# Patient Record
Sex: MALE | Race: BLACK OR AFRICAN AMERICAN | NOT HISPANIC OR LATINO | Employment: PART TIME | URBAN - METROPOLITAN AREA
[De-identification: names, ages, dates, MRNs, and addresses within clinical notes are randomized per-mention and may not be internally consistent; named-entity substitution may affect disease eponyms.]

---

## 2017-01-08 ENCOUNTER — HOSPITAL ENCOUNTER (EMERGENCY)
Facility: CLINIC | Age: 57
Discharge: HOME | End: 2017-01-08
Attending: EMERGENCY MEDICINE | Admitting: EMERGENCY MEDICINE

## 2017-01-08 VITALS
HEART RATE: 65 BPM | DIASTOLIC BLOOD PRESSURE: 110 MMHG | SYSTOLIC BLOOD PRESSURE: 180 MMHG | OXYGEN SATURATION: 93 % | TEMPERATURE: 95.8 F | RESPIRATION RATE: 16 BRPM

## 2017-01-08 DIAGNOSIS — N04.9 NEPHROTIC SYNDROME: ICD-10-CM

## 2017-01-08 DIAGNOSIS — R80.9 PROTEINURIA: ICD-10-CM

## 2017-01-08 DIAGNOSIS — R60.9 EDEMA, UNSPECIFIED TYPE: ICD-10-CM

## 2017-01-08 LAB
ALBUMIN UR-MCNC: 300 MG/DL
ANION GAP SERPL CALCULATED.3IONS-SCNC: 5 MMOL/L (ref 3–14)
APPEARANCE UR: ABNORMAL
BACTERIA #/AREA URNS HPF: ABNORMAL /HPF
BASOPHILS # BLD AUTO: 0 10E9/L (ref 0–0.2)
BASOPHILS NFR BLD AUTO: 0.4 %
BILIRUB UR QL STRIP: NEGATIVE
BUN SERPL-MCNC: 15 MG/DL (ref 7–30)
CALCIUM SERPL-MCNC: 7.8 MG/DL (ref 8.5–10.1)
CHLORIDE SERPL-SCNC: 108 MMOL/L (ref 94–109)
CO2 SERPL-SCNC: 28 MMOL/L (ref 20–32)
COLOR UR AUTO: YELLOW
CREAT SERPL-MCNC: 0.83 MG/DL (ref 0.66–1.25)
DIFFERENTIAL METHOD BLD: ABNORMAL
EOSINOPHIL # BLD AUTO: 0.2 10E9/L (ref 0–0.7)
EOSINOPHIL NFR BLD AUTO: 3.1 %
ERYTHROCYTE [DISTWIDTH] IN BLOOD BY AUTOMATED COUNT: 12.7 % (ref 10–15)
GFR SERPL CREATININE-BSD FRML MDRD: ABNORMAL ML/MIN/1.7M2
GLUCOSE SERPL-MCNC: 123 MG/DL (ref 70–99)
GLUCOSE UR STRIP-MCNC: NEGATIVE MG/DL
HCT VFR BLD AUTO: 42.8 % (ref 40–53)
HGB BLD-MCNC: 14.8 G/DL (ref 13.3–17.7)
HGB UR QL STRIP: ABNORMAL
HYALINE CASTS #/AREA URNS LPF: 14 /LPF (ref 0–2)
IMM GRANULOCYTES # BLD: 0 10E9/L (ref 0–0.4)
IMM GRANULOCYTES NFR BLD: 0.2 %
KETONES UR STRIP-MCNC: NEGATIVE MG/DL
LEUKOCYTE ESTERASE UR QL STRIP: NEGATIVE
LYMPHOCYTES # BLD AUTO: 2 10E9/L (ref 0.8–5.3)
LYMPHOCYTES NFR BLD AUTO: 38.8 %
MCH RBC QN AUTO: 33.2 PG (ref 26.5–33)
MCHC RBC AUTO-ENTMCNC: 34.6 G/DL (ref 31.5–36.5)
MCV RBC AUTO: 96 FL (ref 78–100)
MONOCYTES # BLD AUTO: 0.5 10E9/L (ref 0–1.3)
MONOCYTES NFR BLD AUTO: 9.5 %
MUCOUS THREADS #/AREA URNS LPF: PRESENT /LPF
NEUTROPHILS # BLD AUTO: 2.5 10E9/L (ref 1.6–8.3)
NEUTROPHILS NFR BLD AUTO: 48 %
NITRATE UR QL: NEGATIVE
NRBC # BLD AUTO: 0 10*3/UL
NRBC BLD AUTO-RTO: 0 /100
OVAL FAT BODIES #/AREA URNS HPF: ABNORMAL /HPF
PH UR STRIP: 6.5 PH (ref 5–7)
PLATELET # BLD AUTO: 117 10E9/L (ref 150–450)
POTASSIUM SERPL-SCNC: 4.3 MMOL/L (ref 3.4–5.3)
RBC # BLD AUTO: 4.46 10E12/L (ref 4.4–5.9)
RBC #/AREA URNS AUTO: 34 /HPF (ref 0–2)
SODIUM SERPL-SCNC: 141 MMOL/L (ref 133–144)
SP GR UR STRIP: 1.02 (ref 1–1.03)
URN SPEC COLLECT METH UR: ABNORMAL
UROBILINOGEN UR STRIP-MCNC: 4 MG/DL (ref 0–2)
WBC # BLD AUTO: 5.2 10E9/L (ref 4–11)
WBC #/AREA URNS AUTO: 3 /HPF (ref 0–2)

## 2017-01-08 PROCEDURE — 80048 BASIC METABOLIC PNL TOTAL CA: CPT | Performed by: EMERGENCY MEDICINE

## 2017-01-08 PROCEDURE — 81001 URINALYSIS AUTO W/SCOPE: CPT | Performed by: EMERGENCY MEDICINE

## 2017-01-08 PROCEDURE — 99284 EMERGENCY DEPT VISIT MOD MDM: CPT | Mod: 25 | Performed by: EMERGENCY MEDICINE

## 2017-01-08 PROCEDURE — 96374 THER/PROPH/DIAG INJ IV PUSH: CPT | Performed by: EMERGENCY MEDICINE

## 2017-01-08 PROCEDURE — 99284 EMERGENCY DEPT VISIT MOD MDM: CPT | Mod: Z6 | Performed by: EMERGENCY MEDICINE

## 2017-01-08 PROCEDURE — 25000125 ZZHC RX 250: Performed by: EMERGENCY MEDICINE

## 2017-01-08 PROCEDURE — 85025 COMPLETE CBC W/AUTO DIFF WBC: CPT | Performed by: EMERGENCY MEDICINE

## 2017-01-08 RX ORDER — BUMETANIDE 1 MG/1
1 TABLET ORAL DAILY
Qty: 60 TABLET | Refills: 0 | Status: ON HOLD | OUTPATIENT
Start: 2017-01-08 | End: 2017-02-14

## 2017-01-08 RX ORDER — FUROSEMIDE 10 MG/ML
60 INJECTION INTRAMUSCULAR; INTRAVENOUS ONCE
Status: COMPLETED | OUTPATIENT
Start: 2017-01-08 | End: 2017-01-08

## 2017-01-08 RX ADMIN — FUROSEMIDE 60 MG: 10 INJECTION, SOLUTION INTRAVENOUS at 14:40

## 2017-01-08 ASSESSMENT — ENCOUNTER SYMPTOMS
BRUISES/BLEEDS EASILY: 0
NECK PAIN: 0
NUMBNESS: 0
FEVER: 0
DIFFICULTY URINATING: 0
ABDOMINAL PAIN: 0
COLOR CHANGE: 0
VOMITING: 0
LIGHT-HEADEDNESS: 0
ADENOPATHY: 0
BACK PAIN: 0
SHORTNESS OF BREATH: 0
WEAKNESS: 0
NECK STIFFNESS: 0
POLYDIPSIA: 0
NAUSEA: 0
AGITATION: 0
CHILLS: 0

## 2017-01-08 NOTE — ED PROVIDER NOTES
History     Chief Complaint   Patient presents with     Leg Swelling     for one month, denies SOB      The history is provided by the patient. The history is limited by a language barrier ([place] citizen).     Wilfredo Yoon is a 56 year old [place] citizen speaking male with history of glomerulonephritis and cryoglobulinemia  who presents to the ED today for one month of bilateral lower extremity swelling and some facial swelling. Patient states he hasn't taken his medication in about 4 months due to problems with his insurance. He denies shortness of breath, or chest pain. No fevers. His primary doctor is Dr. Smith. No other concerns or complaints.        I have reviewed the Medications, Allergies, Past Medical and Surgical History, and Social History in the [facility] system.    PAST MEDICAL HISTORY:   Past Medical History   Diagnosis Date     Hepatitis B      Cryoglobulinemia (H)      Glomerulonephritis      Surgical complication        PAST SURGICAL HISTORY:   Past Surgical History   Procedure Laterality Date     Hand surgery Right      C hand/finger surgery unlisted         FAMILY HISTORY:   Family History   Problem Relation Age of Onset     Liver Cancer No family hx of      Hepatitis No family hx of        SOCIAL HISTORY:   Social History   Substance Use Topics     Smoking status: Current Every Day Smoker -- 0.50 packs/day for 40 years     Types: Cigarettes     Last Attempt to Quit: 11/04/2015     Smokeless tobacco: Never Used     Alcohol Use: No        No current facility-administered medications for this encounter.     Current Outpatient Prescriptions   Medication     bumetanide (BUMEX) 1 MG tablet     methylPREDNISolone (MEDROL DOSEPAK) 4 MG tablet     ibuprofen (ADVIL,MOTRIN) 600 MG tablet     azithromycin (ZITHROMAX) 250 MG tablet     albuterol (PROAIR HFA, PROVENTIL HFA, VENTOLIN HFA) 108 (90 BASE) MCG/ACT inhaler     naproxen (NAPROSYN) 500 MG tablet     oxyCODONE (ROXICODONE) 5 MG immediate release tablet      entecavir (BARACLUDE) 0.5 MG tablet     metoprolol (TOPROL-XL) 50 MG 24 hr tablet     [DISCONTINUED] bumetanide (BUMEX) 1 MG tablet      No Known Allergies      Review of Systems   Constitutional: Negative for fever and chills.   HENT: Negative for congestion.    Eyes: Negative for visual disturbance.   Respiratory: Negative for shortness of breath.    Cardiovascular: Negative for chest pain.   Gastrointestinal: Negative for nausea, vomiting and abdominal pain.   Endocrine: Negative for polydipsia and polyuria.   Genitourinary: Negative for difficulty urinating.   Musculoskeletal: Negative for back pain, neck pain and neck stiffness.        Leg swelling   Skin: Negative for color change.   Neurological: Negative for weakness, light-headedness and numbness.   Hematological: Negative for adenopathy. Does not bruise/bleed easily.   Psychiatric/Behavioral: Negative for behavioral problems and agitation.       Physical Exam   BP: (!) 173/100 mmHg  Pulse: 82  Temp: 95.8  F (35.4  C)  Resp: 20  SpO2: 98 %  Physical Exam   Constitutional: He is oriented to person, place, and time. He appears well-developed and well-nourished. No distress.   HENT:   Head: Normocephalic and atraumatic.   Mouth/Throat: Oropharynx is clear and moist. No oropharyngeal exudate.   Mild facial edema   Eyes: Conjunctivae and EOM are normal. Pupils are equal, round, and reactive to light. No scleral icterus.   Neck: Normal range of motion. Neck supple.   Cardiovascular: Normal rate, normal heart sounds and intact distal pulses.    Pulmonary/Chest: Effort normal and breath sounds normal. No respiratory distress. He has no wheezes. He has no rales.   Abdominal: Soft. Bowel sounds are normal. There is no tenderness.   Musculoskeletal: Normal range of motion. He exhibits edema ( 1+ b/l LEs). He exhibits no tenderness.   Neurological: He is alert and oriented to person, place, and time. No cranial nerve deficit. He exhibits normal muscle tone.  Coordination normal.   Skin: Skin is warm. No rash noted. He is not diaphoretic. No erythema.   Psychiatric: He has a normal mood and affect. His behavior is normal. Judgment and thought content normal.   Nursing note and vitals reviewed.      ED Course   Procedures       2:14 PM  The patient was seen and examined by Dr. Dean in Room 17.          Critical Care time:  none               Labs Ordered and Resulted from Time of ED Arrival Up to the Time of Departure from the ED   CBC WITH PLATELETS DIFFERENTIAL - Abnormal; Notable for the following:     MCH 33.2 (*)     Platelet Count 117 (*)     All other components within normal limits   BASIC METABOLIC PANEL - Abnormal; Notable for the following:     Glucose 123 (*)     Calcium 7.8 (*)     All other components within normal limits   ROUTINE UA WITH MICROSCOPIC - Abnormal; Notable for the following:     Blood Urine Moderate (*)     Protein Albumin Urine 300 (*)     Urobilinogen mg/dL 4.0 (*)     WBC Urine 3 (*)     RBC Urine 34 (*)     Bacteria Urine Few (*)     Mucous Urine Present (*)     Hyaline Casts 14 (*)     All other components within normal limits   PERIPHERAL IV CATHETER       Assessments & Plan (with Medical Decision Making)    56-year-old male with history of nephrotic syndrome presenting with diffuse edema. Differential diagnosis: Nephrotic syndrome, acute kidney injury, electrolyte abnormalities, unlikely heart failure, unlikely hepatitis.    After thorough history and physical examination patient appears to  be in no distress. He has no difficulty breathing whatsoever and denies dyspnea on exertional or chest pain. I do not believe he has a deep venous thrombosis given the fact that he has mild diffuse body edema. I will treat him with intravenous Lasix. I will obtain laboratory studies for  further evaluation. He agrees with plan.    Patient s laboratory studies returned without any evidence of leukocytosis WBC normal at 5200. There is no anemia  hemoglobin is normal at 14.8. The electrolyte s show normal renal function with creatinine 0.83 and GFR greater than 90. There is a proteinuria, however, no evidence of urinary tract infection. Patient does have hematuria as well. He started to diurese with intravenous Lasix. At this point I do not believe he needs to be admitted to the hospital, however, given the fact that he has not had his diuretics in several months I will prescribe him Bumex which he was taking in the past. I will refer him to follow-up with his nephrologist and his primary care physician for further evaluation. He agrees with the plan. He also agrees to address his hypertension with his nephrologist and his primary care physician and return if his symptoms worsen. At this point he is stable for discharge.    I have reviewed the nursing notes.    I have reviewed the findings, diagnosis, plan and need for follow up with the patient.    Discharge Medication List as of 1/8/2017  3:32 PM          Final diagnoses:   Edema, unspecified type   Nephrotic syndrome     IFartun , am serving as a trained medical scribe to document services personally performed by Lonnie Dean MD, based on the provider's statements to me.   ILonnie MD, was physically present and have reviewed and verified the accuracy of this note documented by Fartun Maier.      1/8/2017   [facility], [place], EMERGENCY DEPARTMENT      Marlys Dean MD  01/08/17 1814

## 2017-01-08 NOTE — ED AVS SNAPSHOT
[facility], [place], Emergency Department    [address]    [place] [place] [place] [address]    Phone:  [phone]    Fax:  [phone]                                       Wilfredo Yoon   MRN: [id number]    Department:  [facility], Emergency Department   Date of Visit:  1/8/2017           After Visit Summary Signature Page     I have received my discharge instructions, and my questions have been answered. I have discussed any challenges I see with this plan with the nurse or doctor.    ..........................................................................................................................................  Patient/Patient Representative Signature      ..........................................................................................................................................  Patient Representative Print Name and Relationship to Patient    ..................................................               ................................................  Date                                            Time    ..........................................................................................................................................  Reviewed by Signature/Title    ...................................................              ..............................................  Date                                                            Time

## 2017-01-08 NOTE — DISCHARGE INSTRUCTIONS
Please make an appointment to follow up with Your Primary Care Provider in 2-3 days for further evaluation and recommendations.  Coping with Edema  What is edema?  Edema is the build-up of fluid in the body, which causes swelling. Swelling most commonly occurs in the feet, ankles, lower legs or hands.  Swelling can occur in the belly or chest may be a sign of a more severe problem.  Certain medicines or conditions can make the swelling worse.  Symptoms include:    Feet and lower legs get larger when you sit or walk.    Hands feel tight when you make a fist.    When you push on the skin, skin stays dented.    Shiny, tight skin.    Fast weight gain.  How is it treated?  Your care team may give you a medicine to reduce the swelling.  They may also suggest that you meet with a dietitian. He or she can help with food choices to reduce the swelling.  What can I do about the swelling?    Place your feet above your heart 3 times a day: Sit with your feet up on a stool with a pillow. Sit on the bed or couch with two pillows under your feet.    Do not stand for long periods of time.    Wear loose-fitting clothes.    Do not cross your legs.    Reduce the salt in your diet.   These foods are high in salt:    Chips, soup    Frozen meals, TV dinners    Carrizales, lunch meat, ham    Sauces (soy, canned spaghetti sauce)    Walk or do other exercise.    Wear compression stockings.    Drink water as normal.    Weigh yourself every day at the same time to keep track of weight gain.  When should I call my care team?  Call your care team if:    You have a hard time breathing.    You gained 5 pounds or more in 1 week.    Your hands or feet feel cold when you touch them.    You are peeing very little or not at all.    Swelling is moving up your arms or legs.    Your tongue is swelling.    You cannot eat for more than a day  If you have any side effects, call us. We can help you manage these problems.  For more information,  see:  www.chemocare.com  www.cancer.org/treatment/treatmentsandsideeffects/physicalsideeffects/dealingwithsymptomsathome  www.cancer.gov/cancertopics/coping/chemotherapy-and-you  Comments:  __________________________________________  __________________________________________  __________________________________________  __________________________________________  __________________________________________  __________________________________________  __________________________________________  For informational purposes only. Not to replace the advice of your health care provider.  Copyright   2014 [employer]. All rights reserved. SMARTworks 539968 - REV 03/16.

## 2017-01-08 NOTE — ED AVS SNAPSHOT
[facility], Emergency Department    [address]    [place] [address]    Phone:  [phone]    Fax:  [phone]                                       Wilfredo Yoon   MRN: [id number]    Department:  [facility], Emergency Department   Date of Visit:  1/8/2017           Patient Information     Date Of Birth          1960        Your diagnoses for this visit were:     Edema, unspecified type     Nephrotic syndrome     Proteinuria        You were seen by Marlys Dean MD.        Discharge Instructions       Please make an appointment to follow up with Your Primary Care Provider in 2-3 days for further evaluation and recommendations.  Coping with Edema  What is edema?  Edema is the build-up of fluid in the body, which causes swelling. Swelling most commonly occurs in the feet, ankles, lower legs or hands.  Swelling can occur in the belly or chest may be a sign of a more severe problem.  Certain medicines or conditions can make the swelling worse.  Symptoms include:    Feet and lower legs get larger when you sit or walk.    Hands feel tight when you make a fist.    When you push on the skin, skin stays dented.    Shiny, tight skin.    Fast weight gain.  How is it treated?  Your care team may give you a medicine to reduce the swelling.  They may also suggest that you meet with a dietitian. He or she can help with food choices to reduce the swelling.  What can I do about the swelling?    Place your feet above your heart 3 times a day: Sit with your feet up on a stool with a pillow. Sit on the bed or couch with two pillows under your feet.    Do not stand for long periods of time.    Wear loose-fitting clothes.    Do not cross your legs.    Reduce the salt in your diet.   These foods are high in salt:    Chips, soup    Frozen meals, TV dinners    Carrizales, lunch meat, ham    Sauces (soy, canned spaghetti sauce)    Walk or do other exercise.    Wear compression stockings.    Drink water as  normal.    Weigh yourself every day at the same time to keep track of weight gain.  When should I call my care team?  Call your care team if:    You have a hard time breathing.    You gained 5 pounds or more in 1 week.    Your hands or feet feel cold when you touch them.    You are peeing very little or not at all.    Swelling is moving up your arms or legs.    Your tongue is swelling.    You cannot eat for more than a day  If you have any side effects, call us. We can help you manage these problems.  For more information, see:  www.chemocare.com  www.cancer.org/treatment/treatmentsandsideeffects/physicalsideeffects/dealingwithsymptomsathome  www.cancer.gov/cancertopics/coping/chemotherapy-and-you  Comments:  __________________________________________  __________________________________________  __________________________________________  __________________________________________  __________________________________________  __________________________________________  __________________________________________  For informational purposes only. Not to replace the advice of your health care provider.  Copyright   2014 [employer]. All rights reserved. SMARTworks 853153 - REV 03/16.        Discharge References/Attachments     PERIPHERAL EDEMA, BILATERAL (ENGLISH)      24 Hour Appointment Hotline       To make an appointment at any [facility], call [phone] ([phone]). If you don't have a family doctor or clinic, we will help you find one. [facility] are conveniently located to serve the needs of you and your family.             Review of your medicines      Our records show that you are taking the medicines listed below. If these are incorrect, please call your family doctor or clinic.        Dose / Directions Last dose taken    albuterol 108 (90 BASE) MCG/ACT Inhaler   Commonly known as:  PROAIR HFA/PROVENTIL HFA/VENTOLIN HFA   Dose:  2 puff   Quantity:  1 Inhaler        Inhale 2  puffs into the lungs every 6 hours as needed for shortness of breath / dyspnea or wheezing   Refills:  0        azithromycin 250 MG tablet   Commonly known as:  ZITHROMAX   Quantity:  6 tablet        Two tablets first day, then one tablet daily for four days.   Refills:  0        bumetanide 1 MG tablet   Commonly known as:  BUMEX   Dose:  1 mg   Quantity:  60 tablet        Take 1 tablet (1 mg) by mouth daily   Refills:  0        entecavir 0.5 MG tablet   Commonly known as:  BARACLUDE   Dose:  0.5 mg   Quantity:  30 tablet        Take 1 tablet (0.5 mg) by mouth daily   Refills:  6        ibuprofen 600 MG tablet   Commonly known as:  ADVIL/MOTRIN   Dose:  600 mg   Quantity:  20 tablet        Take 1 tablet (600 mg) by mouth every 6 hours as needed for moderate pain   Refills:  0        methylPREDNISolone 4 MG tablet   Commonly known as:  MEDROL DOSEPAK   Quantity:  21 tablet        Follow package instructions   Refills:  0        metoprolol 50 MG 24 hr tablet   Commonly known as:  TOPROL-XL   Dose:  50 mg        Take 50 mg by mouth daily   Refills:  0        naproxen 500 MG tablet   Commonly known as:  NAPROSYN   Dose:  500 mg   Quantity:  30 tablet        Take 1 tablet (500 mg) by mouth 2 times daily as needed for moderate pain   Refills:  0        oxyCODONE 5 MG IR tablet   Commonly known as:  ROXICODONE   Dose:  5 mg   Quantity:  10 tablet        Take 1 tablet (5 mg) by mouth every 6 hours as needed for pain   Refills:  0                Prescriptions were sent or printed at these locations (1 Prescription)                   Other Prescriptions                Printed at Department/Unit printer (1 of 1)         bumetanide (BUMEX) 1 MG tablet                Procedures and tests performed during your visit     Basic metabolic panel    CBC with platelets differential    UA with Microscopic      Orders Needing Specimen Collection     None      Pending Results     No orders found from 1/7/2017 to 1/9/2017.           "  Pending Culture Results     No orders found from 2017 to 2017.            Thank you for choosing [facility]       Thank you for choosing [facility] for your care. Our goal is always to provide you with excellent care. Hearing back from our patients is one way we can continue to improve our services. Please take a few minutes to complete the written survey that you may receive in the mail after you visit with us. Thank you!        [employer] [employer] Information     [employer] lets you send messages to your doctor, view your test results, renew your prescriptions, schedule appointments and more. To sign up, go to [email or web address] . Click on \"Log in\" on the left side of the screen, which will take you to the Welcome page. Then click on \"Sign up Now\" on the right side of the page.     You will be asked to enter the access code listed below, as well as some personal information. Please follow the directions to create your username and password.     Your access code is: [email or web address]  Expires: 2017 12:35 PM     Your access code will  in 90 days. If you need help or a new code, please call your [facility] clinic or [phone].        Care EveryWhere ID     This is your Care EveryWhere ID. This could be used by other organizations to access your [facility] medical records  [id number]        After Visit Summary       This is your record. Keep this with you and show to your community pharmacist(s) and doctor(s) at your next visit.                  "

## 2017-02-12 ENCOUNTER — APPOINTMENT (OUTPATIENT)
Dept: GENERAL RADIOLOGY | Facility: CLINIC | Age: 57
End: 2017-02-12
Attending: FAMILY MEDICINE

## 2017-02-12 ENCOUNTER — HOSPITAL ENCOUNTER (OUTPATIENT)
Facility: CLINIC | Age: 57
Setting detail: OBSERVATION
Discharge: HOME | End: 2017-02-14
Attending: FAMILY MEDICINE | Admitting: HOSPITALIST

## 2017-02-12 DIAGNOSIS — R80.9 PROTEINURIA: ICD-10-CM

## 2017-02-12 DIAGNOSIS — B18.1 CHRONIC VIRAL HEPATITIS B WITHOUT DELTA AGENT AND WITHOUT COMA (H): ICD-10-CM

## 2017-02-12 DIAGNOSIS — N04.9 NEPHROTIC SYNDROME: Primary | ICD-10-CM

## 2017-02-12 DIAGNOSIS — N04.9: ICD-10-CM

## 2017-02-12 LAB
ALBUMIN SERPL-MCNC: 1.5 G/DL (ref 3.4–5)
ALP SERPL-CCNC: 180 U/L (ref 40–150)
ALT SERPL W P-5'-P-CCNC: 38 U/L (ref 0–70)
ANION GAP SERPL CALCULATED.3IONS-SCNC: 6 MMOL/L (ref 3–14)
AST SERPL W P-5'-P-CCNC: 63 U/L (ref 0–45)
BASOPHILS # BLD AUTO: 0 10E9/L (ref 0–0.2)
BASOPHILS NFR BLD AUTO: 0.8 %
BILIRUB SERPL-MCNC: 0.3 MG/DL (ref 0.2–1.3)
BUN SERPL-MCNC: 21 MG/DL (ref 7–30)
CALCIUM SERPL-MCNC: 7.7 MG/DL (ref 8.5–10.1)
CHLORIDE SERPL-SCNC: 105 MMOL/L (ref 94–109)
CO2 SERPL-SCNC: 28 MMOL/L (ref 20–32)
CREAT SERPL-MCNC: 1.03 MG/DL (ref 0.66–1.25)
DIFFERENTIAL METHOD BLD: ABNORMAL
EOSINOPHIL # BLD AUTO: 0.2 10E9/L (ref 0–0.7)
EOSINOPHIL NFR BLD AUTO: 3.4 %
ERYTHROCYTE [DISTWIDTH] IN BLOOD BY AUTOMATED COUNT: 12.2 % (ref 10–15)
GFR SERPL CREATININE-BSD FRML MDRD: 75 ML/MIN/1.7M2
GLUCOSE SERPL-MCNC: 77 MG/DL (ref 70–99)
HCT VFR BLD AUTO: 38.8 % (ref 40–53)
HGB BLD-MCNC: 13.3 G/DL (ref 13.3–17.7)
IMM GRANULOCYTES # BLD: 0 10E9/L (ref 0–0.4)
IMM GRANULOCYTES NFR BLD: 0.2 %
LYMPHOCYTES # BLD AUTO: 2 10E9/L (ref 0.8–5.3)
LYMPHOCYTES NFR BLD AUTO: 37.3 %
MCH RBC QN AUTO: 32.4 PG (ref 26.5–33)
MCHC RBC AUTO-ENTMCNC: 34.3 G/DL (ref 31.5–36.5)
MCV RBC AUTO: 95 FL (ref 78–100)
MONOCYTES # BLD AUTO: 0.5 10E9/L (ref 0–1.3)
MONOCYTES NFR BLD AUTO: 10.1 %
NEUTROPHILS # BLD AUTO: 2.6 10E9/L (ref 1.6–8.3)
NEUTROPHILS NFR BLD AUTO: 48.2 %
NRBC # BLD AUTO: 0 10*3/UL
NRBC BLD AUTO-RTO: 0 /100
PLATELET # BLD AUTO: 157 10E9/L (ref 150–450)
POTASSIUM SERPL-SCNC: 4.1 MMOL/L (ref 3.4–5.3)
PROT SERPL-MCNC: 5.3 G/DL (ref 6.8–8.8)
RBC # BLD AUTO: 4.1 10E12/L (ref 4.4–5.9)
SODIUM SERPL-SCNC: 139 MMOL/L (ref 133–144)
WBC # BLD AUTO: 5.3 10E9/L (ref 4–11)

## 2017-02-12 PROCEDURE — 99285 EMERGENCY DEPT VISIT HI MDM: CPT | Mod: Z6 | Performed by: FAMILY MEDICINE

## 2017-02-12 PROCEDURE — 99220 ZZC INITIAL OBSERVATION CARE,LEVL III: CPT | Mod: GC | Performed by: HOSPITALIST

## 2017-02-12 PROCEDURE — 81001 URINALYSIS AUTO W/SCOPE: CPT | Performed by: FAMILY MEDICINE

## 2017-02-12 PROCEDURE — 25000128 H RX IP 250 OP 636: Performed by: FAMILY MEDICINE

## 2017-02-12 PROCEDURE — 71020 XR CHEST 2 VW: CPT

## 2017-02-12 PROCEDURE — 85025 COMPLETE CBC W/AUTO DIFF WBC: CPT | Performed by: FAMILY MEDICINE

## 2017-02-12 PROCEDURE — 80053 COMPREHEN METABOLIC PANEL: CPT | Performed by: FAMILY MEDICINE

## 2017-02-12 PROCEDURE — 96374 THER/PROPH/DIAG INJ IV PUSH: CPT | Performed by: FAMILY MEDICINE

## 2017-02-12 PROCEDURE — 99285 EMERGENCY DEPT VISIT HI MDM: CPT | Mod: 25 | Performed by: FAMILY MEDICINE

## 2017-02-12 RX ORDER — FUROSEMIDE 10 MG/ML
40 INJECTION INTRAMUSCULAR; INTRAVENOUS ONCE
Status: COMPLETED | OUTPATIENT
Start: 2017-02-12 | End: 2017-02-12

## 2017-02-12 RX ORDER — ONDANSETRON 4 MG/1
4 TABLET, ORALLY DISINTEGRATING ORAL EVERY 6 HOURS PRN
Status: DISCONTINUED | OUTPATIENT
Start: 2017-02-12 | End: 2017-02-14 | Stop reason: HOSPADM

## 2017-02-12 RX ORDER — METOPROLOL TARTRATE 25 MG/1
25 TABLET, FILM COATED ORAL 2 TIMES DAILY
Status: DISCONTINUED | OUTPATIENT
Start: 2017-02-12 | End: 2017-02-12

## 2017-02-12 RX ORDER — NALOXONE HYDROCHLORIDE 0.4 MG/ML
.1-.4 INJECTION, SOLUTION INTRAMUSCULAR; INTRAVENOUS; SUBCUTANEOUS
Status: DISCONTINUED | OUTPATIENT
Start: 2017-02-12 | End: 2017-02-14 | Stop reason: HOSPADM

## 2017-02-12 RX ORDER — HYDRALAZINE HYDROCHLORIDE 20 MG/ML
5 INJECTION INTRAMUSCULAR; INTRAVENOUS EVERY 6 HOURS PRN
Status: DISCONTINUED | OUTPATIENT
Start: 2017-02-12 | End: 2017-02-12

## 2017-02-12 RX ORDER — ONDANSETRON 2 MG/ML
4 INJECTION INTRAMUSCULAR; INTRAVENOUS EVERY 6 HOURS PRN
Status: DISCONTINUED | OUTPATIENT
Start: 2017-02-12 | End: 2017-02-14 | Stop reason: HOSPADM

## 2017-02-12 RX ADMIN — FUROSEMIDE 40 MG: 10 INJECTION, SOLUTION INTRAVENOUS at 17:48

## 2017-02-12 NOTE — ED NOTES
Began feeling a generalized feeling of heaviness over the last 3 months. Recently experiencing decreased appetite, fatigue, and generalized swelling throughout his body, especially his feet. Patient is worried his BP high.

## 2017-02-12 NOTE — IP AVS SNAPSHOT
MRN:[id number]                      After Visit Summary   2/12/2017    Wilfredo Yoon    MRN: [id number]           Thank you!     Thank you for choosing [facility] for your care. Our goal is always to provide you with excellent care. Hearing back from our patients is one way we can continue to improve our services. Please take a few minutes to complete the written survey that you may receive in the mail after you visit with us. Thank you!        Patient Information     Date Of Birth          1960        About your hospital stay     You were admitted on:  February 12, 2017 You last received care in the:  [address] [facility]    You were discharged on:  February 14, 2017        Reason for your hospital stay       Hypertensive urgency  Nephrotic syndrome                  Who to Call     For medical emergencies, please call 911.  For non-urgent questions about your medical care, please call your primary care provider or clinic, [phone]          Attending Provider     Provider Specialty    Trever Orellana MD [facility], Kris Perera MD Internal Medicine    Carlos Ontiveros MD Internal Medicine    Ammy Cardona MD Internal Medicine       Primary Care Provider Office Phone # Fax #    Wu Smith [phone]       [facility] [address] [place] [address]        After Care Instructions     Activity       Your activity upon discharge: activity as tolerated            Diet       Follow this diet upon discharge: 2 gram sodium diet            Discharge Instructions       - Primary care visit on February 20th  - Nephrology follow-up in 3 months  - GI (liver clinic) in 1 month  - Start taking your discharge medications  - If you become dizzy or lightheaded, call your primary care clinic for instructions. You may need to stop your lisinopril and bumex  - Weigh yourself daily. If you gain more than 3 pounds in 1 day or more  than 5 pounds in 1 week, call your primary care clinic as you may need adjust your bumex dose            Monitor and record       weight every day                  Follow-up Appointments     Follow Up and recommended labs and tests       Primary care follow-up in 1 week. Repeat BMP    Nephrology at [facility] in 3 months    GI liver clinic at [facility] in 1 month                  Your next 10 appointments already scheduled     Feb 15, 2017  9:00 AM [facility] Inpatient with Peter Powell    Services Department ([facility])    [address] [place] [phone]               Feb 16, 2017  9:00 AM [facility] Inpatient with Kristopher Powell    Services Department ([facility])    [address] [place] [phone]               Feb 17, 2017  9:00 AM [facility] Inpatient with alex Harmon MD    Services Department ([facility])    [address] [place] [phone]               May 16, 2017  3:00 PM CDT   Lab with  LAB   [facility] ([facility])    [address] [place] [address] [phone]            May 16, 2017  4:00 PM CDT   (Arrive by 3:30 PM)   Return Visit with Isaak Evans MD   [facility]ph[facility] ([facility])    [address] [place] [phone] [phone]              Further instructions from your care team       We have scheduled an appointment for you on Monday 2/20 at 3:00 pm with your Primary Care Doctor, Dr. Wu Smith at the [facility].  Please bring your ID and insurance card with you to this appointment. They will have a [employer]  ready for you.  [place] [address] [place] [address]  Phone #: [phone]    Pending  "Results     Date and Time Order Name Status Description    2017 0947 Blood culture Preliminary     2017 0947 Blood culture Preliminary             Statement of Approval     Ordered          17 1248  I have reviewed and agree with all the recommendations and orders detailed in this document.  EFFECTIVE NOW     Approved and electronically signed by:  Bal Hunter MD             Admission Information     Date & Time Provider Department Dept. Phone    2017 Ammy Cardona MD [address] [facility] [place] [phone]      Your Vitals Were     Blood Pressure Temperature Respirations Height Weight Pulse Oximetry    125/83 97.9  F (36.6  C) (Oral) 16 1.803 m (5' 11\") 61.4 kg (135 lb 4.8 oz) 95%    BMI (Body Mass Index)                   18.87 kg/m2           MyChart Information     [employer] lets you send messages to your doctor, view your test results, renew your prescriptions, schedule appointments and more. To sign up, go to [email or web address] . Click on \"Log in\" on the left side of the screen, which will take you to the Welcome page. Then click on \"Sign up Now\" on the right side of the page.     You will be asked to enter the access code listed below, as well as some personal information. Please follow the directions to create your username and password.     Your access code is: [email or web address]  Expires: 2017 12:35 PM     Your access code will  in 90 days. If you need help or a new code, please call your [facility] clinic or [phone].        Care EveryWhere ID     This is your Care EveryWhere ID. This could be used by other organizations to access your [facility] medical records  [id number]           Review of your medicines      START taking        Dose / Directions    lisinopril 20 MG tablet   Commonly known as:  PRINIVIL/ZESTRIL   Used for:  Nephrotic syndrome        Dose:  20 mg   Take 1 tablet (20 mg) by mouth daily   Quantity:  30 tablet   Refills:  0         CONTINUE these " medicines which may have CHANGED, or have new prescriptions. If we are uncertain of the size of tablets/capsules you have at home, strength may be listed as something that might have changed.        Dose / Directions    bumetanide 2 MG tablet   Commonly known as:  BUMEX   This may have changed:    - medication strength  - how much to take   Used for:  Nephrotic syndrome        Dose:  2 mg   Take 1 tablet (2 mg) by mouth daily   Quantity:  30 tablet   Refills:  0         CONTINUE these medicines which have NOT CHANGED        Dose / Directions    entecavir 0.5 MG tablet   Commonly known as:  BARACLUDE   Used for:  Chronic viral hepatitis B without delta agent and without coma (H)        Dose:  0.5 mg   Take 1 tablet (0.5 mg) by mouth daily   Quantity:  30 tablet   Refills:  0         STOP taking     ibuprofen 600 MG tablet   Commonly known as:  ADVIL/MOTRIN           metoprolol 50 MG 24 hr tablet   Commonly known as:  TOPROL-XL           naproxen 500 MG tablet   Commonly known as:  NAPROSYN                Where to get your medicines      These medications were sent to [facility] [address] [address]     Phone:  [phone]     bumetanide 2 MG tablet    entecavir 0.5 MG tablet    lisinopril 20 MG tablet                Protect others around you: Learn how to safely use, store and throw away your medicines at www.disposemymeds.org.             Medication List: This is a list of all your medications and when to take them. Check marks below indicate your daily home schedule. Keep this list as a reference.      Medications           Morning Afternoon Evening Bedtime As Needed    bumetanide 2 MG tablet   Commonly known as:  BUMEX   Take 1 tablet (2 mg) by mouth daily   Last time this was given:  2 mg on 2/14/2017  8:31 AM                                entecavir 0.5 MG tablet   Commonly known as:  BARACLUDE   Take 1 tablet (0.5 mg) by mouth  daily                                lisinopril 20 MG tablet   Commonly known as:  PRINIVIL/ZESTRIL   Take 1 tablet (20 mg) by mouth daily   Last time this was given:  20 mg on 2/14/2017  8:31 AM

## 2017-02-12 NOTE — IP AVS SNAPSHOT
[address] [address]    Phone:  [phone]                                       After Visit Summary   2/12/2017    Wilfredo Yoon    MRN: [id number]           After Visit Summary Signature Page     I have received my discharge instructions, and my questions have been answered. I have discussed any challenges I see with this plan with the nurse or doctor.    ..........................................................................................................................................  Patient/Patient Representative Signature      ..........................................................................................................................................  Patient Representative Print Name and Relationship to Patient    ..................................................               ................................................  Date                                            Time    ..........................................................................................................................................  Reviewed by Signature/Title    ...................................................              ..............................................  Date                                                            Time

## 2017-02-12 NOTE — ED PROVIDER NOTES
History     Chief Complaint   Patient presents with     Hypertension     Began feeling a generalized feeling of heaviness over the last 3 months. Recently experiencing decreased appetite, fatigue, and generalized swelling throughout his body. Is worried his BP high     HPI  Wilfredo Yoon is a 56 year old male with a history of Hepatitis B, essential hypertension glomerulonephritis and cryoglobulinemia  who presents to the Emergency Department with various symptoms. The patient reports for the last three months he has been feeling generally weak and swollen (especially in his abdomen, back and legs) which he is concerned may be the result of his high blood pressure. He states he had similar symptoms about a year ago when he was not taking his antihypertensives and indeed he has been off of his metoprolol for about the last 4-5 months again secondary to insurance issues. He is still taking Demadex as prescribed. Over the last few days he has had decreased appetite, dizziness, bilateral eye pressure, dry mouth and fatigue which prompted him to seek evaluation. He had been following with a primary care provider but since he lost insurance has not been able to follow-up with him. He would appreciate help with getting insurance coverage. No other complaints at this time.       I have reviewed the Medications, Allergies, Past Medical and Surgical History, and Social History in the [facility] system.  Past Medical History   Diagnosis Date     Cryoglobulinemia (H)      Glomerulonephritis      Hepatitis B      Surgical complication        Past Surgical History   Procedure Laterality Date     Hand surgery Right      C hand/finger surgery unlisted         Family History   Problem Relation Age of Onset     Liver Cancer No family hx of      Hepatitis No family hx of        Social History   Substance Use Topics     Smoking status: Former Smoker     Packs/day: 0.50     Years: 40.00     Types: Cigarettes     Quit date:  "1/29/2017     Smokeless tobacco: Never Used     Alcohol use No       No current facility-administered medications for this encounter.      Current Outpatient Prescriptions   Medication     Torsemide (DEMADEX PO)     bumetanide (BUMEX) 1 MG tablet     ibuprofen (ADVIL,MOTRIN) 600 MG tablet     naproxen (NAPROSYN) 500 MG tablet     entecavir (BARACLUDE) 0.5 MG tablet     metoprolol (TOPROL-XL) 50 MG 24 hr tablet        No Known Allergies   Review of Systems   ROS: 10 point ROS neg other than the symptoms noted above in the HPI.   Physical Exam   BP: (!) 176/115  Heart Rate: 81  Temp: 97  F (36.1  C)  Resp: 16  Height: 180.3 cm (5' 11\")  Weight: 64.4 kg (142 lb)  SpO2: 100 %  Physical Exam   Constitutional: He is oriented to person, place, and time. He appears well-developed and well-nourished.   HENT:   Head: Normocephalic and atraumatic.   Mouth/Throat: Oropharynx is clear and moist.   No facial or periorbital edema is noted   Eyes: EOM are normal. Pupils are equal, round, and reactive to light.   Neck: Normal range of motion. Neck supple. No tracheal deviation present. No thyromegaly present.   Cardiovascular: Normal rate, regular rhythm, normal heart sounds and intact distal pulses.  Exam reveals no gallop and no friction rub.    No murmur heard.  Pulmonary/Chest: Effort normal and breath sounds normal. He has no rales. He exhibits no tenderness.   Abdominal: Soft. Bowel sounds are normal. He exhibits no distension and no mass. There is no tenderness.   Musculoskeletal: He exhibits tenderness (2+ pitting edema to the mid thigh and presacral). He exhibits no edema.   Neurological: He is alert and oriented to person, place, and time. He displays normal reflexes. No cranial nerve deficit. He exhibits normal muscle tone. Coordination normal.   Skin: Skin is warm and dry. No rash noted.   Psychiatric: He has a normal mood and affect. His behavior is normal.   Nursing note and vitals reviewed.      ED Course     ED " Course     Procedures             Critical Care time:  none               Labs Ordered and Resulted from Time of ED Arrival Up to the Time of Departure from the ED   CBC WITH PLATELETS DIFFERENTIAL - Abnormal; Notable for the following:        Result Value    RBC Count 4.10 (*)     Hematocrit 38.8 (*)     All other components within normal limits   COMPREHENSIVE METABOLIC PANEL - Abnormal; Notable for the following:     Calcium 7.7 (*)     Albumin 1.5 (*)     Protein Total 5.3 (*)     Alkaline Phosphatase 180 (*)     AST 63 (*)     All other components within normal limits   ROUTINE UA WITH MICROSCOPIC REFLEX TO CULTURE - Abnormal; Notable for the following:     Blood Urine Small (*)     Protein Albumin Urine 300 (*)     WBC Urine 4 (*)     RBC Urine 21 (*)     Mucous Urine Present (*)     Amorphous Crystals Few (*)     All other components within normal limits   PROTEIN  RANDOM URINE   STRICT INTAKE AND OUTPUT       Assessments & Plan (with Medical Decision Making)   Patient with a history of hepatitis B and prior history of nephrotic syndrome and glomerulonephritis which was felt to be due to cryoglobulinemia.  He is been lost to follow-up due to poor compliance and lack of accessible healthcare as he had no insurance.  He presented today with increasing fatigue, occasional blood in his urine, elevated blood pressure, and worsening edema.  His blood pressure remains persistently elevated despite a dose of Lasix, creatinine is slightly elevated, and his urine again shows protein and red blood cells.  CBC is normal.  His exam indeed shows edema to the presacral area.  The case was discussed with the nephrologist on-call and we agreed it would be appropriate to admit this patient for management of his edema, as well as his blood pressure, nephrology and likely GI consult.  I will discuss with the internal medicine physician on call.  He will need to be admitted to the [facility].  Patient is stable for transport to  the [facility].    I have reviewed the nursing notes.    I have reviewed the findings, diagnosis, plan and need for follow up with the patient.    New Prescriptions    No medications on file       Final diagnoses:   Glomerulonephritis with edema   ILeigha, am serving as a trained medical scribe to document services personally performed by Dick Orellana MD, based on the provider's statements to me. This document has been checked and approved by the attending provider.    IDick MD was physically present and have reviewed and verified the accuracy of this note documented by Leigha Merritt.        2/12/2017   [facility], [place], EMERGENCY DEPARTMENT     Trever Orellana MD  02/12/17 9991

## 2017-02-13 ENCOUNTER — APPOINTMENT (OUTPATIENT)
Dept: ULTRASOUND IMAGING | Facility: CLINIC | Age: 57
End: 2017-02-13

## 2017-02-13 LAB
ALBUMIN SERPL-MCNC: 1.2 G/DL (ref 3.4–5)
ALBUMIN UR-MCNC: 300 MG/DL
ALP SERPL-CCNC: 158 U/L (ref 40–150)
ALT SERPL W P-5'-P-CCNC: 34 U/L (ref 0–70)
AMORPH CRY #/AREA URNS HPF: ABNORMAL /HPF
ANION GAP SERPL CALCULATED.3IONS-SCNC: 7 MMOL/L (ref 3–14)
APPEARANCE UR: ABNORMAL
AST SERPL W P-5'-P-CCNC: 48 U/L (ref 0–45)
BILIRUB SERPL-MCNC: 0.6 MG/DL (ref 0.2–1.3)
BILIRUB UR QL STRIP: NEGATIVE
BUN SERPL-MCNC: 20 MG/DL (ref 7–30)
CALCIUM SERPL-MCNC: 7.7 MG/DL (ref 8.5–10.1)
CHLORIDE SERPL-SCNC: 104 MMOL/L (ref 94–109)
CO2 SERPL-SCNC: 28 MMOL/L (ref 20–32)
COLOR UR AUTO: YELLOW
CREAT SERPL-MCNC: 1.09 MG/DL (ref 0.66–1.25)
ERYTHROCYTE [DISTWIDTH] IN BLOOD BY AUTOMATED COUNT: 12.9 % (ref 10–15)
FLUAV+FLUBV RNA SPEC QL NAA+PROBE: NORMAL
FLUAV+FLUBV RNA SPEC QL NAA+PROBE: NORMAL
GFR SERPL CREATININE-BSD FRML MDRD: 70 ML/MIN/1.7M2
GLUCOSE SERPL-MCNC: 104 MG/DL (ref 70–99)
GLUCOSE UR STRIP-MCNC: NEGATIVE MG/DL
HCT VFR BLD AUTO: 37.7 % (ref 40–53)
HGB BLD-MCNC: 12.4 G/DL (ref 13.3–17.7)
HGB UR QL STRIP: ABNORMAL
HYALINE CASTS #/AREA URNS LPF: 1 /LPF (ref 0–2)
INR PPP: 1.18 (ref 0.86–1.14)
KETONES UR STRIP-MCNC: NEGATIVE MG/DL
LEUKOCYTE ESTERASE UR QL STRIP: NEGATIVE
MCH RBC QN AUTO: 31.6 PG (ref 26.5–33)
MCHC RBC AUTO-ENTMCNC: 32.9 G/DL (ref 31.5–36.5)
MCV RBC AUTO: 96 FL (ref 78–100)
MUCOUS THREADS #/AREA URNS LPF: PRESENT /LPF
NITRATE UR QL: NEGATIVE
PH UR STRIP: 6.5 PH (ref 5–7)
PLATELET # BLD AUTO: 139 10E9/L (ref 150–450)
POTASSIUM SERPL-SCNC: 4.1 MMOL/L (ref 3.4–5.3)
PROT SERPL-MCNC: 4.6 G/DL (ref 6.8–8.8)
RBC # BLD AUTO: 3.93 10E12/L (ref 4.4–5.9)
RBC #/AREA URNS AUTO: 21 /HPF (ref 0–2)
RSV RNA SPEC NAA+PROBE: NORMAL
SODIUM SERPL-SCNC: 138 MMOL/L (ref 133–144)
SP GR UR STRIP: 1.01 (ref 1–1.03)
SPECIMEN SOURCE: NORMAL
URN SPEC COLLECT METH UR: ABNORMAL
UROBILINOGEN UR STRIP-MCNC: 2 MG/DL (ref 0–2)
WBC # BLD AUTO: 6.1 10E9/L (ref 4–11)
WBC #/AREA URNS AUTO: 4 /HPF (ref 0–2)

## 2017-02-13 PROCEDURE — 36415 COLL VENOUS BLD VENIPUNCTURE: CPT | Performed by: INTERNAL MEDICINE

## 2017-02-13 PROCEDURE — 99226 ZZC SUBSEQUENT OBSERVATION CARE,LEVEL III: CPT | Mod: GC | Performed by: INTERNAL MEDICINE

## 2017-02-13 PROCEDURE — 25000128 H RX IP 250 OP 636: Performed by: INTERNAL MEDICINE

## 2017-02-13 PROCEDURE — 25000132 ZZH RX MED GY IP 250 OP 250 PS 637: Performed by: INTERNAL MEDICINE

## 2017-02-13 PROCEDURE — G0378 HOSPITAL OBSERVATION PER HR: HCPCS

## 2017-02-13 PROCEDURE — 85610 PROTHROMBIN TIME: CPT | Performed by: INTERNAL MEDICINE

## 2017-02-13 PROCEDURE — 87517 HEPATITIS B DNA QUANT: CPT | Performed by: INTERNAL MEDICINE

## 2017-02-13 PROCEDURE — 96376 TX/PRO/DX INJ SAME DRUG ADON: CPT

## 2017-02-13 PROCEDURE — 87040 BLOOD CULTURE FOR BACTERIA: CPT | Performed by: INTERNAL MEDICINE

## 2017-02-13 PROCEDURE — 80053 COMPREHEN METABOLIC PANEL: CPT | Performed by: INTERNAL MEDICINE

## 2017-02-13 PROCEDURE — 85027 COMPLETE CBC AUTOMATED: CPT | Performed by: INTERNAL MEDICINE

## 2017-02-13 PROCEDURE — 76705 ECHO EXAM OF ABDOMEN: CPT | Mod: XS

## 2017-02-13 PROCEDURE — 87631 RESP VIRUS 3-5 TARGETS: CPT | Performed by: INTERNAL MEDICINE

## 2017-02-13 RX ORDER — METOPROLOL TARTRATE 25 MG/1
25 TABLET, FILM COATED ORAL 2 TIMES DAILY
Status: DISCONTINUED | OUTPATIENT
Start: 2017-02-13 | End: 2017-02-13

## 2017-02-13 RX ORDER — BUMETANIDE 1 MG/1
2 TABLET ORAL DAILY
Status: DISCONTINUED | OUTPATIENT
Start: 2017-02-14 | End: 2017-02-14 | Stop reason: HOSPADM

## 2017-02-13 RX ORDER — FUROSEMIDE 10 MG/ML
20 INJECTION INTRAMUSCULAR; INTRAVENOUS ONCE
Status: COMPLETED | OUTPATIENT
Start: 2017-02-13 | End: 2017-02-13

## 2017-02-13 RX ORDER — LISINOPRIL 10 MG/1
10 TABLET ORAL DAILY
Status: DISCONTINUED | OUTPATIENT
Start: 2017-02-14 | End: 2017-02-13

## 2017-02-13 RX ORDER — LISINOPRIL 20 MG/1
20 TABLET ORAL DAILY
Status: DISCONTINUED | OUTPATIENT
Start: 2017-02-14 | End: 2017-02-14 | Stop reason: HOSPADM

## 2017-02-13 RX ORDER — ENTECAVIR 0.5 MG/1
0.5 TABLET, FILM COATED ORAL DAILY
Status: DISCONTINUED | OUTPATIENT
Start: 2017-02-13 | End: 2017-02-13

## 2017-02-13 RX ORDER — BUMETANIDE 1 MG/1
1 TABLET ORAL
Status: DISCONTINUED | OUTPATIENT
Start: 2017-02-14 | End: 2017-02-13

## 2017-02-13 RX ADMIN — METOPROLOL TARTRATE 25 MG: 25 TABLET ORAL at 20:53

## 2017-02-13 RX ADMIN — FUROSEMIDE 20 MG: 10 INJECTION, SOLUTION INTRAVENOUS at 11:24

## 2017-02-13 RX ADMIN — METOPROLOL TARTRATE 37.5 MG: 25 TABLET ORAL at 00:22

## 2017-02-13 RX ADMIN — FUROSEMIDE 20 MG: 10 INJECTION, SOLUTION INTRAVENOUS at 17:05

## 2017-02-13 RX ADMIN — METOPROLOL TARTRATE 37.5 MG: 25 TABLET ORAL at 08:10

## 2017-02-13 RX ADMIN — FUROSEMIDE 20 MG: 10 INJECTION, SOLUTION INTRAVENOUS at 00:54

## 2017-02-13 NOTE — DISCHARGE SUMMARY
[facility] Service - Internal Medicine Discharge Summary   Date of Service: 2/14/2017    Wilfredo Yoon MRN# [id number]   YOB: 1960 Age: 56 year old     Date of Admission:  2/12/2017  Date of Discharge:  2/14/2017  Admitting Physician:  Carlos Ontiveros MD  Discharge Physician:  Dr. Ammy Cardona  Discharging Service:  Internal Medicine, [place] [address]     Primary Provider: Wu Smith    TO DO:  - PCP follow-up in 1 week with repeat CMP. Adjust diuretics as needed.  - GI follow-up in 1 month  - Nephrology follow-up in 3 months         Reason for Admission:   Hypertension, fatigue          Discharge Diagnosis:   Hypertensive urgency  Nephrotic syndrome, hypervolemia  Chronic Hepatitis B  Medication non-compliance in the setting of loss of insurance         Procedures & Significant Findings:   RUQ with doppler US  Impression:   1. Mildly heterogeneous echotexture in the liver can be seen with  underlying chronic intrinsic parenchymal disease. No focal liver  lesion.  2. Patent Doppler evaluation with normal direction of flow.  3. Decompressed gallbladder obscures evaluation of the gallbladder  wall. Borderline wall thickening is nonspecific in this setting as  well as in the setting of ascites.  4. At least small perihepatic ascites.         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 Bedside IVC 2.3 with minimal resp variation. BP 180s/110s on admission. Improved with lasix IV. Albumin and TP down from previous lab values. Was following with nephrology but it has been over a year. Urine with significant proteinuria. Not currently on ACE-I. The true treatment will be treating his hepatitis B, so restarting entecavir is necessary  - Nephrology consulted  - Lisinopril 20mg  "daily started  - Bumex 2mg daily started  - Entecavir 0.5mg daily  - Daily weights      # Chronic Hepatitis B  Is prescribed Entecavir - ran out of this medication 4 months ago as well due to insurance coverage. US in 06/2016 did not have any evidence of cirrhosis or hepatomegaly. He did have ascites as listed above. No focal lesion was noted to suggest HCC. No evidence of acute liver failure/hepatitis at this time. Last seen by GI 1 year ago. Last Hep B viral load in 1/2016 was 306[address]. Abd US with doppler without masses  - Discussed case with GI team  - Outpatient GI follow-up in 1 month  - Restart entecavir 0.5mg daily  - HBV viral load pending     # Abdominal swelling  # Fatigue/malaise  Improved with diuresis. Patient feeling much better. Infectious work-up negative. Abdominal swelling is minimal, no pain, and no ascites on bedside ultrasound to get a diagnostic sample    Physical Exam on day of Discharge:  Blood pressure 125/83, temperature 97.9  F (36.6  C), temperature source Oral, resp. rate 16, height 1.803 m (5' 11\"), weight 61.4 kg (135 lb 4.8 oz), SpO2 95 %.  General: alert and no distress  Head: NC/AT  Eyes: non-icteric sclera, EOMI  Pulmonary: CTAB  CV: RRR, +s1/s2, no murmurs  GI: soft, non-tender, mild distension, + bowel sounds  Skin: no rashes seen  EXT: peripheral edema up to presacral area bilaterally, WWP  Neuro: A&Ox3, no focal deficits         Pending Results:   HBV viral load         Discharge Medications:     Current Discharge Medication List      START taking these medications    Details   lisinopril (PRINIVIL/ZESTRIL) 20 MG tablet Take 1 tablet (20 mg) by mouth daily  Qty: 30 tablet, Refills: 0    Associated Diagnoses: Nephrotic syndrome         CONTINUE these medications which have CHANGED    Details   entecavir (BARACLUDE) 0.5 MG tablet Take 1 tablet (0.5 mg) by mouth daily  Qty: 30 tablet, Refills: 0    Associated Diagnoses: Chronic viral hepatitis B without delta agent and without coma " (H)      bumetanide (BUMEX) 2 MG tablet Take 1 tablet (2 mg) by mouth daily  Qty: 30 tablet, Refills: 0    Associated Diagnoses: Nephrotic syndrome         STOP taking these medications       ibuprofen (ADVIL,MOTRIN) 600 MG tablet Comments:   Reason for Stopping:         naproxen (NAPROSYN) 500 MG tablet Comments:   Reason for Stopping:         metoprolol (TOPROL-XL) 50 MG 24 hr tablet Comments:   Reason for Stopping:                    Discharge Instructions and Follow-Up:     Discharge Procedure Orders  Reason for your hospital stay   Order Comments: Hypertensive urgency  Nephrotic syndrome     Follow Up and recommended labs and tests   Order Comments: Primary care follow-up in 1 week. Repeat BMP    Nephrology at [facility] in 3 months    GI liver clinic at [facility] in 1 month     Activity   Order Comments: Your activity upon discharge: activity as tolerated   Order Specific Question Answer Comments   Is discharge order? Yes      Monitor and record   Order Comments: weight every day     Discharge Instructions   Order Comments: - Primary care visit on February 20th  - Nephrology follow-up in 3 months  - GI (liver clinic) in 1 month  - Start taking your discharge medications  - If you become dizzy or lightheaded, call your primary care clinic for instructions. You may need to stop your lisinopril and bumex  - Weigh yourself daily. If you gain more than 3 pounds in 1 day or more than 5 pounds in 1 week, call your primary care clinic as you may need adjust your bumex dose     Diet   Order Comments: Follow this diet upon discharge: 2 gram sodium diet   Order Specific Question Answer Comments   Is discharge order? Yes                Discharge Disposition:   Home         Condition on Discharge:   Discharge condition: Stable   Code status on discharge: Full Code        Date of service: 2/14/2017     30 minutes spent in discharge, including >50% in counseling and coordination of care, medication review and plan of care recommended on  follow up.     Patient seen and staffed with Dr. Cardona

## 2017-02-13 NOTE — H&P
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    Nephrotic Syndrome:   Hypertension:  - Volume status: appears hypervolemic, increased lower extremity/abominal swelling.  Currently on Bumex 1 mg daily - per patient he has been compliant with this dose.  Monitor response to Lasix.  Additional Lasix 20 mg IV x 1 - reassess in the AM and resume PO Bumex.   - IVC on bedside ultrasound on arrival - 2.34 cm with minimal respiratory variation.    - Blood pressure: elevated to the 170s/103 systolic - given Lasix 40 mg IV in the ED prior to transfer.  Metoprolol succinate listed as home med - 50 mg XL - Pt has run out of this medication 4 months ago.  Restart Metoprlol 37.5 mg tartrate BID today and reassess.    -  He additionally has been lost to follow up for more than a year due to his insurance issues.  He was followed in the nephrology clinic 1 year ago.    - Creatinine mildly elevated to 1.03.  Trend with diuresis.   - He does have a history of ascites in the past - likely 2/2 to nephrotic syndrome.     Chronic Hepatitis B without evidence of hepatitis: Is prescribed Entecavir - ran out of this medication 4 months ago as well.    - US in 06/2016 did not have any evidence of cirrhosis or hepatomegaly.  He did have ascites as listed above.  No focal lesion was noted to suggest HCC.  No evidence of acute liver failure/hepatitis at this time.    - he as also seen in the hepatology clinic 1 year ago 01/2016 prescribed Entecariver 0.5 mg PO every 24 hours.  Was instructed to follow up in the  clinic in 3 months for repeat lab work and repeat HBV DNA - however was lost to follow up.    - Last Hep B viral load was 55671.    - Recheck Hep B viral load, CMP, INR in the AM.     - Consider repeat RUQ US in the AM vs. Outpt.    Fatigue/mallaise:  No clear localizing symptoms today.  White count normal.  Stable vital signs.  Afebrile.    - He does have  mild ascites on exam -however - his abdomen is non tender and not tense.  Low suspicion for SBP - but unsure if patients with nephrotic syndrome are at the same risk for SBP.    - UA unremarkable except for known proteinuria.    - CXR within normal limits.    - Monitor for signs of infection.    - check Influenza PCR.    Abdominal pain - intermittent - when he eats.  No prior history of EGD.  Currently is not on a PPI.  Naproxen is listed as his home med - inquire in the AM regarding NSAID use.  DDx includes pain related to abdominal distention vs.  GERD.    - Trail PPI in the AM if history is suggestive.      Nutrition: Albumin is 1.5.  The patient appears cachectic and endorses a poor diet recently.    - Consider nutrition consult.      FEN: 2 gram sodium diet.  2 liter fluid restriction.    DVT Prophylaxis: Ambulate.    Code Status: Full Code  Disposition: Expected discharge in 1-2 days upon further management of blood pressure.  Admit to observation.      Pt will be formally staffed in the AM by the day team.      Del Ag   Internal Medicine PGY2        Physician Attestation    Date of Service: February 12, 2017    Attending Exam: Patient has been seen and evaluated by me. Discussed with the team and agree with the findings and plan in this note.     Review of Findings: I have reviewed today's vital signs, medications, labs and imaging results    Attending Note:     Wilfredo Yoon is a 56 year old male who presents with chronic hepatitis B, HTN, nephrotic range proteinuria s/p renal biopsy consistent with MPGN and IgM kappa  concerning for cryoglobulinemic glomerulonephritis who is now coming in with generalized malaise, increased lower extremity/abdominal swelling, and elevated blood pressures. On exam: S1S2 normal. Lungs CTA. Leg edema noted. Abdomen is non tender.  We will have nephrology see him tomorrow. Give lasix for higher volume status and titrate metoprolol for BP. Check Hep B virla load. Rest of the plan is outline below.     Carlos Ontiveros MD  [place] [facility], [facility]                  Primary Care Physician   Wu Smith    Chief Complaint   Generalized fatigue, edema, and hypertension.      History is obtained from the patient    History of Present Illness   Wilfredo Yoon is a 56 year old male who presents with past medical history as listed above.  He reports several weeks of generalized malaise, increased abdominal and lower extremity edema.  Mild nausea, but no vomiting or hematemesis.  No diarrrhea or loose stools.  Variable amount of urine with his diuretics daily - no dysuria or hematuria.  No dizziness or lightheadedness.  Occasional headache but no neck pain or vision changes.  No syncope.  No shortness of breath - occasional cough when it is cold.  No chest pain or palpitations.      States due to insurance reasons has not been able to follow up with hepatology or nephrology.  Ran out of his hep B and blood pressure medications about 4 months ago.  Has not seen a physician in a while.  States he has been taking his diuretic but he is about to run out. Bumex once daily.  No fever.  But endorses occasional chills.  Diet has been poor - including appetite and endorses pain after meals and difficulty with abdominal bloating limiting his meals.      Past Medical History    I have reviewed this patient's medical history and updated it with pertinent information if needed.   Past Medical History   Diagnosis Date     Cryoglobulinemia (H)      Glomerulonephritis      Hepatitis B      Surgical complication         Past Surgical History   I have reviewed this patient's surgical history and updated it with pertinent information if needed.  Past Surgical History   Procedure Laterality Date     Hand surgery Right      C hand/finger surgery unlisted         Prior to Admission Medications   Prior to Admission Medications   Prescriptions Last Dose Informant Patient Reported? Taking?   Torsemide (DEMADEX PO) 2/11/2017 at Unknown time  Yes Yes   bumetanide (BUMEX) 1 MG tablet   No No   Sig: Take 1 tablet (1 mg) by mouth daily   entecavir (BARACLUDE) 0.5 MG tablet More than a month at Unknown time  No No   Sig: Take 1 tablet (0.5 mg) by mouth daily   ibuprofen (ADVIL,MOTRIN) 600 MG tablet More than a month at Unknown time  No No   Sig: Take 1 tablet (600 mg) by mouth every 6 hours as needed for moderate pain   metoprolol (TOPROL-XL) 50 MG 24 hr tablet   Yes No   Sig: Take 50 mg by mouth daily   naproxen (NAPROSYN) 500 MG tablet More than a month at Unknown time  No No   Sig: Take 1 tablet (500 mg) by mouth 2 times daily as needed for moderate pain      Facility-Administered Medications: None     Allergies   No Known Allergies    Social History   I have reviewed this patient's social history and updated it with pertinent information if needed. Wilfredoliz Yoon  reports that he quit smoking about 2 weeks ago. His smoking use included Cigarettes. He has a 20.00 pack-year smoking history. He has never used smokeless tobacco. He reports that he does not drink alcohol or use illicit drugs.  No history of IV drug use currently on in the past.      Quit smoking 3 weeks ago.  Used to smoke 1 to 1.5 ppd prior to quitting.      Family History   I have reviewed this patient's family history and updated it with pertinent information if needed.   Family History   Problem Relation Age of Onset     Liver Cancer No family hx of      Hepatitis No family hx of        Review of Systems   The 10 point Review of Systems is negative other  than noted in the HPI or here.     Physical Exam   Temp: 96.5  F (35.8  C) Temp src: Axillary BP: (!) 177/110   Heart Rate: 71 Resp: 20 SpO2: 98 % O2 Device: None (Room air)    Vital Signs with Ranges  Temp:  [96.5  F (35.8  C)-97  F (36.1  C)] 96.5  F (35.8  C)  Heart Rate:  [64-91] 71  Resp:  [0-27] 20  BP: (172-194)/(103-128) 177/110  SpO2:  [96 %-100 %] 98 %  135 lbs 4.8 oz    GEN:  Alert, oriented x 3, appears comfortable, NAD.    HEENT:  Normocephalic/atraumatic, no scleral icterus, no nasal discharge, mouth moist.  CV:  RRR, no murmurs, rubs or gallops.  S1 + S2 noted, no S3 or S4.  LUNGS:  Clear to auscultation bilaterally.  No wheezes, rhonchi, or crackles.  ABD:  Active bowel sounds, soft, mildly distended and non tender.  No rebound/guarding/rigidity.  EXT:  Distal pulses intact.  1-2 + pitting lower extremity edema - symmetric bilaterlly.   SKIN:  Dry to touch, no exanthems noted in the visualized areas.  NEURO:  No new focal deficits appreciated.    Bedside ultrasound: Ascites noted - mild in volume.  IVC  2.34 with minimal respiratory variation.  Grossly normal LVEF with no pericardial effusion.      Data   Data reviewed today:      Recent Labs  Lab 02/12/17  1741   WBC 5.3   HGB 13.3   MCV 95         POTASSIUM 4.1   CHLORIDE 105   CO2 28   BUN 21   CR 1.03   ANIONGAP 6   SOTERO 7.7*   GLC 77   ALBUMIN 1.5*   PROTTOTAL 5.3*   BILITOTAL 0.3   ALKPHOS 180*   ALT 38   AST 63*       Recent Results (from the past 24 hour(s))   Chest XR,  PA & LAT    Narrative    CHEST TWO VIEW   2/12/2017 6:08 PM     HISTORY: Fatigue, dyspnea.    COMPARISON: 9/17/2016      Impression    IMPRESSION: Normal.    ERIC DEVINE MD

## 2017-02-13 NOTE — PROGRESS NOTES
Observation goals:  - Diagnostic tests and consults completed and resulted: Not met. In process.  - Vital signs normal or at patient baseline: Partially met. B/P 140s/90s. HTN improving. Other VSS at baseline.   - Tolerating oral intake to maintain hydration: Met  - Adequate pain control on oral analgesia: Met  - Tolerating oral antibiotics or has plans for home infusion setup: N/A    - Infection is improving: N/A  - Return to baseline functional status. Met   - Dyspnea improved and oxygen saturations greater than 88% on room air or prior home oxygen levels: Met   - Safe disposition plan has been identified: Met    - Improved hypertension: Partially met. B/P improving.

## 2017-02-13 NOTE — PROGRESS NOTES
Observation goals:  - Diagnostic tests and consults completed and resulted: Not met. In process. Awaiting Abd ultrasound at 1600 today.  - Vital signs normal or at patient baseline: Met  - Tolerating oral intake to maintain hydration: Met  - Adequate pain control on oral analgesia: Met  - Tolerating oral antibiotics or has plans for home infusion setup: N/A    - Infection is improving: N/A  - Return to baseline functional status. Met   - Dyspnea improved and oxygen saturations greater than 88% on room air or prior home oxygen levels: Met   - Safe disposition plan has been identified: Met    - Improved hypertension: Met

## 2017-02-13 NOTE — PLAN OF CARE
Observation Goals:  - Diagnostic tests and consults completed and resulted : Not met. In process.   - Vital signs normal or at patient baseline: Partially met. Hypertensive but baseline for pt.  - Tolerating oral intake to maintain hydration: met.  - Adequate pain control on oral analgesia : met.  - Tolerating oral antibiotics or has plans for home infusion setup: N/A.  - Infection is improving: N/A.  - Return to baseline functional status: Not met. In process.   - Dyspnea improved and oxygen saturations greater than 88% on room air or prior home oxygen levels: Met.   - Safe disposition plan has been identified: Met.   - Improved hypertension: Partially met. BP improving, on lasix and PO Metoprolol.

## 2017-02-13 NOTE — PHARMACY-ADMISSION MEDICATION HISTORY
"Admission medication history interview status for the 2/12/2017 admission is complete. See Epic admission navigator for allergy information, pharmacy, prior to admission medications and immunization status.     Medication history interview sources:  patient, chart review, [facility], and [facility]    Changes made to PTA medication list (reason)  Added: none  Deleted: none  Changed: none    Additional medication history information:  1. Patient does not know his medications well. He was able to tell what they are for, but was unsure about the name and dosage strength.  2. Patient does not remember which pharmacy he goes to. Per our record, [facility] filled at [facility] and [facility] in the past.  3. Per patient, the only medication he has been taking is his diuretics. Per chart review and [facility], patient had a prescription for bumetanide 1 mg PO QD on 1/8/17. He thinks the diuretics is torsemide (he called it by the brand name \"Demadex\"), knew that it is for getting rid of fluid, and said that he thinks it is 5 mg. However, no other record is found for torsemide, and neither [facility] nor [facility] has a prescription for torsemide.  4. Patient has not take his other medications for ~4 months due to insurance and cost issues.  5. Patient has not had a flu shot this year.      Prior to Admission medications    Medication Sig Last Dose Taking? Auth Provider   bumetanide (BUMEX) 1 MG tablet Take 1 tablet (1 mg) by mouth daily Unknown  Marlys Dean MD   ibuprofen (ADVIL,MOTRIN) 600 MG tablet Take 1 tablet (600 mg) by mouth every 6 hours as needed for moderate pain Unknown  Tasneem Weinberg MD   naproxen (NAPROSYN) 500 MG tablet Take 1 tablet (500 mg) by mouth 2 times daily as needed for moderate pain Unknown  Valdemar Wade MD   entecavir (BARACLUDE) 0.5 MG tablet Take 1 tablet (0.5 mg) by mouth daily More than a month  Eber Ortez MD   metoprolol (TOPROL-XL) 50 MG 24 hr tablet Take " 50 mg by mouth daily More than a month  Reported, Patient         Medication history completed by: Soha Condon, PharmD  PGY-1 Pharmacy Resident

## 2017-02-13 NOTE — PLAN OF CARE
Problem: Goal Outcome Summary  Goal: Goal Outcome Summary  Outcome: Improving  Afebrile. VSS. B/P much improved on PO metoprolol and IV lasix. Denies pain. Tolerating diet, denies nausea/vomiting. Compliant w/ 2000mL fluid restriction. NPO since 0830 this AM for abd ultrasound, pt aware. May resume 2g sodium diet following ultrasound. +1 edema of LE bilaterally. Voiding adequate. Up w/SBA. Continue POC.

## 2017-02-13 NOTE — PROGRESS NOTES
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 Currently on Bumex 1 mg daily - per patient he has been compliant with this dose. Bedside IVC 2.3 with minimal resp. Variation. BP 180s/110s on admission. Improved with lasix IV. Albumin and TP down from previous lab values. Was following with nephrology but it has been over a year. Urine with significant proteinuria. Not currently on ACE-I  - Nephrology consult  - Additional 20mg IV lasix this AM, already with improved volume status today  - Was previously on metoprolol 50XL, so will give 25mg metoprolol tartrate BID to see how he tolerates it  - Would likely benefit from ACE-I given proteinuria, but was not on his med list  - Strict I&Os, daily weights     # Chronic Hepatitis B  Is prescribed Entecavir - ran out of this medication 4 months ago as well due to insurance coverage. US in 06/2016 did not have any evidence of cirrhosis or hepatomegaly. He did have ascites as listed above. No focal lesion was noted to suggest HCC. No evidence of acute liver failure/hepatitis at this time. Last seen by GI 1 year ago. Last Hep B viral load in 1/2016 was 69341.   - Discussed case with GI team: they recommend checking HBV viral load, getting RUQ doppler US, restarting his entecavir, and OPT f/u in  "3-4 weeks  - Will restart entecavir at discharge, given OBS status and cost holding while he is here  - HBV viral load pending  - Abd US with doppler ordered    # Abdominal swelling  # Fatigue/malaise  In the setting of fatigue/malaise, will get diagnostic paracentesis per CAPS procedure team. Afebrile, no white count, vitally stable. UA without clear infection. CXR normal. Influenza PCR negative. He may also have been feeling fatigued/malaise at home from hypervolemia. He feels much better this morning with IV diuresis.  - Diagnostic paracentesis this afternoon.    CODE: full  DVT: ambulate  Diet/fluids: 2 gram sodium diet  Disposition: obs status on maroon 5 for work-up of hypervolemia and consultation with nephrology/GI    Patient staffed with Dr. Kenyon who agrees with above plan    A [employer]  was used    Bal Hunter MD  PGY2  Pager: [phone]    ___________________________________________________________________    Subjective & Interval Hx:    Feeling much better this morning. States he has an appetite and ate breakfast. Has no acute complaints. Feels like swelling is improved. No fevers or chills    Last 24 hr care team notes reviewed.   ROS:  4 point ROS including Respiratory, CV, GI and , other than that noted in the HPI, is negative    Medications:  Reviewed in EPIC. List below for reference    Physical Exam:  Blood pressure (!) 148/92, temperature 97.3  F (36.3  C), temperature source Oral, resp. rate 18, height 1.803 m (5' 11\"), weight 61.4 kg (135 lb 4.8 oz), SpO2 98 %.    I/O last 3 completed shifts:  In: 120 [P.O.:120]  Out: 1775 [Urine:1775]    General: alert and no distress  Head: NC/AT  Eyes: non-icteric sclera, EOMI  Pulmonary: CTAB  CV: RRR, +s1/s2, no murmurs  GI: soft, non-tender, mild distension, + bowel sounds  Skin: no rashes seen  EXT: peripheral edema up to presacral area bilaterally, WWP  Neuro: A&Ox3, no focal deficits    Labs & Studies of Note:   Labs and studies reviewed in " EPIC    Unresulted Labs Ordered in the Past 30 Days of this Admission     Date and Time Order Name Status Description    2/13/2017 0104 Hep B Virus DNA Quant Real Time PCR In process           Medications list for Reference:   Current Facility-Administered Medications   Medication     naloxone (NARCAN) injection 0.1-0.4 mg     ondansetron (ZOFRAN-ODT) ODT tab 4 mg    Or     ondansetron (ZOFRAN) injection 4 mg     metoprolol (LOPRESSOR) half-tab 37.5 mg

## 2017-02-13 NOTE — PLAN OF CARE
Observation Goals:  - Diagnostic tests and consults completed and resulted : Not met. In process.   - Vital signs normal or at patient baseline: Partially met. Slightly hypertensive but baseline for pt. HTN improving.  - Tolerating oral intake to maintain hydration: met.  - Adequate pain control on oral analgesia : met.  - Tolerating oral antibiotics or has plans for home infusion setup: N/A.  - Infection is improving: N/A.  - Return to baseline functional status: Not met. In process.   - Dyspnea improved and oxygen saturations greater than 88% on room air or prior home oxygen levels: Met.   - Safe disposition plan has been identified: Met.   - Improved hypertension: Partially met. BP improving.

## 2017-02-13 NOTE — PLAN OF CARE
Problem: Goal Outcome Summary  Goal: Goal Outcome Summary  Outcome: No Change  A&O, VSS on RA ex some hypertension. Pt was having high BP's yesterday that have improved since receiving PO Metoprolol and Lasix. Denies pain and nausea and rested well between cares. +1 edema to BLE's. Nasopharyngeal swab and Urinalysis completed. Negative for RSV and Influenza A&B. On 2 gm NA restriction and 2000 cc fluid restriction. IV patent and SL'ed. Up with SBA. Continue to monitor and follow POC.

## 2017-02-13 NOTE — PLAN OF CARE
Problem: Goal Outcome Summary  Goal: Goal Outcome Summary  Outcome: No Change  Arrived from [facility] @ 2200. Pt alert and oriented and cooperative with all cares. Pt profile completed with use of  phone. Declined to have wallet sent to security. Explained to pt that we had no way to secure his wallet while in room and he declined security through the . Pt able to make needs known.

## 2017-02-14 ENCOUNTER — APPOINTMENT (OUTPATIENT)
Dept: LAB | Facility: CLINIC | Age: 57
End: 2017-02-14
Attending: INTERNAL MEDICINE

## 2017-02-14 VITALS
DIASTOLIC BLOOD PRESSURE: 83 MMHG | WEIGHT: 135.3 LBS | HEIGHT: 71 IN | TEMPERATURE: 97.9 F | RESPIRATION RATE: 16 BRPM | BODY MASS INDEX: 18.94 KG/M2 | SYSTOLIC BLOOD PRESSURE: 125 MMHG | OXYGEN SATURATION: 95 %

## 2017-02-14 LAB
ALBUMIN SERPL-MCNC: 1.1 G/DL (ref 3.4–5)
ALP SERPL-CCNC: 137 U/L (ref 40–150)
ALT SERPL W P-5'-P-CCNC: 29 U/L (ref 0–70)
ANION GAP SERPL CALCULATED.3IONS-SCNC: 5 MMOL/L (ref 3–14)
AST SERPL W P-5'-P-CCNC: 46 U/L (ref 0–45)
BILIRUB SERPL-MCNC: 1 MG/DL (ref 0.2–1.3)
BUN SERPL-MCNC: 19 MG/DL (ref 7–30)
CALCIUM SERPL-MCNC: 7.5 MG/DL (ref 8.5–10.1)
CHLORIDE SERPL-SCNC: 104 MMOL/L (ref 94–109)
CO2 SERPL-SCNC: 29 MMOL/L (ref 20–32)
CREAT SERPL-MCNC: 1.11 MG/DL (ref 0.66–1.25)
ERYTHROCYTE [DISTWIDTH] IN BLOOD BY AUTOMATED COUNT: 13 % (ref 10–15)
GFR SERPL CREATININE-BSD FRML MDRD: 69 ML/MIN/1.7M2
GLUCOSE SERPL-MCNC: 78 MG/DL (ref 70–99)
HBV DNA SERPL NAA+PROBE-ACNC: ABNORMAL [IU]/ML
HBV DNA SERPL NAA+PROBE-LOG IU: 5.3 {LOG_IU}/ML
HCT VFR BLD AUTO: 36.1 % (ref 40–53)
HGB BLD-MCNC: 12 G/DL (ref 13.3–17.7)
MCH RBC QN AUTO: 31.7 PG (ref 26.5–33)
MCHC RBC AUTO-ENTMCNC: 33.2 G/DL (ref 31.5–36.5)
MCV RBC AUTO: 95 FL (ref 78–100)
PLATELET # BLD AUTO: 130 10E9/L (ref 150–450)
POTASSIUM SERPL-SCNC: 4 MMOL/L (ref 3.4–5.3)
PROT SERPL-MCNC: 4.2 G/DL (ref 6.8–8.8)
RBC # BLD AUTO: 3.79 10E12/L (ref 4.4–5.9)
SODIUM SERPL-SCNC: 139 MMOL/L (ref 133–144)
WBC # BLD AUTO: 4.8 10E9/L (ref 4–11)

## 2017-02-14 PROCEDURE — 80053 COMPREHEN METABOLIC PANEL: CPT | Performed by: INTERNAL MEDICINE

## 2017-02-14 PROCEDURE — 99217 ZZC OBSERVATION CARE DISCHARGE: CPT | Mod: GC | Performed by: INTERNAL MEDICINE

## 2017-02-14 PROCEDURE — 85027 COMPLETE CBC AUTOMATED: CPT | Performed by: INTERNAL MEDICINE

## 2017-02-14 PROCEDURE — G0378 HOSPITAL OBSERVATION PER HR: HCPCS

## 2017-02-14 PROCEDURE — 25900017 H RX MED GY IP 259 OP 259 PS 637: Performed by: INTERNAL MEDICINE

## 2017-02-14 PROCEDURE — 36415 COLL VENOUS BLD VENIPUNCTURE: CPT | Performed by: INTERNAL MEDICINE

## 2017-02-14 PROCEDURE — 25000132 ZZH RX MED GY IP 250 OP 250 PS 637: Performed by: INTERNAL MEDICINE

## 2017-02-14 PROCEDURE — 82595 ASSAY OF CRYOGLOBULIN: CPT | Performed by: INTERNAL MEDICINE

## 2017-02-14 RX ORDER — LISINOPRIL 20 MG/1
20 TABLET ORAL DAILY
Qty: 30 TABLET | Refills: 0 | Status: ON HOLD
Start: 2017-02-14 | End: 2018-08-05

## 2017-02-14 RX ORDER — ENTECAVIR 0.5 MG/1
0.5 TABLET, FILM COATED ORAL DAILY
Qty: 30 TABLET | Refills: 0 | Status: SHIPPED
Start: 2017-02-14 | End: 2017-04-13

## 2017-02-14 RX ORDER — BUMETANIDE 2 MG/1
2 TABLET ORAL DAILY
Qty: 30 TABLET | Refills: 0 | Status: SHIPPED
Start: 2017-02-14 | End: 2017-10-10

## 2017-02-14 RX ADMIN — LISINOPRIL 20 MG: 20 TABLET ORAL at 08:31

## 2017-02-14 RX ADMIN — BUMETANIDE 2 MG: 1 TABLET ORAL at 08:31

## 2017-02-14 NOTE — PLAN OF CARE
Problem: Goal Outcome Summary  Goal: Goal Outcome Summary  Outcome: Adequate for Discharge Date Met:  02/14/17  VSS. Status adequate for d/c. D/c instructions reviewed with pt,  present at bedside throughout d/c process. Instructed about follow-up appts and changes to medications. Pt questions answered. Appt w/PCP scheduled for pt on Feb. 20, pt verbalized understanding of importance of going to this appt. PIV removed. Sent w/transport via WC to d/c pharmacy and front door.

## 2017-02-14 NOTE — CONSULTS
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    1. Hypervolemia in setting of nephrotic syndrome, repeat Pr:Cr pending, albumin 1.4. Prior Pr:Cr was 3.3 g/g -> 2.2 g/g on medications.   2. Hypoalbuminemia, likely 2/2 nephrotic syndrome as well as poor nutrition  3. 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 Cr 1.1 now from 0.7 in 2015, likely progressive cryo vasculitis.   4. Hepatitis B, chronic. Without cirrhosis. Off entecavir due to finances and lack of insurance  5. Hypertensive urgency, improved with diuresis and BB therapy  6. Anorexia, possibly from bowel edema  7. Social barriers to adequate healthcare delivery    Recs:  -Treatment for kidney disease at this point is treatment of Hepatitis B. He needs to be back on Entecavir  -Rituximab not indicated at this point, may be indicated if rapid loss of kidney function or other systemic manifestations of cryo vasculitis  -I'd like to repeat cryo quant test (ordered), also awaiting repeat Pr:Cr  -I'd like to get him on an ACEI and diuretic for nephrotic syndrome. Will start lisinopril 20mg and bumex 2mg daily (I ordered)  -Will stop metoprolol given above addition (I did this)  -Consider starting statin therapy given nephrotic syndrome, if affordability of medications is an issue this would be secondary to ACEI/diuretic and Entecavir  -Will need help from  and plan after discharge to be sure he can get his medications and have appropriate follow-up. This is critical to save his  "liver and kidney function and to control BP and prevent complications of this.    Staffed with Dr. Luciano.     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 He previously followed with GI and [facility]rology at the [facility] in 2015 and early 2016, but was lost to follow-up. He reports not having insurance. He was not able to continue Entecavir for Hep B (took for 1-2 months about 6-7 months ago he says) and was not able to take his BP medications. The only medication that he continued to have was bumex 1mg daily, which he has been taking. His appetite is very poor and he is hardly eating. He does not have abdominal pain, nausea, vomiting, diarrhea, or blood in his stools. The reason for coming to the ER was due to increasing fluid in his legs and mid-section and because he felt that his BP was high with a HA and \"heavy eyelids.\" He denies fevers, chills, sweats, weight loss, SOB, dyspnea with exertion, orthopnea, chest pain, or pain anywhere else in his body.    Regarding his kidney disease, he had a normal Cr of 0.7 in 2015 at presentation to our nephrology clinic, but had nephrotic range proteinuria and hypoalbuminemia. He was found to have positive cryo with IgM kappa monoclonal protein and IgG polyclonal and positive RF. He had a kidney biopsy in Jan 2016 that showed MPGN pattern with cryo vasculitis consistent with Hep B related cryo vasculitis. He had minimal IFTA at that time. His Cr has now risen to 1.1.     Abdu's BP was 180's/110's on admission and he had a HA. HE was given IV lasix and metoprolol and his BP rapidly improved and is now 140's/80's and his HA is gone. He has diuresed net negative " about 2L. He feels the swelling is much improved already. However, he is frustrated that he can't get insurance and can't get his medications paid for.     PAST MEDICAL HISTORY:  Reviewed with patient   Past Medical History   Diagnosis Date     Cryoglobulinemia (H)      Glomerulonephritis      Hepatitis B      Surgical complication      Past Surgical History   Procedure Laterality Date     Hand surgery Right      C hand/finger surgery unlisted        MEDICATIONS:  All Current meds and meds given in the hospital.    PTA Meds  Prior to Admission medications    Medication Sig Last Dose Taking? Auth Provider   bumetanide (BUMEX) 1 MG tablet Take 1 tablet (1 mg) by mouth daily Unknown  Marlys Dean MD   ibuprofen (ADVIL,MOTRIN) 600 MG tablet Take 1 tablet (600 mg) by mouth every 6 hours as needed for moderate pain Unknown  Tasneem Weinberg MD   naproxen (NAPROSYN) 500 MG tablet Take 1 tablet (500 mg) by mouth 2 times daily as needed for moderate pain Unknown  Valdemar Wade MD   entecavir (BARACLUDE) 0.5 MG tablet Take 1 tablet (0.5 mg) by mouth daily More than a month  Eber Ortez MD   metoprolol (TOPROL-XL) 50 MG 24 hr tablet Take 50 mg by mouth daily More than a month  Reported, Patient      ALLERGIES:    No Known Allergies    REVIEW OF SYSTEMS:  A 10 point review of systems was negative except as noted above.    SOCIAL HISTORY:   Social History     Social History     Marital status: Single     Spouse name: N/A     Number of children: N/A     Years of education: N/A     Occupational History     Not on file.     Social History Main Topics     Smoking status: Former Smoker     Packs/day: 0.50     Years: 40.00     Types: Cigarettes     Quit date: 1/29/2017     Smokeless tobacco: Never Used     Alcohol use No     Drug use: No     Sexual activity: Not on file     Other Topics Concern     Not on file     Social History Narrative     FAMILY MEDICAL HISTORY:   Family History   Problem Relation Age of Onset      "Liver Cancer No family hx of      Hepatitis No family hx of      Reviewed with patient    PHYSICAL EXAM:   Temp  Av.5  F (36.4  C)  Min: 96.5  F (35.8  C)  Max: 98.9  F (37.2  C)      No Data Recorded Resp  Av.7  Min: 0  Max: 27  SpO2  Av.3 %  Min: 96 %  Max: 100 %       /83 (BP Location: Left arm)  Temp 97.6  F (36.4  C) (Oral)  Resp 16  Ht 1.803 m (5' 11\")  Wt 61.4 kg (135 lb 4.8 oz)  SpO2 98%  BMI 18.87 kg/m2   Date 17 0700 - 17 0659   Shift 3678-5819 7237-6011 5404-7963 24 Hour Total   I  N  T  A  K  E   P.O. 240 240  480    Shift Total  (mL/kg) 240  (3.91) 240  (3.91)  480  (7.82)   O  U  T  P  U  T   Urine 600 400  1000    Shift Total  (mL/kg) 600  (9.78) 400  (6.52)  1000  (16.29)   Weight (kg) 61.37 61.37 61.37 61.37     Admit Weight: 64.4 kg (142 lb)     GENERAL APPEARANCE: alert and no distress  Head NC/AT  EYES: no scleral icterus, pupils equal  HENT: mouth  without ulcers or lesions  NECK: supple, no goiter  Lymphatics: no cervical or supraclavicular LAD  Pulmonary: lungs clear to auscultation with equal breath sounds bilaterally, no clubbing or cyanosis  CV: regular rhythm, normal rate, no rub   - JVP appears normal   - Edema 2+ in extremities up to lower back  GI: soft, nontender, normal bowel sounds, no HSM   MS: no evidence of inflammation in joints, no muscle tenderness  : no Hawkins, no scrotal edema  SKIN: no rash, warm, dry  NEURO: mentation intact and speech normal    LABS:   All Reviewed in Epic.    IMAGING:  Reviewed abdominal ultrasound    Flako Whitlock  Nephrology Fellow  Pager: [phone]    Nephrology Staff  I have seen and evaluated the patient, reviewed the clinical data, and discussed the case with the renal fellow. See fellow's note for details. I agree with the evaluation and treatment plans. 55 yo man with MPGN secondary to cryoglobulinemia from hepatitis B. Major issue is access to life saving medications. Appreciate social work help. Treatment plan " is clear - control BP, ACEI to reduce proteinuria and reduce BP, Entecavir to treat hepatitis B and consequently renal disease, statin, pneumoccocal vaccine (in view of heavy proteinuria), and diuretic (edema and BP control).    Steven Luciano MD

## 2017-02-14 NOTE — PLAN OF CARE
"Problem: Goal Outcome Summary  Goal: Goal Outcome Summary  Outcome: No Change  /83 (BP Location: Left arm)  Temp 97.6  F (36.4  C) (Oral)  Resp 16  Ht 1.803 m (5' 11\")  Wt 61.4 kg (135 lb 4.8 oz)  SpO2 98%  BMI 18.87 kg/m2     - Vital signs normal or at patient baseline - BP slightly elevated, other VSS  - Tolerating oral intake to maintain hydration -tolerating   - Adequate pain control on oral analgesia -denies pain  - Tolerating oral antibiotics or has plans for home infusion setup -N/A  - Infection is improving -N/A  - Return to baseline functional status -refused ambulation  - Dyspnea improved and oxygen saturations greater than 88% on room air or prior home oxygen levels - satting on RA  - Safe disposition plan has been identified -N/A   - Improved hypertension-improved since admission      "

## 2017-02-14 NOTE — PROGRESS NOTES
Observation goals:  - Diagnostic tests and consults completed and resulted: Met  - Vital signs normal or at patient baseline: Met  - Tolerating oral intake to maintain hydration: Met  - Adequate pain control on oral analgesia: N/A, denies pain.  - Tolerating oral antibiotics or has plans for home infusion setup: N/A   - Infection is improving: N/A  - Return to baseline functional status. Met   - Dyspnea improved and oxygen saturations greater than 88% on room air or prior home oxygen levels: Met   - Safe disposition plan has been identified: In process. Need to address home finances to ensure pt can afford essential medications.   - Improved hypertension: Met

## 2017-02-14 NOTE — PROGRESS NOTES
Observation goals:  - Diagnostic tests and consults completed and resulted: Met  - Vital signs normal or at patient baseline: Met  - Tolerating oral intake to maintain hydration: Met  - Adequate pain control on oral analgesia: Met  - Tolerating oral antibiotics or has plans for home infusion setup: N/A   - Infection is improving: N/A  - Return to baseline functional status. Met   - Dyspnea improved and oxygen saturations greater than 88% on room air or prior home oxygen levels: Met   - Safe disposition plan has been identified: In process. Need to address home finances to ensure pt can afford essential medications.   - Improved hypertension: Met

## 2017-02-14 NOTE — PLAN OF CARE
Problem: Goal Outcome Summary  Goal: Goal Outcome Summary  Outcome: No Change  Vitals:     02/13/17 2030 02/13/17 2217 02/13/17 2303 02/14/17 0431   BP: 133/80 148/83 148/88 140/84   BP Location: Left arm Left arm Left arm Left arm   Resp: 16 16 16 16   Temp: 97.5  F (36.4  C) 97.6  F (36.4  C) 98.9  F (37.2  C) 98  F (36.7  C)   TempSrc: Oral Oral Oral Oral   SpO2: 96% 98% 98% 98%   Weight:           Height:             Afebrile. VSS. O2 sats high 90s on RA. Denies pain. Lung sounds clear. Abdomen rounded, denies pain. Patient stated voided and had small BM in toilet. Ambulates to bathroom independently. Slept most of shift. On fluid restriction, patient aware.      - Vital signs normal or at patient baseline - BPs 140s/80s, VSS. O2 sats high 90s on RA.   - Tolerating oral intake to maintain hydration - Tolerating oral intake.  - Adequate pain control on oral analgesia - denies pain  - Tolerating oral antibiotics or has plans for home infusion setup -N/A  - Infection is improving -N/A  - Return to baseline functional status - ambulates independently to bathroom.  - Dyspnea improved and oxygen saturations greater than 88% on room air or prior home oxygen levels - satting high 90s on RA  - Safe disposition plan has been identified -N/A  - Improved hypertension-improved since admission

## 2017-02-14 NOTE — DISCHARGE INSTRUCTIONS
We have scheduled an appointment for you on Monday 2/20 at 3:00 pm with your Primary Care Doctor, Dr. Wu Smith at the [facility].  Please bring your ID and insurance card with you to this appointment. They will have a Gu[place]  ready for you.  [facility] [address] [place] [address]  Phone #: [phone]

## 2017-02-15 ENCOUNTER — CARE COORDINATION (OUTPATIENT)
Dept: CARDIOLOGY | Facility: CLINIC | Age: 57
End: 2017-02-15

## 2017-02-15 NOTE — PROGRESS NOTES
Patient was an ED admit patient so his post Dc follow up will be handled by another dept      Trever Orellana MD   Emergency Medicine           History           Chief Complaint   Patient presents with     Hypertension       Began feeling a generalized feeling of heaviness over the last 3 months. Recently experiencing decreased appetite, fatigue, and generalized swelling throughout his body. Is worried his BP high

## 2017-02-19 LAB
BACTERIA SPEC CULT: NO GROWTH
BACTERIA SPEC CULT: NO GROWTH
MICRO REPORT STATUS: NORMAL
MICRO REPORT STATUS: NORMAL
SPECIMEN SOURCE: NORMAL
SPECIMEN SOURCE: NORMAL

## 2017-02-20 LAB — CRYOGLOB SER QL: ABNORMAL %

## 2017-02-21 DIAGNOSIS — B18.1 CHRONIC VIRAL HEPATITIS B WITHOUT DELTA AGENT AND WITHOUT COMA (H): Primary | ICD-10-CM

## 2017-03-08 ENCOUNTER — APPOINTMENT (OUTPATIENT)
Dept: GASTROENTEROLOGY | Facility: CLINIC | Age: 57
End: 2017-03-08
Attending: INTERNAL MEDICINE
Payer: COMMERCIAL

## 2017-03-08 VITALS
HEART RATE: 71 BPM | OXYGEN SATURATION: 100 % | TEMPERATURE: 97.8 F | SYSTOLIC BLOOD PRESSURE: 172 MMHG | DIASTOLIC BLOOD PRESSURE: 99 MMHG | BODY MASS INDEX: 19.1 KG/M2 | WEIGHT: 133.4 LBS | HEIGHT: 70 IN

## 2017-03-08 DIAGNOSIS — B18.1 CHRONIC VIRAL HEPATITIS B WITHOUT DELTA AGENT AND WITHOUT COMA (H): ICD-10-CM

## 2017-03-08 DIAGNOSIS — B18.1 CHRONIC VIRAL HEPATITIS B WITHOUT DELTA AGENT AND WITHOUT COMA (H): Primary | ICD-10-CM

## 2017-03-08 LAB
ALBUMIN SERPL-MCNC: 1.5 G/DL (ref 3.4–5)
ALP SERPL-CCNC: 152 U/L (ref 40–150)
ALT SERPL W P-5'-P-CCNC: 28 U/L (ref 0–70)
ANION GAP SERPL CALCULATED.3IONS-SCNC: 6 MMOL/L (ref 3–14)
AST SERPL W P-5'-P-CCNC: 48 U/L (ref 0–45)
BILIRUB DIRECT SERPL-MCNC: <0.1 MG/DL (ref 0–0.2)
BILIRUB SERPL-MCNC: 0.5 MG/DL (ref 0.2–1.3)
BUN SERPL-MCNC: 19 MG/DL (ref 7–30)
CALCIUM SERPL-MCNC: 7.4 MG/DL (ref 8.5–10.1)
CHLORIDE SERPL-SCNC: 106 MMOL/L (ref 94–109)
CO2 SERPL-SCNC: 29 MMOL/L (ref 20–32)
CREAT SERPL-MCNC: 0.88 MG/DL (ref 0.66–1.25)
ERYTHROCYTE [DISTWIDTH] IN BLOOD BY AUTOMATED COUNT: 12.9 % (ref 10–15)
GFR SERPL CREATININE-BSD FRML MDRD: 89 ML/MIN/1.7M2
GLUCOSE SERPL-MCNC: 106 MG/DL (ref 70–99)
HCT VFR BLD AUTO: 37.4 % (ref 40–53)
HGB BLD-MCNC: 12.7 G/DL (ref 13.3–17.7)
INR PPP: 1.1 (ref 0.86–1.14)
MCH RBC QN AUTO: 32.3 PG (ref 26.5–33)
MCHC RBC AUTO-ENTMCNC: 34 G/DL (ref 31.5–36.5)
MCV RBC AUTO: 95 FL (ref 78–100)
PLATELET # BLD AUTO: 121 10E9/L (ref 150–450)
POTASSIUM SERPL-SCNC: 3.6 MMOL/L (ref 3.4–5.3)
PROT SERPL-MCNC: 5.4 G/DL (ref 6.8–8.8)
RBC # BLD AUTO: 3.93 10E12/L (ref 4.4–5.9)
SODIUM SERPL-SCNC: 141 MMOL/L (ref 133–144)
WBC # BLD AUTO: 4.9 10E9/L (ref 4–11)

## 2017-03-08 PROCEDURE — 36415 COLL VENOUS BLD VENIPUNCTURE: CPT | Performed by: INTERNAL MEDICINE

## 2017-03-08 PROCEDURE — 80076 HEPATIC FUNCTION PANEL: CPT | Performed by: INTERNAL MEDICINE

## 2017-03-08 PROCEDURE — 85610 PROTHROMBIN TIME: CPT | Performed by: INTERNAL MEDICINE

## 2017-03-08 PROCEDURE — 85027 COMPLETE CBC AUTOMATED: CPT | Performed by: INTERNAL MEDICINE

## 2017-03-08 PROCEDURE — 87517 HEPATITIS B DNA QUANT: CPT | Performed by: INTERNAL MEDICINE

## 2017-03-08 PROCEDURE — 80048 BASIC METABOLIC PNL TOTAL CA: CPT | Performed by: INTERNAL MEDICINE

## 2017-03-08 PROCEDURE — 99212 OFFICE O/P EST SF 10 MIN: CPT | Mod: 25

## 2017-03-08 PROCEDURE — 91200 LIVER ELASTOGRAPHY: CPT | Mod: ZF

## 2017-03-08 PROCEDURE — T1013 SIGN LANG/ORAL INTERPRETER: HCPCS | Mod: U3

## 2017-03-08 ASSESSMENT — PAIN SCALES - GENERAL: PAINLEVEL: EXTREME PAIN (8)

## 2017-03-08 NOTE — LETTER
3/8/2017      RE: Wilfredo Yoon  [address]   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  Last clinic visit Jan 2016.  Patient was admitted to hospital 2/12/17 to 2/14/17 for hypertensive urgency.  It was noted at that time that he had not been taking any of his anti-viral, anti-hypertensive or diuretic medications for ~4 months as he lost his insurance coverage.    Patient is well.  He feels much improved since resuming diuretics on discharge from hospital.  His lower extremity edema is improving.  He denies abdominal distension.    Patient denies jaundice, lethargy or confusion.    Patient denies melena, hematemesis or hematochezia.    Patient denies fevers, sweats or chills.  Weight stable.    Patient now has medical insurance coverage.  He is taking all his medications daily.  He has not missed any doses since resuming medications.    Patient does not drink alcohol.      Medical hx Surgical hx   Past Medical History   Diagnosis Date     Cryoglobulinemia (H)      Glomerulonephritis      Hepatitis B      Surgical complication       Past Surgical History   Procedure Laterality Date     Hand surgery Right      C hand/finger surgery unlisted            Medications  Prior to Admission medications    Medication Sig Start Date End Date Taking? Authorizing Provider   lisinopril (PRINIVIL/ZESTRIL) 20 MG tablet Take 1 tablet (20 mg) by mouth daily 2/14/17  Yes Bal Hunter MD   entecavir (BARACLUDE)  "0.5 MG tablet Take 1 tablet (0.5 mg) by mouth daily 2/14/17  Yes Bal Hunter MD   bumetanide (BUMEX) 2 MG tablet Take 1 tablet (2 mg) by mouth daily 2/14/17  Yes Bal Hunter MD       Allergies  No Known Allergies    Review of systems  A 10-point review of systems was negative    Examination  BP (!) 172/99  Pulse 71  Temp 97.8  F (36.6  C) (Oral)  Ht 1.778 m (5' 10\")  Wt 60.5 kg (133 lb 6.4 oz)  SpO2 100%  BMI 19.14 kg/m2  Body mass index is 19.14 kg/(m^2).    Gen- well, NAD, A+Ox3, normal color  Lym- no palpable LAD  CVS- RRR  RS- CTA  Abd- soft, non-tender, no ascites or organomegaly on palpation or percussion, BS+  Extr-hands normal, mild pitting bilateral AIDEN  Skin- scars below L nipple, no rash or jaundice  Neuro- no asterixis  Psych- normal mood    Laboratory  Lab Results   Component Value Date     03/08/2017    POTASSIUM 3.6 03/08/2017    CHLORIDE 106 03/08/2017    CO2 29 03/08/2017    BUN 19 03/08/2017    CR 0.88 03/08/2017       Lab Results   Component Value Date    BILITOTAL 0.5 03/08/2017    ALT 28 03/08/2017    AST 48 03/08/2017    ALKPHOS 152 03/08/2017       Lab Results   Component Value Date    ALBUMIN 1.5 03/08/2017    PROTTOTAL 5.4 03/08/2017        Lab Results   Component Value Date    WBC 4.9 03/08/2017    HGB 12.7 03/08/2017    MCV 95 03/08/2017     03/08/2017       Lab Results   Component Value Date    INR 1.10 03/08/2017     HBV DNA 3/8/2017 pending    Radiology  Liver doppler U/S 2/13/17 reviewed    Assessment  57 year old male who presents to clinic for follow-up of chronic hepatitis B 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  Persistent thrombocytopenia suspicious for cirrhosis therefore will obtain Fibrosis Scan.  We discussed the importance of maintaining antiviral therapy and to contact the clinic to help with any financial difficulties regarding medications.  Up to date with HCC screening.    Plan  1.  Follow-up pending HBV DNA  2.  " Continue entecavir  3.  Fibrosis Scan  4.  Follow-up in 6 months    Eber Adame MD  Hepatology  [facility]

## 2017-03-08 NOTE — PROGRESS NOTES
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  Last clinic visit Jan 2016.  Patient was admitted to hospital 2/12/17 to 2/14/17 for hypertensive urgency.  It was noted at that time that he had not been taking any of his anti-viral, anti-hypertensive or diuretic medications for ~4 months as he lost his insurance coverage.    Patient is well.  He feels much improved since resuming diuretics on discharge from hospital.  His lower extremity edema is improving.  He denies abdominal distension.    Patient denies jaundice, lethargy or confusion.    Patient denies melena, hematemesis or hematochezia.    Patient denies fevers, sweats or chills.  Weight stable.    Patient now has medical insurance coverage.  He is taking all his medications daily.  He has not missed any doses since resuming medications.    Patient does not drink alcohol.      Medical hx Surgical hx   Past Medical History   Diagnosis Date     Cryoglobulinemia (H)      Glomerulonephritis      Hepatitis B      Surgical complication       Past Surgical History   Procedure Laterality Date     Hand surgery Right      C hand/finger surgery unlisted            Medications  Prior to Admission medications    Medication Sig Start Date End Date Taking? Authorizing Provider   lisinopril (PRINIVIL/ZESTRIL) 20 MG tablet Take 1 tablet (20 mg) by mouth daily 2/14/17  Yes Bal Hunter MD   entecavir (BARACLUDE) 0.5 MG tablet Take 1 tablet (0.5 mg) by mouth daily 2/14/17  Yes Bal Hunter MD   bumetanide (BUMEX) 2 MG  "tablet Take 1 tablet (2 mg) by mouth daily 2/14/17  Yes Bal Hunter MD       Allergies  No Known Allergies    Review of systems  A 10-point review of systems was negative    Examination  BP (!) 172/99  Pulse 71  Temp 97.8  F (36.6  C) (Oral)  Ht 1.778 m (5' 10\")  Wt 60.5 kg (133 lb 6.4 oz)  SpO2 100%  BMI 19.14 kg/m2  Body mass index is 19.14 kg/(m^2).    Gen- well, NAD, A+Ox3, normal color  Lym- no palpable LAD  CVS- RRR  RS- CTA  Abd- soft, non-tender, no ascites or organomegaly on palpation or percussion, BS+  Extr-hands normal, mild pitting bilateral AIDEN  Skin- scars below L nipple, no rash or jaundice  Neuro- no asterixis  Psych- normal mood    Laboratory  Lab Results   Component Value Date     03/08/2017    POTASSIUM 3.6 03/08/2017    CHLORIDE 106 03/08/2017    CO2 29 03/08/2017    BUN 19 03/08/2017    CR 0.88 03/08/2017       Lab Results   Component Value Date    BILITOTAL 0.5 03/08/2017    ALT 28 03/08/2017    AST 48 03/08/2017    ALKPHOS 152 03/08/2017       Lab Results   Component Value Date    ALBUMIN 1.5 03/08/2017    PROTTOTAL 5.4 03/08/2017        Lab Results   Component Value Date    WBC 4.9 03/08/2017    HGB 12.7 03/08/2017    MCV 95 03/08/2017     03/08/2017       Lab Results   Component Value Date    INR 1.10 03/08/2017     HBV DNA 3/8/2017 pending    Radiology  Liver doppler U/S 2/13/17 reviewed    Assessment  57 year old male who presents to clinic for follow-up of chronic hepatitis B 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  Persistent thrombocytopenia suspicious for cirrhosis therefore will obtain Fibrosis Scan.  We discussed the importance of maintaining antiviral therapy and to contact the clinic to help with any financial difficulties regarding medications.  Up to date with HCC screening.    Plan  1.  Follow-up pending HBV DNA  2.  Continue entecavir  3.  Fibrosis Scan  4.  Follow-up in 6 months    Eber Adame MD  Hepatology  [facility]" [facility]

## 2017-03-08 NOTE — MR AVS SNAPSHOT
After Visit Summary   3/8/2017    Wilfredo Yoon    MRN: [id number]           Patient Information     Date Of Birth          1960        Visit Information        Provider Department      3/8/2017 10:15 AM Cindy Sterling; Eber Ortez MD [facility] Hepatology        Today's Diagnoses     Chronic viral hepatitis B without delta agent and without coma (H)    -  1       Follow-ups after your visit        Follow-up notes from your care team     Return in about 6 months (around 9/8/2017).      Your next 10 appointments already scheduled     Mar 08, 2017 11:15 AM CST   (Arrive by 11:00 AM)   FIBROSIS SCAN with UC FIBROSIS SCAN   [facility] Hepatology ([facility])    [address] [place] [address]   [phone]            Apr 04, 2017  3:30 PM CDT   Lab with UC LAB   [facility] Lab ([facility])    [address] [place] [address]   [phone]            Apr 04, 2017  4:30 PM CDT   (Arrive by 4:00 PM)   Return Visit with Viral Will Evans MD   [facility] Nephrology ([facility])    [address] [place] [id number]   [phone]            Jun 13, 2017  3:00 PM CDT   Lab with UC LAB   [facility] Lab ([facility])    [address] [place] [address]   [phone]            Jun 13, 2017  4:00 PM CDT   (Arrive by 3:30 PM)   Return Visit with Viral Will Evans MD   [facility] Nephrology ([facility])    [address] [place] [address]   [phone]            Sep 06, 2017  9:30 AM CDT   Lab with  LAB   [facility] Lab ([facility])    [address] [place] [address]   [phone]            Sep 06, 2017 10:30 AM CDT   (Arrive by 10:15 AM)   Return General Liver with Eber Rosas MD  "  [facility] Hepatology ([facility])    [address] [facility]  3rd Floor  [facility] [address] [phone]              Future tests that were ordered for you today     Open Future Orders        Priority Expected Expires Ordered    Fibrosis Scan Routine 3/8/2017 3/8/2018 3/8/2017            Who to contact     If you have questions or need follow up information about today's clinic visit or your schedule please contact [facility] HEPATOLOGY directly at [phone].  Normal or non-critical lab and imaging results will be communicated to you by "[employer]hart, letter or phone within 4 business days after the clinic has received the results. If you do not hear from us within 7 days, please contact the clinic through [employer]t or phone. If you have a critical or abnormal lab result, we will notify you by phone as soon as possible.  Submit refill requests through [employer] or call your pharmacy and they will forward the refill request to us. Please allow 3 business days for your refill to be completed.          Additional Information About Your Visit        [employer] Information     [employer] lets you send messages to your doctor, view your test results, renew your prescriptions, schedule appointments and more. To sign up, go to [email or web address] . Click on \"Log in\" on the left side of the screen, which will take you to the Welcome page. Then click on \"Sign up Now\" on the right side of the page.     You will be asked to enter the access code listed below, as well as some personal information. Please follow the directions to create your username and password.     Your access code is: [email or web address]  Expires: 2017  6:30 AM     Your access code will  in 90 days. If you need help or a new code, please call your [facility] clinic or [phone].        Care EveryWhere ID     This is your Care EveryWhere ID. This could be used by other organizations to access your [facility] medical " "records  [id number]        Your Vitals Were     Pulse Temperature Height Pulse Oximetry BMI (Body Mass Index)       71 97.8  F (36.6  C) (Oral) 1.778 m (5' 10\") 100% 19.14 kg/m2        Blood Pressure from Last 3 Encounters:   03/08/17 (!) 172/99   02/14/17 125/83   01/08/17 (!) 180/110    Weight from Last 3 Encounters:   03/08/17 60.5 kg (133 lb 6.4 oz)   02/12/17 61.4 kg (135 lb 4.8 oz)   08/30/16 56.7 kg (125 lb)               Primary Care Provider Office Phone # Fax #    Wu Smith [phone]       [place] [address] [facility] [address]        Thank you!     Thank you for choosing [facility] HEPATOLOGY  for your care. Our goal is always to provide you with excellent care. Hearing back from our patients is one way we can continue to improve our services. Please take a few minutes to complete the written survey that you may receive in the mail after your visit with us. Thank you!             Your Updated Medication List - Protect others around you: Learn how to safely use, store and throw away your medicines at www.disposemymeds.org.          This list is accurate as of: 3/8/17 11:14 AM.  Always use your most recent med list.                   Brand Name Dispense Instructions for use    bumetanide 2 MG tablet    BUMEX    30 tablet    Take 1 tablet (2 mg) by mouth daily       entecavir 0.5 MG tablet    BARACLUDE    30 tablet    Take 1 tablet (0.5 mg) by mouth daily       lisinopril 20 MG tablet    PRINIVIL/ZESTRIL    30 tablet    Take 1 tablet (20 mg) by mouth daily         "

## 2017-03-10 LAB
HBV DNA SERPL NAA+PROBE-ACNC: 1111 [IU]/ML
HBV DNA SERPL NAA+PROBE-LOG IU: 3 {LOG_IU}/ML

## 2017-03-22 DIAGNOSIS — N04.9 NEPHROTIC SYNDROME: Primary | ICD-10-CM

## 2017-03-27 ENCOUNTER — TELEPHONE (OUTPATIENT)
Dept: NEPHROLOGY | Facility: CLINIC | Age: 57
End: 2017-03-27

## 2017-03-27 NOTE — TELEPHONE ENCOUNTER
Ayse, this is  office from [facility] at the [facility]. We are calling to remind you of your upcoming Nephrology appointment on 4/4/17 at 430pm. Please arrive about 1 hours prior to your appointment time for labs. Also, please bring an updated medication list or your labeled medication bottles with you to your appointment. If you have any questions or would like to cancel or reschedule your appointment, please call us at [phone].     You are welcome to have your labs done up to a week before your appointment at any [facility] facility. If you have your labs completed before your appointment, please still come 30 minutes early for check-in  MATTIE ADAME CMA

## 2017-04-04 ENCOUNTER — OFFICE VISIT (OUTPATIENT)
Dept: NEPHROLOGY | Facility: CLINIC | Age: 57
End: 2017-04-04
Attending: INTERNAL MEDICINE
Payer: COMMERCIAL

## 2017-04-04 VITALS
DIASTOLIC BLOOD PRESSURE: 90 MMHG | SYSTOLIC BLOOD PRESSURE: 150 MMHG | TEMPERATURE: 98.3 F | HEIGHT: 70 IN | HEART RATE: 74 BPM | WEIGHT: 129.2 LBS | OXYGEN SATURATION: 99 % | BODY MASS INDEX: 18.5 KG/M2

## 2017-04-04 DIAGNOSIS — N04.9 NEPHROTIC SYNDROME: ICD-10-CM

## 2017-04-04 DIAGNOSIS — I77.6 VASCULITIS (H): ICD-10-CM

## 2017-04-04 DIAGNOSIS — N18.2 CKD (CHRONIC KIDNEY DISEASE) STAGE 2, GFR 60-89 ML/MIN: ICD-10-CM

## 2017-04-04 DIAGNOSIS — N05.5 MPGN (MEMBRANOPROLIFERATIVE GLOMERULONEPHRITIDES): Primary | ICD-10-CM

## 2017-04-04 LAB
ALBUMIN SERPL-MCNC: 1.7 G/DL (ref 3.4–5)
ANION GAP SERPL CALCULATED.3IONS-SCNC: 7 MMOL/L (ref 3–14)
BUN SERPL-MCNC: 16 MG/DL (ref 7–30)
CALCIUM SERPL-MCNC: 7.7 MG/DL (ref 8.5–10.1)
CHLORIDE SERPL-SCNC: 106 MMOL/L (ref 94–109)
CO2 SERPL-SCNC: 28 MMOL/L (ref 20–32)
CREAT SERPL-MCNC: 1.08 MG/DL (ref 0.66–1.25)
CREAT UR-MCNC: 56 MG/DL
ERYTHROCYTE [DISTWIDTH] IN BLOOD BY AUTOMATED COUNT: 13.2 % (ref 10–15)
GFR SERPL CREATININE-BSD FRML MDRD: 71 ML/MIN/1.7M2
GLUCOSE SERPL-MCNC: 126 MG/DL (ref 70–99)
HCT VFR BLD AUTO: 36.3 % (ref 40–53)
HGB BLD-MCNC: 12.2 G/DL (ref 13.3–17.7)
MCH RBC QN AUTO: 32.1 PG (ref 26.5–33)
MCHC RBC AUTO-ENTMCNC: 33.6 G/DL (ref 31.5–36.5)
MCV RBC AUTO: 96 FL (ref 78–100)
MICROALBUMIN UR-MCNC: 1150 MG/L
MICROALBUMIN/CREAT UR: 2064.63 MG/G CR (ref 0–17)
PHOSPHATE SERPL-MCNC: 3 MG/DL (ref 2.5–4.5)
PLATELET # BLD AUTO: 118 10E9/L (ref 150–450)
POTASSIUM SERPL-SCNC: 3.8 MMOL/L (ref 3.4–5.3)
PROT UR-MCNC: 1.46 G/L
PROT/CREAT 24H UR: 2.62 G/G CR (ref 0–0.2)
RBC # BLD AUTO: 3.8 10E12/L (ref 4.4–5.9)
SODIUM SERPL-SCNC: 140 MMOL/L (ref 133–144)
WBC # BLD AUTO: 4.5 10E9/L (ref 4–11)

## 2017-04-04 PROCEDURE — T1013 SIGN LANG/ORAL INTERPRETER: HCPCS | Mod: U3

## 2017-04-04 PROCEDURE — 99213 OFFICE O/P EST LOW 20 MIN: CPT | Mod: ZF

## 2017-04-04 PROCEDURE — 85027 COMPLETE CBC AUTOMATED: CPT | Performed by: INTERNAL MEDICINE

## 2017-04-04 PROCEDURE — 84156 ASSAY OF PROTEIN URINE: CPT | Performed by: INTERNAL MEDICINE

## 2017-04-04 PROCEDURE — 80069 RENAL FUNCTION PANEL: CPT | Performed by: INTERNAL MEDICINE

## 2017-04-04 PROCEDURE — 82043 UR ALBUMIN QUANTITATIVE: CPT | Performed by: INTERNAL MEDICINE

## 2017-04-04 PROCEDURE — 36415 COLL VENOUS BLD VENIPUNCTURE: CPT | Performed by: INTERNAL MEDICINE

## 2017-04-04 PROCEDURE — 99213 OFFICE O/P EST LOW 20 MIN: CPT

## 2017-04-04 ASSESSMENT — PAIN SCALES - GENERAL: PAINLEVEL: NO PAIN (0)

## 2017-04-04 NOTE — MR AVS SNAPSHOT
After Visit Summary   4/4/2017    Wilfredo Yoon    MRN: [id number]           Patient Information     Date Of Birth          1960        Visit Information        Provider Department      4/4/2017 4:15 PM Cindy Sterling; Isaak Evans MD [facility] Nephrology        Today's Diagnoses     MPGN (membranoproliferative glomerulonephritides)    -  1    Vasculitis (H)        CKD (chronic kidney disease) stage 2, GFR 60-89 ml/min           Follow-ups after your visit        Follow-up notes from your care team     Return in about 6 months (around 10/4/2017) for Routine Visit.      Your next 10 appointments already scheduled     Sep 06, 2017  9:30 AM CDT   Lab with  LAB   [facility] Lab ([facility])    [address] [place] [id number] [phone]            Sep 06, 2017 10:30 AM CDT   (Arrive by 10:15 AM)   Return General Liver with Eber Rosas MD   [facility] Hepatology ([facility])    [address] [place] [id number] [phone]            Oct 10, 2017  1:45 PM CDT   Lab with  LAB   [facility] Lab ([facility])    [address] [place] [id number] [phone]            Oct 10, 2017  2:45 PM CDT   (Arrive by 2:15 PM)   Return Visit with Edgar Fuentes MD   [facility] Nephrology ([facility])    [address] [place] [id number] [phone]              Who to contact     If you have questions or need follow up information about today's clinic visit or your schedule please contact [facility] NEPHROLOGY directly at [phone].  Normal or non-critical lab and imaging results will be communicated to you by MyChart, letter or phone within 4 business days after the clinic has received the results. If you do not hear from us within 7 days, please contact the clinic through  "[employer] or phone. If you have a critical or abnormal lab result, we will notify you by phone as soon as possible.  Submit refill requests through [employer] or call your pharmacy and they will forward the refill request to us. Please allow 3 business days for your refill to be completed.          Additional Information About Your Visit        [employer] Information     [employer] lets you send messages to your doctor, view your test results, renew your prescriptions, schedule appointments and more. To sign up, go to [email or web address] . Click on \"Log in\" on the left side of the screen, which will take you to the Welcome page. Then click on \"Sign up Now\" on the right side of the page.     You will be asked to enter the access code listed below, as well as some personal information. Please follow the directions to create your username and password.     Your access code is: [email or web address]  Expires: 2017  6:02 AM     Your access code will  in 90 days. If you need help or a new code, please call your [facility] clinic or [phone].        Care EveryWhere ID     This is your Care EveryWhere ID. This could be used by other organizations to access your [facility] medical records  [id number]        Your Vitals Were     Pulse Temperature Height Pulse Oximetry BMI (Body Mass Index)       74 98.3  F (36.8  C) (Oral) 1.778 m (5' 10\") 99% 18.54 kg/m2        Blood Pressure from Last 3 Encounters:   17 150/90   17 (!) 172/99   17 125/83    Weight from Last 3 Encounters:   17 58.6 kg (129 lb 3.2 oz)   17 60.5 kg (133 lb 6.4 oz)   17 61.4 kg (135 lb 4.8 oz)               Primary Care Provider Office Phone # Fax #    Wu Smith [phone]       [place] [address]  [facility] [address]        Thank you!     Thank you for choosing [facility] NEPHROLOGY  for your care. Our goal is always to provide you with excellent care. Hearing back from our patients " is one way we can continue to improve our services. Please take a few minutes to complete the written survey that you may receive in the mail after your visit with us. Thank you!             Your Updated Medication List - Protect others around you: Learn how to safely use, store and throw away your medicines at www.disposemymeds.org.          This list is accurate as of: 4/4/17 11:59 PM.  Always use your most recent med list.                   Brand Name Dispense Instructions for use    AMOXICILLIN PO      Take 500 mg by mouth 2 times daily       bumetanide 2 MG tablet    BUMEX    30 tablet    Take 1 tablet (2 mg) by mouth daily       lisinopril 20 MG tablet    PRINIVIL/ZESTRIL    30 tablet    Take 1 tablet (20 mg) by mouth daily       omeprazole 20 MG CR capsule    priLOSEC     Take 20 mg by mouth

## 2017-04-04 NOTE — NURSING NOTE
"Chief Complaint   Patient presents with     RECHECK     Kidney follow up       Initial /90  Pulse 74  Temp 98.3  F (36.8  C) (Oral)  Ht 1.778 m (5' 10\")  Wt 58.6 kg (129 lb 3.2 oz)  SpO2 99%  BMI 18.54 kg/m2 Estimated body mass index is 18.54 kg/(m^2) as calculated from the following:    Height as of this encounter: 1.778 m (5' 10\").    Weight as of this encounter: 58.6 kg (129 lb 3.2 oz).  Medication Reconciliation: complete    "

## 2017-04-04 NOTE — LETTER
4/4/2017       RE: Wilfredo Yoon  [address]   [facility] [address]     Dear Colleague,    Thank you for referring your patient, Wilfredo Yoon, to the [facility] NEPHROLOGY [facility]. Please see a copy of my visit note below.    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  Unfortuntely he lost insurance and was off of this medication.  He was hospitalized 2/12 - 2/14 for edema and proteinuria.  The lowest the proteinuria went was 2.2 g/g in 1/2016.  At this time in 1/2016 his viral load was small at 16,480 copies.  However, during his hospitalization he was up to 208,533 copies.  Since then he is back on treatment and the hep b viral load has decreased to 111.  Currently he denies edema, dyspnea, fevers, chills, weakness, muscle aches.  He remains on Bumex 2 mg daily and is also on lisinopril 20 mg daily.    Active Medical Problems:  Patient Active Problem List   Diagnosis     Chronic hepatitis B without hepatic coma (H)     Proteinuria     Nephrotic syndrome     Essential hypertension, malignant     Past Surgical History:  Past Surgical History:   Procedure Laterality Date     C HAND/FINGER SURGERY UNLISTED       HAND SURGERY Right      Family History:  Family History   Problem Relation Age of Onset     Liver Cancer No family hx of       "Hepatitis No family hx of        Social History:  Social History     Social History     Marital status: Single     Spouse name: N/A     Number of children: N/A     Years of education: N/A     Occupational History     Not on file.     Social History Main Topics     Smoking status: Former Smoker     Packs/day: 0.50     Years: 40.00     Types: Cigarettes     Quit date: 1/29/2017     Smokeless tobacco: Never Used     Alcohol use No     Drug use: No     Sexual activity: Not on file     Other Topics Concern     Not on file     Social History Narrative       Allergies:  No Known Allergies    Medications:  Outpatient Encounter Prescriptions as of 4/4/2017   Medication Sig Dispense Refill     omeprazole (PRILOSEC) 20 MG CR capsule Take 20 mg by mouth       AMOXICILLIN PO Take 500 mg by mouth 2 times daily       lisinopril (PRINIVIL/ZESTRIL) 20 MG tablet Take 1 tablet (20 mg) by mouth daily 30 tablet 0     bumetanide (BUMEX) 2 MG tablet Take 1 tablet (2 mg) by mouth daily 30 tablet 0     [DISCONTINUED] entecavir (BARACLUDE) 0.5 MG tablet Take 1 tablet (0.5 mg) by mouth daily 30 tablet 0     No facility-administered encounter medications on file as of 4/4/2017.         Review of Systems:   A comprehensive review of systems was obtained and negative, except as noted in the HPI or PMH.    Physical Exam:  /90  Pulse 74  Temp 98.3  F (36.8  C) (Oral)  Ht 1.778 m (5' 10\")  Wt 58.6 kg (129 lb 3.2 oz)  SpO2 99%  BMI 18.54 kg/m2    GENERAL APPEARANCE: alert and no distress  HENT: mouth without ulcers or lesions  LYMPHATICS: no cervical or supraclavicular nodes  RESP: lungs clear to auscultation - no rales, rhonchi or wheezes  CV: regular rhythm, normal rate, no rub, no murmur  EDEMA: no LE edema bilaterally  ABDOMEN: soft, nondistended, nontender, bowel sounds normal  MS: extremities normal - no gross deformities noted, no evidence of inflammation in joints, no muscle tenderness  SKIN: no rash    Laboratory:  Recent Labs "   Lab Test  04/04/17   1607  03/08/17   0814   11/04/15   1555   NA  140  141   < >  139   POTASSIUM  3.8  3.6   < >  4.5   CHLORIDE  106  106   < >  108   CO2  28  29   < >  23   BUN  16  19   < >  16   CR  1.08  0.88   < >  0.73   SOTERO  7.7*  7.4*   < >  7.4*   PHOS  3.0   --    < >  3.0   PROTTOTAL   --   5.4*   < >   --    ALBUMIN  1.7*  1.5*   < >  1.4*   AST   --   48*   < >   --    ALT   --   28   < >   --    GLC  126*  106*   < >  99   A1C   --    --    --   5.3   WBC  4.5  4.9   < >   --    HGB  12.2*  12.7*   < >  13.8   MCV  96  95   < >   --    PLT  118*  121*   < >   --    INR   --   1.10   < >   --     < > = values in this interval not displayed.       Recent Labs   Lab Test  04/04/17   1718  02/12/17   1819   01/20/16   1450  01/05/16   1142  11/04/15   1626   COLOR   --   Yellow   < >   --    --   Yellow   APPEARANCE   --   Slightly Cloudy   < >   --    --   Slightly Cloudy   URINEGLC   --   Negative   < >   --    --   Negative   URINEBILI   --   Negative   < >   --    --   Negative   URINEKETONE   --   Negative   < >   --    --   Negative   SG   --   1.009   < >   --    --   1.018   UBLD   --   Small*   < >   --    --   Large*   URINEPH   --   6.5   < >   --    --   6.0   PROTEIN   --   300*   < >   --    --   300*   NITRITE   --   Negative   < >   --    --   Negative   LEUKEST   --   Negative   < >   --    --   Negative   RBCU   --   21*   < >   --    --   57*   WBCU   --   4*   < >   --    --   10*   UTPG  2.62*   --    --   2.24*  3.36*  3.37*    < > = values in this interval not displayed.     Imaging:  All imaging studies reviewed by me.     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  Testing for cryoglobulin is recommended.    Assessment and Plan:  55 year old Italian male with history of chronic hepatitis B with edema, hypertension, hematuria, nephrotic range proteinuria s/p renal biopsy consistent with MPGN and IgM kappa concerning for cryoglobulinemic GN and later found to have trace cryoglobulins.  He has been non compliant with entecavir and has persistent proteinuria.  His nephrotic syndrome and renal function have improved / remained stable.     1. Renal Function  Chronic Kidney Disease stage 2.  Baseline creatinine 0.8-1.0. eGFR 86 ml/min  The patient has preserved kidney function despite poorly controlled proteinuria.  Treatment of the cryoglobulinemic GN involves continued treatment of the chronic hepatitis infection.  He should be monitored for improved symptoms in the next 6 months with ongoing treatment    2. Volume / Blood Pressure  Hypertension , euvolemic on exam.  Blood pressure is near goal of 140/90.  No changes to bp medication today.    3. Electrolytes / Acid Base  No electrolyte or acid base abnormalities.     Mr. Yoon should follow up with me in 6 months. At that time he should have a repeat renal panel and urine protein/creatinine ratio.    This patient has been evaluated by me, Edgar Fuentes MD, on 4/4/17. I discussed with the fellow, Dr. Evans, and agree with the findings and plan in this note.    I have reviewed 4/4/17 medications, vital signs and labs.    Total time spent was >40 minutes, and more than 50% of face to face time was spent in counseling and/or coordination of care regarding principles of multidisciplinary care, medication management, and CKD education.   Edgar Fuentes MD

## 2017-04-13 DIAGNOSIS — B18.1 CHRONIC VIRAL HEPATITIS B WITHOUT DELTA AGENT AND WITHOUT COMA (H): ICD-10-CM

## 2017-04-13 RX ORDER — ENTECAVIR 0.5 MG/1
0.5 TABLET, FILM COATED ORAL DAILY
Qty: 90 TABLET | Refills: 3 | Status: SHIPPED | OUTPATIENT
Start: 2017-04-13 | End: 2018-10-08

## 2017-04-13 NOTE — TELEPHONE ENCOUNTER
Patient only had 1 more days worth of medication. No refills available. Refilled with 90 day supply and 3 refills to pharmacy in the building. Called down to pharmacy and they were already working on the prescription request. If the medication is in stock (which it normally is) it would be filled within 30 minutes from when they received the request.    Patient advised to call the clinic if patient is down to the last 2 weeks of his medication in the future. Writer called the clinic number and went through the phone prompts with the patient so he could get to the Hepatology triage to leave a message. Writer wrote down the information so the patient could repeat the steps on his own. Patient went to the pharmacy to wait for the refill.

## 2017-05-03 NOTE — PROGRESS NOTES
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  Unfortuntely he lost insurance and was off of this medication.  He was hospitalized 2/12 - 2/14 for edema and proteinuria.  The lowest the proteinuria went was 2.2 g/g in 1/2016.  At this time in 1/2016 his viral load was small at 16,480 copies.  However, during his hospitalization he was up to 208,533 copies.  Since then he is back on treatment and the hep b viral load has decreased to 111.  Currently he denies edema, dyspnea, fevers, chills, weakness, muscle aches.  He remains on Bumex 2 mg daily and is also on lisinopril 20 mg daily.    Active Medical Problems:  Patient Active Problem List   Diagnosis     Chronic hepatitis B without hepatic coma (H)     Proteinuria     Nephrotic syndrome     Essential hypertension, malignant     Past Surgical History:  Past Surgical History:   Procedure Laterality Date     C HAND/FINGER SURGERY UNLISTED       HAND SURGERY Right      Family History:  Family History   Problem Relation Age of Onset     Liver Cancer No family hx of      Hepatitis No family hx of        Social History:  Social History     Social History     Marital status: Single     Spouse name: N/A     Number of children: N/A     Years of education: N/A     Occupational History     Not on file.     Social History Main Topics     Smoking status: Former Smoker     Packs/day: 0.50      "Years: 40.00     Types: Cigarettes     Quit date: 1/29/2017     Smokeless tobacco: Never Used     Alcohol use No     Drug use: No     Sexual activity: Not on file     Other Topics Concern     Not on file     Social History Narrative       Allergies:  No Known Allergies    Medications:  Outpatient Encounter Prescriptions as of 4/4/2017   Medication Sig Dispense Refill     omeprazole (PRILOSEC) 20 MG CR capsule Take 20 mg by mouth       AMOXICILLIN PO Take 500 mg by mouth 2 times daily       lisinopril (PRINIVIL/ZESTRIL) 20 MG tablet Take 1 tablet (20 mg) by mouth daily 30 tablet 0     bumetanide (BUMEX) 2 MG tablet Take 1 tablet (2 mg) by mouth daily 30 tablet 0     [DISCONTINUED] entecavir (BARACLUDE) 0.5 MG tablet Take 1 tablet (0.5 mg) by mouth daily 30 tablet 0     No facility-administered encounter medications on file as of 4/4/2017.         Review of Systems:   A comprehensive review of systems was obtained and negative, except as noted in the HPI or PMH.    Physical Exam:  /90  Pulse 74  Temp 98.3  F (36.8  C) (Oral)  Ht 1.778 m (5' 10\")  Wt 58.6 kg (129 lb 3.2 oz)  SpO2 99%  BMI 18.54 kg/m2    GENERAL APPEARANCE: alert and no distress  HENT: mouth without ulcers or lesions  LYMPHATICS: no cervical or supraclavicular nodes  RESP: lungs clear to auscultation - no rales, rhonchi or wheezes  CV: regular rhythm, normal rate, no rub, no murmur  EDEMA: no LE edema bilaterally  ABDOMEN: soft, nondistended, nontender, bowel sounds normal  MS: extremities normal - no gross deformities noted, no evidence of inflammation in joints, no muscle tenderness  SKIN: no rash    Laboratory:  Recent Labs   Lab Test  04/04/17   1607  03/08/17   0814   11/04/15   1555   NA  140  141   < >  139   POTASSIUM  3.8  3.6   < >  4.5   CHLORIDE  106  106   < >  108   CO2  28  29   < >  23   BUN  16  19   < >  16   CR  1.08  0.88   < >  0.73   SOTERO  7.7*  7.4*   < >  7.4*   PHOS  3.0   --    < >  3.0   PROTTOTAL   --   5.4*   < >  "  --    ALBUMIN  1.7*  1.5*   < >  1.4*   AST   --   48*   < >   --    ALT   --   28   < >   --    GLC  126*  106*   < >  99   A1C   --    --    --   5.3   WBC  4.5  4.9   < >   --    HGB  12.2*  12.7*   < >  13.8   MCV  96  95   < >   --    PLT  118*  121*   < >   --    INR   --   1.10   < >   --     < > = values in this interval not displayed.       Recent Labs   Lab Test  04/04/17   1718  02/12/17   1819   01/20/16   1450  01/05/16   1142  11/04/15   1626   COLOR   --   Yellow   < >   --    --   Yellow   APPEARANCE   --   Slightly Cloudy   < >   --    --   Slightly Cloudy   URINEGLC   --   Negative   < >   --    --   Negative   URINEBILI   --   Negative   < >   --    --   Negative   URINEKETONE   --   Negative   < >   --    --   Negative   SG   --   1.009   < >   --    --   1.018   UBLD   --   Small*   < >   --    --   Large*   URINEPH   --   6.5   < >   --    --   6.0   PROTEIN   --   300*   < >   --    --   300*   NITRITE   --   Negative   < >   --    --   Negative   LEUKEST   --   Negative   < >   --    --   Negative   RBCU   --   21*   < >   --    --   57*   WBCU   --   4*   < >   --    --   10*   UTPG  2.62*   --    --   2.24*  3.36*  3.37*    < > = values in this interval not displayed.     Imaging:  All imaging studies reviewed by me.     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  Testing for cryoglobulin is recommended.    Assessment and Plan:  55 year old Belgian male with history of chronic hepatitis B with edema, hypertension, hematuria, nephrotic range proteinuria s/p renal biopsy consistent with MPGN and  IgM kappa concerning for cryoglobulinemic GN and later found to have trace cryoglobulins.  He has been non compliant with entecavir and has persistent proteinuria.  His nephrotic syndrome and renal function have improved / remained stable.     1. Renal Function  Chronic Kidney Disease stage 2.  Baseline creatinine 0.8-1.0. eGFR 86 ml/min  The patient has preserved kidney function despite poorly controlled proteinuria.  Treatment of the cryoglobulinemic GN involves continued treatment of the chronic hepatitis infection.  He should be monitored for improved symptoms in the next 6 months with ongoing treatment    2. Volume / Blood Pressure  Hypertension , euvolemic on exam.  Blood pressure is near goal of 140/90.  No changes to bp medication today.    3. Electrolytes / Acid Base  No electrolyte or acid base abnormalities.     Mr. Yoon should follow up with me in 6 months. At that time he should have a repeat renal panel and urine protein/creatinine ratio.    This patient has been evaluated by me, Edgar Fuentes MD, on 4/4/17. I discussed with the fellow, Dr. Evans, and agree with the findings and plan in this note.    I have reviewed 4/4/17 medications, vital signs and labs.    Total time spent was >40 minutes, and more than 50% of face to face time was spent in counseling and/or coordination of care regarding principles of multidisciplinary care, medication management, and CKD education.   Edgar Fuentes MD

## 2017-05-22 ENCOUNTER — DOCUMENTATION ONLY (OUTPATIENT)
Dept: GASTROENTEROLOGY | Facility: CLINIC | Age: 57
End: 2017-05-22

## 2017-05-22 NOTE — PROGRESS NOTES
Writer spoke with patient using  tablet. Discovered patient only had received a 1 month supply. Writer called down to the pharmacy with the patient/ and they are only able to fill 30 day supplies due to insurance approval. Writer reiterated to the patient that he can plan to check in at the pharmacy every month to get his refills. Patient repeated back the information. No further actions needed.

## 2017-08-29 DIAGNOSIS — B18.1 CHRONIC VIRAL HEPATITIS B WITHOUT DELTA AGENT AND WITHOUT COMA (H): Primary | ICD-10-CM

## 2017-09-06 ENCOUNTER — OFFICE VISIT (OUTPATIENT)
Dept: GASTROENTEROLOGY | Facility: CLINIC | Age: 57
End: 2017-09-06
Attending: INTERNAL MEDICINE
Payer: COMMERCIAL

## 2017-09-06 VITALS
OXYGEN SATURATION: 98 % | HEART RATE: 70 BPM | DIASTOLIC BLOOD PRESSURE: 115 MMHG | HEIGHT: 70 IN | BODY MASS INDEX: 18.38 KG/M2 | WEIGHT: 128.4 LBS | TEMPERATURE: 97.9 F | SYSTOLIC BLOOD PRESSURE: 178 MMHG

## 2017-09-06 DIAGNOSIS — N05.5 MEMBRANOPROLIFERATIVE GLOMERULONEPHRITIS: ICD-10-CM

## 2017-09-06 DIAGNOSIS — B18.1 CHRONIC VIRAL HEPATITIS B WITHOUT DELTA AGENT AND WITHOUT COMA (H): Primary | ICD-10-CM

## 2017-09-06 DIAGNOSIS — K74.60 CIRRHOSIS OF LIVER WITHOUT ASCITES, UNSPECIFIED HEPATIC CIRRHOSIS TYPE (H): ICD-10-CM

## 2017-09-06 DIAGNOSIS — B18.1 CHRONIC VIRAL HEPATITIS B WITHOUT DELTA AGENT AND WITHOUT COMA (H): ICD-10-CM

## 2017-09-06 LAB
ALBUMIN SERPL-MCNC: 1.9 G/DL (ref 3.4–5)
ALP SERPL-CCNC: 72 U/L (ref 40–150)
ALT SERPL W P-5'-P-CCNC: 22 U/L (ref 0–70)
ANION GAP SERPL CALCULATED.3IONS-SCNC: 6 MMOL/L (ref 3–14)
AST SERPL W P-5'-P-CCNC: 27 U/L (ref 0–45)
BILIRUB DIRECT SERPL-MCNC: <0.1 MG/DL (ref 0–0.2)
BILIRUB SERPL-MCNC: 0.5 MG/DL (ref 0.2–1.3)
BUN SERPL-MCNC: 10 MG/DL (ref 7–30)
CALCIUM SERPL-MCNC: 8.3 MG/DL (ref 8.5–10.1)
CHLORIDE SERPL-SCNC: 106 MMOL/L (ref 94–109)
CO2 SERPL-SCNC: 25 MMOL/L (ref 20–32)
CREAT SERPL-MCNC: 0.75 MG/DL (ref 0.66–1.25)
ERYTHROCYTE [DISTWIDTH] IN BLOOD BY AUTOMATED COUNT: 12.9 % (ref 10–15)
GFR SERPL CREATININE-BSD FRML MDRD: >90 ML/MIN/1.7M2
GLUCOSE SERPL-MCNC: 134 MG/DL (ref 70–99)
HCT VFR BLD AUTO: 39.5 % (ref 40–53)
HGB BLD-MCNC: 13.7 G/DL (ref 13.3–17.7)
INR PPP: 0.98 (ref 0.86–1.14)
MCH RBC QN AUTO: 32.1 PG (ref 26.5–33)
MCHC RBC AUTO-ENTMCNC: 34.7 G/DL (ref 31.5–36.5)
MCV RBC AUTO: 93 FL (ref 78–100)
PLATELET # BLD AUTO: 125 10E9/L (ref 150–450)
POTASSIUM SERPL-SCNC: 3.5 MMOL/L (ref 3.4–5.3)
PROT SERPL-MCNC: 5.7 G/DL (ref 6.8–8.8)
RBC # BLD AUTO: 4.27 10E12/L (ref 4.4–5.9)
SODIUM SERPL-SCNC: 138 MMOL/L (ref 133–144)
WBC # BLD AUTO: 5.6 10E9/L (ref 4–11)

## 2017-09-06 PROCEDURE — 85027 COMPLETE CBC AUTOMATED: CPT | Performed by: INTERNAL MEDICINE

## 2017-09-06 PROCEDURE — T1013 SIGN LANG/ORAL INTERPRETER: HCPCS | Mod: U3,ZF

## 2017-09-06 PROCEDURE — 80048 BASIC METABOLIC PNL TOTAL CA: CPT | Performed by: INTERNAL MEDICINE

## 2017-09-06 PROCEDURE — 36415 COLL VENOUS BLD VENIPUNCTURE: CPT | Performed by: INTERNAL MEDICINE

## 2017-09-06 PROCEDURE — 85610 PROTHROMBIN TIME: CPT | Performed by: INTERNAL MEDICINE

## 2017-09-06 PROCEDURE — 80076 HEPATIC FUNCTION PANEL: CPT | Performed by: INTERNAL MEDICINE

## 2017-09-06 PROCEDURE — 99212 OFFICE O/P EST SF 10 MIN: CPT | Mod: ZF

## 2017-09-06 PROCEDURE — 87517 HEPATITIS B DNA QUANT: CPT | Performed by: INTERNAL MEDICINE

## 2017-09-06 ASSESSMENT — PAIN SCALES - GENERAL: PAINLEVEL: NO PAIN (0)

## 2017-09-06 NOTE — NURSING NOTE
Chief Complaint   Patient presents with     RECHECK     Hepatitis B   Pt roomed, vitals, meds, and allergies reviewed with pt. Pt ready for provider.  Darrel Lester, CMA

## 2017-09-06 NOTE — MR AVS SNAPSHOT
After Visit Summary   9/6/2017    Wilfredo Yoon    MRN: [id number]           Patient Information     Date Of Birth          1960        Visit Information        Provider Department      9/6/2017 10:05 AM Gibran White; Eber Ortez MD [facility] Hepatology        Today's Diagnoses     Chronic viral hepatitis B without delta agent and without coma (H)    -  1    Membranoproliferative glomerulonephritis        Cirrhosis of liver without ascites, unspecified hepatic cirrhosis type (H)           Follow-ups after your visit        Follow-up notes from your care team     Return in about 6 months (around 3/6/2018).      Your next 10 appointments already scheduled     Oct 10, 2017  2:00 PM CDT   Lab with  LAB   [facility] Lab ([facility])    [address] [place] [id number]   [phone]            Oct 10, 2017  3:00 PM CDT   (Arrive by 2:30 PM)   Return Visit with Edgar Fuentes MD   [facility] Nephrology ([facility])    [address] [place] [address]   [phone]            Feb 26, 2018  9:45 AM CST   US ABDOMEN COMPLETE with UCUS2   [facility] US ([facility])    [address] [place] [id number] [phone]           Please bring a list of your medicines (including vitamins, minerals and over-the-counter drugs). Also, tell your doctor about any allergies you may have. Wear comfortable clothes and leave your valuables at home.  Adults: No eating or drinking for 8 hours before the exam. You may take medicine with a small sip of water.  Children: - Children 6+ years: No food or drink for 6 hours before exam. - Children 1-5 years: No food or drink for 4 hours before exam. - Infants, breast-fed: may have breast milk up to 2 hours before exam. - Infants, formula: may have bottle until 4 hours before exam.   "Please call the Imaging Department at your exam site with any questions.            Mar 05, 2018  9:30 AM CST   Lab with UC LAB   [facility] ([facility])    [address]  1st Floor  [facility] [address] [phone]            Mar 05, 2018 10:20 AM CST   (Arrive by 10:05 AM)   Return General Liver with Eber Rosas MD   [facility] Hepatology ([facility])    [address]  3rd Floor  [facility] [address] [phone]              Future tests that were ordered for you today     Open Future Orders        Priority Expected Expires Ordered    US Abdomen Complete Routine  9/6/2018 9/6/2017    UPPER GI ENDOSCOPY Routine  10/21/2017 9/6/2017            Who to contact     If you have questions or need follow up information about today's clinic visit or your schedule please contact [facility] HEPATOLOGY directly at [phone].  Normal or non-critical lab and imaging results will be communicated to you by [employer], letter or phone within 4 business days after the clinic has received the results. If you do not hear from us within 7 days, please contact the clinic through [employer] or phone. If you have a critical or abnormal lab result, we will notify you by phone as soon as possible.  Submit refill requests through [employer] or call your pharmacy and they will forward the refill request to us. Please allow 3 business days for your refill to be completed.          Additional Information About Your Visit        [employer] Information     [employer] lets you send messages to your doctor, view your test results, renew your prescriptions, schedule appointments and more. To sign up, go to [email or web address] . Click on \"Log in\" on the left side of the screen, which will take you to the Welcome page. Then click on \"Sign up Now\" on the right side of the page.     You will be asked to enter the access code listed below, as well as some personal " "information. Please follow the directions to create your username and password.     Your access code is: [email or web address]  Expires: 2017  6:02 AM     Your access code will  in 90 days. If you need help or a new code, please call your [facility] clinic or [phone].        Care EveryWhere ID     This is your Care EveryWhere ID. This could be used by other organizations to access your [facility] medical records  [id number]        Your Vitals Were     Pulse Temperature Height Pulse Oximetry BMI (Body Mass Index)       70 97.9  F (36.6  C) (Oral) 1.778 m (5' 10\") 98% 18.42 kg/m2        Blood Pressure from Last 3 Encounters:   17 (!) 178/115   17 150/90   17 (!) 172/99    Weight from Last 3 Encounters:   17 58.2 kg (128 lb 6.4 oz)   17 58.6 kg (129 lb 3.2 oz)   17 60.5 kg (133 lb 6.4 oz)               Primary Care Provider Office Phone # Fax #    Basilia JOHNSON DAGOBERTO Wiley [phone]       [address] [place] [address]        Equal Access to Services     STEPHAN BAUTISTA AH: Hadii winston dave hadasho Soomaali, waaxda luqadaha, qaybta kaalmada adeegyada, elisabeth ornelas. So [facility] [phone].    ATENCIÓN: Si habla español, tiene a bingham disposición servicios gratuitos de asistencia lingüística. Neeta al [phone].    We comply with applicable federal civil rights laws and [place] laws. We do not discriminate on the basis of race, color, national origin, age, disability sex, sexual orientation or gender identity.            Thank you!     Thank you for choosing [facility]PATOLOG[facility]  for your care. Our goal is always to provide you with excellent care. Hearing back from our patients is one way we can continue to improve our services. Please take a few minutes to complete the written survey that you may receive in the mail after your visit with us. Thank you!             Your Updated Medication List - Protect others around you: " Learn how to safely use, store and throw away your medicines at www.disposemymeds.org.          This list is accurate as of: 9/6/17 10:42 AM.  Always use your most recent med list.                   Brand Name Dispense Instructions for use Diagnosis    bumetanide 2 MG tablet    BUMEX    30 tablet    Take 1 tablet (2 mg) by mouth daily    Nephrotic syndrome       entecavir 0.5 MG tablet    BARACLUDE    90 tablet    Take 1 tablet (0.5 mg) by mouth daily    Chronic viral hepatitis B without delta agent and without coma (H)       lisinopril 20 MG tablet    PRINIVIL/ZESTRIL    30 tablet    Take 1 tablet (20 mg) by mouth daily    Nephrotic syndrome

## 2017-09-06 NOTE — PROGRESS NOTES
[facility]    Hepatology follow-up    Follow-up visit for hep B    Subjective:  57 year old male    Hepatitis B  - dx 2015  - eAg neg, eAb pos  - Fibrosis Scan 3/8/17, F4 fibrosis  - complicated with glomerulonephritis secondary to cryoglobulinemia  - on entecavir 0.5mg PO Q24 since Dec 2015 (interrupted)  - HBV DNA= 1111, 3/8/17  - HCC screening- liver doppler U/S Feb 2017    Comes to clinic this AM with [place]  for follow-up of hep B.  Last clinic visit March 2017.  Since then, patient completed a course of antibiotics for h.pylori.  Fibrosis Scan showed stiffness consistent with F4 fibrosis.  Patient denies ER visits or hospital admissions since last clinic visit.    Patient is well today.  He denies any signs or symptoms specific to liver disease.    Patient denies jaundice, lower extremity edema, abdominal distension, lethargy or confusion.    Patient denies melena, hematemesis or hematochezia.    Patient denies fevers, sweats or chills.  Weight stable.    Patient is compliant with his medications.  He has not missed any doses of his antiviral therapy.    Patient continues to smoke.  He does not drink alcohol.  He is currently working part-time as PCA and going to school to learn english and math.      Medical hx Surgical hx   Past Medical History:   Diagnosis Date     Cryoglobulinemia (H)      Glomerulonephritis      Hepatitis B      Surgical complication       Past Surgical History:   Procedure Laterality Date     C HAND/FINGER SURGERY UNLISTED       HAND SURGERY Right           Medications  Prior to Admission medications    Medication Sig Start Date End Date Taking? Authorizing Provider   entecavir (BARACLUDE) 0.5 MG tablet Take 1 tablet (0.5 mg) by mouth daily 4/13/17   Eber Ortez MD   omeprazole (PRILOSEC) 20 MG CR capsule Take 20 mg by mouth 3/16/17   Reported, Patient   AMOXICILLIN PO Take 500 mg by mouth 2 times daily    Reported, Patient   lisinopril  "(PRINIVIL/ZESTRIL) 20 MG tablet Take 1 tablet (20 mg) by mouth daily 2/14/17   Bal Hunter MD   bumetanide (BUMEX) 2 MG tablet Take 1 tablet (2 mg) by mouth daily 2/14/17   Bal Hunter MD       Allergies  No Known Allergies    Review of systems  A 10-point review of systems was negative    Examination  BP (!) 178/115  Pulse 70  Temp 97.9  F (36.6  C) (Oral)  Ht 1.778 m (5' 10\")  Wt 58.2 kg (128 lb 6.4 oz)  SpO2 98%  BMI 18.42 kg/m2  Body mass index is 18.42 kg/(m^2).    Gen- well, NAD, A+Ox3, normal color  CVS- RRR  RS- CTA  Abd- soft, non-tender, no ascites or organomegaly on palpation or percussion, BS+  Extr- hands normal, no AIDEN  Skin- no rash or jaundice  Neuro- no asterixis  Psych- normal mood    Laboratory  Lab Results   Component Value Date     09/06/2017    POTASSIUM 3.5 09/06/2017    CHLORIDE 106 09/06/2017    CO2 25 09/06/2017    BUN 10 09/06/2017    CR 0.75 09/06/2017       Lab Results   Component Value Date    BILITOTAL 0.5 09/06/2017    ALT 22 09/06/2017    AST 27 09/06/2017    ALKPHOS 72 09/06/2017       Lab Results   Component Value Date    ALBUMIN 1.9 09/06/2017    PROTTOTAL 5.7 09/06/2017        Lab Results   Component Value Date    WBC 5.6 09/06/2017    HGB 13.7 09/06/2017    MCV 93 09/06/2017     09/06/2017       Lab Results   Component Value Date    INR 0.98 09/06/2017       Radiology  Fibrosis Scan March 2017 personally reviewed    Assessment  57 year old male who presents for follow-up of chronic HBV complicated with glomerulonephritis.  Liver function tests normal on entecavir.  Renal function normal.  Fibrosis Scan consistent with cirrhosis in addition to hypoalbuminemia and borderline thrombocytopenia.  Will obtain EGD to screen for esophageal varices.  Will obtain abdominal ultrasound to screen for HCC.    Plan  1.  Abd U/S  2.  EGD  3.  Continue entecavir 0.5mg PO Q24  4.  Follow-up in 6 months    Eber Adame MD  Hepatology  [facility] " [facility]

## 2017-09-07 LAB
HBV DNA SERPL NAA+PROBE-ACNC: <20 [IU]/ML
HBV DNA SERPL NAA+PROBE-LOG IU: <1.3 {LOG_IU}/ML

## 2017-09-27 DIAGNOSIS — R80.9 PROTEINURIA: Primary | ICD-10-CM

## 2017-09-27 DIAGNOSIS — N04.9 NEPHROTIC SYNDROME: ICD-10-CM

## 2017-10-03 ENCOUNTER — TELEPHONE (OUTPATIENT)
Dept: NEPHROLOGY | Facility: CLINIC | Age: 57
End: 2017-10-03

## 2017-10-03 NOTE — TELEPHONE ENCOUNTER
Hello, this is Alfredo's office from Medicine Specialty on the 3rd floor at [facility] located at [address] [place] [address]. We are reminding you of your upcoming Nephrology appointment on 10/10/2017 at 3:00 pm. Please arrive an hour prior to your appointment time for labs. Also, please bring an updated medication list including dose of prescribed and over the counter medications you are currently taking or your labeled medication bottles with you to your appointment. If you have any questions or would like to cancel or reschedule your appointment, please call us at [phone].     If you are having labs draw same day you need a lab appointment scheduled 1 hour prior to your visit.    You are welcome to have your labs done 2-7 days before your appointment at any [facility] or [facility] facility. If you have your labs completed before your appointment, please still come 30 minutes early for check-in.     Thank-You for allowing us to be a member of your Health Care Team,     Chen Carrillo., CMA

## 2017-10-10 ENCOUNTER — OFFICE VISIT (OUTPATIENT)
Dept: NEPHROLOGY | Facility: CLINIC | Age: 57
End: 2017-10-10
Attending: INTERNAL MEDICINE
Payer: COMMERCIAL

## 2017-10-10 VITALS
BODY MASS INDEX: 17.98 KG/M2 | OXYGEN SATURATION: 98 % | HEIGHT: 70 IN | WEIGHT: 125.6 LBS | DIASTOLIC BLOOD PRESSURE: 90 MMHG | SYSTOLIC BLOOD PRESSURE: 148 MMHG | HEART RATE: 67 BPM

## 2017-10-10 DIAGNOSIS — N05.5 MPGN (MEMBRANOPROLIFERATIVE GLOMERULONEPHRITIDES): Primary | ICD-10-CM

## 2017-10-10 DIAGNOSIS — N04.9 NEPHROTIC SYNDROME: ICD-10-CM

## 2017-10-10 DIAGNOSIS — R80.9 PROTEINURIA: ICD-10-CM

## 2017-10-10 LAB
ALBUMIN SERPL-MCNC: 2.1 G/DL (ref 3.4–5)
ANION GAP SERPL CALCULATED.3IONS-SCNC: 5 MMOL/L (ref 3–14)
BUN SERPL-MCNC: 10 MG/DL (ref 7–30)
CALCIUM SERPL-MCNC: 8.2 MG/DL (ref 8.5–10.1)
CHLORIDE SERPL-SCNC: 105 MMOL/L (ref 94–109)
CO2 SERPL-SCNC: 25 MMOL/L (ref 20–32)
CREAT SERPL-MCNC: 0.84 MG/DL (ref 0.66–1.25)
CREAT UR-MCNC: 141 MG/DL
DEPRECATED CALCIDIOL+CALCIFEROL SERPL-MC: 25 UG/L (ref 20–75)
FERRITIN SERPL-MCNC: 176 NG/ML (ref 26–388)
GFR SERPL CREATININE-BSD FRML MDRD: >90 ML/MIN/1.7M2
GLUCOSE SERPL-MCNC: 70 MG/DL (ref 70–99)
HGB BLD-MCNC: 15.6 G/DL (ref 13.3–17.7)
IRON SATN MFR SERPL: 50 % (ref 15–46)
IRON SERPL-MCNC: 133 UG/DL (ref 35–180)
MICROALBUMIN UR-MCNC: 4620 MG/L
MICROALBUMIN/CREAT UR: 3276.6 MG/G CR (ref 0–17)
PHOSPHATE SERPL-MCNC: 3 MG/DL (ref 2.5–4.5)
POTASSIUM SERPL-SCNC: 3.9 MMOL/L (ref 3.4–5.3)
PROT UR-MCNC: 4.95 G/L
PROT/CREAT 24H UR: 3.51 G/G CR (ref 0–0.2)
PTH-INTACT SERPL-MCNC: 37 PG/ML (ref 12–72)
SODIUM SERPL-SCNC: 135 MMOL/L (ref 133–144)
TIBC SERPL-MCNC: 266 UG/DL (ref 240–430)

## 2017-10-10 PROCEDURE — 83540 ASSAY OF IRON: CPT | Performed by: INTERNAL MEDICINE

## 2017-10-10 PROCEDURE — 82043 UR ALBUMIN QUANTITATIVE: CPT | Performed by: INTERNAL MEDICINE

## 2017-10-10 PROCEDURE — 99213 OFFICE O/P EST LOW 20 MIN: CPT | Mod: ZF

## 2017-10-10 PROCEDURE — 80069 RENAL FUNCTION PANEL: CPT | Performed by: INTERNAL MEDICINE

## 2017-10-10 PROCEDURE — 82728 ASSAY OF FERRITIN: CPT | Performed by: INTERNAL MEDICINE

## 2017-10-10 PROCEDURE — 83550 IRON BINDING TEST: CPT | Performed by: INTERNAL MEDICINE

## 2017-10-10 PROCEDURE — 36415 COLL VENOUS BLD VENIPUNCTURE: CPT | Performed by: INTERNAL MEDICINE

## 2017-10-10 PROCEDURE — 83970 ASSAY OF PARATHORMONE: CPT | Performed by: INTERNAL MEDICINE

## 2017-10-10 PROCEDURE — 82306 VITAMIN D 25 HYDROXY: CPT | Performed by: INTERNAL MEDICINE

## 2017-10-10 PROCEDURE — 85018 HEMOGLOBIN: CPT | Performed by: INTERNAL MEDICINE

## 2017-10-10 PROCEDURE — 84156 ASSAY OF PROTEIN URINE: CPT | Performed by: INTERNAL MEDICINE

## 2017-10-10 RX ORDER — BUMETANIDE 2 MG/1
2 TABLET ORAL DAILY PRN
Qty: 30 TABLET | Refills: 11 | Status: ON HOLD | COMMUNITY
Start: 2017-10-10 | End: 2018-08-05

## 2017-10-10 ASSESSMENT — PAIN SCALES - GENERAL: PAINLEVEL: NO PAIN (1)

## 2017-10-10 NOTE — PATIENT INSTRUCTIONS
-Please have your kidney function and urine albumin checked every 2 months at any [facility] facility  -It is ok to take Bumex as needed for lower extremity swelling rather than every day  -Otherwise, we will see you in 6 months

## 2017-10-10 NOTE — MR AVS SNAPSHOT
After Visit Summary   10/10/2017    Wilfredo Yoon    MRN: [id number]           Patient Information     Date Of Birth          1960        Visit Information        Provider Department      10/10/2017 2:30 PM Edgar Fuentes MD; [place] [employer] [facility] Nephrology        Today's Diagnoses     MPGN (membranoproliferative glomerulonephritides)    -  1    Nephrotic syndrome          Care Instructions    -Please have your kidney function and urine albumin checked every 2 months at any [facility] facility  -It is ok to take Bumex as needed for lower extremity swelling rather than every day  -Otherwise, we will see you in 6 months          Follow-ups after your visit        Follow-up notes from your care team     Return in about 6 months (around 4/10/2018).      Your next 10 appointments already scheduled     Feb 26, 2018  9:45 AM CST   US ABDOMEN COMPLETE with [address] [place] [address] Imaging Center [facility] ([facility])    [address] [place] [id number] [phone]           Please bring a list of your medicines (including vitamins, minerals and over-the-counter drugs). Also, tell your doctor about any allergies you may have. Wear comfortable clothes and leave your valuables at home.  Adults: No eating or drinking for 8 hours before the exam. You may take medicine with a small sip of water.  Children: - Children 6+ years: No food or drink for 6 hours before exam. - Children 1-5 years: No food or drink for 4 hours before exam. - Infants, breast-fed: may have breast milk up to 2 hours before exam. - Infants, formula: may have bottle until 4 hours before exam.  Please call the Imaging Department at your exam site with any questions.            Mar 05, 2018  9:30 AM CST   Lab with  LAB   [facility] ([facility])    [address] [place] [id number] [phone]            Mar  05, 2018 10:20 AM CST   (Arrive by 10:05 AM)   Return General Liver with Eber Rosas MD   [facility]at[facility] ([facility])    [address] [facility] [id number] [phone]            Apr 10, 2018  1:30 PM CDT   Lab with  LAB   [facility] ([facility])    [address] [facility] [id number] [phone]            Apr 10, 2018  2:30 PM CDT   (Arrive by 2:00 PM)   Return Visit with Edgar Fuentes MD   [facility] Nephrology ([facility])    [address] [id number] [phone]              Future tests that were ordered for you today     Open Standing Orders        Priority Remaining Interval Expires Ordered    Renal panel Routine 41/41 Every 2 Months 10/10/2018 10/10/2017    Albumin Random Urine Quantitative with Creat Ratio Routine 41/41 Every 2 Months 10/10/2018 10/10/2017            Who to contact     If you have questions or need follow up information about today's clinic visit or your schedule please contact [facility] NEPHROLOGY directly at [phone].  Normal or non-critical lab and imaging results will be communicated to you by [employer], letter or phone within 4 business days after the clinic has received the results. If you do not hear from us within 7 days, please contact the clinic through [employer] or phone. If you have a critical or abnormal lab result, we will notify you by phone as soon as possible.  Submit refill requests through [employer] or call your pharmacy and they will forward the refill request to us. Please allow 3 business days for your refill to be completed.          Additional Information About Your Visit        D[employer] Information     D[employer]ingle lets you send messages to your doctor, view your test results, renew your prescriptions, schedule appointments and more. To sign up, go to [email or web address] . Click  "on \"Log in\" on the left side of the screen, which will take you to the Welcome page. Then click on \"Sign up Now\" on the right side of the page.     You will be asked to enter the access code listed below, as well as some personal information. Please follow the directions to create your username and password.     Your access code is: [email or web address]  Expires: 2017  6:30 AM     Your access code will  in 90 days. If you need help or a new code, please call your [place] clinic or [phone].        Care EveryWhere ID     This is your Care EveryWhere ID. This could be used by other organizations to access your [place] medical records  [id number]        Your Vitals Were     Pulse Height Pulse Oximetry BMI (Body Mass Index)          67 1.778 m (5' 10\") 98% 18.02 kg/m2         Blood Pressure from Last 3 Encounters:   10/10/17 148/90   17 (!) 178/115   17 150/90    Weight from Last 3 Encounters:   10/10/17 57 kg (125 lb 9.6 oz)   17 58.2 kg (128 lb 6.4 oz)   17 58.6 kg (129 lb 3.2 oz)                 Today's Medication Changes          These changes are accurate as of: 10/10/17  3:51 PM.  If you have any questions, ask your nurse or doctor.               These medicines have changed or have updated prescriptions.        Dose/Directions    bumetanide 2 MG tablet   Commonly known as:  BUMEX   This may have changed:    - when to take this  - reasons to take this  - additional instructions   Used for:  Nephrotic syndrome   Changed by:  Edgar Fuentes MD        Dose:  2 mg   Take 1 tablet (2 mg) by mouth daily as needed As needed for edema.   Quantity:  30 tablet   Refills:  11                Primary Care Provider Office Phone # Fax #    Basilia Wiley PA-C [phone]       [facility] [address]  [facility] [address]        Equal Access to Services     STEPHAN BAUTISTA AH: Dean Torres, delfina etienne, ruthy gamino, [facility] bladimirin " nakul rohitzach sisrachael lacarlota ah. [facility] [phone].    ATENCIÓN: Si habla stephanie, tiene a bingham disposición servicios gratuitos de asistencia lingüística. Neeta al [phone].    We comply with applicable federal civil rights laws and [place] laws. We do not discriminate on the basis of race, color, national origin, age, disability, sex, sexual orientation, or gender identity.            Thank you!     Thank you for choosing [facility] NEPHROLOGY  for your care. Our goal is always to provide you with excellent care. Hearing back from our patients is one way we can continue to improve our services. Please take a few minutes to complete the written survey that you may receive in the mail after your visit with us. Thank you!             Your Updated Medication List - Protect others around you: Learn how to safely use, store and throw away your medicines at www.disposemymeds.org.          This list is accurate as of: 10/10/17  3:51 PM.  Always use your most recent med list.                   Brand Name Dispense Instructions for use Diagnosis    bumetanide 2 MG tablet    BUMEX    30 tablet    Take 1 tablet (2 mg) by mouth daily as needed As needed for edema.    Nephrotic syndrome       entecavir 0.5 MG tablet    BARACLUDE    90 tablet    Take 1 tablet (0.5 mg) by mouth daily    Chronic viral hepatitis B without delta agent and without coma (H)       lisinopril 20 MG tablet    PRINIVIL/ZESTRIL    30 tablet    Take 1 tablet (20 mg) by mouth daily    Nephrotic syndrome

## 2017-10-10 NOTE — NURSING NOTE
"Chief Complaint   Patient presents with     RECHECK     Ckd follow up       Initial /90  Pulse 67  Ht 1.778 m (5' 10\")  Wt 57 kg (125 lb 9.6 oz)  SpO2 98%  BMI 18.02 kg/m2 Estimated body mass index is 18.02 kg/(m^2) as calculated from the following:    Height as of this encounter: 1.778 m (5' 10\").    Weight as of this encounter: 57 kg (125 lb 9.6 oz).  Medication Reconciliation: complete   MATTIE ADAME CMA      "

## 2017-10-10 NOTE — LETTER
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 Yoon is a 57 year old [place] man with chronic hepatitis B initially referred to nephrology clinic for evaluation of edema and nephrotic range proteinuria who presents for CKD f/u.  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   Unfortunately he lost insurance and was off entecavir for a prolonged period of time and noted to have a high HBV viral load.  He has since restarted entecavir and has an undetectable viral load (last measured 9/6/17).  Currently he denies edema, dyspnea, fevers, chills, weakness, muscle aches.  He remains on Bumex 2 mg daily and is also on lisinopril 20 mg daily.  He does not check his BP at home.  He is hesitant to make any changes in medication today.     Active Medical Problems:  Patient Active Problem List   Diagnosis     Chronic hepatitis B without hepatic coma (H)     Proteinuria     Nephrotic syndrome     Essential hypertension, malignant     Past Surgical History:  Past Surgical History:   Procedure Laterality Date     C HAND/FINGER SURGERY UNLISTED       HAND SURGERY Right      Family History:  Family History   Problem Relation Age of Onset     Liver Cancer No family hx of      Hepatitis No family hx of        Social History:  Social History     Social History     Marital status: Single     Spouse name: N/A     Number of children: N/A     Years of education: N/A     Occupational History     Not on file.     Social History Main Topics     Smoking status: Former Smoker     Packs/day: 0.50     Years: 40.00      "Types: Cigarettes     Quit date: 1/29/2017     Smokeless tobacco: Never Used     Alcohol use No     Drug use: No     Sexual activity: Not on file     Other Topics Concern     Not on file     Social History Narrative       Allergies:  No Known Allergies    Medications:  Outpatient Encounter Prescriptions as of 10/10/2017   Medication Sig Dispense Refill     bumetanide (BUMEX) 2 MG tablet Take 1 tablet (2 mg) by mouth daily as needed As needed for edema. 30 tablet 11     entecavir (BARACLUDE) 0.5 MG tablet Take 1 tablet (0.5 mg) by mouth daily 90 tablet 3     lisinopril (PRINIVIL/ZESTRIL) 20 MG tablet Take 1 tablet (20 mg) by mouth daily 30 tablet 0     [DISCONTINUED] bumetanide (BUMEX) 2 MG tablet Take 1 tablet (2 mg) by mouth daily 30 tablet 0     No facility-administered encounter medications on file as of 10/10/2017.         Review of Systems:   A comprehensive review of systems was obtained and negative, except as noted in the HPI or PMH.    Physical Exam:  /90  Pulse 67  Ht 1.778 m (5' 10\")  Wt 57 kg (125 lb 9.6 oz)  SpO2 98%  BMI 18.02 kg/m2    GENERAL APPEARANCE: alert and no distress  HENT: mouth without ulcers or lesions  LYMPHATICS: no cervical or supraclavicular nodes  RESP: lungs clear to auscultation - no rales, rhonchi or wheezes  CV: regular rhythm, normal rate, no rub, no murmur  EDEMA: no LE edema bilaterally  ABDOMEN: soft, nondistended, nontender  : No CVA tenderness  MS: extremities normal - no gross deformities noted, no evidence of inflammation in joints, no muscle tenderness  SKIN: no rash  NEURO: No gross focal deficit    Laboratory:  Recent Labs   Lab Test  10/10/17   1328  09/06/17   0900   11/04/15   1555   NA  135  138   < >  139   POTASSIUM  3.9  3.5   < >  4.5   CHLORIDE  105  106   < >  108   CO2  25  25   < >  23   BUN  10  10   < >  16   CR  0.84  0.75   < >  0.73   SOTERO  8.2*  8.3*   < >  7.4*   PHOS  3.0   --    < >  3.0   PROTTOTAL   --   5.7*   < >   --    ALBUMIN  " 2.1*  1.9*   < >  1.4*   AST   --   27   < >   --    ALT   --   22   < >   --    GLC  70  134*   < >  99   A1C   --    --    --   5.3   WBC   --   5.6   < >   --    HGB  15.6  13.7   < >  13.8   MCV   --   93   < >   --    PLT   --   125*   < >   --    INR   --   0.98   < >   --    PTHI  37   --    --    --    IRON  133   --    --    --    FEB  266   --    --    --    IRONSAT  50*   --    --    --    CHRISTIAN  176   --    --    --     < > = values in this interval not displayed.       Recent Labs   Lab Test  10/10/17   1336  04/04/17   1718  02/12/17   1819   01/20/16   1450  01/05/16   1142  11/04/15   1626   COLOR   --    --   Yellow   < >   --    --   Yellow   APPEARANCE   --    --   Slightly Cloudy   < >   --    --   Slightly Cloudy   URINEGLC   --    --   Negative   < >   --    --   Negative   URINEBILI   --    --   Negative   < >   --    --   Negative   URINEKETONE   --    --   Negative   < >   --    --   Negative   SG   --    --   1.009   < >   --    --   1.018   UBLD   --    --   Small*   < >   --    --   Large*   URINEPH   --    --   6.5   < >   --    --   6.0   PROTEIN   --    --   300*   < >   --    --   300*   NITRITE   --    --   Negative   < >   --    --   Negative   LEUKEST   --    --   Negative   < >   --    --   Negative   RBCU   --    --   21*   < >   --    --   57*   WBCU   --    --   4*   < >   --    --   10*   UTPG  3.51*  2.62*   --    --   2.24*  3.36*  3.37*    < > = values in this interval not displayed.     Imaging:  All imaging studies reviewed by me.     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  Testing for cryoglobulin is recommended.    Assessment and Plan:  Mr. Yoon is a 57 year old [place] man with history of chronic hepatitis B with edema, hypertension, hematuria, nephrotic range proteinuria s/p renal biopsy consistent with MPGN and IgM kappa highly suggestive of cryoglobulinemic GN/vasculitis (later found to have serum cryoglobulins).  He has been non compliant with entecavir and has persistent proteinuria.  His nephrotic syndrome and renal function have improved / remained stable.     1. MPGN/cryoglobulinemic GN: Due to chronic HBV infection. Baseline creatinine 0.8 mg/dL with. eGFR >90 ml/min.  He has preserved kidney function despite poorly controlled albuminuria (~3.3 g/g).  Treatment of his cryoglobulinemic related MPGN involves continued treatment of his chronic hepatitis infection.  His kidney function remains stable though I am concerned about a component of hyperfiltration which may be masking the severity of his CKD.    -continue treatment of HBV with entecavir  -continue RAAS blockade with lisinopril at 20 mg daily  -would like to increase lisinopril to 40 mg daily today both for management of HTN and proteinuria but he remains quite hesitant but will reconsider in the upcoming months  -repeat renal panel and urine albumin in 2 months at which point we would increase his lisinopril if proteinuria has not significantly improved (as patient will be more willing at that time)  -RTC in 6 months    2. Volume / Blood Pressure: Euvolemic to slightly hypovolemic on exam. Office SBP above goal of <130/80.  He does not measure his BP at home.   -As mentioned, patient hesitant to make medication changes today other than decreasing his regimen  -would like to increase lisinopril to 40 mg daily next; will reassess at next visit  -will change bumex (2 mg) from schedule to daily as needed for edema due to concern for mild volume depletion  -measure BP at home as able and report to  clinic for possible changes (increase lisinopril to 40 mg next)      3. Electrolytes / Acid Base: No pressing issues.     4. Lipids:   -will consider lipid panel at next visit given his history of nephrotic syndrome and CKD       This patient has been evaluated by me, Edgar Fuentes MD, on 10/10/17. I have reviewed 10/10/17 medications, vital signs and labs.    Total time spent was >40 minutes, and more than 50% of face to face time was spent in counseling and/or coordination of care regarding principles of multidisciplinary care, medication management, and CKD education.   Edgar Fuentes MD

## 2017-10-16 NOTE — PROGRESS NOTES
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 Yoon is a 57 year old [place] man with chronic hepatitis B initially referred to nephrology clinic for evaluation of edema and nephrotic range proteinuria who presents for CKD f/u.  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   Unfortunately he lost insurance and was off entecavir for a prolonged period of time and noted to have a high HBV viral load.  He has since restarted entecavir and has an undetectable viral load (last measured 9/6/17).  Currently he denies edema, dyspnea, fevers, chills, weakness, muscle aches.  He remains on Bumex 2 mg daily and is also on lisinopril 20 mg daily.  He does not check his BP at home.  He is hesitant to make any changes in medication today.     Active Medical Problems:  Patient Active Problem List   Diagnosis     Chronic hepatitis B without hepatic coma (H)     Proteinuria     Nephrotic syndrome     Essential hypertension, malignant     Past Surgical History:  Past Surgical History:   Procedure Laterality Date     C HAND/FINGER SURGERY UNLISTED       HAND SURGERY Right      Family History:  Family History   Problem Relation Age of Onset     Liver Cancer No family hx of      Hepatitis No family hx of        Social History:  Social History     Social History     Marital status: Single     Spouse name: N/A     Number of children: N/A     Years of education: N/A     Occupational History     Not on file.     Social History Main Topics     Smoking status: Former Smoker     Packs/day: 0.50     Years: 40.00     Types: Cigarettes     Quit date: 1/29/2017     Smokeless tobacco: Never Used     Alcohol use No     Drug use: No      "Sexual activity: Not on file     Other Topics Concern     Not on file     Social History Narrative       Allergies:  No Known Allergies    Medications:  Outpatient Encounter Prescriptions as of 10/10/2017   Medication Sig Dispense Refill     bumetanide (BUMEX) 2 MG tablet Take 1 tablet (2 mg) by mouth daily as needed As needed for edema. 30 tablet 11     entecavir (BARACLUDE) 0.5 MG tablet Take 1 tablet (0.5 mg) by mouth daily 90 tablet 3     lisinopril (PRINIVIL/ZESTRIL) 20 MG tablet Take 1 tablet (20 mg) by mouth daily 30 tablet 0     [DISCONTINUED] bumetanide (BUMEX) 2 MG tablet Take 1 tablet (2 mg) by mouth daily 30 tablet 0     No facility-administered encounter medications on file as of 10/10/2017.         Review of Systems:   A comprehensive review of systems was obtained and negative, except as noted in the HPI or PMH.    Physical Exam:  /90  Pulse 67  Ht 1.778 m (5' 10\")  Wt 57 kg (125 lb 9.6 oz)  SpO2 98%  BMI 18.02 kg/m2    GENERAL APPEARANCE: alert and no distress  HENT: mouth without ulcers or lesions  LYMPHATICS: no cervical or supraclavicular nodes  RESP: lungs clear to auscultation - no rales, rhonchi or wheezes  CV: regular rhythm, normal rate, no rub, no murmur  EDEMA: no LE edema bilaterally  ABDOMEN: soft, nondistended, nontender  : No CVA tenderness  MS: extremities normal - no gross deformities noted, no evidence of inflammation in joints, no muscle tenderness  SKIN: no rash  NEURO: No gross focal deficit    Laboratory:  Recent Labs   Lab Test  10/10/17   1328  09/06/17   0900   11/04/15   1555   NA  135  138   < >  139   POTASSIUM  3.9  3.5   < >  4.5   CHLORIDE  105  106   < >  108   CO2  25  25   < >  23   BUN  10  10   < >  16   CR  0.84  0.75   < >  0.73   SOTERO  8.2*  8.3*   < >  7.4*   PHOS  3.0   --    < >  3.0   PROTTOTAL   --   5.7*   < >   --    ALBUMIN  2.1*  1.9*   < >  1.4*   AST   --   27   < >   --    ALT   --   22   < >   --    GLC  70  134*   < >  99   A1C   --    " --    --   5.3   WBC   --   5.6   < >   --    HGB  15.6  13.7   < >  13.8   MCV   --   93   < >   --    PLT   --   125*   < >   --    INR   --   0.98   < >   --    PTHI  37   --    --    --    IRON  133   --    --    --    FEB  266   --    --    --    IRONSAT  50*   --    --    --    CHRISTIAN  176   --    --    --     < > = values in this interval not displayed.       Recent Labs   Lab Test  10/10/17   1336  04/04/17   1718  02/12/17   1819   01/20/16   1450  01/05/16   1142  11/04/15   1626   COLOR   --    --   Yellow   < >   --    --   Yellow   APPEARANCE   --    --   Slightly Cloudy   < >   --    --   Slightly Cloudy   URINEGLC   --    --   Negative   < >   --    --   Negative   URINEBILI   --    --   Negative   < >   --    --   Negative   URINEKETONE   --    --   Negative   < >   --    --   Negative   SG   --    --   1.009   < >   --    --   1.018   UBLD   --    --   Small*   < >   --    --   Large*   URINEPH   --    --   6.5   < >   --    --   6.0   PROTEIN   --    --   300*   < >   --    --   300*   NITRITE   --    --   Negative   < >   --    --   Negative   LEUKEST   --    --   Negative   < >   --    --   Negative   RBCU   --    --   21*   < >   --    --   57*   WBCU   --    --   4*   < >   --    --   10*   UTPG  3.51*  2.62*   --    --   2.24*  3.36*  3.37*    < > = values in this interval not displayed.     Imaging:  All imaging studies reviewed by me.     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  Testing for cryoglobulin is recommended.    Assessment and Plan:  Mr. Yoon is a 57 year old [place] man  with history of chronic hepatitis B with edema, hypertension, hematuria, nephrotic range proteinuria s/p renal biopsy consistent with MPGN and IgM kappa highly suggestive of cryoglobulinemic GN/vasculitis (later found to have serum cryoglobulins).  He has been non compliant with entecavir and has persistent proteinuria.  His nephrotic syndrome and renal function have improved / remained stable.     1. MPGN/cryoglobulinemic GN: Due to chronic HBV infection. Baseline creatinine 0.8 mg/dL with. eGFR >90 ml/min.  He has preserved kidney function despite poorly controlled albuminuria (~3.3 g/g).  Treatment of his cryoglobulinemic related MPGN involves continued treatment of his chronic hepatitis infection.  His kidney function remains stable though I am concerned about a component of hyperfiltration which may be masking the severity of his CKD.    -continue treatment of HBV with entecavir  -continue RAAS blockade with lisinopril at 20 mg daily  -would like to increase lisinopril to 40 mg daily today both for management of HTN and proteinuria but he remains quite hesitant but will reconsider in the upcoming months  -repeat renal panel and urine albumin in 2 months at which point we would increase his lisinopril if proteinuria has not significantly improved (as patient will be more willing at that time)  -RTC in 6 months    2. Volume / Blood Pressure: Euvolemic to slightly hypovolemic on exam. Office SBP above goal of <130/80.  He does not measure his BP at home.   -As mentioned, patient hesitant to make medication changes today other than decreasing his regimen  -would like to increase lisinopril to 40 mg daily next; will reassess at next visit  -will change bumex (2 mg) from schedule to daily as needed for edema due to concern for mild volume depletion  -measure BP at home as able and report to clinic for possible changes (increase lisinopril to 40 mg next)      3. Electrolytes / Acid Base: No pressing issues.     4.  Lipids:   -will consider lipid panel at next visit given his history of nephrotic syndrome and CKD       This patient has been evaluated by me, Edgar Fuentes MD, on 10/10/17. I have reviewed 10/10/17 medications, vital signs and labs.    Total time spent was >40 minutes, and more than 50% of face to face time was spent in counseling and/or coordination of care regarding principles of multidisciplinary care, medication management, and CKD education.   Edgar Fuentes MD

## 2018-01-24 DIAGNOSIS — B18.1 CHRONIC VIRAL HEPATITIS B WITHOUT DELTA AGENT AND WITHOUT COMA (H): Primary | ICD-10-CM

## 2018-07-30 ENCOUNTER — APPOINTMENT (OUTPATIENT)
Dept: CT IMAGING | Facility: CLINIC | Age: 58
End: 2018-07-30
Attending: EMERGENCY MEDICINE
Payer: MEDICAID

## 2018-07-30 ENCOUNTER — APPOINTMENT (OUTPATIENT)
Dept: GENERAL RADIOLOGY | Facility: CLINIC | Age: 58
End: 2018-07-30
Attending: EMERGENCY MEDICINE
Payer: MEDICAID

## 2018-07-30 ENCOUNTER — HOSPITAL ENCOUNTER (INPATIENT)
Facility: CLINIC | Age: 58
LOS: 5 days | Discharge: HOME | End: 2018-08-05
Attending: EMERGENCY MEDICINE | Admitting: INTERNAL MEDICINE
Payer: MEDICAID

## 2018-07-30 ENCOUNTER — HOSPITAL ENCOUNTER (OUTPATIENT)
Age: 58
End: 2018-07-30
Attending: HOSPITALIST | Admitting: HOSPITALIST
Payer: COMMERCIAL

## 2018-07-30 DIAGNOSIS — I16.1 HYPERTENSIVE EMERGENCY: ICD-10-CM

## 2018-07-30 DIAGNOSIS — R60.0 BILATERAL LOWER EXTREMITY EDEMA: ICD-10-CM

## 2018-07-30 LAB
ALBUMIN SERPL-MCNC: 1.2 G/DL (ref 3.4–5)
ALBUMIN UR-MCNC: 300 MG/DL
ALP SERPL-CCNC: 166 U/L (ref 40–150)
ALT SERPL W P-5'-P-CCNC: 57 U/L (ref 0–70)
AMORPH CRY #/AREA URNS HPF: ABNORMAL /HPF
ANION GAP SERPL CALCULATED.3IONS-SCNC: 7 MMOL/L (ref 3–14)
APPEARANCE UR: CLEAR
AST SERPL W P-5'-P-CCNC: 98 U/L (ref 0–45)
BACTERIA #/AREA URNS HPF: ABNORMAL /HPF
BASOPHILS # BLD AUTO: 0.1 10E9/L (ref 0–0.2)
BASOPHILS NFR BLD AUTO: 0.9 %
BILIRUB SERPL-MCNC: 0.4 MG/DL (ref 0.2–1.3)
BILIRUB UR QL STRIP: NEGATIVE
BUN SERPL-MCNC: 15 MG/DL (ref 7–30)
CALCIUM SERPL-MCNC: 7.6 MG/DL (ref 8.5–10.1)
CHLORIDE SERPL-SCNC: 106 MMOL/L (ref 94–109)
CO2 SERPL-SCNC: 26 MMOL/L (ref 20–32)
COLOR UR AUTO: YELLOW
CREAT SERPL-MCNC: 1.2 MG/DL (ref 0.66–1.25)
DIFFERENTIAL METHOD BLD: ABNORMAL
EOSINOPHIL # BLD AUTO: 0.2 10E9/L (ref 0–0.7)
EOSINOPHIL NFR BLD AUTO: 3.5 %
ERYTHROCYTE [DISTWIDTH] IN BLOOD BY AUTOMATED COUNT: 13.2 % (ref 10–15)
GFR SERPL CREATININE-BSD FRML MDRD: 62 ML/MIN/1.7M2
GLUCOSE SERPL-MCNC: 100 MG/DL (ref 70–99)
GLUCOSE UR STRIP-MCNC: NEGATIVE MG/DL
HCT VFR BLD AUTO: 42.8 % (ref 40–53)
HGB BLD-MCNC: 14.6 G/DL (ref 13.3–17.7)
HGB UR QL STRIP: ABNORMAL
HYALINE CASTS #/AREA URNS LPF: 1 /LPF (ref 0–2)
IMM GRANULOCYTES # BLD: 0 10E9/L (ref 0–0.4)
IMM GRANULOCYTES NFR BLD: 0 %
KETONES UR STRIP-MCNC: NEGATIVE MG/DL
LEUKOCYTE ESTERASE UR QL STRIP: NEGATIVE
LYMPHOCYTES # BLD AUTO: 2.1 10E9/L (ref 0.8–5.3)
LYMPHOCYTES NFR BLD AUTO: 37.5 %
MCH RBC QN AUTO: 31.8 PG (ref 26.5–33)
MCHC RBC AUTO-ENTMCNC: 34.1 G/DL (ref 31.5–36.5)
MCV RBC AUTO: 93 FL (ref 78–100)
MONOCYTES # BLD AUTO: 0.3 10E9/L (ref 0–1.3)
MONOCYTES NFR BLD AUTO: 6 %
MUCOUS THREADS #/AREA URNS LPF: PRESENT /LPF
NEUTROPHILS # BLD AUTO: 2.9 10E9/L (ref 1.6–8.3)
NEUTROPHILS NFR BLD AUTO: 52.1 %
NITRATE UR QL: NEGATIVE
NRBC # BLD AUTO: 0 10*3/UL
NRBC BLD AUTO-RTO: 0 /100
NT-PROBNP SERPL-MCNC: 877 PG/ML (ref 0–900)
PH UR STRIP: 6.5 PH (ref 5–7)
PLATELET # BLD AUTO: 113 10E9/L (ref 150–450)
POTASSIUM SERPL-SCNC: 4.1 MMOL/L (ref 3.4–5.3)
PROT SERPL-MCNC: 5.4 G/DL (ref 6.8–8.8)
RBC # BLD AUTO: 4.59 10E12/L (ref 4.4–5.9)
RBC #/AREA URNS AUTO: 5 /HPF (ref 0–2)
SODIUM SERPL-SCNC: 139 MMOL/L (ref 133–144)
SOURCE: ABNORMAL
SP GR UR STRIP: 1.01 (ref 1–1.03)
TROPONIN I SERPL-MCNC: <0.015 UG/L (ref 0–0.04)
UROBILINOGEN UR STRIP-MCNC: 4 MG/DL (ref 0–2)
WBC # BLD AUTO: 5.5 10E9/L (ref 4–11)
WBC #/AREA URNS AUTO: 4 /HPF (ref 0–5)

## 2018-07-30 PROCEDURE — 85025 COMPLETE CBC W/AUTO DIFF WBC: CPT | Performed by: EMERGENCY MEDICINE

## 2018-07-30 PROCEDURE — 70450 CT HEAD/BRAIN W/O DYE: CPT

## 2018-07-30 PROCEDURE — 84484 ASSAY OF TROPONIN QUANT: CPT | Performed by: EMERGENCY MEDICINE

## 2018-07-30 PROCEDURE — 83880 ASSAY OF NATRIURETIC PEPTIDE: CPT | Performed by: EMERGENCY MEDICINE

## 2018-07-30 PROCEDURE — 25000128 H RX IP 250 OP 636: Performed by: EMERGENCY MEDICINE

## 2018-07-30 PROCEDURE — 80053 COMPREHEN METABOLIC PANEL: CPT | Performed by: EMERGENCY MEDICINE

## 2018-07-30 PROCEDURE — 71046 X-RAY EXAM CHEST 2 VIEWS: CPT

## 2018-07-30 PROCEDURE — 25000128 H RX IP 250 OP 636

## 2018-07-30 PROCEDURE — 25000132 ZZH RX MED GY IP 250 OP 250 PS 637: Performed by: EMERGENCY MEDICINE

## 2018-07-30 PROCEDURE — 81001 URINALYSIS AUTO W/SCOPE: CPT | Performed by: EMERGENCY MEDICINE

## 2018-07-30 RX ORDER — SODIUM CHLORIDE 9 MG/ML
INJECTION, SOLUTION INTRAVENOUS
Status: COMPLETED
Start: 2018-07-30 | End: 2018-07-30

## 2018-07-30 RX ORDER — NITROGLYCERIN 0.4 MG/1
0.4 TABLET SUBLINGUAL EVERY 5 MIN PRN
Status: DISCONTINUED | OUTPATIENT
Start: 2018-07-30 | End: 2018-08-05 | Stop reason: HOSPADM

## 2018-07-30 RX ORDER — FUROSEMIDE 10 MG/ML
40 INJECTION INTRAMUSCULAR; INTRAVENOUS ONCE
Status: COMPLETED | OUTPATIENT
Start: 2018-07-30 | End: 2018-07-30

## 2018-07-30 RX ORDER — GADOBUTROL 604.72 MG/ML
7.5 INJECTION INTRAVENOUS ONCE
Status: COMPLETED | OUTPATIENT
Start: 2018-07-30 | End: 2018-07-31

## 2018-07-30 RX ORDER — NITROGLYCERIN 20 MG/100ML
.07-2 INJECTION INTRAVENOUS CONTINUOUS
Status: DISCONTINUED | OUTPATIENT
Start: 2018-07-30 | End: 2018-08-01 | Stop reason: CLARIF

## 2018-07-30 RX ORDER — HYDRALAZINE HYDROCHLORIDE 20 MG/ML
10 INJECTION INTRAMUSCULAR; INTRAVENOUS ONCE
Status: COMPLETED | OUTPATIENT
Start: 2018-07-30 | End: 2018-07-30

## 2018-07-30 RX ADMIN — HYDRALAZINE HYDROCHLORIDE 10 MG: 20 INJECTION INTRAMUSCULAR; INTRAVENOUS at 20:30

## 2018-07-30 RX ADMIN — SODIUM CHLORIDE 500 ML: 9 INJECTION, SOLUTION INTRAVENOUS at 22:07

## 2018-07-30 RX ADMIN — NITROGLYCERIN 0.4 MG: 0.4 TABLET SUBLINGUAL at 22:04

## 2018-07-30 RX ADMIN — FUROSEMIDE 40 MG: 10 INJECTION, SOLUTION INTRAMUSCULAR; INTRAVENOUS at 20:33

## 2018-07-30 RX ADMIN — NITROGLYCERIN 0.07 MCG/KG/MIN: 20 INJECTION INTRAVENOUS at 22:09

## 2018-07-30 RX ADMIN — HYDRALAZINE HYDROCHLORIDE 10 MG: 20 INJECTION INTRAMUSCULAR; INTRAVENOUS at 21:30

## 2018-07-30 ASSESSMENT — ENCOUNTER SYMPTOMS
HEMATURIA: 0
BACK PAIN: 1
NAUSEA: 1
VOMITING: 0
HEADACHES: 1
FEVER: 0

## 2018-07-30 NOTE — IP AVS SNAPSHOT
MRN:[id number]                      After Visit Summary   7/30/2018    Wilfredo Yoon    MRN: [id number]           Thank you!     Thank you for choosing [facility] for your care. Our goal is always to provide you with excellent care. Hearing back from our patients is one way we can continue to improve our services. Please take a few minutes to complete the written survey that you may receive in the mail after you visit with us. Thank you!        Patient Information     Date Of Birth          1960        Designated Caregiver       Most Recent Value    Caregiver    Will someone help with your care after discharge? no      About your hospital stay     You were admitted on:  July 31, 2018 You last received care in the:  [address] [facility]    You were discharged on:  August 5, 2018        Reason for your hospital stay       Hypertensive emergency                  Who to Call     For medical emergencies, please call 911.  For non-urgent questions about your medical care, please call your primary care provider or clinic, [phone]          Attending Provider     Provider Specialty    Rupal Vega MD Emergency Medicine Emergency Medical Services    Bucky Kramer MD Emergency Medicine    [facility] davenport [facility] Pulmonary    Bal Wallis MD Internal Medicine       Primary Care Provider Office Phone # Fax #    Basilia ELIZABETH Wiley PA-C [phone]      After Care Instructions     Activity       Your activity upon discharge: activity as tolerated            Diet       Follow this diet upon discharge:    Regular Diet Adult            Discharge Instructions       You were admitted for hypertensive emergency (a dangerous elevation in your blood pressure). Your medications were resumed and adjusted. Please take all medications as prescribed to help control your blood pressure and the resulting complications of elevated blood pressure (leg swelling, kidney damage,  "stroke). You should follow up with your primary care doctor in about one week.            Monitor and record       blood pressure daily                  Follow-up Appointments     Adult [facility] Follow-up and recommended labs and tests       Follow up with primary care provider, Basilia Wiley, within 7 days, to evaluate medication change. The following labs/tests are recommended: BMP.     Appointments on [place] and/or [facility] (with [facility] or [facility] provider or service). Call [phone] if you haven't heard regarding these appointments within 7 days of discharge.                  Your next 10 appointments already scheduled     Aug 23, 2018  2:00 PM CDT   Lab with  LAB   [facility] ([facility])    [address]  1st Floor  [place] [address] [phone]            Aug 23, 2018  3:00 PM CDT   (Arrive by 2:30 PM)   Return Visit with Edgar Fuentes MD   [facility] Nephrology ([facility])    [address]  [address]  [place] [address] [phone]              Pending Results     No orders found from 7/28/2018 to 7/31/2018.            Statement of Approval     Ordered          08/05/18 1307  I have reviewed and agree with all the recommendations and orders detailed in this document.  EFFECTIVE NOW     Approved and electronically signed by:  Augustin Bush MD             Admission Information     Date & Time Provider Department Dept. Phone    7/30/2018 Bal Wallis MD [address] [facility] [place] [phone]      Your Vitals Were     Blood Pressure Pulse Temperature Respirations Height Weight    127/96 (BP Location: Left arm) 63 96.5  F (35.8  C) (Oral) 16 1.803 m (5' 11\") 60.7 kg (133 lb 14.4 oz)    Pulse Oximetry BMI (Body Mass Index)                97% 18.68 kg/m2          MyChart Information     [facility] lets you send messages to your doctor, view your test results, renew your prescriptions, schedule " "appointments and more. To sign up, go to [email or web address] . Click on \"Log in\" on the left side of the screen, which will take you to the Welcome page. Then click on \"Sign up Now\" on the right side of the page.     You will be asked to enter the access code listed below, as well as some personal information. Please follow the directions to create your username and password.     Your access code is: [email or web address]  Expires: 10/14/2018  6:30 AM     Your access code will  in 90 days. If you need help or a new code, please call your [place] clinic or [phone].        Care EveryWhere ID     This is your Care EveryWhere ID. This could be used by other organizations to access your [place] medical records  [id number]        Equal Access to Services     STEPHAN BAUTISTA : Dean Torres, delfina etienne, ruthy gamino, elisabeth mistry . So [facility] [phone].    ATENCIÓN: Si habla español, tiene a bingham disposición servicios gratuitos de asistencia lingüística. Neeta al [phone].    We comply with applicable federal civil rights laws and [place] laws. We do not discriminate on the basis of race, color, national origin, age, disability, sex, sexual orientation, or gender identity.               Review of your medicines      START taking        Dose / Directions    amLODIPine 5 MG tablet   Commonly known as:  NORVASC        Dose:  5 mg   Start taking on:  2018   Take 1 tablet (5 mg) by mouth daily   Quantity:  30 tablet   Refills:  0         CONTINUE these medicines which may have CHANGED, or have new prescriptions. If we are uncertain of the size of tablets/capsules you have at home, strength may be listed as something that might have changed.        Dose / Directions    bumetanide 2 MG tablet   Commonly known as:  BUMEX   This may have changed:    - when to take this  - reasons to take this  - additional instructions        Dose:  2 mg   Take 1 tablet (2 " mg) by mouth daily   Quantity:  30 tablet   Refills:  0         CONTINUE these medicines which have NOT CHANGED        Dose / Directions    entecavir 0.5 MG tablet   Commonly known as:  BARACLUDE   Used for:  Chronic viral hepatitis B without delta agent and without coma (H)        Dose:  0.5 mg   Take 1 tablet (0.5 mg) by mouth daily   Quantity:  90 tablet   Refills:  3       lisinopril 20 MG tablet   Commonly known as:  PRINIVIL/ZESTRIL        Dose:  20 mg   Start taking on:  8/6/2018   Take 1 tablet (20 mg) by mouth daily   Quantity:  30 tablet   Refills:  0            Where to get your medicines      These medications were sent to [facility] [address]  [address] [address]    Hours:  TRANSPLANT PHONE NUMBER [phone] Phone:  [phone]     amLODIPine 5 MG tablet    bumetanide 2 MG tablet    lisinopril 20 MG tablet                Protect others around you: Learn how to safely use, store and throw away your medicines at www.disposemymeds.org.             Medication List: This is a list of all your medications and when to take them. Check marks below indicate your daily home schedule. Keep this list as a reference.      Medications           Morning Afternoon Evening Bedtime As Needed    amLODIPine 5 MG tablet   Commonly known as:  NORVASC   Take 1 tablet (5 mg) by mouth daily   Start taking on:  8/6/2018   Last time this was given:  5 mg on 8/5/2018  8:22 AM                                bumetanide 2 MG tablet   Commonly known as:  BUMEX   Take 1 tablet (2 mg) by mouth daily   Last time this was given:  2 mg on 8/5/2018  8:22 AM                                entecavir 0.5 MG tablet   Commonly known as:  BARACLUDE   Take 1 tablet (0.5 mg) by mouth daily                                lisinopril 20 MG tablet   Commonly known as:  PRINIVIL/ZESTRIL   Take 1 tablet (20 mg) by mouth daily   Start taking on:  8/6/2018   Last  time this was given:  20 mg on 8/5/2018  8:26 AM

## 2018-07-30 NOTE — IP AVS SNAPSHOT
[address] [address]    Phone:  [phone]                                       After Visit Summary   7/30/2018    Wilfredo Yoon    MRN: [id number]           After Visit Summary Signature Page     I have received my discharge instructions, and my questions have been answered. I have discussed any challenges I see with this plan with the nurse or doctor.    ..........................................................................................................................................  Patient/Patient Representative Signature      ..........................................................................................................................................  Patient Representative Print Name and Relationship to Patient    ..................................................               ................................................  Date                                            Time    ..........................................................................................................................................  Reviewed by Signature/Title    ...................................................              ..............................................  Date                                                            Time

## 2018-07-31 ENCOUNTER — APPOINTMENT (OUTPATIENT)
Dept: MRI IMAGING | Facility: CLINIC | Age: 58
End: 2018-07-31
Attending: EMERGENCY MEDICINE
Payer: MEDICAID

## 2018-07-31 ENCOUNTER — APPOINTMENT (OUTPATIENT)
Dept: ULTRASOUND IMAGING | Facility: CLINIC | Age: 58
End: 2018-07-31
Attending: INTERNAL MEDICINE
Payer: MEDICAID

## 2018-07-31 ENCOUNTER — OFFICE VISIT (OUTPATIENT)
Dept: INTERPRETER SERVICES | Facility: CLINIC | Age: 58
End: 2018-07-31
Payer: MEDICAID

## 2018-07-31 PROBLEM — I16.1 HYPERTENSIVE EMERGENCY: Status: ACTIVE | Noted: 2018-07-31

## 2018-07-31 LAB
ANION GAP SERPL CALCULATED.3IONS-SCNC: 9 MMOL/L (ref 3–14)
BASOPHILS # BLD AUTO: 0 10E9/L (ref 0–0.2)
BASOPHILS NFR BLD AUTO: 0.5 %
BUN SERPL-MCNC: 16 MG/DL (ref 7–30)
CALCIUM SERPL-MCNC: 7.6 MG/DL (ref 8.5–10.1)
CHLORIDE SERPL-SCNC: 104 MMOL/L (ref 94–109)
CO2 SERPL-SCNC: 23 MMOL/L (ref 20–32)
COPATH REPORT: NORMAL
CREAT SERPL-MCNC: 1.08 MG/DL (ref 0.66–1.25)
CREAT UR-MCNC: 180 MG/DL
DIFFERENTIAL METHOD BLD: NORMAL
EOSINOPHIL # BLD AUTO: 0.1 10E9/L (ref 0–0.7)
EOSINOPHIL NFR BLD AUTO: 2.3 %
ERYTHROCYTE [DISTWIDTH] IN BLOOD BY AUTOMATED COUNT: 13.6 % (ref 10–15)
GFR SERPL CREATININE-BSD FRML MDRD: 70 ML/MIN/1.7M2
GLUCOSE SERPL-MCNC: 88 MG/DL (ref 70–99)
HBV DNA SERPL NAA+PROBE-ACNC: ABNORMAL [IU]/ML
HBV DNA SERPL NAA+PROBE-LOG IU: ABNORMAL {LOG_IU}/ML
HCT VFR BLD AUTO: 42.5 % (ref 40–53)
HGB BLD-MCNC: 14.5 G/DL (ref 13.3–17.7)
IMM GRANULOCYTES # BLD: 0 10E9/L (ref 0–0.4)
IMM GRANULOCYTES NFR BLD: 0.2 %
INTERPRETATION ECG - MUSE: NORMAL
LYMPHOCYTES # BLD AUTO: 1.5 10E9/L (ref 0.8–5.3)
LYMPHOCYTES NFR BLD AUTO: 26.7 %
MCH RBC QN AUTO: 31.9 PG (ref 26.5–33)
MCHC RBC AUTO-ENTMCNC: 34.1 G/DL (ref 31.5–36.5)
MCV RBC AUTO: 93 FL (ref 78–100)
MICROALBUMIN UR-MCNC: 6600 MG/L
MICROALBUMIN/CREAT UR: 3666.67 MG/G CR (ref 0–17)
MONOCYTES # BLD AUTO: 0.7 10E9/L (ref 0–1.3)
MONOCYTES NFR BLD AUTO: 12.7 %
MRSA DNA SPEC QL NAA+PROBE: NEGATIVE
NEUTROPHILS # BLD AUTO: 3.3 10E9/L (ref 1.6–8.3)
NEUTROPHILS NFR BLD AUTO: 57.6 %
NRBC # BLD AUTO: 0 10*3/UL
NRBC BLD AUTO-RTO: 0 /100
PLATELET # BLD AUTO: 131 10E9/L (ref 150–450)
POTASSIUM SERPL-SCNC: 3.7 MMOL/L (ref 3.4–5.3)
PROT UR-MCNC: 8.63 G/L
PROT/CREAT 24H UR: 4.8 G/G CR (ref 0–0.2)
RBC # BLD AUTO: 4.55 10E12/L (ref 4.4–5.9)
RETICS # AUTO: 36.1 10E9/L (ref 25–95)
RETICS/RBC NFR AUTO: 0.8 % (ref 0.5–2)
SODIUM SERPL-SCNC: 136 MMOL/L (ref 133–144)
SPECIMEN SOURCE: NORMAL
WBC # BLD AUTO: 5.6 10E9/L (ref 4–11)

## 2018-07-31 PROCEDURE — 99285 EMERGENCY DEPT VISIT HI MDM: CPT | Mod: 25 | Performed by: EMERGENCY MEDICINE

## 2018-07-31 PROCEDURE — 70553 MRI BRAIN STEM W/O & W/DYE: CPT

## 2018-07-31 PROCEDURE — 25000132 ZZH RX MED GY IP 250 OP 250 PS 637: Performed by: STUDENT IN AN ORGANIZED HEALTH CARE EDUCATION/TRAINING PROGRAM

## 2018-07-31 PROCEDURE — T1013 SIGN LANG/ORAL INTERPRETER: HCPCS | Mod: U3

## 2018-07-31 PROCEDURE — 87641 MR-STAPH DNA AMP PROBE: CPT | Performed by: INTERNAL MEDICINE

## 2018-07-31 PROCEDURE — 84156 ASSAY OF PROTEIN URINE: CPT | Performed by: STUDENT IN AN ORGANIZED HEALTH CARE EDUCATION/TRAINING PROGRAM

## 2018-07-31 PROCEDURE — 93005 ELECTROCARDIOGRAM TRACING: CPT | Performed by: EMERGENCY MEDICINE

## 2018-07-31 PROCEDURE — 87640 STAPH A DNA AMP PROBE: CPT | Performed by: INTERNAL MEDICINE

## 2018-07-31 PROCEDURE — 25000128 H RX IP 250 OP 636: Performed by: EMERGENCY MEDICINE

## 2018-07-31 PROCEDURE — 87517 HEPATITIS B DNA QUANT: CPT | Performed by: INTERNAL MEDICINE

## 2018-07-31 PROCEDURE — 70544 MR ANGIOGRAPHY HEAD W/O DYE: CPT

## 2018-07-31 PROCEDURE — 82043 UR ALBUMIN QUANTITATIVE: CPT | Performed by: STUDENT IN AN ORGANIZED HEALTH CARE EDUCATION/TRAINING PROGRAM

## 2018-07-31 PROCEDURE — 85004 AUTOMATED DIFF WBC COUNT: CPT | Performed by: INTERNAL MEDICINE

## 2018-07-31 PROCEDURE — 80048 BASIC METABOLIC PNL TOTAL CA: CPT | Performed by: INTERNAL MEDICINE

## 2018-07-31 PROCEDURE — 40000611 ZZHCL STATISTIC MORPHOLOGY W/INTERP HEMEPATH TC 85060: Performed by: INTERNAL MEDICINE

## 2018-07-31 PROCEDURE — 96375 TX/PRO/DX INJ NEW DRUG ADDON: CPT | Performed by: EMERGENCY MEDICINE

## 2018-07-31 PROCEDURE — 99291 CRITICAL CARE FIRST HOUR: CPT | Mod: 25 | Performed by: EMERGENCY MEDICINE

## 2018-07-31 PROCEDURE — 20000004 ZZH R&B ICU UMMC

## 2018-07-31 PROCEDURE — 96361 HYDRATE IV INFUSION ADD-ON: CPT | Performed by: EMERGENCY MEDICINE

## 2018-07-31 PROCEDURE — 25000128 H RX IP 250 OP 636: Performed by: INTERNAL MEDICINE

## 2018-07-31 PROCEDURE — 36415 COLL VENOUS BLD VENIPUNCTURE: CPT | Performed by: INTERNAL MEDICINE

## 2018-07-31 PROCEDURE — A9585 GADOBUTROL INJECTION: HCPCS | Performed by: EMERGENCY MEDICINE

## 2018-07-31 PROCEDURE — 93010 ELECTROCARDIOGRAM REPORT: CPT | Mod: Z6 | Performed by: EMERGENCY MEDICINE

## 2018-07-31 PROCEDURE — 96376 TX/PRO/DX INJ SAME DRUG ADON: CPT | Performed by: EMERGENCY MEDICINE

## 2018-07-31 PROCEDURE — 96366 THER/PROPH/DIAG IV INF ADDON: CPT | Performed by: EMERGENCY MEDICINE

## 2018-07-31 PROCEDURE — 25000132 ZZH RX MED GY IP 250 OP 250 PS 637: Performed by: INTERNAL MEDICINE

## 2018-07-31 PROCEDURE — 96365 THER/PROPH/DIAG IV INF INIT: CPT | Performed by: EMERGENCY MEDICINE

## 2018-07-31 PROCEDURE — 93975 VASCULAR STUDY: CPT | Mod: TC

## 2018-07-31 PROCEDURE — 85045 AUTOMATED RETICULOCYTE COUNT: CPT | Performed by: INTERNAL MEDICINE

## 2018-07-31 PROCEDURE — 70549 MR ANGIOGRAPH NECK W/O&W/DYE: CPT

## 2018-07-31 PROCEDURE — 99291 CRITICAL CARE FIRST HOUR: CPT | Mod: GC | Performed by: INTERNAL MEDICINE

## 2018-07-31 PROCEDURE — 85027 COMPLETE CBC AUTOMATED: CPT | Performed by: INTERNAL MEDICINE

## 2018-07-31 RX ORDER — HYDRALAZINE HYDROCHLORIDE 20 MG/ML
10 INJECTION INTRAMUSCULAR; INTRAVENOUS EVERY 6 HOURS
Status: DISCONTINUED | OUTPATIENT
Start: 2018-07-31 | End: 2018-08-01

## 2018-07-31 RX ORDER — HEPARIN SODIUM 5000 [USP'U]/.5ML
5000 INJECTION, SOLUTION INTRAVENOUS; SUBCUTANEOUS EVERY 8 HOURS
Status: DISCONTINUED | OUTPATIENT
Start: 2018-07-31 | End: 2018-08-05 | Stop reason: HOSPADM

## 2018-07-31 RX ORDER — ONDANSETRON 2 MG/ML
4 INJECTION INTRAMUSCULAR; INTRAVENOUS EVERY 6 HOURS PRN
Status: DISCONTINUED | OUTPATIENT
Start: 2018-07-31 | End: 2018-08-05 | Stop reason: HOSPADM

## 2018-07-31 RX ORDER — BUMETANIDE 2 MG/1
2 TABLET ORAL
Status: DISCONTINUED | OUTPATIENT
Start: 2018-07-31 | End: 2018-08-05 | Stop reason: HOSPADM

## 2018-07-31 RX ORDER — LISINOPRIL 20 MG/1
20 TABLET ORAL DAILY
Status: DISCONTINUED | OUTPATIENT
Start: 2018-07-31 | End: 2018-08-05 | Stop reason: HOSPADM

## 2018-07-31 RX ORDER — POLYETHYLENE GLYCOL 3350 17 G/17G
17 POWDER, FOR SOLUTION ORAL DAILY
Status: DISCONTINUED | OUTPATIENT
Start: 2018-07-31 | End: 2018-08-05 | Stop reason: HOSPADM

## 2018-07-31 RX ORDER — ACETAMINOPHEN 325 MG/1
650 TABLET ORAL EVERY 4 HOURS PRN
Status: DISCONTINUED | OUTPATIENT
Start: 2018-07-31 | End: 2018-08-05 | Stop reason: HOSPADM

## 2018-07-31 RX ORDER — NALOXONE HYDROCHLORIDE 0.4 MG/ML
.1-.4 INJECTION, SOLUTION INTRAMUSCULAR; INTRAVENOUS; SUBCUTANEOUS
Status: DISCONTINUED | OUTPATIENT
Start: 2018-07-31 | End: 2018-08-05 | Stop reason: HOSPADM

## 2018-07-31 RX ORDER — HYDRALAZINE HYDROCHLORIDE 10 MG/1
10 TABLET, FILM COATED ORAL ONCE
Status: COMPLETED | OUTPATIENT
Start: 2018-07-31 | End: 2018-07-31

## 2018-07-31 RX ORDER — ONDANSETRON 4 MG/1
4 TABLET, ORALLY DISINTEGRATING ORAL EVERY 6 HOURS PRN
Status: DISCONTINUED | OUTPATIENT
Start: 2018-07-31 | End: 2018-08-05 | Stop reason: HOSPADM

## 2018-07-31 RX ADMIN — HYDRALAZINE HYDROCHLORIDE 10 MG: 10 TABLET, FILM COATED ORAL at 19:57

## 2018-07-31 RX ADMIN — POLYETHYLENE GLYCOL 3350 17 G: 17 POWDER, FOR SOLUTION ORAL at 08:30

## 2018-07-31 RX ADMIN — GADOBUTROL 6.7 ML: 604.72 INJECTION INTRAVENOUS at 00:14

## 2018-07-31 RX ADMIN — HEPARIN SODIUM 5000 UNITS: 5000 INJECTION, SOLUTION INTRAVENOUS; SUBCUTANEOUS at 22:51

## 2018-07-31 RX ADMIN — BUMETANIDE 2 MG: 2 TABLET ORAL at 15:05

## 2018-07-31 RX ADMIN — SODIUM CHLORIDE 50 ML: 9 INJECTION, SOLUTION INTRAVENOUS at 00:16

## 2018-07-31 RX ADMIN — LISINOPRIL 20 MG: 20 TABLET ORAL at 08:29

## 2018-07-31 RX ADMIN — HEPARIN SODIUM 5000 UNITS: 5000 INJECTION, SOLUTION INTRAVENOUS; SUBCUTANEOUS at 15:06

## 2018-07-31 RX ADMIN — ACETAMINOPHEN 650 MG: 325 TABLET, FILM COATED ORAL at 05:41

## 2018-07-31 ASSESSMENT — ACTIVITIES OF DAILY LIVING (ADL)
ADLS_ACUITY_SCORE: 10

## 2018-07-31 ASSESSMENT — PAIN DESCRIPTION - DESCRIPTORS
DESCRIPTORS: HEADACHE
DESCRIPTORS: HEADACHE

## 2018-07-31 NOTE — ED PROVIDER NOTES
"  History     Chief Complaint   Patient presents with     Headache     Nausea     HPI  Wilfredojoshua Yoon is a 58 year old male with a history of hypertension who presents to the Emergency Department due to headache, nausea, and edema up to his abdomen.  He does have a history of hypertension but states that he stopped his medication 4 months ago after his blood pressure was well controlled.  He also reports that he began having a headache one week ago, worse yesterday (7/29) with nausea but denies vomiting. He has not had a fever or neck stiffness.  He denies any blurry vision, numbness, tingling or weakness with this. Patient denies recent falls.  He denies chest pain or shortness of breath.  No recent cough.  Patient also reports bilateral lower back pain. He states that he is swollen \"all over\" including his scrotum and penis. This has been worsening over the past week. He notes that since onset, his urine has decreased. He denies hematuria or dysuria.      Current Facility-Administered Medications   Medication     hydrALAZINE (APRESOLINE) injection 10 mg     Current Outpatient Prescriptions   Medication     lisinopril (PRINIVIL/ZESTRIL) 20 MG tablet     bumetanide (BUMEX) 2 MG tablet     entecavir (BARACLUDE) 0.5 MG tablet     Past Medical History:   Diagnosis Date     Cryoglobulinemia (H)      Glomerulonephritis      Hepatitis B      Hypertension      Surgical complication        Past Surgical History:   Procedure Laterality Date     C HAND/FINGER SURGERY UNLISTED       HAND SURGERY Right        Family History   Problem Relation Age of Onset     Liver Cancer No family hx of      Hepatitis No family hx of        Social History   Substance Use Topics     Smoking status: Current Some Day Smoker     Packs/day: 0.25     Years: 40.00     Types: Cigarettes     Smokeless tobacco: Never Used     Alcohol use No     No Known Allergies    I have reviewed the Medications, Allergies, Past Medical and Surgical " History, and Social History in the Epic system.    Review of Systems   Constitutional: Negative for fever.   Eyes:        Negative for blurry vision   Cardiovascular: Negative for chest pain.   Gastrointestinal: Positive for nausea. Negative for vomiting.   Genitourinary: Positive for decreased urine volume, penile swelling and scrotal swelling. Negative for hematuria.   Musculoskeletal: Positive for back pain (bilateral lower).        Positive for bilateral hip pain   Neurological: Positive for headaches.   All other systems reviewed and are negative.      Physical Exam   BP: (!) 201/112  Heart Rate: 69  Temp: 96.6  F (35.9  C)  Weight: 66.9 kg (147 lb 6.4 oz)  SpO2: 99 %      Physical Exam   General: patient is alert and oriented and in no acute distress   Head: atraumatic and normocephalic   EENT: moist mucus membranes without tonsillar erythema or exudates, pupils 2 mm equal round and reactive, EOMI, no nystagmus  Neck: supple for range of motion, no meningismus  Cardiovascular: regular rate and rhythm, no murmur appreciated, extremities warm and well perfused, 2+ bilateral lower extremity edema with edema extending into the mid abdomen and associated scrotal edema  Pulmonary: Crackles noted at bases bilaterally  Abdomen: soft, non-tender, mildly distended  Musculoskeletal: normal range of motion   Neurological: alert and oriented, moving all extremities symmetrically, CN II-XII intact, strength 5/5 and symmetric in , elbow flexion/extension, hip flexion/extension, knee flexion/extension and ankle plantar/dorsiflexion, sensation to light touch in upper and lower extremities intact, normal gait  Skin: warm, dry       ED Course   7:47 PM  The patient was seen and examined by Rupal Vega MD in Room 5.   ED Course     Procedures             EKG Interpretation:      Interpreted by Rupal Vega  Time reviewed: 2005  Symptoms at time of EKG: none   Rhythm: normal sinus   Rate: normal  Axis: normal  Ectopy:  none  Conduction: normal  ST Segments/ T Waves: No ST-T wave changes  Q Waves: none  Comparison to prior: Unchanged    Clinical Impression: normal EKG          Critical Care time:  was 30 minutes for this patient excluding procedures.             Labs Ordered and Resulted from Time of ED Arrival Up to the Time of Departure from the ED - No data to display         Assessments & Plan (with Medical Decision Making)   Mr. 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 He is noted to be quite hypertensive with a blood pressure of 201/112. He is neurologically intact without any focal deficits. He does complain of an ongoing headache. He has associated nausea without any vomiting or vision changes.  On exam, he does have significant bilateral lower extremity edema extending up to the abdomen. He has been off all of his medications for the last 4-5 months and suspect that he is having an episode of nephrotic syndrome with hypertensive urgency and concern for worsening renal dysfunction. Labs including CBC, CMP, troponin, BMP and UA were obtained.  He has no leukocytosis and hemoglobin is normal at 14.6.  Creatinine is within normal limits at 1.2 but slightly up from his baseline.  LFTs are slightly elevated as well with an alk phos of 166 and AST of 98.  Troponin is negative and ECG does not show acute ischemic changes.  BMP is within normal limits.  His chest x-ray does show bilateral pleural effusions and small amount of pulmonary edema. He was given 40 mg IV Lasix as well as 20 mg IV hydralazine.  Blood pressure continued to be elevated and the patient became more short of breath with desaturations into the 80s.  He was placed on 2 L nasal cannula with improvement in saturations to 94%.  He was given a sublingual nitro and was started on a nitro drip  at this point.  He did have a head CT which shows a possible focal hypodensity in the anterior left thalamic nucleus.  Discussed with neurology and will keep current BP parameters or SBP between 160-180.  MRI was ordered which was limited by motion but no acute infarct identified.  Patient will be admitted to ICU for hypertensive emergency.          I have reviewed the nursing notes.    I have reviewed the findings, diagnosis, plan and need for follow up with the patient.    New Prescriptions    No medications on file       Final diagnoses:   Bilateral lower extremity edema   Hypertensive emergency   I, Aleta Fang, am serving as a trained medical scribe to document services personally performed by Rupal Vega MD, based on the provider's statements to me.   I, Rupal Vega MD, was physically present and have reviewed and verified the accuracy of this note documented by Aleta Fang.    7/30/2018   [facility], [place], EMERGENCY DEPARTMENT     Rupal Vega MD  07/31/18 0152

## 2018-07-31 NOTE — ED PROVIDER NOTES
Emergency Department Patient Sign-out       Brief HPI and ED course:  This is a 58 year old male signed out to me by Dr. Mejía .  See initial ED Provider note for details of the presentation. In brief, patient presenting with hypertensive emergency, headache, edema up to umbilicus. Initial concern for possible subacute thalamic infarct from CT, but MRI negative. Patient admitted to [facility], boarding in ED.     Vitals:   Patient Vitals for the past 24 hrs:   BP Temp Temp src Heart Rate Resp SpO2 Weight   07/31/18 0310 (!) 166/115 - - 70 18 97 % -   07/31/18 0305 (!) 184/108 - - 74 18 97 % -   07/31/18 0300 (!) 185/106 - - 76 18 98 % -   07/31/18 0255 (!) 177/115 - - 88 18 98 % -   07/31/18 0250 (!) 172/114 - - 70 18 98 % -   07/31/18 0245 (!) 188/107 - - 70 18 98 % -   07/31/18 0240 (!) 179/123 - - 74 20 98 % -   07/31/18 0235 (!) 181/125 - - 76 20 98 % -   07/31/18 0230 (!) 182/120 - - 79 20 98 % -   07/31/18 0225 (!) 179/123 - - 84 20 93 % -   07/31/18 0220 (!) 179/121 - - 81 20 99 % -   07/31/18 0215 (!) 183/117 - - 82 20 99 % -   07/31/18 0210 (!) 190/117 - - 72 - 98 % -   07/31/18 0205 (!) 189/125 - - 73 20 100 % -   07/31/18 0200 (!) 194/119 - - 78 20 96 % -   07/31/18 0145 (!) 193/125 - - 77 - 98 % -   07/31/18 0130 (!) 191/120 - - 78 20 96 % -   07/31/18 0115 (!) 199/120 - - 83 20 96 % -   07/31/18 0100 (!) 178/112 - - - 20 97 % -   07/31/18 0000 (!) 181/113 - - 77 - 94 % -   07/30/18 2345 (!) 177/117 - - 79 - 95 % -   07/30/18 2335 (!) 181/114 - - 84 - - -   07/30/18 2330 (!) 170/110 - - 79 - 97 % -   07/30/18 2325 (!) 181/112 - - 77 - 95 % -   07/30/18 2320 (!) 179/113 - - 89 - 97 % -   07/30/18 2315 (!) 164/109 - - 87 - 93 % -   07/30/18 2310 (!) 177/106 - - 83 - 99 % -   07/30/18 2305 (!) 172/109 - - 80 - 97 % -   07/30/18 2300 (!) 165/103 - - 84 - 96 % -   07/30/18 2255 (!) 166/107 - - - - - -   07/30/18 2250 (!) 162/103 - - 81 - 94 % -   07/30/18 2245 (!) 151/125 - - 104 - - -   07/30/18  2240 (!) 117/104 - - 110 - 98 % -   07/30/18 2235 (!) 171/107 - - 93 - 100 % -   07/30/18 2230 (!) 162/108 - - 83 - 100 % -   07/30/18 2225 (!) 169/105 - - 81 - 100 % -   07/30/18 2221 - 98  F (36.7  C) Oral - - - -   07/30/18 2220 (!) 157/105 - - - - - -   07/30/18 2215 (!) 166/101 - - 85 - 100 % -   07/30/18 2210 (!) 160/103 - - 85 - 100 % -   07/30/18 2206 (!) 172/107 - - 95 - 99 % -   07/30/18 2200 (!) 166/101 - - 87 - 100 % -   07/30/18 2155 (!) 170/109 - - 80 - 99 % -   07/30/18 2152 - - - - - 92 % -   07/30/18 2151 - - - - - 90 % -   07/30/18 2150 (!) 170/107 - - - - - -   07/30/18 2149 - - - 84 - 91 % -   07/30/18 2148 (!) 172/114 - - 93 - 92 % -   07/30/18 2140 (!) 184/116 - - - - - -   07/30/18 2135 (!) 187/109 - - 78 - 90 % -   07/30/18 2130 (!) 197/119 - - 66 - 99 % -   07/30/18 2125 (!) 204/120 - - - - - -   07/30/18 2124 (!) 195/112 - - - - - -   07/30/18 2115 (!) 145/136 - - - - - -   07/30/18 2051 (!) 182/106 - - 67 - 97 % -   07/30/18 2049 (!) 187/102 - - 72 - - -   07/30/18 2035 (!) 205/121 - - 65 - - -   07/30/18 2030 (!) 208/125 - - - - 98 % -   07/30/18 2025 (!) 209/124 - - 63 - 95 % -   07/30/18 2022 (!) 204/127 - - - - - -   07/30/18 2013 (!) 213/135 - - 70 - - -   07/30/18 2012 (!) 222/140 - - 69 - - -   07/30/18 2011 - - - 70 - - -   07/30/18 2010 (!) 213/135 - - 63 - - -   07/30/18 1912 (!) 201/112 96.6  F (35.9  C) Oral 69 - 99 % 66.9 kg (147 lb 6.4 oz)       Received Sign-out Plan:    Pending studies include none.      Plan ongoing treatment of hypertensive emergency with nitroglycerin drip.     Events after assuming care:  After care was assumed, a focused history and physical was performed. Agree with findings relayed by previous provider.     MICU bed was available shortly after arrival to the [place] ED. Patient transported to MICU.     --  Bucky Kramer   Emergency Medicine           Bucky Kramer MD  08/01/18 0434

## 2018-07-31 NOTE — PLAN OF CARE
Problem: Patient Care Overview  Goal: Plan of Care/Patient Progress Review  Outcome: No Change  Admitted/transferred from: [place] ED  Reason for admission/transfer: Hypertensive emergency  Patient status upon admission/transfer: A&Ox4, ambulating with SBA. Neuros intact, headache. RA satting 97%. SBP 170s with nitroglycerin gtt continuing at 0.1mg/kg. HR NSR with HR 60-70s. +2 dependent edema, +3 BLE edema. Per patient has had poor appetite, no BM in the last week. [place] speaking,  at bedside.  Interventions: Tylenol given for HA, labs drawn, neuros monitored q1h  Plan: Nitroglycerin gtt titrated to keep SBPs between 160-180. Monitoring neuros for acute changes. PT/OT consulted, possible neuro and nephrology consults.   2 RN skin assessment: completed by Myla Pires  Result of skin assessment and interventions/actions: No skin concerns.  Height, weight, drug calc weight: done  Patient belongings: clothes/cell phone kept at bedside with patient  MDRO education (if applicable): N/A

## 2018-07-31 NOTE — ED NOTES
BP since return from MRI have been running 190's /130  titrating NTG infusion to get BP less than 180/s states breathing is better resting better and HR in 80's

## 2018-07-31 NOTE — CONSULTS
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 Patient ran off his medications around 3 months ago, and after that he started to have progressive weakness, leg swelling and abdominal distension. In addition, it is possible that along with the lack of medication, patient may have developed another infection or other type of vasculitis causing this exacerbation of symptoms. Therefore, we will recommend further work-up for nephrotic syndrome. Cr trended up to 1.2 but then returned to its baseline around 1 this morning.      Hypertensive emergency  - Previous admission in 2/2017 for hypertensive emergency due to non adherence to his meds. Currently transferred to MICU due to persistent hypertension despite initial IV lasix/IV hydralazine/Nitro drip and CT head concerning of ICH. MRI brain limited but ruling out active bleeding. This morning, SBP mostly around 160-170, dropped once to 140, so nitroglycerine drip was stopped and re-starteded PTA lisinopril dose. Also, patient is hypervolemic so will benefit of increase in home diuretics dose. Will try to keep reduction of 25% of SBP in the first 24h.      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 Ascites can be 2/2 liver dysfunction, nephrotic syndrome or other etiology. Will benefit of diagnostic paracentesis.  - Patient had a previous  undetectable VL from 09/2017, but currently not taking entecavir for at least 3 months because he refers is not able to afford medication. Currently LFT trending up, so will need to repeat VL and SW evaluation for social support.     Recommendation:  - Please order hep C serology, hep B viral load, HIV, FARIDA, ANCA, C3/C4, TP/INR  - Strict I/Os  - Goal NET 24h 1 - 1.5 lts  - Increase PO bumex to 2 mg BID, and continue lisinopril 20 mg every day.  - Agree with BP reduction of 25% in the first 24h  - Monitor daily Cr and lytes, protein/Cr ratio ordered  - Consider diagnostic paracentesis  - Will continue to follow    This recommendations were discussed with Dr. Gibson and communicated to primary team via note    Babak Jennings MD  IM - PGY2  [phone]      REASON FOR CONSULT: pt with glomerulonephritis, presenting with hypertensive urgency. 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 Patient refers progressive abdominal distension and bilateral leg edema that started around 3 months ago when he stopped taking his medications because he was not able to afford them. Symptoms were worsening over the time, and started to present headaches 1 week ago, associated with nausea and abdomina pain, so presented in [facility]. He denied fever, recent travel, sick contacts, sore throat, rhinorrhea, cough, SOB, CP, abd pain or diarrhea.There, patient was noted to have /140 but otherwise VS WNL; physical exam remarkable for abdominal distension, scrotal and bilateral LE edema. Labs remarkable for Plt 117, Cr 1.2 (previous baseline at 1), Alb 1.2, AST 98, ALT 57, AP: 166.Troponin is negative and ECG does not show acute ischemic changes   Patient received  IV lasix 40 mg once, IV hydralazine 20 mg once and was started on nitroglycerin drip. Patient underwent a CT head that showed a possible focal hypodensity in the thalamic nucleus (infarct?), with MRI limited by motion showing patchy T2 hyperintensities consistent with small vessel disease. Due to persistence of the symptoms, patient was transferred to the MICU for management of hypertensive emergency.    Renal history: Presented with nephrotic syndrome in 2015 and underwent Bx on 11/4/2015 showing MPGN with deposition of IgM kappa and pseudolinear glomerular capillary wall staining for IgG, compatible suggests the possibility of cryoglobulinemic. IgG and IgM were also positive in blood. HCV was negative but +serology and VL for hep B. Symptoms improving after starting entecavir, but stopping treatment intermittently with consequent symptoms flares and proteinuria. Last renal evaluation on 10/10/2017 where a random urine protein was obtained and resulted 3.5 gr. Cr has been stable around 0.7 - 1. Considered to increase dose of lisinopril to 40 mg every day and continue with bumex 2 mg every day with follow up in 6 months, but not showed up.      PAST MEDICAL HISTORY:  Reviewed with patient on 07/31/2018     Past Medical History:   Diagnosis Date     Cryoglobulinemia (H)      Glomerulonephritis      Hepatitis B      Hypertension      Surgical complication        Past Surgical History:   Procedure Laterality Date     C HAND/FINGER SURGERY UNLISTED       HAND SURGERY Right         MEDICATIONS:  PTA Meds  Prior to Admission medications    Medication Sig Last Dose Taking? Auth Provider   bumetanide (BUMEX) 2 MG tablet Take 1 tablet (2 mg) by mouth daily as needed As needed for edema. Unknown at Unknown time  Edgar Fuentes MD   entecavir (BARACLUDE) 0.5 MG tablet Take 1 tablet (0.5 mg) by mouth daily Unknown at Unknown time  Eber Ortez MD   lisinopril (PRINIVIL/ZESTRIL) 20 MG tablet Take 1 tablet  (20 mg) by mouth daily Unknown at Unknown time  SchempfBal MD      Current Meds    heparin  5,000 Units Subcutaneous Q8H     lisinopril  20 mg Oral Daily     polyethylene glycol  17 g Oral Daily     Infusion Meds    nitroGLYcerin 0.1 mcg/kg/min (18 0800)       ALLERGIES:    No Known Allergies    REVIEW OF SYSTEMS:  A comprehensive of systems was negative except as noted above.    SOCIAL HISTORY:   Social History     Social History     Marital status: Single     Spouse name: N/A     Number of children: N/A     Years of education: N/A     Occupational History     Not on file.     Social History Main Topics     Smoking status: Current Some Day Smoker     Packs/day: 0.25     Years: 40.00     Types: Cigarettes     Smokeless tobacco: Never Used     Alcohol use No     Drug use: No     Sexual activity: Not on file     Other Topics Concern     Not on file     Social History Narrative     Reviewed with patient    FAMILY MEDICAL HISTORY:   Family History   Problem Relation Age of Onset     Liver Cancer No family hx of      Hepatitis No family hx of      Reviewed with patient     PHYSICAL EXAM:   Temp  Av.7  F (36.5  C)  Min: 96.6  F (35.9  C)  Max: 98.2  F (36.8  C)      Pulse  Av  Min: 74  Max: 74 Resp  Av.5  Min: 14  Max: 20  SpO2  Av.9 %  Min: 90 %  Max: 100 %       BP (!) 165/103  Pulse 74  Temp 98.2  F (36.8  C) (Oral)  Resp 14  Wt 64 kg (141 lb 1.5 oz)  SpO2 98%  BMI 20.24 kg/m2   Date 18 0700 - 18 0659   Shift 2214-7399 5286-5363 0978-7048 24 Hour Total   I  N  T  A  K  E   P.O. 120   120    I.V. 26.47   26.47    Shift Total  (mL/kg) 146.47  (2.29)   146.47  (2.29)   O  U  T  P  U  T   Shift Total  (mL/kg)       Weight (kg) 64 64 64 64        Admit Weight: 66.9 kg (147 lb 6.4 oz)     GENERAL APPEARANCE: in no distress,  Awake, lying comfortable in bed  EYES: no scleral icterus, pupils equal  Endo: no goiter, no moon facies  Lymphatics: no cervical or supraclavicular  LAD  Pulmonary: slightly diminished breath sounds in bases, lungs clear to auscultation with equal breath sounds bilaterally  CV: regular rhythm, normal rate, no rub, multifocal holosystolic murmur III/VI, - JVD, peripheral LE edema noted.  GI: soft, nontender, normal bowel sounds, distended, fluid wave present  MS: no evidence of inflammation in joints, no muscle tenderness  : no vazquez  SKIN: no rash, warm, dry, no cyanosis  NEURO: alert, oriented x 3, no asterixis, no focal deficit     LABS:   CMP  Recent Labs  Lab 07/31/18 0613 07/30/18 2027    139   POTASSIUM 3.7 4.1   CHLORIDE 104 106   CO2 23 26   ANIONGAP 9 7   GLC 88 100*   BUN 16 15   CR 1.08 1.20   GFRESTIMATED 70 62   GFRESTBLACK 85 75   SOTERO 7.6* 7.6*   PROTTOTAL  --  5.4*   ALBUMIN  --  1.2*   BILITOTAL  --  0.4   ALKPHOS  --  166*   AST  --  98*   ALT  --  57     CBC  Recent Labs  Lab 07/31/18 0613 07/30/18 2027   HGB 14.5 14.6   WBC 5.6 5.5   RBC 4.55 4.59   HCT 42.5 42.8   MCV 93 93   MCH 31.9 31.8   MCHC 34.1 34.1   RDW 13.6 13.2   * 113*     INRNo lab results found in last 7 days.  ABGNo lab results found in last 7 days.   URINE STUDIES  Recent Labs   Lab Test  07/30/18   2051  02/12/17   1819  01/08/17   1420  11/04/15   1626   COLOR  Yellow  Yellow  Yellow  Yellow   APPEARANCE  Clear  Slightly Cloudy  Slightly Cloudy  Slightly Cloudy   URINEGLC  Negative  Negative  Negative  Negative   URINEBILI  Negative  Negative  Negative  Negative   URINEKETONE  Negative  Negative  Negative  Negative   SG  1.008  1.009  1.020  1.018   UBLD  Moderate*  Small*  Moderate*  Large*   URINEPH  6.5  6.5  6.5  6.0   PROTEIN  300*  300*  300*  300*   NITRITE  Negative  Negative  Negative  Negative   LEUKEST  Negative  Negative  Negative  Negative   RBCU  5*  21*  34*  57*   WBCU  4  4*  3*  10*     Recent Labs   Lab Test  10/10/17   1336  04/04/17   1718  01/20/16   1450  01/05/16   1142  11/04/15   1626   UTPG  3.51*  2.62*  2.24*  3.36*  3.37*      PTH  Recent Labs   Lab Test  10/10/17   1328   PTHI  37     IRON STUDIES  Recent Labs   Lab Test  10/10/17   1328   IRON  133   FEB  266   IRONSAT  50*   CHRISTIAN  176       IMAGING:  US abdomen 7/31  1.  Heterogeneous echotexture of the liver is again demonstrated,  consistent with underlying chronic intrinsic parenchymal disease such  as cirrhosis. No focal hepatic mass. Patent Doppler evaluation of the  liver, with normal direction of flow.  2.  The gallbladder wall is thickened, without evidence of acute  cholecystitis. This is likely related to ascites and underlying liver  these is.  3.  Incidentally noted, normal appearing lymph node in the akiko  hepatis measuring up to 1.0 cm.  4.  Moderate ascites. Right pleural effusion.    MRA head/neck 7/30  Patent vasculature. No flow-limiting stenosis.     MRI head 7/30  Patchy T2 hyperintensities throughout the white matter,  basal ganglia/thalamus, and brainstem which likely represent chronic  small vessel ischemic change. Multiple old lacunar infarcts. Of note,  contrast-enhanced imaging is severely limited due to motion artifact.    CT head 7/30  1. Focal hypodensity in the anterior left thalamic nucleus which is  probably an acute to subacute infarct. If clinically indicated, MRI of  the brain without and with contrast might be helpful in better  characterization.  2. No evidence for intracranial hemorrhage or any significant mass  effect.    CXR 7/30  Small bilateral pleural effusions and probable slight  interstitial pulmonary edema.

## 2018-07-31 NOTE — H&P
Medical ICU  History & Physical    Wilfredo Yoon MRN: [id number]  Age: 58 year old, : 1960  Date of Admission:2018  Primary care provider: Basilia Wiley       Chief Complaint     Hypertensive Emergency       History of Present Illness     Wilfredo Yono is a 58 year old with PMH of hypertension, Hepatitis B, cryoglobulinemia and glomerulonephritis who is admitted from ED after presenting with hypertensive emergency and concern for CVA. He hasn't taken his antihypertensives for the past 4 months. Headaches started about 1 week ago, and has been getting progressively worse leading to nausea and NBNB vomiting on . He's also complaining about bilateral lower back pain, swollen scrotum and legs. The progressive swelling prompted him to present to the ED at the [facility].    In the ED, his MAP was 154. He initially received 40 mg IV lasix as well as 20 mg IV hydralazine. He was also given sublingual nitro and nitroglycerin drip. Because he had severe HA he underwent head CT which demonstrated possible thalamic infarct. He then underwent brain MRI and was transferred to the [facility] and then to the ICU.      He denies fever, recent travel, sick contacts, sore throat, rhinorrhea, cough, SOB, CP, Abd pain, diarrhea, hematuria, dysuria and rash.     History is obtained from the patient and the electronic health record     Complete 10 point ROS is otherwise negative unless mentioned in the HPI.      Past Medical History     Past Medical History:   Diagnosis Date     Cryoglobulinemia (H)      Glomerulonephritis      Hepatitis B      Hypertension      Surgical complication            Past Surgical History     Kidney Biopsy        Social History   No alcohol or illicit drug use  Stopped smoking one week ago, has been lifelong smoker. Does not use dipping tobacco.      Family History     No family history of CVA      Allergies      No Known Allergies      Medications  "    Current Facility-Administered Medications   Medication     acetaminophen (TYLENOL) tablet 650 mg    Or     acetaminophen (TYLENOL) solution 650 mg     naloxone (NARCAN) injection 0.1-0.4 mg     nitroGLYcerin (NITROSTAT) sublingual tablet 0.4 mg     nitroGLYcerin 50 mg in D5W 250 mL (adult std) infusion     ondansetron (ZOFRAN-ODT) ODT tab 4 mg    Or     ondansetron (ZOFRAN) injection 4 mg     Current Outpatient Prescriptions   Medication     lisinopril (PRINIVIL/ZESTRIL) 20 MG tablet     bumetanide (BUMEX) 2 MG tablet     entecavir (BARACLUDE) 0.5 MG tablet         Physical Exam     Vital signs:  Temp: 98  F (36.7  C) Temp src: Oral BP: (!) 164/102   Heart Rate: 75 Resp: 16 SpO2: 95 % O2 Device: None (Room air) Oxygen Delivery: 2 LPM   Weight: 66.9 kg (147 lb 6.4 oz)  Estimated body mass index is 21.15 kg/(m^2) as calculated from the following:    Height as of 10/10/17: 1.778 m (5' 10\").    Weight as of this encounter: 66.9 kg (147 lb 6.4 oz).      Gen: awake, alert and oriented   NEURO: CN II-XII intact, moving all ext. Non-focal  HEENT:AT/ NC, PERRL b/l,  sclera anicteric, dry MM, no rhinorrea  PULM/THORAX: CTAB, no W/R/R  CV: RRR, S1 and S2 appreciated, no extra heart sounds, murmurs or rub auscultated. No JVD  ABD: soft, distended due to anasarca but not taught. NT, no HSM appreciated.  EXT: warm and well perfused. Significant pitting edema, from ankles to T spine. No asymmetrical edema or tenderness   SKIN: Capillary refill normal,     LINES: none, has PIV    Labs       Pertinent Admission Labs:    Component      Latest Ref Rng & Units 7/30/2018   WBC      4.0 - 11.0 10e9/L 5.5   RBC Count      4.4 - 5.9 10e12/L 4.59   Hemoglobin      13.3 - 17.7 g/dL 14.6   Hematocrit      40.0 - 53.0 % 42.8   MCV      78 - 100 fl 93   MCH      26.5 - 33.0 pg 31.8   MCHC      31.5 - 36.5 g/dL 34.1   RDW      10.0 - 15.0 % 13.2   Platelet Count      150 - 450 10e9/L 113 (L)   Diff Method       Automated Method   % " Neutrophils      % 52.1   % Lymphocytes      % 37.5   % Monocytes      % 6.0   % Eosinophils      % 3.5   % Basophils      % 0.9   % Immature Granulocytes      % 0.0   Nucleated RBCs      0 /100 0   Absolute Neutrophil      1.6 - 8.3 10e9/L 2.9   Absolute Lymphocytes      0.8 - 5.3 10e9/L 2.1   Absolute Monocytes      0.0 - 1.3 10e9/L 0.3   Absolute Eosinophils      0.0 - 0.7 10e9/L 0.2   Absolute Basophils      0.0 - 0.2 10e9/L 0.1   Abs Immature Granulocytes      0 - 0.4 10e9/L 0.0   Absolute Nucleated RBC       0.0   Color Urine       Yellow   Appearance Urine       Clear   Glucose Urine      NEG:Negative mg/dL Negative   Bilirubin Urine      NEG:Negative Negative   Ketones Urine      NEG:Negative mg/dL Negative   Specific Gravity Urine      1.003 - 1.035 1.008   Blood Urine      NEG:Negative Moderate (A)   pH Urine      5.0 - 7.0 pH 6.5   Protein Albumin Urine      NEG:Negative mg/dL 300 (A)   Urobilinogen mg/dL      0.0 - 2.0 mg/dL 4.0 (H)   Nitrite Urine      NEG:Negative Negative   Leukocyte Esterase Urine      NEG:Negative Negative   Source       Midstream Urine   WBC Urine      0 - 5 /HPF 4   RBC Urine      0 - 2 /HPF 5 (H)   Bacteria Urine      NEG:Negative /HPF Few (A)   Mucous Urine      NEG:Negative /LPF Present (A)   Hyaline Casts      0 - 2 /LPF 1   Amorphous Crystals      NEG:Negative /HPF Few (A)   Sodium      133 - 144 mmol/L 139   Potassium      3.4 - 5.3 mmol/L 4.1   Chloride      94 - 109 mmol/L 106   Carbon Dioxide      20 - 32 mmol/L 26   Anion Gap      3 - 14 mmol/L 7   Glucose      70 - 99 mg/dL 100 (H)   Urea Nitrogen      7 - 30 mg/dL 15   Creatinine      0.66 - 1.25 mg/dL 1.20   GFR Estimate      >60 mL/min/1.7m2 62   GFR Estimate If Black      >60 mL/min/1.7m2 75   Calcium      8.5 - 10.1 mg/dL 7.6 (L)   Bilirubin Total      0.2 - 1.3 mg/dL 0.4   Albumin      3.4 - 5.0 g/dL 1.2 (L)   Protein Total      6.8 - 8.8 g/dL 5.4 (L)   Alkaline Phosphatase      40 - 150 U/L 166 (H)   ALT      0 -  70 U/L 57   AST      0 - 45 U/L 98 (H)   Troponin I ES      0.000 - 0.045 ug/L <0.015   N-Terminal Pro BNP Inpatient      0 - 900 pg/mL 877            Imaging     CT Head w/o contrast  7/31    IMPRESSION:  1. Focal hypodensity in the anterior left thalamic nucleus which is  probably an acute to subacute infarct. If clinically indicated, MRI of  the brain without and with contrast might be helpful in better  characterization.  2. No evidence for intracranial hemorrhage or any significant mass  Effect.    X-ray Chest 2 view  7/31  IMPRESSION: Small bilateral pleural effusions and probable slight  interstitial pulmonary edema.             Assessment and Plan      Wilfredo Yoon is a 58 year old with PMH of hypertension, Hepatitis B, cryoglobulinemia and glomerulonephritis/nephrotic syndrome who presented with hypertensive emergency (likely due to medication noncompliance) and is responding well to nitroglycerin drip.     Neurologic  Headache:  Likely secondary to HTN v CVA  -tylenol PRN, treat HTN   -MRI brain read pending    Possible thalamic CVA on CT:  -keep -180 (per the notes), will have to balance with MAP goals  -Neurology consult if MRI positive  -Neurochecks Q1H for now  -will need CVA workup if MRI is positive    Cardiovascular  Hypertensive Emergency:  Initial MAP was 154, now it is 130 which 20% below initial presentation. Continue nitroglycerin drip to decrease another 10-15% over 24 hours. Goal MAP (30% lower than initial presentation) is 108 this evening.   -restart lisinopril 20 mg   -continue nitroglycerin drip as above  -nephrology consult     Respiratory  Pulm edema: due to hypertensive emergency, now on room air   -continuous O2 monitoring, diurese as able    Gastrointestinal  Abnormal LFTs: Hep B v HTN   -RUQ US  -treat HTN as above    Constipation: has not had a bowel movement in one week  -miralax qd    Genitourinary  Scrotal Edema:  -elevate scrotum, diurese    Infectious  Disease  Hepatitis B: Was previously entacavir but has not been taking this for months  -check viral hep B DNA level  -RUQ US for cirrhosis/HCC screening.     Hematology  Thrombocytopenia: plts are 113, possibly due to cirrhosis  -monitor CBC, transfuse as needed, plt goal > 10k  -RUQ US for possible cirrhosis, expand w/u if negative    Endocrine  No acute issues     Renal/Electolytes/FEN  Anasarca:  Glomerulonephritis (MPGN):  Nephrotic Syndrome:  Last seen by Nephrology in Oct 2017  -s/p  40 mg IV lasix in the ED  -recheck BMP, give 40 mg lasix  IV more if Cr has not increased   -Nephrology consult  -tentative plan to restart bumex at discharge    Hypocalcemia: When corrected Calcium is actually 9.8 (normal)  -monitor BMP    PPX: SQH    CODE: Full, discussed with patient    Patient seen and discussed with staff attending, Dr. Montenegro.     Electronically signed by:  Federico Lanza M.D.   Pager: [phone]  7/31/2018, 5:40 AM            MICU Attending Note:  July 31, 2018    I saw and examined the patient w the resident and fellow. I reviewed the labs, imaging studies and cultures.    Please see the resident's/fellow's note from today (July 31, 2018) for detailed physical exam, assessment and plan which I agree and formulated together w the team during our daily rounds.    Briefly,  Wilfredo Yoon is a 58 year old male admitted on 7/30/2018 for hypertensive emergency.      Assessment & Plan:  1.hypertensive emergency  --BP goal systolic 160-180  2.Hepatitis B  --Liver US  3. Kidney injury  --Nephrology consult  4.Cont. Supportive care      Critical care time is 35  min excluding procedure time.    Oskar Montenegro MD  [phone]

## 2018-07-31 NOTE — PROGRESS NOTES
Report taken from [facility] RN.  Informed her we will happily take patient after 2300 with an available RN will be here.  She was ok with that and said she would call when EMS leaves after 2300.

## 2018-07-31 NOTE — PLAN OF CARE
Problem: Patient Care Overview  Goal: Plan of Care/Patient Progress Review  PT 4C: Cancel.  Pt currently on bed rest, will reschedule tomorrow.

## 2018-08-01 ENCOUNTER — OFFICE VISIT (OUTPATIENT)
Dept: INTERPRETER SERVICES | Facility: CLINIC | Age: 58
End: 2018-08-01
Payer: MEDICAID

## 2018-08-01 LAB
ANION GAP SERPL CALCULATED.3IONS-SCNC: 6 MMOL/L (ref 3–14)
BUN SERPL-MCNC: 21 MG/DL (ref 7–30)
C3 SERPL-MCNC: 73 MG/DL (ref 76–169)
C4 SERPL-MCNC: 15 MG/DL (ref 15–50)
CALCIUM SERPL-MCNC: 7.8 MG/DL (ref 8.5–10.1)
CHLORIDE SERPL-SCNC: 108 MMOL/L (ref 94–109)
CHOLEST SERPL-MCNC: 254 MG/DL
CO2 SERPL-SCNC: 25 MMOL/L (ref 20–32)
CREAT SERPL-MCNC: 1.31 MG/DL (ref 0.66–1.25)
GFR SERPL CREATININE-BSD FRML MDRD: 56 ML/MIN/1.7M2
GLUCOSE SERPL-MCNC: 82 MG/DL (ref 70–99)
HDLC SERPL-MCNC: 29 MG/DL
LDLC SERPL CALC-MCNC: 201 MG/DL
NONHDLC SERPL-MCNC: 225 MG/DL
POTASSIUM SERPL-SCNC: 3.9 MMOL/L (ref 3.4–5.3)
SODIUM SERPL-SCNC: 139 MMOL/L (ref 133–144)
TRIGL SERPL-MCNC: 120 MG/DL

## 2018-08-01 PROCEDURE — 83516 IMMUNOASSAY NONANTIBODY: CPT | Performed by: INTERNAL MEDICINE

## 2018-08-01 PROCEDURE — 25000132 ZZH RX MED GY IP 250 OP 250 PS 637: Performed by: INTERNAL MEDICINE

## 2018-08-01 PROCEDURE — 25000128 H RX IP 250 OP 636: Performed by: STUDENT IN AN ORGANIZED HEALTH CARE EDUCATION/TRAINING PROGRAM

## 2018-08-01 PROCEDURE — 80061 LIPID PANEL: CPT | Performed by: STUDENT IN AN ORGANIZED HEALTH CARE EDUCATION/TRAINING PROGRAM

## 2018-08-01 PROCEDURE — 86160 COMPLEMENT ANTIGEN: CPT | Performed by: INTERNAL MEDICINE

## 2018-08-01 PROCEDURE — 99232 SBSQ HOSP IP/OBS MODERATE 35: CPT | Mod: GC

## 2018-08-01 PROCEDURE — 83876 ASSAY MYELOPEROXIDASE: CPT | Performed by: INTERNAL MEDICINE

## 2018-08-01 PROCEDURE — 96361 HYDRATE IV INFUSION ADD-ON: CPT | Performed by: EMERGENCY MEDICINE

## 2018-08-01 PROCEDURE — 40000894 ZZH STATISTIC OT IP EVAL DEFER

## 2018-08-01 PROCEDURE — 36415 COLL VENOUS BLD VENIPUNCTURE: CPT | Performed by: STUDENT IN AN ORGANIZED HEALTH CARE EDUCATION/TRAINING PROGRAM

## 2018-08-01 PROCEDURE — 86162 COMPLEMENT TOTAL (CH50): CPT | Performed by: INTERNAL MEDICINE

## 2018-08-01 PROCEDURE — 25000128 H RX IP 250 OP 636: Performed by: INTERNAL MEDICINE

## 2018-08-01 PROCEDURE — 36415 COLL VENOUS BLD VENIPUNCTURE: CPT | Performed by: INTERNAL MEDICINE

## 2018-08-01 PROCEDURE — 80048 BASIC METABOLIC PNL TOTAL CA: CPT | Performed by: STUDENT IN AN ORGANIZED HEALTH CARE EDUCATION/TRAINING PROGRAM

## 2018-08-01 PROCEDURE — 12000001 ZZH R&B MED SURG/OB UMMC

## 2018-08-01 PROCEDURE — T1013 SIGN LANG/ORAL INTERPRETER: HCPCS | Mod: U3

## 2018-08-01 PROCEDURE — 25000132 ZZH RX MED GY IP 250 OP 250 PS 637: Performed by: STUDENT IN AN ORGANIZED HEALTH CARE EDUCATION/TRAINING PROGRAM

## 2018-08-01 RX ORDER — CLONIDINE HYDROCHLORIDE 0.1 MG/1
0.1 TABLET ORAL 2 TIMES DAILY
Status: DISCONTINUED | OUTPATIENT
Start: 2018-08-01 | End: 2018-08-04

## 2018-08-01 RX ORDER — AMLODIPINE BESYLATE 2.5 MG/1
2.5 TABLET ORAL DAILY
Status: DISCONTINUED | OUTPATIENT
Start: 2018-08-01 | End: 2018-08-02

## 2018-08-01 RX ADMIN — HYDRALAZINE HYDROCHLORIDE 10 MG: 20 INJECTION INTRAMUSCULAR; INTRAVENOUS at 14:27

## 2018-08-01 RX ADMIN — HEPARIN SODIUM 5000 UNITS: 5000 INJECTION, SOLUTION INTRAVENOUS; SUBCUTANEOUS at 07:49

## 2018-08-01 RX ADMIN — LISINOPRIL 20 MG: 20 TABLET ORAL at 07:49

## 2018-08-01 RX ADMIN — AMLODIPINE BESYLATE 2.5 MG: 2.5 TABLET ORAL at 17:25

## 2018-08-01 RX ADMIN — BUMETANIDE 2 MG: 2 TABLET ORAL at 07:49

## 2018-08-01 RX ADMIN — HEPARIN SODIUM 5000 UNITS: 5000 INJECTION, SOLUTION INTRAVENOUS; SUBCUTANEOUS at 17:25

## 2018-08-01 RX ADMIN — BUMETANIDE 2 MG: 2 TABLET ORAL at 17:25

## 2018-08-01 ASSESSMENT — ACTIVITIES OF DAILY LIVING (ADL)
ADLS_ACUITY_SCORE: 10

## 2018-08-01 NOTE — PROGRESS NOTES
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 He responded to nitroglycerin gtt, and is now transferred to medicine.       # HTN Emergency   # Uncontrolled HTN  's headache, pulmonary edema, MRI w/chronic small vessel ischemic changes, multiple old lacunar infarcts. Baseline HTN w/medicine non-compliance d/t cost. Social/languge barriers may also be a factor.   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 Last seen by Nephrology in Oct 2017 - per Dr. Fuentes's note \"Treatment of his cryoglobulinemic related MPGN involves continued treatment of his chronic hepatitis infection.  His kidney function remains stable though I am concerned about a component of hyperfiltration which may be masking the severity of his CKD.\"  - resume bumex 2mg BID (for volume control)  - lisinopril 20mg qDay (for RAAS blockade) -> goal was to increase to 40  - recheck BMP  - strict I/O  - consider dx paracentesis   - Nephrology following, appreciate assistance       Chronic  # HBV  Previously on entacavir (12/2015).  " RUQ U/S (cirrhosis/HCC screen): + cirrhosis, moderate ascites, right pleural effusion.   - viral levels     # Constipation   - PEG    Diet: Regular   DVT:  Heparin   Code Status: Full    This patient was staffed with Dr. Wallis, the attending physician on service.     Augustin Bush IV, MD, MSc  Internal Medicine Resident, PGY2  [phone]              Objective:   /85  Pulse 74  Temp 98.2  F (36.8  C)  Resp 16  Wt 64 kg (141 lb 1.5 oz)  SpO2 97%  BMI 20.24 kg/m2    Intake/Output Summary (Last 24 hours) at 07/31/18 1917  Last data filed at 07/31/18 1500   Gross per 24 hour   Intake           250.17 ml   Output              400 ml   Net          -149.83 ml       PHYSICAL EXAMINATION  GEN: Alert, in NAD, pleasant   CV: RRR, PIETER  LUNGS: CTAB  ABD: + BS, soft, NT/ND  EXT: +2 pitting edema      Labs:   All labs reviewed by me. Notable labs include:   Cr 1.31  Cholesterol 254  HDL 29          Imaging:  All imaging studies reviewed by me.     Current Medications:  All medications reviewed by me.

## 2018-08-01 NOTE — PLAN OF CARE
Problem: Patient Care Overview  Goal: Plan of Care/Patient Progress Review  Outcome: Improving  Given iv dose of hydralezine and added oral amlodipine to patients med list, BPs are looking better this evening.   Patient makes needs known, transfers to bathroom with SBA, minimal appetite with nothing eaten today.   Neuro status stable, denies pain or nausea.   Report called to 5B RN and plan to transfer patient off ICU this evening.

## 2018-08-01 NOTE — PLAN OF CARE
Problem: Patient Care Overview  Goal: Plan of Care/Patient Progress Review  PT 4C: PT orders received. Per chart review and discussion with RN and OT, pt IND with mobility, no acute PT needs have been identified. PT orders will be completed, please re-order if new needs arise.

## 2018-08-01 NOTE — PLAN OF CARE
Problem: Patient Care Overview  Goal: Plan of Care/Patient Progress Review  Outcome: Improving  D/I: Patient on 4C Medical ICU.  Neuro: Intact. No overt deficits.   CV: HR 50-60s. BP stable, last BP was 144/91. Afebrile.   Pulm: Lungs are clear throughout, on RA.   GI: No BM. Bowel sounds active in all quadrants.   : No UO this shift.   Pt slept all shift with no complaints or issues.   See flow sheets for further interventions and assessments.  A: Stable  P: Continue to monitor pt closely. Notify MD of significant changes.

## 2018-08-01 NOTE — PLAN OF CARE
Problem: Patient Care Overview  Goal: Plan of Care/Patient Progress Review  OT-4C: Defer/Cancel evaluation. Per discussion with pt, RN and general assessment of mobility/function, pt has no acute skilled OT needs at this time. Pt is moving independently, reports no changes in cognition or general function. Pt feels slightly weak because he has not eaten recently. Pt lives in apartment and uses elevator to reach unit. Will complete OT order and defer.

## 2018-08-01 NOTE — PROGRESS NOTES
[facility] Progress  Note            Interval History:   Lao speaking, no  available. Patient seen and managed today by MICU. Agree with plan to dc nitroglycerin gtt, resume home meds, and transfer to medicine   Patient states he feels OK    Plan for Today  - transfer from ICU to medicine   - continue PTA antihypertensives  - CTM             Assessment and Plan:   Wilfredo Yoon is a 58 year old with PMH of hypertension, Hepatitis B, cryoglobulinemia and glomerulonephritis/nephrotic syndrome who presented with hypertensive emergency (likely due to medication noncompliance), responded to nitroglycerin gtt, and is now transferred to medicine.     # HTN Emergency   's headache, possible thalamic CVA on CT, pulmonary edema. Baseline HTN w/medicine non-compliance d/t cost. Social/languge barriers may also be a factor.   - avoid over correcting BP, goal 25% reduction over 24hrs to -180  - CTM BP and continuous O2   - neurology following   - lisinopril, bumex (as below)  - hydralazine for BPS > goal       # MPGN  # Nephrotic Syndrome  # Anasarca, Scrotal Edema   Last seen by Nephrology in Oct 2017  - resume bumex 2mg BID  - lisinopril 20mg qDay  - recheck BMP  - strict I/O  - consider dx paracentesis   - Nephrology following      Chronic  # HBV  Previously on entacavir.   - viral levels   - RUQ U/S (cirrhosis/HCC screen): + cirrhosis, moderate ascites, right pleural effusion     # Constipation   - PEG    Diet: Regular   DVT:  Heparin   Code Status: Full    This patient was staffed with Dr. Wallis, the attending physician on service.     Augustin Bush IV, MD, MSc  Internal Medicine Resident, PGY2  [phone]              Objective:   BP (!) 149/103  Pulse 74  Temp 98  F (36.7  C) (Oral)  Resp 18  Wt 64 kg (141 lb 1.5 oz)  SpO2 97%  BMI 20.24 kg/m2      Intake/Output Summary (Last 24 hours) at 07/31/18 1917  Last data filed at 07/31/18 1500   Gross per 24  hour   Intake           250.17 ml   Output              400 ml   Net          -149.83 ml       PHYSICAL EXAMINATION  GEN: A&Ox3, in NAD, pleasant  Declines exam in the setting of no       Labs:   All labs reviewed by me. Notable labs include:   BMP wnl  Urine Albumin 98737  Urine Protein 8.6  Urine Protein/Cr 4.8  Trop <0.015  Alk Phos 166  AST 57, ALT 98    Imaging:  All imaging studies reviewed by me.     Current Medications:  All medications reviewed by me.

## 2018-08-01 NOTE — PROGRESS NOTES
Visited with pt on the basis of hospital  request for spiritual support for the pt. Reflected with pt around his hospital experience, sources of spiritual and emotional support and current spiritual health needs. During my visit, no family member was with him.  Pt talked about his situation and circumstance.  He also, talked about what that means for him and his family. Pt expressed his emotion during my conversation. I let him know that I could be of support to him and his family. I would be able to coordinate and participate as a spiritual supporter for both pt and his family.  Pt receives support from his family. Pt reported that his brandon is important to him and hope for the future and trust in God.    Emotional support. Reflective conversation integrating illness elements and family spiritual narratives. I shared reading and conversation that would invite God into the room and to bless those present, support them in their suffering. I provided a special prayer asking of God to help his family and to ease and eliminate any suffering and pain that he feels. I gave Islamic Prayer Booklet.  I introduced spiritual Health Services that the hospital offers. I also, introduced myself as Jehovah's witness  in the hospital.     Pt received spiritual support and reflective conversation in the context of this hospitalization. He expressed appreciation for the visit and the encouragement he felt that he has God s support in his struggles that God knows of his suffering.  He agreed to turning over his worries to God is an important part of his own healing and grieving processes.  Will continue to provide support to pt/family during their hospitalization at least 1x/wk.

## 2018-08-01 NOTE — PROGRESS NOTES
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 Of note, besides vasculitis, previous kidney biopsy showed severe nephrosclerosis, so it is expected changes in kidney function related with changes in BP. Patient restarted lisinopril yesterday, but its low dose is unlikely the culprit of this creatinine elevation. In addition, protein/creatinine ratio resulted 4.8, so ACEs are the preferred agents for treatment of his proteinuria. Since it was just re-started, we can wait few days to increase dose to 40 mg every day considering not sustain worsening of creatinine. Vasculitis panel was ordered, pending results       Hypertensive emergency  - SBP dropped to 140 - 160s overnight, but worsened again this morning to . Patient is asymptomatic, so we can try to attain tight BP control slowly. We can add another scheduled BP medication, but polypharmacy would be an issue for discharge. In the meantime, we can use PRN clonidine instead of hydralazine.      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     Recommendation:  - Continue PO bumex to 2 mg BID and lisinopril 20 mg every day.  - Stop PRN hydralazine and switch it to PRN PO clonidine if SBP persistently > 180. Would consider to add amlodipine 5 mg according to BP readings.    - Follow pending results   - Strict I/Os  - Goal NET 24h 1 - 1.5 lts  - Monitor daily Cr and lytes  - Consider diagnostic paracentesis  - Will continue to follow     This recommendations were discussed with Dr. Gibson and communicated to primary team      Babak Jennings MD  IM - PGY2  [phone]      Interval History :   Patient refers to feel fine this morning, presenting some episodes of -180 but asymptomatic. No reports of  headaches or fever overnight.      Review of Systems:   I reviewed the following systems:  GI: Improved appetite. No nausea or vomiting or diarrhea.   Neuro: no confusion  Constitutional:  no fever or chills  CV: no dyspnea or edema.  no chest pain.    Physical Exam:   I/O last 3 completed shifts:  In: 358.47 [P.O.:120; I.V.:238.47]  Out: 700 [Urine:700]   BP (!) 165/111  Pulse 74  Temp 97.8  F (36.6  C) (Oral)  Resp 16  Wt 64 kg (141 lb 1.5 oz)  SpO2 95%  BMI 20.24 kg/m2     GENERAL APPEARANCE: Looks comfortable in bed  EYES: no scleral icterus, pupils equal  HENT: mouth without ulcers or lesions  PULM: lungs clear to auscultation bilaterally, equal air movement, no clubbing  CV: regular rhythm, normal rate, no rub, -JVD, -edema    GI: soft, non-tender, distended but improved, bowel sounds are present  INTEGUMENT: no cyanosis, no rash  NEURO: no asterixis   Access PIV    Labs:   All labs reviewed by me  Electrolytes/Renal -   Recent Labs   Lab Test  08/01/18   0648  07/31/18   0613  07/30/18   2027  10/10/17   1328   04/04/17   1607   01/20/16   1454   NA  139  136  139  135   < >  140   < >  139   POTASSIUM  3.9  3.7  4.1  3.9   < >  3.8   < >  3.8   CHLORIDE  108  104  106  105   < >  106   < >  106   CO2  25  23  26  25   < >  28   < >  26   BUN  21  16  15  10   < >  16   < >  9   CR  1.31*  1.08  1.20  0.84   < >  1.08   < >  0.72   GLC  82  88  100*  70   < >  126*   < >  152*   SOTERO  7.8*  7.6*  7.6*  8.2*   < >  7.7*   < >  8.2*   PHOS   --    --    --   3.0   --   3.0   --   2.8    < > = values in this interval not displayed.       CBC -   Recent Labs   Lab Test  07/31/18   0613  07/30/18   2027  10/10/17   1328  09/06/17   0900   WBC  5.6  5.5   --   5.6   HGB  14.5  14.6  15.6  13.7   PLT  131*  113*   --   125*       LFTs -   Recent Labs   Lab Test  07/30/18   2027  10/10/17   1328  09/06/17   0900   03/08/17   0814   ALKPHOS  166*   --   72   --   152*   BILITOTAL  0.4   --   0.5   --   0.5   ALT   57   --   22   --   28   AST  98*   --   27   --   48*   PROTTOTAL  5.4*   --   5.7*   --   5.4*   ALBUMIN  1.2*  2.1*  1.9*   < >  1.5*    < > = values in this interval not displayed.       Iron Panel -   Recent Labs   Lab Test  10/10/17   1328   IRON  133   IRONSAT  50*   CHRISTIAN  176           Current Medications:    bumetanide  2 mg Oral BID     heparin  5,000 Units Subcutaneous Q8H     hydrALAZINE  10 mg Intravenous Q6H     lisinopril  20 mg Oral Daily     polyethylene glycol  17 g Oral Daily       nitroGLYcerin Stopped (07/31/18 0900)

## 2018-08-01 NOTE — PROVIDER NOTIFICATION
Notified Person: [facility] crosscover   Notification  Time: 1925   Notification Interaction: telephone  Purpose of Notification: SBPs remain in the 170-180 range, asked if we should be more aggressive in normalizing patient's BP.   Orders Received: 25 mg PO Hydralazine

## 2018-08-01 NOTE — PLAN OF CARE
Problem: Patient Care Overview  Goal: Plan of Care/Patient Progress Review  Outcome: Improving  Patient off nitroglycerin gtt this morning, BPs 160/100 after dose of oral hydralazine, will continue to monitor closely.   Patient ate all of his dinner and had a BM this evening.   Voiding spontaneously using urinal, urine sample sent to lab.   Patient reports HA has improved, denies need for additional tylenol.   Utilizing  as needed.   Consider additional anti hypertensives for further improvement of current BPs.  Patient status adequate for floor with close BP monitoring, transfer orders received, plan to transfer when bed is available.

## 2018-08-01 NOTE — PROGRESS NOTES
A 58-yo MALE with Nephrotic Syndrome with anarsarca, and known HBV infection, presented with 1-week history of headache and was admitted for HTN emergency 2/2 antihypertensive med noncompliance, with unremarkable MRI on 07/31/2018. He was given Nitroglycerine gtt overnight. BPs went down to 160s/100s from 200s/110s. NTG gtt was discharged this morning and switched to hydralazine 10mh IV q6h with lisinopril PTA initiated. Transferred to the floor.    Parminder Crook MD  PGY-1 IM

## 2018-08-02 ENCOUNTER — OFFICE VISIT (OUTPATIENT)
Dept: INTERPRETER SERVICES | Facility: CLINIC | Age: 58
End: 2018-08-02
Payer: MEDICAID

## 2018-08-02 LAB
ANA PAT SER IF-IMP: ABNORMAL
ANA SER QL IF: ABNORMAL
ANA TITR SER IF: ABNORMAL {TITER}
ANION GAP SERPL CALCULATED.3IONS-SCNC: 7 MMOL/L (ref 3–14)
BUN SERPL-MCNC: 23 MG/DL (ref 7–30)
CALCIUM SERPL-MCNC: 7.7 MG/DL (ref 8.5–10.1)
CH50 SERPL-ACNC: 132 CAE UNITS (ref 60–144)
CHLORIDE SERPL-SCNC: 106 MMOL/L (ref 94–109)
CO2 SERPL-SCNC: 26 MMOL/L (ref 20–32)
CREAT SERPL-MCNC: 1.31 MG/DL (ref 0.66–1.25)
GBM IGG SER IA-ACNC: <0.2 AI (ref 0–0.9)
GFR SERPL CREATININE-BSD FRML MDRD: 56 ML/MIN/1.7M2
GLUCOSE SERPL-MCNC: 76 MG/DL (ref 70–99)
MYELOPEROXIDASE AB SER-ACNC: <0.2 AI (ref 0–0.9)
POTASSIUM SERPL-SCNC: 3.5 MMOL/L (ref 3.4–5.3)
PROTEINASE3 IGG SER-ACNC: <0.2 AI (ref 0–0.9)
SODIUM SERPL-SCNC: 139 MMOL/L (ref 133–144)

## 2018-08-02 PROCEDURE — 80048 BASIC METABOLIC PNL TOTAL CA: CPT | Performed by: STUDENT IN AN ORGANIZED HEALTH CARE EDUCATION/TRAINING PROGRAM

## 2018-08-02 PROCEDURE — 99232 SBSQ HOSP IP/OBS MODERATE 35: CPT | Mod: GC

## 2018-08-02 PROCEDURE — 25000132 ZZH RX MED GY IP 250 OP 250 PS 637: Performed by: STUDENT IN AN ORGANIZED HEALTH CARE EDUCATION/TRAINING PROGRAM

## 2018-08-02 PROCEDURE — 25000128 H RX IP 250 OP 636: Performed by: INTERNAL MEDICINE

## 2018-08-02 PROCEDURE — 36415 COLL VENOUS BLD VENIPUNCTURE: CPT | Performed by: STUDENT IN AN ORGANIZED HEALTH CARE EDUCATION/TRAINING PROGRAM

## 2018-08-02 PROCEDURE — 12000008 ZZH R&B INTERMEDIATE UMMC

## 2018-08-02 PROCEDURE — T1013 SIGN LANG/ORAL INTERPRETER: HCPCS | Mod: U3

## 2018-08-02 PROCEDURE — 86039 ANTINUCLEAR ANTIBODIES (ANA): CPT | Performed by: STUDENT IN AN ORGANIZED HEALTH CARE EDUCATION/TRAINING PROGRAM

## 2018-08-02 PROCEDURE — 25000132 ZZH RX MED GY IP 250 OP 250 PS 637: Performed by: INTERNAL MEDICINE

## 2018-08-02 PROCEDURE — 86038 ANTINUCLEAR ANTIBODIES: CPT | Performed by: STUDENT IN AN ORGANIZED HEALTH CARE EDUCATION/TRAINING PROGRAM

## 2018-08-02 RX ORDER — AMLODIPINE BESYLATE 5 MG/1
5 TABLET ORAL DAILY
Status: DISCONTINUED | OUTPATIENT
Start: 2018-08-03 | End: 2018-08-05 | Stop reason: HOSPADM

## 2018-08-02 RX ADMIN — AMLODIPINE BESYLATE 2.5 MG: 2.5 TABLET ORAL at 08:47

## 2018-08-02 RX ADMIN — HEPARIN SODIUM 5000 UNITS: 5000 INJECTION, SOLUTION INTRAVENOUS; SUBCUTANEOUS at 01:25

## 2018-08-02 RX ADMIN — HEPARIN SODIUM 5000 UNITS: 5000 INJECTION, SOLUTION INTRAVENOUS; SUBCUTANEOUS at 16:30

## 2018-08-02 RX ADMIN — POLYETHYLENE GLYCOL 3350 17 G: 17 POWDER, FOR SOLUTION ORAL at 08:46

## 2018-08-02 RX ADMIN — BUMETANIDE 2 MG: 2 TABLET ORAL at 16:30

## 2018-08-02 RX ADMIN — HEPARIN SODIUM 5000 UNITS: 5000 INJECTION, SOLUTION INTRAVENOUS; SUBCUTANEOUS at 08:47

## 2018-08-02 RX ADMIN — CLONIDINE HYDROCHLORIDE 0.1 MG: 0.1 TABLET ORAL at 22:31

## 2018-08-02 RX ADMIN — LISINOPRIL 20 MG: 20 TABLET ORAL at 08:47

## 2018-08-02 RX ADMIN — BUMETANIDE 2 MG: 2 TABLET ORAL at 08:47

## 2018-08-02 RX ADMIN — ACETAMINOPHEN 650 MG: 325 TABLET, FILM COATED ORAL at 08:47

## 2018-08-02 ASSESSMENT — ACTIVITIES OF DAILY LIVING (ADL)
ADLS_ACUITY_SCORE: 10

## 2018-08-02 NOTE — PLAN OF CARE
"Problem: Patient Care Overview  Goal: Plan of Care/Patient Progress Review  Outcome: No Change  Pt alert and oriented. Vitally stable on ra. Up ad sacha per report. Has been sleeping through then night. Denies pain, however c/o of h/a. Didn't want to take anything for h/a and stated \"i'd rather sleep it off.\" Telemetry indicates SR with 1st degree AV block.  R: Continue to monitor and implement poc       "

## 2018-08-02 NOTE — PROGRESS NOTES
Pt arrived from  at 6 pm. Vitally stable, on RA. Was able to walk into room. Pt has in room glasses, wristwatch, cell phone and , green jacket, trouser, black shoes, shirt, and a wallet that contains cash and some cards.  All belongings are in the white plastic hospital bags at bedside.

## 2018-08-02 NOTE — PLAN OF CARE
Problem: Patient Care Overview  Goal: Plan of Care/Patient Progress Review  Outcome: No Change  Arrived unit at 6pm. Pt alert. VSS. Speaks Ecuadorean but understands a little english. No complains of pain. Slept most of shift. Continue to monitor.

## 2018-08-02 NOTE — PLAN OF CARE
Problem: Patient Care Overview  Goal: Plan of Care/Patient Progress Review  /85 &  161/97, other vitals stable. Alert and oriented. Complained of headache this am. Tylenol given with good relief.  Tolerating regular diet. Up to bathroom independently.    Plan to monitor BP.

## 2018-08-02 NOTE — PROGRESS NOTES
[facility] Progress  Note            Interval History:   NAEO  RN notes reviewed  Patient states he was told he can take BP meds as needed and this is the reason why he does not fill his scripts regularly. After discussion did state that he does not have insurance and this was also a factor in obtaining medications. However, he appeared hesitant to admit payment is an issue. He was also apprehensive about obtaining less expensive medications as he feels these are likely to be less good. It was explained to him that price has to do with novelty and demand for medication and that less expensive drugs are usually tried and true' and often more expensive drugs provide only a marginal benefit. Much time was spent explaining the relationship between edema (his primary concern), heart failure, and strokes and blood pressure. He appeared to understand, was engaged in conversation, and asked appropriate questions. He is willing to try new/different medication regimen and appreciative of attempts to find financial assistance/insurance, though it was made clear to him that we could not guarantee such help could be secured for him. He understood the risks and benefits of not treating his hypertension, had an opportunity to ask questions, which were answered. A [employer]ks  was used for this conversation.     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 He responded to nitroglycerin gtt, and is now transferred to medicine.       # HTN Emergency   # Uncontrolled HTN  's headache,  "pulmonary edema, MRI w/chronic small vessel ischemic changes, multiple old lacunar infarcts. Baseline HTN w/medicine non-compliance d/t cost. Social/languge barriers may also be a factor. Will avoid hydralazine.   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 Last seen by Nephrology in Oct 2017 - per Dr. Fuentes's note \"Treatment of his cryoglobulinemic related MPGN involves continued treatment of his chronic hepatitis infection.  His kidney function remains stable though I am concerned about a component of hyperfiltration which may be masking the severity of his CKD.\"  - bumex 2mg BID (for volume control)  - lisinopril 20mg qDay (for RAAS blockade) -> goal was to increase to 40  - recheck BMP  - strict I/O  - consider dx paracentesis   - Nephrology following, appreciate assistance       Chronic  # HBV  Previously on entacavir (12/2015).  RUQ U/S (cirrhosis/HCC screen): + cirrhosis, moderate ascites, right pleural effusion.   - viral levels     # Constipation   - PEG    Diet: Regular   DVT:  Heparin   Code Status: Full    This patient was staffed with Dr. Wallis, the attending physician on service.     Augustin Bush IV, MD, MSc  Internal Medicine Resident, PGY2  [phone]              Objective:   /75 (BP Location: Left arm)  Pulse 77  Temp 97  F (36.1  C) (Oral)  Resp 16  Ht 1.803 m (5' 11\")  Wt 62.6 kg (138 lb)  SpO2 95%  BMI 19.25 kg/m2    Intake/Output Summary (Last 24 hours) at 07/31/18 1917  Last data filed at 07/31/18 1500   Gross per 24 hour   Intake           250.17 ml   Output              400 ml   Net          -149.83 ml "       PHYSICAL EXAMINATION  GEN: Alert, in NAD, pleasant   CV: RRR, PIETER  LUNGS: CTAB  ABD: + BS, soft, NT/ND  EXT: +2 pitting edema      Labs:   All labs reviewed by me. Notable labs include:   Cr 1.31  K 3.5    Imaging:  All imaging studies reviewed by me.     Current Medications:  All medications reviewed by me.

## 2018-08-02 NOTE — PROGRESS NOTES
Brief note - Nephrology    Mr. 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 Also, BP was still running high (SBP up to 180) but asymptomatic, so amlodipine was added. Not needing PRN clonidine overnight. This morning, -160, with Cr stable on 1.3, so can continue with current tx regimen    Recommendations:  - Continue lisinopril 20 mg every day, bumex 2 mg BID and consider to increase amlodipine to 5 mg every day. Agree with PRN PO clonidine as inpatient.  - Continue entecavir  - Check BMP every week  - Follow up with nephrology in 2 - 4 weeks (pt will be called upon discharge)    We sign off for now, but please call us if you have any questions. Thank you for allowing us to participate in his care. Recommendations were discussed with primary team.    Case discussed with Dr. Paige Jennings MD  IM-PGY2  P: [phone]

## 2018-08-03 ENCOUNTER — OFFICE VISIT (OUTPATIENT)
Dept: INTERPRETER SERVICES | Facility: CLINIC | Age: 58
End: 2018-08-03
Payer: MEDICAID

## 2018-08-03 LAB
ANION GAP SERPL CALCULATED.3IONS-SCNC: 6 MMOL/L (ref 3–14)
BUN SERPL-MCNC: 24 MG/DL (ref 7–30)
CALCIUM SERPL-MCNC: 7.5 MG/DL (ref 8.5–10.1)
CHLORIDE SERPL-SCNC: 106 MMOL/L (ref 94–109)
CO2 SERPL-SCNC: 28 MMOL/L (ref 20–32)
CREAT SERPL-MCNC: 1.23 MG/DL (ref 0.66–1.25)
GFR SERPL CREATININE-BSD FRML MDRD: 61 ML/MIN/1.7M2
GLUCOSE SERPL-MCNC: 77 MG/DL (ref 70–99)
HBV DNA SERPL NAA+PROBE-ACNC: ABNORMAL [IU]/ML
HBV DNA SERPL NAA+PROBE-LOG IU: 7.4 {LOG_IU}/ML
POTASSIUM SERPL-SCNC: 3.4 MMOL/L (ref 3.4–5.3)
SODIUM SERPL-SCNC: 140 MMOL/L (ref 133–144)

## 2018-08-03 PROCEDURE — 99232 SBSQ HOSP IP/OBS MODERATE 35: CPT | Mod: GC

## 2018-08-03 PROCEDURE — 36415 COLL VENOUS BLD VENIPUNCTURE: CPT | Performed by: STUDENT IN AN ORGANIZED HEALTH CARE EDUCATION/TRAINING PROGRAM

## 2018-08-03 PROCEDURE — 25000132 ZZH RX MED GY IP 250 OP 250 PS 637: Performed by: STUDENT IN AN ORGANIZED HEALTH CARE EDUCATION/TRAINING PROGRAM

## 2018-08-03 PROCEDURE — 80048 BASIC METABOLIC PNL TOTAL CA: CPT | Performed by: STUDENT IN AN ORGANIZED HEALTH CARE EDUCATION/TRAINING PROGRAM

## 2018-08-03 PROCEDURE — 25000128 H RX IP 250 OP 636: Performed by: INTERNAL MEDICINE

## 2018-08-03 PROCEDURE — 25000132 ZZH RX MED GY IP 250 OP 250 PS 637: Performed by: INTERNAL MEDICINE

## 2018-08-03 PROCEDURE — T1013 SIGN LANG/ORAL INTERPRETER: HCPCS | Mod: U3

## 2018-08-03 PROCEDURE — 12000001 ZZH R&B MED SURG/OB UMMC

## 2018-08-03 RX ADMIN — BUMETANIDE 2 MG: 2 TABLET ORAL at 08:49

## 2018-08-03 RX ADMIN — BUMETANIDE 2 MG: 2 TABLET ORAL at 16:18

## 2018-08-03 RX ADMIN — POLYETHYLENE GLYCOL 3350 17 G: 17 POWDER, FOR SOLUTION ORAL at 08:49

## 2018-08-03 RX ADMIN — HEPARIN SODIUM 5000 UNITS: 5000 INJECTION, SOLUTION INTRAVENOUS; SUBCUTANEOUS at 16:18

## 2018-08-03 RX ADMIN — CLONIDINE HYDROCHLORIDE 0.1 MG: 0.1 TABLET ORAL at 21:26

## 2018-08-03 RX ADMIN — HEPARIN SODIUM 5000 UNITS: 5000 INJECTION, SOLUTION INTRAVENOUS; SUBCUTANEOUS at 08:49

## 2018-08-03 RX ADMIN — AMLODIPINE BESYLATE 5 MG: 5 TABLET ORAL at 08:49

## 2018-08-03 RX ADMIN — LISINOPRIL 20 MG: 20 TABLET ORAL at 08:49

## 2018-08-03 RX ADMIN — HEPARIN SODIUM 5000 UNITS: 5000 INJECTION, SOLUTION INTRAVENOUS; SUBCUTANEOUS at 00:48

## 2018-08-03 RX ADMIN — CLONIDINE HYDROCHLORIDE 0.1 MG: 0.1 TABLET ORAL at 08:49

## 2018-08-03 ASSESSMENT — ACTIVITIES OF DAILY LIVING (ADL)
ADLS_ACUITY_SCORE: 10

## 2018-08-03 NOTE — PLAN OF CARE
Problem: Renal Insufficiency Comorbidity  Goal: Renal Insufficiency  Patient comorbidity will be monitored for signs and symptoms of Renal Insufficiency (Chronic) condition.  Problems will be absent, minimized or managed by discharge/transition of care.   Outcome: No Change  D.  Pt. Has no complaints of pain.  Is up to the bathroom independently.  Walked in the halls with a friend.  Good appetite.  Blood pressures have been high all shift but at 2130 it was 188/104  Clonidine was given.  Is not symptomatic.  Other vitals stable.  Speaks and understands english pretty well.  Alert and oriented.    P. Continue to monitor blood pressures and continue plan of care.

## 2018-08-03 NOTE — PLAN OF CARE
Problem: Patient Care Overview  Goal: Plan of Care/Patient Progress Review  Patient alert and oriented x 4, pleasant calm, Nigerien speaking but speaks and understand some English. Able to ambulate in hallway with steady gait and good balance. No c/o pain or discomfort and breathing comfortably in room air. Was seen and rounded by primary team. Good appetite this shift, VS stable..  P: possible discharge to home tomorrow.

## 2018-08-03 NOTE — PLAN OF CARE
Problem: Patient Care Overview  Goal: Plan of Care/Patient Progress Review  Outcome: No Change  Pt A&Ox4. Vitally stable on RA. Recheck BP per previous RN's recommendation. BPs were 164/96 at 2331 and 148/85 at 0052. Up ad sacha. Denies pain. Slept throughout the night. Will continue to monitor and follow POC.

## 2018-08-03 NOTE — PROGRESS NOTES
"   [facility] Progress  Note            Interval History:   NAEO  RN notes reviewed  Extensive discussion today to reinforce teaching from yesterday. Patient understands importance of compliance with medication, risks of non-compliance. He is appreciative of MA coverage.     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 He responded to nitroglycerin gtt, and is now transferred to medicine.       # HTN Emergency   # Uncontrolled HTN  's headache, pulmonary edema, MRI w/chronic small vessel ischemic changes, multiple old lacunar infarcts. Baseline HTN w/medicine non-compliance d/t cost. Social/languge barriers may also be a factor. Will avoid hydralazine.   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 Last seen by Nephrology in Oct 2017 - per Dr. Fuentes's note \"Treatment of his cryoglobulinemic related MPGN involves continued treatment of his chronic hepatitis infection.  His kidney function remains stable though I am concerned about a component of hyperfiltration which may be masking the severity of his CKD.\"  - bumex 2mg BID (for volume control)  - lisinopril 20mg qDay (for RAAS blockade) -> goal was to increase to 40  - " "recheck BMP  - strict I/O  - consider dx paracentesis   - Nephrology following, appreciate assistance       Chronic  # HBV  Previously on entacavir (12/2015).  RUQ U/S (cirrhosis/HCC screen): + cirrhosis, moderate ascites, right pleural effusion.   - viral levels     # Constipation   - PEG    Diet: Regular   DVT:  Heparin   Code Status: Full    This patient was staffed with Dr. Wallis, the attending physician on service.     Augustin Bush IV, MD, MSc  Internal Medicine Resident, PGY2  [phone]              Objective:   /75 (BP Location: Right arm)  Pulse 69  Temp 96.1  F (35.6  C) (Axillary)  Resp 18  Ht 1.803 m (5' 11\")  Wt 62.4 kg (137 lb 9.1 oz)  SpO2 95%  BMI 19.19 kg/m2    Intake/Output Summary (Last 24 hours) at 07/31/18 1917  Last data filed at 07/31/18 1500   Gross per 24 hour   Intake           250.17 ml   Output              400 ml   Net          -149.83 ml       PHYSICAL EXAMINATION  GEN: Alert, in NAD, pleasant   CV: RRR, PIETER  LUNGS: CTAB  ABD: + BS, soft, NT/ND  EXT: +2 pitting edema (moderate improvement from yesterday)      Labs:   All labs reviewed by me. Notable labs include:   Cr 1.23  K 3.4    Imaging:  All imaging studies reviewed by me.     Current Medications:  All medications reviewed by me.            "

## 2018-08-04 LAB
ANION GAP SERPL CALCULATED.3IONS-SCNC: 7 MMOL/L (ref 3–14)
BUN SERPL-MCNC: 24 MG/DL (ref 7–30)
CALCIUM SERPL-MCNC: 7.6 MG/DL (ref 8.5–10.1)
CHLORIDE SERPL-SCNC: 104 MMOL/L (ref 94–109)
CO2 SERPL-SCNC: 30 MMOL/L (ref 20–32)
CREAT SERPL-MCNC: 1.1 MG/DL (ref 0.66–1.25)
GFR SERPL CREATININE-BSD FRML MDRD: 69 ML/MIN/1.7M2
GLUCOSE SERPL-MCNC: 76 MG/DL (ref 70–99)
POTASSIUM SERPL-SCNC: 3.5 MMOL/L (ref 3.4–5.3)
SODIUM SERPL-SCNC: 140 MMOL/L (ref 133–144)

## 2018-08-04 PROCEDURE — 25000132 ZZH RX MED GY IP 250 OP 250 PS 637: Performed by: STUDENT IN AN ORGANIZED HEALTH CARE EDUCATION/TRAINING PROGRAM

## 2018-08-04 PROCEDURE — 12000001 ZZH R&B MED SURG/OB UMMC

## 2018-08-04 PROCEDURE — 99232 SBSQ HOSP IP/OBS MODERATE 35: CPT

## 2018-08-04 PROCEDURE — 80048 BASIC METABOLIC PNL TOTAL CA: CPT | Performed by: STUDENT IN AN ORGANIZED HEALTH CARE EDUCATION/TRAINING PROGRAM

## 2018-08-04 PROCEDURE — 25000132 ZZH RX MED GY IP 250 OP 250 PS 637: Performed by: INTERNAL MEDICINE

## 2018-08-04 PROCEDURE — 36415 COLL VENOUS BLD VENIPUNCTURE: CPT | Performed by: STUDENT IN AN ORGANIZED HEALTH CARE EDUCATION/TRAINING PROGRAM

## 2018-08-04 PROCEDURE — 25000128 H RX IP 250 OP 636: Performed by: INTERNAL MEDICINE

## 2018-08-04 RX ORDER — CLONIDINE HYDROCHLORIDE 0.1 MG/1
0.1 TABLET ORAL
Status: DISCONTINUED | OUTPATIENT
Start: 2018-08-04 | End: 2018-08-05 | Stop reason: HOSPADM

## 2018-08-04 RX ADMIN — CLONIDINE HYDROCHLORIDE 0.1 MG: 0.1 TABLET ORAL at 08:51

## 2018-08-04 RX ADMIN — HEPARIN SODIUM 5000 UNITS: 5000 INJECTION, SOLUTION INTRAVENOUS; SUBCUTANEOUS at 08:54

## 2018-08-04 RX ADMIN — HEPARIN SODIUM 5000 UNITS: 5000 INJECTION, SOLUTION INTRAVENOUS; SUBCUTANEOUS at 16:03

## 2018-08-04 RX ADMIN — BUMETANIDE 2 MG: 2 TABLET ORAL at 08:51

## 2018-08-04 RX ADMIN — LISINOPRIL 20 MG: 20 TABLET ORAL at 08:51

## 2018-08-04 RX ADMIN — AMLODIPINE BESYLATE 5 MG: 5 TABLET ORAL at 08:51

## 2018-08-04 RX ADMIN — BUMETANIDE 2 MG: 2 TABLET ORAL at 16:03

## 2018-08-04 RX ADMIN — POLYETHYLENE GLYCOL 3350 17 G: 17 POWDER, FOR SOLUTION ORAL at 08:51

## 2018-08-04 ASSESSMENT — ACTIVITIES OF DAILY LIVING (ADL)
ADLS_ACUITY_SCORE: 10

## 2018-08-04 NOTE — CONSULTS
Social Work Services Consult Note:     SW consulted for concerns with medication coverage and insurance coverage at discharge.  Reviewed with attending that per account notes, pt is MA pending and financial counseling was helping to contact employer for financial documentation.  Recommended that attending send prescriptions to discharge pharmacy to be filled and that pt be set up with [facility] to be eligible for SW services and  Pharmacy assistance programming.  Please re-consult if other needs arise.    EVA Thomason, APSW  [facility]   Pager [phone]    Care Management Dept; PH: [phone]  4A, 4C, 4E, 5A and 5B; pager [phone]  6A, 6B, 6C and 6D; pager [phone]  7A, 7B, 7C, 7D and 5C; pager [phone]    RNCC/Care Coordinator; job code [id number]

## 2018-08-04 NOTE — PROGRESS NOTES
[facility] Progress  Note            Interval History:   NAEO  RN notes reviewed  Extensive discussion today to reinforce teaching from yesterday. Patient understands importance of compliance with medication, risks of non-compliance. He is appreciative of MA coverage.     He continues to deny h/a, blurry vision, SOB, CP, n/v. He is eating well and ambulating.    Plan for Today  - amlodipine 5mg, lisinopril 20mg, bumex 2mg  - clonidine should have been PRN, but was scheduled. Will discontinue as I worry about rebound hypertension if we send him home on this medication.  Also, this would represent adding 2 new meds to his regimen.  Will observe overnight in light of this change.  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 He responded to nitroglycerin gtt, and is now transferred to medicine.       # HTN Emergency   # Uncontrolled HTN  's headache, pulmonary edema, MRI w/chronic small vessel ischemic changes, multiple old lacunar infarcts. Baseline HTN w/medicine non-compliance d/t cost. Social/languge barriers may also be a factor. Will avoid hydralazine.   - continue lisinopril, bumex (as below)  - amlodipine 5    - clonidine if SBP >180, however concern for med non-compliance makes this sub optimal for home regimen. He has been receiving this inadvertently on a scheduled basis, so will discontinue today, watch for rebound and see how his BP is managed without if.  If no rebound and good BP, should discharge in AM.      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 Last seen by Nephrology in Oct 2017 - per Dr. Fuentes's note \"Treatment of his cryoglobulinemic related MPGN involves continued treatment of his chronic hepatitis infection.  His kidney function remains stable though I am concerned about a component of hyperfiltration which may be masking the severity of his CKD.\"  - bumex 2mg BID (for volume control)  - lisinopril 20mg qDay (for RAAS blockade) -> goal was to increase to 40  - recheck BMP  - strict I/O  - consider dx paracentesis   - Nephrology following, appreciate assistance       Chronic  # HBV  Previously on entacavir (12/2015).  RUQ U/S (cirrhosis/HCC screen): + cirrhosis, moderate ascites, right pleural effusion.   - viral levels     # Constipation   - PEG    Diet: Regular   DVT:  Heparin   Code Status: Full    Bal Wallis 2002          Objective:   /69 (BP Location: Right arm)  Pulse 69  Temp 96.5  F (35.8  C) (Oral)  Resp 16  Ht 1.803 m (5' 11\")  Wt 61.3 kg (135 lb 3.2 oz)  SpO2 97%  BMI 18.86 kg/m2    Intake/Output Summary (Last 24 hours) at 07/31/18 1917  Last data filed at 07/31/18 1500   Gross per 24 hour   Intake           250.17 ml   Output              400 ml   Net          -149.83 ml       PHYSICAL EXAMINATION  GEN: Alert, in NAD, pleasant   CV: RRR, PIETER  LUNGS: CTAB  ABD: + BS, soft, NT/ND  EXT: +2 pitting edema (moderate improvement from yesterday)      Labs:   All labs reviewed by me. Notable labs include:   Cr 1.1  K 3.5    Imaging:  All imaging studies reviewed by me.     Current Medications:  All medications reviewed by me.            "

## 2018-08-04 NOTE — PLAN OF CARE
Problem: Patient Care Overview  Goal: Plan of Care/Patient Progress Review  D/I: Pt is alert and oriented x 4, vitally stable on room air. Up in room independently. Pleasant, calm, cooperative with cares. Pt is [place] speaking but speaks/understand some English. Denies pain.    P: Possible discharge home today.

## 2018-08-04 NOTE — PLAN OF CARE
Problem: Patient Care Overview  Goal: Plan of Care/Patient Progress Review  Outcome: Improving  No acute events noted this shift. Patient appeared calm and comfortable in bed withy no c/o pain or discomfort and no respiratory issus noted. Verbalized feeling better today and expressed that he would like to go home. Was seen and rounded by Dr. Wallis,  Ate 100% of meal with good appetite.  P: Possible Dc to home tomorrow.

## 2018-08-05 ENCOUNTER — OFFICE VISIT (OUTPATIENT)
Dept: INTERPRETER SERVICES | Facility: CLINIC | Age: 58
End: 2018-08-05
Payer: MEDICAID

## 2018-08-05 VITALS
DIASTOLIC BLOOD PRESSURE: 96 MMHG | HEART RATE: 63 BPM | OXYGEN SATURATION: 97 % | HEIGHT: 71 IN | TEMPERATURE: 96.5 F | BODY MASS INDEX: 18.75 KG/M2 | RESPIRATION RATE: 16 BRPM | SYSTOLIC BLOOD PRESSURE: 127 MMHG | WEIGHT: 133.9 LBS

## 2018-08-05 LAB
ANION GAP SERPL CALCULATED.3IONS-SCNC: 4 MMOL/L (ref 3–14)
BUN SERPL-MCNC: 26 MG/DL (ref 7–30)
CALCIUM SERPL-MCNC: 7.6 MG/DL (ref 8.5–10.1)
CHLORIDE SERPL-SCNC: 104 MMOL/L (ref 94–109)
CO2 SERPL-SCNC: 33 MMOL/L (ref 20–32)
CREAT SERPL-MCNC: 0.98 MG/DL (ref 0.66–1.25)
GFR SERPL CREATININE-BSD FRML MDRD: 78 ML/MIN/1.7M2
GLUCOSE SERPL-MCNC: 83 MG/DL (ref 70–99)
PLATELET # BLD AUTO: 106 10E9/L (ref 150–450)
POTASSIUM SERPL-SCNC: 3.2 MMOL/L (ref 3.4–5.3)
POTASSIUM SERPL-SCNC: 3.5 MMOL/L (ref 3.4–5.3)
SODIUM SERPL-SCNC: 141 MMOL/L (ref 133–144)

## 2018-08-05 PROCEDURE — 25000132 ZZH RX MED GY IP 250 OP 250 PS 637: Performed by: STUDENT IN AN ORGANIZED HEALTH CARE EDUCATION/TRAINING PROGRAM

## 2018-08-05 PROCEDURE — 25000132 ZZH RX MED GY IP 250 OP 250 PS 637: Performed by: INTERNAL MEDICINE

## 2018-08-05 PROCEDURE — 36415 COLL VENOUS BLD VENIPUNCTURE: CPT

## 2018-08-05 PROCEDURE — 84132 ASSAY OF SERUM POTASSIUM: CPT

## 2018-08-05 PROCEDURE — 99238 HOSP IP/OBS DSCHRG MGMT 30/<: CPT

## 2018-08-05 PROCEDURE — 25000128 H RX IP 250 OP 636: Performed by: INTERNAL MEDICINE

## 2018-08-05 PROCEDURE — 80048 BASIC METABOLIC PNL TOTAL CA: CPT

## 2018-08-05 PROCEDURE — 85049 AUTOMATED PLATELET COUNT: CPT

## 2018-08-05 PROCEDURE — T1013 SIGN LANG/ORAL INTERPRETER: HCPCS | Mod: U3

## 2018-08-05 RX ORDER — POTASSIUM CHLORIDE 750 MG/1
20-40 TABLET, EXTENDED RELEASE ORAL
Status: DISCONTINUED | OUTPATIENT
Start: 2018-08-05 | End: 2018-08-05 | Stop reason: HOSPADM

## 2018-08-05 RX ORDER — LISINOPRIL 20 MG/1
20 TABLET ORAL DAILY
Qty: 30 TABLET | Refills: 0 | Status: SHIPPED | OUTPATIENT
Start: 2018-08-06 | End: 2019-02-02

## 2018-08-05 RX ORDER — AMLODIPINE BESYLATE 5 MG/1
5 TABLET ORAL DAILY
Qty: 30 TABLET | Refills: 0 | Status: SHIPPED | OUTPATIENT
Start: 2018-08-06 | End: 2019-02-02

## 2018-08-05 RX ORDER — POTASSIUM CHLORIDE 29.8 MG/ML
20 INJECTION INTRAVENOUS
Status: DISCONTINUED | OUTPATIENT
Start: 2018-08-05 | End: 2018-08-05 | Stop reason: HOSPADM

## 2018-08-05 RX ORDER — POTASSIUM CL/LIDO/0.9 % NACL 10MEQ/0.1L
10 INTRAVENOUS SOLUTION, PIGGYBACK (ML) INTRAVENOUS
Status: DISCONTINUED | OUTPATIENT
Start: 2018-08-05 | End: 2018-08-05 | Stop reason: RX

## 2018-08-05 RX ORDER — POTASSIUM CHLORIDE 7.45 MG/ML
10 INJECTION INTRAVENOUS
Status: DISCONTINUED | OUTPATIENT
Start: 2018-08-05 | End: 2018-08-05 | Stop reason: HOSPADM

## 2018-08-05 RX ORDER — BUMETANIDE 2 MG/1
2 TABLET ORAL DAILY
Qty: 30 TABLET | Refills: 0 | Status: SHIPPED | OUTPATIENT
Start: 2018-08-05 | End: 2019-02-02

## 2018-08-05 RX ORDER — POTASSIUM CHLORIDE 1.5 G/1.58G
20-40 POWDER, FOR SOLUTION ORAL
Status: DISCONTINUED | OUTPATIENT
Start: 2018-08-05 | End: 2018-08-05 | Stop reason: HOSPADM

## 2018-08-05 RX ADMIN — HEPARIN SODIUM 5000 UNITS: 5000 INJECTION, SOLUTION INTRAVENOUS; SUBCUTANEOUS at 08:22

## 2018-08-05 RX ADMIN — POTASSIUM CHLORIDE 40 MEQ: 750 TABLET, EXTENDED RELEASE ORAL at 08:22

## 2018-08-05 RX ADMIN — POTASSIUM CHLORIDE 20 MEQ: 750 TABLET, EXTENDED RELEASE ORAL at 10:23

## 2018-08-05 RX ADMIN — BUMETANIDE 2 MG: 2 TABLET ORAL at 08:22

## 2018-08-05 RX ADMIN — POLYETHYLENE GLYCOL 3350 17 G: 17 POWDER, FOR SOLUTION ORAL at 08:22

## 2018-08-05 RX ADMIN — AMLODIPINE BESYLATE 5 MG: 5 TABLET ORAL at 08:22

## 2018-08-05 RX ADMIN — LISINOPRIL 20 MG: 20 TABLET ORAL at 08:26

## 2018-08-05 ASSESSMENT — ACTIVITIES OF DAILY LIVING (ADL)
ADLS_ACUITY_SCORE: 10

## 2018-08-05 NOTE — DISCHARGE SUMMARY
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 He was admitted on 7/30/2018 for hypertensive emergency.  The following problems were addressed during his hospitalization:       # HTN Emergency   # Uncontrolled HTN  's headache, pulmonary edema, MRI w/chronic small vessel ischemic changes, multiple old lacunar infarcts. Baseline HTN w/medicine non-compliance d/t cost +/- poor understanding of when to take medications. He states that he thought he should take them for leg swelling. Social/languge barriers may also be a factor. Plan to continue lisinopril, bumex, added amlodipine 5.   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 Last seen by Nephrology in Oct 2017 - per Dr. Fuentes's note \"Treatment of his cryoglobulinemic related MPGN involves continued treatment of his chronic hepatitis infection.  His kidney function remains stable though I am concerned about a component of hyperfiltration which may be masking the severity of his CKD.\" Continued bumex 2mg BID (for volume control), Lisinopril 20mg qDay (for RAAS blockade), and amlodipine 5mg.  - seen " outpatient by nephrology; f/u as planned        Chronic  # HBV  Previously on entacavir (12/2015).  RUQ U/S (cirrhosis/HCC screen): + cirrhosis, moderate ascites, right pleural effusion.      # Constipation   - PEG     Diet: Regular   DVT:  Heparin   Code Status: Full    # Discharge Pain Plan:   - Patient currently has NO PAIN and is not being prescribed pain medications on discharge.    Consultations This Hospital Stay   PHYSICAL THERAPY ADULT IP CONSULT  OCCUPATIONAL THERAPY ADULT IP CONSULT  NEPHROLOGY ICU IP CONSULT     Code Status   Full Code       The patient was discussed with Dr. Bimal Bush IV, MD, MSc  Internal Medicine Resident, PGY2  [facility]   Pager [phone]    ______________________________________________________________________    Physical Exam   Vital Signs: Temp: 96.5  F (35.8  C) Temp src: Oral BP: (!) 127/96 Pulse: 63 Heart Rate: 81 Resp: 16 SpO2: 97 % O2 Device: None (Room air)    Weight: 133 lbs 14.4 oz    GEN: Alert, in NAD, pleasant   HEENT: AT/NC, PERRLA, anicteric   CV: RRR, PIETER  LUNGS: CTAB  ABD: + BS, soft, NT/ND  SKIN: intact, no rashes noted on limited exam  EXT: wwp, +1-2 pitting edema       Significant Results and Procedures   Most Recent 3 CBC's:  Recent Labs   Lab Test  08/05/18   0622  07/31/18   0613  07/30/18   2027  10/10/17   1328  09/06/17   0900   WBC   --   5.6  5.5   --   5.6   HGB   --   14.5  14.6  15.6  13.7   MCV   --   93  93   --   93   PLT  106*  131*  113*   --   125*     Most Recent 3 BMP's:  Recent Labs   Lab Test  08/05/18   1215  08/05/18   0622  08/04/18   0534  08/03/18   0558   NA   --   141  140  140   POTASSIUM  3.5  3.2*  3.5  3.4   CHLORIDE   --   104  104  106   CO2   --   33*  30  28   BUN   --   26  24  24   CR   --   0.98  1.10  1.23   ANIONGAP   --   4  7  6   SOTERO   --   7.6*  7.6*  7.5*   GLC   --   83  76  77       Pending Results   These results will be followed up by PCP  Unresulted Labs Ordered in the Past 30  Days of this Admission     No orders found from 5/31/2018 to 7/31/2018.             Primary Care Physician   Basilia Wiley    Discharge Disposition   Discharged to home  Condition at discharge: Stable    Discharge Orders     Reason for your hospital stay   Hypertensive emergency     Adult [facility] Follow-up and recommended labs and tests   Follow up with primary care provider, Basilia Wiley, within 7 days, to evaluate medication change. The following labs/tests are recommended: BMP.     Appointments on [place] and/or [facility] (with [facility] or [facility] provider or service). Call [phone] if you haven't heard regarding these appointments within 7 days of discharge.     Activity   Your activity upon discharge: activity as tolerated     Monitor and record   blood pressure daily     Discharge Instructions   You were admitted for hypertensive emergency (a dangerous elevation in your blood pressure). Your medications were resumed and adjusted. Please take all medications as prescribed to help control your blood pressure and the resulting complications of elevated blood pressure (leg swelling, kidney damage, stroke). You should follow up with your primary care doctor in about one week.     Full Code     Diet   Follow this diet upon discharge:    Regular Diet Adult       Discharge Medications   Current Discharge Medication List      START taking these medications    Details   amLODIPine (NORVASC) 5 MG tablet Take 1 tablet (5 mg) by mouth daily  Qty: 30 tablet, Refills: 0    Associated Diagnoses: Hypertensive emergency         CONTINUE these medications which have CHANGED    Details   bumetanide (BUMEX) 2 MG tablet Take 1 tablet (2 mg) by mouth daily  Qty: 30 tablet, Refills: 0    Associated Diagnoses: Hypertensive emergency      lisinopril (PRINIVIL/ZESTRIL) 20 MG tablet Take 1 tablet (20 mg) by mouth daily  Qty: 30 tablet, Refills: 0    Associated Diagnoses: Hypertensive emergency         CONTINUE these  medications which have NOT CHANGED    Details   entecavir (BARACLUDE) 0.5 MG tablet Take 1 tablet (0.5 mg) by mouth daily  Qty: 90 tablet, Refills: 3    Associated Diagnoses: Chronic viral hepatitis B without delta agent and without coma (H)           Allergies   No Known Allergies   I visited/saw/examined the patient today. Feeling better; agree with resident history and physical documentation in discharge note which reflects my findings.Have spent the last two days educating the patient about his hypertension, renal disease, and the need for adherence.  Patient really appears to demonstrate understanding.  Also reviewed insurance status (MA pending) and what that meant as well as medical and insurance f/u. I reviewed the discharge plan with the resident, and agree with the final assessment and plan for discharge as noted in the resident s discharge summary. Patient stable and medically appropriate for d/c today, with appropriate follow-up planned. Please see dictated d/c note for details.     Bal Wallis MD

## 2018-08-05 NOTE — PLAN OF CARE
Problem: Patient Care Overview  Goal: Plan of Care/Patient Progress Review  Outcome: Improving.   D/I: Pt had an uneventful night, alert and oriented x 4, vitally stable on room air. Up in room independently. Pleasant, calm, cooperative with cares. Is Lebanese speaking but speaks/understand some English. Denies pain. Pt also refused heparin subcutaneous injection at midnight.   P: Continue to monitor pt and follow plan of care. Possible discharge home today.

## 2018-08-05 NOTE — PLAN OF CARE
Problem: Patient Care Overview  Goal: Plan of Care/Patient Progress Review  Outcome: Adequate for Discharge Date Met: 08/05/18  Patient discharged from unit to home in stable condition, ambulatory. Discharge instructions given including new meds and future MD appointments. Patient verbalized Dc instructions and had no questions. Personal belongings taken by [patient.

## 2018-08-06 ENCOUNTER — CARE COORDINATION (OUTPATIENT)
Dept: CARE COORDINATION | Facility: CLINIC | Age: 58
End: 2018-08-06

## 2018-08-13 DIAGNOSIS — N04.9 NEPHROTIC SYNDROME: ICD-10-CM

## 2018-08-13 DIAGNOSIS — R80.9 PROTEINURIA: Primary | ICD-10-CM

## 2018-08-23 ENCOUNTER — PATIENT OUTREACH (OUTPATIENT)
Dept: CARE COORDINATION | Facility: CLINIC | Age: 58
End: 2018-08-23

## 2018-10-08 ENCOUNTER — OFFICE VISIT (OUTPATIENT)
Dept: GASTROENTEROLOGY | Facility: CLINIC | Age: 58
End: 2018-10-08
Attending: INTERNAL MEDICINE
Payer: COMMERCIAL

## 2018-10-08 VITALS
DIASTOLIC BLOOD PRESSURE: 124 MMHG | BODY MASS INDEX: 18.62 KG/M2 | HEIGHT: 71 IN | TEMPERATURE: 98.2 F | HEART RATE: 72 BPM | OXYGEN SATURATION: 98 % | WEIGHT: 133 LBS | SYSTOLIC BLOOD PRESSURE: 175 MMHG

## 2018-10-08 DIAGNOSIS — B18.1 CHRONIC VIRAL HEPATITIS B WITHOUT DELTA AGENT AND WITHOUT COMA (H): ICD-10-CM

## 2018-10-08 DIAGNOSIS — K74.60 CIRRHOSIS OF LIVER WITHOUT ASCITES, UNSPECIFIED HEPATIC CIRRHOSIS TYPE (H): Primary | ICD-10-CM

## 2018-10-08 LAB
ALBUMIN SERPL-MCNC: 1.4 G/DL (ref 3.4–5)
ALP SERPL-CCNC: 176 U/L (ref 40–150)
ALT SERPL W P-5'-P-CCNC: 26 U/L (ref 0–70)
ANION GAP SERPL CALCULATED.3IONS-SCNC: 5 MMOL/L (ref 3–14)
AST SERPL W P-5'-P-CCNC: 48 U/L (ref 0–45)
BILIRUB DIRECT SERPL-MCNC: 0.1 MG/DL (ref 0–0.2)
BILIRUB SERPL-MCNC: 0.5 MG/DL (ref 0.2–1.3)
BUN SERPL-MCNC: 22 MG/DL (ref 7–30)
CALCIUM SERPL-MCNC: 8 MG/DL (ref 8.5–10.1)
CHLORIDE SERPL-SCNC: 107 MMOL/L (ref 94–109)
CO2 SERPL-SCNC: 28 MMOL/L (ref 20–32)
CREAT SERPL-MCNC: 1.24 MG/DL (ref 0.66–1.25)
ERYTHROCYTE [DISTWIDTH] IN BLOOD BY AUTOMATED COUNT: 14 % (ref 10–15)
GFR SERPL CREATININE-BSD FRML MDRD: 60 ML/MIN/1.7M2
GLUCOSE SERPL-MCNC: 75 MG/DL (ref 70–99)
HCT VFR BLD AUTO: 40.1 % (ref 40–53)
HGB BLD-MCNC: 13.5 G/DL (ref 13.3–17.7)
INR PPP: 1.1 (ref 0.86–1.14)
MCH RBC QN AUTO: 32.8 PG (ref 26.5–33)
MCHC RBC AUTO-ENTMCNC: 33.7 G/DL (ref 31.5–36.5)
MCV RBC AUTO: 97 FL (ref 78–100)
PLATELET # BLD AUTO: 115 10E9/L (ref 150–450)
POTASSIUM SERPL-SCNC: 4.5 MMOL/L (ref 3.4–5.3)
PROT SERPL-MCNC: 5.8 G/DL (ref 6.8–8.8)
RBC # BLD AUTO: 4.12 10E12/L (ref 4.4–5.9)
SODIUM SERPL-SCNC: 140 MMOL/L (ref 133–144)
WBC # BLD AUTO: 4.7 10E9/L (ref 4–11)

## 2018-10-08 PROCEDURE — 85610 PROTHROMBIN TIME: CPT | Performed by: INTERNAL MEDICINE

## 2018-10-08 PROCEDURE — 80048 BASIC METABOLIC PNL TOTAL CA: CPT | Performed by: INTERNAL MEDICINE

## 2018-10-08 PROCEDURE — 87517 HEPATITIS B DNA QUANT: CPT | Performed by: INTERNAL MEDICINE

## 2018-10-08 PROCEDURE — G0463 HOSPITAL OUTPT CLINIC VISIT: HCPCS

## 2018-10-08 PROCEDURE — 80076 HEPATIC FUNCTION PANEL: CPT | Performed by: INTERNAL MEDICINE

## 2018-10-08 PROCEDURE — 85027 COMPLETE CBC AUTOMATED: CPT | Performed by: INTERNAL MEDICINE

## 2018-10-08 PROCEDURE — 36415 COLL VENOUS BLD VENIPUNCTURE: CPT | Performed by: INTERNAL MEDICINE

## 2018-10-08 RX ORDER — ENTECAVIR 1 MG/1
1 TABLET, FILM COATED ORAL DAILY
Qty: 90 TABLET | Refills: 3 | Status: SHIPPED | OUTPATIENT
Start: 2018-10-08 | End: 2018-10-17

## 2018-10-08 ASSESSMENT — PAIN SCALES - GENERAL: PAINLEVEL: NO PAIN (0)

## 2018-10-08 NOTE — PROGRESS NOTES
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  - last HBV DNA= [id number], July 2018    Cirrhosis  - dx Mar 2017  - HBV  - Fibrosis Scan Mar 2017- F4  - hx ascites?  - no hx HE or variceal bleed  - no prior EGD  - HCC screening- liver doppler U/S July 2018    The patient comes to clinic this morning with a [facility]  for follow-up of HBV cirrhosis.  Last clinic visit 09/2017.  The patient lost his insurance and has not been compliant with his medications.  He was admitted to the hospital in 07/2018 for hypertensive urgency.  A liver Doppler ultrasound performed in the hospital in July showed no evidence of liver mass but evidence of moderate ascites.      The patient is well today.  He denies any signs or symptoms specific to liver disease.      The patient reports mild intermittent lower extremity edema.  He continues to take bumetanide daily.      The patient denies jaundice, itch, abdominal distention, lethargy or confusion.      The patient denies melena, hematemesis or hematochezia.      No history of fevers, sweats or chills.  Weight has been stable.      The patient does not drink alcohol.  He continues to smoke a few cigarettes per day.  He is no longer working.       Medical hx Surgical hx   Past Medical History:   Diagnosis Date     Cryoglobulinemia (H)      Glomerulonephritis      Hepatitis B      Hypertension      Surgical complication       Past Surgical History:   Procedure Laterality Date     C HAND/FINGER SURGERY UNLISTED       HAND SURGERY Right           Medications  Prior to Admission medications    Medication Sig Start Date End Date Taking? Authorizing Provider   amLODIPine (NORVASC) 5 MG tablet Take 1 tablet  "(5 mg) by mouth daily 8/6/18  Yes Augustin Bush MD   bumetanide (BUMEX) 2 MG tablet Take 1 tablet (2 mg) by mouth daily 8/5/18  Yes Augustin Bush MD   entecavir (BARACLUDE) 1 MG tablet Take 1 tablet (1 mg) by mouth daily 10/8/18  Yes Eber Ortez MD   lisinopril (PRINIVIL/ZESTRIL) 20 MG tablet Take 1 tablet (20 mg) by mouth daily 8/6/18  Yes Augustin Bush MD   RaNITidine HCl (ZANTAC PO) Take 75 mg by mouth 2 times daily   Yes Reported, Patient       Allergies  No Known Allergies    Review of systems  A 10-point review of systems was negative    Examination  BP (!) 175/124  Pulse 72  Temp 98.2  F (36.8  C) (Oral)  Ht 1.803 m (5' 11\")  Wt 60.3 kg (133 lb)  SpO2 98%  BMI 18.55 kg/m2  Body mass index is 18.55 kg/(m^2).    Gen- well, NAD, A+Ox3, normal color  CVS- RRR  RS- CTA  Abd- soft, non-tender, no ascites or organomegaly on palpation or percussion, BS+  Extr- hands normal, mild pitting AIDEN to ankles bilaterally  Skin- no rash or jaundice  Neuro- no asterixis  Psych- normal mood    Laboratory  Lab Results   Component Value Date     08/05/2018    POTASSIUM 3.5 08/05/2018    CHLORIDE 104 08/05/2018    CO2 33 08/05/2018    BUN 26 08/05/2018    CR 0.98 08/05/2018       Lab Results   Component Value Date    BILITOTAL 0.4 07/30/2018    ALT 57 07/30/2018    AST 98 07/30/2018    ALKPHOS 166 07/30/2018       Lab Results   Component Value Date    ALBUMIN 1.2 07/30/2018    PROTTOTAL 5.4 07/30/2018        Lab Results   Component Value Date    WBC 5.6 07/31/2018    HGB 14.5 07/31/2018    MCV 93 07/31/2018     08/05/2018     HBV DNA= [id number], 31st July 2018    Radiology  Liver doppler U/S July 2018 reviewed    Assessment  58-year-old male who presents for routine follow-up of HBV cirrhosis complicated with HBV glomerulonephritis and ascites.  MELD-Na~9.  Liver function tests abnormal off entecavir.  Compliance with entecavir is questionable.  Renal function " normal.  Will restart entecavir at higher dose due to history of now decompensated cirrhosis.  Will obtain EGD to screen for esophageal varices.  Up-to-date with HCC screening.     Plan  1.  Check CMP, INR, CBC, HBV DNA  2.  Entecavir 1mg PO Q24  3.  Continue bumetanide   4.  EGD  5.  Follow-up in 6 months    Eber Adame MD  Hepatology  [facility]

## 2018-10-08 NOTE — MR AVS SNAPSHOT
After Visit Summary   10/8/2018    Wilfredo Yoon    MRN: [id number]           Patient Information     Date Of Birth          1960        Visit Information        Provider Department      10/8/2018 9:15 AM Eber Ortez MD; [facility] Hepatology        Today's Diagnoses     Cirrhosis of liver without ascites, unspecified hepatic cirrhosis type (H)    -  1    Chronic viral hepatitis B without delta agent and without coma (H)           Follow-ups after your visit        Follow-up notes from your care team     Return in about 6 months (around 4/8/2019).      Your next 10 appointments already scheduled     Oct 18, 2018   Procedure with Eber Rosas MD   [facility] [place], Endoscopy ([facility])    [address] [id number] [phone]           The [facility] is located on the corner of [address] on the [facility]. It is easily accessible from virtually any point in the [place] area, via [address] and [address].            Apr 08, 2019  9:15 AM CDT   Lab with  LAB    Health Lab ([facility])    [address] [place] [id number] [phone]            Apr 08, 2019  9:45 AM CDT   US ABDOMEN COMPLETE with US[address] [place] [address] Imaging Center US ([facility])    [address] [place] [id number] [phone]           How do I prepare for my exam? (Food and drink instructions) Adults: No eating, smoking, gum chewing or drinking for 8 hours before the exam. You may take medicine with a small sip of water.  Children: * Infants, breast-fed: may have breast milk up to 2 hours before exam. * Infants, formula: may have bottle until 4 hours before exam. * Children 1-5 years: No food or drink for 4 hours before exam. * Children 6 -12 years: No  food or drink for 6 hours before exam. * Children over 12 years: No food or drink for 8 hours before exam.  * J Tube Fed: No need to stop feedings.  What should I wear: Wear comfortable clothes.  How long does the exam take: Most ultrasounds take 30 to 60 minutes.  What should I bring: Bring a list of your medicines, including vitamins, minerals and over-the-counter drugs. It is safest to leave personal items at home.  Do I need a :  No  is needed.  What do I need to tell my doctor: Tell your doctor about any allergies you may have.  What should I do after the exam: No restrictions, You may resume normal activities.  What is this test: An ultrasound uses sound waves to make pictures of the body. Sound waves do not cause pain. The only discomfort may be the pressure of the wand against your skin or full bladder.  Who should I call with questions: If you have any questions, please call the Imaging Department where you will have your exam. Directions, parking instructions, and other information is available on our website, [email or web address].            Apr 08, 2019 10:30 AM CDT   (Arrive by 10:15 AM)   Return General Liver with Eber Rosas MD   [facility] Hepatology ([facility] [place])    [address] [place] [address]  [address]  [facility] [address] [phone]              Future tests that were ordered for you today     Open Future Orders        Priority Expected Expires Ordered    US Abdomen Complete Routine 4/8/2019 10/8/2019 10/8/2018    INR Routine  11/7/2018 10/8/2018    CBC with platelets Routine  11/7/2018 10/8/2018    Basic metabolic panel Routine  11/7/2018 10/8/2018    Hepatic panel Routine  11/7/2018 10/8/2018    Hep B Virus DNA Quant Real Time PCR Routine  11/7/2018 10/8/2018    UPPER GI ENDOSCOPY Routine  11/22/2018 10/8/2018            Who to contact     If you have questions or need follow up information about today's clinic visit or your schedule please  "contact [facility] HEPATOLOGY directly at [phone].  Normal or non-critical lab and imaging results will be communicated to you by MyChart, letter or phone within 4 business days after the clinic has received the results. If you do not hear from us within 7 days, please contact the clinic through MyChart or phone. If you have a critical or abnormal lab result, we will notify you by phone as soon as possible.  Submit refill requests through [employer] or call your pharmacy and they will forward the refill request to us. Please allow 3 business days for your refill to be completed.          Additional Information About Your Visit        [employer] Information     [employer] lets you send messages to your doctor, view your test results, renew your prescriptions, schedule appointments and more. To sign up, go to [email or web address] . Click on \"Log in\" on the left side of the screen, which will take you to the Welcome page. Then click on \"Sign up Now\" on the right side of the page.     You will be asked to enter the access code listed below, as well as some personal information. Please follow the directions to create your username and password.     Your access code is: [email or web address]  Expires: 10/14/2018  6:30 AM     Your access code will  in 90 days. If you need help or a new code, please call your [place] clinic or [phone].        Care EveryWhere ID     This is your Care EveryWhere ID. This could be used by other organizations to access your [place] medical records  [id number]        Your Vitals Were     Pulse Temperature Height Pulse Oximetry BMI (Body Mass Index)       72 98.2  F (36.8  C) (Oral) 1.803 m (5' 11\") 98% 18.55 kg/m2        Blood Pressure from Last 3 Encounters:   10/08/18 (!) 175/124   18 (!) 127/96   10/10/17 148/90    Weight from Last 3 Encounters:   10/08/18 60.3 kg (133 lb)   18 60.7 kg (133 lb 14.4 oz)   10/10/17 57 kg (125 lb 9.6 oz)                 Today's Medication Changes "          These changes are accurate as of 10/8/18 10:13 AM.  If you have any questions, ask your nurse or doctor.               These medicines have changed or have updated prescriptions.        Dose/Directions    entecavir 1 MG tablet   Commonly known as:  BARACLUDE   This may have changed:    - medication strength  - how much to take   Used for:  Chronic viral hepatitis B without delta agent and without coma (H), Cirrhosis of liver without ascites, unspecified hepatic cirrhosis type (H)   Changed by:  Eber Ortez MD        Dose:  1 mg   Take 1 tablet (1 mg) by mouth daily   Quantity:  90 tablet   Refills:  3            Where to get your medicines      These medications were sent to [facility] [address] [facility] [employer] - [place], [place] - [address]  [address] [address]     Phone:  [phone]     entecavir 1 MG tablet                Primary Care Provider Office Phone # Fax #    Basilia JOHNSON DAGOBERTO Wiley [phone]       [facility] [address]  [facility] [address]        Equal Access to Services     STEPHAN BAUTISTA AH: Hadii aad ku hadasho Soomaali, waaxda luqadaha, qaybta kaalmada adeegyada, waxay idiin haykaya mistry . So [facility] [phone].    ATENCIÓN: Si habla español, tiene a bingham disposición servicios gratuitos de asistencia lingüística. Neeta al [phone].    We comply with applicable federal civil rights laws and [place] laws. We do not discriminate on the basis of race, color, national origin, age, disability, sex, sexual orientation, or gender identity.            Thank you!     Thank you for choosing [facility]PAT[facility]  for your care. Our goal is always to provide you with excellent care. Hearing back from our patients is one way we can continue to improve our services. Please take a few minutes to complete the written survey that you may receive in the mail after your visit with us. Thank you!             Your Updated Medication List - Protect  others around you: Learn how to safely use, store and throw away your medicines at www.disposemymeds.org.          This list is accurate as of 10/8/18 10:13 AM.  Always use your most recent med list.                   Brand Name Dispense Instructions for use Diagnosis    amLODIPine 5 MG tablet    NORVASC    30 tablet    Take 1 tablet (5 mg) by mouth daily    Hypertensive emergency       bumetanide 2 MG tablet    BUMEX    30 tablet    Take 1 tablet (2 mg) by mouth daily    Hypertensive emergency       entecavir 1 MG tablet    BARACLUDE    90 tablet    Take 1 tablet (1 mg) by mouth daily    Chronic viral hepatitis B without delta agent and without coma (H), Cirrhosis of liver without ascites, unspecified hepatic cirrhosis type (H)       lisinopril 20 MG tablet    PRINIVIL/ZESTRIL    30 tablet    Take 1 tablet (20 mg) by mouth daily    Hypertensive emergency       ZANTAC PO      Take 75 mg by mouth 2 times daily    Chronic viral hepatitis B without delta agent and without coma (H), Cirrhosis of liver without ascites, unspecified hepatic cirrhosis type (H)

## 2018-10-08 NOTE — LETTER
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  - last HBV DNA= [id number], July 2018    Cirrhosis  - dx Mar 2017  - HBV  - Fibrosis Scan Mar 2017- F4  - hx ascites?  - no hx HE or variceal bleed  - no prior EGD  - HCC screening- liver doppler U/S July 2018    The patient comes to clinic this morning with a Surinamese  for follow-up of HBV cirrhosis.  Last clinic visit 09/2017.  The patient lost his insurance and has not been compliant with his medications.  He was admitted to the hospital in 07/2018 for hypertensive urgency.  A liver Doppler ultrasound performed in the hospital in July showed no evidence of liver mass but evidence of moderate ascites.      The patient is well today.  He denies any signs or symptoms specific to liver disease.      The patient reports mild intermittent lower extremity edema.  He continues to take bumetanide daily.      The patient denies jaundice, itch, abdominal distention, lethargy or confusion.      The patient denies melena, hematemesis or hematochezia.      No history of fevers, sweats or chills.  Weight has been stable.      The patient does not drink alcohol.  He continues to smoke a few cigarettes per day.  He is no longer working.       Medical hx Surgical hx   Past Medical History:   Diagnosis Date     Cryoglobulinemia (H)      Glomerulonephritis      Hepatitis B      Hypertension      Surgical complication       Past Surgical History:   Procedure Laterality Date     C HAND/FINGER SURGERY UNLISTED       HAND SURGERY Right           Medications  Prior to Admission medications    Medication  "Sig Start Date End Date Taking? Authorizing Provider   amLODIPine (NORVASC) 5 MG tablet Take 1 tablet (5 mg) by mouth daily 8/6/18  Yes Augustin Bush MD   bumetanide (BUMEX) 2 MG tablet Take 1 tablet (2 mg) by mouth daily 8/5/18  Yes Augustin Bush MD   entecavir (BARACLUDE) 1 MG tablet Take 1 tablet (1 mg) by mouth daily 10/8/18  Yes Eber Ortez MD   lisinopril (PRINIVIL/ZESTRIL) 20 MG tablet Take 1 tablet (20 mg) by mouth daily 8/6/18  Yes Augustin Bush MD   RaNITidine HCl (ZANTAC PO) Take 75 mg by mouth 2 times daily   Yes Reported, Patient       Allergies  No Known Allergies    Review of systems  A 10-point review of systems was negative    Examination  BP (!) 175/124  Pulse 72  Temp 98.2  F (36.8  C) (Oral)  Ht 1.803 m (5' 11\")  Wt 60.3 kg (133 lb)  SpO2 98%  BMI 18.55 kg/m2  Body mass index is 18.55 kg/(m^2).    Gen- well, NAD, A+Ox3, normal color  CVS- RRR  RS- CTA  Abd- soft, non-tender, no ascites or organomegaly on palpation or percussion, BS+  Extr- hands normal, mild pitting AIDEN to ankles bilaterally  Skin- no rash or jaundice  Neuro- no asterixis  Psych- normal mood    Laboratory  Lab Results   Component Value Date     08/05/2018    POTASSIUM 3.5 08/05/2018    CHLORIDE 104 08/05/2018    CO2 33 08/05/2018    BUN 26 08/05/2018    CR 0.98 08/05/2018       Lab Results   Component Value Date    BILITOTAL 0.4 07/30/2018    ALT 57 07/30/2018    AST 98 07/30/2018    ALKPHOS 166 07/30/2018       Lab Results   Component Value Date    ALBUMIN 1.2 07/30/2018    PROTTOTAL 5.4 07/30/2018        Lab Results   Component Value Date    WBC 5.6 07/31/2018    HGB 14.5 07/31/2018    MCV 93 07/31/2018     08/05/2018     HBV DNA= [id number], 31st July 2018    Radiology  Liver doppler U/S July 2018 reviewed    Assessment  58-year-old male who presents for routine follow-up of HBV cirrhosis complicated with HBV glomerulonephritis and ascites.  MELD-Na~9.  Liver " function tests abnormal off entecavir.  Compliance with entecavir is questionable.  Renal function normal.  Will restart entecavir at higher dose due to history of now decompensated cirrhosis.  Will obtain EGD to screen for esophageal varices.  Up-to-date with HCC screening.     Plan  1.  Check CMP, INR, CBC, HBV DNA  2.  Entecavir 1mg PO Q24  3.  Continue bumetanide   4.  EGD  5.  Follow-up in 6 months    Eber Adame MD  Hepatology  [facility]    Eber Adame MD

## 2018-10-09 LAB
HBV DNA SERPL NAA+PROBE-ACNC: ABNORMAL [IU]/ML
HBV DNA SERPL NAA+PROBE-LOG IU: 5.1 {LOG_IU}/ML

## 2018-10-11 ENCOUNTER — TELEPHONE (OUTPATIENT)
Dept: GASTROENTEROLOGY | Facility: CLINIC | Age: 58
End: 2018-10-11

## 2018-10-11 NOTE — TELEPHONE ENCOUNTER
Patient scheduled for EGD     Indication for procedure. Chronic viral hepatitis B without delta agent and without coma, Cirrhosis of liver without ascites, unspecified hepatic cirrhosis type    Referring Provider. Eber Rosas MD    ? Yes, David     Arrival time verified? 1030 am     Facility location verified? [address]     Instructions given regarding prep and procedure    Prep Type NPO     Are you taking any anticoagulants or blood thinners? Denies     Instructions given? Yes     Electronic implanted devices? Denies     Pre procedure teaching completed? Yes    Transportation from procedure? Will find; aware of policy     H&P / Pre op physical completed? N/A    Antonio Baeza RN

## 2018-10-17 DIAGNOSIS — K74.60 CIRRHOSIS OF LIVER WITHOUT ASCITES, UNSPECIFIED HEPATIC CIRRHOSIS TYPE (H): ICD-10-CM

## 2018-10-17 DIAGNOSIS — B18.1 CHRONIC VIRAL HEPATITIS B WITHOUT DELTA AGENT AND WITHOUT COMA (H): ICD-10-CM

## 2018-10-17 RX ORDER — LIDOCAINE 40 MG/G
CREAM TOPICAL
Status: CANCELLED | OUTPATIENT
Start: 2018-10-17

## 2018-10-17 RX ORDER — ONDANSETRON 2 MG/ML
4 INJECTION INTRAMUSCULAR; INTRAVENOUS
Status: CANCELLED | OUTPATIENT
Start: 2018-10-17

## 2018-10-17 RX ORDER — ENTECAVIR 1 MG/1
1 TABLET, FILM COATED ORAL DAILY
Qty: 90 TABLET | Refills: 3 | Status: SHIPPED | OUTPATIENT
Start: 2018-10-17 | End: 2019-02-02

## 2018-10-18 ENCOUNTER — SURGERY (OUTPATIENT)
Age: 58
End: 2018-10-18

## 2018-10-18 ENCOUNTER — OFFICE VISIT (OUTPATIENT)
Dept: INTERPRETER SERVICES | Facility: CLINIC | Age: 58
End: 2018-10-18
Payer: COMMERCIAL

## 2018-10-18 ENCOUNTER — HOSPITAL ENCOUNTER (OUTPATIENT)
Facility: CLINIC | Age: 58
Discharge: HOME | End: 2018-10-18
Attending: INTERNAL MEDICINE | Admitting: INTERNAL MEDICINE
Payer: COMMERCIAL

## 2018-10-18 VITALS
OXYGEN SATURATION: 98 % | HEART RATE: 66 BPM | SYSTOLIC BLOOD PRESSURE: 174 MMHG | RESPIRATION RATE: 14 BRPM | DIASTOLIC BLOOD PRESSURE: 106 MMHG

## 2018-10-18 LAB — UPPER GI ENDOSCOPY: NORMAL

## 2018-10-18 PROCEDURE — 25000128 H RX IP 250 OP 636: Performed by: INTERNAL MEDICINE

## 2018-10-18 PROCEDURE — T1013 SIGN LANG/ORAL INTERPRETER: HCPCS | Mod: U3

## 2018-10-18 PROCEDURE — 25000125 ZZHC RX 250: Performed by: INTERNAL MEDICINE

## 2018-10-18 PROCEDURE — 43235 EGD DIAGNOSTIC BRUSH WASH: CPT | Performed by: INTERNAL MEDICINE

## 2018-10-18 PROCEDURE — 40000104 ZZH STATISTIC MODERATE SEDATION < 10 MIN: Performed by: INTERNAL MEDICINE

## 2018-10-18 RX ORDER — NALOXONE HYDROCHLORIDE 0.4 MG/ML
.1-.4 INJECTION, SOLUTION INTRAMUSCULAR; INTRAVENOUS; SUBCUTANEOUS
Status: CANCELLED | OUTPATIENT
Start: 2018-10-18 | End: 2018-10-19

## 2018-10-18 RX ORDER — FLUMAZENIL 0.1 MG/ML
0.2 INJECTION, SOLUTION INTRAVENOUS
Status: CANCELLED | OUTPATIENT
Start: 2018-10-18 | End: 2018-10-18

## 2018-10-18 RX ORDER — ONDANSETRON 4 MG/1
4 TABLET, ORALLY DISINTEGRATING ORAL EVERY 6 HOURS PRN
Status: CANCELLED | OUTPATIENT
Start: 2018-10-18

## 2018-10-18 RX ORDER — FENTANYL CITRATE 50 UG/ML
INJECTION, SOLUTION INTRAMUSCULAR; INTRAVENOUS PRN
Status: DISCONTINUED | OUTPATIENT
Start: 2018-10-18 | End: 2018-10-18 | Stop reason: HOSPADM

## 2018-10-18 RX ORDER — ONDANSETRON 2 MG/ML
4 INJECTION INTRAMUSCULAR; INTRAVENOUS EVERY 6 HOURS PRN
Status: CANCELLED | OUTPATIENT
Start: 2018-10-18

## 2018-10-18 RX ADMIN — MIDAZOLAM 2 MG: 1 INJECTION INTRAMUSCULAR; INTRAVENOUS at 11:45

## 2018-10-18 RX ADMIN — TOPICAL ANESTHETIC 1 SPRAY: 200 SPRAY DENTAL; PERIODONTAL at 11:45

## 2018-10-18 RX ADMIN — FENTANYL CITRATE 100 MCG: 50 INJECTION, SOLUTION INTRAMUSCULAR; INTRAVENOUS at 11:45

## 2018-10-18 NOTE — DISCHARGE INSTRUCTIONS
Discharge Instructions after  Upper Endoscopy (EGD)    Activity and Diet  You were given medicine for pain. You may be dizzy or sleepy.  For 24 hours:    Do not drive or use heavy equipment.    Do not make important decisions.    Do not drink any alcohol.  _x__ You may return to your regular diet.    Discomfort  You may have a sore throat for 2 to 3 days. It may help to:    Avoid hot liquids for 24 hours.    Use sore throat lozenges.    Gargle as needed with salt water up to 4 times a day. Mix 1 cup of warm water  with 1 teaspoon of salt. Do not swallow.    You may take Tylenol (acetaminophen) for pain unless your doctor has told you not to.    Follow-up  Per your physician.    When to call us:  Problems are rare. Call right away if you have:    Unusual throat pain or trouble swallowing    Unusual pain in belly or chest that is not relieved by belching or passing air    Black stools (tar-like looking bowel movement)    Temperature above 100.6  F. (37.5  C).    If you vomit blood or have severe pain, go to an emergency room.    If you have questions, call:  Monday to Friday, 7 a.m. to 4:30 p.m.: Endoscopy: [phone] (We may have to call you back)    After hours: Hospital: [phone] (Ask for the GI fellow on call)

## 2018-10-18 NOTE — IP AVS SNAPSHOT
[facility], [place], Endoscopy    [address] [facility] [address]    Phone:  [phone]                                       After Visit Summary   10/18/2018    Wilfredo Yoon    MRN: [id number]           After Visit Summary Signature Page     I have received my discharge instructions, and my questions have been answered. I have discussed any challenges I see with this plan with the nurse or doctor.    ..........................................................................................................................................  Patient/Patient Representative Signature      ..........................................................................................................................................  Patient Representative Print Name and Relationship to Patient    ..................................................               ................................................  Date                                   Time    ..........................................................................................................................................  Reviewed by Signature/Title    ...................................................              ..............................................  Date                                               Time          22EPIC Rev 08/18

## 2018-10-18 NOTE — IP AVS SNAPSHOT
MRN:[id number]                      After Visit Summary   10/18/2018    Wilfredo Yoon    MRN: [id number]           Thank you!     Thank you for choosing [facility] for your care. Our goal is always to provide you with excellent care. Hearing back from our patients is one way we can continue to improve our services. Please take a few minutes to complete the written survey that you may receive in the mail after you visit with us. Thank you!        Patient Information     Date Of Birth          1960        About your hospital stay     You were admitted on:  October 18, 2018 You last received care in the:  [facility], Endoscopy    You were discharged on:  October 18, 2018       Who to Call     For medical emergencies, please call 911.  For non-urgent questions about your medical care, please call your primary care provider or clinic, [phone]  For questions related to your surgery, please call your surgery clinic        Attending Provider     Provider Specialty    Eber Ortez MD Gastroenterology       Primary Care Provider Office Phone # Fax #    Suzanneangelita ELIZABETH Wiley PA-C [phone]      Your next 10 appointments already scheduled     Apr 08, 2019  9:15 AM CDT   Lab with  LAB   [facility] ([facility])    [address] [place] [address] [phone]            Apr 08, 2019  9:45 AM CDT   US ABDOMEN COMPLETE with US[address] [place] [address] Imaging Center US ([facility])    [address] [place] [address] [phone]           How do I prepare for my exam? (Food and drink instructions) Adults: No eating, smoking, gum chewing or drinking for 8 hours before the exam. You may take medicine with a small sip of water.  Children: * Infants, breast-fed: may have breast milk up to 2 hours before exam. * Infants, formula: may have bottle until 4 hours before  exam. * Children 1-5 years: No food or drink for 4 hours before exam. * Children 6 -12 years: No food or drink for 6 hours before exam. * Children over 12 years: No food or drink for 8 hours before exam.  * J Tube Fed: No need to stop feedings.  What should I wear: Wear comfortable clothes.  How long does the exam take: Most ultrasounds take 30 to 60 minutes.  What should I bring: Bring a list of your medicines, including vitamins, minerals and over-the-counter drugs. It is safest to leave personal items at home.  Do I need a :  No  is needed.  What do I need to tell my doctor: Tell your doctor about any allergies you may have.  What should I do after the exam: No restrictions, You may resume normal activities.  What is this test: An ultrasound uses sound waves to make pictures of the body. Sound waves do not cause pain. The only discomfort may be the pressure of the wand against your skin or full bladder.  Who should I call with questions: If you have any questions, please call the Imaging Department where you will have your exam. Directions, parking instructions, and other information is available on our website, [email or web address].            Apr 08, 2019 10:30 AM CDT   (Arrive by 10:15 AM)   Return General Liver with Eber Rosas MD   [facility] ([facility])    [address] [place] [address]  [address] [place] [address] [phone]              Further instructions from your care team       Discharge Instructions after  Upper Endoscopy (EGD)    Activity and Diet  You were given medicine for pain. You may be dizzy or sleepy.  For 24 hours:    Do not drive or use heavy equipment.    Do not make important decisions.    Do not drink any alcohol.  _x__ You may return to your regular diet.    Discomfort  You may have a sore throat for 2 to 3 days. It may help to:    Avoid hot liquids for 24 hours.    Use sore throat lozenges.    Gargle as needed with salt  "water up to 4 times a day. Mix 1 cup of warm water  with 1 teaspoon of salt. Do not swallow.    You may take Tylenol (acetaminophen) for pain unless your doctor has told you not to.    Follow-up  Per your physician.    When to call us:  Problems are rare. Call right away if you have:    Unusual throat pain or trouble swallowing    Unusual pain in belly or chest that is not relieved by belching or passing air    Black stools (tar-like looking bowel movement)    Temperature above 100.6  F. (37.5  C).    If you vomit blood or have severe pain, go to an emergency room.    If you have questions, call:  Monday to Friday, 7 a.m. to 4:30 p.m.: Endoscopy: [phone] (We may have to call you back)    After hours: Hospital: [phone] (Ask for the GI fellow on call)    Pending Results     No orders found from 10/16/2018 to 10/19/2018.            Admission Information     Date & Time Provider Department Dept. Phone    10/18/2018 Eber Ortez MD [facility], [place], Endoscopy [phone]      Your Vitals Were     Blood Pressure Pulse Respirations Pulse Oximetry          174/106 66 14 99%        [facility] Information     [employer] lets you send messages to your doctor, view your test results, renew your prescriptions, schedule appointments and more. To sign up, go to [email or web address] . Click on \"Log in\" on the left side of the screen, which will take you to the Welcome page. Then click on \"Sign up Now\" on the right side of the page.     You will be asked to enter the access code listed below, as well as some personal information. Please follow the directions to create your username and password.     Your access code is: [email or web address]  Expires: 2019 12:23 PM     Your access code will  in 90 days. If you need help or a new code, please call your [place] clinic or [phone].        Care EveryWhere ID     This is your Care EveryWhere ID. This could be used by other organizations to access your [place] " medical records  [id number]        Equal Access to Services     STEPHAN LILY : Hadii winston dave haleigh Sostevensonali, waaxda luqadaha, qaybta kamikedanielle greenkvngdanielle, elisabeth alegrealcidesnavi ornelas. So [facility] [phone].    ATENCIÓN: Si habla español, tiene a bingham disposición servicios gratuitos de asistencia lingüística. Llame al [phone].    We comply with applicable federal civil rights laws and [place] laws. We do not discriminate on the basis of race, color, national origin, age, disability, sex, sexual orientation, or gender identity.               Review of your medicines      UNREVIEWED medicines. Ask your doctor about these medicines        Dose / Directions    amLODIPine 5 MG tablet   Commonly known as:  NORVASC   Used for:  Hypertensive emergency        Dose:  5 mg   Take 1 tablet (5 mg) by mouth daily   Quantity:  30 tablet   Refills:  0       bumetanide 2 MG tablet   Commonly known as:  BUMEX   Used for:  Hypertensive emergency        Dose:  2 mg   Take 1 tablet (2 mg) by mouth daily   Quantity:  30 tablet   Refills:  0       entecavir 1 MG tablet   Commonly known as:  BARACLUDE   Used for:  Chronic viral hepatitis B without delta agent and without coma (H), Cirrhosis of liver without ascites, unspecified hepatic cirrhosis type (H)        Dose:  1 mg   Take 1 tablet (1 mg) by mouth daily   Quantity:  90 tablet   Refills:  3       lisinopril 20 MG tablet   Commonly known as:  PRINIVIL/ZESTRIL   Used for:  Hypertensive emergency        Dose:  20 mg   Take 1 tablet (20 mg) by mouth daily   Quantity:  30 tablet   Refills:  0       ZANTAC PO   Used for:  Chronic viral hepatitis B without delta agent and without coma (H), Cirrhosis of liver without ascites, unspecified hepatic cirrhosis type (H)        Dose:  75 mg   Take 75 mg by mouth 2 times daily   Refills:  0                Protect others around you: Learn how to safely use, store and throw away your medicines at www.disposemymeds.org.              Medication List: This is a list of all your medications and when to take them. Check marks below indicate your daily home schedule. Keep this list as a reference.      Medications           Morning Afternoon Evening Bedtime As Needed    amLODIPine 5 MG tablet   Commonly known as:  NORVASC   Take 1 tablet (5 mg) by mouth daily                                bumetanide 2 MG tablet   Commonly known as:  BUMEX   Take 1 tablet (2 mg) by mouth daily                                entecavir 1 MG tablet   Commonly known as:  BARACLUDE   Take 1 tablet (1 mg) by mouth daily                                lisinopril 20 MG tablet   Commonly known as:  PRINIVIL/ZESTRIL   Take 1 tablet (20 mg) by mouth daily                                ZANTAC PO   Take 75 mg by mouth 2 times daily

## 2019-02-02 ENCOUNTER — HOSPITAL ENCOUNTER (EMERGENCY)
Facility: CLINIC | Age: 59
Discharge: HOME | End: 2019-02-02
Attending: EMERGENCY MEDICINE | Admitting: EMERGENCY MEDICINE
Payer: COMMERCIAL

## 2019-02-02 VITALS
BODY MASS INDEX: 18.85 KG/M2 | SYSTOLIC BLOOD PRESSURE: 200 MMHG | TEMPERATURE: 98.2 F | DIASTOLIC BLOOD PRESSURE: 126 MMHG | RESPIRATION RATE: 16 BRPM | HEART RATE: 72 BPM | WEIGHT: 135.13 LBS | OXYGEN SATURATION: 100 %

## 2019-02-02 DIAGNOSIS — I16.1 HYPERTENSIVE EMERGENCY: ICD-10-CM

## 2019-02-02 DIAGNOSIS — Z91.148 PAIN MEDICATION AGREEMENT BROKEN: ICD-10-CM

## 2019-02-02 DIAGNOSIS — B18.1 CHRONIC VIRAL HEPATITIS B WITHOUT DELTA AGENT AND WITHOUT COMA (H): ICD-10-CM

## 2019-02-02 DIAGNOSIS — R42 LIGHTHEADEDNESS: ICD-10-CM

## 2019-02-02 DIAGNOSIS — K74.60 CIRRHOSIS OF LIVER WITHOUT ASCITES, UNSPECIFIED HEPATIC CIRRHOSIS TYPE (H): ICD-10-CM

## 2019-02-02 DIAGNOSIS — F17.210 CIGARETTE SMOKER: ICD-10-CM

## 2019-02-02 LAB
ALBUMIN SERPL-MCNC: 1.4 G/DL (ref 3.4–5)
ALP SERPL-CCNC: 150 U/L (ref 40–150)
ALT SERPL W P-5'-P-CCNC: 12 U/L (ref 0–70)
ANION GAP SERPL CALCULATED.3IONS-SCNC: 7 MMOL/L (ref 3–14)
AST SERPL W P-5'-P-CCNC: 37 U/L (ref 0–45)
BASOPHILS # BLD AUTO: 0 10E9/L (ref 0–0.2)
BASOPHILS NFR BLD AUTO: 0.6 %
BILIRUB SERPL-MCNC: 0.4 MG/DL (ref 0.2–1.3)
BUN SERPL-MCNC: 20 MG/DL (ref 7–30)
CALCIUM SERPL-MCNC: 7.6 MG/DL (ref 8.5–10.1)
CHLORIDE SERPL-SCNC: 107 MMOL/L (ref 94–109)
CO2 SERPL-SCNC: 26 MMOL/L (ref 20–32)
CREAT SERPL-MCNC: 1.07 MG/DL (ref 0.66–1.25)
DIFFERENTIAL METHOD BLD: ABNORMAL
EOSINOPHIL # BLD AUTO: 0.2 10E9/L (ref 0–0.7)
EOSINOPHIL NFR BLD AUTO: 3.9 %
ERYTHROCYTE [DISTWIDTH] IN BLOOD BY AUTOMATED COUNT: 12.9 % (ref 10–15)
GFR SERPL CREATININE-BSD FRML MDRD: 76 ML/MIN/{1.73_M2}
GLUCOSE SERPL-MCNC: 116 MG/DL (ref 70–99)
HCT VFR BLD AUTO: 37.9 % (ref 40–53)
HGB BLD-MCNC: 12.6 G/DL (ref 13.3–17.7)
IMM GRANULOCYTES # BLD: 0 10E9/L (ref 0–0.4)
IMM GRANULOCYTES NFR BLD: 0 %
LIPASE SERPL-CCNC: 78 U/L (ref 73–393)
LYMPHOCYTES # BLD AUTO: 1.9 10E9/L (ref 0.8–5.3)
LYMPHOCYTES NFR BLD AUTO: 37.1 %
MCH RBC QN AUTO: 32 PG (ref 26.5–33)
MCHC RBC AUTO-ENTMCNC: 33.2 G/DL (ref 31.5–36.5)
MCV RBC AUTO: 96 FL (ref 78–100)
MONOCYTES # BLD AUTO: 0.4 10E9/L (ref 0–1.3)
MONOCYTES NFR BLD AUTO: 7.1 %
NEUTROPHILS # BLD AUTO: 2.6 10E9/L (ref 1.6–8.3)
NEUTROPHILS NFR BLD AUTO: 51.3 %
NRBC # BLD AUTO: 0 10*3/UL
NRBC BLD AUTO-RTO: 0 /100
PLATELET # BLD AUTO: 158 10E9/L (ref 150–450)
POTASSIUM SERPL-SCNC: 3.6 MMOL/L (ref 3.4–5.3)
PROT SERPL-MCNC: 5.7 G/DL (ref 6.8–8.8)
RBC # BLD AUTO: 3.94 10E12/L (ref 4.4–5.9)
SODIUM SERPL-SCNC: 140 MMOL/L (ref 133–144)
TROPONIN I SERPL-MCNC: <0.015 UG/L (ref 0–0.04)
WBC # BLD AUTO: 5.1 10E9/L (ref 4–11)

## 2019-02-02 PROCEDURE — 85025 COMPLETE CBC W/AUTO DIFF WBC: CPT | Performed by: EMERGENCY MEDICINE

## 2019-02-02 PROCEDURE — 84484 ASSAY OF TROPONIN QUANT: CPT | Performed by: EMERGENCY MEDICINE

## 2019-02-02 PROCEDURE — 80053 COMPREHEN METABOLIC PANEL: CPT | Performed by: EMERGENCY MEDICINE

## 2019-02-02 PROCEDURE — 99284 EMERGENCY DEPT VISIT MOD MDM: CPT

## 2019-02-02 PROCEDURE — 99284 EMERGENCY DEPT VISIT MOD MDM: CPT | Mod: Z6 | Performed by: EMERGENCY MEDICINE

## 2019-02-02 PROCEDURE — 83690 ASSAY OF LIPASE: CPT | Performed by: EMERGENCY MEDICINE

## 2019-02-02 PROCEDURE — 93005 ELECTROCARDIOGRAM TRACING: CPT

## 2019-02-02 RX ORDER — LISINOPRIL 20 MG/1
20 TABLET ORAL DAILY
Qty: 30 TABLET | Refills: 0 | Status: ON HOLD | OUTPATIENT
Start: 2019-02-02 | End: 2019-10-23

## 2019-02-02 RX ORDER — AMLODIPINE BESYLATE 5 MG/1
5 TABLET ORAL DAILY
Qty: 30 TABLET | Refills: 0 | Status: ON HOLD | OUTPATIENT
Start: 2019-02-02 | End: 2019-10-23

## 2019-02-02 RX ORDER — ENTECAVIR 1 MG/1
1 TABLET, FILM COATED ORAL DAILY
Qty: 30 TABLET | Refills: 0 | Status: SHIPPED | OUTPATIENT
Start: 2019-02-02 | End: 2019-05-10

## 2019-02-02 RX ORDER — BUMETANIDE 2 MG/1
2 TABLET ORAL DAILY
Qty: 30 TABLET | Refills: 0 | Status: ON HOLD | OUTPATIENT
Start: 2019-02-02 | End: 2019-10-23

## 2019-02-02 NOTE — ED PROVIDER NOTES
History     Chief Complaint   Patient presents with     Generalized Weakness     Onset 2 weeks ago with generalized weakness, no appetitite and dizziness.     The history is provided by the patient. The history is limited by a language barrier. A  was used (Malawian).     Wilfredo Yoon is a 58 year old male who with a history of hypertension, nephrotic syndrome, and membranoproliferative glomerulonephritis who presents to the Emergency Department with complaints of  generalized weakness, primarily bilateral lower extremity onset 1 week ago. He also complains of abdominal distension for 1 month. The patient is known to be noncompliant with his medications for hypertension. He states that he couldn t work due to the weakness. He feels lightheaded and reports some chills. He also reports constipation, however, had a BM yesterday and this morning. He denies syncope or falls. He denies fever, vomiting, palpitations or any other pain. He reports chest pain when he coughs. He denies any past abdominal surgeries. The patient states that he finished his medication for hepatitis B 2 months ago without consulting a doctor.    I have reviewed the Medications, Allergies, Past Medical and Surgical History, and Social History in the [employer] system.  Past Medical History:   Diagnosis Date     Cryoglobulinemia (H)      Glomerulonephritis      Hepatitis B      Hypertension      Surgical complication        Past Surgical History:   Procedure Laterality Date     C HAND/FINGER SURGERY UNLISTED       ESOPHAGOSCOPY, GASTROSCOPY, DUODENOSCOPY (EGD), COMBINED N/A 10/18/2018    Procedure: EGD;  Surgeon: Eber Ortez MD;  Location:  GI     HAND SURGERY Right        Family History   Problem Relation Age of Onset     Liver Cancer No family hx of      Hepatitis No family hx of        Social History     Tobacco Use     Smoking status: Current Some Day Smoker     Packs/day: 0.25     Years: 40.00     Pack  years: 10.00     Types: Cigarettes     Smokeless tobacco: Never Used   Substance Use Topics     Alcohol use: No     Alcohol/week: 0.0 oz       No current facility-administered medications for this encounter.      Current Outpatient Medications   Medication     amLODIPine (NORVASC) 5 MG tablet     bumetanide (BUMEX) 2 MG tablet     entecavir (BARACLUDE) 1 MG tablet     lisinopril (PRINIVIL/ZESTRIL) 20 MG tablet     RaNITidine HCl (ZANTAC PO)      No Known Allergies    Review of Systems   All other systems reviewed and are negative.      Physical Exam   BP: (!) 162/110  Pulse: 84  Heart Rate: 84  Temp: 96  F (35.6  C)  Resp: 16  Weight: 61.3 kg (135 lb 2 oz)  SpO2: 99 %      Physical Exam   Constitutional: He is oriented to person, place, and time. No distress.   HENT:   Head: Normocephalic and atraumatic.   Eyes: Conjunctivae are normal. Pupils are equal, round, and reactive to light.   Neck: Normal range of motion. Neck supple.   Cardiovascular: Normal rate and intact distal pulses.   Pulmonary/Chest: Effort normal. No respiratory distress. He has no wheezes. He has no rales.   Abdominal: Soft. He exhibits distension. He exhibits no mass. There is no tenderness. There is no rebound and no guarding. No hernia.   Musculoskeletal: Normal range of motion.   Neurological: He is alert and oriented to person, place, and time.   Skin: Skin is warm and dry. He is not diaphoretic.   Psychiatric: He has a normal mood and affect. His behavior is normal. Thought content normal.   Nursing note and vitals reviewed.      ED Course        Procedures             Critical Care time:  none             Labs Ordered and Resulted from Time of ED Arrival Up to the Time of Departure from the ED - No data to display         Assessments & Plan (with Medical Decision Making)   1.  Ascites  2.  Hepatitis B  3.  Dizziness    58-year-old male with a history of hepatitis B who presents with 2 weeks of generalized weakness and dizziness.  On exam  he is hypertensive with otherwise normal vital signs.  He has some ascites on exam but no evidence of serious infection or other serious pathology.  Lab evaluation including CBC CMP and troponin are negative.  He has a history of hepatitis B but has not been taking his medications including his Entecavir or Bumex.  His exam is benign and I see no evidence of bacterial peritonitis.  The patient feels improved and will be discharged with GI follow up.  He has been non-compliant with his medications, and I suspect that explains his symptoms.      I have reviewed the nursing notes.    I have reviewed the findings, diagnosis, plan and need for follow up with the patient.       Medication List      There are no discharge medications for this visit.         Final diagnoses:   None   Suni JOHNSON, am serving as a trained medical scribe to document services personally performed by Bernardo Marks MD, based on the provider's statements to me.   Bernardo JOHNSON MD, was physically present and have reviewed and verified the accuracy of this note documented by Suni Davis.      2/2/2019   [facility], [place], EMERGENCY DEPARTMENT     Bernardo Marks MD  02/02/19 [id number]

## 2019-02-02 NOTE — ED AVS SNAPSHOT
[facility], [place], Emergency Department  [address]  [facility] [place] [address]  Phone:  [phone]  Fax:  [phone]                                    Wilfredo Yoon   MRN: [id number]    Department:  [facility], Emergency Department   Date of Visit:  2/2/2019           After Visit Summary Signature Page    I have received my discharge instructions, and my questions have been answered. I have discussed any challenges I see with this plan with the nurse or doctor.    ..........................................................................................................................................  Patient/Patient Representative Signature      ..........................................................................................................................................  Patient Representative Print Name and Relationship to Patient    ..................................................               ................................................  Date                                   Time    ..........................................................................................................................................  Reviewed by Signature/Title    ...................................................              ..............................................  Date                                               Time          22EPIC Rev 08/18

## 2019-02-03 LAB — INTERPRETATION ECG - MUSE: NORMAL

## 2019-02-19 ENCOUNTER — DOCUMENTATION ONLY (OUTPATIENT)
Dept: CARE COORDINATION | Facility: CLINIC | Age: 59
End: 2019-02-19

## 2019-03-11 DIAGNOSIS — R18.8 OTHER ASCITES: ICD-10-CM

## 2019-03-11 DIAGNOSIS — B18.1 CHRONIC VIRAL HEPATITIS B WITHOUT DELTA AGENT AND WITHOUT COMA (H): Primary | ICD-10-CM

## 2019-03-11 RX ORDER — ALBUMIN (HUMAN) 12.5 G/50ML
12.5 SOLUTION INTRAVENOUS 4 TIMES DAILY PRN
Status: CANCELLED
Start: 2019-03-11

## 2019-03-12 LAB
GRAM STN SPEC: NORMAL
SPECIMEN SOURCE: NORMAL

## 2019-03-14 ENCOUNTER — HOSPITAL ENCOUNTER (EMERGENCY)
Facility: CLINIC | Age: 59
Discharge: HOME | End: 2019-03-15
Attending: EMERGENCY MEDICINE | Admitting: EMERGENCY MEDICINE
Payer: COMMERCIAL

## 2019-03-14 DIAGNOSIS — K59.00 CONSTIPATION, UNSPECIFIED CONSTIPATION TYPE: ICD-10-CM

## 2019-03-14 DIAGNOSIS — R10.84 ABDOMINAL PAIN, GENERALIZED: ICD-10-CM

## 2019-03-14 DIAGNOSIS — R18.8 OTHER ASCITES: ICD-10-CM

## 2019-03-14 LAB
ALBUMIN SERPL-MCNC: 1.6 G/DL (ref 3.4–5)
ALP SERPL-CCNC: 138 U/L (ref 40–150)
ALT SERPL W P-5'-P-CCNC: 14 U/L (ref 0–70)
AMMONIA PLAS-SCNC: 39 UMOL/L (ref 10–50)
ANION GAP SERPL CALCULATED.3IONS-SCNC: 8 MMOL/L (ref 3–14)
APTT PPP: 29 SEC (ref 22–37)
AST SERPL W P-5'-P-CCNC: 36 U/L (ref 0–45)
BASOPHILS # BLD AUTO: 0 10E9/L (ref 0–0.2)
BASOPHILS NFR BLD AUTO: 1 %
BILIRUB SERPL-MCNC: 0.3 MG/DL (ref 0.2–1.3)
BUN SERPL-MCNC: 13 MG/DL (ref 7–30)
CALCIUM SERPL-MCNC: 7.8 MG/DL (ref 8.5–10.1)
CHLORIDE SERPL-SCNC: 104 MMOL/L (ref 94–109)
CO2 SERPL-SCNC: 25 MMOL/L (ref 20–32)
CREAT SERPL-MCNC: 1.09 MG/DL (ref 0.66–1.25)
DIFFERENTIAL METHOD BLD: ABNORMAL
EOSINOPHIL # BLD AUTO: 0.1 10E9/L (ref 0–0.7)
EOSINOPHIL NFR BLD AUTO: 3.2 %
ERYTHROCYTE [DISTWIDTH] IN BLOOD BY AUTOMATED COUNT: 12.6 % (ref 10–15)
GFR SERPL CREATININE-BSD FRML MDRD: 74 ML/MIN/{1.73_M2}
GLUCOSE SERPL-MCNC: 95 MG/DL (ref 70–99)
HCT VFR BLD AUTO: 38.8 % (ref 40–53)
HGB BLD-MCNC: 12.5 G/DL (ref 13.3–17.7)
IMM GRANULOCYTES # BLD: 0 10E9/L (ref 0–0.4)
IMM GRANULOCYTES NFR BLD: 0 %
INR PPP: 1.16 (ref 0.86–1.14)
LACTATE BLD-SCNC: 2 MMOL/L (ref 0.7–2)
LIPASE SERPL-CCNC: 66 U/L (ref 73–393)
LYMPHOCYTES # BLD AUTO: 1.4 10E9/L (ref 0.8–5.3)
LYMPHOCYTES NFR BLD AUTO: 34.5 %
MCH RBC QN AUTO: 31.9 PG (ref 26.5–33)
MCHC RBC AUTO-ENTMCNC: 32.2 G/DL (ref 31.5–36.5)
MCV RBC AUTO: 99 FL (ref 78–100)
MONOCYTES # BLD AUTO: 0.4 10E9/L (ref 0–1.3)
MONOCYTES NFR BLD AUTO: 9.9 %
NEUTROPHILS # BLD AUTO: 2.1 10E9/L (ref 1.6–8.3)
NEUTROPHILS NFR BLD AUTO: 51.4 %
NRBC # BLD AUTO: 0 10*3/UL
NRBC BLD AUTO-RTO: 0 /100
PLATELET # BLD AUTO: 190 10E9/L (ref 150–450)
POTASSIUM SERPL-SCNC: 3.6 MMOL/L (ref 3.4–5.3)
PROT SERPL-MCNC: 6.3 G/DL (ref 6.8–8.8)
RBC # BLD AUTO: 3.92 10E12/L (ref 4.4–5.9)
SODIUM SERPL-SCNC: 137 MMOL/L (ref 133–144)
WBC # BLD AUTO: 4.1 10E9/L (ref 4–11)

## 2019-03-14 PROCEDURE — 80053 COMPREHEN METABOLIC PANEL: CPT | Performed by: EMERGENCY MEDICINE

## 2019-03-14 PROCEDURE — 85610 PROTHROMBIN TIME: CPT | Performed by: EMERGENCY MEDICINE

## 2019-03-14 PROCEDURE — 87340 HEPATITIS B SURFACE AG IA: CPT | Performed by: EMERGENCY MEDICINE

## 2019-03-14 PROCEDURE — 40000556 ZZH STATISTIC PERIPHERAL IV START W US GUIDANCE

## 2019-03-14 PROCEDURE — 83605 ASSAY OF LACTIC ACID: CPT | Performed by: EMERGENCY MEDICINE

## 2019-03-14 PROCEDURE — 83690 ASSAY OF LIPASE: CPT | Performed by: EMERGENCY MEDICINE

## 2019-03-14 PROCEDURE — 99284 EMERGENCY DEPT VISIT MOD MDM: CPT | Performed by: EMERGENCY MEDICINE

## 2019-03-14 PROCEDURE — 82140 ASSAY OF AMMONIA: CPT | Performed by: EMERGENCY MEDICINE

## 2019-03-14 PROCEDURE — 85730 THROMBOPLASTIN TIME PARTIAL: CPT | Performed by: EMERGENCY MEDICINE

## 2019-03-14 PROCEDURE — 99284 EMERGENCY DEPT VISIT MOD MDM: CPT | Mod: Z6 | Performed by: EMERGENCY MEDICINE

## 2019-03-14 PROCEDURE — 85025 COMPLETE CBC W/AUTO DIFF WBC: CPT | Performed by: EMERGENCY MEDICINE

## 2019-03-14 PROCEDURE — 87341 HEP B SURFACE AG NEUTRLZJ IA: CPT | Performed by: EMERGENCY MEDICINE

## 2019-03-14 PROCEDURE — 25000128 H RX IP 250 OP 636: Performed by: EMERGENCY MEDICINE

## 2019-03-14 RX ORDER — IOPAMIDOL 755 MG/ML
88 INJECTION, SOLUTION INTRAVASCULAR ONCE
Status: COMPLETED | OUTPATIENT
Start: 2019-03-14 | End: 2019-03-14

## 2019-03-14 RX ADMIN — IOPAMIDOL 88 ML: 755 INJECTION, SOLUTION INTRAVENOUS at 22:42

## 2019-03-14 ASSESSMENT — ENCOUNTER SYMPTOMS
COLOR CHANGE: 0
CONSTIPATION: 1
ABDOMINAL PAIN: 1
SHORTNESS OF BREATH: 0
FEVER: 0

## 2019-03-14 NOTE — LETTER
March 15, 2019      To Whom It May Concern:      Wilfredo Yoon was seen in our Emergency Department today, 03/15/19.  I expect his condition to improve over the next 5 days.  He may return to work/school when improved.    Sincerely,        Janae Tan MD

## 2019-03-15 VITALS
DIASTOLIC BLOOD PRESSURE: 148 MMHG | RESPIRATION RATE: 18 BRPM | BODY MASS INDEX: 20.08 KG/M2 | SYSTOLIC BLOOD PRESSURE: 178 MMHG | OXYGEN SATURATION: 97 % | HEART RATE: 70 BPM | TEMPERATURE: 97.6 F | WEIGHT: 144 LBS

## 2019-03-15 RX ORDER — SENNA AND DOCUSATE SODIUM 50; 8.6 MG/1; MG/1
1 TABLET, FILM COATED ORAL AT BEDTIME
Qty: 20 TABLET | Refills: 0 | Status: SHIPPED | OUTPATIENT
Start: 2019-03-15 | End: 2019-07-03

## 2019-03-15 NOTE — ED TRIAGE NOTES
Pt ambulatory to triage for c/o 2 months of worsening abd pain, now c/o constipation. Pt unsure of last bowel movement and also ran out of bp medications last month.

## 2019-03-15 NOTE — ED NOTES
Pt left AMA. MD Tan discussed risks of leaving with pt with use of . RN reviewed AMA paperwork with pt via . Pt signed AMA paperwork.

## 2019-03-15 NOTE — ED PROVIDER NOTES
[place] EMERGENCY DEPARTMENT ([facility])  3/14/19   History     Chief Complaint   Patient presents with     Abdominal Pain     The history is provided by the patient. The history is limited by a language barrier. A  was used.     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  The patient reports that his symptoms started 2 months ago and they have gradually worsened since then.  He reports that he has not been passing stool for a while but has been urinating normally.  The patient also reports that he has been having vomiting off and on and does report having some leg swelling.  The patient reports that he has never had symptoms like this in the past.  The patient denies having any fevers, being jaundice, chest pain, or shortness of breath.  Patient reports that he ran out of his hepatitis B medication about 1 month ago and has not taken it since.    I have reviewed the Medications, Allergies, Past Medical and Surgical History, and Social History in the [employer] system.    Past Medical History:   Diagnosis Date     Cryoglobulinemia (H)      Glomerulonephritis      Hepatitis B      Hypertension      Surgical complication        Past Surgical History:   Procedure Laterality Date     C HAND/FINGER SURGERY UNLISTED       ESOPHAGOSCOPY, GASTROSCOPY, DUODENOSCOPY (EGD), COMBINED N/A 10/18/2018    Procedure: EGD;  Surgeon: Eber Ortez MD;  Location:  GI     HAND SURGERY Right        Family History   Problem Relation Age of Onset     Liver Cancer No family hx of      Hepatitis No family hx of        Social History     Tobacco Use     Smoking status: Current Some Day Smoker     Packs/day: 0.25     Years: 40.00     Pack years: 10.00      Types: Cigarettes     Smokeless tobacco: Never Used   Substance Use Topics     Alcohol use: No     Alcohol/week: 0.0 oz       No current facility-administered medications for this encounter.      Current Outpatient Medications   Medication     SENNA-docusate sodium (SENNA S) 8.6-50 MG tablet     amLODIPine (NORVASC) 5 MG tablet     bumetanide (BUMEX) 2 MG tablet     entecavir (BARACLUDE) 1 MG tablet     lisinopril (PRINIVIL/ZESTRIL) 20 MG tablet      No Known Allergies     Review of Systems   Constitutional: Negative for fever.   Respiratory: Negative for shortness of breath.    Cardiovascular: Positive for leg swelling. Negative for chest pain.   Gastrointestinal: Positive for abdominal pain and constipation.   Skin: Negative for color change.   All other systems reviewed and are negative.    Physical Exam   BP: (!) 193/118  Pulse: 70  Heart Rate: 83  Temp: 97.6  F (36.4  C)  Resp: 18  Weight: 65.3 kg (144 lb)  SpO2: 100 %    Physical Exam   Constitutional: He is oriented to person, place, and time. No distress.   HENT:   Head: Normocephalic and atraumatic.   Nose: Nose normal.   Mouth/Throat: Oropharynx is clear and moist.   Eyes: Conjunctivae and EOM are normal. Pupils are equal, round, and reactive to light. No scleral icterus.   Neck: Normal range of motion. Neck supple.   Cardiovascular: Normal rate, regular rhythm and normal heart sounds.   Pulmonary/Chest: Effort normal and breath sounds normal. No stridor. No respiratory distress. He has no wheezes. He has no rales.   Abdominal: Soft. Bowel sounds are normal. He exhibits distension and ascites. He exhibits no mass. There is hepatosplenomegaly. There is generalized tenderness (mild). There is no rigidity, no rebound, no guarding, no CVA tenderness and negative Keen's sign. No hernia.   Musculoskeletal: Normal range of motion. He exhibits no edema or deformity.   Neurological: He is alert and oriented to person, place, and time. No sensory deficit. He  exhibits normal muscle tone.   Skin: Skin is warm and dry. No rash noted. He is not diaphoretic.   Psychiatric: His affect is labile. His speech is rapid and/or pressured. He is agitated and hyperactive.   Nursing note and vitals reviewed.      ED Course   9:45 PM  The patient was seen and examined by Dr. Tan in Room 07.       Procedures             Critical Care time:  none     The Lactic acid level is elevated due to liver disease, at this time there is no sign of severe sepsis or septic shock.       Labs Ordered and Resulted from Time of ED Arrival Up to the Time of Departure from the ED   CBC WITH PLATELETS DIFFERENTIAL - Abnormal; Notable for the following components:       Result Value    RBC Count 3.92 (*)     Hemoglobin 12.5 (*)     Hematocrit 38.8 (*)     All other components within normal limits   COMPREHENSIVE METABOLIC PANEL - Abnormal; Notable for the following components:    Calcium 7.8 (*)     Albumin 1.6 (*)     Protein Total 6.3 (*)     All other components within normal limits   LIPASE - Abnormal; Notable for the following components:    Lipase 66 (*)     All other components within normal limits   INR - Abnormal; Notable for the following components:    INR 1.16 (*)     All other components within normal limits   LACTIC ACID WHOLE BLOOD   AMMONIA   PARTIAL THROMBOPLASTIN TIME   HEPATITIS B SURFACE ANTIGEN   PERIPHERAL IV CATHETER            Assessments & Plan (with Medical Decision Making)   Wilfredo Yoon is a 59 year old male with history of liver cirrhosis secondary to hepatitis B, hypertension, membranoproliferative glomerulonephritis, and nephrotic syndrome who presents with 2 months of worsening abdominal pain, constipation, as well as need for refill on blood pressure medications.     This part of the document was transcribed by Dilma Gonzalez Medical Scribe.      Ddx: acute decompensated Cirrhosis, SBP, med noncompliance, renal failure, constipation, bowel obstruction    LFTs  and creat wnl. Ammonia wnl. Patient had pain in IV when he was brought back to CT scan so he was sent back. IV replaced and tried again with same issue. Sent back again. Patient now refusing enema or further care until he is allowed to eat. Becoming increasingly agitated and yelling. Wanting to leave AMA. Apologized for delay in care and complications with IV access. Convinced patient to allow us to do enema and reassess his abdomen after bowel movement.    Disussed with GI fellow who will staff with patient's hepatologist. They will have someone call him to ensure clinic follow up. Patient had large volume stool output after enema. Still with abdominal distention but felt improved. I had a long discussion with patient and in-person [facility]  about the presence of large volume ascitic fluid in patient's abdomen. Patient reluctant to believe that his problem was related to liver disease or fluid in his abdomen despite POCUS demonstrating free fluid. He did not consent to paracentesis or admission and was not willing to stay for CT scan of his abdomen. He signed out AMA with prescription senna-docusate for home. Patient agreed to follow up with GI clinic. Unwilling to stay overnight because he had to be at work at 5 am in the morning. Offered work release note, but patient did not believe he would be excused.     I have reviewed the nursing notes.    I have reviewed the findings, diagnosis, plan and need for follow up with the patient.       Medication List      Started    SENNA-docusate sodium 8.6-50 MG tablet  Commonly known as:  SENNA S  1 tablet, Oral, AT BEDTIME        ASK your doctor about these medications    ranitidine 150 MG tablet  Commonly known as:  ZANTAC  75 mg, Oral, 2 TIMES DAILY  Ask about: Should I take this medication?          Final diagnoses:   Abdominal pain, generalized   Other ascites   Constipation, unspecified constipation type   I, Tone Yoon, am serving as a trained  medical scribe to document services personally performed by Janae Tan MD, based on the provider's statements to me.   I, Janae Tan MD, was physically present and have reviewed and verified the accuracy of this note documented by Tone Yoon.     3/14/2019   [facility], [place], EMERGENCY DEPARTMENT     Janae Tan MD  03/20/19 1029

## 2019-03-15 NOTE — DISCHARGE INSTRUCTIONS
Please make an appointment to follow up with GI Clinic (phone: [phone]) as soon as possible even if entirely better.    Take your prescribed medications. It is very important that you follow up with your liver doctor Dr. Adame. They will call you to set up an appointment.    Take senna-docusate for constipation.     Return to the Emergency Department for worsening pain, abdominal distention, fevers, or blood in your stool.

## 2019-03-20 LAB — HBV SURFACE AG SERPL QL IA: REACTIVE

## 2019-05-10 ENCOUNTER — CARE COORDINATION (OUTPATIENT)
Dept: GASTROENTEROLOGY | Facility: CLINIC | Age: 59
End: 2019-05-10

## 2019-05-10 DIAGNOSIS — B18.1 CHRONIC VIRAL HEPATITIS B WITHOUT DELTA AGENT AND WITHOUT COMA (H): ICD-10-CM

## 2019-05-10 DIAGNOSIS — K74.60 CIRRHOSIS OF LIVER WITHOUT ASCITES, UNSPECIFIED HEPATIC CIRRHOSIS TYPE (H): ICD-10-CM

## 2019-05-10 RX ORDER — ENTECAVIR 1 MG/1
1 TABLET, FILM COATED ORAL DAILY
Qty: 30 TABLET | Refills: 5 | Status: SHIPPED | OUTPATIENT
Start: 2019-05-10 | End: 2019-05-15

## 2019-05-10 NOTE — PROGRESS NOTES
"Pt came to clinic requesting refill of \"liver medication\" and to be scheduled with a \"[facility] doctor.\" Pt last seen in clinic in 10/2018 by Dr. Adame. Pt was a no show to appointment on 4/25 with Mayra Inman PA-C. Pt unable to name which medication needed to be refilled. With assistance of a [facility] , writer reviewed medication list with pt and pt confirmed he was out of entecavir. 6 month refill sent to pt's preferred pharmacy and writer discussed with pt that scheduling would be in touch to arrange an appointment with Dr. Powell. Reiterated with pt that it is pertinent that pt makes this appointment and that medications will not be further refilled without pt seeing a provider. Pt verbalized understanding of all the information discussed.  "

## 2019-05-13 DIAGNOSIS — B18.1 CHRONIC VIRAL HEPATITIS B WITHOUT DELTA AGENT AND WITHOUT COMA (H): Primary | ICD-10-CM

## 2019-05-14 ENCOUNTER — OFFICE VISIT (OUTPATIENT)
Dept: GASTROENTEROLOGY | Facility: CLINIC | Age: 59
End: 2019-05-14
Attending: INTERNAL MEDICINE
Payer: MEDICAID

## 2019-05-14 VITALS
OXYGEN SATURATION: 98 % | BODY MASS INDEX: 19.02 KG/M2 | DIASTOLIC BLOOD PRESSURE: 105 MMHG | WEIGHT: 128.4 LBS | HEIGHT: 69 IN | TEMPERATURE: 97.5 F | SYSTOLIC BLOOD PRESSURE: 156 MMHG | HEART RATE: 73 BPM

## 2019-05-14 DIAGNOSIS — N05.5 MEMBRANOPROLIFERATIVE GLOMERULONEPHRITIS: ICD-10-CM

## 2019-05-14 DIAGNOSIS — B18.1 CHRONIC VIRAL HEPATITIS B WITHOUT DELTA AGENT AND WITHOUT COMA (H): Primary | ICD-10-CM

## 2019-05-14 DIAGNOSIS — B18.1 CHRONIC VIRAL HEPATITIS B WITHOUT DELTA AGENT AND WITHOUT COMA (H): ICD-10-CM

## 2019-05-14 LAB
ALBUMIN SERPL-MCNC: 1.8 G/DL (ref 3.4–5)
ALP SERPL-CCNC: 166 U/L (ref 40–150)
ALT SERPL W P-5'-P-CCNC: 30 U/L (ref 0–70)
ANION GAP SERPL CALCULATED.3IONS-SCNC: 6 MMOL/L (ref 3–14)
AST SERPL W P-5'-P-CCNC: 56 U/L (ref 0–45)
BILIRUB DIRECT SERPL-MCNC: 0.1 MG/DL (ref 0–0.2)
BILIRUB SERPL-MCNC: 0.3 MG/DL (ref 0.2–1.3)
BUN SERPL-MCNC: 17 MG/DL (ref 7–30)
CALCIUM SERPL-MCNC: 8.3 MG/DL (ref 8.5–10.1)
CHLORIDE SERPL-SCNC: 104 MMOL/L (ref 94–109)
CO2 SERPL-SCNC: 28 MMOL/L (ref 20–32)
CREAT SERPL-MCNC: 1 MG/DL (ref 0.66–1.25)
ERYTHROCYTE [DISTWIDTH] IN BLOOD BY AUTOMATED COUNT: 13.5 % (ref 10–15)
GFR SERPL CREATININE-BSD FRML MDRD: 83 ML/MIN/{1.73_M2}
GLUCOSE SERPL-MCNC: 92 MG/DL (ref 70–99)
HCT VFR BLD AUTO: 36.8 % (ref 40–53)
HGB BLD-MCNC: 11.9 G/DL (ref 13.3–17.7)
INR PPP: 1.19 (ref 0.86–1.14)
MCH RBC QN AUTO: 31.4 PG (ref 26.5–33)
MCHC RBC AUTO-ENTMCNC: 32.3 G/DL (ref 31.5–36.5)
MCV RBC AUTO: 97 FL (ref 78–100)
PLATELET # BLD AUTO: 198 10E9/L (ref 150–450)
POTASSIUM SERPL-SCNC: 3.5 MMOL/L (ref 3.4–5.3)
PROT SERPL-MCNC: 6 G/DL (ref 6.8–8.8)
RBC # BLD AUTO: 3.79 10E12/L (ref 4.4–5.9)
SODIUM SERPL-SCNC: 138 MMOL/L (ref 133–144)
WBC # BLD AUTO: 4.9 10E9/L (ref 4–11)

## 2019-05-14 PROCEDURE — T1013 SIGN LANG/ORAL INTERPRETER: HCPCS | Mod: U3,ZF | Performed by: INTERNAL MEDICINE

## 2019-05-14 PROCEDURE — 80076 HEPATIC FUNCTION PANEL: CPT | Performed by: INTERNAL MEDICINE

## 2019-05-14 PROCEDURE — 87517 HEPATITIS B DNA QUANT: CPT | Performed by: INTERNAL MEDICINE

## 2019-05-14 PROCEDURE — 85610 PROTHROMBIN TIME: CPT | Performed by: INTERNAL MEDICINE

## 2019-05-14 PROCEDURE — 80048 BASIC METABOLIC PNL TOTAL CA: CPT | Performed by: INTERNAL MEDICINE

## 2019-05-14 PROCEDURE — 36415 COLL VENOUS BLD VENIPUNCTURE: CPT | Performed by: INTERNAL MEDICINE

## 2019-05-14 PROCEDURE — G0463 HOSPITAL OUTPT CLINIC VISIT: HCPCS | Mod: ZF

## 2019-05-14 PROCEDURE — 85027 COMPLETE CBC AUTOMATED: CPT | Performed by: INTERNAL MEDICINE

## 2019-05-14 ASSESSMENT — MIFFLIN-ST. JEOR: SCORE: 1392.8

## 2019-05-14 ASSESSMENT — PAIN SCALES - GENERAL: PAINLEVEL: NO PAIN (0)

## 2019-05-14 NOTE — NURSING NOTE
"Chief Complaint   Patient presents with     RECHECK     Chronic hepatitis B without hepatic coma      BP (!) 156/105   Pulse 73   Temp 97.5  F (36.4  C) (Oral)   Ht 1.753 m (5' 9\")   Wt 58.2 kg (128 lb 6.4 oz)   SpO2 98%   BMI 18.96 kg/m    Mariaelena Palacio, GARRISON    "

## 2019-05-14 NOTE — PROGRESS NOTES
[facility]    Hepatology follow-up    Chief complaint and Reason for the visit: HBV and related Cirrhosis.    Subjective: Mr. Yoon is a 59-year-old [place] immigrant who was previously seen by Dr. Adame.  His last visit here was in October, 2018.  At that time, he was coming for followup and according to Dr. Adame the patient had insurance issues and was not compliant with his hepatitis B medications.  He also had an admission in 07/2018, for  Hypertensive emergency attributed to non compliance.  Mr. Yoon had an  ultrasound done prior to seeing Dr. Adame and it  did not show any  liver mass.  Since then, he had been to the [facility] and at that time, his main issue was  ascites which according to him became more prominent in the last 1 month.  He did have a week ago a therapeutic  paracentesis and that was the only one he ever had.  He also lost a lot of muscle mass and is more cachectic.  Please note that the patient is on diuretics  and his creatinine is normal.  Initially when he presented, according to Jp's note, he had issues with nephrotic syndrome and there was a membranoproliferative glomerulonephritis picture.  It is normal and he does not have any significant fluid retention in his lower extremities.  At [place]  he had a CT scan and that CT scan was done on 05/08/2019.  It showed also questionable liver masses which were thought to be indeterminate and further imaging was also asked for regarding that and he was advised to start his medications.      Now he has not taken in over a month his medication.  His main issue is the abdominal distention.  He has also 1 paracentesis.  He is not listed.  He claims he is compliant with his diuretics.  He lost a significant amount of weight and his last EGD, although negative on the CT he had portal hypertension.  Today, he also denies any GI bleed.  Denies any confusion or memory issues, and he has regular bowel moviments.     Medical hx  "Surgical hx   Past Medical History:   Diagnosis Date     Cryoglobulinemia (H)      Glomerulonephritis      Hepatitis B      Hypertension      Surgical complication       Past Surgical History:   Procedure Laterality Date     C HAND/FINGER SURGERY UNLISTED       ESOPHAGOSCOPY, GASTROSCOPY, DUODENOSCOPY (EGD), COMBINED N/A 10/18/2018    Procedure: EGD;  Surgeon: Eber Ortez MD;  Location:  GI     HAND SURGERY Right           Medications  Prior to Admission medications    Medication Sig Start Date End Date Taking? Authorizing Provider   entecavir (BARACLUDE) 1 MG tablet Take 1 tablet (1 mg) by mouth daily 5/10/19  Yes Eber Ortez MD   SENNA-docusate sodium (SENNA S) 8.6-50 MG tablet Take 1 tablet by mouth At Bedtime 3/15/19  Yes Janae Tan MD   amLODIPine (NORVASC) 5 MG tablet Take 1 tablet (5 mg) by mouth daily 2/2/19 3/4/19  Bernardo Marks MD   bumetanide (BUMEX) 2 MG tablet Take 1 tablet (2 mg) by mouth daily 2/2/19 3/4/19  Bernardo Marks MD   lisinopril (PRINIVIL/ZESTRIL) 20 MG tablet Take 1 tablet (20 mg) by mouth daily 2/2/19 3/4/19  Bernardo Marks MD   ranitidine (ZANTAC) 150 MG tablet Take 0.5 tablets (75 mg) by mouth 2 times daily 2/2/19 3/4/19  Bernardo Marks MD       Allergies  No Known Allergies    Review of systems  A 10-point review of systems was negative    Examination  BP (!) 156/105   Pulse 73   Temp 97.5  F (36.4  C) (Oral)   Ht 1.753 m (5' 9\")   Wt 58.2 kg (128 lb 6.4 oz)   SpO2 98%   BMI 18.96 kg/m    Body mass index is 18.96 kg/m .    Gen- well, NAD, A+Ox3, normal color  Lym- no palpable LAD  CVS- RRR  RS- CTA  Abd- Distended. Positive shifting dullness.  Extr- hands normal, no AIDEN  Skin- no rash or jaundice  Neuro- no asterixis  Psych- normal mood    Laboratory  Lab Results   Component Value Date     05/14/2019    POTASSIUM 3.5 05/14/2019    CHLORIDE 104 05/14/2019    CO2 28 05/14/2019    BUN 17 05/14/2019    CR 1.00 " 05/14/2019       Lab Results   Component Value Date    BILITOTAL 0.3 05/14/2019    ALT 30 05/14/2019    AST 56 05/14/2019    ALKPHOS 166 05/14/2019       Lab Results   Component Value Date    ALBUMIN 1.8 05/14/2019    PROTTOTAL 6.0 05/14/2019        Lab Results   Component Value Date    WBC 4.9 05/14/2019    HGB 11.9 05/14/2019    MCV 97 05/14/2019     05/14/2019       Lab Results   Component Value Date    INR 1.19 05/14/2019       Radiology    Assessment and Plan: Mr. Yoon is a 59-year-old [place] immigrant who carries a diagnosis of hepatitis B related cirrhosis.  He was put on entacavir which he has not been compliant with for insurance issues.  He has not taken any now for the last 1-1/2 months.  He had decompensated, has ascites and intra-abdominal varices.  His last upper endoscopy done by Dr. Adame last year did not show any esophageal varices.  His current MELD score on his labs of today is day 8.  Our plan will be to schedule him with Dr. Adame to see him within the coming 8 weeks.  At that time, he will also have an MRI with contrast and we will see if these indeterminate lesions were dysplastic nodes or they are HCC.  Please note that his alpha fetoproteins done at [place] was 5.4.  Besides that, Dr. Adame will then decide if he needs to get an upper endoscopy.  Otherwise, we did have a long conversation with the patient about the importance of compliance and the fact that he will progress and eventually succumb to the hepatitis B related cirrhosis or other complications if he does not comply with his medications and he understood that.      This was a 40-minute visit, of which more than 50% was spent in explaining to the patient what our plan of care was and we answered all his questions.  We did discuss with him the importance of compliance.      cc:  Eber Rosas MD      Primary care physician.       Della Powell MD  Hepatology  [facility]

## 2019-05-14 NOTE — LETTER
5/14/2019      RE: Wilfredo Yoon  [address]  [address]  [facility] [address]       [facility]    Hepatology follow-up    Chief complaint and Reason for the visit: HBV and related Cirrhosis.    Subjective: Mr. Yoon is a 59-year-old [place] immigrant who was previously seen by Dr. Adame.  His last visit here was in October, 2018.  At that time, he was coming for followup and according to Dr. Adame the patient had insurance issues and was not compliant with his hepatitis B medications.  He also had an admission in 07/2018, for  Hypertensive emergency attributed to non compliance.  Mr. Yoon had an  ultrasound done prior to seeing Dr. Adame and it  did not show any  liver mass.  Since then, he had been to the [facility] and at that time, his main issue was  ascites which according to him became more prominent in the last 1 month.  He did have a week ago a therapeutic  paracentesis and that was the only one he ever had.  He also lost a lot of muscle mass and is more cachectic.  Please note that the patient is on diuretics  and his creatinine is normal.  Initially when he presented, according to Jp's note, he had issues with nephrotic syndrome and there was a membranoproliferative glomerulonephritis picture.  It is normal and he does not have any significant fluid retention in his lower extremities.  At [place]  he had a CT scan and that CT scan was done on 05/08/2019.  It showed also questionable liver masses which were thought to be indeterminate and further imaging was also asked for regarding that and he was advised to start his medications.      Now he has not taken in over a month his medication.  His main issue is the abdominal distention.  He has also 1 paracentesis.  He is not listed.  He claims he is compliant with his diuretics.  He lost a significant amount of weight and his last EGD, although negative on the CT he had portal hypertension.  Today, he also denies  "any GI bleed.  Denies any confusion or memory issues, and he has regular bowel moviments.     Medical hx Surgical hx   Past Medical History:   Diagnosis Date     Cryoglobulinemia (H)      Glomerulonephritis      Hepatitis B      Hypertension      Surgical complication       Past Surgical History:   Procedure Laterality Date     C HAND/FINGER SURGERY UNLISTED       ESOPHAGOSCOPY, GASTROSCOPY, DUODENOSCOPY (EGD), COMBINED N/A 10/18/2018    Procedure: EGD;  Surgeon: Eber Ortez MD;  Location:  GI     HAND SURGERY Right           Medications  Prior to Admission medications    Medication Sig Start Date End Date Taking? Authorizing Provider   entecavir (BARACLUDE) 1 MG tablet Take 1 tablet (1 mg) by mouth daily 5/10/19  Yes Eber Ortez MD   SENNA-docusate sodium (SENNA S) 8.6-50 MG tablet Take 1 tablet by mouth At Bedtime 3/15/19  Yes Janae Tan MD   amLODIPine (NORVASC) 5 MG tablet Take 1 tablet (5 mg) by mouth daily 2/2/19 3/4/19  Bernardo Marks MD   bumetanide (BUMEX) 2 MG tablet Take 1 tablet (2 mg) by mouth daily 2/2/19 3/4/19  Bernardo Marks MD   lisinopril (PRINIVIL/ZESTRIL) 20 MG tablet Take 1 tablet (20 mg) by mouth daily 2/2/19 3/4/19  Bernardo Marks MD   ranitidine (ZANTAC) 150 MG tablet Take 0.5 tablets (75 mg) by mouth 2 times daily 2/2/19 3/4/19  Bernardo Marks MD       Allergies  No Known Allergies    Review of systems  A 10-point review of systems was negative    Examination  BP (!) 156/105   Pulse 73   Temp 97.5  F (36.4  C) (Oral)   Ht 1.753 m (5' 9\")   Wt 58.2 kg (128 lb 6.4 oz)   SpO2 98%   BMI 18.96 kg/m     Body mass index is 18.96 kg/m .    Gen- well, NAD, A+Ox3, normal color  Lym- no palpable LAD  CVS- RRR  RS- CTA  Abd- Distended. Positive shifting dullness.  Extr- hands normal, no AIDEN  Skin- no rash or jaundice  Neuro- no asterixis  Psych- normal mood    Laboratory  Lab Results   Component Value Date     05/14/2019    " POTASSIUM 3.5 05/14/2019    CHLORIDE 104 05/14/2019    CO2 28 05/14/2019    BUN 17 05/14/2019    CR 1.00 05/14/2019       Lab Results   Component Value Date    BILITOTAL 0.3 05/14/2019    ALT 30 05/14/2019    AST 56 05/14/2019    ALKPHOS 166 05/14/2019       Lab Results   Component Value Date    ALBUMIN 1.8 05/14/2019    PROTTOTAL 6.0 05/14/2019        Lab Results   Component Value Date    WBC 4.9 05/14/2019    HGB 11.9 05/14/2019    MCV 97 05/14/2019     05/14/2019       Lab Results   Component Value Date    INR 1.19 05/14/2019       Radiology    Assessment and Plan: Mr. Yoon is a 59-year-old [place] immigrant who carries a diagnosis of hepatitis B related cirrhosis.  He was put on entacavir which he has not been compliant with for insurance issues.  He has not taken any now for the last 1-1/2 months.  He had decompensated, has ascites and intra-abdominal varices.  His last upper endoscopy done by Dr. Adame last year did not show any esophageal varices.  His current MELD score on his labs of today is day 8.  Our plan will be to schedule him with Dr. Adame to see him within the coming 8 weeks.  At that time, he will also have an MRI with contrast and we will see if these indeterminate lesions were dysplastic nodes or they are HCC.  Please note that his alpha fetoproteins done at [place] was 5.4.  Besides that, Dr. Adame will then decide if he needs to get an upper endoscopy.  Otherwise, we did have a long conversation with the patient about the importance of compliance and the fact that he will progress and eventually succumb to the hepatitis B related cirrhosis or other complications if he does not comply with his medications and he understood that.      This was a 40-minute visit, of which more than 50% was spent in explaining to the patient what our plan of care was and we answered all his questions.  We did discuss with him the importance of compliance.      cc:  Eber Rosas MD      Primary care  physician.       Della Powell MD  Hepatology  [facility]    Della Powell MD

## 2019-05-15 DIAGNOSIS — B18.1 CHRONIC VIRAL HEPATITIS B WITHOUT DELTA AGENT AND WITHOUT COMA (H): ICD-10-CM

## 2019-05-15 DIAGNOSIS — K74.60 CIRRHOSIS OF LIVER WITHOUT ASCITES, UNSPECIFIED HEPATIC CIRRHOSIS TYPE (H): ICD-10-CM

## 2019-05-15 RX ORDER — ENTECAVIR 1 MG/1
1 TABLET, FILM COATED ORAL DAILY
Qty: 30 TABLET | Refills: 5 | Status: ON HOLD | OUTPATIENT
Start: 2019-05-15 | End: 2019-10-23

## 2019-05-15 NOTE — PROGRESS NOTES
Patient stopped in requesting his prescription be sent to [address] [place] [address] Pharmacy. Updated Rx, called to pharmacy to get a quote on time (30 minutes - already started filling the Rx), and gave the patient a card with the information. Patient prefers to wait for his Rx today.    No further actions needed at this time.

## 2019-05-17 ENCOUNTER — ANCILLARY PROCEDURE (OUTPATIENT)
Dept: MRI IMAGING | Facility: CLINIC | Age: 59
End: 2019-05-17
Attending: INTERNAL MEDICINE
Payer: MEDICAID

## 2019-05-17 ENCOUNTER — HOSPITAL ENCOUNTER (EMERGENCY)
Facility: CLINIC | Age: 59
Discharge: HOME | End: 2019-05-17
Attending: EMERGENCY MEDICINE | Admitting: EMERGENCY MEDICINE
Payer: MEDICAID

## 2019-05-17 ENCOUNTER — APPOINTMENT (OUTPATIENT)
Dept: CT IMAGING | Facility: CLINIC | Age: 59
End: 2019-05-17
Attending: EMERGENCY MEDICINE
Payer: MEDICAID

## 2019-05-17 ENCOUNTER — OFFICE VISIT (OUTPATIENT)
Dept: INFUSION THERAPY | Facility: CLINIC | Age: 59
End: 2019-05-17
Attending: INTERNAL MEDICINE
Payer: MEDICAID

## 2019-05-17 ENCOUNTER — ANCILLARY PROCEDURE (OUTPATIENT)
Dept: ULTRASOUND IMAGING | Facility: CLINIC | Age: 59
End: 2019-05-17
Attending: INTERNAL MEDICINE
Payer: MEDICAID

## 2019-05-17 VITALS
SYSTOLIC BLOOD PRESSURE: 158 MMHG | OXYGEN SATURATION: 98 % | TEMPERATURE: 97.6 F | RESPIRATION RATE: 16 BRPM | HEART RATE: 87 BPM | BODY MASS INDEX: 18.32 KG/M2 | WEIGHT: 128 LBS | HEIGHT: 70 IN | DIASTOLIC BLOOD PRESSURE: 106 MMHG

## 2019-05-17 VITALS
DIASTOLIC BLOOD PRESSURE: 89 MMHG | RESPIRATION RATE: 18 BRPM | SYSTOLIC BLOOD PRESSURE: 136 MMHG | TEMPERATURE: 97.4 F | HEART RATE: 90 BPM | BODY MASS INDEX: 18.83 KG/M2 | OXYGEN SATURATION: 99 % | WEIGHT: 127.5 LBS

## 2019-05-17 DIAGNOSIS — R18.8 ASCITES: ICD-10-CM

## 2019-05-17 DIAGNOSIS — N05.5 MEMBRANOPROLIFERATIVE GLOMERULONEPHRITIS: ICD-10-CM

## 2019-05-17 DIAGNOSIS — R18.8 OTHER ASCITES: Primary | ICD-10-CM

## 2019-05-17 DIAGNOSIS — B18.1 CHRONIC VIRAL HEPATITIS B WITHOUT DELTA AGENT AND WITHOUT COMA (H): ICD-10-CM

## 2019-05-17 DIAGNOSIS — R10.13 ABDOMINAL PAIN, EPIGASTRIC: ICD-10-CM

## 2019-05-17 LAB
ALBUMIN SERPL-MCNC: 2.7 G/DL (ref 3.4–5)
ALP SERPL-CCNC: 123 U/L (ref 40–150)
ALT SERPL W P-5'-P-CCNC: 26 U/L (ref 0–70)
ANION GAP SERPL CALCULATED.3IONS-SCNC: 6 MMOL/L (ref 3–14)
AST SERPL W P-5'-P-CCNC: ABNORMAL U/L (ref 0–45)
BASOPHILS # BLD AUTO: 0 10E9/L (ref 0–0.2)
BASOPHILS NFR BLD AUTO: 0.4 %
BILIRUB SERPL-MCNC: 0.9 MG/DL (ref 0.2–1.3)
BUN SERPL-MCNC: 23 MG/DL (ref 7–30)
CALCIUM SERPL-MCNC: 8.2 MG/DL (ref 8.5–10.1)
CHLORIDE SERPL-SCNC: 105 MMOL/L (ref 94–109)
CO2 SERPL-SCNC: 28 MMOL/L (ref 20–32)
CREAT SERPL-MCNC: 0.93 MG/DL (ref 0.66–1.25)
DIFFERENTIAL METHOD BLD: ABNORMAL
EOSINOPHIL # BLD AUTO: 0 10E9/L (ref 0–0.7)
EOSINOPHIL NFR BLD AUTO: 0.4 %
ERYTHROCYTE [DISTWIDTH] IN BLOOD BY AUTOMATED COUNT: 13.8 % (ref 10–15)
GFR SERPL CREATININE-BSD FRML MDRD: >90 ML/MIN/{1.73_M2}
GLUCOSE SERPL-MCNC: 86 MG/DL (ref 70–99)
HCT VFR BLD AUTO: 41.9 % (ref 40–53)
HGB BLD-MCNC: 13.6 G/DL (ref 13.3–17.7)
IMM GRANULOCYTES # BLD: 0 10E9/L (ref 0–0.4)
IMM GRANULOCYTES NFR BLD: 0.2 %
LYMPHOCYTES # BLD AUTO: 0.4 10E9/L (ref 0.8–5.3)
LYMPHOCYTES NFR BLD AUTO: 9.5 %
MCH RBC QN AUTO: 30.8 PG (ref 26.5–33)
MCHC RBC AUTO-ENTMCNC: 32.5 G/DL (ref 31.5–36.5)
MCV RBC AUTO: 95 FL (ref 78–100)
MONOCYTES # BLD AUTO: 0.2 10E9/L (ref 0–1.3)
MONOCYTES NFR BLD AUTO: 4.1 %
NEUTROPHILS # BLD AUTO: 4 10E9/L (ref 1.6–8.3)
NEUTROPHILS NFR BLD AUTO: 85.4 %
NRBC # BLD AUTO: 0 10*3/UL
NRBC BLD AUTO-RTO: 0 /100
PLATELET # BLD AUTO: 234 10E9/L (ref 150–450)
POTASSIUM SERPL-SCNC: 3.7 MMOL/L (ref 3.4–5.3)
PROT SERPL-MCNC: 6.4 G/DL (ref 6.8–8.8)
RBC # BLD AUTO: 4.41 10E12/L (ref 4.4–5.9)
SODIUM SERPL-SCNC: 139 MMOL/L (ref 133–144)
WBC # BLD AUTO: 4.6 10E9/L (ref 4–11)

## 2019-05-17 PROCEDURE — 82947 ASSAY GLUCOSE BLOOD QUANT: CPT

## 2019-05-17 PROCEDURE — 82374 ASSAY BLOOD CARBON DIOXIDE: CPT

## 2019-05-17 PROCEDURE — 27210190 US PARACENTESIS

## 2019-05-17 PROCEDURE — 96365 THER/PROPH/DIAG IV INF INIT: CPT

## 2019-05-17 PROCEDURE — 84520 ASSAY OF UREA NITROGEN: CPT

## 2019-05-17 PROCEDURE — 99284 EMERGENCY DEPT VISIT MOD MDM: CPT | Mod: Z6 | Performed by: EMERGENCY MEDICINE

## 2019-05-17 PROCEDURE — 82040 ASSAY OF SERUM ALBUMIN: CPT

## 2019-05-17 PROCEDURE — P9047 ALBUMIN (HUMAN), 25%, 50ML: HCPCS | Mod: ZF | Performed by: INTERNAL MEDICINE

## 2019-05-17 PROCEDURE — 85025 COMPLETE CBC W/AUTO DIFF WBC: CPT | Performed by: EMERGENCY MEDICINE

## 2019-05-17 PROCEDURE — 96374 THER/PROPH/DIAG INJ IV PUSH: CPT | Mod: 59

## 2019-05-17 PROCEDURE — 25000128 H RX IP 250 OP 636: Mod: ZF | Performed by: INTERNAL MEDICINE

## 2019-05-17 PROCEDURE — 82435 ASSAY OF BLOOD CHLORIDE: CPT

## 2019-05-17 PROCEDURE — 74177 CT ABD & PELVIS W/CONTRAST: CPT

## 2019-05-17 PROCEDURE — 25000128 H RX IP 250 OP 636: Performed by: EMERGENCY MEDICINE

## 2019-05-17 PROCEDURE — 84460 ALANINE AMINO (ALT) (SGPT): CPT

## 2019-05-17 PROCEDURE — 82310 ASSAY OF CALCIUM: CPT

## 2019-05-17 PROCEDURE — 82247 BILIRUBIN TOTAL: CPT

## 2019-05-17 PROCEDURE — 84132 ASSAY OF SERUM POTASSIUM: CPT

## 2019-05-17 PROCEDURE — 84295 ASSAY OF SERUM SODIUM: CPT

## 2019-05-17 PROCEDURE — 84155 ASSAY OF PROTEIN SERUM: CPT

## 2019-05-17 PROCEDURE — 82565 ASSAY OF CREATININE: CPT

## 2019-05-17 PROCEDURE — 25000125 ZZHC RX 250: Mod: ZF | Performed by: INTERNAL MEDICINE

## 2019-05-17 PROCEDURE — 99284 EMERGENCY DEPT VISIT MOD MDM: CPT | Mod: 25 | Performed by: EMERGENCY MEDICINE

## 2019-05-17 PROCEDURE — 84075 ASSAY ALKALINE PHOSPHATASE: CPT

## 2019-05-17 RX ORDER — IOPAMIDOL 755 MG/ML
100 INJECTION, SOLUTION INTRAVASCULAR ONCE
Status: COMPLETED | OUTPATIENT
Start: 2019-05-17 | End: 2019-05-17

## 2019-05-17 RX ORDER — ALBUMIN (HUMAN) 12.5 G/50ML
12.5 SOLUTION INTRAVENOUS 4 TIMES DAILY PRN
Status: DISCONTINUED | OUTPATIENT
Start: 2019-05-17 | End: 2019-05-17 | Stop reason: HOSPADM

## 2019-05-17 RX ORDER — ALBUMIN (HUMAN) 12.5 G/50ML
12.5 SOLUTION INTRAVENOUS 4 TIMES DAILY PRN
Status: CANCELLED
Start: 2019-05-17

## 2019-05-17 RX ORDER — HYDRALAZINE HYDROCHLORIDE 20 MG/ML
20 INJECTION INTRAMUSCULAR; INTRAVENOUS ONCE
Status: COMPLETED | OUTPATIENT
Start: 2019-05-17 | End: 2019-05-17

## 2019-05-17 RX ADMIN — ALBUMIN HUMAN 12.5 G: 0.25 SOLUTION INTRAVENOUS at 11:12

## 2019-05-17 RX ADMIN — HYDRALAZINE HYDROCHLORIDE 20 MG: 20 INJECTION INTRAMUSCULAR; INTRAVENOUS at 11:04

## 2019-05-17 RX ADMIN — IOPAMIDOL 100 ML: 755 INJECTION, SOLUTION INTRAVENOUS at 16:23

## 2019-05-17 RX ADMIN — LIDOCAINE HYDROCHLORIDE 10 ML: 10 INJECTION, SOLUTION EPIDURAL; INFILTRATION; INTRACAUDAL; PERINEURAL at 10:30

## 2019-05-17 RX ADMIN — ALBUMIN HUMAN 12.5 G: 0.25 SOLUTION INTRAVENOUS at 11:46

## 2019-05-17 RX ADMIN — ALBUMIN HUMAN 12.5 G: 0.25 SOLUTION INTRAVENOUS at 11:18

## 2019-05-17 RX ADMIN — ALBUMIN HUMAN 12.5 G: 0.25 SOLUTION INTRAVENOUS at 11:37

## 2019-05-17 ASSESSMENT — ENCOUNTER SYMPTOMS
VOMITING: 1
ABDOMINAL PAIN: 1
WEAKNESS: 1
FATIGUE: 1

## 2019-05-17 ASSESSMENT — MIFFLIN-ST. JEOR: SCORE: 1406.85

## 2019-05-17 NOTE — ED PROVIDER NOTES
History     Chief Complaint   Patient presents with     Abdominal Pain     The history is provided by the patient and medical records. The history is limited by a language barrier. A  was used (iPad - [employer]).     Wilfredo Yoon is a 59 year old male with a history of chronic hepatitis B, essential HTN, and other ascites, who presents to the Emergency Department for evaluation of abdominal pain, emesis, weakness, and fatigue. The patient reports he completed a paracentesis earlier today, where the patient reports 7,800 ml of fluid was removed.    I have reviewed the Medications, Allergies, Past Medical and Surgical History, and Social History in the [employer] system.    Past Medical History:   Diagnosis Date     Cryoglobulinemia (H)      Glomerulonephritis      Hepatitis B      Hypertension      Surgical complication        Past Surgical History:   Procedure Laterality Date     C HAND/FINGER SURGERY UNLISTED       ESOPHAGOSCOPY, GASTROSCOPY, DUODENOSCOPY (EGD), COMBINED N/A 10/18/2018    Procedure: EGD;  Surgeon: Eber Ortez MD;  Location:  GI     HAND SURGERY Right        Family History   Problem Relation Age of Onset     Liver Cancer No family hx of      Hepatitis No family hx of        Social History     Tobacco Use     Smoking status: Current Some Day Smoker     Packs/day: 0.25     Years: 40.00     Pack years: 10.00     Types: Cigarettes     Smokeless tobacco: Never Used   Substance Use Topics     Alcohol use: No     Alcohol/week: 0.0 oz       No current facility-administered medications for this encounter.      Current Outpatient Medications   Medication     amLODIPine (NORVASC) 5 MG tablet     bumetanide (BUMEX) 2 MG tablet     entecavir (BARACLUDE) 1 MG tablet     lisinopril (PRINIVIL/ZESTRIL) 20 MG tablet     SENNA-docusate sodium (SENNA S) 8.6-50 MG tablet      No Known Allergies    Review of Systems   Constitutional: Positive for fatigue.   Gastrointestinal:  "Positive for abdominal pain and vomiting.   Neurological: Positive for weakness.   All other systems reviewed and are negative.      Physical Exam   BP: 141/85  Pulse: 92  Temp: 97.6  F (36.4  C)  Resp: 16  Height: 177.8 cm (5' 10\")  Weight: 58.1 kg (128 lb)  SpO2: 100 %      Physical Exam   Constitutional: He is oriented to person, place, and time. Vital signs are normal. He appears well-developed and well-nourished.  Non-toxic appearance. He does not appear ill. No distress.   HENT:   Head: Normocephalic and atraumatic.   Mouth/Throat: Oropharynx is clear and moist. No oropharyngeal exudate.   Eyes: Pupils are equal, round, and reactive to light. Conjunctivae and EOM are normal. No scleral icterus.   Neck: Normal range of motion. Neck supple. No JVD present. No tracheal deviation present. No thyromegaly present.   Cardiovascular: Normal rate, regular rhythm, normal heart sounds and intact distal pulses. Exam reveals no gallop and no friction rub.   No murmur heard.  Pulmonary/Chest: Effort normal and breath sounds normal. No respiratory distress.   Abdominal: Soft. Bowel sounds are normal. He exhibits no distension and no mass. There is tenderness (mild, without peritoneal signs) in the epigastric area. There is no rigidity, no rebound, no guarding and no CVA tenderness.   Musculoskeletal: Normal range of motion. He exhibits no edema or tenderness.   Lymphadenopathy:     He has no cervical adenopathy.   Neurological: He is alert and oriented to person, place, and time. He has normal strength. No cranial nerve deficit or sensory deficit.   Skin: Skin is warm and dry. No rash noted. No erythema. No pallor.   Psychiatric: He has a normal mood and affect. His behavior is normal.   Nursing note and vitals reviewed.      ED Course   2:56 PM  The patient was seen and examined by Dr. Reynoso in Room ED15.        Procedures        Results for orders placed or performed during the hospital encounter of 05/17/19   Abd/pelvis " CT,  IV  contrast only TRAUMA / AAA    Narrative    EXAM: CT abdomen and pelvis with IV contrast  5/17/2019 4:31 PM      HISTORY:   abd pain after paracentesis, eval for bleed     COMPARISON: MR 5/17/2019, CT 9/16/2015    FINDINGS: CT abdomen and pelvis was performed after intravenous  administration of contrast. Clear lung bases. No pneumoperitoneum.    Cirrhosis. Normal splenic size. Periesophageal varices. Normal  adrenals and kidneys. Small gastrohepatic ligament, upper  retroperitoneal and periportal lymph nodes are likely secondary to  chronic liver disease.    Normal urinary bladder.    Moderate ascites. Thickening with hyperenhancement of multiple small  bowel loops, likely secondary to portal hypertension. Patent major  abdominal pelvic vasculature. Ascites appears simple. No  intra-abdominal hemorrhage. Bilateral inferior epigastric arteries are  normal. Mild omental edema      Impression    IMPRESSION:   1. Moderate ascites. No evidence of intra-abdominal hemorrhage.  2. Areas of small bowel wall thickening, likely scattered to portal  hypertension.         I have personally reviewed the examination and initial interpretation  and I agree with the findings.    ROBSON AYALA MD   CBC with platelets differential   Result Value Ref Range    WBC 4.6 4.0 - 11.0 10e9/L    RBC Count 4.41 4.4 - 5.9 10e12/L    Hemoglobin 13.6 13.3 - 17.7 g/dL    Hematocrit 41.9 40.0 - 53.0 %    MCV 95 78 - 100 fl    MCH 30.8 26.5 - 33.0 pg    MCHC 32.5 31.5 - 36.5 g/dL    RDW 13.8 10.0 - 15.0 %    Platelet Count 234 150 - 450 10e9/L    Diff Method Automated Method     % Neutrophils 85.4 %    % Lymphocytes 9.5 %    % Monocytes 4.1 %    % Eosinophils 0.4 %    % Basophils 0.4 %    % Immature Granulocytes 0.2 %    Nucleated RBCs 0 0 /100    Absolute Neutrophil 4.0 1.6 - 8.3 10e9/L    Absolute Lymphocytes 0.4 (L) 0.8 - 5.3 10e9/L    Absolute Monocytes 0.2 0.0 - 1.3 10e9/L    Absolute Eosinophils 0.0 0.0 - 0.7 10e9/L    Absolute  Basophils 0.0 0.0 - 0.2 10e9/L    Abs Immature Granulocytes 0.0 0 - 0.4 10e9/L    Absolute Nucleated RBC 0.0    Comprehensive metabolic panel   Result Value Ref Range    Sodium 139 133 - 144 mmol/L    Potassium 3.7 3.4 - 5.3 mmol/L    Chloride 105 94 - 109 mmol/L    Carbon Dioxide 28 20 - 32 mmol/L    Anion Gap 6 3 - 14 mmol/L    Glucose 86 70 - 99 mg/dL    Urea Nitrogen 23 7 - 30 mg/dL    Creatinine 0.93 0.66 - 1.25 mg/dL    GFR Estimate >90 >60 mL/min/[1.73_m2]    GFR Estimate If Black >90 >60 mL/min/[1.73_m2]    Calcium 8.2 (L) 8.5 - 10.1 mg/dL    Bilirubin Total 0.9 0.2 - 1.3 mg/dL    Albumin 2.7 (L) 3.4 - 5.0 g/dL    Protein Total 6.4 (L) 6.8 - 8.8 g/dL    Alkaline Phosphatase 123 40 - 150 U/L    ALT 26 0 - 70 U/L    AST Canceled, Test credited 0 - 45 U/L            Assessments & Plan (with Medical Decision Making)   This patient presented to the Emergency Department with abdominal pain following large volume paracentesis. here in the Emergency Department, he appears in no acute distress but does have some tenderness to palpation of the abdomen without peritoneal signs. He is afebrile and does not have generalized peritoneal signs, does not have leukocytosis and I do not feel that his symptoms are secondary to bacterial peritonitis. Hemoglobin is actually somewhat up from previous level at 13.6. CT of the abdomen and pelvis demonstrated no evidence of a hemorrhage or other acute process. Patient was given a dose of morphine here in the Emergency Department and reported feeling better. Repeat abdominal exam demonstrates decreased tenderness and still no peritoneal signs. At this point in time I am comfortable discharging the patient and will have him follow up with his gastroenterologist as scheduled and to return for any other further problems.    This part of the medical record was transcribed by Suni Davis, Medical Scribe, from a dictation done by Dr. Reynoso.    I have reviewed the nursing notes.    I  have reviewed the findings, diagnosis, plan and need for follow up with the patient.       Medication List      There are no discharge medications for this visit.         Final diagnoses:   Abdominal pain, epigastric   I, Chapin Torrez, am serving as a trained medical scribe to document services personally performed by Phani Reynoso MD, based on the provider's statements to me.      IPhani MD, was physically present and have reviewed and verified the accuracy of this note documented by Chapin Torrez.     5/17/2019   [facility], [place], EMERGENCY DEPARTMENT     Phani Reynoso MD  05/19/19 1011

## 2019-05-17 NOTE — DISCHARGE INSTRUCTIONS
MRI Contrast Discharge Instructions    The IV contrast you received today will pass out of your body in your  urine. This will happen in the next 24 hours. You will not feel this process.  Your urine will not change color.    Drink at least 4 extra glasses of water or juice today (unless your doctor  has restricted your fluids). This reduces the stress on your kidneys.  You may take your regular medicines.    If you are on dialysis: It is best to have dialysis today.    If you have a reaction: Most reactions happen right away. If you have  any new symptoms after leaving the hospital (such as hives or swelling),  call your hospital at the correct number below. Or call your family doctor.  If you have breathing distress or wheezing, call 911.    Special instructions: ***    I have read and understand the above information.    Signature:______________________________________ Date:___________    Staff:__________________________________________ Date:___________     Time:__________    [place] [facility] Departments:    ___[place]: [phone]  ___[facility]: [phone]  ___[place]: [phone] ___[facility]: [phone]  ___[facility]: [phone]  ___[facility]: [phone]  ___[phone]  ___[facility]: [phone]  ___[phone]

## 2019-05-17 NOTE — LETTER
May 21, 2019       TO: Wilfredo Della Yoon  [address]  [address]  [facility] [place] [address]       Dear Mr. Yoon,    We are writing to inform you of your test results.  Your MRI shows cirrhosis and sequelae.  It also shows multiple indeterminate lesions. Six-month follow up is recommended.        Resulted Orders   MRI Abdomen w & w/o contrast*    Narrative    MRI ABDOMEN WITHOUT AND WITH INTRAVENOUS CONTRAST 5/17/2019    CLINICAL HISTORY:  chronic viral hepatitis B, report of questionable  liver lesions on CT at [facility] on 5/7/2019 (images not available for  comparison), membranoproliferative glomerulonephritis    COMPARISON: Ultrasound 7/31/2018, CT abdomen/pelvis 9/16/2015    TECHNIQUE:  Images were acquired with and without intravenous contrast  through the abdomen. The following MR images were acquired: TrueFISP,  multiplanar T2 weighted, axial T1 in/out of phase, axial fat-saturated  T1, diffusion-weighted. Multiplanar T1-weighted images with fat  saturation were before contrast administration and at multiple time  points following the administration of intravenous contrast. Contrast  dose: 10mL Eovist    FINDINGS: Very large volume ascites, which may decrease the  sensitivity of the study.    Liver: Cirrhotic configuration of the liver, including hypertrophy of  the left lobe, diffusely nodular contour to the capsule, and subtle  lacy T2 signal throughout the parenchyma suggesting fibrotic changes.  Multiple (>10) lobular and ill-defined foci of arterial enhancement  which demonstrate nonvisualization on portal venous and delayed phase  images, without washout or pseudocapsule, for example 9 mm focus in  segment VII (series 8, image 20) adjacent 9 mm and 10 mm foci in  segment VIII (series 8, image 24), and 9 mm focus in segment III  (series 8, image 46). No associated restricted diffusion, intrinsic T2  signal changes, or other suspicious ancillary features. These lesions  are indeterminate and  meet criteria for LI-RADS 3.    Gallbladder/biliary: Gallbladder is unremarkable. No intrahepatic or  extrahepatic biliary ductal dilatation.    Spleen: Nonenlarged.    Kidneys: No hydronephrosis. Normal enhancement.    Adrenal glands: Unremarkable.    Pancreas: Normal homogeneous enhancement. No pancreatic ductal  dilatation.    Bowel: The visualized small and large bowel is nondilated.    Lymph nodes: No enlarged lymph nodes.    Blood vessels: Portal venous system, splenic vein, and hepatic  arterial system are patent. Normal caliber of the abdominal aorta.  Partially visualized esophageal varices varices.    Mesentery and abdominal wall: Unremarkable.    Lung bases: Clear.    Bones and soft tissues: No focal abnormal enhancement.      Impression    IMPRESSION:  1. Cirrhotic liver with multiple (>10) arterially enhancing foci in  the liver with no visualization on other sequences, likely  representing vascular shunts. LI-RADS 3. Recommend 6 month follow-up  MRI exam.   2. Very large volume ascites, which can decrease the sensitivity of  MRI. If ascites persists or recurs, consider paracentesis prior to  follow-up MRI.  3. Sequela of portal hypertension including esophageal varices.    I have personally reviewed the examination and initial interpretation  and I agree with the findings.    REYNALDO FERNANDEZ MD         It was a pleasure to see you at your recent visit. Please let me know if you have any questions or concerns.     Clinic Staff - [phone] option 3     Sincerely,     Della Powell MD  [address] [facility], Mail [id number]  [place]  [address].

## 2019-05-17 NOTE — ED TRIAGE NOTES
Brought in by EMS with complaints of abdominal pain after having a paracentesis today. EMS reports pt had 7.9L of fluid removed during paracentesis. Per EMS report pt became hypertensive at 199/125. Pt received 20 mg hydrazine which brought pt to 98/62. VSS at this time.

## 2019-05-17 NOTE — PROGRESS NOTES
Paracentesis Nursing Note  Wilfredo Yoon presents today to [facility] for a paracentesis.    During today's appointment orders from Dr THERESE Adame and Dr RUSH Powell were completed.     present for appointment (Sharlene Bolivar).    Patient with elevated blood pressure upon arrival to [facility] (see flowsheets); patient asymptomatic; patient states he did not take his morning Lisinopril or Norvasc because he was instructed by his physician not to take it today. Denton Arce PA-C aware; Dr RUSH Powell was paged by Shannan Samayoa, RN (charge RN) who informed him of the situation and received orders from Dr Powell to proceed w/ paracentesis and IV albumin replacement per therapy plan orders and to administer 20 mg IV Hydralazine. IV Hydralazine was administered over approximately 4 minutes per orders. Blood pressure monitored throughout appointment. Patient orthostatic at conclusion of paracentesis. Dr Powell was notified and stated to monitor patient for 30 minutes longer and transfer to ED if not improved. Upon recheck of blood pressure after that half hour of monitoring, patient stated he was too weak to stand and patient had approximate 200 ml emesis. Charge RN (Shannan Samayoa) called [facility] for transfer to ED. Upon [facility]'s arrival, patient was able to ambulate across the room to stretcher and then had an approximate 300 ml emesis. Patient was transferred to the ED via [facility] non-emergent transport at 1345. Report called to Kristopher (ED RN).    Patient insisted on eating during albumin infusion and continually bent LUE while eating. At 1129, PIV to (L) AC area infiltrated. Approximate one inch area to PIV site area was noted to be swollen; infusion immediately stopped; PIV d/c'd intact and warm pack applied to area per patient request; within 20 minutes, swollen area had resolved and patient denied discomfort. New PIV was placed for remainder of appointment.        Progress Note:  Patient identification verified by name and date of birth.  Assessment completed.  Vitals monitored throughout appointment and recorded in Doc Flowsheets.  See proceduralist note in ultrasound.    Vascular Access: peripheral IV placed today.  Labs: were not ordered for this appointment.    Date of consent or authorization: 05/17/2019.  Invasive Procedure Safety Checklist was completed and sent for scanning.     Paracentesis performed by Tone Arce PA-C Radiology.    The following labs were communicated to provider performing paracentesis:  Lab Results   Component Value Date     05/14/2019       Total amount of ascites fluid drained: 7.9 liters.  Color of ascites fluid: yellow.  Total amount of albumin given: 50  grams.    Patient tolerated procedure well.    Discharge Plan:  Discharge instructions were reviewed with patient.  Patient/Representative verbalized understanding and all questions were answered.   Discharged from [facility] to [facility] ED via [facility] non-emergent transport.    Zaki Garza RN       Administrations This Visit     albumin human 25 % injection 12.5 g     Admin Date  05/17/2019 Action  New Bag Dose  12.5 g Route  Intravenous Administered By  Zaki Garza RN           Admin Date  05/17/2019 Action  New Bag Dose  12.5 g Route  Intravenous Administered By  Zaki Garza RN           Admin Date  05/17/2019 Action  New Bag Dose  12.5 g Route  Intravenous Administered By  Zaki Garza RN           Admin Date  05/17/2019 Action  New Bag Dose  12.5 g Route  Intravenous Administered By  Zaki Garza RN          hydrALAZINE (APRESOLINE) injection 20 mg     Admin Date  05/17/2019 Action  Given Dose  20 mg Route  Intravenous Administered By  Zaki Garza RN          lidocaine 1 % 20 mL     Admin Date  05/17/2019 Action  Given by Other Clinician Dose  10 mL Route  Other Administered  By  Zaki Garza, RN                  BP (!) 168/127   Pulse 66   Temp 97.2  F (36.2  C) (Oral)   Resp 18   Wt 57.8 kg (127 lb 8 oz)   SpO2 99%   BMI 18.83 kg/m

## 2019-05-17 NOTE — ED AVS SNAPSHOT
[facility], [place], Emergency Department  [address] [place] [address]  Phone:  [phone]                                    Wilfredo Yoon   MRN: [id number]    Department:  [facility], Emergency Department   Date of Visit:  5/17/2019           After Visit Summary Signature Page    I have received my discharge instructions, and my questions have been answered. I have discussed any challenges I see with this plan with the nurse or doctor.    ..........................................................................................................................................  Patient/Patient Representative Signature      ..........................................................................................................................................  Patient Representative Print Name and Relationship to Patient    ..................................................               ................................................  Date                                   Time    ..........................................................................................................................................  Reviewed by Signature/Title    ...................................................              ..............................................  Date                                               Time          22EPIC Rev 08/18

## 2019-05-17 NOTE — DISCHARGE INSTRUCTIONS
Follow-up with your gastroenterologist as scheduled.    Continue current management.    Return to the emergency department for high fevers, worsening symptoms, blood in stool or vomiting blood, or any other problems.

## 2019-05-17 NOTE — PATIENT INSTRUCTIONS
[facility]  DISCHARGE INSTRUCTIONS FOLLOWING ABDOMINAL PARACENTESIS    After you go home:    No strenuous activity for 24 hours    Resume your regular diet    Limit fluid intake for the first 48 hours to no more than 2 quarts per day.  There should be minimal drainage from the needle site.  If drainage does occur and soaks through the bandage, apply gentle pressure with your hand for 5 minutes.    Notify MD for the following:    Excessive drainage    Excessive swelling, redness or tenderness at the needle site    Fever greater than 101 degrees F    Dizziness or light-headedness when getting up or walking      IF THIS IS A MEDICAL EMERGENCY, CALL [phone]    If you have any questions or concerns    Contact the Hepatology Clinic:   [phone]    If you are a post-transplant patient, contact the Transplant Office:  [phone]    If this is after hours, contact the hospital :  [phone] and as to have the GI resident on call paged                   I have received and understand my discharge instructions and I have all of my personal belongings.          ------------------------------------------------------        ---------------------------------------------------    Patient / Significant Other's Signature     Relationship

## 2019-05-18 LAB
HBV DNA SERPL NAA+PROBE-ACNC: ABNORMAL [IU]/ML
HBV DNA SERPL NAA+PROBE-LOG IU: 6.5 {LOG_IU}/ML

## 2019-05-24 ENCOUNTER — OFFICE VISIT (OUTPATIENT)
Dept: INFUSION THERAPY | Facility: CLINIC | Age: 59
End: 2019-05-24
Attending: INTERNAL MEDICINE
Payer: MEDICAID

## 2019-05-24 ENCOUNTER — ANCILLARY PROCEDURE (OUTPATIENT)
Dept: ULTRASOUND IMAGING | Facility: CLINIC | Age: 59
End: 2019-05-24
Attending: INTERNAL MEDICINE
Payer: MEDICAID

## 2019-05-24 VITALS
WEIGHT: 110.9 LBS | HEART RATE: 76 BPM | BODY MASS INDEX: 15.91 KG/M2 | DIASTOLIC BLOOD PRESSURE: 90 MMHG | SYSTOLIC BLOOD PRESSURE: 153 MMHG | RESPIRATION RATE: 16 BRPM | TEMPERATURE: 98.2 F

## 2019-05-24 DIAGNOSIS — R18.8 OTHER ASCITES: Primary | ICD-10-CM

## 2019-05-24 PROCEDURE — T1013 SIGN LANG/ORAL INTERPRETER: HCPCS | Mod: U3,ZF

## 2019-05-24 PROCEDURE — P9047 ALBUMIN (HUMAN), 25%, 50ML: HCPCS | Mod: ZF | Performed by: INTERNAL MEDICINE

## 2019-05-24 PROCEDURE — 25000125 ZZHC RX 250: Mod: ZF | Performed by: INTERNAL MEDICINE

## 2019-05-24 PROCEDURE — 27210190 US PARACENTESIS

## 2019-05-24 PROCEDURE — 25000128 H RX IP 250 OP 636: Mod: ZF | Performed by: INTERNAL MEDICINE

## 2019-05-24 RX ORDER — ALBUMIN (HUMAN) 12.5 G/50ML
12.5 SOLUTION INTRAVENOUS 4 TIMES DAILY PRN
Status: DISCONTINUED | OUTPATIENT
Start: 2019-05-24 | End: 2019-05-24 | Stop reason: HOSPADM

## 2019-05-24 RX ORDER — ALBUMIN (HUMAN) 12.5 G/50ML
12.5 SOLUTION INTRAVENOUS 4 TIMES DAILY PRN
Status: CANCELLED
Start: 2019-05-24

## 2019-05-24 RX ADMIN — ALBUMIN HUMAN 12.5 G: 0.25 SOLUTION INTRAVENOUS at 10:30

## 2019-05-24 RX ADMIN — ALBUMIN HUMAN 12.5 G: 0.25 SOLUTION INTRAVENOUS at 10:46

## 2019-05-24 RX ADMIN — ALBUMIN HUMAN 12.5 G: 0.25 SOLUTION INTRAVENOUS at 10:38

## 2019-05-24 RX ADMIN — ALBUMIN HUMAN 12.5 G: 0.25 SOLUTION INTRAVENOUS at 10:24

## 2019-05-24 RX ADMIN — LIDOCAINE HYDROCHLORIDE 10 ML: 10 INJECTION, SOLUTION EPIDURAL; INFILTRATION; INTRACAUDAL; PERINEURAL at 10:15

## 2019-05-24 NOTE — PATIENT INSTRUCTIONS
Patient Education     Discharge Instructions for Paracentesis  Paracentesis is a procedure to remove extra fluid from your belly (abdomen). This fluid buildup in the abdomen is called ascites. The procedure may have been done to take a sample of the fluid. Or, it may have been done to drain the extra fluid from your abdomen and help make you more comfortable.     Ascites is buildup of excess fluid in the abdomen.   Home care    If you have pain after the procedure, your healthcare provider can prescribe or recommend pain medicines. Take these exactly as directed. If you stopped taking other medicines before the procedure, ask your provider when you can start them again.    Take it easy for 24 hours after the procedure. Avoid physical activity until your provider says it s OK.    You will have a small bandage over the puncture site. Stitches (sutures), surgical staples, adhesive tapes, adhesive strips, or surgical glue may be used to close the incision. They also help stop bleeding and speed healing. You may take the bandage off in 24 hours.    Check the puncture site for the signs of infection listed below.  Follow-up care  Make a follow-up appointment with your healthcare provider as directed. During your follow-up visit, your provider will check your healing. Let your provider know how you are feeling. You can also discuss the cause of your ascites and if you need any further treatment.  When to seek medical advice  Call your healthcare provider if you have any of the following after the procedure:    A fever of 100.4 F (38.0 C) or higher    Trouble breathing    Pain that doesn't go away even after taking pain medicine    Belly pain not caused by having the skin punctured    Bleeding from the puncture site    More than a small amount of fluid leaking from the puncture site    Swollen belly    Signs of infection at the puncture site. These include increased pain, redness, or swelling, warmth, or bad-smelling  drainage.    Blood in your urine    Feeling dizzy or lightheaded, or fainting   Date Last Reviewed: 7/1/2016 2000-2018 [employer]. [address] [place] [address], [place] [address]. All rights reserved. This information is not intended as a substitute for professional medical care. Always follow your healthcare professional's instructions.

## 2019-05-24 NOTE — PROGRESS NOTES
Paracentesis Nursing Note  Wilfredo Yoon presents today to [facility] for a [facility].    During today's appointment orders from Dr. Adame were completed.    Progress Note:  Patient identification verified by name and date of birth.  Assessment completed.  Vitals monitored throughout appointment and recorded in Doc Flowsheets.  See proceduralist note in ultrasound.    Upon arrival, patient's BP elevated: 152/108. Result discussed with Denton Arce PA-C prior to paracentesis procedure. OK to proceed with procedure today per PA.    Vascular Access: peripheral IV placed today.  Labs: were not ordered for this appointment.    Date of consent or authorization: 5/17/19.  Invasive Procedure Safety Checklist was completed and sent for scanning.     Paracentesis performed by Tone Arce PA-C Radiology.    The following labs were communicated to provider performing paracentesis:  Lab Results   Component Value Date     05/17/2019       Total amount of ascites fluid drained: 4.7 liters.  Color of ascites fluid: yellow.  Total amount of albumin given: 50  grams.    Patient tolerated procedure well.    Post procedure,denies pain or discomfort post paracentesis.      Discharge Plan:  Discharge instructions were reviewed with patient.  Patient/Representative verbalized understanding and all questions were answered.   Discharged from [facility] in stable condition.    Enedelia Velásquez, VAISHALI       Administrations This Visit     albumin human 25 % injection 12.5 g     Admin Date  05/24/2019 Action  New Bag Dose  12.5 g Route  Intravenous Administered By  Enedelia Velásquez RN           Admin Date  05/24/2019 Action  New Bag Dose  12.5 g Route  Intravenous Administered By  Enedelia Velásquez, VAISHALI           Admin Date  05/24/2019 Action  New Bag Dose  12.5 g Route  Intravenous Administered By  Enedelia Velásquez, VAISHAIL           Admin Date  05/24/2019  Action  New Bag Dose  12.5 g Route  Intravenous Administered By  Enedelia Velásquez, RN          lidocaine 1 % 20 mL     Admin Date  05/24/2019 Action  Given by Other Dose  10 mL Route  Other Administered By  Enedelia Velásquez, RN                  BP (!) 158/96   Pulse 69   Temp 98.2  F (36.8  C) (Oral)   Resp 16   Wt 54.8 kg (120 lb 12.8 oz)   BMI 17.33 kg/m

## 2019-05-28 ENCOUNTER — OFFICE VISIT (OUTPATIENT)
Dept: INFUSION THERAPY | Facility: CLINIC | Age: 59
End: 2019-05-28
Attending: INTERNAL MEDICINE
Payer: MEDICAID

## 2019-05-28 ENCOUNTER — ANCILLARY PROCEDURE (OUTPATIENT)
Dept: ULTRASOUND IMAGING | Facility: CLINIC | Age: 59
End: 2019-05-28
Attending: INTERNAL MEDICINE
Payer: MEDICAID

## 2019-05-28 VITALS
RESPIRATION RATE: 16 BRPM | TEMPERATURE: 98.1 F | OXYGEN SATURATION: 100 % | DIASTOLIC BLOOD PRESSURE: 86 MMHG | WEIGHT: 113.7 LBS | HEART RATE: 79 BPM | BODY MASS INDEX: 16.31 KG/M2 | SYSTOLIC BLOOD PRESSURE: 132 MMHG

## 2019-05-28 DIAGNOSIS — R18.8 OTHER ASCITES: Primary | ICD-10-CM

## 2019-05-28 PROCEDURE — T1013 SIGN LANG/ORAL INTERPRETER: HCPCS | Mod: U3,ZF

## 2019-05-28 PROCEDURE — P9047 ALBUMIN (HUMAN), 25%, 50ML: HCPCS | Mod: ZF | Performed by: INTERNAL MEDICINE

## 2019-05-28 PROCEDURE — 25000128 H RX IP 250 OP 636: Mod: ZF | Performed by: INTERNAL MEDICINE

## 2019-05-28 PROCEDURE — 27210190 US PARACENTESIS

## 2019-05-28 PROCEDURE — 25000125 ZZHC RX 250: Mod: ZF | Performed by: INTERNAL MEDICINE

## 2019-05-28 RX ORDER — ALBUMIN (HUMAN) 12.5 G/50ML
12.5 SOLUTION INTRAVENOUS 4 TIMES DAILY PRN
Status: DISCONTINUED | OUTPATIENT
Start: 2019-05-28 | End: 2019-05-28 | Stop reason: HOSPADM

## 2019-05-28 RX ORDER — ALBUMIN (HUMAN) 12.5 G/50ML
12.5 SOLUTION INTRAVENOUS 4 TIMES DAILY PRN
Status: CANCELLED
Start: 2019-05-28

## 2019-05-28 RX ADMIN — ALBUMIN HUMAN 12.5 G: 0.25 SOLUTION INTRAVENOUS at 15:04

## 2019-05-28 RX ADMIN — LIDOCAINE HYDROCHLORIDE 10 ML: 10 INJECTION, SOLUTION EPIDURAL; INFILTRATION; INTRACAUDAL; PERINEURAL at 14:57

## 2019-05-28 RX ADMIN — ALBUMIN HUMAN 12.5 G: 0.25 SOLUTION INTRAVENOUS at 15:14

## 2019-05-28 NOTE — PATIENT INSTRUCTIONS
Dear Wilfredo Yoon    Thank you for choosing [facility] ([facility]) for your procedure.  The following information is a summary of our appointment as well as important reminders.      We look forward in seeing you on your next appointment here at [facility] ([facility]).  Please don t hesitate to call us at [phone] to reschedule any of your appointments or to speak with one of the [facility] registered nurses.  It was a pleasure taking care of you today.    Sincerely,    [facility]  [address] [place] [address]  [address]  Phone:  [phone]    DISCHARGE INSTRUCTIONS FOLLOWING ABDOMINAL PARACENTESIS    After you go home:    No strenuous activity for 24 hours    Resume your regular diet    Limit fluid intake for the first 48 hours to no more than 2 quarts per day.  There should be minimal drainage from the needle site.  If drainage does occur and soaks through the bandage, apply gentle pressure with your hand for 5 minutes.    Notify MD for the following:    Excessive drainage    Excessive swelling, redness or tenderness at the needle site    Fever greater than 101 degrees F    Dizziness or light-headedness when getting up or walking      IF THIS IS A MEDICAL EMERGENCY, CALL [phone]    If you have any questions or concerns    Contact the Hepatology Clinic:   [phone]    If you are a post-transplant patient, contact the Transplant Office:  [phone]    If this is after hours, contact the hospital :  [phone] and as to have the GI resident on call paged                   I have received and understand my discharge instructions and I have all of my personal belongings.          ------------------------------------------------------        ---------------------------------------------------    Patient / Significant Other's Signature      Relationship

## 2019-05-28 NOTE — PROGRESS NOTES
Paracentesis Nursing Note  Wilfredo Yoon presents today to [facility] for a [facility]esis.    During today's appointment orders from Dr. Adame were completed.    Progress Note:  Patient identification verified by name and date of birth.  Assessment completed.  Vitals monitored throughout appointment and recorded in Doc Flowsheets.  See proceduralist note in ultrasound.     present during entire appointment  Vascular Access: peripheral IV placed today.  Labs: were not ordered for this appointment.    Date of consent or authorization: 5/17/19.  Invasive Procedure Safety Checklist was completed and sent for scanning.     Paracentesis performed by Galindo Madrid PA-C Radiology.    The following labs were communicated to provider performing paracentesis:  Lab Results   Component Value Date     05/17/2019       Total amount of ascites fluid drained: 2.4 liters.  Color of ascites fluid: Pale yellow/clear.  Total amount of albumin given: 25  grams.    Patient tolerated procedure well.    Post procedure,denies pain or discomfort post paracentesis.      Discharge Plan:  Discharge instructions were reviewed with patient.  Patient/Representative verbalized understanding and all questions were answered.   Discharged from [facility] in stable condition.    Ericka Severson, RN    Administrations This Visit     albumin human 25 % injection 12.5 g     Admin Date  05/28/2019 Action  New Bag Dose  12.5 g Route  Intravenous Administered By  Severson, Ericka, RN           Admin Date  05/28/2019 Action  New Bag Dose  12.5 g Route  Intravenous Administered By  Severson, Ericka, VAISHALI          lidocaine 1 % 20 mL     Admin Date  05/28/2019 Action  Given by Other Clinician Dose  10 mL Route  Other Administered By  Severson, Ericka, RN                /86   Pulse 79   Temp 98.1  F (36.7  C) (Oral)   Resp 16   Wt 53.9 kg (118 lb 12.8 oz)    SpO2 100%   BMI 17.05 kg/m

## 2019-06-07 ENCOUNTER — ANCILLARY PROCEDURE (OUTPATIENT)
Dept: ULTRASOUND IMAGING | Facility: CLINIC | Age: 59
End: 2019-06-07
Attending: INTERNAL MEDICINE
Payer: COMMERCIAL

## 2019-06-07 ENCOUNTER — OFFICE VISIT (OUTPATIENT)
Dept: INFUSION THERAPY | Facility: CLINIC | Age: 59
End: 2019-06-07
Attending: INTERNAL MEDICINE
Payer: COMMERCIAL

## 2019-06-07 VITALS
DIASTOLIC BLOOD PRESSURE: 86 MMHG | WEIGHT: 112.1 LBS | TEMPERATURE: 97.6 F | BODY MASS INDEX: 16.08 KG/M2 | RESPIRATION RATE: 16 BRPM | HEART RATE: 80 BPM | SYSTOLIC BLOOD PRESSURE: 144 MMHG

## 2019-06-07 DIAGNOSIS — R18.8 OTHER ASCITES: Primary | ICD-10-CM

## 2019-06-07 PROCEDURE — P9047 ALBUMIN (HUMAN), 25%, 50ML: HCPCS | Mod: ZF | Performed by: INTERNAL MEDICINE

## 2019-06-07 PROCEDURE — 25000125 ZZHC RX 250: Mod: ZF | Performed by: INTERNAL MEDICINE

## 2019-06-07 PROCEDURE — 27210190 US PARACENTESIS

## 2019-06-07 PROCEDURE — 25000128 H RX IP 250 OP 636: Mod: ZF | Performed by: INTERNAL MEDICINE

## 2019-06-07 PROCEDURE — T1013 SIGN LANG/ORAL INTERPRETER: HCPCS | Mod: U3,ZF

## 2019-06-07 RX ORDER — ALBUMIN (HUMAN) 12.5 G/50ML
12.5 SOLUTION INTRAVENOUS 4 TIMES DAILY PRN
Status: DISCONTINUED | OUTPATIENT
Start: 2019-06-07 | End: 2019-06-07 | Stop reason: HOSPADM

## 2019-06-07 RX ORDER — ALBUMIN (HUMAN) 12.5 G/50ML
12.5 SOLUTION INTRAVENOUS 4 TIMES DAILY PRN
Status: CANCELLED
Start: 2019-06-07

## 2019-06-07 RX ADMIN — LIDOCAINE HYDROCHLORIDE 10 ML: 10 INJECTION, SOLUTION EPIDURAL; INFILTRATION; INTRACAUDAL; PERINEURAL at 12:33

## 2019-06-07 RX ADMIN — ALBUMIN HUMAN 12.5 G: 0.25 SOLUTION INTRAVENOUS at 12:33

## 2019-06-07 RX ADMIN — ALBUMIN HUMAN 12.5 G: 0.25 SOLUTION INTRAVENOUS at 13:03

## 2019-06-07 RX ADMIN — ALBUMIN HUMAN 12.5 G: 0.25 SOLUTION INTRAVENOUS at 12:53

## 2019-06-07 NOTE — PROGRESS NOTES
Paracentesis Nursing Note  Wilfredo Yoon presents today to [facility] for a [facility]esis.    During today's appointment orders from Dr. Deep Rossi were completed.    Progress Note:  Patient identification verified by name and date of birth.  Assessment completed.  Vitals monitored throughout appointment and recorded in Doc Flowsheets.  See proceduralist note in ultrasound.    Vascular Access: peripheral IV placed today.  Labs: were not ordered for this appointment.  Date of consent or authorization: 5/17/19 - 8/15/19 (90 days).  Invasive Procedure Safety Checklist was completed and sent for scanning.   Paracentesis performed by Galindo Madrid PA-C Radiology.    The following labs were communicated to provider performing paracentesis:  Lab Results   Component Value Date     05/17/2019     Total amount of ascites fluid drained: 3.3 liters.  Color of ascites fluid: yellow.  Total amount of albumin given: 37.5  grams.  Patient tolerated procedure well.  Post procedure,denies pain or discomfort post paracentesis.    Discharge Plan:  Discharge instructions were reviewed with patient.  Patient/Representative verbalized understanding and all questions were answered.   Discharged from [facility] in stable condition.    Jerri Reid RN    Administrations This Visit     albumin human 25 % injection 12.5 g     Admin Date  06/07/2019 Action  New Bag Dose  12.5 g Route  Intravenous Administered By  Jerri Reid RN           Admin Date  06/07/2019 Action  New Bag Dose  12.5 g Route  Intravenous Administered By  Jerri Reid RN           Admin Date  06/07/2019 Action  New Bag Dose  12.5 g Route  Intravenous Administered By  Jerri Reid RN          lidocaine 1 % 20 mL     Admin Date  06/07/2019 Action  Given by Other Dose  10 mL Route  Other Administered By  Jerri Reid RN                BP (!) 147/94   Pulse  80   Temp 97.6  F (36.4  C) (Oral)   Resp 16   Wt 54.1 kg (119 lb 3.2 oz)   BMI 17.10 kg/m

## 2019-06-07 NOTE — PATIENT INSTRUCTIONS
Dear Wilfredo Yoon    Thank you for choosing [facility] ([facility]) for your procedure.  The following information is a summary of our appointment as well as important reminders.      Your paracentesis procedure today, beginning weight 119.2 lb (54.1 kg), removed 3.3 liters ascites fluid, gave 37.5 grams albumin fluid, ending weight approximately 112.1 lb (50.8 kg).    We look forward in seeing you on your next appointment here at [facility] ([facility]).  Please don t hesitate to call us at [phone] to reschedule any of your appointments or to speak with one of the [facility] registered nurses.  It was a pleasure taking care of you today.    Sincerely,    [facility]  [facility]  [address] [place] [address]  [address]  Phone:  [phone]  DISCHARGE INSTRUCTIONS FOLLOWING ABDOMINAL PARACENTESIS    After you go home:    No strenuous activity for 24 hours    Resume your regular diet    Limit fluid intake for the first 48 hours to no more than 2 quarts per day.  There should be minimal drainage from the needle site.  If drainage does occur and soaks through the bandage, apply gentle pressure with your hand for 5 minutes.    Notify MD for the following:    Excessive drainage    Excessive swelling, redness or tenderness at the needle site    Fever greater than 101 degrees F    Dizziness or light-headedness when getting up or walking      IF THIS IS A MEDICAL EMERGENCY, CALL 911    If you have any questions or concerns    Contact the Hepatology Clinic:   [phone]    If you are a post-transplant patient, contact the Transplant Office:  [phone]    If this is after hours, contact the hospital :  [phone] and as to have the GI resident on call paged                   I have received and understand my discharge instructions and I have all of my personal  belongings.      Dania lozoya markwinston guriga aado:  Looma baahna waxqabadyo ballaaran 24 saacadood  Delores u cusbooneysii cuntadaada caadiga ah  Xakamee cabitaanada dareeraha 48-ka saacadood ee ugu horreysa si aan ka badnaruthann 2 jeer maalintii. Waxaa karla doona dheecaan rhonda oo ka yimaada goobta irbadda. Haddii dheecaantu ay dhacdo oo ku dahaariso faashad, tenzin cadaadi gacantaada 5 daqiiqo.    Bob GARZA:  Christi marquez-dari ornelas, mohinderudanavio ama jilicsanaanta goobta irbadda  Xummasyl swift weyn [address] darajo F  Dawakthanh ama abdlf-xoddu-fxvhcagspu allegraa shelly meneseso qaado ama socdo

## 2019-06-14 ENCOUNTER — OFFICE VISIT (OUTPATIENT)
Dept: INFUSION THERAPY | Facility: CLINIC | Age: 59
End: 2019-06-14
Attending: INTERNAL MEDICINE
Payer: COMMERCIAL

## 2019-06-14 ENCOUNTER — ANCILLARY PROCEDURE (OUTPATIENT)
Dept: ULTRASOUND IMAGING | Facility: CLINIC | Age: 59
End: 2019-06-14
Attending: INTERNAL MEDICINE
Payer: COMMERCIAL

## 2019-06-14 VITALS
HEART RATE: 67 BPM | OXYGEN SATURATION: 100 % | RESPIRATION RATE: 16 BRPM | WEIGHT: 112.5 LBS | SYSTOLIC BLOOD PRESSURE: 163 MMHG | BODY MASS INDEX: 16.14 KG/M2 | TEMPERATURE: 97.6 F | DIASTOLIC BLOOD PRESSURE: 100 MMHG

## 2019-06-14 DIAGNOSIS — R18.8 OTHER ASCITES: Primary | ICD-10-CM

## 2019-06-14 LAB — PLATELET # BLD AUTO: 121 10E9/L (ref 150–450)

## 2019-06-14 PROCEDURE — 27210190 US PARACENTESIS

## 2019-06-14 PROCEDURE — 36415 COLL VENOUS BLD VENIPUNCTURE: CPT

## 2019-06-14 PROCEDURE — P9047 ALBUMIN (HUMAN), 25%, 50ML: HCPCS | Mod: ZF | Performed by: INTERNAL MEDICINE

## 2019-06-14 PROCEDURE — 25000125 ZZHC RX 250: Mod: ZF | Performed by: INTERNAL MEDICINE

## 2019-06-14 PROCEDURE — 25000128 H RX IP 250 OP 636: Mod: ZF | Performed by: INTERNAL MEDICINE

## 2019-06-14 PROCEDURE — 85049 AUTOMATED PLATELET COUNT: CPT | Performed by: INTERNAL MEDICINE

## 2019-06-14 RX ORDER — ALBUMIN (HUMAN) 12.5 G/50ML
12.5 SOLUTION INTRAVENOUS 4 TIMES DAILY PRN
Status: CANCELLED
Start: 2019-06-14

## 2019-06-14 RX ORDER — ALBUMIN (HUMAN) 12.5 G/50ML
12.5 SOLUTION INTRAVENOUS 4 TIMES DAILY PRN
Status: DISCONTINUED | OUTPATIENT
Start: 2019-06-14 | End: 2019-06-14 | Stop reason: HOSPADM

## 2019-06-14 RX ADMIN — LIDOCAINE HYDROCHLORIDE 10 ML: 10 INJECTION, SOLUTION EPIDURAL; INFILTRATION; INTRACAUDAL; PERINEURAL at 08:40

## 2019-06-14 RX ADMIN — ALBUMIN HUMAN 12.5 G: 0.25 SOLUTION INTRAVENOUS at 08:47

## 2019-06-14 RX ADMIN — ALBUMIN HUMAN 12.5 G: 0.25 SOLUTION INTRAVENOUS at 08:40

## 2019-06-14 NOTE — PROGRESS NOTES
Paracentesis Nursing Note  Wilfredo Yoon presents today to [facility] for a [facility].    During today's appointment orders from Dr. Deep Rossi were completed.    Progress Note:  Patient identification verified by name and date of birth.  Assessment completed.  Vitals monitored throughout appointment and recorded in Doc Flowsheets.  See proceduralist note in ultrasound.    Vascular Access: peripheral IV placed today.  Labs: were ordered for this appointment.  Date of consent or authorization: 5/17/19 - 8/15/19 (90 days).  Invasive Procedure Safety Checklist was completed and sent for scanning.   Paracentesis performed by Ivet Crisostomo PA-C Radiology.    The following labs were communicated to provider performing paracentesis:  Lab Results   Component Value Date     06/14/2019     Total amount of ascites fluid drained: 2.4 liters.  Color of ascites fluid: yellow.  Total amount of albumin given: 25  grams.  Patient tolerated procedure well.  Post procedure,denies pain or discomfort post paracentesis.    Discharge Plan:  Discharge instructions were reviewed with patient.  Patient/Representative verbalized understanding and all questions were answered.   Discharged from [facility] in stable condition.    Shannan Samayoa RN    Administrations This Visit     albumin human 25 % injection 12.5 g     Admin Date  06/14/2019 Action  New Bag Dose  12.5 g Route  Intravenous Administered By  Shannan Samayoa RN           Admin Date  06/14/2019 Action  New Bag Dose  12.5 g Route  Intravenous Administered By  Shannan Samayoa RN          lidocaine 1 % 20 mL     Admin Date  06/14/2019 Action  Given by Other Clinician Dose  10 mL Route  Other Administered By  Shannan Samayoa RN                BP (!) 157/100   Pulse 69   Temp 97.6  F (36.4  C) (Oral)   Resp 16   Wt 53.7 kg (118 lb 6.4 oz)   SpO2 100%   BMI 16.99 kg/m

## 2019-06-21 ENCOUNTER — ANCILLARY PROCEDURE (OUTPATIENT)
Dept: ULTRASOUND IMAGING | Facility: CLINIC | Age: 59
End: 2019-06-21
Attending: INTERNAL MEDICINE
Payer: COMMERCIAL

## 2019-06-21 ENCOUNTER — OFFICE VISIT (OUTPATIENT)
Dept: INFUSION THERAPY | Facility: CLINIC | Age: 59
End: 2019-06-21
Attending: INTERNAL MEDICINE
Payer: COMMERCIAL

## 2019-06-21 VITALS
SYSTOLIC BLOOD PRESSURE: 153 MMHG | DIASTOLIC BLOOD PRESSURE: 93 MMHG | TEMPERATURE: 97.3 F | WEIGHT: 114 LBS | HEART RATE: 67 BPM | BODY MASS INDEX: 16.36 KG/M2 | OXYGEN SATURATION: 100 %

## 2019-06-21 DIAGNOSIS — B18.1 CHRONIC VIRAL HEPATITIS B WITHOUT DELTA AGENT AND WITHOUT COMA (H): Primary | ICD-10-CM

## 2019-06-21 DIAGNOSIS — R18.8 OTHER ASCITES: Primary | ICD-10-CM

## 2019-06-21 PROCEDURE — P9047 ALBUMIN (HUMAN), 25%, 50ML: HCPCS | Mod: ZF | Performed by: INTERNAL MEDICINE

## 2019-06-21 PROCEDURE — 25000128 H RX IP 250 OP 636: Mod: ZF | Performed by: INTERNAL MEDICINE

## 2019-06-21 PROCEDURE — 27210190 US PARACENTESIS

## 2019-06-21 PROCEDURE — 25000125 ZZHC RX 250: Mod: ZF | Performed by: INTERNAL MEDICINE

## 2019-06-21 RX ORDER — ALBUMIN (HUMAN) 12.5 G/50ML
12.5 SOLUTION INTRAVENOUS 4 TIMES DAILY PRN
Status: CANCELLED
Start: 2019-06-21

## 2019-06-21 RX ORDER — ALBUMIN (HUMAN) 12.5 G/50ML
12.5 SOLUTION INTRAVENOUS 4 TIMES DAILY PRN
Status: DISCONTINUED | OUTPATIENT
Start: 2019-06-21 | End: 2019-06-21 | Stop reason: HOSPADM

## 2019-06-21 RX ADMIN — ALBUMIN HUMAN 12.5 G: 0.25 SOLUTION INTRAVENOUS at 10:27

## 2019-06-21 RX ADMIN — ALBUMIN HUMAN 12.5 G: 0.25 SOLUTION INTRAVENOUS at 10:38

## 2019-06-21 RX ADMIN — LIDOCAINE HYDROCHLORIDE 10 ML: 10 INJECTION, SOLUTION EPIDURAL; INFILTRATION; INTRACAUDAL; PERINEURAL at 10:26

## 2019-06-21 NOTE — PROGRESS NOTES
Paracentesis Nursing Note  Wilfredo Yoon presents today to [facility] for a [facility].    During today's appointment orders from Dr. Deep Rossi were completed.    Progress Note:  Patient identification verified by name and date of birth.  Assessment completed.  Vitals monitored throughout appointment and recorded in Doc Flowsheets.  See proceduralist note in ultrasound.    Vascular Access: peripheral IV placed today.  Labs: were not ordered for this appointment.    Date of consent or authorization: 5/17/19 - 8/15/19 (90 days).  Invasive Procedure Safety Checklist was completed and sent for scanning.     Paracentesis performed by Tone Arce PA-C Radiology.    The following labs were communicated to provider performing paracentesis:  Lab Results   Component Value Date     06/14/2019     Total amount of ascites fluid drained: 1.6 liters.  Color of ascites fluid: yellow.  Total amount of albumin given: 25  grams.    Patient tolerated procedure well.  Post procedure,denies pain or discomfort post paracentesis.    Discharge Plan:  Discharge instructions were reviewed with patient.  Patient/Representative verbalized understanding and all questions were answered.   Discharged from [facility] in stable condition.    Jerri Reid RN        BP (!) 142/97   Pulse 75   Temp 97.3  F (36.3  C) (Oral)   Wt 53.3 kg (117 lb 8 oz)   SpO2 100%   BMI 16.86 kg/m

## 2019-06-21 NOTE — PATIENT INSTRUCTIONS
Dear Wilfredo Yoon    Thank you for choosing [facility] ([facility]) for your paracentesis.  The following information is a summary of our appointment as well as important reminders.      Your paracentesis procedure today beginning weight 117.5 lb (53.3 kg), removed 1.6 liters ascites fluid, gave 25 grams albumin fluid, ending weight 113.5 lb    We look forward in seeing you on your next appointment here at [facility] ([facility]).  Please don t hesitate to call us at [phone] to reschedule any of your appointments or to speak with one of the [facility] registered nurses.  It was a pleasure taking care of you today.    Sincerely,    [facility]  [address] [place] [address]  [address]  Phone:  [phone]  DISCHARGE INSTRUCTIONS FOLLOWING ABDOMINAL PARACENTESIS    After you go home:    No strenuous activity for 24 hours    Resume your regular diet    Limit fluid intake for the first 48 hours to no more than 2 quarts per day.  There should be minimal drainage from the needle site.  If drainage does occur and soaks through the bandage, apply gentle pressure with your hand for 5 minutes.    Notify MD for the following:    Excessive drainage    Excessive swelling, redness or tenderness at the needle site    Fever greater than 101 degrees F    Dizziness or light-headedness when getting up or walking      IF THIS IS A MEDICAL EMERGENCY, CALL 911    If you have any questions or concerns    Contact the Hepatology Clinic:   [phone]    If you are a post-transplant patient, contact the Transplant Office:  [phone]    If this is after hours, contact the hospital :  [phone] and as to have the GI resident on call paged                   I have received and understand my discharge instructions and I have all of my personal  belongings.          ------------------------------------------------------        ---------------------------------------------------    Patient / Significant Other's Signature     Relationship

## 2019-06-28 ENCOUNTER — ANCILLARY PROCEDURE (OUTPATIENT)
Dept: ULTRASOUND IMAGING | Facility: CLINIC | Age: 59
End: 2019-06-28
Attending: INTERNAL MEDICINE
Payer: COMMERCIAL

## 2019-06-28 ENCOUNTER — OFFICE VISIT (OUTPATIENT)
Dept: INFUSION THERAPY | Facility: CLINIC | Age: 59
End: 2019-06-28
Attending: INTERNAL MEDICINE
Payer: COMMERCIAL

## 2019-06-28 VITALS
BODY MASS INDEX: 16.52 KG/M2 | HEART RATE: 66 BPM | SYSTOLIC BLOOD PRESSURE: 146 MMHG | TEMPERATURE: 98.3 F | RESPIRATION RATE: 16 BRPM | WEIGHT: 115.1 LBS | DIASTOLIC BLOOD PRESSURE: 90 MMHG | OXYGEN SATURATION: 100 %

## 2019-06-28 DIAGNOSIS — R18.8 OTHER ASCITES: Primary | ICD-10-CM

## 2019-06-28 PROCEDURE — P9047 ALBUMIN (HUMAN), 25%, 50ML: HCPCS | Mod: ZF | Performed by: INTERNAL MEDICINE

## 2019-06-28 PROCEDURE — 25000125 ZZHC RX 250: Mod: ZF | Performed by: INTERNAL MEDICINE

## 2019-06-28 PROCEDURE — 25000128 H RX IP 250 OP 636: Mod: ZF | Performed by: INTERNAL MEDICINE

## 2019-06-28 PROCEDURE — 27210190 US PARACENTESIS

## 2019-06-28 RX ORDER — ALBUMIN (HUMAN) 12.5 G/50ML
12.5 SOLUTION INTRAVENOUS 4 TIMES DAILY PRN
Status: CANCELLED
Start: 2019-06-28

## 2019-06-28 RX ORDER — ALBUMIN (HUMAN) 12.5 G/50ML
12.5 SOLUTION INTRAVENOUS 4 TIMES DAILY PRN
Status: DISCONTINUED | OUTPATIENT
Start: 2019-06-28 | End: 2019-06-28 | Stop reason: HOSPADM

## 2019-06-28 RX ADMIN — ALBUMIN HUMAN 12.5 G: 0.25 SOLUTION INTRAVENOUS at 08:26

## 2019-06-28 RX ADMIN — ALBUMIN HUMAN 12.5 G: 0.25 SOLUTION INTRAVENOUS at 08:43

## 2019-06-28 RX ADMIN — LIDOCAINE HYDROCHLORIDE 10 ML: 10 INJECTION, SOLUTION EPIDURAL; INFILTRATION; INTRACAUDAL; PERINEURAL at 08:26

## 2019-06-28 NOTE — PATIENT INSTRUCTIONS
Patient Education     Discharge Instructions for Paracentesis  Paracentesis is a procedure to remove extra fluid from your belly (abdomen). This fluid buildup in the abdomen is called ascites. The procedure may have been done to take a sample of the fluid. Or, it may have been done to drain the extra fluid from your abdomen and help make you more comfortable.     Ascites is buildup of excess fluid in the abdomen.   Home care    If you have pain after the procedure, your healthcare provider can prescribe or recommend pain medicines. Take these exactly as directed. If you stopped taking other medicines before the procedure, ask your provider when you can start them again.    Take it easy for 24 hours after the procedure. Avoid physical activity until your provider says it s OK.    You will have a small bandage over the puncture site. Stitches (sutures), surgical staples, adhesive tapes, adhesive strips, or surgical glue may be used to close the incision. They also help stop bleeding and speed healing. You may take the bandage off in 24 hours.    Check the puncture site for the signs of infection listed below.  Follow-up care  Make a follow-up appointment with your healthcare provider as directed. During your follow-up visit, your provider will check your healing. Let your provider know how you are feeling. You can also discuss the cause of your ascites and if you need any further treatment.  When to seek medical advice  Call your healthcare provider if you have any of the following after the procedure:    A fever of 100.4 F (38.0 C) or higher    Trouble breathing    Pain that doesn't go away even after taking pain medicine    Belly pain not caused by having the skin punctured    Bleeding from the puncture site    More than a small amount of fluid leaking from the puncture site    Swollen belly    Signs of infection at the puncture site. These include increased pain, redness, or swelling, warmth, or bad-smelling  drainage.    Blood in your urine    Feeling dizzy or lightheaded, or fainting   Date Last Reviewed: 7/1/2016 2000-2018 [employer]. [address] [place] [address], [place] [address]. All rights reserved. This information is not intended as a substitute for professional medical care. Always follow your healthcare professional's instructions.

## 2019-06-28 NOTE — PROGRESS NOTES
Paracentesis Nursing Note  Wilfredo Yoon [facility].    During today's appointment orders from Dr. Deep Rosas were completed.    Progress Note:  Patient identification verified by name and date of birth.  Assessment completed.  Vitals monitored throughout appointment and recorded in Doc Flowsheets.  See proceduralist note in ultrasound.    Vascular Access: peripheral IV placed today.  Labs: were not ordered for this appointment.    Date of consent or authorization: 5/17/19.  Invasive Procedure Safety Checklist was completed and sent for scanning.     Paracentesis performed by Jeni Torres PA-C Radiology.    The following labs were communicated to provider performing paracentesis:  Lab Results   Component Value Date     06/14/2019       Total amount of ascites fluid drained: 1.6 liters.  Color of ascites fluid: yellow.  Total amount of albumin given: 25  grams.    Patient tolerated procedure well.    Post procedure,denies pain or discomfort post paracentesis.      Discharge Plan:  Discharge instructions were reviewed with patient.  Patient/Representative verbalized understanding and all questions were answered.   [facility] in stable condition.    Madison Pickett RN       Administrations This Visit     albumin human 25 % injection 12.5 g     Admin Date  06/28/2019 Action  New Bag Dose  12.5 g Route  Intravenous Administered By  Madison Pickett RN           Admin Date  06/28/2019 Action  New Bag Dose  12.5 g Route  Intravenous Administered By  Madison Pickett RN          lidocaine 1 % 20 mL     Admin Date  06/28/2019 Action  Given by Other Dose  10 mL Route  Other Administered By  Madison Pickett, VAISHALI                  Temp 98.3  F (36.8  C) (Oral)   Resp 16   Wt 53.8 kg (118 lb 8 oz)   SpO2 100%   BMI 17.00 kg/m

## 2019-07-02 ENCOUNTER — PRE VISIT (OUTPATIENT)
Dept: GASTROENTEROLOGY | Facility: CLINIC | Age: 59
End: 2019-07-02

## 2019-07-02 NOTE — PROGRESS NOTES
Was the patient contacted by phone and reminded of the upcoming visit? message left    Was the patient instructed to bring a current list of all medications to the appointment or instructed to bring in all medication bottles? Yes     Is it anticipated the patient will need additional appointments? No    Was the patient instructed to arrive prior to the appointment time to have ordered labs drawn? Yes     Were the needed lab orders placed? Yes    Was lab appointment made 1 hour or more prior to visit? Yes    Patient instructed to arrive early for check-in    Ellyn Olson [facility]  7/2/2019 8:45 AM

## 2019-07-03 ENCOUNTER — OFFICE VISIT (OUTPATIENT)
Dept: GASTROENTEROLOGY | Facility: CLINIC | Age: 59
End: 2019-07-03
Attending: INTERNAL MEDICINE
Payer: COMMERCIAL

## 2019-07-03 VITALS
BODY MASS INDEX: 16.67 KG/M2 | OXYGEN SATURATION: 99 % | WEIGHT: 116.2 LBS | SYSTOLIC BLOOD PRESSURE: 172 MMHG | HEART RATE: 74 BPM | TEMPERATURE: 97.9 F | DIASTOLIC BLOOD PRESSURE: 105 MMHG

## 2019-07-03 DIAGNOSIS — K74.60 CIRRHOSIS OF LIVER WITHOUT ASCITES, UNSPECIFIED HEPATIC CIRRHOSIS TYPE (H): ICD-10-CM

## 2019-07-03 DIAGNOSIS — N05.5 MEMBRANOPROLIFERATIVE GLOMERULONEPHRITIS: ICD-10-CM

## 2019-07-03 DIAGNOSIS — B18.1 CHRONIC VIRAL HEPATITIS B WITHOUT DELTA AGENT AND WITHOUT COMA (H): Primary | ICD-10-CM

## 2019-07-03 DIAGNOSIS — B18.1 CHRONIC VIRAL HEPATITIS B WITHOUT DELTA AGENT AND WITHOUT COMA (H): ICD-10-CM

## 2019-07-03 LAB
AFP SERPL-MCNC: 12.4 UG/L (ref 0–8)
ALBUMIN SERPL-MCNC: 2.4 G/DL (ref 3.4–5)
ALP SERPL-CCNC: 124 U/L (ref 40–150)
ALT SERPL W P-5'-P-CCNC: 25 U/L (ref 0–70)
ANION GAP SERPL CALCULATED.3IONS-SCNC: 6 MMOL/L (ref 3–14)
AST SERPL W P-5'-P-CCNC: 39 U/L (ref 0–45)
BILIRUB DIRECT SERPL-MCNC: 0.1 MG/DL (ref 0–0.2)
BILIRUB SERPL-MCNC: 0.4 MG/DL (ref 0.2–1.3)
BUN SERPL-MCNC: 26 MG/DL (ref 7–30)
CALCIUM SERPL-MCNC: 8.2 MG/DL (ref 8.5–10.1)
CHLORIDE SERPL-SCNC: 104 MMOL/L (ref 94–109)
CO2 SERPL-SCNC: 28 MMOL/L (ref 20–32)
CREAT SERPL-MCNC: 1.26 MG/DL (ref 0.66–1.25)
ERYTHROCYTE [DISTWIDTH] IN BLOOD BY AUTOMATED COUNT: 14.4 % (ref 10–15)
GFR SERPL CREATININE-BSD FRML MDRD: 62 ML/MIN/{1.73_M2}
GLUCOSE SERPL-MCNC: 125 MG/DL (ref 70–99)
HCT VFR BLD AUTO: 35.8 % (ref 40–53)
HGB BLD-MCNC: 11.7 G/DL (ref 13.3–17.7)
INR PPP: 1.08 (ref 0.86–1.14)
MCH RBC QN AUTO: 32.1 PG (ref 26.5–33)
MCHC RBC AUTO-ENTMCNC: 32.7 G/DL (ref 31.5–36.5)
MCV RBC AUTO: 98 FL (ref 78–100)
PLATELET # BLD AUTO: 137 10E9/L (ref 150–450)
POTASSIUM SERPL-SCNC: 3.6 MMOL/L (ref 3.4–5.3)
PROT SERPL-MCNC: 6.6 G/DL (ref 6.8–8.8)
RBC # BLD AUTO: 3.65 10E12/L (ref 4.4–5.9)
SODIUM SERPL-SCNC: 138 MMOL/L (ref 133–144)
WBC # BLD AUTO: 5.7 10E9/L (ref 4–11)

## 2019-07-03 PROCEDURE — 85027 COMPLETE CBC AUTOMATED: CPT | Performed by: INTERNAL MEDICINE

## 2019-07-03 PROCEDURE — 80048 BASIC METABOLIC PNL TOTAL CA: CPT | Performed by: INTERNAL MEDICINE

## 2019-07-03 PROCEDURE — 80076 HEPATIC FUNCTION PANEL: CPT | Performed by: INTERNAL MEDICINE

## 2019-07-03 PROCEDURE — 82105 ALPHA-FETOPROTEIN SERUM: CPT | Performed by: INTERNAL MEDICINE

## 2019-07-03 PROCEDURE — 36415 COLL VENOUS BLD VENIPUNCTURE: CPT | Performed by: INTERNAL MEDICINE

## 2019-07-03 PROCEDURE — G0463 HOSPITAL OUTPT CLINIC VISIT: HCPCS | Mod: ZF

## 2019-07-03 PROCEDURE — 85610 PROTHROMBIN TIME: CPT | Performed by: INTERNAL MEDICINE

## 2019-07-03 RX ORDER — ATORVASTATIN CALCIUM 20 MG/1
20 TABLET, FILM COATED ORAL DAILY
Refills: 3 | Status: ON HOLD | COMMUNITY
Start: 2018-11-04 | End: 2019-10-23

## 2019-07-03 ASSESSMENT — PAIN SCALES - GENERAL: PAINLEVEL: NO PAIN (0)

## 2019-07-03 NOTE — NURSING NOTE
Chief Complaint   Patient presents with     RECHECK     Chronic hepatitis B without hepatic coma     BP (!) 172/105 (BP Location: Right arm, Patient Position: Sitting, Cuff Size: Adult Regular)   Pulse 74   Temp 97.9  F (36.6  C) (Oral)   Wt 52.7 kg (116 lb 3.2 oz)   SpO2 99%   BMI 16.67 kg/m    Sugar White CMA    7/3/2019 8:38 AM

## 2019-07-03 NOTE — LETTER
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  - last HBV DNA= [id number], July 2018     Cirrhosis  - dx Mar 2017  - HBV  - Fibrosis Scan Mar 2017- F4  - hx ascites  - no hx HE or variceal bleed  - EGD Oct 2018- normal esophagus, normal stomach  - HCC screening- MRI liver May 2019    The patient comes to clinic this morning with a Malagasy  for follow-up of decompensated cirrhosis.  Last clinic visit with me in 10/2018.  He did see Dr. Powell in 05/2019 after developing ascites and is now taking entecavir and bumetanide.  He had been to the ER twice in March and May for abdominal pain related to ascites.  MRI liver in May showed multiple small LIRADS-3 lesions but no HCC.  He denies any hospital admissions since last clinic visit.      The patient is feeling good today.  His abdomen is soft.  He denies any abdominal discomfort.  He notes that his weekly paracentesis has yielded progressively decreasing volumes.  He denies any lower extremity edema.      He reports that he is adherent to all of his medications including bumetanide and entecavir.      The patient denies itch, jaundice, lethargy or confusion.      No history of fevers, sweats or chills.      Appetite is good.  Weight is stable.      No history of melena, hematemesis or hematochezia.      The patient does not drink alcohol.  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  He is requesting a letter or some social work assistance to help with these issues.       Medical hx Surgical hx   Past Medical  History:   Diagnosis Date     Cryoglobulinemia (H)      Glomerulonephritis      Hepatitis B      Hypertension      Surgical complication       Past Surgical History:   Procedure Laterality Date     C HAND/FINGER SURGERY UNLISTED       ESOPHAGOSCOPY, GASTROSCOPY, DUODENOSCOPY (EGD), COMBINED N/A 10/18/2018    Procedure: EGD;  Surgeon: Eber Ortez MD;  Location:  GI     HAND SURGERY Right           Medications  Prior to Admission medications    Medication Sig Start Date End Date Taking? Authorizing Provider   amLODIPine (NORVASC) 5 MG tablet Take 1 tablet (5 mg) by mouth daily 2/2/19 7/3/19 Yes Bernardo Marks MD   atorvastatin (LIPITOR) 20 MG tablet Take 20 mg by mouth daily 11/4/18  Yes Reported, Patient   bumetanide (BUMEX) 2 MG tablet Take 1 tablet (2 mg) by mouth daily 2/2/19 7/3/19 Yes Bernardo Marks MD   entecavir (BARACLUDE) 1 MG tablet Take 1 tablet (1 mg) by mouth daily 5/15/19  Yes Della Powell MD   lisinopril (PRINIVIL/ZESTRIL) 20 MG tablet Take 1 tablet (20 mg) by mouth daily 2/2/19 7/3/19 Yes Bernardo Marks MD       Allergies  No Known Allergies    Review of systems  A 10-point review of systems was negative    Examination  BP (!) 172/105 (BP Location: Right arm, Patient Position: Sitting, Cuff Size: Adult Regular)   Pulse 74   Temp 97.9  F (36.6  C) (Oral)   Wt 52.7 kg (116 lb 3.2 oz)   SpO2 99%   BMI 16.67 kg/m     Body mass index is 16.67 kg/m .    Gen- well, NAD, A+Ox3, normal color  CVS- RRR  RS- CTA  Abd- SNT, no ascites (improved) or organomegaly on palpation or percussion, BS+  Extr- hands normal, no AIDEN  Skin- no rash or jaundice  Neuro- no asterixis  Psych- normal mood    Laboratory  Lab Results   Component Value Date     07/03/2019    POTASSIUM 3.6 07/03/2019    CHLORIDE 104 07/03/2019    CO2 28 07/03/2019    BUN 26 07/03/2019    CR 1.26 07/03/2019       Lab Results   Component Value Date    BILITOTAL 0.4 07/03/2019    ALT 25  07/03/2019    AST 39 07/03/2019    ALKPHOS 124 07/03/2019       Lab Results   Component Value Date    ALBUMIN 2.4 07/03/2019    PROTTOTAL 6.6 07/03/2019        Lab Results   Component Value Date    WBC 5.7 07/03/2019    HGB 11.7 07/03/2019    MCV 98 07/03/2019     07/03/2019       Lab Results   Component Value Date    INR 1.08 07/03/2019       Radiology  MRI liver May 2019 reviewed- no HCC, multiple small LIRADS-3 lesions (likely vascular shunts), ascites    Assessment  59-year-old male who presents for follow-up of decompensated HBV cirrhosis complicated with ascites.  MELD-Na= 10.  Ascites almost resolved with adherence to antiviral and diuretic medications.  No evidence of hepatic encephalopathy.  Will request social work assistance to help with the patient's housing issues.  Up to date with HCC screening.  Up to date with screening for esophageal varices.     Plan  1.  Continue entecavir  2.  Low Na diet  3.  Continue bumetanide  4.  Paracentesis as needed  5.  Follow-up in 3 months    Eber Adame MD  Hepatology  [facility]

## 2019-07-03 NOTE — PROGRESS NOTES
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  - last HBV DNA= [id number], July 2018     Cirrhosis  - dx Mar 2017  - HBV  - Fibrosis Scan Mar 2017- F4  - hx ascites  - no hx HE or variceal bleed  - EGD Oct 2018- normal esophagus, normal stomach  - HCC screening- MRI liver May 2019    The patient comes to clinic this morning with a [facility]  for follow-up of decompensated cirrhosis.  Last clinic visit with me in 10/2018.  He did see Dr. Powell in 05/2019 after developing ascites and is now taking entecavir and bumetanide.  He had been to the ER twice in March and May for abdominal pain related to ascites.  MRI liver in May showed multiple small LIRADS-3 lesions but no HCC.  He denies any hospital admissions since last clinic visit.      The patient is feeling good today.  His abdomen is soft.  He denies any abdominal discomfort.  He notes that his weekly paracentesis has yielded progressively decreasing volumes.  He denies any lower extremity edema.      He reports that he is adherent to all of his medications including bumetanide and entecavir.      The patient denies itch, jaundice, lethargy or confusion.      No history of fevers, sweats or chills.      Appetite is good.  Weight is stable.      No history of melena, hematemesis or hematochezia.      The patient does not drink alcohol.  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  He is requesting a letter or some social work assistance to help with these issues.       Medical hx Surgical hx   Past Medical History:   Diagnosis Date     Cryoglobulinemia (H)      Glomerulonephritis      Hepatitis B      Hypertension       Surgical complication       Past Surgical History:   Procedure Laterality Date     C HAND/FINGER SURGERY UNLISTED       ESOPHAGOSCOPY, GASTROSCOPY, DUODENOSCOPY (EGD), COMBINED N/A 10/18/2018    Procedure: EGD;  Surgeon: Eber Ortez MD;  Location:  GI     HAND SURGERY Right           Medications  Prior to Admission medications    Medication Sig Start Date End Date Taking? Authorizing Provider   amLODIPine (NORVASC) 5 MG tablet Take 1 tablet (5 mg) by mouth daily 2/2/19 7/3/19 Yes Bernardo Marks MD   atorvastatin (LIPITOR) 20 MG tablet Take 20 mg by mouth daily 11/4/18  Yes Reported, Patient   bumetanide (BUMEX) 2 MG tablet Take 1 tablet (2 mg) by mouth daily 2/2/19 7/3/19 Yes Bernardo Marks MD   entecavir (BARACLUDE) 1 MG tablet Take 1 tablet (1 mg) by mouth daily 5/15/19  Yes Della Powell MD   lisinopril (PRINIVIL/ZESTRIL) 20 MG tablet Take 1 tablet (20 mg) by mouth daily 2/2/19 7/3/19 Yes Bernardo Marks MD       Allergies  No Known Allergies    Review of systems  A 10-point review of systems was negative    Examination  BP (!) 172/105 (BP Location: Right arm, Patient Position: Sitting, Cuff Size: Adult Regular)   Pulse 74   Temp 97.9  F (36.6  C) (Oral)   Wt 52.7 kg (116 lb 3.2 oz)   SpO2 99%   BMI 16.67 kg/m    Body mass index is 16.67 kg/m .    Gen- well, NAD, A+Ox3, normal color  CVS- RRR  RS- CTA  Abd- SNT, no ascites (improved) or organomegaly on palpation or percussion, BS+  Extr- hands normal, no AIDEN  Skin- no rash or jaundice  Neuro- no asterixis  Psych- normal mood    Laboratory  Lab Results   Component Value Date     07/03/2019    POTASSIUM 3.6 07/03/2019    CHLORIDE 104 07/03/2019    CO2 28 07/03/2019    BUN 26 07/03/2019    CR 1.26 07/03/2019       Lab Results   Component Value Date    BILITOTAL 0.4 07/03/2019    ALT 25 07/03/2019    AST 39 07/03/2019    ALKPHOS 124 07/03/2019       Lab Results   Component Value Date    ALBUMIN  2.4 07/03/2019    PROTTOTAL 6.6 07/03/2019        Lab Results   Component Value Date    WBC 5.7 07/03/2019    HGB 11.7 07/03/2019    MCV 98 07/03/2019     07/03/2019       Lab Results   Component Value Date    INR 1.08 07/03/2019       Radiology  MRI liver May 2019 reviewed- no HCC, multiple small LIRADS-3 lesions (likely vascular shunts), ascites    Assessment  59-year-old male who presents for follow-up of decompensated HBV cirrhosis complicated with ascites.  MELD-Na= 10.  Ascites almost resolved with adherence to antiviral and diuretic medications.  No evidence of hepatic encephalopathy.  Will request social work assistance to help with the patient's housing issues.  Up to date with HCC screening.  Up to date with screening for esophageal varices.     Plan  1.  Continue entecavir  2.  Low Na diet  3.  Continue bumetanide  4.  Paracentesis as needed  5.  Follow-up in 3 months    Eber Adame MD  Hepatology  [facility]

## 2019-07-05 ENCOUNTER — TELEPHONE (OUTPATIENT)
Dept: GASTROENTEROLOGY | Facility: CLINIC | Age: 59
End: 2019-07-05

## 2019-07-05 ENCOUNTER — ANCILLARY PROCEDURE (OUTPATIENT)
Dept: ULTRASOUND IMAGING | Facility: CLINIC | Age: 59
End: 2019-07-05
Attending: INTERNAL MEDICINE
Payer: COMMERCIAL

## 2019-07-05 ENCOUNTER — OFFICE VISIT (OUTPATIENT)
Dept: INFUSION THERAPY | Facility: CLINIC | Age: 59
End: 2019-07-05
Attending: INTERNAL MEDICINE
Payer: COMMERCIAL

## 2019-07-05 VITALS
WEIGHT: 116.5 LBS | HEART RATE: 73 BPM | SYSTOLIC BLOOD PRESSURE: 166 MMHG | OXYGEN SATURATION: 100 % | DIASTOLIC BLOOD PRESSURE: 96 MMHG | TEMPERATURE: 98 F | BODY MASS INDEX: 16.72 KG/M2 | RESPIRATION RATE: 16 BRPM

## 2019-07-05 DIAGNOSIS — R18.8 OTHER ASCITES: ICD-10-CM

## 2019-07-05 DIAGNOSIS — R18.8 OTHER ASCITES: Primary | ICD-10-CM

## 2019-07-05 DIAGNOSIS — B18.1 CHRONIC VIRAL HEPATITIS B WITHOUT DELTA AGENT AND WITHOUT COMA (H): Primary | ICD-10-CM

## 2019-07-05 PROCEDURE — 25000125 ZZHC RX 250: Mod: ZF | Performed by: INTERNAL MEDICINE

## 2019-07-05 PROCEDURE — P9047 ALBUMIN (HUMAN), 25%, 50ML: HCPCS | Mod: ZF | Performed by: INTERNAL MEDICINE

## 2019-07-05 PROCEDURE — 25000128 H RX IP 250 OP 636: Mod: ZF | Performed by: INTERNAL MEDICINE

## 2019-07-05 PROCEDURE — 87517 HEPATITIS B DNA QUANT: CPT | Performed by: INTERNAL MEDICINE

## 2019-07-05 PROCEDURE — 27210190 US PARACENTESIS

## 2019-07-05 RX ORDER — ALBUMIN (HUMAN) 12.5 G/50ML
12.5 SOLUTION INTRAVENOUS 4 TIMES DAILY PRN
Status: DISCONTINUED | OUTPATIENT
Start: 2019-07-05 | End: 2019-07-05 | Stop reason: HOSPADM

## 2019-07-05 RX ORDER — ALBUMIN (HUMAN) 12.5 G/50ML
12.5 SOLUTION INTRAVENOUS 4 TIMES DAILY PRN
Status: CANCELLED
Start: 2019-07-05

## 2019-07-05 RX ADMIN — ALBUMIN HUMAN 12.5 G: 0.25 SOLUTION INTRAVENOUS at 10:11

## 2019-07-05 RX ADMIN — LIDOCAINE HYDROCHLORIDE 10 ML: 10 INJECTION, SOLUTION EPIDURAL; INFILTRATION; INTRACAUDAL; PERINEURAL at 10:09

## 2019-07-05 NOTE — TELEPHONE ENCOUNTER
"Patient came into clinic this morning requesting a letter. Patient's rent is increasing and patient works limited hours due to his liver disease.     Patient stated he was told that he has to pay increased rent or will have to move. Patient requesting a letter that explains his limitations on ability to work and his financial limits that the patient can pass along to his building in the hopes of being able to stay.    Writer asked how soon patient needs the the letter by but patient didn't seem to understand the question of \"When\". Used a calendar and patient wanted to come back Monday @ 1000 for a letter.    Plan to see if Dr. Adame would be willing to write a letter vs seeing if  would be able to reach patient to discuss options for housing/financial assistance.      "

## 2019-07-05 NOTE — PROGRESS NOTES
Paracentesis Nursing Note  Wilfredo Yoon presents today to [facility] for a paracentesis.    During today's appointment orders from Dr THERESE Adame were completed.    Progress Note:  Patient identification verified by name and date of birth.  Assessment completed.  Vitals monitored throughout appointment and recorded in Doc Flowsheets.  See proceduralist note in ultrasound.  Panamanian  present for appointment.     Vascular Access: peripheral IV placed today.  Labs: were drawn per orders.     Date of consent or authorization: 05/17/2019.  Invasive Procedure Safety Checklist was completed and sent for scanning.     Paracentesis performed by Suman Bedoya PA-C Radiology.    The following labs were communicated to provider performing paracentesis:  Lab Results   Component Value Date     07/03/2019       Total amount of ascites fluid drained: 1 liter.  Color of ascites fluid: yellow.  Total amount of albumin given: 12.5  grams.    Patient tolerated procedure well.    Post procedure,denies pain or discomfort post paracentesis.     Patient with elevated blood pressure which has been his trend. States he is taking his blood pressure medications every night. Asymptomatic. Encouraged patient to follow up with primary doctor regarding HTN. Patient agreeable and verbalized understanding.       Discharge Plan:  Discharge instructions were reviewed with patient.  Patient/Representative verbalized understanding and all questions were answered.   Discharged from [facility] in stable condition.    Zaki Garza RN    Administrations This Visit     albumin human 25 % injection 12.5 g     Admin Date  07/05/2019 Action  New Bag Dose  12.5 g Route  Intravenous Administered By  Zaki Garza RN          lidocaine 1 % 20 mL     Admin Date  07/05/2019 Action  Given by Other Clinician Dose  10 mL Route  Other Administered By  Zaki Garza RN                 Temp 98  F (36.7  C) (Oral)   Resp 16   Wt 53.6 kg (118 lb 3.2 oz)   SpO2 100%   BMI 16.96 kg/m

## 2019-07-08 ENCOUNTER — MEDICAL CORRESPONDENCE (OUTPATIENT)
Dept: HEALTH INFORMATION MANAGEMENT | Facility: CLINIC | Age: 59
End: 2019-07-08

## 2019-07-08 ENCOUNTER — PATIENT OUTREACH (OUTPATIENT)
Dept: CARE COORDINATION | Facility: CLINIC | Age: 59
End: 2019-07-08

## 2019-07-08 NOTE — PROGRESS NOTES
"Social Work Intervention  [facility] [place]    Data/Intervention:    Patient Name:  Wilfredo Yoon  /Age:  1960 (59 year old)    Visit Type: in person  Referral Source: VAISHALI Melendez in Hepatology Clinic   Reason for Referral:  Housing issues     Collaborated With:    - MAHESH Marlyn Montague (provided  coverage for this writer last week)   - VAISHALI Melendez  - Patient   - Pt's , Cherelle (Ph. [phone])     Patient Concerns/Issues:   Received request to meet with pt, who was requesting a letter from the clinic due to housing issues. It was unclear what type of letter pt was requesting. SW Marlyn Montague attempted to reach pt last week but his voicemail box was full. Pt came to clinic without an appt to obtain letter on 19.    Met with pt in clinic; introduced self and role of SW. Pt agreeable to meeting and using [employer] phone . Pt explained to SW that his rent went from $519 to $940 and he cannot afford to pay this. Pt reported that he has low-income housing ([employer] , is on a Section 8 voucher. Pt stated that he works 4.5 hrs/wk and is requesting a letter stating that due to his health issues he is unable to work full-time and so he needs to have his rent reduced back to what it was. Attempted to understand why his rent would increase this much if he has a Section 8 voucher. Agreed to provide letter of advocacy if needed, but also requested permission to speak with his landlord to help troubleshoot the issue with him and make sure there wasn't additional paperwork he needed to submit or other issues that needed to be addressed. Pt was agreeable to this though stated he has submitted everything and that they \"lost my documents.\" Called Cherelle at [employer] but reached voicemail so left message. Later attempted again and was able to get in touch with her and spoke with her on speaker phone with pt in the room. Inquired about " situation with pt's rent and Section 8 voucher status. Cherelle reported that it came to light that pt had more income than he reported and so an audit was done and pt was over-income for Section 8, so his rent increased. He also owes back the difference in rent for the months he was over-income but paid Section 8 rates. Discussed that the pt is sick, has weekly appts and that he has difficulty working full-time, asked if there was any way they could work with him while he reapplies for Section 8 (Cherelle reported he would need to reapply for the waitlist, he couldn't just be transferred back on to these rates). Cherelle stated they are unable to do this. Inquired if they manage any apartments that would be cheaper that he could move into. Cherelle reported that they could maybe move pt to a studio for $750, but that's the cheapest option available. Pt began stating that he is sick and can't work enough to afford this increase. Agreed to end call with Cherelle and discuss further. Discussed other options for assistance through the [facility] (e.g., food support, general cash assistance) and applying for disability. Pt adamantly refused these options and did not want to seek assistance, he just wanted a letter as discussed above. He also was adamant that they had made a mistake with his documents and that he had reported all of his income. Offered to print off resources for [employer] (legal assistance for renters) and pt was interested in this. Provided pt with a letter of advocacy for his property management, as well as [employer] info printed in [employer] and SW business card if he has additional questions/concerns. Pt thanked  for the letter and for meeting with him.     Intervention/Education/Resources Provided:  - Brief assessment of needs/resources  - Advocacy call with property management  - Letter of advocacy for property management  - [employer] info in [employer]     Assessment/Plan:  Pt was pleasant and receptive to , easily engaged,  open to support. Pt was frustrated and tearful re: situation. Unclear what actually happened with income reports, as there were discrepancies between pt and . Pt was not really receptive to resources for which he may be eligible, his main goal was to obtain a letter. I did explain that I was doubtful that this would help anything, but agreed to provide. Pt has SW contact info if he needs further assistance, as he is not connected to any other /supports in the community that I could tell.     BENY Milan, [facility]  Clinical   [facility]   Ph. [phone]  [email or web address]     NO LETTER

## 2019-07-10 LAB
HBV DNA SERPL NAA+PROBE-ACNC: 72 [IU]/ML
HBV DNA SERPL NAA+PROBE-LOG IU: 1.9 {LOG_IU}/ML

## 2019-07-12 ENCOUNTER — OFFICE VISIT (OUTPATIENT)
Dept: INFUSION THERAPY | Facility: CLINIC | Age: 59
End: 2019-07-12
Attending: INTERNAL MEDICINE
Payer: COMMERCIAL

## 2019-07-12 ENCOUNTER — ANCILLARY PROCEDURE (OUTPATIENT)
Dept: ULTRASOUND IMAGING | Facility: CLINIC | Age: 59
End: 2019-07-12
Attending: INTERNAL MEDICINE
Payer: COMMERCIAL

## 2019-07-12 VITALS — OXYGEN SATURATION: 97 % | BODY MASS INDEX: 17.32 KG/M2 | TEMPERATURE: 97.6 F | WEIGHT: 120.7 LBS

## 2019-07-12 DIAGNOSIS — R18.8 OTHER ASCITES: Primary | ICD-10-CM

## 2019-07-12 PROCEDURE — 76705 ECHO EXAM OF ABDOMEN: CPT

## 2019-07-12 RX ORDER — ALBUMIN (HUMAN) 12.5 G/50ML
12.5 SOLUTION INTRAVENOUS 4 TIMES DAILY PRN
Status: DISCONTINUED | OUTPATIENT
Start: 2019-07-12 | End: 2019-07-12 | Stop reason: HOSPADM

## 2019-07-12 RX ORDER — ALBUMIN (HUMAN) 12.5 G/50ML
12.5 SOLUTION INTRAVENOUS 4 TIMES DAILY PRN
Status: CANCELLED
Start: 2019-07-12

## 2019-07-12 NOTE — PROGRESS NOTES
Paracentesis Nursing Note  Wilfredo Yoon presents today to [facility] for a paracentesis.    During today's appointment orders from Dr. Adame were completed.    Pt assessed by Denton Arce and ultrasound. Decision made that there wasn't enough fluid to safely perform procedure today so paracentesis cancelled today. Pt will return next week for fluid check.     Progress Note:  Patient identification verified by name and date of birth.  Assessment completed.  Vitals monitored throughout appointment and recorded in Doc Flowsheets.  See proceduralist note in ultrasound.    Vascular Access: peripheral IV placed today.  Labs: were not ordered for this appointment.    The following labs were communicated to provider performing paracentesis:  Lab Results   Component Value Date     07/03/2019       Discharge Plan:  Discharge instructions were reviewed with patient.  Patient/Representative verbalized understanding and all questions were answered.   Discharged from [facility] in stable condition.    Evelina Howell RN        Temp 97.6  F (36.4  C) (Oral)   Wt 54.7 kg (120 lb 11.2 oz)   SpO2 97%   BMI 17.32 kg/m

## 2019-07-19 ENCOUNTER — ANCILLARY PROCEDURE (OUTPATIENT)
Dept: ULTRASOUND IMAGING | Facility: CLINIC | Age: 59
End: 2019-07-19
Attending: INTERNAL MEDICINE
Payer: COMMERCIAL

## 2019-07-19 ENCOUNTER — OFFICE VISIT (OUTPATIENT)
Dept: INFUSION THERAPY | Facility: CLINIC | Age: 59
End: 2019-07-19
Attending: INTERNAL MEDICINE
Payer: COMMERCIAL

## 2019-07-19 VITALS
BODY MASS INDEX: 17.15 KG/M2 | SYSTOLIC BLOOD PRESSURE: 181 MMHG | RESPIRATION RATE: 16 BRPM | WEIGHT: 119.5 LBS | TEMPERATURE: 97.9 F | DIASTOLIC BLOOD PRESSURE: 93 MMHG | HEART RATE: 67 BPM

## 2019-07-19 DIAGNOSIS — R18.8 OTHER ASCITES: Primary | ICD-10-CM

## 2019-07-19 PROCEDURE — 76705 ECHO EXAM OF ABDOMEN: CPT | Mod: TC

## 2019-07-19 RX ORDER — ALBUMIN (HUMAN) 12.5 G/50ML
12.5 SOLUTION INTRAVENOUS 4 TIMES DAILY PRN
Status: CANCELLED
Start: 2019-07-19

## 2019-07-19 RX ORDER — ALBUMIN (HUMAN) 12.5 G/50ML
12.5 SOLUTION INTRAVENOUS 4 TIMES DAILY PRN
Status: DISCONTINUED | OUTPATIENT
Start: 2019-07-19 | End: 2019-07-19 | Stop reason: HOSPADM

## 2019-07-19 NOTE — PROGRESS NOTES
Paracentesis Nursing Note  Wilfredo Yoon presents today to [facility] for a paracentesis.    During today's appointment orders from Dr. Deep Rossi were completed.    Progress Note:  Patient identification verified by name and date of birth.  Assessment completed.    Patient did not have fluid today. He was not have any symptoms of SOB or pain due to fluid.  Patient wanted to keep his next appointment one week today and then if still no fluid he would call for an appointment if he felt he was having symptoms.  Today was the second week with no fluids.  He has one more appointment scheduled next Friday and will not schedule after that unless symptomatic for another paracentesis.    I educated him re: taking blood pressure medications, patient is hypertensive.  He stated with intepreter present that he forgot to take before he left home but would go do that now.  I did more education regarding importance of taking them so there is not kidney or liver damage.  Patient through  stated he understood and declined interventions here.    The following labs were communicated to provider performing paracentesis:  Lab Results   Component Value Date     07/03/2019       Discharge Plan:  Discharge instructions were reviewed with patient.  Patient/Representative verbalized understanding and all questions were answered.   Discharged from [facility] in stable condition.    Jerri Reid RN      BP (!) 181/93   Pulse 67   Temp 97.9  F (36.6  C) (Oral)   Resp 16   Wt 54.2 kg (119 lb 8 oz)   BMI 17.15 kg/m

## 2019-07-26 ENCOUNTER — OFFICE VISIT (OUTPATIENT)
Dept: INFUSION THERAPY | Facility: CLINIC | Age: 59
End: 2019-07-26
Attending: INTERNAL MEDICINE
Payer: COMMERCIAL

## 2019-07-26 ENCOUNTER — ANCILLARY PROCEDURE (OUTPATIENT)
Dept: ULTRASOUND IMAGING | Facility: CLINIC | Age: 59
End: 2019-07-26
Attending: INTERNAL MEDICINE
Payer: COMMERCIAL

## 2019-07-26 VITALS — TEMPERATURE: 98.3 F | DIASTOLIC BLOOD PRESSURE: 102 MMHG | SYSTOLIC BLOOD PRESSURE: 152 MMHG

## 2019-07-26 DIAGNOSIS — R18.8 OTHER ASCITES: Primary | ICD-10-CM

## 2019-07-26 PROCEDURE — 76705 ECHO EXAM OF ABDOMEN: CPT | Mod: TC

## 2019-07-26 RX ORDER — ALBUMIN (HUMAN) 12.5 G/50ML
12.5 SOLUTION INTRAVENOUS 4 TIMES DAILY PRN
Status: CANCELLED
Start: 2019-07-26

## 2019-07-26 RX ORDER — ALBUMIN (HUMAN) 12.5 G/50ML
12.5 SOLUTION INTRAVENOUS 4 TIMES DAILY PRN
Status: DISCONTINUED | OUTPATIENT
Start: 2019-07-26 | End: 2019-07-26 | Stop reason: HOSPADM

## 2019-07-26 NOTE — PROGRESS NOTES
Paracentesis Nursing Note  Wilfredo Yoon presents today to [facility] for a paracentesis.      Progress Note:  Patient identification verified by name and date of birth.      Upon ultrasound, no ascites fluid noted. Provider did not see patient because patient decided to leave.    Lab Results   Component Value Date     07/03/2019       Discharge Plan:  Discharge instructions were reviewed with patient.  Patient/Representative verbalized understanding and all questions were answered.   Discharged from [facility] in stable condition.    Enedelia Velásquez RN      BP (!) 152/102   Temp 98.3  F (36.8  C) (Oral)

## 2019-07-26 NOTE — PROGRESS NOTES
Patient presents for paracentesis, however ultrasound shows no fluid.  Paracentesis canceled as patient denies abdominal distention or discomfort.    Kranthi Cooney PA-C  Interventional Radiology  [phone]

## 2019-08-07 ENCOUNTER — OFFICE VISIT (OUTPATIENT)
Dept: INFUSION THERAPY | Facility: CLINIC | Age: 59
End: 2019-08-07
Attending: INTERNAL MEDICINE
Payer: COMMERCIAL

## 2019-08-07 ENCOUNTER — ANCILLARY PROCEDURE (OUTPATIENT)
Dept: ULTRASOUND IMAGING | Facility: CLINIC | Age: 59
End: 2019-08-07
Attending: INTERNAL MEDICINE
Payer: COMMERCIAL

## 2019-08-07 VITALS
BODY MASS INDEX: 16.46 KG/M2 | RESPIRATION RATE: 16 BRPM | WEIGHT: 114.7 LBS | OXYGEN SATURATION: 100 % | TEMPERATURE: 97.4 F

## 2019-08-07 DIAGNOSIS — R18.8 OTHER ASCITES: Primary | ICD-10-CM

## 2019-08-07 PROCEDURE — 76705 ECHO EXAM OF ABDOMEN: CPT

## 2019-08-07 RX ORDER — ALBUMIN (HUMAN) 12.5 G/50ML
12.5 SOLUTION INTRAVENOUS 4 TIMES DAILY PRN
Status: CANCELLED
Start: 2019-08-07

## 2019-08-07 RX ORDER — ALBUMIN (HUMAN) 12.5 G/50ML
12.5 SOLUTION INTRAVENOUS 4 TIMES DAILY PRN
Status: DISCONTINUED | OUTPATIENT
Start: 2019-08-07 | End: 2019-08-07 | Stop reason: HOSPADM

## 2019-08-07 NOTE — PROGRESS NOTES
Paracentesis Nursing Note  Wilfredoliz Yoon presents today to [facility] for a paracentesis.      Patient did not have any ascites fluid to drain and was asymptomatic. Patient did not wait for PA to look and chose to discharge.    We did discuss his high BPs, and patient stated he takes his BP meds after his appointment when he eats breakfast; he said he would take them as soon as he got home. Patient was asymptomatic.      Madison Pickett RN        Temp 97.4  F (36.3  C) (Oral)   Resp 16   Wt 52 kg (114 lb 11.2 oz)   SpO2 100%   BMI 16.46 kg/m

## 2019-08-08 ENCOUNTER — TELEPHONE (OUTPATIENT)
Dept: GASTROENTEROLOGY | Facility: CLINIC | Age: 59
End: 2019-08-08

## 2019-08-08 DIAGNOSIS — B18.1 CHRONIC VIRAL HEPATITIS B WITHOUT DELTA AGENT AND WITHOUT COMA (H): Primary | ICD-10-CM

## 2019-08-08 NOTE — TELEPHONE ENCOUNTER
Called patient to let him know he no longer needs to have paracentesis as the last two times he presented for paracentesis ultrasound showed no fluid.      Patient stated he would like to be see Dr. Adame sooner as he has been having pain in the area of his liver and has been vomiting.  Let patient know he should see his primary care provider for his symptoms.  Patient asked if he could see Dr. Powell.  Let him know Dr. Powell's schedule is full.  Patient states he will call primary provider's office to schedule appointment.     No further questions or concerns.

## 2019-10-01 ENCOUNTER — OFFICE VISIT (OUTPATIENT)
Dept: GASTROENTEROLOGY | Facility: CLINIC | Age: 59
End: 2019-10-01
Attending: INTERNAL MEDICINE
Payer: COMMERCIAL

## 2019-10-01 VITALS
HEART RATE: 70 BPM | TEMPERATURE: 97.6 F | BODY MASS INDEX: 17.05 KG/M2 | SYSTOLIC BLOOD PRESSURE: 203 MMHG | DIASTOLIC BLOOD PRESSURE: 107 MMHG | OXYGEN SATURATION: 99 % | WEIGHT: 118.8 LBS

## 2019-10-01 DIAGNOSIS — K74.60 CIRRHOSIS OF LIVER WITHOUT ASCITES, UNSPECIFIED HEPATIC CIRRHOSIS TYPE (H): Primary | ICD-10-CM

## 2019-10-01 DIAGNOSIS — B18.1 CHRONIC VIRAL HEPATITIS B WITHOUT DELTA AGENT AND WITHOUT COMA (H): ICD-10-CM

## 2019-10-01 PROBLEM — R18.8 OTHER ASCITES: Status: RESOLVED | Noted: 2019-03-11 | Resolved: 2019-10-01

## 2019-10-01 PROBLEM — I16.1 HYPERTENSIVE EMERGENCY: Status: RESOLVED | Noted: 2018-07-31 | Resolved: 2019-10-01

## 2019-10-01 LAB
ALBUMIN SERPL-MCNC: 2.1 G/DL (ref 3.4–5)
ALP SERPL-CCNC: 136 U/L (ref 40–150)
ALT SERPL W P-5'-P-CCNC: 26 U/L (ref 0–70)
ANION GAP SERPL CALCULATED.3IONS-SCNC: 3 MMOL/L (ref 3–14)
AST SERPL W P-5'-P-CCNC: 37 U/L (ref 0–45)
BILIRUB DIRECT SERPL-MCNC: 0.2 MG/DL (ref 0–0.2)
BILIRUB SERPL-MCNC: 0.6 MG/DL (ref 0.2–1.3)
BUN SERPL-MCNC: 22 MG/DL (ref 7–30)
CALCIUM SERPL-MCNC: 8.3 MG/DL (ref 8.5–10.1)
CHLORIDE SERPL-SCNC: 106 MMOL/L (ref 94–109)
CO2 SERPL-SCNC: 27 MMOL/L (ref 20–32)
CREAT SERPL-MCNC: 1.17 MG/DL (ref 0.66–1.25)
ERYTHROCYTE [DISTWIDTH] IN BLOOD BY AUTOMATED COUNT: 12.2 % (ref 10–15)
GFR SERPL CREATININE-BSD FRML MDRD: 68 ML/MIN/{1.73_M2}
GLUCOSE SERPL-MCNC: 138 MG/DL (ref 70–99)
HCT VFR BLD AUTO: 42 % (ref 40–53)
HGB BLD-MCNC: 13.7 G/DL (ref 13.3–17.7)
INR PPP: 1.12 (ref 0.86–1.14)
MCH RBC QN AUTO: 32.2 PG (ref 26.5–33)
MCHC RBC AUTO-ENTMCNC: 32.6 G/DL (ref 31.5–36.5)
MCV RBC AUTO: 99 FL (ref 78–100)
PLATELET # BLD AUTO: 108 10E9/L (ref 150–450)
POTASSIUM SERPL-SCNC: 4.3 MMOL/L (ref 3.4–5.3)
PROT SERPL-MCNC: 6.2 G/DL (ref 6.8–8.8)
RBC # BLD AUTO: 4.26 10E12/L (ref 4.4–5.9)
SODIUM SERPL-SCNC: 136 MMOL/L (ref 133–144)
WBC # BLD AUTO: 4.9 10E9/L (ref 4–11)

## 2019-10-01 PROCEDURE — 87517 HEPATITIS B DNA QUANT: CPT | Performed by: INTERNAL MEDICINE

## 2019-10-01 PROCEDURE — 80076 HEPATIC FUNCTION PANEL: CPT | Performed by: INTERNAL MEDICINE

## 2019-10-01 PROCEDURE — 80048 BASIC METABOLIC PNL TOTAL CA: CPT | Performed by: INTERNAL MEDICINE

## 2019-10-01 PROCEDURE — 85610 PROTHROMBIN TIME: CPT | Performed by: INTERNAL MEDICINE

## 2019-10-01 PROCEDURE — 85027 COMPLETE CBC AUTOMATED: CPT | Performed by: INTERNAL MEDICINE

## 2019-10-01 PROCEDURE — 36415 COLL VENOUS BLD VENIPUNCTURE: CPT | Performed by: INTERNAL MEDICINE

## 2019-10-01 PROCEDURE — G0463 HOSPITAL OUTPT CLINIC VISIT: HCPCS | Mod: ZF

## 2019-10-01 ASSESSMENT — PAIN SCALES - GENERAL: PAINLEVEL: NO PAIN (0)

## 2019-10-01 NOTE — PROGRESS NOTES
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  - last HBV DNA= [id number], July 2018     Cirrhosis  - dx Mar 2017  - HBV  - Fibrosis Scan Mar 2017- F4  - hx ascites  - no hx HE or variceal bleed  - EGD Oct 2018- normal esophagus, normal stomach  - HCC screening- MRI liver May 2019    Patient comes to clinic this morning with a [place]  for follow-up of cirrhosis.  Last clinic visit July 2019.  Patient denies new medications, ER visits or hospital admissions since last clinic visit.    Patient is doing well today.  He has not needed a paracentesis since late July.  He also denies any lower extremity edema.  He is taking all his medications including diuretics and entecavir.    Patient denies joint pain, rash, jaundice, lower extremity edema, abdominal distension, lethargy or confusion.    Patient denies melena, hematemesis or hematochezia.    Patient denies fevers, sweats or chills.      Weight has stabilized with resolution of ascites.  Appetite is good.    Patient does not drink alcohol.      Medical hx Surgical hx   Past Medical History:   Diagnosis Date     Cryoglobulinemia (H)      Glomerulonephritis      Hepatitis B      Hypertension      Surgical complication       Past Surgical History:   Procedure Laterality Date     C HAND/FINGER SURGERY UNLISTED       ESOPHAGOSCOPY, GASTROSCOPY, DUODENOSCOPY (EGD), COMBINED N/A 10/18/2018    Procedure: EGD;  Surgeon: Eber Ortez MD;  Location:  GI     HAND SURGERY Right           Medications  Prior to Admission medications    Medication Sig Start Date End Date Taking? Authorizing Provider   amLODIPine (NORVASC) 5 MG tablet Take 1 tablet (5 mg) by mouth daily 2/2/19  10/1/19 Yes Bernardo Marks MD   atorvastatin (LIPITOR) 20 MG tablet Take 20 mg by mouth daily 11/4/18  Yes Reported, Patient   bumetanide (BUMEX) 2 MG tablet Take 1 tablet (2 mg) by mouth daily 2/2/19 10/1/19 Yes Bernardo Marks MD   entecavir (BARACLUDE) 1 MG tablet Take 1 tablet (1 mg) by mouth daily 5/15/19  Yes Della Powell MD   lisinopril (PRINIVIL/ZESTRIL) 20 MG tablet Take 1 tablet (20 mg) by mouth daily 2/2/19 10/1/19 Yes Bernardo Marks MD       Allergies  No Known Allergies    Review of systems  A 10-point review of systems was negative    Examination  BP (!) 203/107 (BP Location: Left arm, Patient Position: Sitting, Cuff Size: Adult Small)   Pulse 70   Temp 97.6  F (36.4  C) (Oral)   Wt 53.9 kg (118 lb 12.8 oz)   SpO2 99%   BMI 17.05 kg/m    Body mass index is 17.05 kg/m .    Gen- well, NAD, A+Ox3, normal color  CVS- RRR  RS- CTA  Abd- soft, non-tender, no ascites or organomegaly on palpation or percussion (improved), BS+  Extr- hands normal, no AIDEN  Skin- scars on abdominal wall, no rash or jaundice  Neuro- no asterixis  Psych- normal mood    Laboratory  Lab Results   Component Value Date     10/01/2019    POTASSIUM 4.3 10/01/2019    CHLORIDE 106 10/01/2019    CO2 27 10/01/2019    BUN 22 10/01/2019    CR 1.17 10/01/2019       Lab Results   Component Value Date    BILITOTAL 0.6 10/01/2019    ALT 26 10/01/2019    AST 37 10/01/2019    ALKPHOS 136 10/01/2019       Lab Results   Component Value Date    ALBUMIN 2.1 10/01/2019    PROTTOTAL 6.2 10/01/2019        Lab Results   Component Value Date    WBC 4.9 10/01/2019    HGB 13.7 10/01/2019    MCV 99 10/01/2019     10/01/2019       Lab Results   Component Value Date    INR 1.12 10/01/2019       Radiology  Nil recent    Assessment  59 year old male who presents for routine follow-up of decompensated HBV cirrhosis complicated with ascites.  MELD-Na= 9 (stable).  Ascites now resolved with adherence to  medications.  No evidence of hepatic encephalopathy.  Due for HCC screening next month.  Up to date with screening for esophageal varices.    Plan  1.  Continue bumetanide 2mg PO Q24  2.  Continue entecavir 1mg PO Q24  3.  Low Na diet  4.  Abd U/S next month  5.  Follow-up in 6 months    Eber Adame MD  Hepatology  [facility]

## 2019-10-01 NOTE — NURSING NOTE
Chief Complaint   Patient presents with     RECHECK     HBV Cirrhosis       BP (!) 203/107 (BP Location: Left arm, Patient Position: Sitting, Cuff Size: Adult Small)   Pulse 70   Temp 97.6  F (36.4  C) (Oral)   Wt 53.9 kg (118 lb 12.8 oz)   SpO2 99%   BMI 17.05 kg/m      Sugar White CMA CMA    10/1/2019 9:38 AM    NOTE: Patient declined flu vaccine today.

## 2019-10-01 NOTE — LETTER
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  - last HBV DNA= [id number], July 2018     Cirrhosis  - dx Mar 2017  - HBV  - Fibrosis Scan Mar 2017- F4  - hx ascites  - no hx HE or variceal bleed  - EGD Oct 2018- normal esophagus, normal stomach  - HCC screening- MRI liver May 2019    Patient comes to clinic this morning with a [place] Islander  for follow-up of cirrhosis.  Last clinic visit July 2019.  Patient denies new medications, ER visits or hospital admissions since last clinic visit.    Patient is doing well today.  He has not needed a paracentesis since late July.  He also denies any lower extremity edema.  He is taking all his medications including diuretics and entecavir.    Patient denies joint pain, rash, jaundice, lower extremity edema, abdominal distension, lethargy or confusion.    Patient denies melena, hematemesis or hematochezia.    Patient denies fevers, sweats or chills.      Weight has stabilized with resolution of ascites.  Appetite is good.    Patient does not drink alcohol.      Medical hx Surgical hx   Past Medical History:   Diagnosis Date     Cryoglobulinemia (H)      Glomerulonephritis      Hepatitis B      Hypertension      Surgical complication       Past Surgical History:   Procedure Laterality Date     C HAND/FINGER SURGERY UNLISTED       ESOPHAGOSCOPY, GASTROSCOPY, DUODENOSCOPY (EGD), COMBINED N/A 10/18/2018    Procedure: EGD;  Surgeon: Eber Ortez MD;  Location:  GI     HAND SURGERY Right           Medications  Prior to Admission medications    Medication Sig Start Date End Date Taking?  Authorizing Provider   amLODIPine (NORVASC) 5 MG tablet Take 1 tablet (5 mg) by mouth daily 2/2/19 10/1/19 Yes Bernardo Marks MD   atorvastatin (LIPITOR) 20 MG tablet Take 20 mg by mouth daily 11/4/18  Yes Reported, Patient   bumetanide (BUMEX) 2 MG tablet Take 1 tablet (2 mg) by mouth daily 2/2/19 10/1/19 Yes Bernardo Marks MD   entecavir (BARACLUDE) 1 MG tablet Take 1 tablet (1 mg) by mouth daily 5/15/19  Yes Della Powell MD   lisinopril (PRINIVIL/ZESTRIL) 20 MG tablet Take 1 tablet (20 mg) by mouth daily 2/2/19 10/1/19 Yes Bernardo Marks MD       Allergies  No Known Allergies    Review of systems  A 10-point review of systems was negative    Examination  BP (!) 203/107 (BP Location: Left arm, Patient Position: Sitting, Cuff Size: Adult Small)   Pulse 70   Temp 97.6  F (36.4  C) (Oral)   Wt 53.9 kg (118 lb 12.8 oz)   SpO2 99%   BMI 17.05 kg/m     Body mass index is 17.05 kg/m .    Gen- well, NAD, A+Ox3, normal color  CVS- RRR  RS- CTA  Abd- soft, non-tender, no ascites or organomegaly on palpation or percussion (improved), BS+  Extr- hands normal, no AIDEN  Skin- scars on abdominal wall, no rash or jaundice  Neuro- no asterixis  Psych- normal mood    Laboratory  Lab Results   Component Value Date     10/01/2019    POTASSIUM 4.3 10/01/2019    CHLORIDE 106 10/01/2019    CO2 27 10/01/2019    BUN 22 10/01/2019    CR 1.17 10/01/2019       Lab Results   Component Value Date    BILITOTAL 0.6 10/01/2019    ALT 26 10/01/2019    AST 37 10/01/2019    ALKPHOS 136 10/01/2019       Lab Results   Component Value Date    ALBUMIN 2.1 10/01/2019    PROTTOTAL 6.2 10/01/2019        Lab Results   Component Value Date    WBC 4.9 10/01/2019    HGB 13.7 10/01/2019    MCV 99 10/01/2019     10/01/2019       Lab Results   Component Value Date    INR 1.12 10/01/2019       Radiology  Nil recent    Assessment  59 year old male who presents for routine follow-up of decompensated HBV  cirrhosis complicated with ascites.  MELD-Na= 9 (stable).  Ascites now resolved with adherence to medications.  No evidence of hepatic encephalopathy.  Due for HCC screening next month.  Up to date with screening for esophageal varices.    Plan  1.  Continue bumetanide 2mg PO Q24  2.  Continue entecavir 1mg PO Q24  3.  Low Na diet  4.  Abd U/S next month  5.  Follow-up in 6 months    Eber Adame MD  Hepatology  [facility]    Eber Adame MD

## 2019-10-02 LAB
HBV DNA SERPL NAA+PROBE-ACNC: 20 [IU]/ML
HBV DNA SERPL NAA+PROBE-LOG IU: 1.3 {LOG_IU}/ML

## 2019-10-07 ENCOUNTER — ANCILLARY PROCEDURE (OUTPATIENT)
Dept: ULTRASOUND IMAGING | Facility: CLINIC | Age: 59
End: 2019-10-07
Attending: INTERNAL MEDICINE
Payer: COMMERCIAL

## 2019-10-07 DIAGNOSIS — K74.60 CIRRHOSIS OF LIVER WITHOUT ASCITES, UNSPECIFIED HEPATIC CIRRHOSIS TYPE (H): ICD-10-CM

## 2019-10-21 ENCOUNTER — APPOINTMENT (OUTPATIENT)
Dept: ULTRASOUND IMAGING | Facility: CLINIC | Age: 59
End: 2019-10-21
Attending: EMERGENCY MEDICINE
Payer: COMMERCIAL

## 2019-10-21 ENCOUNTER — HOSPITAL ENCOUNTER (OUTPATIENT)
Facility: CLINIC | Age: 59
Setting detail: OBSERVATION
Discharge: HOME | End: 2019-10-23
Attending: EMERGENCY MEDICINE | Admitting: EMERGENCY MEDICINE
Payer: COMMERCIAL

## 2019-10-21 ENCOUNTER — APPOINTMENT (OUTPATIENT)
Dept: CT IMAGING | Facility: CLINIC | Age: 59
End: 2019-10-21
Attending: EMERGENCY MEDICINE
Payer: COMMERCIAL

## 2019-10-21 DIAGNOSIS — K74.60 CIRRHOSIS OF LIVER WITHOUT ASCITES, UNSPECIFIED HEPATIC CIRRHOSIS TYPE (H): ICD-10-CM

## 2019-10-21 DIAGNOSIS — I16.1 HYPERTENSIVE EMERGENCY: ICD-10-CM

## 2019-10-21 DIAGNOSIS — E78.5 HYPERLIPIDEMIA, UNSPECIFIED HYPERLIPIDEMIA TYPE: ICD-10-CM

## 2019-10-21 DIAGNOSIS — Z87.19 HISTORY OF CIRRHOSIS OF LIVER: ICD-10-CM

## 2019-10-21 DIAGNOSIS — R11.2 NON-INTRACTABLE VOMITING WITH NAUSEA, UNSPECIFIED VOMITING TYPE: ICD-10-CM

## 2019-10-21 DIAGNOSIS — R03.0 ELEVATED BLOOD PRESSURE READING: ICD-10-CM

## 2019-10-21 DIAGNOSIS — I10 ESSENTIAL HYPERTENSION, MALIGNANT: Primary | ICD-10-CM

## 2019-10-21 DIAGNOSIS — M62.81 GENERALIZED MUSCLE WEAKNESS: ICD-10-CM

## 2019-10-21 DIAGNOSIS — B18.1 CHRONIC VIRAL HEPATITIS B WITHOUT DELTA AGENT AND WITHOUT COMA (H): ICD-10-CM

## 2019-10-21 LAB
ALBUMIN SERPL-MCNC: 1.8 G/DL (ref 3.4–5)
ALBUMIN UR-MCNC: 300 MG/DL
ALP SERPL-CCNC: 198 U/L (ref 40–150)
ALT SERPL W P-5'-P-CCNC: 24 U/L (ref 0–70)
AMMONIA PLAS-SCNC: <10 UMOL/L (ref 10–50)
ANION GAP SERPL CALCULATED.3IONS-SCNC: 5 MMOL/L (ref 3–14)
APPEARANCE UR: CLEAR
APTT PPP: 30 SEC (ref 22–37)
AST SERPL W P-5'-P-CCNC: 33 U/L (ref 0–45)
BACTERIA #/AREA URNS HPF: ABNORMAL /HPF
BASOPHILS # BLD AUTO: 0 10E9/L (ref 0–0.2)
BASOPHILS NFR BLD AUTO: 0.9 %
BILIRUB SERPL-MCNC: 0.7 MG/DL (ref 0.2–1.3)
BILIRUB UR QL STRIP: NEGATIVE
BUN SERPL-MCNC: 21 MG/DL (ref 7–30)
CALCIUM SERPL-MCNC: 8.1 MG/DL (ref 8.5–10.1)
CAOX CRY #/AREA URNS HPF: ABNORMAL /HPF
CHLORIDE SERPL-SCNC: 106 MMOL/L (ref 94–109)
CO2 SERPL-SCNC: 28 MMOL/L (ref 20–32)
COLOR UR AUTO: YELLOW
CREAT SERPL-MCNC: 1.17 MG/DL (ref 0.66–1.25)
DIFFERENTIAL METHOD BLD: ABNORMAL
EOSINOPHIL # BLD AUTO: 0.1 10E9/L (ref 0–0.7)
EOSINOPHIL NFR BLD AUTO: 2.6 %
ERYTHROCYTE [DISTWIDTH] IN BLOOD BY AUTOMATED COUNT: 12.6 % (ref 10–15)
GFR SERPL CREATININE-BSD FRML MDRD: 68 ML/MIN/{1.73_M2}
GLUCOSE SERPL-MCNC: 102 MG/DL (ref 70–99)
GLUCOSE UR STRIP-MCNC: 30 MG/DL
HCT VFR BLD AUTO: 41.6 % (ref 40–53)
HGB BLD-MCNC: 14 G/DL (ref 13.3–17.7)
HGB UR QL STRIP: ABNORMAL
HYALINE CASTS #/AREA URNS LPF: 12 /LPF (ref 0–2)
IMM GRANULOCYTES # BLD: 0 10E9/L (ref 0–0.4)
IMM GRANULOCYTES NFR BLD: 0 %
INR PPP: 1.07 (ref 0.86–1.14)
KETONES UR STRIP-MCNC: 5 MG/DL
LEUKOCYTE ESTERASE UR QL STRIP: NEGATIVE
LIPASE SERPL-CCNC: 77 U/L (ref 73–393)
LYMPHOCYTES # BLD AUTO: 1.1 10E9/L (ref 0.8–5.3)
LYMPHOCYTES NFR BLD AUTO: 23.1 %
MCH RBC QN AUTO: 32 PG (ref 26.5–33)
MCHC RBC AUTO-ENTMCNC: 33.7 G/DL (ref 31.5–36.5)
MCV RBC AUTO: 95 FL (ref 78–100)
MONOCYTES # BLD AUTO: 0.3 10E9/L (ref 0–1.3)
MONOCYTES NFR BLD AUTO: 6.8 %
MUCOUS THREADS #/AREA URNS LPF: PRESENT /LPF
NEUTROPHILS # BLD AUTO: 3.1 10E9/L (ref 1.6–8.3)
NEUTROPHILS NFR BLD AUTO: 66.6 %
NITRATE UR QL: NEGATIVE
NRBC # BLD AUTO: 0 10*3/UL
NRBC BLD AUTO-RTO: 0 /100
NT-PROBNP SERPL-MCNC: 776 PG/ML (ref 0–900)
PH UR STRIP: 7 PH (ref 5–7)
PLATELET # BLD AUTO: 132 10E9/L (ref 150–450)
POTASSIUM SERPL-SCNC: 3.6 MMOL/L (ref 3.4–5.3)
PROT SERPL-MCNC: 6 G/DL (ref 6.8–8.8)
RBC # BLD AUTO: 4.37 10E12/L (ref 4.4–5.9)
RBC #/AREA URNS AUTO: 11 /HPF (ref 0–2)
SODIUM SERPL-SCNC: 139 MMOL/L (ref 133–144)
SOURCE: ABNORMAL
SP GR UR STRIP: 1.02 (ref 1–1.03)
TROPONIN I SERPL-MCNC: <0.015 UG/L (ref 0–0.04)
UROBILINOGEN UR STRIP-MCNC: 2 MG/DL (ref 0–2)
WBC # BLD AUTO: 4.6 10E9/L (ref 4–11)
WBC #/AREA URNS AUTO: 6 /HPF (ref 0–5)

## 2019-10-21 PROCEDURE — 96375 TX/PRO/DX INJ NEW DRUG ADDON: CPT | Performed by: EMERGENCY MEDICINE

## 2019-10-21 PROCEDURE — 82140 ASSAY OF AMMONIA: CPT | Performed by: EMERGENCY MEDICINE

## 2019-10-21 PROCEDURE — 99285 EMERGENCY DEPT VISIT HI MDM: CPT | Mod: 25 | Performed by: EMERGENCY MEDICINE

## 2019-10-21 PROCEDURE — 85730 THROMBOPLASTIN TIME PARTIAL: CPT | Performed by: EMERGENCY MEDICINE

## 2019-10-21 PROCEDURE — 85025 COMPLETE CBC W/AUTO DIFF WBC: CPT | Performed by: EMERGENCY MEDICINE

## 2019-10-21 PROCEDURE — 25000132 ZZH RX MED GY IP 250 OP 250 PS 637: Performed by: EMERGENCY MEDICINE

## 2019-10-21 PROCEDURE — 25800030 ZZH RX IP 258 OP 636: Performed by: EMERGENCY MEDICINE

## 2019-10-21 PROCEDURE — 84484 ASSAY OF TROPONIN QUANT: CPT | Performed by: EMERGENCY MEDICINE

## 2019-10-21 PROCEDURE — 83690 ASSAY OF LIPASE: CPT | Performed by: EMERGENCY MEDICINE

## 2019-10-21 PROCEDURE — 93005 ELECTROCARDIOGRAM TRACING: CPT | Performed by: EMERGENCY MEDICINE

## 2019-10-21 PROCEDURE — 76705 ECHO EXAM OF ABDOMEN: CPT

## 2019-10-21 PROCEDURE — 83880 ASSAY OF NATRIURETIC PEPTIDE: CPT | Performed by: EMERGENCY MEDICINE

## 2019-10-21 PROCEDURE — 93010 ELECTROCARDIOGRAM REPORT: CPT | Mod: Z6 | Performed by: EMERGENCY MEDICINE

## 2019-10-21 PROCEDURE — 96361 HYDRATE IV INFUSION ADD-ON: CPT | Performed by: EMERGENCY MEDICINE

## 2019-10-21 PROCEDURE — 81001 URINALYSIS AUTO W/SCOPE: CPT | Performed by: EMERGENCY MEDICINE

## 2019-10-21 PROCEDURE — 96374 THER/PROPH/DIAG INJ IV PUSH: CPT | Mod: 59 | Performed by: EMERGENCY MEDICINE

## 2019-10-21 PROCEDURE — 25000128 H RX IP 250 OP 636: Performed by: EMERGENCY MEDICINE

## 2019-10-21 PROCEDURE — 70450 CT HEAD/BRAIN W/O DYE: CPT

## 2019-10-21 PROCEDURE — 85610 PROTHROMBIN TIME: CPT | Performed by: EMERGENCY MEDICINE

## 2019-10-21 PROCEDURE — 80053 COMPREHEN METABOLIC PANEL: CPT | Performed by: EMERGENCY MEDICINE

## 2019-10-21 RX ORDER — ONDANSETRON 2 MG/ML
4 INJECTION INTRAMUSCULAR; INTRAVENOUS EVERY 30 MIN PRN
Status: DISCONTINUED | OUTPATIENT
Start: 2019-10-21 | End: 2019-10-23 | Stop reason: HOSPADM

## 2019-10-21 RX ORDER — AMLODIPINE BESYLATE 5 MG/1
5 TABLET ORAL DAILY
Status: DISCONTINUED | OUTPATIENT
Start: 2019-10-22 | End: 2019-10-23 | Stop reason: HOSPADM

## 2019-10-21 RX ORDER — CLONIDINE HYDROCHLORIDE 0.1 MG/1
0.1 TABLET ORAL ONCE
Status: COMPLETED | OUTPATIENT
Start: 2019-10-21 | End: 2019-10-22

## 2019-10-21 RX ORDER — HYDRALAZINE HYDROCHLORIDE 20 MG/ML
20 INJECTION INTRAMUSCULAR; INTRAVENOUS ONCE
Status: COMPLETED | OUTPATIENT
Start: 2019-10-21 | End: 2019-10-21

## 2019-10-21 RX ORDER — HYDRALAZINE HYDROCHLORIDE 20 MG/ML
20 INJECTION INTRAMUSCULAR; INTRAVENOUS
Status: COMPLETED | OUTPATIENT
Start: 2019-10-21 | End: 2019-10-22

## 2019-10-21 RX ORDER — ONDANSETRON 4 MG/1
4 TABLET, ORALLY DISINTEGRATING ORAL EVERY 8 HOURS PRN
Qty: 15 TABLET | Refills: 0 | Status: SHIPPED | OUTPATIENT
Start: 2019-10-21 | End: 2019-10-24

## 2019-10-21 RX ORDER — LISINOPRIL 20 MG/1
20 TABLET ORAL DAILY
Status: DISCONTINUED | OUTPATIENT
Start: 2019-10-22 | End: 2019-10-23 | Stop reason: HOSPADM

## 2019-10-21 RX ORDER — DIPHENHYDRAMINE HYDROCHLORIDE 50 MG/ML
25 INJECTION INTRAMUSCULAR; INTRAVENOUS ONCE
Status: COMPLETED | OUTPATIENT
Start: 2019-10-21 | End: 2019-10-21

## 2019-10-21 RX ORDER — HYDRALAZINE HYDROCHLORIDE 20 MG/ML
20 INJECTION INTRAMUSCULAR; INTRAVENOUS ONCE
Status: DISCONTINUED | OUTPATIENT
Start: 2019-10-21 | End: 2019-10-21

## 2019-10-21 RX ADMIN — AMLODIPINE BESYLATE 5 MG: 5 TABLET ORAL at 23:33

## 2019-10-21 RX ADMIN — SODIUM CHLORIDE 500 ML: 9 INJECTION, SOLUTION INTRAVENOUS at 18:10

## 2019-10-21 RX ADMIN — DIPHENHYDRAMINE HYDROCHLORIDE 25 MG: 50 INJECTION, SOLUTION INTRAMUSCULAR; INTRAVENOUS at 18:10

## 2019-10-21 RX ADMIN — LISINOPRIL 20 MG: 20 TABLET ORAL at 23:33

## 2019-10-21 RX ADMIN — METOCLOPRAMIDE HYDROCHLORIDE 10 MG: 5 INJECTION INTRAMUSCULAR; INTRAVENOUS at 18:10

## 2019-10-21 RX ADMIN — HYDRALAZINE HYDROCHLORIDE 20 MG: 20 INJECTION INTRAMUSCULAR; INTRAVENOUS at 16:11

## 2019-10-21 RX ADMIN — ONDANSETRON 4 MG: 2 INJECTION INTRAMUSCULAR; INTRAVENOUS at 16:10

## 2019-10-21 ASSESSMENT — MIFFLIN-ST. JEOR: SCORE: 1291.2

## 2019-10-21 NOTE — ED PROVIDER NOTES
History     Chief Complaint   Patient presents with     Vomiting     HPI  Wilfredo Yoon is a 59 year old male who presents to the ED with vomiting and nausea that started yesterday. He reports that he last took his medications on Friday. He states that he has not taken his medications since Friday because he ran out of them but does have prescriptions available for refill.  He has not been able to tolerate oral intake. He last vomited in the car on his way to the ED. He does not have hematemesis. He reports no sick contacts or recent travel. He does not have diarrhea, fever, or chills. He does not think that he ate anything contributing to his emesis. He feels weak and unable to walk to the bathroom. He denies myalgias. He denies dysuria and hematuria. He also reports a headache on the right side of his head with dizziness. He does not have vision changes or numbness.     History was obtained with the aid of a [place]ian  via iPad.    I have reviewed the Medications, Allergies, Past Medical and Surgical History, and Social History in the Epic system.    Review of Systems    General: No fevers or chills  Skin: No rash or diaphoresis  Eyes: No eye redness, positive for scleral icteris  Ears/Nose/Throat: No rhinorrhea or nasal congestion  Respiratory: No cough or SOB  Cardiovascular: No chest pain or palpitations  Gastrointestinal: positive for nausea and vomiting, negative for BM changes  Genitourinary: No urinary changes  Neurologic: No numbness or weakness, positive for headache  Hematologic/Lymphatic/Immunologic: No leg swelling, no easy bruising/bleeding      Physical Exam   BP: (!) 219/125  Pulse: 63  Temp: 97.5  F (36.4  C)  Resp: 18  SpO2: 93 %      Physical Exam    General: Well nourished, well developed, NAD  HEENT: EOMI, icteric. NCAT, MMM  Neck: no jugular venous distension, supple, nl ROM  Cardiac: Regular rate and rhythm. No murmurs, rubs, or gallops. Normal S1, S2.  Intact  peripheral pulses  Pulm: CTAB, no stridor, wheezes, rales, rhonchi  Abd: Soft, no tenderness to palpation, rebound or guarding, however palpation of the patient's abdomen does worsen his nausea symptoms, abdomen is nondistended.  No masses palpated.    Skin: Warm and dry to the touch.  No rash  Extremities: No LE edema, no cyanosis, w/w/p  Neuro: Alert and oriented x 4, no facial droop, CN II-XII intact, strength 5/5 all 4 extremities, sensation intact throughout, no nystagmus, requires assistance with ambulation      ED Course        Procedures             EKG Interpretation:      Interpreted by Angelica Payne MD  Time reviewed: 1558  Symptoms at time of EKG: nausea   Rhythm: normal sinus   Rate: normal, 61 bpm  Axis: normal  Ectopy: none  Conduction: normal  ST Segments/ T Waves: ST elevation in V3-4 c/w early repolarization, without significant change from prior EKG, otherwise no ST-T wave changes  Q Waves: none  Comparison to prior: Unchanged from 2/2/19    Clinical Impression: normal EKG          Critical Care time:  none             Labs Ordered and Resulted from Time of ED Arrival Up to the Time of Departure from the ED   CBC WITH PLATELETS DIFFERENTIAL - Abnormal; Notable for the following components:       Result Value    RBC Count 4.37 (*)     Platelet Count 132 (*)     All other components within normal limits   COMPREHENSIVE METABOLIC PANEL - Abnormal; Notable for the following components:    Glucose 102 (*)     Calcium 8.1 (*)     Albumin 1.8 (*)     Protein Total 6.0 (*)     Alkaline Phosphatase 198 (*)     All other components within normal limits   AMMONIA - Abnormal; Notable for the following components:    Ammonia <10 (*)     All other components within normal limits   UA MACROSCOPIC WITH REFLEX TO MICRO AND CULTURE - Abnormal; Notable for the following components:    Glucose Urine 30 (*)     Ketones Urine 5 (*)     Blood Urine Moderate (*)     Protein Albumin Urine 300 (*)     RBC Urine 11  (*)     WBC Urine 6 (*)     Bacteria Urine Few (*)     Mucous Urine Present (*)     Hyaline Casts 12 (*)     Calcium Oxalate Few (*)     All other components within normal limits   PARTIAL THROMBOPLASTIN TIME   INR   LIPASE   TROPONIN I   NT PROBNP INPATIENT   PERIPHERAL IV CATHETER          Results for orders placed or performed during the hospital encounter of 10/21/19 (from the past 24 hour(s))   EKG 12-lead, tracing only   Result Value Ref Range    Interpretation ECG Click View Image link to view waveform and result    CBC with platelets differential   Result Value Ref Range    WBC 4.6 4.0 - 11.0 10e9/L    RBC Count 4.37 (L) 4.4 - 5.9 10e12/L    Hemoglobin 14.0 13.3 - 17.7 g/dL    Hematocrit 41.6 40.0 - 53.0 %    MCV 95 78 - 100 fl    MCH 32.0 26.5 - 33.0 pg    MCHC 33.7 31.5 - 36.5 g/dL    RDW 12.6 10.0 - 15.0 %    Platelet Count 132 (L) 150 - 450 10e9/L    Diff Method Automated Method     % Neutrophils 66.6 %    % Lymphocytes 23.1 %    % Monocytes 6.8 %    % Eosinophils 2.6 %    % Basophils 0.9 %    % Immature Granulocytes 0.0 %    Nucleated RBCs 0 0 /100    Absolute Neutrophil 3.1 1.6 - 8.3 10e9/L    Absolute Lymphocytes 1.1 0.8 - 5.3 10e9/L    Absolute Monocytes 0.3 0.0 - 1.3 10e9/L    Absolute Eosinophils 0.1 0.0 - 0.7 10e9/L    Absolute Basophils 0.0 0.0 - 0.2 10e9/L    Abs Immature Granulocytes 0.0 0 - 0.4 10e9/L    Absolute Nucleated RBC 0.0    Partial thromboplastin time   Result Value Ref Range    PTT 30 22 - 37 sec   INR   Result Value Ref Range    INR 1.07 0.86 - 1.14   Comprehensive metabolic panel   Result Value Ref Range    Sodium 139 133 - 144 mmol/L    Potassium 3.6 3.4 - 5.3 mmol/L    Chloride 106 94 - 109 mmol/L    Carbon Dioxide 28 20 - 32 mmol/L    Anion Gap 5 3 - 14 mmol/L    Glucose 102 (H) 70 - 99 mg/dL    Urea Nitrogen 21 7 - 30 mg/dL    Creatinine 1.17 0.66 - 1.25 mg/dL    GFR Estimate 68 >60 mL/min/[1.73_m2]    GFR Estimate If Black 79 >60 mL/min/[1.73_m2]    Calcium 8.1 (L) 8.5 - 10.1  mg/dL    Bilirubin Total 0.7 0.2 - 1.3 mg/dL    Albumin 1.8 (L) 3.4 - 5.0 g/dL    Protein Total 6.0 (L) 6.8 - 8.8 g/dL    Alkaline Phosphatase 198 (H) 40 - 150 U/L    ALT 24 0 - 70 U/L    AST 33 0 - 45 U/L   Lipase   Result Value Ref Range    Lipase 77 73 - 393 U/L   Troponin I   Result Value Ref Range    Troponin I ES <0.015 0.000 - 0.045 ug/L   Ammonia   Result Value Ref Range    Ammonia <10 (L) 10 - 50 umol/L   Nt probnp inpatient (BNP)   Result Value Ref Range    N-Terminal Pro BNP Inpatient 776 0 - 900 pg/mL   CT Head w/o Contrast    Narrative    CT SCAN OF THE HEAD WITHOUT CONTRAST   10/21/2019 5:00 PM     HISTORY: Headache, hypertension.      TECHNIQUE: Axial images of the head and coronal reformations without  IV contrast material. Radiation dose for this scan was reduced using  automated exposure control, adjustment of the mA and/or kV according  to patient size, or iterative reconstruction technique.    COMPARISON: MRI head 7/31/2018, CT head 7/30/2018.    FINDINGS: There is no evidence of intracranial hemorrhage, mass, acute  infarct or acute abnormality. The ventricles are normal in size, shape  and configuration. Mild diffuse parenchymal volume loss. Mild patchy  periventricular white matter hypodensities which are nonspecific, but  likely related to chronic microvascular ischemic disease. Multiple old  lacunar infarcts were better characterized on previous MRI of the  brain dated 7/31/2018.    Moderate mucosal thickening with aerated secretions in the right  maxillary sinus. There may be some periodontal disease involving right  maxillary teeth, but this is poorly characterized on this  head-targeted exam. The bony calvarium and bones of the skull base  appear intact.       Impression    IMPRESSION:   1. No evidence of acute intracranial hemorrhage, mass, or herniation.  2. Mild diffuse parenchymal volume loss and white matter changes  likely due to chronic microvascular ischemic disease. Multiple  known  old lacunar infarcts are better characterized on previous MRI.  3. Mucosal thickening with aerated secretions in the right maxillary  sinus. Correlate clinically for symptoms/signs of acute sinusitis.    MYLES PARNELL MD   US Abdomen Limited (RUQ)    Narrative    US ABDOMEN LIMITED   10/21/2019 7:33 PM     HISTORY:  Elevated alk phos, nausea, vomiting.    COMPARISON: Abdominal ultrasound on 10/7/2019.    FINDINGS:    Gallbladder: No cholelithiasis. Gallbladder wall thickening measuring  up to 6 mm. Sonographic Keen's sign is negative.    Bile ducts:  CHD is normal diameter.  No intrahepatic biliary  dilatation.    Liver: Demonstrates coarsened echotexture and surface nodularity. No  focal hepatic mass.    Pancreas:  Partially obscured by overlying bowel gas,  but grossly  unremarkable.     Right kidney:  Normal.     Aorta and IVC:  Not specifically assessed.     Small volume ascites also noted.      Impression    IMPRESSION:    1. Cirrhotic liver morphology with evidence of portal hypertension  including ascites.  2. Gallbladder wall thickening, likely related to liver dysfunction.  No cholelithiasis or other sonographic evidence of acute  cholecystitis.    BINA SHARP MD   UA reflex to Microscopic and Culture   Result Value Ref Range    Color Urine Yellow     Appearance Urine Clear     Glucose Urine 30 (A) NEG^Negative mg/dL    Bilirubin Urine Negative NEG^Negative    Ketones Urine 5 (A) NEG^Negative mg/dL    Specific Gravity Urine 1.022 1.003 - 1.035    Blood Urine Moderate (A) NEG^Negative    pH Urine 7.0 5.0 - 7.0 pH    Protein Albumin Urine 300 (A) NEG^Negative mg/dL    Urobilinogen mg/dL 2.0 0.0 - 2.0 mg/dL    Nitrite Urine Negative NEG^Negative    Leukocyte Esterase Urine Negative NEG^Negative    Source Unspecified Urine     RBC Urine 11 (H) 0 - 2 /HPF    WBC Urine 6 (H) 0 - 5 /HPF    Bacteria Urine Few (A) NEG^Negative /HPF    Mucous Urine Present (A) NEG^Negative /LPF    Hyaline Casts 12  (H) 0 - 2 /LPF    Calcium Oxalate Few (A) NEG^Negative /HPF       Labs and imaging studies were reviewed by me.    Assessments & Plan (with Medical Decision Making)   Mr. Yoon presents with one day of nausea and vomiting. He was also found to be hypertensive on arrival to the ED.    #Hypertension  Likkely secondary to not taking his prescribed medications since Friday. Chart review reveals that he tends to elevated blood pressures, however today he is reporting right-sided headache and dizziness, therefore we will obtain a head CT to rule out acute intracranial pathology secondary to his hypertension, however his physical exam is reassuring (no focal neurologic deficits). We also treated with hydralazine 20 mg once for management of blood pressure. We will also obtain lab studies to evaluate for other end-organ damage (BNP, troponin, CMP).     #Nausea and vomiting  The differential diagnosis for Mr. Yoon's nausea and emesis is broad, including gastroenteritis, pancreatitis, cholecystitis, hepatitis, migraine, metabolic disturbance, or vestibular neuritis. Mr. Yoon does have known hepatitis B infection with cirrhosis complicated by ascites, last requiring paracentesis six weeks ago. He does not report abdominal pain, which would be expected with pancreatitis and cholecystitis. He does have a headache, which could be related to his nausea and emesis. He also reports dizziness, however we would need to evaluate for gait instability in order for his presentation to be consistent with vestibular neuritis. Gastroenteritis is possible in the setting of acute nausea and emesis, and is probably the most likely etiology if all others are ruled out. We will obtain CMP, ammonia, and lipase to evaluate for other etiologies of his nausea and vomiting.     Patient was able to ambulate in the emergency department.  Patient tolerated small amount of p.o. after medications were given.  He expressed improvement in his  symptoms.    I have reviewed the nursing notes.    I have reviewed the findings, diagnosis, plan and need for follow up with the patient.    Patient's labs are consistent with his baseline.  Ultrasound reveals mild gallbladder wall thickening, but no cholelithiasis or other signs of cholecystitis.  Patient's white blood cell count is not elevated he is afebrile.  No specific right upper quadrant tenderness at this time, which makes cholecystitis much less likely.    Head CT is negative for acute disease process.    Patient's blood pressure improved to 175/108 after treatment with medications in the emergency department.  Patient states that he does have a prescription at a local pharmacy that he should be able to  tomorrow.  Emphasized importance of medication compliance with the patient.  He understands and agrees.    Patient to be discharged home. Advised to follow up with PCP within 1 week. To return to ER immediately with any new/worsening symptoms. Plan of care discussed with patient who understands and agrees with plan of care.  Patient was provided with prescription for Zofran for symptomatic relief at home.    After plan was made to send patient home, he attempted to ambulate and was unsteady on his feet.  He states he does feel better and would like to go home, however I am concerned about sending the patient home if he is unable to ambulate steadily.  He does have other people that he lives with and who agreed to help care for him once he gets home.  Discussed patient with Marlene with the ED observation unit.  Patient may be eligible to be admitted to ED observation unit if he remains unsteady on his feet.  However, he will need to have a diastolic blood pressure of less than 110.  Last measured diastolic blood pressure is 114.  Patient was given his home blood pressure medications to see if this will improve his blood pressure.  Patient has received 1 dose of IV blood pressure medications, per unit  protocol he may receive up to 2 doses of IV blood pressure medications and still be eligible for admission to their unit.  Will monitor patient's blood pressure and if he is still unsteady on his feet and his diastolic blood pressure is less than 110, he will be admitted to ED observation unit.    Clonidine and additional dose of hydralazine ordered.     Pt still unsteady on his feet. SBP <110, will admit patient to the ED observation unit.     New Prescriptions    ONDANSETRON (ZOFRAN ODT) 4 MG ODT TAB    Take 1 tablet (4 mg) by mouth every 8 hours as needed for nausea or vomiting       Final diagnoses:   Non-intractable vomiting with nausea, unspecified vomiting type   History of cirrhosis of liver   Elevated blood pressure reading   Generalized muscle weakness       10/21/2019   [facility], [place], EMERGENCY DEPARTMENT     Angelica Payne MD  10/21/19 215       Angelica Payne MD  10/23/19 [id number]

## 2019-10-21 NOTE — ED AVS SNAPSHOT
[facility], [place], Emergency Department  [address]  [facility] [place] [address]  Phone:  [phone]  Fax:  [phone]                                    Wilfredo Yoon   MRN: [id number]    Department:  [facility], Emergency Department   Date of Visit:  10/21/2019           After Visit Summary Signature Page    I have received my discharge instructions, and my questions have been answered. I have discussed any challenges I see with this plan with the nurse or doctor.    ..........................................................................................................................................  Patient/Patient Representative Signature      ..........................................................................................................................................  Patient Representative Print Name and Relationship to Patient    ..................................................               ................................................  Date                                   Time    ..........................................................................................................................................  Reviewed by Signature/Title    ...................................................              ..............................................  Date                                               Time          22EPIC Rev 08/18

## 2019-10-22 ENCOUNTER — OFFICE VISIT (OUTPATIENT)
Dept: INTERPRETER SERVICES | Facility: CLINIC | Age: 59
End: 2019-10-22

## 2019-10-22 ENCOUNTER — APPOINTMENT (OUTPATIENT)
Dept: MRI IMAGING | Facility: CLINIC | Age: 59
End: 2019-10-22
Attending: NURSE PRACTITIONER
Payer: COMMERCIAL

## 2019-10-22 ENCOUNTER — APPOINTMENT (OUTPATIENT)
Dept: PHYSICAL THERAPY | Facility: CLINIC | Age: 59
End: 2019-10-22
Attending: NURSE PRACTITIONER
Payer: COMMERCIAL

## 2019-10-22 PROBLEM — R11.2 NAUSEA & VOMITING: Status: ACTIVE | Noted: 2019-10-22

## 2019-10-22 LAB
ALBUMIN SERPL-MCNC: 1.6 G/DL (ref 3.4–5)
ALP SERPL-CCNC: 168 U/L (ref 40–150)
ALT SERPL W P-5'-P-CCNC: 21 U/L (ref 0–70)
ANION GAP SERPL CALCULATED.3IONS-SCNC: 5 MMOL/L (ref 3–14)
AST SERPL W P-5'-P-CCNC: 26 U/L (ref 0–45)
BASOPHILS # BLD AUTO: 0.1 10E9/L (ref 0–0.2)
BASOPHILS NFR BLD AUTO: 1.1 %
BILIRUB DIRECT SERPL-MCNC: <0.1 MG/DL (ref 0–0.2)
BILIRUB SERPL-MCNC: 0.5 MG/DL (ref 0.2–1.3)
BUN SERPL-MCNC: 22 MG/DL (ref 7–30)
CALCIUM SERPL-MCNC: 7.9 MG/DL (ref 8.5–10.1)
CHLORIDE SERPL-SCNC: 109 MMOL/L (ref 94–109)
CO2 SERPL-SCNC: 25 MMOL/L (ref 20–32)
CREAT SERPL-MCNC: 1.34 MG/DL (ref 0.66–1.25)
DIFFERENTIAL METHOD BLD: ABNORMAL
EOSINOPHIL # BLD AUTO: 0.2 10E9/L (ref 0–0.7)
EOSINOPHIL NFR BLD AUTO: 3.1 %
ERYTHROCYTE [DISTWIDTH] IN BLOOD BY AUTOMATED COUNT: 13.1 % (ref 10–15)
ERYTHROCYTE [DISTWIDTH] IN BLOOD BY AUTOMATED COUNT: 13.2 % (ref 10–15)
GFR SERPL CREATININE-BSD FRML MDRD: 58 ML/MIN/{1.73_M2}
GLUCOSE SERPL-MCNC: 79 MG/DL (ref 70–99)
HCT VFR BLD AUTO: 37.6 % (ref 40–53)
HCT VFR BLD AUTO: 39.7 % (ref 40–53)
HGB BLD-MCNC: 12.4 G/DL (ref 13.3–17.7)
HGB BLD-MCNC: 13 G/DL (ref 13.3–17.7)
IMM GRANULOCYTES # BLD: 0 10E9/L (ref 0–0.4)
IMM GRANULOCYTES NFR BLD: 0.2 %
INTERPRETATION ECG - MUSE: NORMAL
LYMPHOCYTES # BLD AUTO: 1.1 10E9/L (ref 0.8–5.3)
LYMPHOCYTES NFR BLD AUTO: 19.7 %
MCH RBC QN AUTO: 32.1 PG (ref 26.5–33)
MCH RBC QN AUTO: 32.2 PG (ref 26.5–33)
MCHC RBC AUTO-ENTMCNC: 32.7 G/DL (ref 31.5–36.5)
MCHC RBC AUTO-ENTMCNC: 33 G/DL (ref 31.5–36.5)
MCV RBC AUTO: 97 FL (ref 78–100)
MCV RBC AUTO: 98 FL (ref 78–100)
MONOCYTES # BLD AUTO: 0.5 10E9/L (ref 0–1.3)
MONOCYTES NFR BLD AUTO: 8.6 %
NEUTROPHILS # BLD AUTO: 3.7 10E9/L (ref 1.6–8.3)
NEUTROPHILS NFR BLD AUTO: 67.3 %
NRBC # BLD AUTO: 0 10*3/UL
NRBC BLD AUTO-RTO: 0 /100
PLATELET # BLD AUTO: 143 10E9/L (ref 150–450)
PLATELET # BLD AUTO: 159 10E9/L (ref 150–450)
PLATELET # BLD EST: ABNORMAL 10*3/UL
POTASSIUM SERPL-SCNC: 3.4 MMOL/L (ref 3.4–5.3)
PROT SERPL-MCNC: 5.2 G/DL (ref 6.8–8.8)
RBC # BLD AUTO: 3.86 10E12/L (ref 4.4–5.9)
RBC # BLD AUTO: 4.04 10E12/L (ref 4.4–5.9)
SODIUM SERPL-SCNC: 139 MMOL/L (ref 133–144)
WBC # BLD AUTO: 5.5 10E9/L (ref 4–11)
WBC # BLD AUTO: 6.6 10E9/L (ref 4–11)

## 2019-10-22 PROCEDURE — 80076 HEPATIC FUNCTION PANEL: CPT | Performed by: NURSE PRACTITIONER

## 2019-10-22 PROCEDURE — 25000128 H RX IP 250 OP 636: Performed by: NURSE PRACTITIONER

## 2019-10-22 PROCEDURE — 25000132 ZZH RX MED GY IP 250 OP 250 PS 637: Performed by: EMERGENCY MEDICINE

## 2019-10-22 PROCEDURE — 97116 GAIT TRAINING THERAPY: CPT | Mod: GP,59

## 2019-10-22 PROCEDURE — 25000131 ZZH RX MED GY IP 250 OP 636 PS 637: Performed by: NURSE PRACTITIONER

## 2019-10-22 PROCEDURE — T1013 SIGN LANG/ORAL INTERPRETER: HCPCS | Mod: U3

## 2019-10-22 PROCEDURE — 40000894 ZZH STATISTIC OT IP EVAL DEFER: Performed by: OCCUPATIONAL THERAPIST

## 2019-10-22 PROCEDURE — G0378 HOSPITAL OBSERVATION PER HR: HCPCS

## 2019-10-22 PROCEDURE — 85027 COMPLETE CBC AUTOMATED: CPT | Performed by: NURSE PRACTITIONER

## 2019-10-22 PROCEDURE — 99220 ZZC INITIAL OBSERVATION CARE,LEVL III: CPT | Mod: Z6 | Performed by: NURSE PRACTITIONER

## 2019-10-22 PROCEDURE — 85025 COMPLETE CBC W/AUTO DIFF WBC: CPT | Performed by: NURSE PRACTITIONER

## 2019-10-22 PROCEDURE — 96361 HYDRATE IV INFUSION ADD-ON: CPT | Mod: 59 | Performed by: EMERGENCY MEDICINE

## 2019-10-22 PROCEDURE — 25800030 ZZH RX IP 258 OP 636: Performed by: EMERGENCY MEDICINE

## 2019-10-22 PROCEDURE — 25000132 ZZH RX MED GY IP 250 OP 250 PS 637: Performed by: NURSE PRACTITIONER

## 2019-10-22 PROCEDURE — 36415 COLL VENOUS BLD VENIPUNCTURE: CPT | Performed by: NURSE PRACTITIONER

## 2019-10-22 PROCEDURE — 25000128 H RX IP 250 OP 636: Performed by: EMERGENCY MEDICINE

## 2019-10-22 PROCEDURE — 80048 BASIC METABOLIC PNL TOTAL CA: CPT | Performed by: NURSE PRACTITIONER

## 2019-10-22 PROCEDURE — 25000128 H RX IP 250 OP 636: Performed by: RADIOLOGY

## 2019-10-22 PROCEDURE — 96376 TX/PRO/DX INJ SAME DRUG ADON: CPT

## 2019-10-22 PROCEDURE — 25500064 ZZH RX 255 OP 636: Performed by: FAMILY MEDICINE

## 2019-10-22 PROCEDURE — 96376 TX/PRO/DX INJ SAME DRUG ADON: CPT | Performed by: EMERGENCY MEDICINE

## 2019-10-22 PROCEDURE — 25800030 ZZH RX IP 258 OP 636: Performed by: NURSE PRACTITIONER

## 2019-10-22 PROCEDURE — 25800030 ZZH RX IP 258 OP 636

## 2019-10-22 PROCEDURE — A9585 GADOBUTROL INJECTION: HCPCS | Performed by: FAMILY MEDICINE

## 2019-10-22 PROCEDURE — 70549 MR ANGIOGRAPH NECK W/O&W/DYE: CPT

## 2019-10-22 PROCEDURE — 97162 PT EVAL MOD COMPLEX 30 MIN: CPT | Mod: GP

## 2019-10-22 PROCEDURE — 96361 HYDRATE IV INFUSION ADD-ON: CPT

## 2019-10-22 RX ORDER — ATORVASTATIN CALCIUM 20 MG/1
20 TABLET, FILM COATED ORAL DAILY
Status: DISCONTINUED | OUTPATIENT
Start: 2019-10-22 | End: 2019-10-23 | Stop reason: HOSPADM

## 2019-10-22 RX ORDER — SODIUM CHLORIDE 9 MG/ML
1000 INJECTION, SOLUTION INTRAVENOUS CONTINUOUS
Status: DISCONTINUED | OUTPATIENT
Start: 2019-10-22 | End: 2019-10-23 | Stop reason: HOSPADM

## 2019-10-22 RX ORDER — GADOBUTROL 604.72 MG/ML
7.5 INJECTION INTRAVENOUS ONCE
Status: COMPLETED | OUTPATIENT
Start: 2019-10-22 | End: 2019-10-22

## 2019-10-22 RX ORDER — PREDNISONE 20 MG/1
40 TABLET ORAL ONCE
Status: COMPLETED | OUTPATIENT
Start: 2019-10-22 | End: 2019-10-22

## 2019-10-22 RX ORDER — SODIUM CHLORIDE 9 MG/ML
INJECTION, SOLUTION INTRAVENOUS CONTINUOUS
Status: DISCONTINUED | OUTPATIENT
Start: 2019-10-22 | End: 2019-10-23 | Stop reason: HOSPADM

## 2019-10-22 RX ORDER — ACETAMINOPHEN 325 MG/1
650 TABLET ORAL EVERY 4 HOURS PRN
Status: DISCONTINUED | OUTPATIENT
Start: 2019-10-22 | End: 2019-10-23 | Stop reason: HOSPADM

## 2019-10-22 RX ORDER — ONDANSETRON 2 MG/ML
4 INJECTION INTRAMUSCULAR; INTRAVENOUS EVERY 6 HOURS PRN
Status: DISCONTINUED | OUTPATIENT
Start: 2019-10-22 | End: 2019-10-23 | Stop reason: HOSPADM

## 2019-10-22 RX ORDER — LIDOCAINE 40 MG/G
CREAM TOPICAL
Status: DISCONTINUED | OUTPATIENT
Start: 2019-10-22 | End: 2019-10-23 | Stop reason: HOSPADM

## 2019-10-22 RX ORDER — DIPHENHYDRAMINE HYDROCHLORIDE 50 MG/ML
25 INJECTION INTRAMUSCULAR; INTRAVENOUS ONCE
Status: COMPLETED | OUTPATIENT
Start: 2019-10-22 | End: 2019-10-22

## 2019-10-22 RX ORDER — NALOXONE HYDROCHLORIDE 0.4 MG/ML
.1-.4 INJECTION, SOLUTION INTRAMUSCULAR; INTRAVENOUS; SUBCUTANEOUS
Status: DISCONTINUED | OUTPATIENT
Start: 2019-10-22 | End: 2019-10-23 | Stop reason: HOSPADM

## 2019-10-22 RX ORDER — ACETAMINOPHEN 650 MG/1
650 SUPPOSITORY RECTAL EVERY 4 HOURS PRN
Status: DISCONTINUED | OUTPATIENT
Start: 2019-10-22 | End: 2019-10-23 | Stop reason: HOSPADM

## 2019-10-22 RX ORDER — ENTECAVIR 1 MG/1
1 TABLET, FILM COATED ORAL DAILY
Status: DISCONTINUED | OUTPATIENT
Start: 2019-10-22 | End: 2019-10-23 | Stop reason: HOSPADM

## 2019-10-22 RX ORDER — ONDANSETRON 4 MG/1
4 TABLET, ORALLY DISINTEGRATING ORAL EVERY 6 HOURS PRN
Status: DISCONTINUED | OUTPATIENT
Start: 2019-10-22 | End: 2019-10-23 | Stop reason: HOSPADM

## 2019-10-22 RX ORDER — SODIUM CHLORIDE 9 MG/ML
INJECTION, SOLUTION INTRAVENOUS
Status: DISCONTINUED
Start: 2019-10-22 | End: 2019-10-22 | Stop reason: HOSPADM

## 2019-10-22 RX ORDER — BUMETANIDE 1 MG/1
2 TABLET ORAL DAILY
Status: DISCONTINUED | OUTPATIENT
Start: 2019-10-22 | End: 2019-10-23 | Stop reason: HOSPADM

## 2019-10-22 RX ADMIN — SODIUM CHLORIDE, PRESERVATIVE FREE: 5 INJECTION INTRAVENOUS at 04:18

## 2019-10-22 RX ADMIN — CLONIDINE HYDROCHLORIDE 0.1 MG: 0.1 TABLET ORAL at 00:07

## 2019-10-22 RX ADMIN — SODIUM CHLORIDE, PRESERVATIVE FREE: 5 INJECTION INTRAVENOUS at 17:53

## 2019-10-22 RX ADMIN — ACETAMINOPHEN 650 MG: 325 TABLET, FILM COATED ORAL at 04:05

## 2019-10-22 RX ADMIN — BUMETANIDE 2 MG: 1 TABLET ORAL at 08:12

## 2019-10-22 RX ADMIN — ACETAMINOPHEN 650 MG: 325 TABLET, FILM COATED ORAL at 16:52

## 2019-10-22 RX ADMIN — PREDNISONE 40 MG: 20 TABLET ORAL at 16:52

## 2019-10-22 RX ADMIN — SODIUM CHLORIDE 1000 ML: 9 INJECTION, SOLUTION INTRAVENOUS at 12:00

## 2019-10-22 RX ADMIN — Medication 500 ML: at 02:37

## 2019-10-22 RX ADMIN — ENTECAVIR 1 MG: 0.5 TABLET, FILM COATED ORAL at 08:13

## 2019-10-22 RX ADMIN — DIPHENHYDRAMINE HYDROCHLORIDE 25 MG: 50 INJECTION, SOLUTION INTRAMUSCULAR; INTRAVENOUS at 14:56

## 2019-10-22 RX ADMIN — AMLODIPINE BESYLATE 5 MG: 5 TABLET ORAL at 08:13

## 2019-10-22 RX ADMIN — SODIUM CHLORIDE 500 ML: 9 INJECTION, SOLUTION INTRAVENOUS at 02:37

## 2019-10-22 RX ADMIN — ATORVASTATIN CALCIUM 20 MG: 20 TABLET, FILM COATED ORAL at 08:13

## 2019-10-22 RX ADMIN — HYDRALAZINE HYDROCHLORIDE 20 MG: 20 INJECTION INTRAMUSCULAR; INTRAVENOUS at 00:30

## 2019-10-22 RX ADMIN — GADOBUTROL 7 ML: 604.72 INJECTION INTRAVENOUS at 15:21

## 2019-10-22 RX ADMIN — SODIUM CHLORIDE 1000 ML: 9 INJECTION, SOLUTION INTRAVENOUS at 03:01

## 2019-10-22 RX ADMIN — LISINOPRIL 20 MG: 20 TABLET ORAL at 08:13

## 2019-10-22 NOTE — ED NOTES
Pt up for discharge. Pt stood up to get dressed and unable to ambulate, unsteady and shaking, stumbled forward.  This writer helped pt back to bed.  MD notified

## 2019-10-22 NOTE — PROGRESS NOTES
"Patient interviewed and examined. Labs, imaging and chart notes reviewed.  Wilfredo Yoon presented to the ED yesterday with headache, elevated blood pressure, nausea, vomiting and dizziness which developed a couple of days ago, He has a history of hepatitis B with cirrhosis and ascites. He had not taken his blood pressure or liver disease medications for a couple of days. He has had no fever, chills, URI symptoms, falls, trauma, chest pain, palpitations, shortness of breath, leg pain or worsened swelling. Blood pressure responded to normal home medications. Head CT was negative. Today, his balance was poor on walk testing with PT. He states the headache is almost gone and the nausea is improved.    Past Medical History:   Diagnosis Date     Cryoglobulinemia (H)      Glomerulonephritis      Hepatitis B      Hypertension      Surgical complication        Past Surgical History:   Procedure Laterality Date     C HAND/FINGER SURGERY UNLISTED       ESOPHAGOSCOPY, GASTROSCOPY, DUODENOSCOPY (EGD), COMBINED N/A 10/18/2018    Procedure: EGD;  Surgeon: Eber Ortez MD;  Location:  GI     HAND SURGERY Right        Family History   Problem Relation Age of Onset     Liver Cancer No family hx of      Hepatitis No family hx of        Social History     Tobacco Use     Smoking status: Current Some Day Smoker     Packs/day: 0.25     Years: 40.00     Pack years: 10.00     Types: Cigarettes     Smokeless tobacco: Never Used   Substance Use Topics     Alcohol use: No     Alcohol/week: 0.0 standard drinks     Physical Exam:  Slender elderly male in [facility].   /71   Pulse 67   Temp 98.1  F (36.7  C) (Oral)   Resp 16   Ht 1.651 m (5' 5\")   Wt 54.4 kg (120 lb)   SpO2 99%   BMI 19.97 kg/m    HEENT: PERRLA. EOMI. Anicteric.  Neck: No mass or bruit.  Lungs: Clear to auscultation.  Cardiac: RRR. Normal S1 and S2. No JVD.  Abdomen: thin soft, non tender. BS active.  Extrem: No edema.  Neuro: CN intact other than " minimal left lip droop on smile. Motor intact UE. UE coordination intact.  Psych: Normal mood and affect.    Results for orders placed or performed during the hospital encounter of 10/21/19 (from the past 48 hour(s))   EKG 12-lead, tracing only   Result Value Ref Range    Interpretation ECG Click View Image link to view waveform and result    CBC with platelets differential   Result Value Ref Range    WBC 4.6 4.0 - 11.0 10e9/L    RBC Count 4.37 (L) 4.4 - 5.9 10e12/L    Hemoglobin 14.0 13.3 - 17.7 g/dL    Hematocrit 41.6 40.0 - 53.0 %    MCV 95 78 - 100 fl    MCH 32.0 26.5 - 33.0 pg    MCHC 33.7 31.5 - 36.5 g/dL    RDW 12.6 10.0 - 15.0 %    Platelet Count 132 (L) 150 - 450 10e9/L    Diff Method Automated Method     % Neutrophils 66.6 %    % Lymphocytes 23.1 %    % Monocytes 6.8 %    % Eosinophils 2.6 %    % Basophils 0.9 %    % Immature Granulocytes 0.0 %    Nucleated RBCs 0 0 /100    Absolute Neutrophil 3.1 1.6 - 8.3 10e9/L    Absolute Lymphocytes 1.1 0.8 - 5.3 10e9/L    Absolute Monocytes 0.3 0.0 - 1.3 10e9/L    Absolute Eosinophils 0.1 0.0 - 0.7 10e9/L    Absolute Basophils 0.0 0.0 - 0.2 10e9/L    Abs Immature Granulocytes 0.0 0 - 0.4 10e9/L    Absolute Nucleated RBC 0.0    Partial thromboplastin time   Result Value Ref Range    PTT 30 22 - 37 sec   INR   Result Value Ref Range    INR 1.07 0.86 - 1.14   Comprehensive metabolic panel   Result Value Ref Range    Sodium 139 133 - 144 mmol/L    Potassium 3.6 3.4 - 5.3 mmol/L    Chloride 106 94 - 109 mmol/L    Carbon Dioxide 28 20 - 32 mmol/L    Anion Gap 5 3 - 14 mmol/L    Glucose 102 (H) 70 - 99 mg/dL    Urea Nitrogen 21 7 - 30 mg/dL    Creatinine 1.17 0.66 - 1.25 mg/dL    GFR Estimate 68 >60 mL/min/[1.73_m2]    GFR Estimate If Black 79 >60 mL/min/[1.73_m2]    Calcium 8.1 (L) 8.5 - 10.1 mg/dL    Bilirubin Total 0.7 0.2 - 1.3 mg/dL    Albumin 1.8 (L) 3.4 - 5.0 g/dL    Protein Total 6.0 (L) 6.8 - 8.8 g/dL    Alkaline Phosphatase 198 (H) 40 - 150 U/L    ALT 24 0 - 70  U/L    AST 33 0 - 45 U/L   Lipase   Result Value Ref Range    Lipase 77 73 - 393 U/L   Troponin I   Result Value Ref Range    Troponin I ES <0.015 0.000 - 0.045 ug/L   Ammonia   Result Value Ref Range    Ammonia <10 (L) 10 - 50 umol/L   Nt probnp inpatient (BNP)   Result Value Ref Range    N-Terminal Pro BNP Inpatient 776 0 - 900 pg/mL   CT Head w/o Contrast    Narrative    CT SCAN OF THE HEAD WITHOUT CONTRAST   10/21/2019 5:00 PM     HISTORY: Headache, hypertension.      TECHNIQUE: Axial images of the head and coronal reformations without  IV contrast material. Radiation dose for this scan was reduced using  automated exposure control, adjustment of the mA and/or kV according  to patient size, or iterative reconstruction technique.    COMPARISON: MRI head 7/31/2018, CT head 7/30/2018.    FINDINGS: There is no evidence of intracranial hemorrhage, mass, acute  infarct or acute abnormality. The ventricles are normal in size, shape  and configuration. Mild diffuse parenchymal volume loss. Mild patchy  periventricular white matter hypodensities which are nonspecific, but  likely related to chronic microvascular ischemic disease. Multiple old  lacunar infarcts were better characterized on previous MRI of the  brain dated 7/31/2018.    Moderate mucosal thickening with aerated secretions in the right  maxillary sinus. There may be some periodontal disease involving right  maxillary teeth, but this is poorly characterized on this  head-targeted exam. The bony calvarium and bones of the skull base  appear intact.       Impression    IMPRESSION:   1. No evidence of acute intracranial hemorrhage, mass, or herniation.  2. Mild diffuse parenchymal volume loss and white matter changes  likely due to chronic microvascular ischemic disease. Multiple known  old lacunar infarcts are better characterized on previous MRI.  3. Mucosal thickening with aerated secretions in the right maxillary  sinus. Correlate clinically for  symptoms/signs of acute sinusitis.    MYLES PARNELL MD   US Abdomen Limited (RUQ)    Narrative    US ABDOMEN LIMITED   10/21/2019 7:33 PM     HISTORY:  Elevated alk phos, nausea, vomiting.    COMPARISON: Abdominal ultrasound on 10/7/2019.    FINDINGS:    Gallbladder: No cholelithiasis. Gallbladder wall thickening measuring  up to 6 mm. Sonographic Keen's sign is negative.    Bile ducts:  CHD is normal diameter.  No intrahepatic biliary  dilatation.    Liver: Demonstrates coarsened echotexture and surface nodularity. No  focal hepatic mass.    Pancreas:  Partially obscured by overlying bowel gas,  but grossly  unremarkable.     Right kidney:  Normal.     Aorta and IVC:  Not specifically assessed.     Small volume ascites also noted.      Impression    IMPRESSION:    1. Cirrhotic liver morphology with evidence of portal hypertension  including ascites.  2. Gallbladder wall thickening, likely related to liver dysfunction.  No cholelithiasis or other sonographic evidence of acute  cholecystitis.    BINA SHARP MD   UA reflex to Microscopic and Culture   Result Value Ref Range    Color Urine Yellow     Appearance Urine Clear     Glucose Urine 30 (A) NEG^Negative mg/dL    Bilirubin Urine Negative NEG^Negative    Ketones Urine 5 (A) NEG^Negative mg/dL    Specific Gravity Urine 1.022 1.003 - 1.035    Blood Urine Moderate (A) NEG^Negative    pH Urine 7.0 5.0 - 7.0 pH    Protein Albumin Urine 300 (A) NEG^Negative mg/dL    Urobilinogen mg/dL 2.0 0.0 - 2.0 mg/dL    Nitrite Urine Negative NEG^Negative    Leukocyte Esterase Urine Negative NEG^Negative    Source Unspecified Urine     RBC Urine 11 (H) 0 - 2 /HPF    WBC Urine 6 (H) 0 - 5 /HPF    Bacteria Urine Few (A) NEG^Negative /HPF    Mucous Urine Present (A) NEG^Negative /LPF    Hyaline Casts 12 (H) 0 - 2 /LPF    Calcium Oxalate Few (A) NEG^Negative /HPF   Social Work IP Consult    Narrative    Sydnee Yancey MSW     10/22/2019 10:14 AM  BRIEF SOCIAL WORK  NOTE:    SW consulted for discharge planning. SW competed chart review. Pt   lives with a roommate in an apartment and is independent with   ADL's at baseline. PT/OT completed evaluations this AM and   recommend discharge to home with assist from roommate and OP PT;   per chart review Pt is in agreement with this discharge plan. No   SW needs identified for discharge planning. SW remains available   if needs arise during this hospitalization.    BENY Garay, LGSW  6B Intermediate Care Unit   [facility]  Phone: [phone]  Pager: [phone]     Basic metabolic panel   Result Value Ref Range    Sodium 139 133 - 144 mmol/L    Potassium 3.4 3.4 - 5.3 mmol/L    Chloride 109 94 - 109 mmol/L    Carbon Dioxide 25 20 - 32 mmol/L    Anion Gap 5 3 - 14 mmol/L    Glucose 79 70 - 99 mg/dL    Urea Nitrogen 22 7 - 30 mg/dL    Creatinine 1.34 (H) 0.66 - 1.25 mg/dL    GFR Estimate 58 (L) >60 mL/min/[1.73_m2]    GFR Estimate If Black 67 >60 mL/min/[1.73_m2]    Calcium 7.9 (L) 8.5 - 10.1 mg/dL   CBC with platelets   Result Value Ref Range    WBC 6.6 4.0 - 11.0 10e9/L    RBC Count 3.86 (L) 4.4 - 5.9 10e12/L    Hemoglobin 12.4 (L) 13.3 - 17.7 g/dL    Hematocrit 37.6 (L) 40.0 - 53.0 %    MCV 97 78 - 100 fl    MCH 32.1 26.5 - 33.0 pg    MCHC 33.0 31.5 - 36.5 g/dL    RDW 13.1 10.0 - 15.0 %    Platelet Count 143 (L) 150 - 450 10e9/L   Hepatic panel   Result Value Ref Range    Bilirubin Direct <0.1 0.0 - 0.2 mg/dL    Bilirubin Total 0.5 0.2 - 1.3 mg/dL    Albumin 1.6 (L) 3.4 - 5.0 g/dL    Protein Total 5.2 (L) 6.8 - 8.8 g/dL    Alkaline Phosphatase 168 (H) 40 - 150 U/L    ALT 21 0 - 70 U/L    AST 26 0 - 45 U/L   CBC with platelets differential   Result Value Ref Range    WBC 5.5 4.0 - 11.0 10e9/L    RBC Count 4.04 (L) 4.4 - 5.9 10e12/L    Hemoglobin 13.0 (L) 13.3 - 17.7 g/dL    Hematocrit 39.7 (L) 40.0 - 53.0 %    MCV 98 78 - 100 fl    MCH 32.2 26.5 - 33.0 pg    MCHC 32.7 31.5 - 36.5 g/dL    RDW 13.2 10.0 -  15.0 %    Platelet Count 159 150 - 450 10e9/L    Diff Method Automated Method     % Neutrophils 67.3 %    % Lymphocytes 19.7 %    % Monocytes 8.6 %    % Eosinophils 3.1 %    % Basophils 1.1 %    % Immature Granulocytes 0.2 %    Nucleated RBCs 0 0 /100    Absolute Neutrophil 3.7 1.6 - 8.3 10e9/L    Absolute Lymphocytes 1.1 0.8 - 5.3 10e9/L    Absolute Monocytes 0.5 0.0 - 1.3 10e9/L    Absolute Eosinophils 0.2 0.0 - 0.7 10e9/L    Absolute Basophils 0.1 0.0 - 0.2 10e9/L    Abs Immature Granulocytes 0.0 0 - 0.4 10e9/L    Absolute Nucleated RBC 0.0     Platelet Estimate Confirming automated cell count          Impression:  Middle aged male with hepatitis B, chronic liver disease, history of glomerulonephritis and cryoglobulinemia, HTN presented to the ED yesterday with headache, nausea, vomiting and markedly elevated blood pressure. He has mild elevation of creatinine today compared to baseline. Hgb remains stable. Blood pressure has improved with reinstitution of regular home medications. Headache has resolved. His gait was poor this morning. He does not appear encephalopathic. I suspect this is more related to low volume status than to true ataxia. We will however, obtain brain MRI today to exclude cerebellar stroke or other posterior fossa insult. Recheck gait this afternoon after hydration. If still unsteady, he may need further hydration. He has no nystagmus. He has no hearing change. Peripheral vertigo is possible, but seems unlikely. I do not find evidence of infectious process, active bleeding or decompensation of liver disease.    MD Steven Cedillo MD

## 2019-10-22 NOTE — H&P
[facility], [place]    History and Physical - ED Observation       Date of Admission:  10/21/2019    Assessment & Plan   Wilfredo Yoon is a 59 year old male admitted on 10/21/2019. He has a past medical history significant for chronic Hep B with cirrhosis, HTN, Nephrotic syndrome, tobacco dependence, and HLD and presents to the Emergency Dept for evaluation of a headache, nausea, and vomiting.    1. Hypertension, Headache: He states that he has not taken his medications since Friday because he ran out of them but does have prescriptions available for refill.  He has not been able to tolerate oral intake. He last vomited in the car on his way to the ED. He does not have hematemesis. He reports no sick contacts or recent travel. He does not have diarrhea, fever, or chills. He does not think that he ate anything contributing to his emesis. He feels weak and unable to walk to the bathroom. He denies myalgias. He denies dysuria and hematuria. He also reports a headache on the right side of his head with dizziness. He does not have vision changes or numbness.  In the ED, BP: 230/126, HR 63, temp 97.5, RR 18, SPO2 93% on RA. Labs show Na 139, K+ 3.6, Cr 1.17, BUN 21. Calcium 8.1. Albumin 1.8, Bili 0.7, Alk Phos 198. Ammonia <10. . Troponin negative. CBC normal with the exception of platelets 132. US Abdomen negative for acute cholecystitis, does show cirrhosis of liver. CT Head negative for acute process. UA negative. Pt was cleared for discharge but unable to ambulate in room or dress himself due to weakness and tremor. Medications: NS bolus 500ml x1, clonidine 0.1mg PO, Benadryl 25mg IV, Hydralazine 20mg IV x2, Reglan 10mg IV. Plan: Oneco to ED Observation for monitoring of blood pressure overnight and evaluation by PT/OT in the AM due to weakness and difficulty ambulating.   -Oneco to ED Obs  -VS Q4hrs  -Telemetry  -Continue home Norvasc 5mg daily  -Continue home  "lisinopril 20mg daily  -Repeat BMP and CBC in AM    2. Nausea, vomiting: He has not been able to tolerate oral intake. He last vomited in the car on his way to the ED. He does not have hematemesis. He reports no sick contacts or recent travel. He does not have diarrhea, fever, or chills. He does not think that he ate anything contributing to his emesis. He feels weak and unable to walk to the bathroom. He denies myalgias.  -Zofran PRN  -IVF  -Serial abdominal exams  -ADAT    ##HLD: continue home atorvastatin  ##Hep B with cirrhosis: Meld score 9. Gets monthly paracentesis. Continue home bumetanide 2mg daily, continue home entecavir 1mg daily.        Diet: Clear Liquid Diet  Advance to low sodium diet when tolerating clears  DVT Prophylaxis: Low Risk/Ambulatory with no VTE prophylaxis indicated  Hawkins Catheter: not present  Code Status: full    Disposition Plan   Expected discharge: Today, recommended to prior living arrangement once adequate pain management/ tolerating PO medications and blood pressure stable.  Entered: Aide Rider CNP 10/22/2019, 4:15 AM       Aide Rider CNP  [facility], [place]  ED Observation, 6D  Ascom:[id number]  ______________________________________________________________________    Chief Complaint   Weakness, headache, hypertension    History is obtained from the patient    History of Present Illness   Per ED,\"Wilfredo Yoon is a 59 year old male who presents to the ED with vomiting and nausea that started yesterday. He reports that he last took his medications on Friday. He states that he has not taken his medications since Friday because he ran out of them but does have prescriptions available for refill.  He has not been able to tolerate oral intake. He last vomited in the car on his way to the ED. He does not have hematemesis. He reports no sick contacts or recent travel. He does not have diarrhea, fever, or chills. He does not think that he ate " "anything contributing to his emesis. He feels weak and unable to walk to the bathroom. He denies myalgias. He denies dysuria and hematuria. He also reports a headache on the right side of his head with dizziness. He does not have vision changes or numbness. \"    Review of Systems    General: No fevers or chills  Skin: No rash or diaphoresis  Eyes: No eye redness, positive for scleral icteris  Ears/Nose/Throat: No rhinorrhea or nasal congestion  Respiratory: No cough or SOB  Cardiovascular: No chest pain or palpitations  Gastrointestinal: positive for nausea and vomiting, negative for BM changes  Genitourinary: No urinary changes  Neurologic: No numbness or weakness, positive for headache, improved from ED  Hematologic/Lymphatic/Immunologic: No leg swelling, no easy bruising/bleeding    Past Medical History    I have reviewed this patient's medical history and updated it with pertinent information if needed.   Past Medical History:   Diagnosis Date     Cryoglobulinemia (H)      Glomerulonephritis      Hepatitis B      Hypertension      Surgical complication        Past Surgical History   I have reviewed this patient's surgical history and updated it with pertinent information if needed.  Past Surgical History:   Procedure Laterality Date     C HAND/FINGER SURGERY UNLISTED       ESOPHAGOSCOPY, GASTROSCOPY, DUODENOSCOPY (EGD), COMBINED N/A 10/18/2018    Procedure: EGD;  Surgeon: Eber Ortez MD;  Location:  GI     HAND SURGERY Right        Social History   I have reviewed this patient's social history and updated it with pertinent information if needed.  Social History     Tobacco Use     Smoking status: Current Some Day Smoker     Packs/day: 0.25     Years: 40.00     Pack years: 10.00     Types: Cigarettes     Smokeless tobacco: Never Used   Substance Use Topics     Alcohol use: No     Alcohol/week: 0.0 standard drinks     Drug use: No       Family History   I have reviewed this patient's family history " and updated it with pertinent information if needed.   Family History   Problem Relation Age of Onset     Liver Cancer No family hx of      Hepatitis No family hx of        Prior to Admission Medications   Prior to Admission Medications   Prescriptions Last Dose Informant Patient Reported? Taking?   amLODIPine (NORVASC) 5 MG tablet   No No   Sig: Take 1 tablet (5 mg) by mouth daily   atorvastatin (LIPITOR) 20 MG tablet Past Week at Unknown time  Yes Yes   Sig: Take 20 mg by mouth daily   bumetanide (BUMEX) 2 MG tablet   No No   Sig: Take 1 tablet (2 mg) by mouth daily   entecavir (BARACLUDE) 1 MG tablet Past Week at Unknown time  No Yes   Sig: Take 1 tablet (1 mg) by mouth daily   lisinopril (PRINIVIL/ZESTRIL) 20 MG tablet   No No   Sig: Take 1 tablet (20 mg) by mouth daily      Facility-Administered Medications: None     Allergies   No Known Allergies    Physical Exam   Vital Signs: Temp: 98  F (36.7  C) Temp src: Oral BP: 127/76 Pulse: 63 Heart Rate: 75 Resp: 16 SpO2: 97 % O2 Device: None (Room air)    Weight: 120 lbs 0 oz    Constitutional: awake, alert, cooperative, no apparent distress, and appears stated age  Eyes: Lids and lashes normal, pupils equal, round and reactive to light, extra ocular muscles intact, sclera clear, conjunctiva normal  ENT: Normocephalic, atraumatic, external ears without lesions, oral pharynx with moist mucous membranes  Respiratory: No increased work of breathing, good air exchange, clear to auscultation bilaterally, no crackles or wheezing  Cardiovascular: Normal apical impulse, regular rate and rhythm, normal S1 and S2, no S3 or S4, and no murmur noted  Abdomen: Normal bowel sounds, soft, non-distended, non-tender  Skin: normal skin color, texture, turgor  Musculoskeletal: There is no redness, warmth, or swelling of the joints.  Full range of motion noted.  Motor strength is 5 out of 5 all extremities bilaterally.  Tone is normal.  Neurologic: Awake, alert, oriented to name, place  and time.  Cranial nerves II-XII are grossly intact.  Motor is 5 out of 5 bilaterally.  No facial droop. Sensation intact throughout. No nystagmus.    Data   Data reviewed today: I reviewed all medications, new labs and imaging results over the last 24 hours. I personally reviewed the CT Head and US Abdomen image(s) showing :     US ABDOMEN  IMPRESSION:   1. Cirrhosis without focal liver lesion.  a. LI-RADS US Category: US-1 Negative: No US evidence of HCC  b. Recommend continued surveillance US.  2. Trace ascites.  3. Small hypoechoic nodule adjacent to the pancreas likely a  periportal lymph node. This is not enlarged by size criteria.    CT HEAD  IMPRESSION:   1. No evidence of acute intracranial hemorrhage, mass, or herniation.  2. Mild diffuse parenchymal volume loss and white matter changes  likely due to chronic microvascular ischemic disease. Multiple known  old lacunar infarcts are better characterized on previous MRI.  3. Mucosal thickening with aerated secretions in the right maxillary  sinus. Correlate clinically for symptoms/signs of acute sinusitis.    EKG  Interpreted by Angelica Payne MD  Time reviewed: 1558  Symptoms at time of EKG: nausea   Rhythm: normal sinus   Rate: normal, 61 bpm  Axis: normal  Ectopy: none  Conduction: normal  ST Segments/ T Waves: ST elevation in V3-4 c/w early repolarization, without significant change from prior EKG, otherwise no ST-T wave changes  Q Waves: none  Comparison to prior: Unchanged from 2/2/19     Clinical Impression: normal EKG    Recent Labs   Lab 10/21/19  1557   WBC 4.6   HGB 14.0   MCV 95   *   INR 1.07      POTASSIUM 3.6   CHLORIDE 106   CO2 28   BUN 21   CR 1.17   ANIONGAP 5   SOTERO 8.1*   *   ALBUMIN 1.8*   PROTTOTAL 6.0*   BILITOTAL 0.7   ALKPHOS 198*   ALT 24   AST 33   LIPASE 77   TROPI <0.015

## 2019-10-22 NOTE — DISCHARGE INSTRUCTIONS
Transportation Resource:  [facility] [phone] - call to arrange rides for any medical appointments.    TODAY'S VISIT:  You were seen today for nausea and vomiting  -   - If you had any labs or imaging/radiology tests performed today, you should also discuss these tests with your usual provider.     FOLLOW-UP:  Please make an appointment to follow up with:  - Your Primary Care Provider in 7 days.    - Have your provider review the results from today's visit with you again to make sure no further follow-up or additional testing is needed based on those results.     PRESCRIPTIONS / MEDICATIONS:  - zofran    OTHER INSTRUCTIONS:  - Do your best to stay hydrated.    RETURN TO THE EMERGENCY DEPARTMENT  Return to the Emergency Department at any time for any new or worsening symptoms or any concerns.

## 2019-10-22 NOTE — PROGRESS NOTES
-diagnostic tests and consults completed and resulted: No  -vital signs normal or at patient baseline: Yes     -tolerating oral intake to maintain hydration:- Yes.  -returns to baseline functional status: No  -safe disposition plan has been identified: No. PT/OT/ Consults pending.

## 2019-10-22 NOTE — SIGNIFICANT EVENT
Significant Event Note    S:  Wilfredo Yoon  is a 59 old male ED observation patient who presented to MRI for contrast enhanced MRI of the brain.     After contrast injection, patient reported diffuse itchiness particularly affecting his upper torso and neck. The patient was evaluated by the writer of this note on 10/22/2019 at 2:45 PM. Patient was interviewed with the assistance of an . Patient reported itchiness of the upper torso and neck. Denied chest pain, shortness of breath, difficulty breathing, mouth or lip swelling.    O:  General: A&O, NAD  Lungs: Breathing comfortably on RA and with pulse ox measurement of 98%  CV: Extremities WWP. Peripheral pulses are symmetric. Heart rate 80 bpm. Blood pressure is measured on left upper arm with cough 148/94.  Skin: Mildly erythematous upper torso over the chest and upper back. No swelling, urticaria.  Neuro: symmetric intentional movements    A/P:  Wilfredo Yoon  is status-post contrast reaction to gadolinium contrast. On initial evaluation, the patient was in no acute distress and reported symptoms of itchiness of the upper torso and neck and upper torso erythema which improved after administration of 25 mg IV Benadryl.    Plan for:  Discharge to the ED observation for continued monitoring for change in status, attention to worsening erythema, itchiness, new shortness of breath, hypotension, tachycardia.  Contrast reaction was discussed with ED observation nurse practitioner LINDA Amin CNP,  by Dr. Conor Mendez at 10/22/2019 3:01 PM.      Conor Mendez MD

## 2019-10-22 NOTE — PROGRESS NOTES
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     Functional Level Prior   Ambulation 0-->independent   Transferring 0-->independent   Toileting 0-->independent   Bathing 0-->independent   Communication 0-->understands/communicates without difficulty   Swallowing 0-->swallows foods/liquids without difficulty   Cognition 0 - no cognition issues reported   Fall history within last six months no   Prior Functional Level Comment he reports on balance or gait issues at baseline   General Information   Onset of Illness/Injury or Date of Surgery - Date 10/21/19   Referring Physician Aide Rider CNP   Patient/Family Goals Statement to eat better, feel stronger   Pertinent History of Current Problem (include personal factors and/or comorbidities that impact the POC) 59 year old male admitted on 10/21/2019. He has a past medical history significant for chronic Hep B with cirrhosis, HTN, Nephrotic syndrome, tobacco dependence, and HLD and presents to the Emergency Dept for evaluation of a headache, nausea, and vomiting.   Precautions/Limitations fall precautions   General Observations EOM exam intact without overt central signs.   Cognitive Status Examination   Orientation orientation to person, place and time   Level of Consciousness alert   Follows Commands and Answers Questions 100% of the time   Personal Safety and Judgment intact   Memory intact   Pain Assessment   Patient Currently in Pain No   Integumentary/Edema   Integumentary/Edema no  deficits were identifed   Posture    Posture Forward head position   Posture Comments at times holds head in left side bending, but can correct when cued.  standing with wide SUZANNE with weight on heels   Range of Motion (ROM)   ROM Quick Adds No deficits were identified   Strength   Strength Comments 5/5 all extremities    Bed Mobility   Bed Mobility Comments independent   Transfer Skills   Transfer Comments needs UE support, uses wide SUZANNE   Gait   Gait Comments wide SUZANNE, footflat at initial contacts, path deviations more toward left side.  difficulty coordinating arm swing.  LOB x1 requiring assist.  x4 other losses of balance that pt self-corrects.  prefers UE support and gait improves significantly with this.   Balance   Balance Comments positive romberg   Sensory Examination   Sensory Perception no deficits were identified   Coordination   Coordination Comments intact finger-to-nose BUE, intact heel-to-shin BLE   Muscle Tone   Muscle Tone no deficits were identified   General Therapy Interventions   Planned Therapy Interventions gait training;risk factor education;home program guidelines;progressive activity/exercise   Clinical Impression   Criteria for Skilled Therapeutic Intervention yes, treatment indicated   PT Diagnosis impaired gait   Influenced by the following impairments balance, posture   Functional limitations due to impairments gait, increased falls risk   Clinical Presentation Evolving/Changing   Clinical Presentation Rationale neuro exam changed from reported baseline, head CT negative however will talk to team about pursing MRI to rule out very focal acute or subacute infarcts   Clinical Decision Making (Complexity) Moderate complexity   Therapy Frequency Daily   Predicted Duration of Therapy Intervention (days/wks) 2 visits   Anticipated Equipment Needs at Discharge standard cane   Anticipated Discharge Disposition Home with Outpatient Therapy   Risk & Benefits of therapy have been explained Yes  "  Patient, Family & other staff in agreement with plan of care Yes   [facility] AM-PAC  \"6 Clicks\" V.2 Basic Mobility Inpatient Short Form   1. Turning from your back to your side while in a flat bed without using bedrails? 4 - None   2. Moving from lying on your back to sitting on the side of a flat bed without using bedrails? 4 - None   3. Moving to and from a bed to a chair (including a wheelchair)? 4 - None   4. Standing up from a chair using your arms (e.g., wheelchair, or bedside chair)? 4 - None   5. To walk in hospital room? 3 - A Little   6. Climbing 3-5 steps with a railing? 4 - None   Basic Mobility Raw Score (Score out of 24.Lower scores equate to lower levels of function) 23   Total Evaluation Time   Total Evaluation Time (Minutes) 20     "

## 2019-10-22 NOTE — PROGRESS NOTES
"-diagnostic tests and consults completed and resulted: No  -vital signs normal or at patient baseline: Yes  /76 (BP Location: Right arm)   Pulse 63   Temp 98  F (36.7  C) (Oral)   Resp 16   Ht 1.651 m (5' 5\")   Wt 54.4 kg (120 lb)   SpO2 97%   BMI 19.97 kg/m    -tolerating oral intake to maintain hydration: No. On clear liquids.  -returns to baseline functional status: No  -safe disposition plan has been identified: No. PT/OT/ Consults pending.     PRN Tylenol given for headache.  scheduled for 0800 for two hours.          "

## 2019-10-22 NOTE — PROGRESS NOTES
When assisting pt to the  bathroom, he was unsteady and almost fell backward. Staff was able to support pt. Pt stated he felt very weak.

## 2019-10-22 NOTE — ED NOTES
[facility], [place]   ED Nurse to Floor Handoff     Wilfredo Yoon is a 59 year old male who speaks Slovak and lives alone,  in a home  They arrived in the ED by car from home    ED Chief Complaint: Vomiting    ED Dx;   Final diagnoses:   Non-intractable vomiting with nausea, unspecified vomiting type   History of cirrhosis of liver   Elevated blood pressure reading   Generalized muscle weakness         Needed?: Yes    Allergies: No Known Allergies.  Past Medical Hx:   Past Medical History:   Diagnosis Date     Cryoglobulinemia (H)      Glomerulonephritis      Hepatitis B      Hypertension      Surgical complication       Baseline Mental status: WDL  Current Mental Status changes: at basesline    Infection present or suspected this encounter: no  Sepsis suspected: No  Isolation type: No active isolations     Activity level - Baseline/Home:  Independent  Activity Level - Current:   Stand with Assist    Bariatric equipment needed?: No    In the ED these meds were given:   Medications   ondansetron (ZOFRAN) injection 4 mg (4 mg Intravenous Given 10/21/19 1610)   amLODIPine (NORVASC) tablet 5 mg (5 mg Oral Given 10/21/19 2333)   lisinopril (PRINIVIL/ZESTRIL) tablet 20 mg (20 mg Oral Given 10/21/19 2333)   hydrALAZINE (APRESOLINE) injection 20 mg (20 mg Intravenous Given 10/21/19 1611)   metoclopramide (REGLAN) 10 mg in sodium chloride 0.9 % 50 mL infusion (10 mg Intravenous Given 10/21/19 1810)   diphenhydrAMINE (BENADRYL) injection 25 mg (25 mg Intravenous Given 10/21/19 1810)   0.9% sodium chloride BOLUS (0 mLs Intravenous Stopped 10/21/19 2233)   cloNIDine (CATAPRES) tablet 0.1 mg (0.1 mg Oral Given 10/22/19 0007)   hydrALAZINE (APRESOLINE) injection 20 mg (20 mg Intravenous Given 10/22/19 0030)       Drips running?  No    Home pump  No    Current LDAs       Labs results:   Labs Ordered and Resulted from Time of ED Arrival Up to the Time of Departure from the ED    CBC WITH PLATELETS DIFFERENTIAL - Abnormal; Notable for the following components:       Result Value    RBC Count 4.37 (*)     Platelet Count 132 (*)     All other components within normal limits   COMPREHENSIVE METABOLIC PANEL - Abnormal; Notable for the following components:    Glucose 102 (*)     Calcium 8.1 (*)     Albumin 1.8 (*)     Protein Total 6.0 (*)     Alkaline Phosphatase 198 (*)     All other components within normal limits   AMMONIA - Abnormal; Notable for the following components:    Ammonia <10 (*)     All other components within normal limits   UA MACROSCOPIC WITH REFLEX TO MICRO AND CULTURE - Abnormal; Notable for the following components:    Glucose Urine 30 (*)     Ketones Urine 5 (*)     Blood Urine Moderate (*)     Protein Albumin Urine 300 (*)     RBC Urine 11 (*)     WBC Urine 6 (*)     Bacteria Urine Few (*)     Mucous Urine Present (*)     Hyaline Casts 12 (*)     Calcium Oxalate Few (*)     All other components within normal limits   PARTIAL THROMBOPLASTIN TIME   INR   LIPASE   TROPONIN I   NT PROBNP INPATIENT   PERIPHERAL IV CATHETER       Imaging Studies:   Recent Results (from the past 24 hour(s))   CT Head w/o Contrast    Narrative    CT SCAN OF THE HEAD WITHOUT CONTRAST   10/21/2019 5:00 PM     HISTORY: Headache, hypertension.      TECHNIQUE: Axial images of the head and coronal reformations without  IV contrast material. Radiation dose for this scan was reduced using  automated exposure control, adjustment of the mA and/or kV according  to patient size, or iterative reconstruction technique.    COMPARISON: MRI head 7/31/2018, CT head 7/30/2018.    FINDINGS: There is no evidence of intracranial hemorrhage, mass, acute  infarct or acute abnormality. The ventricles are normal in size, shape  and configuration. Mild diffuse parenchymal volume loss. Mild patchy  periventricular white matter hypodensities which are nonspecific, but  likely related to chronic microvascular ischemic  disease. Multiple old  lacunar infarcts were better characterized on previous MRI of the  brain dated 7/31/2018.    Moderate mucosal thickening with aerated secretions in the right  maxillary sinus. There may be some periodontal disease involving right  maxillary teeth, but this is poorly characterized on this  head-targeted exam. The bony calvarium and bones of the skull base  appear intact.       Impression    IMPRESSION:   1. No evidence of acute intracranial hemorrhage, mass, or herniation.  2. Mild diffuse parenchymal volume loss and white matter changes  likely due to chronic microvascular ischemic disease. Multiple known  old lacunar infarcts are better characterized on previous MRI.  3. Mucosal thickening with aerated secretions in the right maxillary  sinus. Correlate clinically for symptoms/signs of acute sinusitis.    MYLES PARNELL MD   US Abdomen Limited (RUQ)    Narrative    US ABDOMEN LIMITED   10/21/2019 7:33 PM     HISTORY:  Elevated alk phos, nausea, vomiting.    COMPARISON: Abdominal ultrasound on 10/7/2019.    FINDINGS:    Gallbladder: No cholelithiasis. Gallbladder wall thickening measuring  up to 6 mm. Sonographic Keen's sign is negative.    Bile ducts:  CHD is normal diameter.  No intrahepatic biliary  dilatation.    Liver: Demonstrates coarsened echotexture and surface nodularity. No  focal hepatic mass.    Pancreas:  Partially obscured by overlying bowel gas,  but grossly  unremarkable.     Right kidney:  Normal.     Aorta and IVC:  Not specifically assessed.     Small volume ascites also noted.      Impression    IMPRESSION:    1. Cirrhotic liver morphology with evidence of portal hypertension  including ascites.  2. Gallbladder wall thickening, likely related to liver dysfunction.  No cholelithiasis or other sonographic evidence of acute  cholecystitis.    BINA SHARP MD       Recent vital signs:   /72   Pulse 77   Temp 98.1  F (36.7  C) (Oral)   Resp 16   Ht 1.651 m (5'  "5\")   Wt 54.4 kg (120 lb)   SpO2 97%   BMI 19.97 kg/m      Tony Coma Scale Score: 15 (10/21/19 1555)       Cardiac Rhythm: Other  Pt needs tele? No  Skin/wound Issues: None    Code Status: Full Code    Pain control: good    Nausea control: good    Abnormal labs/tests/findings requiring intervention: 2    Family present during ED course? No   Family Comments/Social Situation comments: none    Tasks needing completion: None  Arlene Mccormack RN  [phone] [facility]        "

## 2019-10-22 NOTE — ED NOTES
Observation Brochure and Video    Patient informed of observation status based on provider's order.  Observation brochure was given and the video watched. Patient/Family stated understanding. Questions answered.  Arlene Mccormack RN

## 2019-10-22 NOTE — PROGRESS NOTES
[facility] [place]  Daily Progress Note          Assessment & Plan:   Wilfredo Yoon is a 59 year old male with a PMH of chronic Hep B with cirrhosis, HTN, Nephrotic syndrome, tobacco dependence, and HLD who presented to the ED for evaluation of headache, nausea, and vomiting. Found to be hypertensive to 219/125 in the ED. Plan to discharge from ED after b/p improved. However, he was unable to ambulate.      ##Hypertension, Headache: Ran out of home blood pressure medications. States last dose was 10/18. States he was unable to ambulate to get to pharmacy. At admission he noted headache on the right side and dizziness. This am this has resolved. However, when working with PT he was noted to have gait instability. He does not have vision changes or numbness.  Troponin negative, BNP negative. CBC normal with the exception of platelets 132.  CT Head negative for acute process. UA negative. Pt was cleared for discharge but unable to ambulate in room or dress himself due to weakness and tremor. In the ED he was given NS bolus 500ml x1, clonidine 0.1mg PO, Benadryl 25mg IV, Hydralazine 20mg IV x2, Reglan 10mg IV. He was admitted to observation for monitoring of blood pressure overnight and evaluation by PT/OT in the AM due to weakness and difficulty ambulating.   -Telemetry  -Continue home Norvasc 5mg daily  -Continue home lisinopril 20mg daily      ##ANDRY: creatinine 1.34 this am. Was 1.17 in the ED. Baseline appears to be ~ 1.0-1.2.   -1 L NS bolus over 4 hours  -Monitor      ##Gait Instability: Acute onset. Had been living independently. Neuro exam is unremarkable. However, gait instability and balance issues noted with PT evaluation. Concerned given acute changes. CT negative but concerned stroke could be missed on CT, particularly cerebellar stroke.   -Stat MRI      ##Nausea, vomiting, elevated Alk Phos: He has not been able to tolerate oral intake. He last vomited in the car  "on his way to the ED. Denies hematemesis. He reports no sick contacts or recent travel. Denies diarrhea, fever, or chills. Believes eating spicy foods has caused his symptoms. Notes weakness. US abdomen without acute cause. Alk phos 198 in the ED, 168 this am. Feeling better and tolerating PO intake now.   -Zofran PRN  -IVF  -Serial abdominal exams  -ADAT     ##Anemia: hgb 12.4 this am. Was 14.0 in the ED. Suspect hemoconcentration with n/v and now dilution given IVF. No signs of bleeding. Recheck hgb at 1200 was 13.  -Monitor for signs of bleeding.     ##HLD: continue home atorvastatin    ##Hep B with cirrhosis: Meld score 9. Gets monthly paracentesis. Continue home bumetanide 2mg daily, continue home entecavir 1mg daily.        FEN: As tolerated  Lines: PIV  Prophylaxis: Early ambulation           Consults:   PT         Discharge Planning:   Pending MRI results         Interval History:   Denies complaints.     ROS:   Constitutional: No fevers/chills. Tolerating diet.   Cardiovascular: No chest pain or palpitations.   Respiratory: No cough or SOB.   GI: No abdominal pain. No N/V.      : No urinary complaints.            Physical Exam:   /71   Pulse 67   Temp 98.1  F (36.7  C) (Oral)   Resp 16   Ht 1.651 m (5' 5\")   Wt 54.4 kg (120 lb)   SpO2 99%   BMI 19.97 kg/m       GENERAL: Alert and oriented x 3. NAD.   HEENT: Anicteric sclera. Mucous membranes moist.   CV: RRR. S1, S2. No murmurs appreciated.   RESPIRATORY: Effort normal. Lungs CTAB with no wheezing, rales, rhonchi.   GI: Abdomen soft and non distended with normoactive bowel sounds present in all quadrants. No tenderness, rebound, guarding.   NEUROLOGICAL: No focal deficits. Moves all extremities.    EXTREMITIES: No peripheral edema. Intact bilateral pedal pulses.   SKIN: No jaundice. No rashes.     Medication list reviewed.   Today's labs and imaging were reviewed.     LINDA Amin, CNP  Emergency Department Observation Unit    Addendum " 1700: Patient with contrast reaction (itching without SOB, wheezing, hypoxia, throat swelling), resolved with Benadryl. Will give Prednisone 40 mg's PO x1. Continue to monitor. The patient was previously cleared for discharge by PT. However, per nursing was ambulating to restroom and almost fell. Per nursing, does not appear safe to discharge home. Patient also does not feel safe to discharge to home.   -PT consult again in the am   -Monitor for signs of reaction  -Give Prednisone 40 mg's PO x 1  - Benadryl PRN     LINDA Amin, CNP  Emergency Department Observation Unit

## 2019-10-22 NOTE — PLAN OF CARE
Discharge Planner OT   Defer OT evaluation  Patient plan for discharge: home with A from friend ptn  Current status: Per Physical Therapist pt completing ADLs at baseline, mobility was largest barrier.   Barriers to return to prior living situation: mobility  Recommendations for discharge: Per PT, home with A prn  Rationale for recommendations: defer OT eval as pt has a safe plan for discharge and is not limited in ADLs       Entered by: Leobardo Dickerson 10/22/2019 10:02 AM

## 2019-10-22 NOTE — PROGRESS NOTES
"-diagnostic tests and consults completed and resulted: No  -vital signs normal or at patient baseline: Yes     -tolerating oral intake to maintain hydration:- Yes.  -returns to baseline functional status: No  -safe disposition plan has been identified: To discharge home once medically stable per PT.  Pt has not had ant more reactions to contrast.  BP (!) 157/91   Pulse 67   Temp 97.6  F (36.4  C) (Oral)   Resp 18   Ht 1.651 m (5' 5\")   Wt 54.4 kg (120 lb)   SpO2 98%   BMI 19.97 kg/m                  "

## 2019-10-22 NOTE — PROGRESS NOTES
-diagnostic tests and consults completed and resulted: No  -vital signs normal or at patient baseline: Yes     -tolerating oral intake to maintain hydration:- Yes.  -returns to baseline functional status: No  -safe disposition plan has been identified: To discharge home once medically stable per PT

## 2019-10-22 NOTE — CONSULTS
BRIEF SOCIAL WORK NOTE:    SW consulted for discharge planning. SW competed chart review. Pt lives with a roommate in an apartment and is independent with ADL's at baseline. PT/OT completed evaluations this AM and recommend discharge to home with assist from roommate and OP PT; per chart review Pt is in agreement with this discharge plan. No SW needs identified for discharge planning. SW remains available if needs arise during this hospitalization.    BENY Garay, LGSW  6B Intermediate Care Unit   [facility]  Phone: [phone]  Pager: [phone]

## 2019-10-22 NOTE — PLAN OF CARE
PT 6D: Evaluation completed and treatment initiated.   Discharge Planner PT   Patient plan for discharge: Home with assist from his roommate  Current status: Balance and gait are notably impaired and do not correlate with the generalized weakness a pt would experience after a few days of poor PO intake.  Romberg is positive.  Several gait deviations noted.  Trained pt to use a cane and this improved gait stability significantly.  He continues to report weakness.  Pt should have assist and gait belt for all mobility.  Barriers to return to prior living situation: Medical status, falls risk  Recommendations for discharge: Home with assist from his roommate, use of cane for all gait (pt reports he owns one already), and OP PT  Rationale for recommendations: Pt is below baseline for gait and balance, and is now using a new device.  OP PT to address balance and progress gait independence.  Spoke with NP about the potential for focal infarct that was not seen on head CT given pt's exam.           Entered by: Zabrina Correa 10/22/2019 10:06 AM

## 2019-10-23 ENCOUNTER — APPOINTMENT (OUTPATIENT)
Dept: PHYSICAL THERAPY | Facility: CLINIC | Age: 59
End: 2019-10-23
Attending: NURSE PRACTITIONER
Payer: COMMERCIAL

## 2019-10-23 ENCOUNTER — OFFICE VISIT (OUTPATIENT)
Dept: INTERPRETER SERVICES | Facility: CLINIC | Age: 59
End: 2019-10-23
Payer: COMMERCIAL

## 2019-10-23 VITALS
TEMPERATURE: 98.1 F | BODY MASS INDEX: 19.99 KG/M2 | DIASTOLIC BLOOD PRESSURE: 85 MMHG | OXYGEN SATURATION: 98 % | RESPIRATION RATE: 16 BRPM | SYSTOLIC BLOOD PRESSURE: 152 MMHG | HEART RATE: 64 BPM | HEIGHT: 65 IN | WEIGHT: 120 LBS

## 2019-10-23 PROCEDURE — 25000132 ZZH RX MED GY IP 250 OP 250 PS 637: Performed by: NURSE PRACTITIONER

## 2019-10-23 PROCEDURE — 97530 THERAPEUTIC ACTIVITIES: CPT | Mod: GP

## 2019-10-23 PROCEDURE — 97116 GAIT TRAINING THERAPY: CPT | Mod: GP,59

## 2019-10-23 PROCEDURE — G0378 HOSPITAL OBSERVATION PER HR: HCPCS

## 2019-10-23 PROCEDURE — T1013 SIGN LANG/ORAL INTERPRETER: HCPCS | Mod: U3

## 2019-10-23 PROCEDURE — 99217 ZZC OBSERVATION CARE DISCHARGE: CPT | Mod: Z6 | Performed by: PHYSICIAN ASSISTANT

## 2019-10-23 PROCEDURE — 25000132 ZZH RX MED GY IP 250 OP 250 PS 637: Performed by: EMERGENCY MEDICINE

## 2019-10-23 RX ORDER — LISINOPRIL 20 MG/1
20 TABLET ORAL DAILY
Qty: 30 TABLET | Refills: 0 | Status: ON HOLD | OUTPATIENT
Start: 2019-10-23 | End: 2020-07-16

## 2019-10-23 RX ORDER — ENTECAVIR 1 MG/1
1 TABLET, FILM COATED ORAL DAILY
Qty: 30 TABLET | Refills: 0 | Status: SHIPPED | OUTPATIENT
Start: 2019-10-23 | End: 2020-01-23

## 2019-10-23 RX ORDER — ATORVASTATIN CALCIUM 20 MG/1
20 TABLET, FILM COATED ORAL DAILY
Qty: 30 TABLET | Refills: 0 | Status: ON HOLD | OUTPATIENT
Start: 2019-10-23 | End: 2020-06-16

## 2019-10-23 RX ORDER — AMLODIPINE BESYLATE 5 MG/1
5 TABLET ORAL DAILY
Qty: 30 TABLET | Refills: 0 | Status: ON HOLD | OUTPATIENT
Start: 2019-10-23 | End: 2020-07-16

## 2019-10-23 RX ORDER — BUMETANIDE 2 MG/1
2 TABLET ORAL DAILY
Qty: 30 TABLET | Refills: 0 | Status: ON HOLD | OUTPATIENT
Start: 2019-10-23 | End: 2020-07-16

## 2019-10-23 RX ADMIN — LISINOPRIL 20 MG: 20 TABLET ORAL at 07:56

## 2019-10-23 RX ADMIN — ATORVASTATIN CALCIUM 20 MG: 20 TABLET, FILM COATED ORAL at 07:56

## 2019-10-23 RX ADMIN — AMLODIPINE BESYLATE 5 MG: 5 TABLET ORAL at 07:56

## 2019-10-23 RX ADMIN — BUMETANIDE 2 MG: 1 TABLET ORAL at 07:56

## 2019-10-23 NOTE — PROGRESS NOTES
Care Coordinator - Discharge Planning    Admission Date/Time:  10/21/2019  Attending MD:  Trever Orellana MD     Data  Chart reviewed, discussed with interdisciplinary team.   Patient was admitted for:   1. Hyperlipidemia, unspecified hyperlipidemia type    2. Non-intractable vomiting with nausea, unspecified vomiting type    3. History of cirrhosis of liver    4. Elevated blood pressure reading    5. Generalized muscle weakness    6. Hypertensive emergency    7. Chronic viral hepatitis B without delta agent and without coma (H)    8. Cirrhosis of liver without ascites, unspecified hepatic cirrhosis type (H)         Assessment   Concerns with insurance coverage for discharge needs: None.  Current Living Situation: Patient lives with family.  Support System: Supportive and Involved  Services Involved: None  Transportation at Discharge: [place] transportation,  [employer] [phone], Car and Family or friend will provide  Transportation to Medical Appointments:    - Name of caregiver: Self  Barriers to Discharge: Medical stability     Pt status reviewed/discussed during care team rounds.  Team anticipates that pt will be able to discharge home today pending PT recommendations.    Notified by PT that pt would benefit from OP PT.  Pt noted concerns with transportation.      Met with pt.  Introduced RNCC role.   Per discussion with pt he has been driving on his own but d/t his current health needs will need to use medical transportation.  Agreed to place [employer] information on patients discharge orders.  Pt wishes to go to OP rehab at .  Reviewed with MADELIN Elliott.  Discharge orders updated.       Coordination of Care and Referrals: Provided patient/family with options for Outpatient Rehab and Transportation.      Plan  Anticipated Discharge Date:  10/23/2019  Anticipated Discharge Plan: Home    Valrey Moya RN BSN, PHN RN Care Coordinator  Internal Medicine   [phone]  Pager: [phone]  Weekend RN  Care Coordinator job code * * * [id number]  [facility] [employer]  10/23/2019 11:21 AM

## 2019-10-23 NOTE — PLAN OF CARE
Outpatient/Observation goals to be met prior to discharge:    1. Diagnostic test and consults completed and resulted: No, in process  2. Vital signs normal or at patient baseline: BP slightly elevated.   3. Tolerating oral intake to maintain hydration: Yes.  4. Returns to baseline functional status: No.  5. Safe disposition plan has been identified: Awaiting PT follow up 10/23.

## 2019-10-23 NOTE — PLAN OF CARE
Discharge Planner PT   Patient plan for discharge: Home with assist from friends, use of a cane  Current status: Overall stability is improved today, but continues to have gait impairments and be at falls risk.  Practiced further with cane today, pt is more competent with it than yesterday. Also educated extensively on reducing falls risk.  Barriers to return to prior living situation: Falls risk  Recommendations for discharge: Home with assist from friends, use of a cane and shower chair, OP PT  Rationale for recommendations: Pt's falls risk is reduced with use of cane.  His gait and balance remain quite below his reported baseline, therefore recommend OP PT to address this.       Entered by: Zabrina Correa 10/23/2019 11:06 AM

## 2019-10-23 NOTE — PROGRESS NOTES
Patient oriented, VSS with slightly elevated BP. Denies nausea & vomiting throughout the night, requesting food this AM. Denies headache or other pains. Patient insisted his PIV be removed due to discomfort, primary team notified. Uncertain if patient's gait has improved, he did not use call light to ask for assistance in using restroom. Reported ambulating independently. No acute events overnight.

## 2019-10-23 NOTE — DISCHARGE SUMMARY
Discharge Summary    Wilfredo Yoon MRN# [id number]   YOB: 1960 Age: 59 year old     Date of Admission:  10/21/2019  Date of Discharge:  10/23/2019 11:28 AM  Admitting Physician:  Angelica Payne MD  Discharge Physician:  Meka Tejeda MD  Discharging Service:  Emergency Department Observation Unit     Primary Provider: [facility]          Discharge Diagnosis:   Hypertension urgency  Headache  Nausea and vomiting              Discharge Disposition:   Discharged to home           Condition on Discharge:   Discharge condition: Stable   Code status on discharge:            Procedures:   Imaging performed:   Head MRI             Discharge Medications:     Current Discharge Medication List      START taking these medications    Details   ondansetron (ZOFRAN ODT) 4 MG ODT tab Take 1 tablet (4 mg) by mouth every 8 hours as needed for nausea or vomiting  Qty: 15 tablet, Refills: 0         CONTINUE these medications which have CHANGED    Details   amLODIPine (NORVASC) 5 MG tablet Take 1 tablet (5 mg) by mouth daily  Qty: 30 tablet, Refills: 0    Associated Diagnoses: Hypertensive emergency      atorvastatin (LIPITOR) 20 MG tablet Take 1 tablet (20 mg) by mouth daily  Qty: 30 tablet, Refills: 0    Associated Diagnoses: Hyperlipidemia, unspecified hyperlipidemia type      bumetanide (BUMEX) 2 MG tablet Take 1 tablet (2 mg) by mouth daily  Qty: 30 tablet, Refills: 0    Associated Diagnoses: Hypertensive emergency      entecavir (BARACLUDE) 1 MG tablet Take 1 tablet (1 mg) by mouth daily  Qty: 30 tablet, Refills: 0    Associated Diagnoses: Chronic viral hepatitis B without delta agent and without coma (H); Cirrhosis of liver without ascites, unspecified hepatic cirrhosis type (H)      lisinopril (PRINIVIL/ZESTRIL) 20 MG tablet Take 1 tablet (20 mg) by mouth daily  Qty: 30 tablet, Refills: 0    Associated Diagnoses: Hypertensive emergency                    Consultations:   No consultations were requested during this admission             Brief History of Illness:   Wilfredo Yoon is a 59 year old male with a PMH of chronic Hep B with cirrhosis, HTN, Nephrotic syndrome, tobacco dependence, and HLD who presented to the ED for evaluation of headache, nausea, and vomiting. Found to be hypertensive to 219/125 in the ED. Plan to discharge from ED after b/p improved. However, he was unable to ambulate.           Hospital Course:   ##Hypertension, Headache: Ran out of home blood pressure medications. States last dose was 10/18. States he was unable to ambulate to get to pharmacy. At admission he noted headache on the right side and dizziness. This am this has resolved. However, when working with PT he was noted to have gait instability. He does not have vision changes or numbness.  Troponin negative, BNP negative. CBC normal with the exception of platelets 132.  CT Head negative for acute process. UA negative. Pt was cleared for discharge but unable to ambulate in room or dress himself due to weakness and tremor. In the ED he was given NS bolus 500ml x1, clonidine 0.1mg PO, Benadryl 25mg IV, Hydralazine 20mg IV x2, Reglan 10mg IV. He was admitted to observation for monitoring of blood pressure overnight and evaluation by PT/OT in the AM due to weakness and difficulty ambulating. Patient was assessed by PT today. Recommend outpatient PT for strengthening. Patient agreeable to plan. All PTA medication refilled for x 1 month. Patient was advised to follow up with his primary for future refills of medication.   -Continue home Norvasc 5mg daily  -Continue home lisinopril 20mg daily      ##ANDRY: creatinine 1.34 this am. Was 1.17 in the ED. Baseline appears to be ~ 1.0-1.2.   -1 L NS bolus over 4 hours        ##Gait Instability: Acute onset. Had been living independently. Neuro exam is unremarkable. However, gait instability and balance issues noted with PT evaluation.  "Concerned given acute changes. CT negative but concerned stroke could be missed on CT, particularly cerebellar stroke.   Subsequently, patient had MRI head which was essentially normal. Patient feeling much better this morning. He was able to ambulate to the bathroom and back to his bed on his own. Denies feeling dizzy. He was reassessed by PT with recommendation for home with assist from friends, use of a cane and shower chair, OP PT         ##Nausea, vomiting, elevated Alk Phos: He has not been able to tolerate oral intake. He last vomited in the car on his way to the ED. Denies hematemesis. He reports no sick contacts or recent travel. Denies diarrhea, fever, or chills. Believes eating spicy foods has caused his symptoms. Notes weakness. US abdomen without acute cause. Alk phos 198 in the ED, 168 this am. Feeling better and tolerating PO intake now.      ##Anemia: hgb 12.4 this am. Was 14.0 in the ED. Suspect hemoconcentration with n/v and now dilution given IVF. No signs of bleeding. Recheck hgb at 1200 was 13.        ##HLD: continue home atorvastatin     ##Hep B with cirrhosis: Meld score 9. Gets monthly paracentesis. Continue home bumetanide 2mg daily, continue home entecavir 1mg daily.                Final Day of Progress before Discharge:       Physical Exam:  Blood pressure (!) 152/85, pulse 64, temperature 98.1  F (36.7  C), temperature source Oral, resp. rate 16, height 1.651 m (5' 5\"), weight 54.4 kg (120 lb), SpO2 98 %.    EXAM:  Physical Exam   Constitutional: Pt is oriented to person, place, and time.Pt appears well-developed and well-nourished.   HENT:   Head: Normocephalic and atraumatic.   Eyes: Conjunctivae are normal. Pupils are equal, round, and reactive to light.   Neck: Normal range of motion. Neck supple.   Cardiovascular: Normal rate, regular rhythm, normal heart sounds and intact distal pulses.    Pulmonary/Chest: Effort normal and breath sounds normal. No respiratory distress. Pt has no " wheezes. Pt has no rales  Abdominal: Soft. Bowel sounds are normal. Pt exhibits no distension and no mass. No tenderness. Pt has no rebound and no guarding.   Musculoskeletal: Normal range of motion. Pt exhibits no edema.   Neurological: Pt is alert and oriented to person, place, and time. Normal reflexes.   Skin: Skin is warm and dry. No rash noted.   Psychiatric: Pt has a normal mood and affect. Behavior is normal. Judgment and thought content normal.             Data:  All laboratory data reviewed             Significant Results:   None  Results for orders placed or performed during the hospital encounter of 10/21/19   CT Head w/o Contrast    Narrative    CT SCAN OF THE HEAD WITHOUT CONTRAST   10/21/2019 5:00 PM     HISTORY: Headache, hypertension.      TECHNIQUE: Axial images of the head and coronal reformations without  IV contrast material. Radiation dose for this scan was reduced using  automated exposure control, adjustment of the mA and/or kV according  to patient size, or iterative reconstruction technique.    COMPARISON: MRI head 7/31/2018, CT head 7/30/2018.    FINDINGS: There is no evidence of intracranial hemorrhage, mass, acute  infarct or acute abnormality. The ventricles are normal in size, shape  and configuration. Mild diffuse parenchymal volume loss. Mild patchy  periventricular white matter hypodensities which are nonspecific, but  likely related to chronic microvascular ischemic disease. Multiple old  lacunar infarcts were better characterized on previous MRI of the  brain dated 7/31/2018.    Moderate mucosal thickening with aerated secretions in the right  maxillary sinus. There may be some periodontal disease involving right  maxillary teeth, but this is poorly characterized on this  head-targeted exam. The bony calvarium and bones of the skull base  appear intact.       Impression    IMPRESSION:   1. No evidence of acute intracranial hemorrhage, mass, or herniation.  2. Mild diffuse  parenchymal volume loss and white matter changes  likely due to chronic microvascular ischemic disease. Multiple known  old lacunar infarcts are better characterized on previous MRI.  3. Mucosal thickening with aerated secretions in the right maxillary  sinus. Correlate clinically for symptoms/signs of acute sinusitis.    MYLES PARNELL MD   US Abdomen Limited (RUQ)    Narrative    US ABDOMEN LIMITED   10/21/2019 7:33 PM     HISTORY:  Elevated alk phos, nausea, vomiting.    COMPARISON: Abdominal ultrasound on 10/7/2019.    FINDINGS:    Gallbladder: No cholelithiasis. Gallbladder wall thickening measuring  up to 6 mm. Sonographic Keen's sign is negative.    Bile ducts:  CHD is normal diameter.  No intrahepatic biliary  dilatation.    Liver: Demonstrates coarsened echotexture and surface nodularity. No  focal hepatic mass.    Pancreas:  Partially obscured by overlying bowel gas,  but grossly  unremarkable.     Right kidney:  Normal.     Aorta and IVC:  Not specifically assessed.     Small volume ascites also noted.      Impression    IMPRESSION:    1. Cirrhotic liver morphology with evidence of portal hypertension  including ascites.  2. Gallbladder wall thickening, likely related to liver dysfunction.  No cholelithiasis or other sonographic evidence of acute  cholecystitis.    BINA SHARP MD   MR Brain for Stroke [facility] w/o & w Contras    Narrative    MRI brain without and with contrast  MRA of the head without contrast  Neck MRA without and with contrast    Provided History:  Ataxia, stroke suspected; Gait instability,  evaluate for stroke.    Comparison:  Head CT from yesterday      Technique:   Brain MRI:  Axial diffusion, FLAIR, T2-weighted, susceptibility, and  coronal T1-weighted images were obtained without intravenous contrast.  Following intravenous gadolinium-based contrast administration, axial  and coronal T1-weighted images were obtained.    Head MRA: 3D time-of-flight MRA of the [place]  was performed  without intravenous contrast.  Neck MRA:  Limited non contrast 2DTOF images were obtained of the  mid-cervical region. Following intravenous gadolinium-based contrast  administration, a contrast enhanced MRA of the neck/cervical vessels  was performed.  Three-dimensional reconstructions of the neck and head MRA were  created, which were reviewed by the radiologist.    Dose: 7 mls Gadavist    Findings:   Brain MRI: Axial diffusion weighted images demonstrate no definite  acute infarct. On the FLAIR images, there is nonspecific mild  periventricular T2-hyperintensity, which are most likely related to  chronic small vessel ischemic disease. There is no definite  intracranial hemorrhage on susceptibility images. Ventricles are  proportionate to the cerebral sulci. Contrast-enhanced images of the  brain demonstrate no abnormal intra- or extra-axial enhancement.    Head MRA demonstrates no definite aneurysm or stenosis of the major  intracranial arteries. Anterior communicating artery is patent.  Posterior communicating arteries are not well-demonstrated.    Neck MRA demonstrates patent major cervical arteries. The normal  distal right internal carotid artery measures 5 mm. The normal distal  left internal carotid artery measures 5 mm. Antegrade flow in the  major cervical vasculature.      Impression    Impression:  1. No acute intracranial pathology. No abnormal intracranial  enhancement.  2. Head MRA demonstrates no definite aneurysm or stenosis of the major  intracranial arteries.  3. Neck MRA demonstrates patent major cervical arteries.    ROSELYN PRESLEY MD   CBC with platelets differential   Result Value Ref Range    WBC 4.6 4.0 - 11.0 10e9/L    RBC Count 4.37 (L) 4.4 - 5.9 10e12/L    Hemoglobin 14.0 13.3 - 17.7 g/dL    Hematocrit 41.6 40.0 - 53.0 %    MCV 95 78 - 100 fl    MCH 32.0 26.5 - 33.0 pg    MCHC 33.7 31.5 - 36.5 g/dL    RDW 12.6 10.0 - 15.0 %    Platelet Count 132 (L) 150 - 450 10e9/L     Diff Method Automated Method     % Neutrophils 66.6 %    % Lymphocytes 23.1 %    % Monocytes 6.8 %    % Eosinophils 2.6 %    % Basophils 0.9 %    % Immature Granulocytes 0.0 %    Nucleated RBCs 0 0 /100    Absolute Neutrophil 3.1 1.6 - 8.3 10e9/L    Absolute Lymphocytes 1.1 0.8 - 5.3 10e9/L    Absolute Monocytes 0.3 0.0 - 1.3 10e9/L    Absolute Eosinophils 0.1 0.0 - 0.7 10e9/L    Absolute Basophils 0.0 0.0 - 0.2 10e9/L    Abs Immature Granulocytes 0.0 0 - 0.4 10e9/L    Absolute Nucleated RBC 0.0    Partial thromboplastin time   Result Value Ref Range    PTT 30 22 - 37 sec   INR   Result Value Ref Range    INR 1.07 0.86 - 1.14   Comprehensive metabolic panel   Result Value Ref Range    Sodium 139 133 - 144 mmol/L    Potassium 3.6 3.4 - 5.3 mmol/L    Chloride 106 94 - 109 mmol/L    Carbon Dioxide 28 20 - 32 mmol/L    Anion Gap 5 3 - 14 mmol/L    Glucose 102 (H) 70 - 99 mg/dL    Urea Nitrogen 21 7 - 30 mg/dL    Creatinine 1.17 0.66 - 1.25 mg/dL    GFR Estimate 68 >60 mL/min/[1.73_m2]    GFR Estimate If Black 79 >60 mL/min/[1.73_m2]    Calcium 8.1 (L) 8.5 - 10.1 mg/dL    Bilirubin Total 0.7 0.2 - 1.3 mg/dL    Albumin 1.8 (L) 3.4 - 5.0 g/dL    Protein Total 6.0 (L) 6.8 - 8.8 g/dL    Alkaline Phosphatase 198 (H) 40 - 150 U/L    ALT 24 0 - 70 U/L    AST 33 0 - 45 U/L   Lipase   Result Value Ref Range    Lipase 77 73 - 393 U/L   Troponin I   Result Value Ref Range    Troponin I ES <0.015 0.000 - 0.045 ug/L   Ammonia   Result Value Ref Range    Ammonia <10 (L) 10 - 50 umol/L   UA reflex to Microscopic and Culture   Result Value Ref Range    Color Urine Yellow     Appearance Urine Clear     Glucose Urine 30 (A) NEG^Negative mg/dL    Bilirubin Urine Negative NEG^Negative    Ketones Urine 5 (A) NEG^Negative mg/dL    Specific Gravity Urine 1.022 1.003 - 1.035    Blood Urine Moderate (A) NEG^Negative    pH Urine 7.0 5.0 - 7.0 pH    Protein Albumin Urine 300 (A) NEG^Negative mg/dL    Urobilinogen mg/dL 2.0 0.0 - 2.0 mg/dL     Nitrite Urine Negative NEG^Negative    Leukocyte Esterase Urine Negative NEG^Negative    Source Unspecified Urine     RBC Urine 11 (H) 0 - 2 /HPF    WBC Urine 6 (H) 0 - 5 /HPF    Bacteria Urine Few (A) NEG^Negative /HPF    Mucous Urine Present (A) NEG^Negative /LPF    Hyaline Casts 12 (H) 0 - 2 /LPF    Calcium Oxalate Few (A) NEG^Negative /HPF   Nt probnp inpatient (BNP)   Result Value Ref Range    N-Terminal Pro BNP Inpatient 776 0 - 900 pg/mL   Basic metabolic panel   Result Value Ref Range    Sodium 139 133 - 144 mmol/L    Potassium 3.4 3.4 - 5.3 mmol/L    Chloride 109 94 - 109 mmol/L    Carbon Dioxide 25 20 - 32 mmol/L    Anion Gap 5 3 - 14 mmol/L    Glucose 79 70 - 99 mg/dL    Urea Nitrogen 22 7 - 30 mg/dL    Creatinine 1.34 (H) 0.66 - 1.25 mg/dL    GFR Estimate 58 (L) >60 mL/min/[1.73_m2]    GFR Estimate If Black 67 >60 mL/min/[1.73_m2]    Calcium 7.9 (L) 8.5 - 10.1 mg/dL   CBC with platelets   Result Value Ref Range    WBC 6.6 4.0 - 11.0 10e9/L    RBC Count 3.86 (L) 4.4 - 5.9 10e12/L    Hemoglobin 12.4 (L) 13.3 - 17.7 g/dL    Hematocrit 37.6 (L) 40.0 - 53.0 %    MCV 97 78 - 100 fl    MCH 32.1 26.5 - 33.0 pg    MCHC 33.0 31.5 - 36.5 g/dL    RDW 13.1 10.0 - 15.0 %    Platelet Count 143 (L) 150 - 450 10e9/L   Hepatic panel   Result Value Ref Range    Bilirubin Direct <0.1 0.0 - 0.2 mg/dL    Bilirubin Total 0.5 0.2 - 1.3 mg/dL    Albumin 1.6 (L) 3.4 - 5.0 g/dL    Protein Total 5.2 (L) 6.8 - 8.8 g/dL    Alkaline Phosphatase 168 (H) 40 - 150 U/L    ALT 21 0 - 70 U/L    AST 26 0 - 45 U/L   CBC with platelets differential   Result Value Ref Range    WBC 5.5 4.0 - 11.0 10e9/L    RBC Count 4.04 (L) 4.4 - 5.9 10e12/L    Hemoglobin 13.0 (L) 13.3 - 17.7 g/dL    Hematocrit 39.7 (L) 40.0 - 53.0 %    MCV 98 78 - 100 fl    MCH 32.2 26.5 - 33.0 pg    MCHC 32.7 31.5 - 36.5 g/dL    RDW 13.2 10.0 - 15.0 %    Platelet Count 159 150 - 450 10e9/L    Diff Method Automated Method     % Neutrophils 67.3 %    % Lymphocytes 19.7 %    %  Monocytes 8.6 %    % Eosinophils 3.1 %    % Basophils 1.1 %    % Immature Granulocytes 0.2 %    Nucleated RBCs 0 0 /100    Absolute Neutrophil 3.7 1.6 - 8.3 10e9/L    Absolute Lymphocytes 1.1 0.8 - 5.3 10e9/L    Absolute Monocytes 0.5 0.0 - 1.3 10e9/L    Absolute Eosinophils 0.2 0.0 - 0.7 10e9/L    Absolute Basophils 0.1 0.0 - 0.2 10e9/L    Abs Immature Granulocytes 0.0 0 - 0.4 10e9/L    Absolute Nucleated RBC 0.0     Platelet Estimate Confirming automated cell count    EKG 12-lead, tracing only   Result Value Ref Range    Interpretation ECG Click View Image link to view waveform and result    Social Work IP Consult    Narrative    Sydnee Yancey MSW     10/22/2019 10:14 AM  BRIEF SOCIAL WORK NOTE:    SW consulted for discharge planning. SW competed chart review. Pt   lives with a roommate in an apartment and is independent with   ADL's at baseline. PT/OT completed evaluations this AM and   recommend discharge to home with assist from roommate and OP PT;   per chart review Pt is in agreement with this discharge plan. No   SW needs identified for discharge planning. SW remains available   if needs arise during this hospitalization.    BENY Garay, LGSW  6B Intermediate Care Unit   [facility]  Phone: [phone]  Pager: [phone]        Recent Results (from the past 48 hour(s))   CT Head w/o Contrast    Narrative    CT SCAN OF THE HEAD WITHOUT CONTRAST   10/21/2019 5:00 PM     HISTORY: Headache, hypertension.      TECHNIQUE: Axial images of the head and coronal reformations without  IV contrast material. Radiation dose for this scan was reduced using  automated exposure control, adjustment of the mA and/or kV according  to patient size, or iterative reconstruction technique.    COMPARISON: MRI head 7/31/2018, CT head 7/30/2018.    FINDINGS: There is no evidence of intracranial hemorrhage, mass, acute  infarct or acute abnormality. The ventricles are normal in size, shape  and  configuration. Mild diffuse parenchymal volume loss. Mild patchy  periventricular white matter hypodensities which are nonspecific, but  likely related to chronic microvascular ischemic disease. Multiple old  lacunar infarcts were better characterized on previous MRI of the  brain dated 7/31/2018.    Moderate mucosal thickening with aerated secretions in the right  maxillary sinus. There may be some periodontal disease involving right  maxillary teeth, but this is poorly characterized on this  head-targeted exam. The bony calvarium and bones of the skull base  appear intact.       Impression    IMPRESSION:   1. No evidence of acute intracranial hemorrhage, mass, or herniation.  2. Mild diffuse parenchymal volume loss and white matter changes  likely due to chronic microvascular ischemic disease. Multiple known  old lacunar infarcts are better characterized on previous MRI.  3. Mucosal thickening with aerated secretions in the right maxillary  sinus. Correlate clinically for symptoms/signs of acute sinusitis.    MYLES PARNELL MD   US Abdomen Limited (RUQ)    Narrative    US ABDOMEN LIMITED   10/21/2019 7:33 PM     HISTORY:  Elevated alk phos, nausea, vomiting.    COMPARISON: Abdominal ultrasound on 10/7/2019.    FINDINGS:    Gallbladder: No cholelithiasis. Gallbladder wall thickening measuring  up to 6 mm. Sonographic Keen's sign is negative.    Bile ducts:  CHD is normal diameter.  No intrahepatic biliary  dilatation.    Liver: Demonstrates coarsened echotexture and surface nodularity. No  focal hepatic mass.    Pancreas:  Partially obscured by overlying bowel gas,  but grossly  unremarkable.     Right kidney:  Normal.     Aorta and IVC:  Not specifically assessed.     Small volume ascites also noted.      Impression    IMPRESSION:    1. Cirrhotic liver morphology with evidence of portal hypertension  including ascites.  2. Gallbladder wall thickening, likely related to liver dysfunction.  No cholelithiasis or  other sonographic evidence of acute  cholecystitis.    BINA SHARP MD   MR Brain for Stroke [facility] w/o & w Contras    Narrative    MRI brain without and with contrast  MRA of the head without contrast  Neck MRA without and with contrast    Provided History:  Ataxia, stroke suspected; Gait instability,  evaluate for stroke.    Comparison:  Head CT from yesterday      Technique:   Brain MRI:  Axial diffusion, FLAIR, T2-weighted, susceptibility, and  coronal T1-weighted images were obtained without intravenous contrast.  Following intravenous gadolinium-based contrast administration, axial  and coronal T1-weighted images were obtained.    Head MRA: 3D time-of-flight MRA of the [place] was performed  without intravenous contrast.  Neck MRA:  Limited non contrast 2DTOF images were obtained of the  mid-cervical region. Following intravenous gadolinium-based contrast  administration, a contrast enhanced MRA of the neck/cervical vessels  was performed.  Three-dimensional reconstructions of the neck and head MRA were  created, which were reviewed by the radiologist.    Dose: 7 mls Gadavist    Findings:   Brain MRI: Axial diffusion weighted images demonstrate no definite  acute infarct. On the FLAIR images, there is nonspecific mild  periventricular T2-hyperintensity, which are most likely related to  chronic small vessel ischemic disease. There is no definite  intracranial hemorrhage on susceptibility images. Ventricles are  proportionate to the cerebral sulci. Contrast-enhanced images of the  brain demonstrate no abnormal intra- or extra-axial enhancement.    Head MRA demonstrates no definite aneurysm or stenosis of the major  intracranial arteries. Anterior communicating artery is patent.  Posterior communicating arteries are not well-demonstrated.    Neck MRA demonstrates patent major cervical arteries. The normal  distal right internal carotid artery measures 5 mm. The normal distal  left internal carotid  artery measures 5 mm. Antegrade flow in the  major cervical vasculature.      Impression    Impression:  1. No acute intracranial pathology. No abnormal intracranial  enhancement.  2. Head MRA demonstrates no definite aneurysm or stenosis of the major  intracranial arteries.  3. Neck MRA demonstrates patent major cervical arteries.    ROSELYN PRESLEY MD                Pending Results:   Unresulted Labs Ordered in the Past 30 Days of this Admission     No orders found from 9/21/2019 to 10/22/2019.                  Discharge Instructions and Follow-Up:     Discharge Procedure Orders   Reason for your hospital stay   Order Comments: Hypertension urgency  Headache  Nausea and vomiting     Follow Up and recommended labs and tests   Order Comments: Follow up with primary in 1 week     Activity   Order Comments: Your activity upon discharge: activity as tolerated     Order Specific Question Answer Comments   Is discharge order? Yes      When to contact your care team   Order Comments: Return to the ED with fever, uncontrolled nausea, vomiting, unrelieved pain, dizziness, chest pain, shortness of breath, loss of consciousness, and any new or concerning symptoms.     Discharge Instructions   Order Comments: You were admitted for hypertension urgency, headache, nausea and vomiting. You had CT head and MRI of the brain and both were negative. Your blood medications were resumed and with this your hypertension improved. Your headache, nausea, and vomiting are resolved currently. You were assessed by physical therapy who cleared for discharge. They recommended outpatient physical therapy for strengthening and to use your jakob when walking. Please continue to take you home medications as before. Follow up with your primary in 1 week. Return to the ED if having worsening symptoms.     Diet   Order Comments: Follow this diet upon discharge: Regular     Order Specific Question Answer Comments   Is discharge order? Yes            Attestation:  Lexi Hull PA-C.

## 2019-10-23 NOTE — PROGRESS NOTES
-diagnostic tests and consults completed and resulted: No  -vital signs normal or at patient baseline: Yes     -tolerating oral intake to maintain hydration:- Yes.  -returns to baseline functional status: No  -safe disposition plan has been identified: To discharge home once medically stable per PT.  Pt has not had ant more reactions to contrast.

## 2019-10-23 NOTE — PLAN OF CARE
Outpatient/Observation goals to be met prior to discharge:      1. Diagnostic test and consults completed and resulted: No, in process.  2. Vital signs normal or at patient baseline: BP elevated intermittently.  3. Tolerating oral intake to maintain hydration: Yes.  4. Returns to baseline functional status: No.  5. Safe disposition plan has been identified: Awaiting PT follow up.

## 2019-10-23 NOTE — PROGRESS NOTES
Patient complaining of IV site being painful, requested it be removed immediately. Discussed need for MIVF and getting PIV replaced. Patient refused new PIV placement. SENG Noble notified and agreeable for patient to be without PIV. Encourage PO intake.

## 2019-10-23 NOTE — PROGRESS NOTES
Discharge instructions reviewed, understood, and signed by patient. VSS, PIV removed, new medications reviewed and understood, patient has all belongings. Patient left unit via wheelchair.

## 2019-10-23 NOTE — PLAN OF CARE
Physical Therapy Discharge Summary    Reason for therapy discharge:    Discharged to home with outpatient therapy.    Progress towards therapy goal(s). See goals on Care Plan in [facility] electronic health record for goal details.  Goals partially met.  Barriers to achieving goals:   discharge from facility.    Therapy recommendation(s):    Continued therapy is recommended.  Rationale/Recommendations:  OP PT for balance and gait training, to wean from gait device.

## 2019-11-19 NOTE — PROGRESS NOTES
[facility]      OUTPATIENT PHYSICAL THERAPY EVALUATION  PLAN OF TREATMENT FOR OUTPATIENT REHABILITATION  (COMPLETE FOR INITIAL CLAIMS ONLY)  Patient's Last Name, First Name, M.I.  YOB: 1960  Wilfredo Yoon                        Provider's Name  [facility] Medical Record No.  [id number]                               Onset Date:  10/21/19   Start of Care Date:  10/22/19      Type:     _X_PT   ___OT   ___SLP Medical Diagnosis:  Hypertension urgency                        PT Diagnosis:  impaired gait   Visits from SOC:  1   _________________________________________________________________________________  Plan of Treatment/Functional Goals    Planned Interventions:  ,    gait training, risk factor education, home program guidelines, progressive activity/exercise,       Goals: See Physical Therapy Goals on Care Plan in [employer] electronic health record.    Therapy Frequency: Daily  Predicted Duration of Therapy Intervention: 2 visits  _________________________________________________________________________________    I CERTIFY THE NEED FOR THESE SERVICES FURNISHED UNDER        THIS PLAN OF TREATMENT AND WHILE UNDER MY CARE     (Physician co-signature of this document indicates review and certification of the therapy plan).                Certification date from: 10/22/19, Certification date to: 10/23/19    Referring Physician: WHITLEY SIBLEY           Initial Assessment        See Physical Therapy evaluation dated 10/22/19 in Epic electronic health record.

## 2020-01-23 DIAGNOSIS — B18.1 CHRONIC VIRAL HEPATITIS B WITHOUT DELTA AGENT AND WITHOUT COMA (H): ICD-10-CM

## 2020-01-23 DIAGNOSIS — K74.60 CIRRHOSIS OF LIVER WITHOUT ASCITES, UNSPECIFIED HEPATIC CIRRHOSIS TYPE (H): ICD-10-CM

## 2020-01-23 RX ORDER — ENTECAVIR 1 MG/1
1 TABLET, FILM COATED ORAL DAILY
Qty: 30 TABLET | Refills: 0 | Status: SHIPPED | OUTPATIENT
Start: 2020-01-23 | End: 2020-01-23

## 2020-01-23 RX ORDER — ENTECAVIR 1 MG/1
1 TABLET, FILM COATED ORAL DAILY
Qty: 30 TABLET | Refills: 0 | Status: SHIPPED | OUTPATIENT
Start: 2020-01-23 | End: 2020-04-02

## 2020-03-25 DIAGNOSIS — B18.1 CHRONIC VIRAL HEPATITIS B WITHOUT DELTA AGENT AND WITHOUT COMA (H): Primary | ICD-10-CM

## 2020-03-25 DIAGNOSIS — K74.60 CIRRHOSIS OF LIVER WITHOUT ASCITES, UNSPECIFIED HEPATIC CIRRHOSIS TYPE (H): ICD-10-CM

## 2020-03-26 ENCOUNTER — TELEPHONE (OUTPATIENT)
Dept: GASTROENTEROLOGY | Facility: CLINIC | Age: 60
End: 2020-03-26

## 2020-03-26 ENCOUNTER — APPOINTMENT (OUTPATIENT)
Dept: INTERPRETER SERVICES | Facility: CLINIC | Age: 60
End: 2020-03-26
Payer: COMMERCIAL

## 2020-03-26 NOTE — TELEPHONE ENCOUNTER
Appointment with Dr. Adame will be transferred to telephone visit.  Labs and ultrasound will be rescheduled for a later date.     [facility]    Phone Message    May a detailed message be left on voicemail: yes     Reason for Call: Other: Pavan called from imaging he said they need to know if the patients upcoming appointment with Dr. Adame can be rescheduled or is it of urgency? If not then this appointment with imaging would be scheduled out 30 days due to COVID please call to advise     Action Taken: Other: [facility]    Travel Screening: Not Applicable

## 2020-04-01 DIAGNOSIS — B18.1 CHRONIC VIRAL HEPATITIS B WITHOUT DELTA AGENT AND WITHOUT COMA (H): ICD-10-CM

## 2020-04-01 DIAGNOSIS — K74.60 CIRRHOSIS OF LIVER WITHOUT ASCITES, UNSPECIFIED HEPATIC CIRRHOSIS TYPE (H): ICD-10-CM

## 2020-04-02 RX ORDER — ENTECAVIR 1 MG/1
TABLET, FILM COATED ORAL
Qty: 30 TABLET | Refills: 11 | Status: ON HOLD | OUTPATIENT
Start: 2020-04-02 | End: 2020-08-04

## 2020-06-08 ENCOUNTER — APPOINTMENT (OUTPATIENT)
Dept: INTERPRETER SERVICES | Facility: CLINIC | Age: 60
End: 2020-06-08
Payer: COMMERCIAL

## 2020-06-08 DIAGNOSIS — B18.1 CHRONIC VIRAL HEPATITIS B WITHOUT DELTA AGENT AND WITHOUT COMA (H): Primary | ICD-10-CM

## 2020-06-08 DIAGNOSIS — K74.60 CIRRHOSIS OF LIVER WITHOUT ASCITES, UNSPECIFIED HEPATIC CIRRHOSIS TYPE (H): ICD-10-CM

## 2020-06-11 ENCOUNTER — ANCILLARY PROCEDURE (OUTPATIENT)
Dept: ULTRASOUND IMAGING | Facility: CLINIC | Age: 60
End: 2020-06-11
Attending: INTERNAL MEDICINE
Payer: COMMERCIAL

## 2020-06-11 DIAGNOSIS — K74.60 CIRRHOSIS OF LIVER WITHOUT ASCITES, UNSPECIFIED HEPATIC CIRRHOSIS TYPE (H): ICD-10-CM

## 2020-06-11 DIAGNOSIS — B18.1 CHRONIC VIRAL HEPATITIS B WITHOUT DELTA AGENT AND WITHOUT COMA (H): ICD-10-CM

## 2020-06-11 LAB — RADIOLOGIST FLAGS: NORMAL

## 2020-06-12 ENCOUNTER — TELEPHONE (OUTPATIENT)
Dept: GASTROENTEROLOGY | Facility: CLINIC | Age: 60
End: 2020-06-12

## 2020-06-12 DIAGNOSIS — B18.1 CHRONIC VIRAL HEPATITIS B WITHOUT DELTA AGENT AND WITHOUT COMA (H): ICD-10-CM

## 2020-06-12 DIAGNOSIS — R18.8 OTHER ASCITES: Primary | ICD-10-CM

## 2020-06-12 NOTE — TELEPHONE ENCOUNTER
Second call to patient to help get him scheduled for paracentesis.  Patient had already gone to [facility] ED for fluid removal.  Will call patient on Monday to discuss setting up future para's.    Joana NOLEN LPN

## 2020-06-14 ENCOUNTER — HOSPITAL ENCOUNTER (EMERGENCY)
Facility: CLINIC | Age: 60
Discharge: LEFT BEFORE DISCHARGE | End: 2020-06-14
Attending: EMERGENCY MEDICINE | Admitting: EMERGENCY MEDICINE
Payer: COMMERCIAL

## 2020-06-14 ENCOUNTER — ANCILLARY PROCEDURE (OUTPATIENT)
Dept: ULTRASOUND IMAGING | Facility: CLINIC | Age: 60
End: 2020-06-14
Attending: EMERGENCY MEDICINE
Payer: COMMERCIAL

## 2020-06-14 VITALS
SYSTOLIC BLOOD PRESSURE: 214 MMHG | OXYGEN SATURATION: 98 % | HEART RATE: 60 BPM | DIASTOLIC BLOOD PRESSURE: 128 MMHG | HEIGHT: 65 IN | WEIGHT: 123.6 LBS | RESPIRATION RATE: 16 BRPM | TEMPERATURE: 97.1 F | BODY MASS INDEX: 20.59 KG/M2

## 2020-06-14 DIAGNOSIS — I16.0 HYPERTENSIVE URGENCY: ICD-10-CM

## 2020-06-14 DIAGNOSIS — R18.8 OTHER ASCITES: ICD-10-CM

## 2020-06-14 LAB
ALBUMIN SERPL-MCNC: 1.5 G/DL (ref 3.4–5)
ALP SERPL-CCNC: 216 U/L (ref 40–150)
ALT SERPL W P-5'-P-CCNC: 22 U/L (ref 0–70)
ANION GAP SERPL CALCULATED.3IONS-SCNC: 4 MMOL/L (ref 3–14)
AST SERPL W P-5'-P-CCNC: 36 U/L (ref 0–45)
BASOPHILS # BLD AUTO: 0 10E9/L (ref 0–0.2)
BASOPHILS NFR BLD AUTO: 0.7 %
BILIRUB SERPL-MCNC: 0.4 MG/DL (ref 0.2–1.3)
BUN SERPL-MCNC: 26 MG/DL (ref 7–30)
CALCIUM SERPL-MCNC: 8.1 MG/DL (ref 8.5–10.1)
CHLORIDE SERPL-SCNC: 108 MMOL/L (ref 94–109)
CO2 SERPL-SCNC: 27 MMOL/L (ref 20–32)
CREAT SERPL-MCNC: 1.44 MG/DL (ref 0.66–1.25)
DIFFERENTIAL METHOD BLD: ABNORMAL
EOSINOPHIL # BLD AUTO: 0.3 10E9/L (ref 0–0.7)
EOSINOPHIL NFR BLD AUTO: 5 %
ERYTHROCYTE [DISTWIDTH] IN BLOOD BY AUTOMATED COUNT: 13.4 % (ref 10–15)
GFR SERPL CREATININE-BSD FRML MDRD: 53 ML/MIN/{1.73_M2}
GLUCOSE SERPL-MCNC: 122 MG/DL (ref 70–99)
HCT VFR BLD AUTO: 36.3 % (ref 40–53)
HGB BLD-MCNC: 11.8 G/DL (ref 13.3–17.7)
IMM GRANULOCYTES # BLD: 0 10E9/L (ref 0–0.4)
IMM GRANULOCYTES NFR BLD: 0.3 %
INR PPP: 1.06 (ref 0.86–1.14)
LIPASE SERPL-CCNC: 115 U/L (ref 73–393)
LYMPHOCYTES # BLD AUTO: 1.5 10E9/L (ref 0.8–5.3)
LYMPHOCYTES NFR BLD AUTO: 25.2 %
MCH RBC QN AUTO: 32.2 PG (ref 26.5–33)
MCHC RBC AUTO-ENTMCNC: 32.5 G/DL (ref 31.5–36.5)
MCV RBC AUTO: 99 FL (ref 78–100)
MONOCYTES # BLD AUTO: 0.5 10E9/L (ref 0–1.3)
MONOCYTES NFR BLD AUTO: 8.4 %
NEUTROPHILS # BLD AUTO: 3.5 10E9/L (ref 1.6–8.3)
NEUTROPHILS NFR BLD AUTO: 60.4 %
NRBC # BLD AUTO: 0 10*3/UL
NRBC BLD AUTO-RTO: 0 /100
PLATELET # BLD AUTO: 106 10E9/L (ref 150–450)
POTASSIUM SERPL-SCNC: 3.4 MMOL/L (ref 3.4–5.3)
PROT SERPL-MCNC: 5.7 G/DL (ref 6.8–8.8)
RBC # BLD AUTO: 3.66 10E12/L (ref 4.4–5.9)
SODIUM SERPL-SCNC: 140 MMOL/L (ref 133–144)
WBC # BLD AUTO: 5.8 10E9/L (ref 4–11)

## 2020-06-14 PROCEDURE — 76705 ECHO EXAM OF ABDOMEN: CPT | Mod: 26 | Performed by: EMERGENCY MEDICINE

## 2020-06-14 PROCEDURE — 85025 COMPLETE CBC W/AUTO DIFF WBC: CPT | Performed by: EMERGENCY MEDICINE

## 2020-06-14 PROCEDURE — 25000132 ZZH RX MED GY IP 250 OP 250 PS 637: Performed by: EMERGENCY MEDICINE

## 2020-06-14 PROCEDURE — 99285 EMERGENCY DEPT VISIT HI MDM: CPT | Mod: 25 | Performed by: EMERGENCY MEDICINE

## 2020-06-14 PROCEDURE — 83690 ASSAY OF LIPASE: CPT | Performed by: EMERGENCY MEDICINE

## 2020-06-14 PROCEDURE — 76705 ECHO EXAM OF ABDOMEN: CPT | Performed by: EMERGENCY MEDICINE

## 2020-06-14 PROCEDURE — 85610 PROTHROMBIN TIME: CPT | Performed by: EMERGENCY MEDICINE

## 2020-06-14 PROCEDURE — 80053 COMPREHEN METABOLIC PANEL: CPT | Performed by: EMERGENCY MEDICINE

## 2020-06-14 PROCEDURE — 99284 EMERGENCY DEPT VISIT MOD MDM: CPT | Mod: 25 | Performed by: EMERGENCY MEDICINE

## 2020-06-14 RX ORDER — CLONIDINE HYDROCHLORIDE 0.1 MG/1
0.2 TABLET ORAL ONCE
Status: COMPLETED | OUTPATIENT
Start: 2020-06-14 | End: 2020-06-14

## 2020-06-14 RX ADMIN — CLONIDINE HYDROCHLORIDE 0.2 MG: 0.1 TABLET ORAL at 15:16

## 2020-06-14 ASSESSMENT — MIFFLIN-ST. JEOR: SCORE: 1302.53

## 2020-06-14 NOTE — ED AVS SNAPSHOT
[facility], [place], Emergency Department  [address] [place] [id number]  Phone:  [phone]                                    Wilfredo Yoon   MRN: [id number]    Department:  [facility], Emergency Department   Date of Visit:  6/14/2020           After Visit Summary Signature Page    I have received my discharge instructions, and my questions have been answered. I have discussed any challenges I see with this plan with the nurse or doctor.    ..........................................................................................................................................  Patient/Patient Representative Signature      ..........................................................................................................................................  Patient Representative Print Name and Relationship to Patient    ..................................................               ................................................  Date                                   Time    ..........................................................................................................................................  Reviewed by Signature/Title    ...................................................              ..............................................  Date                                               Time          22EPIC Rev 08/18

## 2020-06-14 NOTE — ED PROVIDER NOTES
History     Chief Complaint   Patient presents with     Abdominal Pain     HPI  Wilfredo Yoon is a 60 year old male who presents the ER with complaints of abdominal distention requiring paracentesis for comfort.  Patient states he last had paracentesis a year ago and states that his abdomen is starting to swell again.  Patient denies any vomiting and denies any fevers.    I have reviewed the Medications, Allergies, Past Medical and Surgical History, and Social History in the [employer] system.    Past Medical History:   Diagnosis Date     Cryoglobulinemia (H)      Glomerulonephritis      Hepatitis B      Hypertension      Surgical complication        Past Surgical History:   Procedure Laterality Date     C HAND/FINGER SURGERY UNLISTED       ESOPHAGOSCOPY, GASTROSCOPY, DUODENOSCOPY (EGD), COMBINED N/A 10/18/2018    Procedure: EGD;  Surgeon: Eber Ortez MD;  Location:  GI     HAND SURGERY Right            Dose / Directions   amLODIPine 5 MG tablet  Commonly known as:  NORVASC  Used for:  Hypertensive emergency      Dose:  5 mg  Take 1 tablet (5 mg) by mouth daily  Quantity:  30 tablet  Refills:  0     atorvastatin 20 MG tablet  Commonly known as:  LIPITOR  Used for:  Hyperlipidemia, unspecified hyperlipidemia type      Dose:  20 mg  Take 1 tablet (20 mg) by mouth daily  Quantity:  30 tablet  Refills:  0     bumetanide 2 MG tablet  Commonly known as:  BUMEX  Used for:  Hypertensive emergency      Dose:  2 mg  Take 1 tablet (2 mg) by mouth daily  Quantity:  30 tablet  Refills:  0     entecavir 1 MG tablet  Commonly known as:  BARACLUDE  Used for:  Chronic viral hepatitis B without delta agent and without coma (H), Cirrhosis of liver without ascites, unspecified hepatic cirrhosis type (H)      TAKE ONE TABLET BY MOUTH EVERY DAY  Quantity:  30 tablet  Refills:  11     lisinopril 20 MG tablet  Commonly known as:  ZESTRIL  Used for:  Hypertensive emergency      Dose:  20 mg  Take 1 tablet (20 mg) by  "mouth daily  Quantity:  30 tablet  Refills:  0            Past medical history, past surgical history, medications, and allergies were reviewed with the patient. Additional pertinent items: None    Family History   Problem Relation Age of Onset     Liver Cancer No family hx of      Hepatitis No family hx of        Social History     Tobacco Use     Smoking status: Current Some Day Smoker     Packs/day: 0.25     Years: 40.00     Pack years: 10.00     Types: Cigarettes     Smokeless tobacco: Never Used   Substance Use Topics     Alcohol use: No     Alcohol/week: 0.0 standard drinks     Social history was reviewed with the patient. Additional pertinent items: None    Allergies   Allergen Reactions     Gadolinium Derivatives Itching     Gadavist reaction. Itchiness of neck and upper torso. Redness of upper torso, responded to 25 mg IV benadryl.       Review of Systems  A complete review of systems was performed with pertinent positives and negatives noted in the HPI, and all other systems negative.    Physical Exam   BP: (!) 201/119  Pulse: 71  Temp: 97.1  F (36.2  C)  Resp: 16  Height: 165.1 cm (5' 5\")  Weight: 56.1 kg (123 lb 9.6 oz)  SpO2: 100 %      Physical Exam  Vitals signs and nursing note reviewed.   HENT:      Head: Atraumatic.   Cardiovascular:      Rate and Rhythm: Regular rhythm.   Pulmonary:      Breath sounds: Normal breath sounds.   Abdominal:      General: There is distension.      Palpations: Abdomen is soft.      Tenderness: There is no abdominal tenderness.   Skin:     General: Skin is warm.      Coloration: Skin is not jaundiced.      Findings: No erythema.   Neurological:      General: No focal deficit present.      Mental Status: He is alert and oriented to person, place, and time.   Psychiatric:         Mood and Affect: Mood normal.         ED Course        Procedures  Results for orders placed during the hospital encounter of 06/14/20   POC US ABDOMEN LIMITED    Impression Limited Bedside " Abdominal Ultrasound, performed and interpreted by me.     Indication: Abdominal swelling. Evaluate for Free fluid.     With the patient in Trendelenburg, the RUQ, LUQ, (including the paracolic gutters) views were examined for free fluid. With the patient in reverse Trendelenburg, the super pubic view was examined for free fluid.     Findings: Free fluid present in the abdomen c/w ascites    IMPRESSION: Free fluid present as noted above.    Ge Goel MD, MD               Results for orders placed or performed during the hospital encounter of 06/14/20 (from the past 24 hour(s))   POC US ABDOMEN LIMITED    Impression    Limited Bedside Abdominal Ultrasound, performed and interpreted by me.     Indication: Abdominal swelling. Evaluate for Free fluid.     With the patient in Trendelenburg, the RUQ, LUQ, (including the paracolic gutters) views were examined for free fluid. With the patient in reverse Trendelenburg, the super pubic view was examined for free fluid.     Findings: Free fluid present in the abdomen c/w ascites    IMPRESSION: Free fluid present as noted above.    Ge Goel MD, MD       CBC with platelets differential   Result Value Ref Range    WBC 5.8 4.0 - 11.0 10e9/L    RBC Count 3.66 (L) 4.4 - 5.9 10e12/L    Hemoglobin 11.8 (L) 13.3 - 17.7 g/dL    Hematocrit 36.3 (L) 40.0 - 53.0 %    MCV 99 78 - 100 fl    MCH 32.2 26.5 - 33.0 pg    MCHC 32.5 31.5 - 36.5 g/dL    RDW 13.4 10.0 - 15.0 %    Platelet Count 106 (L) 150 - 450 10e9/L    Diff Method Automated Method     % Neutrophils 60.4 %    % Lymphocytes 25.2 %    % Monocytes 8.4 %    % Eosinophils 5.0 %    % Basophils 0.7 %    % Immature Granulocytes 0.3 %    Nucleated RBCs 0 0 /100    Absolute Neutrophil 3.5 1.6 - 8.3 10e9/L    Absolute Lymphocytes 1.5 0.8 - 5.3 10e9/L    Absolute Monocytes 0.5 0.0 - 1.3 10e9/L    Absolute Eosinophils 0.3 0.0 - 0.7 10e9/L    Absolute Basophils 0.0 0.0 - 0.2 10e9/L    Abs Immature Granulocytes 0.0  "0 - 0.4 10e9/L    Absolute Nucleated RBC 0.0    INR   Result Value Ref Range    INR 1.06 0.86 - 1.14   Comprehensive metabolic panel   Result Value Ref Range    Sodium 140 133 - 144 mmol/L    Potassium 3.4 3.4 - 5.3 mmol/L    Chloride 108 94 - 109 mmol/L    Carbon Dioxide 27 20 - 32 mmol/L    Anion Gap 4 3 - 14 mmol/L    Glucose 122 (H) 70 - 99 mg/dL    Urea Nitrogen 26 7 - 30 mg/dL    Creatinine 1.44 (H) 0.66 - 1.25 mg/dL    GFR Estimate 53 (L) >60 mL/min/[1.73_m2]    GFR Estimate If Black 61 >60 mL/min/[1.73_m2]    Calcium 8.1 (L) 8.5 - 10.1 mg/dL    Bilirubin Total 0.4 0.2 - 1.3 mg/dL    Albumin 1.5 (L) 3.4 - 5.0 g/dL    Protein Total 5.7 (L) 6.8 - 8.8 g/dL    Alkaline Phosphatase 216 (H) 40 - 150 U/L    ALT 22 0 - 70 U/L    AST 36 0 - 45 U/L   Lipase   Result Value Ref Range    Lipase 115 73 - 393 U/L       Patient's blood pressure was significantly elevated and this was treated with clonidine.    Medications   cloNIDine (CATAPRES) tablet 0.2 mg (0.2 mg Oral Given 6/14/20 1516)     After 1 hour the patient's blood pressure still remained elevated and hospitalization was recommended    BP (!) 214/128   Pulse 60   Temp 97.1  F (36.2  C) (Oral)   Resp 16   Ht 1.651 m (5' 5\")   Wt 56.1 kg (123 lb 9.6 oz)   SpO2 98%   BMI 20.57 kg/m        Patient refused hospitalization stating he wanted to go home and return in the morning for paracentesis.    Case was discussed with the interventional radiologist resident on-call who will help arrange the paracentesis for morning.  Consult ordered in epic.      Assessments & Plan (with Medical Decision Making)     I have reviewed the nursing notes.    I have reviewed the findings, diagnosis, plan and need for follow up with the patient.    New Prescriptions    No medications on file       Final diagnoses:   Hypertensive urgency   Other ascites     Your blood pressure needs to be better controlled so that you do not wind up with problems related to uncontrolled hypertension " like a stroke or heart attack.  Hospitalization for blood pressure control was recommended but you decided to leave AGAINST MEDICAL ADVICE and go home to return in the morning for your paracentesis.  If you change your mind about wanting to stay, we can always bring you into the hospital to control your blood pressure until the paracentesis can be done.    Interventional radiology should call you in the morning with an appointment time for your paracentesis.  If they do not call you by 9 AM, please call them at [phone].    Please make an appointment to follow up with Your Primary Care Provider within the next week to have your blood pressure rechecked and your medication reassessed.      Ge Goel MD, MD    6/14/2020   [facility], [place], EMERGENCY DEPARTMENT     Ge Goel MD  06/14/20 3622

## 2020-06-14 NOTE — DISCHARGE INSTRUCTIONS
Your blood pressure needs to be better controlled so that you do not wind up with problems related to uncontrolled hypertension like a stroke or heart attack.  Hospitalization for blood pressure control was recommended but you decided to leave AGAINST MEDICAL ADVICE and go home to return in the morning for your paracentesis.  If you change your mind about wanting to stay, we can always bring you into the hospital to control your blood pressure until the paracentesis can be done.    Interventional radiology should call you in the morning with an appointment time for your paracentesis.  If they do not call you by 9 AM, please call them at [phone].    Please make an appointment to follow up with Your Primary Care Provider within the next week to have your blood pressure rechecked and your medication reassessed.

## 2020-06-14 NOTE — ED TRIAGE NOTES
"Pt presents to ED with c/o fluid in \"belly\". Fluid is causing discomfort. Needs to have fluid taken off. Last had fluid drained 4-5months ago.   "

## 2020-06-15 ENCOUNTER — HOSPITAL ENCOUNTER (EMERGENCY)
Facility: CLINIC | Age: 60
Discharge: HOME | End: 2020-06-15
Attending: EMERGENCY MEDICINE | Admitting: EMERGENCY MEDICINE
Payer: COMMERCIAL

## 2020-06-15 VITALS
HEART RATE: 63 BPM | OXYGEN SATURATION: 99 % | DIASTOLIC BLOOD PRESSURE: 132 MMHG | SYSTOLIC BLOOD PRESSURE: 224 MMHG | RESPIRATION RATE: 18 BRPM | TEMPERATURE: 97.6 F

## 2020-06-15 DIAGNOSIS — R14.0 ABDOMINAL DISTENSION: ICD-10-CM

## 2020-06-15 LAB
APPEARANCE FLD: CLEAR
COLOR FLD: YELLOW
LYMPHOCYTES NFR FLD MANUAL: 6 %
MONOS+MACROS NFR FLD MANUAL: 92 %
NEUTS BAND NFR FLD MANUAL: 2 %
SARS-COV-2 PCR COMMENT: NORMAL
SARS-COV-2 RNA SPEC QL NAA+PROBE: NEGATIVE
SARS-COV-2 RNA SPEC QL NAA+PROBE: NORMAL
SPECIMEN SOURCE FLD: NORMAL
SPECIMEN SOURCE: NORMAL
SPECIMEN SOURCE: NORMAL
WBC # FLD AUTO: 79 /UL

## 2020-06-15 PROCEDURE — 49083 ABD PARACENTESIS W/IMAGING: CPT | Performed by: EMERGENCY MEDICINE

## 2020-06-15 PROCEDURE — C9803 HOPD COVID-19 SPEC COLLECT: HCPCS | Performed by: EMERGENCY MEDICINE

## 2020-06-15 PROCEDURE — 89051 BODY FLUID CELL COUNT: CPT | Performed by: EMERGENCY MEDICINE

## 2020-06-15 PROCEDURE — U0003 INFECTIOUS AGENT DETECTION BY NUCLEIC ACID (DNA OR RNA); SEVERE ACUTE RESPIRATORY SYNDROME CORONAVIRUS 2 (SARS-COV-2) (CORONAVIRUS DISEASE [COVID-19]), AMPLIFIED PROBE TECHNIQUE, MAKING USE OF HIGH THROUGHPUT TECHNOLOGIES AS DESCRIBED BY CMS-2020-01-R: HCPCS | Performed by: EMERGENCY MEDICINE

## 2020-06-15 PROCEDURE — 49083 ABD PARACENTESIS W/IMAGING: CPT | Mod: Z6 | Performed by: EMERGENCY MEDICINE

## 2020-06-15 PROCEDURE — 99285 EMERGENCY DEPT VISIT HI MDM: CPT | Mod: 25 | Performed by: EMERGENCY MEDICINE

## 2020-06-15 RX ORDER — LIDOCAINE HYDROCHLORIDE 20 MG/ML
INJECTION, SOLUTION EPIDURAL; INFILTRATION; INTRACAUDAL; PERINEURAL
Status: DISCONTINUED
Start: 2020-06-15 | End: 2020-06-15 | Stop reason: HOSPADM

## 2020-06-15 ASSESSMENT — ENCOUNTER SYMPTOMS
FEVER: 0
CHILLS: 0
SHORTNESS OF BREATH: 0
COUGH: 0

## 2020-06-15 NOTE — PROGRESS NOTES
Emergency Social Work Services Note    Date of  Intervention: 06/15/20  Last Emergency Department Visit:  06/14/20  Care Plan:  NA  Collaborated with:  ED MD, BETH scheduling, [facility]    Data:  Wilfredo Yoon is a 60 year old male who presents to the ED with abdominal distention and discomfort. The patient is in need of a paracentesis. ED SW was asked to get involved to assist the patient with scheduling and transportation.        Intervention:  Due to current staffing changes related to COVID, the patient will need to go to [facility] to complete paracentesis. They are currently being scheduled in the ultrasound department. I left a voicemail on the main scheduling line for a call back to assist patient with setting up appointment. Scheduling line: [phone]    The patient has current orders placed for an as needed paracentesis from Eber Ortez MD that are good until 6/12/2021.    The patient may need transportation assistance set up as well. He receives medical transportation through [facility]. Once appointment date/time is confirmed, I can assist with arranging transport.     [facility]: [phone]    Assessment:  I did not meet with the patient for this encounter.     Plan:    Anticipated Disposition:  Home, no needs identified    Barriers to d/c plan:  Follow up appointment and medical clearance    Follow Up:  Ongoing while the patient is in the ED.     BENY Dela Cruz. SW.   Clinical , Emergency Department   [facility]  Pager: [phone] Mon-Sat 9 am - 9 pm  On-call/after hours pager [phone]

## 2020-06-15 NOTE — ED PROVIDER NOTES
ED Provider Note  [facility]      History     Chief Complaint   Patient presents with     Abdominal Pain     HPI  Wilfredo Yoon is a 60 year old male who has a past medical history of hepatitis B with cirrhosis, hypertension, here with increased abdominal swelling.  Was seen in the ED for the same last night, was offered obvious with IR paracentesis this morning, left hospital AGAINST MEDICAL ADVICE.  Presents today for same complaint, has increased discomfort.  Use , unable to fully distinguish if pain is from increased swelling or separate in nature.  I asked patient, again using , if he has any fever, he is unclear what I mean by that as an ache he is questioning whether the fever is coming from his head his belly or his body, I asked her to measure her temperature if it would be elevated and he is unsure.  No vomiting or diarrhea no chest pain difficulty breathing or cough.    Past Medical History  Past Medical History:   Diagnosis Date     Cryoglobulinemia (H)      Glomerulonephritis      Hepatitis B      Hypertension      Surgical complication      Past Surgical History:   Procedure Laterality Date     ANESTHESIA OUT OF OR MRI N/A 7/18/2020    Procedure: ANESTHESIA OUT OF OR MRI;  Surgeon: GENERIC ANESTHESIA PROVIDER;  Location: [facility] OR     C HAND/FINGER SURGERY UNLISTED       ESOPHAGOSCOPY, GASTROSCOPY, DUODENOSCOPY (EGD), COMBINED N/A 10/18/2018    Procedure: EGD;  Surgeon: Eber Ortez MD;  Location: [facility] GI     HAND SURGERY Right      IR PARACENTESIS  7/27/2020     acetaminophen (TYLENOL) 325 MG tablet  amLODIPine (NORVASC) 10 MG tablet  aspirin (ASA) 81 MG chewable tablet  atorvastatin (LIPITOR) 40 MG tablet  carvedilol (COREG) 25 MG tablet  chlorthalidone (HYGROTON) 25 MG tablet  entecavir (BARACLUDE) 1 MG tablet  miconazole (MICATIN) 2 % external cream  polyethylene glycol (MIRALAX) 17 g packet      No Known Allergies  Past medical  history, past surgical history, medications, and allergies were reviewed with the patient. Additional pertinent items: None    Family History  Family History   Problem Relation Age of Onset     Liver Cancer No family hx of      Hepatitis No family hx of      Family history was reviewed with the patient. Additional pertinent items: None    Social History  Social History     Tobacco Use     Smoking status: Current Some Day Smoker     Packs/day: 0.25     Years: 40.00     Pack years: 10.00     Types: Cigarettes     Smokeless tobacco: Never Used   Substance Use Topics     Alcohol use: No     Alcohol/week: 0.0 standard drinks     Drug use: No      Social history was reviewed with the patient. Additional pertinent items: None    Review of Systems   Constitutional: Negative for chills and fever.   Respiratory: Negative for cough and shortness of breath.    All other systems reviewed and are negative.    A complete review of systems was performed with pertinent positives and negatives noted in the HPI, and all other systems negative.    Physical Exam   BP: (!) 185/111  Pulse: 64  Temp: 98.2  F (36.8  C)  Resp: 18  SpO2: 98 %  Physical Exam  Constitutional:       General: He is not in acute distress.     Appearance: Normal appearance.   HENT:      Head: Normocephalic and atraumatic.      Right Ear: External ear normal.      Left Ear: External ear normal.      Nose: Nose normal. No rhinorrhea.   Eyes:      Conjunctiva/sclera: Conjunctivae normal.   Cardiovascular:      Rate and Rhythm: Normal rate and regular rhythm.      Pulses: Normal pulses.   Pulmonary:      Effort: Pulmonary effort is normal. No respiratory distress.      Breath sounds: Normal breath sounds.   Abdominal:      General: There is distension.      Palpations: Abdomen is soft.      Tenderness: There is no abdominal tenderness.      Hernia: No hernia is present.   Musculoskeletal:         General: No deformity or signs of injury.   Skin:     General: Skin is  warm and dry.      Capillary Refill: Capillary refill takes less than 2 seconds.   Neurological:      General: No focal deficit present.      Mental Status: He is alert. Mental status is at baseline.   Psychiatric:         Mood and Affect: Mood normal.         Behavior: Behavior normal.         ED Course      Procedures  Results for orders placed during the hospital encounter of 06/15/20   POC US GUIDE FOR PARACENTESIS    Impression [facility] Procedure Note      Limited Bedside ED Ultrasound for Procedural Guidance:    Procedure: Paracentesis  PROCEDURE: PERFORMED BY: Dr. Ge Elise MD  INDICATIONS/SYMPTOM:  Pain  PROBE: Low frequency convex probe  BODY LOCATION: R lateral abdomen  FINDINGS:  Normal landmarks were identified such that vessels (arteries, veins) and nerves were avoided.  Injection was done with real time visualization.  INTERPRETATION:  Real-time ultrasonic guidance utilized to perform above procedure.  IMAGE DOCUMENTATION: Images were archived to PACs system.                         Results for orders placed or performed during the hospital encounter of 06/15/20   POC US GUIDE FOR PARACENTESIS     Status: None    Impression    [facility] Procedure Note      Limited Bedside ED Ultrasound for Procedural Guidance:    Procedure: Paracentesis  PROCEDURE: PERFORMED BY: Dr. Ge Elise MD  INDICATIONS/SYMPTOM:  Pain  PROBE: Low frequency convex probe  BODY LOCATION: R lateral abdomen  FINDINGS:  Normal landmarks were identified such that vessels (arteries, veins) and nerves were avoided.  Injection was done with real time visualization.  INTERPRETATION:  Real-time ultrasonic guidance utilized to perform above procedure.  IMAGE DOCUMENTATION: Images were archived to PACs system.   Asymptomatic COVID-19 Virus (Coronavirus) by PCR     Status: None    Specimen: Nasopharyngeal   Result Value Ref Range    COVID-19 Virus PCR to [facility] - Source Nasopharyngeal     COVID-19 Virus PCR to [facility]  - Result       Test received-See reflex to IDDL test SARS CoV2 (COVID-19) Virus RT-PCR   SARS-CoV-2 COVID-19 Virus (Coronavirus) RT-PCR Nasopharyngeal     Status: None    Specimen: Nasopharyngeal   Result Value Ref Range    SARS-CoV-2 Virus Specimen Source Nasopharyngeal     SARS-CoV-2 PCR Result NEGATIVE     SARS-CoV-2 PCR Comment       This automated, real-time RT-PCR assay by [employer] on the [employer] has   been given Emergency Use Authorization (EUA) for the in vitro qualitative detection of RNA   from the SARS-CoV2 virus in nasopharyngeal swabs in viral transport medium from patients   with signs and symptoms of infection who are suspected of COVID-19. The performance is   unknown in asymptomatic patients.     Cell count with differential fluid     Status: None   Result Value Ref Range    Body Fluid Analysis Source Abdominal     Color Fluid Yellow     Appearance Fluid Clear     WBC Fluid 79 /uL    % Neutrophils Fluid 2 %    % Lymphocytes Fluid 6 %    % Mono/Macro Fluid 92 %     Medications - No data to display     Assessments & Plan (with Medical Decision Making)     60-year-old male here with abdominal tenderness in setting of no distention with history of cirrhosis, will perform diagnostic and therapeutic paracentesis.  Low suspicion given minimal tenderness but will need to rule it out.    I have reviewed the nursing notes. I have reviewed the findings, diagnosis, plan and need for follow up with the patient.    Discharge Medication List as of 6/15/2020  3:09 PM          Final diagnoses:   Abdominal distension       --  eG Elise MD   Emergency Medicine   [facility], [place], EMERGENCY DEPARTMENT  6/15/2020     Ge Elise MD  06/20/20 2022       Ge Elise MD  08/25/20 [id number]

## 2020-06-15 NOTE — ED AVS SNAPSHOT
[facility], [place], Emergency Department  [address] [place] [id number]  Phone:  [phone]                                    Wilfredo Yoon   MRN: [id number]    Department:  [facility], Emergency Department   Date of Visit:  6/15/2020           After Visit Summary Signature Page    I have received my discharge instructions, and my questions have been answered. I have discussed any challenges I see with this plan with the nurse or doctor.    ..........................................................................................................................................  Patient/Patient Representative Signature      ..........................................................................................................................................  Patient Representative Print Name and Relationship to Patient    ..................................................               ................................................  Date                                   Time    ..........................................................................................................................................  Reviewed by Signature/Title    ...................................................              ..............................................  Date                                               Time          22EPIC Rev 08/18

## 2020-06-15 NOTE — ED TRIAGE NOTES
60 yr old male presents to ED with request for paracentesis for abd distention and discomfort. Pt presented to ED similarly last evening with abd distention and hypertensive emergency and was recommended that he be admitted to observation overnight and have paracentesis this morning. Pt decided to leave against medical advice last evening and agreed to come back to hospital for paracentesis this morning. Pt reports he did not receive a call to schedule paracentesis and did not call number on discharge paperwork.

## 2020-06-16 ENCOUNTER — HOSPITAL ENCOUNTER (OUTPATIENT)
Dept: ULTRASOUND IMAGING | Facility: CLINIC | Age: 60
End: 2020-06-16
Attending: INTERNAL MEDICINE
Payer: COMMERCIAL

## 2020-06-16 ENCOUNTER — HOSPITAL ENCOUNTER (OUTPATIENT)
Facility: CLINIC | Age: 60
Discharge: HOME | End: 2020-06-16
Admitting: INTERNAL MEDICINE
Payer: COMMERCIAL

## 2020-06-16 ENCOUNTER — CARE COORDINATION (OUTPATIENT)
Dept: CARE COORDINATION | Facility: CLINIC | Age: 60
End: 2020-06-16

## 2020-06-16 VITALS
DIASTOLIC BLOOD PRESSURE: 122 MMHG | TEMPERATURE: 98 F | HEART RATE: 64 BPM | SYSTOLIC BLOOD PRESSURE: 204 MMHG | RESPIRATION RATE: 18 BRPM

## 2020-06-16 DIAGNOSIS — B18.1 CHRONIC VIRAL HEPATITIS B WITHOUT DELTA AGENT AND WITHOUT COMA (H): ICD-10-CM

## 2020-06-16 DIAGNOSIS — R18.8 OTHER ASCITES: ICD-10-CM

## 2020-06-16 PROCEDURE — 25000125 ZZHC RX 250: Performed by: PHYSICIAN ASSISTANT

## 2020-06-16 PROCEDURE — 27210190 US PARACENTESIS

## 2020-06-16 PROCEDURE — 40000863 ZZH STATISTIC RADIOLOGY XRAY, US, CT, MAR, NM

## 2020-06-16 RX ORDER — NICOTINE POLACRILEX 4 MG
15-30 LOZENGE BUCCAL
Status: DISCONTINUED | OUTPATIENT
Start: 2020-06-16 | End: 2020-06-16 | Stop reason: HOSPADM

## 2020-06-16 RX ORDER — ALBUMIN (HUMAN) 12.5 G/50ML
12.5 SOLUTION INTRAVENOUS ONCE
Status: DISCONTINUED | OUTPATIENT
Start: 2020-06-16 | End: 2020-06-16 | Stop reason: HOSPADM

## 2020-06-16 RX ORDER — LIDOCAINE HYDROCHLORIDE 10 MG/ML
10 INJECTION, SOLUTION EPIDURAL; INFILTRATION; INTRACAUDAL; PERINEURAL ONCE
Status: COMPLETED | OUTPATIENT
Start: 2020-06-16 | End: 2020-06-16

## 2020-06-16 RX ORDER — LIDOCAINE 40 MG/G
CREAM TOPICAL
Status: DISCONTINUED | OUTPATIENT
Start: 2020-06-16 | End: 2020-06-16 | Stop reason: HOSPADM

## 2020-06-16 RX ORDER — DEXTROSE MONOHYDRATE 25 G/50ML
25-50 INJECTION, SOLUTION INTRAVENOUS
Status: DISCONTINUED | OUTPATIENT
Start: 2020-06-16 | End: 2020-06-16 | Stop reason: HOSPADM

## 2020-06-16 RX ADMIN — LIDOCAINE HYDROCHLORIDE 10 ML: 10 INJECTION, SOLUTION EPIDURAL; INFILTRATION; INTRACAUDAL; PERINEURAL at 14:02

## 2020-06-16 NOTE — PROGRESS NOTES
Pt returns to caresuites and requesting po food and fluids.  Denies pain at this time. Pt has called friend for ride to home.

## 2020-06-16 NOTE — PROGRESS NOTES
Wilfredo Yoon was seen in the ED yesterday for abdominal distention. Per ED MD, the patient will need a follow up appointment to get a paracentesis. The will need to be done through [facility] in the Ultrasound department. They have a 2pm appointment available today and the patient would need to arrive at 1230 for check in.    I contacted the patient with  services and he is able to get to th appointment in time. The patient will arrange his own transportation. He had no additional questions.     Lovely SWAN. LGSW.  Clinical   [facility]    Pager: [phone] Mon-Sat 9 am - 4:30 pm  On-call/after hours pager [phone]

## 2020-06-16 NOTE — PROGRESS NOTES
Care Suites Discharge Nursing Note    Patient Information  Name: Wilfredo Yoon  Age: 60 year old    Discharge Education:  Discharge instructions reviewed: Yes  Additional education/resources provided: none  Patient/patient representative verbalizes understanding: Yes  Patient discharging on new medications: No  Medication education completed: N/A    Discharge Plans:   Discharge location: home  Discharge ride contacted: Yes  Approximate discharge time: 1444    Discharge Criteria:  Discharge criteria met and vital signs stable: Yes    Patient Belongs:  Patient belongings returned to patient: Yes    Eugenie Hoffman RN

## 2020-06-16 NOTE — PROGRESS NOTES
Ivanna PA in to see and consent pt prior to procedure. Right side of abdomen used for paracentesis.  Pt denies pain. PA aware of B/P today as ongoing problem.  No additional orders received today to treat elevated Blood pressure.

## 2020-06-16 NOTE — PROCEDURES
[facility]    Procedure: Paracentesis    Date/Time: 6/16/2020 2:24 PM  Performed by: Ivanna Mckenzie PA-C  Authorized by: Ivanna Mckenzie PA-C     UNIVERSAL PROTOCOL   Site Marked: Yes  Prior Images Obtained and Reviewed:  Yes  Required items: Required blood products, implants, devices and special equipment available    Patient identity confirmed:  Verbally with patient  NA - No sedation, light sedation, or local anesthesia  Confirmation Checklist:  Patient's identity using two indicators, relevant allergies, procedure was appropriate and matched the consent or emergent situation and correct equipment/implants were available  Time out: Immediately prior to the procedure a time out was called    Universal Protocol: the Joint Commission Universal Protocol was followed    Preparation: Patient was prepped and draped in usual sterile fashion           ANESTHESIA    Anesthesia: Local infiltration  Local Anesthetic:  Lidocaine 1% without epinephrine  Anesthetic Total (mL):  10      SEDATION    Patient Sedated: No    See dictated procedure note for full details.  PROCEDURE   Patient Tolerance:  Patient tolerated the procedure well with no immediate complications  Describe Procedure: Ultrasound guided  Length of time physician/provider present for 1:1 monitoring during sedation: 0

## 2020-06-16 NOTE — PROGRESS NOTES
Pt discharged to home ambulating safely with friend driving. No additional questions at this time. Pt instructed to take medications today at home and follow up with MD.

## 2020-06-16 NOTE — PROGRESS NOTES
1300 ml clear yellow fluid drained from right side of abdomen. Pt denies pain. Will return to [facility] for recovery and discharge to home.

## 2020-06-16 NOTE — PROGRESS NOTES
Discharge instructions reviewed with pt prior to exam.  All questions answered. Pt's B/P high upon admission--pt states he has taken his medications yesterday as prescribed.

## 2020-06-16 NOTE — PROGRESS NOTES
Pre procedure plan of care reviewed with pt prior to exam.  Pt has had procedure in the past and has no additional questions at this time.

## 2020-06-16 NOTE — DISCHARGE INSTRUCTIONS

## 2020-07-16 ENCOUNTER — APPOINTMENT (OUTPATIENT)
Dept: CT IMAGING | Facility: CLINIC | Age: 60
End: 2020-07-16
Attending: EMERGENCY MEDICINE
Payer: COMMERCIAL

## 2020-07-16 ENCOUNTER — HOSPITAL ENCOUNTER (EMERGENCY)
Facility: CLINIC | Age: 60
Discharge: HOME | End: 2020-07-16
Attending: EMERGENCY MEDICINE | Admitting: EMERGENCY MEDICINE
Payer: COMMERCIAL

## 2020-07-16 ENCOUNTER — TELEPHONE (OUTPATIENT)
Dept: GASTROENTEROLOGY | Facility: CLINIC | Age: 60
End: 2020-07-16

## 2020-07-16 ENCOUNTER — APPOINTMENT (OUTPATIENT)
Dept: GENERAL RADIOLOGY | Facility: CLINIC | Age: 60
End: 2020-07-16
Attending: EMERGENCY MEDICINE
Payer: COMMERCIAL

## 2020-07-16 ENCOUNTER — HOSPITAL ENCOUNTER (INPATIENT)
Facility: CLINIC | Age: 60
LOS: 18 days | Discharge: NURSING, REHAB OR LONG-TERM CARE | End: 2020-08-04
Attending: EMERGENCY MEDICINE | Admitting: PSYCHIATRY & NEUROLOGY
Payer: COMMERCIAL

## 2020-07-16 ENCOUNTER — HOSPITAL ENCOUNTER (OUTPATIENT)
Facility: CLINIC | Age: 60
End: 2020-07-16
Payer: COMMERCIAL

## 2020-07-16 VITALS
DIASTOLIC BLOOD PRESSURE: 108 MMHG | TEMPERATURE: 98.1 F | HEART RATE: 78 BPM | SYSTOLIC BLOOD PRESSURE: 170 MMHG | OXYGEN SATURATION: 98 % | RESPIRATION RATE: 14 BRPM

## 2020-07-16 DIAGNOSIS — L30.4 INTERTRIGO: ICD-10-CM

## 2020-07-16 DIAGNOSIS — K74.60 CIRRHOSIS OF LIVER WITHOUT ASCITES, UNSPECIFIED HEPATIC CIRRHOSIS TYPE (H): ICD-10-CM

## 2020-07-16 DIAGNOSIS — I10 HYPERTENSION, UNSPECIFIED TYPE: ICD-10-CM

## 2020-07-16 DIAGNOSIS — I63.89 CEREBROVASCULAR ACCIDENT (CVA) DUE TO OTHER MECHANISM (H): Primary | ICD-10-CM

## 2020-07-16 DIAGNOSIS — R18.8 OTHER ASCITES: ICD-10-CM

## 2020-07-16 DIAGNOSIS — R41.0 CONFUSION: ICD-10-CM

## 2020-07-16 DIAGNOSIS — I10 ESSENTIAL HYPERTENSION, MALIGNANT: ICD-10-CM

## 2020-07-16 DIAGNOSIS — B18.1 CHRONIC VIRAL HEPATITIS B WITHOUT DELTA AGENT AND WITHOUT COMA (H): ICD-10-CM

## 2020-07-16 DIAGNOSIS — T83.511A URINARY TRACT INFECTION ASSOCIATED WITH INDWELLING URETHRAL CATHETER, INITIAL ENCOUNTER (H): ICD-10-CM

## 2020-07-16 DIAGNOSIS — Z11.59 ENCOUNTER FOR SCREENING FOR OTHER VIRAL DISEASES: Primary | ICD-10-CM

## 2020-07-16 DIAGNOSIS — B18.1 VIRAL HEPATITIS B CHRONIC (H): ICD-10-CM

## 2020-07-16 DIAGNOSIS — I67.1 INTRACRANIAL ANEURYSM: ICD-10-CM

## 2020-07-16 DIAGNOSIS — I10 BENIGN ESSENTIAL HYPERTENSION: ICD-10-CM

## 2020-07-16 DIAGNOSIS — N39.0 URINARY TRACT INFECTION ASSOCIATED WITH INDWELLING URETHRAL CATHETER, INITIAL ENCOUNTER (H): ICD-10-CM

## 2020-07-16 DIAGNOSIS — Z20.822 COVID-19 VIRUS NOT DETECTED: ICD-10-CM

## 2020-07-16 PROBLEM — R53.1 WEAKNESS: Status: ACTIVE | Noted: 2020-07-16

## 2020-07-16 LAB
ALBUMIN SERPL-MCNC: 1.7 G/DL (ref 3.4–5)
ALBUMIN SERPL-MCNC: 1.7 G/DL (ref 3.4–5)
ALP SERPL-CCNC: 238 U/L (ref 40–150)
ALP SERPL-CCNC: 254 U/L (ref 40–150)
ALT SERPL W P-5'-P-CCNC: 27 U/L (ref 0–70)
ALT SERPL W P-5'-P-CCNC: 28 U/L (ref 0–70)
AMMONIA PLAS-SCNC: <10 UMOL/L (ref 10–50)
AMMONIA PLAS-SCNC: NORMAL UMOL/L (ref 10–50)
ANION GAP SERPL CALCULATED.3IONS-SCNC: 4 MMOL/L (ref 3–14)
ANION GAP SERPL CALCULATED.3IONS-SCNC: 5 MMOL/L (ref 3–14)
AST SERPL W P-5'-P-CCNC: 41 U/L (ref 0–45)
AST SERPL W P-5'-P-CCNC: ABNORMAL U/L (ref 0–45)
BASOPHILS # BLD AUTO: 0 10E9/L (ref 0–0.2)
BASOPHILS # BLD AUTO: 0 10E9/L (ref 0–0.2)
BASOPHILS NFR BLD AUTO: 0.3 %
BASOPHILS NFR BLD AUTO: 0.6 %
BILIRUB SERPL-MCNC: 1 MG/DL (ref 0.2–1.3)
BILIRUB SERPL-MCNC: 1.1 MG/DL (ref 0.2–1.3)
BUN SERPL-MCNC: 21 MG/DL (ref 7–30)
BUN SERPL-MCNC: 22 MG/DL (ref 7–30)
CALCIUM SERPL-MCNC: 8.2 MG/DL (ref 8.5–10.1)
CALCIUM SERPL-MCNC: 8.5 MG/DL (ref 8.5–10.1)
CHLORIDE SERPL-SCNC: 106 MMOL/L (ref 94–109)
CHLORIDE SERPL-SCNC: 106 MMOL/L (ref 94–109)
CO2 SERPL-SCNC: 25 MMOL/L (ref 20–32)
CO2 SERPL-SCNC: 27 MMOL/L (ref 20–32)
CREAT SERPL-MCNC: 1.49 MG/DL (ref 0.66–1.25)
CREAT SERPL-MCNC: 1.53 MG/DL (ref 0.66–1.25)
DIFFERENTIAL METHOD BLD: ABNORMAL
DIFFERENTIAL METHOD BLD: ABNORMAL
EOSINOPHIL # BLD AUTO: 0.1 10E9/L (ref 0–0.7)
EOSINOPHIL # BLD AUTO: 0.2 10E9/L (ref 0–0.7)
EOSINOPHIL NFR BLD AUTO: 1.2 %
EOSINOPHIL NFR BLD AUTO: 2.7 %
ERYTHROCYTE [DISTWIDTH] IN BLOOD BY AUTOMATED COUNT: 12.7 % (ref 10–15)
ERYTHROCYTE [DISTWIDTH] IN BLOOD BY AUTOMATED COUNT: 12.8 % (ref 10–15)
GFR SERPL CREATININE-BSD FRML MDRD: 48 ML/MIN/{1.73_M2}
GFR SERPL CREATININE-BSD FRML MDRD: 51 ML/MIN/{1.73_M2}
GLUCOSE BLDC GLUCOMTR-MCNC: 99 MG/DL (ref 70–99)
GLUCOSE SERPL-MCNC: 93 MG/DL (ref 70–99)
GLUCOSE SERPL-MCNC: 93 MG/DL (ref 70–99)
HCT VFR BLD AUTO: 38.7 % (ref 40–53)
HCT VFR BLD AUTO: 38.9 % (ref 40–53)
HGB BLD-MCNC: 13.1 G/DL (ref 13.3–17.7)
HGB BLD-MCNC: 13.5 G/DL (ref 13.3–17.7)
IMM GRANULOCYTES # BLD: 0 10E9/L (ref 0–0.4)
IMM GRANULOCYTES # BLD: 0 10E9/L (ref 0–0.4)
IMM GRANULOCYTES NFR BLD: 0.2 %
IMM GRANULOCYTES NFR BLD: 0.3 %
INR PPP: 1.09 (ref 0.86–1.14)
INTERPRETATION ECG - MUSE: NORMAL
LACTATE BLD-SCNC: 0.6 MMOL/L (ref 0.7–2)
LACTATE BLD-SCNC: 0.9 MMOL/L (ref 0.7–2)
LIPASE SERPL-CCNC: 30 U/L (ref 73–393)
LIPASE SERPL-CCNC: 67 U/L (ref 73–393)
LYMPHOCYTES # BLD AUTO: 0.8 10E9/L (ref 0.8–5.3)
LYMPHOCYTES # BLD AUTO: 0.9 10E9/L (ref 0.8–5.3)
LYMPHOCYTES NFR BLD AUTO: 12.8 %
LYMPHOCYTES NFR BLD AUTO: 13.1 %
MCH RBC QN AUTO: 32.7 PG (ref 26.5–33)
MCH RBC QN AUTO: 33.3 PG (ref 26.5–33)
MCHC RBC AUTO-ENTMCNC: 33.7 G/DL (ref 31.5–36.5)
MCHC RBC AUTO-ENTMCNC: 34.9 G/DL (ref 31.5–36.5)
MCV RBC AUTO: 96 FL (ref 78–100)
MCV RBC AUTO: 97 FL (ref 78–100)
MONOCYTES # BLD AUTO: 0.5 10E9/L (ref 0–1.3)
MONOCYTES # BLD AUTO: 0.6 10E9/L (ref 0–1.3)
MONOCYTES NFR BLD AUTO: 7.7 %
MONOCYTES NFR BLD AUTO: 9.6 %
NEUTROPHILS # BLD AUTO: 4.7 10E9/L (ref 1.6–8.3)
NEUTROPHILS # BLD AUTO: 5.2 10E9/L (ref 1.6–8.3)
NEUTROPHILS NFR BLD AUTO: 74.1 %
NEUTROPHILS NFR BLD AUTO: 77.4 %
NRBC # BLD AUTO: 0 10*3/UL
NRBC # BLD AUTO: 0 10*3/UL
NRBC BLD AUTO-RTO: 0 /100
NRBC BLD AUTO-RTO: 0 /100
PLATELET # BLD AUTO: 92 10E9/L (ref 150–450)
PLATELET # BLD AUTO: 96 10E9/L (ref 150–450)
POTASSIUM SERPL-SCNC: 3.7 MMOL/L (ref 3.4–5.3)
POTASSIUM SERPL-SCNC: 4.6 MMOL/L (ref 3.4–5.3)
PROT SERPL-MCNC: 6.2 G/DL (ref 6.8–8.8)
PROT SERPL-MCNC: 6.4 G/DL (ref 6.8–8.8)
RBC # BLD AUTO: 4.01 10E12/L (ref 4.4–5.9)
RBC # BLD AUTO: 4.05 10E12/L (ref 4.4–5.9)
SODIUM SERPL-SCNC: 134 MMOL/L (ref 133–144)
SODIUM SERPL-SCNC: 138 MMOL/L (ref 133–144)
TROPONIN I SERPL-MCNC: <0.015 UG/L (ref 0–0.04)
TSH SERPL DL<=0.005 MIU/L-ACNC: 2.02 MU/L (ref 0.4–4)
WBC # BLD AUTO: 6.4 10E9/L (ref 4–11)
WBC # BLD AUTO: 6.7 10E9/L (ref 4–11)

## 2020-07-16 PROCEDURE — 83690 ASSAY OF LIPASE: CPT | Performed by: EMERGENCY MEDICINE

## 2020-07-16 PROCEDURE — 96360 HYDRATION IV INFUSION INIT: CPT | Performed by: EMERGENCY MEDICINE

## 2020-07-16 PROCEDURE — 80048 BASIC METABOLIC PNL TOTAL CA: CPT

## 2020-07-16 PROCEDURE — 25800030 ZZH RX IP 258 OP 636: Performed by: EMERGENCY MEDICINE

## 2020-07-16 PROCEDURE — C9803 HOPD COVID-19 SPEC COLLECT: HCPCS | Performed by: EMERGENCY MEDICINE

## 2020-07-16 PROCEDURE — 82140 ASSAY OF AMMONIA: CPT | Performed by: EMERGENCY MEDICINE

## 2020-07-16 PROCEDURE — 25000132 ZZH RX MED GY IP 250 OP 250 PS 637: Performed by: NURSE PRACTITIONER

## 2020-07-16 PROCEDURE — 99284 EMERGENCY DEPT VISIT MOD MDM: CPT | Mod: 25

## 2020-07-16 PROCEDURE — 80053 COMPREHEN METABOLIC PANEL: CPT | Performed by: EMERGENCY MEDICINE

## 2020-07-16 PROCEDURE — 82247 BILIRUBIN TOTAL: CPT

## 2020-07-16 PROCEDURE — 99220 ZZC INITIAL OBSERVATION CARE,LEVL III: CPT | Mod: 25 | Performed by: NURSE PRACTITIONER

## 2020-07-16 PROCEDURE — 25000132 ZZH RX MED GY IP 250 OP 250 PS 637: Performed by: EMERGENCY MEDICINE

## 2020-07-16 PROCEDURE — 25000128 H RX IP 250 OP 636: Performed by: EMERGENCY MEDICINE

## 2020-07-16 PROCEDURE — 25000128 H RX IP 250 OP 636: Performed by: NURSE PRACTITIONER

## 2020-07-16 PROCEDURE — 84443 ASSAY THYROID STIM HORMONE: CPT | Performed by: EMERGENCY MEDICINE

## 2020-07-16 PROCEDURE — 00000146 ZZHCL STATISTIC GLUCOSE BY METER IP

## 2020-07-16 PROCEDURE — 84155 ASSAY OF PROTEIN SERUM: CPT

## 2020-07-16 PROCEDURE — 85025 COMPLETE CBC W/AUTO DIFF WBC: CPT | Performed by: EMERGENCY MEDICINE

## 2020-07-16 PROCEDURE — G0378 HOSPITAL OBSERVATION PER HR: HCPCS

## 2020-07-16 PROCEDURE — 25000128 H RX IP 250 OP 636: Performed by: STUDENT IN AN ORGANIZED HEALTH CARE EDUCATION/TRAINING PROGRAM

## 2020-07-16 PROCEDURE — U0003 INFECTIOUS AGENT DETECTION BY NUCLEIC ACID (DNA OR RNA); SEVERE ACUTE RESPIRATORY SYNDROME CORONAVIRUS 2 (SARS-COV-2) (CORONAVIRUS DISEASE [COVID-19]), AMPLIFIED PROBE TECHNIQUE, MAKING USE OF HIGH THROUGHPUT TECHNOLOGIES AS DESCRIBED BY CMS-2020-01-R: HCPCS | Performed by: EMERGENCY MEDICINE

## 2020-07-16 PROCEDURE — 99285 EMERGENCY DEPT VISIT HI MDM: CPT | Mod: 25 | Performed by: EMERGENCY MEDICINE

## 2020-07-16 PROCEDURE — 84484 ASSAY OF TROPONIN QUANT: CPT | Performed by: EMERGENCY MEDICINE

## 2020-07-16 PROCEDURE — 96374 THER/PROPH/DIAG INJ IV PUSH: CPT

## 2020-07-16 PROCEDURE — 71045 X-RAY EXAM CHEST 1 VIEW: CPT

## 2020-07-16 PROCEDURE — 85610 PROTHROMBIN TIME: CPT | Performed by: EMERGENCY MEDICINE

## 2020-07-16 PROCEDURE — 96361 HYDRATE IV INFUSION ADD-ON: CPT | Performed by: EMERGENCY MEDICINE

## 2020-07-16 PROCEDURE — 70450 CT HEAD/BRAIN W/O DYE: CPT

## 2020-07-16 PROCEDURE — 83605 ASSAY OF LACTIC ACID: CPT | Performed by: EMERGENCY MEDICINE

## 2020-07-16 PROCEDURE — 84075 ASSAY ALKALINE PHOSPHATASE: CPT

## 2020-07-16 PROCEDURE — 82040 ASSAY OF SERUM ALBUMIN: CPT

## 2020-07-16 PROCEDURE — 93010 ELECTROCARDIOGRAM REPORT: CPT | Mod: Z6 | Performed by: EMERGENCY MEDICINE

## 2020-07-16 PROCEDURE — 84460 ALANINE AMINO (ALT) (SGPT): CPT

## 2020-07-16 PROCEDURE — 93005 ELECTROCARDIOGRAM TRACING: CPT | Performed by: EMERGENCY MEDICINE

## 2020-07-16 PROCEDURE — 70498 CT ANGIOGRAPHY NECK: CPT | Mod: 59

## 2020-07-16 RX ORDER — LISINOPRIL 40 MG/1
40 TABLET ORAL DAILY
Status: ON HOLD | COMMUNITY
End: 2020-08-04

## 2020-07-16 RX ORDER — BUMETANIDE 1 MG/1
1 TABLET ORAL DAILY
Status: DISCONTINUED | OUTPATIENT
Start: 2020-07-17 | End: 2020-07-25

## 2020-07-16 RX ORDER — ACETAMINOPHEN 650 MG/1
650 SUPPOSITORY RECTAL EVERY 4 HOURS PRN
Status: DISCONTINUED | OUTPATIENT
Start: 2020-07-16 | End: 2020-07-17

## 2020-07-16 RX ORDER — ACETAMINOPHEN 325 MG/1
650 TABLET ORAL EVERY 4 HOURS PRN
Status: DISCONTINUED | OUTPATIENT
Start: 2020-07-16 | End: 2020-07-17

## 2020-07-16 RX ORDER — LIDOCAINE 40 MG/G
CREAM TOPICAL
Status: DISCONTINUED | OUTPATIENT
Start: 2020-07-16 | End: 2020-08-04 | Stop reason: HOSPADM

## 2020-07-16 RX ORDER — ONDANSETRON 2 MG/ML
4 INJECTION INTRAMUSCULAR; INTRAVENOUS EVERY 6 HOURS PRN
Status: DISCONTINUED | OUTPATIENT
Start: 2020-07-16 | End: 2020-08-04 | Stop reason: HOSPADM

## 2020-07-16 RX ORDER — AMLODIPINE BESYLATE 5 MG/1
5 TABLET ORAL ONCE
Status: COMPLETED | OUTPATIENT
Start: 2020-07-16 | End: 2020-07-16

## 2020-07-16 RX ORDER — AMLODIPINE BESYLATE 10 MG/1
10 TABLET ORAL DAILY
Status: DISCONTINUED | OUTPATIENT
Start: 2020-07-17 | End: 2020-07-19

## 2020-07-16 RX ORDER — NALOXONE HYDROCHLORIDE 0.4 MG/ML
.1-.4 INJECTION, SOLUTION INTRAMUSCULAR; INTRAVENOUS; SUBCUTANEOUS
Status: DISCONTINUED | OUTPATIENT
Start: 2020-07-16 | End: 2020-08-04 | Stop reason: HOSPADM

## 2020-07-16 RX ORDER — LISINOPRIL 10 MG/1
20 TABLET ORAL ONCE
Status: COMPLETED | OUTPATIENT
Start: 2020-07-16 | End: 2020-07-16

## 2020-07-16 RX ORDER — HYDRALAZINE HYDROCHLORIDE 20 MG/ML
10 INJECTION INTRAMUSCULAR; INTRAVENOUS EVERY 6 HOURS PRN
Status: DISCONTINUED | OUTPATIENT
Start: 2020-07-16 | End: 2020-07-23

## 2020-07-16 RX ORDER — IOPAMIDOL 755 MG/ML
75 INJECTION, SOLUTION INTRAVASCULAR ONCE
Status: COMPLETED | OUTPATIENT
Start: 2020-07-16 | End: 2020-07-16

## 2020-07-16 RX ORDER — BUMETANIDE 2 MG/1
2 TABLET ORAL DAILY
Status: DISCONTINUED | OUTPATIENT
Start: 2020-07-16 | End: 2020-07-16

## 2020-07-16 RX ORDER — HYDRALAZINE HYDROCHLORIDE 20 MG/ML
10 INJECTION INTRAMUSCULAR; INTRAVENOUS ONCE
Status: COMPLETED | OUTPATIENT
Start: 2020-07-16 | End: 2020-07-16

## 2020-07-16 RX ORDER — AMLODIPINE BESYLATE 5 MG/1
5 TABLET ORAL DAILY
Status: DISCONTINUED | OUTPATIENT
Start: 2020-07-16 | End: 2020-07-16

## 2020-07-16 RX ORDER — CARVEDILOL 12.5 MG/1
12.5 TABLET ORAL 2 TIMES DAILY WITH MEALS
Status: ON HOLD | COMMUNITY
End: 2020-08-04

## 2020-07-16 RX ORDER — LISINOPRIL 40 MG/1
40 TABLET ORAL DAILY
Status: DISCONTINUED | OUTPATIENT
Start: 2020-07-17 | End: 2020-08-04 | Stop reason: HOSPADM

## 2020-07-16 RX ORDER — ONDANSETRON 4 MG/1
4 TABLET, ORALLY DISINTEGRATING ORAL EVERY 6 HOURS PRN
Status: DISCONTINUED | OUTPATIENT
Start: 2020-07-16 | End: 2020-08-04 | Stop reason: HOSPADM

## 2020-07-16 RX ORDER — LISINOPRIL 20 MG/1
20 TABLET ORAL DAILY
Status: DISCONTINUED | OUTPATIENT
Start: 2020-07-16 | End: 2020-07-16

## 2020-07-16 RX ORDER — LISINOPRIL 20 MG/1
40 TABLET ORAL DAILY
Status: DISCONTINUED | OUTPATIENT
Start: 2020-07-16 | End: 2020-07-16

## 2020-07-16 RX ORDER — AMLODIPINE BESYLATE 10 MG/1
10 TABLET ORAL DAILY
Status: ON HOLD | COMMUNITY
End: 2020-08-04

## 2020-07-16 RX ORDER — ENTECAVIR 1 MG/1
1 TABLET, FILM COATED ORAL DAILY
Status: DISCONTINUED | OUTPATIENT
Start: 2020-07-16 | End: 2020-07-18

## 2020-07-16 RX ORDER — BUMETANIDE 1 MG/1
1 TABLET ORAL DAILY
Status: ON HOLD | COMMUNITY
End: 2020-08-04

## 2020-07-16 RX ADMIN — SODIUM CHLORIDE 500 ML: 9 INJECTION, SOLUTION INTRAVENOUS at 14:06

## 2020-07-16 RX ADMIN — ENTECAVIR 1 MG: 1 TABLET ORAL at 21:00

## 2020-07-16 RX ADMIN — HYDRALAZINE HYDROCHLORIDE 10 MG: 20 INJECTION INTRAMUSCULAR; INTRAVENOUS at 10:31

## 2020-07-16 RX ADMIN — IOPAMIDOL 75 ML: 755 INJECTION, SOLUTION INTRAVENOUS at 14:15

## 2020-07-16 RX ADMIN — LISINOPRIL 20 MG: 10 TABLET ORAL at 09:17

## 2020-07-16 RX ADMIN — AMLODIPINE BESYLATE 5 MG: 5 TABLET ORAL at 21:00

## 2020-07-16 RX ADMIN — AMLODIPINE BESYLATE 5 MG: 5 TABLET ORAL at 09:39

## 2020-07-16 RX ADMIN — HYDRALAZINE HYDROCHLORIDE 10 MG: 20 INJECTION, SOLUTION INTRAMUSCULAR; INTRAVENOUS at 19:30

## 2020-07-16 ASSESSMENT — ENCOUNTER SYMPTOMS
VOMITING: 0
LIGHT-HEADEDNESS: 0
ARTHRALGIAS: 0
FEVER: 0
NECK STIFFNESS: 0
COUGH: 0
POLYDIPSIA: 0
NECK PAIN: 0
SHORTNESS OF BREATH: 0
CHILLS: 0
SPEECH DIFFICULTY: 0
SHORTNESS OF BREATH: 0
ADENOPATHY: 0
CONFUSION: 0
BRUISES/BLEEDS EASILY: 0
FATIGUE: 1
HEADACHES: 0
ABDOMINAL PAIN: 1
DIARRHEA: 0
WEAKNESS: 0
ABDOMINAL PAIN: 0
EYE REDNESS: 0
NAUSEA: 0
VOMITING: 0
FEVER: 0
COLOR CHANGE: 0
DIFFICULTY URINATING: 0

## 2020-07-16 NOTE — ED NOTES
Pt discharge was undone d/t fall that happened outside hospital building.  staff helped to get into a wheelchair and back into the ER. Pt was reassessed by MD and RN.

## 2020-07-16 NOTE — ED TRIAGE NOTES
Pt has hx of having paracentesis procedures and is having increased swelling and pain in abd. Pt wants abdomen drained.

## 2020-07-16 NOTE — ED NOTES
I was asked to assist this patient  Re: scheduling routine paracentesis as an OP.  I contacted the [facility]  Hepatology clinic and this patient has routine orders placed and just needs to call US to have the paracentesis done. I asked the clinic to reach out to this patient as he is coming to the ER for non emergent paracentesis and the ER provider thinks this patient isn't understanding the process even while using the interpretor services.  The clinic scheduler is sending a message to the team to reach out to this patient. I will put the OP US scheduling number on his discharge instructions.  I updated the ER provider of the above.

## 2020-07-16 NOTE — ED PROVIDER NOTES
History     Chief Complaint:  Abdominal Pain      HPI   A phone   was used obtain the below history as well as to explain diagnosis, plan of treatment, reasons to return to the emergency department and appropriate follow up.  ID: [id number]    Wilfredo Yoon is a 60 year old male with a history of hypertension, chronic hepatitis B and cirrhosis who presents with abdominal pain. The patient reported that he has had epigastric pain for the last 7 days. Per chart review, the patient had a paracentesis performed on 6/16/2020 at which time 1300 ml of fluid were removed. The patient states that he feels the same way he did prior to his paracentesis however the patient's abdomen does not appear to be extended. He denied any fever, shortness of breath, vomiting or diarrhea. The patient states that he normally takes his blood pressure medication in the morning but did not take any today. He did take his Lisinopril yesterday. Of note, his gastroenterologist is Dr. Deep Rosas with [facility].     Allergies:  The patient has no known drug allergies.    Medications:    Norvasc   Bumex  Entecavir   Lisinopril     Past Medical History:    Chronic hepatitis B without hepatic coma   hypertension   Glomerulonephritis  Cirrhosis     Past Surgical History:    Hand/Finger surgery   EGD combined   Hand surgery     Family History:    No past pertinent family history.    Social History:  Tobacco use: Current some day smoker, 0.25 PPD   Alcohol use: No  Drug use: No   Marital Status:  Single [1]    Review of Systems   Constitutional: Negative for fever.   Respiratory: Negative for shortness of breath.    Gastrointestinal: Positive for abdominal pain. Negative for diarrhea and vomiting.   All other systems reviewed and are negative.        Physical Exam     Patient Vitals for the past 24 hrs:   BP Temp Temp src Pulse Heart Rate Resp SpO2   07/16/20 0852 (!) 245/140 98.1  F (36.7  C) Oral 70 70 14 96  %         Physical Exam  General: Patient is alert and normal appearing.  HEENT: Head atraumatic    Eyes: pupils equal and reactive. Conjunctiva clear   Nares: patent   Oropharynx: no lesions, uvula midline, no palatal draping, normal voice, no trismus  Neck: Supple without lymphadenopathy, no meningismus  Chest: Heart regular rate and rhythm.   Lungs: Equal clear to auscultation with no wheeze or rales  Abdomen: Soft, non tender, minimal soft distention with no rebound or guarding, normal bowel sounds  Back: No costovertebral angle tenderness, no midline C, T or L spine tenderness  Neuro: Grossly nonfocal, normal speech, strength equal bilaterally, CN 2-12 intact  Extremities: No deformities, equal radial and DP pulses. No clubbing, cyanosis.  No edema  Skin: Warm and dry with no rash.       Emergency Department Course   Laboratory:  CBC: WBC: 6.4, HGB: 13.5, PLT: 92 (L)  CMP: Creatinine: 1.53 (H), GFR: 56 (L), Albumin: 1.7 (L), Protein Total: 6.4 (L), Alkphos: 254 (H), o/w WNL    INR: 1.09  Lipase: 67 (L)    0926 Lactic acid: 0.6 (L)    Interventions:  0917 Lisinopril 20 mg PO   0939 Norvasc 5 mg PO   1031 Hydralazine 10 mg IV    Emergency Department Course:  Nursing notes and vitals reviewed. (0850) I performed an exam of the patient as documented above.     (0917) I spoke with Merly, ED Care Coordinator, with regard to assisting the patient with scheduling appointments for the patient.   (0940) Merly reached out to the patient's care team.     IV inserted. Medicine administered as documented above. Blood drawn. This was sent to the lab for further testing, results above.    (1100) I rechecked the patient and discussed the results of his workup thus far. A family member of the patient's was in the room with the patient and was also given instructions.     Findings and plan explained to the Patient. Patient discharged home with instructions regarding supportive care, medications, and reasons to return. The  importance of close follow-up was reviewed.    I personally reviewed the laboratory results with the Patient and answered all related questions prior to discharge.     Impression & Plan      Medical Decision Making:  Patient is a 60-year-old male with history of hypertension, hepatitis B and associated ascites with history of paracentesis most recently 1 month ago presents the emergency department requesting paracentesis.  Patient states he has some mild abdominal pain that is been present for the last 7 days.  He denies fever nausea, vomiting.  His abdomen is benign on evaluation here.  He does have minimal distention but not significantly distended.  He has no shortness of breath.  He does not meet criteria for emergent paracentesis and he does have standing orders in place for outpatient paracentesis.  He was given the information through [facility]  as well as his family member for calling ultrasound to schedule outpatient paracentesis.  Do not suspect SBP at this time.  Do not suspect significant intra-abdominal pathology.  No indication for advanced imaging at this time.  Patient has mild elevation of his creatinine at at his baseline.  Otherwise his laboratory work-up is within acceptable limits.  No evidence of sepsis.  He was noted to be hypertensive and states he has been noncompliant with his hypertensive meds he was given his home meds as well as 1 dose of hydralazine here with improvement of his blood pressures.  Recommend close follow-up with primary care for consideration of new antihypertensives if he continues to have episodes of hypertension to this degree.  No evidence of endorgan damage at this time.  Blood pressure was 170/108 on recheck prior to discharge.  Patient denies headache and he has normal mental status.  Denies chest pain.  Patient is agreeable with the management plan and all questions and concerns addressed.    Diagnosis:    ICD-10-CM    1. Other ascites  R18.8    2. Benign  essential hypertension  I10    3. Viral hepatitis B chronic (H)  B18.1        Disposition:  discharged to home    Scribe Disclosure:  I, Milly Slaughter, am serving as a scribe on 7/16/2020 at 8:50 AM to personally document services performed by Catina Kulkarni MD based on my observations and the provider's statements to me.     Milly Slaughter  7/16/2020    EMERGENCY DEPARTMENT       Catina Kulkarni MD  07/16/20 1248

## 2020-07-16 NOTE — DISCHARGE INSTRUCTIONS
To schedule an Outpatient routine paracentesis to take fluid off of your abdomin please call  [phone].  You already have orders for the procedure to be scheduled.

## 2020-07-16 NOTE — ED PROVIDER NOTES
[place] EMERGENCY DEPARTMENT ([facility])  July 16, 2020 ED 28  History     Chief Complaint   Patient presents with     Altered Mental Status     The history is provided by the patient and medical records.     Wilfredo Yoon is a 60 year old male with history of hepatitis B, liver cirrhosis, ascites who was brought in via ambulance for evaluation of altered mental status. He was seen at [facility] ER this morning complaining of epigastric abdominal pain for the past 7 days.  He reported that this abdominal pain was similar to when he needed paracentesis and requested this, though did not have any abdominal distention. Patient previously had a second paracentesis done on 6/16/2020 with removal of 1.3 L of fluid. He was evaluated with labs and physical exam.  He was treated with his usual dose of amlodipine and hydralazine and was discharged to home.  Since that time he has developed altered mental status and lethargy.  Patient's friend was sitting with him that when he developed altered mental status.  911 was called and he was brought here for evaluation.  His blood sugar was 97 on arrival.    Currently, patient states that he feels generally ill with diffuse body aches.  Denies any chest pain, cough, shortness of breath, nausea/vomiting, abdominal pain, diarrhea, headache, neck pain, or fevers.  No other concerns or complaints.      PAST MEDICAL HISTORY:   Past Medical History:   Diagnosis Date     Cryoglobulinemia (H)      Glomerulonephritis      Hepatitis B      Hypertension      Surgical complication        PAST SURGICAL HISTORY:   Past Surgical History:   Procedure Laterality Date     C HAND/FINGER SURGERY UNLISTED       ESOPHAGOSCOPY, GASTROSCOPY, DUODENOSCOPY (EGD), COMBINED N/A 10/18/2018    Procedure: EGD;  Surgeon: Eber Ortez MD;  Location:  GI     HAND SURGERY Right        Past medical history, past surgical history, medications, and allergies were reviewed  with the patient. Additional pertinent items: None    FAMILY HISTORY:   Family History   Problem Relation Age of Onset     Liver Cancer No family hx of      Hepatitis No family hx of        SOCIAL HISTORY:   Social History     Tobacco Use     Smoking status: Current Some Day Smoker     Packs/day: 0.25     Years: 40.00     Pack years: 10.00     Types: Cigarettes     Smokeless tobacco: Never Used   Substance Use Topics     Alcohol use: No     Alcohol/week: 0.0 standard drinks     Social history was reviewed with the patient. Additional pertinent items: None      Patient's Medications   New Prescriptions    No medications on file   Previous Medications    AMLODIPINE (NORVASC) 5 MG TABLET    Take 1 tablet (5 mg) by mouth daily    BUMETANIDE (BUMEX) 2 MG TABLET    Take 1 tablet (2 mg) by mouth daily    ENTECAVIR (BARACLUDE) 1 MG TABLET    TAKE ONE TABLET BY MOUTH EVERY DAY    LISINOPRIL (PRINIVIL/ZESTRIL) 20 MG TABLET    Take 1 tablet (20 mg) by mouth daily   Modified Medications    No medications on file   Discontinued Medications    No medications on file        No Known Allergies     Review of Systems   Constitutional: Positive for fatigue. Negative for chills and fever.   HENT: Negative for congestion.    Eyes: Negative for redness.   Respiratory: Negative for cough and shortness of breath.    Cardiovascular: Negative for chest pain.   Gastrointestinal: Negative for abdominal pain, nausea and vomiting.   Endocrine: Negative for polydipsia and polyuria.   Genitourinary: Negative for difficulty urinating.   Musculoskeletal: Negative for arthralgias, gait problem, neck pain and neck stiffness.   Skin: Negative for color change.   Allergic/Immunologic: Negative for immunocompromised state.   Neurological: Negative for speech difficulty, weakness, light-headedness and headaches.   Hematological: Negative for adenopathy. Does not bruise/bleed easily.   Psychiatric/Behavioral: Negative for confusion.   All other systems  "reviewed and are negative.    A complete review of systems was performed with pertinent positives and negatives noted in the HPI, and all other systems negative.    Physical Exam   BP: (!) 156/99  Pulse: 69  Heart Rate: 69  Resp: 16  Height: 167.6 cm (5' 6\")  SpO2: 97 %      Physical Exam  Vitals signs and nursing note reviewed.   Constitutional:       General: He is not in acute distress.     Appearance: Normal appearance. He is normal weight. He is not diaphoretic.   HENT:      Head: Normocephalic and atraumatic.      Nose: Nose normal.      Mouth/Throat:      Mouth: Mucous membranes are moist.      Pharynx: Oropharynx is clear.   Eyes:      General: No scleral icterus.     Extraocular Movements: Extraocular movements intact.      Conjunctiva/sclera: Conjunctivae normal.      Pupils: Pupils are equal, round, and reactive to light.   Neck:      Musculoskeletal: Normal range of motion and neck supple. No muscular tenderness.      Comments: There is no meningismus.  Cardiovascular:      Rate and Rhythm: Normal rate.      Pulses: Normal pulses.      Heart sounds: Normal heart sounds.   Pulmonary:      Effort: Pulmonary effort is normal. No respiratory distress.      Breath sounds: Normal breath sounds. No wheezing, rhonchi or rales.   Abdominal:      General: Bowel sounds are normal. There is no distension.      Palpations: Abdomen is soft.      Tenderness: There is no abdominal tenderness. There is no right CVA tenderness, left CVA tenderness, guarding or rebound.      Hernia: No hernia is present.      Comments: No evidence of ascites.   Musculoskeletal: Normal range of motion.         General: No tenderness.      Right lower leg: No edema.      Left lower leg: No edema.   Lymphadenopathy:      Cervical: No cervical adenopathy.   Skin:     General: Skin is warm.      Findings: No rash.   Neurological:      General: No focal deficit present.      Mental Status: He is alert and oriented to person, place, and time.     "  GCS: GCS eye subscore is 4. GCS verbal subscore is 5. GCS motor subscore is 6.      Cranial Nerves: Cranial nerves are intact. No cranial nerve deficit.      Sensory: Sensation is intact. No sensory deficit.      Motor: Motor function is intact. No weakness.      Coordination: Coordination is intact. Coordination normal.      Gait: Gait is intact. Gait normal.      Deep Tendon Reflexes:      Reflex Scores:       Patellar reflexes are 2+ on the right side and 2+ on the left side.       Achilles reflexes are 2+ on the right side and 2+ on the left side.     Comments: No dysarthria, dysmetria, diplopia, disdiadochokinesia. Strength 5/5 in b/l UEs with  and b/l LEs with dorsi- and plantar-flexion against resistance. Sensation to light touch and 2-point discrimination intact in b/l distal UEs and LEs. CNs II-XII intact.   Psychiatric:         Mood and Affect: Mood normal.         Behavior: Behavior normal.         Thought Content: Thought content normal.         Judgment: Judgment normal.         ED Course        Procedures                           Results for orders placed or performed during the hospital encounter of 07/16/20 (from the past 24 hour(s))   Comprehensive metabolic panel   Result Value Ref Range    Sodium 134 133 - 144 mmol/L    Potassium 4.6 3.4 - 5.3 mmol/L    Chloride 106 94 - 109 mmol/L    Carbon Dioxide 25 20 - 32 mmol/L    Anion Gap 4 3 - 14 mmol/L    Glucose 93 70 - 99 mg/dL    Urea Nitrogen 22 7 - 30 mg/dL    Creatinine 1.49 (H) 0.66 - 1.25 mg/dL    GFR Estimate 51 (L) >60 mL/min/[1.73_m2]    GFR Estimate If Black 59 (L) >60 mL/min/[1.73_m2]    Calcium 8.2 (L) 8.5 - 10.1 mg/dL    Bilirubin Total 1.0 0.2 - 1.3 mg/dL    Albumin 1.7 (L) 3.4 - 5.0 g/dL    Protein Total 6.2 (L) 6.8 - 8.8 g/dL    Alkaline Phosphatase 238 (H) 40 - 150 U/L    ALT 27 0 - 70 U/L    AST Canceled, Test credited 0 - 45 U/L   Lipase   Result Value Ref Range    Lipase 30 (L) 73 - 393 U/L   Lactic acid whole blood   Result  Value Ref Range    Lactic Acid 0.9 0.7 - 2.0 mmol/L   Troponin I   Result Value Ref Range    Troponin I ES <0.015 0.000 - 0.045 ug/L   Ammonia   Result Value Ref Range    Ammonia Canceled, Test credited 10 - 50 umol/L   TSH   Result Value Ref Range    TSH 2.02 0.40 - 4.00 mU/L   CBC with platelets differential   Result Value Ref Range    WBC 6.7 4.0 - 11.0 10e9/L    RBC Count 4.01 (L) 4.4 - 5.9 10e12/L    Hemoglobin 13.1 (L) 13.3 - 17.7 g/dL    Hematocrit 38.9 (L) 40.0 - 53.0 %    MCV 97 78 - 100 fl    MCH 32.7 26.5 - 33.0 pg    MCHC 33.7 31.5 - 36.5 g/dL    RDW 12.8 10.0 - 15.0 %    Platelet Count 96 (L) 150 - 450 10e9/L    Diff Method Automated Method     % Neutrophils 77.4 %    % Lymphocytes 12.8 %    % Monocytes 7.7 %    % Eosinophils 1.2 %    % Basophils 0.6 %    % Immature Granulocytes 0.3 %    Nucleated RBCs 0 0 /100    Absolute Neutrophil 5.2 1.6 - 8.3 10e9/L    Absolute Lymphocytes 0.9 0.8 - 5.3 10e9/L    Absolute Monocytes 0.5 0.0 - 1.3 10e9/L    Absolute Eosinophils 0.1 0.0 - 0.7 10e9/L    Absolute Basophils 0.0 0.0 - 0.2 10e9/L    Abs Immature Granulocytes 0.0 0 - 0.4 10e9/L    Absolute Nucleated RBC 0.0    EKG 12-lead, tracing only   Result Value Ref Range    Interpretation ECG Click View Image link to view waveform and result    XR Chest Port 1 View    Narrative    Exam: XR CHEST PORT 1 VW, 7/16/2020 1:43 PM    Indication: Altered mental status    Comparison: 7/30/2018    Findings:   Heart and pulmonary vasculature within normal limits. Lungs are clear.  Pleural spaces are clear. Upper abdomen unremarkable.      Impression    Impression: No acute cardiopulmonary disease identified.    CHARLEE ALAN MD   CT Head w/o Contrast    Narrative    CT HEAD W/O CONTRAST 7/16/2020 2:38 PM    History: Altered mental status     Comparison: MRI 10/22/2019    Technique: Using multidetector thin collimation helical acquisition  technique, axial, coronal and sagittal CT images from the skull base  to the vertex were  obtained without intravenous contrast.    Findings: There is no intracranial hemorrhage, mass effect, or midline  shift. Gray/white matter differentiation in both cerebral hemispheres  is preserved. Ventricles are proportionate to the cerebral sulci. The  basal cisterns are clear. Extensive leukoaraiosis.    The bony calvaria and the bones of the skull base are normal. The  visualized portions of the paranasal sinuses and mastoid air cells are  clear.       Impression    Impression:  1. No acute intracranial pathology.   2. Extensive leukoaraiosis.         ROSELYN PRESLEY MD   CTA Head Neck with Contrast    Narrative    CTA  HEAD NECK WITH CONTRAST 7/16/2020 2:38 PM    CT angiogram of the neck   CT angiogram of the base of the brain with contrast  Reconstruction by the Radiologist on the 3D workstation    Provided History:  Altered mental status    Comparison:  No prior similar studies.      Technique:  HEAD and NECK CTA: During rapid bolus intravenous injection of  nonionic contrast material, axial images were obtained using thin  collimation multidetector helical technique from the base of the upper  aortic arch through the [place]. This CT angiogram data was  reconstructed at thin intervals with mild overlap. Images were sent to  the [employer] workstation, and 3D reconstructions were obtained. The  axial source images, multiplanar reformations, 3D reconstructions in  both maximum intensity projection display and volume rendered models  were reviewed, with reconstructions performed by the technologist and  the radiologist.    Contrast: iopamidol (ISOVUE-370) solution 75 mL    Findings:    Head CTA demonstrates a 2 x 2 mm anterosuperiorly pointing saccular  aneurysm off the left supraclinoid ICA. The remaining major  intracranial arteries are patent with no evidence of stenosis or  aneurysm. Anterior communicating artery is patent. Posterior  communicating arteries are not well seen.    Neck CTA demonstrates  no stenosis of the major cervical arteries. The  origins of the great vessels from the aortic arch are patent. The  normal distal right internal carotid artery measures 4.1 mm. The  normal distal left internal carotid artery measures 3.6 mm.     No mass within the visualized portions of the cervical soft tissues or  lung apices.       Impression    Impression:    1. Head CTA demonstrates a 2 x 2 mm anterosuperiorly pointing saccular  aneurysm off the left supraclinoid ICA. The remaining major  intracranial arteries are patent with no evidence of stenosis or  aneurysm.  2. Neck CTA demonstrates no stenosis of the major cervical arteries.    ROSELYN PRESLEY MD   Ammonia (on ice)   Result Value Ref Range    Ammonia <10 (L) 10 - 50 umol/L     Medications   0.9% sodium chloride BOLUS (500 mLs Intravenous New Bag 7/16/20 1406)   iopamidol (ISOVUE-370) solution 75 mL (75 mLs Intravenous Given 7/16/20 1415)   sodium chloride (PF) 0.9% PF flush 90 mL (90 mLs Intravenous Given 7/16/20 1415)             Assessments & Plan (with Medical Decision Making)   60-year-old man brought in by ambulance due to confusion.  Differential diagnosis: Dehydration, electrode abnormalities, ischemic stroke, hemorrhagic stroke, UTI, pneumonia, hepatic encephalopathy, unlikely meningitis, unlikely ACS.    After thorough history and physical examination patient appears to be no acute distress.  When I spoke to the patient he exhibited no signs of altered mental status.  I used a small  and  was able to understand the patient and interpret his answers to my questions, which made sense.  It is unclear to me what the etiology of the confusion or altered mental status was prior to his arriving to the emergency department.  Did review his medical records and it appears that he was at [facility] emergency department earlier today with significantly elevated blood pressure, that was reduced successfully with medications.  Currently  his blood pressure is somewhat elevated, however, nowhere close to what it was earlier this morning.    Patient's laboratory studies returned without any evidence of leukocytosis, WBC is normal at 6700. There is no evidence of anemia, hemoglobin is normal at 13.1.  Electrolytes show evidence of dehydration, creatinine is slightly elevated 1.49.  Troponin is undetectable and EKG showed no acute ischemic changes; I do not suspect the patient has acute coronary syndrome.  TSH is normal at 2.02.  I reviewed his chest x-ray and I read the radiology report; no evidence of pneumonia.  I also reviewed his CT of the head and I read the radiology report; no evidence of intracranial hemorrhage or large ischemic stroke.  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  Case was discussed with neuro interventional fellow, Dr. Orozco, who recommended no emergent intervention at this time, but he will arrange for the patient to schedule a clinic visit and establish care with neurosurgery.  Patient was informed about these findings.  He is awake, alert, oriented and at this time exhibits no signs of confusion.  Gait is normal.  It is unclear to me what the etiology of his symptoms was prior to arrival, however, I did not notice any confusion throughout his emergency department stay.  At this time he is stable for discharge.  He agrees with this plan and he agrees to return if his symptoms return or he develops a headache.      I have reviewed the nursing notes.    I have reviewed the findings, diagnosis, plan and need for follow up with the patient.    New Prescriptions    No medications on file       Final diagnoses:   Confusion   Intracranial aneurysm   Hypertension, unspecified type     I, Dilma Gonzalez, am serving as a trained medical scribe to document services personally  performed by Lonnie Dean MD, based on the provider's statements to me.  This document has been checked and approved by the attending provider.     I, Lonnie Dean MD, was physically present and have reviewed and verified the accuracy of this note documented by Dilma Gonzalez, medical scribe.    7/16/2020   [facility], [place], EMERGENCY DEPARTMENT     Marlys Dean MD  07/16/20 1547

## 2020-07-16 NOTE — DISCHARGE INSTRUCTIONS
Please make an appointment to follow up with Neurosurgery Clinic (phone: [phone]) as soon as possible for further evaluation and recommendations.  Today we found that you have 2 mm x 2 mm aneurysm in your left, supraclinoid internal carotid artery.  It is very important that you establish care with neurosurgery clinic and follow their recommendations.  If you develop headache or symptoms of confusion return please come back to the emergency department.      Also, please follow-up with your primary care clinic as well for management of your high blood pressure.

## 2020-07-16 NOTE — ED NOTES
[facility], [place]   ED Nurse to Floor Handoff     Wilfredo Yoon is a 60 year old male who speaks [place] and lives alone,  in a home  They arrived in the ED by ambulance from home    ED Chief Complaint: Altered Mental Status    ED Dx;   Final diagnoses:   Confusion   Intracranial aneurysm   Hypertension, unspecified type         Needed?: No    Allergies: No Known Allergies.  Past Medical Hx:   Past Medical History:   Diagnosis Date     Cryoglobulinemia (H)      Glomerulonephritis      Hepatitis B      Hypertension      Surgical complication       Baseline Mental status: WDL  Current Mental Status changes: at basesline    Infection present or suspected this encounter: no  Sepsis suspected: No  Isolation type: No active isolations     Activity level - Baseline/Home:  Independent  Activity Level - Current:   Stand with Assist    Bariatric equipment needed?: No    In the ED these meds were given:   Medications   0.9% sodium chloride BOLUS (0 mLs Intravenous Stopped 7/16/20 1545)   iopamidol (ISOVUE-370) solution 75 mL (75 mLs Intravenous Given 7/16/20 1415)   sodium chloride (PF) 0.9% PF flush 90 mL (90 mLs Intravenous Given 7/16/20 1415)       Drips running?  No    Home pump  No    Current LDAs  Peripheral IV 07/16/20 Right Upper forearm (Active)   Site Assessment WDL 07/16/20 1739   Line Status Saline locked 07/16/20 1739   Number of days: 0       Incision/Surgical Site 06/16/20 Right Abdomen (Active)   Number of days: 30       Labs results:   Labs Ordered and Resulted from Time of ED Arrival Up to the Time of Departure from the ED   COMPREHENSIVE METABOLIC PANEL - Abnormal; Notable for the following components:       Result Value    Creatinine 1.49 (*)     GFR Estimate 51 (*)     GFR Estimate If Black 59 (*)     Calcium 8.2 (*)     Albumin 1.7 (*)     Protein Total 6.2 (*)     Alkaline Phosphatase 238 (*)     All other components within normal limits   LIPASE -  Abnormal; Notable for the following components:    Lipase 30 (*)     All other components within normal limits   CBC WITH PLATELETS DIFFERENTIAL - Abnormal; Notable for the following components:    RBC Count 4.01 (*)     Hemoglobin 13.1 (*)     Hematocrit 38.9 (*)     Platelet Count 96 (*)     All other components within normal limits   AMMONIA - Abnormal; Notable for the following components:    Ammonia <10 (*)     All other components within normal limits   LACTIC ACID WHOLE BLOOD   TROPONIN I   AMMONIA   TSH   COVID-19 VIRUS (CORONAVIRUS) BY PCR   GLUCOSE BY METER   ROUTINE UA WITH MICROSCOPIC   PERIPHERAL IV CATHETER   CARDIAC CONTINUOUS MONITORING   NOTIFY       Imaging Studies:   Recent Results (from the past 24 hour(s))   XR Chest Port 1 View    Narrative    Exam: XR CHEST PORT 1 VW, 7/16/2020 1:43 PM    Indication: Altered mental status    Comparison: 7/30/2018    Findings:   Heart and pulmonary vasculature within normal limits. Lungs are clear.  Pleural spaces are clear. Upper abdomen unremarkable.      Impression    Impression: No acute cardiopulmonary disease identified.    CHARLEE ALAN MD   CT Head w/o Contrast    Narrative    CT HEAD W/O CONTRAST 7/16/2020 2:38 PM    History: Altered mental status     Comparison: MRI 10/22/2019    Technique: Using multidetector thin collimation helical acquisition  technique, axial, coronal and sagittal CT images from the skull base  to the vertex were obtained without intravenous contrast.    Findings: There is no intracranial hemorrhage, mass effect, or midline  shift. Gray/white matter differentiation in both cerebral hemispheres  is preserved. Ventricles are proportionate to the cerebral sulci. The  basal cisterns are clear. Extensive leukoaraiosis.    The bony calvaria and the bones of the skull base are normal. The  visualized portions of the paranasal sinuses and mastoid air cells are  clear.       Impression    Impression:  1. No acute intracranial pathology.    2. Extensive leukoaraiosis.         ROSELYN PRESLEY MD   CTA Head Neck with Contrast    Narrative    CTA  HEAD NECK WITH CONTRAST 7/16/2020 2:38 PM    CT angiogram of the neck   CT angiogram of the base of the brain with contrast  Reconstruction by the Radiologist on the 3D workstation    Provided History:  Altered mental status    Comparison:  No prior similar studies.      Technique:  HEAD and NECK CTA: During rapid bolus intravenous injection of  nonionic contrast material, axial images were obtained using thin  collimation multidetector helical technique from the base of the upper  aortic arch through the [place] of [place]. This CT angiogram data was  reconstructed at thin intervals with mild overlap. Images were sent to  the [employer] workstation, and 3D reconstructions were obtained. The  axial source images, multiplanar reformations, 3D reconstructions in  both maximum intensity projection display and volume rendered models  were reviewed, with reconstructions performed by the technologist and  the radiologist.    Contrast: iopamidol (ISOVUE-370) solution 75 mL    Findings:    Head CTA demonstrates a 2 x 2 mm anterosuperiorly pointing saccular  aneurysm off the left supraclinoid ICA. The remaining major  intracranial arteries are patent with no evidence of stenosis or  aneurysm. Anterior communicating artery is patent. Posterior  communicating arteries are not well seen.    Neck CTA demonstrates no stenosis of the major cervical arteries. The  origins of the great vessels from the aortic arch are patent. The  normal distal right internal carotid artery measures 4.1 mm. The  normal distal left internal carotid artery measures 3.6 mm.     No mass within the visualized portions of the cervical soft tissues or  lung apices.       Impression    Impression:    1. Head CTA demonstrates a 2 x 2 mm anterosuperiorly pointing saccular  aneurysm off the left supraclinoid ICA. The remaining major  intracranial arteries are  "patent with no evidence of stenosis or  aneurysm.  2. Neck CTA demonstrates no stenosis of the major cervical arteries.    ROSELYN PRESLEY MD       Recent vital signs:   BP (!) 128/94   Pulse 77   Resp 16   Ht 1.676 m (5' 6\")   SpO2 99%   BMI 19.95 kg/m      [place] Coma Scale Score: 15 (07/16/20 1629)       Cardiac Rhythm: Normal Sinus  Pt needs tele? No  Skin/wound Issues: None    Code Status: Full Code    Pain control: good    Nausea control: good    Abnormal labs/tests/findings requiring intervention:     Family present during ED course? No   Family Comments/Social Situation comments:      Tasks needing completion: None    Ivy Marie, RN  [phone] [facility]      "

## 2020-07-16 NOTE — TELEPHONE ENCOUNTER
Called pt using  Services.  No answer.  Mailbox full.  Will try later.    Joana NOLEN LPN  Hepatology Clinic      [facility] Call Center    Phone Message    May a detailed message be left on voicemail: yes     Reason for Call: Other: Merly called in from [facility] and the patient keeps going into the ED for a   Paracentesis she stated that he is not that full but if someone can follow up with the patient to let him know he has a order and that he comes here for those. Please follow up with the patient to discuss. Thank you.    Action Taken: Message routed to:  [facility] ([facility] hep    Travel Screening: Not Applicable

## 2020-07-16 NOTE — ED NOTES
Pt to discharge. RN ensured pt has safe ride home and keys to get in. Pt states that his house keys are in his car. He does not have his car keys but he states he can get into his car via key code. Pt stated he didn't want to call a ride and that he would use a bus or light rail. Conversation completed with [facility] .

## 2020-07-16 NOTE — ED NOTES
5:53 PM this patient has had 2 ED visits today 1 at [facility] and he was discharged in a second here at the [place].  He was seen by Dr. Sanz.  Work-up at both facilities was negative except for hypertension at [facility] which was treated by giving him his home medications here he had labs repeated and a head CT that showed a non-ruptured cerebral aneurysm he was discharged and heading towards the light rail and apparently had fallen and was found on the ground.  There is no loss of consciousness he did not sustain any injury he appears to me to be underweight and deconditioned.  He has a history of chronic hepatitis C.  His ammonia was negative.  He did not injure his head or any other area with this fall.  I do not feel I can safely discharge him at this time we repeated a blood sugar and an EKG these are normal he will be admitted to ED observation for PT OT social work consult.  The goal is discharge home tomorrow assuming he is safe and able to ambulate.  I do not think either of the 2 ED visits today has missed anything medically.     Alfonso Salinas MD  07/16/20 0938

## 2020-07-16 NOTE — ED AVS SNAPSHOT
[facility], [place], Emergency Department  [address] [place] [id number]  Phone:  [phone]                                    Wilfredo Yoon   MRN: [id number]    Department:  [facility], Emergency Department   Date of Visit:  7/16/2020           After Visit Summary Signature Page    I have received my discharge instructions, and my questions have been answered. I have discussed any challenges I see with this plan with the nurse or doctor.    ..........................................................................................................................................  Patient/Patient Representative Signature      ..........................................................................................................................................  Patient Representative Print Name and Relationship to Patient    ..................................................               ................................................  Date                                   Time    ..........................................................................................................................................  Reviewed by Signature/Title    ...................................................              ..............................................  Date                                               Time          22EPIC Rev 08/18

## 2020-07-16 NOTE — ED AVS SNAPSHOT
Emergency Department  [address] [place] [address]  Phone:  [phone]  Fax:  [phone]                                    Wilfredo Yoon   MRN: [id number]    Department:   Emergency Department   Date of Visit:  7/16/2020           After Visit Summary Signature Page    I have received my discharge instructions, and my questions have been answered. I have discussed any challenges I see with this plan with the nurse or doctor.    ..........................................................................................................................................  Patient/Patient Representative Signature      ..........................................................................................................................................  Patient Representative Print Name and Relationship to Patient    ..................................................               ................................................  Date                                   Time    ..........................................................................................................................................  Reviewed by Signature/Title    ...................................................              ..............................................  Date                                               Time          22EPIC Rev 08/18

## 2020-07-16 NOTE — ED TRIAGE NOTES
Pt BIBA c/o AMS and lethargic. Pt friend was sitting with pt when EMS arrive, states he was at an appointment today. BG 97. Hx hep B. Cambodian speaking, but knows some English. When talking with Niuean  pt is mumbling and not talking clear.

## 2020-07-16 NOTE — LETTER
Transition Communication Hand-off for Care Transitions to Next Level of Care Provider    Name: Wilfredo Yoon  : 1960  MRN #: [id number]  Primary Care Provider: [facility]     Primary Clinic: [address]  [facility] [address]     Reason for Hospitalization:  Intracranial aneurysm [I67.1]  Confusion [R41.0]  Hypertension, unspecified type [I10]  Admit Date/Time: 2020  1:01 PM  Discharge Date: 2020  Payor Source: Payor: [employer] / Plan: [employer] [place] / Product Type: HMO /              Reason for Communication Hand-off Referral:  Notification of the discharge plan.    Discharge Plan:    Social Work Services Discharge Note      Patient Name:  Wilfredo Yoon     Anticipated Discharge Date:  2020     Discharge Disposition:   [facility]     Following MD:  Facility Assignment     Pre-Admission Screening (PAS) online form has been completed.  The Level of Care (LOC) is:  Determined  Confirmation Code is:  [id number].  Patient/caregiver informed of referral to [facility] for Pre-Admission Screening for skilled nursing facility (SNF) placement and to expect a phone call post discharge from SNF.     Additional Services/Equipment Arranged:  Dr. Sandy has confirmed readiness for discharge. Admissions (Eugenie) at [facility] has confirmed acceptance for admit.  has arranged for [facility] to provide stretcher transport (due to pt's inability to maintain an upright seated position) today at 3:30pm.   has completed a physicians certification for transportation.     Patient / Family response to discharge plan:  Pt did not offer a response.  Pt's niece (Fathia) and sister (Sofi) voice understanding of the discharge plan and agreement with the discharge plan.     Persons notified of above discharge plan:  Pt, niece (Fathia), sister (Sofi), 6A nursing and Dr. Sandy.     Staff Discharge Instructions:  Please fax discharge  orders and signed hard scripts for any controlled substances (MAHESH completed this task).  Please print a packet and send with patient.      CTS Handoff completed:  YES     Medicare Notice of Rights provided to the patient/family:  NO, as per Admissions Facesheet, pt is not on Medicare.     Additional information:  Pt was assessed and declined for admit to [facility] as per Admissions (Joslyn) due to no appropriate bed.  Pt was assessed and approved for admit to both [facility] and [facility].  MAHESH reviewed Medicare Star ratings for each facility and each category with David (via  Phone call). David selected [facility] on pt's behalf again stressing that it is important to have pt placed closest to his home.  MAHESH reviewed [facility]'s visiting policy's (as stated by Eugenie in Admissions) with both Fathia (no inside visitors, pt quarantined for the first 14 days of admit, after that, visitor's can scheduled outside visits only, masks must be worn and social distancing practiced). MAHESH also informed David that family can bring pt food but must call the facility so that a staff member can come out to get the food (as stated by Eugenie in Admissions). MAHESH met with pt and sister (Sofi) and utilized on line . MAHESH updated in regards to the discharge plan, visiting guidelines at [facility] and bringing in food. Sofi voiced concern as pt is a one to one feeder and she states that she has been assisting with this task. MAHESH informed Sofi that staff at [facility] will feed pt and this will be written on the discharge orders.  ..     JOHNNY Rodriguez  Social Work, 6A  Phone:  [phone]  Pager:  [phone]  8/4/2020

## 2020-07-17 ENCOUNTER — APPOINTMENT (OUTPATIENT)
Dept: CT IMAGING | Facility: CLINIC | Age: 60
End: 2020-07-17
Payer: COMMERCIAL

## 2020-07-17 ENCOUNTER — APPOINTMENT (OUTPATIENT)
Dept: MRI IMAGING | Facility: CLINIC | Age: 60
End: 2020-07-17
Attending: NURSE PRACTITIONER
Payer: COMMERCIAL

## 2020-07-17 PROBLEM — G93.9 BRAIN LESION: Status: ACTIVE | Noted: 2020-07-17

## 2020-07-17 LAB
ALBUMIN SERPL-MCNC: 1.4 G/DL (ref 3.4–5)
ALBUMIN SERPL-MCNC: 1.6 G/DL (ref 3.4–5)
ALBUMIN UR-MCNC: 300 MG/DL
ALP SERPL-CCNC: 214 U/L (ref 40–150)
ALT SERPL W P-5'-P-CCNC: 21 U/L (ref 0–70)
AMMONIA PLAS-SCNC: <10 UMOL/L (ref 10–50)
AMORPH CRY #/AREA URNS HPF: ABNORMAL /HPF
AMPHETAMINES UR QL SCN: NEGATIVE
ANION GAP SERPL CALCULATED.3IONS-SCNC: 6 MMOL/L (ref 3–14)
APPEARANCE UR: CLEAR
AST SERPL W P-5'-P-CCNC: 28 U/L (ref 0–45)
BARBITURATES UR QL: NEGATIVE
BASE DEFICIT BLDA-SCNC: 0.7 MMOL/L
BENZODIAZ UR QL: NEGATIVE
BILIRUB SERPL-MCNC: 0.4 MG/DL (ref 0.2–1.3)
BILIRUB UR QL STRIP: NEGATIVE
BUN SERPL-MCNC: 23 MG/DL (ref 7–30)
CALCIUM SERPL-MCNC: 8 MG/DL (ref 8.5–10.1)
CANNABINOIDS UR QL SCN: NEGATIVE
CHLORIDE SERPL-SCNC: 110 MMOL/L (ref 94–109)
CO2 SERPL-SCNC: 24 MMOL/L (ref 20–32)
COCAINE UR QL: NEGATIVE
COLOR UR AUTO: YELLOW
CREAT SERPL-MCNC: 1.52 MG/DL (ref 0.66–1.25)
CRP SERPL-MCNC: 84 MG/L (ref 0–8)
ERYTHROCYTE [DISTWIDTH] IN BLOOD BY AUTOMATED COUNT: 12.9 % (ref 10–15)
ERYTHROCYTE [SEDIMENTATION RATE] IN BLOOD BY WESTERGREN METHOD: 96 MM/H (ref 0–20)
ETHANOL UR QL SCN: NEGATIVE
GFR SERPL CREATININE-BSD FRML MDRD: 49 ML/MIN/{1.73_M2}
GLUCOSE BLDC GLUCOMTR-MCNC: 82 MG/DL (ref 70–99)
GLUCOSE BLDC GLUCOMTR-MCNC: 83 MG/DL (ref 70–99)
GLUCOSE SERPL-MCNC: 87 MG/DL (ref 70–99)
GLUCOSE UR STRIP-MCNC: NEGATIVE MG/DL
GRAN CASTS #/AREA URNS LPF: 1 /LPF
HCO3 BLD-SCNC: 24 MMOL/L (ref 21–28)
HCT VFR BLD AUTO: 34.8 % (ref 40–53)
HGB BLD-MCNC: 11.4 G/DL (ref 13.3–17.7)
HGB UR QL STRIP: ABNORMAL
HYALINE CASTS #/AREA URNS LPF: 1 /LPF (ref 0–2)
KETONES UR STRIP-MCNC: NEGATIVE MG/DL
LDH SERPL L TO P-CCNC: 250 U/L (ref 85–227)
LEUKOCYTE ESTERASE UR QL STRIP: NEGATIVE
MAGNESIUM SERPL-MCNC: 1.8 MG/DL (ref 1.6–2.3)
MCH RBC QN AUTO: 32 PG (ref 26.5–33)
MCHC RBC AUTO-ENTMCNC: 32.8 G/DL (ref 31.5–36.5)
MCV RBC AUTO: 98 FL (ref 78–100)
MISCELLANEOUS TEST: NORMAL
MUCOUS THREADS #/AREA URNS LPF: PRESENT /LPF
NITRATE UR QL: NEGATIVE
O2/TOTAL GAS SETTING VFR VENT: 21 %
OPIATES UR QL SCN: NEGATIVE
PCO2 BLD: 37 MM HG (ref 35–45)
PH BLD: 7.42 PH (ref 7.35–7.45)
PH UR STRIP: 6.5 PH (ref 5–7)
PLATELET # BLD AUTO: 117 10E9/L (ref 150–450)
PO2 BLD: 92 MM HG (ref 80–105)
POTASSIUM SERPL-SCNC: 3.6 MMOL/L (ref 3.4–5.3)
PROT SERPL-MCNC: 5.4 G/DL (ref 6.8–8.8)
RADIOLOGIST FLAGS: ABNORMAL
RBC # BLD AUTO: 3.56 10E12/L (ref 4.4–5.9)
RBC #/AREA URNS AUTO: 14 /HPF (ref 0–2)
SARS-COV-2 RNA SPEC QL NAA+PROBE: NOT DETECTED
SODIUM SERPL-SCNC: 139 MMOL/L (ref 133–144)
SOURCE: ABNORMAL
SP GR UR STRIP: 1.01 (ref 1–1.03)
SPECIMEN SOURCE: NORMAL
SQUAMOUS #/AREA URNS AUTO: <1 /HPF (ref 0–1)
TRANS CELLS #/AREA URNS HPF: <1 /HPF (ref 0–1)
UROBILINOGEN UR STRIP-MCNC: 2 MG/DL (ref 0–2)
VIT B12 SERPL-MCNC: 308 PG/ML (ref 193–986)
WBC # BLD AUTO: 6.1 10E9/L (ref 4–11)
WBC #/AREA URNS AUTO: 5 /HPF (ref 0–5)

## 2020-07-17 PROCEDURE — 70551 MRI BRAIN STEM W/O DYE: CPT

## 2020-07-17 PROCEDURE — 40000275 ZZH STATISTIC RCP TIME EA 10 MIN

## 2020-07-17 PROCEDURE — 40000141 ZZH STATISTIC PERIPHERAL IV START W/O US GUIDANCE

## 2020-07-17 PROCEDURE — 86481 TB AG RESPONSE T-CELL SUSP: CPT | Performed by: STUDENT IN AN ORGANIZED HEALTH CARE EDUCATION/TRAINING PROGRAM

## 2020-07-17 PROCEDURE — 82040 ASSAY OF SERUM ALBUMIN: CPT | Performed by: NURSE PRACTITIONER

## 2020-07-17 PROCEDURE — G0378 HOSPITAL OBSERVATION PER HR: HCPCS

## 2020-07-17 PROCEDURE — 80053 COMPREHEN METABOLIC PANEL: CPT | Performed by: NURSE PRACTITIONER

## 2020-07-17 PROCEDURE — 70498 CT ANGIOGRAPHY NECK: CPT

## 2020-07-17 PROCEDURE — 96376 TX/PRO/DX INJ SAME DRUG ADON: CPT

## 2020-07-17 PROCEDURE — 85027 COMPLETE CBC AUTOMATED: CPT | Performed by: NURSE PRACTITIONER

## 2020-07-17 PROCEDURE — 82140 ASSAY OF AMMONIA: CPT | Performed by: NURSE PRACTITIONER

## 2020-07-17 PROCEDURE — 70450 CT HEAD/BRAIN W/O DYE: CPT

## 2020-07-17 PROCEDURE — 80320 DRUG SCREEN QUANTALCOHOLS: CPT | Performed by: NURSE PRACTITIONER

## 2020-07-17 PROCEDURE — 87040 BLOOD CULTURE FOR BACTERIA: CPT | Performed by: NURSE PRACTITIONER

## 2020-07-17 PROCEDURE — 86255 FLUORESCENT ANTIBODY SCREEN: CPT | Performed by: STUDENT IN AN ORGANIZED HEALTH CARE EDUCATION/TRAINING PROGRAM

## 2020-07-17 PROCEDURE — 36415 COLL VENOUS BLD VENIPUNCTURE: CPT | Performed by: NURSE PRACTITIONER

## 2020-07-17 PROCEDURE — 00000146 ZZHCL STATISTIC GLUCOSE BY METER IP

## 2020-07-17 PROCEDURE — 82607 VITAMIN B-12: CPT | Performed by: PHYSICIAN ASSISTANT

## 2020-07-17 PROCEDURE — 83735 ASSAY OF MAGNESIUM: CPT | Performed by: NURSE PRACTITIONER

## 2020-07-17 PROCEDURE — 82803 BLOOD GASES ANY COMBINATION: CPT | Performed by: NURSE PRACTITIONER

## 2020-07-17 PROCEDURE — 81001 URINALYSIS AUTO W/SCOPE: CPT | Performed by: EMERGENCY MEDICINE

## 2020-07-17 PROCEDURE — 83615 LACTATE (LD) (LDH) ENZYME: CPT | Performed by: NURSE PRACTITIONER

## 2020-07-17 PROCEDURE — 84999 UNLISTED CHEMISTRY PROCEDURE: CPT | Performed by: STUDENT IN AN ORGANIZED HEALTH CARE EDUCATION/TRAINING PROGRAM

## 2020-07-17 PROCEDURE — 36415 COLL VENOUS BLD VENIPUNCTURE: CPT | Performed by: STUDENT IN AN ORGANIZED HEALTH CARE EDUCATION/TRAINING PROGRAM

## 2020-07-17 PROCEDURE — 25000128 H RX IP 250 OP 636: Performed by: EMERGENCY MEDICINE

## 2020-07-17 PROCEDURE — 36600 WITHDRAWAL OF ARTERIAL BLOOD: CPT

## 2020-07-17 PROCEDURE — 40000740 ZZH STATISTIC STROKE CODE W/ ACCESS

## 2020-07-17 PROCEDURE — 86140 C-REACTIVE PROTEIN: CPT | Performed by: NURSE PRACTITIONER

## 2020-07-17 PROCEDURE — 99217 ZZC OBSERVATION CARE DISCHARGE: CPT | Mod: Z6 | Performed by: NURSE PRACTITIONER

## 2020-07-17 PROCEDURE — 25000128 H RX IP 250 OP 636: Performed by: NURSE PRACTITIONER

## 2020-07-17 PROCEDURE — 80307 DRUG TEST PRSMV CHEM ANLYZR: CPT | Performed by: NURSE PRACTITIONER

## 2020-07-17 PROCEDURE — 12000001 ZZH R&B MED SURG/OB UMMC

## 2020-07-17 PROCEDURE — 87086 URINE CULTURE/COLONY COUNT: CPT | Performed by: NURSE PRACTITIONER

## 2020-07-17 PROCEDURE — 85652 RBC SED RATE AUTOMATED: CPT | Performed by: NURSE PRACTITIONER

## 2020-07-17 PROCEDURE — 25000132 ZZH RX MED GY IP 250 OP 250 PS 637: Performed by: NURSE PRACTITIONER

## 2020-07-17 RX ORDER — IOPAMIDOL 755 MG/ML
75 INJECTION, SOLUTION INTRAVASCULAR ONCE
Status: COMPLETED | OUTPATIENT
Start: 2020-07-17 | End: 2020-07-17

## 2020-07-17 RX ADMIN — ENTECAVIR 1 MG: 1 TABLET ORAL at 09:01

## 2020-07-17 RX ADMIN — LISINOPRIL 40 MG: 40 TABLET ORAL at 21:24

## 2020-07-17 RX ADMIN — IOPAMIDOL 75 ML: 755 INJECTION, SOLUTION INTRAVENOUS at 11:08

## 2020-07-17 RX ADMIN — HYDRALAZINE HYDROCHLORIDE 10 MG: 20 INJECTION, SOLUTION INTRAMUSCULAR; INTRAVENOUS at 22:32

## 2020-07-17 RX ADMIN — HYDRALAZINE HYDROCHLORIDE 10 MG: 20 INJECTION, SOLUTION INTRAMUSCULAR; INTRAVENOUS at 01:36

## 2020-07-17 ASSESSMENT — ACTIVITIES OF DAILY LIVING (ADL): ADLS_ACUITY_SCORE: 20

## 2020-07-17 NOTE — DISCHARGE SUMMARY
Patient ID:  Wilfredo Yoon  MRN: [id number]  60 year old  YOB: 1960    Observation Admit Date: 07/16/2020    ED Admitting Attending: Dr. Alfonso Salinas MD    Transfer Date and Time: July 17, 2020 at 12:59 PM     Transferring Observation Provider: Jaye Holt, NP, APRN CNP    Admission Diagnoses:     1. Confusion    2. Intracranial aneurysm    3. Hypertension, unspecified type        Transfer Diagnoses:    ##AMS    Emergency Department and Observation Course:   Wilfredo Yoon is a 60 year old male with history of hepatitis B, liver cirrhosis, ascites, hypertension, and HLD who was brought in via ambulance for a fall. He was seen in the ED 3 times yesterday. He was initially seen at [facility] due to abdominal pain and HTN. Blood pressure was treated with antihypertensives and he was discharged. He then presented to the ED at [facility] with AMS. Mental status cleared, CT Head w/o contrast and CTA Head and Neck were unremarkable for acute process. He was discharged. He was then walking to the blue line and fell. No injuries reported from fall. He again came to the ED. He was admitted to observation for PT/OT and possible placement. See H & P for more details.     This am the patient became increasingly altered, not answering questions.  Throughout the morning he developed worsening left sided weakness and left sided facial droop. Not answering questions verbally. Neurology evaluated him and recommended MRI. Stroke code was called. Repeat CT Head, CTA Head and Neck were unremarkable. MRI Brain pending. Per Neurology, unlikely stroke.    At this time the patient has failed observation management due to AMS and will be transferred to inpatient status.    Diagnostic and therapeutic paracentesis ordered but deferred until MRI complete. Consider LP given confusion.    Per Neurology, do not believe this is consistent with stroke, mostly likely toxic metabolic encephalopathy.  Infection can not be ruled out.  Blood cultures sent and pending. UDS ordered. Will add on UC. Could also be encephalitis. Medicine team to follow-up on MRI and consider LP. PRESS is on the differential, will see what MRI shows.      Consults: Neurology     DATA:  Results for orders placed or performed during the hospital encounter of 07/16/20 (from the past 48 hour(s))   Comprehensive metabolic panel   Result Value Ref Range    Sodium 134 133 - 144 mmol/L    Potassium 4.6 3.4 - 5.3 mmol/L    Chloride 106 94 - 109 mmol/L    Carbon Dioxide 25 20 - 32 mmol/L    Anion Gap 4 3 - 14 mmol/L    Glucose 93 70 - 99 mg/dL    Urea Nitrogen 22 7 - 30 mg/dL    Creatinine 1.49 (H) 0.66 - 1.25 mg/dL    GFR Estimate 51 (L) >60 mL/min/[1.73_m2]    GFR Estimate If Black 59 (L) >60 mL/min/[1.73_m2]    Calcium 8.2 (L) 8.5 - 10.1 mg/dL    Bilirubin Total 1.0 0.2 - 1.3 mg/dL    Albumin 1.7 (L) 3.4 - 5.0 g/dL    Protein Total 6.2 (L) 6.8 - 8.8 g/dL    Alkaline Phosphatase 238 (H) 40 - 150 U/L    ALT 27 0 - 70 U/L    AST Canceled, Test credited 0 - 45 U/L   Lipase   Result Value Ref Range    Lipase 30 (L) 73 - 393 U/L   Lactic acid whole blood   Result Value Ref Range    Lactic Acid 0.9 0.7 - 2.0 mmol/L   Troponin I   Result Value Ref Range    Troponin I ES <0.015 0.000 - 0.045 ug/L   Ammonia   Result Value Ref Range    Ammonia Canceled, Test credited 10 - 50 umol/L   TSH   Result Value Ref Range    TSH 2.02 0.40 - 4.00 mU/L   CBC with platelets differential   Result Value Ref Range    WBC 6.7 4.0 - 11.0 10e9/L    RBC Count 4.01 (L) 4.4 - 5.9 10e12/L    Hemoglobin 13.1 (L) 13.3 - 17.7 g/dL    Hematocrit 38.9 (L) 40.0 - 53.0 %    MCV 97 78 - 100 fl    MCH 32.7 26.5 - 33.0 pg    MCHC 33.7 31.5 - 36.5 g/dL    RDW 12.8 10.0 - 15.0 %    Platelet Count 96 (L) 150 - 450 10e9/L    Diff Method Automated Method     % Neutrophils 77.4 %    % Lymphocytes 12.8 %    % Monocytes 7.7 %    % Eosinophils 1.2 %    % Basophils 0.6 %    % Immature Granulocytes  0.3 %    Nucleated RBCs 0 0 /100    Absolute Neutrophil 5.2 1.6 - 8.3 10e9/L    Absolute Lymphocytes 0.9 0.8 - 5.3 10e9/L    Absolute Monocytes 0.5 0.0 - 1.3 10e9/L    Absolute Eosinophils 0.1 0.0 - 0.7 10e9/L    Absolute Basophils 0.0 0.0 - 0.2 10e9/L    Abs Immature Granulocytes 0.0 0 - 0.4 10e9/L    Absolute Nucleated RBC 0.0    EKG 12-lead, tracing only   Result Value Ref Range    Interpretation ECG Click View Image link to view waveform and result    XR Chest Port 1 View    Narrative    Exam: XR CHEST PORT 1 VW, 7/16/2020 1:43 PM    Indication: Altered mental status    Comparison: 7/30/2018    Findings:   Heart and pulmonary vasculature within normal limits. Lungs are clear.  Pleural spaces are clear. Upper abdomen unremarkable.      Impression    Impression: No acute cardiopulmonary disease identified.    CHARLEE ALAN MD   CT Head w/o Contrast    Narrative    CT HEAD W/O CONTRAST 7/16/2020 2:38 PM    History: Altered mental status     Comparison: MRI 10/22/2019    Technique: Using multidetector thin collimation helical acquisition  technique, axial, coronal and sagittal CT images from the skull base  to the vertex were obtained without intravenous contrast.    Findings: There is no intracranial hemorrhage, mass effect, or midline  shift. Gray/white matter differentiation in both cerebral hemispheres  is preserved. Ventricles are proportionate to the cerebral sulci. The  basal cisterns are clear. Extensive leukoaraiosis.    The bony calvaria and the bones of the skull base are normal. The  visualized portions of the paranasal sinuses and mastoid air cells are  clear.       Impression    Impression:  1. No acute intracranial pathology.   2. Extensive leukoaraiosis.         ROSELYN PRESLEY MD   CTA Head Neck with Contrast    Narrative    CTA  HEAD NECK WITH CONTRAST 7/16/2020 2:38 PM    CT angiogram of the neck   CT angiogram of the base of the brain with contrast  Reconstruction by the Radiologist on the 3D  workstation    Provided History:  Altered mental status    Comparison:  No prior similar studies.      Technique:  HEAD and NECK CTA: During rapid bolus intravenous injection of  nonionic contrast material, axial images were obtained using thin  collimation multidetector helical technique from the base of the upper  aortic arch through the [place]. This CT angiogram data was  reconstructed at thin intervals with mild overlap. Images were sent to  the [email or web address] workstation, and 3D reconstructions were obtained. The  axial source images, multiplanar reformations, 3D reconstructions in  both maximum intensity projection display and volume rendered models  were reviewed, with reconstructions performed by the technologist and  the radiologist.    Contrast: iopamidol (ISOVUE-370) solution 75 mL    Findings:    Head CTA demonstrates a 2 x 2 mm anterosuperiorly pointing saccular  aneurysm off the left supraclinoid ICA. The remaining major  intracranial arteries are patent with no evidence of stenosis or  aneurysm. Anterior communicating artery is patent. Posterior  communicating arteries are not well seen.    Neck CTA demonstrates no stenosis of the major cervical arteries. The  origins of the great vessels from the aortic arch are patent. The  normal distal right internal carotid artery measures 4.1 mm. The  normal distal left internal carotid artery measures 3.6 mm.     No mass within the visualized portions of the cervical soft tissues or  lung apices.       Impression    Impression:    1. Head CTA demonstrates a 2 x 2 mm anterosuperiorly pointing saccular  aneurysm off the left supraclinoid ICA. The remaining major  intracranial arteries are patent with no evidence of stenosis or  aneurysm.  2. Neck CTA demonstrates no stenosis of the major cervical arteries.    ROSELYN PRESLEY MD   Ammonia (on ice)   Result Value Ref Range    Ammonia <10 (L) 10 - 50 umol/L   Glucose by meter   Result Value Ref Range    Glucose  99 70 - 99 mg/dL   UA with Microscopic   Result Value Ref Range    Color Urine Yellow     Appearance Urine Clear     Glucose Urine Negative NEG^Negative mg/dL    Bilirubin Urine Negative NEG^Negative    Ketones Urine Negative NEG^Negative mg/dL    Specific Gravity Urine 1.012 1.003 - 1.035    Blood Urine Moderate (A) NEG^Negative    pH Urine 6.5 5.0 - 7.0 pH    Protein Albumin Urine 300 (A) NEG^Negative mg/dL    Urobilinogen mg/dL 2.0 0.0 - 2.0 mg/dL    Nitrite Urine Negative NEG^Negative    Leukocyte Esterase Urine Negative NEG^Negative    Source Midstream Urine     WBC Urine 5 0 - 5 /HPF    RBC Urine 14 (H) 0 - 2 /HPF    Squamous Epithelial /HPF Urine <1 0 - 1 /HPF    Transitional Epi <1 0 - 1 /HPF    Mucous Urine Present (A) NEG^Negative /LPF    Hyaline Casts 1 0 - 2 /LPF    Granular Casts 1 (A) NEG^Negative /LPF    Amorphous Crystals Moderate (A) NEG^Negative /HPF   Comprehensive metabolic panel   Result Value Ref Range    Sodium 139 133 - 144 mmol/L    Potassium 3.6 3.4 - 5.3 mmol/L    Chloride 110 (H) 94 - 109 mmol/L    Carbon Dioxide 24 20 - 32 mmol/L    Anion Gap 6 3 - 14 mmol/L    Glucose 87 70 - 99 mg/dL    Urea Nitrogen 23 7 - 30 mg/dL    Creatinine 1.52 (H) 0.66 - 1.25 mg/dL    GFR Estimate 49 (L) >60 mL/min/[1.73_m2]    GFR Estimate If Black 57 (L) >60 mL/min/[1.73_m2]    Calcium 8.0 (L) 8.5 - 10.1 mg/dL    Bilirubin Total 0.4 0.2 - 1.3 mg/dL    Albumin 1.4 (L) 3.4 - 5.0 g/dL    Protein Total 5.4 (L) 6.8 - 8.8 g/dL    Alkaline Phosphatase 214 (H) 40 - 150 U/L    ALT 21 0 - 70 U/L    AST 28 0 - 45 U/L   CBC with platelets   Result Value Ref Range    WBC 6.1 4.0 - 11.0 10e9/L    RBC Count 3.56 (L) 4.4 - 5.9 10e12/L    Hemoglobin 11.4 (L) 13.3 - 17.7 g/dL    Hematocrit 34.8 (L) 40.0 - 53.0 %    MCV 98 78 - 100 fl    MCH 32.0 26.5 - 33.0 pg    MCHC 32.8 31.5 - 36.5 g/dL    RDW 12.9 10.0 - 15.0 %    Platelet Count 117 (L) 150 - 450 10e9/L   Lactate Dehydrogenase   Result Value Ref Range    Lactate  Dehydrogenase 250 (H) 85 - 227 U/L   CRP inflammation   Result Value Ref Range    CRP Inflammation 84.0 (H) 0.0 - 8.0 mg/L   Albumin level   Result Value Ref Range    Albumin 1.6 (L) 3.4 - 5.0 g/dL   Interventional Radiology Adult/Peds IP Consult: Patient to be seen: Routine within 24 hours; Call back #: [phone]; Paracentesis; Requesting provider? Other supervising provider; Name: Jaye Robert, LINDA Mansfield CNP     7/17/2020 10:52 AM      Interventional Radiology Consult Service Note    Patient is on IR schedule tentatively 7/17 for a dx and   therapeutic paracentesis.   Labs WNL for procedure. COVID pending.  No NPO required.  Consent will be done prior to procedure. Team working to ID   patient's surrogate decision maker.    Please [place] the [place] RN [place] [phone] for estimated time of   procedure.     Case discussed with LINDA Amin. This is a 60 year old   male with history of hepatitis B, liver cirrhosis, ascites,   hypertension, and HLD who was brought in via ambulance for a   fall. He presented to the ED two other times today, for ascites   and altered mental status. IR consulted for dx and therapeutic   paracentesis. Last para was 6/16 with 1.3 L drained.    Pt currently with waxing and waning mental status. If he is   deemed not consentable, procedure will be deferred until   surrogate decision maker is identified.    Chanell Robert DNP, LINDA  Interventional Radiology  Pager: [phone]       Blood culture    Specimen: Blood    Right Hand   Result Value Ref Range    Specimen Description Blood Right Hand     Culture Micro PENDING    Blood culture    Specimen: Blood    Left Hand   Result Value Ref Range    Specimen Description Blood Left Hand     Culture Micro PENDING    Glucose by meter   Result Value Ref Range    Glucose 83 70 - 99 mg/dL   CTA Head Neck with Contrast    Narrative    CTA  HEAD NECK WITH CONTRAST 7/17/2020 11:20 AM    CTA  HEAD NECK WITH  CONTRAST 7/17/2020 11:20 AM    CT angiogram of the neck   CT angiogram of the base of the brain with contrast  Reconstruction by the Radiologist on the 3D workstation    Provided History:  Neuro deficit(s), subacute    Comparison:  CTA from yesterday.      Technique:  HEAD and NECK CTA: During rapid bolus intravenous injection of  nonionic contrast material, axial images were obtained using thin  collimation multidetector helical technique from the base of the upper  aortic arch through the [place]. This CT angiogram data was  reconstructed at thin intervals with mild overlap. Images were sent to  the [email or web address] workstation, and 3D reconstructions were obtained. The  axial source images, multiplanar reformations, 3D reconstructions in  both maximum intensity projection display and volume rendered models  were reviewed, with reconstructions performed by the technologist and  the radiologist.    Contrast: iopamidol (ISOVUE-370) solution 75 mL    Findings:    Head CTA:  2.2 mm outpouching off the left supraclinoid ICA on yesterday's CT is  again seen. On today's CT, this outpouching appears to be at the  origin of the left ophthalmic artery. There are also punctate  posteriorly pointing outpouchings off both ICA terminus, not well  appreciated on yesterdays CTA. These may represent small aneurysms.  The remaining major intracranial arteries are patent. No evidence of  stenosis .     Neck CTA:  No stenosis of the major cervical arteries. The origins of the great  vessels from the aortic arch are patent. The normal distal right  internal carotid artery measures 4.1 mm. The normal distal left  internal carotid artery measures 3.6 mm.     No mass within the visualized portions of the cervical soft tissues or  lung apices.       Impression    Impression:    1. Head CTA:  a. 2.2 mm outpouching off the left supraclinoid ICA on yesterday's CT  is again seen. This outpouching appears to be at the origin of the  left  ophthalmic artery and likely an infundibulum. On yesterday's CT  ophthalmic artery is not well seen, likely technical.  b. Tiny posteriorly pointing outpouchings at bilateral carotid  terminus. These are not well seen on yesterday's CT, again likely due  phase of the CT. No vessel is seen at the tip of these outpouchings,  could represent tiny aneurysms or infundibulum.  2. Neck CTA demonstrates no stenosis of the major cervical arteries.    .    ROSELYN PRESLEY MD   CT Head w/o Contrast    Narrative    CT HEAD W/O CONTRAST 7/17/2020 11:21 AM    History: Neuro deficit(s), subacute     Comparison: Head CT from yesterday    Technique: Using multidetector thin collimation helical acquisition  technique, axial, coronal and sagittal CT images from the skull base  to the vertex were obtained without intravenous contrast.    Findings: There is no intracranial hemorrhage, mass effect, or midline  shift. Gray/white matter differentiation in both cerebral hemispheres  is preserved. Ventricles are proportionate to the cerebral sulci. The  basal cisterns are clear. Extensive leukoaraiosis. Chronic lacunar  infarcts in the left anterior thalamus and bilateral basal ganglia.    The bony calvaria and the bones of the skull base are normal. The  visualized portions of the paranasal sinuses and mastoid air cells are  clear.       Impression    Impression:  No acute intracranial pathology. Extensive leukoaraiosis. If there is  a clinical concern for acute infarct, MRI is recommended. Extensive  leukoaraiosis.    ROSELYN PRESLEY MD   Blood gas arterial   Result Value Ref Range    pH Arterial 7.42 7.35 - 7.45 pH    pCO2 Arterial 37 35 - 45 mm Hg    pO2 Arterial 92 80 - 105 mm Hg    Bicarbonate Arterial 24 21 - 28 mmol/L    Base Deficit Art 0.7 mmol/L    FIO2 21    Glucose by meter   Result Value Ref Range    Glucose 82 70 - 99 mg/dL   Ammonia   Result Value Ref Range    Ammonia <10 (L) 10 - 50 umol/L   Erythrocyte sedimentation rate  "auto   Result Value Ref Range    Sed Rate 96 (H) 0 - 20 mm/h         Transfer Exam:    BP (!) 148/98   Pulse 70   Temp 98.3  F (36.8  C) (Axillary)   Resp 12   Ht 1.676 m (5' 6\")   SpO2 99%   BMI 19.95 kg/m    Exam:  Constitutional: Wakes to voice, falls asleep during conversation.   Head: Normocephalic. Drooling noted, no longer noted   Neck: Neck supple. No adenopathy. Thyroid symmetric, normal size,,   ENT: ENT exam normal, no neck nodes or sinus tenderness  Cardiovascular: negative, PMI normal. No lifts, heaves, or thrills. RRR. No murmurs, clicks gallops or rub  Respiratory: negative, Percussion normal. Good diaphragmatic excursion. Lungs clear  Gastrointestinal: Abdomen soft, non-tender. BS normal. No masses, organomegaly  Musculoskeletal: Left hand  decreased from right  Skin: no suspicious lesions or rashes  Neurologic: Not answering questions verbally. Intermittently following commands.   Psychiatric: mentation appears altered   Current Medications:    No current outpatient medications on file.       Medications Prior to Admission:    Medications Prior to Admission   Medication Sig Dispense Refill Last Dose     amLODIPine (NORVASC) 10 MG tablet Take 10 mg by mouth daily   not filled since 1/29/2020     bumetanide (BUMEX) 1 MG tablet Take 1 mg by mouth daily   7/15/2020 at Unknown time     entecavir (BARACLUDE) 1 MG tablet TAKE ONE TABLET BY MOUTH EVERY DAY 30 tablet 11 7/15/2020 at Unknown time     carvedilol (COREG) 12.5 MG tablet Take 12.5 mg by mouth 2 times daily (with meals)   not filled since 1/29/2020     lisinopril (ZESTRIL) 40 MG tablet Take 40 mg by mouth daily   not filled since 1/29/2020       Significant Diagnostic Studies:     Results for orders placed or performed during the hospital encounter of 07/16/20   XR Chest Port 1 View     Status: None    Narrative    Exam: XR CHEST PORT 1 VW, 7/16/2020 1:43 PM    Indication: Altered mental status    Comparison: 7/30/2018    Findings: "   Heart and pulmonary vasculature within normal limits. Lungs are clear.  Pleural spaces are clear. Upper abdomen unremarkable.      Impression    Impression: No acute cardiopulmonary disease identified.    CHARLEE ALAN MD   CT Head w/o Contrast     Status: None    Narrative    CT HEAD W/O CONTRAST 7/16/2020 2:38 PM    History: Altered mental status     Comparison: MRI 10/22/2019    Technique: Using multidetector thin collimation helical acquisition  technique, axial, coronal and sagittal CT images from the skull base  to the vertex were obtained without intravenous contrast.    Findings: There is no intracranial hemorrhage, mass effect, or midline  shift. Gray/white matter differentiation in both cerebral hemispheres  is preserved. Ventricles are proportionate to the cerebral sulci. The  basal cisterns are clear. Extensive leukoaraiosis.    The bony calvaria and the bones of the skull base are normal. The  visualized portions of the paranasal sinuses and mastoid air cells are  clear.       Impression    Impression:  1. No acute intracranial pathology.   2. Extensive leukoaraiosis.         ROSELYN PRESLEY MD   CTA Head Neck with Contrast     Status: None    Narrative    CTA  HEAD NECK WITH CONTRAST 7/16/2020 2:38 PM    CT angiogram of the neck   CT angiogram of the base of the brain with contrast  Reconstruction by the Radiologist on the 3D workstation    Provided History:  Altered mental status    Comparison:  No prior similar studies.      Technique:  HEAD and NECK CTA: During rapid bolus intravenous injection of  nonionic contrast material, axial images were obtained using thin  collimation multidetector helical technique from the base of the upper  aortic arch through the [place]. This CT angiogram data was  reconstructed at thin intervals with mild overlap. Images were sent to  the [employer] workstation, and 3D reconstructions were obtained. The  axial source images, multiplanar reformations, 3D  reconstructions in  both maximum intensity projection display and volume rendered models  were reviewed, with reconstructions performed by the technologist and  the radiologist.    Contrast: iopamidol (ISOVUE-370) solution 75 mL    Findings:    Head CTA demonstrates a 2 x 2 mm anterosuperiorly pointing saccular  aneurysm off the left supraclinoid ICA. The remaining major  intracranial arteries are patent with no evidence of stenosis or  aneurysm. Anterior communicating artery is patent. Posterior  communicating arteries are not well seen.    Neck CTA demonstrates no stenosis of the major cervical arteries. The  origins of the great vessels from the aortic arch are patent. The  normal distal right internal carotid artery measures 4.1 mm. The  normal distal left internal carotid artery measures 3.6 mm.     No mass within the visualized portions of the cervical soft tissues or  lung apices.       Impression    Impression:    1. Head CTA demonstrates a 2 x 2 mm anterosuperiorly pointing saccular  aneurysm off the left supraclinoid ICA. The remaining major  intracranial arteries are patent with no evidence of stenosis or  aneurysm.  2. Neck CTA demonstrates no stenosis of the major cervical arteries.    ROSELYN PRESLEY MD   CTA Head Neck with Contrast     Status: None    Narrative    CTA  HEAD NECK WITH CONTRAST 7/17/2020 11:20 AM    CTA  HEAD NECK WITH CONTRAST 7/17/2020 11:20 AM    CT angiogram of the neck   CT angiogram of the base of the brain with contrast  Reconstruction by the Radiologist on the 3D workstation    Provided History:  Neuro deficit(s), subacute    Comparison:  CTA from yesterday.      Technique:  HEAD and NECK CTA: During rapid bolus intravenous injection of  nonionic contrast material, axial images were obtained using thin  collimation multidetector helical technique from the base of the upper  aortic arch through the [place]. This CT angiogram data was  reconstructed at thin intervals  with mild overlap. Images were sent to  the [employer] workstation, and 3D reconstructions were obtained. The  axial source images, multiplanar reformations, 3D reconstructions in  both maximum intensity projection display and volume rendered models  were reviewed, with reconstructions performed by the technologist and  the radiologist.    Contrast: iopamidol (ISOVUE-370) solution 75 mL    Findings:    Head CTA:  2.2 mm outpouching off the left supraclinoid ICA on yesterday's CT is  again seen. On today's CT, this outpouching appears to be at the  origin of the left ophthalmic artery. There are also punctate  posteriorly pointing outpouchings off both ICA terminus, not well  appreciated on yesterdays CTA. These may represent small aneurysms.  The remaining major intracranial arteries are patent. No evidence of  stenosis .     Neck CTA:  No stenosis of the major cervical arteries. The origins of the great  vessels from the aortic arch are patent. The normal distal right  internal carotid artery measures 4.1 mm. The normal distal left  internal carotid artery measures 3.6 mm.     No mass within the visualized portions of the cervical soft tissues or  lung apices.       Impression    Impression:    1. Head CTA:  a. 2.2 mm outpouching off the left supraclinoid ICA on yesterday's CT  is again seen. This outpouching appears to be at the origin of the  left ophthalmic artery and likely an infundibulum. On yesterday's CT  ophthalmic artery is not well seen, likely technical.  b. Tiny posteriorly pointing outpouchings at bilateral carotid  terminus. These are not well seen on yesterday's CT, again likely due  phase of the CT. No vessel is seen at the tip of these outpouchings,  could represent tiny aneurysms or infundibulum.  2. Neck CTA demonstrates no stenosis of the major cervical arteries.    .    ROSELYN PRESLEY MD   CT Head w/o Contrast     Status: None    Narrative    CT HEAD W/O CONTRAST 7/17/2020 11:21 AM    History:  Neuro deficit(s), subacute     Comparison: Head CT from yesterday    Technique: Using multidetector thin collimation helical acquisition  technique, axial, coronal and sagittal CT images from the skull base  to the vertex were obtained without intravenous contrast.    Findings: There is no intracranial hemorrhage, mass effect, or midline  shift. Gray/white matter differentiation in both cerebral hemispheres  is preserved. Ventricles are proportionate to the cerebral sulci. The  basal cisterns are clear. Extensive leukoaraiosis. Chronic lacunar  infarcts in the left anterior thalamus and bilateral basal ganglia.    The bony calvaria and the bones of the skull base are normal. The  visualized portions of the paranasal sinuses and mastoid air cells are  clear.       Impression    Impression:  No acute intracranial pathology. Extensive leukoaraiosis. If there is  a clinical concern for acute infarct, MRI is recommended. Extensive  leukoaraiosis.    ROSELYN PRESLEY MD   Comprehensive metabolic panel     Status: Abnormal   Result Value Ref Range    Sodium 134 133 - 144 mmol/L    Potassium 4.6 3.4 - 5.3 mmol/L    Chloride 106 94 - 109 mmol/L    Carbon Dioxide 25 20 - 32 mmol/L    Anion Gap 4 3 - 14 mmol/L    Glucose 93 70 - 99 mg/dL    Urea Nitrogen 22 7 - 30 mg/dL    Creatinine 1.49 (H) 0.66 - 1.25 mg/dL    GFR Estimate 51 (L) >60 mL/min/[1.73_m2]    GFR Estimate If Black 59 (L) >60 mL/min/[1.73_m2]    Calcium 8.2 (L) 8.5 - 10.1 mg/dL    Bilirubin Total 1.0 0.2 - 1.3 mg/dL    Albumin 1.7 (L) 3.4 - 5.0 g/dL    Protein Total 6.2 (L) 6.8 - 8.8 g/dL    Alkaline Phosphatase 238 (H) 40 - 150 U/L    ALT 27 0 - 70 U/L    AST Canceled, Test credited 0 - 45 U/L   Lipase     Status: Abnormal   Result Value Ref Range    Lipase 30 (L) 73 - 393 U/L   Lactic acid whole blood     Status: None   Result Value Ref Range    Lactic Acid 0.9 0.7 - 2.0 mmol/L   Troponin I     Status: None   Result Value Ref Range    Troponin I ES <0.015 0.000  - 0.045 ug/L   Ammonia     Status: None   Result Value Ref Range    Ammonia Canceled, Test credited 10 - 50 umol/L   TSH     Status: None   Result Value Ref Range    TSH 2.02 0.40 - 4.00 mU/L   CBC with platelets differential     Status: Abnormal   Result Value Ref Range    WBC 6.7 4.0 - 11.0 10e9/L    RBC Count 4.01 (L) 4.4 - 5.9 10e12/L    Hemoglobin 13.1 (L) 13.3 - 17.7 g/dL    Hematocrit 38.9 (L) 40.0 - 53.0 %    MCV 97 78 - 100 fl    MCH 32.7 26.5 - 33.0 pg    MCHC 33.7 31.5 - 36.5 g/dL    RDW 12.8 10.0 - 15.0 %    Platelet Count 96 (L) 150 - 450 10e9/L    Diff Method Automated Method     % Neutrophils 77.4 %    % Lymphocytes 12.8 %    % Monocytes 7.7 %    % Eosinophils 1.2 %    % Basophils 0.6 %    % Immature Granulocytes 0.3 %    Nucleated RBCs 0 0 /100    Absolute Neutrophil 5.2 1.6 - 8.3 10e9/L    Absolute Lymphocytes 0.9 0.8 - 5.3 10e9/L    Absolute Monocytes 0.5 0.0 - 1.3 10e9/L    Absolute Eosinophils 0.1 0.0 - 0.7 10e9/L    Absolute Basophils 0.0 0.0 - 0.2 10e9/L    Abs Immature Granulocytes 0.0 0 - 0.4 10e9/L    Absolute Nucleated RBC 0.0    UA with Microscopic     Status: Abnormal   Result Value Ref Range    Color Urine Yellow     Appearance Urine Clear     Glucose Urine Negative NEG^Negative mg/dL    Bilirubin Urine Negative NEG^Negative    Ketones Urine Negative NEG^Negative mg/dL    Specific Gravity Urine 1.012 1.003 - 1.035    Blood Urine Moderate (A) NEG^Negative    pH Urine 6.5 5.0 - 7.0 pH    Protein Albumin Urine 300 (A) NEG^Negative mg/dL    Urobilinogen mg/dL 2.0 0.0 - 2.0 mg/dL    Nitrite Urine Negative NEG^Negative    Leukocyte Esterase Urine Negative NEG^Negative    Source Midstream Urine     WBC Urine 5 0 - 5 /HPF    RBC Urine 14 (H) 0 - 2 /HPF    Squamous Epithelial /HPF Urine <1 0 - 1 /HPF    Transitional Epi <1 0 - 1 /HPF    Mucous Urine Present (A) NEG^Negative /LPF    Hyaline Casts 1 0 - 2 /LPF    Granular Casts 1 (A) NEG^Negative /LPF    Amorphous Crystals Moderate (A) NEG^Negative  /HPF   Ammonia (on ice)     Status: Abnormal   Result Value Ref Range    Ammonia <10 (L) 10 - 50 umol/L   Glucose by meter     Status: None   Result Value Ref Range    Glucose 99 70 - 99 mg/dL   Comprehensive metabolic panel     Status: Abnormal   Result Value Ref Range    Sodium 139 133 - 144 mmol/L    Potassium 3.6 3.4 - 5.3 mmol/L    Chloride 110 (H) 94 - 109 mmol/L    Carbon Dioxide 24 20 - 32 mmol/L    Anion Gap 6 3 - 14 mmol/L    Glucose 87 70 - 99 mg/dL    Urea Nitrogen 23 7 - 30 mg/dL    Creatinine 1.52 (H) 0.66 - 1.25 mg/dL    GFR Estimate 49 (L) >60 mL/min/[1.73_m2]    GFR Estimate If Black 57 (L) >60 mL/min/[1.73_m2]    Calcium 8.0 (L) 8.5 - 10.1 mg/dL    Bilirubin Total 0.4 0.2 - 1.3 mg/dL    Albumin 1.4 (L) 3.4 - 5.0 g/dL    Protein Total 5.4 (L) 6.8 - 8.8 g/dL    Alkaline Phosphatase 214 (H) 40 - 150 U/L    ALT 21 0 - 70 U/L    AST 28 0 - 45 U/L   CBC with platelets     Status: Abnormal   Result Value Ref Range    WBC 6.1 4.0 - 11.0 10e9/L    RBC Count 3.56 (L) 4.4 - 5.9 10e12/L    Hemoglobin 11.4 (L) 13.3 - 17.7 g/dL    Hematocrit 34.8 (L) 40.0 - 53.0 %    MCV 98 78 - 100 fl    MCH 32.0 26.5 - 33.0 pg    MCHC 32.8 31.5 - 36.5 g/dL    RDW 12.9 10.0 - 15.0 %    Platelet Count 117 (L) 150 - 450 10e9/L   Albumin level     Status: Abnormal   Result Value Ref Range    Albumin 1.6 (L) 3.4 - 5.0 g/dL   Lactate Dehydrogenase     Status: Abnormal   Result Value Ref Range    Lactate Dehydrogenase 250 (H) 85 - 227 U/L   Glucose by meter     Status: None   Result Value Ref Range    Glucose 83 70 - 99 mg/dL   Blood gas arterial     Status: None   Result Value Ref Range    pH Arterial 7.42 7.35 - 7.45 pH    pCO2 Arterial 37 35 - 45 mm Hg    pO2 Arterial 92 80 - 105 mm Hg    Bicarbonate Arterial 24 21 - 28 mmol/L    Base Deficit Art 0.7 mmol/L    FIO2 21    Ammonia     Status: Abnormal   Result Value Ref Range    Ammonia <10 (L) 10 - 50 umol/L   Glucose by meter     Status: None   Result Value Ref Range    Glucose 82  70 - 99 mg/dL   Erythrocyte sedimentation rate auto     Status: Abnormal   Result Value Ref Range    Sed Rate 96 (H) 0 - 20 mm/h   CRP inflammation     Status: Abnormal   Result Value Ref Range    CRP Inflammation 84.0 (H) 0.0 - 8.0 mg/L   EKG 12-lead, tracing only     Status: None   Result Value Ref Range    Interpretation ECG Click View Image link to view waveform and result    Interventional Radiology Adult/Peds IP Consult: Patient to be seen: Routine within 24 hours; Call back #: [phone]; Paracentesis; Requesting provider? Other supervising provider; Name: Jaye Holt NP     Status: None ()    Chanell Stout APRN CNP     7/17/2020 10:52 AM      Interventional Radiology Consult Service Note    Patient is on IR schedule tentatively 7/17 for a dx and   therapeutic paracentesis.   Labs WNL for procedure. COVID pending.  No NPO required.  Consent will be done prior to procedure. Team working to ID   patient's surrogate decision maker.    Please contact the [place] RN at [phone] for estimated time of   procedure.     Case discussed with LINDA Amin. This is a 60 year old   male with history of hepatitis B, liver cirrhosis, ascites,   hypertension, and HLD who was brought in via ambulance for a   fall. He presented to the ED two other times today, for ascites   and altered mental status. IR consulted for dx and therapeutic   paracentesis. Last para was 6/16 with 1.3 L drained.    Pt currently with waxing and waning mental status. If he is   deemed not consentable, procedure will be deferred until   surrogate decision maker is identified.    Chanell Robert DNP, APRN  Interventional Radiology  Pager: [phone]       Blood culture     Status: None (Preliminary result)    Specimen: Blood    Right Hand   Result Value Ref Range    Specimen Description Blood Right Hand     Culture Micro PENDING    Blood culture     Status: None (Preliminary result)    Specimen: Blood    Left Hand   Result  Value Ref Range    Specimen Description Blood Left Hand     Culture Micro PENDING        Signed:  Jaye Holt NP, APRN CNP  July 17, 2020 at 12:59 PM

## 2020-07-17 NOTE — PROGRESS NOTES
This writer had CARLYLE assess pt at bedside due to pt not responding to questions or commands. iPad  was used but pt would not answer  as well. Pt was attempting to get out of bed and this writer attempted to help pt get up to commode. Pt was not responding to nurses prompts. CARLYLE notified and assessed pt. VSS. Pt remains on tele monitor.

## 2020-07-17 NOTE — UTILIZATION REVIEW
Admission Status; Secondary Review Determination       Under the authority of the Utilization Management Committee, the utilization review process indicated a secondary review on the above patient. The review outcome is based on review of the medical records, discussions with staff, and applying clinical experience noted on the date of the review.     (x) Inpatient Status Appropriate - This patient's medical care is consistent with medical management for inpatient care and reasonable inpatient medical practice.     RATIONALE FOR DETERMINATION   60 year old male with history of hepatitis B, liver cirrhosis, ascites, hypertension, and HLD who was brought in via ambulance for a fall. He presented to the ED two other times today, for ascites and altered mental status. IR consulted for dx and therapeutic paracentesis.  She has been on observation for more than 24 hours, multiple code strokes in the MRI department, his preliminary reading:Extensive T2 hyperintense signal involving supratentorial and  infratentorial white matter with scattered foci of diffusion  restriction in bilateral corona radiata, bilateral frontal subcortical  white matter and right frontal centrum semiovale. Most of this T2  hyperintense signal is new from 10/22/2019. Given the abnormal signal  and diffusion restriction solely/mostly involves the white matter,  differentials favor acute white matter pathologies like acute toxic  leukoencephalopathy from liver failure. Other less likely  differentials include hepatocerebral syndrome, osmotic demyelination  or embolic infarct.   The expected length of stay at the time of admission was more than 2 nights because of the severity of illness, intensity of service provided, and risk for adverse outcome. Inpatient admission is appropriate.  Stroke team notified    This document was produced using voice recognition software       The information on this document is developed by the utilization review team  in order for the business office to ensure compliance. This only denotes the appropriateness of proper admission status and does not reflect the quality of care rendered.   The definitions of Inpatient Status and Observation Status used in making the determination above are those provided in the CMS Coverage Manual, Chapter 1 and Chapter 6, section 70.4.   Sincerely,   CHRISTINE SCHAEFER MD   System Medical Director   Utilization Management   [facility].

## 2020-07-17 NOTE — ED NOTES
Road test complete. Pt walks well with cane. Pt states he can walk far and feels safe getting home via light rail.

## 2020-07-17 NOTE — H&P
[facility], [place]    History and Physical - ED Observation       Date of Admission:  7/16/2020    Assessment & Plan   Wilfredo Yoon is a 60 year old male with history of hepatitis B, liver cirrhosis, ascites, hypertension, and HLD who was brought in via ambulance for a fall. He presented to the ED two other times today, for ascites and altered mental status.     ##Weakness  ##Fall  ##Altered Mental Status  60yr [place] male who reports that he lives alone and was trying to ride public transit to home after his second ED visit in twelve hours who fell while walking.  He returned to the ED a third time. Pt did not lose consciousness. Pt has no injuries. Ammonia level negative. Glucose normal. EKG normal. Unable to reach family during his time in the Emergency Dept. Pt was in the ED earlier today with AMS and discharged as WBC was normal, Hgb normal, EKG normal, and chest xray normal. 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  Case was discussed with neuro interventional fellow, Dr. Orozco, who recommended no emergent intervention at this time, but he will arrange for the patient to schedule a clinic visit and establish care with neurosurgery. Plan for discharge, likely to home, after PT/OT eval for weakness.  -[place] to ED Obs  -VS Q4hrs  -PT/OT eval  -SW consult  -CC consult, confirm Neuro IR follow up appt    ##HTN  179/123 on arrival in ED Obs. Was higher this morning, at [facility] with /140. Pharmacy has done medication review. It is unclear which, if any, antihypertensives the patient has been taking. Per chart review, in January primary care visit the patient had /105 and the next day 216/119. Notes from those visits show pt is supposed to be taking amlodipine 10mg each morning, Lisinopril 40mg each evening, and Carvedilol 12.5mg BID. He also takes bumetanide 1mg daily in the morning. Over the course of  his ED visits today he got amlodipine 5mg this morning and lisinopril 20mg this morning. This evening he has gotten 10mg hydralazine IV. He denies headache. Will give additional amlodipine this evening and monitor. Plan for full amlodipine 10mg in the morning.   -Resume amlodipine 10mg each morning.  -Resume lisinopril 40mg daily, in the evening. Start tomorrow.  -Hold carvedilol for now to avoid lowering BP too rapidly.   -Telemetry  -Hydralazine IV PRN    ##Nephrotic Syndrome  Creatinine at baseline, although GFR is 59.   -Avoid nephrotoxic agents    ##Hepatitis B  ALT 28, AST 41, Alk Phos 254. Albumin 1.7, Bili 1.1. Ammonia <10. On exam, there is some ascites. He is not short of breath. Per chart review, Last paracentesis was one month ago.   -Medicine procedure team consult for paracentesis    ##HLD  -Patient is supposed to be on a statin but has not been taking for months, per pharmacy medication review. Defer to primary care for follow up on this.       Diet:   regular  DVT Prophylaxis: Low Risk/Ambulatory with no VTE prophylaxis indicated  Hawkins Catheter: not present  Code Status:   full         Disposition Plan   Expected discharge: Tomorrow, recommended to prior living arrangement once safe disposition plan/ TCU bed available.  Entered: Aide Rider CNP 07/16/2020, 8:30 PM     The patient's care was discussed with the Attending Physician, Dr. Tejeda.    Aide Rider CNP  [facility], [place]  ED Observation, 6D  Ascom:[id number]  ______________________________________________________________________    Chief Complaint   Weakness, fall    History is obtained from the patient  And review of the medical record    History of Present Illness    Wilfredo Yoon is a 60 year old Lebanese speaking male with history of hepatitis B, liver cirrhosis, ascites, hypertension, and HLD who was brought in via ambulance for a fall. The patient has had 2 prior ED visits today, 1 at [facility]  and he was discharged, then a second here at the [facility].  He was seen by Dr. Sanz.  Work-up at both facilities was negative except for hypertension at [facility] which was treated by giving him his home medications of amlodipine 5mg and lisinopril 20mg. Here, he presented for Altered mental status when a visiting friend felt that he was not himself and called 911. The pt had labs repeated and a head CT that showed a non-ruptured cerebral aneurysm that Neuro IR reviewed and recommended non-emergent outpatient follow up in clinic. He was discharged and heading towards the light rail and apparently had fallen and was found on the ground.  .     In the ED, BP this morning 245/140. On arrival this visit, /113. HR 69,, temp 97.9, RR 16, SPO2 99% on RA.    blood sugar and an EKG these are normal he will be admitted to ED observation for PT OT social work consult.  The goal is discharge home tomorrow assuming he is safe and able to ambulate.  I do not think either of the 2 ED visits today has missed anything medically.    Review of Systems    Constitutional: Positive for fatigue. Negative for chills and fever.   HENT: Negative for congestion.    Eyes: Negative for redness.   Respiratory: Negative for cough and shortness of breath.    Cardiovascular: Negative for chest pain.   Gastrointestinal: Negative for abdominal pain, nausea and vomiting.   Endocrine: Negative for polydipsia and polyuria.   Genitourinary: Negative for difficulty urinating.   Musculoskeletal: Negative for arthralgias, gait problem, neck pain and neck stiffness.   Skin: Negative for color change.   Allergic/Immunologic: Negative for immunocompromised state.   Neurological: Negative for speech difficulty, weakness, light-headedness and headaches.   Hematological: Negative for adenopathy. Does not bruise/bleed easily.   Psychiatric/Behavioral: Negative for confusion.   All other systems reviewed and are negative.     A complete review of systems  was performed with pertinent positives and negatives noted in the HPI, and all other systems negative.    Past Medical History    I have reviewed this patient's medical history and updated it with pertinent information if needed.   Past Medical History:   Diagnosis Date     Cryoglobulinemia (H)      Glomerulonephritis      Hepatitis B      Hypertension      Surgical complication        Past Surgical History   I have reviewed this patient's surgical history and updated it with pertinent information if needed.  Past Surgical History:   Procedure Laterality Date     C HAND/FINGER SURGERY UNLISTED       ESOPHAGOSCOPY, GASTROSCOPY, DUODENOSCOPY (EGD), COMBINED N/A 10/18/2018    Procedure: EGD;  Surgeon: Eber Ortez MD;  Location:  GI     HAND SURGERY Right        Social History   I have reviewed this patient's social history and updated it with pertinent information if needed.      Family History         Prior to Admission Medications   Prior to Admission Medications   Prescriptions Last Dose Informant Patient Reported? Taking?   amLODIPine (NORVASC) 5 MG tablet   No No   Sig: Take 1 tablet (5 mg) by mouth daily   entecavir (BARACLUDE) 1 MG tablet   No No   Sig: TAKE ONE TABLET BY MOUTH EVERY DAY   lisinopril (PRINIVIL/ZESTRIL) 20 MG tablet   No No   Sig: Take 1 tablet (20 mg) by mouth daily      Facility-Administered Medications: None     Allergies   No Known Allergies    Physical Exam   Vital Signs: Temp: 97.9  F (36.6  C) Temp src: Oral BP: 139/87 Pulse: 79 Heart Rate: 69 Resp: 16 SpO2: 99 % O2 Device: None (Room air)    Weight: 0 lbs 0 oz    Constitutional: awake, alert, cooperative, no apparent distress, and appears stated age  Eyes: Lids and lashes normal, pupils equal, round and reactive to light, extra ocular muscles intact, sclera clear, conjunctiva normal  ENT: Normocephalic, atraumatic, external ears without lesions, oral pharynx with moist mucous membranes, gums normal and good  dentition.  Respiratory: No increased work of breathing, good air exchange, clear to auscultation bilaterally  Cardiovascular: Regular rate and rhythm, normal S1 and S2, no S3 or S4, and no murmur noted  Abdomen: Normal bowel sounds, soft, moderately distended although no obvious fluid wave. Non-tender.  Skin: normal skin color, texture, turgor  Musculoskeletal: There is no redness, warmth, or swelling of the joints.  Full range of motion noted.  Motor strength is 5 out of 5 all extremities bilaterally.  Tone is normal.  Neurologic: Awake, alert, oriented to name, place and time.  Cranial nerves II-XII are grossly intact.  Motor is 5 out of 5 bilaterally.  No asterixis.    Data     Recent Labs   Lab 07/16/20  1314 07/16/20  0926   WBC 6.7 6.4   HGB 13.1* 13.5   MCV 97 96   PLT 96* 92*   INR  --  1.09    138   POTASSIUM 4.6 3.7   CHLORIDE 106 106   CO2 25 27   BUN 22 21   CR 1.49* 1.53*   ANIONGAP 4 5   SOTERO 8.2* 8.5   GLC 93 93   ALBUMIN 1.7* 1.7*   PROTTOTAL 6.2* 6.4*   BILITOTAL 1.0 1.1   ALKPHOS 238* 254*   ALT 27 28   AST Canceled, Test credited 41   LIPASE 30* 67*   TROPI <0.015  --      Recent Results (from the past 24 hour(s))   XR Chest Port 1 View    Narrative    Exam: XR CHEST PORT 1 VW, 7/16/2020 1:43 PM    Indication: Altered mental status    Comparison: 7/30/2018    Findings:   Heart and pulmonary vasculature within normal limits. Lungs are clear.  Pleural spaces are clear. Upper abdomen unremarkable.      Impression    Impression: No acute cardiopulmonary disease identified.    CHARLEE ALAN MD   CT Head w/o Contrast    Narrative    CT HEAD W/O CONTRAST 7/16/2020 2:38 PM    History: Altered mental status     Comparison: MRI 10/22/2019    Technique: Using multidetector thin collimation helical acquisition  technique, axial, coronal and sagittal CT images from the skull base  to the vertex were obtained without intravenous contrast.    Findings: There is no intracranial hemorrhage, mass effect, or  midline  shift. Gray/white matter differentiation in both cerebral hemispheres  is preserved. Ventricles are proportionate to the cerebral sulci. The  basal cisterns are clear. Extensive leukoaraiosis.    The bony calvaria and the bones of the skull base are normal. The  visualized portions of the paranasal sinuses and mastoid air cells are  clear.       Impression    Impression:  1. No acute intracranial pathology.   2. Extensive leukoaraiosis.         ROSELYN PRESLEY MD   CTA Head Neck with Contrast    Narrative    CTA  HEAD NECK WITH CONTRAST 7/16/2020 2:38 PM    CT angiogram of the neck   CT angiogram of the base of the brain with contrast  Reconstruction by the Radiologist on the 3D workstation    Provided History:  Altered mental status    Comparison:  No prior similar studies.      Technique:  HEAD and NECK CTA: During rapid bolus intravenous injection of  nonionic contrast material, axial images were obtained using thin  collimation multidetector helical technique from the base of the upper  aortic arch through the [place] of [place]. This CT angiogram data was  reconstructed at thin intervals with mild overlap. Images were sent to  the [employer] workstation, and 3D reconstructions were obtained. The  axial source images, multiplanar reformations, 3D reconstructions in  both maximum intensity projection display and volume rendered models  were reviewed, with reconstructions performed by the technologist and  the radiologist.    Contrast: iopamidol (ISOVUE-370) solution 75 mL    Findings:    Head CTA demonstrates a 2 x 2 mm anterosuperiorly pointing saccular  aneurysm off the left supraclinoid ICA. The remaining major  intracranial arteries are patent with no evidence of stenosis or  aneurysm. Anterior communicating artery is patent. Posterior  communicating arteries are not well seen.    Neck CTA demonstrates no stenosis of the major cervical arteries. The  origins of the great vessels from the aortic arch are  patent. The  normal distal right internal carotid artery measures 4.1 mm. The  normal distal left internal carotid artery measures 3.6 mm.     No mass within the visualized portions of the cervical soft tissues or  lung apices.       Impression    Impression:    1. Head CTA demonstrates a 2 x 2 mm anterosuperiorly pointing saccular  aneurysm off the left supraclinoid ICA. The remaining major  intracranial arteries are patent with no evidence of stenosis or  aneurysm.  2. Neck CTA demonstrates no stenosis of the major cervical arteries.    ROSELYN PRESLEY MD

## 2020-07-17 NOTE — CONSULTS
Interventional Radiology Consult Service Note    Patient is on IR schedule tentatively 7/17 for a dx and therapeutic paracentesis.   Labs WNL for procedure. COVID pending.  No NPO required.  Consent will be done prior to procedure. Team working to ID patient's surrogate decision maker.    Please contact the IR charge RN at [phone] for estimated time of procedure.     Case discussed with LINDA Amin. This is a 60 year old male with history of hepatitis B, liver cirrhosis, ascites, hypertension, and HLD who was brought in via ambulance for a fall. He presented to the ED two other times today, for ascites and altered mental status. IR consulted for dx and therapeutic paracentesis. Last para was 6/16 with 1.3 L drained.    Pt currently with waxing and waning mental status. If he is deemed not consentable, procedure will be deferred until surrogate decision maker is identified.    Chanell Robert DNP, LINDA  Interventional Radiology  Pager: [phone]

## 2020-07-17 NOTE — PLAN OF CARE
"Observation Goals:    -diagnostic tests and consults completed and resulted:not met    -vital signs normal or at patient baseline: not met; pt hypertensive; PRN hydralazine given x1    -returns to baseline functional status: not met    -safe disposition plan has been identified: not met     BP (!) 149/100   Pulse 72   Temp 97.9  F (36.6  C) (Oral)   Resp 16   Ht 1.676 m (5' 6\")   SpO2 99%   BMI 19.95 kg/m      "

## 2020-07-17 NOTE — PLAN OF CARE
"Observation Goals:    -diagnostic tests and consults completed and resulted: not met    -vital signs normal or at patient baseline: /87   Pulse 98   Temp 97.9  F (36.6  C) (Oral)   Resp 16   Ht 1.676 m (5' 6\")   SpO2 97%   BMI 19.95 kg/m      -returns to baseline functional status: not met   -safe disposition plan has been identified: not met     Pts blood pressure has improved, however pt is now tachycardic. CARLYLE aware. Will continue to monitor.   "

## 2020-07-17 NOTE — PROGRESS NOTES
"BP (!) 148/98   Pulse 70   Temp 98.3  F (36.8  C) (Axillary)   Resp 12   Ht 1.676 m (5' 6\")   SpO2 99%   BMI 19.95 kg/m        Observation goals:   -diagnostic tests and consults completed and resulted: Not met. BCx2 pending. MRI pending.   -vital signs normal or at patient baseline: Met. At baseline.   -returns to baseline functional status: NOT MET: functional status not at baseline, patient is extremely weak, unable to sit up independently, unable to speak.   -safe disposition plan has been identified   "

## 2020-07-17 NOTE — PHARMACY-ADMISSION MEDICATION HISTORY
Admission medication history interview status for the 7/16/2020 admission is complete. See Epic admission navigator for allergy information, pharmacy, prior to admission medications and immunization status.     Medication history interview sources:  chart review, fill history available in EPIC    Changes made to PTA medication list (reason)  Added: carvedilol   Deleted: none  Changed:   - amlodipine 5mg --> 10mg   - bumetanide 2mg --> 1mg  - lisinopril 20mg --> 40mg    Additional medication history information (including reliability of information, actions taken by pharmacist):  - Per NP patient reports only taking 3 medication. He knew that he took bumetanide, entecavir but not what the third medication was. He thought maybe amlodipine.   - Per fill history patient has been filling bumetanide and entecavir fairly consistently.  With last fill on 6/23/2020.  - He has not filled any blood pressure medications since 1/29/2020.  On that day he filled carvedilol, lisinopril, and amlodipine.  Per last family medicine note in January he should be on all three medicines.         Prior to Admission medications    Medication Sig Last Dose Taking? Auth Provider   amLODIPine (NORVASC) 10 MG tablet Take 10 mg by mouth daily not filled since 1/29/2020 Yes Unknown, Entered By History   bumetanide (BUMEX) 1 MG tablet Take 1 mg by mouth daily 7/15/2020 at Unknown time Yes Unknown, Entered By History   entecavir (BARACLUDE) 1 MG tablet TAKE ONE TABLET BY MOUTH EVERY DAY 7/15/2020 at Unknown time Yes Eber Ortez MD   carvedilol (COREG) 12.5 MG tablet Take 12.5 mg by mouth 2 times daily (with meals) not filled since 1/29/2020  Unknown, Entered By History   lisinopril (ZESTRIL) 40 MG tablet Take 40 mg by mouth daily not filled since 1/29/2020  Unknown, Entered By History         Medication history completed by:   Lorraine Pak PharmD  Pager: [phone]

## 2020-07-17 NOTE — ED NOTES
RN advocating for pt to have repeat head CT. MD states he does not feel the need. MD gave pt road test. Pt is mumbling words slight more than previously, MD aware.

## 2020-07-17 NOTE — PROGRESS NOTES
Responded to Rapid Response call. RRT was called due to neuro and respiratory concern. Patient was on room air, RR 8-10/min, SpO2 97%. Respiratory interventions included ABGs send to lab. Patient remained on room air. RT to continue monitor.     Margie Karimi, RT, RT  7/17/2020 11:47 AM

## 2020-07-17 NOTE — PROGRESS NOTES
Shift: 0700 - 1930  VS: Temp: 98.2  F (36.8  C) Temp src: Oral BP: (!) 160/97 Pulse: 63 Heart Rate: 70 Resp: 16 SpO2: 96 % O2 Device: None (Room air)    Pain: No objective S&S of pain. Pt is non-verbal with no expression of pain.   Neuro: Eyes open to voice but unable to respond verbally or make needs known. Generalized weakness with moderate/significant decrease in mobility, Ax2 w/GB to commode. Unable to sit upright independently. Left greater than right weakness. Drooling.   Cardiac:   HTN. SR w/wide QT.   Respiratory: Lung sounds clear on RA. Respirations 12-16 RR  GI/Diet/Appetite: NPO. Aspiration precautions.  :  Voiding w/o difficulty.   LDA's: PIV to RUE, SL   Skin: Intact.   Activity: Ax2 w/GB. Mobility moderately/significantly impaired.   Tests/Procedures: CT, MRI, EKG.    Pertinent Labs/Lab Collection:      Plan: Continue w/POC.     Emergency contact, David NELSON, Expressed to this nurse she does not want any patient information shared with anyone except her or the patient's sister, Evelina. Also, no visitors except David and Evelina.

## 2020-07-17 NOTE — PLAN OF CARE
OT 6D: OT orders received and acknoweldged. Patient currently under observation status and per discussion with RN, patient with altered mental status and not medically appropriate for therapy at this time. Given decreased responsiveness patient may be transitioning to inpatient status. Will hold off on OT evaluation at this time and initiate as appropriate

## 2020-07-17 NOTE — PLAN OF CARE
"Observation Goals:     -diagnostic tests and consults completed and resulted: not met    -vital signs normal or at patient baseline: not met; hypertensive   -returns to baseline functional status: not met    -safe disposition plan has been identified: not met   BP (!) 147/110 (BP Location: Right arm)   Pulse 72   Temp 97.9  F (36.6  C) (Oral)   Resp 16   Ht 1.676 m (5' 6\")   SpO2 99%   BMI 19.95 kg/m       Vitals have been checked frequently due to high BP. Hydralazine PRN given x1. Home dose of amlodipine 5mg given. Pt up with one to bathroom. Commode ordered due to pt not being steady on his feet.  Bed alarm in place for safety. Will continue to monitor.    "

## 2020-07-17 NOTE — PROGRESS NOTES
The patient is a 59 yo male with hx of hepatitis B, liver cirrhosis, ascites, hyperension, and HLD who presents to the ED yesterday for fall.  He had been seen two other times in the ER yesterday.  He was admitted to ED obs for observation and found this morning by nursing staff to not be following directions, and drooling.  Neurology team was involved.  Eventually stroke team was also involved.  Neurology recommended brain MRI but felt this was more likely a metabolic encephalopathy.       Work up so far:     Ammonia less than 10 yesterday.  Will repeat today.  Cbc and comp met are at baseline.  Covid sent.  CXR yest shows no acute concerns.  Paracentesis scheduled for today. UA does not suggest uti.  Lactic acid normal.  tsh normal.  Lipase normal.       Yesterday in the ER he had Ct head and CT head and neck:    CTA head neck with contrast:                                                               Impression:    1. Head CTA demonstrates a 2 x 2 mm anterosuperiorly pointing saccular  aneurysm off the left supraclinoid ICA. The remaining major  intracranial arteries are patent with no evidence of stenosis or  aneurysm.  2. Neck CTA demonstrates no stenosis of the major cervical arteries.    Head CT without contrast:                                                                   Impression:  1. No acute intracranial pathology.   2. Extensive leukoaraiosis.    Neurology recommended repeat head CT today which showed no bleed.  Awaiting MRI brain.   Due to the patient being unable to talk, but can follow some commands though seems confused, I do not feel that he can stay on floor bed.  We have requested he go to a step down bed.  Neurology has requested medicine admission with neurology consulting.

## 2020-07-17 NOTE — PLAN OF CARE
PT 6D: Cancel- PT orders received. Patient currently under observation status, per discussion with RN patient with altered mental status, not medically appropriate for therapy at this time. Given decreased responsiveness patient may be transitioning to inpatient status. Will hold off on PT evaluation at this time.

## 2020-07-17 NOTE — PROVIDER NOTIFICATION
07/17/20 1100   Call Information   Date of Call 07/17/20   Time of Call 1122   Name of person requesting the team Ellyn NELSON   Title of person requesting team RN   RRT Arrival time 1122   Time RRT ended 1230   Reason for call   Type of RRT Adult   Primary reason for call Neurological;Respiratory   Respiratory Respiratory pattern change   Neurological Weakness   Was patient transferred from the ED, ICU, or PACU within last 24 hours prior to RRT call? Yes   SBAR   Situation Initially stroke code called ffor left sided weakness and right facial droop. CT/CTA completed, stroke code deescalated. Concern for change in respiratory pattern change, and snoring while alert.   Background Hx hepatitis B and liver cirrhosis. Admitted with AMS and is post fall.   Notable History/Conditions Hypertension   Assessment Pt is responsive to voice but non verbal. Left sided weakness and right facial droop with drooling. Currently protecting airway.   Interventions Labs;Blood glucose;Suction   Patient Outcome   Patient Outcome Stabilized on unit   RRT Team   Attending/Primary/Covering Physician ED OBS   Date Attending Physician notified 07/17/20   Time Attending Physician notified 1122   Physician(s) Jaye Holt NP   Lead RN Ellyn NELSON   RT Margie   Post RRT Intervention Assessment   Post RRT Assessment Stable/Improved   Date Follow Up Done 07/17/20   Time Follow Up Done 1430   Comments MRI with urgent results: Possible acute toxic leukoencephalopathy. Pt transfering to inpatient status.

## 2020-07-17 NOTE — PROGRESS NOTES
See Obs notes. Planned transfer to medicine for AMS. Before this writer could physically see patient he was brought to MRI.    On return from MRI, I was notified by neuro rad of multiple code strokes on MRI.    Called neuro and updated Dr. Condon. ? If patient would be best served on neuro service in light of new strokes and AMS. Their team will review chart and will provide recs ASAP.    Valarie Johnson PA-C     Addendum: Notified on 2nd call by neurorad of brainstem edema/inflammation, possible concern for demyelinating disease.     Discussed above findings again with neurology, Dr. Condon of neurostroke team. He notes there team will take over as primary & decide on dispo. Also discussed with Dr. Carvalho and notified cardiology of neuros hope for CLAUDIA today. Dr. Manley will be attending, with neurostroke team as primary. Please consult medicine if any acute medical issues arise that we could assist with.     Valarie Johnson PA-C

## 2020-07-17 NOTE — SIGNIFICANT EVENT
Wilfredo Yoon is a 60 year old male with history of hepatitis B, liver cirrhosis, ascites, hypertension, and HLD who was brought in via ambulance for a fall. He was seen in the ED 3 times yesterday. [place] due to abdominal pain and HTN. Blood pressure was treated with antihypertensives and he was discharged. He then presented to the ED at [facility] with AMS. Mental status cleared, CT Head w/o contrast and CTA Head and Neck were unremarkable for acute process. He was discharged. He was then walking to the blue line and fell. No injuries reported from fall. He again came to the ED. He was admitted to observation for PT/OT and possible placement. See H & P for more details.      Assumed care of this patient at 0700. At the time of arrival nursing had informed overnight CARLYLE that the patient was not responding verbally. Overnight CARLYLE had assessed the patient and stated he was sitting at bed-side, following commands, with equal strength bilateral. No focal neuro deficits.     Patient evaluated by this provider ~ 0830, resting in bed. Waking up and falling back asleep. Neuro exam difficult to assess due to mental status. Possible subtle left sided weakness with hand , 5/5 to right and 4/5 to left. Discussed with attending and Neurology. Etiology unclear as symptom waxing and waning AMS and CT head and CT A Head and Neck negative for acute process on 7/16.     Reassessed around 0900. Patient now alert, but not answering questions. Left sided deficit more pronounced. No able to sit up in bed. Drooling noted. Discussed with stroke team who recommended MRI. Also discussed with General Neurology who had already assessed the patient and agreed with MRI. Per Neurology, cancel stroke Neurology consult.     Patient with progressive weakness and then developed facial drop. Case discussed with General Neurology and Stroke team. Stroke Code was called. CT Head and CTA Head and Neck complete. Final read pending, but  nothing acute per Neurology.     Patient arrived back on floor. Due to AMS, rapid response was called. ABG ordered. Vital signs stable, sats in upper 90's on RA.     Clinical picture most concerning for acute stroke versus encephalopathy (infection, HTN, versus other).  Discussed transition to in-patient under Neurology team. Per Neurology believe stroke is unlikely and recommend admission to medicine with Neurology consult.     MRI already ordered. Nursing to obtain safety checklist and anticipate MRI around 1200 per MRI tech.     Continuous pulse ox, ABG, TELE, close nursing monitoring.     Waiting for call back from medicine triage, anticipate transition to in-patient status.     Discussed with: Attending Dr. Alicea.     Jaye Holt, NP, APRN CNP  Nurse Practitioner

## 2020-07-17 NOTE — CONSULTS
[facility]  Neurology Consultation    Patient Name:  Wilfredo Yoon  MRN:  [id number]    :  1960  Date of Service:  2020  [facility]      Neurology consultation service was asked to see Wilfredo Yoon by Jaye Black evaluate for altered mental status.    History of Present Illness:   Wilfredo Yoon is a 60 year old male with a PMHx of HTN, chronic Hepatitis B and cirrhosis a/w ascites who initially presented to [facility] with abdominal pain on . At that time he was requesting paracentesis but was found to have minimal distention, no findings concerning for SBP and was not meeting criteria for emergent paracentesis.  He was noted to be hypertensive (245/140) and noncompliant with his hypertensive medications and therefore was given 1 dose of hydralazine with improvement of blood to 170/108 and recommended follow-up for hypertension. He was then BIBA to [facility] a few hours later when he was found to be altered and lethargic.  After thorough history and examination in the ED he was found to be back to his baseline mental status and not altered. CT was reported at showing extensive leukoariosis but no acute intracranial pathology.  CT with patent intracranial and extracranial vessels.  He was deemed stable for discharge from the emergency room. During this ED visit BP was 156/99.  However patient returned to the ED a few hours later after a fall as he was heading towards the light rail.  Following this he was admitted under observation for PT/OT eval.     He remained hypertensive on arrival to the ED Obs (179/123).  Per H&P documented exam noted that he was awake, alert, and oriented with 5 out of 5 strength bilaterally.  This morning at approximately 7 AM patient was found to be altered and not responding to questions or commands.  There is also concern for some possible  left-sided weakness and therefore neurology was consulted to evaluate for altered mental status.  Upon my evaluation (with the help of an iPad ) patient was nonverbal and therefore I was unable to elicit adequate history from him.    ROS  A 10-point ROS was performed and is negative expect as per HPI.     PMH  Past Medical History:   Diagnosis Date     Cryoglobulinemia (H)      Glomerulonephritis      Hepatitis B      Hypertension      Surgical complication      Past Surgical History:   Procedure Laterality Date     C HAND/FINGER SURGERY UNLISTED       ESOPHAGOSCOPY, GASTROSCOPY, DUODENOSCOPY (EGD), COMBINED N/A 10/18/2018    Procedure: EGD;  Surgeon: Eber Ortez MD;  Location:  GI     HAND SURGERY Right        Medications   Medications Prior to Admission   Medication Sig Dispense Refill Last Dose     amLODIPine (NORVASC) 10 MG tablet Take 10 mg by mouth daily   not filled since 1/29/2020     bumetanide (BUMEX) 1 MG tablet Take 1 mg by mouth daily   7/15/2020 at Unknown time     entecavir (BARACLUDE) 1 MG tablet TAKE ONE TABLET BY MOUTH EVERY DAY 30 tablet 11 7/15/2020 at Unknown time     carvedilol (COREG) 12.5 MG tablet Take 12.5 mg by mouth 2 times daily (with meals)   not filled since 1/29/2020     lisinopril (ZESTRIL) 40 MG tablet Take 40 mg by mouth daily   not filled since 1/29/2020       Allergies  No Known Allergies    Social History  Social History     Tobacco Use     Smoking status: Current Some Day Smoker     Packs/day: 0.25     Years: 40.00     Pack years: 10.00     Types: Cigarettes     Smokeless tobacco: Never Used   Substance Use Topics     Alcohol use: No     Alcohol/week: 0.0 standard drinks       Family History    Family History   Problem Relation Age of Onset     Liver Cancer No family hx of      Hepatitis No family hx of          Physical Examination   Vitals: BP (!) 160/97 (BP Location: Right arm)   Pulse 63   Temp 98.2  F (36.8  C) (Oral)   Resp 16   Ht 1.676 m (5'  "6\")   SpO2 96%   BMI 19.95 kg/m    General: Adult, lying in bed  HEENT: NC/AT  Chest: Normal work of breathing in RA  Extremities: No LE swelling.  Skin: No rash or lesion.     Neuro:  Mental status: Eyes closed but with stimulation awakens and tracks. However, he is non-verbal and is unable to answer orientation questions or follow commands.   Cranial nerves: Eyes conjugate, PERRL, EOM appear intact, L facial droop,  sunable to assess remainder of Cranial Nerve exam.   Motor: Moving RUE and b/l LE spontaneously. However he is weak in the LUE and with L hand .  Reflexes: Normoreflexic and symmetric biceps, patellar, and achilles. Toes down-going on R, upgoing on L.   Sensory: Localizes noxious stimuli in RUE and b/l LE. Does not in LUE.   Coordination: Unable to assess  Gait: Unable to assess    Investigations   CT HEAD WO Contrast 7/16/20  Impression:  1. No acute intracranial pathology.   2. Extensive leukoaraiosis.    CTA HEAD & NECK 7/16/20  Impression:    1. Head CTA demonstrates a 2 x 2 mm anterosuperiorly pointing saccular  aneurysm off the left supraclinoid ICA. The remaining major  intracranial arteries are patent with no evidence of stenosis or  aneurysm.  2. Neck CTA demonstrates no stenosis of the major cervical arteries.    Laboratory tests including CBC, CMP, INR, lactic acid, ammonia reviewed.   CRP - 84 ESR 96  B12 - 308  BCx - NGTD     Impression & Recommendations  Wilfredo Yoon is a 60 year old male with a PMHx of HTN, chronic Hepatitis B and cirrhosis a/w ascites who initially presented to [facility] with abdominal pain on 7/16. He was noted to be hypertensive (245/140) and did not meet criteria for paracentesis and therefore was discharged home. He re-presented after friend found him to be altered and lethargic.  After thorough history and examination in the ED he was found to be back to his baseline mental status and not altered. CT was reported at showing extensive " leukoariosis but no acute intracranial pathology. Per my eye however, he had significant hyperdensities in the brainstem particularly in the mid-brain and linden in addition to cortical white matter changes and this finding was not seen on a previous MRI last October. CTA with patent intracranial and extracranial vessels.  He was deemed stable for discharge from the emergency room.  However patient returned to the ED a few hours later after a fall as he was heading towards the light rail.  Following this he was admitted under observation for PT/OT eval. We were consulted to evaluate him as he was found to be altered this morning. He was non-verbal but awake and tracking. He had LUE weakness and L facial droop. LKWT was unclear but stroke code was called. He had a repeat CT/CTA which again showed no acute pathology and patent intracranial vessels. Given focal findings on exam, altered mental status and CT findings we recommended MRI Brain due to concern for ischemic infarct. MRI Brain with extensive T2 hypertentense signal involving the midbrain and linden as well cortical white matter. He also has scattered bilateral ischemic infarcts (bilateral frontal subcortical and right frontal). Based on this he will be admitted to the Stroke Service for stroke evaluation and will undergo further work up with CLAUDIA.     Unclear etiology of what his white matter FLAR changes indicates. These changes have a broad differential including infratentorial posterior reversible encephalopathy syndrome (given elevated BP), demyelinating disease such as NMOSD/MOG, severe metabolic/toxic disturbances, CLIPPERS,  central pontine myelinosis, infectious rhomboencephalitis (bacterial such as tubercular or listeria rhomboencephalitis, or viral HSV, EBV or HHV6 rhomboencephalitis) or neoplastic etiologies such as glioma or lymphoma.  He has elevated ESR/CRP which would potentially point to an infectious etiology. Elevated BPs would be concerning for  infratentorial PRES.    Given his multifocal embolic infarcts he will be admitted to the NCC/Stroke service. We recommend further work up with a CSF (to send for glucose, protein, culture, gram stain, cell count, HSV, EBV, OCBs and freeze sample for future testing). Also recommending CNS Demyelinating panel (ordered for you) and MRI Brain W/ Contrast to look for enhancement. Aslo recommending HIV and Quantiferon Gold. Will ultimately defer work up to Stroke Team.     Thank you for involving neurology in the care of Wilfredo Yoon.  Please do not hesitate to call with questions/concerns (consult pager [phone]).      Patient was seen and discussed with Dr. Carvalho.     Janina Eaton  Neurology Resident, PGY4  Pager: [phone]

## 2020-07-17 NOTE — CONSULTS
[facility], [place]    Stroke Consult Note    Reason for Consult: Stroke Code    Chief Complaint: Altered Mental Status      HPI  Wilfredo Yoon is a 60 year old male with past medical history of hypertension, hepatitis B and cirrhosis who was presented to [facility] yesterday for altered mental status.  After extensive work-up he was found to have extensive leukoariosis and no acute intracranial finding and as he was back to baseline mentation he was discharged.  However he returned to the ED after a few hours stating that he had a fall after hitting out from the ED. he was admitted for further work-up and evaluation.  Per nursing since admission it has been difficult to communicate with him as he is [place] speaking, however he was following commands through the .  About 7:30 this morning he was found to be more altered and not following commands. Initially general neurology service was consulted, and as there was some concern for acute left-sided weakness, stroke code was activated on him.    TPA Treatment   Not given due to stroke mimic: Encephalopathy.    Endovascular Treatment  Not indicated    Impression  Initial examination was concerning for altered mental status and possible left lower extremity weakness.  CT scan with no acute intracranial pathology and same as yesterday CT scan, Extensive leukoaraiosis.  CTA unremarkable. given the focal finding on the exam and concern for acute infarction MRI was ordered. Patient has extensive T2 hyperintense signal involving in brainstem and bilateral white matter.  Differential diagnosis include neoplastic, Viral infection, toxic metabolic disturbances, vasculitis, demyelinating dis and TB. Would recommend repeat MRI with contrast, and CSF analysis for further wo.    Recommendations  - Admit to stroke service  - Neurochecks Q 4 hours  - MRI brain with contrast  - Lumbar puncture tomorrow  - Quantiferon Gold  - TTE with  Bubble Study  - Telemetry, EKG  - Bedside Glucose Monitoring  - A1c, Lipid Panel, Troponin x 3  - PT/OT/SLP  - Stroke Education  - Euthermia, Euglycemia      Patient Follow-up    - final recommendation pending work-up    Thank you for this consult. We will continue to follow.     Mirella Hernandez MD  Neurology Resident PGY2  Pager [phone]    ______________________________________________________    Past Medical History   Past Medical History:   Diagnosis Date     Cryoglobulinemia (H)      Glomerulonephritis      Hepatitis B      Hypertension      Surgical complication      Past Surgical History   Past Surgical History:   Procedure Laterality Date     C HAND/FINGER SURGERY UNLISTED       ESOPHAGOSCOPY, GASTROSCOPY, DUODENOSCOPY (EGD), COMBINED N/A 10/18/2018    Procedure: EGD;  Surgeon: Eber Ortez MD;  Location:  GI     HAND SURGERY Right      Medications   Home Meds  Prior to Admission medications    Medication Sig Start Date End Date Taking? Authorizing Provider   amLODIPine (NORVASC) 10 MG tablet Take 10 mg by mouth daily   Yes Unknown, Entered By History   bumetanide (BUMEX) 1 MG tablet Take 1 mg by mouth daily   Yes Unknown, Entered By History   entecavir (BARACLUDE) 1 MG tablet TAKE ONE TABLET BY MOUTH EVERY DAY 4/2/20  Yes Eber Ortez MD   carvedilol (COREG) 12.5 MG tablet Take 12.5 mg by mouth 2 times daily (with meals)    Unknown, Entered By History   lisinopril (ZESTRIL) 40 MG tablet Take 40 mg by mouth daily    Unknown, Entered By History       Scheduled Meds    amLODIPine  10 mg Oral Daily     bumetanide  1 mg Oral Daily     entecavir  1 mg Oral Daily     lisinopril  40 mg Oral Daily     sodium chloride (PF)  3 mL Intracatheter Q8H       PRN Meds  hydrALAZINE, lidocaine 4%, lidocaine (buffered or not buffered), melatonin, naloxone, ondansetron **OR** ondansetron, sodium chloride (PF)    Allergies   No Known Allergies  Family History   Family History   Problem Relation Age of  Onset     Liver Cancer No family hx of      Hepatitis No family hx of      Social History   Social History     Tobacco Use     Smoking status: Current Some Day Smoker     Packs/day: 0.25     Years: 40.00     Pack years: 10.00     Types: Cigarettes     Smokeless tobacco: Never Used   Substance Use Topics     Alcohol use: No     Alcohol/week: 0.0 standard drinks     Drug use: No       Review of Systems   The 10 point Review of Systems is negative other than noted in the HPI or here.        PHYSICAL EXAMINATION  Temp:  [97.9  F (36.6  C)-98.4  F (36.9  C)] 98.3  F (36.8  C)  Pulse:  [] 70  Heart Rate:  [] 73  Resp:  [12-16] 12  BP: (128-185)/() 148/98  SpO2:  [94 %-99 %] 99 %     General:  patient lying in bed without any acute distress    HEENT:  normocephalic/atraumatic  Cardio:  RRR  Pulmonary:  no respiratory distress  Abdomen:  soft  Extremities:  no edema  Skin:  intact       Dysphagia Screen  Speech consult, pending    Stroke Scales    NIHSS  Interval baseline (07/17/20 1927)   Interval Comments     1a. Level of Consciousness 1-->Not alert, but arousable by minor stimulation to obey, answer, or respond   1b. LOC Questions 2-->Answers neither question correctly   1c. LOC Commands 2-->Performs neither task correctly   2.   Best Gaze 0-->Normal   3.   Visual 0-->No visual loss   4.   Facial Palsy 1-->Minor paralysis (flattened nasolabial fold, asymmetry on smiling)   5a. Motor Arm, Left 0-->No drift, limb holds 90 (or 45) degrees for full 10 secs   5b. Motor Arm, Right 0-->No drift, limb holds 90 (or 45) degrees for full 10 secs   6a. Motor Leg, Left 3-->No effort against gravity, leg falls to bed immediately   6b. Motor Leg, right 0-->No drift, leg holds 30 degree position for full 5 secs   7.   Limb Ataxia 0-->Absent   8.   Sensory 0-->Normal, no sensory loss   9.   Best Language 1-->Mild-to-moderate aphasia, some obvious loss of fluency or facility of comprehension, without significant  limitation on ideas expressed or form of expression. Reduction of speech and/or comprehension, however, makes conversation. . . (see row details)   10. Dysarthria (UN) Intubated or other physical barrier   11. Extinction and Inattention  0-->No abnormality   Total 10 (07/17/20 1927)       Imaging  I personally reviewed all imaging; relevant findings per HPI.     Lab Results Data   CBC  Recent Labs   Lab 07/17/20  0610 07/16/20  1314 07/16/20  0926   WBC 6.1 6.7 6.4   RBC 3.56* 4.01* 4.05*   HGB 11.4* 13.1* 13.5   HCT 34.8* 38.9* 38.7*   * 96* 92*     Basic Metabolic Panel    Recent Labs   Lab 07/17/20  0610 07/16/20  1314 07/16/20  0926    134 138   POTASSIUM 3.6 4.6 3.7   CHLORIDE 110* 106 106   CO2 24 25 27   BUN 23 22 21   CR 1.52* 1.49* 1.53*   GLC 87 93 93   SOTERO 8.0* 8.2* 8.5     Liver Panel  Recent Labs   Lab 07/17/20  0856 07/17/20  0610 07/16/20  1314 07/16/20  0926   PROTTOTAL  --  5.4* 6.2* 6.4*   ALBUMIN 1.6* 1.4* 1.7* 1.7*   BILITOTAL  --  0.4 1.0 1.1   ALKPHOS  --  214* 238* 254*   AST  --  28 Canceled, Test credited 41   ALT  --  21 27 28     INR    Recent Labs   Lab Test 07/16/20  0926 06/14/20  1426 10/21/19  1557   INR 1.09 1.06 1.07      Lipid Profile    Recent Labs   Lab Test 08/01/18  0648 11/04/15  1555   CHOL 254* 171   HDL 29* 27*   * 122   TRIG 120 111   CHOLHDLRATIO  --  6.3*     A1C    Recent Labs   Lab Test 11/04/15  1555   A1C 5.3     Troponin I    Recent Labs   Lab 07/16/20  1314   TROPI <0.015          Stroke Code / Stroke Consult Data Data   Stroke Code Data  (for stroke code without tele)  Stroke code activated 07/17/20   1045   First stroke provider response 07/17/20   1048   Last known normal         Time of discovery   (or onset of symptoms)         Head CT read by me 07/17/20   1121   Was stroke code de-escalated? Yes

## 2020-07-18 ENCOUNTER — ANESTHESIA (OUTPATIENT)
Dept: SURGERY | Facility: CLINIC | Age: 60
End: 2020-07-18
Payer: COMMERCIAL

## 2020-07-18 ENCOUNTER — APPOINTMENT (OUTPATIENT)
Dept: MRI IMAGING | Facility: CLINIC | Age: 60
End: 2020-07-18
Attending: PSYCHIATRY & NEUROLOGY
Payer: COMMERCIAL

## 2020-07-18 ENCOUNTER — ANESTHESIA EVENT (OUTPATIENT)
Dept: SURGERY | Facility: CLINIC | Age: 60
End: 2020-07-18
Payer: COMMERCIAL

## 2020-07-18 ENCOUNTER — APPOINTMENT (OUTPATIENT)
Dept: INTERPRETER SERVICES | Facility: CLINIC | Age: 60
End: 2020-07-18
Payer: COMMERCIAL

## 2020-07-18 ENCOUNTER — APPOINTMENT (OUTPATIENT)
Dept: MRI IMAGING | Facility: CLINIC | Age: 60
End: 2020-07-18
Payer: COMMERCIAL

## 2020-07-18 LAB
ANION GAP SERPL CALCULATED.3IONS-SCNC: 7 MMOL/L (ref 3–14)
BACTERIA SPEC CULT: NO GROWTH
BASOPHILS # BLD AUTO: 0.1 10E9/L (ref 0–0.2)
BASOPHILS NFR BLD AUTO: 0.8 %
BUN SERPL-MCNC: 22 MG/DL (ref 7–30)
CALCIUM SERPL-MCNC: 8.3 MG/DL (ref 8.5–10.1)
CHLORIDE SERPL-SCNC: 112 MMOL/L (ref 94–109)
CHOLEST SERPL-MCNC: 222 MG/DL
CO2 SERPL-SCNC: 22 MMOL/L (ref 20–32)
CREAT SERPL-MCNC: 1.5 MG/DL (ref 0.66–1.25)
CREAT SERPL-MCNC: 1.54 MG/DL (ref 0.66–1.25)
CRP SERPL-MCNC: 50 MG/L (ref 0–8)
DIFFERENTIAL METHOD BLD: ABNORMAL
EOSINOPHIL # BLD AUTO: 0.2 10E9/L (ref 0–0.7)
EOSINOPHIL NFR BLD AUTO: 3.7 %
ERYTHROCYTE [DISTWIDTH] IN BLOOD BY AUTOMATED COUNT: 13.2 % (ref 10–15)
ERYTHROCYTE [SEDIMENTATION RATE] IN BLOOD BY WESTERGREN METHOD: 119 MM/H (ref 0–20)
GFR SERPL CREATININE-BSD FRML MDRD: 49 ML/MIN/{1.73_M2}
GFR SERPL CREATININE-BSD FRML MDRD: 50 ML/MIN/{1.73_M2}
GLUCOSE BLDC GLUCOMTR-MCNC: 112 MG/DL (ref 70–99)
GLUCOSE BLDC GLUCOMTR-MCNC: 126 MG/DL (ref 70–99)
GLUCOSE BLDC GLUCOMTR-MCNC: 59 MG/DL (ref 70–99)
GLUCOSE BLDC GLUCOMTR-MCNC: 69 MG/DL (ref 70–99)
GLUCOSE BLDC GLUCOMTR-MCNC: 77 MG/DL (ref 70–99)
GLUCOSE SERPL-MCNC: 68 MG/DL (ref 70–99)
HBA1C MFR BLD: 4.7 % (ref 0–5.6)
HCT VFR BLD AUTO: 35.1 % (ref 40–53)
HDLC SERPL-MCNC: 29 MG/DL
HGB BLD-MCNC: 11.3 G/DL (ref 13.3–17.7)
IMM GRANULOCYTES # BLD: 0 10E9/L (ref 0–0.4)
IMM GRANULOCYTES NFR BLD: 0.3 %
INTERPRETATION ECG - MUSE: NORMAL
LDLC SERPL CALC-MCNC: 172 MG/DL
LYMPHOCYTES # BLD AUTO: 1.6 10E9/L (ref 0.8–5.3)
LYMPHOCYTES NFR BLD AUTO: 26.6 %
Lab: NORMAL
MCH RBC QN AUTO: 32.2 PG (ref 26.5–33)
MCHC RBC AUTO-ENTMCNC: 32.2 G/DL (ref 31.5–36.5)
MCV RBC AUTO: 100 FL (ref 78–100)
MONOCYTES # BLD AUTO: 0.6 10E9/L (ref 0–1.3)
MONOCYTES NFR BLD AUTO: 8.9 %
NEUTROPHILS # BLD AUTO: 3.7 10E9/L (ref 1.6–8.3)
NEUTROPHILS NFR BLD AUTO: 59.7 %
NONHDLC SERPL-MCNC: 193 MG/DL
NRBC # BLD AUTO: 0 10*3/UL
NRBC BLD AUTO-RTO: 0 /100
PLATELET # BLD AUTO: 148 10E9/L (ref 150–450)
PLATELET # BLD AUTO: 152 10E9/L (ref 150–450)
POTASSIUM SERPL-SCNC: 3.6 MMOL/L (ref 3.4–5.3)
RBC # BLD AUTO: 3.51 10E12/L (ref 4.4–5.9)
SODIUM SERPL-SCNC: 141 MMOL/L (ref 133–144)
SPECIMEN SOURCE: NORMAL
TRIGL SERPL-MCNC: 106 MG/DL
TSH SERPL DL<=0.005 MIU/L-ACNC: 3.83 MU/L (ref 0.4–4)
WBC # BLD AUTO: 6.2 10E9/L (ref 4–11)

## 2020-07-18 PROCEDURE — 36415 COLL VENOUS BLD VENIPUNCTURE: CPT | Performed by: STUDENT IN AN ORGANIZED HEALTH CARE EDUCATION/TRAINING PROGRAM

## 2020-07-18 PROCEDURE — 86255 FLUORESCENT ANTIBODY SCREEN: CPT | Performed by: STUDENT IN AN ORGANIZED HEALTH CARE EDUCATION/TRAINING PROGRAM

## 2020-07-18 PROCEDURE — 87517 HEPATITIS B DNA QUANT: CPT | Performed by: STUDENT IN AN ORGANIZED HEALTH CARE EDUCATION/TRAINING PROGRAM

## 2020-07-18 PROCEDURE — 85652 RBC SED RATE AUTOMATED: CPT | Performed by: STUDENT IN AN ORGANIZED HEALTH CARE EDUCATION/TRAINING PROGRAM

## 2020-07-18 PROCEDURE — 25800030 ZZH RX IP 258 OP 636: Performed by: NURSE ANESTHETIST, CERTIFIED REGISTERED

## 2020-07-18 PROCEDURE — 86038 ANTINUCLEAR ANTIBODIES: CPT | Performed by: STUDENT IN AN ORGANIZED HEALTH CARE EDUCATION/TRAINING PROGRAM

## 2020-07-18 PROCEDURE — 25000128 H RX IP 250 OP 636: Performed by: NURSE PRACTITIONER

## 2020-07-18 PROCEDURE — 86140 C-REACTIVE PROTEIN: CPT | Performed by: STUDENT IN AN ORGANIZED HEALTH CARE EDUCATION/TRAINING PROGRAM

## 2020-07-18 PROCEDURE — 25800025 ZZH RX 258: Performed by: PSYCHIATRY & NEUROLOGY

## 2020-07-18 PROCEDURE — A9585 GADOBUTROL INJECTION: HCPCS | Performed by: RADIOLOGY

## 2020-07-18 PROCEDURE — 85025 COMPLETE CBC W/AUTO DIFF WBC: CPT | Performed by: STUDENT IN AN ORGANIZED HEALTH CARE EDUCATION/TRAINING PROGRAM

## 2020-07-18 PROCEDURE — 40000171 ZZH STATISTIC PRE-PROCEDURE ASSESSMENT III

## 2020-07-18 PROCEDURE — 80061 LIPID PANEL: CPT | Performed by: STUDENT IN AN ORGANIZED HEALTH CARE EDUCATION/TRAINING PROGRAM

## 2020-07-18 PROCEDURE — 25000128 H RX IP 250 OP 636: Performed by: PSYCHIATRY & NEUROLOGY

## 2020-07-18 PROCEDURE — 82164 ANGIOTENSIN I ENZYME TEST: CPT | Performed by: STUDENT IN AN ORGANIZED HEALTH CARE EDUCATION/TRAINING PROGRAM

## 2020-07-18 PROCEDURE — 25000128 H RX IP 250 OP 636: Performed by: NURSE ANESTHETIST, CERTIFIED REGISTERED

## 2020-07-18 PROCEDURE — 72156 MRI NECK SPINE W/O & W/DYE: CPT

## 2020-07-18 PROCEDURE — 25000125 ZZHC RX 250: Performed by: NURSE ANESTHETIST, CERTIFIED REGISTERED

## 2020-07-18 PROCEDURE — 85049 AUTOMATED PLATELET COUNT: CPT | Performed by: STUDENT IN AN ORGANIZED HEALTH CARE EDUCATION/TRAINING PROGRAM

## 2020-07-18 PROCEDURE — 37000009 ZZH ANESTHESIA TECHNICAL FEE, EACH ADDTL 15 MIN

## 2020-07-18 PROCEDURE — 84443 ASSAY THYROID STIM HORMONE: CPT | Performed by: STUDENT IN AN ORGANIZED HEALTH CARE EDUCATION/TRAINING PROGRAM

## 2020-07-18 PROCEDURE — 00000146 ZZHCL STATISTIC GLUCOSE BY METER IP

## 2020-07-18 PROCEDURE — 71000015 ZZH RECOVERY PHASE 1 LEVEL 2 EA ADDTL HR

## 2020-07-18 PROCEDURE — 25000132 ZZH RX MED GY IP 250 OP 250 PS 637: Performed by: NURSE PRACTITIONER

## 2020-07-18 PROCEDURE — 87389 HIV-1 AG W/HIV-1&-2 AB AG IA: CPT | Performed by: STUDENT IN AN ORGANIZED HEALTH CARE EDUCATION/TRAINING PROGRAM

## 2020-07-18 PROCEDURE — 86803 HEPATITIS C AB TEST: CPT | Performed by: STUDENT IN AN ORGANIZED HEALTH CARE EDUCATION/TRAINING PROGRAM

## 2020-07-18 PROCEDURE — 82565 ASSAY OF CREATININE: CPT | Performed by: STUDENT IN AN ORGANIZED HEALTH CARE EDUCATION/TRAINING PROGRAM

## 2020-07-18 PROCEDURE — 80048 BASIC METABOLIC PNL TOTAL CA: CPT | Performed by: STUDENT IN AN ORGANIZED HEALTH CARE EDUCATION/TRAINING PROGRAM

## 2020-07-18 PROCEDURE — 25800025 ZZH RX 258: Performed by: ANESTHESIOLOGY

## 2020-07-18 PROCEDURE — 12000001 ZZH R&B MED SURG/OB UMMC

## 2020-07-18 PROCEDURE — 25000566 ZZH SEVOFLURANE, EA 15 MIN

## 2020-07-18 PROCEDURE — 37000008 ZZH ANESTHESIA TECHNICAL FEE, 1ST 30 MIN

## 2020-07-18 PROCEDURE — 83036 HEMOGLOBIN GLYCOSYLATED A1C: CPT | Performed by: STUDENT IN AN ORGANIZED HEALTH CARE EDUCATION/TRAINING PROGRAM

## 2020-07-18 PROCEDURE — 71000014 ZZH RECOVERY PHASE 1 LEVEL 2 FIRST HR

## 2020-07-18 PROCEDURE — 70553 MRI BRAIN STEM W/O & W/DYE: CPT

## 2020-07-18 PROCEDURE — 25000125 ZZHC RX 250: Performed by: STUDENT IN AN ORGANIZED HEALTH CARE EDUCATION/TRAINING PROGRAM

## 2020-07-18 PROCEDURE — 25500064 ZZH RX 255 OP 636: Performed by: RADIOLOGY

## 2020-07-18 RX ORDER — PROPOFOL 10 MG/ML
INJECTION, EMULSION INTRAVENOUS PRN
Status: DISCONTINUED | OUTPATIENT
Start: 2020-07-18 | End: 2020-07-18

## 2020-07-18 RX ORDER — ONDANSETRON 2 MG/ML
INJECTION INTRAMUSCULAR; INTRAVENOUS PRN
Status: DISCONTINUED | OUTPATIENT
Start: 2020-07-18 | End: 2020-07-18

## 2020-07-18 RX ORDER — ENTECAVIR 0.5 MG/1
0.5 TABLET, FILM COATED ORAL DAILY
Status: DISCONTINUED | OUTPATIENT
Start: 2020-07-19 | End: 2020-07-28

## 2020-07-18 RX ORDER — SODIUM CHLORIDE, SODIUM LACTATE, POTASSIUM CHLORIDE, CALCIUM CHLORIDE 600; 310; 30; 20 MG/100ML; MG/100ML; MG/100ML; MG/100ML
INJECTION, SOLUTION INTRAVENOUS CONTINUOUS PRN
Status: DISCONTINUED | OUTPATIENT
Start: 2020-07-18 | End: 2020-07-18

## 2020-07-18 RX ORDER — GADOBUTROL 604.72 MG/ML
0.1 INJECTION INTRAVENOUS ONCE
Status: COMPLETED | OUTPATIENT
Start: 2020-07-18 | End: 2020-07-18

## 2020-07-18 RX ORDER — PROPOFOL 10 MG/ML
INJECTION, EMULSION INTRAVENOUS CONTINUOUS PRN
Status: DISCONTINUED | OUTPATIENT
Start: 2020-07-18 | End: 2020-07-18

## 2020-07-18 RX ORDER — LABETALOL 20 MG/4 ML (5 MG/ML) INTRAVENOUS SYRINGE
10-20 EVERY 4 HOURS PRN
Status: DISCONTINUED | OUTPATIENT
Start: 2020-07-18 | End: 2020-07-24

## 2020-07-18 RX ORDER — DEXTROSE MONOHYDRATE 25 G/50ML
25 INJECTION, SOLUTION INTRAVENOUS ONCE
Status: COMPLETED | OUTPATIENT
Start: 2020-07-18 | End: 2020-07-18

## 2020-07-18 RX ORDER — LIDOCAINE HYDROCHLORIDE 20 MG/ML
INJECTION, SOLUTION INFILTRATION; PERINEURAL PRN
Status: DISCONTINUED | OUTPATIENT
Start: 2020-07-18 | End: 2020-07-18

## 2020-07-18 RX ORDER — ESMOLOL HYDROCHLORIDE 10 MG/ML
INJECTION INTRAVENOUS PRN
Status: DISCONTINUED | OUTPATIENT
Start: 2020-07-18 | End: 2020-07-18

## 2020-07-18 RX ORDER — SODIUM CHLORIDE, SODIUM GLUCONATE, SODIUM ACETATE, POTASSIUM CHLORIDE AND MAGNESIUM CHLORIDE 526; 502; 368; 37; 30 MG/100ML; MG/100ML; MG/100ML; MG/100ML; MG/100ML
100 INJECTION, SOLUTION INTRAVENOUS CONTINUOUS
Status: DISCONTINUED | OUTPATIENT
Start: 2020-07-18 | End: 2020-07-21

## 2020-07-18 RX ORDER — LIDOCAINE 40 MG/G
CREAM TOPICAL
Status: DISCONTINUED | OUTPATIENT
Start: 2020-07-18 | End: 2020-07-18 | Stop reason: HOSPADM

## 2020-07-18 RX ORDER — DEXTROSE MONOHYDRATE 25 G/50ML
INJECTION, SOLUTION INTRAVENOUS
Status: DISCONTINUED
Start: 2020-07-18 | End: 2020-07-18 | Stop reason: HOSPADM

## 2020-07-18 RX ORDER — FENTANYL CITRATE 50 UG/ML
INJECTION, SOLUTION INTRAMUSCULAR; INTRAVENOUS PRN
Status: DISCONTINUED | OUTPATIENT
Start: 2020-07-18 | End: 2020-07-18

## 2020-07-18 RX ADMIN — ENOXAPARIN SODIUM 40 MG: 40 INJECTION SUBCUTANEOUS at 08:58

## 2020-07-18 RX ADMIN — PROPOFOL 50 MG: 10 INJECTION, EMULSION INTRAVENOUS at 13:13

## 2020-07-18 RX ADMIN — ENTECAVIR 1 MG: 1 TABLET ORAL at 08:09

## 2020-07-18 RX ADMIN — AMLODIPINE BESYLATE 10 MG: 10 TABLET ORAL at 08:09

## 2020-07-18 RX ADMIN — DEXTROSE MONOHYDRATE 25 ML: 500 INJECTION PARENTERAL at 10:00

## 2020-07-18 RX ADMIN — GADOBUTROL 5.6 ML: 604.72 INJECTION INTRAVENOUS at 13:34

## 2020-07-18 RX ADMIN — PHENYLEPHRINE HYDROCHLORIDE 150 MCG: 10 INJECTION INTRAVENOUS at 14:24

## 2020-07-18 RX ADMIN — PROPOFOL 100 MG: 10 INJECTION, EMULSION INTRAVENOUS at 14:44

## 2020-07-18 RX ADMIN — PHENYLEPHRINE HYDROCHLORIDE 100 MCG: 10 INJECTION INTRAVENOUS at 14:04

## 2020-07-18 RX ADMIN — LIDOCAINE HYDROCHLORIDE 100 MG: 20 INJECTION, SOLUTION INFILTRATION; PERINEURAL at 13:10

## 2020-07-18 RX ADMIN — FENTANYL CITRATE 50 MCG: 50 INJECTION, SOLUTION INTRAMUSCULAR; INTRAVENOUS at 13:15

## 2020-07-18 RX ADMIN — PHENYLEPHRINE HYDROCHLORIDE 100 MCG: 10 INJECTION INTRAVENOUS at 14:58

## 2020-07-18 RX ADMIN — ONDANSETRON 4 MG: 2 INJECTION INTRAMUSCULAR; INTRAVENOUS at 13:21

## 2020-07-18 RX ADMIN — SODIUM CHLORIDE, SODIUM GLUCONATE, SODIUM ACETATE, POTASSIUM CHLORIDE AND MAGNESIUM CHLORIDE 1000 ML: 526; 502; 368; 37; 30 INJECTION, SOLUTION INTRAVENOUS at 19:03

## 2020-07-18 RX ADMIN — ROCURONIUM BROMIDE 100 MG: 10 INJECTION INTRAVENOUS at 13:10

## 2020-07-18 RX ADMIN — HYDRALAZINE HYDROCHLORIDE 10 MG: 20 INJECTION, SOLUTION INTRAMUSCULAR; INTRAVENOUS at 23:58

## 2020-07-18 RX ADMIN — PROPOFOL 150 MG: 10 INJECTION, EMULSION INTRAVENOUS at 13:10

## 2020-07-18 RX ADMIN — PROPOFOL 50 MG: 10 INJECTION, EMULSION INTRAVENOUS at 14:50

## 2020-07-18 RX ADMIN — ESMOLOL HYDROCHLORIDE 10 MG: 10 INJECTION, SOLUTION INTRAVENOUS at 13:15

## 2020-07-18 RX ADMIN — SODIUM CHLORIDE, POTASSIUM CHLORIDE, SODIUM LACTATE AND CALCIUM CHLORIDE: 600; 310; 30; 20 INJECTION, SOLUTION INTRAVENOUS at 13:08

## 2020-07-18 RX ADMIN — BUMETANIDE 1 MG: 1 TABLET ORAL at 08:09

## 2020-07-18 RX ADMIN — FENTANYL CITRATE 50 MCG: 50 INJECTION, SOLUTION INTRAMUSCULAR; INTRAVENOUS at 13:10

## 2020-07-18 RX ADMIN — SUGAMMADEX 200 MG: 100 INJECTION, SOLUTION INTRAVENOUS at 15:18

## 2020-07-18 RX ADMIN — DEXTROSE 50 % IN WATER (D50W) INTRAVENOUS SYRINGE 25 ML: at 17:10

## 2020-07-18 RX ADMIN — PROPOFOL 175 MCG/KG/MIN: 10 INJECTION, EMULSION INTRAVENOUS at 13:15

## 2020-07-18 RX ADMIN — ESMOLOL HYDROCHLORIDE 10 MG: 10 INJECTION, SOLUTION INTRAVENOUS at 14:45

## 2020-07-18 ASSESSMENT — ACTIVITIES OF DAILY LIVING (ADL)
ADLS_ACUITY_SCORE: 18
ADLS_ACUITY_SCORE: 22

## 2020-07-18 NOTE — PLAN OF CARE
Neuro: Pt is intermittently alert, KENDAL orientation. Able to follow few simple commands. Does not speak, unable to make most needs known.  Cardiac: SR. Slightly hypertensive intermittently, no concerns.  VSS.   Respiratory: Sating >96% on RA. No SOB noted.  GI/: Adequate urine output via urinal, intermittently incontinent. No BM this shift.  Diet/appetite: NPO, took meds appropriately. BG 59, MD aware, 25 of D50 given x1.   Activity:  Independently positioning in bed, attempted to stand with Ax2 and GB but pt was too weak.   Pain: KENDAL, no signs of pain  Skin: No new deficits noted.  LDA's: Right PIV x2 SL.    Plan: Repeat MRI done today. Possible CLAUDIA Sunday or Monday. Niece and sister allowed to get updates. Continue with POC. Notify primary team with changes.

## 2020-07-18 NOTE — PLAN OF CARE
Arrived from: 6D by bed at 2100  Belongings/meds:  Cane, clothes, shoes  2 RN Skin Assessment Completed by: Amanda Smiley RN and Melinda Carlton RN   Non-intact findings documented (yes/no/NA): Scab on R knee, otherwise intact.    Vitals: VSS on RA ex HTN. PRN hydralazine and scheduled lisinopril given.  Neuros: Difficult to assess. BUE 4/5, BLE 3/5. Does not follow commands. Mumbles, but does not verbalize enough for  to understand.  IV: PIV SL x2.   Resp/trach: Expiratory and inspiratory wheezing. No shortness of breath noted.   Diet: NPO ex meds.   Bowel status: BS+. Unsure of last BM date.   : Voids spontaneously via bedside urinal.   Skin: Intact ex scab on R knee.   Pain: Denies, no signs of pain.   Activity: Bedrest.   Social: Niece and sister are only contacts allowed to receive updates or information.   Plan: CLAUDIA and repeat MRI tomorrow. Continue to monitor and follow plan of care.

## 2020-07-18 NOTE — ANESTHESIA POSTPROCEDURE EVALUATION
Anesthesia POST Procedure Evaluation    Patient: Wilfredo Yoon   MRN:     [id number] Gender:   male   Age:    60 year old :      1960        Preoperative Diagnosis: History of MRI of brain and brain stem [Z92.89]   Procedure(s):  ANESTHESIA OUT OF OR MRI   Postop Comments: No value filed.     Anesthesia Type: General       Disposition: Admission   Postop Pain Control: Uneventful            Sign Out: Well controlled pain   PONV: No   Neuro/Psych: Uneventful            Sign Out: Acceptable/Baseline neuro status   Airway/Respiratory: Uneventful            Sign Out: AIRWAY IN SITU/Resp. Support   CV/Hemodynamics: Uneventful            Sign Out: Acceptable CV status   Other NRE: NONE   DID A NON-ROUTINE EVENT OCCUR? No         Last Anesthesia Record Vitals:      Last PACU Vitals:  Vitals Value Taken Time   /92 2020  5:00 PM   Temp 36.5  C (97.7  F) 2020  3:45 PM   Pulse 64 2020  5:00 PM   Resp 11 2020  4:45 PM   SpO2 98 % 2020  5:09 PM   Temp src     NIBP 145/97 2020  3:20 PM   Pulse 62 2020  3:20 PM   SpO2 100 % 2020  3:20 PM   Resp     Temp     Ht Rate     Temp 2     Vitals shown include unvalidated device data.      Electronically Signed By: Rosanegla Zabala MD, 2020, 5:10 PM

## 2020-07-18 NOTE — PROVIDER NOTIFICATION
Pt's blood sugar 59 MD notified and asked for protocol orders.    No protocol orders to be placed, MD said to give 25 of D50

## 2020-07-18 NOTE — OR NURSING
Dr Zabala to assess pt at bedside at 1715 ok to send pt to floor per anesthesia. [employer] ipad used throughout stay in PACU while awake.

## 2020-07-18 NOTE — ANESTHESIA PREPROCEDURE EVALUATION
Anesthesia Pre-Procedure Evaluation    Patient: Wilfredo Yoon   MRN:     [id number] Gender:   male   Age:    60 year old :      1960        Preoperative Diagnosis: History of MRI of brain and brain stem [Z92.89]   Procedure(s):  ANESTHESIA OUT OF OR MRI     LABS:  CBC:   Lab Results   Component Value Date    WBC 6.2 2020    WBC 6.1 2020    HGB 11.3 (L) 2020    HGB 11.4 (L) 2020    HCT 35.1 (L) 2020    HCT 34.8 (L) 2020     2020     (L) 2020     BMP:   Lab Results   Component Value Date     2020     2020    POTASSIUM 3.6 2020    POTASSIUM 3.6 2020    CHLORIDE 112 (H) 2020    CHLORIDE 110 (H) 2020    CO2 22 2020    CO2 24 2020    BUN 22 2020    BUN 23 2020    CR 1.54 (H) 2020    CR 1.50 (H) 2020    GLC 68 (L) 2020    GLC 87 2020     COAGS:   Lab Results   Component Value Date    PTT 30 10/21/2019    INR 1.09 2020     POC:   Lab Results   Component Value Date    BGM 77 2020     OTHER:   Lab Results   Component Value Date    PH 7.42 2020    LACT 0.9 2020    A1C 4.7 2020    SOTERO 8.3 (L) 2020    PHOS 3.0 10/10/2017    MAG 1.8 2020    ALBUMIN 1.6 (L) 2020    PROTTOTAL 5.4 (L) 2020    ALT 21 2020    AST 28 2020    ALKPHOS 214 (H) 2020    BILITOTAL 0.4 2020    LIPASE 30 (L) 2020    JENNIFER <10 (L) 2020    TSH 3.83 2020    CRP 50.0 (H) 2020     (H) 2020        Preop Vitals    BP Readings from Last 3 Encounters:   20 (!) 140/106   20 (!) 170/108   20 (!) 204/122    Pulse Readings from Last 3 Encounters:   20 65   20 78   20 64      Resp Readings from Last 3 Encounters:   20 16   20 14   20 18    SpO2 Readings from Last 3 Encounters:   20 98%   20 98%   06/15/20 99%      Temp  "Readings from Last 1 Encounters:   07/18/20 37  C (98.6  F) (Oral)    Ht Readings from Last 1 Encounters:   07/16/20 1.676 m (5' 6\")      Wt Readings from Last 1 Encounters:   06/14/20 56.1 kg (123 lb 9.6 oz)    Estimated body mass index is 19.95 kg/m  as calculated from the following:    Height as of this encounter: 1.676 m (5' 6\").    Weight as of 6/14/20: 56.1 kg (123 lb 9.6 oz).     LDA:  Peripheral IV 07/16/20 Right Upper forearm (Active)   Site Assessment L 07/18/20 1256   Line Status Saline locked 07/18/20 1256   Phlebitis Scale 0-->no symptoms 07/18/20 1256   Infiltration Scale 0 07/18/20 1256   Infiltration Site Treatment Method  None 07/18/20 0800   Extravasation? No 07/18/20 0800   Number of days: 2       Peripheral IV 07/17/20 Right Lower forearm (Active)   Site Assessment [facility] 07/18/20 1256   Line Status Saline locked 07/18/20 1256   Phlebitis Scale 0-->no symptoms 07/18/20 1256   Infiltration Scale 0 07/18/20 1256   Infiltration Site Treatment Method  None 07/18/20 0800   Extravasation? No 07/18/20 0800   Number of days: 1       ETT (Active)   Number of days: 0        Past Medical History:   Diagnosis Date     Cryoglobulinemia (H)      Glomerulonephritis      Hepatitis B      Hypertension      Surgical complication       Past Surgical History:   Procedure Laterality Date     C HAND/FINGER SURGERY UNLISTED       ESOPHAGOSCOPY, GASTROSCOPY, DUODENOSCOPY (EGD), COMBINED N/A 10/18/2018    Procedure: EGD;  Surgeon: Eber Ortez MD;  Location: [facility]     HAND SURGERY Right       No Known Allergies     Anesthesia Evaluation     .             ROS/MED HX    ENT/Pulmonary:       Neurologic:     (+)other neuro AMS, somnolent, follows commands but is not speaking    Cardiovascular:     (+) hypertension----. : . . . :. .       METS/Exercise Tolerance:     Hematologic:         Musculoskeletal:         GI/Hepatic:     (+) hepatitis type B, liver disease,       Renal/Genitourinary:     (+) chronic renal " disease, type: CRI, Pt does not require dialysis, Pt has no history of transplant,       Endo:         Psychiatric:         Infectious Disease:         Malignancy:         Other:                         PHYSICAL EXAM:   Mental Status/Neuro: Abnormal Mental Status  Abnormal Mental Status: Disoriented (oriented to person only)   Airway: Facies: Feasible  Mallampati: Not Assessed  Mouth/Opening: Not Assessed  TM distance: > 6 cm  Neck ROM: Full   Respiratory:   Resp. Effort: Normal      CV:   Edema: None   Comments: Patient poorly cooperative with airway exam     Dental: Normal Dentition                Assessment:   ASA SCORE: 3    H&P: History and physical reviewed and following examination; no interval change.     Smoking Status:  Active Smoker       - patient did not smoke on day of surgery       - not instructed to abstain from smoking on day of procedure   NPO Status: NPO Appropriate     Plan:   Anes. Type:  General   Pre-Medication: None   Induction:  IV (Standard)   Airway: ETT; Oral   Access/Monitoring: PIV   Maintenance: Balanced     Postop Plan:   Postop Pain: Opioids  Postop Sedation/Airway: Not planned  Disposition: Inpatient/Admit     PONV Management:   Adult Risk Factors:, Postop Opioids   Prevention: Ondansetron     CONSENT: Not Applicable   Plan and risks discussed with: Not Applicable   Blood Products: N/a       Comments for Plan/Consent:  Unable to discuss risks/benefits with patient given somnolence and poor mental status. Attempted to discuss anesthesia plan using  but patient does not respond.                 Rosangela Zabala MD

## 2020-07-18 NOTE — PLAN OF CARE
Status: Pt admitted after fall for stroke work up.  Vitals: VSS on RA ex HTN within parameters. CCM  Neuros: Difficult to assess. BUE 4/5, BLE 3/5. Does not follow commands. Mumbles if speaks at all. Drooling at times. Unable to make needs known.  IV: PIV SL x2.   Resp/trach: Expiratory and inspiratory wheezing. No shortness of breath noted.   Diet: NPO ex meds.   Bowel status: BS+. Unsure of last BM date.   : Incontinent x1 this shift  Skin: Intact ex scab on R knee.   Pain: Denies, no signs of pain.   Activity: Pt has not been OOB yet this shift.   Social: Niece and sister are only contacts allowed to receive updates or information.   Plan: CLAUDIA and repeat MRI to be done today. Continue to monitor and follow plan of care

## 2020-07-18 NOTE — OR NURSING
David  IPADuse.  Pt alert to self and following simple commands.  Pupils MARCELLA.  Pt denies pain.  Alert to intermittent sleepiness.  Arousable by voice.   Dr Zabala aware of BS 77 at 1225.

## 2020-07-18 NOTE — PROGRESS NOTES
[facility], [place]    Stroke Progress Note    Interval Events  No acute events overnight    Summary  Wilfredo Yoon is a 60 year old male with past medical history of hypertension, hepatitis B and cirrhosis who was presented to for altered mental status, stroke code was called due to worsenaning of MS and left side weakness. CT CTA unremarkable for acute changes. MRI with T2 hyperintense signal involving in brainstem and bilateral cerebral white matter.    Impression and Plan:    # AMS  # Extensive b/l white matter changes, unclear etiology  - Admit to stroke service  - Neurochecks Q 4 hours  - MRI brain with contrast  - Lumbar puncture tomorrow  - Quantiferon Gold, pending  - TTE with Bubble Study  - Telemetry, EKG  - Bedside Glucose Monitoring  - PT/OT/SLP  - Stroke Education  - Euthermia, Euglycemia    # HTN  - Cont pta amlodipine 10 and Lisinopril 40  - Hold home Coreg    #HLD  No on Statin  - Lipid panel  - Start Statin today      # Nephrotic syndrome  Cr 1.5,  close to Cr baseline 1.3- 1.4    # Cirrhosis 2/2 Hepatitis B  # Ascites  - Medicine consult for Paracentesis       Diet: NPO, pending Speech clearance  DVT prophylaxis: Enoxaparin subcutaneous  Code status: Full code      __________________________________________________    Medications   Home Meds  Prior to Admission medications    Medication Sig Start Date End Date Taking? Authorizing Provider   amLODIPine (NORVASC) 10 MG tablet Take 10 mg by mouth daily   Yes Unknown, Entered By History   bumetanide (BUMEX) 1 MG tablet Take 1 mg by mouth daily   Yes Unknown, Entered By History   entecavir (BARACLUDE) 1 MG tablet TAKE ONE TABLET BY MOUTH EVERY DAY 4/2/20  Yes Eber Ortez MD   carvedilol (COREG) 12.5 MG tablet Take 12.5 mg by mouth 2 times daily (with meals)    Unknown, Entered By History   lisinopril (ZESTRIL) 40 MG tablet Take 40 mg by mouth daily    Unknown, Entered By History       Scheduled  Meds    amLODIPine  10 mg Oral Daily     bumetanide  1 mg Oral Daily     entecavir  1 mg Oral Daily     lisinopril  40 mg Oral Daily     sodium chloride (PF)  3 mL Intracatheter Q8H       PRN Meds  hydrALAZINE, labetalol, lidocaine 4%, lidocaine (buffered or not buffered), melatonin, naloxone, ondansetron **OR** ondansetron, sodium chloride (PF)       PHYSICAL EXAMINATION  Temp:  [97.6  F (36.4  C)-98.4  F (36.9  C)] 97.8  F (36.6  C)  Pulse:  [63-91] 73  Heart Rate:  [65-79] 66  Resp:  [12-16] 16  BP: (137-183)/() 137/104  SpO2:  [94 %-99 %] 98 %     General:  patient lying in bed without any acute distress    HEENT:  normocephalic/atraumatic  Cardio:  RRR  Pulmonary:  no respiratory distress  Abdomen:  soft  Extremities:  no edema  Skin:  intact     Neurologic  Mental Status: Awake and alert, follows simple commands, mute, answer questions with shaking the head  Cranial Nerves:  PERRL, EOMI with normal smooth pursuit, facial movements symmetric, hearing not formally tested but intact to conversation, tongue protrusion midline  Motor:  normal muscle tone and bulk, no abnormal movements, able to move all limbs spontaneously, has LUE and LLE drift  Sensory:  light touch sensation intact and symmetric throughout upper and lower extremities  Coordination:  Unable to test (pt not cooperative)  Station/Gait:  deferred     Imaging  I personally reviewed all imaging; relevant findings per HPI.     Lab Results Data   CBC  Recent Labs   Lab 07/17/20  0610 07/16/20  1314 07/16/20  0926   WBC 6.1 6.7 6.4   RBC 3.56* 4.01* 4.05*   HGB 11.4* 13.1* 13.5   HCT 34.8* 38.9* 38.7*   * 96* 92*     Basic Metabolic Panel    Recent Labs   Lab 07/17/20  0610 07/16/20  1314 07/16/20  0926    134 138   POTASSIUM 3.6 4.6 3.7   CHLORIDE 110* 106 106   CO2 24 25 27   BUN 23 22 21   CR 1.52* 1.49* 1.53*   GLC 87 93 93   SOTERO 8.0* 8.2* 8.5     Liver Panel  Recent Labs   Lab 07/17/20  0856 07/17/20  0610 07/16/20  1319  07/16/20  0926   PROTTOTAL  --  5.4* 6.2* 6.4*   ALBUMIN 1.6* 1.4* 1.7* 1.7*   BILITOTAL  --  0.4 1.0 1.1   ALKPHOS  --  214* 238* 254*   AST  --  28 Canceled, Test credited 41   ALT  --  21 27 28     INR    Recent Labs   Lab Test 07/16/20  0926 06/14/20  1426 10/21/19  1557   INR 1.09 1.06 1.07      Lipid Profile    Recent Labs   Lab Test 08/01/18  0648 11/04/15  1555   CHOL 254* 171   HDL 29* 27*   * 122   TRIG 120 111   CHOLHDLRATIO  --  6.3*     A1C    Recent Labs   Lab Test 11/04/15  1555   A1C 5.3     Troponin I    Recent Labs   Lab 07/16/20  1314   TROPI <0.015       Mirella Hernandez MD  Neurology Resident PGY2  Pager [phone]

## 2020-07-18 NOTE — PLAN OF CARE
Discharge Planner OT   Patient plan for discharge: unknown  Current status: OT eval orders received. Per RN, pt remains unable to follow commands or respond- not appropriate for evaluation this date. Cancel. Will reschedule to tomorrow and check for appropriateness.          Entered by: Fina Thayer 07/18/2020 10:47 AM

## 2020-07-18 NOTE — OR NURSING
1625 extubation done by Dr Zabala.  Pt able to sustain a head lift off pillow stick tongue out and open eyes with verbal stimuli.  Movement of all 4 extrms weakly.

## 2020-07-18 NOTE — OR NURSING
Late entry:  Propofol infusion off.  Pt unable to open eyes, lift head  or move extremities at this time.  Pt remained intubated.

## 2020-07-18 NOTE — ANESTHESIA CARE TRANSFER NOTE
Patient: Wilfredo Yoon    Procedure(s):  ANESTHESIA OUT OF OR MRI    Diagnosis: History of MRI of brain and brain stem [Z92.89]  Diagnosis Additional Information: No value filed.    Anesthesia Type:   General     Note:  Airway :ETT  Patient transferred to:PACU  Handoff Report: Identifed the Patient, Identified the Reponsible Provider, Reviewed the pertinent medical history, Discussed the surgical course, Reviewed Intra-OP anesthesia mangement and issues during anesthesia, Set expectations for post-procedure period and Allowed opportunity for questions and acknowledgement of understanding      Vitals: (Last set prior to Anesthesia Care Transfer)    CRNA VITALS  7/18/2020 1452 - 7/18/2020 1522      7/18/2020             EKG:  Sinus rhythm                Electronically Signed By: LINDA Carlson CRNA  July 18, 2020  3:22 PM

## 2020-07-18 NOTE — PLAN OF CARE
SLP consult received. SLP attempted eval today, but the patient has been NPO for procedures, per RN is lethargic, and is now off the unit for MRI with sedation. Will check back tomorrow to complete evaluation as appropriate.

## 2020-07-18 NOTE — PROVIDER NOTIFICATION
This writer received a call from Jd Yoon. His contact number is . He is not listed in the patient's chart as a contact. He had been with the patient earlier that day. He identified himself as the patient's brother. According to the conversation this writer had with security, he had left the hospital and attempted to return but was told he couldn't bring another visitor with him. The encounter escalated to the point that Jd was told he must leave the property or the police would be called an he would be sited for trespassing. When Jd called this writer he denied trying to bring in another person and stated that the  was racial profiling him. He was upset about the encounter. This writer had conversations with ANS, security and Jd. Jd agreed to remain off the property until 7-18. He expressed that he wanted to meet with the  and ANS. About an hour after this encounter the unit received a call from David Paris who identified herself as the patient's sister. She is listed in the chart as the patient's emergency contact. Her contact number is . During the conversation with her she stated that Jd was not the patient's brother. She stated that she did not know who this person was. She stated that she didn't want any information given to any one but herself and her niece. Her niece's name I do not have available at this time. This situation is difficult to understand. Jd's contact number was listed on the white board in the room. It is unclear who put it there as it was there prior to Jd arrival.

## 2020-07-18 NOTE — PLAN OF CARE
6A PT: Cancel, PT orders acknowledged and appreciated. Per RN, pt lethargic and not following commands this AM, now in pre-op. Will reschedule PT evaluation.

## 2020-07-19 ENCOUNTER — APPOINTMENT (OUTPATIENT)
Dept: PHYSICAL THERAPY | Facility: CLINIC | Age: 60
End: 2020-07-19
Attending: NURSE PRACTITIONER
Payer: COMMERCIAL

## 2020-07-19 ENCOUNTER — APPOINTMENT (OUTPATIENT)
Dept: SPEECH THERAPY | Facility: CLINIC | Age: 60
End: 2020-07-19
Payer: COMMERCIAL

## 2020-07-19 LAB
ANION GAP SERPL CALCULATED.3IONS-SCNC: 6 MMOL/L (ref 3–14)
APPEARANCE CSF: CLEAR
APPEARANCE CSF: CLEAR
BASOPHILS # BLD AUTO: 0 10E9/L (ref 0–0.2)
BASOPHILS NFR BLD AUTO: 0.9 %
BUN SERPL-MCNC: 24 MG/DL (ref 7–30)
CALCIUM SERPL-MCNC: 8.1 MG/DL (ref 8.5–10.1)
CHLORIDE SERPL-SCNC: 110 MMOL/L (ref 94–109)
CO2 SERPL-SCNC: 25 MMOL/L (ref 20–32)
COLOR CSF: COLORLESS
COLOR CSF: COLORLESS
CREAT SERPL-MCNC: 1.57 MG/DL (ref 0.66–1.25)
DIFFERENTIAL METHOD BLD: ABNORMAL
EOSINOPHIL # BLD AUTO: 0.1 10E9/L (ref 0–0.7)
EOSINOPHIL NFR BLD AUTO: 2.6 %
ERYTHROCYTE [DISTWIDTH] IN BLOOD BY AUTOMATED COUNT: 13.2 % (ref 10–15)
EV RNA SPEC QL NAA+PROBE: NEGATIVE
GFR SERPL CREATININE-BSD FRML MDRD: 47 ML/MIN/{1.73_M2}
GLUCOSE BLDC GLUCOMTR-MCNC: 185 MG/DL (ref 70–99)
GLUCOSE BLDC GLUCOMTR-MCNC: 62 MG/DL (ref 70–99)
GLUCOSE BLDC GLUCOMTR-MCNC: 75 MG/DL (ref 70–99)
GLUCOSE BLDC GLUCOMTR-MCNC: 77 MG/DL (ref 70–99)
GLUCOSE CSF-MCNC: 42 MG/DL (ref 40–70)
GLUCOSE SERPL-MCNC: 73 MG/DL (ref 70–99)
GRAM STN SPEC: NORMAL
HCT VFR BLD AUTO: 36.4 % (ref 40–53)
HGB BLD-MCNC: 11.6 G/DL (ref 13.3–17.7)
IMM GRANULOCYTES # BLD: 0 10E9/L (ref 0–0.4)
IMM GRANULOCYTES NFR BLD: 0.2 %
LYMPHOCYTES # BLD AUTO: 1.1 10E9/L (ref 0.8–5.3)
LYMPHOCYTES NFR BLD AUTO: 23.1 %
MCH RBC QN AUTO: 32.2 PG (ref 26.5–33)
MCHC RBC AUTO-ENTMCNC: 31.9 G/DL (ref 31.5–36.5)
MCV RBC AUTO: 101 FL (ref 78–100)
MONOCYTES # BLD AUTO: 0.4 10E9/L (ref 0–1.3)
MONOCYTES NFR BLD AUTO: 8.1 %
NEUTROPHILS # BLD AUTO: 3 10E9/L (ref 1.6–8.3)
NEUTROPHILS NFR BLD AUTO: 65.1 %
NRBC # BLD AUTO: 0 10*3/UL
NRBC BLD AUTO-RTO: 0 /100
PLATELET # BLD AUTO: 161 10E9/L (ref 150–450)
POTASSIUM SERPL-SCNC: 3.5 MMOL/L (ref 3.4–5.3)
PROT CSF-MCNC: 30 MG/DL (ref 15–60)
RBC # BLD AUTO: 3.6 10E12/L (ref 4.4–5.9)
RBC # CSF MANUAL: 0 /UL (ref 0–2)
RBC # CSF MANUAL: NORMAL /UL (ref 0–2)
SODIUM SERPL-SCNC: 142 MMOL/L (ref 133–144)
SPECIMEN SOURCE: NORMAL
SPECIMEN SOURCE: NORMAL
TUBE # CSF: 1 #
TUBE # CSF: 4 #
WBC # BLD AUTO: 4.6 10E9/L (ref 4–11)
WBC # CSF MANUAL: 2 /UL (ref 0–5)
WBC # CSF MANUAL: NORMAL /UL (ref 0–5)

## 2020-07-19 PROCEDURE — 87498 ENTEROVIRUS PROBE&REVRS TRNS: CPT | Performed by: STUDENT IN AN ORGANIZED HEALTH CARE EDUCATION/TRAINING PROGRAM

## 2020-07-19 PROCEDURE — 97162 PT EVAL MOD COMPLEX 30 MIN: CPT | Mod: GP | Performed by: PHYSICAL THERAPIST

## 2020-07-19 PROCEDURE — 82164 ANGIOTENSIN I ENZYME TEST: CPT | Performed by: STUDENT IN AN ORGANIZED HEALTH CARE EDUCATION/TRAINING PROGRAM

## 2020-07-19 PROCEDURE — 82042 OTHER SOURCE ALBUMIN QUAN EA: CPT | Performed by: NURSE PRACTITIONER

## 2020-07-19 PROCEDURE — 87798 DETECT AGENT NOS DNA AMP: CPT | Performed by: PSYCHIATRY & NEUROLOGY

## 2020-07-19 PROCEDURE — 82040 ASSAY OF SERUM ALBUMIN: CPT | Performed by: NURSE PRACTITIONER

## 2020-07-19 PROCEDURE — 12000001 ZZH R&B MED SURG/OB UMMC

## 2020-07-19 PROCEDURE — 009U3ZX DRAINAGE OF SPINAL CANAL, PERCUTANEOUS APPROACH, DIAGNOSTIC: ICD-10-PCS | Performed by: PSYCHIATRY & NEUROLOGY

## 2020-07-19 PROCEDURE — 00000102 ZZHCL STATISTIC CYTO WRIGHT STAIN TC: Performed by: STUDENT IN AN ORGANIZED HEALTH CARE EDUCATION/TRAINING PROGRAM

## 2020-07-19 PROCEDURE — 36415 COLL VENOUS BLD VENIPUNCTURE: CPT | Performed by: STUDENT IN AN ORGANIZED HEALTH CARE EDUCATION/TRAINING PROGRAM

## 2020-07-19 PROCEDURE — 80048 BASIC METABOLIC PNL TOTAL CA: CPT | Performed by: STUDENT IN AN ORGANIZED HEALTH CARE EDUCATION/TRAINING PROGRAM

## 2020-07-19 PROCEDURE — 88184 FLOWCYTOMETRY/ TC 1 MARKER: CPT | Performed by: STUDENT IN AN ORGANIZED HEALTH CARE EDUCATION/TRAINING PROGRAM

## 2020-07-19 PROCEDURE — 86644 CMV ANTIBODY: CPT | Performed by: STUDENT IN AN ORGANIZED HEALTH CARE EDUCATION/TRAINING PROGRAM

## 2020-07-19 PROCEDURE — 82784 ASSAY IGA/IGD/IGG/IGM EACH: CPT | Performed by: NURSE PRACTITIONER

## 2020-07-19 PROCEDURE — 84999 UNLISTED CHEMISTRY PROCEDURE: CPT | Performed by: STUDENT IN AN ORGANIZED HEALTH CARE EDUCATION/TRAINING PROGRAM

## 2020-07-19 PROCEDURE — 86592 SYPHILIS TEST NON-TREP QUAL: CPT | Performed by: STUDENT IN AN ORGANIZED HEALTH CARE EDUCATION/TRAINING PROGRAM

## 2020-07-19 PROCEDURE — 88108 CYTOPATH CONCENTRATE TECH: CPT | Performed by: STUDENT IN AN ORGANIZED HEALTH CARE EDUCATION/TRAINING PROGRAM

## 2020-07-19 PROCEDURE — 84157 ASSAY OF PROTEIN OTHER: CPT | Performed by: NURSE PRACTITIONER

## 2020-07-19 PROCEDURE — 92610 EVALUATE SWALLOWING FUNCTION: CPT | Mod: GN

## 2020-07-19 PROCEDURE — 89050 BODY FLUID CELL COUNT: CPT | Performed by: NURSE PRACTITIONER

## 2020-07-19 PROCEDURE — 25000125 ZZHC RX 250: Performed by: STUDENT IN AN ORGANIZED HEALTH CARE EDUCATION/TRAINING PROGRAM

## 2020-07-19 PROCEDURE — 87205 SMEAR GRAM STAIN: CPT | Performed by: NURSE PRACTITIONER

## 2020-07-19 PROCEDURE — 86645 CMV ANTIBODY IGM: CPT | Performed by: STUDENT IN AN ORGANIZED HEALTH CARE EDUCATION/TRAINING PROGRAM

## 2020-07-19 PROCEDURE — 40001004 ZZHCL STATISTIC FLOW INT 9-15 ABY TC 88188: Performed by: STUDENT IN AN ORGANIZED HEALTH CARE EDUCATION/TRAINING PROGRAM

## 2020-07-19 PROCEDURE — 87015 SPECIMEN INFECT AGNT CONCNTJ: CPT | Performed by: NURSE PRACTITIONER

## 2020-07-19 PROCEDURE — 87556 M.TUBERCULO DNA AMP PROBE: CPT | Performed by: STUDENT IN AN ORGANIZED HEALTH CARE EDUCATION/TRAINING PROGRAM

## 2020-07-19 PROCEDURE — 83916 OLIGOCLONAL BANDS: CPT | Performed by: NURSE PRACTITIONER

## 2020-07-19 PROCEDURE — 87798 DETECT AGENT NOS DNA AMP: CPT | Performed by: STUDENT IN AN ORGANIZED HEALTH CARE EDUCATION/TRAINING PROGRAM

## 2020-07-19 PROCEDURE — 86617 LYME DISEASE ANTIBODY: CPT | Performed by: NURSE PRACTITIONER

## 2020-07-19 PROCEDURE — 85025 COMPLETE CBC W/AUTO DIFF WBC: CPT | Performed by: STUDENT IN AN ORGANIZED HEALTH CARE EDUCATION/TRAINING PROGRAM

## 2020-07-19 PROCEDURE — 87070 CULTURE OTHR SPECIMN AEROBIC: CPT | Performed by: NURSE PRACTITIONER

## 2020-07-19 PROCEDURE — 97110 THERAPEUTIC EXERCISES: CPT | Mod: GP | Performed by: PHYSICAL THERAPIST

## 2020-07-19 PROCEDURE — 88185 FLOWCYTOMETRY/TC ADD-ON: CPT | Performed by: STUDENT IN AN ORGANIZED HEALTH CARE EDUCATION/TRAINING PROGRAM

## 2020-07-19 PROCEDURE — 82945 GLUCOSE OTHER FLUID: CPT | Performed by: NURSE PRACTITIONER

## 2020-07-19 PROCEDURE — 25800025 ZZH RX 258: Performed by: STUDENT IN AN ORGANIZED HEALTH CARE EDUCATION/TRAINING PROGRAM

## 2020-07-19 PROCEDURE — 97530 THERAPEUTIC ACTIVITIES: CPT | Mod: GP | Performed by: PHYSICAL THERAPIST

## 2020-07-19 PROCEDURE — 25000132 ZZH RX MED GY IP 250 OP 250 PS 637: Performed by: STUDENT IN AN ORGANIZED HEALTH CARE EDUCATION/TRAINING PROGRAM

## 2020-07-19 PROCEDURE — 00000146 ZZHCL STATISTIC GLUCOSE BY METER IP

## 2020-07-19 PROCEDURE — 87529 HSV DNA AMP PROBE: CPT | Performed by: NURSE PRACTITIONER

## 2020-07-19 RX ORDER — CARVEDILOL 12.5 MG/1
12.5 TABLET ORAL 2 TIMES DAILY WITH MEALS
Status: DISCONTINUED | OUTPATIENT
Start: 2020-07-19 | End: 2020-07-24

## 2020-07-19 RX ORDER — NIFEDIPINE 30 MG/1
90 TABLET, EXTENDED RELEASE ORAL DAILY
Status: DISCONTINUED | OUTPATIENT
Start: 2020-07-19 | End: 2020-07-21

## 2020-07-19 RX ORDER — DEXTROSE MONOHYDRATE 25 G/50ML
25-50 INJECTION, SOLUTION INTRAVENOUS
Status: DISCONTINUED | OUTPATIENT
Start: 2020-07-19 | End: 2020-08-04 | Stop reason: HOSPADM

## 2020-07-19 RX ORDER — CARVEDILOL 12.5 MG/1
12.5 TABLET ORAL 2 TIMES DAILY WITH MEALS
Status: DISCONTINUED | OUTPATIENT
Start: 2020-07-19 | End: 2020-07-19

## 2020-07-19 RX ORDER — NICOTINE POLACRILEX 4 MG
15-30 LOZENGE BUCCAL
Status: DISCONTINUED | OUTPATIENT
Start: 2020-07-19 | End: 2020-08-04 | Stop reason: HOSPADM

## 2020-07-19 RX ADMIN — CARVEDILOL 12.5 MG: 12.5 TABLET, FILM COATED ORAL at 22:43

## 2020-07-19 RX ADMIN — SODIUM CHLORIDE, SODIUM GLUCONATE, SODIUM ACETATE, POTASSIUM CHLORIDE AND MAGNESIUM CHLORIDE 100 ML: 526; 502; 368; 37; 30 INJECTION, SOLUTION INTRAVENOUS at 16:24

## 2020-07-19 RX ADMIN — SODIUM CHLORIDE, SODIUM GLUCONATE, SODIUM ACETATE, POTASSIUM CHLORIDE AND MAGNESIUM CHLORIDE 100 ML: 526; 502; 368; 37; 30 INJECTION, SOLUTION INTRAVENOUS at 05:11

## 2020-07-19 RX ADMIN — DEXTROSE MONOHYDRATE 25 ML: 500 INJECTION PARENTERAL at 16:37

## 2020-07-19 ASSESSMENT — ACTIVITIES OF DAILY LIVING (ADL)
ADLS_ACUITY_SCORE: 18
FALL_HISTORY_WITHIN_LAST_SIX_MONTHS: YES
ADLS_ACUITY_SCORE: 18
AMBULATION: 1-->ASSISTIVE EQUIPMENT
COGNITION: 0 - NO COGNITION ISSUES REPORTED
TRANSFERRING: 0-->INDEPENDENT
RETIRED_EATING: 0-->INDEPENDENT
ADLS_ACUITY_SCORE: 18
ADLS_ACUITY_SCORE: 18
TOILETING: 0-->INDEPENDENT
ADLS_ACUITY_SCORE: 18
NUMBER_OF_TIMES_PATIENT_HAS_FALLEN_WITHIN_LAST_SIX_MONTHS: 1
ADLS_ACUITY_SCORE: 18
WHICH_OF_THE_ABOVE_FUNCTIONAL_RISKS_HAD_A_RECENT_ONSET_OR_CHANGE?: AMBULATION
RETIRED_COMMUNICATION: 0-->UNDERSTANDS/COMMUNICATES WITHOUT DIFFICULTY
BATHING: 0-->INDEPENDENT
DRESS: 0-->INDEPENDENT
SWALLOWING: 0-->SWALLOWS FOODS/LIQUIDS WITHOUT DIFFICULTY

## 2020-07-19 ASSESSMENT — MIFFLIN-ST. JEOR: SCORE: 1290.73

## 2020-07-19 NOTE — PLAN OF CARE
Status: Pt admitted after fall for stroke work up.  Vitals: VSS on RA ex HTN within parameters. CCM  Neuros: Difficult to assess. BUE 4/5, BLE 3/5. Does not follow commands. Mumbles if speaks at all. Drooling at times. Unable to make needs known. Sluggish pupils.  IV: PIV infusing plasmalyte @ 100 mL/hr.   Resp/trach: Expiratory and inspiratory wheezing. No shortness of breath noted.   Diet: NPO ex meds.   Bowel status: BS+. Unsure of last BM date.   : Incontinent x2 this shift  Skin: Intact ex scab on R knee.   Pain: Denies, no signs of pain.   Activity: Up with lift, pt self adjusting in bed.  Social: Niece and sister are only contacts allowed to receive updates or information.   Plan: CLAUDIA today or Monday. Continue to monitor and follow plan of care

## 2020-07-19 NOTE — PROGRESS NOTES
[facility], [place]    Stroke Progress Note    Interval Events  No acute events overnight. Patient remains nonverbal, even with Maur[place]ian     Impression  60 year old man with a history of HTN, Hep B, and cirrhosis who presented with altered mental status on 7/16. Stroke code was called on patient on 7/17 due to altered mental status with possible LLE weakness and that resolved. CT at that time notable only for leukoaraiosis. MR obtained which showed extensive T2 hyperintensity of the entire brainstem with extension in the middle cerebellar peduncles and portions of the cerebellum as well as scattered bilateral ischemic infarcts. CLAUDIA held given patient's mental status. Unclear etiology of the extensive T2 hyperintensity at this time. LP to be performed.     Plan  # AMS  # Extensive b/l white matter changes, unclear etiology  - Neurochecks Q 4 hours  - Lumbar puncture today  - PT/OT/SLP  - Stroke Education  - Euthermia, Euglycemia     # HTN  - Nifedipine 90 mg daily  - Hold PTA amlodipine 10 mg PO daily  - Hold PTA Lisinopril 40 mg PO daily  - Holding PTA Coreg     # Nephrotic syndrome  #ANDRY  Cr 1.5,  close to Cr baseline 1.3- 1.4  - Holding lisinopril and Bumex until improved renal function     # Cirrhosis 2/2 Hepatitis B  # Ascites  - Continue entecavir 0.5 mg daily     Diet: NPO, pending Speech clearance  DVT prophylaxis: Holding enoxaparin subcutaneous for LP today  Code status: Full code    The patient was discussed with Stroke Fellow, Dr. Condon.  The Stroke Staff is Dr. Manley.    Alfonso Jefferson MD  PGY-1 Neurology Resident  Pager:  [phone]  ______________________________________________________    Medications   Home Meds  Prior to Admission medications    Medication Sig Start Date End Date Taking? Authorizing Provider   amLODIPine (NORVASC) 10 MG tablet Take 10 mg by mouth daily   Yes Unknown, Entered By History   bumetanide (BUMEX) 1 MG tablet Take 1 mg by mouth daily   Yes  Unknown, Entered By History   entecavir (BARACLUDE) 1 MG tablet TAKE ONE TABLET BY MOUTH EVERY DAY 4/2/20  Yes Eber Ortez MD   carvedilol (COREG) 12.5 MG tablet Take 12.5 mg by mouth 2 times daily (with meals)    Unknown, Entered By History   lisinopril (ZESTRIL) 40 MG tablet Take 40 mg by mouth daily    Unknown, Entered By History       Scheduled Meds    [Held by provider] bumetanide  1 mg Oral Daily     [Held by provider] carvedilol  12.5 mg Oral BID w/meals     [Held by provider] enoxaparin ANTICOAGULANT  40 mg Subcutaneous Q24H     entecavir  0.5 mg Oral Daily     [Held by provider] lisinopril  40 mg Oral Daily     NIFEdipine ER OSMOTIC  90 mg Oral Daily     sodium chloride (PF)  3 mL Intracatheter Q8H       Infusion Meds    Plasma-Lyte A 1,000 mL (07/18/20 1903)       PRN Meds  hydrALAZINE, labetalol, lidocaine 4%, lidocaine (buffered or not buffered), melatonin, naloxone, ondansetron **OR** ondansetron, sodium chloride (PF)       PHYSICAL EXAMINATION  Temp:  [94  F (34.4  C)-98.6  F (37  C)] 97.1  F (36.2  C)  Pulse:  [61-70] 70  Heart Rate:  [57-75] 64  Resp:  [1-18] 18  BP: (130-153)/() 141/89  FiO2 (%):  [60 %] 60 %  SpO2:  [96 %-100 %] 97 %     General: Awake and alert, Lying in bed, NAD, intermittently cooperative  HEENT: Normocephalic, atraumatic, no epistaxis, no oral lesions  Resp: Breathing comfortably on room air  Cardio: RRR  Extremities: Warm and well perfused, peripheral pulses present, no edema  Psych: Normal mood and affect    Neuro:  Mental status: Awake, alert, attentive; intermittency following commands w/ Ukrainian    Speech: Patient is mute despite use of Ukrainian   Cranial nerves: Eyes conjugate, PERRL, EOMI w/ normal and smooth pursuit, face expression symmetric at rest, unable to assess with smile due to lack of cooperation, other CN unable to be assessed d/t lack of cooperation.  Motor: Tone normal. Difficult to assess strength due to lack of  cooperation, but is moving all 4 extremities antigravity. No abnormal movements noted.   Sensory: Moving all 4 extremities in response to light touch, unable to formally assess  Coordination: Could not be assessed d/t lack of cooperation  Gait: Deferred    Stroke Scales    NIHSS  Interval baseline (07/18/20 1700)   Interval Comments     1a. Level of Consciousness 0-->Alert, keenly responsive   1b. LOC Questions 2-->Answers neither question correctly   1c. LOC Commands 1-->Performs one task correctly   2.   Best Gaze 0-->Normal   3.   Visual 0-->No visual loss   4.   Facial Palsy 0-->Normal symmetrical movements   5a. Motor Arm, Left 2-->Some effort against gravity, limb cannot get to or maintain (if cued) 90 (or 45) degrees, drifts down to bed, but has some effort against gravity   5b. Motor Arm, Right 2-->Some effort against gravity, limb cannot get to or maintain (if cued) 90 (or 45) degrees, drifts down to bed, but has some effort against gravity   6a. Motor Leg, Left 2-->Some effort against gravity, leg falls to bed by 5 secs, but has some effort against gravity   6b. Motor Leg, right 2-->Some effort against gravity, leg falls to bed by 5 secs, but has some effort against gravity   7.   Limb Ataxia 0-->Absent   8.   Sensory 0-->Normal, no sensory loss   9.   Best Language 3-->Mute, global aphasia, no usable speech or auditory comprehension   10. Dysarthria 0-->Normal   11. Extinction and Inattention  0-->No abnormality   Total 14 (07/19/20 1318)       Imaging  MR Brain W/ and W/O 07/18/20  Impression:  1. Stable extensive T2 signal abnormality involving the entire  brainstem with extension into the middle cerebellar peduncles and  portions of the cerebellum. Additional T2 signal abnormality involving  symmetric supratentorial white matter, with a slight predominance  towards watershed distribution. Upon chart review it is noted that  this patient has hypertension and these findings can be seen in  atypical form  of PRES. Given symmetric distribution, additional  metabolic/systemic etiologies including liver dysfunction and other  causes for acute toxic leukoencephalopathy are included in the  differential.  2. Mild to moderate spondylosis of the cervical spine. No abnormal  cord signal.    MR C-Spine 07/18/20:  Impression:  1. Stable extensive T2 signal abnormality involving the entire  brainstem with extension into the middle cerebellar peduncles and  portions of the cerebellum. Additional T2 signal abnormality involving  symmetric supratentorial white matter, with a slight predominance  towards watershed distribution. Upon chart review it is noted that  this patient has hypertension and these findings can be seen in  atypical form of PRES. Given symmetric distribution, additional  metabolic/systemic etiologies including liver dysfunction and other  causes for acute toxic leukoencephalopathy are included in the  differential.  2. Mild to moderate spondylosis of the cervical spine. No abnormal  cord signal.    MR Brain W/ and W/O contrast 07/17/20:  Impression:   Extensive T2 hyperintense signal involving supratentorial and  infratentorial white matter with scattered foci of diffusion  restriction in bilateral corona radiata, bilateral frontal subcortical  white matter and right frontal centrum semiovale. Most of this T2  hyperintense signal is new from 10/22/2019. Given the abnormal signal  and diffusion restriction solely/mostly involves the white matter,  differentials favor acute white matter pathologies like acute toxic  leukoencephalopathy from liver failure. Other less likely  differentials include hepatocerebral syndrome, osmotic demyelination  or embolic infarct.     CTH 07/17/20:  Impression:  No acute intracranial pathology. Extensive leukoaraiosis. If there is  a clinical concern for acute infarct, MRI is recommended. Extensive  leukoaraiosis.    CTA Head and Neck 07/17/20:  Impression:    1. Head CTA:  a. 2.2  mm outpouching off the left supraclinoid ICA on yesterday's CT  is again seen. This outpouching appears to be at the origin of the  left ophthalmic artery and likely an infundibulum. On yesterday's CT  ophthalmic artery is not well seen, likely technical.  b. Tiny posteriorly pointing outpouchings at bilateral carotid  terminus. These are not well seen on yesterday's CT, again likely due  phase of the CT. No vessel is seen at the tip of these outpouchings,  could represent tiny aneurysms or infundibulum.  2. Neck CTA demonstrates no stenosis of the major cervical arteries    Lab Results Data   CBC  Recent Labs   Lab 07/19/20  0625 07/18/20  1028 07/18/20  0822 07/17/20  0610   WBC 4.6  --  6.2 6.1   RBC 3.60*  --  3.51* 3.56*   HGB 11.6*  --  11.3* 11.4*   HCT 36.4*  --  35.1* 34.8*    152 148* 117*     Basic Metabolic Panel    Recent Labs   Lab 07/19/20  0625 07/18/20  1028 07/18/20  0822 07/17/20  0610     --  141 139   POTASSIUM 3.5  --  3.6 3.6   CHLORIDE 110*  --  112* 110*   CO2 25  --  22 24   BUN 24  --  22 23   CR 1.57* 1.54* 1.50* 1.52*   GLC 73  --  68* 87   SOTERO 8.1*  --  8.3* 8.0*     Liver Panel  Recent Labs   Lab 07/17/20  0856 07/17/20  0610 07/16/20  1314 07/16/20  0926   PROTTOTAL  --  5.4* 6.2* 6.4*   ALBUMIN 1.6* 1.4* 1.7* 1.7*   BILITOTAL  --  0.4 1.0 1.1   ALKPHOS  --  214* 238* 254*   AST  --  28 Canceled, Test credited 41   ALT  --  21 27 28     INR    Recent Labs   Lab Test 07/16/20  0926 06/14/20  1426 10/21/19  1557   INR 1.09 1.06 1.07      Lipid Profile    Recent Labs   Lab Test 07/18/20  1028 08/01/18  0648 11/04/15  1555   CHOL 222* 254* 171   HDL 29* 29* 27*   * 201* 122   TRIG 106 120 111   CHOLHDLRATIO  --   --  6.3*     A1C    Recent Labs   Lab Test 07/18/20  0822 11/04/15  1555   A1C 4.7 5.3     Troponin I    Recent Labs   Lab 07/16/20  1314   TROPI <0.015

## 2020-07-19 NOTE — PLAN OF CARE
Discharge Planner SLP   Patient plan for discharge: not discussed  Current status: Clinical dysphagia examination completed. The patient was initially very lethargic, but did become more alert and cooperative with cues/encouragement. The patient tolerated small sips of thin H20 and bites of puree applesauce without direct signs/symptoms of aspiration. However, swallow function did appear somewhat uncoordinated and pharyngeal swallow was audible. Cautiously recommend small sips of thin h20 for comfort with 1:1 supervision when alert and engaged. SLP will follow closely for dysphagia. When mental status is consistently improved, pt may benefit from videoswallow study to objectively assess aspiration risk and make PO diet recommendations. SLP will recommend videoswallow study as appropriate and if indicated pending progress.   Barriers to return to prior living situation: weakness, new deficits, dysphagia, aphasia  Recommendations for discharge:  TCU  Rationale for recommendations: benefits from SLP intervention for below baseline swallowing skills. Expect the patient may also require cognitive communication intervention as well.       Entered by: Joanne Boo 07/19/2020 2:57 PM

## 2020-07-19 NOTE — PROGRESS NOTES
07/19/20 1028   Quick Adds   Type of Visit Initial PT Evaluation   Living Environment   Living Environment Comment living environment unknown at this time as pt unable to state   Self-Care   Current Activity Tolerance poor   Equipment Currently Used at Home cane, straight   Activity/Exercise/Self-Care Comment Unknown. However pt riding bus at time of fall (able to access bus from a mobility standpoint)   Functional Level Prior   Ambulation 1-->assistive equipment   Transferring 0-->independent   Prior Functional Level Comment Per nursing notes pt ambulated with a cane at baseline, transferred IND. Unable to further determine PLOF at this time as pt unable to to verbalize, no visitor present and phone contact did not respond. Cane present in room.    General Information   Onset of Illness/Injury or Date of Surgery - Date 07/16/20   Referring Physician Aide Rider CNP    Patient/Family Goals Statement Pt unable to state   Pertinent History of Current Problem (include personal factors and/or comorbidities that impact the POC) Wilfredo Yoon is a 60 year old male with past medical history of hypertension, hepatitis B and cirrhosis who was presented to for altered mental status, stroke code was called due to worsenaning of MS and left side weakness. CT CTA unremarkable for acute changes. MRI with T2 hyperintense signal involving in brainstem and bilateral cerebral white matter.   Precautions/Limitations fall precautions   Cognitive Status Examination   Level of Consciousness alert;lethargic/somnolent   Follows Commands and Answers Questions able to follow single-step instructions;unable to follow multi-step instructions;unable to answer questions;speech unintelligible   Pain Assessment   Patient Currently in Pain   (no signs noted)   Range of Motion (ROM)   ROM Comment less than full hip flexion AROM, SAQ AROM B; hamstring tight on R   Strength   Strength Comments hip flex 2-/5 B, R weaker than L  "as pt required more assistance on R side. Quad strength (knee ext) R 2-/5, L at least 3/5. Ankle DF R 0/5, L at least 2/5 (NT further); NT UE strength due to pt fatigue and reduced participation   Bed Mobility   Bed Mobility Comments rolled to R with MODA x 1 to LUNA of 2; total assist to reposition in midline   Transfer Skills   Transfer Comments NT   Gait   Gait Comments N/A   Sensory Examination   Sensory Perception Comments light touch R lower leg (lower 1/3 shin) and at knee cap was impaired based on pt shaking head \"no\" in response to being asked if he felt the touch.    General Therapy Interventions   Planned Therapy Interventions balance training;bed mobility training;gait training;neuromuscular re-education;ROM;strengthening;stretching;transfer training;progressive activity/exercise   Clinical Impression   Criteria for Skilled Therapeutic Intervention yes, treatment indicated   PT Diagnosis Impaired functional mobility   Influenced by the following impairments impaired communication, R sided LE weakness, impaired sensation   Functional limitations due to impairments Impaired bed mobility, inability to perform transfers or  gait.    Clinical Presentation Evolving/Changing   Clinical Presentation Rationale Patient having difficulty communicating. Understanding some simple commands through  and sometimes in english. Patient also declining functional status per chart review.    Clinical Decision Making (Complexity) Moderate complexity   Therapy Frequency 5x/week   Predicted Duration of Therapy Intervention (days/wks) 1 week   Anticipated Equipment Needs at Discharge other (see comments)  (Unable to determine currently due to not knowing PLOF)   Anticipated Discharge Disposition Transitional Care Facility  (will monitor for ARU)   Risk & Benefits of therapy have been explained Yes   Patient, Family & other staff in agreement with plan of care No  (Unable to reach family, will try again tomorrow. ) " "  [facility] AM-PAC  \"6 Clicks\" V.2 Basic Mobility Inpatient Short Form   1. Turning from your back to your side while in a flat bed without using bedrails? 2 - A Lot   2. Moving from lying on your back to sitting on the side of a flat bed without using bedrails? 1 - Total   3. Moving to and from a bed to a chair (including a wheelchair)? 1 - Total   4. Standing up from a chair using your arms (e.g., wheelchair, or bedside chair)? 1 - Total   5. To walk in hospital room? 1 - Total   6. Climbing 3-5 steps with a railing? 1 - Total   Basic Mobility Raw Score (Score out of 24.Lower scores equate to lower levels of function) 7   Total Evaluation Time   Total Evaluation Time (Minutes) 10     "

## 2020-07-19 NOTE — PROCEDURES
Adult Lumbar Puncture Procedure Note    July 19, 2020    Procedure: Lumbar Puncture to obtain CSF     Diagnosis: Encephalopathy    Description:. The patient was placed in the lateral decubitus position. The L4-5 disc space was prepped and draped in a sterile fashion. Local anesthesia with 3 mL 1% preservative-free lidocaine was used. A 22 gauge, 4 inch needle was placed on the first  attempt.  21 mL spinal fluid collected. Specimen appears colorless. Specimen sent.  The needle was removed and a bandage was applied to the site.  Patient tolerated well.     Opening pressure was 21 cmH2O with both legs flexed     Disposition: Patient will remain on back for 2 hour post procedure.     Performed by:   Alfonso Jefferson MD

## 2020-07-19 NOTE — PROGRESS NOTES
07/19/20 1400   General Information   Onset Date 07/16/20   Start of Care Date 07/19/20   Referring Physician Sunni Wallis MD    Patient Profile Review/OT: Additional Occupational Profile Info See Profile for full history and prior level of function   Patient/Family Goals Statement patient nodded yes to being hungry and thirsty   Swallowing Evaluation Bedside swallow evaluation   Behaviorial Observations Lethargic   Mode of current nutrition NPO   Respiratory Status Room air   Comments 60 year old man with a history of HTN, Hep B, and cirrhosis who presented with altered mental status on 7/16. Stroke code was called on patient on 7/17 due to altered mental status with possible LLE weakness and that resolved. CT at that time notable only for leukoaraiosis. MR obtained which showed extensive T2 hyperintensity of the entire brainstem with extension in the middle cerebellar peduncles and portions of the cerebellum as well as scattered bilateral ischemic infarcts. CLAUDIA held given patient's mental status. Unclear etiology of the extensive T2 hyperintensity at this time. LP to be performed.  SLP completed bedside swallow eval at 1300 per RN request.   Clinical Swallow Evaluation   Oral Musculature unable to assess due to poor participation/comprehension   Dentition present and adequate   Oral Musculature Comments appears WFL   Clinical Swallow Eval: Thin Liquid Texture Trial   Mode of Presentation, Thin Liquids cup;spoon;self-fed;fed by clinician   Volume of Liquid or Food Presented 4 oz thin h20   Oral Phase of Swallow WFL   Pharyngeal Phase of Swallow other (see comments)  (audible swallow)   Diagnostic Statement no direct signs/symptoms of aspiration, but swallow notably audible.    Clinical Swallow Eval: Puree Solid Texture Trial   Mode of Presentation, Puree spoon;fed by clinician   Volume of Puree Presented 3 bites of applesauce   Oral Phase, Puree WFL   Pharyngeal Phase, Puree other (see comments)  (audible  swallow)   Diagnostic Statement no direct signs/symptoms of aspiration, but swallow notably audible.     Esophageal Phase of Swallow   Patient reports or presents with symptoms of esophageal dysphagia No   General Therapy Interventions   Planned Therapy Interventions Dysphagia Treatment   Dysphagia treatment Oropharyngeal exercise training;Modified diet education;Instruction of safe swallow strategies   Swallow Eval: Clinical Impressions   Skilled Criteria for Therapy Intervention Skilled criteria met.  Treatment indicated.   Functional Assessment Scale (FAS) 3   Treatment Diagnosis oropharyngeal dysphagia   Diet texture recommendations NPO;Thin liquids  (sips of thin H20 for comfort when appropriate)   Therapy Frequency Daily   Predicted Duration of Therapy Intervention (days/wks) 2 weeks   Anticipated Discharge Disposition inpatient rehabilitation facility   Risks and Benefits of Treatment have been explained. Yes   Patient, family and/or staff in agreement with Plan of Care Yes   Clinical Impression Comments Clinical dysphagia examination completed. The patient was initially very lethargic, but did become more alert and cooperative with cues/encouragement. The patient tolerated small sips of thin H20 and bites of puree applesauce without direct signs/symptoms of aspiration. However, swallow function did appear somewhat uncoordinated and pharyngeal swallow was audible. Cautiously recommend small sips of thin h20 for comfort with 1:1 supervision when alert and engaged. SLP will follow closely for dysphagia. When mental status is consistently improved, pt may benefit from videoswallow study to objectively assess aspiration risk and make PO diet recommendations. SLP will recommend videoswallow study as appropriate and if indicated pending progress.    Total Evaluation Time   Total Evaluation Time (Minutes) 15

## 2020-07-19 NOTE — PLAN OF CARE
Discharge Planner PT   Patient plan for discharge: Not discussed. Pt unable to verbalize.   Current status: Patient presented with difficulty communicating. Pt required [place]  and was unable to speak, but nodded or shook head in response single step commands. Patient required ModAx2 for rolling and dependent with scooting in bed. VSS.   Barriers to return to prior living situation: Medical status, impaired communication  Recommendations for discharge: TCU  Rationale for recommendations: Patient not able to tolerate intensive therapy at this time (will monitor ongoing for ARU), requires multiple services for return to PLOF.    Entered by: Judd Cuevas 07/19/2020 4:44 PM

## 2020-07-19 NOTE — PLAN OF CARE
Neuro: Lethargic. Pt is intermittently alert, KENDAL orientation. Able to follow few simple commands. Does not speak, unable to make most needs known.  VSS: VSS. CCM  Respiratory: Sating >96% on 2LNC. No SOB noted. On capno  GI/: Adequate urine output via urinal, intermittently incontinent per report. No incontinence this shift. No bowel movement this shift.  Diet/appetite: NPO, failed prescreen and bedside swallow, oral meds held and provider aware.  Activity:  Independently positioning in bed, not OOB this shift  Pain: Denies  Skin: No new deficits noted.  LDA's: Right PIV infusing plasmalyte 100mL/hr  Plan: Repeat MRI done today. Possible CLAUDIA Sunday or Monday. Niece and sister allowed to get updates. Speech consulted for difficulty swallowing. Continue with POC. Notify primary team with changes.

## 2020-07-19 NOTE — PLAN OF CARE
OT/6A: Cancel. OT evaluation orders noted and appreciated. Per collaboration with PT, anticipate patient would benefit from skilled OT while IP to promote command following/participation in ADL and strengthening. Patient able to participate with PT via ipad . Patient preparing for LP shortly upon check-in for OT evaluation with RN this PM, with need for bedrest ~1 hour after. Will reschedule evaluation for tomorrow.

## 2020-07-19 NOTE — PLAN OF CARE
Status: Pt admitted after fall for stroke work up.  Vitals: VSS on RA ex HTN within parameters. CCM, NSR.   Neuros: Difficult to assess. RUE 3/5, RLE 3/5, LUE 4/5, LLE 4/5, MD aware of weaker R side. Only follows commands intermittently, mumbling speech,  cannot understand. Cannot handle his secretions well. Unable to make needs known. Sluggish pupils.  IV: PIV infusing plasmalyte @ 100 mL/hr.   Resp/trach: Expiratory and inspiratory wheezing. No shortness of breath noted. Unable to handle secretions well.    Diet: NPO, speech saw pt, recommended full liquids with supervision when pt is awake. Pt was unable to swallow this morning, spit out water when writer attempted to assess swallowing.    Bowel status: BS+, unknown of last BM.   : Pt retaining urine, bladder scanned for 800 ml, straight cath for 450 ml. Pt w/ cirrhosis and hx of ascites.   Skin: Intact ex scab on R knee.   Pain: Denies, no signs of pain.    Activity: Up with lift, repositioned q2h.   Social: Niece and sister are only contacts allowed to receive updates or information. Sister visited.   Plan: LP later today, sister consented via . Continue to monitor and follow plan of care.

## 2020-07-20 ENCOUNTER — APPOINTMENT (OUTPATIENT)
Dept: OCCUPATIONAL THERAPY | Facility: CLINIC | Age: 60
End: 2020-07-20
Attending: NURSE PRACTITIONER
Payer: COMMERCIAL

## 2020-07-20 ENCOUNTER — APPOINTMENT (OUTPATIENT)
Dept: CARDIOLOGY | Facility: CLINIC | Age: 60
End: 2020-07-20
Attending: STUDENT IN AN ORGANIZED HEALTH CARE EDUCATION/TRAINING PROGRAM
Payer: COMMERCIAL

## 2020-07-20 ENCOUNTER — APPOINTMENT (OUTPATIENT)
Dept: SPEECH THERAPY | Facility: CLINIC | Age: 60
End: 2020-07-20
Payer: COMMERCIAL

## 2020-07-20 LAB
ACE SERPL-CCNC: <5 U/L (ref 9–67)
ALBUMIN UR-MCNC: 300 MG/DL
ANA SER QL IF: NEGATIVE
ANCA AB PATTERN SER IF-IMP: NORMAL
ANION GAP SERPL CALCULATED.3IONS-SCNC: 8 MMOL/L (ref 3–14)
APPEARANCE UR: ABNORMAL
BASOPHILS # BLD AUTO: 0.1 10E9/L (ref 0–0.2)
BASOPHILS NFR BLD AUTO: 1 %
BILIRUB UR QL STRIP: NEGATIVE
BUN SERPL-MCNC: 22 MG/DL (ref 7–30)
C-ANCA TITR SER IF: NORMAL {TITER}
CALCIUM SERPL-MCNC: 8.2 MG/DL (ref 8.5–10.1)
CHLORIDE SERPL-SCNC: 110 MMOL/L (ref 94–109)
CO2 SERPL-SCNC: 24 MMOL/L (ref 20–32)
COLOR UR AUTO: YELLOW
COPATH REPORT: NORMAL
COPATH REPORT: NORMAL
CREAT SERPL-MCNC: 1.43 MG/DL (ref 0.66–1.25)
DIFFERENTIAL METHOD BLD: ABNORMAL
EOSINOPHIL # BLD AUTO: 0.2 10E9/L (ref 0–0.7)
EOSINOPHIL NFR BLD AUTO: 4 %
ERYTHROCYTE [DISTWIDTH] IN BLOOD BY AUTOMATED COUNT: 13.1 % (ref 10–15)
GAMMA INTERFERON BACKGROUND BLD IA-ACNC: 0.64 IU/ML
GFR SERPL CREATININE-BSD FRML MDRD: 53 ML/MIN/{1.73_M2}
GLUCOSE BLDC GLUCOMTR-MCNC: 79 MG/DL (ref 70–99)
GLUCOSE BLDC GLUCOMTR-MCNC: 82 MG/DL (ref 70–99)
GLUCOSE BLDC GLUCOMTR-MCNC: 86 MG/DL (ref 70–99)
GLUCOSE SERPL-MCNC: 79 MG/DL (ref 70–99)
GLUCOSE UR STRIP-MCNC: NEGATIVE MG/DL
HCT VFR BLD AUTO: 37.4 % (ref 40–53)
HCV AB SERPL QL IA: NONREACTIVE
HGB BLD-MCNC: 12.2 G/DL (ref 13.3–17.7)
HGB UR QL STRIP: ABNORMAL
HIV 1+2 AB+HIV1 P24 AG SERPL QL IA: NONREACTIVE
HSV1 DNA CSF QL NAA+PROBE: NOT DETECTED
HSV2 DNA CSF QL NAA+PROBE: NOT DETECTED
HYALINE CASTS #/AREA URNS LPF: 1 /LPF (ref 0–2)
IMM GRANULOCYTES # BLD: 0 10E9/L (ref 0–0.4)
IMM GRANULOCYTES NFR BLD: 0.4 %
KETONES UR STRIP-MCNC: 10 MG/DL
LEUKOCYTE ESTERASE UR QL STRIP: ABNORMAL
LYMPHOCYTES # BLD AUTO: 1.3 10E9/L (ref 0.8–5.3)
LYMPHOCYTES NFR BLD AUTO: 25 %
Lab: NORMAL
M TB IFN-G CD4+ BCKGRND COR BLD-ACNC: 9.36 IU/ML
M TB TUBERC IFN-G BLD QL: POSITIVE
MCH RBC QN AUTO: 32.5 PG (ref 26.5–33)
MCHC RBC AUTO-ENTMCNC: 32.6 G/DL (ref 31.5–36.5)
MCV RBC AUTO: 100 FL (ref 78–100)
MICROBIOLOGIST REVIEW: NORMAL
MITOGEN IGNF BCKGRD COR BLD-ACNC: 9.36 IU/ML
MITOGEN IGNF BCKGRD COR BLD-ACNC: 9.36 IU/ML
MONOCYTES # BLD AUTO: 0.4 10E9/L (ref 0–1.3)
MONOCYTES NFR BLD AUTO: 8.2 %
MUCOUS THREADS #/AREA URNS LPF: PRESENT /LPF
NEUTROPHILS # BLD AUTO: 3.1 10E9/L (ref 1.6–8.3)
NEUTROPHILS NFR BLD AUTO: 61.4 %
NITRATE UR QL: NEGATIVE
NRBC # BLD AUTO: 0 10*3/UL
NRBC BLD AUTO-RTO: 0 /100
OSMOLALITY UR: 454 MMOL/KG (ref 100–1200)
PH UR STRIP: 6 PH (ref 5–7)
PLATELET # BLD AUTO: 175 10E9/L (ref 150–450)
POTASSIUM SERPL-SCNC: 3.5 MMOL/L (ref 3.4–5.3)
RBC # BLD AUTO: 3.75 10E12/L (ref 4.4–5.9)
RBC #/AREA URNS AUTO: 5 /HPF (ref 0–2)
SODIUM SERPL-SCNC: 142 MMOL/L (ref 133–144)
SODIUM UR-SCNC: 46 MMOL/L
SOURCE: ABNORMAL
SP GR UR STRIP: 1.02 (ref 1–1.03)
SPECIMEN TYPE: NORMAL
UROBILINOGEN UR STRIP-MCNC: 2 MG/DL (ref 0–2)
VARICELLA ZOSTER DNA PCR COMMENT: NORMAL
VZV DNA SPEC QL NAA+PROBE: NORMAL
WBC # BLD AUTO: 5 10E9/L (ref 4–11)
WBC #/AREA URNS AUTO: 6 /HPF (ref 0–5)

## 2020-07-20 PROCEDURE — 92526 ORAL FUNCTION THERAPY: CPT | Mod: GN

## 2020-07-20 PROCEDURE — 25000128 H RX IP 250 OP 636: Performed by: NURSE PRACTITIONER

## 2020-07-20 PROCEDURE — 83935 ASSAY OF URINE OSMOLALITY: CPT | Performed by: STUDENT IN AN ORGANIZED HEALTH CARE EDUCATION/TRAINING PROGRAM

## 2020-07-20 PROCEDURE — 87799 DETECT AGENT NOS DNA QUANT: CPT | Performed by: PSYCHIATRY & NEUROLOGY

## 2020-07-20 PROCEDURE — 25000132 ZZH RX MED GY IP 250 OP 250 PS 637: Performed by: STUDENT IN AN ORGANIZED HEALTH CARE EDUCATION/TRAINING PROGRAM

## 2020-07-20 PROCEDURE — 97165 OT EVAL LOW COMPLEX 30 MIN: CPT | Mod: GO

## 2020-07-20 PROCEDURE — 83516 IMMUNOASSAY NONANTIBODY: CPT | Performed by: PSYCHIATRY & NEUROLOGY

## 2020-07-20 PROCEDURE — 97530 THERAPEUTIC ACTIVITIES: CPT | Mod: GO

## 2020-07-20 PROCEDURE — 00000146 ZZHCL STATISTIC GLUCOSE BY METER IP

## 2020-07-20 PROCEDURE — 36415 COLL VENOUS BLD VENIPUNCTURE: CPT | Performed by: PSYCHIATRY & NEUROLOGY

## 2020-07-20 PROCEDURE — 93306 TTE W/DOPPLER COMPLETE: CPT | Mod: 26 | Performed by: INTERNAL MEDICINE

## 2020-07-20 PROCEDURE — 40000264 ECHOCARDIOGRAM COMPLETE

## 2020-07-20 PROCEDURE — 25000132 ZZH RX MED GY IP 250 OP 250 PS 637: Performed by: PSYCHIATRY & NEUROLOGY

## 2020-07-20 PROCEDURE — 25000128 H RX IP 250 OP 636: Performed by: STUDENT IN AN ORGANIZED HEALTH CARE EDUCATION/TRAINING PROGRAM

## 2020-07-20 PROCEDURE — 80048 BASIC METABOLIC PNL TOTAL CA: CPT | Performed by: PSYCHIATRY & NEUROLOGY

## 2020-07-20 PROCEDURE — 85025 COMPLETE CBC W/AUTO DIFF WBC: CPT | Performed by: PSYCHIATRY & NEUROLOGY

## 2020-07-20 PROCEDURE — 81001 URINALYSIS AUTO W/SCOPE: CPT | Performed by: STUDENT IN AN ORGANIZED HEALTH CARE EDUCATION/TRAINING PROGRAM

## 2020-07-20 PROCEDURE — 97110 THERAPEUTIC EXERCISES: CPT | Mod: GO

## 2020-07-20 PROCEDURE — 84300 ASSAY OF URINE SODIUM: CPT | Performed by: STUDENT IN AN ORGANIZED HEALTH CARE EDUCATION/TRAINING PROGRAM

## 2020-07-20 PROCEDURE — 12000001 ZZH R&B MED SURG/OB UMMC

## 2020-07-20 RX ORDER — ATORVASTATIN CALCIUM 20 MG/1
20 TABLET, FILM COATED ORAL EVERY EVENING
Status: DISCONTINUED | OUTPATIENT
Start: 2020-07-20 | End: 2020-07-26

## 2020-07-20 RX ORDER — ASPIRIN 81 MG/1
81 TABLET, CHEWABLE ORAL DAILY
Status: DISCONTINUED | OUTPATIENT
Start: 2020-07-20 | End: 2020-08-04 | Stop reason: HOSPADM

## 2020-07-20 RX ORDER — ACYCLOVIR 200 MG/1
12 CAPSULE ORAL ONCE
Status: DISCONTINUED | OUTPATIENT
Start: 2020-07-20 | End: 2020-08-04 | Stop reason: HOSPADM

## 2020-07-20 RX ADMIN — CARVEDILOL 12.5 MG: 12.5 TABLET, FILM COATED ORAL at 08:06

## 2020-07-20 RX ADMIN — NIFEDIPINE 90 MG: 30 TABLET, FILM COATED, EXTENDED RELEASE ORAL at 08:10

## 2020-07-20 RX ADMIN — ENOXAPARIN SODIUM 40 MG: 40 INJECTION SUBCUTANEOUS at 18:38

## 2020-07-20 RX ADMIN — HYDRALAZINE HYDROCHLORIDE 10 MG: 20 INJECTION, SOLUTION INTRAMUSCULAR; INTRAVENOUS at 05:09

## 2020-07-20 RX ADMIN — ATORVASTATIN CALCIUM 20 MG: 20 TABLET, FILM COATED ORAL at 20:39

## 2020-07-20 RX ADMIN — ASPIRIN 81 MG CHEWABLE TABLET 81 MG: 81 TABLET CHEWABLE at 11:26

## 2020-07-20 RX ADMIN — CARVEDILOL 12.5 MG: 12.5 TABLET, FILM COATED ORAL at 18:38

## 2020-07-20 RX ADMIN — ENTECAVIR 0.5 MG: 0.5 TABLET ORAL at 08:10

## 2020-07-20 ASSESSMENT — ACTIVITIES OF DAILY LIVING (ADL)
ADLS_ACUITY_SCORE: 18

## 2020-07-20 ASSESSMENT — VISUAL ACUITY
OU: OTHER (SEE COMMENT)
OU: OTHER (SEE COMMENT)

## 2020-07-20 NOTE — PROGRESS NOTES
"   07/20/20 0900   Quick Adds   Type of Visit Initial Occupational Therapy Evaluation   Living Environment   Lives With alone   Living Arrangements apartment   Living Environment Comment Unable to obtain detailed living environment information from patient d/t communication and cognition. Pt responded \"yes\" when asked if living apartment/alone.     Self-Care   Current Activity Tolerance fair   Activity/Exercise/Self-Care Comment Unknown, however personal cane present in room   Functional Level   Ambulation 1-->assistive equipment  (Per PT ty, RN notes indicating was ambulatory with cane)   Which of the above functional risks had a recent onset or change? fall history;transferring;ambulation;toileting;bathing;dressing;communication/speech;cognition;eating  (Will follow up for further clarification on PLOF)   Prior Functional Level Comment Per RN notes, patient previously independent. Unable to further determine PLOF from pt due to communication and limited ability to respond to questions. Y/N questions work best. Will plan to follow up with designated family contact if able and/or continue to obtain information from patient if cognition and communication improve. Cane was present in room, suggestive that patient may be ambulatory at baseline.        Present yes  (ipad)   Language Jordanian   General Information   Onset of Illness/Injury or Date of Surgery - Date 07/16/20   Referring Physician  Aide Rider, SENG     Patient/Family Goals Statement Patient did not state.   Additional Occupational Profile Info/Pertinent History of Current Problem Per pt chart \"60 year old man with a history of HTN, Hep B, and cirrhosis who presented with altered mental status on 7/16. Stroke code was called on patient on 7/17 due to altered mental status with possible LLE weakness and that resolved. CT at that time notable only for leukoaraiosis. MR obtained which showed extensive T2 hyperintensity of the " "entire brainstem with extension in the middle cerebellar peduncles and portions of the cerebellum as well as scattered bilateral ischemic infarcts. CLAUDIA held given patient's mental status. Unclear etiology of the extensive T2 hyperintensity at this time.\"   Precautions/Limitations fall precautions   General Observations Pt greeted supine in bed. Alert; delayed to respond to questions at times. Whispers answers and trying to respond, though often mumbles and difficult to understand. Peripheral IV on R forearm. Telemetry and pulse ox in place.    General Info Comments Activity orders: Up ad sacha   Cognitive Status Examination   Level of Consciousness alert   Follows Commands (Cognition) follows one step commands;50-74% accuracy;delayed response/completion;increased processing time needed;physical/tactile prompts required;repetition of directions required;verbal cues/prompting required   Cognitive Comment Pt attempting to respond to questions. However, unclear of the accuracy of responses and difficult to understand. Pt could not verbalize orientation responses. Language barrier/ may also be contributing factors. Will continue to monitor cognition.    Visual Perception   Visual Perception Comments Pt denied any blurry or double vision via head shake.   Sensory Examination   Sensory Comments Patient nodding head yes when asked if bilateral sensation feels intact/equal. Will continue to monitor.    Pain Assessment   Patient Currently in Pain Yes, see Vital Sign flowsheet  (Pt reported pain with PROM in R shoulder at 80 degrees)   Posture   Posture Comments Pt observed to have a R lean and intermittent posterior posture when seated EOB. Requiring Mod A to maintain balance at EOB. Able to correct for small periods of time to Min A with proper hand placement and cues.    Range of Motion (ROM)   ROM Comment LUE shoulder flexion AROM approx 70 degrees, and ~120 degrees with AAROM. DNT PROM. Question if demonstrates " true available AROM d/t cognitive status/command following. L Elbow/wrist/hand ROM appears functional. RUE shoulder flexion AROM approx 5-10 degrees, worked up to ~100 degrees during AAROM/PROM with pain reported. PROM of distal R hand WFL. Difficulty participating in AAROM/AROM in distal RUE. Difficult to assess motor planning vs command following vs weakness.     Strength   Strength Comments LUE strength > RUE strength. Able to assist with LUE for rolling. RUE grossly 2/5. LUE grossly 3/5. Did not attempt MMT today.    Hand Strength   Hand Strength Comments Pt able to make L hand fist, and unable to make R hand fist independently. R hand required hand-over-hand max A for making a first and extending digits.   Coordination   Coordination Comments Finger tip to nose functional with LUE. Did not formally assess RUE. Will continue to monitor.   Mobility   Bed Mobility Bed mobility skill: Rolling/Turning  (modA x 2)   Bed Mobility Skill: Rolling/Turning   Level of Baxter - Bed Mobility Skill Rolling Turning moderate assist (50% patients effort)   Physical Assist/Nonphysical Assist 2 persons   Assistive Device:  Rolling/Turning bed rails   Transfer Skills   Transfer Comments Supine <> EOB required use of ceiling lift. Pt required verbal and tactile cues for positioning of hand placement in sling and posture at EOB.    Balance   Balance Comments Patient requiring variable Mod-Min A for EOB sitting balance in sling. Improved sitting balance with assist placing L hand at EOB to stabilize and verbal/tactile cues for upright posture.   Grooming   Level of Baxter: Grooming maximum assist (25% patients effort)   Physical Assist/Nonphysical Assist: Grooming set-up required;1 person assist;nonverbal cues (demo/gestures);verbal cues;other (see comments)  (Performed face/R arm washing with assist using L hand)   Instrumental Activities of Daily Living (IADL)   IADL Comments   (Unable to determine - follow up for  clarification)   Activities of Daily Living Analysis   Impairments Contributing to Impaired Activities of Daily Living balance impaired;motor control impaired;postural control impaired;ROM decreased;strength decreased;cognition impaired;pain;coordination impaired   ADL Comments Pt has significant R side weakness, balance deficits, and cognitive changes currently impacting ability to perform ADL self-cares independently.   General Therapy Interventions   Planned Therapy Interventions ADL retraining;neuromuscular re-education;ROM;strengthening;progressive activity/exercise;risk factor education;home program guidelines;transfer training;bed mobility training;balance training;cognition;fine motor coordination training   Clinical Impression   Criteria for Skilled Therapeutic Interventions Met yes, treatment indicated   OT Diagnosis Impaired ability to complete ADLs independently.   Influenced by the following impairments ROM, R side weakness, limited communication ability, Balance, Coordination, Pain, and Cognition    Assessment of Occupational Performance 5 or more Performance Deficits   Identified Performance Deficits Functional transfers, dressing, toileting, grooming, bathing, leisure, home management (All ADL/IADL).   Clinical Decision Making (Complexity) Low complexity   Therapy Frequency 6x/week   Predicted Duration of Therapy Intervention (days/wks) 2-3 weeks   Anticipated Equipment Needs at Discharge other (see comments)  (TBD.)   Anticipated Discharge Disposition Acute Rehabilitation Facility;Transitional Care Facility;Other (see comments)  (TBD TCU vs ARU pending IP progress)   Risks and Benefits of Treatment have been explained. Yes   Patient, Family & other staff in agreement with plan of care Yes   Clinical Impression Comments Pt presents with R side weakness with subsequent ROM limitations, cognitive/communication changes and balance deficits. Pt would benefit from receiving skilled occupational therapy  "services to progress independence with ADLs.   (o)   [facility] AM-PAC TM \"6 Clicks\"   2016, Trustees of [facility], under license to [employer].  All rights reserved.   6 Clicks Short Forms Daily Activity Inpatient Short Form   [facility] AM-PAC  \"6 Clicks\" Daily Activity Inpatient Short Form   1. Putting on and taking off regular lower body clothing? 1 - Total   2. Bathing (including washing, rinsing, drying)? 2 - A Lot   3. Toileting, which includes using toilet, bedpan or urinal? 2 - A Lot   4. Putting on and taking off regular upper body clothing? 2 - A Lot   5. Taking care of personal grooming such as brushing teeth? 2 - A Lot   6. Eating meals? 2 - A Lot   Daily Activity Raw Score (Score out of 24.Lower scores equate to lower levels of function) 11   Total Evaluation Time   Total Evaluation Time (Minutes) 8     "

## 2020-07-20 NOTE — PROGRESS NOTES
SPIRITUAL HEALTH SERVICES: Tele-Encounter  Patient Location ([facility], Unit): [facility], [place]  Spoke with (patient, family relationship): pt's relative      Referral Source: referral by lead carlos.    If applicable: patient was appropriately screened for telechaplaincy support with bedside nurse prior to visit (e.g. Mental Health and Addiction contexts). See call details below.    DATA:  The patient's relative declined carlos visit.       PLAN:  For now there is no follow up in order.    Jerrica Powell   Intern   ______________________________    Type of service:  Telephone Visit     has received verbal consent for a TelephoneVisit from the patient? Yes    Distance Provider Location: designated [place] office or home office (secure setting)    Mode of Communication: telephone (via [employer] phone or [employer] tele-call-number ([phone]))    * [facility] remains available 24/7 for emergent requests/referrals, either by having the switchboard page the on-call  or by entering an ASAP/STAT consult in Epic (this will also page the on-call ). Routine Epic consults receive an initial response within 24 hours.*

## 2020-07-20 NOTE — PLAN OF CARE
OT/6A:   Discharge Planner OT   Patient plan for discharge: Did not discuss.   Current status: OT evaluation competed; treatment initiated. Ipad  utilized throughout. Patient alert but demonstrates difficulty with communication/mumbled speech. Able to answer some yes/no questions. RUE strength grossly 2/5. Mod Ax2 for rolling for placement of sling. Dependently transferred to EOB. Sits EOB with Min-Mod A and cues for hand placement. Able to initiate washing face with set-up of washcloth. Assist to wash RUE. Engaged in RUE AAROM/PROM exercises in supine. Indicates pain with R shoulder flexion greater than ~80 degrees.    Barriers to return to prior living situation: Medical Status, Communication, Cognition, R Weakness, Level of A for ADL/Mobility   Recommendations for discharge: TCU vs ARU. Pending progress with IP therapies.   Rationale for recommendations: Patient appears to be well below functional baseline. Would benefit from skilled OT services to facilitate increased ADL independence, strength, cognition and participation. Unclear if will tolerate 3 hours of intensive therapies at this time, but will monitor.        Entered by: Ava Gibson 07/20/2020 12:48 PM

## 2020-07-20 NOTE — PLAN OF CARE
Status: Pt admitted after fall for stroke work up.  Vitals: VSS on RA ex HTN within parameters. CCM, NSR, intermittently kun.   Neuros: Difficult to assess. RUE 3/5, RLE 3/5, LUE 4/5, LLE 4/5. Only follows commands intermittently, mumbling speech,  cannot understand. Cannot handle his secretions well. Unable to make needs known. Sluggish pupils.   IV: PIV infusing plasmalyte @ 100 mL/hr.   Resp/trach: Expiratory and inspiratory wheezing. No shortness of breath noted. Unable to handle secretions well.  Coarse lung sounds.   Diet: NPO except meds and sips of liquids, speech following. Pt was able to swallow his pills and sips of water okay. Oral cares q2h.   Bowel status: BS+, unknown of last BM.   : Pt retaining urine, bladder scanned for more 1000 cc (pt does have ascites). Hawkins placed this afternoon.  Skin: Intact ex scab on R knee.   Pain: Denies, no signs of pain.    Activity: Up with lift, repositioned q2h.   Social: Niece and sister are only contacts allowed to receive updates or information.    Plan: Continue to monitor and follow plan of care.

## 2020-07-20 NOTE — PLAN OF CARE
Status: Pt on 6A post-fall for a stroke work-up.   Vitals: VSS on RA ex HTN within parameters. CCM, NSR.   Neuros: Difficult to assess neuro status. R side 3/5, L side 4/5. Follows commands intermittently, usually only when mimicking. Mumbling speech, not understood by family or . Pupils sluggish.  IV: One PIV SL, one PIV infusing Plasma-Lyte A @ 100.   Resp/trach: LS coarse with intermittent wheezing. Pt has trouble managing secretions. No SOB noted.   Diet: NPO ex meds. Speech recommending thin liquids. Low BG this shift, PRN Dextrose given x1, effective.  Bowel status: BS+. No BM this shift.   : Pt retaining urine. Bladder scanned for 372 at 1740 and 515 at 2040. Straight cathed for 250 at 2100. Pt offered urinal multiple times.   Skin: Intact ex scab on R knee.   Pain: Denies. No signs or symptoms of pain present.   Activity: Assist of two and lift, not OOB this shift. Turn/repo q2h.   Social: Niece and sister at bedside at start of shift; they're the only contacts allowed to receive updates or information.   Plan: LP completed today, awaiting results. Continue to monitor and follow plan of care.

## 2020-07-20 NOTE — PROGRESS NOTES
"CLINICAL NUTRITION SERVICES - ASSESSMENT NOTE     Nutrition Prescription    RECOMMENDATIONS FOR MDs/PROVIDERS TO ORDER:  If unable to advance diet after VFSS, recommend initiate enteral nutrition support.  --> If/when nutrition support becomes POC, please consult RD (\"Registered Dietitian to Order TF per Medical Nutrition Therapy Guidelines\")    Malnutrition Status:    Unable to determine due to unable to complete all parameters of nutrition assessment at this time.    Recommendations already ordered by Registered Dietitian (RD):  None at this time.    Future/Additional Recommendations:  1. Monitor results of VFSS and if appropriate for scheduled ONS/snacks    2. If EN becomes nutrition POC, recommend:  Impact Peptide @ goal 45 ml/hr (1080 ml/day) to provide 1620 kcals (31 kcal/kg/day), 102 g PRO (1.9 g/kg/day), 832 ml free H2O, 69 g Fat (50% from MCTs), 151 g CHO and no Fiber daily.  - Initiate @ 15 mL/hr and advance by 10 mL q8hr as tolerated  - Do not start or advance unless lytes (Mg++/K+) >/= WNL and phos>1.9   - Weekly CRP inflammation lab with immunomodulating formula   - 30 mL q4hr fluid flushes for tube patency. Additional fluids and/or adjustments per MD.    - Multivitamin/mineral (15 mL/day via FT) to help ensure micronutrient needs being met with suspected hypermetabolic demands and potential interruptions to TF infusions.    3. Once ready for discharge, recommend transition to standard EN formula:  Isosource 1.5 @ goal 50 ml/hr (1200 ml/day) to provide 1800 kcals (34 kcal/kg/day), 82 g PRO (1.5 g/kg/day), 912 ml free H2O, 211 g CHO and 18 g Fiber daily.       REASON FOR ASSESSMENT  Wilfredo Yoon is a/an 60 year old male assessed by the dietitian for Admission Nutrition Risk Screen for \"family noted weight loss\".    Per chart: history of hepatitis B, liver cirrhosis, ascites, hypertension, and HLD who was brought in via ambulance for a fall. He presented to the ED two other times today, for " "ascites and altered mental status.     NUTRITION HISTORY  -Per pt: Unable -  unable to understand pt per chart notes today - pt nonverbal/mumbles.  -Per chart: Not previously known to clinical nutrition services. No known allergies per chart review.    CURRENT NUTRITION ORDERS  Diet: NPO - previously on 2gm Na diet --> Per SLP note today: \"Continue to recommend NPO. OK for small sips of H20 for comfort with 1:1 supervision. Further recommend a videoswallow study for 7/21/20 to better assess aspiration risk and help direct need for possible TF placement.\"    Intake/Tolerance: 75% of one meal on 7/16.    LABS  Labs reviewed - 7/20  -UA ketones 10 (abnormal)   -Na+ 142 (WNL)  -K+ 3.5 (WNL)  -Cr 1.43 (H)    MEDICATIONS  Medications reviewed  -Plasma-Lyte A @ 100 mL/hr    ANTHROPOMETRICS  Height: 167.6 cm (5' 6\")  Most Recent Weight: 53.3 kg (117 lb 8 oz)    IBW: 64.5 kg (83% IBW)  BMI: 18.96 kg/m2; Normal BMI (borderline underweight)  Weight History: ~5% wt loss over past month per limited recent wt hx below.  Wt Readings from Last 20 Encounters:   07/19/20 53.3 kg (117 lb 8 oz)   06/14/20 56.1 kg (123 lb 9.6 oz)   10/21/19 54.4 kg (120 lb)   10/01/19 53.9 kg (118 lb 12.8 oz)   08/07/19 52 kg (114 lb 11.2 oz)   07/19/19 54.2 kg (119 lb 8 oz)   07/12/19 54.7 kg (120 lb 11.2 oz)   07/05/19 52.8 kg (116 lb 8 oz)   07/03/19 52.7 kg (116 lb 3.2 oz)   06/28/19 52.2 kg (115 lb 1.6 oz)   06/21/19 51.7 kg (114 lb)   06/14/19 51 kg (112 lb 8 oz)   06/07/19 50.8 kg (112 lb 1.6 oz)   05/28/19 51.6 kg (113 lb 11.2 oz)   05/24/19 50.3 kg (110 lb 14.4 oz)   05/17/19 58.1 kg (128 lb)   05/17/19 57.8 kg (127 lb 8 oz)   05/14/19 58.2 kg (128 lb 6.4 oz)   03/14/19 65.3 kg (144 lb)   02/02/19 61.3 kg (135 lb 2 oz)   Per Care Everywhere: No recent wt hx either  52.5 kg (115 lb 12.8 oz) 01/24/2020 11:30 AM CST     Dosing Weight: 53 kg (actual, based on most recent wt of 53.3 kg on 7/19)    ASSESSED NUTRITION NEEDS  Estimated " Energy Needs: 4136-4536+ kcals/day (30 - 35+ kcals/kg )  Justification: Borderline underweight, maintenance to repletion  Estimated Protein Needs:  grams protein/day (1.5 - 2 grams of pro/kg)  Justification: Repletion  Estimated Fluid Needs: 1 mL/kcal   Justification: Maintenance or Per provider pending fluid status    PHYSICAL FINDINGS  See malnutrition section below.  -Surgical site to abdomen      MALNUTRITION**Nutrition focused physical exam available per MD request. --> Unable to obtain in-person nutrition history or nutrition focused physical assessment (NFPA) from patient as the number of staff going into rooms is restricted to limit exposure and to minimize use of PPE.  % Intake: Unable to assess  % Weight Loss: Up to 5% in 1 month (non-severe)  Subcutaneous Fat Loss: Unable to assess  Muscle Loss: Unable to assess  Fluid Accumulation/Edema: None noted  Malnutrition Diagnosis: Unable to determine due to unable to complete all parameters of nutrition assessment at this time.    NUTRITION DIAGNOSIS  Inadequate oral intake related to suspect poor PO PTA and current NPO in setting of dysphagia as evidenced by SLP recommendation for NPO until VFSS on 7/21, pt wt loss of ~5% wt loss over past month, positive urine ketones, and 83% IBW.     INTERVENTIONS  Implementation  -Nutrition Education: Not appropriate at this time due to patient condition   -Enteral Nutrition - Recs if needed     Goals  Diet adv v nutrition support within 24-48 hours.     Monitoring/Evaluation  Progress toward goals will be monitored and evaluated per protocol.    Estelle Miguel RD, LD  Pager: [phone]

## 2020-07-20 NOTE — PLAN OF CARE
Discharge Planner SLP   Patient plan for discharge: not discussed  Current status: The patient was seen for dysphagia tx this afternoon with use of iPad . Although pharyngeal swallow response is timely, it remains reduced and audible, raising concern for silent aspiration. Continue to recommend NPO. OK for small sips of H20 for comfort with 1:1 supervision. Further recommend a videoswallow study for 7/21/20 to better assess aspiration risk and help direct need for possible TF placement.   Barriers to return to prior living situation: severity of deficits, dysphagia, aphasia, medical complexity  Recommendations for discharge: TCU  Rationale for recommendations: SLP tx for well below baseline swallowing function. Anticipate pt will also require tx for aphasia pending progress.       Entered by: Joanne Boo 07/20/2020 2:22 PM

## 2020-07-20 NOTE — PROGRESS NOTES
"[facility]  General Neurology Progress Note    Patient Name:  Wilfredo Yoon  MRN:  [id number]    :  1960  Date of Service: 2020      Patient Summary: Per initial general neurology consult note -  \"Wilfredo Yoon is a 60 year old male with a PMHx of HTN, chronic Hepatitis B and cirrhosis a/w ascites who initially presented to [facility] with abdominal pain on . He was noted to be hypertensive (245/140) and did not meet criteria for paracentesis and therefore was discharged home. He re-presented after friend found him to be altered and lethargic.  After thorough history and examination in the ED he was found to be back to his baseline mental status and not altered. CT was reported at showing extensive leukoariosis but no acute intracranial pathology. Per my eye however, he had significant hyperdensities in the brainstem particularly in the mid-brain and linden in addition to cortical white matter changes and this finding was not seen on a previous MRI last October. CTA with patent intracranial and extracranial vessels.  He was deemed stable for discharge from the emergency room.  However patient returned to the ED a few hours later after a fall as he was heading towards the light rail.  Following this he was admitted under observation for PT/OT eval. We were consulted to evaluate him as he was found to be altered this morning. He was non-verbal but awake and tracking. He had LUE weakness and L facial droop. LKWT was unclear but stroke code was called. He had a repeat CT/CTA which again showed no acute pathology and patent intracranial vessels. Given focal findings on exam, altered mental status and CT findings we recommended MRI Brain due to concern for ischemic infarct. MRI Brain with extensive T2 hypertentense signal involving the midbrain and linden as well cortical white matter. He also has scattered bilateral ischemic infarcts " "(bilateral frontal subcortical and right frontal). Based on this he will be admitted to the Stroke Service for stroke evaluation and will undergo further work up with CLAUDIA.      Unclear etiology of what his white matter FLAR changes indicates. These changes have a broad differential including infratentorial posterior reversible encephalopathy syndrome (given elevated BP), demyelinating disease such as NMOSD/MOG, severe metabolic/toxic disturbances, CLIPPERS,  central pontine myelinosis, infectious rhomboencephalitis (bacterial such as tubercular or listeria rhomboencephalitis, or viral HSV, EBV or HHV6 rhomboencephalitis) or neoplastic etiologies such as glioma or lymphoma.  He has elevated ESR/CRP which would potentially point to an infectious etiology. Elevated BPs would be concerning for infratentorial PRES.     Given his multifocal embolic infarcts he will be admitted to the [facility]/Stroke service. We recommend further work up with a CSF (to send for glucose, protein, culture, gram stain, cell count, HSV, EBV, OCBs and freeze sample for future testing). Also recommending CNS Demyelinating panel (ordered for you) and MRI Brain W/ Contrast to look for enhancement. Aslo recommending HIV and Quantiferon Gold. Will ultimately defer work up to Stroke Team.\"     Stroke team continued workup. LP renata.     Interval history:   Transferred to general neurology on 7/20.     Right hemiparesis weakness, improving mental status.    Physical Examination (performed with help of ipad )  Vitals: BP (!) 158/96 (BP Location: Right arm)   Pulse 80   Temp 97.6  F (36.4  C) (Oral)   Resp 16   Ht 1.676 m (5' 6\")   Wt 53.3 kg (117 lb 8 oz)   SpO2 100%   BMI 18.96 kg/m    Physical Exam:  Constitutional: Alert. Lying in bed comfortably. No acute distress.   Head: Atraumatic, normocephalic.  ENT: Moist mucous membranes. No sinus drainage.  Cardiovascular: Appears warm, well-perfused, and non-toxic.  Respiratory: No increased " work of breathing, no accessory muscle use.   Gastrointestinal: Does not appear distended.  Musculoskeletal: Moving all four limbs spontaneously.    Skin: No rashes or lesions appreciated on visualized skin.   Hematologic/Lymphatic/Immunologic: No bruising appreciated on visualized skin.   Neurologic: (limited by communication issues)  Mental Status: alert, awake. Speech is aphasic, unable to produce more than occasional words. Able to follow commands (shut eyes, thumbs up).   Cranial Nerves: pupils, equal and reactive to light and accommodation, EOMI without nystagmus, eyes move conjugately. Smile and eyebrow raise equal, blinking symmetric, no facial weakness. Lashes are buried on eye squeeze. Nasolabial folds symmetric. Tongue midline, hearing intact to conversation.  Motor: R hemiparesis with difficulty antigravity in RUE and LUE and weaker than left  Reflexes:hypereflexic biceps, brachioradialis, patellar, and achilles (R>L). Toes are upgoing.   Sensory: intact to pinch in all extremities             PMH:  Past Medical History:   Diagnosis Date     Cryoglobulinemia (H)      Glomerulonephritis      Hepatitis B      Hypertension      Surgical complication      PSH:  Past Surgical History:   Procedure Laterality Date     C HAND/FINGER SURGERY UNLISTED       ESOPHAGOSCOPY, GASTROSCOPY, DUODENOSCOPY (EGD), COMBINED N/A 10/18/2018    Procedure: EGD;  Surgeon: Eber Ortez MD;  Location:  GI     HAND SURGERY Right      Fam Hx:  Family History   Problem Relation Age of Onset     Liver Cancer No family hx of      Hepatitis No family hx of      Social Hx:  Relationships   Social connections     Talks on phone: Not on file     Gets together: Not on file     Attends Faith service: Not on file     Active member of club or organization: Not on file     Attends meetings of clubs or organizations: Not on file     Relationship status: Not on file      ROS: unable to perform 2/2 mental status      Investigations    Results for orders placed or performed during the hospital encounter of 07/16/20 (from the past 24 hour(s))   Glucose CSF:   Result Value Ref Range    Glucose CSF 42 40 - 70 mg/dL   Protein total CSF:   Result Value Ref Range    Protein Total CSF 30 15 - 60 mg/dL   Cell count with differential CSF:   Result Value Ref Range    WBC CSF 2 0 - 5 /uL    RBC CSF 0 0 - 2 /uL    Tube Number 4 #    Color CSF Colorless CLRL^Colorless    Appearance CSF Clear CLER^Clear   Cell count with differential CSF:   Result Value Ref Range    WBC CSF  0 - 5 /uL     Test not performed. Criteria not met for second cell count.    RBC CSF  0 - 2 /uL     Test not performed. Criteria not met for second cell count.    Tube Number 1 #    Color CSF Colorless CLRL^Colorless    Appearance CSF Clear CLER^Clear   Gram stain CSF Tube 2    Specimen: Cerebrospinal fluid   Result Value Ref Range    Specimen Description Cerebrospinal fluid     Gram Stain No organisms seen     Gram Stain Few  WBC'S seen       Gram Stain       Quantification of host cells and microbiological organisms was done on a cytocentrifuged   preparation.     Enterovirus PCR CSF    Specimen: Cerebrospinal fluid   Result Value Ref Range    Specimen Description Cerebrospinal fluid     Enterovirus CSF PCR Negative NEG^Negative   Glucose by meter   Result Value Ref Range    Glucose 62 (L) 70 - 99 mg/dL   Glucose by meter   Result Value Ref Range    Glucose 75 70 - 99 mg/dL   Glucose by meter   Result Value Ref Range    Glucose 185 (H) 70 - 99 mg/dL   Glucose by meter   Result Value Ref Range    Glucose 77 70 - 99 mg/dL   Glucose by meter   Result Value Ref Range    Glucose 82 70 - 99 mg/dL   Basic metabolic panel   Result Value Ref Range    Sodium 142 133 - 144 mmol/L    Potassium 3.5 3.4 - 5.3 mmol/L    Chloride 110 (H) 94 - 109 mmol/L    Carbon Dioxide 24 20 - 32 mmol/L    Anion Gap 8 3 - 14 mmol/L    Glucose 79 70 - 99 mg/dL    Urea Nitrogen 22 7 - 30 mg/dL    Creatinine 1.43 (H)  0.66 - 1.25 mg/dL    GFR Estimate 53 (L) >60 mL/min/[1.73_m2]    GFR Estimate If Black 62 >60 mL/min/[1.73_m2]    Calcium 8.2 (L) 8.5 - 10.1 mg/dL   CBC with platelets differential   Result Value Ref Range    WBC 5.0 4.0 - 11.0 10e9/L    RBC Count 3.75 (L) 4.4 - 5.9 10e12/L    Hemoglobin 12.2 (L) 13.3 - 17.7 g/dL    Hematocrit 37.4 (L) 40.0 - 53.0 %     78 - 100 fl    MCH 32.5 26.5 - 33.0 pg    MCHC 32.6 31.5 - 36.5 g/dL    RDW 13.1 10.0 - 15.0 %    Platelet Count 175 150 - 450 10e9/L    Diff Method Automated Method     % Neutrophils 61.4 %    % Lymphocytes 25.0 %    % Monocytes 8.2 %    % Eosinophils 4.0 %    % Basophils 1.0 %    % Immature Granulocytes 0.4 %    Nucleated RBCs 0 0 /100    Absolute Neutrophil 3.1 1.6 - 8.3 10e9/L    Absolute Lymphocytes 1.3 0.8 - 5.3 10e9/L    Absolute Monocytes 0.4 0.0 - 1.3 10e9/L    Absolute Eosinophils 0.2 0.0 - 0.7 10e9/L    Absolute Basophils 0.1 0.0 - 0.2 10e9/L    Abs Immature Granulocytes 0.0 0 - 0.4 10e9/L    Absolute Nucleated RBC 0.0          Assessment and Plan:  Mr. Yoon is a 60 year old man with a PMH of chronic Hep B with cirrhosis, HTN with hx hypertensive emergency, tobacco use, HLD, who presented to [facility] with abdominal pain on 7/16. Hypertensive to 245/140, ultimately discharged. He presented later to [facility] ED, also discharged after CT/CTA. He presented to [facility] ED for a third time with new AMS. Initially worked up as a stroke code. Found to have MRI Brain with extensive T2 hypertentense signal involving the midbrain and linden as well cortical white matte, as well as scattered bilateral ischemic infarcts (bilateral frontal subcortical and right frontal). Likely infratentorial PRES. Concern for RCVS like etiolgy of new infarcts. Will need therapies and BP control.      #Infratentorial PRES  # AMS, improving  -Blood pressure management as below in HTN section  - Neurochecks Q 4 hours  -repeat MRI in 7-10 days    #Multifocal strokes of unknown etiology  -  aspirin 81mg PO daily  -resume pta atorvastatin at 20mg (home dose is 40mg) PO nightly  -TTE  -tele  - PT/OT/SLP  - Stroke Education     # HTN  - Nifedipine 90 mg daily, if BPs stable, then will consider resuming PTA amlodipine 10 mg PO daily  - Hold PTA Lisinopril 40 mg PO daily due to ANDRY  - continue PTA Coreg     #ANDRY  #Hx nephrotic syndrome  Cr baseline 1.3- 1.4  - Holding lisinopril and Bumex      # Cirrhosis 2/2 Hepatitis B  # Ascites  - Continue entecavir 0.5 mg daily         Diet: NPO, pending Speech clearance  DVT prophylaxis: enoxaparin subcutaneous   Code status: Full code      Thank you for involving neurology in the care of Wilfredo Yoon.  Please do not hesitate to call with questions/concerns (consult pager [phone]).           Patient was seen and discussed with Dr. Keturah Patel MD  PGY2 Neurology  [phone]

## 2020-07-20 NOTE — PLAN OF CARE
Status: Pt on 6A post-fall for a stroke work-up.   Vitals: VSS on RA ex intermittent bradycardia (55-60bpm) and HTN >160 parameters, PRN hydralazine given and re-take WNL. CCM.  Neuros: KENDAL orientation, pt. with mumbling/whispering speech and not understood by  (I[place]ic speaking). Intermittently follows commands, RUE drift, RUE 2-3/5 depending on effort (lifts higher off bed with continued encouragement), RLE 3/5, LUE/LLE 4/5.    IV: R. PIVx2 with one SL and other infusing Plasma-Lyte A @ 100mL/hr.   Resp/trach: LS coarse, diminished in bases.   Diet: NPO ex meds and sips of thin liquids for comfort when appropriate per speech recommendations.   Bowel status: No BM this shift.   : Urinal offered q2hrs but pt. Unable to void, had one small episode of incontinence this shift. Straight cath at 0500 for 250mL.   Skin: Intact ex scab on R knee.   Pain: No c/o pain overnight, no nonverbal indicators of pain present    Activity: A2/lift. Turn/repo q2h.   Social: Niece and sister are only contacts allowed to receive updates or information regarding pt.   Plan:  Continue to monitor and follow plan of care.

## 2020-07-21 ENCOUNTER — APPOINTMENT (OUTPATIENT)
Dept: GENERAL RADIOLOGY | Facility: CLINIC | Age: 60
End: 2020-07-21
Attending: STUDENT IN AN ORGANIZED HEALTH CARE EDUCATION/TRAINING PROGRAM
Payer: COMMERCIAL

## 2020-07-21 ENCOUNTER — APPOINTMENT (OUTPATIENT)
Dept: ULTRASOUND IMAGING | Facility: CLINIC | Age: 60
End: 2020-07-21
Attending: STUDENT IN AN ORGANIZED HEALTH CARE EDUCATION/TRAINING PROGRAM
Payer: COMMERCIAL

## 2020-07-21 ENCOUNTER — APPOINTMENT (OUTPATIENT)
Dept: OCCUPATIONAL THERAPY | Facility: CLINIC | Age: 60
End: 2020-07-21
Payer: COMMERCIAL

## 2020-07-21 ENCOUNTER — APPOINTMENT (OUTPATIENT)
Dept: PHYSICAL THERAPY | Facility: CLINIC | Age: 60
End: 2020-07-21
Payer: COMMERCIAL

## 2020-07-21 ENCOUNTER — APPOINTMENT (OUTPATIENT)
Dept: SPEECH THERAPY | Facility: CLINIC | Age: 60
End: 2020-07-21
Payer: COMMERCIAL

## 2020-07-21 LAB
ANION GAP SERPL CALCULATED.3IONS-SCNC: 9 MMOL/L (ref 3–14)
ANNOTATION COMMENT IMP: NOT DETECTED
B BURGDOR IGG CSF QL IB: NEGATIVE
B BURGDOR IGM CSF QL IB: NEGATIVE
BUN SERPL-MCNC: 30 MG/DL (ref 7–30)
CALCIUM SERPL-MCNC: 8.4 MG/DL (ref 8.5–10.1)
CHLORIDE SERPL-SCNC: 108 MMOL/L (ref 94–109)
CO2 SERPL-SCNC: 22 MMOL/L (ref 20–32)
CREAT SERPL-MCNC: 1.61 MG/DL (ref 0.66–1.25)
DEPRECATED M TB RPOB XXX QL NAA+PROBE: NORMAL
ERYTHROCYTE [DISTWIDTH] IN BLOOD BY AUTOMATED COUNT: 12.9 % (ref 10–15)
GFR SERPL CREATININE-BSD FRML MDRD: 46 ML/MIN/{1.73_M2}
GLUCOSE BLDC GLUCOMTR-MCNC: 190 MG/DL (ref 70–99)
GLUCOSE SERPL-MCNC: 91 MG/DL (ref 70–99)
HBV DNA SERPL NAA+PROBE-ACNC: <20 [IU]/ML
HBV DNA SERPL NAA+PROBE-LOG IU: <1.3 {LOG_IU}/ML
HCT VFR BLD AUTO: 37.2 % (ref 40–53)
HGB BLD-MCNC: 12.1 G/DL (ref 13.3–17.7)
M TB DNA SPEC QL NAA+PROBE: NOT DETECTED
MCH RBC QN AUTO: 32.6 PG (ref 26.5–33)
MCHC RBC AUTO-ENTMCNC: 32.5 G/DL (ref 31.5–36.5)
MCV RBC AUTO: 100 FL (ref 78–100)
PLATELET # BLD AUTO: 184 10E9/L (ref 150–450)
POTASSIUM SERPL-SCNC: 3.8 MMOL/L (ref 3.4–5.3)
RBC # BLD AUTO: 3.71 10E12/L (ref 4.4–5.9)
SODIUM SERPL-SCNC: 139 MMOL/L (ref 133–144)
SPECIMEN SOURCE: NORMAL
WBC # BLD AUTO: 5.9 10E9/L (ref 4–11)

## 2020-07-21 PROCEDURE — 73060 X-RAY EXAM OF HUMERUS: CPT | Mod: RT

## 2020-07-21 PROCEDURE — 97530 THERAPEUTIC ACTIVITIES: CPT | Mod: GP | Performed by: REHABILITATION PRACTITIONER

## 2020-07-21 PROCEDURE — 97112 NEUROMUSCULAR REEDUCATION: CPT | Mod: GP | Performed by: REHABILITATION PRACTITIONER

## 2020-07-21 PROCEDURE — 25000132 ZZH RX MED GY IP 250 OP 250 PS 637: Performed by: STUDENT IN AN ORGANIZED HEALTH CARE EDUCATION/TRAINING PROGRAM

## 2020-07-21 PROCEDURE — 92611 MOTION FLUOROSCOPY/SWALLOW: CPT | Mod: GN

## 2020-07-21 PROCEDURE — 97535 SELF CARE MNGMENT TRAINING: CPT | Mod: GO

## 2020-07-21 PROCEDURE — 25000132 ZZH RX MED GY IP 250 OP 250 PS 637: Performed by: PSYCHIATRY & NEUROLOGY

## 2020-07-21 PROCEDURE — 12000001 ZZH R&B MED SURG/OB UMMC

## 2020-07-21 PROCEDURE — 36415 COLL VENOUS BLD VENIPUNCTURE: CPT | Performed by: PSYCHIATRY & NEUROLOGY

## 2020-07-21 PROCEDURE — 85027 COMPLETE CBC AUTOMATED: CPT | Performed by: PSYCHIATRY & NEUROLOGY

## 2020-07-21 PROCEDURE — 80048 BASIC METABOLIC PNL TOTAL CA: CPT | Performed by: PSYCHIATRY & NEUROLOGY

## 2020-07-21 PROCEDURE — 25000128 H RX IP 250 OP 636: Performed by: STUDENT IN AN ORGANIZED HEALTH CARE EDUCATION/TRAINING PROGRAM

## 2020-07-21 PROCEDURE — 97110 THERAPEUTIC EXERCISES: CPT | Mod: GP | Performed by: REHABILITATION PRACTITIONER

## 2020-07-21 PROCEDURE — 00000146 ZZHCL STATISTIC GLUCOSE BY METER IP

## 2020-07-21 PROCEDURE — 93971 EXTREMITY STUDY: CPT | Mod: RT

## 2020-07-21 PROCEDURE — 92526 ORAL FUNCTION THERAPY: CPT | Mod: GN

## 2020-07-21 PROCEDURE — 73010 X-RAY EXAM OF SHOULDER BLADE: CPT | Mod: RT

## 2020-07-21 PROCEDURE — 74230 X-RAY XM SWLNG FUNCJ C+: CPT

## 2020-07-21 PROCEDURE — 97110 THERAPEUTIC EXERCISES: CPT | Mod: GO

## 2020-07-21 RX ORDER — AMLODIPINE BESYLATE 10 MG/1
10 TABLET ORAL DAILY
Status: DISCONTINUED | OUTPATIENT
Start: 2020-07-21 | End: 2020-08-04 | Stop reason: HOSPADM

## 2020-07-21 RX ORDER — BUMETANIDE 1 MG/1
1 TABLET ORAL ONCE
Status: COMPLETED | OUTPATIENT
Start: 2020-07-21 | End: 2020-07-21

## 2020-07-21 RX ORDER — BARIUM SULFATE 400 MG/ML
10 SUSPENSION ORAL ONCE
Status: COMPLETED | OUTPATIENT
Start: 2020-07-21 | End: 2020-07-21

## 2020-07-21 RX ADMIN — BUMETANIDE 1 MG: 1 TABLET ORAL at 16:17

## 2020-07-21 RX ADMIN — BARIUM SULFATE 10 ML: 400 SUSPENSION ORAL at 10:04

## 2020-07-21 RX ADMIN — ATORVASTATIN CALCIUM 20 MG: 20 TABLET, FILM COATED ORAL at 20:56

## 2020-07-21 RX ADMIN — CARVEDILOL 12.5 MG: 12.5 TABLET, FILM COATED ORAL at 08:19

## 2020-07-21 RX ADMIN — CARVEDILOL 12.5 MG: 12.5 TABLET, FILM COATED ORAL at 18:03

## 2020-07-21 RX ADMIN — ENTECAVIR 0.5 MG: 0.5 TABLET ORAL at 08:19

## 2020-07-21 RX ADMIN — AMLODIPINE BESYLATE 10 MG: 10 TABLET ORAL at 08:19

## 2020-07-21 RX ADMIN — ASPIRIN 81 MG CHEWABLE TABLET 81 MG: 81 TABLET CHEWABLE at 08:19

## 2020-07-21 RX ADMIN — ENOXAPARIN SODIUM 40 MG: 40 INJECTION SUBCUTANEOUS at 18:03

## 2020-07-21 ASSESSMENT — VISUAL ACUITY
OU: OTHER (SEE COMMENT)

## 2020-07-21 ASSESSMENT — ACTIVITIES OF DAILY LIVING (ADL)
ADLS_ACUITY_SCORE: 22
ADLS_ACUITY_SCORE: 18
ADLS_ACUITY_SCORE: 22

## 2020-07-21 NOTE — PLAN OF CARE
Discharge Planner PT   Patient plan for discharge: Did not discuss  Current status: Patient consistently responds to single step commands or questions. Patient performed LE therapeutic exercise in supine and EOB. Patient exhibits R sided weakness needing maximum assistance with R LE and UE. Patient able to transfer sup<>sit MaxAx2. Patient able to tolerate unsupported sitting EOB SBAx1.   Barriers to return to prior living situation: Medical status, impaired functional mobility, cognition, R hemiparesis, expressive aphasia, impaired balance, decreased strength.   Recommendations for discharge: TCU vs ARU  Rationale for recommendations: Pending progress with IP rehabs patient may be able to tolerate 3+ hours of therapy. Pt in need of multiple therapy services. Currently due to patients activity tolerance, TCU is the best choice.        Entered by: Judd Cuevas 07/21/2020 2:58 PM

## 2020-07-21 NOTE — PROGRESS NOTES
"[facility]  General Neurology Progress Note    Patient Name:  Wilfredo Yoon  MRN:  [id number]    :  1960  Date of Service: 2020      Patient Summary: Per initial general neurology consult note -  \"Wilfredo Yoon is a 60 year old male with a PMHx of HTN, chronic Hepatitis B and cirrhosis a/w ascites who initially presented to [facility] with abdominal pain on . He was noted to be hypertensive (245/140) and did not meet criteria for paracentesis and therefore was discharged home. He re-presented after friend found him to be altered and lethargic.  After thorough history and examination in the ED he was found to be back to his baseline mental status and not altered. CT was reported at showing extensive leukoariosis but no acute intracranial pathology. Per my eye however, he had significant hyperdensities in the brainstem particularly in the mid-brain and linden in addition to cortical white matter changes and this finding was not seen on a previous MRI last October. CTA with patent intracranial and extracranial vessels.  He was deemed stable for discharge from the emergency room.  However patient returned to the ED a few hours later after a fall as he was heading towards the light rail.  Following this he was admitted under observation for PT/OT eval. We were consulted to evaluate him as he was found to be altered this morning. He was non-verbal but awake and tracking. He had LUE weakness and L facial droop. LKWT was unclear but stroke code was called. He had a repeat CT/CTA which again showed no acute pathology and patent intracranial vessels. Given focal findings on exam, altered mental status and CT findings we recommended MRI Brain due to concern for ischemic infarct. MRI Brain with extensive T2 hypertentense signal involving the midbrain and linden as well cortical white matter. He also has scattered bilateral ischemic infarcts " "(bilateral frontal subcortical and right frontal). Based on this he will be admitted to the Stroke Service for stroke evaluation and will undergo further work up with CLAUDIA.      Unclear etiology of what his white matter FLAIR changes indicates. These changes have a broad differential including infratentorial posterior reversible encephalopathy syndrome (given elevated BP), demyelinating disease such as NMOSD/MOG, severe metabolic/toxic disturbances, CLIPPERS,  central pontine myelinosis, infectious rhomboencephalitis (bacterial such as tubercular or listeria rhomboencephalitis, or viral HSV, EBV or HHV6 rhomboencephalitis) or neoplastic etiologies such as glioma or lymphoma.  He has elevated ESR/CRP which would potentially point to an infectious etiology. Elevated BPs would be concerning for infratentorial PRES.     Given his multifocal embolic infarcts he will be admitted to the NCC/Stroke service. We recommend further work up with a CSF (to send for glucose, protein, culture, gram stain, cell count, HSV, EBV, OCBs and freeze sample for future testing). Also recommending CNS Demyelinating panel (ordered for you) and MRI Brain W/ Contrast to look for enhancement. Aslo recommending HIV and Quantiferon Gold. Will ultimately defer work up to Stroke Team.\"     Stroke team continued workup. LP renata.     Interval history:   Transferred to general neurology on 7/20.     Right hemiparesis weakness, improving mental status and alertness. Endorsing new pain/swelling in right shoulder and upper arm, grimmacing with passive motion.     Saw patient again later in afternoon with sister at bedside and niece on the phone, video ipad  used.       Physical Examination (performed with help of ipad )  Vitals: /85 (BP Location: Left arm)   Pulse 63   Temp 97.9  F (36.6  C) (Oral)   Resp 16   Ht 1.676 m (5' 6\")   Wt 53.3 kg (117 lb 8 oz)   SpO2 100%   BMI 18.96 kg/m    Physical Exam:  Constitutional: " Alert. Lying in bed comfortably. No acute distress.   Head: Atraumatic, normocephalic.  ENT: Moist mucous membranes. No sinus drainage.  Cardiovascular: Appears warm, well-perfused, and non-toxic.  Respiratory: No increased work of breathing, no accessory muscle use.   Gastrointestinal: Does not appear distended.   Skin: No rashes or lesions appreciated on visualized skin.   Hematologic/Lymphatic/Immunologic: No bruising appreciated on visualized skin.   Neurologic: (limited by aphasia)  Mental Status: alert, awake. Oriented to self and sister, as well as location. Speech is aphasic, unable to produce more than occasional words. Difficulty following commands today, but answering some question and following some exam commands.   Cranial Nerves: Looking in all directions, able to bury sclerae, eyes move conjugately. Smile and eyebrow raise equal, blinking symmetric, mild right nasolabial flattening. hearing intact to conversation.  Motor: R hemiparesis with RUE weaker than, RLE cannot lift antigravity but flicker. LUE with drift, unclear if limited due to aphasia.  Reflexes: hypereflexic biceps, brachioradialis, patellar, and achilles (R>L). Toes are upgoing.   Sensory: intact to pinch in all extremities, no sensory changes endorsed      PMH:  Past Medical History:   Diagnosis Date     Cryoglobulinemia (H)      Glomerulonephritis      Hepatitis B      Hypertension      Surgical complication      PSH:  Past Surgical History:   Procedure Laterality Date     ANESTHESIA OUT OF OR MRI N/A 7/18/2020    Procedure: ANESTHESIA OUT OF OR MRI;  Surgeon: GENERIC ANESTHESIA PROVIDER;  Location: [facility] OR     C HAND/FINGER SURGERY UNLISTED       ESOPHAGOSCOPY, GASTROSCOPY, DUODENOSCOPY (EGD), COMBINED N/A 10/18/2018    Procedure: EGD;  Surgeon: Eber Ortez MD;  Location: [facility]     HAND SURGERY Right      Fam Hx:  Family History   Problem Relation Age of Onset     Liver Cancer No family hx of      Hepatitis No family hx of       Social Hx:  Relationships   Social connections     Talks on phone: Not on file     Gets together: Not on file     Attends Sabianist service: Not on file     Active member of club or organization: Not on file     Attends meetings of clubs or organizations: Not on file     Relationship status: Not on file      ROS: unable to perform 2/2 mental status/aphasia      Investigations   Results for orders placed or performed during the hospital encounter of 20 (from the past 24 hour(s))   Glucose by meter   Result Value Ref Range    Glucose 79 70 - 99 mg/dL   Sodium random urine   Result Value Ref Range    Sodium Urine mmol/L 46 mmol/L   Osmolality urine   Result Value Ref Range    Urine Osmolality 454 100 - 1,200 mmol/kg   UA with Microscopic   Result Value Ref Range    Color Urine Yellow     Appearance Urine Slightly Cloudy     Glucose Urine Negative NEG^Negative mg/dL    Bilirubin Urine Negative NEG^Negative    Ketones Urine 10 (A) NEG^Negative mg/dL    Specific Gravity Urine 1.019 1.003 - 1.035    Blood Urine Small (A) NEG^Negative    pH Urine 6.0 5.0 - 7.0 pH    Protein Albumin Urine 300 (A) NEG^Negative mg/dL    Urobilinogen mg/dL 2.0 0.0 - 2.0 mg/dL    Nitrite Urine Negative NEG^Negative    Leukocyte Esterase Urine Trace (A) NEG^Negative    Source Catheterized Urine     WBC Urine 6 (H) 0 - 5 /HPF    RBC Urine 5 (H) 0 - 2 /HPF    Mucous Urine Present (A) NEG^Negative /LPF    Hyaline Casts 1 0 - 2 /LPF   Echo Complete    Narrative    [id number]  [id number]  [id number]  [id number]^JESSICA BLACK^MACARIO           [facility]  Echocardiography Laboratory  [address] [place] [id number]     Name: RANDALL BLOOD  MRN: [id number]  : 1960  Study Date: 2020 01:26 PM  Age: 60 yrs  Gender: Male  Patient Location: [facility]  Reason For Study: CVA  Ordering Physician: MACARIO RAMOS  Performed By: Veronica Knight RDCS     BSA: 1.6 m2  Height: 66 in  Weight: 117 lb  HR:  66  BP: 120/99 mmHg  _____________________________________________________________________________  __        Procedure  Bubble Echocardiogram with two-dimensional, color and spectral Doppler  performed.  _____________________________________________________________________________  __        Interpretation Summary  No cardiac source for embolus identified. The atrial septum is intact as  assessed by color Doppler and agitated saline bubble study .  _____________________________________________________________________________  __        Left Ventricle  Global and regional left ventricular function is hyperkinetic with an EF of  65-70%. Left ventricular wall thickness is normal. Left ventricular size is  normal. Grade I or early diastolic dysfunction. No regional wall motion  abnormalities are seen.     Right Ventricle  Right ventricular function, chamber size, wall motion, and thickness are  normal.     Atria  Both atria appear normal. The atrial septum is intact as assessed by color  Doppler and agitated saline bubble study .     Mitral Valve  The mitral valve is normal.        Aortic Valve  Aortic valve is normal in structure and function.     Tricuspid Valve  The tricuspid valve is normal. Pulmonary artery systolic pressure cannot be  assessed.     Pulmonic Valve  The pulmonic valve is normal.     Vessels  The thoracic aorta is normal. The pulmonary artery and bifurcation cannot be  assessed. The inferior vena cava is normal.     Pericardium  No pericardial effusion is present.        Compared to Previous Study  Previous study not available for comparison.  _____________________________________________________________________________  __  MMode/2D Measurements & Calculations     IVSd: 0.75 cm  LVIDd: 4.1 cm  LVIDs: 2.7 cm  LVPWd: 1.2 cm  FS: 33.9 %  LV mass(C)d: 126.4 grams  LV mass(C)dI: 79.4 grams/m2  Ao root diam: 2.9 cm  asc Aorta Diam: 2.3 cm  LVOT diam: 1.9 cm  LVOT area: 2.8 cm2  LA Volume (BP): 41.6  "ml  LA Volume Index (BP): 26.2 ml/m2  RWT: 0.59           Doppler Measurements & Calculations  MV E max gunjan: 48.0 cm/sec  MV A max gunjan: 107.0 cm/sec  MV E/A: 0.45  MV dec slope: 157.0 cm/sec2  PA acc time: 0.11 sec  E/E' av.7  Lateral E/e': 6.4  Medial E/e': 9.0     _____________________________________________________________________________  __           Report approved by: Mike Ruelas 2020 02:42 PM      Glucose by meter   Result Value Ref Range    Glucose 86 70 - 99 mg/dL   Basic metabolic panel   Result Value Ref Range    Sodium 139 133 - 144 mmol/L    Potassium 3.8 3.4 - 5.3 mmol/L    Chloride 108 94 - 109 mmol/L    Carbon Dioxide 22 20 - 32 mmol/L    Anion Gap 9 3 - 14 mmol/L    Glucose 91 70 - 99 mg/dL    Urea Nitrogen 30 7 - 30 mg/dL    Creatinine 1.61 (H) 0.66 - 1.25 mg/dL    GFR Estimate 46 (L) >60 mL/min/[1.73_m2]    GFR Estimate If Black 53 (L) >60 mL/min/[1.73_m2]    Calcium 8.4 (L) 8.5 - 10.1 mg/dL   CBC with platelets   Result Value Ref Range    WBC 5.9 4.0 - 11.0 10e9/L    RBC Count 3.71 (L) 4.4 - 5.9 10e12/L    Hemoglobin 12.1 (L) 13.3 - 17.7 g/dL    Hematocrit 37.2 (L) 40.0 - 53.0 %     78 - 100 fl    MCH 32.6 26.5 - 33.0 pg    MCHC 32.5 31.5 - 36.5 g/dL    RDW 12.9 10.0 - 15.0 %    Platelet Count 184 150 - 450 10e9/L     Per SLP 7/21  \"Recommend the patient initiate a nectar thick consistency full liquid diet with close supervision. Recommend 1 small bite/sip at a time, a slow pace of feeding, and a dry swallow between bites/sips to ensure pharyngeal residue has cleared. Feel the patient would benefit from a repeat videoswallow study in approximately 1 week to reassess oropharyngeal swallowing skills and potential to safely advance diet level.\"    Assessment and Plan:  Mr. Yoon is a 60 year old man with a PMH of chronic Hep B with cirrhosis, HTN with hx hypertensive emergency, tobacco use, HLD, who presented to [facility] with abdominal pain on . Hypertensive to " 245/140, ultimately discharged. He presented later to [facility] ED, also discharged after CT/CTA. He presented to [facility] ED for a third time with new AMS. Initially worked up as a stroke code. Imaging showed complex findings, including severe hyperintensity on T2/FLAIR of the entire brainstem, but NOT restricted diffusion, and supratentorial extensive white matter pathology involving periventricular white matter and corpus callosum, plus several areas of restricted diffusion at left corona radiata, right basal ganglia, etc, most likely consistent with infarcts. While the DDx of brainstem hyperintensities is very large and includes infectious, inflammatory, vasculitic, demyelinating, toxic/metabolic (eg CPM), and neoplastic pathologies, overall I believe that the most likely explanation is brainstem variant of PRES syndrome and that the multifocal infarcts noted in the supratentorial compartment may be due to concomitant reversible cerebral vasoconstriction syndrome.     Likely infratentorial PRES. Concern for RCVS like etiolgy of new infarcts. Will need therapies and BP control.      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   - resumed PTA amlodipine 10 mg PO daily and discontinued nifedipine  - Hold PTA Lisinopril 40 mg PO daily due to ANDRY   - continue PTA Coreg 12.5mg PO BID     #ANDRY  #Hx nephrotic syndrome  Cr baseline 1.3- 1.4. This morning at 1.6 with some increased edema on exam  - Holding lisinopril and Bumex      # Cirrhosis 2/2 Hepatitis B  #  Ascites  - Continue entecavir 0.5 mg daily     #shoulder pain and hand swelling this morning  Will obtain XR for fracture rule out (pt did have fall prior to presentation and may have been too altered to endorse symptoms previously). Will also obtain doppler to r/o DVT, given hand swelling, though this is less likely. Also on ddx is adhesive capsulitis.   -XR shoulder and upper arm no acute osseous abnormality per radiology read  -doppler RUE no DVT    Diet: Full liquid, nectar thick (will stop mIVF at this time)  DVT prophylaxis: enoxaparin subcutaneous   Lines: PIV, vazquez  Code status: Full code      Thank you for involving neurology in the care of Wilfredo Yoon.  Please do not hesitate to call with questions/concerns (consult pager [phone]).           Patient was seen and discussed with Dr. Keturah Patel MD  PGY2 Neurology  [phone]

## 2020-07-21 NOTE — PLAN OF CARE
OT/6A:  Discharge Planner OT   Patient plan for discharge: Did not discuss.  Current status:  used and sister present throughout session. Pt completed face washing seated in bed, requiring hand over hand assist to use R hand. Able to follow one-step commands and initiate task with set-up. Participated in PROM exercises for RUE, pain noted with shoulder flexion ~70-80 degrees. Pt alert initially but fatigued over time.  Barriers to return to prior living situation: Medical status, R sided weakness, impaired cognition and communication, level of assist for ADLs  Recommendations for discharge: TCU vs ARU  Rationale for recommendations: Pt significantly below baseline functional status with cognition, communication, and ADLs/IADLs. Unclear whether pt would tolerate 3 hours of therapy/day, will continue to monitor as pt progresses with IP therapy.       Entered by: Emerita Melton 07/21/2020 4:18 PM

## 2020-07-21 NOTE — PROGRESS NOTES
"   07/21/20 1000   General Information   Onset Date 07/16/20   Start of Care Date 07/19/20   Referring Physician Sunni Wallis MD    Patient Profile Review/OT: Additional Occupational Profile Info See Profile for full history and prior level of function   Patient/Family Goals Statement patient did not state, but was cooperative and appeared motivated for this exam   Swallowing Evaluation Videofluoroscopic evaluation   Behaviorial Observations WFL (within functional limits)  (expressive aphasia)   Mode of current nutrition NPO   Respiratory Status Room air   Comments Per pt chart \"60 year old man with a history of HTN, Hep B, and cirrhosis who presented with altered mental status on 7/16. Stroke code was called on patient on 7/17 due to altered mental status with possible LLE weakness and that resolved. CT at that time notable only for leukoaraiosis. MR obtained which showed extensive T2 hyperintensity of the entire brainstem with extension in the middle cerebellar peduncles and portions of the cerebellum as well as scattered bilateral ischemic infarcts. CLAUDIA held given patient's mental status. Unclear etiology of the extensive T2 hyperintensity at this time.\" Videoswallow study ordered to objectively assess oropharyngeal swallow skills and potential to safely advance to a PO diet.   VFSS Eval: Radiology   Radiologist resident   Views Taken left lateral   Physical Location of Procedure [facility] radiology suite #5   VFSS Eval: Thin Liquid Texture Trial   Mode of Presentation, Thin Liquid spoon;fed by clinician   Order of Presentation 1,2,3,4   Preparatory Phase Poor bolus control   Oral Phase, Thin Liquid Poor AP movement;Premature pharyngeal entry   Pharyngeal Phase, Thin Liquid Delayed swallow reflex;Residue in valleculae;Residue in pyriform sinus   Rosenbek's Penetration Aspiration Scale: Thin Liquid Trial Results 4 - contrast contacts vocal cords, no residue remains (penetration)   Diagnostic Statement Swallow " response triggered at the level of the pyfriform sinus. Penetration during the swallow, no aspiration present with teaspoon trial, but felt to be at risk for aspiration.   VFSS Eval: Nectar Thick Liquid Texture Trial   Mode of Presentation, Nectar cup;spoon;self-fed;fed by clinician   Order of Presentation 5,6,7,8,10,12,13   Preparatory Phase WFL   Oral Phase, Nectar Poor AP movement;Premature pharyngeal entry   Pharyngeal Phase, Nectar Delayed swallow reflex;Residue in valleculae;Residue in pyriform sinus   Rosenbek's Penetration Aspiration Scale: Nectar-Thick Liquid Trial Results 2 - contrast enters airway, remains above the vocal cords, no residue remains (penetration)   Diagnostic Statement Trace penetration, no aspiration with sips of nectar thick consistency by cup. Benefits from verbal cue for a dry swallow between sips to clear pharyngeal residue.   VFSS Eval: Puree Solid Texture Trial   Mode of Presentation, Puree fed by clinician   Order of Presentation 9   Preparatory Phase WFL   Oral Phase, Puree Poor AP movement;Premature pharyngeal entry   Pharyngeal Phase, Puree Delayed swallow reflex   Rosenbek's Penetration Aspiration Scale: Puree Food Trial Results 1 - no aspiration, contrast does not enter airway   Diagnostic Statement Swallow response triggered at the level of the pyriform sinus. No penetrration or aspriation present.   VFSS Eval: Semisolid Texture Trial   Mode of Presentation, Semisolid fed by clinician   Order of Presentation 11   Preparatory Phase Holding;Insufficient mastication;Poor bolus control   Oral Phase, Semisolid Poor AP movement;Residue in oral cavity;Premature pharyngeal entry   Pharyngeal Phase, Semisolid Delayed swallow reflex   Rosenbek's Penetration Aspiration Scale: Semisolid Food Trial Results 1 - no aspiration, contrast does not enter airway   Diagnostic Statement Oral phase excessively slow with insufficient mastication, poor bolus control and oral residue after the swallow.  No penetrtion or aspiration present with small trial.   Swallow Compensations   Swallow Compensations Alternate viscosity of consistencies;Pacing;Reduce amounts;Multiple swallow  (helpful to improve swallow safety)   Esophageal Phase of Swallow   Patient reports or presents with symptoms of esophageal dysphagia No   General Therapy Interventions   Planned Therapy Interventions Dysphagia Treatment   Dysphagia treatment Oropharyngeal exercise training;Modified diet education;Instruction of safe swallow strategies   Swallow Eval: Clinical Impressions   Skilled Criteria for Therapy Intervention Skilled criteria met.  Treatment indicated.   Functional Assessment Scale (FAS) 3   Treatment Diagnosis moderate oropharyngeal dysphagia   Diet texture recommendations Full liquid;Nectar thick liquids   Recommended Feeding/Eating Techniques alternate between small bites and sips of food/liquid;hard swallow w/ each bite or sip;maintain upright posture during/after eating for 30 mins;no straws;small sips/bites   Demonstrates Need for Referral to Another Service occupational therapy;physical therapy   Therapy Frequency Daily   Predicted Duration of Therapy Intervention (days/wks) 2 weeks   Anticipated Discharge Disposition inpatient rehabilitation facility   Risks and Benefits of Treatment have been explained. Yes   Patient, family and/or staff in agreement with Plan of Care Yes   Clinical Impression Comments Videoswallow study completed per MD order, ipad  used. During this session the patient remained nonverbal, but gave thumbs up and down, was alert, following commands and more interactive than during previous sessions. Under fluoroscopy the patient demonstrated moderate oropharyngeal dysphagia with poor bolus control, insufficient mastication with solids, premature pharyngeal entry and significantly delayed swallow response. Pharyngeal swallow triggered at the level of the pyiform sinus, which places the patient at  risk for aspiration across consistencies. Penetration noted with teaspoon amounts of thin liquid and with x1 sip of nectar thick liquids. Moderate residue present intermittently noted in the valleculae and pyriform sinus, cleared with cue for a dry swallow. No aspiration present during this exam. Recommend the patient initiate a nectar thick consistency full liquid diet with close supervision. Recommend 1 small bite/sip at a time, a slow pace of feeding, and a dry swallow between bites/sips to ensure pharyngeal residue has cleared. Feel the patient would benefit from a repeat videoswallow study in approximately 1 week to reassess oropharyngeal swallowing skills and potential to safely advance diet level.   Total Evaluation Time   Total Evaluation Time (Minutes) 20

## 2020-07-21 NOTE — PLAN OF CARE
Discharge Planner SLP   Patient plan for discharge: not discussed  Current status: Videoswallow study completed per MD order, ipad  used. During this session the patient remained nonverbal, but gave thumbs up and down, was alert, following commands and more interactive than during previous sessions. Under fluoroscopy the patient demonstrated moderate oropharyngeal dysphagia with poor bolus control, insufficient mastication with solids, premature pharyngeal entry and significantly delayed swallow response. Pharyngeal swallow triggered at the level of the pyiform sinus, which places the patient at risk for aspiration across consistencies. Penetration noted with teaspoon amounts of thin liquid and with x1 sip of nectar thick liquids. Moderate residue present intermittently noted in the valleculae and pyriform sinus, cleared with cue for a dry swallow. No aspiration present during this exam. Recommend the patient initiate a nectar thick consistency full liquid diet with close supervision. Recommend 1 small bite/sip at a time, a slow pace of feeding, and a dry swallow between bites/sips to ensure pharyngeal residue has cleared. Feel the patient would benefit from a repeat videoswallow study in approximately 1 week to reassess oropharyngeal swallowing skills and potential to safely advance diet level.  Barriers to return to prior living situation: medical complexity, weakness, aphasia, dysphagia  Recommendations for discharge: patient with increased participation today, may progress to being a good candidate for ARU  Rationale for recommendations: well below baseline oropharyngeal swallowing and communication skills. Will plan to initiate speech-language intervention if consistently appropriate to participate.       Entered by: Joanne Boo 07/21/2020 10:23 AM

## 2020-07-21 NOTE — PLAN OF CARE
Status: Pt on 6A post-fall for a stroke work-up.   Vitals: VSS on RA. CCM, NSR.   Neuros: Difficult to assess neuro status. R side 3/5, L side 4/5. Follows commands intermittently, usually only when mimicking. Mumbling speech, not understood by family or . Pupils sluggish.  Unable to make needs known a majority of the time.   IV: One PIV SL, one PIV infusing Plasma-Lyte A @ 100.   Resp/trach: LS coarse with intermittent wheezing. Pt has trouble managing secretions. No SOB noted.   Diet: NPO ex meds. Speech recommending thin liquids. Oral cares q2h.  Bowel status: BS+. No BM this shift. Last BM date unknown.  : Hawkins placed earlier today for retention. Cares completed. Low output.  Pain: Denies. No signs or symptoms of pain present.   Activity: Assist of two and lift, not OOB this shift. Turn/repo q2h.   Social: Niece and sister are only contacts allowed to receive updates. Niece updated this shift.  Plan: Continue to monitor and follow plan of care.

## 2020-07-21 NOTE — PLAN OF CARE
Status: Working diagnosis is PRES and infarcts due to vasoconstriction  Vitals: VSS. MD want consistent and controlled BP.   Neuros: [place] speaking. Speech is a whisper and mumbles.  has a difficult time understanding him. He was able to say his first name this afternoon. Continue to have right sided weakness. RUE 1/5 and RLE 2/5 LUE 4/5 and LLE3/5. Follows simple commands.   IV: PIV SL  Resp/trach: LS diminished in bases  Diet: Video swallow study done this AM- Full liquid nectar diet ordered.   Bowel status: No BM today   : Vazquez in place with low urine output. MD notified of previous low urine and will update her with day shift total.  Skin: Skin intact except scab on right knee. Pitting edema noted to right hand and bilat feet today. MD notified.   Pain: C/O pain in right hand/arm/shoulder- Very painful even to touch. Right hand was swollen as well. MD notified and x-ray and ultrasound complete. Asked MD to order patient tylenol.   Activity: Up with lift  Social: Sister at bedside this afternoon. MD updated sister and niece via  IPAD.   Plan: Continue to monitor BP, urine output, and encourage activity. Stroke class tomorrow at 10:30 with sister and possibly niece. Family is aware.       Update- vazquez output was 150ml. Neurology resident paged.

## 2020-07-21 NOTE — PLAN OF CARE
Status: Pt on 6A post-fall for a stroke work-up.   Vitals: VSS on RA. CCM.  Neuros: KENDAL orientation, pt. with mumbling/whispering speech and not understood by  (Thai speaking). Intermittently follows commands, RUE drift, RUE/RLE 2-3/5, LUE/LLE 4/5, generalized weakness.    IV: R. PIVx2 with one SL and other infusing Plasma-Lyte A @ 100mL/hr.   Resp/trach: LS coarse, diminished in bases. Occasional inspiratory wheezing  Diet: NPO ex meds and sips of thin liquids for comfort with 1:1 supervision  Bowel status: No BM this shift, LBM unknown. BS audible and normoactive  : Hawkins catheter in place for retention   Skin: Intact ex scab on R knee.   Pain: No c/o pain overnight, no nonverbal indicators of pain present    Activity: A2/lift. Turn/repo q2h.   Social: Niece and sister are only contacts allowed to receive updates or information regarding pt.   Plan:  Video swallow study for 7/21/20 to better assess aspiration risk and help direct need for possible TF placement. Paracentesis 7/23. Continue to monitor and follow plan of care.

## 2020-07-22 ENCOUNTER — APPOINTMENT (OUTPATIENT)
Dept: EDUCATION SERVICES | Facility: CLINIC | Age: 60
End: 2020-07-22
Attending: PSYCHIATRY & NEUROLOGY
Payer: COMMERCIAL

## 2020-07-22 ENCOUNTER — TELEPHONE (OUTPATIENT)
Dept: MEDSURG UNIT | Facility: CLINIC | Age: 60
End: 2020-07-22

## 2020-07-22 ENCOUNTER — APPOINTMENT (OUTPATIENT)
Dept: OCCUPATIONAL THERAPY | Facility: CLINIC | Age: 60
End: 2020-07-22
Payer: COMMERCIAL

## 2020-07-22 ENCOUNTER — APPOINTMENT (OUTPATIENT)
Dept: PHYSICAL THERAPY | Facility: CLINIC | Age: 60
End: 2020-07-22
Payer: COMMERCIAL

## 2020-07-22 LAB
ACE CSF-CCNC: <0.4 U/L (ref 0–2.5)
ALB CSF/SERPL: 5.7 RATIO (ref 0–9)
ALBUMIN CSF-MCNC: 9 MG/DL (ref 0–35)
ALBUMIN SERPL-MCNC: 1580 MG/DL (ref 3500–5200)
ANION GAP SERPL CALCULATED.3IONS-SCNC: 4 MMOL/L (ref 3–14)
BUN SERPL-MCNC: 32 MG/DL (ref 7–30)
C3 SERPL-MCNC: 95 MG/DL (ref 81–157)
C4 SERPL-MCNC: 21 MG/DL (ref 13–39)
CALCIUM SERPL-MCNC: 8.1 MG/DL (ref 8.5–10.1)
CHLORIDE SERPL-SCNC: 108 MMOL/L (ref 94–109)
CO2 SERPL-SCNC: 27 MMOL/L (ref 20–32)
CREAT SERPL-MCNC: 1.62 MG/DL (ref 0.66–1.25)
ERYTHROCYTE [DISTWIDTH] IN BLOOD BY AUTOMATED COUNT: 12.9 % (ref 10–15)
GFR SERPL CREATININE-BSD FRML MDRD: 46 ML/MIN/{1.73_M2}
GLUCOSE SERPL-MCNC: 91 MG/DL (ref 70–99)
HCT VFR BLD AUTO: 37.4 % (ref 40–53)
HGB BLD-MCNC: 12.1 G/DL (ref 13.3–17.7)
IGG CSF-MCNC: 3.4 MG/DL (ref 0–6)
IGG SERPL-MCNC: 1060 MG/DL (ref 768–1632)
IGG SYNTH RATE SER+CSF CALC-MRATE: <0 MG/D
IGG/ALB CLEAR SER+CSF-RTO: 0.56 RATIO (ref 0.28–0.66)
IGG/ALB CSF: 0.38 RATIO (ref 0.09–0.25)
MCH RBC QN AUTO: 32.2 PG (ref 26.5–33)
MCHC RBC AUTO-ENTMCNC: 32.4 G/DL (ref 31.5–36.5)
MCV RBC AUTO: 100 FL (ref 78–100)
OLIGOCLONAL BANDS CSF ELPH-IMP: ABNORMAL
OLIGOCLONAL BANDS CSF ELPH-IMP: NEGATIVE
OLIGOCLONAL BANDS CSF IEF: 0 BANDS (ref 0–1)
PLATELET # BLD AUTO: 191 10E9/L (ref 150–450)
POTASSIUM SERPL-SCNC: 3.5 MMOL/L (ref 3.4–5.3)
RBC # BLD AUTO: 3.76 10E12/L (ref 4.4–5.9)
RHEUMATOID FACT SER NEPH-ACNC: 84 IU/ML (ref 0–20)
SODIUM SERPL-SCNC: 139 MMOL/L (ref 133–144)
VDRL CSF QL: NON REACTIVE
WBC # BLD AUTO: 5.9 10E9/L (ref 4–11)

## 2020-07-22 PROCEDURE — 82595 ASSAY OF CRYOGLOBULIN: CPT | Performed by: PSYCHIATRY & NEUROLOGY

## 2020-07-22 PROCEDURE — 86160 COMPLEMENT ANTIGEN: CPT | Performed by: PSYCHIATRY & NEUROLOGY

## 2020-07-22 PROCEDURE — 36415 COLL VENOUS BLD VENIPUNCTURE: CPT | Performed by: STUDENT IN AN ORGANIZED HEALTH CARE EDUCATION/TRAINING PROGRAM

## 2020-07-22 PROCEDURE — 97530 THERAPEUTIC ACTIVITIES: CPT | Mod: GP | Performed by: PHYSICAL THERAPIST

## 2020-07-22 PROCEDURE — 25000132 ZZH RX MED GY IP 250 OP 250 PS 637: Performed by: STUDENT IN AN ORGANIZED HEALTH CARE EDUCATION/TRAINING PROGRAM

## 2020-07-22 PROCEDURE — 25000125 ZZHC RX 250: Performed by: STUDENT IN AN ORGANIZED HEALTH CARE EDUCATION/TRAINING PROGRAM

## 2020-07-22 PROCEDURE — 86334 IMMUNOFIX E-PHORESIS SERUM: CPT | Performed by: PSYCHIATRY & NEUROLOGY

## 2020-07-22 PROCEDURE — 97112 NEUROMUSCULAR REEDUCATION: CPT | Mod: GO

## 2020-07-22 PROCEDURE — 97530 THERAPEUTIC ACTIVITIES: CPT | Mod: GO

## 2020-07-22 PROCEDURE — 25000128 H RX IP 250 OP 636: Performed by: STUDENT IN AN ORGANIZED HEALTH CARE EDUCATION/TRAINING PROGRAM

## 2020-07-22 PROCEDURE — 80048 BASIC METABOLIC PNL TOTAL CA: CPT | Performed by: STUDENT IN AN ORGANIZED HEALTH CARE EDUCATION/TRAINING PROGRAM

## 2020-07-22 PROCEDURE — 36415 COLL VENOUS BLD VENIPUNCTURE: CPT | Performed by: PSYCHIATRY & NEUROLOGY

## 2020-07-22 PROCEDURE — 85027 COMPLETE CBC AUTOMATED: CPT | Performed by: STUDENT IN AN ORGANIZED HEALTH CARE EDUCATION/TRAINING PROGRAM

## 2020-07-22 PROCEDURE — 25000132 ZZH RX MED GY IP 250 OP 250 PS 637: Performed by: PSYCHIATRY & NEUROLOGY

## 2020-07-22 PROCEDURE — 86431 RHEUMATOID FACTOR QUANT: CPT | Performed by: PSYCHIATRY & NEUROLOGY

## 2020-07-22 PROCEDURE — 12000001 ZZH R&B MED SURG/OB UMMC

## 2020-07-22 RX ORDER — BUMETANIDE 1 MG/1
1 TABLET ORAL ONCE
Status: COMPLETED | OUTPATIENT
Start: 2020-07-22 | End: 2020-07-22

## 2020-07-22 RX ORDER — SODIUM CHLORIDE, SODIUM GLUCONATE, SODIUM ACETATE, POTASSIUM CHLORIDE AND MAGNESIUM CHLORIDE 526; 502; 368; 37; 30 MG/100ML; MG/100ML; MG/100ML; MG/100ML; MG/100ML
50 INJECTION, SOLUTION INTRAVENOUS CONTINUOUS
Status: DISCONTINUED | OUTPATIENT
Start: 2020-07-22 | End: 2020-07-23

## 2020-07-22 RX ADMIN — CARVEDILOL 12.5 MG: 12.5 TABLET, FILM COATED ORAL at 09:28

## 2020-07-22 RX ADMIN — SODIUM CHLORIDE, SODIUM GLUCONATE, SODIUM ACETATE, POTASSIUM CHLORIDE AND MAGNESIUM CHLORIDE 50 ML/HR: 526; 502; 368; 37; 30 INJECTION, SOLUTION INTRAVENOUS at 17:39

## 2020-07-22 RX ADMIN — CARVEDILOL 12.5 MG: 12.5 TABLET, FILM COATED ORAL at 17:05

## 2020-07-22 RX ADMIN — ENTECAVIR 0.5 MG: 0.5 TABLET ORAL at 09:29

## 2020-07-22 RX ADMIN — ATORVASTATIN CALCIUM 20 MG: 20 TABLET, FILM COATED ORAL at 20:28

## 2020-07-22 RX ADMIN — AMLODIPINE BESYLATE 10 MG: 10 TABLET ORAL at 09:29

## 2020-07-22 RX ADMIN — BUMETANIDE 1 MG: 1 TABLET ORAL at 17:05

## 2020-07-22 RX ADMIN — ENOXAPARIN SODIUM 40 MG: 40 INJECTION SUBCUTANEOUS at 17:05

## 2020-07-22 ASSESSMENT — ACTIVITIES OF DAILY LIVING (ADL)
ADLS_ACUITY_SCORE: 18
ADLS_ACUITY_SCORE: 18
ADLS_ACUITY_SCORE: 22

## 2020-07-22 NOTE — PROVIDER NOTIFICATION
"\"PAVITHRA  211-2 6A General Neurology. [facility] hospital urine output via vazquez catheter from [id number] was 200mL. Thanks, Ferdinand [phone]\"        Paged General neurology pager [phone]    "

## 2020-07-22 NOTE — PLAN OF CARE
SLP: Attempted to see patient for dysphagia treatment, however patient not available due to meeting with provider.    Addendum: Patient sleeping upon second attempt.

## 2020-07-22 NOTE — PLAN OF CARE
Discharge Planner OT   Patient plan for discharge: Pt does not state.   Current status: Pt max A rolling, dependent for sling placement. Dependently lifted to chair. Initiated R UE ROM/neuro re-ed. Short tx as pt's sisters arrived for stroke education.   Barriers to return to prior living situation: Medical status, lift dependent, decreased ROM with R side, weakness, decreased ADL/IADL I.   Recommendations for discharge: TCU  Rationale for recommendations: May progress to ARU. Pt has significant OT and PT needs. Currently, unclear if pt would tolerate 3 hours a day. Will continue to monitor.        Entered by: Fang Galvin 07/22/2020 11:03 AM     OT 6A

## 2020-07-22 NOTE — CONSULTS
Stroke Education Note    The following information has been reviewed with the patient and family:    1. Warning signs of stroke    2. Calling 911 if having warning signs of stroke    3. All modifiable risk factors: hypertension, CAD, atrial fib, diabetes, hypercholesterolemia, smoking, substance abuse, diet, physical inactivity, obesity, sleep apnea.    4. Patient's risk factors for stroke which include: HTN, HLD, smoking, unhealthy diet    5. Follow-up plan for after discharge    6. Discharge medications which include: Norvasc, lisinopril, ASA, Lipitor, and Bumex    In addition, the PLC Stroke Class Handout has been given to the patient and family.    Learner's response to risk factors / lifestyle modification education: Taking steps     SKYLER Verdugo RN

## 2020-07-22 NOTE — PLAN OF CARE
Discharge Planner PT   Patient plan for discharge: Did not discuss  Current status: PT 6A: Pt was very fatigued but able to participate. Pt unable to speak, but with encouragement intermittently able to nod/shake head to yes/no questions. Pt required MAXA x 2 rolling and supine>sit, sat at edge of bed ~ 6 min with LUNA of 1-2. VSS, O2 sats % on RA. Pt required MAXA x 2 sit>supine and total assist to boost. Pt declined further PT due to fatigue.   Barriers to return to prior living situation: Medical status, impaired functional mobility, cognition, R hemiparesis, expressive aphasia, impaired balance, decreased strength.   Recommendations for discharge: TCU   Rationale for recommendations: Rehab to maximize functional IND. Currently due to patients activity tolerance, TCU is the best choice.        Entered by: Asya Camacho 07/22/2020 5:00 PM

## 2020-07-22 NOTE — CONSULTS
Neuro Endovascular Consult     60 year old male with a PMHx of HTN, chronic Hepatitis B and cirrhosis a/w ascites, CKD presents with AMS in setting of hypertensive encephalopathy.  MRI brain showed new FLAIR changes in linden, and bihemispheric restricted diffusion lesions. NeuroIR consulted for possible vasculitis. Patient reportedly has history of vasculitis, and given HTN not given steroids during this admission. LP is unremarkable. CTA during this admission did not show any vessel irregularities. At this point, after discussion with primary neurology team, we believe that vasculitic process as etiology is low, if patient develops new/worsening sxs, please feel free to call us to rediscuss.    Neuro IR service will sign off    Eugenie Addison MD  Neuroendovascular Fellow  Pager: [phone]  07/22/2020 3:11 PM

## 2020-07-22 NOTE — PROGRESS NOTES
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   Cr at baseline , no acute electrolyte abnormalities   Hemodynamically stable    -- will check renal US   -- C3 C4, Cryoglobulinemia ordered - will follow results  -- Will check PR3, MPO antibody  -- would advise to continue Bumex 1 gm x1 today       Reassess response tomorrow    Formal nephrology consult to follow tomorrow      Staffed with  Dr Edison Aragon MD, FACP  Nephrology Fellow   [facility]   Pager [phone]

## 2020-07-22 NOTE — PLAN OF CARE
Status: Pt on 6A for stroke work-up after fall. Working diagnosis of PRES and infarcts d/t vasoconstriction. Needs [facility] .  Vitals: VSS on RA. CCM, intermittent bradycardia.   Neuros: Difficult to assess. Follows commands intermittently, usually only when mimicking. Mumbles/whispers, only understandable to sister and  at times. Denies N/T. R-sided weakness: RUE 1/5, RLE 2/5, LUE 4/5, and LLE 3/5. Oriented to self and hospital via .   IV: PIV SL x2.   Resp/trach: LS coarse/diminished. Denies SOB.   Diet: Full liquid, nectar-thick. Tolerating well. Offered sips of water at every safety check. Ate majority of dinner.   Bowel status: BS+. Last BM date unknown. No BM this shift.   : Hawkins in place for retention with low output, MD aware.   Skin: Intact ex scab on R knee. Pitting edema in bilateral feet, elevated. Swelling in hand, elevated, improving.  Pain: C/o pain in R arm and shoulder, improving over shift.   Activity: Assist of two and lift, not OOB this shift. Turn/repo q2h. Rooke boots and PCDs in place.  Social: Sister at bedside during visiting hours.   Plan: Encourage fluids and activity. Keep BP within parameters. Monitor urine output, strict I&Os. Stroke class tomorrow at 10:30 with sister in attendance. Continue to monitor and follow plan of care.

## 2020-07-22 NOTE — PROGRESS NOTES
"[facility]  General Neurology Progress Note    Patient Name:  Wilfredo Yoon  MRN:  [id number]    :  1960  Date of Service: 2020      Patient Summary:   Mr. Yoon is a 60 year old male with a PMHx of HTN, chronic Hepatitis B and cirrhosis a/w ascites, CKD who presents with PRES and vasoconstriction-induced infarcts after an episode of hypertensive emergency.    Transferred to general neurology on .     Interval history:   No acute events overnight. Right hemiparesis weakness, improving mental status and alertness.       Physical Examination (performed with help of ipad )  Vitals: /89 (BP Location: Left arm)   Pulse 68   Temp 98  F (36.7  C) (Oral)   Resp 16   Ht 1.676 m (5' 6\")   Wt 53.3 kg (117 lb 8 oz)   SpO2 100%   BMI 18.96 kg/m    Physical Exam:  Constitutional: Alert. Lying in bed comfortably. No acute distress.   Head: Atraumatic, normocephalic.  ENT: Moist mucous membranes. No sinus drainage.  Cardiovascular: Appears warm, well-perfused, and non-toxic.  Respiratory: No increased work of breathing, no accessory muscle use.   Gastrointestinal: Does not appear distended.   Skin: No rashes or lesions appreciated on visualized skin.   Hematologic/Lymphatic/Immunologic: No bruising appreciated on visualized skin.   Neurologic: (limited by aphasia)  Mental Status: alert, awake. Aphasic, following some exam commands.   Cranial Nerves: Looking in all directions, able to bury sclerae, eyes move conjugately. Smile and eyebrow raise equal, blinking symmetric, mild right nasolabial flattening. hearing intact to conversation.  Motor: R hemiparesis-RUE and RLE cannot lift antigravity but some movement in plane. LUE moving spontaneously, antigravity  Sensory: intact to pinch in all extremities, no sensory changes endorsed      Investigations   Recent Labs   Lab 20  0705 20  0648 20  0625  20  0856 " "07/17/20  0610 07/16/20  1314 07/16/20  0926   WBC 5.9 5.0 4.6   < >  --  6.1 6.7 6.4   HGB 12.1* 12.2* 11.6*   < >  --  11.4* 13.1* 13.5    100 101*   < >  --  98 97 96    175 161   < >  --  117* 96* 92*   INR  --   --   --   --   --   --   --  1.09    142 142   < >  --  139 134 138   POTASSIUM 3.8 3.5 3.5   < >  --  3.6 4.6 3.7   CHLORIDE 108 110* 110*   < >  --  110* 106 106   CO2 22 24 25   < >  --  24 25 27   BUN 30 22 24   < >  --  23 22 21   CR 1.61* 1.43* 1.57*   < >  --  1.52* 1.49* 1.53*   ANIONGAP 9 8 6   < >  --  6 4 5   SOTERO 8.4* 8.2* 8.1*   < >  --  8.0* 8.2* 8.5   GLC 91 79 73   < >  --  87 93 93   ALBUMIN  --   --   --   --  1.6* 1.4* 1.7* 1.7*   PROTTOTAL  --   --   --   --   --  5.4* 6.2* 6.4*   BILITOTAL  --   --   --   --   --  0.4 1.0 1.1   ALKPHOS  --   --   --   --   --  214* 238* 254*   ALT  --   --   --   --   --  21 27 28   AST  --   --   --   --   --  28 Canceled, Test credited 41   LIPASE  --   --   --   --   --   --  30* 67*   TROPI  --   --   --   --   --   --  <0.015  --     < > = values in this interval not displayed.         Per SLP 7/21  \"Recommend the patient initiate a nectar thick consistency full liquid diet with close supervision. Recommend 1 small bite/sip at a time, a slow pace of feeding, and a dry swallow between bites/sips to ensure pharyngeal residue has cleared. Feel the patient would benefit from a repeat videoswallow study in approximately 1 week to reassess oropharyngeal swallowing skills and potential to safely advance diet level.\"    Assessment and Plan:  Mr. Yoon is a 60 year old man with a PMH of chronic Hep B with cirrhosis, HTN with hx hypertensive emergency, tobacco use, HLD, who presented to [facility] with abdominal pain on 7/16. Hypertensive to 245/140, ultimately discharged. He presented later to [facility] ED, also discharged after CT/CTA. He presented to [facility] ED for a third time with new AMS. Initially worked up as a stroke code. Imaging " showed complex findings, including severe hyperintensity on T2/FLAIR of the entire brainstem, but NOT restricted diffusion, and supratentorial extensive white matter pathology involving periventricular white matter and corpus callosum, plus several areas of restricted diffusion at left corona radiata, right basal ganglia, etc, most likely consistent with infarcts. While the DDx of brainstem hyperintensities is very large and includes infectious, inflammatory, vasculitic, demyelinating, toxic/metabolic (eg CPM), and neoplastic pathologies, overall I believe that the most likely explanation is brainstem variant of PRES syndrome and that the multifocal infarcts noted in the supratentorial compartment may be due to concomitant reversible cerebral vasoconstriction syndrome. Of note he has membranous nephropathy.     Likely infratentorial PRES. Concern for RCVS like etiolgy of new infarcts. Will need therapies and BP control.      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   See neprhology office note 10/10/2017. Current Cr baseline 1.3- 1.4. Yesterday morning at 1.6 with some increased edema on exam ,gave 1x dose of home bumex with increase in UOP. Will consider consult to nephrology if no improvement.   - Holding lisinopril and Bumex   - strict Is and Os  - vazquez     #Multifocal strokes of unknown etiology  - aspirin 81mg PO daily   - resumed pta atorvastatin at 20mg (home dose is 40mg) PO nightly  - tele  - PT/OT/SLP  - Stroke Education     # HTN  Hypertension is long standing, secondary (likely due to membranous glomerulonephritis in the setting of chronic hepatitis B and possible cryoglobulinemia) and suboptimally controlled.    - resumed PTA amlodipine 10 mg PO daily and discontinued nifedipine  - continue PTA Coreg 12.5mg PO BID  - Hold PTA Lisinopril 40 mg PO daily due to ANDRY     # Cirrhosis 2/2 Hepatitis B  # Ascites  - Continue entecavir 0.5 mg daily     #shoulder pain R  Will obtain XR for fracture rule out (pt did have fall prior to presentation and may have been too altered to endorse symptoms previously). Will also obtain doppler to r/o DVT, given hand swelling, though this is less likely. Also on ddx is adhesive capsulitis.   -XR shoulder and upper arm no acute osseous abnormality per radiology read  -doppler RUE no DVT  -continue to monitor    Diet: Full liquid, nectar thick   DVT prophylaxis: SCDs, enoxaparin subcutaneous   Lines: PIV, vazquez  Code status: Full code      Thank you for involving neurology in the care of Wilfredo Yoon.  Please do not hesitate to call with questions/concerns (consult pager [phone]).         Patient was seen and discussed with Dr. Keturah Patel MD  PGY2 Neurology  [phone]

## 2020-07-22 NOTE — PLAN OF CARE
Shift: [id number]    Status: Admit 7/16 for stroke work-up from OSH, needs [place]   Neuro: Difficult to assess, speech is very quiet whisper/mumble. Intermittently follows commands, does better with tasks he can mimic. Oriented x self, unable to completely assess orientation. RUE 1/5 RLE 2/5, LUE 4/5 LLE 3/5  Cardiac/VS: VSS  Respiratory: RA  GI: Denies N/V  Diet/Appetite: Full liquid diet with nectar thick liquids  : Hawkins with small amount of urine output  Activity: Up w/ Ax1-2 w/ GB and walker   Pain: Denies pain  Skin: No new deficits   LDA(s): PIV SL        Plan: Continue plan of care

## 2020-07-22 NOTE — PLAN OF CARE
Vitals: VSS on RA, CCM  Neuros: English speaking. Mumbles/whispers. RUE 1/5, RLE 2/5, LLE 3/5, LUE 4/5. Alert and oriented to self.  IV: PIV x2 SL'd  Resp/trach: Wnl on RA  Diet: Full liquids, nectar thick  Bowel status: Bs+x4, no BM this shift.  : Hawkins in place, Urine output 200ml this shift, MD notified.  Skin: See flowsheets  Pain: Pain to right shoulder, elevated and ice applied  Activity: Up with lift and assist of 2, up in chair this shift.  Social: Family here for PLC today.  Plan: Continue to monitor and follow POC. Stroke PLC completed today.

## 2020-07-23 ENCOUNTER — APPOINTMENT (OUTPATIENT)
Dept: ULTRASOUND IMAGING | Facility: CLINIC | Age: 60
End: 2020-07-23
Payer: COMMERCIAL

## 2020-07-23 ENCOUNTER — APPOINTMENT (OUTPATIENT)
Dept: GENERAL RADIOLOGY | Facility: CLINIC | Age: 60
End: 2020-07-23
Payer: COMMERCIAL

## 2020-07-23 LAB
ANION GAP SERPL CALCULATED.3IONS-SCNC: 7 MMOL/L (ref 3–14)
BACTERIA SPEC CULT: NO GROWTH
BACTERIA SPEC CULT: NO GROWTH
BUN SERPL-MCNC: 30 MG/DL (ref 7–30)
CALCIUM SERPL-MCNC: 8.6 MG/DL (ref 8.5–10.1)
CHLORIDE SERPL-SCNC: 108 MMOL/L (ref 94–109)
CO2 SERPL-SCNC: 24 MMOL/L (ref 20–32)
CREAT SERPL-MCNC: 1.39 MG/DL (ref 0.66–1.25)
GFR SERPL CREATININE-BSD FRML MDRD: 55 ML/MIN/{1.73_M2}
GLUCOSE BLDC GLUCOMTR-MCNC: 112 MG/DL (ref 70–99)
GLUCOSE BLDC GLUCOMTR-MCNC: 75 MG/DL (ref 70–99)
GLUCOSE SERPL-MCNC: 84 MG/DL (ref 70–99)
LAB SCANNED RESULT: NORMAL
MYELOPEROXIDASE AB SER-ACNC: <0.2 AI (ref 0–0.9)
POTASSIUM SERPL-SCNC: 3.7 MMOL/L (ref 3.4–5.3)
PROTEINASE3 IGG SER-ACNC: <0.2 AI (ref 0–0.9)
RESULT: NORMAL
SEND OUTS MISC TEST CODE: NORMAL
SEND OUTS MISC TEST SPECIMEN: NORMAL
SODIUM SERPL-SCNC: 138 MMOL/L (ref 133–144)
SPECIMEN SOURCE: NORMAL
SPECIMEN SOURCE: NORMAL
TEST NAME: NORMAL

## 2020-07-23 PROCEDURE — 12000001 ZZH R&B MED SURG/OB UMMC

## 2020-07-23 PROCEDURE — 25000132 ZZH RX MED GY IP 250 OP 250 PS 637: Performed by: PSYCHIATRY & NEUROLOGY

## 2020-07-23 PROCEDURE — 83516 IMMUNOASSAY NONANTIBODY: CPT | Performed by: PSYCHIATRY & NEUROLOGY

## 2020-07-23 PROCEDURE — 25000132 ZZH RX MED GY IP 250 OP 250 PS 637: Performed by: STUDENT IN AN ORGANIZED HEALTH CARE EDUCATION/TRAINING PROGRAM

## 2020-07-23 PROCEDURE — 36415 COLL VENOUS BLD VENIPUNCTURE: CPT | Performed by: PSYCHIATRY & NEUROLOGY

## 2020-07-23 PROCEDURE — 74018 RADEX ABDOMEN 1 VIEW: CPT

## 2020-07-23 PROCEDURE — 25000128 H RX IP 250 OP 636: Performed by: STUDENT IN AN ORGANIZED HEALTH CARE EDUCATION/TRAINING PROGRAM

## 2020-07-23 PROCEDURE — 83876 ASSAY MYELOPEROXIDASE: CPT | Performed by: PSYCHIATRY & NEUROLOGY

## 2020-07-23 PROCEDURE — 80048 BASIC METABOLIC PNL TOTAL CA: CPT | Performed by: PSYCHIATRY & NEUROLOGY

## 2020-07-23 PROCEDURE — 86038 ANTINUCLEAR ANTIBODIES: CPT | Performed by: PSYCHIATRY & NEUROLOGY

## 2020-07-23 PROCEDURE — 25000132 ZZH RX MED GY IP 250 OP 250 PS 637: Performed by: NURSE PRACTITIONER

## 2020-07-23 PROCEDURE — 00000146 ZZHCL STATISTIC GLUCOSE BY METER IP

## 2020-07-23 PROCEDURE — 76770 US EXAM ABDO BACK WALL COMP: CPT

## 2020-07-23 RX ORDER — LABETALOL 20 MG/4 ML (5 MG/ML) INTRAVENOUS SYRINGE
10 ONCE
Status: COMPLETED | OUTPATIENT
Start: 2020-07-23 | End: 2020-07-23

## 2020-07-23 RX ADMIN — ASPIRIN 81 MG CHEWABLE TABLET 81 MG: 81 TABLET CHEWABLE at 08:58

## 2020-07-23 RX ADMIN — CARVEDILOL 12.5 MG: 12.5 TABLET, FILM COATED ORAL at 08:58

## 2020-07-23 RX ADMIN — ENTECAVIR 0.5 MG: 0.5 TABLET ORAL at 08:58

## 2020-07-23 RX ADMIN — CARVEDILOL 12.5 MG: 12.5 TABLET, FILM COATED ORAL at 18:18

## 2020-07-23 RX ADMIN — ATORVASTATIN CALCIUM 20 MG: 20 TABLET, FILM COATED ORAL at 20:28

## 2020-07-23 RX ADMIN — ENOXAPARIN SODIUM 40 MG: 40 INJECTION SUBCUTANEOUS at 18:18

## 2020-07-23 RX ADMIN — LABETALOL 20 MG/4 ML (5 MG/ML) INTRAVENOUS SYRINGE 10 MG: at 04:36

## 2020-07-23 RX ADMIN — BUMETANIDE 1 MG: 1 TABLET ORAL at 08:58

## 2020-07-23 RX ADMIN — AMLODIPINE BESYLATE 10 MG: 10 TABLET ORAL at 08:58

## 2020-07-23 RX ADMIN — LABETALOL 20 MG/4 ML (5 MG/ML) INTRAVENOUS SYRINGE 10 MG: at 05:05

## 2020-07-23 ASSESSMENT — ACTIVITIES OF DAILY LIVING (ADL)
ADLS_ACUITY_SCORE: 20
ADLS_ACUITY_SCORE: 20
ADLS_ACUITY_SCORE: 18
ADLS_ACUITY_SCORE: 18
ADLS_ACUITY_SCORE: 20
ADLS_ACUITY_SCORE: 18

## 2020-07-23 NOTE — PROVIDER NOTIFICATION
Patient diastolic . No parameters, MD updated. Systolic WNL. No new orders. Will continue to monitor.

## 2020-07-23 NOTE — PROGRESS NOTES
"[facility]  General Neurology Progress Note    Patient Name:  Wilfredo Yoon  MRN:  [id number]    :  1960  Date of Service: 2020      Patient Summary:   Mr. Yoon is a 60 year old male with a PMHx of HTN, chronic Hepatitis B and cirrhosis a/w ascites, CKD who presents with PRES and vasoconstriction-induced infarcts after an episode of hypertensive emergency.    Transferred to general neurology on .     Interval history:   No acute events overnight. Right hemiparesis stable.  Patient was sitting up feeding himself this morning with his left hand.       Intake/Output Summary (Last 24 hours) at 2020 1111  Last data filed at 2020 0800  Gross per 24 hour   Intake 760 ml   Output 1500 ml   Net -740 ml       Physical Examination   Vitals: BP (!) 156/100 (BP Location: Left arm)   Pulse 60   Temp 97.8  F (36.6  C) (Oral)   Resp 16   Ht 1.676 m (5' 6\")   Wt 53.3 kg (117 lb 8 oz)   SpO2 100%   BMI 18.96 kg/m    Physical Exam:  Constitutional: Alert. Lying in bed comfortably. No acute distress.   Head: Atraumatic, normocephalic.  ENT: Moist mucous membranes. No sinus drainage.  Cardiovascular: Appears warm, well-perfused, and non-toxic.  Respiratory: No increased work of breathing, no accessory muscle use.   Gastrointestinal: Does not appear distended. No tenderness to palpation.  Skin: No rashes or lesions appreciated on visualized skin.   Hematologic/Lymphatic/Immunologic: No bruising appreciated on visualized skin.   Neurologic: (limited by aphasia)  Mental Status: alert, awake. Waves hello. Copies gestures. Aphasic, following some exam commands.   Cranial Nerves: Looking in all directions, able to bury sclerae, eyes move conjugately. Smile and eyebrow raise equal, blinking symmetric, mild right nasolabial flattening. hearing intact to conversation.  Motor: R hemiparesis-RUE and RLE cannot lift antigravity but some movement in plane. LUE " moving spontaneously, antigravity.   Sensory: intact to pinch in all extremities      Investigations   Recent Labs   Lab 07/23/20  0623 07/22/20  1032 07/21/20  0705 07/20/20  0648  07/17/20  0856 07/17/20  0610 07/16/20  1314 07/16/20  0926   WBC  --  5.9 5.9 5.0   < >  --  6.1 6.7 6.4   HGB  --  12.1* 12.1* 12.2*   < >  --  11.4* 13.1* 13.5   MCV  --  100 100 100   < >  --  98 97 96   PLT  --  191 184 175   < >  --  117* 96* 92*   INR  --   --   --   --   --   --   --   --  1.09    139 139 142   < >  --  139 134 138   POTASSIUM 3.7 3.5 3.8 3.5   < >  --  3.6 4.6 3.7   CHLORIDE 108 108 108 110*   < >  --  110* 106 106   CO2 24 27 22 24   < >  --  24 25 27   BUN 30 32* 30 22   < >  --  23 22 21   CR 1.39* 1.62* 1.61* 1.43*   < >  --  1.52* 1.49* 1.53*   ANIONGAP 7 4 9 8   < >  --  6 4 5   SOTERO 8.6 8.1* 8.4* 8.2*   < >  --  8.0* 8.2* 8.5   GLC 84 91 91 79   < >  --  87 93 93   ALBUMIN  --   --   --   --   --  1.6* 1.4* 1.7* 1.7*   PROTTOTAL  --   --   --   --   --   --  5.4* 6.2* 6.4*   BILITOTAL  --   --   --   --   --   --  0.4 1.0 1.1   ALKPHOS  --   --   --   --   --   --  214* 238* 254*   ALT  --   --   --   --   --   --  21 27 28   AST  --   --   --   --   --   --  28 Canceled, Test credited 41   LIPASE  --   --   --   --   --   --   --  30* 67*   TROPI  --   --   --   --   --   --   --  <0.015  --     < > = values in this interval not displayed.         Assessment and Plan:  Mr. Yoon is a 60 year old man with a PMH of chronic Hep B with cirrhosis, HTN with hx hypertensive emergency, tobacco use, HLD, who presented to [facility] with abdominal pain on 7/16. Hypertensive to 245/140, ultimately discharged. He presented later to [facility] ED, also discharged after CT/CTA. He presented to [facility] ED for a third time with new AMS. Initially worked up as a stroke code. Imaging showed complex findings, including severe hyperintensity on T2/FLAIR of the entire brainstem, but NOT restricted diffusion, and supratentorial  extensive white matter pathology involving periventricular white matter and corpus callosum, plus several areas of restricted diffusion at left corona radiata, right basal ganglia, etc, most likely consistent with infarcts. While the DDx of brainstem hyperintensities is very large and includes infectious, inflammatory, vasculitic, demyelinating, toxic/metabolic (eg CPM), and neoplastic pathologies, overall I believe that the most likely explanation is brainstem variant of PRES syndrome and that the multifocal infarcts noted in the supratentorial compartment may be due to concomitant reversible cerebral vasoconstriction syndrome. Of note he has membranous nephropathy.     Likely infratentorial PRES. Concern for RCVS like etiolgy of new infarcts. Will need therapies and BP control.      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   Consulted nephrology 7/22, appreciate recommendations.  Gave gentle fluids with the restarting of Bumex with improved urine output yesterday this morning will discontinue fluids and continue home Bumex.  Of note, patient will need to be seen by a nephrologist outpatient going forward.  -Restarted pta Bumex   - Holding lisinopril   - strict Is and Os  - vazquez, will do a trial of void today    #Multifocal strokes of unknown etiology  - aspirin 81mg PO daily   - resumed pta atorvastatin at 20mg (home dose is 40mg) PO nightly  - tele  - PT/OT/SLP  - Stroke Education completed  -PMR consulted     # HTN  Hypertension is long standing, secondary (likely due to membranous glomerulonephritis in the setting of chronic hepatitis B and possible cryoglobulinemia) and suboptimally controlled.  Nephrology to assist with  recommendations for hypertensive management.  Per nephrology kidney goal blood pressures less than 130 systolic for patient long-term  - resumed PTA amlodipine 10 mg PO daily and discontinued nifedipine  - continue PTA Coreg 12.5mg PO BID  - Hold PTA Lisinopril 40 mg PO daily due to ANDRY     # Cirrhosis 2/2 Hepatitis B  # Ascites  - Continue entecavir 0.5 mg daily       Diet: Full liquid, nectar thick (patient needs supervision to eat)  DVT prophylaxis: SCDs, enoxaparin subcutaneous   Lines: PIV, vazquez  Code status: Full code      Thank you for involving neurology in the care of Wilfredo Yoon.  Please do not hesitate to call with questions/concerns (consult pager [phone]).         Patient was seen and discussed with Dr. Keturah Patel MD  PGY2 Neurology  [phone]

## 2020-07-23 NOTE — PLAN OF CARE
Status: Admitted for stroke work-up after fall. Concerns for PRES and vasoconstriction infarcts after hypertensive emergency   Vitals: Hypertensive within parameters. Systolic in 150's, diastolic 114, MDs notified, no new orders.   Neuros: Hard to assess. Knows self consistently. Did not answer other orientation questions. Alert most of shift. Mumbles and whispers. Kazakh speaking. RUE 1/5, RLE 2/5, LUE 5/5, LLE 3/5. Waxes and wanes  IV: 2 PIV SL   Resp/trach: Course  Diet: Full liquid with nectar thickness. Assisted with meals  Bowel status: No BM today. Unknown of last BM   : Vazquez trial today, vazquez pulled at 1300. DTV   Skin: Scars throughout   Pain: R shoulder/arm pain, controlled with ice pack and elevation  Activity: A2+lift. Turn q2  Social: Sister, Sofi, designated visitor   Plan: Kidney US done today. Repeat MRI 7/27 ordered. Continue to monitor and follow POC

## 2020-07-23 NOTE — PLAN OF CARE
6A OT: Cancel  2 attempts this AM/PM, both times w/other providers and family. Will reschedule for tomorrow.

## 2020-07-23 NOTE — PLAN OF CARE
PT cancel: Attempted to see patient this morning however gone for ultra sound. This afternoon, per SLP and OT, patient and patient's family declining therapy. Will reschedule for tomorrow as able/appropriate.

## 2020-07-23 NOTE — PLAN OF CARE
[id number]  Status: Pt admitted for stroke work-up after fall   Vitals: HTN on RA, MD bianchi, PRN Labetalol administered x2  Neuros: Pt lethargic, oriented to self, whispers with mumbling, Anguillan speaking, RUE 1/5, RLE 2/5, LLE 3/5, LUE 4/5  IV: PIV infusing with Plasma-Lyte A @50mL/hr  Resp/trach: Coarse LS  Diet: Full nectar thick diet  Bowel status: BS+  : Hawkins in place, cath cares completed  Skin: Scars throughout  Pain: R arm pain, elevated   Activity: Repo q2hr, up with lift   Plan: Continue to monitor and follow POC

## 2020-07-23 NOTE — CONSULTS
Nephrology Initial Consult  July 23, 2020      Wilfredo Yoon MRN:[id number] YOB: 1960  Date of Admission:7/16/2020  [facility]  Requesting physician: Lakhwinder Rebollar*    ASSESSMENT AND RECOMMENDATIONS:     CKD Stage 3  With some acute worsening in renal function 2/2 recent IV contrast use   ( Baseline Cr 1.17 - 1.44) . 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 I tried contacting his PCP  But unable to reach him by phone . No response on the telephone . His PCP is  Dr Feng Mcdonald's office ( [phone] ) at [facility]    -- His Oliguria has improved and Cr back to baseline with restarting his Bumex PTA dose of 1 mg daily   -- His last ACR 3.6 g/gCr  ( 7/31/2018)   -- UPCR 4.80 g/g/Cr ( 7/31/2018)   -- Renal US ( 7/23) - no Hydro/mass  -- C3,C4,Cryoglobulinemia , PR3,MPO Ab pending     Volume status : Hypervolemic   HTN : bp uncontrolled . Target /80   Electrolytes : No acute issues   Acid base : No acute issues       RECOMMENDATIONS   1. Continue Bumex 1 mg daily   2. Continue amlodipine 10 mg daily , Coreg 12.5 mg BID   3. If BP> 130/80 , then increase Coreg dose to 25 mg BID . Monitor HR . If HR < 60 , avoid increasing Coreg and rather start Hydralazine 10 mg TID , with holding parameter SBP< 110   4. If Cr remains stable  For the next 1 week ,restart Losartan 40 mg daily but then discontinue hydralazine to avoid hypotension   5. Will check 24 hr UPCR , ACR  ( ordered for you) to determine level of proteinuria   6. SPEP, Immunofixation, FLC ( ordered for you)   7 Daily renal panel, magnesium, CBC  diff   8. Daily weight , I/O     Nephrology will continue to follow   Recommendations were communicated to primary team via note and verbally    Seen and discussed with Dr. Edison Kim MD   [phone]       REASON FOR CONSULT:   Oliguria and Worsening renal function     HISTORY OF PRESENT ILLNESS:  Admitting provider and nursing notes reviewed  Wilfredo Yoon is a 60 year old  PMHx of HTN, chronic Hepatitis B and cirrhosis a/w ascites, CKD  admitted with with AMS in setting of hypertensive encephalopathy. His MRI brain showed new FLAIR changes in linden, and bihemispheric restricted diffusion lesions. NeuroIR consulted for possible vasculitis. LP is unremarkable. CTA during this admission did not show any vessel irregularities. Neurology evaluating further .  Neurology consulted for Oliguric ANDRY   Patient is Chadian speaking. I tried using Ipad  -patient unable to cooperate.  Patient's nursing team did state that patient has only been responding minimally.  I spoke with patient's emergency contact David Paris ( Relative of patient ) at [phone] who stated that previously he used to speak normally but for the last week has has not communicated well    In summary I was unable to get any history from patient and unable to obtain any ROS       PAST MEDICAL HISTORY:  Per chart review  on 07/23/2020     Past Medical History:   Diagnosis Date     Cryoglobulinemia (H)      Glomerulonephritis      Hepatitis B      Hypertension      Surgical complication        Past Surgical History:   Procedure Laterality Date     ANESTHESIA OUT OF OR MRI N/A 7/18/2020    Procedure: ANESTHESIA OUT OF OR MRI;  Surgeon: GENERIC ANESTHESIA PROVIDER;  Location: [facility] OR     C HAND/FINGER SURGERY UNLISTED       ESOPHAGOSCOPY, GASTROSCOPY, DUODENOSCOPY (EGD), COMBINED N/A 10/18/2018    Procedure: EGD;  Surgeon: Eber Ortez MD;  Location: [facility] GI     HAND SURGERY Right         MEDICATIONS:  PTA  Meds  Prior to Admission medications    Medication Sig Last Dose Taking? Auth Provider   amLODIPine (NORVASC) 10 MG tablet Take 10 mg by mouth daily not filled since 1/29/2020 Yes Unknown, Entered By History   bumetanide (BUMEX) 1 MG tablet Take 1 mg by mouth daily 7/15/2020 at Unknown time Yes Unknown, Entered By History   entecavir (BARACLUDE) 1 MG tablet TAKE ONE TABLET BY MOUTH EVERY DAY 7/15/2020 at Unknown time Yes Eber Ortez MD   carvedilol (COREG) 12.5 MG tablet Take 12.5 mg by mouth 2 times daily (with meals) not filled since 1/29/2020  Unknown, Entered By History   lisinopril (ZESTRIL) 40 MG tablet Take 40 mg by mouth daily not filled since 1/29/2020  Unknown, Entered By History      Current Meds    amLODIPine  10 mg Oral Daily     aspirin  81 mg Oral Daily     atorvastatin  20 mg Oral QPM     bumetanide  1 mg Oral Daily     carvedilol  12.5 mg Oral BID w/meals     enoxaparin ANTICOAGULANT  40 mg Subcutaneous Q24H     entecavir  0.5 mg Oral Daily     [Held by provider] lisinopril  40 mg Oral Daily     sodium chloride (PF)  3 mL Intracatheter Q8H     sodium chloride bacteriostatic  12 mL Intravenous Once     Infusion Meds    Plasma-Lyte A 50 mL/hr (07/22/20 1732)       ALLERGIES:    No Known Allergies    REVIEW OF SYSTEMS:  A comprehensive of systems was negative except as noted above.    SOCIAL HISTORY:   Social History     Socioeconomic History     Marital status: Single     Spouse name: Not on file     Number of children: Not on file     Years of education: Not on file     Highest education level: Not on file   Occupational History     Not on file   Social Needs     Financial resource strain: Not on file     Food insecurity     Worry: Not on file     Inability: Not on file     Transportation needs     Medical: Not on file     Non-medical: Not on file   Tobacco Use     Smoking status: Current Some Day Smoker     Packs/day: 0.25     Years: 40.00     Pack years: 10.00     Types: Cigarettes      "Smokeless tobacco: Never Used   Substance and Sexual Activity     Alcohol use: No     Alcohol/week: 0.0 standard drinks     Drug use: No     Sexual activity: Not on file   Lifestyle     Physical activity     Days per week: Not on file     Minutes per session: Not on file     Stress: Not on file   Relationships     Social connections     Talks on phone: Not on file     Gets together: Not on file     Attends Shinto service: Not on file     Active member of club or organization: Not on file     Attends meetings of clubs or organizations: Not on file     Relationship status: Not on file     Intimate partner violence     Fear of current or ex partner: Not on file     Emotionally abused: Not on file     Physically abused: Not on file     Forced sexual activity: Not on file   Other Topics Concern     Parent/sibling w/ CABG, MI or angioplasty before 65F 55M? Not Asked   Social History Narrative     Not on file     Per chart review      FAMILY MEDICAL HISTORY:   Family History   Problem Relation Age of Onset     Liver Cancer No family hx of      Hepatitis No family hx of      Per chart review    PHYSICAL EXAM:   Temp  Av.7  F (36.5  C)  Min: 94  F (34.4  C)  Max: 98.8  F (37.1  C)      Pulse  Av.5  Min: 60  Max: 109 Resp  Av.2  Min: 1  Max: 18  FiO2 (%)  Av %  Min: 60 %  Max: 60 %  SpO2  Av.4 %  Min: 94 %  Max: 100 %       BP (!) 159/98   Pulse 60   Temp 97.6  F (36.4  C) (Oral)   Resp 16   Ht 1.676 m (5' 6\")   Wt 53.3 kg (117 lb 8 oz)   SpO2 100%   BMI 18.96 kg/m        Admit Weight: 53.3 kg (117 lb 8 oz)     GENERAL APPEARANCE: no distress, drowsy but awakable   EYES: no scleral icterus, pupils equal  Endo: no goiter, no moon facies  Lymphatics: no cervical or supraclavicular LAD  Pulmonary: decreased air entry in both lung bases   CV: regular rhythm, normal rate, no rub   - JVD none   - Edema 1+ bilaterally   GI: soft, nontender, normal bowel sounds  MS: no evidence of inflammation in " joints, no muscle tenderness  : none  vazquez  SKIN: no rash, warm, dry, no cyanosis  NEURO: face symmetric, no asterixis . Drowsy, not able to cooperate with exam     LABS:   CMP  Recent Labs   Lab 07/23/20  0623 07/22/20  1032 07/21/20  0705 07/20/20  0648  07/17/20  0856 07/17/20  0610 07/16/20  1314    139 139 142   < >  --  139 134   POTASSIUM 3.7 3.5 3.8 3.5   < >  --  3.6 4.6   CHLORIDE 108 108 108 110*   < >  --  110* 106   CO2 24 27 22 24   < >  --  24 25   ANIONGAP 7 4 9 8   < >  --  6 4   GLC 84 91 91 79   < >  --  87 93   BUN 30 32* 30 22   < >  --  23 22   CR 1.39* 1.62* 1.61* 1.43*   < >  --  1.52* 1.49*   GFRESTIMATED 55* 46* 46* 53*   < >  --  49* 51*   GFRESTBLACK 64 53* 53* 62   < >  --  57* 59*   SOTERO 8.6 8.1* 8.4* 8.2*   < >  --  8.0* 8.2*   MAG  --   --   --   --   --   --  1.8  --    PROTTOTAL  --   --   --   --   --   --  5.4* 6.2*   ALBUMIN  --   --   --   --   --  1.6* 1.4* 1.7*   BILITOTAL  --   --   --   --   --   --  0.4 1.0   ALKPHOS  --   --   --   --   --   --  214* 238*   AST  --   --   --   --   --   --  28 Canceled, Test credited   ALT  --   --   --   --   --   --  21 27    < > = values in this interval not displayed.     CBC  Recent Labs   Lab 07/22/20  1032 07/21/20  0705 07/20/20  0648 07/19/20  0625   HGB 12.1* 12.1* 12.2* 11.6*   WBC 5.9 5.9 5.0 4.6   RBC 3.76* 3.71* 3.75* 3.60*   HCT 37.4* 37.2* 37.4* 36.4*    100 100 101*   MCH 32.2 32.6 32.5 32.2   MCHC 32.4 32.5 32.6 31.9   RDW 12.9 12.9 13.1 13.2    184 175 161     INRNo lab results found in last 7 days.  ABG  Recent Labs   Lab 07/17/20  1135   PH 7.42   PCO2 37   PO2 92   HCO3 24   O2PER 21      URINE STUDIES  Recent Labs   Lab Test 07/20/20  1300 07/17/20  0025 10/21/19  1953 07/30/18  2051   COLOR Yellow Yellow Yellow Yellow   APPEARANCE Slightly Cloudy Clear Clear Clear   URINEGLC Negative Negative 30* Negative   URINEBILI Negative Negative Negative Negative   URINEKETONE 10* Negative 5* Negative    SG 1.019 1.012 1.022 1.008   UBLD Small* Moderate* Moderate* Moderate*   URINEPH 6.0 6.5 7.0 6.5   PROTEIN 300* 300* 300* 300*   NITRITE Negative Negative Negative Negative   LEUKEST Trace* Negative Negative Negative   RBCU 5* 14* 11* 5*   WBCU 6* 5 6* 4     Recent Labs   Lab Test 07/31/18  1630 10/10/17  1336 04/04/17  1718 01/20/16  1450 01/05/16  1142 11/04/15  1626   UTPG 4.80* 3.51* 2.62* 2.24* 3.36* 3.37*     PTH  Recent Labs   Lab Test 10/10/17  1328   PTHI 37     IRON STUDIES  Recent Labs   Lab Test 10/10/17  1328   IRON 133      IRONSAT 50*   CHRISTIAN 176       IMAGING:  All imaging studies reviewed by me.     Margo Kim MD

## 2020-07-23 NOTE — PLAN OF CARE
SLP: Pt asleep upon SLP arrival, with polite refusal from pt and pt's family (sister) on SLP attempt.  SLP cancel, will continue to follow per POC.

## 2020-07-23 NOTE — CONSULTS
[facility]   PM&R CONSULT    Consulting Provider:   Reason for Consult: Assessment of rehabilitation   Location of Patient: 6211-02  Date of Encounter: 7/23/2020   Date of Admission: 7/16/2020        ASSESSMENT:  Mr. Wilfredo Yoon is a 61 y/o man with a PMH including chronic HepB/cirrhosis, membranous glomerulonephritis and hypertension who initially presented on 7/16 with multiple ED visits. He had severe hypertension (up to 245/140) but CT/CTA were negative. He ultimately did receive an MRI and findings included severe hyperintensity to entire brainstem on T2/FLAIR as well as infarcts to the L corona radiata and basal ganglia. 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 However, there is some consideration of CNS vasculitis given that the deep infarcts would be unusual for RCVS. Plan is for repeat MRI next week (resolution of lesions = more likely PRES).     In the interim, patient has multiple deficits including aphasia, dysphagia, right hemiparesis. With therapies, he is MaxAx2 to get to EOB; able to sit about 6 min. Using the lift to get to chair. He is on a nectar thick full liquid diet. He has been quite fatigued during therapies. Per collateral information from niece, pt was previously independent (with a cane) living alone in an apartment in [place].     RECOMMENDATIONS:  -- Given the severity of his deficits and poor participation in therapies, TCU would be more appropriate at this point. However, we will continue to follow and pending improvements, he may become appropriate for ARU level of care.     Discussed with attending physician Dr. Sheldon Carvalho MD  PM&R Resident      HPI:      Mr Yoon is a 61 y/o man with a PMH including chronic HepB with cirrhosis and HTN who initially presented to [facility] on 7/16 with abdominal pain and BP up to 245/140 but  "was discharged; presented to [facility] ED and again discharged after CT/CTA; finally, he presented again to [facility] ED with AMS and was worked up as a stroke code. He was found to have severe hyperintensity on T2/FLAIR of entire brainstem and extensive supratentorial white matter pathology to periventricular area and corpus callosum, as well as restricted diffusion in L corona radiata and basal ganglia consistent with infarcts. The working diagnosis is brainstem variant of PRES with multifocal infarcts due to cerebral vasoconstriction. However, there is also some concern for CNS vasculitis. His course has been complicated by ANDRY in the context of membranous glomerulonephritis.     On evaluation today, pt does respond upon entering room (seems to say \"hello doctor\"). He follows some commands. Any further history limited by apparent aphasia/mumbling. Did discuss with nursing if we could get an  - she reported that the Samoan iPad  has been unsuccessful as the  has been unable to understand what pt is saying.     Did obtain information about baseline function and home environment from nirashel David () as below.       PREVIOUS LEVEL OF FUNCTION   Mobility: Independent with cane  ADLs: Independent      CURRENT FUNCTION   PT: Pt was very fatigued but able to participate. Pt unable to speak, but with encouragement intermittently able to nod/shake head to yes/no questions. Pt required MAXA x 2 rolling and supine>sit, sat at edge of bed ~ 6 min with LUNA of 1-2. VSS, O2 sats % on RA. Pt required MAXA x 2 sit>supine and total assist to boost. Pt declined further PT due to fatigue.   OT: Pt max A rolling, dependent for sling placement. Dependently lifted to chair. Initiated R UE ROM/neuro re-ed  SLP: Recommending nectar thick full liquid diet. Below baseline with communication skills.     LIVING SITUATION/SUPPORT  Lives alone in apartment in [place]  5th floor, uses elevator  Nirashel " reports family is figuring out any post-discharge support. Sister might be able to help at home but this sister works and is older herself.     SOCIAL HISTORY  Social History     Socioeconomic History     Marital status: Single     Spouse name: None     Number of children: None     Years of education: None     Highest education level: None   Occupational History     None   Social Needs     Financial resource strain: None     Food insecurity     Worry: None     Inability: None     Transportation needs     Medical: None     Non-medical: None   Tobacco Use     Smoking status: Current Some Day Smoker     Packs/day: 0.25     Years: 40.00     Pack years: 10.00     Types: Cigarettes     Smokeless tobacco: Never Used   Substance and Sexual Activity     Alcohol use: No     Alcohol/week: 0.0 standard drinks     Drug use: No     Sexual activity: None   Lifestyle     Physical activity     Days per week: None     Minutes per session: None     Stress: None   Relationships     Social connections     Talks on phone: None     Gets together: None     Attends Caodaism service: None     Active member of club or organization: None     Attends meetings of clubs or organizations: None     Relationship status: None     Intimate partner violence     Fear of current or ex partner: None     Emotionally abused: None     Physically abused: None     Forced sexual activity: None   Other Topics Concern     Parent/sibling w/ CABG, MI or angioplasty before 65F 55M? Not Asked   Social History Narrative     None               Past Medical History:  Past Medical History:   Diagnosis Date     Cryoglobulinemia (H)      Glomerulonephritis      Hepatitis B      Hypertension      Surgical complication            Current Medications:  Current Facility-Administered Medications   Medication     amLODIPine (NORVASC) tablet 10 mg     aspirin (ASA) chewable tablet 81 mg     atorvastatin (LIPITOR) tablet 20 mg     bumetanide (BUMEX) tablet 1 mg     carvedilol  (COREG) tablet 12.5 mg     glucose gel 15-30 g    Or     dextrose 50 % injection 25-50 mL    Or     glucagon injection 1 mg     enoxaparin ANTICOAGULANT (LOVENOX) injection 40 mg     entecavir (BARACLUDE) tablet 0.5 mg     labetalol (NORMODYNE/TRANDATE) syringe 10-20 mg     lidocaine (LMX4) cream     lidocaine 1 % 0.1-1 mL     [Held by provider] lisinopril (ZESTRIL) tablet 40 mg     melatonin tablet 1 mg     naloxone (NARCAN) injection 0.1-0.4 mg     ondansetron (ZOFRAN-ODT) ODT tab 4 mg    Or     ondansetron (ZOFRAN) injection 4 mg     sodium chloride (PF) 0.9% PF flush 3 mL     sodium chloride (PF) 0.9% PF flush 3 mL     sodium chloride bacteriostatic 0.9 % flush 12 mL         Review of Systems:  ROS limited 2/2 aphasia.     Labs   Lab Results   Component Value Date    WBC 5.9 07/22/2020    HGB 12.1 (L) 07/22/2020    HCT 37.4 (L) 07/22/2020     07/22/2020     07/22/2020     Lab Results   Component Value Date     07/23/2020    POTASSIUM 3.7 07/23/2020    CHLORIDE 108 07/23/2020    CO2 24 07/23/2020    GLC 84 07/23/2020     Lab Results   Component Value Date    GFRESTIMATED 55 (L) 07/23/2020    GFRESTBLACK 64 07/23/2020     Lab Results   Component Value Date    AST 28 07/17/2020    ALT 21 07/17/2020    ALKPHOS 214 (H) 07/17/2020    BILITOTAL 0.4 07/17/2020    JENNIFER <10 (L) 07/17/2020     Lab Results   Component Value Date    INR 1.09 07/16/2020     Lab Results   Component Value Date    BUN 30 07/23/2020    CR 1.39 (H) 07/23/2020         ON EXAMINATION:  Vitals:    07/23/20 0632 07/23/20 0755 07/23/20 0903 07/23/20 1205   BP: (!) 156/100 (!) 161/107 (!) 159/98 (!) 156/114   BP Location: Left arm Left arm     Pulse:       Resp:  16  16   Temp:  97.6  F (36.4  C)  97.9  F (36.6  C)   TempSrc:  Oral  Oral   SpO2:  100%  100%   Weight:       Height:           Physical Exam:  Blood pressure (!) 156/114, pulse 60, temperature 97.9  F (36.6  C), temperature source Oral, resp. rate 16, height 1.676 m (5'  "6\"), weight 53.3 kg (117 lb 8 oz), SpO2 100 %.    GEN: NAD. Lying in bed comfortably.   HEENT: NC/AT  RESPIRATORY: Non-labored breathing on RA, no cough or wheeze  EXTREMITIES: No BLE edema.    SKIN: Warm and dry.   NEURO:  Alert. Aphasic but appears to say \"hello doctor\" upon entering room. Intermittently follows commands.   EOM appear grossly intact.   ? R facial droop.   RUE grossly 2/5, LUE at least 3/5  Moving BLE, R>L.             "

## 2020-07-23 NOTE — PLAN OF CARE
[id number]  Status: Pt admitted for stroke work-up after fall   Vitals: VSS on RA  Neuros: Pt lethargic, oriented to self, whispers with mumbling, Algerian speaking, RUE 1/5, RLE 2/5, LLE 3/5, LUE 4/5  IV: PIV infusing with Plasma-Lyte A @50mL/hr  Resp/trach: Coarse LS  Diet: Full nectar thick diet  Bowel status: BS+  : Hawkins in place  Skin: Scars throughout  Pain: R arm pain, elevated   Activity: Repo q2hr, up with lift   Social: Family at bedside for portion of shift   Plan: Continue to monitor and follow POC

## 2020-07-24 ENCOUNTER — APPOINTMENT (OUTPATIENT)
Dept: SPEECH THERAPY | Facility: CLINIC | Age: 60
End: 2020-07-24
Payer: COMMERCIAL

## 2020-07-24 ENCOUNTER — APPOINTMENT (OUTPATIENT)
Dept: PHYSICAL THERAPY | Facility: CLINIC | Age: 60
End: 2020-07-24
Payer: COMMERCIAL

## 2020-07-24 LAB
ANA SER QL IF: NEGATIVE
ANION GAP SERPL CALCULATED.3IONS-SCNC: 6 MMOL/L (ref 3–14)
BACTERIA SPEC CULT: NO GROWTH
BUN SERPL-MCNC: 28 MG/DL (ref 7–30)
CALCIUM SERPL-MCNC: 8.3 MG/DL (ref 8.5–10.1)
CHLORIDE SERPL-SCNC: 109 MMOL/L (ref 94–109)
CO2 SERPL-SCNC: 26 MMOL/L (ref 20–32)
CREAT SERPL-MCNC: 1.29 MG/DL (ref 0.66–1.25)
GD1B GANGL IGG SER IA-ACNC: 4 IV (ref 0–50)
GD1B GANGL IGM SER IA-ACNC: 15 IV (ref 0–50)
GFR SERPL CREATININE-BSD FRML MDRD: 60 ML/MIN/{1.73_M2}
GLUCOSE SERPL-MCNC: 84 MG/DL (ref 70–99)
GM1 GANGL IGG SER IA-ACNC: 4 IV (ref 0–50)
GM1 GANGL IGM SER IA-ACNC: 18 IV (ref 0–50)
GQ1B GANGL IGG SER IA-ACNC: 4 IV (ref 0–50)
GQ1B GANGL IGM SER IA-ACNC: 5 IV (ref 0–50)
PLATELET # BLD AUTO: 174 10E9/L (ref 150–450)
POTASSIUM SERPL-SCNC: 3.7 MMOL/L (ref 3.4–5.3)
SODIUM SERPL-SCNC: 141 MMOL/L (ref 133–144)
SPECIMEN SOURCE: NORMAL

## 2020-07-24 PROCEDURE — 97530 THERAPEUTIC ACTIVITIES: CPT | Mod: GP

## 2020-07-24 PROCEDURE — 25000132 ZZH RX MED GY IP 250 OP 250 PS 637: Performed by: NURSE PRACTITIONER

## 2020-07-24 PROCEDURE — 92526 ORAL FUNCTION THERAPY: CPT | Mod: GN

## 2020-07-24 PROCEDURE — 25000128 H RX IP 250 OP 636: Performed by: STUDENT IN AN ORGANIZED HEALTH CARE EDUCATION/TRAINING PROGRAM

## 2020-07-24 PROCEDURE — 25000132 ZZH RX MED GY IP 250 OP 250 PS 637: Performed by: PSYCHIATRY & NEUROLOGY

## 2020-07-24 PROCEDURE — 92523 SPEECH SOUND LANG COMPREHEN: CPT | Mod: GN

## 2020-07-24 PROCEDURE — 82784 ASSAY IGA/IGD/IGG/IGM EACH: CPT | Performed by: PSYCHIATRY & NEUROLOGY

## 2020-07-24 PROCEDURE — 85049 AUTOMATED PLATELET COUNT: CPT | Performed by: PSYCHIATRY & NEUROLOGY

## 2020-07-24 PROCEDURE — 36415 COLL VENOUS BLD VENIPUNCTURE: CPT | Performed by: PSYCHIATRY & NEUROLOGY

## 2020-07-24 PROCEDURE — 00000402 ZZHCL STATISTIC TOTAL PROTEIN: Performed by: PSYCHIATRY & NEUROLOGY

## 2020-07-24 PROCEDURE — 80048 BASIC METABOLIC PNL TOTAL CA: CPT | Performed by: PSYCHIATRY & NEUROLOGY

## 2020-07-24 PROCEDURE — 84165 PROTEIN E-PHORESIS SERUM: CPT | Performed by: PSYCHIATRY & NEUROLOGY

## 2020-07-24 PROCEDURE — 97112 NEUROMUSCULAR REEDUCATION: CPT | Mod: GP

## 2020-07-24 PROCEDURE — 25000132 ZZH RX MED GY IP 250 OP 250 PS 637: Performed by: STUDENT IN AN ORGANIZED HEALTH CARE EDUCATION/TRAINING PROGRAM

## 2020-07-24 PROCEDURE — 82043 UR ALBUMIN QUANTITATIVE: CPT | Performed by: INTERNAL MEDICINE

## 2020-07-24 PROCEDURE — 83883 ASSAY NEPHELOMETRY NOT SPEC: CPT | Performed by: PSYCHIATRY & NEUROLOGY

## 2020-07-24 PROCEDURE — 12000001 ZZH R&B MED SURG/OB UMMC

## 2020-07-24 PROCEDURE — 86334 IMMUNOFIX E-PHORESIS SERUM: CPT | Performed by: PSYCHIATRY & NEUROLOGY

## 2020-07-24 RX ORDER — HYDRALAZINE HYDROCHLORIDE 10 MG/1
10 TABLET, FILM COATED ORAL EVERY 6 HOURS PRN
Status: DISCONTINUED | OUTPATIENT
Start: 2020-07-24 | End: 2020-08-04 | Stop reason: HOSPADM

## 2020-07-24 RX ORDER — CARVEDILOL 25 MG/1
25 TABLET ORAL 2 TIMES DAILY WITH MEALS
Status: DISCONTINUED | OUTPATIENT
Start: 2020-07-24 | End: 2020-08-04 | Stop reason: HOSPADM

## 2020-07-24 RX ORDER — BISACODYL 10 MG
10 SUPPOSITORY, RECTAL RECTAL DAILY PRN
Status: DISCONTINUED | OUTPATIENT
Start: 2020-07-24 | End: 2020-08-04 | Stop reason: HOSPADM

## 2020-07-24 RX ORDER — POLYETHYLENE GLYCOL 3350 17 G/17G
17 POWDER, FOR SOLUTION ORAL DAILY PRN
Status: DISCONTINUED | OUTPATIENT
Start: 2020-07-24 | End: 2020-08-01

## 2020-07-24 RX ORDER — AMOXICILLIN 250 MG
1 CAPSULE ORAL 2 TIMES DAILY
Status: DISCONTINUED | OUTPATIENT
Start: 2020-07-24 | End: 2020-07-27

## 2020-07-24 RX ADMIN — CARVEDILOL 25 MG: 25 TABLET, FILM COATED ORAL at 17:37

## 2020-07-24 RX ADMIN — ATORVASTATIN CALCIUM 20 MG: 20 TABLET, FILM COATED ORAL at 21:17

## 2020-07-24 RX ADMIN — ASPIRIN 81 MG CHEWABLE TABLET 81 MG: 81 TABLET CHEWABLE at 07:57

## 2020-07-24 RX ADMIN — ENTECAVIR 0.5 MG: 0.5 TABLET ORAL at 07:57

## 2020-07-24 RX ADMIN — LABETALOL 20 MG/4 ML (5 MG/ML) INTRAVENOUS SYRINGE 10 MG: at 04:29

## 2020-07-24 RX ADMIN — BUMETANIDE 1 MG: 1 TABLET ORAL at 07:57

## 2020-07-24 RX ADMIN — ENOXAPARIN SODIUM 40 MG: 40 INJECTION SUBCUTANEOUS at 17:37

## 2020-07-24 RX ADMIN — AMLODIPINE BESYLATE 10 MG: 10 TABLET ORAL at 07:57

## 2020-07-24 RX ADMIN — LABETALOL 20 MG/4 ML (5 MG/ML) INTRAVENOUS SYRINGE 10 MG: at 05:02

## 2020-07-24 RX ADMIN — CARVEDILOL 25 MG: 25 TABLET, FILM COATED ORAL at 07:57

## 2020-07-24 RX ADMIN — MENTHOL 1 PATCH: 205.5 PATCH TOPICAL at 02:06

## 2020-07-24 ASSESSMENT — ACTIVITIES OF DAILY LIVING (ADL)
ADLS_ACUITY_SCORE: 20

## 2020-07-24 ASSESSMENT — VISUAL ACUITY
OU: OTHER (SEE COMMENT)

## 2020-07-24 NOTE — PLAN OF CARE
Status: Admitted for stroke work-up after fall. Concerns for PRES and vasoconstriction infarcts after hypertensive emergency. Scottish speaking.   Vitals: VSS on RA. HTN but within parameters.  Neuros: Alert, can state name but does not answer other orientation questions. Mumbles and whispers, hard to understand. RUE 1/5, RLE, 1/5, LUE 5/5, LLE 3/5. Noticed slight increase in right side weakness at 2000 assessment.  IV: 2 PIV saline locked.   Resp/trach: Lung sounds are coarse. Infrequent, productive cough.  Diet: Full liquid diet, nectar thick.  Bowel status: BS+ 4 large, loose/liquid BM this shift. Stomach firm and distended. MD aware.   : Hawkins catheter put back in at 1900 per MD for 24hr urine collection.  Skin: Scarring throughout.  Pain: Right shoulder pain partially controlled with ice packs and repositioning. Waiting for an order for icy hot patches.   Activity: A2 w/ lift. Q2hr repo.  Social: Family member at bedside. Uncle cannot receive updates. Only 3 family members listed on sticky note in chart can receive updates per family.   Plan: Continue to monitor and follow POC.

## 2020-07-24 NOTE — PLAN OF CARE
Discharge Planner PT   Patient plan for discharge: not discussed today  Current status: Pt progressing with transfers. ModAx2 sit<>stand x3 with improving technique throughout. Seated balance with reaching tasks needing assist to complete forward lean. Slight improvements with R LE activation throughout.  Barriers to return to prior living situation: medical needs, current level of function  Recommendations for discharge: TCU  Rationale for recommendations: Pt currently below baseline level of function and would benefit from ongoing therapy to address the above deficits in order to progress towards PLOF and promote IND mobility. If continues to progress and participate would be good ARU candidate but want to see consistent participation and progress.       Entered by: Kristin Dias 07/24/2020 12:57 PM

## 2020-07-24 NOTE — PROGRESS NOTES
Nephrology Progress note  July 24, 2020      Wilfredo Yoon MRN:[id number] YOB: 1960  Date of Admission:7/16/2020  [facility]  Requesting physician: Lakhwinder Rebollar*    ASSESSMENT AND RECOMMENDATIONS:   CKD Stage 3  With some acute worsening in renal function 2/2 recent IV contrast use   ( Baseline Cr 1.17 - 1.44) . 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 I tried contacting his PCP  But unable to reach him by phone . No response on the telephone . His PCP is  Dr Feng Mcdonald's office ( [phone] ) at [facility]    -- His Oliguria has improved and Cr back to baseline with restarting his Bumex PTA dose of 1 mg daily   -- His last ACR 3.6 g/gCr  ( 7/31/2018)   -- UPCR 4.80 g/g/Cr ( 7/31/2018)   -- Renal US ( 7/23) - no Hydro/mass  -- C3,C4,Cryoglobulinemia , PR3,MPO Ab pending    --  SPEP ,Immunofixation pending     Volume status : Hypervolemic,mild  HTN : bp uncontrolled . Target /80   Electrolytes : No acute issues   Acid base : No acute issues         RECOMMENDATIONS   1. Stop Bumex  2. Start Chlorthalidone 25 mg daily .   3. Continue Amlodipine 10 mg daily , Coreg 12.5 mg BID   3. If Cr remains stable  For the next 1 week ,restart Lisinopril at 10 mg daily and titrate dose up if needed to maintain BP ~ 130/80. Titrate dose up to 40 mg daily which was his PTA dose  4. Follow up labs   5. Following discharge he will need to follow with Nephrology clinic ( Dr Fuentes) in 2-4 weeks . Please make a referral at time of discharge        Nephrology will continue to follow   Recommendations were  communicated to primary team via note and verbally  Seen and discussed with Dr. Edison Kim MD   [phone]     INTERVAL HISTORY   BP still high   Patient remains non conversive   Cr improving  Non oliguric       MEDICATIONS:  PTA Meds  Prior to Admission medications    Medication Sig Last Dose Taking? Auth Provider   amLODIPine (NORVASC) 10 MG tablet Take 10 mg by mouth daily not filled since 1/29/2020 Yes Unknown, Entered By History   bumetanide (BUMEX) 1 MG tablet Take 1 mg by mouth daily 7/15/2020 at Unknown time Yes Unknown, Entered By History   entecavir (BARACLUDE) 1 MG tablet TAKE ONE TABLET BY MOUTH EVERY DAY 7/15/2020 at Unknown time Yes Eber Ortez MD   carvedilol (COREG) 12.5 MG tablet Take 12.5 mg by mouth 2 times daily (with meals) not filled since 1/29/2020  Unknown, Entered By History   lisinopril (ZESTRIL) 40 MG tablet Take 40 mg by mouth daily not filled since 1/29/2020  Unknown, Entered By History      Current Meds    amLODIPine  10 mg Oral Daily     aspirin  81 mg Oral Daily     atorvastatin  20 mg Oral QPM     bumetanide  1 mg Oral Daily     carvedilol  25 mg Oral BID w/meals     enoxaparin ANTICOAGULANT  40 mg Subcutaneous Q24H     entecavir  0.5 mg Oral Daily     [Held by provider] lisinopril  40 mg Oral Daily     menthol   Transdermal Q8H     [Held by provider] senna-docusate  1 tablet Oral BID     sodium chloride (PF)  3 mL Intracatheter Q8H     sodium chloride bacteriostatic  12 mL Intravenous Once     Infusion Meds      ALLERGIES:    No Known Allergies    REVIEW OF SYSTEMS:  Unable to obtain , remains non conversive     SOCIAL HISTORY:   Social History     Socioeconomic History     Marital status: Single     Spouse name: Not on file     Number of children: Not on file     Years of education: Not on file     Highest education level: Not on file   Occupational History     Not on file   Social Needs     Financial resource strain: Not on file     Food insecurity      "Worry: Not on file     Inability: Not on file     Transportation needs     Medical: Not on file     Non-medical: Not on file   Tobacco Use     Smoking status: Current Some Day Smoker     Packs/day: 0.25     Years: 40.00     Pack years: 10.00     Types: Cigarettes     Smokeless tobacco: Never Used   Substance and Sexual Activity     Alcohol use: No     Alcohol/week: 0.0 standard drinks     Drug use: No     Sexual activity: Not on file   Lifestyle     Physical activity     Days per week: Not on file     Minutes per session: Not on file     Stress: Not on file   Relationships     Social connections     Talks on phone: Not on file     Gets together: Not on file     Attends [employer] service: Not on file     Active member of club or organization: Not on file     Attends meetings of clubs or organizations: Not on file     Relationship status: Not on file     Intimate partner violence     Fear of current or ex partner: Not on file     Emotionally abused: Not on file     Physically abused: Not on file     Forced sexual activity: Not on file   Other Topics Concern     Parent/sibling w/ CABG, MI or angioplasty before 65F 55M? Not Asked   Social History Narrative     Not on file         PHYSICAL EXAM:   Temp  Av.7  F (36.5  C)  Min: 94  F (34.4  C)  Max: 98.8  F (37.1  C)      Pulse  Av.3  Min: 60  Max: 109 Resp  Av.4  Min: 1  Max: 18  FiO2 (%)  Av %  Min: 60 %  Max: 60 %  SpO2  Av.3 %  Min: 92 %  Max: 100 %       BP (!) 131/90 (BP Location: Left arm)   Pulse 71   Temp 98  F (36.7  C) (Oral)   Resp 16   Ht 1.676 m (5' 6\")   Wt 53.3 kg (117 lb 8 oz)   SpO2 96%   BMI 18.96 kg/m        Admit Weight: 53.3 kg (117 lb 8 oz)     GENERAL APPEARANCE: no distress, sleeping but awakable  EYES: no scleral icterus, pupils equal  Pulmonary: lungs clear to auscultation with equal breath sounds bilaterally, no clubbing  CV: regular rhythm, normal rate, no rub   - JVD none   - Edema 1+  GI: soft, nontender, " normal bowel sounds  MS: no evidence of inflammation in joints, no muscle tenderness  :none vazquez  SKIN: no rash, warm, dry, no cyanosis  NEURO: face symmetric, no asterixis     LABS:   CMP  Recent Labs   Lab 07/24/20  0640 07/23/20  0623 07/22/20  1032 07/21/20  0705    138 139 139   POTASSIUM 3.7 3.7 3.5 3.8   CHLORIDE 109 108 108 108   CO2 26 24 27 22   ANIONGAP 6 7 4 9   GLC 84 84 91 91   BUN 28 30 32* 30   CR 1.29* 1.39* 1.62* 1.61*   GFRESTIMATED 60* 55* 46* 46*   GFRESTBLACK 70 64 53* 53*   SOTERO 8.3* 8.6 8.1* 8.4*     CBC  Recent Labs   Lab 07/24/20  0640 07/22/20  1032 07/21/20  0705 07/20/20  0648 07/19/20  0625   HGB  --  12.1* 12.1* 12.2* 11.6*   WBC  --  5.9 5.9 5.0 4.6   RBC  --  3.76* 3.71* 3.75* 3.60*   HCT  --  37.4* 37.2* 37.4* 36.4*   MCV  --  100 100 100 101*   MCH  --  32.2 32.6 32.5 32.2   MCHC  --  32.4 32.5 32.6 31.9   RDW  --  12.9 12.9 13.1 13.2    191 184 175 161     INRNo lab results found in last 7 days.  ABGNo lab results found in last 7 days.   URINE STUDIES  Recent Labs   Lab Test 07/20/20  1300 07/17/20  0025 10/21/19  1953 07/30/18  2051   COLOR Yellow Yellow Yellow Yellow   APPEARANCE Slightly Cloudy Clear Clear Clear   URINEGLC Negative Negative 30* Negative   URINEBILI Negative Negative Negative Negative   URINEKETONE 10* Negative 5* Negative   SG 1.019 1.012 1.022 1.008   UBLD Small* Moderate* Moderate* Moderate*   URINEPH 6.0 6.5 7.0 6.5   PROTEIN 300* 300* 300* 300*   NITRITE Negative Negative Negative Negative   LEUKEST Trace* Negative Negative Negative   RBCU 5* 14* 11* 5*   WBCU 6* 5 6* 4     Recent Labs   Lab Test 07/31/18  1630 10/10/17  1336 04/04/17  1718 01/20/16  1450 01/05/16  1142 11/04/15  1626   UTPG 4.80* 3.51* 2.62* 2.24* 3.36* 3.37*     PTH  Recent Labs   Lab Test 10/10/17  1328   PTHI 37     IRON STUDIES  Recent Labs   Lab Test 10/10/17  1328   IRON 133      IRONSAT 50*   CHRISTIAN 176       IMAGING:  All imaging studies reviewed by me.      Margo Kim MD

## 2020-07-24 NOTE — PLAN OF CARE
Status: Admitted for stroke work-up after fall. Concerns for PRES and vasoconstriction infarcts after hypertensive emergency. [place] speaking.   Vitals: VSS on RA ex/ intermittently kun while resting (55-60bpm) and HTN >160 parameters, PRN labetalol 20mg given this shift  Neuros: A&O to self only at 0000 and at 0400 A&O to self and hospital. Mumbled/whispering speech. RUE/RLE 1/5, LUE 4/5, LLE 3-4/5  IV: R. PIVx2, SL   Resp/trach: Lung sounds are coarse. Intermittent expiratory grunting. Infrequent, productive cough.  Diet: Full liquid diet, nectar thick.  Bowel status: Having loose/liquid stools, BM x1 this shift. Stomach firm and distended. MD aware.   : Hawkins catheter in place per MD for 24hr urine collection, to be collected until 1900 today.  Skin: Scarring throughout.  Pain: Right shoulder pain managed with repositioning and icy hot patch  Activity: A2/lift. Q2hr repo.  Social: Uncle cannot receive updates. Only 3 family members listed on sticky note in chart can receive updates per family.   Plan: Continue to monitor and follow POC.

## 2020-07-24 NOTE — PLAN OF CARE
Status: Admitted for stroke work-up after fall. Concerns for PRES and vasoconstriction infarcts after hypertensive emergency. [place] speaking.   Vitals: VSS on RA. HTN but within parameters.  Neuros: Alert, can state name but does not answer other orientation questions. Mumbles and whispers, hard to understand. RUE 1/5, RLE, 1/5, LUE 5/5, LLE 3/5.   IV: 2 PIV saline locked.   Resp/trach: Lung sounds are coarse. Infrequent, productive cough.  Diet: Full liquid diet, nectar thick. Assist with feeding.  Bowel status: BS+, No BM this shift.Stomach firm and distended. MD aware.   : Hawkins catheter for 24hr urine collection.  Skin: Scarring throughout.  Pain: Right shoulder pain partially controlled with ice packs and repositioning.   Activity: A2 w/ lift. Q2hr repo.  Social: Family member at bedside. Uncle cannot receive updates. Only 3 family members listed on sticky note in chart can receive updates per family.   Plan: Continue to monitor and follow POC.

## 2020-07-24 NOTE — PROGRESS NOTES
"[facility]  General Neurology Progress Note    Patient Name:  Wilfredo Yoon  MRN:  [id number]    :  1960  Date of Service: 2020      Patient Summary:   Mr. Yoon is a 60 year old male with a PMHx of HTN, chronic Hepatitis B and cirrhosis a/w ascites, CKD who presents with PRES and vasoconstriction-induced infarcts after an episode of hypertensive emergency.    Transferred to general neurology on .     Interval history:   No acute events overnight. Right hemiparesis stable.  Patient was sitting up feeding himself this morning with his left hand.       Intake/Output Summary (Last 24 hours) at 2020 1215  Last data filed at 2020 1318  Gross per 24 hour   Intake --   Output 225 ml   Net -225 ml         Physical Examination (ipad )  Vitals: BP (!) 176/103 (BP Location: Left arm)   Pulse 71   Temp 97.7  F (36.5  C) (Oral)   Resp 16   Ht 1.676 m (5' 6\")   Wt 53.3 kg (117 lb 8 oz)   SpO2 92%   BMI 18.96 kg/m    Physical Exam:  Constitutional: Alert.  Sitting up in bed comfortably. No acute distress.  Waves when you enter the room  Head: Atraumatic, normocephalic.  ENT: Moist mucous membranes. No sinus drainage.  Cardiovascular: Appears warm, well-perfused, and non-toxic.  Respiratory: No increased work of breathing, no accessory muscle use.   Gastrointestinal: Does not appear distended. No tenderness to palpation.  Skin: No rashes or lesions appreciated on visualized skin.   Hematologic/Lymphatic/Immunologic: No bruising appreciated on visualized skin.   Neurologic: (limited by aphasia)  Mental Status: alert, awake. Waves hello. Copies gestures. Aphasic, following some exam commands including squeeze I should show me a thumbs up wiggle your toes.  Languages moderate to severely aphasic with hypophonia.  Cranial Nerves: Looking in all directions, able to bury sclerae, eyes move conjugately. Smile and eyebrow raise equal, blinking " symmetric, mild right nasolabial flattening. hearing intact to conversation.  Spontaneous smile intact with symmetric facial muscles.  Motor: R hemiparesis-RUE and RLE cannot lift antigravity but some movement in plane. LUE moving spontaneously, antigravity.  Able to hold left upper extremity antigravity without drift, able to slightly elevate left lower extremity off the bed.  Exam somewhat limited by aphasia.  Sensory: intact to pinch in all extremities      Investigations   Recent Labs   Lab 07/24/20  0640 07/23/20  0623 07/22/20  1032 07/21/20  0705 07/20/20  0648  07/17/20  0856   WBC  --   --  5.9 5.9 5.0   < >  --    HGB  --   --  12.1* 12.1* 12.2*   < >  --    MCV  --   --  100 100 100   < >  --      --  191 184 175   < >  --     138 139 139 142   < >  --    POTASSIUM 3.7 3.7 3.5 3.8 3.5   < >  --    CHLORIDE 109 108 108 108 110*   < >  --    CO2 26 24 27 22 24   < >  --    BUN 28 30 32* 30 22   < >  --    CR 1.29* 1.39* 1.62* 1.61* 1.43*   < >  --    ANIONGAP 6 7 4 9 8   < >  --    SOTERO 8.3* 8.6 8.1* 8.4* 8.2*   < >  --    GLC 84 84 91 91 79   < >  --    ALBUMIN  --   --   --   --   --   --  1.6*    < > = values in this interval not displayed.         Assessment and Plan:  Mr. Yoon is a 60 year old man with a PMH of chronic Hep B with cirrhosis, HTN with hx hypertensive emergency, tobacco use, HLD, who presented to [facility] with abdominal pain on 7/16. Hypertensive to 245/140, ultimately discharged. He presented later to [facility] ED, also discharged after CT/CTA. He presented to [facility] ED for a third time with new AMS. Initially worked up as a stroke code. Imaging showed complex findings, including severe hyperintensity on T2/FLAIR of the entire brainstem, but NOT restricted diffusion, and supratentorial extensive white matter pathology involving periventricular white matter and corpus callosum, plus several areas of restricted diffusion at left corona radiata, right basal ganglia, etc, most  likely consistent with infarcts. While the DDx of brainstem hyperintensities is very large and includes infectious, inflammatory, vasculitic, demyelinating, toxic/metabolic (eg CPM), and neoplastic pathologies, overall I believe that the most likely explanation is brainstem variant of PRES syndrome and that the multifocal infarcts noted in the supratentorial compartment may be due to concomitant reversible cerebral vasoconstriction syndrome. Of note he has membranous nephropathy.     Likely infratentorial PRES. Concern for RCVS like etiolgy of new infarcts. Will need therapies and BP control.      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   Consulted nephrology 7/22, appreciate recommendations.  Gave gentle fluids with the restarting of Bumex with improved urine output yesterday this morning will discontinue fluids and continue home Bumex.  Of note, patient will need to be seen by a nephrologist outpatient going forward.  He had a Hawkins and trial void yesterday was failed Hawkins was reinserted. Will follow with renal for additional recs.  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  Nephrology to assist with recommendations for hypertensive management.  Per nephrology kidney goal blood pressures less than 130 systolic for patient  long-term.  Will increase Coreg to 25 mg p.o. twice daily today.  Added as needed hydralazine.  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  - Continue entecavir 0.5 mg daily       Diet: Full liquid, nectar thick (patient needs supervision to eat)  DVT prophylaxis: SCDs, enoxaparin subcutaneous   Lines: PIV, vazquez  Code status: Full code      Thank you for involving neurology in the care of Wilfredo Yoon.  Please do not hesitate to call with questions/concerns (consult pager [phone]).         Patient was seen and discussed with Dr. Kd Patel MD  PGY2 Neurology  [phone]    I saw and evaluated the patient with the resident and agree with the assessment and plan. I was present for key portions of the above examination.    Shannon Yi MD  Chief, Multiple Sclerosis Division  Department of Neurology  [facility] [place]

## 2020-07-24 NOTE — PLAN OF CARE
"Discharge Planner SLP   Patient plan for discharge: Pt unable to state  Current status: Language evaluation completed per MD order. Administered the Bedside Western Aphasia Battery - Revised (WAB-R); pt scored 13.33/100, indicating severe global aphasia. Expressive language characterized by recurrent utterances, with meaningful intonation. Deficits are present in structured tasks and conversation. Relative strength is basic auditory comprehension (able to answer yes/no questions with ~50% accuracy, follow 1 step commands, identify a named object from a f:2). Pt with increasing difficulty following 2-step sequential commands, repeating words and phrases, naming objects.  Pt with suspected dysarthria of speech.  Difficult to determine diagnosis of dysarthria versus apraxia, pt speaks [place] and per ipad  speech is difficult to understand and seems \"slurred\".  No significant groping errors present, however unable to determine if speech sounds errors are inconsistent vs consistent, presence of vowel distortions.  Pt would benefit from additional assessment with use of in person  as available to determine differential speech intelligibility disorder, as well continued assessment of functional language skills.  Recommend ongoing ST targeting verbal expression and auditory comprehension. Pt benefits from basic yes/no questions, repetition of tasks, and initiation cues.   Recommend continuation of full liquid (nectar-thick consistency) diet, no straws, with 1:1 supervision and feeding assist. Pt should perform additional dry swallow after each bite/sip.  Ensure pt is fully alert and upright for all PO, given small bites/sips, alternating bites/sips. ST to continue to follow. Pt will require intensive ST follow up at discharge targeting language, swallow deficits.  Pt will require VFSS prior to diet advancement.  Barriers to return to prior living situation: medical complexity, weakness, aphasia, " dysphagia  Recommendations for discharge: TCU  Rationale for recommendations: Below baseline function; pt will benefit from ongoing ST targeting swallowing, language, functional communication       Entered by: Rosemary Ybarra 07/24/2020 11:33 AM

## 2020-07-24 NOTE — PROGRESS NOTES
"   07/24/20 1000   General Information, SLP   Type of Evaluation Speech and Language   Type of Visit Initial   Start of Care Date 07/17/20   Onset of Illness/Injury or Date of Surgery - Date 07/16/20   Referring Physician  Jj Condon MD   Patient/Family Goals Statement Pt unable to state   Pertinent History of Current Problem Per pt chart \"60 year old man with a history of HTN, Hep B, and cirrhosis who presented with altered mental status on 7/16. Stroke code was called on patient on 7/17 due to altered mental status with possible LLE weakness and that resolved. CT at that time notable only for leukoaraiosis. MR obtained which showed extensive T2 hyperintensity of the entire brainstem with extension in the middle cerebellar peduncles and portions of the cerebellum as well as scattered bilateral ischemic infarcts. CLAUDIA held given patient's mental status. Unclear etiology of the extensive T2 hyperintensity at this time.\" Pt currently in EOC with SLP targeting swallow, recommended full liquid (nectar-thick consistency) diet per recent VFSS.  Formal language evaluation completed per MD order.  Limited background information able to be obtained at time of this assessment as pt with signficant expressive communication deficits and dysarthria, and no family/caregiver present   General Observations Pt awake and alert   Speech   Speech Comments Pt with suspected dysarthia of speech.  Difficult to detemine diagnosis of dysarthria versus apraxia, pt speaks Japanese and per ipad  speech is difficult to understand and seems \"slurred\".  No significant groping errors present, however unable to determine if speech sounds errors are inconsistent vs consistent, or to detemine vowel distortions.  Pt would benefit from additional assessment with use of in person  to improve ability to determine differential diagnosis   Language: Auditory Comprehension (understanding of spoken language)   Comments (Auditory " "Comprehension) Based on informal assessment, pt is able to identify a named object from a f:2, follow simple 1 step directions.  Increased difficulty following simple 2 step directions   Language: Verbal Expression (use of spoken language to express information)   Comments (Verbal Expression) Pt is able to indicate \"yes\" and \"no\" in English via words with ~50% accuracy, improved ability given visual support for Y/N    Western Aphasia Battery- Revised Bedside Record From   Spontaneous Speech Content Score (out of 10) 0   Spontaneous Speech Fluency Score (out of 10) 1   Auditory Verbal Comprehension Score (out of 10) 5   Sequential Commands Score (out of 10) 0   Repetition Score (out of 10) 2   Object Naming Score (out of 10) 0   Bedside Aphasia Sum 8   WAB-R Bedside Aphasia Score 13.33   Apraxia Score - optional (out of 10) 2   Bedside Aphasia Classification Global Aphasia   Aphasia Severity Level Very Severe Aphasia   Comments Testing conducted in English with use of ipad [employer] .  Suspect method of interpretation impacting pt's performance, would benefit from additional testing with use of in person    General Therapy Interventions   Planned Therapy Interventions Language;Communication   Language Auditory comprehension;Reading comprehension   Communication Augmentative/alternative communication   Clinical Impression, SLP Eval   Criteria for Skilled Therapeutic Interventions Met Yes   SLP Diagnosis Severe global aphasia   Rehab Potential Good, to achieve stated therapy goals   Therapy Frequency 5x/week   Predicted Duration of Therapy Intervention (days/wks) 2 weeks   Anticipated Discharge Disposition Transitional Care Facility   Risks and Benefits of Treatment have been explained. Yes   Patient, Family & other staff in agreement with plan of care Yes   Clinical Impression Comments Language evaluation completed per MD order. Administered the Bedside Western Aphasia Battery - Revised (WAB-R); pt " "scored 13.33/100, indicating severe global aphasia. Expressive language characterized by recurrent utterances, with meaningful intonation. Deficits are present in structured tasks and conversation. Relative strength is basic auditory comprehension (able to answer yes/no questions with ~50% accuracy, follow 1 step commands, identify a named object from a f:2). Pt with increasing difficulty following 2-step sequential commands, repeating words and phrases, naming objects.  Pt with suspected dysarthria of speech.  Difficult to determine diagnosis of dysarthria versus apraxia, pt speaks Wallisian and per ipad  speech is difficult to understand and seems \"slurred\".  No significant groping errors present, however unable to determine if speech sounds errors are inconsistent vs consistent, presence of vowel distortions.  Pt would benefit from additional assessment with use of in person  as available to determine differential speech intelligibility disorder, as well continued assessment of functional language skills.  Recommend ongoing ST targeting basic-moderate verbal expression and auditory comprehension. Pt benefits from basic yes/no questions, repetition of tasks, and initiation cues. ST to continue to follow. Pt will require intensive ST follow up at discharge targeting language deficits   Total Evaluation Time      Total Evaluation Time (Minutes) 40     "

## 2020-07-25 ENCOUNTER — APPOINTMENT (OUTPATIENT)
Dept: PHYSICAL THERAPY | Facility: CLINIC | Age: 60
End: 2020-07-25
Payer: COMMERCIAL

## 2020-07-25 LAB
ALBUMIN URINE MG/SPEC: 2379 MG/SPEC (ref 0–30)
ANION GAP SERPL CALCULATED.3IONS-SCNC: 6 MMOL/L (ref 3–14)
BUN SERPL-MCNC: 26 MG/DL (ref 7–30)
CALCIUM SERPL-MCNC: 8.1 MG/DL (ref 8.5–10.1)
CHLORIDE SERPL-SCNC: 109 MMOL/L (ref 94–109)
CO2 SERPL-SCNC: 23 MMOL/L (ref 20–32)
CREAT SERPL-MCNC: 1.23 MG/DL (ref 0.66–1.25)
GFR SERPL CREATININE-BSD FRML MDRD: 64 ML/MIN/{1.73_M2}
GLUCOSE SERPL-MCNC: 75 MG/DL (ref 70–99)
MICROALBUMIN UR-MCNC: 2310 MG/L
MICROALBUMIN/CREAT UR: 2793.23 MG/G CR (ref 0–17)
POTASSIUM SERPL-SCNC: 4 MMOL/L (ref 3.4–5.3)
SODIUM SERPL-SCNC: 138 MMOL/L (ref 133–144)

## 2020-07-25 PROCEDURE — 25000128 H RX IP 250 OP 636: Performed by: STUDENT IN AN ORGANIZED HEALTH CARE EDUCATION/TRAINING PROGRAM

## 2020-07-25 PROCEDURE — 25000132 ZZH RX MED GY IP 250 OP 250 PS 637: Performed by: PSYCHIATRY & NEUROLOGY

## 2020-07-25 PROCEDURE — 25000132 ZZH RX MED GY IP 250 OP 250 PS 637: Performed by: STUDENT IN AN ORGANIZED HEALTH CARE EDUCATION/TRAINING PROGRAM

## 2020-07-25 PROCEDURE — 97110 THERAPEUTIC EXERCISES: CPT | Mod: GP

## 2020-07-25 PROCEDURE — 12000001 ZZH R&B MED SURG/OB UMMC

## 2020-07-25 PROCEDURE — 80048 BASIC METABOLIC PNL TOTAL CA: CPT | Performed by: PSYCHIATRY & NEUROLOGY

## 2020-07-25 PROCEDURE — 97530 THERAPEUTIC ACTIVITIES: CPT | Mod: GP

## 2020-07-25 PROCEDURE — 25000132 ZZH RX MED GY IP 250 OP 250 PS 637: Performed by: NURSE PRACTITIONER

## 2020-07-25 PROCEDURE — 36415 COLL VENOUS BLD VENIPUNCTURE: CPT | Performed by: PSYCHIATRY & NEUROLOGY

## 2020-07-25 RX ORDER — CHLORTHALIDONE 25 MG/1
25 TABLET ORAL DAILY
Status: DISCONTINUED | OUTPATIENT
Start: 2020-07-26 | End: 2020-08-04 | Stop reason: HOSPADM

## 2020-07-25 RX ORDER — BUMETANIDE 1 MG/1
1 TABLET ORAL
Status: DISCONTINUED | OUTPATIENT
Start: 2020-07-25 | End: 2020-07-25

## 2020-07-25 RX ADMIN — AMLODIPINE BESYLATE 10 MG: 10 TABLET ORAL at 07:47

## 2020-07-25 RX ADMIN — CARVEDILOL 25 MG: 25 TABLET, FILM COATED ORAL at 07:47

## 2020-07-25 RX ADMIN — ENTECAVIR 0.5 MG: 0.5 TABLET ORAL at 07:47

## 2020-07-25 RX ADMIN — CARVEDILOL 25 MG: 25 TABLET, FILM COATED ORAL at 18:26

## 2020-07-25 RX ADMIN — BUMETANIDE 1 MG: 1 TABLET ORAL at 07:47

## 2020-07-25 RX ADMIN — ATORVASTATIN CALCIUM 20 MG: 20 TABLET, FILM COATED ORAL at 21:45

## 2020-07-25 RX ADMIN — MENTHOL 1 PATCH: 205.5 PATCH TOPICAL at 07:47

## 2020-07-25 RX ADMIN — ASPIRIN 81 MG CHEWABLE TABLET 81 MG: 81 TABLET CHEWABLE at 07:47

## 2020-07-25 RX ADMIN — Medication 1 MG: at 21:45

## 2020-07-25 RX ADMIN — ENOXAPARIN SODIUM 40 MG: 40 INJECTION SUBCUTANEOUS at 18:26

## 2020-07-25 ASSESSMENT — ACTIVITIES OF DAILY LIVING (ADL)
ADLS_ACUITY_SCORE: 20

## 2020-07-25 NOTE — PLAN OF CARE
Status: Admitted for stroke work-up after fall. Concerns for PRES and vasoconstriction infarcts after hypertensive emergency. [place] speaking.   Vitals: VSS on RA. HTN but within parameters.  Neuros: Alert, can state name but does not answer other orientation questions. Mumbles and whispers, hard to understand. RUE 1/5, RLE, 1/5, LUE 5/5, LLE 3/5.   IV: 2 PIV saline locked.   Resp/trach: Lung sounds are coarse. Infrequent, productive cough.  Diet: Full liquid diet, nectar thick. Assist with feeding.  Bowel status: BS+, No BM this shift. Stomach firm and distended. MD aware. possible paracentesis.  : Hawkins catheter in place, low UOP, MD aware.  Skin: Scarring throughout.  Pain: Right shoulder pain partially controlled with ice packs and icey hot patch.    Activity: A2 w/ lift. Q2hr repo.  Social: Both sister and niece have been allowed by security to visit. Uncle cannot receive updates. Only 3 family members listed on sticky note in chart can receive updates per family.   Plan: Continue to monitor and follow POC. Repeat MRI 7/27

## 2020-07-25 NOTE — PROGRESS NOTES
"[facility]  General Neurology Progress Note    Patient Name:  Wilfredo Yoon  MRN:  [id number]    :  1960  Date of Service: 2020      Patient Summary:   Mr. Yoon is a 60 year old male with a PMHx of HTN, chronic Hepatitis B and cirrhosis a/w ascites, CKD who presents with PRES and vasoconstriction-induced infarcts after an episode of hypertensive emergency.    Transferred to general neurology on .     Interval history:   No acute events overnight. Right hemiparesis stable to improved this morning.  Patient able to repeat some words today, and vocalizing more.      Intake/Output Summary (Last 24 hours) at 2020 0710  Last data filed at 2020 0600  Gross per 24 hour   Intake --   Output 400 ml   Net -400 ml         Physical Examination (ipad )  Vitals: BP (!) 161/103 (BP Location: Left arm)   Pulse 61   Temp 98.1  F (36.7  C) (Oral)   Resp 16   Ht 1.676 m (5' 6\")   Wt 53.3 kg (117 lb 8 oz)   SpO2 99%   BMI 18.96 kg/m    Physical Exam:  Constitutional: Alert.  Sitting up in bed comfortably. No acute distress.  Able to smile and wave at examiner  Head: Atraumatic, normocephalic.  ENT: Moist mucous membranes. No sinus drainage.  Cardiovascular: Appears warm, well-perfused, and non-toxic.  Respiratory: No increased work of breathing, no accessory muscle use.   Gastrointestinal: Distended, dull to percussion, nontender  Neurologic: (limited by aphasia)  Mental Status: alert, awake. Waves hello. Copies gestures. Aphasic, following some exam commands..  Languages moderate to severely aphasic with hypophonia.  Cranial Nerves: Looking in all directions, able to bury sclerae, eyes move conjugately. Smile and eyebrow raise equal, blinking symmetric, mild right nasolabial flattening. hearing intact to conversation.  Spontaneous smile intact with symmetric facial muscles.  Motor: R hemiparesis-RUE and RLE cannot lift antigravity but " some movement in plane. LUE moving spontaneously, antigravity.  Able to hold left upper extremity antigravity without drift, able to slightly elevate left lower extremity off the bed.  Exam somewhat limited by aphasia.  Sensory: intact to pinch in all extremities, he nods to light touch questions      Investigations   Recent Labs   Lab 07/24/20  0640 07/23/20  0623 07/22/20  1032 07/21/20  0705 07/20/20  0648   WBC  --   --  5.9 5.9 5.0   HGB  --   --  12.1* 12.1* 12.2*   MCV  --   --  100 100 100     --  191 184 175    138 139 139 142   POTASSIUM 3.7 3.7 3.5 3.8 3.5   CHLORIDE 109 108 108 108 110*   CO2 26 24 27 22 24   BUN 28 30 32* 30 22   CR 1.29* 1.39* 1.62* 1.61* 1.43*   ANIONGAP 6 7 4 9 8   SOTERO 8.3* 8.6 8.1* 8.4* 8.2*   GLC 84 84 91 91 79         Assessment and Plan:  Mr. Yoon is a 60 year old man with a PMH of chronic Hep B with cirrhosis, HTN with hx hypertensive emergency, tobacco use, HLD, who presented to [facility] with abdominal pain on 7/16. Hypertensive to 245/140, ultimately discharged. He presented later to [facility] ED, also discharged after CT/CTA. He presented to [facility] ED for a third time with new AMS. Initially worked up as a stroke code. Imaging showed complex findings, including severe hyperintensity on T2/FLAIR of the entire brainstem, but NOT restricted diffusion, and supratentorial extensive white matter pathology involving periventricular white matter and corpus callosum, plus several areas of restricted diffusion at left corona radiata, right basal ganglia, etc, most likely consistent with infarcts. While the DDx of brainstem hyperintensities is very large and includes infectious, inflammatory, vasculitic, demyelinating, toxic/metabolic (eg CPM), and neoplastic pathologies, overall I believe that the most likely explanation is brainstem variant of PRES syndrome and that the multifocal infarcts noted in the supratentorial compartment may be due to concomitant reversible cerebral  vasoconstriction syndrome. Of note he has membranous nephropathy.     Likely infratentorial PRES. Concern for RCVS like etiolgy of new infarcts.       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   Consulted nephrology 7/22, appreciate recs.  - stop Bumex  - Start Chlorthalidone 25 mg daily  - strict Is and Os  - continue amlodipine 10 mg PO daily  - continue Coreg 25mg PO BID  - Hold PTA Lisinopril 40 mg PO daily due to ANDRY (per renal: If Cr remains stable  For the next 1 week ,restart Lisinopril at 10 mg daily and titrate dose up if needed to maintain BP ~ 130/80. Titrate dose up to 40 mg daily which was his PTA dose)  - hydralazine prn for SBP>160  -Following discharge follow with Nephrology clinic ( Dr Fuentes) in 2-4 weeks    #Multifocal strokes of unknown etiology  - aspirin 81mg PO daily   - resumed pta atorvastatin at 20mg (home dose is 40mg) PO nightly  - tele  - PT/OT/SLP  - Stroke Education completed  -PMR consulted       # Cirrhosis 2/2 Hepatitis B  # Ascites  Increasing abdominal distention, and patient missed his last outpatient paracentesis date which would have been 7/23.  We will consider IR paracentesis.  - Continue entecavir 0.5 mg daily  -consult to IR for para       Diet: Full liquid, nectar thick (patient needs supervision to eat)  DVT prophylaxis: SCDs, enoxaparin subcutaneous   Lines: PIV, vazquez  Code status: Full code      Thank you for involving neurology in the care of Wilfredo Yoon.  Please do not hesitate to call with questions/concerns (consult pager [phone]).         Patient was seen and discussed with Dr. Kd Patel MD  PGY2 Neurology  [phone]    I saw and evaluated the patient with  the resident and agree with the assessment and plan. I was present for  minor  Examination. We a attempted to interview him with the help of a virtual  but he was tired from the PT session and minimally engaged. His abdomen was distended but not painful to touch.    Plan is to proceed with paracentesis ASAP and transfer to TCU when bed available    Shannon Yi MD  Chief, Multiple Sclerosis Division  Department of Neurology  [facility] [place]

## 2020-07-25 NOTE — PLAN OF CARE
Status: Admitted for stroke work-up after fall. Concerns for PRES and vasoconstriction infarcts after hypertensive emergency. [place] speaking.   Vitals: VSS on RA. HTN but within parameters.  Neuros: Alert, can state name but does not answer other orientation questions. Mumbles and whispers, hard to understand. RUE 1/5, RLE, 1/5, LUE 5/5, LLE 3/5.   IV: 2 PIV saline locked.   Resp/trach: Lung sounds are coarse. Infrequent, productive cough.  Diet: Full liquid diet, nectar thick. Assist with feeding.  Bowel status: BS+, No BM this shift. Stomach firm and distended. MD aware.   : Hawkins catheter in place, low UOP, MD aware.  Skin: Scarring throughout.  Pain: Right shoulder pain partially controlled with ice packs and repositioning.   Activity: A2 w/ lift. Q2hr repo.  Social: Both sister and niece have been allowed by security to visit. Uncle cannot receive updates. Only 3 family members listed on sticky note in chart can receive updates per family.   Plan: Continue to monitor and follow POC. Repeat MRI 7/27

## 2020-07-25 NOTE — DISCHARGE SUMMARY
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 Pt will need MRI brain in 2 weeks (ordered) to follow up on strokes and PRES.   - ID Referral at discharge for positive QuantiFERON gold test  - Follow with Nephrology clinic at discharge (Dr Fuentes) in 2-4 weeks  - Repeat UA, CBC, BMP in 1 week   -Order placed for monthly paracentesis through [facility] ultrasound department.  Please see discharge orders for phone number.  Patient was previously getting paracentesis through [facility] system.  - F/U with PCP within 1 month for HTN control     Brief History of Illness:   Mr. Yoon is a 60 year old [place]ian-speaking man with a PMH of chronic Hep B with cirrhosis, HTN with hx hypertensive emergency, tobacco use, HLD, who presented to [facility] with abdominal pain on . Hypertensive to 245/140, ultimately discharged. He presented later to [facility] ED, also discharged after CT/CTA. He presented to [facility] ED for a third time with new AMS. Initially worked up as a stroke code. Imaging showed complex findings, including severe hyperintensity on T2/FLAIR of the  entire brainstem, but without restricted diffusion, and supratentorial extensive white matter pathology involving periventricular white matter and corpus callosum, plus several areas of restricted diffusion including at left corona radiata and right basal ganglia, most likely consistent with infarcts. While the DDx of brainstem hyperintensities is very large and includes infectious, inflammatory, vasculitic, demyelinating, toxic/metabolic (eg CPM), and neoplastic pathologies, overall we believed that the most likely explanation is brainstem variant of PRES syndrome (especially given normal CSF and partial resolution of hyperintensity on repeat MR imaging 1 week later). The following CSF labs were negative: NMO/AQP4, MOG, WBC 2, RBC 0, negative gram stain/culture, protein 30, HSV 1 and 2, EBV, Lyme IGG/IGM, oligoclonal banding, RAJAN virus, leukemia/lymphoma evaluation, ACE, enterovirus, VZV, VDRL, TB PCR, CMV (note suboptimal sample), cytology. The following serum labs were unremarkable: TSH, ANCA, FARIDA, HIV, ganglioside antibodies, myeloperoxidase. RF, ESR, CRP elevated (chronic). Trace cryoglobulin. Kappa and lambda light chains elevated and ratio slightly elevated.      The multifocal infarcts noted in the supratentorial compartment may be due to concomitant small vessel infarcts secondary to hypertensive urgency. Of note he has membranous nephropathy and other differential includes vasculitis. Rheumatology was consulted to assist in evaluating for vasculitis. However, given patient's clinical stability and improvement in physical exam over the course of the hospital stay, suspicion for vasculitis is quite low. Patient needing ongoing PT, OT, SLP cares and will benefit from TCU stay.      In last week of admission, patient was taking in less than his recommended daily caloric intake.  Option of NG tube or PEG was presented to patient's decision maker (DEWEY, his sister).  She expressed that artificial nutrition would  not be within patient's goals of care.     Hospital Course By Problem:  #Infratentorial PRES  #Encephalopathy, improving  Suspect secondary to malignant HTN (patient  at outside hospital on day of admission to [facility]). Patient's encephalopathy resolved over few days. PRES improved on sequential scan. See follow-up section.      #Multifocal subcortical infarcts  Suspect secondary to small vessel disease vs. Malignant HTN. Echo normal. Telemetry just showing rare Mobitz heart block. Residual deficits include expressive aphasia and right hemiparesis. Noted to have dysphagia as well. After video swallow study at end of July, patient was able to advance to dysphagia 3 diet.   - Aspirin 81mg PO daily   -PTA atorvastatin  - See follow-up section.      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  Consulted nephrology 7/22 for assistance with management. Rheumatology also consulted this hospital visit to evaluate whether patient could have ongoing active cryoglobulinemia.  They agree that patient is unlikely to have any acute vasculitis.  They did recommend treatment with steroids and rituximab after infectious disease and hematology clearance if there is any suspicion for vasculitis in the future.  - Stopped PTA Bumex  - Started Chlorthalidone 25 mg daily  - Continue amlodipine 10 mg PO daily  - Continue Coreg 25mg PO BID (to primary care physician: can decrease this to 12.5 BID if patient increasingly in heart block).   - Was initially holding PTA Lisinopril 40 mg PO daily due to ANDRY, that resolved within first week of admission. We decided to continue holding off this medication as patient was normotensive during last week of admission.      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 Gets monthly paracentesis.   - Continue  entecavir 0.5 mg daily inpatient and at discharge    #Positive Quant Gold Test   Likely represents latent TB infection. CSF TB PCR negative. No chronic pulmonary symptoms/persistent cough, stable weight. Low suspicion for acute pulmonary TB.   - ID Referral at discharge      #Hyperlipidemia   on admission   - Stated patient on atorvastatin      #Urinary Retention, improving   #Enterococcus faecalis UTI   Patient presented with urinary retention requiring Hawkins catheter.  He failed trial of void on 7-23.  Hawkins was kept in place for a few days.  He was noted to have white discharge from penis on 7-28.  UA from Hawkins bag grew enterococcus. Hawkins removed for good. Next clean-catch UA done few hours later virtually normal.  However patient was started on 5-day course of Augmentin for presumed catheter associated urinary tract infection.  Patient able to void spontaneously at time of discharge.  Patient completed course of Augmentin.      #Episode of Type 2 heart block  Pt with occasional dropped QRS interval on telemetry 7/31. Ordered EKG in AM that showed mild ST elevation in V2, V3, but did not meet criteria for STEMI. Repeat EKG stable. Troponin x 1 undetectable.  We did curbside consult cardiology who agreed that no further work-up was merited.  If this continues, primary care doctor can consider decreasing Coreg dosing to 12.5 BID instead and should avoid AV node blocking agents.         Pertinent Investigations:  Results for orders placed or performed during the hospital encounter of 07/16/20   XR Chest Port 1 View    Narrative    Exam: XR CHEST PORT 1 VW, 7/16/2020 1:43 PM    Indication: Altered mental status    Comparison: 7/30/2018    Findings:   Heart and pulmonary vasculature within normal limits. Lungs are clear.  Pleural spaces are clear. Upper abdomen unremarkable.      Impression    Impression: No acute cardiopulmonary disease identified.    CHARLEE ALAN MD   CT Head w/o Contrast    Narrative     CT HEAD W/O CONTRAST 7/16/2020 2:38 PM    History: Altered mental status     Comparison: MRI 10/22/2019    Technique: Using multidetector thin collimation helical acquisition  technique, axial, coronal and sagittal CT images from the skull base  to the vertex were obtained without intravenous contrast.    Findings: There is no intracranial hemorrhage, mass effect, or midline  shift. Gray/white matter differentiation in both cerebral hemispheres  is preserved. Ventricles are proportionate to the cerebral sulci. The  basal cisterns are clear. Extensive leukoaraiosis.    The bony calvaria and the bones of the skull base are normal. The  visualized portions of the paranasal sinuses and mastoid air cells are  clear.       Impression    Impression:  1. No acute intracranial pathology.   2. Extensive leukoaraiosis.         ROSELYN PRESLEY MD   CTA Head Neck with Contrast    Narrative    CTA  HEAD NECK WITH CONTRAST 7/16/2020 2:38 PM    CT angiogram of the neck   CT angiogram of the base of the brain with contrast  Reconstruction by the Radiologist on the 3D workstation    Provided History:  Altered mental status    Comparison:  No prior similar studies.      Technique:  HEAD and NECK CTA: During rapid bolus intravenous injection of  nonionic contrast material, axial images were obtained using thin  collimation multidetector helical technique from the base of the upper  aortic arch through the [place] of [place]. This CT angiogram data was  reconstructed at thin intervals with mild overlap. Images were sent to  the [employer] workstation, and 3D reconstructions were obtained. The  axial source images, multiplanar reformations, 3D reconstructions in  both maximum intensity projection display and volume rendered models  were reviewed, with reconstructions performed by the technologist and  the radiologist.    Contrast: iopamidol (ISOVUE-370) solution 75 mL    Findings:    Head CTA demonstrates a 2 x 2 mm anterosuperiorly pointing  saccular  aneurysm off the left supraclinoid ICA. The remaining major  intracranial arteries are patent with no evidence of stenosis or  aneurysm. Anterior communicating artery is patent. Posterior  communicating arteries are not well seen.    Neck CTA demonstrates no stenosis of the major cervical arteries. The  origins of the great vessels from the aortic arch are patent. The  normal distal right internal carotid artery measures 4.1 mm. The  normal distal left internal carotid artery measures 3.6 mm.     No mass within the visualized portions of the cervical soft tissues or  lung apices.       Impression    Impression:    1. Head CTA demonstrates a 2 x 2 mm anterosuperiorly pointing saccular  aneurysm off the left supraclinoid ICA. The remaining major  intracranial arteries are patent with no evidence of stenosis or  aneurysm.  2. Neck CTA demonstrates no stenosis of the major cervical arteries.    ROSELYN PRESLEY MD   CTA Head Neck with Contrast    Narrative    CTA  HEAD NECK WITH CONTRAST 7/17/2020 11:20 AM    CTA  HEAD NECK WITH CONTRAST 7/17/2020 11:20 AM    CT angiogram of the neck   CT angiogram of the base of the brain with contrast  Reconstruction by the Radiologist on the 3D workstation    Provided History:  Neuro deficit(s), subacute    Comparison:  CTA from yesterday.      Technique:  HEAD and NECK CTA: During rapid bolus intravenous injection of  nonionic contrast material, axial images were obtained using thin  collimation multidetector helical technique from the base of the upper  aortic arch through the [place] of [place]. This CT angiogram data was  reconstructed at thin intervals with mild overlap. Images were sent to  the [employer] workstation, and 3D reconstructions were obtained. The  axial source images, multiplanar reformations, 3D reconstructions in  both maximum intensity projection display and volume rendered models  were reviewed, with reconstructions performed by the technologist and  the  radiologist.    Contrast: iopamidol (ISOVUE-370) solution 75 mL    Findings:    Head CTA:  2.2 mm outpouching off the left supraclinoid ICA on yesterday's CT is  again seen. On today's CT, this outpouching appears to be at the  origin of the left ophthalmic artery. There are also punctate  posteriorly pointing outpouchings off both ICA terminus, not well  appreciated on yesterdays CTA. These may represent small aneurysms.  The remaining major intracranial arteries are patent. No evidence of  stenosis .     Neck CTA:  No stenosis of the major cervical arteries. The origins of the great  vessels from the aortic arch are patent. The normal distal right  internal carotid artery measures 4.1 mm. The normal distal left  internal carotid artery measures 3.6 mm.     No mass within the visualized portions of the cervical soft tissues or  lung apices.       Impression    Impression:    1. Head CTA:  a. 2.2 mm outpouching off the left supraclinoid ICA on yesterday's CT  is again seen. This outpouching appears to be at the origin of the  left ophthalmic artery and likely an infundibulum. On yesterday's CT  ophthalmic artery is not well seen, likely technical.  b. Tiny posteriorly pointing outpouchings at bilateral carotid  terminus. These are not well seen on yesterday's CT, again likely due  phase of the CT. No vessel is seen at the tip of these outpouchings,  could represent tiny aneurysms or infundibulum.  2. Neck CTA demonstrates no stenosis of the major cervical arteries.    .    ROSELYN PRESLEY MD   CT Head w/o Contrast    Narrative    CT HEAD W/O CONTRAST 7/17/2020 11:21 AM    History: Neuro deficit(s), subacute     Comparison: Head CT from yesterday    Technique: Using multidetector thin collimation helical acquisition  technique, axial, coronal and sagittal CT images from the skull base  to the vertex were obtained without intravenous contrast.    Findings: There is no intracranial hemorrhage, mass effect, or  midline  shift. Gray/white matter differentiation in both cerebral hemispheres  is preserved. Ventricles are proportionate to the cerebral sulci. The  basal cisterns are clear. Extensive leukoaraiosis. Chronic lacunar  infarcts in the left anterior thalamus and bilateral basal ganglia.    The bony calvaria and the bones of the skull base are normal. The  visualized portions of the paranasal sinuses and mastoid air cells are  clear.       Impression    Impression:  No acute intracranial pathology. Extensive leukoaraiosis. If there is  a clinical concern for acute infarct, MRI is recommended. Extensive  leukoaraiosis.    ROSELYN PRESLEY MD   MR Brain w/o Contrast     Value    Radiologist flags Possible acute toxic leukoencephalopathy (Urgent)    Narrative    MR BRAIN W/O CONTRAST 7/17/2020 1:29 PM    Provided History: Altered level of consciousness (LOC), unexplained    Comparison:  Brain MRI dated 10/22/2019     Technique: Sagittal T1-weighted and axial T2-weighted, turboFLAIR and  diffusion-weighted with ADC map images of the brain were obtained  without intravenous contrast.    Findings:   Multiple images degraded by motion artifact and susceptibility  weighted image was not able to obtain.    There is diffuse T2 hyperintense signal involving the supratentorial  white matter and extending down into the linden and middle cerebellar  peduncle, increased from 10/22/2019. There are scattered foci of  diffusion restriction, in bilateral corona radiata, bilateral frontal  subcortical white matter and right frontal centrum semiovale. There is  slight swelling of the brainstem. These images reveal no hemorrhage,  mass effect, midline shift or abnormal extraaxial fluid collection.  The ventricles and sulci are grossly normal for age. Normal  intravascular flow voids.      Impression    Impression:   Extensive T2 hyperintense signal involving supratentorial and  infratentorial white matter with scattered foci of  diffusion  restriction in bilateral corona radiata, bilateral frontal subcortical  white matter and right frontal centrum semiovale. Most of this T2  hyperintense signal is new from 10/22/2019. Given the abnormal signal  and diffusion restriction solely/mostly involves the white matter,  differentials favor acute white matter pathologies like acute toxic  leukoencephalopathy from liver failure. Other less likely  differentials include hepatocerebral syndrome, osmotic demyelination  or embolic infarct.     [Urgent Result: Possible acute toxic leukoencephalopathy     Finding was identified on 7/17/2020 1:46 PM.     Dr. Johnson and neurology team was contacted by Dr. Arellano at 7/17/2020  1:48 PM and verbalized understanding of the urgent finding.     I have personally reviewed the examination and initial interpretation  and I agree with the findings.    ROSELYN PRESLEY MD   MR Cervical Spine w/o & w Contrast    Narrative    History: Acute disseminated demyelination.     Comparison: MR brain 7/17/2020 and 10/22/2019    Technique:   Brain:  Axial FLAIR,  T1-weighted, and susceptibility images were obtained  without intravenous contrast. Following intravenous gadolinium-based  contrast administration, axial T2-weighted, diffusion, FLAIR, and  axial and coronal T1-weighted images were obtained.     Cervical Spine:  Sagittal pre and postcontrast T1-weighted, sagittal T2-weighted,  sagittal STIR, sagittal diffusion weighted, axial T2-weighted, and  axial T2* gradient echo, axial T1 postcontrast were obtained.    Contrast: 5.6CC GADAVIST (accession [id number])    Findings:   BRAIN:  There is a new focus of restricted diffusion within the right  posterior parietal lobe. Redemonstration of restricted diffusion  involving the left corona radiata, bilateral centrum semiovale,  bilateral basal ganglia and right inferior frontal lobe. The bilateral  centrum semiovale lesions are slightly less intense on diffusion  weighted imaging  compared to the prior study.    Again noted is extensive T2 signal abnormality within the brainstem,  extending into the middle cerebellar peduncles and portions of the  cerebellum. Additional supratentorial T2 signal abnormality is  identified almost exclusively within the periventricular white matter,  however does involve portions of the basal ganglia. The locations of  the white matter signal abnormality favor a watershed distribution in  certain areas. Foci of susceptibility artifact is present within the  brainstem, particularly the linden. Overall white matter signal  abnormality is similar to the more recent prior MR examination.    The ventricles are stable in size. There is no abnormal mass effect or  midline shift. The fourth ventricle is midline. No abnormal  extra-axial fluid collection.     There is no abnormal enhancement. No intracranial mass identified.    CERVICAL SPINE:  The vertebral body heights are maintained without evidence of  fracture. There is reversal of the normal cervical lordosis. Moderate  disc desiccation and narrowing is present at C5-T1 levels. There is no  spondylolisthesis. No evidence of marrow replacing process.    There is no abnormal cord signal. No abnormal enhancement.    C2-3: No spinal canal or neural foraminal stenosis.    C3-4: Mild disc bulge without significant spinal canal narrowing. Mild  bilateral neural foraminal narrowing secondary to disc bulge.    C4-5: Minimal disc bulge without spinal canal narrowing. Moderate left  neural foraminal narrowing secondary to uncovertebral joint and facet  arthropathy. Right neural foramen is patent.    C5-6: No spinal canal narrowing. Mild left and moderate right neural  foraminal narrowing secondary to uncovertebral joint hypertrophy and  facet arthropathy.    C6-7: Left eccentric posterior disc bulge. No spinal canal narrowing.  Moderate to severe left and mild right neural foraminal narrowing  secondary to uncovertebral joint  and facet hypertrophy.    C7-T1: Mild disc bulge resulting in mild relative spinal canal  narrowing. Moderate bilateral neural foraminal narrowing secondary to  facet hypertrophy.    Incidental note is made of a mucous retention cyst within the right  lateral aspect of the pharyngeal mucosal space posteriorly. The  remaining visualized paraspinal soft tissues appear unremarkable.      Impression    Impression:  1. Stable extensive T2 signal abnormality involving the entire  brainstem with extension into the middle cerebellar peduncles and  portions of the cerebellum. Additional T2 signal abnormality involving  symmetric supratentorial white matter, with a slight predominance  towards watershed distribution. Upon chart review it is noted that  this patient has hypertension and these findings can be seen in  atypical form of PRES. Given symmetric distribution, additional  metabolic/systemic etiologies including liver dysfunction and other  causes for acute toxic leukoencephalopathy are included in the  differential.  2. Mild to moderate spondylosis of the cervical spine. No abnormal  cord signal.    I have personally reviewed the examination and initial interpretation  and I agree with the findings.    DEYSI MEYER MD   MR Brain w/o & w Contrast    Narrative    History: Acute disseminated demyelination.     Comparison: MR brain 7/17/2020 and 10/22/2019    Technique:   Brain:  Axial FLAIR,  T1-weighted, and susceptibility images were obtained  without intravenous contrast. Following intravenous gadolinium-based  contrast administration, axial T2-weighted, diffusion, FLAIR, and  axial and coronal T1-weighted images were obtained.     Cervical Spine:  Sagittal pre and postcontrast T1-weighted, sagittal T2-weighted,  sagittal STIR, sagittal diffusion weighted, axial T2-weighted, and  axial T2* gradient echo, axial T1 postcontrast were obtained.    Contrast: 5.6CC GADAVIST (accession [id number])    Findings:    BRAIN:  There is a new focus of restricted diffusion within the right  posterior parietal lobe. Redemonstration of restricted diffusion  involving the left corona radiata, bilateral centrum semiovale,  bilateral basal ganglia and right inferior frontal lobe. The bilateral  centrum semiovale lesions are slightly less intense on diffusion  weighted imaging compared to the prior study.    Again noted is extensive T2 signal abnormality within the brainstem,  extending into the middle cerebellar peduncles and portions of the  cerebellum. Additional supratentorial T2 signal abnormality is  identified almost exclusively within the periventricular white matter,  however does involve portions of the basal ganglia. The locations of  the white matter signal abnormality favor a watershed distribution in  certain areas. Foci of susceptibility artifact is present within the  brainstem, particularly the linden. Overall white matter signal  abnormality is similar to the more recent prior MR examination.    The ventricles are stable in size. There is no abnormal mass effect or  midline shift. The fourth ventricle is midline. No abnormal  extra-axial fluid collection.     There is no abnormal enhancement. No intracranial mass identified.    CERVICAL SPINE:  The vertebral body heights are maintained without evidence of  fracture. There is reversal of the normal cervical lordosis. Moderate  disc desiccation and narrowing is present at C5-T1 levels. There is no  spondylolisthesis. No evidence of marrow replacing process.    There is no abnormal cord signal. No abnormal enhancement.    C2-3: No spinal canal or neural foraminal stenosis.    C3-4: Mild disc bulge without significant spinal canal narrowing. Mild  bilateral neural foraminal narrowing secondary to disc bulge.    C4-5: Minimal disc bulge without spinal canal narrowing. Moderate left  neural foraminal narrowing secondary to uncovertebral joint and facet  arthropathy. Right  neural foramen is patent.    C5-6: No spinal canal narrowing. Mild left and moderate right neural  foraminal narrowing secondary to uncovertebral joint hypertrophy and  facet arthropathy.    C6-7: Left eccentric posterior disc bulge. No spinal canal narrowing.  Moderate to severe left and mild right neural foraminal narrowing  secondary to uncovertebral joint and facet hypertrophy.    C7-T1: Mild disc bulge resulting in mild relative spinal canal  narrowing. Moderate bilateral neural foraminal narrowing secondary to  facet hypertrophy.    Incidental note is made of a mucous retention cyst within the right  lateral aspect of the pharyngeal mucosal space posteriorly. The  remaining visualized paraspinal soft tissues appear unremarkable.      Impression    Impression:  1. Stable extensive T2 signal abnormality involving the entire  brainstem with extension into the middle cerebellar peduncles and  portions of the cerebellum. Additional T2 signal abnormality involving  symmetric supratentorial white matter, with a slight predominance  towards watershed distribution. Upon chart review it is noted that  this patient has hypertension and these findings can be seen in  atypical form of PRES. Given symmetric distribution, additional  metabolic/systemic etiologies including liver dysfunction and other  causes for acute toxic leukoencephalopathy are included in the  differential.  2. Mild to moderate spondylosis of the cervical spine. No abnormal  cord signal.    I have personally reviewed the examination and initial interpretation  and I agree with the findings.    DEYSI MEYER MD   XR Video Swallow with SLP or OT    Narrative    Examination:  Modified Barium Swallow Study with Speech Pathology,  11/21/2020    Comparison: None.    History: Patient has history of stroke. Evaluate swallow function    Fluoroscopy time: 3.4 minutes.    Findings: Under fluoroscopic guidance, the patient was given orally  administered barium  of varying consistencies in the presence of the  speech pathology service. Premature spillage of thin liquid barium,  nectar thick barium, and pudding consistency barium. Normal  mastication of barium covered cookie with delay in swallow. However,  no penetration or aspiration with any consistency.      Impression    Impression: No penetration or aspiration with any consistency. Please  see the speech pathologist report for further details.    I have personally reviewed the examination and initial interpretation  and I agree with the findings.    TIKI DORMAN MD   XR Scapula Right    Narrative    EXAMINATION: XR SCAPULA RT  7/21/2020 10:36 AM     CLINICAL HISTORY:  New right arm pain/shoulder pain     COMPARISON: None available    FINDINGS: 2 views of the scapula were obtained. There is no definite  evidence of acute osseous abnormalities.    The lungs within the field of view are preserved. Mild degenerative  changes of the AC joint.       Impression    IMPRESSION: No evidence of acute osseous abnormalities.    JUTTA ELLERMANN, MD   XR Humerus Right G/E 2 Views    Narrative    2 views right humerus radiographs 7/21/2020 11:00 AM    History: New right arm/shoulder pain    Comparison: None available    Findings:    AP and lateral views of the right humerus were obtained.     No acute osseous abnormality.      Shoulder and elbow joints are incompletely assessed but grossly  congruent. No substantial degenerative change in shoulder or elbow.  Mild subcortical cystic changes of the humeral head.    Soft tissue is unremarkable.      Impression    Impression:  1. No acute osseous abnormality.    JUTTA ELLERMANN, MD   US Upper Extremity Venous Duplex Right    Narrative    EXAMINATION: US UPPER EXTREMITY VENOUS DUPLEX RIGHT  7/21/2020 10:49  AM      CLINICAL HISTORY: New asymmetric right arm swelling    COMPARISON: None        PROCEDURE COMMENTS: Ultrasound was performed of the deep venous system  of the right using  grayscale, color, and spectral Doppler.    FINDINGS:  The internal jugular, brachiocephalic, subclavian, brachial, basilic  and cephalic veins are visualized and are patent. Venous waveforms are  normal. There is normal response to compression.      Impression    IMPRESSION:  No deep venous thrombosis in the right upper extremity.    I have personally reviewed the examination and initial interpretation  and I agree with the findings.    TIKI DORMAN MD   US Renal Complete    Narrative    EXAMINATION: US RENAL COMPLETE  7/23/2020 10:08 AM      CLINICAL HISTORY: OLIGURIA    COMPARISON: Ultrasound: 10/21/2019, 6/11/2020    FINDINGS:  Right kidney:  Right renal length: 10.7 cm.  This is within normal limits for age.  Previous length: 11.5 cm.    The right kidney is normal in position and echogenicity. There is no  evident calculus or renal scarring. There is no significant urinary  tract dilation.     Left kidney:  Left renal length: 11.0 cm.  This is within normal limits for age.  Previous length: 12.9 cm.    The left kidney is normal in position and echogenicity. There is no  evident calculus or renal scarring. There is no significant urinary  tract dilation.     The urinary bladder is incompletely distended and normal in  morphology. Hawkins catheter in place.       Cirrhotic morphology, shrunken liver with nodular contour and  coarsened echotexture. Ascites.      Impression    IMPRESSION:    1. Normal grayscale evaluation of both kidneys.    2. Cirrhotic liver morphology with ascites.    I have personally reviewed the examination and initial interpretation  and I agree with the findings.    XANDER MARIE MD   XR Abdomen Port 1 View    Narrative    Exam: XR ABDOMEN PORT 1 VW, 7/23/2020 11:39 PM    Indication: Copious loose stool, abdominal distension with tenderness  to palpation    Comparison: CT AP 5/17/2019    Findings: Single portable abdominal radiograph. Nonobstructive bowel  gas pattern. There is  hyperdense material coating the entirety of the  colon, which is predominantly air-filled. No pneumatosis or portal  venous gas. Lung bases are clear.      Impression    Impression:   1. Nonobstructive bowel gas pattern.  2. Hyperdense material coating the walls of the large intestine, which  contains minimal stool.    I have personally reviewed the examination and initial interpretation  and I agree with the findings.    REYNALDO FERNANDEZ MD   Echo Complete    Narrative    [id number]  [id number]  [id number]  [id number]^JESSICA BLACK^MACARIO           [facility]  Ech[facility]  [address] [place] [id number]     Name: RANDALL BLOOD  MRN: [id number]  : 1960  Study Date: 2020 01:26 PM  Age: 60 yrs  Gender: Male  Patient Location: [facility]  Reason For Study: CVA  Ordering Physician: MACARIO RAMOS  Performed By: Veronica Knight RDCS     BSA: 1.6 m2  Height: 66 in  Weight: 117 lb  HR: 66  BP: 120/99 mmHg  _____________________________________________________________________________  __        Procedure  Bubble Echocardiogram with two-dimensional, color and spectral Doppler  performed.  _____________________________________________________________________________  __        Interpretation Summary  No cardiac source for embolus identified. The atrial septum is intact as  assessed by color Doppler and agitated saline bubble study .  _____________________________________________________________________________  __        Left Ventricle  Global and regional left ventricular function is hyperkinetic with an EF of  65-70%. Left ventricular wall thickness is normal. Left ventricular size is  normal. Grade I or early diastolic dysfunction. No regional wall motion  abnormalities are seen.     Right Ventricle  Right ventricular function, chamber size, wall motion, and thickness are  normal.     Atria  Both atria appear normal. The atrial septum is intact as assessed by  color  Doppler and agitated saline bubble study .     Mitral Valve  The mitral valve is normal.        Aortic Valve  Aortic valve is normal in structure and function.     Tricuspid Valve  The tricuspid valve is normal. Pulmonary artery systolic pressure cannot be  assessed.     Pulmonic Valve  The pulmonic valve is normal.     Vessels  The thoracic aorta is normal. The pulmonary artery and bifurcation cannot be  assessed. The inferior vena cava is normal.     Pericardium  No pericardial effusion is present.        Compared to Previous Study  Previous study not available for comparison.  _____________________________________________________________________________  __  MMode/2D Measurements & Calculations     IVSd: 0.75 cm  LVIDd: 4.1 cm  LVIDs: 2.7 cm  LVPWd: 1.2 cm  FS: 33.9 %  LV mass(C)d: 126.4 grams  LV mass(C)dI: 79.4 grams/m2  Ao root diam: 2.9 cm  asc Aorta Diam: 2.3 cm  LVOT diam: 1.9 cm  LVOT area: 2.8 cm2  LA Volume (BP): 41.6 ml  LA Volume Index (BP): 26.2 ml/m2  RWT: 0.59           Doppler Measurements & Calculations  MV E max gunjan: 48.0 cm/sec  MV A max gunjan: 107.0 cm/sec  MV E/A: 0.45  MV dec slope: 157.0 cm/sec2  PA acc time: 0.11 sec  E/E' av.7  Lateral E/e': 6.4  Medial E/e': 9.0     _____________________________________________________________________________  __           Report approved by: Mike Ruelas 2020 02:42 PM              Consultations:    PT, OT, SLP rheumatology, nephrology, interventional radiology, PMNR.    Discharge Medications:  Current Discharge Medication List      START taking these medications    Details   acetaminophen (TYLENOL) 325 MG tablet Take 1 tablet (325 mg) by mouth every 4 hours as needed for mild pain or fever    Associated Diagnoses: Cerebrovascular accident (CVA) due to other mechanism (H)      aspirin (ASA) 81 MG chewable tablet Take 1 tablet (81 mg) by mouth daily Indication: Stroke    Associated Diagnoses: Cerebrovascular accident (CVA) due to other  mechanism (H)      atorvastatin (LIPITOR) 40 MG tablet Take 1 tablet (40 mg) by mouth every evening Indication: Stroke    Associated Diagnoses: Cerebrovascular accident (CVA) due to other mechanism (H)      chlorthalidone (HYGROTON) 25 MG tablet Take 1 tablet (25 mg) by mouth daily Indication: HTN    Associated Diagnoses: Essential hypertension, malignant      miconazole (MICATIN) 2 % external cream Apply topically 2 times daily Indication: Groin rash    Associated Diagnoses: Intertrigo      polyethylene glycol (MIRALAX) 17 g packet Take 17 g by mouth daily Indication: Constipation    Associated Diagnoses: Cerebrovascular accident (CVA) due to other mechanism (H)         CONTINUE these medications which have CHANGED    Details   amLODIPine (NORVASC) 10 MG tablet Take 1 tablet (10 mg) by mouth daily Indication: HTN    Associated Diagnoses: Essential hypertension, malignant      carvedilol (COREG) 25 MG tablet Take 1 tablet (25 mg) by mouth 2 times daily (with meals) Indication: HTN    Associated Diagnoses: Essential hypertension, malignant      entecavir (BARACLUDE) 1 MG tablet Take 1 tablet (1 mg) by mouth daily Indication: HTN  Qty: 30 tablet, Refills: 11    Associated Diagnoses: Chronic viral hepatitis B without delta agent and without coma (H); Cirrhosis of liver without ascites, unspecified hepatic cirrhosis type (H)         STOP taking these medications       bumetanide (BUMEX) 1 MG tablet Comments:   Reason for Stopping:         lisinopril (PRINIVIL/ZESTRIL) 20 MG tablet Comments:   Reason for Stopping:         lisinopril (ZESTRIL) 40 MG tablet Comments:   Reason for Stopping:               Medication changes:  See above     Discharge physical examination:   Physical Examination (ipad )  Vitals Overnight: WNL overnight   Physical Exam:  Constitutional: Alert, Laying in bed. Appears comfortable.    Head: Atraumatic, normocephalic.  Cardiovascular: Warm extremities. No leg edema. RRR.      Neurologic:    Mental Status: Alert, awake. Follows commands to the best of his ability (sticks tongue out, wiggles toes on left, raises left arm above head). Speech is dysarthric and unintelligible to myself and iPad .   Cranial Nerves: Looks in all direction on command, eyes move conjugately. Smile and eyebrow raise equal, blinking symmetric, mild right nasolabial flattening. Hearing intact to conversation.   Motor: R hemiparesis-RUE and RLE cannot lift antigravity but some movement in plane. Very weak right hand . LUE moving spontaneously, antigravity.  Able to hold LUE/LLE antigravity without drift. 2/5 hip flexion in RLE.   Sensory: Intact to light touch x 4 extremities.   Gait: Patient hemiparetic, so did not test gait     Discharge follow up and instructions:       IR Paracentesis     MRI Brain w & w/o contrast     Routine UA with microscopic     Medication Therapy Management Referral      General info for SNF    Length of Stay Estimate: Short Term Care: Estimated # of Days <30  Condition at Discharge: Improving  Level of care:skilled   Rehabilitation Potential: Fair  Admission H&P remains valid and up-to-date: Yes  Recent Chemotherapy: N/A  Use Nursing Home Standing Orders: Yes     Mantoux instructions    Give two-step Mantoux (PPD) Per Facility Policy Yes     Reason for your hospital stay    You were admitted for evaluation of altered mental status. You were found to have brainstem PRES and brain stroke.     Glucose monitor nursing POCT    Before meals and at bedtime     Daily weights    Call Provider for weight gain of more than 2 pounds per day or 5 pounds per week.     Bladder scan    X 2 for post void residual     Follow Up and recommended labs and tests    Follow up with snf physician.  The following labs/tests are recommended: Urinalysis, CBC, BMP.     Additional Discharge Instructions    Patient will need next paracentesis at end of August. Please call [facility] Ultrasound  ([phone]) to schedule this appointment.     Activity - Up with nursing assistance     Encourage PO fluids     Follow Up ([facility])    Follow up with Dr. Feuntes in nephrology within 2 weeks of discharge.     Follow up with Dr. Mcdonald, your family care physician, within 1 month of discharge for blood pressure management, repeat EKG.     Establish care with infectious disease within 1-3 months of discharge for evaluation of latent TB     Follow up with general neurology within 3-4 weeks of discharge (after brain MRI has been completed)     Appointments on [place] and/or [facility] (with [facility] or [facility] provider or service). Call [phone] if you haven't heard regarding these appointments within 7 days of discharge.     Physical Therapy Adult Consult    Evaluate and treat as clinically indicated.    Reason:  Stroke     Occupational Therapy Adult Consult    Evaluate and treat as clinically indicated.    Reason:  Stroke     Speech Language Path Adult Consult    Evaluate and treat as clinically indicated.    Reason:  Stroke     Fall precautions     Pneumatic Compression Device     Bilateral calf. Remove 30 mins BID.     Advance Diet as Tolerated    Follow this diet upon discharge: Orders Placed This Encounter      Room Service      Calorie Counts      Snacks/Supplements Adult: Boost Plus; Between Meals      Calorie Counts      Calorie Counts      Dysphagia Diet Level 3 Advanced Thin Liquids (water, ice chips, juice, milk gelatin, ice cream, etc); No Pork       A total of 75 minutes was spent on discharge activities.     Patient seen and discussed with Dr. Swati Sandy MD  PGY-2 Neurology Resident  P: [phone]    Attending physician: I saw and evaluated the patient on the day of discharge and I agree with the findings and plan of care as documented above.    Briefly, the patient is a 60 year old man who presented with encephalopathy and new focal neurologic deficits in the setting of  hyperintensive urgency. Brain MRI revealed multifocal brain infarcts superimposed on changes consistent with posterior reversible encephalopathy syndrome, all of which are presumed secondary to hypertensive emergency.    By discharge, the patient was demonstrating some improvement in right sided motor function, although continues to have profound right hemiparesis and apparent aphasia. He will need ongoing rehabilitation to try to maximize functional recovery.    Disposition: TCU for ongoing rehabilitation    Rey Longoria MD   of Neurology

## 2020-07-25 NOTE — CONSULTS
INTERVENTIONAL RADIOLOGY CONSULT NOTE    Request received for paracentesis.     Will put the patient on IR list and discuss on morning rounds on Monday for possible time of scheduling.   Labs WNL for procedure.  No NPO required.    Consent will be done prior to procedure.     Platelet Count   Date Value Ref Range Status   07/24/2020 174 150 - 450 10e9/L Final     INR   Date Value Ref Range Status   07/16/2020 1.09 0.86 - 1.14 Final      Please [place] the [place] [phone] for estimated time of procedure.     Case discussed with Dr CAILIN Mcconnell and plan conveyed to Reshma Patel MD    I agree with the assessment and plan documented by Dr. Parish.    Lydia Mcconnell MD  Interventional Radiology   Pager [phone]

## 2020-07-25 NOTE — PLAN OF CARE
PT 5C  Discharge Planner PT   Patient plan for discharge: not discussed with pt  Current status:  available via IPad. Rehab tech present to assist. Pt transferred supine to sitting with moderate assist of 2. Moderate to minimal assist needed to maintain sitting position as pt leans backward. Pt transferred sit to stand with moderate assist of 2 and blocking of knees to facilitate standing. Pt stands with flexed posture and backward lean and responds somewhat to verbal and manual cues to stand straighter. Pt transfers sit to supine with maximum assist of 2.  Barriers to return to prior living situation: Medical condition, assist with transfers, right sided weakness, inability to ambulate presently  Recommendations for discharge: TCU  Rationale for recommendations: Continued rehab recommended for pt to progress with strengthening, transfer training, gait training, and functional endurance.       Entered by: Feng Torres 07/25/2020 2:49 PM

## 2020-07-26 ENCOUNTER — APPOINTMENT (OUTPATIENT)
Dept: OCCUPATIONAL THERAPY | Facility: CLINIC | Age: 60
End: 2020-07-26
Payer: COMMERCIAL

## 2020-07-26 LAB
ANION GAP SERPL CALCULATED.3IONS-SCNC: 3 MMOL/L (ref 3–14)
BUN SERPL-MCNC: 27 MG/DL (ref 7–30)
CALCIUM SERPL-MCNC: 8.3 MG/DL (ref 8.5–10.1)
CHLORIDE SERPL-SCNC: 108 MMOL/L (ref 94–109)
CO2 SERPL-SCNC: 27 MMOL/L (ref 20–32)
CREAT SERPL-MCNC: 1.28 MG/DL (ref 0.66–1.25)
ERYTHROCYTE [DISTWIDTH] IN BLOOD BY AUTOMATED COUNT: 12.8 % (ref 10–15)
GFR SERPL CREATININE-BSD FRML MDRD: 61 ML/MIN/{1.73_M2}
GLUCOSE BLDC GLUCOMTR-MCNC: 109 MG/DL (ref 70–99)
GLUCOSE BLDC GLUCOMTR-MCNC: 89 MG/DL (ref 70–99)
GLUCOSE SERPL-MCNC: 81 MG/DL (ref 70–99)
HCT VFR BLD AUTO: 33.2 % (ref 40–53)
HGB BLD-MCNC: 10.7 G/DL (ref 13.3–17.7)
JCPYV DNA SERPL QL NAA+PROBE: NOT DETECTED
MCH RBC QN AUTO: 32.1 PG (ref 26.5–33)
MCHC RBC AUTO-ENTMCNC: 32.2 G/DL (ref 31.5–36.5)
MCV RBC AUTO: 100 FL (ref 78–100)
PLATELET # BLD AUTO: 152 10E9/L (ref 150–450)
POTASSIUM SERPL-SCNC: 3.8 MMOL/L (ref 3.4–5.3)
RBC # BLD AUTO: 3.33 10E12/L (ref 4.4–5.9)
SODIUM SERPL-SCNC: 139 MMOL/L (ref 133–144)
SPECIMEN SOURCE: NORMAL
WBC # BLD AUTO: 6.8 10E9/L (ref 4–11)

## 2020-07-26 PROCEDURE — 85027 COMPLETE CBC AUTOMATED: CPT | Performed by: STUDENT IN AN ORGANIZED HEALTH CARE EDUCATION/TRAINING PROGRAM

## 2020-07-26 PROCEDURE — 25000128 H RX IP 250 OP 636: Performed by: STUDENT IN AN ORGANIZED HEALTH CARE EDUCATION/TRAINING PROGRAM

## 2020-07-26 PROCEDURE — 80048 BASIC METABOLIC PNL TOTAL CA: CPT | Performed by: STUDENT IN AN ORGANIZED HEALTH CARE EDUCATION/TRAINING PROGRAM

## 2020-07-26 PROCEDURE — 25000132 ZZH RX MED GY IP 250 OP 250 PS 637: Performed by: STUDENT IN AN ORGANIZED HEALTH CARE EDUCATION/TRAINING PROGRAM

## 2020-07-26 PROCEDURE — 12000001 ZZH R&B MED SURG/OB UMMC

## 2020-07-26 PROCEDURE — 25000132 ZZH RX MED GY IP 250 OP 250 PS 637: Performed by: PSYCHIATRY & NEUROLOGY

## 2020-07-26 PROCEDURE — 00000146 ZZHCL STATISTIC GLUCOSE BY METER IP

## 2020-07-26 PROCEDURE — 97535 SELF CARE MNGMENT TRAINING: CPT | Mod: GO | Performed by: OCCUPATIONAL THERAPIST

## 2020-07-26 PROCEDURE — 36415 COLL VENOUS BLD VENIPUNCTURE: CPT | Performed by: STUDENT IN AN ORGANIZED HEALTH CARE EDUCATION/TRAINING PROGRAM

## 2020-07-26 PROCEDURE — A9585 GADOBUTROL INJECTION: HCPCS | Performed by: PSYCHIATRY & NEUROLOGY

## 2020-07-26 PROCEDURE — 25500064 ZZH RX 255 OP 636: Performed by: PSYCHIATRY & NEUROLOGY

## 2020-07-26 PROCEDURE — 97110 THERAPEUTIC EXERCISES: CPT | Mod: GO | Performed by: OCCUPATIONAL THERAPIST

## 2020-07-26 RX ORDER — GADOBUTROL 604.72 MG/ML
0.1 INJECTION INTRAVENOUS ONCE
Status: COMPLETED | OUTPATIENT
Start: 2020-07-26 | End: 2020-07-28

## 2020-07-26 RX ORDER — ATORVASTATIN CALCIUM 40 MG/1
40 TABLET, FILM COATED ORAL EVERY EVENING
Status: DISCONTINUED | OUTPATIENT
Start: 2020-07-26 | End: 2020-08-04 | Stop reason: HOSPADM

## 2020-07-26 RX ADMIN — CARVEDILOL 25 MG: 25 TABLET, FILM COATED ORAL at 18:10

## 2020-07-26 RX ADMIN — ATORVASTATIN CALCIUM 40 MG: 40 TABLET, FILM COATED ORAL at 20:45

## 2020-07-26 RX ADMIN — AMLODIPINE BESYLATE 10 MG: 10 TABLET ORAL at 08:35

## 2020-07-26 RX ADMIN — ASPIRIN 81 MG CHEWABLE TABLET 81 MG: 81 TABLET CHEWABLE at 08:35

## 2020-07-26 RX ADMIN — ENOXAPARIN SODIUM 40 MG: 40 INJECTION SUBCUTANEOUS at 18:09

## 2020-07-26 RX ADMIN — CHLORTHALIDONE 25 MG: 25 TABLET ORAL at 08:35

## 2020-07-26 RX ADMIN — ENTECAVIR 0.5 MG: 0.5 TABLET ORAL at 08:35

## 2020-07-26 RX ADMIN — MENTHOL 1 PATCH: 205.5 PATCH TOPICAL at 08:34

## 2020-07-26 RX ADMIN — CARVEDILOL 25 MG: 25 TABLET, FILM COATED ORAL at 08:37

## 2020-07-26 ASSESSMENT — ACTIVITIES OF DAILY LIVING (ADL)
ADLS_ACUITY_SCORE: 20

## 2020-07-26 NOTE — PLAN OF CARE
Discharge Planner OT   Patient plan for discharge: not discussed  Current status: Utilized , limited by R sided weakness. Completed RUE shoulder, elbow, wrist, hand PROM (one set x 10 reps) to maintain integrity and prevent contractures. Used ceiling lift to transfer chair <> bed. ModA x2 for bed mobility, maxA for toileting with bed pan.  Barriers to return to prior living situation: Medical status, R sided weakness, level of assistance, cognition  Recommendations for discharge: TCU  Rationale for recommendations: Pt is significantly below baseline functioning level and would benefit from continued skilled occupational therapy to increase safety and independence with ADL/IADLs.       Entered by: Judith Joiner 07/26/2020 3:14 PM

## 2020-07-26 NOTE — PLAN OF CARE
SLP: Cancel - Attempted to see pt for dysphagia and communication tx. Pt OOR at time of attempt. SLP will reschedule per POC.

## 2020-07-26 NOTE — PLAN OF CARE
Status: Admitted for stroke work-up after fall. Concerns for PRES and vasoconstriction infarcts after hypertensive emergency. Citizen of [place] speaking.   Vitals: VSS on RA. HTN but within parameters.  Neuros: Alert, can state name but does not answer other orientation questions. Mumbles and whispers, hard to understand. RUE 2/5, RLE, 1/5, LUE 5/5, LLE 3/5.   IV: 2 PIV saline locked.   Resp/trach: Lung sounds are coarse. Infrequent, productive cough.  Diet: Full liquid diet, nectar thick. Assist with feeding. Poor intake this shift.  Bowel status: BS+, No BM this shift. Stomach firm and distended. MD aware.   : Hawkins catheter in place, low UOP, MD aware.  Skin: Scarring throughout.  Pain: Right shoulder pain partially controlled with ice packs and repositioning.   Activity: A2 w/ lift. Q2hr repo.  Social: Both sister and niece have been allowed by security to visit. Uncle cannot receive updates. Only 3 family members listed on sticky note in chart can receive updates per family.   Plan: Continue to monitor and follow POC. Repeat MRI 7/27, paracentesis in IR will be scheduled on Monday.

## 2020-07-26 NOTE — PROGRESS NOTES
"[facility]  General Neurology Progress Note    Patient Name:  Wilfredo Yoon  MRN:  [id number]    :  1960  Date of Service: 20    Patient Summary:   Mr. Yoon is a 60 year old male with a PMHx of HTN, chronic Hepatitis B and cirrhosis a/w ascites, CKD who presents with PRES and probably hypertension-induced infarcts after an episode of hypertensive emergency.    Interval history:   No acute events overnight. Patient not reporting any pain.     Physical Examination (ipad )  Vitals: /82 (BP Location: Left arm)   Pulse 64   Temp 97.6  F (36.4  C) (Axillary)   Resp 16   Ht 1.676 m (5' 6\")   Wt 53.3 kg (117 lb 8 oz)   SpO2 100%   BMI 18.96 kg/m    Physical Exam:  Constitutional: Alert.  Sitting up in bed comfortably. No acute distress.    Head: Atraumatic, normocephalic.  Cardiovascular: Appears warm, well-perfused, and non-toxic.  Respiratory: No increased work of breathing, no accessory muscle use.     Neurologic:   Mental Status: Alert, awake. Able to consistently follow commands (sticks tongue out, thumbs up, wiggles toes). Speech is dysarthric and unintelligible to iPAD .   Cranial Nerves: Looking in all directions, able to bury sclerae, eyes move conjugately. Smile and eyebrow raise equal, blinking symmetric, mild right nasolabial flattening. Hearing intact to conversation.    Motor: R hemiparesis-RUE and RLE cannot lift antigravity but some movement in plane. LUE moving spontaneously, antigravity.  Able to hold left upper extremity antigravity without drift, able to slightly elevate left lower extremity off the bed.   Sensory: Deferred today    Investigations   Recent Labs   Lab 20  0734 20  0637 20  0640  20  1032 20  0705   WBC 6.8  --   --   --  5.9 5.9   HGB 10.7*  --   --   --  12.1* 12.1*     --   --   --  100 100     --  174  --  191 184    138 141   < > 139 " 139   POTASSIUM 3.8 4.0 3.7   < > 3.5 3.8   CHLORIDE 108 109 109   < > 108 108   CO2 27 23 26   < > 27 22   BUN 27 26 28   < > 32* 30   CR 1.28* 1.23 1.29*   < > 1.62* 1.61*   ANIONGAP 3 6 6   < > 4 9   SOTERO 8.3* 8.1* 8.3*   < > 8.1* 8.4*   GLC 81 75 84   < > 91 91    < > = values in this interval not displayed.       Assessment and Plan:  Mr. Yoon is a 60 year old man with a PMH of chronic Hep B with cirrhosis, HTN with hx hypertensive emergency, tobacco use, HLD, who presented to [facility] with abdominal pain on 7/16. Hypertensive to 245/140, ultimately discharged. He presented later to [facility] ED, also discharged after CT/CTA. He presented to [facility] ED for a third time with new AMS. Initially worked up as a stroke code. Imaging showed complex findings, including severe hyperintensity on T2/FLAIR of the entire brainstem, but without restricted diffusion, and supratentorial extensive white matter pathology involving periventricular white matter and corpus callosum, plus several areas of restricted diffusion including at left corona radiata and right basal ganglia, most likely consistent with infarcts. While the DDx of brainstem hyperintensities is very large and includes infectious, inflammatory, vasculitic, demyelinating, toxic/metabolic (eg CPM), and neoplastic pathologies, overall I believe that the most likely explanation is brainstem variant of PRES syndrome (especially given normal CSF). The multifocal infarcts noted in the supratentorial compartment may be due to concomitant small vessel infarcts secondary to hypertensive urgency. Of note he has membranous nephropathy and other differential includes vasculitis.     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   Consulted nephrology 7/22, appreciate recs.  - Stopped Bumex  - Started Chlorthalidone 25 mg daily  - Strict Is and Os  - Continue amlodipine 10 mg PO daily  - Continue Coreg 25mg PO BID  - Hold PTA Lisinopril 40 mg PO daily due to ANDRY (per renal: If Cr remains stable for the next 1 week, restart Lisinopril at 10 mg daily and titrate dose up if needed to maintain BP ~ 130/80. Titrate dose up to 40 mg daily which was his PTA dose)  - Hydralazine prn for SBP>160  - Follow with Nephrology clinic at discharge (Dr Fuentes) in 2-4 weeks    #Cirrhosis 2/2 Hepatitis B  #Ascites  Increasing abdominal distention, and patient missed his last outpatient paracentesis date which would have been 7/23.    - Plan for IR paracentesis early next week (Monday/Tuesday)  - Continue entecavir 0.5 mg daily    Diet: Full liquid, nectar thick (patient needs supervision to eat)  DVT prophylaxis: SCDs, enoxaparin subcutaneous   Lines: PIV, vazquez  Code status: Full code    Disposition Plan   Expected discharge in 2-3 days to transitional care unit once medical work up/treatment is complete.     Entered: Jainna Eaton 07/26/2020, 4:00 PM       Patient was seen and discussed with Dr. Kd Eaton  PGY4 Neurology  [phone]

## 2020-07-26 NOTE — PROGRESS NOTES
"[facility]  General Neurology Progress Note    Patient Name:  Wilfredo Yoon  MRN:  [id number]    :  1960  Date of Service: 20    Patient Summary:   Mr. Yoon is a 60 year old male with a PMHx of HTN, chronic Hepatitis B and cirrhosis a/w ascites, CKD who presents with PRES and probably hypertension-induced infarcts after an episode of hypertensive emergency.    Interval history:   No acute events overnight. Patient not reporting any pain.     Physical Examination (ipad )  Vitals: BP (!) 150/89   Pulse 65   Temp 98.1  F (36.7  C) (Axillary)   Resp 18   Ht 1.676 m (5' 6\")   Wt 53.3 kg (117 lb 8 oz)   SpO2 100%   BMI 18.96 kg/m    Physical Exam:  Constitutional: Alert.  Sitting up in bed comfortably. No acute distress.    Head: Atraumatic, normocephalic.  Cardiovascular: Appears warm, well-perfused, and non-toxic.  Respiratory: No increased work of breathing, no accessory muscle use.     Neurologic:   Mental Status: Alert, awake. Able to consistently follow commands (sticks tongue out, thumbs up, wiggles toes). Speech is dysarthric and unintelligible to iPAD .   Cranial Nerves: Looking in all directions, able to bury sclerae, eyes move conjugately. Smile and eyebrow raise equal, blinking symmetric, mild right nasolabial flattening. Hearing intact to conversation.    Motor: R hemiparesis-RUE and RLE cannot lift antigravity but some movement in plane. LUE moving spontaneously, antigravity.  Able to hold left upper extremity antigravity without drift, able to slightly elevate left lower extremity off the bed.   Sensory: Deferred today    Investigations   Recent Labs   Lab 20  0734 20  0637 20  0640  20  1032 20  0705   WBC 6.8  --   --   --  5.9 5.9   HGB 10.7*  --   --   --  12.1* 12.1*     --   --   --  100 100     --  174  --  191 184    138 141   < > 139 139   POTASSIUM 3.8 " 4.0 3.7   < > 3.5 3.8   CHLORIDE 108 109 109   < > 108 108   CO2 27 23 26   < > 27 22   BUN 27 26 28   < > 32* 30   CR 1.28* 1.23 1.29*   < > 1.62* 1.61*   ANIONGAP 3 6 6   < > 4 9   SOTERO 8.3* 8.1* 8.3*   < > 8.1* 8.4*   GLC 81 75 84   < > 91 91    < > = values in this interval not displayed.       Assessment and Plan:  Mr. Yoon is a 60 year old man with a PMH of chronic Hep B with cirrhosis, HTN with hx hypertensive emergency, tobacco use, HLD, who presented to [facility] with abdominal pain on 7/16. Hypertensive to 245/140, ultimately discharged. He presented later to [facility] ED, also discharged after CT/CTA. He presented to [facility] ED for a third time with new AMS. Initially worked up as a stroke code. Imaging showed complex findings, including severe hyperintensity on T2/FLAIR of the entire brainstem, but without restricted diffusion, and supratentorial extensive white matter pathology involving periventricular white matter and corpus callosum, plus several areas of restricted diffusion including at left corona radiata and right basal ganglia, most likely consistent with infarcts. While the DDx of brainstem hyperintensities is very large and includes infectious, inflammatory, vasculitic, demyelinating, toxic/metabolic (eg CPM), and neoplastic pathologies, overall I believe that the most likely explanation is brainstem variant of PRES syndrome (especially given normal CSF). The multifocal infarcts noted in the supratentorial compartment may be due to concomitant small vessel infarcts secondary to hypertensive urgency. Of note he has membranous nephropathy and other differential includes vasculitis.     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   Consulted nephrology 7/22, appreciate recs.  - Stopped Bumex  - Started Chlorthalidone 25 mg daily  - Strict Is and Os  - Continue amlodipine 10 mg PO daily  - Continue Coreg 25mg PO BID  - Hold PTA Lisinopril 40 mg PO daily due to ANDRY (per renal: If Cr remains stable for the next 1 week, restart Lisinopril at 10 mg daily and titrate dose up if needed to maintain BP ~ 130/80. Titrate dose up to 40 mg daily which was his PTA dose)  - Hydralazine prn for SBP>160  - Follow with Nephrology clinic at discharge (Dr Fuentes) in 2-4 weeks    #Cirrhosis 2/2 Hepatitis B  #Ascites  Increasing abdominal distention, and patient missed his last outpatient paracentesis date which would have been 7/23.    - Plan for IR paracentesis early next week (Monday/Tuesday)  - Continue entecavir 0.5 mg daily    Diet: Full liquid, nectar thick (patient needs supervision to eat)  DVT prophylaxis: SCDs, enoxaparin subcutaneous   Lines: PIV, vazquez  Code status: Full code    Disposition Plan   Expected discharge in 2-3 days to transitional care unit once medical work up/treatment is complete.     Entered: Janina Eaton 07/25/2020, 8:06 PM       Patient was seen and discussed with Dr. Kd Eaton  PGY4 Neurology  [phone]    I saw and evaluated the patient with the resident and agree with the assessment and plan. I was present for  minor examination.    Agree with paracentesis tomorrow.    Shannon Yi MD  Chief, Multiple Sclerosis Division  Department of Neurology  [facility] [place]

## 2020-07-27 ENCOUNTER — APPOINTMENT (OUTPATIENT)
Dept: INTERVENTIONAL RADIOLOGY/VASCULAR | Facility: CLINIC | Age: 60
End: 2020-07-27
Payer: COMMERCIAL

## 2020-07-27 ENCOUNTER — APPOINTMENT (OUTPATIENT)
Dept: OCCUPATIONAL THERAPY | Facility: CLINIC | Age: 60
End: 2020-07-27
Payer: COMMERCIAL

## 2020-07-27 LAB
ALBUMIN SERPL-MCNC: 1.5 G/DL (ref 3.4–5)
ALP SERPL-CCNC: 151 U/L (ref 40–150)
ALT SERPL W P-5'-P-CCNC: 18 U/L (ref 0–70)
AST SERPL W P-5'-P-CCNC: 27 U/L (ref 0–45)
BILIRUB DIRECT SERPL-MCNC: 0.1 MG/DL (ref 0–0.2)
BILIRUB SERPL-MCNC: 0.6 MG/DL (ref 0.2–1.3)
CREAT SERPL-MCNC: 1.23 MG/DL (ref 0.66–1.25)
CRYOGLOB SER QL: ABNORMAL %
DIAGNOSTIC IMP SPEC-IMP: NOT DETECTED
EBV DNA # SPEC NAA+PROBE: <390 CPY/ML
EBV DNA SPEC NAA+PROBE-LOG#: <2.6 LOG
GFR SERPL CREATININE-BSD FRML MDRD: 64 ML/MIN/{1.73_M2}
IGA SERPL-MCNC: 405 MG/DL (ref 84–499)
IGG SERPL-MCNC: 1030 MG/DL (ref 610–1616)
IGM SERPL-MCNC: 127 MG/DL (ref 35–242)
KAPPA LC UR-MCNC: 12.7 MG/DL (ref 0.33–1.94)
KAPPA LC/LAMBDA SER: 1.87 {RATIO} (ref 0.26–1.65)
LAMBDA LC SERPL-MCNC: 6.79 MG/DL (ref 0.57–2.63)
PLATELET # BLD AUTO: 168 10E9/L (ref 150–450)
PROT PATTERN SERPL IFE-IMP: NORMAL
PROT SERPL-MCNC: 5.7 G/DL (ref 6.8–8.8)
SPECIMEN SOURCE: NORMAL

## 2020-07-27 PROCEDURE — 25000128 H RX IP 250 OP 636: Performed by: STUDENT IN AN ORGANIZED HEALTH CARE EDUCATION/TRAINING PROGRAM

## 2020-07-27 PROCEDURE — 27211039 ZZH NEEDLE CR2

## 2020-07-27 PROCEDURE — 97535 SELF CARE MNGMENT TRAINING: CPT | Mod: GO | Performed by: OCCUPATIONAL THERAPIST

## 2020-07-27 PROCEDURE — 97530 THERAPEUTIC ACTIVITIES: CPT | Mod: GO | Performed by: OCCUPATIONAL THERAPIST

## 2020-07-27 PROCEDURE — 25000132 ZZH RX MED GY IP 250 OP 250 PS 637: Performed by: STUDENT IN AN ORGANIZED HEALTH CARE EDUCATION/TRAINING PROGRAM

## 2020-07-27 PROCEDURE — 27210732 ZZH ACCESSORY CR1

## 2020-07-27 PROCEDURE — 80076 HEPATIC FUNCTION PANEL: CPT | Performed by: PSYCHIATRY & NEUROLOGY

## 2020-07-27 PROCEDURE — 0W9G3ZZ DRAINAGE OF PERITONEAL CAVITY, PERCUTANEOUS APPROACH: ICD-10-PCS | Performed by: PHYSICIAN ASSISTANT

## 2020-07-27 PROCEDURE — 85049 AUTOMATED PLATELET COUNT: CPT | Performed by: PSYCHIATRY & NEUROLOGY

## 2020-07-27 PROCEDURE — 12000001 ZZH R&B MED SURG/OB UMMC

## 2020-07-27 PROCEDURE — 36415 COLL VENOUS BLD VENIPUNCTURE: CPT | Performed by: PSYCHIATRY & NEUROLOGY

## 2020-07-27 PROCEDURE — 49083 ABD PARACENTESIS W/IMAGING: CPT

## 2020-07-27 PROCEDURE — 25000125 ZZHC RX 250: Performed by: PHYSICIAN ASSISTANT

## 2020-07-27 PROCEDURE — 82565 ASSAY OF CREATININE: CPT | Performed by: PSYCHIATRY & NEUROLOGY

## 2020-07-27 PROCEDURE — 25000132 ZZH RX MED GY IP 250 OP 250 PS 637: Performed by: PSYCHIATRY & NEUROLOGY

## 2020-07-27 RX ORDER — ALBUMIN (HUMAN) 12.5 G/50ML
12.5-5 SOLUTION INTRAVENOUS CONTINUOUS PRN
Status: CANCELLED | OUTPATIENT
Start: 2020-07-27

## 2020-07-27 RX ORDER — LIDOCAINE HYDROCHLORIDE 10 MG/ML
1-30 INJECTION, SOLUTION EPIDURAL; INFILTRATION; INTRACAUDAL; PERINEURAL
Status: COMPLETED | OUTPATIENT
Start: 2020-07-27 | End: 2020-07-27

## 2020-07-27 RX ORDER — LORAZEPAM 2 MG/ML
1 INJECTION INTRAMUSCULAR
Status: COMPLETED | OUTPATIENT
Start: 2020-07-27 | End: 2020-07-27

## 2020-07-27 RX ORDER — DEXTROSE MONOHYDRATE 25 G/50ML
25-50 INJECTION, SOLUTION INTRAVENOUS
Status: CANCELLED | OUTPATIENT
Start: 2020-07-27

## 2020-07-27 RX ORDER — NICOTINE POLACRILEX 4 MG
15-30 LOZENGE BUCCAL
Status: CANCELLED | OUTPATIENT
Start: 2020-07-27

## 2020-07-27 RX ORDER — ACETAMINOPHEN 325 MG/1
325 TABLET ORAL EVERY 4 HOURS PRN
Status: DISCONTINUED | OUTPATIENT
Start: 2020-07-27 | End: 2020-08-04 | Stop reason: HOSPADM

## 2020-07-27 RX ORDER — LORAZEPAM 2 MG/ML
2 INJECTION INTRAMUSCULAR
Status: DISCONTINUED | OUTPATIENT
Start: 2020-07-27 | End: 2020-07-27

## 2020-07-27 RX ORDER — LORAZEPAM 1 MG/1
1 TABLET ORAL ONCE
Status: DISCONTINUED | OUTPATIENT
Start: 2020-07-27 | End: 2020-07-28

## 2020-07-27 RX ADMIN — ENOXAPARIN SODIUM 40 MG: 40 INJECTION SUBCUTANEOUS at 17:58

## 2020-07-27 RX ADMIN — MENTHOL 1 PATCH: 205.5 PATCH TOPICAL at 17:58

## 2020-07-27 RX ADMIN — LORAZEPAM 1 MG: 2 INJECTION INTRAMUSCULAR; INTRAVENOUS at 20:44

## 2020-07-27 RX ADMIN — CARVEDILOL 25 MG: 25 TABLET, FILM COATED ORAL at 17:58

## 2020-07-27 RX ADMIN — CHLORTHALIDONE 25 MG: 25 TABLET ORAL at 08:24

## 2020-07-27 RX ADMIN — ENTECAVIR 0.5 MG: 0.5 TABLET ORAL at 08:24

## 2020-07-27 RX ADMIN — POLYETHYLENE GLYCOL 3350 17 G: 17 POWDER, FOR SOLUTION ORAL at 14:59

## 2020-07-27 RX ADMIN — ATORVASTATIN CALCIUM 40 MG: 40 TABLET, FILM COATED ORAL at 20:30

## 2020-07-27 RX ADMIN — AMLODIPINE BESYLATE 10 MG: 10 TABLET ORAL at 08:24

## 2020-07-27 RX ADMIN — CARVEDILOL 25 MG: 25 TABLET, FILM COATED ORAL at 08:24

## 2020-07-27 RX ADMIN — LIDOCAINE HYDROCHLORIDE 6 ML: 10 INJECTION, SOLUTION EPIDURAL; INFILTRATION; INTRACAUDAL; PERINEURAL at 14:03

## 2020-07-27 RX ADMIN — ASPIRIN 81 MG CHEWABLE TABLET 81 MG: 81 TABLET CHEWABLE at 08:24

## 2020-07-27 ASSESSMENT — ACTIVITIES OF DAILY LIVING (ADL)
ADLS_ACUITY_SCORE: 22
ADLS_ACUITY_SCORE: 22
ADLS_ACUITY_SCORE: 20
ADLS_ACUITY_SCORE: 22

## 2020-07-27 ASSESSMENT — MIFFLIN-ST. JEOR: SCORE: 1361.49

## 2020-07-27 NOTE — PLAN OF CARE
OT/6A:  Discharge Planner OT   Patient plan for discharge: Not discussed with patient today  Current status: Pt completed supine > EOB with mod A x2 and sat EOB ~7 mins with min-mod A to maintain upright posture. Required max x 2 for STS x2, standing for ~10 seconds each time. Dependent transfer bed > chair using ceiling lift. Engaged in self-feeding using built-up spoon in R hand and [facility] assistance to bring to mouth. Pt dependent for self-feeding with RUE, however did note pt engagement through trace muscle activation during task.  Barriers to return to prior living situation: Medical status, R sided weakness, level of assist, communication, command following/cognition  Recommendations for discharge: TCU  Rationale for recommendations: Pt would benefit from continued skilled therapies to address deficits to facilitate independence with ADLs/IADLs and safety.       Entered by: Emerita Melton 07/27/2020 4:15 PM

## 2020-07-27 NOTE — PROCEDURES
[facility], [place]    Procedure: IR Procedure Note    Date/Time: 7/27/2020 2:17 PM  Performed by: Galindo Madrid PA-C  Authorized by: Galindo Madrid PA-C   IR Fellow Physician:  Other(s) attending procedure: Assist; Nhi AGUILERA    UNIVERSAL PROTOCOL   Site Marked: NA  Prior Images Obtained and Reviewed:  Yes  Required items: Required blood products, implants, devices and special equipment available    Patient identity confirmed:  Verbally with patient, arm band, provided demographic data and hospital-assigned identification number  Patient was reevaluated immediately before administering moderate or deep sedation or anesthesia  Confirmation Checklist:  Patient's identity using two indicators, relevant allergies, procedure was appropriate and matched the consent or emergent situation and correct equipment/implants were available  Time out: Immediately prior to the procedure a time out was called    Universal Protocol: the Joint Commission Universal Protocol was followed    Preparation: Patient was prepped and draped in usual sterile fashion    ESBL (mL):  1         ANESTHESIA    Anesthesia: Local infiltration  Local Anesthetic:  Lidocaine 1% without epinephrine  Anesthetic Total (mL):  5      SEDATION    Patient Sedated: No    See dictated procedure note for full details.  Findings: Ultrasound guided, therapeutic paracentesis performed at right lower quadrant. Return of fluid was clear serous fluid.     Specimens: none    Complications: None    Condition: Stable    Plan: Follow up per primary team.    PROCEDURE   Patient Tolerance:  Patient tolerated the procedure well with no immediate complications    Length of time physician/provider present for 1:1 monitoring during sedation: 0

## 2020-07-27 NOTE — PLAN OF CARE
Status: Patient admitted for stroke work up following fall. Concerns for PRES and vasoconstriction infarcts after hypertensive emergency. Barbadian speaking.   Vitals: VSS, HTN within parameters. On CCM, sinus kun.   Neuros: Unable to fully assess. Does not respond to questions even with Trinidadian  on iPad. Can repeat actions. Left side=4/5, right side=2/5.   IV: PIV SL.  Resp/trach: No issues.  Diet: Full liquid nectar thick diet.  Bowel status: No BM this shift.   : Hawkins in place for retention, adequate UOP.   Skin: Intact.  Pain: No nonverbal signs of pain.   Activity: Up with 2 and lift. Turn q2h.  Social: No visitors or phone calls this shift.  Plan: Plan for repeat MRI and paracentesis in IR today. Continue to monitor.

## 2020-07-27 NOTE — PLAN OF CARE
Status: Admitted for stroke work-up after fall. Concerns for PRES and vasoconstriction infarcts after hypertensive emergency. Luxembourger speaking.   Vitals: VSS on RA. HTN but within parameters.  Neuros: Alert, can state name but does not answer other orientation questions. Mumbles and whispers, hard to understand. RUE 2/5, RLE, 1/5, LUE 5/5, LLE 3/5.   IV: 2 PIV saline locked.   Resp/trach: Lung sounds are coarse. Infrequent, productive cough.  Diet: Full liquid diet, nectar thick. Assist with feeding.  Bowel status: BS+, Small formed BM this shift. Stomach firm and distended. MD aware. possible paracentesis tomorrow in IR.   : Hawkins catheter in place, low UOP, MD aware.  Skin: Scarring throughout.  Pain: Right shoulder pain partially controlled with ice packs and icey hot patch.    Activity: A2 w/ lift. Q2hr repo.  Social: Both sister and niece have been allowed by security to visit. Uncle cannot receive updates. Only 3 family members listed on sticky note in chart can receive updates per family.   Plan: Continue to monitor and follow POC. Repeat MRI 7/26. Pt didn't tolerate. MD aware.

## 2020-07-27 NOTE — PROGRESS NOTES
Patient Name: Wilfredo Yoon  Medical Record Number: [id number]  Today's Date: 7/27/2020    Procedure: Paracentesis  Proceduralist: Filipe Madrid PA-C.    Patient in room: 1330  Procedure Start: 1400  Procedure end: 1435  Patient out of room: 1438    Report given to: 6.A. R.N.    Other Notes: Pt arrived to IR room 7 from 6211-02. Consent reviewed. Pt denies any questions or concerns regarding procedure. Pt positioned supine and monitored per protocol. Pt tolerated procedure without any noted complications. Pt transferred back to 6.A    3300cc removed.

## 2020-07-27 NOTE — PROGRESS NOTES
"CLINICAL NUTRITION SERVICES - REASSESSMENT NOTE   Nutrition Prescription    Malnutrition Status:    Severe malnutrition in the context of acute illness.    Recommendations already ordered by Registered Dietitian (RD):  1. Ordered \"weigh patient\" d/t no wt since admit  2. Kcal cts 7/28-7/30  3. Supplements: Boost Plus TID to trial    Future/Additional Recommendations:  1. Monitor kcal cts. Recommend pt meets on average at least 75% minimum assessed needs (~1200 kcal and 60 g protein daily) to avoid add'l nutrition interventions such as add'l scheduled ONS/snacks vs FT placement and EN support.    2. If EN becomes nutrition POC, recommend:  Impact Peptide @ goal 45 ml/hr (1080 ml/day) to provide 1620 kcals (31 kcal/kg/day), 102 g PRO (1.9 g/kg/day), 832 ml free H2O, 69 g Fat (50% from MCTs), 151 g CHO and no Fiber daily.  - Initiate @ 15 mL/hr and advance by 10 mL q8hr as tolerated  - Do not start or advance unless lytes (Mg++/K+) >/= WNL and phos>1.9   - Weekly CRP inflammation lab with immunomodulating formula   - 30 mL q4hr fluid flushes for tube patency. Additional fluids and/or adjustments per MD.    - Multivitamin/mineral (15 mL/day via FT) to help ensure micronutrient needs being met with suspected hypermetabolic demands and potential interruptions to TF infusions.     3. Once ready for discharge, recommend transition to standard EN formula:  Isosource 1.5 @ goal 50 ml/hr (1200 ml/day) to provide 1800 kcals (34 kcal/kg/day), 82 g PRO (1.5 g/kg/day), 912 ml free H2O, 211 g CHO and 18 g Fiber daily.     EVALUATION OF THE PROGRESS TOWARD GOALS   Diet: Full Liquid and Nectar Thickened Liquids + pt not appropriate for room service    Oral Intake: Eating typically % meals with variable appetite since 7/22 per RN flowsheets.  -Per [employer] meal ordering system: 6-day average (assuming 100% meals consumed): 636 kcal (12 kcal/kg, or 40% minimum assessed needs) and 18 g protein (0.3 g pro/kg, or 23% minimum " assessed needs) daily.    NEW FINDINGS     -Wt trends: 53.3 kg on 7/19 --> 60.4 kg on 7/27. +16 lbs over past week - suspect related to fluid/differences in scales. DW of 53 kg remains appropriate.      -Labs: Reviewed (from 7/26 unless otherwise noted), notable for: Na+ 139 (WNL), K+ 3.8 (WNL), BUN 27 (WNL), Cr 1.23 (WNL on 7/27)    -GI: LBM today per RN flowsheets    -Neuro: Infratentorial PRES, Encephalopathy, Multifocal subcortical infarcts. Unintelligible to/not responding to iPAD  per RN chart notes.    -Skin: Intact per RN chart note today    -Meds & Vitamin/Mineral Supplementation: Reviewed    -Diet/SLP: SLP following. Per VFSS on 7/21, Recommend the patient initiate a nectar thick consistency full liquid diet with close supervision. Recommend 1 small bite/sip at a time, a slow pace of feeding, and a dry swallow between bites/sips to ensure pharyngeal residue has cleared.    MALNUTRITION**Nutrition focused physical exam available per MD request. --> Unable to obtain in-person nutrition history or nutrition focused physical assessment (NFPA) from patient as the number of staff going into rooms is restricted to limit exposure and to minimize use of PPE.  % Intake: </= 50% for >/= 5 days (severe)  % Weight Loss: Up to 5% in 1 month (non-severe) - from PTA  Subcutaneous Fat Loss: Unable to assess  Muscle Loss: Unable to assess  Fluid Accumulation/Edema: Does not meet criteria (trace bipedal edema per RN flowsheets)  Malnutrition Diagnosis: Severe malnutrition in the context of acute illness.    Previous Goals   Diet adv v nutrition support within 24-48 hours.  Evaluation: Met - diet advanced 7/21    Previous Nutrition Diagnosis  Inadequate oral intake related to suspect poor PO PTA and current NPO in setting of dysphagia as evidenced by SLP recommendation for NPO until VFSS on 7/21, pt wt loss of ~5% wt loss over past month, positive urine ketones, and 83% IBW.   Evaluation: Improving, dx updated  below    CURRENT NUTRITION DIAGNOSIS  Inadequate protein-energy intake related to limited diet 2/2 dysphagia as evidenced by VFSS and SLP recommending nectar-thick full liquid diet and 6-day average based on [facility] meal ordering showing 636 kcal (12 kcal/kg, or 40% minimum assessed needs) and 18 g protein (0.3 g pro/kg, or 23% minimum assessed needs) daily (assuming 100% meals consumed).      INTERVENTIONS  Implementation  -Enteral Nutrition - Recs  -Medical food supplement therapy    Goals  Total avg nutritional intake to meet a minimum of 30 kcal/kg and 1.5 g PRO/kg daily (per dosing wt 53 kg).    Monitoring/Evaluation  Progress toward goals will be monitored and evaluated per protocol.     Estelle Miguel RD, LD  Pager: [phone]

## 2020-07-27 NOTE — PLAN OF CARE
Status: Admitted for stroke work up s/p fall. Concerns for PRES and vasoconstriction infarcts after hypertensive emergency. Polish speaking.   VS: VSS ex bradycardic. Cardiac monitoring sinus kun. HTN resolved w/AM oral meds  Neuros: Unable to assess orientation, pt mumbles in response to . R side 2/5, L side 4/5. Mimics some other actions  GI: Tolerating fulls w/nectar thick liquids. 1:1 feeder. Fair intake. Small BM this shift, miralax given  : Hawkins intact w/adequate output  IV: PIV SL  Activity: Ax2 w/lift. Up in chair x2. Turn/repo Q2hrs in bed  Pain: No nonverbal indicators of pain.   Skin: WNL  Labs/Tests: Paracentesis completed in IR. MRI to be completed this evening  Social: Sister and niece updated  Plan of care: MRI this evening w/IV ativan. No discharge plan at this time. Encourage PO    Contacted MRI multiple times throughout day, always had other stat scans before pt. Update at 1850 was that pt would be coming down in a few hours

## 2020-07-27 NOTE — PROGRESS NOTES
[facility]  General Neurology Progress Note    Patient Name:  Wilfredo Yoon  MRN:  [id number]    :  1960  Date of Service: 20    Patient Summary:   Mr. Yoon is a 60 year old male with a PMHx of HTN, chronic Hepatitis B c/b liver cirrhosis and ascites, and CKD who presented on 20 with fall and AMS, found to have PRES and probably hypertension-induced infarcts after an episode of hypertensive emergency.    Interval history:   No acute events overnight. Patient denies pain in rt shoulder or anywhere else (belly does seem tender and full). Patient evaluated today with help of iPad  in [facility].     Physical Examination (ipad )  Vitals Overnight: BP 130s-170s/90s-100s, HR 60s, RR 16-20.   Physical Exam:  Constitutional: Alert. Laying in bed. Appears comfortable.    Head: Atraumatic, normocephalic.  Cardiovascular: Warm extremities. RRR, no murmur appreciated. No leg edema.   Respiratory: diminished breath sounds anteriorly bilaterally. No wheezing or respiratory muscle use.     Neurologic:   Mental Status: Alert, awake. Follows commands to the best of his ability (sticks tongue out, thumbs up left hand, wiggles toes on left foot, turns head from side to side). Speech is dysarthric and unintelligible to iPAD .   Cranial Nerves: Looking in all directions, eyes move conjugately. Smile and eyebrow raise equal, blinking symmetric, mild right nasolabial flattening. Hearing intact to conversation.  Can turn head from side to side.   Motor: R hemiparesis-RUE and RLE cannot lift antigravity but some movement in plane. Very weak right hand . LUE moving spontaneously, antigravity.  Able to hold left upper extremity antigravity without drift, able to lift LLE off bed antigravity and hold in air for few seconds with drift.   Sensory: Groaned to noxious stimuli x 4 extremities   Gait: not tested    Investigations   Labs :  Platelets 168, Tbili 0.6, Albumin 1.5, Total protein 5.7, alk phosph 151, Cr 1.23  Plan for MR Brain today     Assessment and Plan:  Mr. Yoon is a 60 year old man with a PMH of chronic Hep B with cirrhosis and ascites, HTN with hx hypertensive emergency, tobacco use, HLD, who presented to the ED on 7/16/20 with AMS and concern for stroke. Neurology initially saw patient as a stroke code. MR brain showed large brainstem hyperintensity w/extension into cerebellum on FLAIR sequence without DWI/ADC correlate. It also showed moderate-severe leukoaraiosis plus several areas of restricted diffusion including at left corona radiata and right basal ganglia, most likely consistent with infarcts. DDx of brainstem hyperintensities is broad and includes infectious, inflammatory, vasculitic, demyelinating, toxic/metabolic (eg CPM), and neoplastic pathologies. The following CSF labs were negative: NMO/AQP4, MOG, WBC 2, RBC 0, negative gram stain/culture, protein 30, HSV 1 and 2, Lyme IGG/IGM, oligoclonal banding, RAJAN virus, leukemia/lymphoma evaluation, ACE, enterovirus, VZV, VDRL, TB PCR, CMV (note suboptimal sample), cytology. The following serum labs were unremarkable: TSH, ANCA, FARIDA, HIV, ganglioside antibodies, myeloperoxidase. RF, ESR, CRP elevated (chronic). Our team feels the most likely explanation for the posterior T2 hyperintensities is brainstem variant of PRES syndrome. The multifocal infarcts noted in the supratentorial compartment may be due to concomitant small vessel infarcts secondary to hypertensive urgency. Of note he has membranous nephropathy and other differential includes vasculitis.     #Extensive posterior T2 hyperintensities, suspect infratentorial PRES  #Encephalopathy, improving  - Continue supportive cares   - Blood pressure management as below in HTN section  - Neurochecks Q4 hours  - Repeat MRI on 7/27 w/1mg Ativan for mild sedation prior to scan   - F/U EBV, cryoglobulin, kapa/lambda light chain,  protein electrophoresis, protein immunofixation  - F/U brain MR scheduled for 7/27 PM     #Multifocal subcortical infarcts  - Aspirin 81mg PO daily   - Atorvastatin 40mg daily   - Telemetry  - PT/OT/SLP -- will likely discharge to TCU   - Stroke Education completed  - PMR consulted  - Consideration of re-consulting Neuro IR for DSA if he develops further ischemic infarcts    #MPGN  #Oliguria  #HTN  #CKD 3 with worsening of   Hx renal 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   Consulted nephrology 7/22, appreciate recs.  - Stopped Bumex  - Started Chlorthalidone 25 mg daily  - Strict Is and Os  - Continue amlodipine 10 mg PO daily  - Continue Coreg 25mg PO BID  - Hold PTA Lisinopril 40 mg PO daily due to ANDRY (per renal: If Cr remains stable for the next 1 week, restart Lisinopril at 10 mg daily and titrate dose up if needed to maintain BP ~ 130/80. Titrate dose up to 40 mg daily which was his PTA dose)  - Hydralazine 10mg PO prn for SBP>165  - Follow with Nephrology clinic at discharge (Dr Fuentes) in 2-4 weeks    #Cirrhosis 2/2 Hepatitis B  #Ascites  Increasing abdominal distention, and patient missed his last outpatient paracentesis date which would have been 7/23.   - Plan for IR paracentesis today (IR aware)  - Continue entecavir 0.5 mg daily    #Positive Quant Gold Test   Likely represents latent TB infection. CSF TB PCR negative. No chronic pulmonary symptoms/persistent cough, stable weight. Low suspicion for acute pulmonary TB.   - ID Referral at discharge     #Malnutrition:  Moderate, based on decreased PO intake and dysphagia.      On full liquid diet, but unclear if taking adequate PO.    Will do calorie counts and consult nutrition.        #Hyperlipidemia   on admission   - Atorvastatin as above     #Constipation   Last BM charted as 7/24. Will give pt Miralax today.     Diet: Full liquid,  nectar thick (patient needs supervision to eat)  DVT prophylaxis: SCDs, enoxaparin subcutaneous   Lines: PIV, vazquez  Code status: Full code    Disposition Plan   Expected discharge in 2-3 days to transitional care unit once medical work up/treatment is complete.     Entered: Yuval Sandy 07/27/2020, 7:37 AM       Patient was seen and discussed with Dr. Carvalho.     Yuval Sandy MD  PGY-2 Neurology Resident  P: [phone]      I saw and evaluated the patient on 7/27/20 and agree with the findings and the plan of care as documented in the resident's note.       Complex case.  60-year-old man who presented with severe hypertension and abdominal pain subsequently developed encephalopathy.  On original consult left-sided weakness was appreciated and MRI showed T2 hyperintensities throughout the brainstem and scattered infarcts in the deep white matter.  Over course of admission he has developed a dense hemiparesis on the right side.  Lesions do not enhance and it is not clear that he is worsening but also has not had significant improvement in both his hemiparesis, and dysarthria.     Today he is following commands in all extremities, and is able to say his name but  states that she cannot identify most of what he is saying.     We will need repeat brain MRI today to evaluate changes over the past week.  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  This may be complicated by reversible cerebral vasoconstriction syndrome which would  be source of infarcts.  If this is the case, brain MRI should be showing improvement.  If brain MRI worse or if he is developing new lesions I do think he is going to need vasculitis work-up including angiogram or possibly even a brain biopsy.  Admittedly on most recent MRI there is no clear safe, high yield site to biopsy.     Geo Carvalho DO   of Neurology

## 2020-07-28 ENCOUNTER — APPOINTMENT (OUTPATIENT)
Dept: OCCUPATIONAL THERAPY | Facility: CLINIC | Age: 60
End: 2020-07-28
Payer: COMMERCIAL

## 2020-07-28 ENCOUNTER — APPOINTMENT (OUTPATIENT)
Dept: SPEECH THERAPY | Facility: CLINIC | Age: 60
End: 2020-07-28
Payer: COMMERCIAL

## 2020-07-28 ENCOUNTER — APPOINTMENT (OUTPATIENT)
Dept: MRI IMAGING | Facility: CLINIC | Age: 60
End: 2020-07-28
Attending: STUDENT IN AN ORGANIZED HEALTH CARE EDUCATION/TRAINING PROGRAM
Payer: COMMERCIAL

## 2020-07-28 ENCOUNTER — APPOINTMENT (OUTPATIENT)
Dept: PHYSICAL THERAPY | Facility: CLINIC | Age: 60
End: 2020-07-28
Payer: COMMERCIAL

## 2020-07-28 LAB
ALBUMIN SERPL ELPH-MCNC: 2 G/DL (ref 3.7–5.1)
ALBUMIN SERPL ELPH-MCNC: POSITIVE G/DL
ALBUMIN SERPL-MCNC: 1.4 G/DL (ref 3.4–5)
ALP SERPL-CCNC: 154 U/L (ref 40–150)
ALPHA1 GLOB SERPL ELPH-MCNC: 0.4 G/DL (ref 0.2–0.4)
ALPHA2 GLOB SERPL ELPH-MCNC: 1 G/DL (ref 0.5–0.9)
ALT SERPL W P-5'-P-CCNC: 19 U/L (ref 0–70)
ANION GAP SERPL CALCULATED.3IONS-SCNC: 3 MMOL/L (ref 3–14)
AST SERPL W P-5'-P-CCNC: 28 U/L (ref 0–45)
B-GLOBULIN SERPL ELPH-MCNC: 0.7 G/DL (ref 0.6–1)
BILIRUB SERPL-MCNC: 0.3 MG/DL (ref 0.2–1.3)
BUN SERPL-MCNC: 22 MG/DL (ref 7–30)
CALCIUM SERPL-MCNC: 8.3 MG/DL (ref 8.5–10.1)
CHLORIDE SERPL-SCNC: 106 MMOL/L (ref 94–109)
CO2 SERPL-SCNC: 28 MMOL/L (ref 20–32)
CREAT SERPL-MCNC: 1.17 MG/DL (ref 0.66–1.25)
CRYOGLOB IGA & IGG & IGM SER-IMP: ABNORMAL
CRYOGLOB TYP SER IFE: ABNORMAL
CRYOGLOB TYP SER IFE: POSITIVE
ERYTHROCYTE [DISTWIDTH] IN BLOOD BY AUTOMATED COUNT: 12.4 % (ref 10–15)
GAMMA GLOB SERPL ELPH-MCNC: 1.1 G/DL (ref 0.7–1.6)
GFR SERPL CREATININE-BSD FRML MDRD: 68 ML/MIN/{1.73_M2}
GLUCOSE BLDC GLUCOMTR-MCNC: 82 MG/DL (ref 70–99)
GLUCOSE SERPL-MCNC: 93 MG/DL (ref 70–99)
HCT VFR BLD AUTO: 33.4 % (ref 40–53)
HGB BLD-MCNC: 11.1 G/DL (ref 13.3–17.7)
M PROTEIN SERPL ELPH-MCNC: 0 G/DL
MCH RBC QN AUTO: 32.5 PG (ref 26.5–33)
MCHC RBC AUTO-ENTMCNC: 33.2 G/DL (ref 31.5–36.5)
MCV RBC AUTO: 98 FL (ref 78–100)
PLATELET # BLD AUTO: 157 10E9/L (ref 150–450)
POTASSIUM SERPL-SCNC: 3.8 MMOL/L (ref 3.4–5.3)
PROT PATTERN SERPL ELPH-IMP: ABNORMAL
PROT SERPL-MCNC: 5.5 G/DL (ref 6.8–8.8)
RADIOLOGIST FLAGS: ABNORMAL
RBC # BLD AUTO: 3.42 10E12/L (ref 4.4–5.9)
SODIUM SERPL-SCNC: 137 MMOL/L (ref 133–144)
WBC # BLD AUTO: 6.7 10E9/L (ref 4–11)

## 2020-07-28 PROCEDURE — 25000132 ZZH RX MED GY IP 250 OP 250 PS 637: Performed by: STUDENT IN AN ORGANIZED HEALTH CARE EDUCATION/TRAINING PROGRAM

## 2020-07-28 PROCEDURE — 97110 THERAPEUTIC EXERCISES: CPT | Mod: GO | Performed by: OCCUPATIONAL THERAPIST

## 2020-07-28 PROCEDURE — 97530 THERAPEUTIC ACTIVITIES: CPT | Mod: GP | Performed by: PHYSICAL THERAPIST

## 2020-07-28 PROCEDURE — 25000128 H RX IP 250 OP 636: Performed by: STUDENT IN AN ORGANIZED HEALTH CARE EDUCATION/TRAINING PROGRAM

## 2020-07-28 PROCEDURE — 25000132 ZZH RX MED GY IP 250 OP 250 PS 637: Performed by: PSYCHIATRY & NEUROLOGY

## 2020-07-28 PROCEDURE — 25800030 ZZH RX IP 258 OP 636: Performed by: STUDENT IN AN ORGANIZED HEALTH CARE EDUCATION/TRAINING PROGRAM

## 2020-07-28 PROCEDURE — 00000146 ZZHCL STATISTIC GLUCOSE BY METER IP

## 2020-07-28 PROCEDURE — 97112 NEUROMUSCULAR REEDUCATION: CPT | Mod: GP | Performed by: PHYSICAL THERAPIST

## 2020-07-28 PROCEDURE — 80053 COMPREHEN METABOLIC PANEL: CPT | Performed by: STUDENT IN AN ORGANIZED HEALTH CARE EDUCATION/TRAINING PROGRAM

## 2020-07-28 PROCEDURE — 70553 MRI BRAIN STEM W/O & W/DYE: CPT

## 2020-07-28 PROCEDURE — 92526 ORAL FUNCTION THERAPY: CPT | Mod: GN

## 2020-07-28 PROCEDURE — A9585 GADOBUTROL INJECTION: HCPCS | Performed by: PSYCHIATRY & NEUROLOGY

## 2020-07-28 PROCEDURE — 36415 COLL VENOUS BLD VENIPUNCTURE: CPT | Performed by: STUDENT IN AN ORGANIZED HEALTH CARE EDUCATION/TRAINING PROGRAM

## 2020-07-28 PROCEDURE — 85027 COMPLETE CBC AUTOMATED: CPT | Performed by: STUDENT IN AN ORGANIZED HEALTH CARE EDUCATION/TRAINING PROGRAM

## 2020-07-28 PROCEDURE — 25500064 ZZH RX 255 OP 636: Performed by: PSYCHIATRY & NEUROLOGY

## 2020-07-28 PROCEDURE — 12000001 ZZH R&B MED SURG/OB UMMC

## 2020-07-28 RX ORDER — LORAZEPAM 2 MG/ML
0.5 INJECTION INTRAMUSCULAR
Status: DISCONTINUED | OUTPATIENT
Start: 2020-07-28 | End: 2020-08-04 | Stop reason: HOSPADM

## 2020-07-28 RX ORDER — ENTECAVIR 1 MG/1
1 TABLET, FILM COATED ORAL DAILY
Status: DISCONTINUED | OUTPATIENT
Start: 2020-07-29 | End: 2020-08-04 | Stop reason: HOSPADM

## 2020-07-28 RX ORDER — LORAZEPAM 2 MG/ML
1 INJECTION INTRAMUSCULAR
Status: DISCONTINUED | OUTPATIENT
Start: 2020-07-28 | End: 2020-07-28

## 2020-07-28 RX ADMIN — SODIUM CHLORIDE, POTASSIUM CHLORIDE, SODIUM LACTATE AND CALCIUM CHLORIDE 1000 ML: 600; 310; 30; 20 INJECTION, SOLUTION INTRAVENOUS at 18:15

## 2020-07-28 RX ADMIN — CARVEDILOL 25 MG: 25 TABLET, FILM COATED ORAL at 18:22

## 2020-07-28 RX ADMIN — ENOXAPARIN SODIUM 40 MG: 40 INJECTION SUBCUTANEOUS at 18:23

## 2020-07-28 RX ADMIN — ACETAMINOPHEN 325 MG: 325 TABLET, FILM COATED ORAL at 16:41

## 2020-07-28 RX ADMIN — GADOBUTROL 5 ML: 604.72 INJECTION INTRAVENOUS at 11:42

## 2020-07-28 RX ADMIN — CHLORTHALIDONE 25 MG: 25 TABLET ORAL at 08:39

## 2020-07-28 RX ADMIN — CARVEDILOL 25 MG: 25 TABLET, FILM COATED ORAL at 08:39

## 2020-07-28 RX ADMIN — AMLODIPINE BESYLATE 10 MG: 10 TABLET ORAL at 08:40

## 2020-07-28 RX ADMIN — ENTECAVIR 0.5 MG: 0.5 TABLET ORAL at 08:40

## 2020-07-28 RX ADMIN — ATORVASTATIN CALCIUM 40 MG: 40 TABLET, FILM COATED ORAL at 19:31

## 2020-07-28 RX ADMIN — ASPIRIN 81 MG CHEWABLE TABLET 81 MG: 81 TABLET CHEWABLE at 08:39

## 2020-07-28 RX ADMIN — SODIUM CHLORIDE, POTASSIUM CHLORIDE, SODIUM LACTATE AND CALCIUM CHLORIDE 1000 ML: 600; 310; 30; 20 INJECTION, SOLUTION INTRAVENOUS at 21:50

## 2020-07-28 ASSESSMENT — ACTIVITIES OF DAILY LIVING (ADL)
ADLS_ACUITY_SCORE: 20
ADLS_ACUITY_SCORE: 18
ADLS_ACUITY_SCORE: 20
ADLS_ACUITY_SCORE: 18

## 2020-07-28 NOTE — PROGRESS NOTES
[facility]  General Neurology Progress Note    Patient Name:  Wilfredo Yoon  MRN:  [id number]    :  1960  Date of Service: 20    Patient Summary:   Mr. Yoon is a 60 year old male with a PMHx of HTN, chronic Hepatitis B c/b liver cirrhosis and ascites, and CKD who presented on 20 with fall and AMS, found to have PRES and probably hypertension-induced infarcts after an episode of hypertensive emergency.    Interval history:   No acute events overnight. Patient denies pain. He appears more sleepy this morning compared to yesterday and is intermittently following commands. Was supposed to get repeat MR brain yesterday and was given 1mg Ativan around 8:30PM in anticipation of this. However, patient was repeatedly rescheduled due emergent MRI's and ultimately patient's MR had to be delayed to today.  Patient evaluated today with help of iPad  in [facility].     Physical Examination (ipad )  Vitals Overnight: BP 140s-160s/90s, HR 70s, RR 16  Physical Exam:  Constitutional: Sleepy but easily arousable with voice and stimulation. Laying in bed. Appears comfortable.    Head: Atraumatic, normocephalic.  Cardiovascular: Warm extremities. RRR, no murmur appreciated. No leg edema.   Respiratory: diminished breath sounds anteriorly bilaterally. No wheezing or respiratory muscle use.     Neurologic:   Mental Status: Alert, awake. Follows commands to the best of his ability (sticks tongue out, thumbs up left hand, wiggles toes on left foot and with limited success on right). Speech is dysarthric and unintelligible to iPAD .   Cranial Nerves: Looking in all directions, eyes move conjugately. Smile and eyebrow raise equal, blinking symmetric, mild right nasolabial flattening. Hearing intact to conversation.  Can turn head from side to side.   Motor: R hemiparesis-RUE and RLE cannot lift antigravity but some movement in plane. Very weak  right hand . LUE moving spontaneously, antigravity.  Able to hold left upper extremity antigravity without drift, able to lift LLE off bed antigravity and hold in air for few seconds with drift. Dorsiflexion 3/5 in LLE   Sensory: Difficult to assess but appears intact to light touch x 4 extremities   Gait: not tested    Investigations   Labs 7/28 Pending  Plan for MR Brain today     Assessment and Plan:  Mr. Yoon is a 60 year old man with a PMH of chronic Hep B with cirrhosis and ascites, HTN with hx hypertensive emergency, tobacco use, HLD, who presented to the ED on 7/16/20 with AMS and concern for stroke. Neurology initially saw patient as a stroke code. MR brain showed large brainstem hyperintensity w/extension into cerebellum on FLAIR sequence without DWI/ADC correlate. It also showed moderate-severe leukoaraiosis plus several areas of restricted diffusion including at left corona radiata and right basal ganglia, most likely consistent with infarcts. DDx of brainstem hyperintensities is broad and includes infectious, inflammatory, vasculitic, demyelinating, toxic/metabolic (eg CPM), and neoplastic pathologies. The following CSF labs were negative: NMO/AQP4, MOG, WBC 2, RBC 0, negative gram stain/culture, protein 30, HSV 1 and 2, Lyme IGG/IGM, oligoclonal banding, RAJAN virus, leukemia/lymphoma evaluation, ACE, enterovirus, VZV, VDRL, TB PCR, CMV (note suboptimal sample), cytology. The following serum labs were unremarkable: TSH, ANCA, FARIDA, HIV, ganglioside antibodies, myeloperoxidase. RF, ESR, CRP elevated (chronic). Our team feels the most likely explanation for the posterior T2 hyperintensities is brainstem variant of PRES syndrome. The multifocal infarcts noted in the supratentorial compartment may be due to concomitant small vessel infarcts secondary to hypertensive urgency. Plan for re-evaluation of posterior fossa with repeat brain MR on 7/28.     #Extensive posterior T2 hyperintensities, suspect  infratentorial PRES  #Encephalopathy, improving  - Continue supportive cares   - Blood pressure management as below in HTN section  - Neurochecks Q4 hours  - Repeat MRI on 7/28 w/ Ativan for mild sedation prior to scan   - F/U EBV, cryoglobulin, kapa/lambda light chain, protein electrophoresis, protein immunofixation    #Multifocal subcortical infarcts  - Aspirin 81mg PO daily   - Atorvastatin 40mg daily   - Telemetry  - PT/OT/SLP -- will likely discharge to TCU   - Stroke Education completed  - PMR consulted  - Consideration of re-consulting Neuro IR for DSA if he develops further ischemic infarcts    #MPGN  #Oliguria  #HTN  #CKD 3 with worsening of   Hx renal 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   Consulted nephrology 7/22, appreciate recs.  - Started Chlorthalidone 25 mg daily  - Strict Is and Os  - Continue amlodipine 10 mg PO daily  - Continue Coreg 25mg PO BID  - Hold PTA Lisinopril 40 mg PO daily due to ANDRY (per renal: If Cr remains stable for the next 1 week, restart Lisinopril at 10 mg daily and titrate dose up if needed to maintain BP ~ 130/80. Titrate dose up to 40 mg daily which was his PTA dose)  - Hydralazine 10mg PO prn for SBP>165  - Follow with Nephrology clinic at discharge (Dr Fuentes) in 2-4 weeks    #Cirrhosis 2/2 Hepatitis B  #Ascites  S/P paracentesis of 3.3L on 7/28 with IR.   - Continue entecavir 0.5 mg daily    #Positive Quant Gold Test   Likely represents latent TB infection. CSF TB PCR negative. No chronic pulmonary symptoms/persistent cough, stable weight. Low suspicion for acute pulmonary TB.   - ID Referral at discharge     #Malnutrition:  Moderate, based on decreased PO intake and dysphagia.      On full liquid diet, but unclear if taking adequate PO.    Will do calorie counts and consult nutrition.      #Hyperlipidemia   on admission   - Atorvastatin as above      #Constipation   Last BM charted as 7/24. Will give pt Miralax today.     Diet: Full liquid, nectar thick (patient needs supervision to eat)  DVT prophylaxis: SCDs, enoxaparin subcutaneous   Lines: PIV, vazquez  Code status: Full code    Disposition Plan   Expected discharge in 2 days to transitional care unit once medical work up/treatment is complete.     Entered: Yuval Sandy 07/28/2020, 7:15 AM       Patient was seen and discussed with Dr. Carvalho.     Yuval Sandy MD  PGY-2 Neurology Resident  P: [phone]      I saw and evaluated the patient on 7/28/20 and agree with the findings and the plan of care as documented in the resident's note.      Patient's exam is stable.  MRI shows improved signal change in the brainstem.  Appears to be evolution of old infarcts but no new infarcts.  This would be consistent with presumed diagnosis of atypical posterior reversible encephalopathy syndrome.  We will continue to work on hypertension and nutrition.  He is borderline with regard to p.o. intake.  Will work with social work on pursuing TCU discharge there is no further neurologic work-up planned at this time.  He will need follow-up MRI to confirm resolution of changes.    Geo Carvalho DO   of Neurology

## 2020-07-28 NOTE — PROVIDER NOTIFICATION
Output via vazquez 100ml since 6am. Paged provider.    Received order for 1L LR bolus, infusing over 4 hours

## 2020-07-28 NOTE — PLAN OF CARE
Status: Pt on 6A for stroke work up s/p fall, concern for PRES and vasoconstriction infarcts after HTN emergency  Vitals: VSS on RA, HTN w/in nakita  Neuros: Difficult to assess, pt more lethargic, interpretor is unable to understand the pt, L side strengths 4/5, R side strengths 2/5,   IV: PIVs are SL  Resp/trach: Lung sounds are clear  Diet: Full liquid, nectar thick  Bowel status: Bowel sounds are active, no BM this shift  : Hawkins in place, adequate output  Skin: Intact  Pain: No signs of pain present  Activity: Assist x2 w/ lift  Social: One visitor this shift and has been supportive in his cares, other family was updated by RN via the phone  Plan: Will continue to monitor and follow POC, further plan is pending MRI results

## 2020-07-28 NOTE — PLAN OF CARE
PT 5C  Discharge Planner PT   Patient plan for discharge: pt was unable to verbalize/respond  Current status:  utilizedvia IPad. Per sister who was concerned about pt, pt today has been more lethargic than other dates, to the point he is not opening his mouth to eat when assisted. She noted he has barely opened his eyes today. During PT visit presentation was more similar to back on 7/22, pt was not able to participate as much as recent visits. Standing deferred due to this. Pt required MODA-MAXA sitting at edge of bed, briefly participated in forward reaching with L UE (unable with R UE) and lateral leans, was prone to retro and R lateral lean. Pt briefly able to kick with L LE, unable with R LE. Pt required MAXA-total assist supine>sit, total assist sit>supine and boosting.    Barriers to return to prior living situation: Medical condition, assist with transfers, right sided weakness, inability to ambulate presently  Recommendations for discharge: TCU  Rationale for recommendations: Continued rehab recommended for pt to progress with strengthening, transfer training, gait training, and functional endurance.       Entered by: Asya Camacho 07/28/2020 2:50 PM

## 2020-07-28 NOTE — PLAN OF CARE
Status: Admitted for stroke work up s/p fall. Concerns for PRES and vasoconstriction infarcts after hypertensive emergency.  Vitals: VSS, RA. HTN within parameters  Neuros: Orientation difficult to assess.  unable to understand patient.   IV: PIV x2 SL  Resp/trach: RA, O2 sats stable, LS clear  Diet: Full liquid/Nectar thick.  Bowel status: BS+, No BM overnihgt  : Hawkins in place - adequate UOP  Skin: WNL  Pain: No non-verbal indicators of pain.  Activity: Up w/2 and lift, Turn/repo. q2h  Plan: Needs MRI (pre-med w/ativan). Continue to monitor and follow POC. Discharge plan pending MRI results

## 2020-07-28 NOTE — PROGRESS NOTES
Brief Nephrology note:    Patient with ANDRY on CKD, likely 2/2 congestion v GERARDO, with h/o nephrotic range proteinuria 2/2 biopsy proven MPGN consistent with cryoglobulinemia in the setting of chronic hep B infection. Cr now stable and BP at goal.  - continue chlorthalidone, amlodipine and coreg  - If Cr remains stable for the next 1 week, could restart Lisinopril at 10 mg daily and titrate dose up as needed   - Follow-up with Nephrology (Dr Fuentes) after discharge in 2-4 weeks    Nephrology will sign off, please page with questions    Wilbur Mahoney MD  Renal fellow  [phone]

## 2020-07-28 NOTE — PLAN OF CARE
Discharge Planner SLP   Patient plan for discharge: Pt unable to state  Current status: Recommend continuation of full liquid (nectar-thick consistency) diet, no straws, with 1:1 supervision and feeding assist. Pt should perform additional dry swallow after each bite/sip.  Ensure pt is fully alert and upright for all PO, given small bites/sips, alternating bites/sips. Pt with polite refusal of functional communication/language tx despite maximal encouragement.  ST to continue to follow.  Pt will require VFSS prior to diet advancement.  Barriers to return to prior living situation: medical complexity, weakness, aphasia, dysphagia  Recommendations for discharge: TCU  Rationale for recommendations: Below baseline function; pt will benefit from ongoing ST targeting swallowing, language, functional communication       Entered by: Rosemary Ybarra 07/28/2020 9:50 AM

## 2020-07-29 ENCOUNTER — APPOINTMENT (OUTPATIENT)
Dept: SPEECH THERAPY | Facility: CLINIC | Age: 60
End: 2020-07-29
Payer: COMMERCIAL

## 2020-07-29 ENCOUNTER — APPOINTMENT (OUTPATIENT)
Dept: PHYSICAL THERAPY | Facility: CLINIC | Age: 60
End: 2020-07-29
Payer: COMMERCIAL

## 2020-07-29 ENCOUNTER — APPOINTMENT (OUTPATIENT)
Dept: OCCUPATIONAL THERAPY | Facility: CLINIC | Age: 60
End: 2020-07-29
Payer: COMMERCIAL

## 2020-07-29 LAB
ALBUMIN UR-MCNC: 100 MG/DL
ANION GAP SERPL CALCULATED.3IONS-SCNC: 5 MMOL/L (ref 3–14)
APPEARANCE UR: ABNORMAL
BACTERIA #/AREA URNS HPF: ABNORMAL /HPF
BILIRUB UR QL STRIP: NEGATIVE
BUN SERPL-MCNC: 22 MG/DL (ref 7–30)
CALCIUM SERPL-MCNC: 8.3 MG/DL (ref 8.5–10.1)
CHLORIDE SERPL-SCNC: 107 MMOL/L (ref 94–109)
CO2 SERPL-SCNC: 25 MMOL/L (ref 20–32)
COLOR UR AUTO: ABNORMAL
CREAT SERPL-MCNC: 1.16 MG/DL (ref 0.66–1.25)
GFR SERPL CREATININE-BSD FRML MDRD: 68 ML/MIN/{1.73_M2}
GLUCOSE SERPL-MCNC: 83 MG/DL (ref 70–99)
GLUCOSE UR STRIP-MCNC: NEGATIVE MG/DL
HGB UR QL STRIP: ABNORMAL
KETONES UR STRIP-MCNC: NEGATIVE MG/DL
LEUKOCYTE ESTERASE UR QL STRIP: ABNORMAL
NITRATE UR QL: NEGATIVE
PH UR STRIP: 7 PH (ref 5–7)
POTASSIUM SERPL-SCNC: 3.8 MMOL/L (ref 3.4–5.3)
RBC #/AREA URNS AUTO: 105 /HPF (ref 0–2)
SODIUM SERPL-SCNC: 137 MMOL/L (ref 133–144)
SOURCE: ABNORMAL
SP GR UR STRIP: 1.01 (ref 1–1.03)
UROBILINOGEN UR STRIP-MCNC: NORMAL MG/DL (ref 0–2)
WBC #/AREA URNS AUTO: >182 /HPF (ref 0–5)
WBC CLUMPS #/AREA URNS HPF: PRESENT /HPF
YEAST #/AREA URNS HPF: ABNORMAL /HPF

## 2020-07-29 PROCEDURE — 80048 BASIC METABOLIC PNL TOTAL CA: CPT | Performed by: STUDENT IN AN ORGANIZED HEALTH CARE EDUCATION/TRAINING PROGRAM

## 2020-07-29 PROCEDURE — 92526 ORAL FUNCTION THERAPY: CPT | Mod: GN

## 2020-07-29 PROCEDURE — 25000132 ZZH RX MED GY IP 250 OP 250 PS 637: Performed by: STUDENT IN AN ORGANIZED HEALTH CARE EDUCATION/TRAINING PROGRAM

## 2020-07-29 PROCEDURE — 81001 URINALYSIS AUTO W/SCOPE: CPT | Performed by: STUDENT IN AN ORGANIZED HEALTH CARE EDUCATION/TRAINING PROGRAM

## 2020-07-29 PROCEDURE — 36415 COLL VENOUS BLD VENIPUNCTURE: CPT | Performed by: STUDENT IN AN ORGANIZED HEALTH CARE EDUCATION/TRAINING PROGRAM

## 2020-07-29 PROCEDURE — 87088 URINE BACTERIA CULTURE: CPT | Performed by: STUDENT IN AN ORGANIZED HEALTH CARE EDUCATION/TRAINING PROGRAM

## 2020-07-29 PROCEDURE — 12000001 ZZH R&B MED SURG/OB UMMC

## 2020-07-29 PROCEDURE — 87186 SC STD MICRODIL/AGAR DIL: CPT | Performed by: STUDENT IN AN ORGANIZED HEALTH CARE EDUCATION/TRAINING PROGRAM

## 2020-07-29 PROCEDURE — 25800030 ZZH RX IP 258 OP 636: Performed by: STUDENT IN AN ORGANIZED HEALTH CARE EDUCATION/TRAINING PROGRAM

## 2020-07-29 PROCEDURE — 87086 URINE CULTURE/COLONY COUNT: CPT | Performed by: STUDENT IN AN ORGANIZED HEALTH CARE EDUCATION/TRAINING PROGRAM

## 2020-07-29 PROCEDURE — 92507 TX SP LANG VOICE COMM INDIV: CPT | Mod: GN

## 2020-07-29 PROCEDURE — 97535 SELF CARE MNGMENT TRAINING: CPT | Mod: GO | Performed by: OCCUPATIONAL THERAPIST

## 2020-07-29 PROCEDURE — 97530 THERAPEUTIC ACTIVITIES: CPT | Mod: GP | Performed by: PHYSICAL THERAPIST

## 2020-07-29 PROCEDURE — 25000128 H RX IP 250 OP 636: Performed by: STUDENT IN AN ORGANIZED HEALTH CARE EDUCATION/TRAINING PROGRAM

## 2020-07-29 RX ADMIN — AMLODIPINE BESYLATE 10 MG: 10 TABLET ORAL at 08:37

## 2020-07-29 RX ADMIN — ATORVASTATIN CALCIUM 40 MG: 40 TABLET, FILM COATED ORAL at 20:07

## 2020-07-29 RX ADMIN — ENTECAVIR 1 MG: 1 TABLET ORAL at 08:37

## 2020-07-29 RX ADMIN — CARVEDILOL 25 MG: 25 TABLET, FILM COATED ORAL at 08:37

## 2020-07-29 RX ADMIN — ASPIRIN 81 MG CHEWABLE TABLET 81 MG: 81 TABLET CHEWABLE at 08:37

## 2020-07-29 RX ADMIN — CHLORTHALIDONE 25 MG: 25 TABLET ORAL at 08:37

## 2020-07-29 RX ADMIN — MENTHOL 1 PATCH: 205.5 PATCH TOPICAL at 21:38

## 2020-07-29 RX ADMIN — ENOXAPARIN SODIUM 40 MG: 40 INJECTION SUBCUTANEOUS at 18:38

## 2020-07-29 RX ADMIN — CARVEDILOL 25 MG: 25 TABLET, FILM COATED ORAL at 18:38

## 2020-07-29 RX ADMIN — MENTHOL 1 PATCH: 205.5 PATCH TOPICAL at 06:38

## 2020-07-29 RX ADMIN — SODIUM CHLORIDE, POTASSIUM CHLORIDE, SODIUM LACTATE AND CALCIUM CHLORIDE 1000 ML: 600; 310; 30; 20 INJECTION, SOLUTION INTRAVENOUS at 16:40

## 2020-07-29 ASSESSMENT — ACTIVITIES OF DAILY LIVING (ADL)
ADLS_ACUITY_SCORE: 18
ADLS_ACUITY_SCORE: 18
ADLS_ACUITY_SCORE: 22
ADLS_ACUITY_SCORE: 18
ADLS_ACUITY_SCORE: 22
ADLS_ACUITY_SCORE: 22

## 2020-07-29 NOTE — PROGRESS NOTES
CLINICAL NUTRITION SERVICES - BRIEF NOTE    Nutrition Prescription    RECOMMENDATIONS FOR MDs/PROVIDERS TO ORDER:  - Discussed with neurology; agree with sub-optimal nutrition intakes on restricted/modified diet.   - If anticipated pt will require nutrition support at minimum 1 month, consider placement of long-term access/gastrostomy to meet nutrition needs. Please consult RD for nutrition support orders if/when access placed for nutrition support.  - Ongoing diet per SLP; repeat video swallow pending.   - Encouragement and assistance with meals    Recommendations already ordered by Registered Dietitian (RD):  - none currently while nutrition POC pending. Continue dung counts and supplements     Future/Additional Recommendations:  - PO/supp adequacy vs FT (short vs long-term) and nutrition support.     - Once/if EN initiated,once access placed and verified for use recommend: Isosource 1.5 @ goal 50 ml/hr (1200 ml/day) to provide 1800 kcals (34 kcal/kg/day), 82 g PRO (1.5 g/kg/day), 912 ml free H2O, 211 g CHO and 18 g Fiber daily.  - Initiate @ 10 ml/hr and advance by 10 ml q8hr as tolerated  - Do not start or advance until lytes (Mg++,K+) WNL and phos>1.9   - Recommend 30-60 ml q4hr fluid flushes for tube patency. Additional fluids and/or adjustments per MD.    - Order multivitamin/mineral (15 ml/day via FT) to help ensure micronutrient needs being met with suspected hypermetabolic demands and potential interruptions to TF infusions.  - Elevated HOB with gastric feeds   - May require adjusting pending PO abilities        Dung counts; only one day available:   7/28  Total Kcals: 515  Total Protein: 10g    Nutrition Progress Note - f/u for progress towards previous nutrition POC (see previous reassessment for details)     Harrison Torres, ERICA, LD, [facility]  Neuro ICU  Pager: [phone]

## 2020-07-29 NOTE — PLAN OF CARE
Discharge Planner PT   Patient plan for discharge: Not discussed  Current status: Patient transferred supine>sit EOB modAx2 with use of bed rail. Pt required modAx1 for static balance EOB with support on R side and behind to address imbalance. Pt attempted standing twice, able to initiate mod-maxAx2 but unable to stand fully erect in today's session.   Barriers to return to prior living situation: Impaired functional mobility, weakness  Recommendations for discharge: TCU  Rationale for recommendations: Patient will benefit from skilled therapy for improved safety with functional mobility.        Entered by: Judd Cuevas 07/29/2020 5:08 PM

## 2020-07-29 NOTE — PROGRESS NOTES
Calorie Count  Intake recorded for: 7/28  Total Kcals: 515 Total Protein: 10g  Kcals from Hospital Food: 515  Protein: 10g  Kcals from Outside Food (average):0 Protein: 0g  # Meals Recorded: 100% apple juice, pudding, 50% applesauce  # Supplements Recorded: 100% 1 Magic Cup

## 2020-07-29 NOTE — PLAN OF CARE
OT/6A    Discharge Planner OT   Patient plan for discharge: Not discussed   Current status: Facilitated self feeding due to team concerns on intake. Pt able to self feed entire pudding cup with LUE at SBA level after set up. Does benefit from use of built up foam to ease coordination due to non-dominant UE. Provided hand over hand to hold pudding cup in right hand for incorporation of impaired RUE.   Barriers to return to prior living situation: medical, dependent transfers, level of Assist, R side weakness, cognition   Recommendations for discharge: TCU  Rationale for recommendations:  Pt is significantly below baseline PLOF, and would benefit from continued skilled therapies to maximize independence with functional mobility and ADLs/IADLs.       Entered by: Eugenie Farley 07/29/2020 3:50 PM

## 2020-07-29 NOTE — PLAN OF CARE
Discharge Planner SLP   Patient plan for discharge: TCU  Current status: Pt seen for dysphagia and communication. Per provider, concern that pt is taking in very little PO, potentially d/t diet modification. No s/sx of aspiration with current diet. Attempted communication tx; difficult with audio-only iPad  and pt's severe expressive language deficits. Training provided re: communication boards; pt 67% accurate.     Recommend continuation of nectar-thick, full liquid diet with 1:1 assistance. Will plan to complete videoswallow study to determine readiness for diet advancement. Recommend caregivers utilize total communication approach with pt, including verbal expression, gestures, facial expression, and communication boards. SLP will follow.     Barriers to return to prior living situation: Limited PO intake, dysphagia, medical condition, communication deficits  Recommendations for discharge: TCU  Rationale for recommendations: Pt with significant communication and swallow deficits, will benefit from ongoing ST to improve functional communication and safely return to PO diet       Entered by: Claire Marrero 07/29/2020 2:50 PM

## 2020-07-29 NOTE — PLAN OF CARE
"  Vitals: /88   Pulse 68   Temp 98.3  F (36.8  C) (Oral)   Resp 16   Ht 1.676 m (5' 6\")   Wt 60.4 kg (133 lb 1.6 oz)   SpO2 99%   BMI 21.48 kg/m    Neuros: Difficult to assess. KENDAL orientation this shift as  phone was static when attempted, but pt participated in push/pull and other commands. L side 4, RUE 1, RLE 2.   IV: PIV SL x2.   Resp/trach: On RA.   Diet: Full liquids & nectar thickened liquids. Spoon feeding, with 1:1 assist.   Bowel status: BM x1 this shift.   : Hawkins in place for retention.   Pain: No nonverbal indicators present.   Activity: T/R q2.   Social: Speaks Botswanan, but per  only making incomprehensible sounds.   Plan: Continue to monitor and follow POC.     "

## 2020-07-29 NOTE — PLAN OF CARE
Status: Pt admitted for stroke work up s/p fall. Concerns for PRES and vasoconstriction infarcts after hypertensive emergency. [place] speaking.   Vitals: VSS on RA. On CCM.  Neuros: A&O to self only. KENDAL full neuro assessment w/  present.  states he cannot understand him or it doesn't make sense. L side 4/5. RUE 1/5. RLE 2/5. Intermittently follows commands.  IV: PIV x 2 SL.  Resp/trach: Stable on RA. LS clear all fields.  Diet: Full liquids diet w/ nectar thick liquids. Pt takes pills crushed in applesauce.  Bowel status: BS active all quads. Incontinent BM x 1 overnight.  : Hawkins in place for retention. Pt having scant amount of creamy penile discharge.  Skin: Skin intact.  Pain: Pt exhibiting signs of pain in RLE w/ position change but refused medications when asked by . Icy/Hot patch x 1 on RLE.  Activity: Up w/ 2 and lift. Turn and repo q 2 hr.  Social: No calls or visitors this shift.  Plan: Will continue to monitor and follow POC.

## 2020-07-29 NOTE — PLAN OF CARE
[id number]  VSS, sating 100% on RA. Alert, oriented to person and family. Unable to assess orientation to place, time, or situation.  says pt speech isn't clear. L side 4/5, R side 2/5. C/o pain in R leg, tylenol given with relief. Takes pills crushed in apple sauce. Intermittently follows commands. On full liquid diet with nectar thick liquids, spoon fed. Needs encouragement for po intake. Hawkins with low urine output, bladder scanned for 187ml. 1L LR bolus infused over 4 hours, 2nd L LR infusing over 2 hours.

## 2020-07-30 ENCOUNTER — APPOINTMENT (OUTPATIENT)
Dept: GENERAL RADIOLOGY | Facility: CLINIC | Age: 60
End: 2020-07-30
Attending: STUDENT IN AN ORGANIZED HEALTH CARE EDUCATION/TRAINING PROGRAM
Payer: COMMERCIAL

## 2020-07-30 ENCOUNTER — APPOINTMENT (OUTPATIENT)
Dept: SPEECH THERAPY | Facility: CLINIC | Age: 60
End: 2020-07-30
Payer: COMMERCIAL

## 2020-07-30 ENCOUNTER — APPOINTMENT (OUTPATIENT)
Dept: PHYSICAL THERAPY | Facility: CLINIC | Age: 60
End: 2020-07-30
Payer: COMMERCIAL

## 2020-07-30 LAB
ALBUMIN UR-MCNC: 100 MG/DL
APPEARANCE UR: CLEAR
BILIRUB UR QL STRIP: NEGATIVE
COLOR UR AUTO: ABNORMAL
CREAT SERPL-MCNC: 1.15 MG/DL (ref 0.66–1.25)
CRP SERPL-MCNC: 25 MG/L (ref 0–8)
ERYTHROCYTE [SEDIMENTATION RATE] IN BLOOD BY WESTERGREN METHOD: 110 MM/H (ref 0–20)
GFR SERPL CREATININE-BSD FRML MDRD: 69 ML/MIN/{1.73_M2}
GLUCOSE UR STRIP-MCNC: NEGATIVE MG/DL
HGB UR QL STRIP: ABNORMAL
KETONES UR STRIP-MCNC: NEGATIVE MG/DL
LDH SERPL L TO P-CCNC: 164 U/L (ref 85–227)
LEUKOCYTE ESTERASE UR QL STRIP: ABNORMAL
MUCOUS THREADS #/AREA URNS LPF: PRESENT /LPF
NITRATE UR QL: NEGATIVE
PH UR STRIP: 7 PH (ref 5–7)
PLATELET # BLD AUTO: 141 10E9/L (ref 150–450)
RBC #/AREA URNS AUTO: 3 /HPF (ref 0–2)
SOURCE: ABNORMAL
SP GR UR STRIP: 1 (ref 1–1.03)
UROBILINOGEN UR STRIP-MCNC: NORMAL MG/DL (ref 0–2)
WBC # BLD AUTO: 7.3 10E9/L (ref 4–11)
WBC #/AREA URNS AUTO: 4 /HPF (ref 0–5)

## 2020-07-30 PROCEDURE — 85652 RBC SED RATE AUTOMATED: CPT | Performed by: INTERNAL MEDICINE

## 2020-07-30 PROCEDURE — 86140 C-REACTIVE PROTEIN: CPT | Performed by: PSYCHIATRY & NEUROLOGY

## 2020-07-30 PROCEDURE — 36415 COLL VENOUS BLD VENIPUNCTURE: CPT | Performed by: INTERNAL MEDICINE

## 2020-07-30 PROCEDURE — 25000132 ZZH RX MED GY IP 250 OP 250 PS 637: Performed by: STUDENT IN AN ORGANIZED HEALTH CARE EDUCATION/TRAINING PROGRAM

## 2020-07-30 PROCEDURE — 12000001 ZZH R&B MED SURG/OB UMMC

## 2020-07-30 PROCEDURE — 97110 THERAPEUTIC EXERCISES: CPT | Mod: GP

## 2020-07-30 PROCEDURE — 82565 ASSAY OF CREATININE: CPT | Performed by: PSYCHIATRY & NEUROLOGY

## 2020-07-30 PROCEDURE — 81001 URINALYSIS AUTO W/SCOPE: CPT | Performed by: STUDENT IN AN ORGANIZED HEALTH CARE EDUCATION/TRAINING PROGRAM

## 2020-07-30 PROCEDURE — 92526 ORAL FUNCTION THERAPY: CPT | Mod: GN

## 2020-07-30 PROCEDURE — 85049 AUTOMATED PLATELET COUNT: CPT | Performed by: PSYCHIATRY & NEUROLOGY

## 2020-07-30 PROCEDURE — 25000128 H RX IP 250 OP 636: Performed by: STUDENT IN AN ORGANIZED HEALTH CARE EDUCATION/TRAINING PROGRAM

## 2020-07-30 PROCEDURE — 25000132 ZZH RX MED GY IP 250 OP 250 PS 637: Performed by: NURSE PRACTITIONER

## 2020-07-30 PROCEDURE — 74230 X-RAY XM SWLNG FUNCJ C+: CPT

## 2020-07-30 PROCEDURE — 36415 COLL VENOUS BLD VENIPUNCTURE: CPT | Performed by: PSYCHIATRY & NEUROLOGY

## 2020-07-30 PROCEDURE — 83615 LACTATE (LD) (LDH) ENZYME: CPT | Performed by: PSYCHIATRY & NEUROLOGY

## 2020-07-30 PROCEDURE — 92611 MOTION FLUOROSCOPY/SWALLOW: CPT | Mod: GN

## 2020-07-30 PROCEDURE — 85048 AUTOMATED LEUKOCYTE COUNT: CPT | Performed by: PSYCHIATRY & NEUROLOGY

## 2020-07-30 RX ORDER — BARIUM SULFATE 400 MG/ML
SUSPENSION ORAL ONCE
Status: COMPLETED | OUTPATIENT
Start: 2020-07-30 | End: 2020-07-30

## 2020-07-30 RX ORDER — CEFTRIAXONE 1 G/1
1 INJECTION, POWDER, FOR SOLUTION INTRAMUSCULAR; INTRAVENOUS EVERY 24 HOURS
Status: DISCONTINUED | OUTPATIENT
Start: 2020-07-30 | End: 2020-07-31

## 2020-07-30 RX ADMIN — ASPIRIN 81 MG CHEWABLE TABLET 81 MG: 81 TABLET CHEWABLE at 10:13

## 2020-07-30 RX ADMIN — Medication 1 MG: at 20:03

## 2020-07-30 RX ADMIN — AMLODIPINE BESYLATE 10 MG: 10 TABLET ORAL at 10:13

## 2020-07-30 RX ADMIN — CARVEDILOL 25 MG: 25 TABLET, FILM COATED ORAL at 10:13

## 2020-07-30 RX ADMIN — ACETAMINOPHEN 325 MG: 325 TABLET, FILM COATED ORAL at 01:17

## 2020-07-30 RX ADMIN — ACETAMINOPHEN 325 MG: 325 TABLET, FILM COATED ORAL at 20:03

## 2020-07-30 RX ADMIN — CARVEDILOL 25 MG: 25 TABLET, FILM COATED ORAL at 20:03

## 2020-07-30 RX ADMIN — ENTECAVIR 1 MG: 1 TABLET ORAL at 10:13

## 2020-07-30 RX ADMIN — ENOXAPARIN SODIUM 40 MG: 40 INJECTION SUBCUTANEOUS at 20:03

## 2020-07-30 RX ADMIN — CEFTRIAXONE 1 G: 1 INJECTION, POWDER, FOR SOLUTION INTRAMUSCULAR; INTRAVENOUS at 16:03

## 2020-07-30 RX ADMIN — ACETAMINOPHEN 325 MG: 325 TABLET, FILM COATED ORAL at 14:11

## 2020-07-30 RX ADMIN — CHLORTHALIDONE 25 MG: 25 TABLET ORAL at 10:13

## 2020-07-30 RX ADMIN — BARIUM SULFATE 4 G: 400 SUSPENSION ORAL at 11:06

## 2020-07-30 RX ADMIN — ATORVASTATIN CALCIUM 40 MG: 40 TABLET, FILM COATED ORAL at 20:03

## 2020-07-30 ASSESSMENT — ACTIVITIES OF DAILY LIVING (ADL)
ADLS_ACUITY_SCORE: 22
ADLS_ACUITY_SCORE: 22
ADLS_ACUITY_SCORE: 18
ADLS_ACUITY_SCORE: 22

## 2020-07-30 NOTE — CONSULTS
[facility] Rheumatology IP H&P    Consult for: Rheumatology IP Consult: Patient to be seen: Routine - within 24 hours; Pt with hx hep B, cryoglobulinemia, chronically elevated ESR, CRP and positive RF. Currently inpatient for PRES + strokes, but wondering if this is vasculitis.; Consultant may... [[id number]] ordered by Yuval Sandy MD at 07/29/20 9410     Consult by: Neurology inpatient service      ASSESSMENT:    59 yo male with pmhx of hep b, cirrhosis, and chronic cryoglobulinemia admitted with multifocal infarcts and likely PRES variant. Repeat MRI showing new areas of infarct. Rheumatology consulted to consider vasculitis. Cryoglobulinemia syndromes/mixed cryo vasculitis is possible given positive cryo, elevated ESR, CRP, and RF. However this patient has had known cryoglobulinemia without issue chronically. Difficult to say if the cryoglobulins are responsible for current symptoms. He lacks skin findings, he did have an ANDRY on admission that has since improved. Unclear if ongoing tissue damage or active process involving cryo, however new infarcts is concerning.     Multifocal infarcts could be 2/2 to small to medium vessel vasculitis vs hyperviscosity. His elevated RF is likely 2/2 mixed cryglobulinemia very low suspician for RA as there are no joint abnormalities on exam. Will obtain repeat ESR, and RF and check CRP for signs of worsening inflammation. Will also check LDH for signs of ongoing tissue damage. If there are signs of significant  ongoing tissue damage, or worsening neuro status/ongoing infarcts, would consider treatment for cryoglobulin mediated hyperviscosity vs.  vasculitis with plasmaphoresis and exchange along with high dose steroids and immunosuppression. If that is the direction we go, would need an infectious disease consult regarding the potential use of rituximab in the setting of hep b infection and TB. We would also want Hepatology opinion regarding HepB reactivation risk..      DIAGNOSIS:  #Cryoglobulinemia  #Multifocal infarcts  #Chronic Hep B cirrhosis  #MPGN consistent with cryoglobulinemia  #Latent TB      PLAN:  -ESR  -CRP  -RF  -LDH  -CCP antibody  -Will consider plasmaphoresis with exchange if evidence of ongoing tissue damage  -Will consider treatment with steroids and rituximab with ID and Hepatology clearance if ongoing tissue damage.     I discussed the findings and recommendations with the patient.  I communicated the assessment and plan to the consulting team.    Case seen and discussed with Dr. Ching Roca MD     I saw the patient with the fellow and resident.  My exam and recomendations are as described.     I have further discussed the pt. With Dr. Carvalho, who feel the case is most c/w PRES. His Cryos are long-standing, and of unclear relationship to current features. C3 and C4, typically low in small vessel immune complex vasculitis are normal. His exam shows no palpable purpura or other petechial rashes, no joint warmth or swelling and no evidence of pain on joint ROM, all of which might be expected in diffuse immune-complex mediated vasculitis.     On the other hand his cryo levels, RF and ESR are all high.     LDH may be helpful in considering possibility of infarcted tissue due to vasculitis or small vessel occlusive disease secondary to immune complexes. If that were quite high, would consider additional imaging to look for evidence of infarcts in other tissue beds, and depending on those results, would consider PLEX and immunsuppression.     Otherwise, would agree with observation based on imaging evidence of improvement c/w resolving PRES, while tracking cryo's and complement levels.      Serge Flower [phone]               HPI:  59 yo with pmhx of hep b cirrhosis s/b cryoglobulinemia MPGN, HTN,    On day of presentation had uncomfortable ascites, left  ED after therepeutic IR para, then presented again same day with altered mental  status was found to be hypertensive, this was treated there were no focal neurologic deficits noted at that time and his mental status was back to baseline after treating HTN.  Head CTA that showed a non-ruptured cerebral aneurysm with plan to follow up in neurology clinic he was discharged and heading towards the light rail and apparently had fallen and was found on the ground. Was admitted to observation.     CTA head neck with contrast:                                                               Impression:    1. Head CTA demonstrates a 2 x 2 mm anterosuperiorly pointing saccular  aneurysm off the left supraclinoid ICA. The remaining major  intracranial arteries are patent with no evidence of stenosis or  aneurysm.  2. Neck CTA demonstrates no stenosis of the major cervical arteries.     Head CT without contrast:                                                                   Impression:  1. No acute intracranial pathology.   2. Extensive leukoaraiosis.    Hours after admission he developed, facial droop, abnormal speech and right sided weakness deficit stroke code with MRI showing:    Impression:   Extensive T2 hyperintense signal involving supratentorial and  infratentorial white matter with scattered foci of diffusion  restriction in bilateral corona radiata, bilateral frontal subcortical  white matter and right frontal centrum semiovale. Most of this T2  hyperintense signal is new from 10/22/2019. Given the abnormal signal  and diffusion restriction solely/mostly involves the white matter,  differentials favor acute white matter pathologies like acute toxic  leukoencephalopathy from liver failure. Other less likely  differentials include hepatocerebral syndrome, osmotic demyelination  or embolic infarct.    Per Neuro: Upon further review prior MRIs from 2019 and 2018 demonstrate small areas of white matter/pontine FLAIR changes that evolved slowly since 2018 at least. Unclear etiology.     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   See nepology office note 10/10/2017. Current Cr baseline 1.3- 1.4.    The following CSF labs were negative: NMO/AQP4, MOG, WBC 2, RBC 0, negative gram stain/culture, protein 30, HSV 1 and 2, Lyme IGG/IGM, oligoclonal banding, RAJAN virus, leukemia/lymphoma evaluation, ACE, enterovirus, VZV, VDRL, TB PCR, CMV (note suboptimal sample), cytology. The following serum labs were unremarkable: TSH, ANCA, FARIDA, HIV, ganglioside antibodies, myeloperoxidase. RF, ESR, CRP elevated (chronic). Positive quant gold.     Repeat MRI 1 week later shows improvement in PRES and evidence of at least 1 new infarct in left periventricular region.     Patient is aphasic and is unable to give any history aside from answering no to having any pain.         HISTORY REVIEW:  Past Medical History:   Diagnosis Date     Cryoglobulinemia (H)      Glomerulonephritis      Hepatitis B      Hypertension      Surgical complication      Past Surgical History:   Procedure Laterality Date     ANESTHESIA OUT OF OR MRI N/A 7/18/2020    Procedure: ANESTHESIA OUT OF OR MRI;  Surgeon: GENERIC ANESTHESIA PROVIDER;  Location: [facility] OR     C HAND/FINGER SURGERY UNLISTED       ESOPHAGOSCOPY, GASTROSCOPY, DUODENOSCOPY (EGD), COMBINED N/A 10/18/2018    Procedure: EGD;  Surgeon: Eber Ortez MD;  Location: [facility]     HAND SURGERY Right      IR PARACENTESIS  7/27/2020     Family History   Problem Relation Age of Onset     Liver Cancer No family hx of      Hepatitis No family hx of      Social History     Socioeconomic History     Marital status: Single     Spouse name: Not on file     Number of children: Not on file     Years of education: Not on file     Highest education level: Not on file   Occupational History     Not on file   Social Needs     Financial resource strain: Not on file     Food insecurity     Worry: Not on  "file     Inability: Not on file     Transportation needs     Medical: Not on file     Non-medical: Not on file   Tobacco Use     Smoking status: Current Some Day Smoker     Packs/day: 0.25     Years: 40.00     Pack years: 10.00     Types: Cigarettes     Smokeless tobacco: Never Used   Substance and Sexual Activity     Alcohol use: No     Alcohol/week: 0.0 standard drinks     Drug use: No     Sexual activity: Not on file   Lifestyle     Physical activity     Days per week: Not on file     Minutes per session: Not on file     Stress: Not on file   Relationships     Social connections     Talks on phone: Not on file     Gets together: Not on file     Attends Orthodox service: Not on file     Active member of club or organization: Not on file     Attends meetings of clubs or organizations: Not on file     Relationship status: Not on file     Intimate partner violence     Fear of current or ex partner: Not on file     Emotionally abused: Not on file     Physically abused: Not on file     Forced sexual activity: Not on file   Other Topics Concern     Parent/sibling w/ CABG, MI or angioplasty before 65F 55M? Not Asked   Social History Narrative     Not on file     Patient Active Problem List   Diagnosis     Chronic hepatitis B without hepatic coma (H)     Essential hypertension, malignant     Nausea & vomiting     Weakness     Brain lesion     No Known Allergies    REVIEW OF SYMPTOMS:  Unable to obtain due to aphasia    OBJECTIVE:  PHYSICAL EXAM  /89 (BP Location: Left arm)   Pulse 71   Temp 98.2  F (36.8  C) (Oral)   Resp 16   Ht 1.676 m (5' 6\")   Wt 60.4 kg (133 lb 1.6 oz)   SpO2 99%   BMI 21.48 kg/m    Wt Readings from Last 4 Encounters:   07/27/20 60.4 kg (133 lb 1.6 oz)   06/14/20 56.1 kg (123 lb 9.6 oz)   10/21/19 54.4 kg (120 lb)   10/01/19 53.9 kg (118 lb 12.8 oz)     Constitutional: laying comfortably in bed, appears chronically ill  Eyes: nl EOM,  Normal conjunctiva, sclera  ENT: nl external ears, " nose,  lips, teeth, gums, No mucous membrane lesions  Neck: no mass or thyroid enlargement, No LAD  Resp: lungs clear to auscultation  CV: RRR, no murmurs,  GI: NDNT  Lymph: no cervical, supraclavicular LAD  MS:  shoulder, elbow, wrist, MCP/PIP/DIP, knee, ankle, and foot MTP/IP joints were examined and  found normal aside from is mild nonspecific swelling in the right foot, cool to touch, no tenderness. No active synovitis or deformity. Full ROM.    Skin: no nail pitting, rash, nodules or lesions  Neuro: intermittently follows commands, unable to speak, expressive aphasia, hypophonia, dysarthria, tongue is midline, face symmetric at rest       DATA:  Outside Records: NO  Outside Xrays: NO  CBC:  Recent Labs   Lab Test 07/30/20  0703 07/28/20  0929 07/27/20  0655 07/26/20  0734  07/22/20  1032   WBC  --  6.7  --  6.8  --  5.9   RBC  --  3.42*  --  3.33*  --  3.76*   HGB  --  11.1*  --  10.7*  --  12.1*   HCT  --  33.4*  --  33.2*  --  37.4*   MCV  --  98  --  100  --  100   MCH  --  32.5  --  32.1  --  32.2   MCHC  --  33.2  --  32.2  --  32.4   RDW  --  12.4  --  12.8  --  12.9   * 157 168 152   < > 191    < > = values in this interval not displayed.       BMP:  Recent Labs   Lab Test 07/30/20  0703 07/29/20  0641 07/28/20  0929  07/26/20  0734   NA  --  137 137  --  139   POTASSIUM  --  3.8 3.8  --  3.8   CHLORIDE  --  107 106  --  108   CO2  --  25 28  --  27   ANIONGAP  --  5 3  --  3   GLC  --  83 93  --  81   BUN  --  22 22  --  27   CR 1.15 1.16 1.17   < > 1.28*   GFRESTIMATED 69 68 68   < > 61   SOTERO  --  8.3* 8.3*  --  8.3*    < > = values in this interval not displayed.       LFT:  Recent Labs   Lab Test 07/28/20  0929 07/27/20  0655 07/17/20  0856 07/17/20  0610   PROTTOTAL 5.5* 5.7*  --  5.4*   ALBUMIN 1.4* 1.5* 1.6* 1.4*   BILITOTAL 0.3 0.6  --  0.4   ALKPHOS 154* 151*  --  214*   AST 28 27  --  28   ALT 19 18  --  21       No results found for: CKTOTAL  TSH   Date Value Ref Range Status    07/18/2020 3.83 0.40 - 4.00 mU/L Final     No results found for: URIC    Inflammatory markers  Lab Results   Component Value Date    CRP 50.0 07/18/2020    CRP 84.0 07/17/2020    .0 09/17/2016     Lab Results   Component Value Date     07/18/2020    SED 96 07/17/2020     Ferritin   Date Value Ref Range Status   10/10/2017 176 26 - 388 ng/mL Final       UA RESULTS:  Recent Labs   Lab Test 07/29/20 2012 07/20/20  1300 07/17/20  0025   COLOR Light Yellow Yellow Yellow   APPEARANCE Slightly Cloudy Slightly Cloudy Clear   URINEGLC Negative Negative Negative   URINEBILI Negative Negative Negative   URINEKETONE Negative 10* Negative   SG 1.008 1.019 1.012   UBLD Moderate* Small* Moderate*   URINEPH 7.0 6.0 6.5   PROTEIN 100* 300* 300*   NITRITE Negative Negative Negative   LEUKEST Large* Trace* Negative   RBCU 105* 5* 14*   WBCU >182* 6* 5       Autoimmunity labs:  Lab Results   Component Value Date    RHF 84 (H) 07/22/2020     No results found for: CCPIGG  No results found for: ANCA  Lab Results   Component Value Date    N9XXLQU 95 07/22/2020    B5MDXAV 73 (L) 08/01/2018     Lab Results   Component Value Date    O4DIIIP 21 07/22/2020    L6WUBWA 15 08/01/2018     No results found for: SUJIT  No results found for: DNA  No results found for: RNPIGG, SMIGG, SSAIGG, SSBIGG, SCLIGG    IMAGING:

## 2020-07-30 NOTE — PLAN OF CARE
Status: Pt on 6A for stroke work up s/p fall, concern for PRES and vasoconstriction infarcts after HTN emergency  Vitals: VSS on RA, HTN w/in nakita  Neuros: Difficult to assess, interpretor is unable to understand the pt, L side strengths 4/5, R side strengths 2/5,   IV: PIVs are SL  Resp/trach: Lung sounds are clear  Diet: Full liquid, nectar thick, 1:1  Bowel status: Bowel sounds are active, 2 small BMs this shift, light brown/megan colored  : Incontinent  Skin: Intact  Pain: Pt was showing some signs of pain, tylenol given x1  Activity: Assist x2 w/ lift  Social: One visitor this shift and has been supportive in his cares, other family was updated by RN via the phone  Plan: Will continue to monitor and follow POC

## 2020-07-30 NOTE — PROGRESS NOTES
07/30/20 1050   General Information   Onset Date 07/16/20   Start of Care Date 07/19/20   Referring Physician Yuval Sandy MD   Patient Profile Review/OT: Additional Occupational Profile Info See Profile for full history and prior level of function   Patient/Family Goals Statement None stated   Swallowing Evaluation Videofluoroscopic evaluation   Behaviorial Observations Alert  (attempting to vocalize but persisting dysarthria)   Mode of current nutrition Oral diet   Type of oral diet Nectar - thick liquid  (Full liquids)   Respiratory Status Room air   Comments SLP: Pt is a 60 year old male with a PMHx of HTN, chronic Hepatitis B c/b liver cirrhosis and ascites, and CKD who presented on 7/16/20 with fall and AMS, found to have PRES and probably hypertension-induced infarcts after an episode of hypertensive emergency. Repeat MRI 1 week later shows improvement in PRES and evidence of at least 1 new infarct in left periventricular region. Patient will need TCU. SLP has been following pt for dysphagia and language tx. Repeat VFSS requested by neuro to assess if pt is ready for diet advancement and hopefully improve pt's PO intake. VFSS completed per MD order.    Clinical Swallow Evaluation   Oral Musculature Comments Persisting dysarthria which slightly improves with cues for overarticulation and increased vocal intensity   Swallow Eval   Feeding Assistance no assistance needed  **Pt required max encouragement to participate in VFSS and only accepted limited trials.   VFSS Eval: Radiology   Radiologist Resident   Views Taken left lateral   Physical Location of Procedure [facility] [address]   VFSS Eval: Thin Liquid Texture Trial   Mode of Presentation, Thin Liquid cup;self-fed   Order of Presentation 1, 2, 6   Preparatory Phase WFL   Oral Phase, Thin Liquid WFL   Pharyngeal Phase, Thin Liquid Delayed swallow reflex  (questionable)   Rosenbek's Penetration Aspiration Scale: Thin Liquid Trial Results 2 -  contrast enters airway, remains above the vocal cords, no residue remains (penetration)   Diagnostic Statement Oral phase WFL. Pharyngeal phase characterized flash penetration 2/2 questionable swallow delay. No aspiration.    VFSS Eval: Nectar Thick Liquid Texture Trial   Mode of Presentation, Nectar cup;self-fed   Order of Presentation 3, 7   Preparatory Phase WFL   Oral Phase, Nectar WFL   Pharyngeal Phase, Nectar   (Flash penetration)   Rosenbek's Penetration Aspiration Scale: Nectar-Thick Liquid Trial Results 2 - contrast enters airway, remains above the vocal cords, no residue remains (penetration)   Diagnostic Statement Oral phase WFL. Pharyngeal phase characterized by flash penetration, no aspiration, no pharyngeal residue.   VFSS Eval: Puree Solid Texture Trial   Mode of Presentation, Puree spoon;self-fed   Order of Presentation 4   Preparatory Phase WFL   Oral Phase, Puree WFL   Pharyngeal Phase, Puree Delayed swallow reflex   Rosenbek's Penetration Aspiration Scale: Puree Food Trial Results 1 - no aspiration, contrast does not enter airway   Diagnostic Statement Swallow was delayed but functional. No pharyngeal residue. No penetration or aspiration.    VFSS Eval: Solid Food Texture Trial   Mode of Presentation, Solid self-fed   Order of Presentation 5   Preparatory Phase WFL   Oral Phase, Solid WFL   Pharyngeal Phase, Solid Delayed swallow reflex   Rosenbek's Penetration Aspiration Scale: Solid Food Trial Results 1 - no aspiration, contrast does not enter airway   Diagnostic Statement Oral phase functional. Pharyngeal phase characterized by delayed swallow. Once swallow was triggered, no penetration or aspiration, no pharyngeal residue.    Esophageal Phase of Swallow   Patient reports or presents with symptoms of esophageal dysphagia No   General Therapy Interventions   Planned Therapy Interventions Dysphagia Treatment;Language   Dysphagia treatment Modified diet education;Instruction of safe swallow  strategies;Compensatory strategies for swallowing;Oropharyngeal exercise training   Language Auditory comprehension;Verbal expression   Swallow Eval: Clinical Impressions   Skilled Criteria for Therapy Intervention Skilled criteria met.  Treatment indicated.   Functional Assessment Scale (FAS) 5   Treatment Diagnosis Mild pharyngeal dysphagia 2/2 delayed swallow    Diet texture recommendations Dysphagia diet level 3;Thin liquids   Recommended Feeding/Eating Techniques alternate between small bites and sips of food/liquid;small sips/bites  (upright and alert for all PO, slow rate)   Demonstrates Need for Referral to Another Service occupational therapy;physical therapy;dietitian   Therapy Frequency Daily   Predicted Duration of Therapy Intervention (days/wks) 2 weeks   Anticipated Discharge Disposition inpatient rehabilitation facility   Risks and Benefits of Treatment have been explained. Yes   Patient, family and/or staff in agreement with Plan of Care Yes   Clinical Impression Comments SLP: Videoswallow study completed per MD order with assistance of iPad  to objectively assess oropharyngeal swallow mechanism. Under fluoroscopy, pt presents with improving, mild pharyngeal dysphagia in setting of PRES vs vasculitis vs atypical demyelinating disease (felt least likely). Pt assessed with thin liquids, nectar-thick liquids, puree, and cookie. Oral phase WFL across consistencies. Swallow trigger delayed; this was most notable with solids, with majority of bolus in valleculae before swallow was triggered. Once triggered, velar elevation, BOT retraction, and pharyngeal constriction are adequate. No pharyngeal residue with any consistency. Epiglottic inversion is complete. Flash penetration occurred with thin and nectar-thick liquids. No aspiration with any consistency. Recommend dysphagia 3 (chopped) diet/thin liquids. Pt to be fully alert and upright for all PO, taking small bites/sips at slow rate. SLP to  follow and advance diet at bedside as appropriate.   Total Evaluation Time   Total Evaluation Time (Minutes) 23

## 2020-07-30 NOTE — PLAN OF CARE
Status: Pt admitted for stroke work up s/p fall. Concerns for PRES and vasoconstriction infarcts after hypertensive emergency.   Vitals: VSS   Neuros: KENDAL most neuros, pt speaking incomprehensible words. [place] speaking, able to say okay and follow commands for assessment. Left extremities 1/5, RUE 1/5, RLE 2/5  IV: PIV SL. 1 L LR bolus given   Diet: Full w/ nectar thick. 1:1 assist.   Bowel status: BM x1 this shift, megan light brown colored  : Hawkins in place for retention. Pulled around @1800 for spontaneous voiding trial, pt stated he cannot go and was bladder scanned for 525 ml. Pt urinated again, PVR-300.   Skin: new rash noted on right side of groin, notified MD.  ICY hot patch to back.   Pain: No nonverbal indicators present.   Activity: Up w/2 and a lift. Repo Q2hrs  Plan: Continue to follow POC

## 2020-07-30 NOTE — PLAN OF CARE
"Status: Pt admitted for stroke work up s/p fall. Concerns for PRES and vasoconstriction infarcts after hypertensive emergency.   Vitals: VSS   Neuros: KENDAL most neuros, pt speaking incomprehensible words. Singaporean speaking, able to say \"okay\" & \"no\", follow commands for assessment. Left extremities 4/5, RUE 1/5, RLE 2/5  IV: PIV SL  Diet: Full liquid w/ nectar thick. 1:1 assist.   Bowel status: Incontinent of soft BM, megan light brown colored  : Hawkins removed last evening, Incontinent of urine but with urinary retention, Bladder scanned for >482mL @ 0430, voided about 125mL after trying a few times, bladder scanned again for >371mL, straight cath'd to get a baseline idea to make sure how much of scan was for urine or ascites, straight cath'd for 150mL. Offer & allow time with urinal, more than half of bladder scanned fluid was for ascites.  Skin: new rash noted on right side of groin, notified MD.  ICY hot patch to back.   Pain: prn tylenol x1 for headache  Activity: Up w/ A2 and a lift. Repo Q2hrs  Plan: Positive for UTI, On charge RN list to inform MD. Continue to follow POC  "

## 2020-07-30 NOTE — PLAN OF CARE
Discharge Planner PT   Patient plan for discharge: Not discussed  Current status: Pt awake and alert for today's session. Patient rolled to right side in bed min-modAx2. Required CGA-ModAx2 for sitting EOB. Transferred supine>sit EOB modAx2 with use of bed rail and assist w/ R leg. Pt required ModAx2 for sit>stand. Transferred bed>chair MaxAx2 with assist with R leg.   Barriers to return to prior living situation: Weakness, impaired functional mobility, medical status, impaired balance  Recommendations for discharge: TCU  Rationale for recommendations: Patient below baseline. Will benefit of skilled therapy to return to baseline functional mobility.        Entered by: Judd Cuevas 07/30/2020 10:43 AM

## 2020-07-30 NOTE — PROGRESS NOTES
[facility]  General Neurology Progress Note    Patient Name:  Wilfredo Yoon  MRN:  [id number]    :  1960  Date of Service: 20    Patient Summary:   Mr. Yoon is a 60 year old male with a PMHx of HTN, chronic Hepatitis B c/b liver cirrhosis and ascites, and CKD who presented on 20 with fall and AMS, found to have T2 hyperintensity in brainstem on MRI, suspicious for PRES and multiple infarcts. Deficits include right hemibody weakness and expressive aphasia. Repeat MRI 1 week later shows improvement in PRES and evidence of at least 1 new infarct in left periventricular region.     Major Updates for Today:   - Hawkins removed. Requiring intermittent straight cath for urinary retention  - Urine from Hawkins appears infected. Will empirically begin broad-spectrum abx for UTI   - Will get video swallow study      Subjective:   Difficult to obtain, patient with severe expressive aphasia. Patient evaluated today with help of iPad  in [facility].     Physical Examination (ipad )  Vitals Overnight: BP 120s-160s/70s-90s, HR 50s-70s, RR 16  Physical Exam:  Constitutional: Alert, Laying in bed. Appears comfortable.    Head: Atraumatic, normocephalic.  Cardiovascular: Warm extremities. No leg edema.   Genital: No discharge from penis on examination today     Neurologic:   Mental Status: Alert, awake. Follows commands to the best of his ability (sticks tongue out, wiggles toes on right). Speech is dysarthric and unintelligible.    Cranial Nerves: Looking in all directions, eyes move conjugately. Smile and eyebrow raise equal, blinking symmetric, mild right nasolabial flattening. Hearing intact to conversation.   Motor: R hemiparesis-RUE and RLE cannot lift antigravity but some movement in plane. Very weak right hand . LUE moving spontaneously, antigravity.  Able to hold left upper extremity antigravity without drift, able to lift LLE off bed  antigravity and hold in air for 5 seconds without drift. No movement observed in RLE.   Sensory: Difficult to assess   Gait: not tested    Labs/Investigations  - No fluid intake recorded for 7/29. Output over 1L.   - Total calorie intake on 7/29 is 843 rafael with 27g of protein   - UA from last night shows large amount of leukocyte esterase, greater than 182 WBC, presence of bacteria and WBC clumps in urine.     Assessment and Plan:  Mr. Yoon is a 60 year old man with a PMH of chronic Hep B with cirrhosis and ascites, HTN with hx hypertensive emergency, tobacco use, HLD, who presented to the ED on 7/16/20 with AMS and concern for stroke. Neurology initially saw patient as a stroke code. MR brain showed large brainstem hyperintensity w/extension into cerebellum on FLAIR sequence without DWI/ADC correlate. It also showed moderate-severe leukoaraiosis plus several areas of restricted diffusion including at left corona radiata and right basal ganglia, most likely consistent with infarcts. DDx of brainstem hyperintensities is broad and includes infectious, inflammatory, vasculitic, demyelinating, toxic/metabolic (eg CPM), and neoplastic pathologies. The following CSF labs were negative: NMO/AQP4, MOG, WBC 2, RBC 0, negative gram stain/culture, protein 30, HSV 1 and 2, EBV, Lyme IGG/IGM, oligoclonal banding, RAJAN virus, leukemia/lymphoma evaluation, ACE, enterovirus, VZV, VDRL, TB PCR, CMV (note suboptimal sample), cytology. The following serum labs were unremarkable: TSH, ANCA, FARIDA, HIV, ganglioside antibodies, myeloperoxidase. RF, ESR, CRP elevated (chronic). Trace cryoglobulin. Kappa and lambda light chains elevated and ratio slightly elevated. Our team feels the most likely explanation for the posterior T2 hyperintensities is brainstem variant of PRES syndrome but when taking into account the strokes, RCVS and vasculitis are also on differential.     #Extensive posterior T2 hyperintensities, suspect PRES, improving  -  Continue supportive cares   - Blood pressure management as below in HTN section  - Neurochecks Q4 hours  - Rheumatology consult to evaluate for active vasculitis/cryoglobulinemia   - Patient will need repeat MRI in 3-4 weeks and outpatient general neurology follow-up within 1 month of discharge     #Multifocal subcortical infarcts, suspect hypertensive vs. 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   Consulted nephrology 7/22, appreciate recs.  - Started Chlorthalidone 25 mg daily  - Strict Is and Os  - Continue amlodipine 10 mg PO daily  - Continue Coreg 25mg PO BID  - Hold PTA Lisinopril 40 mg PO daily due to ANDRY (per renal: If Cr remains stable for the next 1 week, restart Lisinopril at 10 mg daily and titrate dose up if needed to maintain BP ~ 130/80. Titrate dose up to 40 mg daily which was his PTA dose)  - Hydralazine 10mg PO prn for SBP>165  - Follow with Nephrology clinic at discharge (Dr Fuentes) in 2-4 weeks    #Cirrhosis 2/2 Hepatitis B  #Ascites  S/P paracentesis of 3.3L on 7/28 with IR.   - Continue entecavir 0.5 mg daily    #Positive Quant Gold Test   Likely represents latent TB infection. CSF TB PCR negative. No chronic pulmonary symptoms/persistent cough, stable weight. Low suspicion for acute pulmonary TB.   - ID Referral at discharge     #Malnutrition:  Moderate, based on decreased PO intake and dysphagia.    On full liquid diet.  Calorie count showing suboptimal intake.   - video swallow test with speech today  - May need NG or PEG for tube feeds for supplemental feeds.      #Hyperlipidemia   on admission   - Atorvastatin as above     #Urinary Retention  #UTI  Vazquez was place for urinary retention. Failed trial of void on 7/23-7/24. UA from last night shows large amount of leukocyte esterase, greater than 182 WBC, presence of bacteria and WBC clumps in urine. Suspect UTI related to catheter use   - Remove vazquez   - Intermittent straight cath PRN   - Urine culture pending   - Repeat UA   - IV Ceftriaxone 1g Q24 hour    Will repeat UA from clean catch, and if still infected, will start abx treatment for UTI     Diet: Dysphagia 3 (updated today by SLP)   DVT prophylaxis: SCDs, enoxaparin subcutaneous   Lines: PIV, vazquez  Code status: Full code    Disposition Plan   TCU - date pending.  Medical workup ongoing.        Entered: Yuval Sandy 07/30/2020, 7:53 AM       Patient was seen and discussed with Dr. Carvalho.     Yuval Sandy MD  PGY-2 Neurology Resident  P: [phone]

## 2020-07-30 NOTE — PROGRESS NOTES
Calorie Count  Intake recorded for: 7/29  Total Kcals: 843 Total Protein: 27g  Kcals from Hospital Food: 843  Protein: 27g  Kcals from Outside Food (average):0 Protein: 0g  # Meals Recorded: 2 meals (First - 100% apple juice, Greek yogurt, pudding)      (Second - 100% cream of wheat w/ brown sugar, apple juice, 25% Greek yogurt)  # Supplements Recorded: 100% 1 Magic Cup

## 2020-07-30 NOTE — PLAN OF CARE
Discharge Planner SLP   Patient plan for discharge: Unknown  Current status: Videoswallow study completed per MD order with assistance of iPad  to objectively assess oropharyngeal swallow mechanism. Under fluoroscopy, pt presents with improving, mild pharyngeal dysphagia in setting of PRES vs vasculitis vs atypical demyelinating disease (felt least likely). Pt assessed with thin liquids, nectar-thick liquids, puree, and cookie. Oral phase WFL across consistencies. Swallow trigger delayed; this was most notable with solids, with majority of bolus in valleculae before swallow was triggered. Once triggered, velar elevation, BOT retraction, and pharyngeal constriction are adequate. No pharyngeal residue with any consistency. Epiglottic inversion is complete. Flash penetration occurred with thin and nectar-thick liquids. No aspiration with any consistency. Recommend dysphagia 3 (chopped) diet/thin liquids. Pt to be fully alert and upright for all PO, taking small bites/sips at slow rate. SLP to follow and advance diet at bedside as appropriate.  Barriers to return to prior living situation: Medical condition, communication deficits/dysarthria, weakness  Recommendations for discharge: TCU  Rationale for recommendations: Pt will benefit from ongoing ST targeting swallow and communication       Entered by: Claire Marrero 07/30/2020 12:50 PM

## 2020-07-31 ENCOUNTER — APPOINTMENT (OUTPATIENT)
Dept: PHYSICAL THERAPY | Facility: CLINIC | Age: 60
End: 2020-07-31
Payer: COMMERCIAL

## 2020-07-31 ENCOUNTER — APPOINTMENT (OUTPATIENT)
Dept: OCCUPATIONAL THERAPY | Facility: CLINIC | Age: 60
End: 2020-07-31
Payer: COMMERCIAL

## 2020-07-31 ENCOUNTER — APPOINTMENT (OUTPATIENT)
Dept: SPEECH THERAPY | Facility: CLINIC | Age: 60
End: 2020-07-31
Payer: COMMERCIAL

## 2020-07-31 LAB
B2 GLYCOPROT1 IGG SERPL IA-ACNC: 3 U/ML
B2 GLYCOPROT1 IGM SERPL IA-ACNC: <2.9 U/ML
CARDIOLIPIN ANTIBODY IGG: <1.6 GPL-U/ML (ref 0–19.9)
CARDIOLIPIN ANTIBODY IGM: 1.1 MPL-U/ML (ref 0–19.9)
CCP AB SER IA-ACNC: 1 U/ML
INTERPRETATION ECG - MUSE: NORMAL
INTERPRETATION ECG - MUSE: NORMAL
LDH SERPL L TO P-CCNC: 168 U/L (ref 85–227)
RHEUMATOID FACT SER NEPH-ACNC: 78 IU/ML (ref 0–20)
TROPONIN I SERPL-MCNC: <0.015 UG/L (ref 0–0.04)

## 2020-07-31 PROCEDURE — 12000001 ZZH R&B MED SURG/OB UMMC

## 2020-07-31 PROCEDURE — 86146 BETA-2 GLYCOPROTEIN ANTIBODY: CPT | Performed by: STUDENT IN AN ORGANIZED HEALTH CARE EDUCATION/TRAINING PROGRAM

## 2020-07-31 PROCEDURE — 86200 CCP ANTIBODY: CPT | Performed by: STUDENT IN AN ORGANIZED HEALTH CARE EDUCATION/TRAINING PROGRAM

## 2020-07-31 PROCEDURE — 00000401 ZZHCL STATISTIC THROMBIN TIME NC: Performed by: STUDENT IN AN ORGANIZED HEALTH CARE EDUCATION/TRAINING PROGRAM

## 2020-07-31 PROCEDURE — 00000167 ZZHCL STATISTIC INR NC: Performed by: STUDENT IN AN ORGANIZED HEALTH CARE EDUCATION/TRAINING PROGRAM

## 2020-07-31 PROCEDURE — 25000128 H RX IP 250 OP 636: Performed by: STUDENT IN AN ORGANIZED HEALTH CARE EDUCATION/TRAINING PROGRAM

## 2020-07-31 PROCEDURE — 36415 COLL VENOUS BLD VENIPUNCTURE: CPT | Performed by: STUDENT IN AN ORGANIZED HEALTH CARE EDUCATION/TRAINING PROGRAM

## 2020-07-31 PROCEDURE — 85732 THROMBOPLASTIN TIME PARTIAL: CPT | Performed by: STUDENT IN AN ORGANIZED HEALTH CARE EDUCATION/TRAINING PROGRAM

## 2020-07-31 PROCEDURE — 25000132 ZZH RX MED GY IP 250 OP 250 PS 637: Performed by: STUDENT IN AN ORGANIZED HEALTH CARE EDUCATION/TRAINING PROGRAM

## 2020-07-31 PROCEDURE — 97530 THERAPEUTIC ACTIVITIES: CPT | Mod: GO

## 2020-07-31 PROCEDURE — 97110 THERAPEUTIC EXERCISES: CPT | Mod: GO

## 2020-07-31 PROCEDURE — 93010 ELECTROCARDIOGRAM REPORT: CPT | Mod: 76 | Performed by: INTERNAL MEDICINE

## 2020-07-31 PROCEDURE — 83615 LACTATE (LD) (LDH) ENZYME: CPT | Performed by: STUDENT IN AN ORGANIZED HEALTH CARE EDUCATION/TRAINING PROGRAM

## 2020-07-31 PROCEDURE — 86147 CARDIOLIPIN ANTIBODY EA IG: CPT | Performed by: STUDENT IN AN ORGANIZED HEALTH CARE EDUCATION/TRAINING PROGRAM

## 2020-07-31 PROCEDURE — 85597 PHOSPHOLIPID PLTLT NEUTRALIZ: CPT | Performed by: STUDENT IN AN ORGANIZED HEALTH CARE EDUCATION/TRAINING PROGRAM

## 2020-07-31 PROCEDURE — 97530 THERAPEUTIC ACTIVITIES: CPT | Mod: GP

## 2020-07-31 PROCEDURE — 93005 ELECTROCARDIOGRAM TRACING: CPT

## 2020-07-31 PROCEDURE — 86431 RHEUMATOID FACTOR QUANT: CPT | Performed by: STUDENT IN AN ORGANIZED HEALTH CARE EDUCATION/TRAINING PROGRAM

## 2020-07-31 PROCEDURE — 85730 THROMBOPLASTIN TIME PARTIAL: CPT | Performed by: STUDENT IN AN ORGANIZED HEALTH CARE EDUCATION/TRAINING PROGRAM

## 2020-07-31 PROCEDURE — 97535 SELF CARE MNGMENT TRAINING: CPT | Mod: GO

## 2020-07-31 PROCEDURE — 85613 RUSSELL VIPER VENOM DILUTED: CPT | Performed by: STUDENT IN AN ORGANIZED HEALTH CARE EDUCATION/TRAINING PROGRAM

## 2020-07-31 PROCEDURE — 92526 ORAL FUNCTION THERAPY: CPT | Mod: GN

## 2020-07-31 PROCEDURE — 84484 ASSAY OF TROPONIN QUANT: CPT | Performed by: STUDENT IN AN ORGANIZED HEALTH CARE EDUCATION/TRAINING PROGRAM

## 2020-07-31 RX ADMIN — CARVEDILOL 25 MG: 25 TABLET, FILM COATED ORAL at 09:05

## 2020-07-31 RX ADMIN — AMLODIPINE BESYLATE 10 MG: 10 TABLET ORAL at 09:05

## 2020-07-31 RX ADMIN — CHLORTHALIDONE 25 MG: 25 TABLET ORAL at 09:05

## 2020-07-31 RX ADMIN — ENTECAVIR 1 MG: 1 TABLET ORAL at 09:05

## 2020-07-31 RX ADMIN — ENOXAPARIN SODIUM 40 MG: 40 INJECTION SUBCUTANEOUS at 18:54

## 2020-07-31 RX ADMIN — AMOXICILLIN AND CLAVULANATE POTASSIUM 1 TABLET: 875; 125 TABLET, FILM COATED ORAL at 20:26

## 2020-07-31 RX ADMIN — ATORVASTATIN CALCIUM 40 MG: 40 TABLET, FILM COATED ORAL at 20:26

## 2020-07-31 RX ADMIN — CEFTRIAXONE 1 G: 1 INJECTION, POWDER, FOR SOLUTION INTRAMUSCULAR; INTRAVENOUS at 12:18

## 2020-07-31 RX ADMIN — ASPIRIN 81 MG CHEWABLE TABLET 81 MG: 81 TABLET CHEWABLE at 09:05

## 2020-07-31 RX ADMIN — CARVEDILOL 25 MG: 25 TABLET, FILM COATED ORAL at 18:54

## 2020-07-31 ASSESSMENT — MIFFLIN-ST. JEOR: SCORE: 1353.32

## 2020-07-31 ASSESSMENT — ACTIVITIES OF DAILY LIVING (ADL)
ADLS_ACUITY_SCORE: 24
ADLS_ACUITY_SCORE: 24
ADLS_ACUITY_SCORE: 22
ADLS_ACUITY_SCORE: 24

## 2020-07-31 NOTE — PLAN OF CARE
Discharge Planner PT   Patient plan for discharge: Not discussed  Current status: Pt more lethargic in todays session leading to regression in mobility. ModAx2 for log rolling R. Pt mod-maxAx2 with help at feet and shoulders with sup>sit. maxAx2 for sit>stand with guarding pt R knee due to a valgus buckle. Pt transferred maxAx1 with assistance at R leg for bed>chair stand-pivot transfer.   Barriers to return to prior living situation: Weaknesss, Hemiparesis, lethargy, medical status  Recommendations for discharge: TCU  Rationale for recommendations: Patient well below baseline, will benefit from a longer stay at TCU to return to functional independence.        Entered by: Judd Cuevas 07/31/2020 3:51 PM

## 2020-07-31 NOTE — PLAN OF CARE
"I saw and evaluated the patient and agree with the resident note and plan of care. Patient complaining of crampy abdominal pain this afternoon and decreased stool and gas output from ileostomy. Abdominal exam benign. Will obtain KUB.     Adriana Najrea MD  Reconstructive Urology Fellow    -----------------------------------------------------------------------------------------------------------------------------------------    Urology  Progress Note    - afebrile overnight  - epidural removed yesterday, pain is controlled with oral medication   - Tolerating regular diet, no nausea or vomiting   - +stool and gas in ileostomy    Exam  /81   Pulse 99   Temp 99.3  F (37.4  C) (Oral)   Resp 16   Ht 1.473 m (4' 10\")   Wt 69.4 kg (153 lb)   SpO2 97%   BMI 31.98 kg/m    No acute distress  Unlabored breathing  Abdomen soft, non-distended, appropriately tender to palpation. L abdominal ileostomy w/  brown liquid and gas. Laparotomy incision C/D/I with staples, no clear evidence of fluctuance/drainage   R abdominal urostomy with dark UOP, mucous in bag, stoma pink and viable with single stent. Yellow urine in bag.   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     Neuro: tylenol, dilaudid IV and PO PRN.  CV: HDS. HR normalizing. LE US 7/14 negative for DVT.   Pulm: incentive spirometry while awake  FEN/GI: regular diet, no MIVF  Endo: DIRK   : monitor urine output, will check LEANDRO Cr. If normal- will plan for stent and LEANDRO removal.   Heme/ID: Hgb stable. Afebrile x24 hours. F/u cultures. Levaquin added 7/14.   Activity: Up with assist. Encourage ambulation.   PPx: SCDs  Dispo: pending. WOCRN to assist with stoma " Discharge Planner SLP   Patient plan for discharge: Unknown  Current status: Pt seen for dysphagia tx. Prolonged mastication of cracker, otherwise functional swallow. Pt declined lang tx this date. Communication remains a barrier d/t pt's low vocal intensity; iPad  has a difficult time understanding pt. Recommend dysphagia 3 (chopped) diet/thin liquids. Pt to be fully alert and upright for all PO, taking small bites/sips at slow rate. SLP will follow.  Barriers to return to prior living situation: Medical condition, communication deficits/dysarthria, weakness  Recommendations for discharge: TCU  Rationale for recommendations: Pt will benefit from ongoing ST targeting swallow and communication       Entered by: Claire Marrero 07/31/2020 4:16 PM        tran. TCU is recommended per PT/OT       Will discuss with Dr. Causey.    Jaylyn Matos  Urology PGY-5           Contacting the Urology Team     Please use the following job codes to reach the Urology Team. Note that you must use an in house phone and that job codes cannot receive text pages.     On weekdays, dial [phone] (or star-star-star [phone] on the new [employer] telephones) then [phone] to reach the Adult Urology resident or PA on call    On weekdays, dial [phone] (or star-star-star [phone] on the new [employer] telephones) then [phone] to reach the Pediatric Urology resident    On weeknights and weekends, dial [phone] (or star-star-star [phone] on the new [employer] telephones) then [phone] to reach the Urology resident on call (for both Adult and Pediatrics)

## 2020-07-31 NOTE — PROGRESS NOTES
[facility]  General Neurology Progress Note    Patient Name:  Wilfredo Yoon  MRN:  [id number]    :  1960  Date of Service: 20    Patient Summary:   Mr. Yoon is a 60 year old male with a PMHx of HTN, chronic Hepatitis B c/b liver cirrhosis and ascites, and CKD who presented on 20 with fall and AMS, found to have T2 hyperintensity in brainstem on MRI, suspicious for PRES and multiple infarcts. Deficits include right hemibody weakness and expressive aphasia. Repeat MRI 1 week later shows improvement in PRES and evidence of at least 1 new infarct in left periventricular region.     Interval Updates:   - Some dropping of QRS on telemetry overnight --> ordered EKG       Subjective:   Difficult to obtain, patient with severe expressive aphasia. Patient evaluated today with help of iPad  in [facility].     Physical Examination (ipad )  Vitals Overnight: 's-120's/70s-90s, RR 16, HR 60s   Physical Exam:  Constitutional: Alert, Laying in bed. Appears comfortable.    Head: Atraumatic, normocephalic.  Cardiovascular: Warm extremities. No leg edema. RRR.   Lungs: CTA b/l anteriorly and laterally   Genital: No discharge from penis. No Hawkins.     Neurologic:   Mental Status: Alert, awake. Follows commands to the best of his ability (sticks tongue out, wiggles toes on right, raises left arm above head). Speech is dysarthric and unintelligible to myself and iPad .   Cranial Nerves: Looks in all direction on command, eyes move conjugately. Smile and eyebrow raise equal, blinking symmetric, mild right nasolabial flattening. Hearing intact to conversation.   Motor: R hemiparesis-RUE and RLE cannot lift antigravity but some movement in plane. Very weak right hand . LUE moving spontaneously, antigravity.  Able to hold left upper extremity antigravity without drift. No movement observed in RLE.   Sensory: Difficult to assess    Gait: not tested    Labs/Investigations  - Fluid intake for 7/30 recorded as 170cc. Output recorded as 275cc, but note that patient is wearing adult diaper.   - No calorie count posted for 7/30  - CRP 25 (50 on 7/18)  -   -  (119 on 7/18)  - UA with trace leukocyte esterase, 4 WBC. Urine culture from 7/29 showing enterococcus faecalis (susceptiblity pending)     Assessment and Plan:  Mr. Yoon is a 60 year old man with a PMH of chronic Hep B with cirrhosis and ascites, HTN with hx hypertensive emergency, tobacco use, HLD, who presented to the ED on 7/16/20 with AMS and concern for stroke. MR brain showed large brainstem hyperintensity w/extension into cerebellum on FLAIR sequence without DWI/ADC correlate. It also showed moderate-severe leukoaraiosis plus several areas of restricted diffusion including at left corona radiata and right basal ganglia, most likely consistent with infarcts. DDx of brainstem hyperintensities is broad and includes infectious, inflammatory, vasculitic, demyelinating, toxic/metabolic (eg CPM), and neoplastic pathologies, but suspect the most likely explanation is brainstem variant PRES. The following CSF labs were negative: NMO/AQP4, MOG, WBC 2, RBC 0, negative gram stain/culture, protein 30, HSV 1 and 2, EBV, Lyme IGG/IGM, oligoclonal banding, RAJAN virus, leukemia/lymphoma evaluation, ACE, enterovirus, VZV, VDRL, TB PCR, CMV (note suboptimal sample), cytology. The following serum labs were unremarkable: TSH, ANCA, FARIDA, HIV, ganglioside antibodies, myeloperoxidase. RF, ESR, CRP elevated (chronic). Trace cryoglobulin. Kappa and lambda light chains elevated and ratio slightly elevated. Low suspicion for vasculitic process given clinical stability and improving inflammatory labs. Patient will need TCU and close outpatient follow up with both neurology and rheumatology.     #Extensive posterior T2 hyperintensities, suspect PRES, improving  - Continue supportive cares   - Blood  pressure management as below in HTN section  - Neurochecks Q4 hours  - Rheumatology consulted to assist in evaluating for active cryoglobulinemia, vasculitis, hypercoagulability. Per rheumatology, low suspicion for cryoglobulins contributing to overall picture. Cannot exclude vasculitis vs. Hyperviscosity as contributors to infarcts. Will track cryoglobulin and complement levels. Will see rheum outpatient for repeat evaluation and labs.   - Patient will need repeat MRI in 2 weeks and outpatient general neurology follow-up within 1 month of discharge     #Multifocal subcortical infarcts, suspect 2/2 severe hypertension vs. 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   Consulted nephrology 7/22, appreciate recs.  - Started Chlorthalidone 25 mg daily  - Strict Is and Os  - Continue amlodipine 10 mg PO daily  - Continue Coreg 25mg PO BID  - Hold PTA Lisinopril 40 mg PO daily due to ANDRY (per renal: If Cr remains stable for the next 1 week, restart Lisinopril at 10 mg daily and titrate dose up if needed to maintain BP ~ 130/80. Titrate dose up to 40 mg daily which was his PTA dose)  - Hydralazine 10mg PO prn for SBP>165  - Follow with Nephrology clinic at discharge (Dr Fuentes) in 2-4 weeks    #Cirrhosis 2/2 Hepatitis B  #Ascites  S/P paracentesis of 3.3L on 7/28 with IR.   - Continue entecavir 0.5 mg daily    #Positive Quant Gold Test   Likely represents latent TB infection. CSF TB PCR negative. No chronic pulmonary symptoms/persistent cough, stable weight. Low suspicion for acute pulmonary TB.   - ID  Referral at discharge     #Malnutrition:  Moderate, based on decreased PO intake and dysphagia and calorie counts  Advanced to dysphagia 3 diet. Calorie counts ongoing.   - May need PEG or NG tube at discharge for supplemental feedings  - Can consider adding mirtazapine to stimulate appetite.   - Daily weights     #Hyperlipidemia   on admission   - Atorvastatin as above     #Urinary Retention, improving   #Enterococcus faecalis UTI   Vazquez was place for urinary retention. Failed trial of void on 7/23-7/24. UA from last night shows large amount of leukocyte esterase, greater than 182 WBC, presence of bacteria and WBC clumps in urine. Now growing Enterococcus. Suspect UTI related to catheter use   - Removed vazquez   - Intermittent straight cath PRN   - Augmentin 875-125 Q12h for 5 days for complicated UTI     #Telemetry Abnormalities  Pt with occasional dropped QRS interval on telemetry overnight. Ordered EKG in AM that showed mild ST elevation in V2, V3, but did not meet criteria for STEMI. Repeat EKG stable. Troponin x 1 undetectable. Will just watch for now but low threshold to pursue more extensive workup if additional abnormalities noted on telemetry.     Diet: Dysphagia 3   DVT prophylaxis: SCDs, enoxaparin subcutaneous   Lines: PIV  Code status: Full code    Disposition Plan   TCU, hopefully Monday.        Entered: Yuval Sandy 07/31/2020, 7:33 AM       Patient was seen and discussed with Dr. Carvalho.     Yuval Sandy MD  PGY-2 Neurology Resident  P: [phone]      I saw and evaluated the patient on 7/31/20 and agree with the findings and the plan of care as documented in the resident's note.      Diet advanced after video swallow.  Will continue calorie counts to see if needs NG.  Plan for close monitoring as outpatient with repeat MRI in ~2 weeks.      Geo Carvalho DO   of Neurology

## 2020-07-31 NOTE — PROGRESS NOTES
Social Work: Assessment with Discharge Plan    Patient Name:  Wilfredo Yoon  :  1960  Age:  60 year old  MRN:  [id number]  Risk/Complexity Score:  Filed Complexity Screen Score: 7  Completed assessment with:  Niece (David Paris)    Presenting Information   Reason for Referral:  Assessment and Discharge Planning  Date of Intake:  2020  Referral Source: Case Finding  Decision Maker:  Patient  Alternate Decision Maker:  David states that pt was/is a very private person and did not share information with family members.  David states that pt only recently told her that he wants her to be his emergency contact.  Niece does not know if pt ever  (again stating that pt is very private and did not share information).  Niece states that pt has 4 adult children who reside in [facility].  Niece states that pt is one of 11 children.  Niece states that pt's sister (Sofi Yoon ([phone]) lives in [place], [place] and the remaining siblings (including David's father Cindy) live in [place].    Health Care Directive:  Provided education  Living Situation:  Pt was living alone in a 5th floor apt. There are no steps to enter the building. Once inside, the building is accessible by elevator.  Laundry facility's are on the first floor. Pt's bathroom has a tub/shower combination.    Previous Functional Status:  Per niece, pt was ambulating with a cane prior to hospitalization.  Niece states that to her knowledge, pt has only fallen 1x in the past year (leading up to current hospitalization). Niece again states that pt was very private and did not share information with family and thus, she does not know any add'l information about pt's previous level of indep, whether or not pt owns add'l DME, whether or not pt was utilizing community resources, pt's previous mode of transportation ect.  Patient and family understanding of hospitalization:  CVA  Cultural/Language/Spiritual  Considerations:   Libyan/Hinduism/Islamic  Adjustment to Illness:  Appropriate for situation.    Physical Health  Reason for Admission:    1. Confusion    2. Intracranial aneurysm    3. Hypertension, unspecified type    4. COVID-19 virus not detected      Services Needed/Recommended:  TCU    Mental Health/Chemical Dependency  Diagnosis:  Not applicable  Support/Services in Place:  Not applicable  Services Needed/Recommended:  Not applicable    Support System  Significant relationship at present time:  As stated above  Family of origin is available for support:  As stated above  Other support available:  As stated above  Gaps in support system:  Pt's niece is the only local family member  Patient is caregiver to:  None     Provider Information   [facility]   [phone]   Clinic:  [address] [place] [address] / [facility] [address]      :  Unknown    Financial   Income Source:  Unknown  Financial Concerns:  None stated  Insurance:    Payor/Plan Subscriber Name Rel Member # Group #   UCARE - UCARE VLAD ALVAREZCAITIERADHA* F [id number] [address]       Discharge Plan   Patient and family discharge goal: Rehab placement in a SNF near to pt's home  Provided education on discharge plan:  YES  Patient agreeable to discharge plan:  YES  A list of Medicare Certified Facilities was provided to the patient and/or family to encourage patient choice. Patient's choices for facility are:  Jonathon plans to speak with pt's sister (Sofi) to inquire as to if Sofi would like to make decisions regarding disposition planning or if she would like to defer this to jonathon. Nirashel states that she will get back to this SW after she speaks with Sofi.  Will NH provide Skilled rehabilitation or complex medical:  YES  General information regarding anticipated insurance coverage and possible out of pocket cost was discussed. Patient and patient's family are aware patient may incur the cost of  "transportation to the facility, pending insurance payment: YES  Barriers to discharge:  Medical readiess    Discharge Recommendations   Anticipated Disposition:  TCU placement in a community SNF      Additional comments   - MAHESH received a 7/30/2020, note (on desk) requesting that SW call [phone] in regards to this patient.  The note does not state the name of the caller or the relationship (to the patient).  SW did not call this number for this reason.  Note states, \"[phone], please call regarding Car.\"   SW asked pt's niece whether or not she recognizes this phone number and she did not.  Niece states that pt is very private and would not want information shared about him with multiple people.  Niece states that this would upset pt.    SW will follow for discharge planning.    JOHNNY Rodriguez  Social Work, 6A  Phone:  [phone]  Pager:  [phone]  7/31/2020        "

## 2020-07-31 NOTE — PROGRESS NOTES
"Calorie Count    Intake recorded for: 7/30/2020  Total Kcals: ? Total Protein: ?g    Kcals from Hospital Food: ?   Protein: ?g    Kcals from Outside Food (average):? Protein: ?g    # Meals Recorded: 3 meals ordered from kitchen. No food intake recorded on calorie count sheet. Meal tray tickets not saved. [facility] Food Record shows \"75% intake\" @ 1850, but no note stating what was eaten. Not enough information to accurately calculate nutritionals.     # Supplements Recorded: no intake recorded             "

## 2020-07-31 NOTE — PLAN OF CARE
Status: Pt on 6A for stroke work up s/p fall, concern for PRES and vasoconstriction infarcts after HTN emergency  Vitals: VSS on RA, HTN w/in nakita  Neuros: Difficult to assess, interpretor is unable to understand the pt, family was present and able to help understand pt, oriented to self and place, disoriented to situation and time, L side strengths 4/5, R side strengths 2/5, denied N/T  IV: PIVs are SL  Resp/trach: Lung sounds are clear  Diet: DD3 w/ thins, 1:1  Bowel status: Bowel sounds are active, 1 smear BM this shift, light brown/megan colored  : Incontinent  Skin: Intact  Pain: No signs of pain present  Activity: Assist x2 w/ lift  Social: His sister is present this shift and has been supportive in his cares  Plan: Will continue to monitor and follow POC

## 2020-07-31 NOTE — PROGRESS NOTES
Brief Rheumatology Note     Wilfredo Yoon MRN# [id number]   YOB: 1960 Age: 60 year old      Date of Service: July 31, 2020       Assessment /Plan:     61 yo male with pmhx of hep b, cirrhosis, and chronic cryoglobulinemia admitted with multifocal infarcts and likely PRES variant. Repeat MRI showing new areas of infarct. Rheumatology consulted for possible vasculitis. Cryoglobulinemia syndromes/mixed cryo vasculitis is possible given positive cryo, elevated ESR, CRP, and RF. However this patient has had known cryoglobulinemia without issue chronically. Difficult to say if the cryoglobulins are responsible for current symptoms. He lacks skin findings, he did have an ANDRY on admission that has since improved and in setting of normal complement and LDH level, makes active cryo-AV, unlikely to be related to his current features.       DIAGNOSIS:   #Cryoglobulinemia  #Multifocal infarcts  #Chronic Hep B cirrhosis  #MPGN consistent with cryoglobulinemia  #Latent TB     Pending tests:  -RF  -CCP antibody  -Beta-2 GP IgM and IgG, Anti-cardiolipin abx, Lupus anticoagulation panel      Plan:  - Agree with observation in setting of improvement on imaging which is suggestive of resolving PRES. In the meantime recommend tracking Cryo's and complement levels. If there was any evidence of active disease with decreasing complement level and elevated Cryo's, We recommend follow up with Rheumatology as outpatient.     - Rheumatology will sign off for now. Please page rheumatology if you have any question.     Patient was discuss with Dr. Flower who agrees with assessment and  Plan.     Giovanni Ferris MD  Internal Medicine PGY-2  P: [phone]    I discussed but did not see  the patient with the fellow and resident.  My exam and recomendations are as described.      Serge Flower MD  [phone]

## 2020-07-31 NOTE — PLAN OF CARE
Status: Pt on 6A for stroke work up s/p fall, concern for PRES and vasoconstriction infarcts after HTN emergency.   Vitals: VSS on RA  Neuros: Difficult to assess,  used, but even  is unable to understand patient much of the time. JI/LL 3-4/5, RU/RL 1-2/5.  IV: PIV SL   Resp: LS diminished in bases.  Encouraged pt to cough, does not follow.   Diet: DD3 with thins with precautions (see SLP note), 1:1 assist/supervision eating.   Bowel status: Last BM 07/30, incontinent of megan/tan soft BM, BS+  : Incontinent of urine. UA negative    Skin: WDL   Pain: Denied pain, no obvious signs of pain  Activity: Up with 2, lift. Turn/repo Q2hrs and PRN.   Social: No visitors this shift.   Plan: Cont to monitor.

## 2020-07-31 NOTE — PLAN OF CARE
Status: Pt on 6A for stroke work up s/p fall, concern for PRES and vasoconstriction infarcts after HTN emergency.   Vitals: VSS on RA, HTN WDL   Neuros: Difficult to assess,  used, but even  is unable to understand patient much of the time. JI/LL 3-4/5, RU/RL 1-2/5.  IV: PIV SL between abx.   Resp: LS diminished in bases. Slightly course. Pt encouraged to cough, does not follow.   Diet: DD3 with thins with precautions (see SLP note), 1:1 assist/supervision eating. Tolerated well. Ate approx 75% of dinner.   Bowel status: BS +, One very large megan/tan/brown colored stool, soft and liquid.   : Incontinent of urine. Changed Q2hrs and PRN. Straight cath'ed x1 for UA. UA sent.    Skin: WDL   Pain: Tylenol given x1.   Activity: Up with 2, lift. Turn/repo Q2hrs and PRN.   Social: No visitors this shift.   Plan: Cont to monitor.

## 2020-07-31 NOTE — PLAN OF CARE
OT/6A:  Discharge Planner OT   Patient plan for discharge: Not discussed today  Current status: Interpretor used. Pt engaged in preparatory AAROM/PROM with RUE, 10 sets each of shoulder, elbow, and wrist flexion/extension and R hand grasp/release. Improved muscle activation noted from previous sessions. Therapist alerted to plan for EKG monitoring of troponin, further OOB activity deferred and session continued seated in bed with OK from RN. Completed seated face-washing and toothbrushing with incorporation of RUE with [place] assistance. Pt able to follow one-step commands throughout session.  Barriers to return to prior living situation: Medical status, R sided weakness, level of assist, cognition  Recommendations for discharge: TCU  Rationale for recommendations: Pt is significantly below baseline and would benefit from continued skilled OT services to address deficits to promote independence in ADLs/IADLs and safety.       Entered by: Emerita Melton 07/31/2020 10:44 AM

## 2020-07-31 NOTE — PROGRESS NOTES
CLINICAL NUTRITION SERVICES - BRIEF NOTE   (See RD note on 7/27 for full assessment)     Reason for RD note: Following up on kcal cts and SLP diet advancement    New Findings/Chart Review:  Oral Diet: DD3 with thin liquids per SLP VFSS on 7/30 (previously on full nectar-thick liquids) + Boost Plus TID (vary flavors) between meals    Intake: % meals over past week per RN flowsheets (improved compared to last wk)    Kcal cts  7/28       Total Kcals: 515       Total Protein: 10g   7/29       Total Kcals: 843       Total Protein: 27g   7/30       Total Kcals: ?           Total Protein: ?g -- incomplete data  -->Based on 2-day average: 679 kcal (13 kcal/kg, or 43% minimum assessed needs), and 19 g protein (0.4 g pro/kg, or 24% minimum assessed needs).     Interventions:  -Continue Boost Plus TID as ordered  -Continue kcal cts 8/1-8/3     Future/Additional Recommendations:  1. Attempted calling bedside RN to check on Boost intake, line busy x2 attempts    2. Monitor kcal cts. Recommend pt meets at least 75% minimum assessed needs (1200 kcal, 60 g pro) to avoid more aggressive nutrition interventions such as additional scheduled ONS/snacks vs FT placement and EN support.   -->RD notes that primary team is hopeful to avoid PEG and place NG instead if needed.     Nutrition will continue to follow per protocol.    Estelle Miguel RD, LD  Pager: [phone]

## 2020-08-01 ENCOUNTER — APPOINTMENT (OUTPATIENT)
Dept: INTERPRETER SERVICES | Facility: CLINIC | Age: 60
End: 2020-08-01
Payer: COMMERCIAL

## 2020-08-01 LAB
ALBUMIN SERPL-MCNC: 1.3 G/DL (ref 3.4–5)
ALP SERPL-CCNC: 144 U/L (ref 40–150)
ALT SERPL W P-5'-P-CCNC: 24 U/L (ref 0–70)
ANION GAP SERPL CALCULATED.3IONS-SCNC: 6 MMOL/L (ref 3–14)
AST SERPL W P-5'-P-CCNC: 36 U/L (ref 0–45)
BACTERIA SPEC CULT: ABNORMAL
BACTERIA SPEC CULT: ABNORMAL
BILIRUB SERPL-MCNC: 0.9 MG/DL (ref 0.2–1.3)
BUN SERPL-MCNC: 28 MG/DL (ref 7–30)
CALCIUM SERPL-MCNC: 8.2 MG/DL (ref 8.5–10.1)
CHLORIDE SERPL-SCNC: 106 MMOL/L (ref 94–109)
CO2 SERPL-SCNC: 24 MMOL/L (ref 20–32)
CREAT SERPL-MCNC: 1.36 MG/DL (ref 0.66–1.25)
ERYTHROCYTE [DISTWIDTH] IN BLOOD BY AUTOMATED COUNT: 12.3 % (ref 10–15)
GFR SERPL CREATININE-BSD FRML MDRD: 56 ML/MIN/{1.73_M2}
GLUCOSE SERPL-MCNC: 90 MG/DL (ref 70–99)
HCT VFR BLD AUTO: 31.8 % (ref 40–53)
HGB BLD-MCNC: 10.5 G/DL (ref 13.3–17.7)
LA PPP-IMP: NEGATIVE
Lab: ABNORMAL
MCH RBC QN AUTO: 31.9 PG (ref 26.5–33)
MCHC RBC AUTO-ENTMCNC: 33 G/DL (ref 31.5–36.5)
MCV RBC AUTO: 97 FL (ref 78–100)
PLATELET # BLD AUTO: 148 10E9/L (ref 150–450)
POTASSIUM SERPL-SCNC: 4.1 MMOL/L (ref 3.4–5.3)
PROT SERPL-MCNC: 5.5 G/DL (ref 6.8–8.8)
RBC # BLD AUTO: 3.29 10E12/L (ref 4.4–5.9)
SODIUM SERPL-SCNC: 136 MMOL/L (ref 133–144)
SPECIMEN SOURCE: ABNORMAL
WBC # BLD AUTO: 8.2 10E9/L (ref 4–11)

## 2020-08-01 PROCEDURE — 36415 COLL VENOUS BLD VENIPUNCTURE: CPT | Performed by: PSYCHIATRY & NEUROLOGY

## 2020-08-01 PROCEDURE — 85027 COMPLETE CBC AUTOMATED: CPT | Performed by: PSYCHIATRY & NEUROLOGY

## 2020-08-01 PROCEDURE — 80053 COMPREHEN METABOLIC PANEL: CPT | Performed by: PSYCHIATRY & NEUROLOGY

## 2020-08-01 PROCEDURE — 25800030 ZZH RX IP 258 OP 636: Performed by: STUDENT IN AN ORGANIZED HEALTH CARE EDUCATION/TRAINING PROGRAM

## 2020-08-01 PROCEDURE — 25000132 ZZH RX MED GY IP 250 OP 250 PS 637: Performed by: STUDENT IN AN ORGANIZED HEALTH CARE EDUCATION/TRAINING PROGRAM

## 2020-08-01 PROCEDURE — 12000001 ZZH R&B MED SURG/OB UMMC

## 2020-08-01 PROCEDURE — 25000128 H RX IP 250 OP 636: Performed by: STUDENT IN AN ORGANIZED HEALTH CARE EDUCATION/TRAINING PROGRAM

## 2020-08-01 RX ORDER — POLYETHYLENE GLYCOL 3350 17 G/17G
17 POWDER, FOR SOLUTION ORAL DAILY
Status: DISCONTINUED | OUTPATIENT
Start: 2020-08-02 | End: 2020-08-04 | Stop reason: HOSPADM

## 2020-08-01 RX ADMIN — AMLODIPINE BESYLATE 10 MG: 10 TABLET ORAL at 09:31

## 2020-08-01 RX ADMIN — CHLORTHALIDONE 25 MG: 25 TABLET ORAL at 09:31

## 2020-08-01 RX ADMIN — AMOXICILLIN AND CLAVULANATE POTASSIUM 1 TABLET: 875; 125 TABLET, FILM COATED ORAL at 09:31

## 2020-08-01 RX ADMIN — ATORVASTATIN CALCIUM 40 MG: 40 TABLET, FILM COATED ORAL at 20:02

## 2020-08-01 RX ADMIN — ENTECAVIR 1 MG: 1 TABLET ORAL at 09:31

## 2020-08-01 RX ADMIN — ENOXAPARIN SODIUM 40 MG: 40 INJECTION SUBCUTANEOUS at 20:02

## 2020-08-01 RX ADMIN — AMOXICILLIN AND CLAVULANATE POTASSIUM 1 TABLET: 875; 125 TABLET, FILM COATED ORAL at 20:02

## 2020-08-01 RX ADMIN — CARVEDILOL 25 MG: 25 TABLET, FILM COATED ORAL at 09:31

## 2020-08-01 RX ADMIN — CARVEDILOL 25 MG: 25 TABLET, FILM COATED ORAL at 20:02

## 2020-08-01 RX ADMIN — SODIUM CHLORIDE, POTASSIUM CHLORIDE, SODIUM LACTATE AND CALCIUM CHLORIDE 1000 ML: 600; 310; 30; 20 INJECTION, SOLUTION INTRAVENOUS at 16:09

## 2020-08-01 ASSESSMENT — ACTIVITIES OF DAILY LIVING (ADL)
ADLS_ACUITY_SCORE: 24

## 2020-08-01 ASSESSMENT — MIFFLIN-ST. JEOR: SCORE: 1353.78

## 2020-08-01 NOTE — PROGRESS NOTES
[facility]  General Neurology Progress Note    Patient Name:  Wilfredo Yoon  MRN:  [id number]    :  1960  Date of Service: 20    Patient Summary:   Mr. Yoon is a 60 year old male with a PMHx of HTN, chronic Hepatitis B c/b liver cirrhosis and ascites, and CKD who presented on 20 with fall and AMS, found to have T2 hyperintensity in brainstem on MRI, suspicious for brainstem variant PRES and multiple acute-subacute infarcts, felt to be secondary to malignant hypertension. Deficits include right hemibody weakness and expressive aphasia. Repeat MRI 1 week later shows improvement in PRES and evidence of at least 1 new infarct in left periventricular region.     Interval Updates:   No acute events overnight.       Subjective:   Difficult to obtain, patient with severe expressive aphasia. Patient evaluated today with help of iPad  in [facility].     Physical Examination (ipad )  Vitals Overnight: WNL overnight   Physical Exam:  Constitutional: Alert, Laying in bed. Appears comfortable.    Head: Atraumatic, normocephalic.  Cardiovascular: Warm extremities. No leg edema. RRR.   Lungs: CTA b/l anteriorly and laterally   Genital: No discharge from penis. No Hawkins.     Neurologic:   Mental Status: Alert, awake. Follows commands to the best of his ability (sticks tongue out, wiggles toes on right, raises left arm above head). Speech is dysarthric and unintelligible to myself and iPad .   Cranial Nerves: Looks in all direction on command, eyes move conjugately. Smile and eyebrow raise equal, blinking symmetric, mild right nasolabial flattening. Hearing intact to conversation.   Motor: R hemiparesis-RUE and RLE cannot lift antigravity but some movement in plane. Very weak right hand . LUE moving spontaneously, antigravity.  Able to hold left upper extremity antigravity without drift. No movement observed in RLE.   Sensory:  Difficult to assess   Gait: not tested    Labs/Investigations  - I's and O's  - Calorie count for today pending   - CMP normal, Hg 10.5, platelet 148    Assessment and Plan:  Mr. Yoon is a 60 year old man with a PMH of chronic Hep B with cirrhosis and ascites, HTN with hx hypertensive emergency, tobacco use, HLD, who presented to the ED on 7/16/20 with AMS and concern for stroke. MR brain showed large brainstem hyperintensity w/extension into cerebellum on FLAIR sequence without DWI/ADC correlate. It also showed moderate-severe leukoaraiosis plus several areas of restricted diffusion including at left corona radiata and right basal ganglia, most likely consistent with infarcts. DDx of brainstem hyperintensities is broad and includes infectious, inflammatory, vasculitic, demyelinating, toxic/metabolic (eg CPM), and neoplastic pathologies, but suspect the most likely explanation is brainstem variant PRES. The following CSF labs were negative: NMO/AQP4, MOG, WBC 2, RBC 0, negative gram stain/culture, protein 30, HSV 1 and 2, EBV, Lyme IGG/IGM, oligoclonal banding, RAJAN virus, leukemia/lymphoma evaluation, ACE, enterovirus, VZV, VDRL, TB PCR, CMV (note suboptimal sample), cytology. The following serum labs were unremarkable: TSH, ANCA, FARIDA, HIV, ganglioside antibodies, myeloperoxidase. RF, ESR, CRP elevated (chronic). Trace cryoglobulin. Kappa and lambda light chains elevated and ratio slightly elevated. Low suspicion for vasculitic process given clinical stability and improving inflammatory labs. Patient with poor appetite and generally avoids eating unless someone coaxes him to eat. He is not meeting his recommended daily intake and would benefit from supplemental feeds through PEG. Patient will need TCU and close outpatient follow up with both neurology and rheumatology.     #Extensive posterior T2 hyperintensities, suspect PRES, improving  - Continue supportive cares   - Blood pressure management as below in HTN  section  - Neurochecks Q4 hours  - Rheumatology consulted to assist in evaluating for active cryoglobulinemia, vasculitis, hypercoagulability. Per rheumatology, low suspicion for cryoglobulins contributing to overall picture. Cannot exclude vasculitis vs. Hyperviscosity as contributors to infarcts. Will track cryoglobulin and complement levels. Will see rheum outpatient for repeat evaluation and labs.   - Patient will need repeat MRI in 2 weeks and outpatient general neurology follow-up within 1 month of discharge     #Multifocal subcortical infarcts, suspect 2/2 severe hypertension vs. RCVS   - Aspirin 81mg PO daily   - Atorvastatin 40mg daily   - Telemetry  - PT/OT/SLP -- will likely discharge to TCU   - Stroke Education completed  - PMR consulted  - Consideration of re-consulting Neuro IR for DSA if he develops further ischemic infarcts    #Malnutrition:  Moderate, based on decreased PO intake and dysphagia and calorie counts  #Tentative plan for PEG before discharge   Advanced to dysphagia 3 diet. Calorie counts ongoing. Patient continues to under-eat significantly. Patient with history of portal hypertension and esophageal varices found on abd MRI from May, 2019, but his liver disease is stable now so will likely still be candidate for PEG   - Discussed PEG with niece (David). She plans to discuss this with other family members and have a decision ready by tomorrow. If PEG is pursued, patient's sister Sofi would be the one providing consent.   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   Consulted nephrology 7/22, appreciate recs.  - Started Chlorthalidone 25 mg daily  - Strict Is and Os  - Continue amlodipine 10 mg PO  daily  - Continue Coreg 25mg PO BID  - Hold PTA Lisinopril 40 mg PO daily due to ANDRY (per renal: If Cr remains stable for the next 1 week, restart Lisinopril at 10 mg daily and titrate dose up if needed to maintain BP ~ 130/80. Titrate dose up to 40 mg daily which was his PTA dose)  - Hydralazine 10mg PO prn for SBP>165  - Follow with Nephrology clinic at discharge (Dr Fuentes) in 2-4 weeks    #Cirrhosis 2/2 Hepatitis B  #Ascites  S/P paracentesis of 3.3L on 7/28 with IR.   - Continue entecavir 0.5 mg daily    #Positive Quant Gold Test   Likely represents latent TB infection. CSF TB PCR negative. No chronic pulmonary symptoms/persistent cough, stable weight. Low suspicion for acute pulmonary TB.   - ID Referral at discharge     #Hyperlipidemia   on admission   - Atorvastatin as above     #Urinary Retention, improving   #Enterococcus faecalis UTI   Vazquez was place for urinary retention. Failed trial of void on 7/23-7/24. UA from last night shows large amount of leukocyte esterase, greater than 182 WBC, presence of bacteria and WBC clumps in urine. Now growing Enterococcus. Suspect UTI related to catheter use   - Removed vazquez   - Intermittent straight cath PRN   - Augmentin 875-125 Q12h for 5 days for complicated UTI     #Telemetry Abnormalities  Pt with occasional dropped QRS interval on telemetry overnight. Ordered EKG in AM that showed mild ST elevation in V2, V3, but did not meet criteria for STEMI. Repeat EKG stable. Troponin x 1 undetectable. Will just watch for now but low threshold to pursue more extensive workup if additional abnormalities noted on telemetry.     Diet: Dysphagia 3   DVT prophylaxis: SCDs, enoxaparin subcutaneous   Lines: PIV  Code status: Full code    Disposition Plan   TCU, hopefully Monday.        Entered: Yuval Sandy 08/01/2020, 5:17 AM       Patient was seen and discussed with Dr. Carvalho.     Yuval Sandy MD  PGY-2 Neurology Resident  P: [phone]    I saw and  evaluated the patient on 8/1/20 and agree with the findings and the plan of care as documented in the resident's note.      Patient's exam stable and appears more alert today.  He still has dense R hemiparesis and dysarthria per .  His major issue now is with regards to nutrition.  He is not maintaining nutrition to optimal levels for stroke recovery based on calorie counts.  Feel may be related to his decreased functional status more than appetite.  NG and PEG discussed with family to adamantly deny feeding tube of any sort.  Benefits including improved recovery with adequate nutrition discussed but they again deny.  Patient being treated for UTI.  Will need repeat follow up MRI and close follow up in neurology in 2-3 weeks to ensure no underlying vasculitis process present but risk felt low given lack of inflammatory markers and improvement in exam and imaging in general despite no treatment.     Geo Carvalho DO   of Neurology

## 2020-08-01 NOTE — PLAN OF CARE
Admitted for stroke. Hx HTN, hep B cirrhosis, HLD. Neuros: follows most commands, dysarthric speech with mild aphasia. RUE 1/5, RLE 2/5. Left side 4/5. KENDAL sensation. VSS on CCM NSR, on RA. Turn and reposition q2h. Bladder scanned for 535ml, cath for 100ml at 0300. Abdomin distended. PIV SL. DD3 diet with thins, rafael counts begin today. 1:1 supervision with eating. Incontinence of bowel and bladder. Up 2/lift. Continue to monitor.

## 2020-08-01 NOTE — PLAN OF CARE
VSS. Mimics most commands but does not answer most questions via . Up to chair for couple hours today via lift. DD3 with thins; pt ate good breakfast and dinner; rafael counts started today. 1000ml LR bolus running via PIV. Incontinent of bowel and bladder. Turn and repo q2h. Continue with POC.

## 2020-08-01 NOTE — PLAN OF CARE
Status: Pt on 6A for stroke work up s/p fall, concern for PRES and vasoconstriction infarcts after HTN emergency.   Vitals: VSS on RA  Neuros: Difficult to assess,  used, at times even hard for interpretor to understand pt. Cooperative with cares. JI/LL 4/5, RU/RL 2/5. Denied N/T  IV: PIV SL   Resp: LS clear. Pt encouraged to cough   Diet: DD3 with thins with precautions 1:1 assist/supervision eating.   Bowel status: Small BM this shift, incontinent of megan/tan soft BS+  : Incontinent of urine.  Skin: WDL   Pain:  No signs of pain  Activity: Up with 2, lift.   Social: No visitors this shift.   Plan: Dung counts start tomorrow. Continue to monitor and follow POC.

## 2020-08-01 NOTE — PROGRESS NOTES
"Brief Progress Note    I spent over 45 minutes speaking with patient's niece (David) over the phone and his sister Sofi (patient's decision-maker) in-person today via iPad  regarding our medical recommendation to pursue PEG tube. I explained that although Mr. Yoon has some oral intake, it is far below his recommended daily calorie intake. He is at risk of malnutrition, weight loss, dehydration, and fatigue without additional supplementation through artificial nutrition. She says he would not want a PEG placed and would instead prefer to get his nutrition by mouth. Sofi states patient eats \"everything I bring him\" whenever she visits him in the hospital, and that she plans to visit him everyday at TCU and feed him 1-2 meals/day. I explained that this may not be possible during COVID pandemic. I offered NG tube as alternative, which Sofi also declined.     Yuval Sandy MD  PGY-2 Neurology Resident  P: [phone]  "

## 2020-08-02 ENCOUNTER — APPOINTMENT (OUTPATIENT)
Dept: SPEECH THERAPY | Facility: CLINIC | Age: 60
End: 2020-08-02
Payer: COMMERCIAL

## 2020-08-02 LAB
ANION GAP SERPL CALCULATED.3IONS-SCNC: 4 MMOL/L (ref 3–14)
BUN SERPL-MCNC: 28 MG/DL (ref 7–30)
CALCIUM SERPL-MCNC: 8.3 MG/DL (ref 8.5–10.1)
CHLORIDE SERPL-SCNC: 104 MMOL/L (ref 94–109)
CO2 SERPL-SCNC: 27 MMOL/L (ref 20–32)
CREAT SERPL-MCNC: 1.18 MG/DL (ref 0.66–1.25)
ERYTHROCYTE [DISTWIDTH] IN BLOOD BY AUTOMATED COUNT: 12.4 % (ref 10–15)
GFR SERPL CREATININE-BSD FRML MDRD: 67 ML/MIN/{1.73_M2}
GLUCOSE SERPL-MCNC: 74 MG/DL (ref 70–99)
HCT VFR BLD AUTO: 30.9 % (ref 40–53)
HGB BLD-MCNC: 10.1 G/DL (ref 13.3–17.7)
MCH RBC QN AUTO: 31.8 PG (ref 26.5–33)
MCHC RBC AUTO-ENTMCNC: 32.7 G/DL (ref 31.5–36.5)
MCV RBC AUTO: 97 FL (ref 78–100)
PLATELET # BLD AUTO: 139 10E9/L (ref 150–450)
POTASSIUM SERPL-SCNC: 4 MMOL/L (ref 3.4–5.3)
RBC # BLD AUTO: 3.18 10E12/L (ref 4.4–5.9)
SODIUM SERPL-SCNC: 135 MMOL/L (ref 133–144)
WBC # BLD AUTO: 8.3 10E9/L (ref 4–11)

## 2020-08-02 PROCEDURE — 12000001 ZZH R&B MED SURG/OB UMMC

## 2020-08-02 PROCEDURE — 25000128 H RX IP 250 OP 636: Performed by: STUDENT IN AN ORGANIZED HEALTH CARE EDUCATION/TRAINING PROGRAM

## 2020-08-02 PROCEDURE — 25000132 ZZH RX MED GY IP 250 OP 250 PS 637: Performed by: STUDENT IN AN ORGANIZED HEALTH CARE EDUCATION/TRAINING PROGRAM

## 2020-08-02 PROCEDURE — 80048 BASIC METABOLIC PNL TOTAL CA: CPT | Performed by: STUDENT IN AN ORGANIZED HEALTH CARE EDUCATION/TRAINING PROGRAM

## 2020-08-02 PROCEDURE — 36415 COLL VENOUS BLD VENIPUNCTURE: CPT | Performed by: STUDENT IN AN ORGANIZED HEALTH CARE EDUCATION/TRAINING PROGRAM

## 2020-08-02 PROCEDURE — 92526 ORAL FUNCTION THERAPY: CPT | Mod: GN | Performed by: SPEECH-LANGUAGE PATHOLOGIST

## 2020-08-02 PROCEDURE — 85027 COMPLETE CBC AUTOMATED: CPT | Performed by: STUDENT IN AN ORGANIZED HEALTH CARE EDUCATION/TRAINING PROGRAM

## 2020-08-02 PROCEDURE — 92507 TX SP LANG VOICE COMM INDIV: CPT | Mod: GN | Performed by: SPEECH-LANGUAGE PATHOLOGIST

## 2020-08-02 RX ORDER — MICONAZOLE NITRATE 20 MG/G
CREAM TOPICAL 2 TIMES DAILY
Status: DISCONTINUED | OUTPATIENT
Start: 2020-08-02 | End: 2020-08-04 | Stop reason: HOSPADM

## 2020-08-02 RX ADMIN — ENTECAVIR 1 MG: 1 TABLET ORAL at 08:46

## 2020-08-02 RX ADMIN — ATORVASTATIN CALCIUM 40 MG: 40 TABLET, FILM COATED ORAL at 20:30

## 2020-08-02 RX ADMIN — CARVEDILOL 25 MG: 25 TABLET, FILM COATED ORAL at 08:46

## 2020-08-02 RX ADMIN — AMOXICILLIN AND CLAVULANATE POTASSIUM 1 TABLET: 875; 125 TABLET, FILM COATED ORAL at 20:30

## 2020-08-02 RX ADMIN — AMOXICILLIN AND CLAVULANATE POTASSIUM 1 TABLET: 875; 125 TABLET, FILM COATED ORAL at 08:46

## 2020-08-02 RX ADMIN — ENOXAPARIN SODIUM 40 MG: 40 INJECTION SUBCUTANEOUS at 18:39

## 2020-08-02 RX ADMIN — ASPIRIN 81 MG CHEWABLE TABLET 81 MG: 81 TABLET CHEWABLE at 08:46

## 2020-08-02 RX ADMIN — MICONAZOLE NITRATE: 20 CREAM TOPICAL at 23:29

## 2020-08-02 RX ADMIN — CHLORTHALIDONE 25 MG: 25 TABLET ORAL at 08:46

## 2020-08-02 RX ADMIN — CARVEDILOL 25 MG: 25 TABLET, FILM COATED ORAL at 18:39

## 2020-08-02 RX ADMIN — POLYETHYLENE GLYCOL 3350 17 G: 17 POWDER, FOR SOLUTION ORAL at 08:46

## 2020-08-02 RX ADMIN — AMLODIPINE BESYLATE 10 MG: 10 TABLET ORAL at 08:46

## 2020-08-02 ASSESSMENT — ACTIVITIES OF DAILY LIVING (ADL)
ADLS_ACUITY_SCORE: 24

## 2020-08-02 ASSESSMENT — MIFFLIN-ST. JEOR: SCORE: 1346.52

## 2020-08-02 NOTE — PLAN OF CARE
Status: Admitted for stroke work up s/p fall.  VS:VSS. Cardiac monitoring NSR vs sinus kun. No PRNs needed for BP  Neuros: Axself and place intermittently, can be difficult to assess d/t pts quiet/dysarthric speech.  ipad utilized. R side 1-2/5, L side 4/5. Mimics some other actions  GI: Tolerating DD3 w/thins, fair intake, 1:1 feeder. Dung counts Day 2. BM x2 this shift  : Incontinent of urine  IV: PIV x2 SL  Activity: Ax2 w/lift. Up in chair x1. Turn/repo Q2hrs in bed  Pain: Denies?  Skin:?WNL  Labs/Tests: Worked w/speech  Social: Sister and niece updated  Plan of care: Continue to encourage PO intake and activity as tolerated. Eventual d/c to TCU, no plan at this time

## 2020-08-02 NOTE — PLAN OF CARE
Discharge Planner SLP   Patient plan for discharge: Pt unable to state  Current status: Pt seen for communication and dysphagia therapy. Pt demonstrates adequate ability to manage DD3/thin liquids via straw. Cup sips provided by his sister resulted in s/sx of aspiration/penetration. Pt demonstrates minimal attempts at verbal communication during session.     Recommend continue Dysphagia Diet level 3 with thin liquids. Sit upright for all po intake. Straw ok with liquid when pt is being assisted with feeding. Pt will benefit from ongoing SLP services to maximize swallow and communication function.      Barriers to return to prior living situation: Medical condition, communication deficits/dysarthria, weakness  Recommendations for discharge: TCU  Rationale for recommendations: Pt will benefit from ongoing ST targeting swallow and communication       Entered by: Margarette Goel 08/02/2020 1:03 PM

## 2020-08-02 NOTE — PROGRESS NOTES
[facility]  General Neurology Progress Note    Patient Name:  Wilfredo Yoon  MRN:  [id number]    :  1960  Date of Service: 20    Patient Summary:   Mr. Yoon is a 60 year old male with a PMHx of HTN, chronic Hepatitis B c/b liver cirrhosis and ascites, and CKD who presented on 20 with fall and AMS, found to have T2 hyperintensity in brainstem on MRI, suspicious for brainstem variant PRES and multiple acute-subacute infarcts, felt to be secondary to malignant hypertension. Deficits include right hemibody weakness and expressive aphasia. Repeat MRI 1 week later shows improvement in PRES and evidence of at least 1 new infarct in left periventricular region.     Interval Updates:   RN notes reviewed. No acute events overnight. Feeding tube discussed with family - they declined feeding tube of any sort. Remains vitally stable. Remained in NSR with occasionally dropped QRS. Appears to have had intermittent Type 1 heart block, will monitor for now.    Subjective:   Difficult to obtain, patient with severe expressive aphasia. Patient evaluated today with help of iPad  in [facility].     Physical Examination (ipad )  Vitals Overnight: WNL overnight   Physical Exam:  Constitutional: Alert, Laying in bed. Appears comfortable.    Head: Atraumatic, normocephalic.  Cardiovascular: Warm extremities. No leg edema. RRR.   Lungs: CTA b/l anteriorly and laterally     Neurologic:   Mental Status: Alert, awake. Follows commands to the best of his ability (sticks tongue out, wiggles toes on left, raises left arm above head). Speech is dysarthric and unintelligible to myself and iPad .   Cranial Nerves: Looks in all direction on command, eyes move conjugately. Smile and eyebrow raise equal, blinking symmetric, mild right nasolabial flattening. Hearing intact to conversation.   Motor: R hemiparesis-RUE and RLE cannot lift antigravity but  some movement in plane. Very weak right hand . LUE moving spontaneously, antigravity.  Able to hold LUE/LLE antigravity without drift. No movement observed in RLE.   Sensory: Difficult to assess   Gait: not tested    Labs/Investigations  - BMP/CBC stable    Assessment and Plan:  Mr. Yoon is a 60 year old man with a PMH of chronic Hep B with cirrhosis and ascites, HTN with hx hypertensive emergency, tobacco use, HLD, who presented to the ED on 7/16/20 with AMS and concern for stroke. MR brain showed large brainstem hyperintensity w/extension into cerebellum on FLAIR sequence without DWI/ADC correlate. It also showed moderate-severe leukoaraiosis plus several areas of restricted diffusion including at left corona radiata and right basal ganglia, most likely consistent with infarcts. DDx of brainstem hyperintensities is broad and includes infectious, inflammatory, vasculitic, demyelinating, toxic/metabolic (eg CPM), and neoplastic pathologies, but suspect the most likely explanation is brainstem variant PRES. The following CSF labs were negative: NMO/AQP4, MOG, WBC 2, RBC 0, negative gram stain/culture, protein 30, HSV 1 and 2, EBV, Lyme IGG/IGM, oligoclonal banding, RAJAN virus, leukemia/lymphoma evaluation, ACE, enterovirus, VZV, VDRL, TB PCR, CMV (note suboptimal sample), cytology. The following serum labs were unremarkable: TSH, ANCA, FARIDA, HIV, ganglioside antibodies, myeloperoxidase. RF, ESR, CRP elevated (chronic). Trace cryoglobulin. Kappa and lambda light chains elevated and ratio slightly elevated. Low suspicion for vasculitic process given clinical stability and improving inflammatory labs. Patient with poor appetite and generally avoids eating unless someone coaxes him to eat. He is not meeting his recommended daily intake and would benefit from supplemental feeds through PEG, however, family declined. Patient will need TCU and close outpatient follow up with both neurology and rheumatology.      #Extensive posterior T2 hyperintensities, suspect PRES, improving  - Continue supportive cares   - Blood pressure management as below in HTN section  - Neurochecks Q4 hours  - Rheumatology consulted to assist in evaluating for active cryoglobulinemia, vasculitis, hypercoagulability. Per rheumatology, low suspicion for cryoglobulins contributing to overall picture. Cannot exclude vasculitis vs. Hyperviscosity as contributors to infarcts. Will track cryoglobulin and complement levels. Will see rheum outpatient for repeat evaluation and labs.   - Patient will need repeat MRI in 2 weeks and outpatient general neurology follow-up within 1 month of discharge     #Multifocal subcortical infarcts, suspect 2/2 severe hypertension vs. RCVS   - Aspirin 81mg PO daily   - Atorvastatin 40mg daily   - Telemetry  - PT/OT/SLP -- will likely discharge to TCU   - Stroke Education completed  - PMR consulted  - Consideration of re-consulting Neuro IR for DSA if he develops further ischemic infarcts    #Malnutrition:  Moderate, based on decreased PO intake and dysphagia and calorie counts  #Tentative plan for PEG before discharge   Advanced to dysphagia 3 diet. Calorie counts ongoing. Patient continues to under-eat significantly. Patient with history of portal hypertension and esophageal varices found on abd MRI from May, 2019, but his liver disease is stable now so will likely still be candidate for PEG though family declined.  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   Consulted  nephrology 7/22, appreciate recs.  - Started Chlorthalidone 25 mg daily  - Strict Is and Os  - Continue amlodipine 10 mg PO daily  - Continue Coreg 25mg PO BID  - Hold PTA Lisinopril 40 mg PO daily due to ANDRY (per renal: If Cr remains stable for the next 1 week, restart Lisinopril at 10 mg daily and titrate dose up if needed to maintain BP ~ 130/80. Titrate dose up to 40 mg daily which was his PTA dose)  - Hydralazine 10mg PO prn for SBP>165  - Follow with Nephrology clinic at discharge (Dr Fuentes) in 2-4 weeks    #Cirrhosis 2/2 Hepatitis B  #Ascites  S/P paracentesis of 3.3L on 7/28 with IR.   - Continue entecavir 0.5 mg daily    #Positive Quant Gold Test   Likely represents latent TB infection. CSF TB PCR negative. No chronic pulmonary symptoms/persistent cough, stable weight. Low suspicion for acute pulmonary TB.   - ID Referral at discharge     #Hyperlipidemia   on admission   - Atorvastatin as above     #Urinary Retention, improving   #Enterococcus faecalis UTI   Vazquez was place for urinary retention. Failed trial of void on 7/23-7/24. UA from last night shows large amount of leukocyte esterase, greater than 182 WBC, presence of bacteria and WBC clumps in urine. Now growing Enterococcus. Suspect UTI related to catheter use   - Removed vazquez   - Intermittent straight cath PRN   - Augmentin 875-125 Q12h for 5 days for complicated UTI     #Telemetry Abnormalities  #Episode of Type 1 heart block  Pt with occasional dropped QRS interval on telemetry overnight. Ordered EKG in AM that showed mild ST elevation in V2, V3, but did not meet criteria for STEMI. Repeat EKG stable. Troponin x 1 undetectable. Will just watch for now but low threshold to pursue more extensive workup if additional abnormalities noted on telemetry. Noted to have Type 1 Heart block though not sustained on 8/1. Will monitor for now and if increased frequency, will decrease coreg dosing to 12.5 BID instead and will avoid AV node blocking  agents.    Diet: Dysphagia 3   DVT prophylaxis: SCDs, enoxaparin subcutaneous   Lines: PIV  Code status: Full code    Disposition Plan   TCU, hopefully Monday.        Entered: Cat Carty 08/02/2020, 8:03 AM       Patient was seen and discussed with Dr. Carvalho.     Cat Carty MD  PGY-3 Neurology Resident  P: [phone]      I saw and evaluated the patient on 8/2/20 and agree with the findings and the plan of care as documented in the resident's note.      Patients exam remained stable.  Discussed with cardiology telemetry changes, with no acute interventions but plan to continue monitoring while inpatient.  Family denies NG or PEG tube.  Fortunately his appetite seems to be improving and he had over 75% of breakfast today.  Patient is medically ready for discharge.  Needs very close follow-up with repeat MRI in approximately 2 weeks.  Message to Dr. Boone and schedulers to arrange close follow-up preferably in person given complexity of case and need for .    Geo Carvalho DO   of Neurology

## 2020-08-02 NOTE — PROGRESS NOTES
Calorie Count  Intake recorded for: 8/1  Total Kcals: 1431 Total Protein: 59g  Kcals from Hospital Food: 1431  Protein: 59g  Kcals from Outside Food (average):0 Protein: 0g  # Meals Recorded: 3 meals (First - 100% scrambled eggs, 1% milk, banana, Cheerios)      (Second - 100% ice cream, pears, 50% lemonade, 25% hot roast beef w/ gravy)      (Third - 100% pudding, apple juice, 50% mashed potatoes w/ gravy, 25% roast turkey w/ gravy, green beans)  # Supplements Recorded: 100% 1 Boost Plus

## 2020-08-02 NOTE — PLAN OF CARE
Admitted for stroke, Neuros: mimics commands, mild aphasia with dysarthric speech, right sided weakness with RUE 1/5, RLE 2/5. Left side 4/5. KENDAL sensation. Oriented to place and self. Liberian speaking,  phone at bedside. VSS on RA. CCM NSR with occasional QRS drops/bradycardia. Turn and reposition q2h. Incontinent of bowel and bladder. Abdomin distended, ascites. Up 2/lift. DD3 diet with thins with 1:1 supervision. Continue to monitor.

## 2020-08-03 ENCOUNTER — APPOINTMENT (OUTPATIENT)
Dept: PHYSICAL THERAPY | Facility: CLINIC | Age: 60
End: 2020-08-03
Payer: COMMERCIAL

## 2020-08-03 PROCEDURE — 25000128 H RX IP 250 OP 636: Performed by: STUDENT IN AN ORGANIZED HEALTH CARE EDUCATION/TRAINING PROGRAM

## 2020-08-03 PROCEDURE — 25000132 ZZH RX MED GY IP 250 OP 250 PS 637: Performed by: STUDENT IN AN ORGANIZED HEALTH CARE EDUCATION/TRAINING PROGRAM

## 2020-08-03 PROCEDURE — 12000001 ZZH R&B MED SURG/OB UMMC

## 2020-08-03 PROCEDURE — 97530 THERAPEUTIC ACTIVITIES: CPT | Mod: GP

## 2020-08-03 RX ADMIN — CARVEDILOL 25 MG: 25 TABLET, FILM COATED ORAL at 17:41

## 2020-08-03 RX ADMIN — ATORVASTATIN CALCIUM 40 MG: 40 TABLET, FILM COATED ORAL at 20:16

## 2020-08-03 RX ADMIN — MICONAZOLE NITRATE: 20 CREAM TOPICAL at 08:00

## 2020-08-03 RX ADMIN — ENOXAPARIN SODIUM 40 MG: 40 INJECTION SUBCUTANEOUS at 17:41

## 2020-08-03 RX ADMIN — AMLODIPINE BESYLATE 10 MG: 10 TABLET ORAL at 07:59

## 2020-08-03 RX ADMIN — AMOXICILLIN AND CLAVULANATE POTASSIUM 1 TABLET: 875; 125 TABLET, FILM COATED ORAL at 08:00

## 2020-08-03 RX ADMIN — CARVEDILOL 25 MG: 25 TABLET, FILM COATED ORAL at 08:00

## 2020-08-03 RX ADMIN — POLYETHYLENE GLYCOL 3350 17 G: 17 POWDER, FOR SOLUTION ORAL at 08:00

## 2020-08-03 RX ADMIN — AMOXICILLIN AND CLAVULANATE POTASSIUM 1 TABLET: 875; 125 TABLET, FILM COATED ORAL at 20:16

## 2020-08-03 RX ADMIN — MICONAZOLE NITRATE: 20 CREAM TOPICAL at 20:16

## 2020-08-03 RX ADMIN — ENTECAVIR 1 MG: 1 TABLET ORAL at 08:00

## 2020-08-03 RX ADMIN — ASPIRIN 81 MG CHEWABLE TABLET 81 MG: 81 TABLET CHEWABLE at 08:00

## 2020-08-03 RX ADMIN — CHLORTHALIDONE 25 MG: 25 TABLET ORAL at 08:00

## 2020-08-03 ASSESSMENT — ACTIVITIES OF DAILY LIVING (ADL)
ADLS_ACUITY_SCORE: 24

## 2020-08-03 NOTE — PROGRESS NOTES
Social Work Services Progress Note    Hospital Day: 19  Date of Initial Social Work Evaluation:  7/31/2020  Collaborated with:   Dr. Sandy, pt's niece (David), and SNF Admissions Coordinator's    Data:  SW is following for discharge planning. TCU placement is recommended.      Intervention:  SW attended am rounds and per Dr. Sandy, pt is medically ready for discharge.  MAHESH phoned pt's niece David. David states that she spoke with pt's sister (Sofi) who deferred decision making regarding TCU placement to Fathidarryl.  SW informed David that pt is medically ready for discharge. David states that she would like pt placed closest to his home as this is what pt would want.  David has requested that SW make referrals based on closest to furthest.  Thus, SW referred pt to the following SNF's:  - [facility] ([facility]) assessed and declined pt for admit as pt is anticipated to be a longer term short term rehab stay and thus, pt's length of stay would exceed program capacity  - [facility], MAHESH left message referring pt and sent referral via [facility], MAHESH left message referring pt and sent referral via [facility] has one male opening in a semi private room, referred pt and faxed assessment materials  - [facility], MAHESH left message referring pt and sent referral via Epic.    MAHESH updated Dr. Sandy.    Assessment:  Pt's niece voices understanding of the discharge plan and agreement with the discharge plan.    Plan:    Anticipated Disposition:  Rehab placement in a community SNF    Barriers to d/c plan:  Await decision from the above facility's    Follow Up:  SW will continue to follow for discharge planning.    JOHNNY Rodriguez  Social Work, 6A  Phone:  [phone]  Pager:  [phone]  8/3/2020

## 2020-08-03 NOTE — PROGRESS NOTES
Calorie Count  Intake recorded for: 8/2  Total Kcals: 672 Total Protein: 30g  Kcals from Hospital Food: 672  Protein: 30g  Kcals from Outside Food (average):0 Protein: 0g  # Meals Recorded: 2 meals (First - 100% orange juice, rice chex, peaches, blueberry muffin, 75% milk, 25% coffee, scrambled eggs)      (Second - 50% hot turkey sandwich w/ mashed potatoes & gravy, carrots)  # Supplements Recorded: 0

## 2020-08-03 NOTE — PLAN OF CARE
Admitted for stroke work up. Neuros: garbled dysarthric speech with mild aphasia. Right side 2/5, left side 4/5. KENDAL sensation. Oriented to self, occasionally place. Mimics commands. VSS on CCM with occasional QRS drop. Turn and reposition q2h. Abdomin distended/ascites . PIV SL. Condom cath patent. Miconazole cream for fungal rash between thighs bilaterally. DD3 diet with thins 1:1 supervision rafael counts. Plan TCU, medically ready for discharge.

## 2020-08-03 NOTE — PLAN OF CARE
Status: Admitted for stroke work up s/p fall.  VS:VSS. Cardiac monitoring NSR vs sinus kun. No PRNs needed for BP  Neuros: Axself and place intermittently, can be difficult to assess d/t pts quiet/dysarthric speech.  ipad utilized. R side 1-2/5, L side 4/5. Mimics some other actions  GI: Tolerating DD3 w/thins, fair intake, 1:1 feeder. Dung counts Day 2. No  BM x2 this shift  : Incontinent of urine  IV: PIV x2 SL  Activity: Ax2 w/lift. Up in chair x1. Turn/repo Q2hrs in bed  Pain: Denies?  Skin:?WNL, rash groin.   Labs/Tests: Worked w/speech  Social: Sister at BS.   Plan of care: Continue to encourage PO intake and activity as tolerated. Eventual d/c to TCU, no plan at this time

## 2020-08-03 NOTE — PROGRESS NOTES
CLINICAL NUTRITION SERVICES - REASSESSMENT NOTE   Nutrition Prescription     Malnutrition Status:    Pt meets criteria for at least non-severe malnutrition in the context of acute illness based on info available.     Recommendations already ordered by Registered Dietitian (RD):  1. Continue kcal cts through 8/6  2. Continue supplements: Boost Plus TID      Future/Additional Recommendations:  1. Monitor kcal cts. Recommend pt meets on average at least 75% minimum assessed needs (~1200 kcal and 60 g protein daily) to avoid add'l nutrition interventions such as add'l scheduled ONS/snacks vs FT placement and EN support.     2. If EN becomes nutrition POC, recommend:  Isosource 1.5 @ goal 50 ml/hr (1200 ml/day) to provide 1800 kcals (34 kcal/kg/day), 82 g PRO (1.5 g/kg/day), 912 ml free H2O, 211 g CHO and 18 g Fiber daily.      EVALUATION OF THE PROGRESS TOWARD GOALS   Diet: DD3 with thin liquids per SLP VFSS on 7/30 and most recent SLP eval on 8/2 (previously on full nectar-thick liquids) + Boost Plus TID (vary flavors) between meals + pt not appropriate for room service     Oral Intake: Taking Boost Plus per kcal cts. PO approved over weekend. Eating typically % meals with poor to fair appetite since 7/27 per RN flowsheets.    -Per kcal cts:  7/28       Total Kcals: 515       Total Protein: 10g   7/29       Total Kcals: 843       Total Protein: 27g   7/30       Total Kcals: ?           Total Protein: ?g -- incomplete data  8/1         Total Kcals: 1431     Total Protein: 59g   8/2         Total Kcals: 672       Total Protein: 30g   8/3   Pending...  -->Based on 4-day average: 865 kcal (16 kcal/kg, or 54% minimum assessed needs), and 32 g protein (0.6 g pro/kg, or 40% minimum assessed needs). PO is overall improving.     NEW FINDINGS      -General: Medically ready to discharge, plan for TCU  -Wt trends: 53.3 kg on 7/19 --> 58.9 kg on 8/2. +12 lbs since admit - suspect related to fluid. DW of 53 kg remains  appropriate.    08/02/20 1540  58.9 kg (129 lb 12.8 oz)    08/01/20 1311  59.6 kg (131 lb 6.4 oz)    07/31/20 1700  59.6 kg (131 lb 4.8 oz)    07/27/20 1154  60.4 kg (133 lb 1.6 oz)    07/19/20 1300  53.3 kg (117 lb 8 oz) - lowest wt this admit       -Labs: Reviewed (from 8/3), notable for: Na+ 135 (WNL), K+ 4.0 (WNL), BUN 28 (WNL), Cr 1.18 (WNL)     -GI: LBM yesterday 8/2 per RN flowsheets     -Neuro: Infratentorial PRES, Encephalopathy, Multifocal subcortical infarcts. Speech is dysarthric and unintelligible to staff and iPad  per chart notes.     -Skin: No issues per RN flowsheets/chart     -Meds & Vitamin/Mineral Supplementation: Reviewed     -Diet/SLP: SLP following. DD3 with thin liquids per SLP VFSS on 7/30 and most recent SLP eval on 8/2.     MALNUTRITION**Nutrition focused physical exam available per MD request. --> Unable to obtain in-person nutrition history or nutrition focused physical assessment (NFPA) from patient as the number of staff going into rooms is restricted to limit exposure and to minimize use of PPE.  % Intake: </= 75% for >7 days (non-severe)  % Weight Loss: Up to 5% in 1 month (non-severe) - from PTA  Subcutaneous Fat Loss: Unable to assess  Muscle Loss: Unable to assess  Fluid Accumulation/Edema: Does not meet criteria (trace bipedal edema per RN flowsheets)  Malnutrition Diagnosis: Pt meets criteria for at least non-severe malnutrition in the context of acute illness based on info available.     Previous Goals   Total avg nutritional intake to meet a minimum of 30 kcal/kg and 1.5 g PRO/kg daily (per dosing wt 53 kg).  Evaluation: Not met, but overall improving per kcal cts     Previous Nutrition Diagnosis  Inadequate protein-energy intake related to limited diet 2/2 dysphagia as evidenced by VFSS and SLP recommending nectar-thick full liquid diet and 6-day average based on [employer] meal ordering showing 636 kcal (12 kcal/kg, or 40% minimum assessed needs) and 18 g protein  (0.3 g pro/kg, or 23% minimum assessed needs) daily (assuming 100% meals consumed).  Evaluation: Improving, dx updated below     CURRENT NUTRITION DIAGNOSIS  Inadequate protein-energy intake related to limited diet 2/2 dysphagia as evidenced by VFSS and SLP recommending DD3 with thin liquid diet and 4-day kcal cts average of 865 kcal (16 kcal/kg, or 54% minimum assessed needs), and 32 g protein (0.6 g pro/kg, or 40% minimum assessed needs).       INTERVENTIONS  Implementation  -Enteral Nutrition - Recs if needed  -Continue kcal cts through 8/6  -Medical food supplement therapy - Continue     Goals  Total avg nutritional intake to meet a minimum of 30 kcal/kg and 1.5 g PRO/kg daily (per dosing wt 53 kg).     Monitoring/Evaluation  Progress toward goals will be monitored and evaluated per protocol.     Estelle Miguel RD, LD  Pager: [phone]

## 2020-08-03 NOTE — PLAN OF CARE
Discharge Planner PT   Patient plan for discharge: not discussed   Current status: Supine to sit with mod Ax1.  Needing constant min-mod Ax1 to maintain sitting balance.  STS x3 with mod-max A x1.  Leaning posterior and R in standing with mod Ax1 to prevent.    Barriers to return to prior living situation: Weaknesss, Hemiparesis, lethargy, medical status  Recommendations for discharge: TCU  Rationale for recommendations: Patient well below baseline, will benefit from a longer stay at TCU to return to functional independence.        Entered by: Alex Hickman 08/03/2020 1:17 PM

## 2020-08-03 NOTE — PROGRESS NOTES
General acute hospital  General Neurology Progress Note    Patient Name:  Wilfredo Yoon  MRN:  [id number]    :  1960  Date of Service: 20    Patient Summary:   Mr. Yoon is a 60 year old male with a PMHx of HTN, chronic Hepatitis B c/b liver cirrhosis and ascites, and CKD who presented on 20 with fall and AMS, found to have T2 hyperintensity in brainstem on MRI, suspicious for brainstem variant PRES and multiple acute-subacute infarcts, felt to be secondary to malignant hypertension. Deficits include right hemibody weakness and expressive aphasia. Repeat MRI 1 week later shows improvement in PRES and evidence of at least 1 new infarct in left periventricular region.     Interval Updates:   No acute events overnight.     Attempted to call jonathon Hernandez (first call) today to update her but phone went to voicemail after a few rings.     Subjective:   Difficult to obtain, patient with severe expressive aphasia. Patient evaluated today with help of iPad  in [facility].     Physical Examination (ipad )  Vitals Overnight: WNL overnight   Physical Exam:  Constitutional: Alert, Laying in bed. Appears comfortable.    Head: Atraumatic, normocephalic.  Cardiovascular: Warm extremities. No leg edema. RRR.     Neurologic:   Mental Status: Alert, awake. Follows commands to the best of his ability (sticks tongue out, wiggles toes on left, raises left arm above head). Speech is dysarthric and unintelligible to myself and iPad .   Cranial Nerves: Looks in all direction on command, eyes move conjugately. Smile and eyebrow raise equal, blinking symmetric, mild right nasolabial flattening. Hearing intact to conversation.   Motor: R hemiparesis-RUE and RLE cannot lift antigravity but some movement in plane. Very weak right hand . LUE moving spontaneously, antigravity.  Able to hold LUE/LLE antigravity without drift. No movement observed in RLE.    Sensory: Intact to pinprick, light touch x 4 extremities. Pt did not understand directions for vibration/proprioception testing   Gait: Patient hemiparetic, so did not test gait     Labs/Investigations  No new labs/investigations     Assessment and Plan:  Mr. Yoon is a 60 year old man with a PMH of chronic Hep B with cirrhosis and ascites, HTN with hx hypertensive emergency, tobacco use, HLD, who presented to the ED on 7/16/20 with AMS and concern for stroke. MR brain showed large brainstem hyperintensity w/extension into cerebellum on FLAIR sequence without DWI/ADC correlate. It also showed moderate-severe leukoaraiosis plus several areas of restricted diffusion including at left corona radiata and right basal ganglia, most likely consistent with infarcts. DDx of brainstem hyperintensities is broad and includes infectious, inflammatory, vasculitic, demyelinating, toxic/metabolic (eg CPM), and neoplastic pathologies, but suspect the most likely explanation is brainstem variant PRES. The following CSF labs were negative: NMO/AQP4, MOG, WBC 2, RBC 0, negative gram stain/culture, protein 30, HSV 1 and 2, EBV, Lyme IGG/IGM, oligoclonal banding, RAJAN virus, leukemia/lymphoma evaluation, ACE, enterovirus, VZV, VDRL, TB PCR, CMV (note suboptimal sample), cytology. The following serum labs were unremarkable: TSH, ANCA, FARIDA, HIV, ganglioside antibodies, myeloperoxidase. RF, ESR, CRP elevated (chronic). Trace cryoglobulin. Kappa and lambda light chains elevated and ratio slightly elevated. Low suspicion for vasculitic process given clinical stability and improving inflammatory labs. Patient is eating some food but is missing goal daily calorie intake by ~500-1000/day. He is not meeting his recommended daily intake and would benefit from supplemental feeds through PEG, however, family declined. Patient will need TCU and close outpatient follow up with both neurology and rheumatology.     #Extensive posterior T2  hyperintensities, suspect PRES, improving  - Continue supportive cares   - Blood pressure management as below in HTN section  - Neurochecks Q4 hours  - Rheumatology consulted to assist in evaluating for active cryoglobulinemia, vasculitis, hypercoagulability. Per rheumatology, low suspicion for cryoglobulins contributing to overall picture. Cannot exclude vasculitis vs. Hyperviscosity as contributors to infarcts. Will track cryoglobulin and complement levels. Will see rheum outpatient for repeat evaluation and labs.   - Patient will need repeat MRI in 2 weeks and outpatient general neurology follow-up within 1 month of discharge     #Multifocal subcortical infarcts, suspect 2/2 severe hypertension vs. RCVS   - Aspirin 81mg PO daily   - Atorvastatin 40mg daily   - Telemetry  - PT/OT/SLP -- will likely discharge to TCU   - Stroke Education completed  - PMR consulted  - Consideration of re-consulting Neuro IR for DSA if he develops further ischemic infarcts    #Malnutrition:  Moderate, based on decreased PO intake and dysphagia and calorie counts  #Tentative plan for PEG before discharge   Advanced to dysphagia 3 diet. Calorie counts ongoing. Patient continues to under-eat significantly. Patient with history of portal hypertension and esophageal varices found on abd MRI from May, 2019, but his liver disease is stable now so will likely still be candidate for PEG though family declined.  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   Consulted nephrology 7/22, appreciate recs.  - Started Chlorthalidone 25 mg daily  -  Strict Is and Os  - Continue amlodipine 10 mg PO daily  - Continue Coreg 25mg PO BID  - Hold PTA Lisinopril 40 mg PO daily due to ANDRY (per renal: If Cr remains stable for the next 1 week, restart Lisinopril at 10 mg daily and titrate dose up if needed to maintain BP ~ 130/80. Titrate dose up to 40 mg daily which was his PTA dose)  - Hydralazine 10mg PO prn for SBP>165  - Follow with Nephrology clinic at discharge (Dr Fuentes) in 2-4 weeks    #Cirrhosis 2/2 Hepatitis B  #Ascites  S/P paracentesis of 3.3L on 7/28 with IR. Gets monthly paracentesis.   - Continue entecavir 0.5 mg daily    #Positive Quant Gold Test   Likely represents latent TB infection. CSF TB PCR negative. No chronic pulmonary symptoms/persistent cough, stable weight. Low suspicion for acute pulmonary TB.   - ID Referral at discharge     #Hyperlipidemia   on admission   - Atorvastatin as above     #Urinary Retention, improving   #Enterococcus faecalis UTI   Vazquez was place for urinary retention. Failed trial of void on 7/23-7/24. UA from last night shows large amount of leukocyte esterase, greater than 182 WBC, presence of bacteria and WBC clumps in urine. Now growing Enterococcus. Suspect UTI related to catheter use   - Removed vazquez   - Intermittent straight cath PRN   - Augmentin 875-125 Q12h for 5 days for complicated UTI     #Telemetry Abnormalities  #Episode of Type 1 heart block  Pt with occasional dropped QRS interval on telemetry 7/31. Ordered EKG in AM that showed mild ST elevation in V2, V3, but did not meet criteria for STEMI. Repeat EKG stable. Troponin x 1 undetectable. Will just watch for now but low threshold to pursue more extensive workup if additional abnormalities noted on telemetry. Noted to have Type 1 Heart block though not sustained on 8/1. Will monitor for now and if increased frequency, will decrease coreg dosing to 12.5 BID instead and will avoid AV node blocking agents.    Diet: Dysphagia 3   DVT prophylaxis: SCDs,  enoxaparin subcutaneous   Lines: PIV  Code status: Full code    Disposition Plan   TCU       Entered: Yuval Sandy 08/03/2020, 12:57 PM       Patient was seen and discussed with Dr. Swati Sandy MD  PGY-2 Neurology Resident  P: [phone]    Attending physician: I saw and evaluated the patient with the resident team and I agree with the findings and plan of care as per Dr. Ledezma above. The patient is an unfortunate 60 year old man admitted with acute neurologic changes and multifocal ischemia in the setting of hypertensive emergency. MRI improved but with persistent neurologic deficits.    Disposition: Medically ready for discharge, awaiting TCU placement.    Rey Longoria MD   of Neurology

## 2020-08-04 ENCOUNTER — TELEPHONE (OUTPATIENT)
Dept: NEUROLOGY | Facility: CLINIC | Age: 60
End: 2020-08-04

## 2020-08-04 ENCOUNTER — APPOINTMENT (OUTPATIENT)
Dept: SPEECH THERAPY | Facility: CLINIC | Age: 60
End: 2020-08-04
Payer: COMMERCIAL

## 2020-08-04 ENCOUNTER — APPOINTMENT (OUTPATIENT)
Dept: OCCUPATIONAL THERAPY | Facility: CLINIC | Age: 60
End: 2020-08-04
Payer: COMMERCIAL

## 2020-08-04 VITALS
BODY MASS INDEX: 20.86 KG/M2 | HEIGHT: 66 IN | WEIGHT: 129.8 LBS | SYSTOLIC BLOOD PRESSURE: 138 MMHG | HEART RATE: 71 BPM | OXYGEN SATURATION: 98 % | DIASTOLIC BLOOD PRESSURE: 95 MMHG | TEMPERATURE: 98.2 F | RESPIRATION RATE: 16 BRPM

## 2020-08-04 LAB
LABORATORY COMMENT REPORT: NORMAL
SARS-COV-2 RNA SPEC QL NAA+PROBE: NEGATIVE
SARS-COV-2 RNA SPEC QL NAA+PROBE: NORMAL
SPECIMEN SOURCE: NORMAL
SPECIMEN SOURCE: NORMAL

## 2020-08-04 PROCEDURE — 25000132 ZZH RX MED GY IP 250 OP 250 PS 637: Performed by: STUDENT IN AN ORGANIZED HEALTH CARE EDUCATION/TRAINING PROGRAM

## 2020-08-04 PROCEDURE — 92526 ORAL FUNCTION THERAPY: CPT | Mod: GN

## 2020-08-04 PROCEDURE — 97535 SELF CARE MNGMENT TRAINING: CPT | Mod: GO

## 2020-08-04 PROCEDURE — U0003 INFECTIOUS AGENT DETECTION BY NUCLEIC ACID (DNA OR RNA); SEVERE ACUTE RESPIRATORY SYNDROME CORONAVIRUS 2 (SARS-COV-2) (CORONAVIRUS DISEASE [COVID-19]), AMPLIFIED PROBE TECHNIQUE, MAKING USE OF HIGH THROUGHPUT TECHNOLOGIES AS DESCRIBED BY CMS-2020-01-R: HCPCS | Performed by: STUDENT IN AN ORGANIZED HEALTH CARE EDUCATION/TRAINING PROGRAM

## 2020-08-04 PROCEDURE — 97530 THERAPEUTIC ACTIVITIES: CPT | Mod: GO

## 2020-08-04 RX ORDER — ENTECAVIR 1 MG/1
1 TABLET, FILM COATED ORAL DAILY
Qty: 30 TABLET | Refills: 11
Start: 2020-08-04 | End: 2021-03-26

## 2020-08-04 RX ORDER — CHLORTHALIDONE 25 MG/1
25 TABLET ORAL DAILY
Start: 2020-08-04 | End: 2020-12-02 | Stop reason: SINTOL

## 2020-08-04 RX ORDER — ASPIRIN 81 MG/1
81 TABLET, CHEWABLE ORAL DAILY
Start: 2020-08-04 | End: 2021-03-26

## 2020-08-04 RX ORDER — POLYETHYLENE GLYCOL 3350 17 G/17G
17 POWDER, FOR SOLUTION ORAL DAILY
Start: 2020-08-04 | End: 2021-03-26

## 2020-08-04 RX ORDER — MICONAZOLE NITRATE 20 MG/G
CREAM TOPICAL 2 TIMES DAILY
Start: 2020-08-04 | End: 2021-02-23

## 2020-08-04 RX ORDER — ATORVASTATIN CALCIUM 40 MG/1
40 TABLET, FILM COATED ORAL EVERY EVENING
Start: 2020-08-04 | End: 2021-03-26

## 2020-08-04 RX ORDER — ACETAMINOPHEN 325 MG/1
325 TABLET ORAL EVERY 4 HOURS PRN
Start: 2020-08-04 | End: 2022-02-01

## 2020-08-04 RX ORDER — AMLODIPINE BESYLATE 10 MG/1
10 TABLET ORAL DAILY
Start: 2020-08-04 | End: 2021-03-26

## 2020-08-04 RX ORDER — CARVEDILOL 25 MG/1
25 TABLET ORAL 2 TIMES DAILY WITH MEALS
Start: 2020-08-04 | End: 2020-12-02 | Stop reason: DRUGHIGH

## 2020-08-04 RX ADMIN — AMOXICILLIN AND CLAVULANATE POTASSIUM 1 TABLET: 875; 125 TABLET, FILM COATED ORAL at 07:53

## 2020-08-04 RX ADMIN — ENTECAVIR 1 MG: 1 TABLET ORAL at 07:53

## 2020-08-04 RX ADMIN — POLYETHYLENE GLYCOL 3350 17 G: 17 POWDER, FOR SOLUTION ORAL at 07:52

## 2020-08-04 RX ADMIN — CARVEDILOL 25 MG: 25 TABLET, FILM COATED ORAL at 07:53

## 2020-08-04 RX ADMIN — AMLODIPINE BESYLATE 10 MG: 10 TABLET ORAL at 07:53

## 2020-08-04 RX ADMIN — CHLORTHALIDONE 25 MG: 25 TABLET ORAL at 07:53

## 2020-08-04 RX ADMIN — ASPIRIN 81 MG CHEWABLE TABLET 81 MG: 81 TABLET CHEWABLE at 07:53

## 2020-08-04 ASSESSMENT — ACTIVITIES OF DAILY LIVING (ADL)
ADLS_ACUITY_SCORE: 24

## 2020-08-04 NOTE — PROGRESS NOTES
Social Work Services Discharge Note      Patient Name:  Wilfredo Yoon     Anticipated Discharge Date:  8/4/2020    Discharge Disposition:   [facility] [place] ([phone])    Following MD:  Facility Assignment     Pre-Admission Screening (PAS) online form has been completed.  The Level of Care (LOC) is:  Determined  Confirmation Code is:  [id number].  Patient/caregiver informed of referral to [facility] for Pre-Admission Screening for skilled nursing facility (SNF) placement and to expect a phone call post discharge from SNF.     Additional Services/Equipment Arranged:  Dr. Sandy has confirmed readiness for discharge. Admissions (Eugenie) at [facility] [place] has confirmed acceptance for admit. MAHESH has arranged for [facility] to provide stretcher transport (due to pt's inability to maintain an upright seated position) today at 3:30pm.  SW has completed a physicians certification for transportation.     Patient / Family response to discharge plan:  Pt did not offer a response.  Pt's niece (Fathia) and sister (Sofi) voice understanding of the discharge plan and agreement with the discharge plan.     Persons notified of above discharge plan:  Pt, niece (Fathia), sister (Sofi), 6A nursing and Dr. Sandy.    Staff Discharge Instructions:  Please fax discharge orders and signed hard scripts for any controlled substances (MAHESH completed this task).  Please print a packet and send with patient.     CTS Handoff completed:  YES    Medicare Notice of Rights provided to the patient/family:  NO, as per Admissions Facesheet, pt is not on Medicare.    Additional information:  Pt was assessed and declined for admit to [facility] as per Admissions (Joslyn) due to no appropriate bed.  Pt was assessed and approved for admit to both [facility] and to [facility].  MAHESH reviewed Medicare Star ratings for each facility and each category with David (via  Phone call). David  selected [facility] on pt's behalf again stressing that it is important to have pt placed closest to his home.  MAHESH reviewed [facility]'s visiting policy's (as stated by Eugenie in Admissions) with both [place] (no inside visitors, pt quarantined for the first 14 days of admit, after that, visitor's can scheduled outside visits only, masks must be worn and social distancing practiced). MAHESH also informed Fathia that family can bring pt food but must call the facility so that a staff member can come out to get the food (as stated by Eugenie in Admissions). MAHESH met with pt and sister (Sofi) and utilized on line . SW updated in regards to the discharge plan, visiting guidelines at [facility] and bringing in food. Sofi voiced concern as pt is a one to one feeder and she states that she has been assisting with this task. MAHESH informed Sofi that staff at [facility] will feed pt and this will be written on the discharge orders.  ..    JOHNNY Rodriguez  Social Work, 6A  Phone:  [phone]  Pager:  [phone]  8/4/2020

## 2020-08-04 NOTE — TELEPHONE ENCOUNTER
----- Message from Nimesh Blackwood sent at 8/4/2020 11:12 AM CDT -----  Regarding: RE: Dr Boone 1-2 weeks face to face  Pt not able to receive calls at this time. Ill try back later in the week.  ----- Message -----  From: Rachelle Simons, RN  Sent: 8/3/2020   6:25 AM CDT  To: Nimesh Blackwood  Subject: Dr Boone 1-2 weeks face to face           Please help schedule a new face to face visit with Paolo in one to two weeks for hospital follow up .    Thank you!  ----- Message -----  From: Geo Carvalho MD  Sent: 8/2/2020  12:22 PM CDT  To: Armen Boone DO, #    Hi Rachelle Wu and JOSI,      I will plan to get this patient follow-up in JOSI's clinic in approximately 1 to 2 weeks for follow-up hospitalization with repeat brain MRI.  He requires an  and given his relative complexity I think he would be better served seen in person if possible.   I have cc'ed schedulers and would appreciate any help arranging.      Geo Roldan

## 2020-08-04 NOTE — PROGRESS NOTES
"Social Work Services Progress Note    Hospital Day: 20  Date of Initial Social Work Evaluation:  7/31/2020  Collaborated with:  Dr Sandy, SNF Admissions Coordinator's, niece (David), sister (Sofi)    Data:  SW is following pt for discharge planning.   A TCU stay is recommended.   Pt ready for discharge on 8/3/2020.      Intervention:  MAHESH spoke with Dr. Sandy who confirms readiness for discharge.  SW received a voice mail message from Admissions (Kristin) at [facility] indicating that pt was assessed and declined for admit as they have \"no appropriate bed.\"  SW received a voice mail message from Admissions (Angela) at [facility] indicating that pt was assessed and approved for admit. Angela states that pt needs a current Covid screen.  Angela states that pt will be quarantined for 14 days (to his room). Angela states that family will not be allowed into the facility but can bring pt food by calling the facility to arrange for a staff member to come out to get the food.  Angela states that after 14 days, family can schedule outside visits but will need to mask. MAHESH spoke with Dr. Sandy and she will order a new covid test.  MAHESH spoke with Admissions for [facility] ([place]) who indicates that pt has been assessed and approved for admit and they can accept pt for admit today.  MAHESH spoke with Admissions for [facility] (Joslyn) and pt was assessed and declined for admit due to \"no appropriate bed.      Assessment:  Family has expressed that it is important that pt is placed in a rehab facility nearest to his home.  Pt declined for [facility], [facility] and [facility].  Pt accepted for admit to [facility] and to [facility]. MAHESH spoke with pt's niece (David) who selects [facility]. MAHESH spoke with pt's sister (Sofi) who states that she wants pt placed nearest to pt's home.    Plan:    Anticipated Disposition:  TCU placement    Barriers " to d/c plan:  None at this time    Follow Up:  SW will coordinate discharge.    JOHNNY Rodriguez  Social Work, 6A  Phone:  [phone]  Pager:  [phone]  8/4/2020

## 2020-08-04 NOTE — PROGRESS NOTES
Calorie Count  Intake recorded for: 8/3  Total Kcals: 916 Total Protein: 41g  Kcals from Hospital Food: 916  Protein: 41g  Kcals from Outside Food (average):0 Protein: 0g  # Meals Recorded: 2 meals (First - 100% scrambled eggs, oatmeal, blueberry muffin, orange juice, 25% milk)      (Second - 100% peaches, 50% grilled chicken w/ grav, green beans, 25% egg noodles w/ gravy)  # Supplements Recorded: 25% 1 Magic Cup

## 2020-08-04 NOTE — PLAN OF CARE
OT/6A:  Discharge Planner OT   Patient plan for discharge: Not discussed today  Current status: Interpretor services used. Pt required mod A rolling in bed and mod A x2 side-lying > EOB. Completed weight-bearing in elbow of RUE at EOB with mod-max A to re-establish and maintain upright posture. Pt completed self-feeding with built-up handle and [place] assistance. Noted improved voluntary muscle activation throughout RUE.  Barriers to return to prior living situation: Medical status, R sided weakness, communication, level of assist  Recommendations for discharge: TCU  Rationale for recommendations: Pt is significantly below baseline and would benefit from continued skilled OT services to promote independence with functional mobility and ADLs/IADLs. Pt has intensive multidisciplinary needs however timeline for recovery may be longer than typical ARU candidate.       Entered by: Emerita Melton 08/04/2020 10:18 AM

## 2020-08-04 NOTE — PLAN OF CARE
Discharge Planner SLP   Patient plan for discharge: none stated   Current status: Pt remains appropriate to continue dysphagia diet 3 and thin liquids with feeding assistance. Pt should be fully upright and alert for all PO, take small sips/bites, slow pacing, and alternate between consistencies. Pt made minimal attempts to communicate and iPad  unable to understand pt's message despite repetition. ST to continue to follow.   Barriers to return to prior living situation: dysphagia, functional communication, weakness   Recommendations for discharge: TCU  Rationale for recommendations: pt would benefit from continued ST targeting communication and swallowing        Entered by: Eugenie Norman 08/04/2020 10:08 AM         Speech Language Therapy Discharge Summary    Reason for therapy discharge:    Discharged to transitional care facility.    Progress towards therapy goal(s). See goals on Care Plan in [facility] electronic health record for goal details.  Goals not met.  Barriers to achieving goals:   discharge from facility.    Therapy recommendation(s):    Continued therapy is recommended.  Rationale/Recommendations:  Continued ST for dysphagia and functional communication .

## 2020-08-04 NOTE — PLAN OF CARE
Physical Therapy Discharge Summary    Reason for therapy discharge:    Discharged to transitional care facility.    Progress towards therapy goal(s). See [facility] on Care [facility] for goal details.  Goals partially met.  Barriers to achieving goals:   discharge from facility.    Therapy recommendation(s):    Continued therapy is recommended.  Rationale/Recommendations:  TCU.

## 2020-08-04 NOTE — PROGRESS NOTES
Patient discharged to [facility] via EMT/stretcher at 1600. Patient's sister collected his belongings. Patient's packet and facesheet sent with EMT.

## 2020-08-04 NOTE — PROGRESS NOTES
Status: admitted for stroke work up s/p fall  Vitals: VSS.  Continuous cardiac monitoring, NSR  Neuros: A&Ox2.  (self and place intermittently).  Quiet/dysarthric speech, difficult to understand.  Mimics commands for neuro assessment.  R side 1/5.  L side 4/5.    IV: PIV x2, SL  Resp/trach: WDL  Diet: DD3 w/thins.  1:1 feeder.  Dung counts day 3.    Bowel status: Large BM this shift  : Incontinent  Skin: rash to groin  Pain: denies  Activity: Turned/repositioned Q2hrs  Plan: TCU when bed available

## 2020-08-04 NOTE — PLAN OF CARE
Status: Admitted for stroke work up s/p fall.  VS:VSS. Cardiac monitoring NSR vs sinus kun. No PRNs needed for BP  Neuros: Ax 2 self and place intermittently, can be difficult to assess d/t pts quiet/dysarthric speech.  ipad utilized. R side 1-2/5, L side 4/5. Mimics some other actions  GI: Tolerating DD3 w/thins, fair intake, 1:1 feeder. Dung counts Day 3. No BM this shift.  : Incontinent of urine, condom cath replaced.  IV: PIV x2 SL  Activity: Ax2 w/lift. Up in chair x1. Turn/repo Q2hrs in bed  Pain: Denies?  Skin: WNL, rash groin.   Labs/Tests: Worked w/speech and PT.   Social: Sister at .   Plan of care: Continue to encourage PO intake and activity as tolerated. Eventual d/c to TCU, when bed available.

## 2020-08-05 ENCOUNTER — NURSING HOME VISIT (OUTPATIENT)
Dept: GERIATRICS | Facility: CLINIC | Age: 60
End: 2020-08-05
Payer: COMMERCIAL

## 2020-08-05 VITALS
RESPIRATION RATE: 18 BRPM | HEART RATE: 72 BPM | HEIGHT: 66 IN | DIASTOLIC BLOOD PRESSURE: 94 MMHG | SYSTOLIC BLOOD PRESSURE: 146 MMHG | OXYGEN SATURATION: 98 % | TEMPERATURE: 98.2 F | BODY MASS INDEX: 20.95 KG/M2

## 2020-08-05 DIAGNOSIS — N05.5 MPGN (MEMBRANOPROLIFERATIVE GLOMERULONEPHRITIDES): ICD-10-CM

## 2020-08-05 DIAGNOSIS — B19.10 HEPATIC CIRRHOSIS DUE TO HEPATITIS B (H): ICD-10-CM

## 2020-08-05 DIAGNOSIS — I10 ESSENTIAL HYPERTENSION: ICD-10-CM

## 2020-08-05 DIAGNOSIS — K74.60 HEPATIC CIRRHOSIS DUE TO HEPATITIS B (H): ICD-10-CM

## 2020-08-05 DIAGNOSIS — E44.0 MODERATE PROTEIN-CALORIE MALNUTRITION (H): ICD-10-CM

## 2020-08-05 DIAGNOSIS — R41.82 ALTERED MENTAL STATUS, UNSPECIFIED ALTERED MENTAL STATUS TYPE: Primary | ICD-10-CM

## 2020-08-05 DIAGNOSIS — I67.83 PRES (POSTERIOR REVERSIBLE ENCEPHALOPATHY SYNDROME): ICD-10-CM

## 2020-08-05 DIAGNOSIS — R53.81 PHYSICAL DECONDITIONING: ICD-10-CM

## 2020-08-05 PROCEDURE — 99309 SBSQ NF CARE MODERATE MDM 30: CPT | Performed by: NURSE PRACTITIONER

## 2020-08-05 NOTE — PROGRESS NOTES
[place] [facility]  PRIMARY CARE PROVIDER AND CLINIC:  [facility], [address] / [facility] [address]  Chief Complaint   Patient presents with     Hospital F/U     [place] Medical Record Number:  [id number]  Place of Service where encounter took place:  [facility] ([facility]) [[id number]]    Wilfredo Yoon  is a 60 year old  (1960), admitted to the above facility from  [facility]. Hospital stay 7/16/20 through 8/4/20.  Admitted to this facility for  rehab, medical management and nursing care.    HPI:    HPI information obtained from: facility chart records, facility staff, patient report and [facility] chart review.   Brief Summary of Hospital Course: PMH chronic Hep B with cirrhosis, MPGN, HTN, HLD. Patient presented to [facility] with altered mental status. He was also in the ED 7/16 with abdominal pain and hypertension 245/140. After extensive work up, his AMS was determined to be due to PRES and multifocal subcortical infarcts secondary to small vessel disease vs malignant HTN. PRES did improve throughout his hospital stay. Residual deficits include expressive aphasia, right hemiparesis and dysphagia. Due to poor intake, tube feeding was discussed with his wife, but she declined saying that that would not be in line with his wishes.    Updates on Status Since Skilled nursing Admission:   Patient is seen with [facility] . When patient is asked if he has any residual effects from his neurological issues, he shakes his head no. He acknowledges that he is weak. He answers only yes/no to questions.       CODE STATUS/ADVANCE DIRECTIVES DISCUSSION:   CPR/Full code   Patient's living condition: lives with spouse  ALLERGIES: Patient has no known allergies.  PAST MEDICAL HISTORY:  has a past medical history of Cryoglobulinemia (H), Glomerulonephritis, Hepatitis B, Hypertension, and Surgical complication.  PAST SURGICAL HISTORY:   has a past  "surgical history that includes Hand surgery (Right); HAND/FINGER SURGERY UNLISTED; Esophagoscopy, gastroscopy, duodenoscopy (EGD), combined (N/A, 10/18/2018); Anesthesia out of OR MRI (N/A, 7/18/2020); and IR Paracentesis (7/27/2020).  FAMILY HISTORY: family history is not on file.  SOCIAL HISTORY:   reports that he has been smoking cigarettes. He has a 10.00 pack-year smoking history. He has never used smokeless tobacco. He reports that he does not drink alcohol or use drugs.    Post Discharge Medication Reconciliation Status: discharge medications reconciled, continue medications without change    Current Outpatient Medications   Medication Sig Dispense Refill     acetaminophen (TYLENOL) 325 MG tablet Take 1 tablet (325 mg) by mouth every 4 hours as needed for mild pain or fever       amLODIPine (NORVASC) 10 MG tablet Take 1 tablet (10 mg) by mouth daily Indication: HTN       aspirin (ASA) 81 MG chewable tablet Take 1 tablet (81 mg) by mouth daily Indication: Stroke       atorvastatin (LIPITOR) 40 MG tablet Take 1 tablet (40 mg) by mouth every evening Indication: Stroke       carvedilol (COREG) 25 MG tablet Take 1 tablet (25 mg) by mouth 2 times daily (with meals) Indication: HTN       chlorthalidone (HYGROTON) 25 MG tablet Take 1 tablet (25 mg) by mouth daily Indication: HTN       entecavir (BARACLUDE) 1 MG tablet Take 1 tablet (1 mg) by mouth daily Indication: HTN 30 tablet 11     miconazole (MICATIN) 2 % external cream Apply topically 2 times daily Indication: Groin rash       polyethylene glycol (MIRALAX) 17 g packet Take 17 g by mouth daily Indication: Constipation           ROS:  Limited secondary to aphasia impairment but today pt reports 4 point ROS including Respiratory, CV, GI and , other than that noted in the HPI,  is negative    Vitals:  BP (!) 146/94   Pulse 72   Temp 98.2  F (36.8  C)   Resp 18   Ht 1.676 m (5' 6\")   SpO2 98%   BMI 20.95 kg/m    Exam:  GENERAL APPEARANCE:  Alert, in no " distress, thin  EYES:  EOM, conjunctivae, lids, pupils and irises normal  RESP:  respiratory effort and palpation of chest normal, lungs clear to auscultation , no respiratory distress  CV:  Palpation and auscultation of heart done , regular rate and rhythm, no murmur, rub, or gallop, no edema  ABDOMEN:  bowel sounds normal, soft, non-tender, mild distension  SKIN:  Inspection of skin and subcutaneous tissue baseline, Palpation of skin and subcutaneous tissue baseline  NEURO:   R hemiparesis, expressive aphasia  PSYCH:  insight and judgement impaired, memory impaired , affect abnormal flat    Lab/Diagnostic data:  Recent labs in [facility] reviewed by me today.       ASSESSMENT/PLAN:  (R41.82) Altered mental status, unspecified altered mental status type  (primary encounter diagnosis)  Comment: Difficult to assess due to expressive aphasia. Therapy will be evaluating cognition and family can certainly alert staff if they have acute concerns    (I67.83) PRES (posterior reversible encephalopathy syndrome)  Comment: No evidence of worsening  Plan: Monitor neuro exam. PT/OT eval and treat, discharge planning per their recommendations.    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 Plan is for monthly paracentesis. No s/sx significant ascites currently  Plan: Continue current POC with no changes at this time and adjustments as needed.    (I10) Essential hypertension  Comment: -140s since admission, it is unclear what goal is. Will not make any changes, but will certainly send data to cardiology and nephrology follow up appointments  Plan: Continue current POC with no changes at this time and adjustments as needed.    (E44.0) Moderate protein-calorie malnutrition (H)  Comment: Due to poor intake. Hopefully this will improve as time goes on  Plan: Continue current POC with no  changes at this time. Dietician to monitor      Electronically signed by:  LINDA Mac [facility]  Phone: [phone]

## 2020-08-05 NOTE — PLAN OF CARE
Occupational Therapy Discharge Summary    Reason for therapy discharge:    Discharged to transitional care facility.    Progress towards therapy goal(s). See goals on Care Plan in [facility] electronic health record for goal details.  Goals not met.  Barriers to achieving goals:   discharge from facility.    Therapy recommendation(s):    Continued therapy is recommended.  Rationale/Recommendations:  to maximize safety and independence with ADL/IADL and functional mobility.

## 2020-08-05 NOTE — LETTER
8/5/2020        RE: Wilfredo Yoon  [address]  [address]  [facility] [address]        [place] [facility]  PRIMARY CARE PROVIDER AND CLINIC:  [facility], [address] / [facility] [address]  Chief Complaint   Patient presents with     Hospital F/U     [place] Medical Record Number:  [id number]  Place of Service where encounter took place:  [facility] [place] ([facility]) [[id number]]    Wilfredo Yoon  is a 60 year old  (1960), admitted to the above facility from  [facility]. Hospital stay 7/16/20 through 8/4/20.  Admitted to this facility for  rehab, medical management and nursing care.    HPI:    HPI information obtained from: facility chart records, facility staff, patient report and [facility] chart review.   Brief Summary of Hospital Course: PMH chronic Hep B with cirrhosis, MPGN, HTN, HLD. Patient presented to [facility] with altered mental status. He was also in the ED 7/16 with abdominal pain and hypertension 245/140. After extensive work up, his AMS was determined to be due to PRES and multifocal subcortical infarcts secondary to small vessel disease vs malignant HTN. PRES did improve throughout his hospital stay. Residual deficits include expressive aphasia, right hemiparesis and dysphagia. Due to poor intake, tube feeding was discussed with his wife, but she declined saying that that would not be in line with his wishes.    Updates on Status Since Skilled nursing Admission:   Patient is seen with [facility] . When patient is asked if he has any residual effects from his neurological issues, he shakes his head no. He acknowledges that he is weak. He answers only yes/no to questions.       CODE STATUS/ADVANCE DIRECTIVES DISCUSSION:   CPR/Full code   Patient's living condition: lives with spouse  ALLERGIES: Patient has no known allergies.  PAST MEDICAL HISTORY:  has a past medical history of Cryoglobulinemia (H),  Glomerulonephritis, Hepatitis B, Hypertension, and Surgical complication.  PAST SURGICAL HISTORY:   has a past surgical history that includes Hand surgery (Right); HAND/FINGER SURGERY UNLISTED; Esophagoscopy, gastroscopy, duodenoscopy (EGD), combined (N/A, 10/18/2018); Anesthesia out of OR MRI (N/A, 7/18/2020); and IR Paracentesis (7/27/2020).  FAMILY HISTORY: family history is not on file.  SOCIAL HISTORY:   reports that he has been smoking cigarettes. He has a 10.00 pack-year smoking history. He has never used smokeless tobacco. He reports that he does not drink alcohol or use drugs.    Post Discharge Medication Reconciliation Status: discharge medications reconciled, continue medications without change    Current Outpatient Medications   Medication Sig Dispense Refill     acetaminophen (TYLENOL) 325 MG tablet Take 1 tablet (325 mg) by mouth every 4 hours as needed for mild pain or fever       amLODIPine (NORVASC) 10 MG tablet Take 1 tablet (10 mg) by mouth daily Indication: HTN       aspirin (ASA) 81 MG chewable tablet Take 1 tablet (81 mg) by mouth daily Indication: Stroke       atorvastatin (LIPITOR) 40 MG tablet Take 1 tablet (40 mg) by mouth every evening Indication: Stroke       carvedilol (COREG) 25 MG tablet Take 1 tablet (25 mg) by mouth 2 times daily (with meals) Indication: HTN       chlorthalidone (HYGROTON) 25 MG tablet Take 1 tablet (25 mg) by mouth daily Indication: HTN       entecavir (BARACLUDE) 1 MG tablet Take 1 tablet (1 mg) by mouth daily Indication: HTN 30 tablet 11     miconazole (MICATIN) 2 % external cream Apply topically 2 times daily Indication: Groin rash       polyethylene glycol (MIRALAX) 17 g packet Take 17 g by mouth daily Indication: Constipation           ROS:  Limited secondary to aphasia impairment but today pt reports 4 point ROS including Respiratory, CV, GI and , other than that noted in the HPI,  is negative    Vitals:  BP (!) 146/94   Pulse 72   Temp 98.2  F (36.8  C)   " Resp 18   Ht 1.676 m (5' 6\")   SpO2 98%   BMI 20.95 kg/m    Exam:  GENERAL APPEARANCE:  Alert, in no distress, thin  EYES:  EOM, conjunctivae, lids, pupils and irises normal  RESP:  respiratory effort and palpation of chest normal, lungs clear to auscultation , no respiratory distress  CV:  Palpation and auscultation of heart done , regular rate and rhythm, no murmur, rub, or gallop, no edema  ABDOMEN:  bowel sounds normal, soft, non-tender, mild distension  SKIN:  Inspection of skin and subcutaneous tissue baseline, Palpation of skin and subcutaneous tissue baseline  NEURO:   R hemiparesis, expressive aphasia  PSYCH:  insight and judgement impaired, memory impaired , affect abnormal flat    Lab/Diagnostic data:  Recent labs in [facility] reviewed by me today.       ASSESSMENT/PLAN:  (R41.82) Altered mental status, unspecified altered mental status type  (primary encounter diagnosis)  Comment: Difficult to assess due to expressive aphasia. Therapy will be evaluating cognition and family can certainly alert staff if they have acute concerns    (I67.83) PRES (posterior reversible encephalopathy syndrome)  Comment: No evidence of worsening  Plan: Monitor neuro exam. PT/OT eval and treat, discharge planning per their recommendations.    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 Plan is for monthly paracentesis. No s/sx significant ascites currently  Plan: Continue current POC with no changes at this time and adjustments as needed.    (I10) Essential hypertension  Comment: -140s since admission, it is unclear what goal is. Will not make any changes, but will certainly send data to cardiology and nephrology follow up appointments  Plan: Continue current POC with no changes at this time and adjustments as needed.    (E44.0) Moderate protein-calorie malnutrition " (H)  Comment: Due to poor intake. Hopefully this will improve as time goes on  Plan: Continue current POC with no changes at this time. Dietician to monitor      Electronically signed by:  LINDA Mac [facility]  Phone: [phone]

## 2020-08-06 NOTE — TELEPHONE ENCOUNTER
RECORDS RECEIVED FROM: hospital follow up/per pts discharge instructions   DATE RECEIVED: 09.08.2020   NOTES (Gather within 2 years) STATUS DETAILS   OFFICE NOTE from referring provider   Internal 07.16.2020    Rey Longoria MD        OFFICE NOTE from other specialist N/A    DISCHARGE SUMMARY from hospital Internal 07.16.2020   DISCHARGE REPORT from the ER N/A    LABS (any labs) Internal / CE    MEDICATION LIST Internal    IMAGING  (NEED IMAGES AND REPORTS)     Osteomyelitis: Foot imaging  N/A    Liver Abscess: Abdominal imaging N/A    Other (anything related to diagnoses N/A

## 2020-08-14 ENCOUNTER — APPOINTMENT (OUTPATIENT)
Dept: INTERPRETER SERVICES | Facility: CLINIC | Age: 60
End: 2020-08-14
Payer: COMMERCIAL

## 2020-08-25 ENCOUNTER — NURSING HOME VISIT (OUTPATIENT)
Dept: GERIATRICS | Facility: CLINIC | Age: 60
End: 2020-08-25
Payer: COMMERCIAL

## 2020-08-25 VITALS
BODY MASS INDEX: 16.83 KG/M2 | SYSTOLIC BLOOD PRESSURE: 126 MMHG | HEIGHT: 65 IN | DIASTOLIC BLOOD PRESSURE: 82 MMHG | HEART RATE: 61 BPM | WEIGHT: 101 LBS | OXYGEN SATURATION: 98 % | TEMPERATURE: 98.9 F | RESPIRATION RATE: 18 BRPM

## 2020-08-25 DIAGNOSIS — G81.91 RIGHT HEMIPARESIS (H): ICD-10-CM

## 2020-08-25 DIAGNOSIS — R13.10 DYSPHAGIA, UNSPECIFIED TYPE: ICD-10-CM

## 2020-08-25 DIAGNOSIS — R41.89 COGNITIVE IMPAIRMENT: ICD-10-CM

## 2020-08-25 DIAGNOSIS — N05.5 MPGN (MEMBRANOPROLIFERATIVE GLOMERULONEPHRITIDES): ICD-10-CM

## 2020-08-25 DIAGNOSIS — B18.1 VIRAL HEPATITIS B CHRONIC (H): ICD-10-CM

## 2020-08-25 DIAGNOSIS — I67.83 PRES (POSTERIOR REVERSIBLE ENCEPHALOPATHY SYNDROME): ICD-10-CM

## 2020-08-25 DIAGNOSIS — I69.320 APHASIA AS LATE EFFECT OF CEREBROVASCULAR ACCIDENT (CVA): ICD-10-CM

## 2020-08-25 DIAGNOSIS — R76.12 POSITIVE QUANTIFERON-TB GOLD TEST: ICD-10-CM

## 2020-08-25 DIAGNOSIS — I63.9 CEREBROVASCULAR ACCIDENT (CVA), UNSPECIFIED MECHANISM (H): ICD-10-CM

## 2020-08-25 DIAGNOSIS — I10 ESSENTIAL HYPERTENSION, MALIGNANT: Primary | ICD-10-CM

## 2020-08-25 DIAGNOSIS — R00.1 BRADYCARDIA: ICD-10-CM

## 2020-08-25 DIAGNOSIS — R18.8 CIRRHOSIS OF LIVER WITH ASCITES, UNSPECIFIED HEPATIC CIRRHOSIS TYPE (H): ICD-10-CM

## 2020-08-25 DIAGNOSIS — K74.60 CIRRHOSIS OF LIVER WITH ASCITES, UNSPECIFIED HEPATIC CIRRHOSIS TYPE (H): ICD-10-CM

## 2020-08-25 PROCEDURE — 99305 1ST NF CARE MODERATE MDM 35: CPT | Performed by: INTERNAL MEDICINE

## 2020-08-25 ASSESSMENT — MIFFLIN-ST. JEOR: SCORE: 1200.01

## 2020-08-25 NOTE — LETTER
8/25/2020        RE: Wilfredo Yoon  [address]  [address]  [facility] [address]        [place] [facility]  PHYSICIAN NOTE    PRIMARY CARE PROVIDER AND CLINIC:  [facility], [address] / [facility] [address]    Chief Complaint   Patient presents with     Hospital F/U     [place] Medical Record Number:  [id number]  Place of Service where encounter took place:  [facility] ([facility]) [[id number]]    Wilfredo Yoon is a 60 year old (1960), admitted to the above facility from  [facility]. Hospital stay 7/16/20 through 8/4/20. Admitted to this facility for  rehab, medical management and nursing care.     HPI:    HPI information obtained from: facility chart records, facility staff, patient report and [facility] chart review.     Brief summary of hospital course: Mr. Wilfredo Yoon has h/o HTN, chronic Hepatitis B with cirrhosis needing periodic paracentesis and h/o glomerulonephritis who was admitted with hypertensive emergency and evidence of multifocal supratentorial infarcts likely d/t HTN with brainstem findings likely consistent with PRES. He was encephalopathic and unfortunately had developed aphasia, dysphagia and right hemiparesis. His diet was eventually able to be advanced to a dysphagia 3 diet but still had poor intake. Per notes, his sister thought a feeding tube would not be in line with his goals of care. Several anti-hypertensives added for BP control. He is also to be on aspirin and atorvastatin. He had several consultants on board in his care and several tests throughout his stay. He did have IR paracentesis of 3.3 liters on 7/28/20 as has h/o approximately monthly paracenteses. He had a positive quantiferon gold test too thought to be representative of latent TB to see ID as outpatient.    Updates on status since skilled nursing admission: Wilfredo is seen in his room today accompanied by a  "professional [facility] interpretor. He just finished AM therapies and lunch. He says he is \"very good\" and denies pain or dyspnea. Says he is sleeping ok and mood is fine. He doesn't complain or ask anything specific. His answers are very short and to the point. He has insight to being independent prior to this. Though chart mentions h/o smoking he denies h/o recent smoking. No behaviors per staff. I did speak with his therapists who said there is some progress but its very slow and often limited some days d/t reports of fatigue. The interpretors try to encourage him but he doesn't always seem to respond to that. Doesn't seem to nap away the days. Unsure if family has been able to do a visit d/t COVID-19 pandemic and visitor restrictions. Therapists give example about his ability and say if he is tired, he doesn't move his right arm at all. Still using ethel and/or mechanical lift for transfers. He told me he thought he was \"in clinic\" now but per interpretor, when asked this question earlier today he did mention rehab.  In reviewing social work note from the hospital, jonathon Hernandez is closest to him in [place]. He is one of 11 siblings and all live in [place] except for one sister in [place]. Supposedly he has 4 adult children in [place] and unknown if ever  as per notes he was always a very private person.    CODE STATUS/ADVANCE DIRECTIVES DISCUSSION:   CPR/Full code   Patient's living condition: lives with family    ALLERGIES: Patient has no known allergies.    Past Medical History:   Diagnosis Date     Cirrhosis (H)      Cryoglobulinemia (H)      CVA (cerebral vascular accident) (H) 07/16/2020    Supratentorial likely d/t hypertensive emergency leading to right hemiparesis, dysphagia and aphasia     Glomerulonephritis      Hepatitis B      Hypertension      MPGN (membranoproliferative glomerulonephritides)      Positive QuantiFERON-TB Gold test      PRES (posterior reversible encephalopathy syndrome) 07/16/2020 "    In brainstem d/t hypertensive emergency; suffered supratentorial CVAs same date      Past Surgical History:   Procedure Laterality Date     ANESTHESIA OUT OF OR MRI N/A 7/18/2020    Procedure: ANESTHESIA OUT OF OR MRI;  Surgeon: GENERIC ANESTHESIA PROVIDER;  Location: [facility] OR     C HAND/FINGER SURGERY UNLISTED       ESOPHAGOSCOPY, GASTROSCOPY, DUODENOSCOPY (EGD), COMBINED N/A 10/18/2018    Procedure: EGD;  Surgeon: Eber Ortez MD;  Location: [facility]     HAND SURGERY Right      IR PARACENTESIS  7/27/2020     Family History   Problem Relation Age of Onset     Liver Cancer No family hx of      Hepatitis No family hx of      Social History     Tobacco Use     Smoking status: Former Smoker     Packs/day: 0.25     Years: 40.00     Pack years: 10.00     Types: Cigarettes     Smokeless tobacco: Never Used   Substance Use Topics     Alcohol use: No     Alcohol/week: 0.0 standard drinks     Drug use: No        Post-discharge medication reconciliation status: Reviewed and updated in [facility] according to facility MAR    Current Outpatient Medications   Medication Sig Dispense Refill     acetaminophen (TYLENOL) 325 MG tablet Take 1 tablet (325 mg) by mouth every 4 hours as needed for mild pain or fever       amLODIPine (NORVASC) 10 MG tablet Take 1 tablet (10 mg) by mouth daily Indication: HTN       aspirin (ASA) 81 MG chewable tablet Take 1 tablet (81 mg) by mouth daily Indication: Stroke       atorvastatin (LIPITOR) 40 MG tablet Take 1 tablet (40 mg) by mouth every evening Indication: Stroke       carvedilol (COREG) 25 MG tablet Take 1 tablet (25 mg) by mouth 2 times daily (with meals) Indication: HTN       chlorthalidone (HYGROTON) 25 MG tablet Take 1 tablet (25 mg) by mouth daily Indication: HTN       entecavir (BARACLUDE) 1 MG tablet Take 1 tablet (1 mg) by mouth daily Indication: HTN 30 tablet 11     miconazole (MICATIN) 2 % external cream Apply topically 2 times daily Indication: Groin rash       polyethylene  "glycol (MIRALAX) 17 g packet Take 17 g by mouth daily Indication: Constipation         ROS:  Limited secondary to cognitive impairment but today pt reports as above in HPI    Exam:  /82   Pulse 61   Temp 98.9  F (37.2  C)   Resp 18   Ht 1.651 m (5' 5\")   Wt 45.8 kg (101 lb)   SpO2 98%   BMI 16.81 kg/m    Alert, sitting up in wheelchair in NAD, casually dressed  Smiles at one point during our visit  No scleral icterus, moist oral mucosa  Breathing non-labored, no cough  Abdomen thin, soft, non-tender, no palpable ascites  No tremor, voice is very soft/mumbled  Leans slightly forward and to left in chair, noted right hemiparesis with weak right hand , elevation at right shoulder less than 90 degrees, significant right pronator drift and minimal active movement of right leg on command  Even left arm seems slightly generally weak/deconditioned but is able to ultimately raise it up with prompting/encouragement and tap left leg  No edema  No muscle contractures noted  Appears well cared for, no odor    Lab/Diagnostic data:  None yet at TCU but CBC and CMP ordered for 8/31/20    ASSESSMENT/PLAN:  Essential hypertension, malignant  Cerebrovascular accident (CVA), unspecified mechanism (H)  PRES (posterior reversible encephalopathy syndrome)  Right hemiparesis (H)  Dysphagia, unspecified type  Aphasia as late effect of cerebrovascular accident (CVA)  Cognitive impairment  Bradycardia  Looks alert today after therapies in AM but sometimes I'm told he can be fatigued and minimally interactive with therapy and requires a lot of encouragement that interpretors try to provide  He denies concerns but appears to have lack of insight/understanding about what is going on  So unfortunate from being previously independent to now have such debility  He is calm without behaviors though per staff  BPs well controlled on several agents; labs ordered for recheck given his new medications and reports of fatigue  Monitor for " recurrent bradycardia as noted intermittently in hospital and if that occurs would need dose reduction of Coreg  Per social work notes, he was/is historically a very private person so that may be contributing to his lack of participation at times  Now on regular soft diet with thins; monitor weight to ensure enough nutrition though per family discussion in the hospital a feeding tube would be against his goals of care (but again he was quite private so unsure how well they know)  Consider depression in Ddx if continues to have poor participation despite encouragement from staff and professional [facility] interpretors  Remains on ASA and Atorvastatin; per outpatient notes he was not always reliable with medication management at baseline  Future f/u with neurology with MRI as well is also needed    Viral hepatitis B chronic (H)  Cirrhosis of liver with ascites, unspecified hepatic cirrhosis type (H)  MPGN (membranoproliferative glomerulonephritides)  No ascites to drain that I can appreciate today; continues on Entecavir  However, last paracentesis was in the hospital at the end of July and as outpatient often had paracentesis every month so follow closely for needs  He would need wheelchair transport and transfer from wheelchair to stretcher in IR would be challenging so keep that in mind (?stretcher transport then needed)  Follows with GI Dr. Eber Rossi Ralph  May need f/u with nephrology pending labs      Positive QuantiFERON-TB Gold test  Eventual f/u with ID on positive quantiferon gold thought to be d/t latent TB       Electronically signed by:  Heather Lester DO        Sincerely,        Heather Lester DO

## 2020-08-26 NOTE — PROGRESS NOTES
"[place] [facility]  PHYSICIAN NOTE    PRIMARY CARE PROVIDER AND CLINIC:  [facility] [place], [address] / [facility] [address]    Chief Complaint   Patient presents with     Hospital F/U     [place] Medical Record Number:  [id number]  Place of Service where encounter took place:  [facility] ([facility]) [[id number]]    Wilfredo Yoon is a 60 year old (1960), admitted to the above facility from  [facility]. Hospital stay 7/16/20 through 8/4/20. Admitted to this facility for  rehab, medical management and nursing care.     HPI:    HPI information obtained from: facility chart records, facility staff, patient report and [facility] chart review.     Brief summary of hospital course: Mr. Wilfredo Yoon has h/o HTN, chronic Hepatitis B with cirrhosis needing periodic paracentesis and h/o glomerulonephritis who was admitted with hypertensive emergency and evidence of multifocal supratentorial infarcts likely d/t HTN with brainstem findings likely consistent with PRES. He was encephalopathic and unfortunately had developed aphasia, dysphagia and right hemiparesis. His diet was eventually able to be advanced to a dysphagia 3 diet but still had poor intake. Per notes, his sister thought a feeding tube would not be in line with his goals of care. Several anti-hypertensives added for BP control. He is also to be on aspirin and atorvastatin. He had several consultants on board in his care and several tests throughout his stay. He did have IR paracentesis of 3.3 liters on 7/28/20 as has h/o approximately monthly paracenteses. He had a positive quantiferon gold test too thought to be representative of latent TB to see ID as outpatient.    Updates on status since skilled nursing admission: Wilfredo is seen in his room today accompanied by a professional [place] interpretor. He just finished AM therapies and lunch. He says he is \"very good\" and denies pain " "or dyspnea. Says he is sleeping ok and mood is fine. He doesn't complain or ask anything specific. His answers are very short and to the point. He has insight to being independent prior to this. Though chart mentions h/o smoking he denies h/o recent smoking. No behaviors per staff. I did speak with his therapists who said there is some progress but its very slow and often limited some days d/t reports of fatigue. The interpretors try to encourage him but he doesn't always seem to respond to that. Doesn't seem to nap away the days. Unsure if family has been able to do a visit d/t COVID-19 pandemic and visitor restrictions. Therapists give example about his ability and say if he is tired, he doesn't move his right arm at all. Still using ethel and/or mechanical lift for transfers. He told me he thought he was \"in clinic\" now but per interpretor, when asked this question earlier today he did mention rehab.  In reviewing social work note from the hospital, jonathon Hernandez is closest to him in [place]. He is one of 11 siblings and all live in [place] except for one sister in [place]. Supposedly he has 4 adult children in [facility] and unknown if ever  as per notes he was always a very private person.    CODE STATUS/ADVANCE DIRECTIVES DISCUSSION:   CPR/Full code   Patient's living condition: lives with family    ALLERGIES: Patient has no known allergies.    Past Medical History:   Diagnosis Date     Cirrhosis (H)      Cryoglobulinemia (H)      CVA (cerebral vascular accident) (H) 07/16/2020    Supratentorial likely d/t hypertensive emergency leading to right hemiparesis, dysphagia and aphasia     Glomerulonephritis      Hepatitis B      Hypertension      MPGN (membranoproliferative glomerulonephritides)      Positive QuantiFERON-TB Gold test      PRES (posterior reversible encephalopathy syndrome) 07/16/2020    In brainstem d/t hypertensive emergency; suffered supratentorial CVAs same date      Past Surgical History: "   Procedure Laterality Date     ANESTHESIA OUT OF OR MRI N/A 7/18/2020    Procedure: ANESTHESIA OUT OF OR MRI;  Surgeon: GENERIC ANESTHESIA PROVIDER;  Location: [facility] OR     C HAND/FINGER SURGERY UNLISTED       ESOPHAGOSCOPY, GASTROSCOPY, DUODENOSCOPY (EGD), COMBINED N/A 10/18/2018    Procedure: EGD;  Surgeon: Eber Ortez MD;  Location: [facility] GI     HAND SURGERY Right      IR PARACENTESIS  7/27/2020     Family History   Problem Relation Age of Onset     Liver Cancer No family hx of      Hepatitis No family hx of      Social History     Tobacco Use     Smoking status: Former Smoker     Packs/day: 0.25     Years: 40.00     Pack years: 10.00     Types: Cigarettes     Smokeless tobacco: Never Used   Substance Use Topics     Alcohol use: No     Alcohol/week: 0.0 standard drinks     Drug use: No        Post-discharge medication reconciliation status: Reviewed and updated in [facility] according to facility MAR    Current Outpatient Medications   Medication Sig Dispense Refill     acetaminophen (TYLENOL) 325 MG tablet Take 1 tablet (325 mg) by mouth every 4 hours as needed for mild pain or fever       amLODIPine (NORVASC) 10 MG tablet Take 1 tablet (10 mg) by mouth daily Indication: HTN       aspirin (ASA) 81 MG chewable tablet Take 1 tablet (81 mg) by mouth daily Indication: Stroke       atorvastatin (LIPITOR) 40 MG tablet Take 1 tablet (40 mg) by mouth every evening Indication: Stroke       carvedilol (COREG) 25 MG tablet Take 1 tablet (25 mg) by mouth 2 times daily (with meals) Indication: HTN       chlorthalidone (HYGROTON) 25 MG tablet Take 1 tablet (25 mg) by mouth daily Indication: HTN       entecavir (BARACLUDE) 1 MG tablet Take 1 tablet (1 mg) by mouth daily Indication: HTN 30 tablet 11     miconazole (MICATIN) 2 % external cream Apply topically 2 times daily Indication: Groin rash       polyethylene glycol (MIRALAX) 17 g packet Take 17 g by mouth daily Indication: Constipation         ROS:  Limited secondary to  "cognitive impairment but today pt reports as above in HPI    Exam:  /82   Pulse 61   Temp 98.9  F (37.2  C)   Resp 18   Ht 1.651 m (5' 5\")   Wt 45.8 kg (101 lb)   SpO2 98%   BMI 16.81 kg/m    Alert, sitting up in wheelchair in NAD, casually dressed  Smiles at one point during our visit  No scleral icterus, moist oral mucosa  Breathing non-labored, no cough  Abdomen thin, soft, non-tender, no palpable ascites  No tremor, voice is very soft/mumbled  Leans slightly forward and to left in chair, noted right hemiparesis with weak right hand , elevation at right shoulder less than 90 degrees, significant right pronator drift and minimal active movement of right leg on command  Even left arm seems slightly generally weak/deconditioned but is able to ultimately raise it up with prompting/encouragement and tap left leg  No edema  No muscle contractures noted  Appears well cared for, no odor    Lab/Diagnostic data:  None yet at TCU but CBC and CMP ordered for 8/31/20    ASSESSMENT/PLAN:  Essential hypertension, malignant  Cerebrovascular accident (CVA), unspecified mechanism (H)  PRES (posterior reversible encephalopathy syndrome)  Right hemiparesis (H)  Dysphagia, unspecified type  Aphasia as late effect of cerebrovascular accident (CVA)  Cognitive impairment  Bradycardia  Looks alert today after therapies in AM but sometimes I'm told he can be fatigued and minimally interactive with therapy and requires a lot of encouragement that interpretors try to provide  He denies concerns but appears to have lack of insight/understanding about what is going on  So unfortunate from being previously independent to now have such debility  He is calm without behaviors though per staff  BPs well controlled on several agents; labs ordered for recheck given his new medications and reports of fatigue  Monitor for recurrent bradycardia as noted intermittently in hospital and if that occurs would need dose reduction of " Coreg  Per social work notes, he was/is historically a very private person so that may be contributing to his lack of participation at times  Now on regular soft diet with thins; monitor weight to ensure enough nutrition though per family discussion in the hospital a feeding tube would be against his goals of care (but again he was quite private so unsure how well they know)  Consider depression in Ddx if continues to have poor participation despite encouragement from staff and professional [place] interpretors  Remains on ASA and Atorvastatin; per outpatient notes he was not always reliable with medication management at baseline  Future f/u with neurology with MRI as well is also needed    Viral hepatitis B chronic (H)  Cirrhosis of liver with ascites, unspecified hepatic cirrhosis type (H)  MPGN (membranoproliferative glomerulonephritides)  No ascites to drain that I can appreciate today; continues on Entecavir  However, last paracentesis was in the hospital at the end of July and as outpatient often had paracentesis every month so follow closely for needs  He would need wheelchair transport and transfer from wheelchair to stretcher in IR would be challenging so keep that in mind (?stretcher transport then needed)  Follows with GI Dr. Eber Rossi Ralph  May need f/u with nephrology pending labs      Positive QuantiFERON-TB Gold test  Eventual f/u with ID on positive quantiferon gold thought to be d/t latent TB       Electronically signed by:  Heather Lester DO

## 2020-09-08 ENCOUNTER — PRE VISIT (OUTPATIENT)
Dept: INFECTIOUS DISEASES | Facility: CLINIC | Age: 60
End: 2020-09-08

## 2020-09-08 ENCOUNTER — VIRTUAL VISIT (OUTPATIENT)
Dept: INFECTIOUS DISEASES | Facility: CLINIC | Age: 60
End: 2020-09-08
Attending: INTERNAL MEDICINE
Payer: COMMERCIAL

## 2020-09-08 DIAGNOSIS — R76.12 POSITIVE QUANTIFERON-TB GOLD TEST: Primary | ICD-10-CM

## 2020-09-08 NOTE — PROGRESS NOTES
Left voicemail for patient to call back to set up telemedicine visit, will call again before appointment time.  Dolores Clemente CMA on 9/8/2020 at 9:35 AM

## 2020-09-08 NOTE — PROGRESS NOTES
Left voicemail for patient to call back to set up telemedicine visit, will call again before appointment time.  Dolores Clemente CMA on 9/8/2020 at 9:50 AM      Patient did not answer the phone  when I called him back with an  at 10 am. We left a voice message, then I called his listed relative David at her home phone number. She did answer and told me that the patient was at [facility] for rehab. He has had a stroke and is unable to speak. She gave me the number for the nursing home. [phone]. Mr. Hurt is her father's brother.     I spoke with Mr. Yoon's nurse, LINDSAY Best who told me that Mr. Yoon was at therapy. She said that we should contact the nurse manager, Ed Chand, at [phone] to reschedule a virtual visit with the patient at a time the patient and a nurse are available to assist.

## 2020-09-08 NOTE — LETTER
9/8/2020       RE: Wilfredo Yoon  [address]  [address]  [place] [address]     Dear Colleague,    Thank you for referring your patient, Wilfredo Yoon, to the [facility] AND INFECTIOUS DISEASES at [facility]. Please see a copy of my visit note below.    Left voicemail for patient to call back to set up telemedicine visit, will call again before appointment time.  Dolores Clemente CMA on 9/8/2020 at 9:35 AM      Left voicemail for patient to call back to set up telemedicine visit, will call again before appointment time.  Dolores Clemente CMA on 9/8/2020 at 9:50 AM      Patient did not answer the phone  when I called him back with an  at 10 am. We left a voice message, then I called his listed relative David at her home phone number. She did answer and told me that the patient was at [facility] for rehab. He has had a stroke and is unable to speak. She gave me the number for the nursing home. [phone]. Mr. Hurt is her father's brother.     I spoke with Mr. Yoon's nurse, LINDSAY Best who told me that Mr. Yoon was at therapy. She said that we should contact the nurse manager, Ed Chand, at [phone] to reschedule a virtual visit with the patient at a time the patient and a nurse are available to assist.       Again, thank you for allowing me to participate in the care of your patient.      Sincerely,    Jerri Brandt MD

## 2020-09-13 PROBLEM — R76.12 POSITIVE QUANTIFERON-TB GOLD TEST: Status: ACTIVE | Noted: 2020-09-13

## 2020-09-13 PROBLEM — R11.2 NAUSEA & VOMITING: Status: RESOLVED | Noted: 2019-10-22 | Resolved: 2020-09-13

## 2020-09-21 ENCOUNTER — TELEPHONE (OUTPATIENT)
Dept: INFECTIOUS DISEASES | Facility: CLINIC | Age: 60
End: 2020-09-21

## 2020-09-21 ENCOUNTER — TELEPHONE (OUTPATIENT)
Dept: GERIATRICS | Facility: CLINIC | Age: 60
End: 2020-09-21

## 2020-09-22 NOTE — TELEPHONE ENCOUNTER
Patient had a positive mantoux so a blood test Quantiferon test was done, result is positive.   Patient does not have a cough, congestion, fever or SOB  Order: update NP in AM

## 2020-09-30 ENCOUNTER — NURSING HOME VISIT (OUTPATIENT)
Dept: GERIATRICS | Facility: CLINIC | Age: 60
End: 2020-09-30
Payer: COMMERCIAL

## 2020-09-30 VITALS
OXYGEN SATURATION: 98 % | WEIGHT: 99.8 LBS | SYSTOLIC BLOOD PRESSURE: 127 MMHG | HEART RATE: 65 BPM | DIASTOLIC BLOOD PRESSURE: 74 MMHG | RESPIRATION RATE: 16 BRPM | BODY MASS INDEX: 16.63 KG/M2 | HEIGHT: 65 IN | TEMPERATURE: 98.4 F

## 2020-09-30 DIAGNOSIS — R76.12 POSITIVE QUANTIFERON-TB GOLD TEST: Primary | ICD-10-CM

## 2020-09-30 PROCEDURE — 99308 SBSQ NF CARE LOW MDM 20: CPT | Performed by: NURSE PRACTITIONER

## 2020-09-30 ASSESSMENT — MIFFLIN-ST. JEOR: SCORE: 1194.57

## 2020-09-30 NOTE — PROGRESS NOTES
[place] [facility]  [place] Medical Record Number:  [id number]  Place of Service where encounter took place:  [facility] ([facility]) [[id number]]  Chief Complaint   Patient presents with     RECHECK       HPI:    Wilfredo Yoon  is a 60 year old (1960), who is being seen today for an episodic care visit.  HPI information obtained from: facility chart records and facility staff.     Today's concern is:  Positive QuantiFERON-TB Gold test  Patient had a positive quantiferon in the hospital and at this facility. Staff are wondering what the next steps are. HPI is challenging due to cognitive impairment. No cough, fever, SOB, hypoxia. He was supposed to have a telehealth visit with infectious disease, Dr. Brandt, but her chart review there was an issue with her having the correct contact information. Her office has tried to contact the nurse manager here to reschedule      Past Medical and Surgical History reviewed in Epic today.    MEDICATIONS:    Current Outpatient Medications   Medication Sig Dispense Refill     acetaminophen (TYLENOL) 325 MG tablet Take 1 tablet (325 mg) by mouth every 4 hours as needed for mild pain or fever       amLODIPine (NORVASC) 10 MG tablet Take 1 tablet (10 mg) by mouth daily Indication: HTN       aspirin (ASA) 81 MG chewable tablet Take 1 tablet (81 mg) by mouth daily Indication: Stroke       atorvastatin (LIPITOR) 40 MG tablet Take 1 tablet (40 mg) by mouth every evening Indication: Stroke       carvedilol (COREG) 25 MG tablet Take 1 tablet (25 mg) by mouth 2 times daily (with meals) Indication: HTN       chlorthalidone (HYGROTON) 25 MG tablet Take 1 tablet (25 mg) by mouth daily Indication: HTN       entecavir (BARACLUDE) 1 MG tablet Take 1 tablet (1 mg) by mouth daily Indication: HTN 30 tablet 11     miconazole (MICATIN) 2 % external cream Apply topically 2 times daily Indication: Groin rash       polyethylene glycol (MIRALAX) 17 g packet Take 17 g by mouth daily  "Indication: Constipation           REVIEW OF SYSTEMS:  Unobtainable secondary to cognitive impairment.     Objective:  /74   Pulse 65   Temp 98.4  F (36.9  C)   Resp 16   Ht 1.651 m (5' 5\")   Wt 45.3 kg (99 lb 12.8 oz)   SpO2 98%   BMI 16.61 kg/m    Exam:  GENERAL APPEARANCE:  Alert, in no distress    Labs:   Recent labs in EPIC reviewed by me today.     ASSESSMENT/PLAN:  (R76.12) Positive QuantiFERON-TB Gold test  (primary encounter diagnosis)  Comment: Patient may need to be treated for latent TB, but would prefer to have ID input regarding this. Nurse manager is advised of the need to follow up with Dr. Brandt's office to schedule a telehealth visit. A nurse will need to be available for the visit to answer questions. It may also be helpful to have his sister involved      Electronically signed by:  LINDA Mac CNP   [facility]  Phone: [phone]            "

## 2020-09-30 NOTE — LETTER
9/30/2020        RE: Wilfredo Yoon  [address]  [address]  [place] [address]        [place] [facility]  [facility] Record Number:  [id number]  Place of Service where encounter took place:  [facility] ([facility]) [[id number]]  Chief Complaint   Patient presents with     RECHECK       HPI:    Wilfredo Yoon  is a 60 year old (1960), who is being seen today for an episodic care visit.  HPI information obtained from: facility chart records and facility staff.     Today's concern is:  Positive QuantiFERON-TB Gold test  Patient had a positive quantiferon in the hospital and at this facility. Staff are wondering what the next steps are. HPI is challenging due to cognitive impairment. No cough, fever, SOB, hypoxia. He was supposed to have a telehealth visit with infectious disease, Dr. Brandt, but her chart review there was an issue with her having the correct contact information. Her office has tried to contact the nurse manager here to reschedule      Past Medical and Surgical History reviewed in Epic today.    MEDICATIONS:    Current Outpatient Medications   Medication Sig Dispense Refill     acetaminophen (TYLENOL) 325 MG tablet Take 1 tablet (325 mg) by mouth every 4 hours as needed for mild pain or fever       amLODIPine (NORVASC) 10 MG tablet Take 1 tablet (10 mg) by mouth daily Indication: HTN       aspirin (ASA) 81 MG chewable tablet Take 1 tablet (81 mg) by mouth daily Indication: Stroke       atorvastatin (LIPITOR) 40 MG tablet Take 1 tablet (40 mg) by mouth every evening Indication: Stroke       carvedilol (COREG) 25 MG tablet Take 1 tablet (25 mg) by mouth 2 times daily (with meals) Indication: HTN       chlorthalidone (HYGROTON) 25 MG tablet Take 1 tablet (25 mg) by mouth daily Indication: HTN       entecavir (BARACLUDE) 1 MG tablet Take 1 tablet (1 mg) by mouth daily Indication: HTN 30 tablet 11     miconazole (MICATIN) 2 % external cream Apply topically  "2 times daily Indication: Groin rash       polyethylene glycol (MIRALAX) 17 g packet Take 17 g by mouth daily Indication: Constipation           REVIEW OF SYSTEMS:  Unobtainable secondary to cognitive impairment.     Objective:  /74   Pulse 65   Temp 98.4  F (36.9  C)   Resp 16   Ht 1.651 m (5' 5\")   Wt 45.3 kg (99 lb 12.8 oz)   SpO2 98%   BMI 16.61 kg/m    Exam:  GENERAL APPEARANCE:  Alert, in no distress    Labs:   Recent labs in EPIC reviewed by me today.     ASSESSMENT/PLAN:  (R76.12) Positive QuantiFERON-TB Gold test  (primary encounter diagnosis)  Comment: Patient may need to be treated for latent TB, but would prefer to have ID input regarding this. Nurse manager is advised of the need to follow up with Dr. Brandt's office to schedule a telehealth visit. A nurse will need to be available for the visit to answer questions. It may also be helpful to have his sister involved      Electronically signed by:  LINDA Mac [facility]  Phone: [phone]                    "

## 2020-10-13 ENCOUNTER — VIRTUAL VISIT (OUTPATIENT)
Dept: INFECTIOUS DISEASES | Facility: CLINIC | Age: 60
End: 2020-10-13
Attending: INTERNAL MEDICINE
Payer: COMMERCIAL

## 2020-10-13 DIAGNOSIS — R76.12 POSITIVE QUANTIFERON-TB GOLD TEST: Primary | ICD-10-CM

## 2020-10-13 DIAGNOSIS — Z22.7 LATENT TUBERCULOSIS INFECTION: ICD-10-CM

## 2020-10-13 PROCEDURE — 99203 OFFICE O/P NEW LOW 30 MIN: CPT | Mod: 95 | Performed by: INTERNAL MEDICINE

## 2020-10-13 ASSESSMENT — PAIN SCALES - GENERAL: PAINLEVEL: NO PAIN (0)

## 2020-10-13 NOTE — LETTER
"10/13/2020       RE: Wilfredo Yoon  [address]  [address]  [facility] [address]     Dear Colleague,    Thank you for referring your patient, Wilfredo Yoon, to the [facility] INFECTIOUS DISEASE CLINIC [place] at [facility]. Please see a copy of my visit note below.    Wilfredo Yoon is a 60 year old male who is being evaluated via a billable telephone visit.      The patient has been notified of following:     \"This telephone visit will be conducted via a call between you and your physician/provider. We have found that certain health care needs can be provided without the need for a physical exam.  This service lets us provide the care you need with a short phone conversation.  If a prescription is necessary we can send it directly to your pharmacy.  If lab work is needed we can place an order for that and you can then stop by our lab to have the test done at a later time.    Telephone visits are billed at different rates depending on your insurance coverage. During this emergency period, for some insurers they may be billed the same as an in-person visit.  Please reach out to your insurance provider with any questions.    If during the course of the call the physician/provider feels a telephone visit is not appropriate, you will not be charged for this service.\"    Patient has given verbal consent for Telephone visit?  Yes    What phone number would you like to be contacted at? [phone] (long term care facility)     Services [phone]    Opt 1 for phone    Opt 2 for [place]     How would you like to obtain your AVS? Mail a copy     Wilfredo Yoon is a 60 year old male who is being evaluated via a billable telephone visit.      Subjective     Wilfredo Yoon is a 60 year old male who presents via phone visit today for the following health issues: positive quantiferon gold TB " assay.    HPI     Patient was hospitalized in July for mental status changes due to hypertensive emergency. He was discharged to a nursing home where he is staying currently. He has no acute c/o today. History was obtained with the help of a [facility] .   Patient denies cough, SOB, fever or night sweats. He now is feeling better than during his hospitalization in July. H had a CXR in July which appeared normal. Patient denies h/o prior positive TB test or treatment for TB. He is from [facility].     Review of Systems   CONSTITUTIONAL: NEGATIVE for fever, chills, change in weight  ENT/MOUTH: NEGATIVE for ear, mouth and throat problems  RESP: NEGATIVE for significant cough or SOB  CV: NEGATIVE for chest pain, palpitations or peripheral edema     Past Medical History:   Diagnosis Date     Cirrhosis (H)      Cryoglobulinemia (H)      CVA (cerebral vascular accident) (H) 07/16/2020    Supratentorial likely d/t hypertensive emergency leading to right hemiparesis, dysphagia and aphasia     Glomerulonephritis      Hepatitis B      Hypertension      MPGN (membranoproliferative glomerulonephritides)      Positive QuantiFERON-TB Gold test      PRES (posterior reversible encephalopathy syndrome) 07/16/2020    In brainstem d/t hypertensive emergency; suffered supratentorial CVAs same date     Past Surgical History:   Procedure Laterality Date     ANESTHESIA OUT OF OR MRI N/A 7/18/2020    Procedure: ANESTHESIA OUT OF OR MRI;  Surgeon: GENERIC ANESTHESIA PROVIDER;  Location: [facility] OR     C HAND/FINGER SURGERY UNLISTED       ESOPHAGOSCOPY, GASTROSCOPY, DUODENOSCOPY (EGD), COMBINED N/A 10/18/2018    Procedure: EGD;  Surgeon: Eber Ortez MD;  Location: [facility] GI     HAND SURGERY Right      IR PARACENTESIS  7/27/2020       Current Outpatient Medications:      acetaminophen (TYLENOL) 325 MG tablet, Take 1 tablet (325 mg) by mouth every 4 hours as needed for mild pain or fever, Disp: , Rfl:      amLODIPine (NORVASC) 10 MG  tablet, Take 1 tablet (10 mg) by mouth daily Indication: HTN, Disp: , Rfl:      aspirin (ASA) 81 MG chewable tablet, Take 1 tablet (81 mg) by mouth daily Indication: Stroke, Disp: , Rfl:      atorvastatin (LIPITOR) 40 MG tablet, Take 1 tablet (40 mg) by mouth every evening Indication: Stroke, Disp: , Rfl:      carvedilol (COREG) 25 MG tablet, Take 1 tablet (25 mg) by mouth 2 times daily (with meals) Indication: HTN (Patient taking differently: Take 12.5 mg by mouth 2 times daily (with meals) Indication: HTN), Disp: , Rfl:      chlorthalidone (HYGROTON) 25 MG tablet, Take 1 tablet (25 mg) by mouth daily Indication: HTN, Disp: , Rfl:      entecavir (BARACLUDE) 1 MG tablet, Take 1 tablet (1 mg) by mouth daily Indication: HTN, Disp: 30 tablet, Rfl: 11     miconazole (MICATIN) 2 % external cream, Apply topically 2 times daily Indication: Groin rash, Disp: , Rfl:      polyethylene glycol (MIRALAX) 17 g packet, Take 17 g by mouth daily Indication: Constipation, Disp: , Rfl:     Objective   Vitals - Patient Reported  Pain Score: No Pain (0)        healthy, alert and no distress  PSYCH: Alert and oriented times 3; coherent speech, normal   rate and volume, able to articulate logical thoughts, able   to abstract reason, no tangential thoughts, no hallucinations   or delusions  His affect is normal  RESP: No cough, no audible wheezing, able to talk in full sentences  Remainder of exam unable to be completed due to telephone visits    XRAY:  Chest 2 views:  Personally reviewed by me - no airspace, soft tissue, cardiac or bony abnormalities.                Assessment/Plan:    Assessment & Plan     Wilfredo was seen today for telephone.    Diagnoses and all orders for this visit:    Positive QuantiFERON-TB Gold test    Latent tuberculosis infection    Patient would benefit from treatment with INH. Need to watch LFTs very closely given h/o hepatitis B , currently on treatment with normal AST/ALT.         MEDICATIONS:        -  Continue other medications without change       - Start  mg PO Q day X 9 months. I gave this as a telephone order to his nursing home nurse, Estephania.   Patient has h/o cirrhosis due to chronic hepatitis B therefore will need to monitor AST/ALT closely. Plan LFTs in 2 weeks and follow-up telephone visit with Dr. Brandt on 10/28/2020.     No follow-ups on file.    Jerri Brandt MD  [facility] INFECTIOUS DISEASE CLINIC [place]      Phone call duration: 22 minutes    Jerri Brandt MD

## 2020-10-13 NOTE — PROGRESS NOTES
"Wilfredo Yoon is a 60 year old male who is being evaluated via a billable telephone visit.      The patient has been notified of following:     \"This telephone visit will be conducted via a call between you and your physician/provider. We have found that certain health care needs can be provided without the need for a physical exam.  This service lets us provide the care you need with a short phone conversation.  If a prescription is necessary we can send it directly to your pharmacy.  If lab work is needed we can place an order for that and you can then stop by our lab to have the test done at a later time.    Telephone visits are billed at different rates depending on your insurance coverage. During this emergency period, for some insurers they may be billed the same as an in-person visit.  Please reach out to your insurance provider with any questions.    If during the course of the call the physician/provider feels a telephone visit is not appropriate, you will not be charged for this service.\"    Patient has given verbal consent for Telephone visit?  Yes    What phone number would you like to be contacted at? [phone] (long term care facility)     Services [phone]    Opt 1 for phone    Opt 2 for [place]     How would you like to obtain your AVS? Mail a copy     Wilfredo Yoon is a 60 year old male who is being evaluated via a billable telephone visit.      Subjective     Wilfredo Yoon is a 60 year old male who presents via phone visit today for the following health issues: positive quantiferon gold TB assay.    HPI     Patient was hospitalized in July for mental status changes due to hypertensive emergency. He was discharged to a nursing home where he is staying currently. He has no acute c/o today. History was obtained with the help of a [place] .   Patient denies cough, SOB, fever or night sweats. He now is feeling better than during his " hospitalization in July. H had a CXR in July which appeared normal. Patient denies h/o prior positive TB test or treatment for TB. He is from [facility].     Review of Systems   CONSTITUTIONAL: NEGATIVE for fever, chills, change in weight  ENT/MOUTH: NEGATIVE for ear, mouth and throat problems  RESP: NEGATIVE for significant cough or SOB  CV: NEGATIVE for chest pain, palpitations or peripheral edema     Past Medical History:   Diagnosis Date     Cirrhosis (H)      Cryoglobulinemia (H)      CVA (cerebral vascular accident) (H) 07/16/2020    Supratentorial likely d/t hypertensive emergency leading to right hemiparesis, dysphagia and aphasia     Glomerulonephritis      Hepatitis B      Hypertension      MPGN (membranoproliferative glomerulonephritides)      Positive QuantiFERON-TB Gold test      PRES (posterior reversible encephalopathy syndrome) 07/16/2020    In brainstem d/t hypertensive emergency; suffered supratentorial CVAs same date     Past Surgical History:   Procedure Laterality Date     ANESTHESIA OUT OF OR MRI N/A 7/18/2020    Procedure: ANESTHESIA OUT OF OR MRI;  Surgeon: GENERIC ANESTHESIA PROVIDER;  Location: [facility] OR     C HAND/FINGER SURGERY UNLISTED       ESOPHAGOSCOPY, GASTROSCOPY, DUODENOSCOPY (EGD), COMBINED N/A 10/18/2018    Procedure: EGD;  Surgeon: Eber Ortez MD;  Location: [facility] GI     HAND SURGERY Right      IR PARACENTESIS  7/27/2020       Current Outpatient Medications:      acetaminophen (TYLENOL) 325 MG tablet, Take 1 tablet (325 mg) by mouth every 4 hours as needed for mild pain or fever, Disp: , Rfl:      amLODIPine (NORVASC) 10 MG tablet, Take 1 tablet (10 mg) by mouth daily Indication: HTN, Disp: , Rfl:      aspirin (ASA) 81 MG chewable tablet, Take 1 tablet (81 mg) by mouth daily Indication: Stroke, Disp: , Rfl:      atorvastatin (LIPITOR) 40 MG tablet, Take 1 tablet (40 mg) by mouth every evening Indication: Stroke, Disp: , Rfl:      carvedilol (COREG) 25 MG tablet, Take 1 tablet  (25 mg) by mouth 2 times daily (with meals) Indication: HTN (Patient taking differently: Take 12.5 mg by mouth 2 times daily (with meals) Indication: HTN), Disp: , Rfl:      chlorthalidone (HYGROTON) 25 MG tablet, Take 1 tablet (25 mg) by mouth daily Indication: HTN, Disp: , Rfl:      entecavir (BARACLUDE) 1 MG tablet, Take 1 tablet (1 mg) by mouth daily Indication: HTN, Disp: 30 tablet, Rfl: 11     miconazole (MICATIN) 2 % external cream, Apply topically 2 times daily Indication: Groin rash, Disp: , Rfl:      polyethylene glycol (MIRALAX) 17 g packet, Take 17 g by mouth daily Indication: Constipation, Disp: , Rfl:     Objective   Vitals - Patient Reported  Pain Score: No Pain (0)        healthy, alert and no distress  PSYCH: Alert and oriented times 3; coherent speech, normal   rate and volume, able to articulate logical thoughts, able   to abstract reason, no tangential thoughts, no hallucinations   or delusions  His affect is normal  RESP: No cough, no audible wheezing, able to talk in full sentences  Remainder of exam unable to be completed due to telephone visits    XRAY:  Chest 2 views:  Personally reviewed by me - no airspace, soft tissue, cardiac or bony abnormalities.                Assessment/Plan:    Assessment & Plan     Wilfredo was seen today for telephone.    Diagnoses and all orders for this visit:    Positive QuantiFERON-TB Gold test    Latent tuberculosis infection    Patient would benefit from treatment with INH. Need to watch LFTs very closely given h/o hepatitis B , currently on treatment with normal AST/ALT.         MEDICATIONS:        - Continue other medications without change       - Start  mg PO Q day X 9 months. I gave this as a telephone order to his nursing home nurse, Estephania.   Patient has h/o cirrhosis due to chronic hepatitis B therefore will need to monitor AST/ALT closely. Plan LFTs in 2 weeks and follow-up telephone visit with Dr. Brandt on 10/28/2020.     No follow-ups on  file.    Jerri Brandt MD  [facility] INFECTIOUS DISEASE CLINIC [place]      Phone call duration: 22 minutes    Jerri Brandt MD

## 2020-11-03 ENCOUNTER — VIRTUAL VISIT (OUTPATIENT)
Dept: INFECTIOUS DISEASES | Facility: CLINIC | Age: 60
End: 2020-11-03
Attending: INTERNAL MEDICINE
Payer: COMMERCIAL

## 2020-11-03 DIAGNOSIS — R76.12 POSITIVE QUANTIFERON-TB GOLD TEST: Primary | ICD-10-CM

## 2020-11-03 DIAGNOSIS — Z22.7 LATENT TUBERCULOSIS INFECTION: ICD-10-CM

## 2020-11-03 PROCEDURE — 99213 OFFICE O/P EST LOW 20 MIN: CPT | Mod: 95 | Performed by: INTERNAL MEDICINE

## 2020-11-03 NOTE — LETTER
"11/3/2020       RE: Wilfredo Yoon  [address]  [address]  [facility] [address]     Dear Colleague,    Thank you for referring your patient, Wilfredo Yoon, to the [facility] INFECTIOUS DISEASE CLINIC [place] at [facility]. Please see a copy of my visit note below.    Patients phone is not in service.  Dolores Clemente CMA on 11/3/2020 at 11:31 AM    Wilfredo Yoon is a 60 year old male who is being evaluated via a billable telephone visit.      The patient has been notified of following:     \"This telephone visit will be conducted via a call between you and your physician/provider. We have found that certain health care needs can be provided without the need for a physical exam.  This service lets us provide the care you need with a short phone conversation.  If a prescription is necessary we can send it directly to your pharmacy.  If lab work is needed we can place an order for that and you can then stop by our lab to have the test done at a later time.    Telephone visits are billed at different rates depending on your insurance coverage. During this emergency period, for some insurers they may be billed the same as an in-person visit.  Please reach out to your insurance provider with any questions.    If during the course of the call the physician/provider feels a telephone visit is not appropriate, you will not be charged for this service.\"    Patient has given verbal consent for Telephone visit?  Yes    What phone number would you like to be contacted at? [facility]- [phone]      Subjective     Wilfredo Yoon is a 60 year old male who presents via phone visit today for the following health issues:    HPI     I spoke with the patient with th help of a [place] interpreters and the assistance of his nursing home nurse. He has no complaints and denies nausea, abdominal pain. His nurse says that he seems more " tired since he started taking the INH in October. LFTs were checked last week and were in the normal range except for a low albumin.     Review of Systems   CONSTITUTIONAL: NEGATIVE for fever, chills, change in weight  ENT/MOUTH: NEGATIVE for ear, mouth and throat problems  RESP: NEGATIVE for significant cough or SOB  CV: NEGATIVE for chest pain, palpitations or peripheral edema       Objective          Vitals:  No vitals were obtained today due to virtual visit.    healthy, alert and no distress  PSYCH: Alert and oriented times 3; coherent speech, normal   rate and volume, able to articulate logical thoughts, able   to abstract reason, no tangential thoughts, no hallucinations   or delusions  His affect is normal  RESP: No cough, no audible wheezing, able to talk in full sentences  Remainder of exam unable to be completed due to telephone visits    {        Assessment/Plan:    Assessment & Plan     Diagnoses and all orders for this visit:    Positive QuantiFERON-TB Gold test    Latent tuberculosis infection            FUTURE LABS:       - I asked the nursing home to check CMP and CBC in 2 weeks.     Return in about 3 weeks (around 11/24/2020) for using a video visit, with me.    Jerri Brandt MD  [facility] INFECTIOUS DISEASE CLINIC [place]    Phone call duration:  15 minutes

## 2020-11-03 NOTE — PROGRESS NOTES
"Patients phone is not in service.  Dolroes Clemente CMA on 11/3/2020 at 11:31 AM    Wilfredo Yoon is a 60 year old male who is being evaluated via a billable telephone visit.      The patient has been notified of following:     \"This telephone visit will be conducted via a call between you and your physician/provider. We have found that certain health care needs can be provided without the need for a physical exam.  This service lets us provide the care you need with a short phone conversation.  If a prescription is necessary we can send it directly to your pharmacy.  If lab work is needed we can place an order for that and you can then stop by our lab to have the test done at a later time.    Telephone visits are billed at different rates depending on your insurance coverage. During this emergency period, for some insurers they may be billed the same as an in-person visit.  Please reach out to your insurance provider with any questions.    If during the course of the call the physician/provider feels a telephone visit is not appropriate, you will not be charged for this service.\"    Patient has given verbal consent for Telephone visit?  Yes    What phone number would you like to be contacted at? [facility]- [phone]      [facility]     Wilfredo Yoon is a 60 year old male who presents via phone visit today for the following health issues:    HPI     I spoke with the patient with th help of a [place] interpreters and the assistance of his nursing home nurse. He has no complaints and denies nausea, abdominal pain. His nurse says that he seems more tired since he started taking the INH in October. LFTs were checked last week and were in the normal range except for a low albumin.     Review of Systems   CONSTITUTIONAL: NEGATIVE for fever, chills, change in weight  ENT/MOUTH: NEGATIVE for ear, mouth and throat problems  RESP: NEGATIVE for significant cough or SOB  CV: NEGATIVE for chest pain, " palpitations or peripheral edema       Objective          Vitals:  No vitals were obtained today due to virtual visit.    healthy, alert and no distress  PSYCH: Alert and oriented times 3; coherent speech, normal   rate and volume, able to articulate logical thoughts, able   to abstract reason, no tangential thoughts, no hallucinations   or delusions  His affect is normal  RESP: No cough, no audible wheezing, able to talk in full sentences  Remainder of exam unable to be completed due to telephone visits    {        Assessment/Plan:    Assessment & Plan     Diagnoses and all orders for this visit:    Positive QuantiFERON-TB Gold test    Latent tuberculosis infection            FUTURE LABS:       - I asked the nursing home to check CMP and CBC in 2 weeks.     Return in about 3 weeks (around 11/24/2020) for using a video visit, with me.    Jerri Brandt MD  [facility] INFECTIOUS DISEASE CLINIC [place]    Phone call duration:  15 minutes

## 2020-11-06 ENCOUNTER — TELEPHONE (OUTPATIENT)
Dept: INFECTIOUS DISEASES | Facility: CLINIC | Age: 60
End: 2020-11-06

## 2020-11-24 ENCOUNTER — TELEPHONE (OUTPATIENT)
Dept: INFECTIOUS DISEASES | Facility: CLINIC | Age: 60
End: 2020-11-24
Payer: COMMERCIAL

## 2020-11-24 DIAGNOSIS — R76.12 POSITIVE QUANTIFERON-TB GOLD TEST: Primary | ICD-10-CM

## 2020-11-24 NOTE — TELEPHONE ENCOUNTER
Nursing home nurse called me today. She thought patient was to have had a phone visit with me today at 12 noon but this never got on my schedule.     She said that the patient continues to take the INH and is weak but his labs are okay.     Labs last week were bili 0.9, AST 28, ALT 14. Cr 1.39. K+ 3.3, glucose 175.     Plan to reschedule a phone visit with the patient in 2-3 weeks.

## 2020-12-02 ENCOUNTER — NURSING HOME VISIT (OUTPATIENT)
Dept: GERIATRICS | Facility: CLINIC | Age: 60
End: 2020-12-02
Payer: COMMERCIAL

## 2020-12-02 VITALS
RESPIRATION RATE: 17 BRPM | BODY MASS INDEX: 16.41 KG/M2 | TEMPERATURE: 97.5 F | OXYGEN SATURATION: 99 % | DIASTOLIC BLOOD PRESSURE: 80 MMHG | HEART RATE: 55 BPM | SYSTOLIC BLOOD PRESSURE: 125 MMHG | WEIGHT: 98.5 LBS | HEIGHT: 65 IN

## 2020-12-02 DIAGNOSIS — I69.90 LATE EFFECTS OF CVA (CEREBROVASCULAR ACCIDENT): Primary | ICD-10-CM

## 2020-12-02 DIAGNOSIS — Z22.7 LATENT TUBERCULOSIS: ICD-10-CM

## 2020-12-02 DIAGNOSIS — I15.1 HYPERTENSION SECONDARY TO OTHER RENAL DISORDERS: ICD-10-CM

## 2020-12-02 DIAGNOSIS — E43 SEVERE PROTEIN-CALORIE MALNUTRITION (H): ICD-10-CM

## 2020-12-02 PROBLEM — E46 PROTEIN-CALORIE MALNUTRITION (H): Status: ACTIVE | Noted: 2020-12-02

## 2020-12-02 PROBLEM — E78.2 MIXED HYPERLIPIDEMIA: Status: ACTIVE | Noted: 2019-06-14

## 2020-12-02 PROBLEM — R16.0 LIVER MASS: Status: ACTIVE | Noted: 2019-05-07

## 2020-12-02 PROCEDURE — 99309 SBSQ NF CARE MODERATE MDM 30: CPT | Performed by: NURSE PRACTITIONER

## 2020-12-02 RX ORDER — CARVEDILOL 12.5 MG/1
12.5 TABLET ORAL 2 TIMES DAILY WITH MEALS
COMMUNITY
End: 2021-03-26

## 2020-12-02 RX ORDER — ISONIAZID 300 MG/1
300 TABLET ORAL EVERY MORNING
COMMUNITY
End: 2021-03-26

## 2020-12-02 ASSESSMENT — MIFFLIN-ST. JEOR: SCORE: 1188.67

## 2020-12-02 NOTE — LETTER
12/2/2020        RE: Wilfredo Yoon  [address]  [address]  [facility] [address]        [place] [facility]  Chief Complaint   Patient presents with     long-term Regulatory     [place] [facility] Record Number:  [id number]  Place of Service where encounter took place:  [facility] ([facility]) [[id number]]    HPI:    Wilfredo Yoon  is 60 year old (1960), who is being seen today for a federally mandated E/M visit.  HPI information obtained from: facility chart records, facility staff and patient report.     Today's concerns are:  Late effects of CVA (cerebrovascular accident)  After extensive work up, his AMS was determined to be due to PRES and multifocal subcortical infarcts secondary to small vessel disease vs malignant HTN. He did not make appreciable progress with therapy. He is [facility] for transfers, assist with all ADLs. He answers yes/no to questions using [place]ian     Severe protein-calorie malnutrition (H)  He says he is trying to eat well. His family declined feeding tube in the hospital, saying this would not be in line with his wishes.  Wt Readings from Last 4 Encounters:   12/02/20 44.7 kg (98 lb 8 oz)   09/30/20 45.3 kg (99 lb 12.8 oz)   08/25/20 45.8 kg (101 lb)   08/02/20 58.9 kg (129 lb 12.8 oz)     Hypertension secondary to other renal disorders  -120s/70-80s. HR 60s, occasional 50s    Latent tuberculosis  Started on treatment and followed by ID      ALLERGIES:Patient has no known allergies.  PAST MEDICAL HISTORY:   has a past medical history of Cirrhosis (H), Cryoglobulinemia (H), CVA (cerebral vascular accident) (H) (07/16/2020), Glomerulonephritis, Hepatitis B, Hypertension, MPGN (membranoproliferative glomerulonephritides), Positive QuantiFERON-TB Gold test, and PRES (posterior reversible encephalopathy syndrome) (07/16/2020).  PAST SURGICAL HISTORY:   has a past surgical history that includes Hand surgery (Right); HAND/FINGER SURGERY  UNLISTED; Esophagoscopy, gastroscopy, duodenoscopy (EGD), combined (N/A, 10/18/2018); Anesthesia out of OR MRI (N/A, 7/18/2020); and IR Paracentesis (7/27/2020).  FAMILY HISTORY: family history is not on file.  SOCIAL HISTORY:  reports that he has quit smoking. His smoking use included cigarettes. He has a 10.00 pack-year smoking history. He has never used smokeless tobacco. He reports that he does not drink alcohol or use drugs.    MEDICATIONS:  Current Outpatient Medications   Medication Sig Dispense Refill     acetaminophen (TYLENOL) 325 MG tablet Take 1 tablet (325 mg) by mouth every 4 hours as needed for mild pain or fever       amLODIPine (NORVASC) 10 MG tablet Take 1 tablet (10 mg) by mouth daily Indication: HTN       aspirin (ASA) 81 MG chewable tablet Take 1 tablet (81 mg) by mouth daily Indication: Stroke       atorvastatin (LIPITOR) 40 MG tablet Take 1 tablet (40 mg) by mouth every evening Indication: Stroke       carvedilol (COREG) 12.5 MG tablet Take 12.5 mg by mouth 2 times daily (with meals)       chlorthalidone (HYGROTON) 25 MG tablet Take 1 tablet (25 mg) by mouth daily Indication: HTN       entecavir (BARACLUDE) 1 MG tablet Take 1 tablet (1 mg) by mouth daily Indication: HTN 30 tablet 11     isoniazid (NYDRAZID) 300 MG tablet Take 300 mg by mouth every morning       miconazole (MICATIN) 2 % external cream Apply topically 2 times daily Indication: Groin rash       polyethylene glycol (MIRALAX) 17 g packet Take 17 g by mouth daily Indication: Constipation         Case Management:  I have reviewed the care plan and MDS and do agree with the plan. Patient's desire to return to the community is not assessible due to cognitive impairment. Information reviewed:  Medications, vital signs, orders, and nursing notes.    ROS:  Limited secondary to cognitive impairment but today pt reports 10 point ROS of systems including Constitutional, Eyes, Respiratory, Cardiovascular, Gastroenterology, Genitourinary,  "Integumentary, Musculoskeletal, Psychiatric were all negative except for pertinent positives noted in my HPI.    Vitals:  /80   Pulse 55   Temp 97.5  F (36.4  C)   Resp 17   Ht 1.651 m (5' 5\")   Wt 44.7 kg (98 lb 8 oz)   SpO2 99%   BMI 16.39 kg/m    Body mass index is 16.39 kg/m .  Exam:  GENERAL APPEARANCE:  Alert, in no distress, cachectic  ENT:  Mouth and posterior oropharynx normal, moist mucous membranes, normal hearing acuity  EYES:  EOM, conjunctivae, lids, pupils and irises normal  RESP:  respiratory effort and palpation of chest normal, lungs clear to auscultation , no respiratory distress  CV:  Palpation and auscultation of heart done , regular rate and rhythm, no murmur, rub, or gallop, no edema  ABDOMEN:  normal bowel sounds, soft, nontender, no hepatosplenomegaly or other masses  M/S:   muscle wasting  NEURO:   follows commands, able to move upper extremities, grasp strength 4/5 in left hand, 3/5 in right hand. No purposeful movement of BLE  PSYCH:  insight and judgement impaired, memory impaired , speech sparse, affect abnormal flat    Lab/Diagnostic data:   11/17/20  Na 137  K 3.3  BUN 32  Creat 1.39  ALT 14  AST 28  Hgb 13.5  Plt 164    ASSESSMENT/PLAN  (I69.90) Late effects of CVA (cerebrovascular accident)  (primary encounter diagnosis)  Comment: Severe functional impairment. Cognitive impairment challenging to assess, but appears to be moderate at a minimum. Improvement is not expected. He will need 24 hour care indefinitely  Plan: Continue secondary prevention. Continue 24 hour skilled care.     (E43) Severe protein-calorie malnutrition (H)  Comment: Due to poor intake. Weight continues to decrease. Any acute illness would be challenging for him to recover from or even survive. See HTN as well  Plan: Dietician following.     (I15.1,  N28.89) Hypertension secondary to other renal disorders  Comment: BP controlled, but given his poor intake and low K, will try stopping chlorthalidone. " Goal BP<140/80  Plan: Discontinue chlorthalidone. Monitor BP. Monitor for hypervolemia.     (Z22.7) Latent tuberculosis  Comment: Followed by ID. Staff thought that his appetite may have been further affected by the INH, but benefit of this outweighs risk  Plan: Continue current POC with no changes at this time and adjustments as needed.      Orders:  Discontinue chlorthalidone      Electronically signed by:  LINDA Mac [facility]  Phone: [phone]

## 2020-12-02 NOTE — PROGRESS NOTES
[place] [facility]  Chief Complaint   Patient presents with     correction Regulatory     [place] [facility] Record Number:  [id number]  Place of Service where encounter took place:  [facility] ([facility]) [[id number]]    HPI:    Wilfredo Yoon  is 60 year old (1960), who is being seen today for a federally mandated E/M visit.  HPI information obtained from: facility chart records, facility staff and patient report.     Today's concerns are:  Late effects of CVA (cerebrovascular accident)  After extensive work up, his AMS was determined to be due to PRES and multifocal subcortical infarcts secondary to small vessel disease vs malignant HTN. He did not make appreciable progress with therapy. He is [facility] for transfers, assist with all ADLs. He answers yes/no to questions using [place] Islander     Severe protein-calorie malnutrition (H)  He says he is trying to eat well. His family declined feeding tube in the hospital, saying this would not be in line with his wishes.  Wt Readings from Last 4 Encounters:   12/02/20 44.7 kg (98 lb 8 oz)   09/30/20 45.3 kg (99 lb 12.8 oz)   08/25/20 45.8 kg (101 lb)   08/02/20 58.9 kg (129 lb 12.8 oz)     Hypertension secondary to other renal disorders  -120s/70-80s. HR 60s, occasional 50s    Latent tuberculosis  Started on treatment and followed by ID      ALLERGIES:Patient has no known allergies.  PAST MEDICAL HISTORY:   has a past medical history of Cirrhosis (H), Cryoglobulinemia (H), CVA (cerebral vascular accident) (H) (07/16/2020), Glomerulonephritis, Hepatitis B, Hypertension, MPGN (membranoproliferative glomerulonephritides), Positive QuantiFERON-TB Gold test, and PRES (posterior reversible encephalopathy syndrome) (07/16/2020).  PAST SURGICAL HISTORY:   has a past surgical history that includes Hand surgery (Right); HAND/FINGER SURGERY UNLISTED; Esophagoscopy, gastroscopy, duodenoscopy (EGD), combined (N/A, 10/18/2018); Anesthesia out of OR MRI  (N/A, 7/18/2020); and IR Paracentesis (7/27/2020).  FAMILY HISTORY: family history is not on file.  SOCIAL HISTORY:  reports that he has quit smoking. His smoking use included cigarettes. He has a 10.00 pack-year smoking history. He has never used smokeless tobacco. He reports that he does not drink alcohol or use drugs.    MEDICATIONS:  Current Outpatient Medications   Medication Sig Dispense Refill     acetaminophen (TYLENOL) 325 MG tablet Take 1 tablet (325 mg) by mouth every 4 hours as needed for mild pain or fever       amLODIPine (NORVASC) 10 MG tablet Take 1 tablet (10 mg) by mouth daily Indication: HTN       aspirin (ASA) 81 MG chewable tablet Take 1 tablet (81 mg) by mouth daily Indication: Stroke       atorvastatin (LIPITOR) 40 MG tablet Take 1 tablet (40 mg) by mouth every evening Indication: Stroke       carvedilol (COREG) 12.5 MG tablet Take 12.5 mg by mouth 2 times daily (with meals)       chlorthalidone (HYGROTON) 25 MG tablet Take 1 tablet (25 mg) by mouth daily Indication: HTN       entecavir (BARACLUDE) 1 MG tablet Take 1 tablet (1 mg) by mouth daily Indication: HTN 30 tablet 11     isoniazid (NYDRAZID) 300 MG tablet Take 300 mg by mouth every morning       miconazole (MICATIN) 2 % external cream Apply topically 2 times daily Indication: Groin rash       polyethylene glycol (MIRALAX) 17 g packet Take 17 g by mouth daily Indication: Constipation         Case Management:  I have reviewed the care plan and MDS and do agree with the plan. Patient's desire to return to the community is not assessible due to cognitive impairment. Information reviewed:  Medications, vital signs, orders, and nursing notes.    ROS:  Limited secondary to cognitive impairment but today pt reports 10 point ROS of systems including Constitutional, Eyes, Respiratory, Cardiovascular, Gastroenterology, Genitourinary, Integumentary, Musculoskeletal, Psychiatric were all negative except for pertinent positives noted in my  "HPI.    Vitals:  /80   Pulse 55   Temp 97.5  F (36.4  C)   Resp 17   Ht 1.651 m (5' 5\")   Wt 44.7 kg (98 lb 8 oz)   SpO2 99%   BMI 16.39 kg/m    Body mass index is 16.39 kg/m .  Exam:  GENERAL APPEARANCE:  Alert, in no distress, cachectic  ENT:  Mouth and posterior oropharynx normal, moist mucous membranes, normal hearing acuity  EYES:  EOM, conjunctivae, lids, pupils and irises normal  RESP:  respiratory effort and palpation of chest normal, lungs clear to auscultation , no respiratory distress  CV:  Palpation and auscultation of heart done , regular rate and rhythm, no murmur, rub, or gallop, no edema  ABDOMEN:  normal bowel sounds, soft, nontender, no hepatosplenomegaly or other masses  M/S:   muscle wasting  NEURO:   follows commands, able to move upper extremities, grasp strength 4/5 in left hand, 3/5 in right hand. No purposeful movement of BLE  PSYCH:  insight and judgement impaired, memory impaired , speech sparse, affect abnormal flat    Lab/Diagnostic data:   11/17/20  Na 137  K 3.3  BUN 32  Creat 1.39  ALT 14  AST 28  Hgb 13.5  Plt 164    ASSESSMENT/PLAN  (I69.90) Late effects of CVA (cerebrovascular accident)  (primary encounter diagnosis)  Comment: Severe functional impairment. Cognitive impairment challenging to assess, but appears to be moderate at a minimum. Improvement is not expected. He will need 24 hour care indefinitely  Plan: Continue secondary prevention. Continue 24 hour skilled care.     (E43) Severe protein-calorie malnutrition (H)  Comment: Due to poor intake. Weight continues to decrease. Any acute illness would be challenging for him to recover from or even survive. See HTN as well  Plan: Dietician following.     (I15.1,  N28.89) Hypertension secondary to other renal disorders  Comment: BP controlled, but given his poor intake and low K, will try stopping chlorthalidone. Goal BP<140/80  Plan: Discontinue chlorthalidone. Monitor BP. Monitor for hypervolemia.     (Z22.7) " Latent tuberculosis  Comment: Followed by ID. Staff thought that his appetite may have been further affected by the INH, but benefit of this outweighs risk  Plan: Continue current POC with no changes at this time and adjustments as needed.      Orders:  Discontinue chlorthalidone      Electronically signed by:  LINDA Mac [facility]  Phone: [phone]

## 2020-12-03 ENCOUNTER — TELEPHONE (OUTPATIENT)
Dept: INFECTIOUS DISEASES | Facility: CLINIC | Age: 60
End: 2020-12-03

## 2020-12-03 DIAGNOSIS — Z22.7 LATENT TUBERCULOSIS INFECTION: Primary | ICD-10-CM

## 2020-12-03 NOTE — TELEPHONE ENCOUNTER
----- Message from Jerri Brandt MD sent at 11/24/2020  2:23 PM CST -----  Regarding: need to schedule follow-up virtual vist.  Patient is in a nursing home. I need to schedule a FOLLOW-UP PHONE VISIT WITH HIM IN 2-3 WEEKS. Please call the nursing home to reschedule a follow-up phone visit . A video visit would be better if possible.     To schedule call the nursing home [facility] at [phone]. Also please ask the nursing home to repeat his labs before the follow-up visit CBC and CMP.     Thanks,  Jerri

## 2020-12-03 NOTE — TELEPHONE ENCOUNTER
Faxed orders for CBC with diff and CMP to [facility] at fax # [phone]. Requested that labs be drawn on 12/7 and results faxed to us.  Emerita Lambert RN

## 2020-12-07 ENCOUNTER — TELEPHONE (OUTPATIENT)
Dept: GERIATRICS | Facility: CLINIC | Age: 60
End: 2020-12-07

## 2020-12-07 DIAGNOSIS — E87.6 HYPOPOTASSEMIA: Primary | ICD-10-CM

## 2020-12-07 RX ORDER — POTASSIUM CHLORIDE 750 MG/1
10 CAPSULE, EXTENDED RELEASE ORAL DAILY
Start: 2020-12-07 | End: 2021-01-05

## 2020-12-07 NOTE — TELEPHONE ENCOUNTER
K level 3.0 today. At last check it was 3.3. Order was given to stop chlorthalidone on 12/3. Expected that potassium level would improve with this. He is eating better overall. No loose stools. Will start low dose supplement for now.    Plan:  KCl 10meq daily

## 2020-12-08 ENCOUNTER — VIRTUAL VISIT (OUTPATIENT)
Dept: INFECTIOUS DISEASES | Facility: CLINIC | Age: 60
End: 2020-12-08
Attending: INTERNAL MEDICINE
Payer: COMMERCIAL

## 2020-12-08 DIAGNOSIS — B18.1 CHRONIC VIRAL HEPATITIS B WITHOUT DELTA AGENT AND WITHOUT COMA (H): ICD-10-CM

## 2020-12-08 DIAGNOSIS — Z22.7 LATENT TUBERCULOSIS INFECTION: Primary | ICD-10-CM

## 2020-12-08 PROCEDURE — 99214 OFFICE O/P EST MOD 30 MIN: CPT | Mod: 95 | Performed by: INTERNAL MEDICINE

## 2020-12-08 NOTE — LETTER
"12/8/2020       RE: Wilfredo Yoon  [address]  [address]  [facility] [address]     Dear Colleague,    Thank you for referring your patient, Wilfredo Yoon, to the [facility]ECT[facility] DISEASE CL[facility] [place] [facility]. Please see a copy of my visit note below.    Phone is not in service.  Dolores Clemente CMA on 12/8/2020 at 10:37 AM    .Wilfredo Yoon is a 60 year old male who is being evaluated via a billable telephone visit.      The patient has been notified of following:     \"This telephone visit will be conducted via a call between you and your physician/provider. We have found that certain health care needs can be provided without the need for a physical exam.  This service lets us provide the care you need with a short phone conversation.  If a prescription is necessary we can send it directly to your pharmacy.  If lab work is needed we can place an order for that and you can then stop by our lab to have the test done at a later time.    Telephone visits are billed at different rates depending on your insurance coverage. During this emergency period, for some insurers they may be billed the same as an in-person visit.  Please reach out to your insurance provider with any questions.    If during the course of the call the physician/provider feels a telephone visit is not appropriate, you will not be charged for this service.\"    Patient has given verbal consent for Telephone visit?  Yes        Subjective     Wilfredo Yoon is a 60 year old male who presents via phone visit today for the following health issues:    HPI   Patient has been staying at a nursing home after a stroke. History obtained with help of Malagasy . Patient without acute c/o. Per his nurse who assisted with the patient the patient is doing better than at the time of last visit. He is no longer so fatigued and he is eating well " now. NH CARLYLE started him on potasium supplement.     Review of Systems   CONSTITUTIONAL: NEGATIVE for fever, chills, change in weight  ENT/MOUTH: NEGATIVE for ear, mouth and throat problems  RESP: NEGATIVE for significant cough or SOB  CV: NEGATIVE for chest pain, palpitations or peripheral edema       Objective          Vitals:  No vitals were obtained today due to virtual visit.    healthy, alert and no distress  PSYCH: Alert and oriented times 3; coherent speech, normal   rate and volume, able to articulate logical thoughts, able   to abstract reason, no tangential thoughts, no hallucinations   or delusions  His affect is normal  RESP: No cough, no audible wheezing, able to talk in full sentences  Remainder of exam unable to be completed due to telephone visits    No results displayed because visit has over 200 results.          Per NH nurse the patient's recent labs from 12/7/20 show low potassium but normal AST/ALT and CBC with low but stable hemoglobin. Low albumin and total protein, normal bili.     Assessment/Plan:    Assessment & Plan   Problem List Items Addressed This Visit        Infectious Diseases    Chronic hepatitis B without hepatic coma (H)      Other Visit Diagnoses     Latent tuberculosis infection    -  Primary         Continue  mg PO Q day. So far patient is tolerating this despite having chronic hepatitis B which he is on treatment for.   Plan repeat telephone visit in 4 weeks with labs 2 days before, CBC with diff/plts and CMP.        MEDICATIONS:  Continue current medications without change  Return in 29 days (on 1/6/2021) for Follow up, using a phone visit, with me.    Jerri Brandt MD  [facility] INFECTIOUS DISEASE CLINIC [place]    Phone call duration:  25 minutes

## 2020-12-08 NOTE — PROGRESS NOTES
"Phone is not in service.  Dolores Clemente CMA on 12/8/2020 at 10:37 AM    .Wilfredo Yoon is a 60 year old male who is being evaluated via a billable telephone visit.      The patient has been notified of following:     \"This telephone visit will be conducted via a call between you and your physician/provider. We have found that certain health care needs can be provided without the need for a physical exam.  This service lets us provide the care you need with a short phone conversation.  If a prescription is necessary we can send it directly to your pharmacy.  If lab work is needed we can place an order for that and you can then stop by our lab to have the test done at a later time.    Telephone visits are billed at different rates depending on your insurance coverage. During this emergency period, for some insurers they may be billed the same as an in-person visit.  Please reach out to your insurance provider with any questions.    If during the course of the call the physician/provider feels a telephone visit is not appropriate, you will not be charged for this service.\"    Patient has given verbal consent for Telephone visit?  Yes        Subjective     Wilfredo Yoon is a 60 year old male who presents via phone visit today for the following health issues:    HPI   Patient has been staying at a nursing home after a stroke. History obtained with help of Lao . Patient without acute c/o. Per his nurse who assisted with the patient the patient is doing better than at the time of last visit. He is no longer so fatigued and he is eating well now. NH CARLYLE started him on potasium supplement.     Review of Systems   CONSTITUTIONAL: NEGATIVE for fever, chills, change in weight  ENT/MOUTH: NEGATIVE for ear, mouth and throat problems  RESP: NEGATIVE for significant cough or SOB  CV: NEGATIVE for chest pain, palpitations or peripheral edema       Objective          Vitals:  No vitals were " obtained today due to virtual visit.    healthy, alert and no distress  PSYCH: Alert and oriented times 3; coherent speech, normal   rate and volume, able to articulate logical thoughts, able   to abstract reason, no tangential thoughts, no hallucinations   or delusions  His affect is normal  RESP: No cough, no audible wheezing, able to talk in full sentences  Remainder of exam unable to be completed due to telephone visits    No results displayed because visit has over 200 results.          Per NH nurse the patient's recent labs from 12/7/20 show low potassium but normal AST/ALT and CBC with low but stable hemoglobin. Low albumin and total protein, normal bili.     Assessment/Plan:    Assessment & Plan   Problem List Items Addressed This Visit        Infectious Diseases    Chronic hepatitis B without hepatic coma (H)      Other Visit Diagnoses     Latent tuberculosis infection    -  Primary         Continue  mg PO Q day. So far patient is tolerating this despite having chronic hepatitis B which he is on treatment for.   Plan repeat telephone visit in 4 weeks with labs 2 days before, CBC with diff/plts and CMP.        MEDICATIONS:  Continue current medications without change  Return in 29 days (on 1/6/2021) for Follow up, using a phone visit, with me.    Jerri Brandt MD  [facility] INFECTIOUS DISEASE CLINIC [place]    Phone call duration:  25 minutes

## 2020-12-15 ENCOUNTER — NURSING HOME VISIT (OUTPATIENT)
Dept: GERIATRICS | Facility: CLINIC | Age: 60
End: 2020-12-15
Payer: COMMERCIAL

## 2020-12-15 VITALS
OXYGEN SATURATION: 100 % | HEIGHT: 65 IN | WEIGHT: 99.1 LBS | DIASTOLIC BLOOD PRESSURE: 79 MMHG | HEART RATE: 60 BPM | SYSTOLIC BLOOD PRESSURE: 135 MMHG | RESPIRATION RATE: 18 BRPM | BODY MASS INDEX: 16.51 KG/M2 | TEMPERATURE: 97.7 F

## 2020-12-15 DIAGNOSIS — I63.9 CEREBROVASCULAR ACCIDENT (CVA), UNSPECIFIED MECHANISM (H): ICD-10-CM

## 2020-12-15 DIAGNOSIS — K74.60 HEPATIC CIRRHOSIS DUE TO HEPATITIS B (H): ICD-10-CM

## 2020-12-15 DIAGNOSIS — B19.10 HEPATIC CIRRHOSIS DUE TO HEPATITIS B (H): ICD-10-CM

## 2020-12-15 DIAGNOSIS — I10 ESSENTIAL HYPERTENSION, MALIGNANT: ICD-10-CM

## 2020-12-15 DIAGNOSIS — I67.83 PRES (POSTERIOR REVERSIBLE ENCEPHALOPATHY SYNDROME): ICD-10-CM

## 2020-12-15 DIAGNOSIS — Z22.7 LATENT TUBERCULOSIS: Primary | ICD-10-CM

## 2020-12-15 PROCEDURE — 99309 SBSQ NF CARE MODERATE MDM 30: CPT | Performed by: INTERNAL MEDICINE

## 2020-12-15 ASSESSMENT — MIFFLIN-ST. JEOR: SCORE: 1191.39

## 2020-12-15 NOTE — PROGRESS NOTES
"[place] [facility]  PHYSICIAN NOTE    Chief Complaint   Patient presents with     Nursing Home Acute       HPI:    Wilfredo Yoon is a 60 year old  (1960), who is being seen today for a follow up visit at [facility] . He has now been residing at [facility] since Aug 2020 after Hypertensive emergency with PRES and sustaining a CVA with resultant right sided hemiparesis, cognitive impairment and dysphagia. Feeding tube not thought to be in line with his wishes per his sister but he has been able to maintain nutrition here at [facility]. His BP is well controlled with medication adjustments as needed. Amazingly, with known h/o Hep B cirrhosis and h/o ascites needing monthly paracenteses prior to admission, that has not been needed here while at [facility]. He has been following with ID and is on INH for latent TB as well.     Wilfredo is seen in his room today with professional [facility] interpretor and welcomes the visit. He smiles broadly and says he is \"happy\" today but he doesn't know why. Denies pain. No dyspnea. No recurrent ascites. BP improved/stable. Some increasing strength. Denies concerns. Staff noted some decline when he initially started his INH treatment for latent TB but that seems to have passed and he is better now.     ALLERGIES: Patient has no known allergies.    Past Medical History:   Diagnosis Date     Cirrhosis (H)      Cryoglobulinemia (H)      CVA (cerebral vascular accident) (H) 07/16/2020    Supratentorial likely d/t hypertensive emergency leading to right hemiparesis, dysphagia and aphasia     Glomerulonephritis      Hepatitis B      Hypertension      Latent tuberculosis     Treatment 2781-3190     MPGN (membranoproliferative glomerulonephritides)      Positive QuantiFERON-TB Gold test      PRES (posterior reversible encephalopathy syndrome) 07/16/2020    In brainstem d/t hypertensive emergency; suffered supratentorial CVAs same date      CODE STATUS: " "FULL    MEDICATIONS: Reviewed and updated in [facility] according to facility MAR  Current Outpatient Medications   Medication Sig Dispense Refill     acetaminophen (TYLENOL) 325 MG tablet Take 1 tablet (325 mg) by mouth every 4 hours as needed for mild pain or fever       amLODIPine (NORVASC) 10 MG tablet Take 1 tablet (10 mg) by mouth daily Indication: HTN       aspirin (ASA) 81 MG chewable tablet Take 1 tablet (81 mg) by mouth daily Indication: Stroke       atorvastatin (LIPITOR) 40 MG tablet Take 1 tablet (40 mg) by mouth every evening Indication: Stroke       carvedilol (COREG) 12.5 MG tablet Take 12.5 mg by mouth 2 times daily (with meals)       entecavir (BARACLUDE) 1 MG tablet Take 1 tablet (1 mg) by mouth daily  30 tablet 11     isoniazid (NYDRAZID) 300 MG tablet Take 300 mg by mouth every morning       miconazole (MICATIN) 2 % external cream Apply topically 2 times daily Indication: Groin rash       polyethylene glycol (MIRALAX) 17 g packet Take 17 g by mouth daily Indication: Constipation       potassium chloride ER (MICRO-K) 10 MEQ CR capsule Take 1 capsule (10 mEq) by mouth daily         ROS:  Limited secondary to aphasia impairment but today pt reports as above in HPI    Exam:  /79   Pulse 60   Temp 97.7  F (36.5  C)   Resp 18   Ht 1.651 m (5' 5\")   Wt 45 kg (99 lb 1.6 oz)   SpO2 100%   BMI 16.49 kg/m    Alert, sitting up in bed casually dressed  Cheerful disposition, smiles broadly, lack of insight though  Breathing non-labored, no cough  HRRR  No edema  R hemiparesis with R arm flexed at shoulder to about 90 degrees only with some drift, R foot flexed    Lab/Diagnostic Data:    12/7/20 labs showing K+ 3.0 (subsequently started supplement), Na 140, Cr 1.2, LFT within normal limits, low albumin at 2.6, WBC 6.3, Hgb 12.8, Platelets 151    ASSESSMENT/PLAN:  Latent tuberculosis  Appreciate ID assistance in overseeing his INH therapy  Sounds per staff he had some vague general decline when he " started this therapy for about a month and then rebounded   Has labs scheduled 1/4/21    Essential hypertension, malignant  PRES (posterior reversible encephalopathy syndrome)  Cerebrovascular accident (CVA), unspecified mechanism (H)  BP now well controlled on agents as noted above in medication list (110-140s/60-80s)  Again, repeat labs planned 1/4/21  His Chlorthalidone was discontinued recently and K+ supplement started d/t mild hypokalemia  Continues to have right hemiparesis, cognitive impairment (reports happy mood and generally did smile brightly but prior to his CVA he was independent/private person), and is maintaining his weight now after an initial loss when first admitted  Neurology had wanted follow up and an MRI but given stability could likely hold until COVID-19 pandemic calms    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 Eber Rosas        Electronically signed by:  Heather Lester DO

## 2020-12-15 NOTE — LETTER
"    12/15/2020        RE: Wilfredo Yoon  [address]  [address]  [facility] [address]        [place] [facility]  PHYSICIAN NOTE    Chief Complaint   Patient presents with     Nursing Home Acute       HPI:    Wilfredo Yoon is a 60 year old  (1960), who is being seen today for a follow up visit at [facility] . He has now been residing at [facility] since Aug 2020 after Hypertensive emergency with PRES and sustaining a CVA with resultant right sided hemiparesis, cognitive impairment and dysphagia. Feeding tube not thought to be in line with his wishes per his sister but he has been able to maintain nutrition here at [facility]. His BP is well controlled with medication adjustments as needed. Amazingly, with known h/o Hep B cirrhosis and h/o ascites needing monthly paracenteses prior to admission, that has not been needed here while at [facility]. He has been following with ID and is on INH for latent TB as well.     Wilfredo is seen in his room today with professional [facility] interpretor and welcomes the visit. He smiles broadly and says he is \"happy\" today but he doesn't know why. Denies pain. No dyspnea. No recurrent ascites. BP improved/stable. Some increasing strength. Denies concerns. Staff noted some decline when he initially started his INH treatment for latent TB but that seems to have passed and he is better now.     ALLERGIES: Patient has no known allergies.    Past Medical History:   Diagnosis Date     Cirrhosis (H)      Cryoglobulinemia (H)      CVA (cerebral vascular accident) (H) 07/16/2020    Supratentorial likely d/t hypertensive emergency leading to right hemiparesis, dysphagia and aphasia     Glomerulonephritis      Hepatitis B      Hypertension      Latent tuberculosis     Treatment [id number]     MPGN (membranoproliferative glomerulonephritides)      Positive QuantiFERON-TB Gold test      PRES (posterior reversible encephalopathy syndrome) 07/16/2020    " "In brainstem d/t hypertensive emergency; suffered supratentorial CVAs same date      CODE STATUS: FULL    MEDICATIONS: Reviewed and updated in Epic according to facility MAR  Current Outpatient Medications   Medication Sig Dispense Refill     acetaminophen (TYLENOL) 325 MG tablet Take 1 tablet (325 mg) by mouth every 4 hours as needed for mild pain or fever       amLODIPine (NORVASC) 10 MG tablet Take 1 tablet (10 mg) by mouth daily Indication: HTN       aspirin (ASA) 81 MG chewable tablet Take 1 tablet (81 mg) by mouth daily Indication: Stroke       atorvastatin (LIPITOR) 40 MG tablet Take 1 tablet (40 mg) by mouth every evening Indication: Stroke       carvedilol (COREG) 12.5 MG tablet Take 12.5 mg by mouth 2 times daily (with meals)       entecavir (BARACLUDE) 1 MG tablet Take 1 tablet (1 mg) by mouth daily  30 tablet 11     isoniazid (NYDRAZID) 300 MG tablet Take 300 mg by mouth every morning       miconazole (MICATIN) 2 % external cream Apply topically 2 times daily Indication: Groin rash       polyethylene glycol (MIRALAX) 17 g packet Take 17 g by mouth daily Indication: Constipation       potassium chloride ER (MICRO-K) 10 MEQ CR capsule Take 1 capsule (10 mEq) by mouth daily         ROS:  Limited secondary to aphasia impairment but today pt reports as above in HPI    Exam:  /79   Pulse 60   Temp 97.7  F (36.5  C)   Resp 18   Ht 1.651 m (5' 5\")   Wt 45 kg (99 lb 1.6 oz)   SpO2 100%   BMI 16.49 kg/m    Alert, sitting up in bed casually dressed  Cheerful disposition, smiles broadly, lack of insight though  Breathing non-labored, no cough  HRRR  No edema  R hemiparesis with R arm flexed at shoulder to about 90 degrees only with some drift, R foot flexed    Lab/Diagnostic Data:    12/7/20 labs showing K+ 3.0 (subsequently started supplement), Na 140, Cr 1.2, LFT within normal limits, low albumin at 2.6, WBC 6.3, Hgb 12.8, Platelets 151    ASSESSMENT/PLAN:  Latent tuberculosis  Appreciate ID " assistance in overseeing his INH therapy  Sounds per staff he had some vague general decline when he started this therapy for about a month and then rebounded   Has labs scheduled 1/4/21    Essential hypertension, malignant  PRES (posterior reversible encephalopathy syndrome)  Cerebrovascular accident (CVA), unspecified mechanism (H)  BP now well controlled on agents as noted above in medication list (110-140s/60-80s)  Again, repeat labs planned 1/4/21  His Chlorthalidone was discontinued recently and K+ supplement started d/t mild hypokalemia  Continues to have right hemiparesis, cognitive impairment (reports happy mood and generally did smile brightly but prior to his CVA he was independent/private person), and is maintaining his weight now after an initial loss when first admitted  Neurology had wanted follow up and an MRI but given stability could likely hold until COVID-19 pandemic calms    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 Eber Rosas        Electronically signed by:  Heather Lester DO        Sincerely,        Heather Lester DO

## 2021-01-05 ENCOUNTER — TELEPHONE (OUTPATIENT)
Dept: GERIATRICS | Facility: CLINIC | Age: 61
End: 2021-01-05

## 2021-01-05 DIAGNOSIS — E87.6 HYPOPOTASSEMIA: ICD-10-CM

## 2021-01-05 RX ORDER — POTASSIUM CHLORIDE 750 MG/1
20 CAPSULE, EXTENDED RELEASE ORAL DAILY
Start: 2021-01-05 | End: 2021-03-26

## 2021-01-06 NOTE — TELEPHONE ENCOUNTER
[place] [facility] TELEPHONE ENCOUNTER    Wilfredo Yoon is a 60 year old  (1960), Nurse called today to report: CBC with diff.     WBC 7.4  HGB = 11.3  PLTS 182    BMP:  Na - 139  Glucose is 50  K 3.2  Cr - 1.24    On KCL - 10 meq po q day     ASSESSMENT/PLAN  Hypokalemia     Give KCL 20 meq po x 1 dose now.     Increase scheduled KCL to 20 meq po daily     K + recheck in 1 week.   Electronically signed by:   LINDA Monahan CNP

## 2021-01-12 ENCOUNTER — VIRTUAL VISIT (OUTPATIENT)
Dept: INFECTIOUS DISEASES | Facility: CLINIC | Age: 61
End: 2021-01-12
Attending: INTERNAL MEDICINE
Payer: COMMERCIAL

## 2021-01-12 DIAGNOSIS — Z22.7 LATENT TUBERCULOSIS INFECTION: Primary | ICD-10-CM

## 2021-01-12 NOTE — LETTER
1/12/2021       RE: Wilfredo Yoon  [address]  [address]  [facility] [address]     Dear Colleague,    Thank you for referring your patient, Wilfredo Yoon, to the [facility] [place] [facility]. Please see a copy of my visit note below.    Patient phone is not in service.  Austin Hull MA    I called the patient's nursing home [phone] nursing home. I tried to connect with his nursing station but they did not answer. I left a message on the nurse managers phoen mail to call me back.  Jerri rBandt Md      Again, thank you for allowing me to participate in the care of your patient.      Sincerely,    Jerri Brandt MD

## 2021-01-12 NOTE — PROGRESS NOTES
Patient phone is not in service.  Austin Hull MA    I called the patient's nursing home [phone] nursing home. I tried to connect with his nursing station but they did not answer. I left a message on the nurse managers phoen mail to call me back.  Jerri Brandt Md

## 2021-02-22 ENCOUNTER — NURSING HOME VISIT (OUTPATIENT)
Dept: GERIATRICS | Facility: CLINIC | Age: 61
End: 2021-02-22
Payer: COMMERCIAL

## 2021-02-22 ENCOUNTER — APPOINTMENT (OUTPATIENT)
Dept: INTERPRETER SERVICES | Facility: CLINIC | Age: 61
End: 2021-02-22
Payer: COMMERCIAL

## 2021-02-22 VITALS
OXYGEN SATURATION: 98 % | DIASTOLIC BLOOD PRESSURE: 71 MMHG | HEART RATE: 67 BPM | BODY MASS INDEX: 17 KG/M2 | TEMPERATURE: 98.3 F | HEIGHT: 65 IN | WEIGHT: 102 LBS | RESPIRATION RATE: 18 BRPM | SYSTOLIC BLOOD PRESSURE: 114 MMHG

## 2021-02-22 DIAGNOSIS — E43 SEVERE PROTEIN-CALORIE MALNUTRITION (H): ICD-10-CM

## 2021-02-22 DIAGNOSIS — I69.90 LATE EFFECTS OF CVA (CEREBROVASCULAR ACCIDENT): Primary | ICD-10-CM

## 2021-02-22 DIAGNOSIS — I15.1 HYPERTENSION SECONDARY TO OTHER RENAL DISORDERS: ICD-10-CM

## 2021-02-22 DIAGNOSIS — Z22.7 LATENT TUBERCULOSIS: ICD-10-CM

## 2021-02-22 PROCEDURE — 99309 SBSQ NF CARE MODERATE MDM 30: CPT | Performed by: NURSE PRACTITIONER

## 2021-02-22 ASSESSMENT — MIFFLIN-ST. JEOR: SCORE: 1199.55

## 2021-02-22 NOTE — LETTER
2/22/2021        RE: Wilfredo Yoon  [address]  [address]  [facility] [address]        [place] [facility]  Chief Complaint   Patient presents with     skilled nursing Regulatory     [place] Medical Record Number:  [id number]  Place of Service where encounter took place:  [facility] () [[id number]]    HPI:    Wilfredo Yoon  is 61 year old (1960), who is being seen today for a federally mandated E/M visit.      HPI information obtained from: facility chart records, facility staff and patient report.     Today's concerns are:  Late effects of CVA (cerebrovascular accident)  Severe protein-calorie malnutrition (H)  Staff reported that for 2-3 days he refused to eat, refused medications. He has been compliant today. There are apparently some staff that are [place] women that he doesn't work well with. When provider enters room he has his blanket up over his head. He does pull it down. Phone  is used. Patient does not answer all questions, sometimes just stares at examiner. He says he feels fine. If they bring him lunch, he will eat it. His sister is now here and is going through training with staff so that she and her daughter can take him home. He requires assist with all ADLs and transfers. He does not ambulate.   Wt Readings from Last 4 Encounters:   02/22/21 46.3 kg (102 lb)   12/15/20 45 kg (99 lb 1.6 oz)   12/02/20 44.7 kg (98 lb 8 oz)   09/30/20 45.3 kg (99 lb 12.8 oz)     Hypertension secondary to other renal disorders  -150s/70-90s    Latent tuberculosis  Followed by ID      ALLERGIES:Patient has no known allergies.  PAST MEDICAL HISTORY:   has a past medical history of Cirrhosis (H), Cryoglobulinemia (H), CVA (cerebral vascular accident) (H) (07/16/2020), Glomerulonephritis, Hepatitis B, Hypertension, Latent tuberculosis, MPGN (membranoproliferative glomerulonephritides), Positive QuantiFERON-TB Gold test, and PRES (posterior reversible  encephalopathy syndrome) (07/16/2020).  PAST SURGICAL HISTORY:   has a past surgical history that includes Hand surgery (Right); HAND/FINGER SURGERY UNLISTED; Esophagoscopy, gastroscopy, duodenoscopy (EGD), combined (N/A, 10/18/2018); Anesthesia out of OR MRI (N/A, 7/18/2020); and IR Paracentesis (7/27/2020).  FAMILY HISTORY: family history is not on file.  SOCIAL HISTORY:  reports that he has quit smoking. His smoking use included cigarettes. He has a 10.00 pack-year smoking history. He has never used smokeless tobacco. He reports that he does not drink alcohol or use drugs.    MEDICATIONS:  Current Outpatient Medications   Medication Sig Dispense Refill     acetaminophen (TYLENOL) 325 MG tablet Take 1 tablet (325 mg) by mouth every 4 hours as needed for mild pain or fever       amLODIPine (NORVASC) 10 MG tablet Take 1 tablet (10 mg) by mouth daily Indication: HTN       aspirin (ASA) 81 MG chewable tablet Take 1 tablet (81 mg) by mouth daily Indication: Stroke       atorvastatin (LIPITOR) 40 MG tablet Take 1 tablet (40 mg) by mouth every evening Indication: Stroke       carvedilol (COREG) 12.5 MG tablet Take 12.5 mg by mouth 2 times daily (with meals)       entecavir (BARACLUDE) 1 MG tablet Take 1 tablet (1 mg) by mouth daily Indication: HTN 30 tablet 11     isoniazid (NYDRAZID) 300 MG tablet Take 300 mg by mouth every morning       polyethylene glycol (MIRALAX) 17 g packet Take 17 g by mouth daily Indication: Constipation       potassium chloride ER (MICRO-K) 10 MEQ CR capsule Take 2 capsules (20 mEq) by mouth daily         Case Management:  I have reviewed the care plan and MDS and do agree with the plan. Patient's desire to return to the community is present and family is working toward this. Information reviewed:  Medications, vital signs, orders, and nursing notes.    ROS:  Limited secondary to cognitive impairment but today pt reports 4 point ROS including Respiratory, CV, GI and , other than that noted in  "the HPI,  is negative    Vitals:  /71   Pulse 67   Temp 98.3  F (36.8  C)   Resp 18   Ht 1.651 m (5' 5\")   Wt 46.3 kg (102 lb)   SpO2 98%   BMI 16.97 kg/m    Body mass index is 16.97 kg/m .  Exam:  GENERAL APPEARANCE:  Alert, in no distress, thin  ENT:  Mouth and posterior oropharynx normal, moist mucous membranes, normal hearing acuity  EYES:  EOM, conjunctivae, lids, pupils and irises normal  RESP:  respiratory effort and palpation of chest normal, lungs clear to auscultation , no respiratory distress  CV:  Palpation and auscultation of heart done , regular rate and rhythm, no murmur, rub, or gallop, no edema  ABDOMEN:  normal bowel sounds, soft, nontender, no hepatosplenomegaly or other masses  M/S:   muscle wasting  NEURO:   follows commands, able to move upper extremities, grasp strength 4/5 in left hand, 3/5 in right hand. No purposeful movement of BLE  PSYCH:  insight and judgement impaired, memory impaired , speech sparse, affect abnormal flat      Lab/Diagnostic data:   Recent labs in [facility] reviewed by me today.       ASSESSMENT/PLAN  (I69.90) Late effects of CVA (cerebrovascular accident)  (primary encounter diagnosis)  Comment: Weakness, aphasia. Is totally dependent on staff for all cares.   Plan:  Continue current psychological and physical support. Monitor skin integrity, weight.    (E43) Severe protein-calorie malnutrition (H)  Comment: Patient has gained a few pounds, so he must be eating an adequate amount. This may improve if he is able to be with family, have home cooked food that appeals to him  Plan: Continue current POC with no changes at this time and adjustments as needed.    (I15.1,  N28.89) Hypertension secondary to other renal disorders  Comment: Adequate control. To avoid risk of hypotension, falls, dizziness and tissue hypoperfusion, recommend  BP goal is < 150/90mmHg.  Plan: Continue current POC with no changes at this time and adjustments as needed.    (Z22.7) Latent " tuberculosis  Comment: Generally compliant with treatment  Plan: Continue current POC with no changes at this time and adjustments as needed.      Electronically signed by:  LINDA Mac [facility]  Phone: [phone]

## 2021-02-22 NOTE — PROGRESS NOTES
[place] [facility]  Chief Complaint   Patient presents with     USP Regulatory     [place] [facility] Record Number:  [id number]  Place of Service where encounter took place:  [facility] () [[id number]]    HPI:    Wilfredo Yoon  is 61 year old (1960), who is being seen today for a federally mandated E/M visit.      HPI information obtained from: facility chart records, facility staff and patient report.     Today's concerns are:  Late effects of CVA (cerebrovascular accident)  Severe protein-calorie malnutrition (H)  Staff reported that for 2-3 days he refused to eat, refused medications. He has been compliant today. There are apparently some staff that are [place] women that he doesn't work well with. When provider enters room he has his blanket up over his head. He does pull it down. Phone  is used. Patient does not answer all questions, sometimes just stares at examiner. He says he feels fine. If they bring him lunch, he will eat it. His sister is now here and is going through training with staff so that she and her daughter can take him home. He requires assist with all ADLs and transfers. He does not ambulate.   Wt Readings from Last 4 Encounters:   02/22/21 46.3 kg (102 lb)   12/15/20 45 kg (99 lb 1.6 oz)   12/02/20 44.7 kg (98 lb 8 oz)   09/30/20 45.3 kg (99 lb 12.8 oz)     Hypertension secondary to other renal disorders  -150s/70-90s    Latent tuberculosis  Followed by ID      ALLERGIES:Patient has no known allergies.  PAST MEDICAL HISTORY:   has a past medical history of Cirrhosis (H), Cryoglobulinemia (H), CVA (cerebral vascular accident) (H) (07/16/2020), Glomerulonephritis, Hepatitis B, Hypertension, Latent tuberculosis, MPGN (membranoproliferative glomerulonephritides), Positive QuantiFERON-TB Gold test, and PRES (posterior reversible encephalopathy syndrome) (07/16/2020).  PAST SURGICAL HISTORY:   has a past surgical history that includes Hand  surgery (Right); HAND/FINGER SURGERY UNLISTED; Esophagoscopy, gastroscopy, duodenoscopy (EGD), combined (N/A, 10/18/2018); Anesthesia out of OR MRI (N/A, 7/18/2020); and IR Paracentesis (7/27/2020).  FAMILY HISTORY: family history is not on file.  SOCIAL HISTORY:  reports that he has quit smoking. His smoking use included cigarettes. He has a 10.00 pack-year smoking history. He has never used smokeless tobacco. He reports that he does not drink alcohol or use drugs.    MEDICATIONS:  Current Outpatient Medications   Medication Sig Dispense Refill     acetaminophen (TYLENOL) 325 MG tablet Take 1 tablet (325 mg) by mouth every 4 hours as needed for mild pain or fever       amLODIPine (NORVASC) 10 MG tablet Take 1 tablet (10 mg) by mouth daily Indication: HTN       aspirin (ASA) 81 MG chewable tablet Take 1 tablet (81 mg) by mouth daily Indication: Stroke       atorvastatin (LIPITOR) 40 MG tablet Take 1 tablet (40 mg) by mouth every evening Indication: Stroke       carvedilol (COREG) 12.5 MG tablet Take 12.5 mg by mouth 2 times daily (with meals)       entecavir (BARACLUDE) 1 MG tablet Take 1 tablet (1 mg) by mouth daily Indication: HTN 30 tablet 11     isoniazid (NYDRAZID) 300 MG tablet Take 300 mg by mouth every morning       polyethylene glycol (MIRALAX) 17 g packet Take 17 g by mouth daily Indication: Constipation       potassium chloride ER (MICRO-K) 10 MEQ CR capsule Take 2 capsules (20 mEq) by mouth daily         Case Management:  I have reviewed the care plan and MDS and do agree with the plan. Patient's desire to return to the community is present and family is working toward this. Information reviewed:  Medications, vital signs, orders, and nursing notes.    ROS:  Limited secondary to cognitive impairment but today pt reports 4 point ROS including Respiratory, CV, GI and , other than that noted in the HPI,  is negative    Vitals:  /71   Pulse 67   Temp 98.3  F (36.8  C)   Resp 18   Ht 1.651 m (5'  "5\")   Wt 46.3 kg (102 lb)   SpO2 98%   BMI 16.97 kg/m    Body mass index is 16.97 kg/m .  Exam:  GENERAL APPEARANCE:  Alert, in no distress, thin  ENT:  Mouth and posterior oropharynx normal, moist mucous membranes, normal hearing acuity  EYES:  EOM, conjunctivae, lids, pupils and irises normal  RESP:  respiratory effort and palpation of chest normal, lungs clear to auscultation , no respiratory distress  CV:  Palpation and auscultation of heart done , regular rate and rhythm, no murmur, rub, or gallop, no edema  ABDOMEN:  normal bowel sounds, soft, nontender, no hepatosplenomegaly or other masses  M/S:   muscle wasting  NEURO:   follows commands, able to move upper extremities, grasp strength 4/5 in left hand, 3/5 in right hand. No purposeful movement of BLE  PSYCH:  insight and judgement impaired, memory impaired , speech sparse, affect abnormal flat      Lab/Diagnostic data:   Recent labs in [facility] reviewed by me today.       ASSESSMENT/PLAN  (I69.90) Late effects of CVA (cerebrovascular accident)  (primary encounter diagnosis)  Comment: Weakness, aphasia. Is totally dependent on staff for all cares.   Plan:  Continue current psychological and physical support. Monitor skin integrity, weight.    (E43) Severe protein-calorie malnutrition (H)  Comment: Patient has gained a few pounds, so he must be eating an adequate amount. This may improve if he is able to be with family, have home cooked food that appeals to him  Plan: Continue current POC with no changes at this time and adjustments as needed.    (I15.1,  N28.89) Hypertension secondary to other renal disorders  Comment: Adequate control. To avoid risk of hypotension, falls, dizziness and tissue hypoperfusion, recommend  BP goal is < 150/90mmHg.  Plan: Continue current POC with no changes at this time and adjustments as needed.    (Z22.7) Latent tuberculosis  Comment: Generally compliant with treatment  Plan: Continue current POC with no changes at this time " and adjustments as needed.      Electronically signed by:  LINDA Mac [facility]  Phone: [phone]

## 2021-03-03 ENCOUNTER — NURSING HOME VISIT (OUTPATIENT)
Dept: GERIATRICS | Facility: CLINIC | Age: 61
End: 2021-03-03
Payer: COMMERCIAL

## 2021-03-03 VITALS
TEMPERATURE: 98.2 F | RESPIRATION RATE: 16 BRPM | OXYGEN SATURATION: 100 % | HEART RATE: 68 BPM | BODY MASS INDEX: 18.15 KG/M2 | SYSTOLIC BLOOD PRESSURE: 134 MMHG | WEIGHT: 108.9 LBS | HEIGHT: 65 IN | DIASTOLIC BLOOD PRESSURE: 74 MMHG

## 2021-03-03 DIAGNOSIS — I69.90 LATE EFFECTS OF CVA (CEREBROVASCULAR ACCIDENT): Primary | ICD-10-CM

## 2021-03-03 DIAGNOSIS — E43 SEVERE PROTEIN-CALORIE MALNUTRITION (H): ICD-10-CM

## 2021-03-03 PROCEDURE — 99309 SBSQ NF CARE MODERATE MDM 30: CPT | Performed by: NURSE PRACTITIONER

## 2021-03-03 ASSESSMENT — MIFFLIN-ST. JEOR: SCORE: 1230.85

## 2021-03-03 NOTE — PROGRESS NOTES
Face to Face and Medical Necessity Statement for DME Provider visit    Demographic Information on Wilfredo Yoon:  Gender: male  : 1960  [address]    [facility] [address] [phone] (home) none (work)    Medical Record: [id number]  Social Security Number: [id number]  [facility]  Insurance: Payor: [facility] / Plan: [facility] [place] / Product Type: HMO /     HPI:   Wilfredo Yoon is a 61 year old  (1960), who is being seen today for a face to face provider visit at [facility]; medical necessity statement for DME included. This patient requires the following:  DME Ordered and Medical Necessity Statement   Manual wheelchair  Semi-electric hospital bed    Wheelchair Documentation  Size: standard 16 x 16  Corresponding cushion: Yes: seat  Standard foot rests: Yes  Elevating leg rests: No      The patient has mobility limitation that significantly impairs their ability to participate in one or more mobility related activities: Toileting, Feeding, Grooming and Bathing due to (I69.90) Late effects of CVA (cerebrovascular accident) and (E43) Severe protein-calorie malnutrition. The patient's mobility limitations cannot be safely resolved by using a cane/walker. Patient and/or caregiver is able to safely use the manual wheelchair. Patient's functional mobility deficit can be sufficiently resolved by the use of a manual wheelchair. Patient's home provides adequate access between rooms, maneuvering space, and surfaces for use of a manual wheelchair. The patient has expressed willingness to use a manual wheelchair in the home.       The patient does  require positioning of the body in ways not feasible with an ordinary bed due to a medical condition that is expected to last at least 1 month due to (I69.90) Late effects of CVA (cerebrovascular accident) and (E43) Severe protein-calorie malnutrition.   The patient does not require,  "for the alleviation of pain, postioning of the body in ways not feasible with an ordinary bed.   The patient does  require the head of bed elevated more than 30* most of the time due to CHF, chronic pulmonary disease or aspiration.  The patient does not require traction that can only be attached to a hospital bed.  The patient does  require a bed height different than a fixed height hospital bed to permit tranfers to wheelchair or standing position.   The patient does  require frequent or immediate changes in body position due to (I69.90) Late effects of CVA (cerebrovascular accident) and (E43) Severe protein-calorie malnutrition.       Pt needing above DME with expected length of need of  99  months  due to medical necessity associated with following diagnosis:  (I69.90) Late effects of CVA (cerebrovascular accident)    (E43) Severe protein-calorie malnutrition      PMH   has a past medical history of Cirrhosis (H), Cryoglobulinemia (H), CVA (cerebral vascular accident) (H) (07/16/2020), Glomerulonephritis, Hepatitis B, Hypertension, Latent tuberculosis, MPGN (membranoproliferative glomerulonephritides), Positive QuantiFERON-TB Gold test, and PRES (posterior reversible encephalopathy syndrome) (07/16/2020).    ROS:10 point ROS of systems including Constitutional, Eyes, Respiratory, Cardiovascular, Gastroenterology, Genitourinary, Integumentary, Musculoskeletal, Psychiatric were all negative except for pertinent positives noted in my HPI.    EXAM  Vitals: /74   Pulse 68   Temp 98.2  F (36.8  C)   Resp 16   Ht 1.651 m (5' 5\")   Wt 49.4 kg (108 lb 14.4 oz)   SpO2 100%   BMI 18.12 kg/m  ;BMI= Body mass index is 18.12 kg/m .   GENERAL APPEARANCE:  Alert, in no distress, thin  ENT:  Mouth and posterior oropharynx normal, moist mucous membranes, normal hearing acuity  EYES:  EOM, conjunctivae, lids, pupils and irises normal  RESP:  respiratory effort and palpation of chest normal, lungs clear to auscultation " , no respiratory distress  CV:  Palpation and auscultation of heart done , regular rate and rhythm, no murmur, rub, or gallop, no edema  ABDOMEN:  normal bowel sounds, soft, nontender, no hepatosplenomegaly or other masses  M/S:   muscle wasting  NEURO:   follows commands, able to move upper extremities, grasp strength 4/5 in left hand, 3/5 in right hand. Severely weak BLE  PSYCH:  insight and judgement impaired, memory impaired , speech sparse, affect abnormal flat      ASSESSMENT/PLAN:  1. Late effects of CVA (cerebrovascular accident)    2. Severe protein-calorie malnutrition (H)        Orders:  Semi-electric hospital bed  Manual wheelchair      ELECTRONICALLY SIGNED BY PECOS CERTIFIED PROVIDER:  LINDA Mac CNP   [facility]  Phone: [phone]    NPI: [id number]  [facility]  [address], [address]  [place] [address]    Addendum 3/12/21 to make diagnoses for bed more clear, see above

## 2021-03-03 NOTE — LETTER
3/3/2021        RE: Wilfredo Yoon  [address]  [address]  [place] [address]          Face to Face and Medical Necessity Statement for DME Provider visit    Demographic Information on Wilfredo Yoon:  Gender: male  : 1960  [address]    [place] [id number] [phone] (home) none (work)    Medical Record: [id number]  Social Security Number: [id number]  Primary Care Provider: [facility], [facility]  Insurance: Payor: [employer] / Plan: [employer] [place] / Product Type: HMO /     HPI:   Wilfredo Yoon is a 61 year old  (1960), who is being seen today for a face to face provider visit at [facility]; medical necessity statement for DME included. This patient requires the following:  DME Ordered and Medical Necessity Statement   Manual wheelchair  Semi-electric hospital bed    Wheelchair Documentation  Size: standard 16 x 16  Corresponding cushion: Yes: seat  Standard foot rests: Yes  Elevating leg rests: No      The patient has mobility limitation that significantly impairs their ability to participate in one or more mobility related activities: Toileting, Feeding, Grooming and Bathing due to (I69.90) Late effects of CVA (cerebrovascular accident) and (E43) Severe protein-calorie malnutrition. The patient's mobility limitations cannot be safely resolved by using a cane/walker. Patient and/or caregiver is able to safely use the manual wheelchair. Patient's functional mobility deficit can be sufficiently resolved by the use of a manual wheelchair. Patient's home provides adequate access between rooms, maneuvering space, and surfaces for use of a manual wheelchair. The patient has expressed willingness to use a manual wheelchair in the home.       The patient does  require positioning of the body in ways not feasible with an ordinary bed due to a medical condition that is expected to last at least 1 month due to CVA  The patient does not  "require, for the alleviation of pain, postioning of the body in ways not feasible with an ordinary bed.   The patient does  require the head of bed elevated more than 30* most of the time due to CHF, chronic pulmonary disease or aspiration.  The patient does not require traction that can only be attached to a hospital bed.  The patient does  require a bed height different than a fixed height hospital bed to permit tranfers to wheelchair or standing position.   The patient does  require frequent or immediate changes in body position due to high risk for pressure ulcers.         Pt needing above DME with expected length of need of  99  months  due to medical necessity associated with following diagnosis:  (I69.90) Late effects of CVA (cerebrovascular accident)    (E43) Severe protein-calorie malnutrition      PMH   has a past medical history of Cirrhosis (H), Cryoglobulinemia (H), CVA (cerebral vascular accident) (H) (07/16/2020), Glomerulonephritis, Hepatitis B, Hypertension, Latent tuberculosis, MPGN (membranoproliferative glomerulonephritides), Positive QuantiFERON-TB Gold test, and PRES (posterior reversible encephalopathy syndrome) (07/16/2020).    ROS:10 point ROS of systems including Constitutional, Eyes, Respiratory, Cardiovascular, Gastroenterology, Genitourinary, Integumentary, Musculoskeletal, Psychiatric were all negative except for pertinent positives noted in my HPI.    EXAM  Vitals: /74   Pulse 68   Temp 98.2  F (36.8  C)   Resp 16   Ht 1.651 m (5' 5\")   Wt 49.4 kg (108 lb 14.4 oz)   SpO2 100%   BMI 18.12 kg/m  ;BMI= Body mass index is 18.12 kg/m .   GENERAL APPEARANCE:  Alert, in no distress, thin  ENT:  Mouth and posterior oropharynx normal, moist mucous membranes, normal hearing acuity  EYES:  EOM, conjunctivae, lids, pupils and irises normal  RESP:  respiratory effort and palpation of chest normal, lungs clear to auscultation , no respiratory distress  CV:  Palpation and auscultation of " heart done , regular rate and rhythm, no murmur, rub, or gallop, no edema  ABDOMEN:  normal bowel sounds, soft, nontender, no hepatosplenomegaly or other masses  M/S:   muscle wasting  NEURO:   follows commands, able to move upper extremities, grasp strength 4/5 in left hand, 3/5 in right hand. Severely weak BLE  PSYCH:  insight and judgement impaired, memory impaired , speech sparse, affect abnormal flat      ASSESSMENT/PLAN:  1. Late effects of CVA (cerebrovascular accident)    2. Severe protein-calorie malnutrition (H)        Orders:  Semi-electric hospital bed  Manual wheelchair      ELECTRONICALLY SIGNED BY SHAYY CERTIFIED PROVIDER:  LINDA Mac CNP   [facility]  Phone: [phone]    NPI: [id number]  [facility]  [address], [address]  [place] [address]                  Breath sounds clear and equal bilaterally.

## 2021-03-12 ENCOUNTER — DISCHARGE SUMMARY NURSING HOME (OUTPATIENT)
Dept: GERIATRICS | Facility: CLINIC | Age: 61
End: 2021-03-12
Payer: COMMERCIAL

## 2021-03-12 VITALS
BODY MASS INDEX: 17.49 KG/M2 | OXYGEN SATURATION: 98 % | HEART RATE: 63 BPM | DIASTOLIC BLOOD PRESSURE: 81 MMHG | SYSTOLIC BLOOD PRESSURE: 126 MMHG | WEIGHT: 105 LBS | RESPIRATION RATE: 16 BRPM | TEMPERATURE: 98.6 F | HEIGHT: 65 IN

## 2021-03-12 DIAGNOSIS — I15.1 HYPERTENSION SECONDARY TO OTHER RENAL DISORDERS: ICD-10-CM

## 2021-03-12 DIAGNOSIS — I69.90 LATE EFFECTS OF CVA (CEREBROVASCULAR ACCIDENT): Primary | ICD-10-CM

## 2021-03-12 DIAGNOSIS — E43 SEVERE PROTEIN-CALORIE MALNUTRITION (H): ICD-10-CM

## 2021-03-12 DIAGNOSIS — Z22.7 LATENT TUBERCULOSIS: ICD-10-CM

## 2021-03-12 DIAGNOSIS — B18.1 CHRONIC VIRAL HEPATITIS B WITHOUT DELTA AGENT AND WITHOUT COMA (H): ICD-10-CM

## 2021-03-12 DIAGNOSIS — E87.6 HYPOPOTASSEMIA: ICD-10-CM

## 2021-03-12 PROCEDURE — 99316 NF DSCHRG MGMT 30 MIN+: CPT | Performed by: NURSE PRACTITIONER

## 2021-03-12 ASSESSMENT — MIFFLIN-ST. JEOR: SCORE: 1213.16

## 2021-03-12 NOTE — PROGRESS NOTES
[facility] DISCHARGE SUMMARY  PATIENT'S NAME: Wilfredo Yoon  YOB: 1960  MEDICAL RECORD NUMBER:  [id number]  Place of Service where encounter took place:  [facility] () [[id number]]    PRIMARY CARE PROVIDER AND CLINIC RESPONSIBLE AFTER TRANSFER:   [facility], [address] / [facility] [address]    Non-FMG Provider     Transferring providers: LINDA Mac CNP, Heather Lester MD  Recent Hospitalization/ED:  Hospital  [facility] stay 7/16/20 to 8/4/20.  Date of SNF Admission: August/04/2020  Date of SNF (anticipated) Discharge: March/15/2021  Discharged to: with family, sister  Physical Function: Assist of 1 for transfers, non-ambulatory  DME: Wheelchair and Hospital Bed    CODE STATUS/ADVANCE DIRECTIVES DISCUSSION:  Full Code   ALLERGIES: Patient has no known allergies.    DISCHARGE DIAGNOSIS/NURSING FACILITY COURSE:    [facility] Hospital stay 7/16/20 through 8/4/20. PMH chronic Hep B with cirrhosis, MPGN, HTN, HLD. Patient presented to [facility] with altered mental status. He was also in the ED 7/16 with abdominal pain and hypertension 245/140. After extensive work up, his AMS was determined to be due to PRES and multifocal subcortical infarcts secondary to small vessel disease vs malignant HTN. PRES did improve throughout his hospital stay. Residual deficits include expressive aphasia, right hemiparesis and dysphagia. Due to poor intake, tube feeding was discussed with his wife, but she declined saying that that would not be in line with his wishes.    Late effects of CVA (cerebrovascular accident)  He did not make appreciable progress with therapy. He is min assist for transfers, assist with all ADLs. He answers yes/no to questions using Cuban . His answers are not always accurate and he cannot elaborate. His sister has undergone staff training with therapy and has been  approved to take him home. She will be his PCA and her daughter will help out as well. Wheelchair and hospital bed have been ordered.     Severe protein-calorie malnutrition (H)  Patient goes through episodes of refusing to eat at times. He certainly prefers Gambian food.   Wt Readings from Last 4 Encounters:   03/12/21 47.6 kg (105 lb)   03/03/21 49.4 kg (108 lb 14.4 oz)   02/22/21 46.3 kg (102 lb)   12/15/20 45 kg (99 lb 1.6 oz)     Hypertension secondary to other renal disorders  Controlled. -130s/60-80s    Latent tuberculosis  Chronic viral hepatitis B without delta agent and without coma (H)  He was started on isoniazid by ID a few months ago. He is scheduled to complete this 7/2021. No obvious side effects to treatment. His LFTs have remained WNL despite his diagnosis of chronic hepatitis. ID has had issues completing follow up visits as these have been virtual and [facility] staff are not always able to assist. Hopefully his family can assist in a visit soon. His niece is the main contact here as she speaks English    Hypopotassemia  K noted to be 3.2 during routine labs. Supplement started, last K 3.5. This is likely due to poor nutritional intake      Past Medical History:  has a past medical history of Cirrhosis (H), Cryoglobulinemia (H), CVA (cerebral vascular accident) (H) (07/16/2020), Glomerulonephritis, Hepatitis B, Hypertension, Latent tuberculosis, MPGN (membranoproliferative glomerulonephritides), Positive QuantiFERON-TB Gold test, and PRES (posterior reversible encephalopathy syndrome) (07/16/2020).    Discharge Medications:    Current Outpatient Medications   Medication Sig Dispense Refill     acetaminophen (TYLENOL) 325 MG tablet Take 1 tablet (325 mg) by mouth every 4 hours as needed for mild pain or fever       amLODIPine (NORVASC) 10 MG tablet Take 1 tablet (10 mg) by mouth daily Indication: HTN       aspirin (ASA) 81 MG chewable tablet Take 1 tablet (81 mg) by mouth daily Indication:  "Stroke       atorvastatin (LIPITOR) 40 MG tablet Take 1 tablet (40 mg) by mouth every evening Indication: Stroke       carvedilol (COREG) 12.5 MG tablet Take 12.5 mg by mouth 2 times daily (with meals)       entecavir (BARACLUDE) 1 MG tablet Take 1 tablet (1 mg) by mouth daily Indication: HTN 30 tablet 11     isoniazid (NYDRAZID) 300 MG tablet Take 300 mg by mouth every morning       polyethylene glycol (MIRALAX) 17 g packet Take 17 g by mouth daily Indication: Constipation       potassium chloride ER (MICRO-K) 10 MEQ CR capsule Take 2 capsules (20 mEq) by mouth daily          ROS:   Limited secondary to cognitive impairment but today pt reports 4 point ROS including Respiratory, CV, GI and , other than that noted in the HPI,  is negative    Physical Exam:   Vitals: /81   Pulse 63   Temp 98.6  F (37  C)   Resp 16   Ht 1.651 m (5' 5\")   Wt 47.6 kg (105 lb)   SpO2 98%   BMI 17.47 kg/m    BMI= Body mass index is 17.47 kg/m .   GENERAL APPEARANCE:  Alert, in no distress, thin  ENT:  Mouth and posterior oropharynx normal, moist mucous membranes, normal hearing acuity  EYES:  EOM, conjunctivae, lids, pupils and irises normal  RESP:  respiratory effort and palpation of chest normal, lungs clear to auscultation , no respiratory distress  CV:  Palpation and auscultation of heart done , regular rate and rhythm, no murmur, rub, or gallop, no edema  ABDOMEN:  normal bowel sounds, soft, nontender, no hepatosplenomegaly or other masses  M/S:   muscle wasting  NEURO:   follows commands, able to move upper extremities, grasp strength 4/5 in left hand, 3/5 in right hand. Very weak BLE  PSYCH:  insight and judgement impaired, memory impaired , speech sparse, affect abnormal flat      SNF labs: Labs done in SNF are in [place] EPIC. Please refer to them using [employer]Care Everywhere.      DISCHARGE PLAN:    Follow up labs: No labs orders/due    Medical Follow Up:      Follow up with primary care provider in 1-2 " weeks   Follow up with specialist ID when able     MTM referral needed and placed by this provider: No    Discharge Services: Home Care:  Occupational Therapy, Physical Therapy, Registered Nurse and From:  [facility]      TOTAL DISCHARGE TIME:   Greater than 30 minutes  Electronically signed by:  LINDA Mac CNP   [facility]  Phone: [phone]      Home care Face to Face documentation done in EPIC attached to Home care orders for [facility].

## 2021-03-12 NOTE — LETTER
3/12/2021        RE: Wilfredo Yoon  [address]  [address]  [facility] [address]        [facility] DISCHARGE SUMMARY  PATIENT'S NAME: Wilfredo Yoon  YOB: 1960  MEDICAL RECORD NUMBER:  [id number]  Place of Service where encounter took place:  [facility] () [[id number]]    PRIMARY CARE PROVIDER AND CLINIC RESPONSIBLE AFTER TRANSFER:   [facility], [address] / [facility] [address]    Non-FMG Provider     Transferring providers: LINDA Mac CNP, Heather Lester MD  Recent Hospitalization/ED:  Hospital  [facility] stay 7/16/20 to 8/4/20.  Date of SNF Admission: August/04/2020  Date of SNF (anticipated) Discharge: March/15/2021  Discharged to: with family, sister  Physical Function: Assist of 1 for transfers, non-ambulatory  DME: Wheelchair and Hospital Bed    CODE STATUS/ADVANCE DIRECTIVES DISCUSSION:  Full Code   ALLERGIES: Patient has no known allergies.    DISCHARGE DIAGNOSIS/NURSING FACILITY COURSE:    [facility] Hospital stay 7/16/20 through 8/4/20. PMH chronic Hep B with cirrhosis, MPGN, HTN, HLD. Patient presented to [facility] with altered mental status. He was also in the ED 7/16 with abdominal pain and hypertension 245/140. After extensive work up, his AMS was determined to be due to PRES and multifocal subcortical infarcts secondary to small vessel disease vs malignant HTN. PRES did improve throughout his hospital stay. Residual deficits include expressive aphasia, right hemiparesis and dysphagia. Due to poor intake, tube feeding was discussed with his wife, but she declined saying that that would not be in line with his wishes.    Late effects of CVA (cerebrovascular accident)  He did not make appreciable progress with therapy. He is min assist for transfers, assist with all ADLs. He answers yes/no to questions using Sammarinese . His answers are not  always accurate and he cannot elaborate. His sister has undergone staff training with therapy and has been approved to take him home. She will be his PCA and her daughter will help out as well. Wheelchair and hospital bed have been ordered.     Severe protein-calorie malnutrition (H)  Patient goes through episodes of refusing to eat at times. He certainly prefers Nauruan food.   Wt Readings from Last 4 Encounters:   03/12/21 47.6 kg (105 lb)   03/03/21 49.4 kg (108 lb 14.4 oz)   02/22/21 46.3 kg (102 lb)   12/15/20 45 kg (99 lb 1.6 oz)     Hypertension secondary to other renal disorders  Controlled. -130s/60-80s    Latent tuberculosis  Chronic viral hepatitis B without delta agent and without coma (H)  He was started on isoniazid by ID a few months ago. He is scheduled to complete this 7/2021. No obvious side effects to treatment. His LFTs have remained WNL despite his diagnosis of chronic hepatitis. ID has had issues completing follow up visits as these have been virtual and [facility] staff are not always able to assist. Hopefully his family can assist in a visit soon. His niece is the main contact here as she speaks English    Hypopotassemia  K noted to be 3.2 during routine labs. Supplement started, last K 3.5. This is likely due to poor nutritional intake      Past Medical History:  has a past medical history of Cirrhosis (H), Cryoglobulinemia (H), CVA (cerebral vascular accident) (H) (07/16/2020), Glomerulonephritis, Hepatitis B, Hypertension, Latent tuberculosis, MPGN (membranoproliferative glomerulonephritides), Positive QuantiFERON-TB Gold test, and PRES (posterior reversible encephalopathy syndrome) (07/16/2020).    Discharge Medications:    Current Outpatient Medications   Medication Sig Dispense Refill     acetaminophen (TYLENOL) 325 MG tablet Take 1 tablet (325 mg) by mouth every 4 hours as needed for mild pain or fever       amLODIPine (NORVASC) 10 MG tablet Take 1 tablet (10 mg) by mouth daily  "Indication: HTN       aspirin (ASA) 81 MG chewable tablet Take 1 tablet (81 mg) by mouth daily Indication: Stroke       atorvastatin (LIPITOR) 40 MG tablet Take 1 tablet (40 mg) by mouth every evening Indication: Stroke       carvedilol (COREG) 12.5 MG tablet Take 12.5 mg by mouth 2 times daily (with meals)       entecavir (BARACLUDE) 1 MG tablet Take 1 tablet (1 mg) by mouth daily Indication: HTN 30 tablet 11     isoniazid (NYDRAZID) 300 MG tablet Take 300 mg by mouth every morning       polyethylene glycol (MIRALAX) 17 g packet Take 17 g by mouth daily Indication: Constipation       potassium chloride ER (MICRO-K) 10 MEQ CR capsule Take 2 capsules (20 mEq) by mouth daily          ROS:   Limited secondary to cognitive impairment but today pt reports 4 point ROS including Respiratory, CV, GI and , other than that noted in the HPI,  is negative    Physical Exam:   Vitals: /81   Pulse 63   Temp 98.6  F (37  C)   Resp 16   Ht 1.651 m (5' 5\")   Wt 47.6 kg (105 lb)   SpO2 98%   BMI 17.47 kg/m    BMI= Body mass index is 17.47 kg/m .   GENERAL APPEARANCE:  Alert, in no distress, thin  ENT:  Mouth and posterior oropharynx normal, moist mucous membranes, normal hearing acuity  EYES:  EOM, conjunctivae, lids, pupils and irises normal  RESP:  respiratory effort and palpation of chest normal, lungs clear to auscultation , no respiratory distress  CV:  Palpation and auscultation of heart done , regular rate and rhythm, no murmur, rub, or gallop, no edema  ABDOMEN:  normal bowel sounds, soft, nontender, no hepatosplenomegaly or other masses  M/S:   muscle wasting  NEURO:   follows commands, able to move upper extremities, grasp strength 4/5 in left hand, 3/5 in right hand. Very weak BLE  PSYCH:  insight and judgement impaired, memory impaired , speech sparse, affect abnormal flat      SNF labs: Labs done in SNF are in [place] EPIC. Please refer to them using EPIC/Care Everywhere.      DISCHARGE PLAN:    Follow up " labs: No labs orders/due    Medical Follow Up:      Follow up with primary care provider in 1-2 weeks   Follow up with specialist ID when able     MTM referral needed and placed by this provider: No    Discharge Services: Home Care:  Occupational Therapy, Physical Therapy, Registered Nurse and From:  [facility]      TOTAL DISCHARGE TIME:   Greater than 30 minutes  Electronically signed by:  LINDA Mac CNP   [facility]  Phone: [phone]      Home care Face to Face documentation done in [facility] attached to Home care orders for [facility].

## 2021-03-17 ENCOUNTER — TELEPHONE (OUTPATIENT)
Dept: FAMILY MEDICINE | Facility: CLINIC | Age: 61
End: 2021-03-17

## 2021-03-17 DIAGNOSIS — E87.6 HYPOPOTASSEMIA: ICD-10-CM

## 2021-03-17 DIAGNOSIS — I63.89 CEREBROVASCULAR ACCIDENT (CVA) DUE TO OTHER MECHANISM (H): ICD-10-CM

## 2021-03-17 DIAGNOSIS — K74.60 CIRRHOSIS OF LIVER WITHOUT ASCITES, UNSPECIFIED HEPATIC CIRRHOSIS TYPE (H): ICD-10-CM

## 2021-03-17 DIAGNOSIS — B18.1 CHRONIC VIRAL HEPATITIS B WITHOUT DELTA AGENT AND WITHOUT COMA (H): ICD-10-CM

## 2021-03-17 DIAGNOSIS — I10 ESSENTIAL HYPERTENSION, MALIGNANT: ICD-10-CM

## 2021-03-17 NOTE — TELEPHONE ENCOUNTER
Home Care - [facility]  - intake - [phone]    Calling to review if you will be following patient - he has not seen you before but was recently discharged from hospital and has appointment with you 3/22/21 - home care is requesting confirmation that you will be following patient at this time please    Nurse scheduled for patient assessment to establish care 3/18/21    Dorian with [facility] - yes can confirm provider will follow patient - as long as he attends his appointment on Monday    Discussed with home care they verbalized understanding

## 2021-03-18 ENCOUNTER — MEDICAL CORRESPONDENCE (OUTPATIENT)
Dept: HEALTH INFORMATION MANAGEMENT | Facility: CLINIC | Age: 61
End: 2021-03-18

## 2021-03-18 RX ORDER — ACETAMINOPHEN 325 MG/1
325 TABLET ORAL EVERY 4 HOURS PRN
Status: CANCELLED | OUTPATIENT
Start: 2021-03-18

## 2021-03-18 RX ORDER — ASPIRIN 81 MG/1
81 TABLET, CHEWABLE ORAL DAILY
Status: CANCELLED | OUTPATIENT
Start: 2021-03-18

## 2021-03-18 RX ORDER — ATORVASTATIN CALCIUM 40 MG/1
40 TABLET, FILM COATED ORAL EVERY EVENING
Status: CANCELLED | OUTPATIENT
Start: 2021-03-18

## 2021-03-18 RX ORDER — POTASSIUM CHLORIDE 750 MG/1
20 CAPSULE, EXTENDED RELEASE ORAL DAILY
Status: CANCELLED | OUTPATIENT
Start: 2021-03-18

## 2021-03-18 RX ORDER — POLYETHYLENE GLYCOL 3350 17 G/17G
17 POWDER, FOR SOLUTION ORAL DAILY
Qty: 510 G | Status: CANCELLED | OUTPATIENT
Start: 2021-03-18

## 2021-03-18 RX ORDER — ENTECAVIR 1 MG/1
1 TABLET, FILM COATED ORAL DAILY
Qty: 30 TABLET | Refills: 11 | Status: CANCELLED | OUTPATIENT
Start: 2021-03-18

## 2021-03-18 RX ORDER — AMLODIPINE BESYLATE 10 MG/1
10 TABLET ORAL DAILY
Status: CANCELLED | OUTPATIENT
Start: 2021-03-18

## 2021-03-18 RX ORDER — CARVEDILOL 12.5 MG/1
12.5 TABLET ORAL 2 TIMES DAILY WITH MEALS
Status: CANCELLED | OUTPATIENT
Start: 2021-03-18

## 2021-03-18 RX ORDER — ISONIAZID 300 MG/1
300 TABLET ORAL EVERY MORNING
Status: CANCELLED | OUTPATIENT
Start: 2021-03-18

## 2021-03-18 NOTE — TELEPHONE ENCOUNTER
Dr Soto    Pt is starting with home care, he is going to be changing pharmacy and needs refills sent  He only has enough meds until next visit with she plans on doing next Tuesday 3/23/21 for med set  He was dcd from TCU on Tuesday this week  Va would also like BP parameters     Ok for skilled nursing weekly for med management 1week9 and 3prn  PT/OT to eval and tx given to Va TOM   1st stat nursing services, her contact number is     He should have a an appointment and he is on MA can this be a virtual appointment but he is behiond on many vaccines, he did get the flu and COVID vaccines, he has an appointment set up for 3/22/ at 815 am which doesn't work for them and will need to be changed and it is a short appointment is that appointment time long enough?    Call Va with plan/refills   Med rec done with Va Cummins RN   [facility]

## 2021-03-18 NOTE — TELEPHONE ENCOUNTER
OK to start home care however, he needs to be seen before medications can be refilled.  In person visit would be preferred given complexity of patient.      If appointment with me does not work on Monday as scheduled then he can see another provider or contact discharging provider for refill request (and blood pressure parameters) and schedule with me another day. This patient has never been seen in this clinic, treating him without having established care here is not appropriate.      Amber Soto, DO on 3/18/2021 at 4:51 PM

## 2021-03-19 NOTE — TELEPHONE ENCOUNTER
Spoke with Va TOM detailed message given she will contact pt jonathon to assure he gets to the appointment on Monday    June Cummins RN   [facility]

## 2021-03-22 ENCOUNTER — TELEPHONE (OUTPATIENT)
Dept: FAMILY MEDICINE | Facility: CLINIC | Age: 61
End: 2021-03-22

## 2021-03-22 NOTE — TELEPHONE ENCOUNTER
Spoke with representative with [facility] and gave verbal order for OT treatment:  1 visit for 1 week  2 visits for 2 weeks  1 visit for 1 week    Evelina Lewis RN  [facility]

## 2021-03-26 DIAGNOSIS — Z22.7 LATENT TUBERCULOSIS: Primary | ICD-10-CM

## 2021-03-26 DIAGNOSIS — K74.60 CIRRHOSIS OF LIVER WITHOUT ASCITES, UNSPECIFIED HEPATIC CIRRHOSIS TYPE (H): ICD-10-CM

## 2021-03-26 DIAGNOSIS — E87.6 HYPOPOTASSEMIA: ICD-10-CM

## 2021-03-26 DIAGNOSIS — B18.1 CHRONIC VIRAL HEPATITIS B WITHOUT DELTA AGENT AND WITHOUT COMA (H): ICD-10-CM

## 2021-03-26 DIAGNOSIS — I10 ESSENTIAL HYPERTENSION, MALIGNANT: ICD-10-CM

## 2021-03-26 DIAGNOSIS — I63.89 CEREBROVASCULAR ACCIDENT (CVA) DUE TO OTHER MECHANISM (H): ICD-10-CM

## 2021-03-26 RX ORDER — POLYETHYLENE GLYCOL 3350 17 G/17G
17 POWDER, FOR SOLUTION ORAL DAILY
Qty: 507 G | Refills: 0 | Status: SHIPPED | OUTPATIENT
Start: 2021-03-26 | End: 2021-04-02

## 2021-03-26 RX ORDER — CARVEDILOL 12.5 MG/1
12.5 TABLET ORAL 2 TIMES DAILY WITH MEALS
Qty: 60 TABLET | Refills: 0 | Status: SHIPPED | OUTPATIENT
Start: 2021-03-26 | End: 2021-04-02

## 2021-03-26 RX ORDER — ISONIAZID 300 MG/1
300 TABLET ORAL EVERY MORNING
Qty: 30 TABLET | Refills: 0 | Status: SHIPPED | OUTPATIENT
Start: 2021-03-26 | End: 2021-04-28

## 2021-03-26 RX ORDER — POTASSIUM CHLORIDE 750 MG/1
20 CAPSULE, EXTENDED RELEASE ORAL DAILY
Qty: 60 CAPSULE | Refills: 0 | Status: SHIPPED | OUTPATIENT
Start: 2021-03-26 | End: 2021-04-02

## 2021-03-26 RX ORDER — ASPIRIN 81 MG/1
81 TABLET, CHEWABLE ORAL DAILY
Qty: 30 TABLET | Refills: 0 | Status: SHIPPED | OUTPATIENT
Start: 2021-03-26 | End: 2021-04-02

## 2021-03-26 RX ORDER — ATORVASTATIN CALCIUM 40 MG/1
40 TABLET, FILM COATED ORAL EVERY EVENING
Qty: 30 TABLET | Refills: 0 | Status: SHIPPED | OUTPATIENT
Start: 2021-03-26 | End: 2021-04-02

## 2021-03-26 RX ORDER — ENTECAVIR 1 MG/1
1 TABLET, FILM COATED ORAL DAILY
Qty: 30 TABLET | Refills: 0 | Status: SHIPPED | OUTPATIENT
Start: 2021-03-26 | End: 2021-04-28

## 2021-03-26 RX ORDER — AMLODIPINE BESYLATE 10 MG/1
10 TABLET ORAL DAILY
Qty: 30 TABLET | Refills: 0 | Status: SHIPPED | OUTPATIENT
Start: 2021-03-26 | End: 2021-04-02

## 2021-03-26 NOTE — TELEPHONE ENCOUNTER
Notified by home care that patient has not been able to see new PCP and needs refill of medications. 30 day supply sent to [facility] pharmacy

## 2021-04-02 ENCOUNTER — OFFICE VISIT (OUTPATIENT)
Dept: FAMILY MEDICINE | Facility: CLINIC | Age: 61
End: 2021-04-02
Payer: COMMERCIAL

## 2021-04-02 ENCOUNTER — MEDICAL CORRESPONDENCE (OUTPATIENT)
Dept: HEALTH INFORMATION MANAGEMENT | Facility: CLINIC | Age: 61
End: 2021-04-02

## 2021-04-02 ENCOUNTER — TELEPHONE (OUTPATIENT)
Dept: FAMILY MEDICINE | Facility: CLINIC | Age: 61
End: 2021-04-02

## 2021-04-02 VITALS
HEART RATE: 72 BPM | OXYGEN SATURATION: 98 % | DIASTOLIC BLOOD PRESSURE: 72 MMHG | RESPIRATION RATE: 16 BRPM | SYSTOLIC BLOOD PRESSURE: 128 MMHG

## 2021-04-02 DIAGNOSIS — E87.6 HYPOPOTASSEMIA: ICD-10-CM

## 2021-04-02 DIAGNOSIS — Z22.7 LATENT TUBERCULOSIS: Primary | ICD-10-CM

## 2021-04-02 DIAGNOSIS — Z09 HOSPITAL DISCHARGE FOLLOW-UP: ICD-10-CM

## 2021-04-02 DIAGNOSIS — K59.00 CONSTIPATION, UNSPECIFIED CONSTIPATION TYPE: ICD-10-CM

## 2021-04-02 DIAGNOSIS — I10 ESSENTIAL HYPERTENSION, MALIGNANT: ICD-10-CM

## 2021-04-02 DIAGNOSIS — Z22.7 LATENT TUBERCULOSIS: ICD-10-CM

## 2021-04-02 DIAGNOSIS — K74.60 CIRRHOSIS OF LIVER WITHOUT ASCITES, UNSPECIFIED HEPATIC CIRRHOSIS TYPE (H): ICD-10-CM

## 2021-04-02 DIAGNOSIS — B18.1 CHRONIC VIRAL HEPATITIS B WITHOUT DELTA AGENT AND WITHOUT COMA (H): ICD-10-CM

## 2021-04-02 DIAGNOSIS — E43 SEVERE PROTEIN-CALORIE MALNUTRITION (H): ICD-10-CM

## 2021-04-02 DIAGNOSIS — I69.30 HISTORY OF CVA WITH RESIDUAL DEFICIT: ICD-10-CM

## 2021-04-02 DIAGNOSIS — Z76.89 ENCOUNTER TO ESTABLISH CARE: Primary | ICD-10-CM

## 2021-04-02 LAB
ALBUMIN SERPL-MCNC: 3 G/DL (ref 3.4–5)
ALP SERPL-CCNC: 128 U/L (ref 40–150)
ALT SERPL W P-5'-P-CCNC: 30 U/L (ref 0–70)
ANION GAP SERPL CALCULATED.3IONS-SCNC: 4 MMOL/L (ref 3–14)
AST SERPL W P-5'-P-CCNC: 32 U/L (ref 0–45)
BILIRUB SERPL-MCNC: 0.5 MG/DL (ref 0.2–1.3)
BUN SERPL-MCNC: 25 MG/DL (ref 7–30)
CALCIUM SERPL-MCNC: 9.5 MG/DL (ref 8.5–10.1)
CHLORIDE SERPL-SCNC: 105 MMOL/L (ref 94–109)
CO2 SERPL-SCNC: 28 MMOL/L (ref 20–32)
CREAT SERPL-MCNC: 1.19 MG/DL (ref 0.66–1.25)
ERYTHROCYTE [DISTWIDTH] IN BLOOD BY AUTOMATED COUNT: 12.5 % (ref 10–15)
GFR SERPL CREATININE-BSD FRML MDRD: 66 ML/MIN/{1.73_M2}
GLUCOSE SERPL-MCNC: 97 MG/DL (ref 70–99)
HCT VFR BLD AUTO: 41.3 % (ref 40–53)
HGB BLD-MCNC: 13.5 G/DL (ref 13.3–17.7)
MCH RBC QN AUTO: 30.4 PG (ref 26.5–33)
MCHC RBC AUTO-ENTMCNC: 32.7 G/DL (ref 31.5–36.5)
MCV RBC AUTO: 93 FL (ref 78–100)
PLATELET # BLD AUTO: 145 10E9/L (ref 150–450)
POTASSIUM SERPL-SCNC: 4 MMOL/L (ref 3.4–5.3)
PROT SERPL-MCNC: 7.4 G/DL (ref 6.8–8.8)
RBC # BLD AUTO: 4.44 10E12/L (ref 4.4–5.9)
SODIUM SERPL-SCNC: 137 MMOL/L (ref 133–144)
WBC # BLD AUTO: 6.2 10E9/L (ref 4–11)

## 2021-04-02 PROCEDURE — 99205 OFFICE O/P NEW HI 60 MIN: CPT | Performed by: FAMILY MEDICINE

## 2021-04-02 PROCEDURE — 36415 COLL VENOUS BLD VENIPUNCTURE: CPT | Performed by: PATHOLOGY

## 2021-04-02 PROCEDURE — 80053 COMPREHEN METABOLIC PANEL: CPT | Performed by: PATHOLOGY

## 2021-04-02 PROCEDURE — 85027 COMPLETE CBC AUTOMATED: CPT | Performed by: PATHOLOGY

## 2021-04-02 RX ORDER — CARVEDILOL 12.5 MG/1
12.5 TABLET ORAL 2 TIMES DAILY WITH MEALS
Qty: 60 TABLET | Refills: 2 | Status: SHIPPED | OUTPATIENT
Start: 2021-04-02 | End: 2021-08-10

## 2021-04-02 RX ORDER — DOCUSATE SODIUM 100 MG/1
100 CAPSULE, LIQUID FILLED ORAL 2 TIMES DAILY PRN
Qty: 180 CAPSULE | Refills: 1 | Status: SHIPPED | OUTPATIENT
Start: 2021-04-02 | End: 2022-02-01

## 2021-04-02 RX ORDER — MULTIVITAMIN
1 TABLET ORAL DAILY
Qty: 90 TABLET | Refills: 1 | Status: SHIPPED | OUTPATIENT
Start: 2021-04-02 | End: 2021-11-11

## 2021-04-02 RX ORDER — ASPIRIN 81 MG/1
81 TABLET, CHEWABLE ORAL DAILY
Qty: 30 TABLET | Refills: 2 | Status: SHIPPED | OUTPATIENT
Start: 2021-04-02 | End: 2021-08-10

## 2021-04-02 RX ORDER — POTASSIUM CHLORIDE 750 MG/1
20 CAPSULE, EXTENDED RELEASE ORAL DAILY
Qty: 60 CAPSULE | Refills: 0 | Status: SHIPPED | OUTPATIENT
Start: 2021-04-02 | End: 2021-06-01

## 2021-04-02 RX ORDER — POLYETHYLENE GLYCOL 3350 17 G/17G
17 POWDER, FOR SOLUTION ORAL DAILY PRN
Qty: 507 G | Refills: 2 | Status: SHIPPED | OUTPATIENT
Start: 2021-04-02

## 2021-04-02 RX ORDER — ATORVASTATIN CALCIUM 40 MG/1
40 TABLET, FILM COATED ORAL EVERY EVENING
Qty: 30 TABLET | Refills: 2 | Status: SHIPPED | OUTPATIENT
Start: 2021-04-02 | End: 2021-08-10

## 2021-04-02 RX ORDER — AMLODIPINE BESYLATE 10 MG/1
10 TABLET ORAL DAILY
Qty: 30 TABLET | Refills: 2 | Status: SHIPPED | OUTPATIENT
Start: 2021-04-02 | End: 2021-08-10

## 2021-04-02 NOTE — PATIENT INSTRUCTIONS
1.  I will reach out the following specialists:   - Dr Brandt (infectious disease for tuberculosis)   - Dr Deep Rosas (liver disease)   - Dr Fuentes (kidney disease)   - Dr Diallo (stroke follow-up)    2.  Medication refills today.    3.  Blood pressure looks very good.    4.  Our care coordination team will reach out to you for extra support.      5.  We will reach out to home care to work on getting speech therapy and any other needs at home addressed.    6.  Physical therapy referral placed - be sure to let them know he is in a wheelchair and has a history of stroke when you schedule.

## 2021-04-02 NOTE — PROGRESS NOTES
Assessment & Plan     Encounter to establish care  Hospital discharge follow-up  See below.      Cerebrovascular accident (CVA) due to other mechanism (H)  Stable/improving.  Encouraged family to continue with home cares.  He will continue his current medications.  Family reports that home care PT suggested in office PT - referral placed as this seems reasonable.  I have also placed a care coordination referral to help assist family and patient in navigating complex care moving forward.  Will ask clinic nursing to reach out to home care to clarify any outstanding orders that are needed or if any additional equipment/supplies are needed.  He will need to follow-up with his neurologist.  - aspirin (ASA) 81 MG chewable tablet; Take 1 tablet (81 mg) by mouth daily Indication: Stroke  - atorvastatin (LIPITOR) 40 MG tablet; Take 1 tablet (40 mg) by mouth every evening Indication: Stroke  - polyethylene glycol (MIRALAX) 17 GM/Dose powder; Take 17 g by mouth daily as needed for constipation Hold for loose stools/diarrhea  - PHYSICAL THERAPY REFERRAL; Future  - CARE COORDINATION REFERRAL    Severe protein-calorie malnutrition (H)  Needs improvement.  Family wishes for him to take a multivitamin which is reasonable given his oral intake is not back to normal.  Reasonable to also consider nutrition consults as he may benefit from dietary supplements.  - multivitamin (ONE-DAILY) tablet; Take 1 tablet by mouth daily  - NUTRITION REFERRAL    Cirrhosis of liver without ascites, unspecified hepatic cirrhosis type (H)  Chronic viral hepatitis B without delta agent and without coma (H)  Overall stable.  He is tolerating antiviral treatment.  Liver enzymes have remained stable.  He is due for follow-up with hepatology.  No ascites on exam.    Essential hypertension, malignant  At goal.  He will continue his current medications and careful monitoring.  - amLODIPine (NORVASC) 10 MG tablet; Take 1 tablet (10 mg) by mouth daily  Indication: HTN  - carvedilol (COREG) 12.5 MG tablet; Take 1 tablet (12.5 mg) by mouth 2 times daily (with meals)    Hypopotassemia  Has been stable on supplemental potassium.  He is due for labs today and will recheck.  - potassium chloride ER (MICRO-K) 10 MEQ CR capsule; Take 2 capsules (20 mEq) by mouth daily    Latent tuberculosis  Tolerating treatment.  Treatment concludes in July but this year.  He is overdue for follow-up with infectious disease.  Remains symptomatic.  - **CBC with platelets FUTURE anytime  - Comprehensive metabolic panel (BMP + Alb, Alk Phos, ALT, AST, Total. Bili, TP)    Constipation, unspecified constipation type  He continues to struggle with constipation at times despite MiraLAX.  I will add Colace as needed for relief.  - docusate sodium (COLACE) 100 MG capsule; Take 1 capsule (100 mg) by mouth 2 times daily as needed for constipation    I have asked that he follow-up with me in approximately 3 months, sooner if needed.  He has many specialists appointments that he will need to attend in the near future.  I provided a list of these appointment needs to his caregivers and will reach out to the various specialists as well.    Amber Soto, [facility] ALVERTO Villalobos is a 61 year old who presents for the following health issues  accompanied by his sister and aric:    [facility]     Hospital Follow-up Visit:    Hospital/Nursing Home/IP Rehab Facility: [facility]   Date of Admission: 8/4/2020  Date of Discharge: 3/15/2021  Reason(s) for Admission:  AMS       Was your hospitalization related to COVID-19? No   Problems taking medications regularly:  None  Medication changes since discharge: None  Problems adhering to non-medication therapy:  None    Summary of hospitalization:  [facility] discharge summary reviewed  Diagnostic Tests/Treatments reviewed.  Follow up needed:  ID/neuro/hepatology/nephrology  Other Healthcare Providers  Involved in Patient s Care:         Homecare and Specialist appointment - see above  Update since discharge: improved.  Post Discharge Medication Reconciliation: discharge medications reconciled, continue medications without change.  Plan of care communicated with patient and caregiver/family       Since discharge he has been staying in his own apartment, his sister cares for him around-the-clock.  His niece also helps.  He has a home care nurse that comes once a week.  Patient responds to questions with mostly one-word answers.    1.  PRES/mutifocal subcortical infarcts:  Residual effects noted to be expressive aphasia, right hemiparesis, and dysphagia.  Family reports that he was fully independent prior to hospitalization and now is fully dependent.  His sister notes that since coming home from the nursing home he is gaining strength -she reports he is able to maintain upright position when seated though still requires assistance to move from laying to seated position.  His sister will often massage his muscles in his hands and arms and thinks this is helping him regain some strength.  His speech difficulties persist.  Family notes improved oral control when eating.  We will still sometimes say things that do not make sense.  He is not currently engaged in speech therapy but this is something patient and family are interested in.  Family is requesting an order for a bath chair (I have these orders to be signed from home care) and briefs (which I do not have orders for).  Per chart review it appears that neurology has not seen considered admitted.    2.  Malnutrition: Due to poor oral intake.  Family is unsure if he has lost or gained weight since arriving home.  Per notes I see that a feeding tube was discussed but ultimately family felt this would go against his wishes.  Family wonders if nutrition consult could be placed to help address nutrition needs.    3.  Hypertension: Blood pressures were well controlled  in nursing home.  He continues to take his blood pressure medications.  He denies any chest pain or shortness of breath.    4.  History of glomerulonephritis: He follows with nephrology (Dr Fuentes) and is due for follow-up appointment.  Potassium levels have been low, he is on daily supplement.    5.  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  Prior to hospitalization he was having monthly paracentesis.  Has not required lately.  He is currently taking entecavir to treat hepatitis B infection.  Due for follow-up with hepatology.    6.  Latent tuberculosis: He is currently taking isoniazid 300 mg daily.  He is to follow with Dr. Brandt from infectious disease, overdue for follow-up visit.      Review of Systems   Constitutional, cardiovascular, pulmonary, GI, , musculoskeletal, neuro, skin, endocrine and psych systems are negative, except as otherwise noted.      Objective    /72   Pulse 72   Resp 16   SpO2 98%   There is no height or weight on file to calculate BMI.  Physical Exam   GENERAL: alert and no distress, thin  EYES: Eyes grossly normal to inspection, conjunctivae and sclerae normal  RESP: lungs clear to auscultation - no rales, rhonchi or wheezes  CV: regular rate and rhythm, normal S1 S2, no S3 or S4, no murmur, click or rub, no peripheral edema and peripheral pulses strong  ABDOMEN: soft, nontender, nondistended, bowel sounds normal  MS: no LE edema  SKIN: warm and dry, no suspicious lesions or rashes  NEURO: in wheelchair, strength 4+/5 in bilateral UE (slightly weaker on right)

## 2021-04-05 ENCOUNTER — TELEPHONE (OUTPATIENT)
Dept: FAMILY MEDICINE | Facility: CLINIC | Age: 61
End: 2021-04-05

## 2021-04-05 NOTE — TELEPHONE ENCOUNTER
Forms completed and faxed back to [facility] @ [phone]. Placed in abstraction to be scanned into patient's chart.    [place], Patient Representative

## 2021-04-06 ENCOUNTER — TELEPHONE (OUTPATIENT)
Dept: FAMILY MEDICINE | Facility: CLINIC | Age: 61
End: 2021-04-06

## 2021-04-06 NOTE — TELEPHONE ENCOUNTER
Patient recently established care with me after discharge from TCU.  He is currently being treated for chronic hepatitis B, history of cirrhosis.  Due for follow-up with hepatology.  Will request that hepatology (Dr Deep Rosas) office help patient schedule appointment.      Amber Soto, DO on 4/6/2021 at 3:30 PM

## 2021-04-06 NOTE — TELEPHONE ENCOUNTER
Patient established care with me on 4/2 after discharge from TCU.  He is overdue for neurology follow-up. Will request neurology clinic assist patient is scheduling follow-up - appears he was to see Dr Boone last summer regarding CVA/PRES but this does not appear to have occurred.      Amber Soto, DO on 4/6/2021 at 3:33 PM

## 2021-04-06 NOTE — TELEPHONE ENCOUNTER
Patient established care with me for primary care on 4/2 after discharge from TCU.  He is overdue for ID follow-up with Dr Brandt regarding latent TB.  Will forward to Dr Brandt to request her office reach out to patient to schedule.      Amber Soto, DO on 4/6/2021 at 3:23 PM

## 2021-04-06 NOTE — TELEPHONE ENCOUNTER
Left vm for Va TOM with [facility] at [phone] to verify briefs that need to be ordered for patient    Mary North RN   [facility]    Pt a/ox4, RR even and unlabored. Pt to be discharged home at this time. Pt provided with and educated on discharge instructions. No questions, no further needs. No distress noted.

## 2021-04-06 NOTE — TELEPHONE ENCOUNTER
Patient recently established care with me after discharge from TCU. Due for follow-up with nephrology (Dr Fuentes) for history of MPGN.  Will ask Dr Fuentes's office to reach out to patient to assist in scheduling follow-up.      Amber Soto, DO on 4/6/2021 at 3:28 PM

## 2021-04-07 ENCOUNTER — PATIENT OUTREACH (OUTPATIENT)
Dept: CARE COORDINATION | Facility: CLINIC | Age: 61
End: 2021-04-07

## 2021-04-07 NOTE — PROGRESS NOTES
. Clinic Care Coordination Contact  Community Health Worker Initial Outreach    CHW Initial Information Gathering:  Referral Source: PCP  Community Resources: PCA  Equipment Currently Used at Home: wheelchair, manual  Informal Support system:: Family  No PCP office visit in Past Year: Yes  Transportation means:: Other       Patient accepts CC: Yes. Patient scheduled for assessment with CCC SW on 4/15/2021 at 2:00pm. Patient noted desire to discuss about CCC..     After consulting with the CCRC supervisor CHW spoke with David pt's niece.   Pt is non verbal per David and from recent OV with PCP, family is very involved in supporting patient in plan of care.    Notes:   Please ensure communication occurs back to clinic to have consent to communicate form done at next OV, currently there is no consent to communicate form uploaded in the chart.    Reason for Referral: Care Transition: TCU discharge, Complex Medical Concerns/Education: New diagnosis (stroke) and Patient/Caregiver Support: Resources for Support  Additional pertinent details: Patient was fully independent prior to stroke July 2020, now completely dependent. Sister is primary caregiver. He has home health care. Sister would like to be his PCA.

## 2021-04-08 DIAGNOSIS — B18.1 CHRONIC VIRAL HEPATITIS B WITHOUT DELTA AGENT AND WITHOUT COMA (H): ICD-10-CM

## 2021-04-08 DIAGNOSIS — K74.60 CIRRHOSIS OF LIVER WITHOUT ASCITES, UNSPECIFIED HEPATIC CIRRHOSIS TYPE (H): ICD-10-CM

## 2021-04-08 DIAGNOSIS — Z22.7 LATENT TUBERCULOSIS: ICD-10-CM

## 2021-04-08 RX ORDER — ENTECAVIR 1 MG/1
1 TABLET, FILM COATED ORAL DAILY
Qty: 30 TABLET | Refills: 0 | Status: CANCELLED | OUTPATIENT
Start: 2021-04-08

## 2021-04-08 RX ORDER — ISONIAZID 300 MG/1
300 TABLET ORAL EVERY MORNING
Qty: 30 TABLET | Refills: 0 | Status: CANCELLED | OUTPATIENT
Start: 2021-04-08

## 2021-04-08 NOTE — TELEPHONE ENCOUNTER
Please check with pharmacy as these were sent on 3/26 for 30 day supply.  If refill is not on file then OK to fill for 1 month as patient should be following up with appropriate specialists.    Amber Soto, DO on 4/8/2021 at 5:07 PM

## 2021-04-08 NOTE — TELEPHONE ENCOUNTER
PCP:   Patient's homecare nurse calling   Pt needs refills of Isoniazid and Entecavir  Homecare was only able to set up 5 days worth of pills in patients box for this next week     Routing refill request to provider for review/approval because:  Drug not on the [facility] refill protocol     Please authorize if appropriate.  Thanks,  Esperanza GALLOWAY RN

## 2021-04-09 ENCOUNTER — TELEPHONE (OUTPATIENT)
Dept: FAMILY MEDICINE | Facility: CLINIC | Age: 61
End: 2021-04-09

## 2021-04-09 ENCOUNTER — APPOINTMENT (OUTPATIENT)
Dept: INTERPRETER SERVICES | Facility: CLINIC | Age: 61
End: 2021-04-09
Payer: COMMERCIAL

## 2021-04-09 DIAGNOSIS — I69.30 HISTORY OF CVA WITH RESIDUAL DEFICIT: Primary | ICD-10-CM

## 2021-04-09 NOTE — TELEPHONE ENCOUNTER
Dr Soto    Home care nurse called requesting depends, 2xl, pt uses 5 per day, jim, disposable uses 2 to 4 a day and gloves, requesting size medium  Family has been paying for the supplies out of pocket and it is getting to be to costly for them    DME cued  Will need to be faxed to [facility], [phone]    June Cummins RN   [facility]

## 2021-04-09 NOTE — TELEPHONE ENCOUNTER
"Orders printed and signed.  Placed in \"completed\" basket to be faxed.    Amber Soto, DO on 4/9/2021 at 2:26 PM    "

## 2021-04-09 NOTE — TELEPHONE ENCOUNTER
Called pharmacy about medications that were sent on 3/26/21 - confirmed that 30 day supplies were received and family was able to  the entecavir, but were unable to  isoniazid because it was too early to fill. Pharmacy states isoniazid is available for pt, and they will deliver it to him. Refills denied at this time.    Evelina Lewis RN  [facility]

## 2021-04-14 ENCOUNTER — TELEPHONE (OUTPATIENT)
Dept: FAMILY MEDICINE | Facility: CLINIC | Age: 61
End: 2021-04-14

## 2021-04-14 NOTE — TELEPHONE ENCOUNTER
Forms completed and faxed back to [facility] @ [phone]. Placed in abstraction to be scanned into patient's chart.    [place] Pay[place], Patient Representative

## 2021-04-15 ENCOUNTER — PATIENT OUTREACH (OUTPATIENT)
Dept: NURSING | Facility: CLINIC | Age: 61
End: 2021-04-15
Attending: FAMILY MEDICINE
Payer: COMMERCIAL

## 2021-04-15 SDOH — SOCIAL STABILITY: SOCIAL NETWORK: HOW OFTEN DO YOU GET TOGETHER WITH FRIENDS OR RELATIVES?: MORE THAN THREE TIMES A WEEK

## 2021-04-15 SDOH — ECONOMIC STABILITY: FOOD INSECURITY: WITHIN THE PAST 12 MONTHS, YOU WORRIED THAT YOUR FOOD WOULD RUN OUT BEFORE YOU GOT MONEY TO BUY MORE.: NEVER TRUE

## 2021-04-15 SDOH — ECONOMIC STABILITY: FOOD INSECURITY: WITHIN THE PAST 12 MONTHS, THE FOOD YOU BOUGHT JUST DIDN'T LAST AND YOU DIDN'T HAVE MONEY TO GET MORE.: NEVER TRUE

## 2021-04-15 SDOH — ECONOMIC STABILITY: TRANSPORTATION INSECURITY
IN THE PAST 12 MONTHS, HAS LACK OF TRANSPORTATION KEPT YOU FROM MEETINGS, WORK, OR FROM GETTING THINGS NEEDED FOR DAILY LIVING?: NO

## 2021-04-15 ASSESSMENT — ACTIVITIES OF DAILY LIVING (ADL)
DEPENDENT_IADLS:: CLEANING;MEDICATION MANAGEMENT;MONEY MANAGEMENT;COOKING;LAUNDRY;SHOPPING;MEAL PREPARATION;TRANSPORTATION

## 2021-04-15 NOTE — PROGRESS NOTES
Clinic Care Coordination Contact    Clinic Care Coordination Contact  OUTREACH    Referral Information:  Referral Source: PCP    Primary Diagnosis: Neurological Disorders    Chief Complaint   Patient presents with     Clinic Care Coordination - Initial        Universal Utilization:   Clinic Utilization  Difficulty keeping appointments:: No  Compliance Concerns: No  No-Show Concerns: No  No PCP office visit in Past Year: No  Utilization    Last refreshed: 4/15/2021  2:58 PM: Hospital Admissions 2           Last refreshed: 4/15/2021  2:58 PM: ED Visits 4           Last refreshed: 4/15/2021  2:58 PM: No Show Count (past year) 12              Current as of: 4/15/2021  2:58 PM            Clinical Concerns:  CC SW spoke with pt's niece regarding pt's overall wellbeing. David shared that she and her mother care for pt. Her mother is living in the home with pt and offers care 24/7.    Transportation is the main concern at this time. David shared that it was requested that provider complete [employer] paperwork and Home Care RN assisted with this on 3/26.    [facility] comes at least 1x/week. PT coming 1x/week but this isn't working well for him. PT would like him to go to clinic to utilize more equipment. This order was completed at OV on 4/2.    Many of pt's appointments are virtual which is very helpful.    David shared that pt's sister would like to become pt's PCA officially and get paid for this. When CC SW inquired further about the steps taken David stated the Home Care RN had somehow started his process. David believes PCP completed paperwork and sent it somewhere.    David shared that pt's sister and Home Care RN will be a good resources of CC SW to learn more about pt's needs and what steps have been taken.    Current Medical Concerns:  stroke    Current Behavioral Concerns: due to stroke only able to use 1-2 word answers    Education Provided to patient: CC role      Health Maintenance  Reviewed: Up to date  Clinical Pathway: None    Medication Management:  Not discussed at this time     Functional Status:  Dependent ADLs:: Wheelchair-with assist, Bathing, Dressing, Eating, Grooming, Transfers, Incontinence  Dependent IADLs:: Cleaning, Medication Management, Money Management, Cooking, Laundry, Shopping, Meal Preparation, Transportation  Bed or wheelchair confined:: Yes  Mobility Status: Dependent/Assisted by Another  Fallen 2 or more times in the past year?: No  Any fall with injury in the past year?: No    Living Situation:  Current living arrangement:: I live in a private home with family  Type of residence:: Apartment    Lifestyle & Psychosocial Needs:     Social Needs     Financial resource strain: Not on file     Food insecurity     Worry: Never true     Inability: Never true     Transportation needs     Medical: Not on file     Non-medical: No     Diet:: Regular  Tube Feeding: No  Transportation means:: Other     Chemical Dependency Status: No Current Concerns  Informal Support system:: Family   Socioeconomic History     Marital status: Single     Spouse name: Not on file     Number of children: Not on file     Years of education: Not on file     Highest education level: Not on file   Relationships     Social connections     Talks on phone: Not on file     Gets together: More than three times a week     Attends [employer] service: Not on file     Active member of club or organization: Not on file     Attends meetings of clubs or organizations: Not on file     Relationship status: Not on file     Intimate partner violence     Fear of current or ex partner: Not on file     Emotionally abused: Not on file     Physically abused: Not on file     Forced sexual activity: Not on file     Tobacco Use     Smoking status: Former Smoker     Packs/day: 0.25     Years: 40.00     Pack years: 10.00     Types: Cigarettes     Smokeless tobacco: Never Used   Substance and Sexual Activity     Alcohol use: No      Alcohol/week: 0.0 standard drinks     Drug use: No      Resources and Interventions:  Current Resources:   Skilled Home Care Services: Skilled Nursing, Physicial Therapy  Community Resources: [facility]([place])  Supplies used at home:: Incontinence Supplies  Equipment Currently Used at Home: wheelchair, manual  Employment Status: disabled)   )    Advance Care Plan/Directive  Advanced Care Plans/Directives on file:: No       Goals:   Goals        General    1. Medical (pt-stated)     Notes - Note created  4/15/2021  4:23 PM by Keena Yi LGSW    Goal Statement: Over the next 6 months, Allison family would like to assist him to following medical recommendation to support his health and wellbeing.   Date Goal Set: 4/15/2021  Barriers: Complexity of medical needs  Strengths: Strong support system  Date to Achieve By: 10/15/2021  Patient expressed understanding of goal: yes    Action steps to achieve this goal:  1. Family will assist pt in completing [employer] mobility paperwork  2. Family will explore payment for family as PCA support  3. Family will outreach to Care Coordination  for further questions or concerns           Patient/Caregiver understanding: Pt reports understanding and denies any additional questions or concerns at this times. SW CC engaged in [employer]ID[employer] communication during encounter.    Outreach Frequency: weekly  Future Appointments              In 6 days Maliha Interiano RD [facility]    In 1 week Jerri Brandt MD [facility]    In 2 months Amber Soto DO [facility]        Plan: CC SW will outreach to HC for clarification on [employer] application and PCA services.    Cheyanne Yi [facility]  Clinic Care Coordinator  [facility]  [facility]  [phone]  [email or web address]

## 2021-04-16 NOTE — PROGRESS NOTES
Clinic Care Coordination Contact  Care Team Conversations    DEMARCO ARAGON spoke with Va with pt's home care. She shared that she was not able to complete [employer] Application with pt, at this time she doesn't believe this is pending.    She is uncertain about the information regarding pt's sister becoming pt's PCP. She did provide [facility] phone number to discuss this.     Plan: DEMARCO ARAGON will outreach to pt's sister to discuss transportation to appointments and PCA services.    Cheyanne Yi [facility]  Clinic Care Coordinator  [facility] [place]  [facility]  [phone]  [email or web address]

## 2021-04-21 ENCOUNTER — TELEPHONE (OUTPATIENT)
Dept: NUTRITION | Facility: CLINIC | Age: 61
End: 2021-04-21

## 2021-04-21 ENCOUNTER — HOSPITAL ENCOUNTER (OUTPATIENT)
Dept: NUTRITION | Facility: CLINIC | Age: 61
End: 2021-04-21
Attending: FAMILY MEDICINE
Payer: COMMERCIAL

## 2021-04-21 ENCOUNTER — PATIENT OUTREACH (OUTPATIENT)
Dept: NURSING | Facility: CLINIC | Age: 61
End: 2021-04-21
Payer: COMMERCIAL

## 2021-04-21 NOTE — PROGRESS NOTES
Clinic Care Coordination Contact    Follow Up Progress Note      Assessment: DEMARCO ARAGON spoke with pt's sister regarding pt's overall wellbeing. They are in need of a chair for bathroom/shower support. Sofi believes a nurse put in the order for this chair but she doesn't know who or if they are continuing to come to the home. Pt is still working with Home Care.     Sofi is new to the area and needs more information about [place] assistance. Discuss [place]. DEMARCO ARAGON will assist Sofi and pt to set this appointment up with [facility].    Discussed transportation. He is unable to sit in a car and needs to transport with a wheelchair. They did use an uber to attend PCP appointment. Ultimately, she is hopefully to have support from the government to help with transportation. DEMARCO ARAGON explained there may be support for this but it will not be available at this time. DEMARCO ARAGON will discuss Metro Mobility at next conversation.    Pt missed nutrition appointment. Sofi shared that she didn't receive a call.    Goals addressed this encounter:   Goals Addressed                 This Visit's Progress       Patient Stated      1. Medical (pt-stated)   10%     Goal Statement: Over the next 6 months, Wilfredo's family would like to assist him to following medical recommendation to support his health and wellbeing.   Date Goal Set: 4/15/2021  Barriers: Complexity of medical needs  Strengths: Strong support system  Date to Achieve By: 10/15/2021  Patient expressed understanding of goal: yes    Action steps to achieve this goal:  1. Family will assist pt in completing metro mobility paperwork  2. Family will explore payment for family as PCA support  3. Family will outreach to Care Coordination  for further questions or concerns         Outreach Frequency: weekly    Plan: DEMARCO ARAGON will outreach to pt's sister on 4/23/2021 to contact [facility] to start MH Choices Assessment.    Cheyanne Yi, [facility]  Clinic Care  Coordinator  [facility]  [facility]  [facility] Sarah [place]  [facility]  [phone]  [email or web address]

## 2021-04-21 NOTE — PROGRESS NOTES
Nutrition Note     attempted to call pt for nutrition therapy visit today at 14:15 and was unable to connect. Left a call-back number and did not receive a return call. Pt was a no-show today.    Maliha Interiano RD, LD  Clinical Dietitian  [facility]: [phone]  [facility]: [phone]

## 2021-04-23 ENCOUNTER — PATIENT OUTREACH (OUTPATIENT)
Dept: NURSING | Facility: CLINIC | Age: 61
End: 2021-04-23
Payer: COMMERCIAL

## 2021-04-23 ENCOUNTER — MEDICAL CORRESPONDENCE (OUTPATIENT)
Dept: HEALTH INFORMATION MANAGEMENT | Facility: CLINIC | Age: 61
End: 2021-04-23

## 2021-04-23 NOTE — PROGRESS NOTES
Clinic Care Coordination Contact    Follow Up Progress Note      Assessment: DEMARCO ARAGON spoke with Sofi, utilizing [employer] , regarding support services. Sofi is currently being paid for PCA services for pt. 4/2 saw a provider that prescribed 10 hours of PCA.    DEMARCO ARGAON outreach to [facility] with Sofi to discuss progress with [facility] services. [facility] was able to confirm pt has been assessed for CADI Waiver but uncertain where pt is in the process.  contact information was provided (Sloane Rodarte at [phone]). Message was left requesting call to discuss pt's waiver, she returns 5/3.    Pt currently has PT/OT/ Nurse through Home Care. Every Thursday a provider is coming to the home to check pt's vitals. Pt will be transferring to in-clinic PT soon.    Transportation is a concern. At this time pt has access to [place] transportation but it is uncertain what [place] can offer for wheelchair bound pts. DEMARCO ARAGON will outreach to [place] to discuss this.    Additional concern for pt is the cost of the place being rented. Sofi would like to discuss housing support. DEMARCO ARAGON explained this will be a better question for the CADI worker as they may be able to offer him support with his living expenses.    Goals addressed this encounter:   Goals Addressed                 This Visit's Progress       Patient Stated      1. Medical (pt-stated)   10%     Goal Statement: Over the next 6 months, Wilfredo's family would like to assist him to following medical recommendation to support his health and wellbeing.   Date Goal Set: 4/15/2021  Barriers: Complexity of medical needs  Strengths: Strong support system  Date to Achieve By: 10/15/2021  Patient expressed understanding of goal: yes    Action steps to achieve this goal:  1. Family will assist pt in completing metro mobility paperwork  2. Family will explore payment for family as PCA support  3. Family will outreach to Care Coordination  for further  questions or concerns         Outreach Frequency: 2 weeks    Plan: CC SW will outreach to [place] to discuss transportation. CC MAHESH will relay this information to Sofi.    Cheyanne Yi [facility]  Clinic Care Coordinator  [facility] Sarah [place]  [facility]  [phone]  [email or web address]

## 2021-04-27 ENCOUNTER — VIRTUAL VISIT (OUTPATIENT)
Dept: INFECTIOUS DISEASES | Facility: CLINIC | Age: 61
End: 2021-04-27
Attending: INTERNAL MEDICINE
Payer: COMMERCIAL

## 2021-04-27 DIAGNOSIS — K74.60 CIRRHOSIS OF LIVER WITHOUT ASCITES, UNSPECIFIED HEPATIC CIRRHOSIS TYPE (H): ICD-10-CM

## 2021-04-27 DIAGNOSIS — Z22.7 LATENT TUBERCULOSIS: ICD-10-CM

## 2021-04-27 DIAGNOSIS — B18.1 CHRONIC VIRAL HEPATITIS B WITHOUT DELTA AGENT AND WITHOUT COMA (H): ICD-10-CM

## 2021-04-27 DIAGNOSIS — Z22.7 LATENT TUBERCULOSIS INFECTION: Primary | ICD-10-CM

## 2021-04-27 PROCEDURE — 99207 PR NO BILLABLE SERVICE THIS VISIT: CPT | Mod: 95 | Performed by: INTERNAL MEDICINE

## 2021-04-27 ASSESSMENT — PAIN SCALES - GENERAL: PAINLEVEL: NO PAIN (0)

## 2021-04-27 NOTE — LETTER
4/27/2021       RE: Wilfredo Yoon  [address]  [place] [address]     Dear Colleague,    Thank you for referring your patient, Wilfredo Yoon, to the [facility] [place] [facility]. Please see a copy of my visit note below.    Wilfredo is a 61 year old who is being evaluated via a billable telephone visit.        What phone number would you like to be contacted at? [phone]   [phone]  How would you like to obtain your AVS? Mail a copy  Phone call duration: 15 minutes plus conversation with home health nurse 5 minutes plus documentation and arranging follow-up 10 minutes. Total time 30 minutes.    Wilfredo is a 61 year old who is being evaluated via a billable telephone visit.      Subjective   Wilfredo is a 61 year old who presents for the following health issues latent TB,  accompanied by his sister:     HPI   The patient is back living at home being cared for by his sister for two months. 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 I had an  on the phone to help communicate with his sister. The patient was sleeping and his sister did not want to wake him up to talk. His sister said that he was doing okay and he was eating and she was giving him all his meds which his home health nurse sets up in pill boxes. I asked his sister to have his home health nurse call me and she called me back the same day. She confirmed that the patient is taking  mg Q day. Patient has seen his primary care recently and she ordered all the needed labs then.     Review of Systems   CONSTITUTIONAL: NEGATIVE for fever, chills, change in weight  ENT/MOUTH: NEGATIVE for ear, mouth and throat problems  RESP: NEGATIVE for significant cough or SOB  CV: NEGATIVE for chest pain, palpitations or peripheral edema      Objective           Vitals:  No vitals were obtained today due  to virtual visit.    Physical Exam   Description obtained over the phone from his sister and nurse. Patient reported to be resting comfortably in no acute distress.   Remainder of exam unable to be completed due to telephone visits    Office Visit on 04/02/2021   Component Date Value Ref Range Status     WBC 04/02/2021 6.2  4.0 - 11.0 10e9/L Final     RBC Count 04/02/2021 4.44  4.4 - 5.9 10e12/L Final     Hemoglobin 04/02/2021 13.5  13.3 - 17.7 g/dL Final     Hematocrit 04/02/2021 41.3  40.0 - 53.0 % Final     MCV 04/02/2021 93  78 - 100 fl Final     MCH 04/02/2021 30.4  26.5 - 33.0 pg Final     MCHC 04/02/2021 32.7  31.5 - 36.5 g/dL Final     RDW 04/02/2021 12.5  10.0 - 15.0 % Final     Platelet Count 04/02/2021 145* 150 - 450 10e9/L Final     Sodium 04/02/2021 137  133 - 144 mmol/L Final     Potassium 04/02/2021 4.0  3.4 - 5.3 mmol/L Final     Chloride 04/02/2021 105  94 - 109 mmol/L Final     Carbon Dioxide 04/02/2021 28  20 - 32 mmol/L Final     Anion Gap 04/02/2021 4  3 - 14 mmol/L Final     Glucose 04/02/2021 97  70 - 99 mg/dL Final     Urea Nitrogen 04/02/2021 25  7 - 30 mg/dL Final     Creatinine 04/02/2021 1.19  0.66 - 1.25 mg/dL Final     GFR Estimate 04/02/2021 66  >60 mL/min/[1.73_m2] Final    Comment: Non  GFR Calc  Starting 12/18/2018, serum creatinine based estimated GFR (eGFR) will be   calculated using the Chronic Kidney Disease Epidemiology Collaboration   (CKD-EPI) equation.       GFR Estimate If Black 04/02/2021 76  >60 mL/min/[1.73_m2] Final    Comment:  GFR Calc  Starting 12/18/2018, serum creatinine based estimated GFR (eGFR) will be   calculated using the Chronic Kidney Disease Epidemiology Collaboration   (CKD-EPI) equation.       Calcium 04/02/2021 9.5  8.5 - 10.1 mg/dL Final     Bilirubin Total 04/02/2021 0.5  0.2 - 1.3 mg/dL Final     Albumin 04/02/2021 3.0* 3.4 - 5.0 g/dL Final     Protein Total 04/02/2021 7.4  6.8 - 8.8 g/dL Final     Alkaline Phosphatase  04/02/2021 128  40 - 150 U/L Final     ALT 04/02/2021 30  0 - 70 U/L Final     AST 04/02/2021 32  0 - 45 U/L Final       Wilfredo was seen today for recheck.    Diagnoses and all orders for this visit:    Latent tuberculosis infection      Plan to continue  mg PO Q day through July 13, 2021 to complete a total 9 month course.   RTC 3 months.     Again, thank you for allowing me to participate in the care of your patient.      Sincerely,    Jerri Brandt MD

## 2021-04-27 NOTE — PROGRESS NOTES
Wilfredo is a 61 year old who is being evaluated via a billable telephone visit.        What phone number would you like to be contacted at? [phone]   [phone]  How would you like to obtain your AVS? Mail a copy  Phone call duration: 15 minutes plus conversation with home health nurse 5 minutes plus documentation and arranging follow-up 10 minutes. Total time 30 minutes.    Wilfredo is a 61 year old who is being evaluated via a billable telephone visit.      Subjective   Wilfredo is a 61 year old who presents for the following health issues latent TB,  accompanied by his sister:     HPI   The patient is back living at home being cared for by his sister for two months. 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 I had an  on the phone to help communicate with his sister. The patient was sleeping and his sister did not want to wake him up to talk. His sister said that he was doing okay and he was eating and she was giving him all his meds which his home health nurse sets up in pill boxes. I asked his sister to have his home health nurse call me and she called me back the same day. She confirmed that the patient is taking  mg Q day. Patient has seen his primary care recently and she ordered all the needed labs then.     Review of Systems   CONSTITUTIONAL: NEGATIVE for fever, chills, change in weight  ENT/MOUTH: NEGATIVE for ear, mouth and throat problems  RESP: NEGATIVE for significant cough or SOB  CV: NEGATIVE for chest pain, palpitations or peripheral edema      Objective           Vitals:  No vitals were obtained today due to virtual visit.    Physical Exam   Description obtained over the phone from his sister and nurse. Patient reported to be resting comfortably in no acute distress.   Remainder of exam unable to be completed due to telephone visits    Office Visit on 04/02/2021   Component Date Value Ref Range Status     WBC 04/02/2021 6.2  4.0 - 11.0 10e9/L  Final     RBC Count 04/02/2021 4.44  4.4 - 5.9 10e12/L Final     Hemoglobin 04/02/2021 13.5  13.3 - 17.7 g/dL Final     Hematocrit 04/02/2021 41.3  40.0 - 53.0 % Final     MCV 04/02/2021 93  78 - 100 fl Final     MCH 04/02/2021 30.4  26.5 - 33.0 pg Final     MCHC 04/02/2021 32.7  31.5 - 36.5 g/dL Final     RDW 04/02/2021 12.5  10.0 - 15.0 % Final     Platelet Count 04/02/2021 145* 150 - 450 10e9/L Final     Sodium 04/02/2021 137  133 - 144 mmol/L Final     Potassium 04/02/2021 4.0  3.4 - 5.3 mmol/L Final     Chloride 04/02/2021 105  94 - 109 mmol/L Final     Carbon Dioxide 04/02/2021 28  20 - 32 mmol/L Final     Anion Gap 04/02/2021 4  3 - 14 mmol/L Final     Glucose 04/02/2021 97  70 - 99 mg/dL Final     Urea Nitrogen 04/02/2021 25  7 - 30 mg/dL Final     Creatinine 04/02/2021 1.19  0.66 - 1.25 mg/dL Final     GFR Estimate 04/02/2021 66  >60 mL/min/[1.73_m2] Final    Comment: Non  GFR Calc  Starting 12/18/2018, serum creatinine based estimated GFR (eGFR) will be   calculated using the Chronic Kidney Disease Epidemiology Collaboration   (CKD-EPI) equation.       GFR Estimate If Black 04/02/2021 76  >60 mL/min/[1.73_m2] Final    Comment:  GFR Calc  Starting 12/18/2018, serum creatinine based estimated GFR (eGFR) will be   calculated using the Chronic Kidney Disease Epidemiology Collaboration   (CKD-EPI) equation.       Calcium 04/02/2021 9.5  8.5 - 10.1 mg/dL Final     Bilirubin Total 04/02/2021 0.5  0.2 - 1.3 mg/dL Final     Albumin 04/02/2021 3.0* 3.4 - 5.0 g/dL Final     Protein Total 04/02/2021 7.4  6.8 - 8.8 g/dL Final     Alkaline Phosphatase 04/02/2021 128  40 - 150 U/L Final     ALT 04/02/2021 30  0 - 70 U/L Final     AST 04/02/2021 32  0 - 45 U/L Final       Wilfredo was seen today for recheck.    Diagnoses and all orders for this visit:    Latent tuberculosis infection      Plan to continue  mg PO Q day through July 13, 2021 to complete a total 9 month course.   RTC  3 months.

## 2021-04-27 NOTE — TELEPHONE ENCOUNTER
Dr. Soto,    Pt's homecare nurse Shantell calling regarding pt's isoniazid and entecavir - pt requested these medications on 4/8, and you responded with the following message:    OK to fill for 1 month as patient should be following up with appropriate specialists    Pt's homecare nurse reports that they had a virtual visit with infectious disease today, discussed refilling these meds, and were advised to follow up with PCP for refills. Family reports that pt is not currently following with any other specialists.    Are you ok with refilling? Cued if agree.    Call pt's homecare nurse Shantell if follow-up is needed - [phone]    Evelina Lewis, VAISHALI  [facility]

## 2021-04-28 RX ORDER — ISONIAZID 300 MG/1
300 TABLET ORAL EVERY MORNING
Qty: 30 TABLET | Refills: 2 | Status: SHIPPED | OUTPATIENT
Start: 2021-04-28 | End: 2022-02-01

## 2021-04-28 RX ORDER — ENTECAVIR 1 MG/1
1 TABLET, FILM COATED ORAL DAILY
Qty: 30 TABLET | Refills: 0 | Status: SHIPPED | OUTPATIENT
Start: 2021-04-28 | End: 2021-06-01

## 2021-04-28 NOTE — TELEPHONE ENCOUNTER
Refill sent.  Please remind patient/family that he needs to follow-up with hepatology - they should call [phone].     Thanks!    Amber Soto, DO on 4/28/2021 at 8:30 AM

## 2021-04-29 NOTE — PATIENT INSTRUCTIONS
Continue to take INH daily for latent TB through July 13th to complete 9 month course.  Continue Entecavir for chronic hepatitis B.

## 2021-05-04 ENCOUNTER — TELEPHONE (OUTPATIENT)
Dept: FAMILY MEDICINE | Facility: CLINIC | Age: 61
End: 2021-05-04

## 2021-05-04 NOTE — TELEPHONE ENCOUNTER
Shantell - RN [facility] - [phone] - calling to check on refill request patient still needs medication - isoniazid (NYDRAZID) 300 MG tablet    Writer reviewed 4/28/21 - 90 day supply sent to [facility] pharmacy     Nurse verbalized understanding and will contact pharmacy at this time.

## 2021-05-05 ENCOUNTER — MEDICAL CORRESPONDENCE (OUTPATIENT)
Dept: HEALTH INFORMATION MANAGEMENT | Facility: CLINIC | Age: 61
End: 2021-05-05

## 2021-05-05 ENCOUNTER — PATIENT OUTREACH (OUTPATIENT)
Dept: CARE COORDINATION | Facility: CLINIC | Age: 61
End: 2021-05-05

## 2021-05-05 NOTE — PROGRESS NOTES
Clinic Care Coordination Contact  Care Team Conversations    DEMARCO ARAGON outreached to pt's [employer] transportation to inquire about options for pt due to status as wheelchair bound. [facility] offers wheelchair transportation. Representative explained that rides need to be reserved 1 week in advance. She provided the phone number for pt/family to call ([phone]) and an  can be requested on the call.    Due to pt currently not being listed as wheelchair bound in transportation system, paperwork will be faxed to PCP to complete. This will ensure appropriate transportation is arranged for pt's appointments.    Plan: DEMARCO ARAGON will inform PCP to await this paperwork. DEMARCO ARAGON will outreach to pt's family to provide transportation information.    [facility]yanne [facility]i [facility]  Clinic Care Coordinator  [facility] Sarah Tipton  [facility]  [phone]  [email or web address]

## 2021-05-06 ENCOUNTER — TELEPHONE (OUTPATIENT)
Dept: INFECTIOUS DISEASES | Facility: CLINIC | Age: 61
End: 2021-05-06

## 2021-05-06 ENCOUNTER — TELEPHONE (OUTPATIENT)
Dept: FAMILY MEDICINE | Facility: CLINIC | Age: 61
End: 2021-05-06

## 2021-05-06 NOTE — TELEPHONE ENCOUNTER
Forms completed and faxed back to [facility] @ [phone]. Placed into Abstraction to scan into patients chart.   Mike Anthony MA

## 2021-05-10 ENCOUNTER — TELEPHONE (OUTPATIENT)
Dept: NEPHROLOGY | Facility: CLINIC | Age: 61
End: 2021-05-10

## 2021-05-10 NOTE — TELEPHONE ENCOUNTER
lvm x2 for patient to call us back to schedule an appt with Dr Fuentes.  It have been 4 year so it will  Need to be scheduled as a new patient

## 2021-05-12 ENCOUNTER — TELEPHONE (OUTPATIENT)
Dept: FAMILY MEDICINE | Facility: CLINIC | Age: 61
End: 2021-05-12

## 2021-05-12 DIAGNOSIS — Z53.9 DIAGNOSIS NOT YET DEFINED: Primary | ICD-10-CM

## 2021-05-12 PROCEDURE — G0180 MD CERTIFICATION HHA PATIENT: HCPCS | Performed by: FAMILY MEDICINE

## 2021-05-12 NOTE — TELEPHONE ENCOUNTER
Reason for Call: Other call back    Detailed comments: [facility] is requesting order for PT 1 x week for 3 weeks. Please follow up. Thanks!    Phone Number Patient can be reached at: [phone]    Best Time: Any    Can we leave a detailed message on this number? YES    Call taken on 5/12/2021 at 9:38 AM by Indira Boyd

## 2021-05-12 NOTE — TELEPHONE ENCOUNTER
"Return call to home care - voice message says \"vikram\" - advised we do not leave detailed messages on unidentified or non-confidential voice messages - requested she return call to clinic with regard to her request  "

## 2021-05-13 ENCOUNTER — PATIENT OUTREACH (OUTPATIENT)
Dept: CARE COORDINATION | Facility: CLINIC | Age: 61
End: 2021-05-13

## 2021-05-13 NOTE — PROGRESS NOTES
Clinic Care Coordination Contact  [facility]/Voicemail    Referral Source: PCP  Clinical Data: Care Coordinator Outreach    Outreach attempted x 1.  Left message on Sofi's voicemail with Ukrainian  with call back information and requested return call.    CC SW left message with pt's nieceDavid, shared phone number for pt/family to contact for transportation through [facility].    Plan: Care Coordinator will try to reach patient again in 10 business days.    Cheyanne Yi [facility]  Clinic Care Coordinator  [facility] Sarah Howell  [facility]  [phone]  [email or web address]

## 2021-05-14 ENCOUNTER — TELEPHONE (OUTPATIENT)
Dept: FAMILY MEDICINE | Facility: CLINIC | Age: 61
End: 2021-05-14

## 2021-05-14 NOTE — TELEPHONE ENCOUNTER
Reason for Call:  Home Health Care    Va with [facility] called regarding (reason for call): verbal orders    Orders are needed for this patient. Yes    PT: Continue Eval And Treat    OT: Continue Eval and Treat    Skilled Nursing: once a week and 1 Prn   - Eval And Treat    Pt Provider: Charles    Phone Number Homecare Nurse can be reached at: [phone]    Can we leave a detailed message on this number? YES    Phone number patient can be reached at: Other phone number:  N/A*    Best Time: SOON    Call taken on 5/14/2021 at 4:53 PM by America Mcadams

## 2021-05-14 NOTE — TELEPHONE ENCOUNTER
Forms completed and faxed back to [facility] @ [phone]. Placed into Abstraction to scan into patients chart.   Mike Anthony MA

## 2021-05-18 ENCOUNTER — TELEPHONE (OUTPATIENT)
Dept: FAMILY MEDICINE | Facility: CLINIC | Age: 61
End: 2021-05-18

## 2021-05-18 DIAGNOSIS — K74.60 CIRRHOSIS OF LIVER WITHOUT ASCITES, UNSPECIFIED HEPATIC CIRRHOSIS TYPE (H): Primary | ICD-10-CM

## 2021-05-18 NOTE — TELEPHONE ENCOUNTER
Message with referral info given to Jelly, she will inform the pt/family    June Cummins RN   [facility]

## 2021-05-18 NOTE — TELEPHONE ENCOUNTER
Dr. Soto,    Please see message below.    Called pt's home care nurse to clarify referral request - they need a referral to see hepatology.    Unsure which hepatology referral to cue up - there's few different options.    Evelina Lewis RN  [facility]

## 2021-05-18 NOTE — TELEPHONE ENCOUNTER
Left vm on Ileana confidential vm OK for PT orders    June Cummins RN   [facility]

## 2021-05-18 NOTE — TELEPHONE ENCOUNTER
Reason for Call: Request for an order or referral:    Order or referral being requested: Referral for Hematology.     Date needed: as soon as possible    Has the patient been seen by the PCP for this problem? YES    Additional comments: Needs Haitian Interpretor     Phone number Patient can be reached at:  Home number on file [phone] (home)    Best Time:  Any    Can we leave a detailed message on this number?  YES    Call taken on 5/18/2021 at 1:12 PM by Ileana Mario

## 2021-05-18 NOTE — TELEPHONE ENCOUNTER
Referral placed.  Please let patient/family or RN know.      Amber Soto, DO on 5/18/2021 at 2:21 PM

## 2021-05-24 ENCOUNTER — MEDICAL CORRESPONDENCE (OUTPATIENT)
Dept: HEALTH INFORMATION MANAGEMENT | Facility: CLINIC | Age: 61
End: 2021-05-24

## 2021-05-27 ENCOUNTER — TRANSFERRED RECORDS (OUTPATIENT)
Dept: HEALTH INFORMATION MANAGEMENT | Facility: CLINIC | Age: 61
End: 2021-05-27

## 2021-05-27 ENCOUNTER — MEDICAL CORRESPONDENCE (OUTPATIENT)
Dept: HEALTH INFORMATION MANAGEMENT | Facility: CLINIC | Age: 61
End: 2021-05-27

## 2021-05-28 ENCOUNTER — MEDICAL CORRESPONDENCE (OUTPATIENT)
Dept: HEALTH INFORMATION MANAGEMENT | Facility: CLINIC | Age: 61
End: 2021-05-28

## 2021-05-28 ENCOUNTER — TELEPHONE (OUTPATIENT)
Dept: FAMILY MEDICINE | Facility: CLINIC | Age: 61
End: 2021-05-28

## 2021-05-28 NOTE — TELEPHONE ENCOUNTER
Spoke with Naveed KEARNEY from home care, gave verbal orders:    OT for Wilfredo 1 x w for 4 wks.      Mary North RN   [facility]

## 2021-05-28 NOTE — TELEPHONE ENCOUNTER
Naveed is requesting verbal orders for OT for Wilfredo 1 x w for 4 wks. If you have any questions feel free to give Naveed a call at [phone]. Ileana COLLINS 5/28/2021

## 2021-06-01 ENCOUNTER — TELEPHONE (OUTPATIENT)
Dept: FAMILY MEDICINE | Facility: CLINIC | Age: 61
End: 2021-06-01

## 2021-06-01 DIAGNOSIS — B18.1 CHRONIC VIRAL HEPATITIS B WITHOUT DELTA AGENT AND WITHOUT COMA (H): ICD-10-CM

## 2021-06-01 DIAGNOSIS — I69.30 HISTORY OF CVA WITH RESIDUAL DEFICIT: Primary | ICD-10-CM

## 2021-06-01 DIAGNOSIS — K74.60 CIRRHOSIS OF LIVER WITHOUT ASCITES, UNSPECIFIED HEPATIC CIRRHOSIS TYPE (H): ICD-10-CM

## 2021-06-01 DIAGNOSIS — E87.6 HYPOPOTASSEMIA: ICD-10-CM

## 2021-06-01 RX ORDER — ENTECAVIR 1 MG/1
1 TABLET, FILM COATED ORAL DAILY
Qty: 30 TABLET | Refills: 0 | Status: SHIPPED | OUTPATIENT
Start: 2021-06-01 | End: 2021-06-09

## 2021-06-01 RX ORDER — POTASSIUM CHLORIDE 750 MG/1
20 CAPSULE, EXTENDED RELEASE ORAL DAILY
Qty: 60 CAPSULE | Refills: 2 | Status: SHIPPED | OUTPATIENT
Start: 2021-06-01 | End: 2021-09-13

## 2021-06-01 NOTE — TELEPHONE ENCOUNTER
Refill for 30 days sent to pharmacy.  Appreciate update on upcoming visit with hepatology.    Amber Soto,  on 6/1/2021 at 2:38 PM

## 2021-06-01 NOTE — TELEPHONE ENCOUNTER
Dr. Soto,    Pt's homecare nurse is calling to request referral for PT - pt's family would like him to do outpatient PT to help with strength - pt is an assist of 1 into wheelchair, could use strength training to help with transfers.    Pt's family requesting referral to [facility] - informed pt's family that they may have to check with insurance to see if [facility] would be covered    Referral cued for [facility].    Evelina Lewis, RN  [facility]

## 2021-06-01 NOTE — TELEPHONE ENCOUNTER
"Dr. Soto,    Pt's homecare nurse calling to request refills:    Requested Prescriptions   Pending Prescriptions Disp Refills     entecavir (BARACLUDE) 1 MG tablet 30 tablet 0     Sig: Take 1 tablet (1 mg) by mouth daily Indication: HTN       There is no refill protocol information for this order      Signed Prescriptions Disp Refills    potassium chloride ER (MICRO-K) 10 MEQ CR capsule 60 capsule 2     Sig: Take 2 capsules (20 mEq) by mouth daily       Potassium Supplements Protocol Passed - 6/1/2021  1:05 PM        Passed - Recent (12 mo) or future (30 days) visit within the authorizing provider's department     Patient has had an office visit with the authorizing provider or a provider within the authorizing providers department within the previous 12 mos or has a future within next 30 days. See \"Patient Info\" tab in inbasket, or \"Choose Columns\" in Meds & Orders section of the refill encounter.              Passed - Medication is active on med list        Passed - Patient is age 18 or older        Passed - Normal serum potassium in past 12 months     Recent Labs   Lab Test 04/02/21  1230   POTASSIUM 4.0                       Pt is scheduled to see hepatology on 6/9, needs a bridge of entecavir until this appt.    Routing refill request to provider for review/approval because:  Drug not on the [facility] [place] [facility] according to the [facility] refill protocol     Evelina Lewis RN  [facility]          "

## 2021-06-03 NOTE — PROGRESS NOTES
Clinic Care Coordination Contact  [facility]/Voicemail    Referral Source: PCP  Clinical Data: Care Coordinator Outreach    Outreach attempted x 2.  Left message on pt's sister, Sofi, iker with call back information and requested return call.    Plan: Care Coordinator will try to reach patient again in 1 month.    Cheyanne Yi [facility]  Clinic Care Coordinator  [facility]'s [facility] Sarah Wright  [facility]  [phone]  [email or web address]

## 2021-06-03 NOTE — TELEPHONE ENCOUNTER
Referral faxed to Omar Zapata at [phone]. Called pt's home care nurse to update.    Evelina Lewis RN  [facility]

## 2021-06-07 ENCOUNTER — MEDICAL CORRESPONDENCE (OUTPATIENT)
Dept: HEALTH INFORMATION MANAGEMENT | Facility: CLINIC | Age: 61
End: 2021-06-07

## 2021-06-09 ENCOUNTER — VIRTUAL VISIT (OUTPATIENT)
Dept: GASTROENTEROLOGY | Facility: CLINIC | Age: 61
End: 2021-06-09
Attending: FAMILY MEDICINE
Payer: COMMERCIAL

## 2021-06-09 ENCOUNTER — PATIENT OUTREACH (OUTPATIENT)
Dept: NURSING | Facility: CLINIC | Age: 61
End: 2021-06-09
Payer: COMMERCIAL

## 2021-06-09 ENCOUNTER — PRE VISIT (OUTPATIENT)
Dept: GASTROENTEROLOGY | Facility: CLINIC | Age: 61
End: 2021-06-09

## 2021-06-09 DIAGNOSIS — B18.1 CHRONIC VIRAL HEPATITIS B WITHOUT DELTA AGENT AND WITHOUT COMA (H): Primary | ICD-10-CM

## 2021-06-09 DIAGNOSIS — R18.8 CIRRHOSIS OF LIVER WITH ASCITES, UNSPECIFIED HEPATIC CIRRHOSIS TYPE (H): ICD-10-CM

## 2021-06-09 DIAGNOSIS — K74.60 CIRRHOSIS OF LIVER WITH ASCITES, UNSPECIFIED HEPATIC CIRRHOSIS TYPE (H): ICD-10-CM

## 2021-06-09 DIAGNOSIS — I69.90 LATE EFFECTS OF CVA (CEREBROVASCULAR ACCIDENT): ICD-10-CM

## 2021-06-09 PROCEDURE — 99214 OFFICE O/P EST MOD 30 MIN: CPT | Mod: 95 | Performed by: STUDENT IN AN ORGANIZED HEALTH CARE EDUCATION/TRAINING PROGRAM

## 2021-06-09 RX ORDER — ENTECAVIR 0.5 MG/1
1 TABLET, FILM COATED ORAL DAILY
Qty: 90 TABLET | Refills: 3 | Status: SHIPPED | OUTPATIENT
Start: 2021-06-09 | End: 2022-02-01

## 2021-06-09 NOTE — PROGRESS NOTES
Wilfredo is a 61 year old who is being evaluated via a billable telephone visit.      What phone number would you like to be contacted at? Please call  services first at [phone]   Then use cell number [phone]  How would you like to obtain your AVS? Mail a copy  Phone call duration: 25 minutes    [facility] New Patient Visit    Date of Visit: June 9, 2021    Reason for referral: Follow up of hepatitis B cirrhosis    Subjective: Mr. 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     He was admitted to the hospital 8/2020 with AMS, found to multifocal infarcts 2/2 HTN urgency and brainstem abnormalities thought to be related to infratentorial PRES syndrome.     He had a paracentesis 7/2020 - no studies sent.      Found to have latent Tb during this admission, on INH with ID.     Was discharged to a TCU Fall 2020 - just discharged to home 2/2021.     Interview conducted with the patient and his niece. He is not able to provide much history. He does not remember when he had his last paracentesis. Denies abdominal distention, jaundice. Niece reports he has made improvement since discharge to home. He is unable to walk, needs assistance with eating, but she reports he is eating more since home.     A RN comes and sets up his medications weekly. They do not think he is taking medications for his hepatitis B, but it appears his PCP was been prescribing it, so he is probably taking it.     Cirrhosis diagnosed 2017 - had a history of ascites, no history of HE or variceal bleeding. EGD 10/2018 without varices. Previously follows with Dr. Adame - scheduled in my clinic in error.     ROS: 14 point ROS negative except for positives noted in HPI.    PMHx:  Past Medical History:   Diagnosis Date     Cirrhosis (H)      Cryoglobulinemia (H)      CVA  (cerebral vascular accident) (H) 07/16/2020    Supratentorial likely d/t hypertensive emergency leading to right hemiparesis, dysphagia and aphasia     Glomerulonephritis      Hepatitis B      Hypertension      Latent tuberculosis     Treatment [id number]     MPGN (membranoproliferative glomerulonephritides)      Positive QuantiFERON-TB Gold test      PRES (posterior reversible encephalopathy syndrome) 07/16/2020    In brainstem d/t hypertensive emergency; suffered supratentorial CVAs same date       PSHx:  Past Surgical History:   Procedure Laterality Date     ANESTHESIA OUT OF OR MRI N/A 7/18/2020    Procedure: ANESTHESIA OUT OF OR MRI;  Surgeon: GENERIC ANESTHESIA PROVIDER;  Location: [facility]     C HAND/FINGER SURGERY UNLISTED       ESOPHAGOSCOPY, GASTROSCOPY, DUODENOSCOPY (EGD), COMBINED N/A 10/18/2018    Procedure: EGD;  Surgeon: Eber Ortez MD;  [facility]     HAND SURGERY Right      IR PARACENTESIS  7/27/2020       FamHx:  Family History   Problem Relation Age of Onset     Liver Cancer No family hx of      Hepatitis No family hx of      No family history of liver disease, liver cancer    SocHx:  Social History     Socioeconomic History     Marital status: Single     Spouse name: Not on file     Number of children: Not on file     Years of education: Not on file     Highest education level: Not on file   Occupational History     Not on file   Social Needs     Financial resource strain: Not on file     Food insecurity     Worry: Never true     Inability: Never true     Transportation needs     Medical: Not on file     Non-medical: No   Tobacco Use     Smoking status: Former Smoker     Packs/day: 0.25     Years: 40.00     Pack years: 10.00     Types: Cigarettes     Smokeless tobacco: Never Used   Substance and Sexual Activity     Alcohol use: No     Alcohol/week: 0.0 standard drinks     Drug use: No     Sexual activity: Not on file   Lifestyle     Physical activity     Days per week: Not on file      Minutes per session: Not on file     Stress: Not on file   Relationships     Social connections     Talks on phone: Not on file     Gets together: More than three times a week     Attends Islam service: Not on file     Active member of club or organization: Not on file     Attends meetings of clubs or organizations: Not on file     Relationship status: Not on file     Intimate partner violence     Fear of current or ex partner: Not on file     Emotionally abused: Not on file     Physically abused: Not on file     Forced sexual activity: Not on file   Other Topics Concern     Parent/sibling w/ CABG, MI or angioplasty before 65F 55M? Not Asked   Social History Narrative     Not on file       Medications:  Current Outpatient Medications   Medication     amLODIPine (NORVASC) 10 MG tablet     aspirin (ASA) 81 MG chewable tablet     atorvastatin (LIPITOR) 40 MG tablet     carvedilol (COREG) 12.5 MG tablet     docusate sodium (COLACE) 100 MG capsule     entecavir (BARACLUDE) 1 MG tablet     isoniazid (NYDRAZID) 300 MG tablet     multivitamin (ONE-DAILY) tablet     polyethylene glycol (MIRALAX) 17 GM/Dose powder     potassium chloride ER (MICRO-K) 10 MEQ CR capsule     acetaminophen (TYLENOL) 325 MG tablet     No current facility-administered medications for this visit.    Patient and niece do not know what medications he takes    Allergies:  No Known Allergies    Objective:  No vitals or exam for this telephone visit    Labs:  Last Comprehensive Metabolic Panel:  Sodium   Date Value Ref Range Status   04/02/2021 137 133 - 144 mmol/L Final     Potassium   Date Value Ref Range Status   04/02/2021 4.0 3.4 - 5.3 mmol/L Final     Chloride   Date Value Ref Range Status   04/02/2021 105 94 - 109 mmol/L Final     Carbon Dioxide   Date Value Ref Range Status   04/02/2021 28 20 - 32 mmol/L Final     Anion Gap   Date Value Ref Range Status   04/02/2021 4 3 - 14 mmol/L Final     Glucose   Date Value Ref Range Status   04/02/2021  97 70 - 99 mg/dL Final     Urea Nitrogen   Date Value Ref Range Status   04/02/2021 25 7 - 30 mg/dL Final     Creatinine   Date Value Ref Range Status   04/02/2021 1.19 0.66 - 1.25 mg/dL Final     GFR Estimate   Date Value Ref Range Status   04/02/2021 66 >60 mL/min/[1.73_m2] Final     Comment:     Non  GFR Calc  Starting 12/18/2018, serum creatinine based estimated GFR (eGFR) will be   calculated using the Chronic Kidney Disease Epidemiology Collaboration   (CKD-EPI) equation.       Calcium   Date Value Ref Range Status   04/02/2021 9.5 8.5 - 10.1 mg/dL Final     Bilirubin Total   Date Value Ref Range Status   04/02/2021 0.5 0.2 - 1.3 mg/dL Final     Alkaline Phosphatase   Date Value Ref Range Status   04/02/2021 128 40 - 150 U/L Final     ALT   Date Value Ref Range Status   04/02/2021 30 0 - 70 U/L Final     AST   Date Value Ref Range Status   04/02/2021 32 0 - 45 U/L Final       Lab Results   Component Value Date    WBC 6.2 04/02/2021     Lab Results   Component Value Date    RBC 4.44 04/02/2021     Lab Results   Component Value Date    HGB 13.5 04/02/2021     Lab Results   Component Value Date    HCT 41.3 04/02/2021     Lab Results   Component Value Date    MCV 93 04/02/2021     Lab Results   Component Value Date    MCH 30.4 04/02/2021     Lab Results   Component Value Date    MCHC 32.7 04/02/2021     Lab Results   Component Value Date    RDW 12.5 04/02/2021     Lab Results   Component Value Date     04/02/2021       INR   Date Value Ref Range Status   07/16/2020 1.09 0.86 - 1.14 Final        MELD-Na score: 11 at 7/18/2020 10:28 AM  MELD score: 11 at 7/18/2020 10:28 AM  Calculated from:  Serum Creatinine: 1.54 mg/dL at 7/18/2020 10:28 AM  Serum Sodium: 141 mmol/L (Rounded to 137 mmol/L) at 7/18/2020  8:22 AM  Total Bilirubin: 0.4 mg/dL (Rounded to 1 mg/dL) at 7/17/2020  6:10 AM  INR(ratio): 1.09 at 7/16/2020  9:26 AM  Age: 60 years 4 months    Imaging:     RUQ US 6/2020    IMPRESSION:   1.  Persistent coarsened echotexture of liver with surface nodularity  consistent with chronic viral hepatitis. No focal liver lesions  identified.  2. Increasingly thickened gallbladder wall, presumably related to  chronic liver disease and ascites.  3. Two rounded hypoechoic nodules in the midline appear to be adjacent  the visualized pancreas. It may be related to  peripancreatic/periportal lymph nodes present on prior CT and MRI.  Consider consider attention on CT/MRI for further confirmation.    Endoscopy:    EGD 10/2018    Findings:        The examined esophagus was normal.        There is no endoscopic evidence of varices in the entire esophagus.        The entire examined stomach was normal.        There is no endoscopic evidence of portal hypertension gastropathy in        the entire examined stomach.        The examined duodenum was normal.                                                                                     Moderate Sedation:        Moderate (conscious) sedation was administered by the endoscopy nurse        and supervised by the endoscopist. The following parameters were        monitored: oxygen saturation, heart rate, blood pressure, and response        to care. Total physician intraservice time was 8 minutes.        Moderate (conscious) sedation was administered by the endoscopy nurse        and supervised by the endoscopist. The following parameters were        monitored: oxygen saturation, heart rate, blood pressure, and response        to care. Total physician intraservice time was 8 minutes.   Impression:          - Normal esophagus.                        - Normal stomach.                        - Normal examined duodenum.                        - No specimens collected.     Independently reviewed labs and imaging.     Assessment/Plan: Mr. 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     Visit today limited due to telephone encounter - patient and niece do not know what medications he takes, appears he is taking entecavir. Does not sounds like he has had issues with ascites in the past several months. Denies recent para, no diuretics are prescribed.     - Recommend CBC, CMP, INR, AFP, Hepatitis B dna, Sag  - Repeat RUQ US to assess for ascites and HCC screening   - No varices on EGD 2018, since has been put on NSBB, can hold on repeat EGD for surveillance while on NSBB  - Continue entecavir 1 mg daily for chronic hepatitis B in a patient with decompensated cirrhosis. Discussed this is a lifelong medication    RTC 3 months in person or video - NO telephone visit    Marlyn Hurley MD MS  Hepatology/Liver Transplant  [facility]

## 2021-06-09 NOTE — PROGRESS NOTES
Clinic Care Coordination Contact  Care Team Conversations    DEMARCO ARAGON spoke with Jelly to inform that PT appointment with made at [facility]. DEMARCO ARAGON requested she support pt in calling ANW to request Care Everywhere. Jelly was agreeable to this next Tuesday when she is next with pt    Plan: DEMARCO ARAGON will outreach to pt's sister to inform of PT appointment, date, and time.     Cheyanne Yi [facility]  Clinic Care Coordinator  [facility] Sarah Morrill  [facility]  [phone]  [email or web address]

## 2021-06-09 NOTE — TELEPHONE ENCOUNTER
[facility] fax number was incorrect, referral not received for PT.    Please re-fax PT referral from 6/2 to [phone].    Cheyanne Yi, [facility]  Clinic Care Coordinator  [phone]

## 2021-06-09 NOTE — LETTER
6/9/2021         RE: Wilfredo Yoon  [address]  [place] [address]        Dear Colleague,    Thank you for referring your patient, Wilfredo Yoon, to the [facility] [place]. Please see a copy of my visit note below.    Wilfredo is a 61 year old who is being evaluated via a billable telephone visit.      What phone number would you like to be contacted at? Please call  services first at [phone]   Then use cell number [phone]  How would you like to obtain your AVS? Mail a copy  Phone call duration: 25 minutes    [facility] New Patient Visit    Date of Visit: June 9, 2021    Reason for referral: Follow up of hepatitis B cirrhosis    Subjective: Mr. 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     He was admitted to the hospital 8/2020 with AMS, found to multifocal infarcts 2/2 HTN urgency and brainstem abnormalities thought to be related to infratentorial PRES syndrome.     He had a paracentesis 7/2020 - no studies sent.      Found to have latent Tb during this admission, on INH with ID.     Was discharged to a TCU Fall 2020 - just discharged to home 2/2021.     Interview conducted with the patient and his niece. He is not able to provide much history. He does not remember when he had his last paracentesis. Denies abdominal distention, jaundice. Niece reports he has made improvement since discharge to home. He is unable to walk, needs assistance with eating, but she reports he is eating more since home.     A RN comes and sets up his medications weekly. They do not think he is taking medications for his hepatitis B, but it appears his PCP was been prescribing it, so he is probably taking it.     Cirrhosis diagnosed 2017 - had a history of ascites, no history of  HE or variceal bleeding. EGD 10/2018 without varices. Previously follows with Dr. Adame - scheduled in my clinic in error.     ROS: 14 point ROS negative except for positives noted in HPI.    PMHx:  Past Medical History:   Diagnosis Date     Cirrhosis (H)      Cryoglobulinemia (H)      CVA (cerebral vascular accident) (H) 07/16/2020    Supratentorial likely d/t hypertensive emergency leading to right hemiparesis, dysphagia and aphasia     Glomerulonephritis      Hepatitis B      Hypertension      Latent tuberculosis     Treatment [id number]     MPGN (membranoproliferative glomerulonephritides)      Positive QuantiFERON-TB Gold test      PRES (posterior reversible encephalopathy syndrome) 07/16/2020    In brainstem d/t hypertensive emergency; suffered supratentorial CVAs same date       PSHx:  Past Surgical History:   Procedure Laterality Date     ANESTHESIA OUT OF OR MRI N/A 7/18/2020    Procedure: ANESTHESIA OUT OF OR MRI;  Surgeon: GENERIC ANESTHESIA PROVIDER;  Location: [facility] OR     C HAND/FINGER SURGERY UNLISTED       ESOPHAGOSCOPY, GASTROSCOPY, DUODENOSCOPY (EGD), COMBINED N/A 10/18/2018    Procedure: EGD;  Surgeon: Eber Ortez MD;  Location: [facility]     HAND SURGERY Right      IR PARACENTESIS  7/27/2020       FamHx:  Family History   Problem Relation Age of Onset     Liver Cancer No family hx of      Hepatitis No family hx of      No family history of liver disease, liver cancer    SocHx:  Social History     Socioeconomic History     Marital status: Single     Spouse name: Not on file     Number of children: Not on file     Years of education: Not on file     Highest education level: Not on file   Occupational History     Not on file   Social Needs     Financial resource strain: Not on file     Food insecurity     Worry: Never true     Inability: Never true     Transportation needs     Medical: Not on file     Non-medical: No   Tobacco Use     Smoking status: Former Smoker     Packs/day: 0.25     Years:  40.00     Pack years: 10.00     Types: Cigarettes     Smokeless tobacco: Never Used   Substance and Sexual Activity     Alcohol use: No     Alcohol/week: 0.0 standard drinks     Drug use: No     Sexual activity: Not on file   Lifestyle     Physical activity     Days per week: Not on file     Minutes per session: Not on file     Stress: Not on file   Relationships     Social connections     Talks on phone: Not on file     Gets together: More than three times a week     Attends [employer] service: Not on file     Active member of club or organization: Not on file     Attends meetings of clubs or organizations: Not on file     Relationship status: Not on file     Intimate partner violence     Fear of current or ex partner: Not on file     Emotionally abused: Not on file     Physically abused: Not on file     Forced sexual activity: Not on file   Other Topics Concern     Parent/sibling w/ CABG, MI or angioplasty before 65F 55M? Not Asked   Social History Narrative     Not on file       Medications:  Current Outpatient Medications   Medication     amLODIPine (NORVASC) 10 MG tablet     aspirin (ASA) 81 MG chewable tablet     atorvastatin (LIPITOR) 40 MG tablet     carvedilol (COREG) 12.5 MG tablet     docusate sodium (COLACE) 100 MG capsule     entecavir (BARACLUDE) 1 MG tablet     isoniazid (NYDRAZID) 300 MG tablet     multivitamin (ONE-DAILY) tablet     polyethylene glycol (MIRALAX) 17 GM/Dose powder     potassium chloride ER (MICRO-K) 10 MEQ CR capsule     acetaminophen (TYLENOL) 325 MG tablet     No current facility-administered medications for this visit.    Patient and niece do not know what medications he takes    Allergies:  No Known Allergies    Objective:  No vitals or exam for this telephone visit    Labs:  Last Comprehensive Metabolic Panel:  Sodium   Date Value Ref Range Status   04/02/2021 137 133 - 144 mmol/L Final     Potassium   Date Value Ref Range Status   04/02/2021 4.0 3.4 - 5.3 mmol/L Final      Chloride   Date Value Ref Range Status   04/02/2021 105 94 - 109 mmol/L Final     Carbon Dioxide   Date Value Ref Range Status   04/02/2021 28 20 - 32 mmol/L Final     Anion Gap   Date Value Ref Range Status   04/02/2021 4 3 - 14 mmol/L Final     Glucose   Date Value Ref Range Status   04/02/2021 97 70 - 99 mg/dL Final     Urea Nitrogen   Date Value Ref Range Status   04/02/2021 25 7 - 30 mg/dL Final     Creatinine   Date Value Ref Range Status   04/02/2021 1.19 0.66 - 1.25 mg/dL Final     GFR Estimate   Date Value Ref Range Status   04/02/2021 66 >60 mL/min/[1.73_m2] Final     Comment:     Non  GFR Calc  Starting 12/18/2018, serum creatinine based estimated GFR (eGFR) will be   calculated using the Chronic Kidney Disease Epidemiology Collaboration   (CKD-EPI) equation.       Calcium   Date Value Ref Range Status   04/02/2021 9.5 8.5 - 10.1 mg/dL Final     Bilirubin Total   Date Value Ref Range Status   04/02/2021 0.5 0.2 - 1.3 mg/dL Final     Alkaline Phosphatase   Date Value Ref Range Status   04/02/2021 128 40 - 150 U/L Final     ALT   Date Value Ref Range Status   04/02/2021 30 0 - 70 U/L Final     AST   Date Value Ref Range Status   04/02/2021 32 0 - 45 U/L Final       Lab Results   Component Value Date    WBC 6.2 04/02/2021     Lab Results   Component Value Date    RBC 4.44 04/02/2021     Lab Results   Component Value Date    HGB 13.5 04/02/2021     Lab Results   Component Value Date    HCT 41.3 04/02/2021     Lab Results   Component Value Date    MCV 93 04/02/2021     Lab Results   Component Value Date    MCH 30.4 04/02/2021     Lab Results   Component Value Date    MCHC 32.7 04/02/2021     Lab Results   Component Value Date    RDW 12.5 04/02/2021     Lab Results   Component Value Date     04/02/2021       INR   Date Value Ref Range Status   07/16/2020 1.09 0.86 - 1.14 Final        MELD-Na score: 11 at 7/18/2020 10:28 AM  MELD score: 11 at 7/18/2020 10:28 AM  Calculated from:  Serum  Creatinine: 1.54 mg/dL at 7/18/2020 10:28 AM  Serum Sodium: 141 mmol/L (Rounded to 137 mmol/L) at 7/18/2020  8:22 AM  Total Bilirubin: 0.4 mg/dL (Rounded to 1 mg/dL) at 7/17/2020  6:10 AM  INR(ratio): 1.09 at 7/16/2020  9:26 AM  Age: 60 years 4 months    Imaging:     RUQ US 6/2020    IMPRESSION:   1. Persistent coarsened echotexture of liver with surface nodularity  consistent with chronic viral hepatitis. No focal liver lesions  identified.  2. Increasingly thickened gallbladder wall, presumably related to  chronic liver disease and ascites.  3. Two rounded hypoechoic nodules in the midline appear to be adjacent  the visualized pancreas. It may be related to  peripancreatic/periportal lymph nodes present on prior CT and MRI.  Consider consider attention on CT/MRI for further confirmation.    Endoscopy:    EGD 10/2018    Findings:        The examined esophagus was normal.        There is no endoscopic evidence of varices in the entire esophagus.        The entire examined stomach was normal.        There is no endoscopic evidence of portal hypertension gastropathy in        the entire examined stomach.        The examined duodenum was normal.                                                                                     Moderate Sedation:        Moderate (conscious) sedation was administered by the endoscopy nurse        and supervised by the endoscopist. The following parameters were        monitored: oxygen saturation, heart rate, blood pressure, and response        to care. Total physician intraservice time was 8 minutes.        Moderate (conscious) sedation was administered by the endoscopy nurse        and supervised by the endoscopist. The following parameters were        monitored: oxygen saturation, heart rate, blood pressure, and response        to care. Total physician intraservice time was 8 minutes.   Impression:          - Normal esophagus.                        - Normal stomach.                         - Normal examined duodenum.                        - No specimens collected.     Independently reviewed labs and imaging.     Assessment/Plan: Mr. 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     Visit today limited due to telephone encounter - patient and niece do not know what medications he takes, appears he is taking entecavir. Does not sounds like he has had issues with ascites in the past several months. Denies recent para, no diuretics are prescribed.     - Recommend CBC, CMP, INR, AFP, Hepatitis B dna, Sag  - Repeat RUQ US to assess for ascites and HCC screening   - No varices on EGD 2018, since has been put on NSBB, can hold on repeat EGD for surveillance while on NSBB  - Continue entecavir 1 mg daily for chronic hepatitis B in a patient with decompensated cirrhosis. Discussed this is a lifelong medication    RTC 3 months in person or video - NO telephone visit    Marlyn Hurley MD MS  Hepatology/Liver Transplant  [facility]

## 2021-06-09 NOTE — PROGRESS NOTES
Clinic Care Coordination Contact    Follow Up Progress Note      Assessment: DEMARCO ARAGON spoke with Sofi with [employer]  to inform of PT appointment at [facility] at [facility]. Sofi stated understanding of the date and time. DEMARCO ARAGON clarified that she needs to use the phone number given to make transportation reservation.     DEMARCO ARAGON explained that HC Nurse will be assisting with calling ANW to give permission to access Care Everywhere [facility] notes.    Goals addressed this encounter:   Goals Addressed                 This Visit's Progress       Patient Stated      1. Medical (pt-stated)   30%     Goal Statement: Over the next 6 months, Wilfredo's family would like to assist him to following medical recommendation to support his health and wellbeing.   Date Goal Set: 4/15/2021  Barriers: Complexity of medical needs  Strengths: Strong support system  Date to Achieve By: 10/15/2021  Patient expressed understanding of goal: yes    Action steps to achieve this goal:  1. Family will assist pt in completing metro mobility paperwork  2. Family will explore payment for family as PCA support  3. Family will outreach to Care Coordination  for further questions or concerns         Outreach Frequency: monthly    Plan: DEMARCO ARAGON will outreach to pt's sister in 1 month to discuss overall wellbeing.    Cheyanne Yi [facility]  Clinic Care Coordinator  [facility] Sarah Bulloch  [facility]  [phone]  [email or web address]

## 2021-06-09 NOTE — PROGRESS NOTES
Clinic Care Coordination Contact    Follow Up Progress Note      Assessment: DEMARCO ARAGON received a call from pt's sister, Sofi, stating that she thought she had an appointment with DEMARCO ARAGON. DEMARCO ARAGON explained that there was no appointment planned but clarified that pt does have a virtual appointment with Gastroenterology today at 1:30pm.     Pt's sister shared frustration that she isn't able to keep up with all the different medical providers and different roles that everyone is working within.     His  came to the home yesterday to review his overall wellbeing. Sofi doesn't know why she was there and what is to come from this.     DEMARCO ARAGON offered to support pt with making PT appointment with Omar Quigley. Sofi shared this is a high priority for pt at this time.    Goals addressed this encounter:   Goals Addressed                 This Visit's Progress       Patient Stated      1. Medical (pt-stated)   30%     Goal Statement: Over the next 6 months, Allison family would like to assist him to following medical recommendation to support his health and wellbeing.   Date Goal Set: 4/15/2021  Barriers: Complexity of medical needs  Strengths: Strong support system  Date to Achieve By: 10/15/2021  Patient expressed understanding of goal: yes    Action steps to achieve this goal:  1. Family will assist pt in completing metro mobility paperwork  2. Family will explore payment for family as PCA support  3. Family will outreach to Care Coordination  for further questions or concerns         Outreach Frequency: monthly    Plan: DEMARCO ARAGON will outreach to Omar Quigley to make PT appointment for pt.    Cheyanne Yi [facility]  Clinic Care Coordinator  [facility] Sarah [place]  [facility]  [phone]  [email or web address]

## 2021-06-09 NOTE — TELEPHONE ENCOUNTER
PT referral refaxed to the corrected number    June Cummins RN   [facility]

## 2021-06-09 NOTE — PROGRESS NOTES
Clinic Care Coordination Contact  Care Team Conversations    DEMARCO ARAGON spoke with Omar sequeira. They did not receive referral from PCP. DEMARCO ARAGON will request this to be re-sent to fax: [phone].    DEMARCO ARAGON was able to schedule appointment for 6/18 at 11:15am. Pt needs to arrive 20 minutes prior to appointment and appointment with take 1 hour.    ANW is not able to access pt's [facility] notes at this time. Home Care RN can call with pt to provide verbal permission for Care Everywhere. He will then sign something at his appointment. This will ensure the PT has access to his records prior to appointment, which was very important to Sofi. Phone number to call:  [phone] or Medical Records [phone].    Plan: DEMARCO ARAGON will outreach to pt's RN, Jelly, to request she support pt in calling ANW to give verbal permission for Care Everywhere to access [facility] notes. DEMARCO ARAGON will send request to care team to re-fax referral.    Cheyanne Yi [facility]  Clinic Care Coordinator  [facility] Women[facility] Sarah [place]  [facility]  [phone]  [email or web address]

## 2021-06-11 ENCOUNTER — TELEPHONE (OUTPATIENT)
Dept: FAMILY MEDICINE | Facility: CLINIC | Age: 61
End: 2021-06-11

## 2021-06-11 DIAGNOSIS — Z53.9 DIAGNOSIS NOT YET DEFINED: Primary | ICD-10-CM

## 2021-06-11 PROCEDURE — G0179 MD RECERTIFICATION HHA PT: HCPCS | Performed by: FAMILY MEDICINE

## 2021-06-22 ENCOUNTER — TELEPHONE (OUTPATIENT)
Dept: FAMILY MEDICINE | Facility: CLINIC | Age: 61
End: 2021-06-22

## 2021-06-22 NOTE — TELEPHONE ENCOUNTER
Reason for Call:  Form, our goal is to have forms completed with 72 hours, however, some forms may require a visit or additional information.    Type of letter, form or note:  medical    Who is the form from?: [facility]  (if other please explain)    Where did the form come from: form was faxed in    What clinic location was the form placed at?: [facility]    Where the form was placed: Dr. Soto Box/Folder    What number is listed as a contact on the form?: [phone]       Additional comments: Please complete forms, sign and fax back to number listed above     Call taken on 6/22/2021 at 3:46 PM by Gregoria Norman

## 2021-06-22 NOTE — TELEPHONE ENCOUNTER
Reason for Call:  Form, our goal is to have forms completed with 72 hours, however, some forms may require a visit or additional information.    Type of letter, form or note:  medical    Who is the form from?: [facility]  (if other please explain)    Where did the form come from: form was faxed in    What clinic location was the form placed at?: [facility]    Where the form was placed: Dr. Soto  Box/Folder    What number is listed as a contact on the form?: [phone]       Additional comments: Please complete, sign and fax back to number listed above     Call taken on 6/22/2021 at 4:26 PM by Gregoria Norman

## 2021-06-23 ENCOUNTER — TRANSFERRED RECORDS (OUTPATIENT)
Dept: HEALTH INFORMATION MANAGEMENT | Facility: CLINIC | Age: 61
End: 2021-06-23

## 2021-06-23 ENCOUNTER — MEDICAL CORRESPONDENCE (OUTPATIENT)
Dept: HEALTH INFORMATION MANAGEMENT | Facility: CLINIC | Age: 61
End: 2021-06-23

## 2021-06-24 ENCOUNTER — APPOINTMENT (OUTPATIENT)
Dept: INTERPRETER SERVICES | Facility: CLINIC | Age: 61
End: 2021-06-24
Payer: COMMERCIAL

## 2021-06-28 NOTE — TELEPHONE ENCOUNTER
Forms faxed on 06/28/2021 to [employer] to [phone]. Forms placed into scanning.     Kristin Patterson MA

## 2021-06-29 ENCOUNTER — APPOINTMENT (OUTPATIENT)
Dept: INTERPRETER SERVICES | Facility: CLINIC | Age: 61
End: 2021-06-29
Payer: COMMERCIAL

## 2021-06-30 ENCOUNTER — ANCILLARY PROCEDURE (OUTPATIENT)
Dept: ULTRASOUND IMAGING | Facility: CLINIC | Age: 61
End: 2021-06-30
Attending: STUDENT IN AN ORGANIZED HEALTH CARE EDUCATION/TRAINING PROGRAM
Payer: COMMERCIAL

## 2021-06-30 DIAGNOSIS — B18.1 CHRONIC VIRAL HEPATITIS B WITHOUT DELTA AGENT AND WITHOUT COMA (H): ICD-10-CM

## 2021-06-30 LAB
AFP SERPL-MCNC: 10.9 UG/L (ref 0–8)
ALBUMIN SERPL-MCNC: 3 G/DL (ref 3.4–5)
ALP SERPL-CCNC: 106 U/L (ref 40–150)
ALT SERPL W P-5'-P-CCNC: 16 U/L (ref 0–70)
ANION GAP SERPL CALCULATED.3IONS-SCNC: 9 MMOL/L (ref 3–14)
AST SERPL W P-5'-P-CCNC: 32 U/L (ref 0–45)
BILIRUB SERPL-MCNC: 0.7 MG/DL (ref 0.2–1.3)
BUN SERPL-MCNC: 24 MG/DL (ref 7–30)
CALCIUM SERPL-MCNC: 9 MG/DL (ref 8.5–10.1)
CHLORIDE SERPL-SCNC: 106 MMOL/L (ref 94–109)
CO2 SERPL-SCNC: 25 MMOL/L (ref 20–32)
CREAT SERPL-MCNC: 1.27 MG/DL (ref 0.66–1.25)
ERYTHROCYTE [DISTWIDTH] IN BLOOD BY AUTOMATED COUNT: 13.4 % (ref 10–15)
GFR SERPL CREATININE-BSD FRML MDRD: 61 ML/MIN/{1.73_M2}
GLUCOSE SERPL-MCNC: 96 MG/DL (ref 70–99)
HBV SURFACE AG SERPL QL IA: REACTIVE
HCT VFR BLD AUTO: 41.5 % (ref 40–53)
HGB BLD-MCNC: 14.3 G/DL (ref 13.3–17.7)
INR PPP: 1.16 (ref 0.86–1.14)
MCH RBC QN AUTO: 31.2 PG (ref 26.5–33)
MCHC RBC AUTO-ENTMCNC: 34.5 G/DL (ref 31.5–36.5)
MCV RBC AUTO: 91 FL (ref 78–100)
PLATELET # BLD AUTO: 137 10E9/L (ref 150–450)
POTASSIUM SERPL-SCNC: 4.2 MMOL/L (ref 3.4–5.3)
PROT SERPL-MCNC: 7.4 G/DL (ref 6.8–8.8)
RBC # BLD AUTO: 4.58 10E12/L (ref 4.4–5.9)
SODIUM SERPL-SCNC: 140 MMOL/L (ref 133–144)
WBC # BLD AUTO: 5.3 10E9/L (ref 4–11)

## 2021-06-30 PROCEDURE — 85027 COMPLETE CBC AUTOMATED: CPT | Performed by: PATHOLOGY

## 2021-06-30 PROCEDURE — 87341 HEP B SURFACE AG NEUTRLZJ IA: CPT | Mod: 90 | Performed by: PATHOLOGY

## 2021-06-30 PROCEDURE — 82105 ALPHA-FETOPROTEIN SERUM: CPT | Mod: 90 | Performed by: PATHOLOGY

## 2021-06-30 PROCEDURE — 87340 HEPATITIS B SURFACE AG IA: CPT | Mod: 90 | Performed by: PATHOLOGY

## 2021-06-30 PROCEDURE — 36415 COLL VENOUS BLD VENIPUNCTURE: CPT | Performed by: PATHOLOGY

## 2021-06-30 PROCEDURE — 85610 PROTHROMBIN TIME: CPT | Performed by: PATHOLOGY

## 2021-06-30 PROCEDURE — 99000 SPECIMEN HANDLING OFFICE-LAB: CPT | Performed by: PATHOLOGY

## 2021-06-30 PROCEDURE — 80053 COMPREHEN METABOLIC PANEL: CPT | Performed by: PATHOLOGY

## 2021-06-30 PROCEDURE — 87517 HEPATITIS B DNA QUANT: CPT | Mod: 90 | Performed by: PATHOLOGY

## 2021-06-30 PROCEDURE — 76705 ECHO EXAM OF ABDOMEN: CPT | Mod: GC | Performed by: RADIOLOGY

## 2021-07-01 LAB
HBV DNA SERPL NAA+PROBE-ACNC: NORMAL [IU]/ML
HBV DNA SERPL NAA+PROBE-LOG IU: NORMAL {LOG_IU}/ML

## 2021-07-05 ENCOUNTER — TELEPHONE (OUTPATIENT)
Dept: FAMILY MEDICINE | Facility: CLINIC | Age: 61
End: 2021-07-05

## 2021-07-05 NOTE — TELEPHONE ENCOUNTER
Reason for Call:  Form, our goal is to have forms completed with 72 hours, however, some forms may require a visit or additional information.    Type of letter, form or note:  Home Health Certification    Who is the form from?: [facility]  (if other please explain)    Where did the form come from: form was faxed in    What clinic location was the form placed at?: [facility]    Where the form was placed: Dr. Soto's  Box/Folder    What number is listed as a contact on the form?: FAX: [phone]       Additional comments: Please Complete, Sign and fax back to the number listed above     Call taken on 7/5/2021 at 2:09 PM by Gregoria Norman

## 2021-07-06 ENCOUNTER — TELEPHONE (OUTPATIENT)
Dept: FAMILY MEDICINE | Facility: CLINIC | Age: 61
End: 2021-07-06

## 2021-07-06 NOTE — TELEPHONE ENCOUNTER
Homecare calling. They do not provide social work. Sister said that Dr Stephenson office is providing homecare    Caller Asya is director [facility], phone [phone]. Has questions about this    Rosibel Melton RN, BSN  [facility]

## 2021-07-07 ENCOUNTER — VIRTUAL VISIT (OUTPATIENT)
Dept: FAMILY MEDICINE | Facility: CLINIC | Age: 61
End: 2021-07-07
Payer: COMMERCIAL

## 2021-07-07 DIAGNOSIS — Z53.9 NO SHOW: Primary | ICD-10-CM

## 2021-07-07 NOTE — PROGRESS NOTES
Unable to reach patient at time of visit.  I sent two video invites via text and attempted two phone calls.  Will need to reschedule.      Chart review:    1.  History of CVA:  - taking ASA and atorvastatin  - working with home care and PT    2.  HTN:  - taking amlodipine and carvedilol    3.  Latent TB:  - had recent follow-up with infections disease (Dr Brandt on 4/27)  - continues insoniazid - end date of 7/13/21 (completion of 9 month course)   - due for follow-up with ID end of July - this does not appear to be scheduled    4.  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  In person preferred.        Amber Soto DO on 7/7/2021 at 10:57 PM    This patient was a no show for this scheduled appointment.

## 2021-07-07 NOTE — Clinical Note
I was not able to reach patient today.  Can you please call to reschedule?  I would prefer in person (OK to use virtual spots or Dr Only spots).  30 min visit.  Thanks!

## 2021-07-09 NOTE — TELEPHONE ENCOUNTER
Asya calling back. Pt was looking for resources for living costs and placement, currently having difficulty paying for home now.   Informed Asya that Pt can make follow up appt with PCP to discuss needs and care coordinator or  referral can be placed.       Sury Sherman RN   [facility]

## 2021-07-09 NOTE — TELEPHONE ENCOUNTER
FORMS HAS BEEN COMPLETED AND FAXED BACK TO [facility] -[phone] FROM 6/11/2021, ALSO PLACED INTO ABSTRACTED TO SCAN INTO PATIENT CHART.    GI ABDI MA

## 2021-07-09 NOTE — TELEPHONE ENCOUNTER
Spoke with Ayah, informed that Pt needs to be seen in clinic and we can provide referrals if appropriate.       Sury Sherman RN   [facility]

## 2021-07-13 ENCOUNTER — NURSE TRIAGE (OUTPATIENT)
Dept: FAMILY MEDICINE | Facility: CLINIC | Age: 61
End: 2021-07-13

## 2021-07-13 NOTE — TELEPHONE ENCOUNTER
Return call to nurse - gave verbal orders as requested - she reported vitals WNL /71    Writer attempted triage called niece/sister with [place]  to clarify if patient is continuing to have dizziness - left message encouraging triage this evening - if he continues with dizziness please report to emergency

## 2021-07-13 NOTE — TELEPHONE ENCOUNTER
BP Readings from Last 6 Encounters:   04/02/21 128/72   03/12/21 126/81   03/03/21 134/74   02/22/21 114/71   12/15/20 135/79   12/02/20 125/80       Additional Information    Negative: Sounds like a life-threatening emergency to the triager    Negative: Fainted suddenly after medicine, allergic food or bee sting    Negative: Heart beating < 50 beats per minute OR > 140 beats per minute    Negative: Extra heart beats or heart is beating fast (i.e., palpitations)    Negative: Chest pain    Negative: Bleeding (e.g., vomiting blood, rectal bleeding or tarry stools, severe vaginal bleeding)    Negative: Has diabetes (diabetes mellitus) and fainting from low blood sugar (i.e., < 70 mg/dL or 3.9 mmol/L)    Negative: Seizure suspected (e.g., muscle jerking or shaking followed by confusion)    Negative: Heat exhaustion suspected (i.e., dehydration from heat exposure)    Negative: Shock suspected (e.g., cold/pale/clammy skin, too weak to stand, low BP, rapid pulse)    Negative: Bluish (or gray) lips or face    Negative: Difficulty breathing    Negative: Difficult to awaken or acting confused (e.g., disoriented, slurred speech)    Negative: Still unconscious    Negative: Fainted > 15 minutes ago and still looks pale (pale skin, pallor)    Negative: History of heart problems or congestive heart failure    Negative: Occurred during exercise    Negative: Any head or face injury    Protocols used: [id number]

## 2021-07-13 NOTE — TELEPHONE ENCOUNTER
Reason for Call: Request for an order or referral:    Order or referral being requested: Verbal Orders and an FYI    Date needed: as soon as possible    Has the patient been seen by the PCP for this problem? YES    Additional comments: Verbal Orders for skilled nursing 1 x a wk for 9 wks 1 PRN visit for Re-Certification.   FYI- collapsed while at PT patient stated he felt dizzy and started throwing up and has felt this way since he  Passed out.    Phone number Patient can be reached at:  [facility] number [phone]      Best Time:  ANY    Can we leave a detailed message on this number?  YES    Call taken on 7/13/2021 at 1:34 PM by Ileana Mario

## 2021-07-14 NOTE — TELEPHONE ENCOUNTER
Left vm on Sofi vm to call clinic, pt should have an in clinic appointment per the providers needs to be a longer appointment    June Cummins RN   [facility]

## 2021-07-14 NOTE — TELEPHONE ENCOUNTER
"  Sofi sister says he is much improved over the last two days - says he did not loose consciousness and this occurred on Monday    Rhinitis - \"a lot, all the time\" - since he had the stroke - sister doesn't feel this is related to environmental allergies -    Right shoulder pain - requesting X-ray - tends to lean to this side - no known injury - has occurred since stroke -  ice/heat, rest, tylenol      Low back pain - since the stroke - not something new - denies loss of bowel or bladder control, falls, severe weakness - ice/heat, rest, tylenol    Denies dizziness, episodes of LOC, vomiting, diarrhea, fever, sinus pain, ear pain, sinus congestion, swallowing concerns    Scheduled f/u appointment with provider - they verbalized understanding and agree with plan  "

## 2021-07-15 ENCOUNTER — VIRTUAL VISIT (OUTPATIENT)
Dept: FAMILY MEDICINE | Facility: CLINIC | Age: 61
End: 2021-07-15
Payer: COMMERCIAL

## 2021-07-15 ENCOUNTER — PATIENT OUTREACH (OUTPATIENT)
Dept: NURSING | Facility: CLINIC | Age: 61
End: 2021-07-15
Payer: COMMERCIAL

## 2021-07-15 DIAGNOSIS — I44.2 ATRIOVENTRICULAR BLOCK, COMPLETE (H): ICD-10-CM

## 2021-07-15 DIAGNOSIS — R47.01 APHASIA: ICD-10-CM

## 2021-07-15 DIAGNOSIS — I69.391 DYSPHAGIA FOLLOWING CEREBRAL INFARCTION: ICD-10-CM

## 2021-07-15 DIAGNOSIS — R76.12 POSITIVE QUANTIFERON-TB GOLD TEST: ICD-10-CM

## 2021-07-15 DIAGNOSIS — B18.1 CHRONIC VIRAL HEPATITIS B WITHOUT DELTA AGENT AND WITHOUT COMA (H): ICD-10-CM

## 2021-07-15 DIAGNOSIS — I69.351 HEMIPLEGIA AND HEMIPARESIS FOLLOWING CEREBRAL INFARCTION AFFECTING RIGHT DOMINANT SIDE (H): Primary | ICD-10-CM

## 2021-07-15 PROBLEM — I67.1 CEREBRAL ANEURYSM, NONRUPTURED: Status: ACTIVE | Noted: 2020-08-04

## 2021-07-15 PROBLEM — K74.60 UNSPECIFIED CIRRHOSIS OF LIVER (H): Status: ACTIVE | Noted: 2020-08-04

## 2021-07-15 PROBLEM — I63.9 CEREBRAL INFARCTION, UNSPECIFIED (H): Status: ACTIVE | Noted: 2020-08-04

## 2021-07-15 PROCEDURE — 99214 OFFICE O/P EST MOD 30 MIN: CPT | Mod: 95 | Performed by: FAMILY MEDICINE

## 2021-07-15 NOTE — PROGRESS NOTES
Clinic Care Coordination Contact    Follow Up Progress Note      Assessment: DEMARCO ARAGON spoke with pt's sister regarding his overall wellbeing. Pt is now engaging with PT, he is at an appointment now. This is going well.    Transportation is a struggle right now. Pt has [employer] insurance and uses transportation through this but 2-3 times the cabs has dropped her and pt off not at the appointment location. She would like to use a different ride company. DEMARCO ARAGON advised that she speak with are to discuss this as it is a serious issue and request a different cab company. DEMARCO ARAGON explained that they will want to know all the details, dates, and locations that this occurred and with what company. DEMARCO ARAGON explained [employer] is the only one that can make changes to transportation company.    DEMARCO ARAGON shared another transportation option in [employer] but this would cost money and not be as efficient as Medical transportation should be.    Sofi shared that pt's appetite is low but she was not able to expand on this as she was in appointment with pt. She requested this be sent to PCP and request medication to support this.    Goals addressed this encounter:   Goals Addressed                    This Visit's Progress       Patient Stated       1. Medical (pt-stated)   80%      Goal Statement: Over the next 6 months, Wilfredo's family would like to assist him to following medical recommendation to support his health and wellbeing.   Date Goal Set: 4/15/2021  Barriers: Complexity of medical needs  Strengths: Strong support system  Date to Achieve By: 10/15/2021  Patient expressed understanding of goal: yes    Action steps to achieve this goal:  1. Family will assist pt in completing metro mobility paperwork  2. Family will explore payment for family as PCA support  3. Family will outreach to Care Coordination  for further questions or concerns         Outreach Frequency: monthly    Plan: DEMARCO ARAGON will outreach to pt in 2-3 weeks  to discuss transportation and overall wellbeing.    Cheyanne Yi [facility]  Clinic Care Coordinator  [facility] [place]  [facility]  [phone]  [email or web address]

## 2021-07-15 NOTE — PROGRESS NOTES
Wilfredo is a 61 year old who is being evaluated via a billable telephone visit.      What phone number would you like to be contacted at? [phone]   How would you like to obtain your AVS? MyChart    Assessment & Plan     Hemiplegia and hemiparesis following cerebral infarction affecting right dominant side (H)  Sister reports that patient has significant physical disability related to a previous cerebral infarction.  He is totally dependent for what sounds like most cares and unable to ambulate independently.  She feels overwhelmed at this time.  She reports having an appointment with Social Security this afternoon, which if true, I suspect is probably related for disability determination.  I did give her the contact information for our office in anticipation that they may request medical records and have also suggested that she look into getting medical records from other care providers.  He has follow-up with Dr. Soto in August and would recommend that he keeps that at this time with sister voicing confirmation.  I did put in a referral to care coordination with the hope that our care coordinators could provide some education and guidance and navigating the possible application for Social Security disability as well as possible community resources to help the sister, Sofi, care for her brother.  - Care Coordination Referral; Future    Chronic viral hepatitis B without delta agent and without coma (H)  As above.  - Care Coordination Referral; Future    Aphasia  As above.  - Care Coordination Referral; Future    Dysphagia following cerebral infarction  He did have some issues related to malnutrition.  I am not able to assess his current status today.  - Care Coordination Referral; Future    Positive QuantiFERON-TB Gold test  Noted previously.  Currently appears to be on treatment.  - Care Coordination Referral; Future    Atrioventricular block, complete (H)  Did have a recent syncopal episode associated  with physical therapy earlier this week and the note from bhavesh goddard was reviewed.  Unclear if this is playing a role.      Review of prior external note(s) from - CareEverywhere information from [facility] reviewed             Return in about 1 month (around 8/18/2021) for Routine Visit.    Poncho Tillman MD  [facility] is a 61 year old who presents for the following health issues  accompanied by his sister and :    HPI     Chronic Pain Follow-Up    Where in your body do you have pain? Back-low and shoulder right  How has your pain affected your ability to work? Not applicable  Which of these pain treatments have you tried since your last clinic visit? Physical Therapy and Rest  How well are you sleeping? Good  How has your mood been since your last visit? Better  Have you had a significant life event? No  Other aggravating factors: none  Taking medication as directed? Yes    No flowsheet data found.  No flowsheet data found.  No flowsheet data found.  Encounter-Level CSA:    There are no encounter-level csa.     Patient-Level CSA:    There are no patient-level csa.         How many servings of fruits and vegetables do you eat daily?  2-3    On average, how many sweetened beverages do you drink each day (Examples: soda, juice, sweet tea, etc.  Do NOT count diet or artificially sweetened beverages)?   0    How many days per week do you exercise enough to make your heart beat faster? 3 or less    How many minutes a day do you exercise enough to make your heart beat faster? 9 or less    How many days per week do you miss taking your medication? 0    Acute Illness  Acute illness concerns: Runny nose  Onset/Duration: a few days  Symptoms:  Fever: no  Chills/Sweats: no  Headache (location?): no  Sinus Pressure: no  Conjunctivitis:  no  Ear Pain: no  Rhinorrhea: YES  Congestion: no  Sore Throat: no  Cough: YES-non-productive  Wheeze: no  Decreased  Appetite: YES  Nausea: no  Vomiting: no  Diarrhea: no  Dysuria/Freq.: no  Dysuria or Hematuria: no  Fatigue/Achiness: YES  Sick/Strep Exposure: no  Therapies tried and outcome: None    I spoke with his sister today as the patient is significant dysarthria and aphasia.  She is overwhelmed by the level of care that she is having to provide for him and is wondering what resources may be available to help her.  It also sounds like there may be an appointment later today with Social Security for possible disability.  She describes her brother as sick, bedridden, not able to walk, and completely dependent on her for all cares after he suffered what sounds like a rather devastating cerebral infarction.    Review of Systems   Constitutional, HEENT, cardiovascular, pulmonary, gi and gu systems are negative, except as otherwise noted.      Objective           Vitals:  No vitals were obtained today due to virtual visit.    Physical Exam   healthy, alert and no distress  PSYCH: Alert and oriented times 3; coherent speech, normal   rate and volume, able to articulate logical thoughts, able   to abstract reason, no tangential thoughts, no hallucinations   or delusions  His affect is normal  RESP: No cough, no audible wheezing, able to talk in full sentences  Remainder of exam unable to be completed due to telephone visits                Phone call duration: 35 minutes

## 2021-07-20 ENCOUNTER — MEDICAL CORRESPONDENCE (OUTPATIENT)
Dept: HEALTH INFORMATION MANAGEMENT | Facility: CLINIC | Age: 61
End: 2021-07-20

## 2021-07-27 ENCOUNTER — TELEPHONE (OUTPATIENT)
Dept: FAMILY MEDICINE | Facility: CLINIC | Age: 61
End: 2021-07-27

## 2021-07-27 NOTE — TELEPHONE ENCOUNTER
Reason for Call:  Form, our goal is to have forms completed with 72 hours, however, some forms may require a visit or additional information.    Type of letter, form or note:  Home Health Certification and plan of care (7/16/21-9/13/21)    Who is the form from?: Home care    Where did the form come from: form was faxed in    What clinic location was the form placed at?: [facility]    Where the form was placed: Dr. Soto's  Box/Folder    What number is listed as a contact on the form?:   Phone: [phone]   Fax: [phone]       Additional comments: Please complete, sign, date and fax back to the number listed above.     Call taken on 7/27/2021 at 4:34 PM by Gregoria Norman

## 2021-07-28 DIAGNOSIS — Z53.9 DIAGNOSIS NOT YET DEFINED: Primary | ICD-10-CM

## 2021-07-28 PROCEDURE — G0179 MD RECERTIFICATION HHA PT: HCPCS | Performed by: FAMILY MEDICINE

## 2021-07-30 NOTE — TELEPHONE ENCOUNTER
Plan of care forms faxed on 07/30/2021 to [phone]. Placed into scanning.     Kristin Patterson MA

## 2021-08-03 ENCOUNTER — TELEPHONE (OUTPATIENT)
Dept: FAMILY MEDICINE | Facility: CLINIC | Age: 61
End: 2021-08-03

## 2021-08-03 NOTE — TELEPHONE ENCOUNTER
FYI- 89/63 bp then unable to set up all medications pharmacy unable to fill all meds pharmacy reached out no reply.    [facility] [phone] [facility]

## 2021-08-04 ENCOUNTER — PATIENT OUTREACH (OUTPATIENT)
Dept: NURSING | Facility: CLINIC | Age: 61
End: 2021-08-04
Payer: COMMERCIAL

## 2021-08-04 NOTE — PROGRESS NOTES
Clinic Care Coordination Contact    Follow Up Progress Note      Assessment: DEMARCO ARAGON spoke with pt's sister with [place] . Sofi shared that pt is doing well and continues to work with PT. He has a follow up appointment with PCP in 2 weeks. Sofi reported that pt is taking his medications as prescribed. Pt continues to have a Home Care nurse visit each week.    Goals addressed this encounter:   Goals Addressed                    This Visit's Progress       Patient Stated       1. Medical (pt-stated)   90%      Goal Statement: Over the next 6 months, Nates family would like to assist him to following medical recommendation to support his health and wellbeing.   Date Goal Set: 4/15/2021  Barriers: Complexity of medical needs  Strengths: Strong support system  Date to Achieve By: 10/15/2021  Patient expressed understanding of goal: yes    Action steps to achieve this goal:  1. Family will assist pt in completing metro mobility paperwork  2. Family will explore payment for family as PCA support  3. Family will outreach to Care Coordination  for further questions or concerns         Outreach Frequency: monthly    Plan: DEMARCO ARAGON will outreach to pt's sister in 1 month to discuss overall wellbeing.    Cheyanne Yi [facility]  Clinic Care Coordinator  [facility] Women's [facility] [place]  [facility]  [phone]  [email or web address]

## 2021-08-10 ENCOUNTER — TELEPHONE (OUTPATIENT)
Dept: FAMILY MEDICINE | Facility: CLINIC | Age: 61
End: 2021-08-10

## 2021-08-10 DIAGNOSIS — I10 ESSENTIAL HYPERTENSION, MALIGNANT: ICD-10-CM

## 2021-08-10 DIAGNOSIS — I69.30 HISTORY OF CVA WITH RESIDUAL DEFICIT: ICD-10-CM

## 2021-08-10 DIAGNOSIS — Z22.7 LATENT TUBERCULOSIS: ICD-10-CM

## 2021-08-10 RX ORDER — ASPIRIN 81 MG/1
81 TABLET, CHEWABLE ORAL DAILY
Qty: 30 TABLET | Refills: 3 | Status: SHIPPED | OUTPATIENT
Start: 2021-08-10 | End: 2021-12-15

## 2021-08-10 RX ORDER — CARVEDILOL 12.5 MG/1
12.5 TABLET ORAL 2 TIMES DAILY WITH MEALS
Qty: 60 TABLET | Refills: 3 | Status: SHIPPED | OUTPATIENT
Start: 2021-08-10 | End: 2021-12-15

## 2021-08-10 NOTE — TELEPHONE ENCOUNTER
Reason for Call:  Medication or medication refill:    Do you use a [facility]?  Name of the pharmacy and phone number for the current request:       [facility] - [place], [place] - [address]      Name of the medication requested: asparin, amlodipine, atorvastatin, cabadillo, isoniazid    Other request:     Can we leave a detailed message on this number? YES    Phone number patient can be reached at: Other phone number:  [phone]*    Best Time: Any    Call taken on 8/10/2021 at 3:45 PM by Letty Butler

## 2021-08-10 NOTE — TELEPHONE ENCOUNTER
"Dr. Soto,    Requested Prescriptions   Pending Prescriptions Disp Refills     amLODIPine (NORVASC) 10 MG tablet 30 tablet 3     Sig: Take 1 tablet (10 mg) by mouth daily Indication: HTN       Calcium Channel Blockers Protocol  Failed - 8/10/2021  5:11 PM        Failed - Normal serum creatinine on file in past 12 months     Recent Labs   Lab Test 06/30/21  0804   CR 1.27*       Ok to refill medication if creatinine is low          Passed - Blood pressure under 140/90 in past 12 months     BP Readings from Last 3 Encounters:   04/02/21 128/72   03/12/21 126/81   03/03/21 134/74                 Passed - Recent (12 mo) or future (30 days) visit within the authorizing provider's specialty     Patient has had an office visit with the authorizing provider or a provider within the authorizing providers department within the previous 12 mos or has a future within next 30 days. See \"Patient Info\" tab in inbasket, or \"Choose Columns\" in Meds & Orders section of the refill encounter.              Passed - Medication is active on med list        Passed - Patient is age 18 or older           atorvastatin (LIPITOR) 40 MG tablet 30 tablet 3     Sig: Take 1 tablet (40 mg) by mouth every evening Indication: Stroke       Statins Protocol Failed - 8/10/2021  5:11 PM        Failed - LDL on file in past 12 months     Recent Labs   Lab Test 07/18/20  1028   *             Passed - No abnormal creatine kinase in past 12 months     Recent Labs   Lab Test 05/29/15  1302                   Passed - Recent (12 mo) or future (30 days) visit within the authorizing provider's specialty     Patient has had an office visit with the authorizing provider or a provider within the authorizing providers department within the previous 12 mos or has a future within next 30 days. See \"Patient Info\" tab in inbasket, or \"Choose Columns\" in Meds & Orders section of the refill encounter.              Passed - Medication is active on med list    " "    Passed - Patient is age 18 or older           isoniazid (NYDRAZID) 300 MG tablet 30 tablet 3     Sig: Take 1 tablet (300 mg) by mouth every morning       There is no refill protocol information for this order      Signed Prescriptions Disp Refills    aspirin (ASA) 81 MG chewable tablet 30 tablet 3     Sig: Take 1 tablet (81 mg) by mouth daily Indication: Stroke       Analgesics (Non-Narcotic Tylenol and ASA Only) Passed - 8/10/2021  5:11 PM        Passed - Recent (12 mo) or future (30 days) visit within the authorizing provider's specialty     Patient has had an office visit with the authorizing provider or a provider within the authorizing providers department within the previous 12 mos or has a future within next 30 days. See \"Patient Info\" tab in inbasket, or \"Choose Columns\" in Meds & Orders section of the refill encounter.              Passed - Patient is age 20 years or older     If ASA is flagged for ages under 20 years old. Forward to provider for confirmation Ryes Syndrome is not a concern.              Passed - Medication is active on med list          carvedilol (COREG) 12.5 MG tablet 60 tablet 3     Sig: Take 1 tablet (12.5 mg) by mouth 2 times daily (with meals)       Beta-Blockers Protocol Passed - 8/10/2021  5:11 PM        Passed - Blood pressure under 140/90 in past 12 months     BP Readings from Last 3 Encounters:   04/02/21 128/72   03/12/21 126/81   03/03/21 134/74                 Passed - Patient is age 6 or older        Passed - Recent (12 mo) or future (30 days) visit within the authorizing provider's specialty     Patient has had an office visit with the authorizing provider or a provider within the authorizing providers department within the previous 12 mos or has a future within next 30 days. See \"Patient Info\" tab in inbasket, or \"Choose Columns\" in Meds & Orders section of the refill encounter.              Passed - Medication is active on med list           Routing refill request to " provider for review/approval because:  Drug not on the [facility] refill protocol   Labs out of range:  Cr, LDL    Evelina Lewis RN  [place]

## 2021-08-11 RX ORDER — ISONIAZID 300 MG/1
300 TABLET ORAL EVERY MORNING
Qty: 30 TABLET | Refills: 3 | OUTPATIENT
Start: 2021-08-11

## 2021-08-11 RX ORDER — ATORVASTATIN CALCIUM 40 MG/1
40 TABLET, FILM COATED ORAL EVERY EVENING
Qty: 30 TABLET | Refills: 3 | Status: SHIPPED | OUTPATIENT
Start: 2021-08-11 | End: 2021-12-15

## 2021-08-11 RX ORDER — AMLODIPINE BESYLATE 10 MG/1
10 TABLET ORAL DAILY
Qty: 30 TABLET | Refills: 3 | Status: SHIPPED | OUTPATIENT
Start: 2021-08-11 | End: 2021-12-15

## 2021-08-11 NOTE — TELEPHONE ENCOUNTER
I have sent refills of amlodipine and atorvastatin.  He needs to follow-up with infectious disease (Dr Brandt) regarding tuberculosis treatment.  Per most recent note he should have completed treatment course in July.      Amber Soto, DO on 8/11/2021 at 12:53 PM

## 2021-08-13 NOTE — TELEPHONE ENCOUNTER
PAPERWORK WAS NOT RECEIVED AND ASKING TO RESEND THESE PAPERWORK. COULDN'T FIND PAPERWORK SCANNED IN CHART.     Reason for Call:  Form, our goal is to have forms completed with 72 hours, however, some forms may require a visit or additional information.    Type of letter, form or note:  Home Health Certification & PLAN OF CARE (07/16/2021 TO 09/13/2021)    Who is the form from?: Home care; [facility].     Where did the form come from: form was faxed in    What clinic location was the form placed at?: [facility]    Where the form was placed: [facility] Box/Folder    What number is listed as a contact on the form?: [phone] (P) [phone] (F)       Additional comments: PLEASE REVIEW, SIGN, AND FAX BACK TO NUMBER LISTED ABOVE.     Call taken on 8/13/2021 at 9:55 AM by Zabrina Doan

## 2021-08-16 NOTE — TELEPHONE ENCOUNTER
3RD REQUEST  Reason for Call:  Form, our goal is to have forms completed with 72 hours, however, some forms may require a visit or additional information.    Type of letter, form or note:  Home Health Certification Home Health Certification & PLAN OF CARE (07/16/2021 TO 09/13/2021)    Who is the form from?: Home care    Where did the form come from: form was faxed in    What clinic location was the form placed at?: [facility]    Where the form was placed: Dr. Soto's  Box/Folder    What number is listed as a contact on the form?:   PHONE: [phone]  FAX: [phone]       Additional comments:  3RD REQUEST: Please review, sign, date and fax back to the number listed above.    Call taken on 8/16/2021 at 2:40 PM by Gregoria Norman

## 2021-08-18 ENCOUNTER — TELEPHONE (OUTPATIENT)
Dept: FAMILY MEDICINE | Facility: CLINIC | Age: 61
End: 2021-08-18

## 2021-08-18 NOTE — TELEPHONE ENCOUNTER
Forms completed and faxed back to Home Health Certification and Plan of Care @ [phone]. Placed into [employer] to scan into patients chart.   Mike Anthony MA

## 2021-09-07 NOTE — PROGRESS NOTES
[facility] Return Patient Visit    Date of Visit: September 8, 2021    Reason for referral: Follow up of hepatitis B cirrhosis    Subjective: Mr. 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     Initial History:     He was admitted to the hospital 8/2020 with AMS, found to multifocal infarcts 2/2 HTN urgency and brainstem abnormalities thought to be related to infratentorial PRES syndrome.     He had a paracentesis 7/2020 - no studies sent.      Found to have latent Tb during this admission, took INH.     Was discharged to a TCU Fall 2020 - just discharged to home 2/2021. .     Cirrhosis diagnosed 2017 - had a history of ascites, no history of HE or variceal bleeding. EGD 10/2018 without varices. Previously follows with Dr. Adame - scheduled in my clinic in error.     Interval Events:  - Doing well, more time out of bed. Still does not have a great appetite. Weight is actually up a few pounds  - No abdominal distention, other s/s of liver decompensation  - Taking his entecavir, RN sets up his medications.     ROS: 14 point ROS negative except for positives noted in HPI.    PMHx:  Past Medical History:   Diagnosis Date     Cirrhosis (H)      Cryoglobulinemia (H)      CVA (cerebral vascular accident) (H) 07/16/2020    Supratentorial likely d/t hypertensive emergency leading to right hemiparesis, dysphagia and aphasia     Glomerulonephritis      Hepatitis B      Hypertension      Latent tuberculosis     Treatment 4661-0529     MPGN (membranoproliferative glomerulonephritides)      Positive QuantiFERON-TB Gold test      PRES (posterior reversible encephalopathy syndrome) 07/16/2020    In brainstem d/t hypertensive emergency; suffered supratentorial CVAs same date       PSHx:  Past Surgical History:   Procedure Laterality Date     ANESTHESIA OUT OF OR MRI  N/A 7/18/2020    Procedure: ANESTHESIA OUT OF OR MRI;  Surgeon: GENERIC ANESTHESIA PROVIDER;  Location: [facility] OR     C HAND/FINGER SURGERY UNLISTED       ESOPHAGOSCOPY, GASTROSCOPY, DUODENOSCOPY (EGD), COMBINED N/A 10/18/2018    Procedure: EGD;  Surgeon: Eber Ortez MD;  Location: [facility]     HAND SURGERY Right      IR PARACENTESIS  7/27/2020       FamHx:  Family History   Problem Relation Age of Onset     Liver Cancer No family hx of      Hepatitis No family hx of      No family history of liver disease, liver cancer    SocHx:  Social History     Socioeconomic History     Marital status: Single     Spouse name: Not on file     Number of children: Not on file     Years of education: Not on file     Highest education level: Not on file   Occupational History     Not on file   Tobacco Use     Smoking status: Former Smoker     Packs/day: 0.25     Years: 40.00     Pack years: 10.00     Types: Cigarettes     Smokeless tobacco: Never Used   Substance and Sexual Activity     Alcohol use: No     Alcohol/week: 0.0 standard drinks     Drug use: No     Sexual activity: Not on file   Other Topics Concern     Parent/sibling w/ CABG, MI or angioplasty before 65F 55M? Not Asked   Social History Narrative     Not on file     Social Determinants of Health     Financial Resource Strain:      Difficulty of Paying Living Expenses:    Food Insecurity: No Food Insecurity     Worried About Running Out of Food in the Last Year: Never true     Ran Out of Food in the Last Year: Never true   Transportation Needs: Unknown     Lack of Transportation (Medical): Not on file     Lack of Transportation (Non-Medical): No   Physical Activity:      Days of Exercise per Week:      Minutes of Exercise per Session:    Stress:      Feeling of Stress :    Social Connections: Unknown     Frequency of Communication with Friends and Family: Not on file     Frequency of Social Gatherings with Friends and Family: More than three times a week     Attends  Worship Services: Not on file     Active Member of Clubs or Organizations: Not on file     Attends Club or Organization Meetings: Not on file     Marital Status: Not on file   Intimate Partner Violence:      Fear of Current or Ex-Partner:      Emotionally Abused:      Physically Abused:      Sexually Abused:        Medications:  Current Outpatient Medications   Medication     acetaminophen (TYLENOL) 325 MG tablet     amLODIPine (NORVASC) 10 MG tablet     aspirin (ASA) 81 MG chewable tablet     atorvastatin (LIPITOR) 40 MG tablet     carvedilol (COREG) 12.5 MG tablet     docusate sodium (COLACE) 100 MG capsule     entecavir (BARACLUDE) 0.5 MG tablet     isoniazid (NYDRAZID) 300 MG tablet     multivitamin (ONE-DAILY) tablet     polyethylene glycol (MIRALAX) 17 GM/Dose powder     potassium chloride ER (MICRO-K) 10 MEQ CR capsule     No current facility-administered medications for this visit.   Patient and niece do not know what medications he takes    Allergies:  No Known Allergies    Objective:  /84   Pulse 63   Wt 49.9 kg (110 lb)   SpO2 97%   BMI 18.30 kg/m    General: pleasant man in NAD  HEENT: Non icteric  Abd: NT, No HSM, no ascites  Ext: no edema    Labs:  Last Comprehensive Metabolic Panel:  Sodium   Date Value Ref Range Status   06/30/2021 140 133 - 144 mmol/L Final     Potassium   Date Value Ref Range Status   06/30/2021 4.2 3.4 - 5.3 mmol/L Final     Chloride   Date Value Ref Range Status   06/30/2021 106 94 - 109 mmol/L Final     Carbon Dioxide   Date Value Ref Range Status   06/30/2021 25 20 - 32 mmol/L Final     Anion Gap   Date Value Ref Range Status   06/30/2021 9 3 - 14 mmol/L Final     Glucose   Date Value Ref Range Status   06/30/2021 96 70 - 99 mg/dL Final     Urea Nitrogen   Date Value Ref Range Status   06/30/2021 24 7 - 30 mg/dL Final     Creatinine   Date Value Ref Range Status   06/30/2021 1.27 (H) 0.66 - 1.25 mg/dL Final     GFR Estimate   Date Value Ref Range Status   06/30/2021  61 >60 mL/min/[1.73_m2] Final     Comment:     Non  GFR Calc  Starting 12/18/2018, serum creatinine based estimated GFR (eGFR) will be   calculated using the Chronic Kidney Disease Epidemiology Collaboration   (CKD-EPI) equation.       Calcium   Date Value Ref Range Status   06/30/2021 9.0 8.5 - 10.1 mg/dL Final     Bilirubin Total   Date Value Ref Range Status   06/30/2021 0.7 0.2 - 1.3 mg/dL Final     Alkaline Phosphatase   Date Value Ref Range Status   06/30/2021 106 40 - 150 U/L Final     ALT   Date Value Ref Range Status   06/30/2021 16 0 - 70 U/L Final     AST   Date Value Ref Range Status   06/30/2021 32 0 - 45 U/L Final       Lab Results   Component Value Date    WBC 6.2 04/02/2021     Lab Results   Component Value Date    RBC 4.44 04/02/2021     Lab Results   Component Value Date    HGB 13.5 04/02/2021     Lab Results   Component Value Date    HCT 41.3 04/02/2021     Lab Results   Component Value Date    MCV 93 04/02/2021     Lab Results   Component Value Date    MCH 30.4 04/02/2021     Lab Results   Component Value Date    MCHC 32.7 04/02/2021     Lab Results   Component Value Date    RDW 12.5 04/02/2021     Lab Results   Component Value Date     04/02/2021       INR   Date Value Ref Range Status   06/30/2021 1.16 (H) 0.86 - 1.14 Final        MELD-Na score: 11 at 6/30/2021  8:04 AM  MELD score: 11 at 6/30/2021  8:04 AM  Calculated from:  Serum Creatinine: 1.27 mg/dL at 6/30/2021  8:04 AM  Serum Sodium: 140 mmol/L (Using max of 137 mmol/L) at 6/30/2021  8:04 AM  Total Bilirubin: 0.7 mg/dL (Using min of 1 mg/dL) at 6/30/2021  8:04 AM  INR(ratio): 1.16 at 6/30/2021  8:04 AM  Age: 61 years    Imaging:     RUQ US 6/2021    Fluid: No evidence of ascites or pleural effusions.     Liver: Cirrhotic morphology of the liver, measuring 12 cm in  craniocaudal dimension. No focal hepatic mass. No intrahepatic biliary  dilatation. The main portal vein is patent with antegrade flow.     US  visualization score: A - No or minimal limitations     Gallbladder: The gallbladder is well distended and of normal  morphology. No wall thickening, pericholecystic fluid, sonographic  Keen's sign, or evidence of cholelithiasis.     Bile Ducts: Normal caliber intra and extrahepatic biliary tree. The  common bile duct measures 4.6 mm in diameter.     Pancreas: Smaller in size and prominence of the hypoechoic rounded  structures about the visualized pancreas, for example now measuring  0.8 x 0.8 x 1.1 cm, previously measured 1.3 x 1.1 x 0.9 cm. Otherwise,  visualized portions of the pancreas are unremarkable. S     Kidney: The right kidney measures 10.3 cm in long dimension. No  hydronephrosis, hydroureter, shadowing renal calculi, or solid mass.  The capsule and parenchyma demonstrate normal echogenicity.  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     Aorta and IVC: The visualized portions of the aorta and IVC are  unremarkable.                                                                       IMPRESSION:   1. Cirrhosis without focal liver lesion.  a. LI-RADS US Category: US-1 Negative: No US evidence of HCC  b. Recommend continued surveillance US.  2. Smaller in size and prominence of the hypoechoic rounded structures  about the visualized pancreas since prior US 6/11/2020. These are  likely representing peripancreatic/periportal lymph nodes also when  correlated with prior CT and MRI. Attention on follow-up.    Endoscopy:    EGD 10/2018    Findings:        The examined esophagus was normal.        There is no endoscopic evidence of varices in the entire esophagus.        The entire examined stomach was normal.        There is no endoscopic evidence of portal hypertension gastropathy in        the entire examined stomach.        The examined duodenum was normal.                                                                                     Moderate Sedation:         Moderate (conscious) sedation was administered by the endoscopy nurse        and supervised by the endoscopist. The following parameters were        monitored: oxygen saturation, heart rate, blood pressure, and response        to care. Total physician intraservice time was 8 minutes.        Moderate (conscious) sedation was administered by the endoscopy nurse        and supervised by the endoscopist. The following parameters were        monitored: oxygen saturation, heart rate, blood pressure, and response        to care. Total physician intraservice time was 8 minutes.   Impression:          - Normal esophagus.                        - Normal stomach.                        - Normal examined duodenum.                        - No specimens collected.     Independently reviewed labs and imaging.     Assessment/Plan: Mr. 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     He currently has compensated cirrhosis - previously decompensations with ascites. He has made good functional improvements after his CVA. Biggest issue is decreased appetite - has been slowly gaining weight, discussed trying protein supplements prior to bedtime to help his intake.    - Recommend CBC, CMP, INR, AFP, Hepatitis B dna, Sag ~ 12/2021  - Will recommend MRI liver given history of LR 3 lesions, no concerning features on more recent abdominal imaging, after this can resume US for surveillance  - No varices on EGD 2018, since has been put on NSBB, can hold on repeat EGD for surveillance while on NSBB  - Continue entecavir 1 mg daily for chronic hepatitis B in a patient with decompensated cirrhosis. Discussed this is a lifelong medication    RTC 9 months with Abd US and labs    Marlyn Hurley MD MS  Hepatology/Liver Transplant  [facility]

## 2021-09-08 ENCOUNTER — OFFICE VISIT (OUTPATIENT)
Dept: GASTROENTEROLOGY | Facility: CLINIC | Age: 61
End: 2021-09-08
Attending: STUDENT IN AN ORGANIZED HEALTH CARE EDUCATION/TRAINING PROGRAM
Payer: COMMERCIAL

## 2021-09-08 VITALS
BODY MASS INDEX: 18.3 KG/M2 | HEART RATE: 63 BPM | OXYGEN SATURATION: 97 % | DIASTOLIC BLOOD PRESSURE: 84 MMHG | WEIGHT: 110 LBS | SYSTOLIC BLOOD PRESSURE: 128 MMHG

## 2021-09-08 DIAGNOSIS — B18.1 CHRONIC VIRAL HEPATITIS B WITHOUT DELTA AGENT AND WITHOUT COMA (H): Primary | ICD-10-CM

## 2021-09-08 DIAGNOSIS — E43 SEVERE PROTEIN-CALORIE MALNUTRITION (H): ICD-10-CM

## 2021-09-08 PROCEDURE — 99215 OFFICE O/P EST HI 40 MIN: CPT | Performed by: STUDENT IN AN ORGANIZED HEALTH CARE EDUCATION/TRAINING PROGRAM

## 2021-09-08 NOTE — LETTER
9/8/2021         RE: Wilfredo Yoon  [address]  [facility] [address]        Dear Colleague,    Thank you for referring your patient, Wilfredo Yoon, to the [facility]PATOLOG[facility] [place]. Please see a copy of my visit note below.    [facility] Return Patient Visit    Date of Visit: September 8, 2021    Reason for referral: Follow up of hepatitis B cirrhosis    Subjective: Mr. 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     Initial History:     He was admitted to the hospital 8/2020 with AMS, found to multifocal infarcts 2/2 HTN urgency and brainstem abnormalities thought to be related to infratentorial PRES syndrome.     He had a paracentesis 7/2020 - no studies sent.      Found to have latent Tb during this admission, took INH.     Was discharged to a TCU Fall 2020 - just discharged to home 2/2021. .     Cirrhosis diagnosed 2017 - had a history of ascites, no history of HE or variceal bleeding. EGD 10/2018 without varices. Previously follows with Dr. Adame - scheduled in my clinic in error.     Interval Events:  - Doing well, more time out of bed. Still does not have a great appetite. Weight is actually up a few pounds  - No abdominal distention, other s/s of liver decompensation  - Taking his entecavir, RN sets up his medications.     ROS: 14 point ROS negative except for positives noted in HPI.    PMHx:  Past Medical History:   Diagnosis Date     Cirrhosis (H)      Cryoglobulinemia (H)      CVA (cerebral vascular accident) (H) 07/16/2020    Supratentorial likely d/t hypertensive emergency leading to right hemiparesis, dysphagia and aphasia     Glomerulonephritis      Hepatitis B      Hypertension      Latent tuberculosis     Treatment [id number]     MPGN (membranoproliferative  glomerulonephritides)      Positive QuantiFERON-TB Gold test      PRES (posterior reversible encephalopathy syndrome) 07/16/2020    In brainstem d/t hypertensive emergency; suffered supratentorial CVAs same date       PSHx:  Past Surgical History:   Procedure Laterality Date     ANESTHESIA OUT OF OR MRI N/A 7/18/2020    Procedure: ANESTHESIA OUT OF OR MRI;  Surgeon: GENERIC ANESTHESIA PROVIDER;  Location: [facility] OR     C HAND/FINGER SURGERY UNLISTED       ESOPHAGOSCOPY, GASTROSCOPY, DUODENOSCOPY (EGD), COMBINED N/A 10/18/2018    Procedure: EGD;  Surgeon: Eber Ortez MD;  Location:  GI     HAND SURGERY Right      IR PARACENTESIS  7/27/2020       FamHx:  Family History   Problem Relation Age of Onset     Liver Cancer No family hx of      Hepatitis No family hx of      No family history of liver disease, liver cancer    SocHx:  Social History     Socioeconomic History     Marital status: Single     Spouse name: Not on file     Number of children: Not on file     Years of education: Not on file     Highest education level: Not on file   Occupational History     Not on file   Tobacco Use     Smoking status: Former Smoker     Packs/day: 0.25     Years: 40.00     Pack years: 10.00     Types: Cigarettes     Smokeless tobacco: Never Used   Substance and Sexual Activity     Alcohol use: No     Alcohol/week: 0.0 standard drinks     Drug use: No     Sexual activity: Not on file   Other Topics Concern     Parent/sibling w/ CABG, MI or angioplasty before 65F 55M? Not Asked   Social History Narrative     Not on file     Social Determinants of Health     Financial Resource Strain:      Difficulty of Paying Living Expenses:    Food Insecurity: No Food Insecurity     Worried About Running Out of Food in the Last Year: Never true     Ran Out of Food in the Last Year: Never true   Transportation Needs: Unknown     Lack of Transportation (Medical): Not on file     Lack of Transportation (Non-Medical): No   Physical Activity:       Days of Exercise per Week:      Minutes of Exercise per Session:    Stress:      Feeling of Stress :    Social Connections: Unknown     Frequency of Communication with Friends and Family: Not on file     Frequency of Social Gatherings with Friends and Family: More than three times a week     Attends Orthodox Services: Not on file     Active Member of Clubs or Organizations: Not on file     Attends Club or Organization Meetings: Not on file     Marital Status: Not on file   Intimate Partner Violence:      Fear of Current or Ex-Partner:      Emotionally Abused:      Physically Abused:      Sexually Abused:        Medications:  Current Outpatient Medications   Medication     acetaminophen (TYLENOL) 325 MG tablet     amLODIPine (NORVASC) 10 MG tablet     aspirin (ASA) 81 MG chewable tablet     atorvastatin (LIPITOR) 40 MG tablet     carvedilol (COREG) 12.5 MG tablet     docusate sodium (COLACE) 100 MG capsule     entecavir (BARACLUDE) 0.5 MG tablet     isoniazid (NYDRAZID) 300 MG tablet     multivitamin (ONE-DAILY) tablet     polyethylene glycol (MIRALAX) 17 GM/Dose powder     potassium chloride ER (MICRO-K) 10 MEQ CR capsule     No current facility-administered medications for this visit.   Patient and niece do not know what medications he takes    Allergies:  No Known Allergies    Objective:  /84   Pulse 63   Wt 49.9 kg (110 lb)   SpO2 97%   BMI 18.30 kg/m    General: pleasant man in NAD  HEENT: Non icteric  Abd: NT, No HSM, no ascites  Ext: no edema    Labs:  Last Comprehensive Metabolic Panel:  Sodium   Date Value Ref Range Status   06/30/2021 140 133 - 144 mmol/L Final     Potassium   Date Value Ref Range Status   06/30/2021 4.2 3.4 - 5.3 mmol/L Final     Chloride   Date Value Ref Range Status   06/30/2021 106 94 - 109 mmol/L Final     Carbon Dioxide   Date Value Ref Range Status   06/30/2021 25 20 - 32 mmol/L Final     Anion Gap   Date Value Ref Range Status   06/30/2021 9 3 - 14 mmol/L Final      Glucose   Date Value Ref Range Status   06/30/2021 96 70 - 99 mg/dL Final     Urea Nitrogen   Date Value Ref Range Status   06/30/2021 24 7 - 30 mg/dL Final     Creatinine   Date Value Ref Range Status   06/30/2021 1.27 (H) 0.66 - 1.25 mg/dL Final     GFR Estimate   Date Value Ref Range Status   06/30/2021 61 >60 mL/min/[1.73_m2] Final     Comment:     Non  GFR Calc  Starting 12/18/2018, serum creatinine based estimated GFR (eGFR) will be   calculated using the Chronic Kidney Disease Epidemiology Collaboration   (CKD-EPI) equation.       Calcium   Date Value Ref Range Status   06/30/2021 9.0 8.5 - 10.1 mg/dL Final     Bilirubin Total   Date Value Ref Range Status   06/30/2021 0.7 0.2 - 1.3 mg/dL Final     Alkaline Phosphatase   Date Value Ref Range Status   06/30/2021 106 40 - 150 U/L Final     ALT   Date Value Ref Range Status   06/30/2021 16 0 - 70 U/L Final     AST   Date Value Ref Range Status   06/30/2021 32 0 - 45 U/L Final       Lab Results   Component Value Date    WBC 6.2 04/02/2021     Lab Results   Component Value Date    RBC 4.44 04/02/2021     Lab Results   Component Value Date    HGB 13.5 04/02/2021     Lab Results   Component Value Date    HCT 41.3 04/02/2021     Lab Results   Component Value Date    MCV 93 04/02/2021     Lab Results   Component Value Date    MCH 30.4 04/02/2021     Lab Results   Component Value Date    MCHC 32.7 04/02/2021     Lab Results   Component Value Date    RDW 12.5 04/02/2021     Lab Results   Component Value Date     04/02/2021       INR   Date Value Ref Range Status   06/30/2021 1.16 (H) 0.86 - 1.14 Final        MELD-Na score: 11 at 6/30/2021  8:04 AM  MELD score: 11 at 6/30/2021  8:04 AM  Calculated from:  Serum Creatinine: 1.27 mg/dL at 6/30/2021  8:04 AM  Serum Sodium: 140 mmol/L (Using max of 137 mmol/L) at 6/30/2021  8:04 AM  Total Bilirubin: 0.7 mg/dL (Using min of 1 mg/dL) at 6/30/2021  8:04 AM  INR(ratio): 1.16 at 6/30/2021  8:04 AM  Age:  61 years    Imaging:     RUQ US 6/2021    Fluid: No evidence of ascites or pleural effusions.     Liver: Cirrhotic morphology of the liver, measuring 12 cm in  craniocaudal dimension. No focal hepatic mass. No intrahepatic biliary  dilatation. The main portal vein is patent with antegrade flow.     US visualization score: A - No or minimal limitations     Gallbladder: The gallbladder is well distended and of normal  morphology. No wall thickening, pericholecystic fluid, sonographic  Keen's sign, or evidence of cholelithiasis.     Bile Ducts: Normal caliber intra and extrahepatic biliary tree. The  common bile duct measures 4.6 mm in diameter.     Pancreas: Smaller in size and prominence of the hypoechoic rounded  structures about the visualized pancreas, for example now measuring  0.8 x 0.8 x 1.1 cm, previously measured 1.3 x 1.1 x 0.9 cm. Otherwise,  visualized portions of the pancreas are unremarkable. S     Kidney: The right kidney measures 10.3 cm in long dimension. No  hydronephrosis, hydroureter, shadowing renal calculi, or solid mass.  The capsule and parenchyma demonstrate normal echogenicity.  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     Aorta and IVC: The visualized portions of the aorta and IVC are  unremarkable.                                                                       IMPRESSION:   1. Cirrhosis without focal liver lesion.  a. LI-RADS US Category: US-1 Negative: No US evidence of HCC  b. Recommend continued surveillance US.  2. Smaller in size and prominence of the hypoechoic rounded structures  about the visualized pancreas since prior US 6/11/2020. These are  likely representing peripancreatic/periportal lymph nodes also when  correlated with prior CT and MRI. Attention on follow-up.    Endoscopy:    EGD 10/2018    Findings:        The examined esophagus was normal.        There is no endoscopic evidence of varices in the entire esophagus.         The entire examined stomach was normal.        There is no endoscopic evidence of portal hypertension gastropathy in        the entire examined stomach.        The examined duodenum was normal.                                                                                     Moderate Sedation:        Moderate (conscious) sedation was administered by the endoscopy nurse        and supervised by the endoscopist. The following parameters were        monitored: oxygen saturation, heart rate, blood pressure, and response        to care. Total physician intraservice time was 8 minutes.        Moderate (conscious) sedation was administered by the endoscopy nurse        and supervised by the endoscopist. The following parameters were        monitored: oxygen saturation, heart rate, blood pressure, and response        to care. Total physician intraservice time was 8 minutes.   Impression:          - Normal esophagus.                        - Normal stomach.                        - Normal examined duodenum.                        - No specimens collected.     Independently reviewed labs and imaging.     Assessment/Plan: Mr. 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     He currently has compensated cirrhosis - previously decompensations with ascites. He has made good functional improvements after his CVA. Biggest issue is decreased appetite - has been slowly gaining weight, discussed trying protein supplements prior to bedtime to help his intake.    - Recommend CBC, CMP, INR, AFP, Hepatitis B dna, Sag ~ 12/2021  - Will recommend MRI liver given history of LR 3 lesions, no concerning features on more recent abdominal imaging, after this can resume US for surveillance  - No varices on EGD 2018, since has been put on NSBB, can hold on repeat EGD for surveillance while on NSBB  -  Continue entecavir 1 mg daily for chronic hepatitis B in a patient with decompensated cirrhosis. Discussed this is a lifelong medication    RTC 9 months with Abd US and labs    Marlyn Hurley MD MS  Hepatology/Liver Transplant  [facility]          Again, thank you for allowing me to participate in the care of your patient.        Sincerely,        Marlyn Hurley MD

## 2021-09-09 DIAGNOSIS — K59.00 CONSTIPATION, UNSPECIFIED CONSTIPATION TYPE: ICD-10-CM

## 2021-09-09 DIAGNOSIS — E87.6 HYPOPOTASSEMIA: ICD-10-CM

## 2021-09-09 NOTE — TELEPHONE ENCOUNTER
1.[facility] services, phone [phone]    Orders needed for skilled nursing once a week for 9 weeks, one prn visit    OK given for these orders under PCP Charles    2. Also he has been out of potassium and needs refills     will route to primary clinic for potassium refill    Rosibel Melton RN, BSN  [facility]

## 2021-09-11 PROBLEM — R16.0 LIVER MASS: Status: RESOLVED | Noted: 2019-05-07 | Resolved: 2021-09-11

## 2021-09-13 ENCOUNTER — TELEPHONE (OUTPATIENT)
Dept: GASTROENTEROLOGY | Facility: CLINIC | Age: 61
End: 2021-09-13

## 2021-09-13 RX ORDER — POTASSIUM CHLORIDE 750 MG/1
20 CAPSULE, EXTENDED RELEASE ORAL DAILY
Qty: 60 CAPSULE | Refills: 2 | Status: SHIPPED | OUTPATIENT
Start: 2021-09-13 | End: 2022-02-01

## 2021-09-13 NOTE — TELEPHONE ENCOUNTER
"Requested Prescriptions   Signed Prescriptions Disp Refills    potassium chloride ER (MICRO-K) 10 MEQ CR capsule 60 capsule 2     Sig: Take 2 capsules (20 mEq) by mouth daily       Potassium Supplements Protocol Passed - 9/13/2021  9:42 AM        Passed - Recent (12 mo) or future (30 days) visit within the authorizing provider's department     Patient has had an office visit with the authorizing provider or a provider within the authorizing providers department within the previous 12 mos or has a future within next 30 days. See \"Patient Info\" tab in inbasket, or \"Choose Columns\" in Meds & Orders section of the refill encounter.              Passed - Medication is active on med list        Passed - Patient is age 18 or older        Passed - Normal serum potassium in past 12 months     Recent Labs   Lab Test 06/30/21  0804   POTASSIUM 4.2                       ThanksAkosua RN  [facility]     "

## 2021-09-14 NOTE — TELEPHONE ENCOUNTER
Dr Soto    Per the home care nurse, pt is not able to swallow the colace capsules and is asking for an alternative    Pharm cued    June Cummins RN   [facility]

## 2021-09-15 RX ORDER — DOCUSATE SODIUM 100 MG/1
100 CAPSULE, LIQUID FILLED ORAL 2 TIMES DAILY PRN
Qty: 180 CAPSULE | Refills: 1 | Status: CANCELLED | OUTPATIENT
Start: 2021-09-15

## 2021-09-16 ENCOUNTER — PATIENT OUTREACH (OUTPATIENT)
Dept: NURSING | Facility: CLINIC | Age: 61
End: 2021-09-16
Payer: COMMERCIAL

## 2021-09-16 NOTE — PROGRESS NOTES
Clinic Care Coordination Contact    Follow Up Progress Note      Assessment: DEMARCO ARAGON spoke with pt's sister with [place]  regarding pt's overall wellbeing. Sofi shared that pt is doing well except he is struggling with transportation to his appointments and therefore he is unable to go to PT at [facility].     They have tried to use the medical insurance for transportation but it is very unreliable. They are trying to get a card through his medical insurance that will assist further with transportation.     Discuss [employer]. DEMARCO ARAGON assisted to complete application. DEMARCO ARAGON explained that PCP now needs to complete a portion and the entire application will then be sent to her to sign and mail in Sofi expressed understanding.    Sofi requested to work with someone that speaks Nepalese to simplify communication. DEMARCO ARAGON will look into this request to determine if there is someone appropriate from the Care Coordination team to assist.    Care Gaps:    Health Maintenance Due   Topic Date Due     PREVENTIVE CARE VISIT  Never done     Pneumococcal Vaccine: Pediatrics (0 to 5 Years) and At-Risk Patients (6 to 64 Years) (1 of 2 - PPSV23) Never done     COLORECTAL CANCER SCREENING  Never done     ZOSTER IMMUNIZATION (1 of 2) Never done     INFLUENZA VACCINE (1) 09/01/2021       Postponed to focus on other medical needs     Goals addressed this encounter:   Goals Addressed                    This Visit's Progress       Patient Stated       1. Medical (pt-stated)   90%      Goal Statement: Over the next 6 months, Wilfredo's family would like to assist him to following medical recommendation to support his health and wellbeing.   Date Goal Set: 4/15/2021  Barriers: Complexity of medical needs  Strengths: Strong support system  Date to Achieve By: 10/15/2021  Patient expressed understanding of goal: yes    Action steps to achieve this goal:  1. Family will assist pt in completing metro mobility paperwork  2. Family  will explore payment for family as PCA support  3. Family will outreach to Care Coordination  for further questions or concerns         Outreach Frequency: monthly    Plan: CC SW will outreach next week to discuss completion of application.    Cheyanne Yi, [facility]  Clinic Care Coordinator  [facility] [place]  [facility]  [phone]  [email or web address]

## 2021-09-16 NOTE — TELEPHONE ENCOUNTER
Called home care RN and left detailed message - requested she call back if pt is using Miralax so we can order liquid colace as well.    Evelina Lewis RN  [facility]

## 2021-09-17 ENCOUNTER — TELEPHONE (OUTPATIENT)
Dept: FAMILY MEDICINE | Facility: CLINIC | Age: 61
End: 2021-09-17
Payer: COMMERCIAL

## 2021-09-17 NOTE — TELEPHONE ENCOUNTER
Reason for Call:  Form, our goal is to have forms completed with 72 hours, however, some forms may require a visit or additional information.    Type of letter, form or note:  metro mobility    Who is the form from?: [place]    Where did the form come from: emailed from care coordinator    What clinic location was the form placed at?: [facility]    Where the form was placed: Given to physician    What number is listed as a contact on the form?:        Additional comments: After Dr. Soto completes her portion, the entire packet needs to be mailed to patient at:  [facility] [address] [address]      Call taken on 9/17/2021 at 10:46 AM by Terri Mcdonough

## 2021-09-22 ENCOUNTER — ANCILLARY PROCEDURE (OUTPATIENT)
Dept: MRI IMAGING | Facility: CLINIC | Age: 61
End: 2021-09-22
Attending: STUDENT IN AN ORGANIZED HEALTH CARE EDUCATION/TRAINING PROGRAM
Payer: COMMERCIAL

## 2021-09-22 DIAGNOSIS — B18.1 CHRONIC VIRAL HEPATITIS B WITHOUT DELTA AGENT AND WITHOUT COMA (H): ICD-10-CM

## 2021-09-22 PROCEDURE — A9585 GADOBUTROL INJECTION: HCPCS | Performed by: RADIOLOGY

## 2021-09-22 PROCEDURE — 74183 MRI ABD W/O CNTR FLWD CNTR: CPT | Mod: GC | Performed by: RADIOLOGY

## 2021-09-22 RX ORDER — GADOBUTROL 604.72 MG/ML
7.5 INJECTION INTRAVENOUS ONCE
Status: COMPLETED | OUTPATIENT
Start: 2021-09-22 | End: 2021-09-22

## 2021-09-22 RX ADMIN — GADOBUTROL 6 ML: 604.72 INJECTION INTRAVENOUS at 15:58

## 2021-09-22 NOTE — LETTER
September 27, 2021      Wilfredo Yoon  [address]   [facility] [address]        Dear ,    We are writing to inform you of your MRI results. The MRI is stable - showed cirrhosis without findings concerning for liver cancer. It showed you have a very small amount of fluid in your abdomen (ascites). Continue taking your hepatitis B medication as planned. We will monitor your liver with labs and an ultrasound every 6 months.     Resulted Orders   MR Liver wo & w Contrast    Narrative    MRI ABDOMEN    CLINICAL HISTORY:  cirrhosis, history of LR3 lesions, baseline mildly  elevated AFP    TECHNIQUE:  Images were acquired with and without intravenous contrast  through the liver. The following MR images were acquired: TrueFISP,  multiplanar T2 weighted, axial T1 in/out of phase, axial fat-saturated  T1, diffusion-weighted. Multiplanar T1-weighted images with fat  saturation were before contrast administration and at multiple time  points following the administration of intravenous contrast. Contrast  dose: 6mL Gadavist    FINDINGS:    Comparison study: 5/17/2019 CT abdomen/pelvis, 5/17/2019 MRI abdomen    Liver: Hepatic parenchyma is slightly atrophic with nodular hepatic  capsule and relative hypertrophy of the left lobe. Small volume  perihepatic ascites. No biliary dilatation. Multiple,  well-circumscribed, irregular, arterially enhancing lesions scattered  throughout the hepatic parenchyma without washout, T2 hyperintensity  or restricted diffusion, the largest of which measures 1.1 cm located  in the medial aspect of the right lobe (series 19, image 7).    Gallbladder: No focal abnormalities.     Spleen: No focal abnormalities. Small volume perisplenic ascites.     Kidneys: Unremarkable. No urinary obstruction.    Adrenal glands: Unremarkable    Pancreas: Normal homogenous T1 signal. No focal abnormalities.    Bowel: Visualized large and small bowel are normal diameter.    Lymph nodes:  Subcentimeter peripancreatic nodes are stable and likely  reactive. No pathologically enlarged lymph nodes.    Blood vessels: Abdominal aorta and its proximal bifurcations are  widely patent.    Lung bases: Clear    Bones and soft tissues: No suspicious osseous or soft tissue lesions.    Mesentery and abdominal wall: Unremarkable    Ascites: Small volume perihepatic and perisplenic ascites, markedly  improved when compared to 5/17/2019 abdominal MR.      Impression    IMPRESSION:  1.  Limited study due to motion artifact.  2.  Cirrhotic liver morphology with multiple small arterially  enhancing lesions that are not evident on delayed or  diffusion-weighted sequences, likely representing benign vascular  shunts(LI-RADS 2).   3.  Markedly decreased perihepatic and perisplenic ascites.     I have personally reviewed the examination and initial interpretation  and I agree with the findings.    FRANNY SWAIN MD         SYSTEM ID:  [id number]       If you have any questions or concerns, please call the clinic at the number listed above.       Sincerely,      Marlyn Hurley MD

## 2021-09-22 NOTE — DISCHARGE INSTRUCTIONS
MRI Contrast Discharge Instructions    The IV contrast you received today will pass out of your body in your  urine. This will happen in the next 24 hours. You will not feel this process.  Your urine will not change color.    Drink at least 4 extra glasses of water or juice today (unless your doctor  has restricted your fluids). This reduces the stress on your kidneys.  You may take your regular medicines.    If you are on dialysis: It is best to have dialysis today.    If you have a reaction: Most reactions happen right away. If you have  any new symptoms after leaving the hospital (such as hives or swelling),  call your hospital at the correct number below. Or call your family doctor.  If you have breathing distress or wheezing, call 911.    Special instructions: ***    I have read and understand the above information.    Signature:______________________________________ Date:___________    Staff:__________________________________________ Date:___________     Time:__________    [place] [facility] Departments:    ___[place]: [phone]  ___[facility]: [phone]  ___[place]: [phone] ___[facility]: [phone]  ___[facility]: [phone]  ___[facility]: [phone]  ___[phone]  ___[facility]: [phone]  ___[phone]

## 2021-09-22 NOTE — TELEPHONE ENCOUNTER
Form needs patient signature before it can be completed.  Can they bring in copy of signed form?    Amber Soto, DO on 9/22/2021 at 4:39 PM

## 2021-09-23 ENCOUNTER — PATIENT OUTREACH (OUTPATIENT)
Dept: CARE COORDINATION | Facility: CLINIC | Age: 61
End: 2021-09-23

## 2021-09-23 NOTE — PROGRESS NOTES
Clinic Care Coordination Contact  [facility]/Voicemail       Clinical Data: Care Coordinator Outreach    CC SW received message from Dr. Soto explaining importance of pt signing form prior to her completion.     Outreach attempted x 1.  Left message on pt's sister's voicemail with call back information and requested return call.     CC SW left information that form will be mailed to her for signature and requested call back to discuss having form completed by PCP.    Plan: Care Coordinator will try to reach patient again in 3-5 business days.    Cheyanne Yi [facility]  Clinic Care Coordinator  [facility] [place]  [facility]  [phone]  [email or web address]

## 2021-09-24 NOTE — TELEPHONE ENCOUNTER
Form mailed to patient with signature spots highlighted and stickered and requested it be mailed or dropped off when complete.    Amber Soto, DO on 9/24/2021 at 11:28 AM

## 2021-09-29 ENCOUNTER — TELEPHONE (OUTPATIENT)
Dept: FAMILY MEDICINE | Facility: CLINIC | Age: 61
End: 2021-09-29

## 2021-09-29 DIAGNOSIS — Z53.9 DIAGNOSIS NOT YET DEFINED: Primary | ICD-10-CM

## 2021-09-29 PROCEDURE — G0179 MD RECERTIFICATION HHA PT: HCPCS | Performed by: FAMILY MEDICINE

## 2021-09-29 NOTE — TELEPHONE ENCOUNTER
Reason for Call:  Form, our goal is to have forms completed with 72 hours, however, some forms may require a visit or additional information.    Type of letter, form or note:  Home Health Certification-Home health certification and plan of care (09/14/21-11/12/21)    Who is the form from?: Home care-[facility] nursing [facility]     Where did the form come from: form was faxed in    What clinic location was the form placed at?: [facility]    Where the form was placed: Dr. Soto's  Box/Folder    What number is listed as a contact on the form?:   PHONE: [phone]  FAX: [phone]       Additional comments: Please review, sign, date and fax back to the number listed above     Call taken on 9/29/2021 at 2:13 PM by Gregoria Norman

## 2021-10-01 ENCOUNTER — PATIENT OUTREACH (OUTPATIENT)
Dept: NURSING | Facility: CLINIC | Age: 61
End: 2021-10-01
Payer: COMMERCIAL

## 2021-10-01 NOTE — PROGRESS NOTES
Clinic Care Coordination Contact    Follow Up Progress Note      Assessment: DEMARCO ARAGON received a return call from Sofi and a friend that speaks english with her.     DEMARCO ARAGON inquired about progress with [employer] application. Sofi stated the form has been completed. DEMARCO ARAGON explained it just needs to be dropped off at [facility] for completion. This will happen today.    Pt has applied for a low income apartment. He was told it could take a year unless he has a medical need. DEMARCO ARAGON encouraged to gather more information about what is needed for the PCP to document this and if there is form to be completed or letter needed then to make an appointment with PCP.     Pt will establish with new PCP due to currently PCP leaving clinic.    Care Gaps:    Health Maintenance Due   Topic Date Due     PREVENTIVE CARE VISIT  Never done     Pneumococcal Vaccine: Pediatrics (0 to 5 Years) and At-Risk Patients (6 to 64 Years) (1 of 2 - PPSV23) Never done     COLORECTAL CANCER SCREENING  Never done     ZOSTER IMMUNIZATION (1 of 2) Never done     INFLUENZA VACCINE (1) 09/01/2021       Postponed to focus on other medical needs     Goals addressed this encounter:   Goals Addressed                    This Visit's Progress       Patient Stated       1. Medical (pt-stated)   90%      Goal Statement: Over the next 6 months, Wilfredo's family would like to assist him to following medical recommendation to support his health and wellbeing.   Date Goal Set: 4/15/2021  Barriers: Complexity of medical needs  Strengths: Strong support system  Date to Achieve By: 10/15/2021  Patient expressed understanding of goal: yes    Action steps to achieve this goal:  1. Family will assist pt in completing metro mobility paperwork  2. Family will explore payment for family as PCA support  3. Family will outreach to Care Coordination  for further questions or concerns            Outreach Frequency: monthly    Plan: DEMARCO ARAGON will outreach to  pt's sister in 3-4 weeks to discuss overall wellbeing.    Cheyanne Yi, [facility]  Clinic Care Coordinator  [facility] Sarah [place]  [facility]  [phone]  [email or web address]

## 2021-10-01 NOTE — PROGRESS NOTES
Clinic Care Coordination Contact  [facility]/Voicemail       Clinical Data: Care Coordinator Outreach    Outreach attempted x 2.  Left message on patient's voicemail with call back information and requested return call.    Plan: Care Coordinator will try to reach patient again in 10 business days.    Cheyanne Yi [facility]  Clinic Care Coordinator  [facility]'s [facility] Sarah [place]  [facility]  [phone]  [email or web address]

## 2021-10-22 ENCOUNTER — TELEPHONE (OUTPATIENT)
Dept: FAMILY MEDICINE | Facility: CLINIC | Age: 61
End: 2021-10-22

## 2021-10-22 NOTE — TELEPHONE ENCOUNTER
Apryl/MARIAN reception,    Per our provider of the day pt needs to schedule visit to establish care with a new provider to have these forms filled out. Please call pt and help schedule.    Thanks,  VAISHALI South  [facility]

## 2021-10-22 NOTE — TELEPHONE ENCOUNTER
POD,    Can pt do a virtual visit with any provider for the forms that were dropped off today? Please see below.     Thanks,  VAISHALI South  [facility]

## 2021-10-22 NOTE — TELEPHONE ENCOUNTER
Reason for Call:  Form, our goal is to have forms completed with 72 hours, however, some forms may require a visit or additional information.    Type of letter, form or note:  medical    Who is the form from?: [facility] (if other please explain)    Where did the form come from: Patient or family brought in       What clinic location was the form placed at?: [facility]    Where the form was placed:  Box/Folder    What number is listed as a contact on the form?:   Phone [phone]  Fax [phone]       Additional comments: please complete sign date, and fax back to number above    Call taken on 10/22/2021 at 10:36 AM by Apryl Norton

## 2021-10-25 NOTE — TELEPHONE ENCOUNTER
Form completed and faxed to [phone]. Copy faxed to [employer]. Original mailed to patients home address per request

## 2021-10-27 ENCOUNTER — APPOINTMENT (OUTPATIENT)
Dept: INTERPRETER SERVICES | Facility: CLINIC | Age: 61
End: 2021-10-27
Payer: COMMERCIAL

## 2021-10-27 ENCOUNTER — PATIENT OUTREACH (OUTPATIENT)
Dept: CARE COORDINATION | Facility: CLINIC | Age: 61
End: 2021-10-27

## 2021-10-27 NOTE — PROGRESS NOTES
Clinic Care Coordination Contact  [facility]/Voicemail       Clinical Data: Care Coordinator Outreach    Outreach attempted x 1. Unable to leave message on pt's sister's phone due to voicemail not being set up.    Plan: Care Coordinator will try to reach patient again in 3-5 business days.    Cheyanne Yi [facility]  Clinic Care Coordinator  [facility] Women's [facility] Sarah [place]  [facility]  [phone]  [email or web address]

## 2021-11-09 ENCOUNTER — PATIENT OUTREACH (OUTPATIENT)
Dept: NURSING | Facility: CLINIC | Age: 61
End: 2021-11-09
Payer: COMMERCIAL

## 2021-11-09 NOTE — PROGRESS NOTES
Clinic Care Coordination Contact    Follow Up Progress Note      Assessment: DEMARCO ARAGON spoke with pt's sister, Sofi, regarding pt's overall wellbeing. She shared that pt is doing well.    He continues to need a home. DEMARCO ARAGON shared that the forms that were brought to clinic for [facility] were completed and faxed. DEMARCO ARAGON provided the home number that she can call to discuss this with [facility]. Sofi shared that she never received the originals in the mail, DEMARCO ARAGON will send them to her    Discuss "[employer]" application. Sofi never received any information about these forms. DEMARCO ARAGON shared that a provider completed these. DEMARCO ARAGON shared this application was likely mailed to "[employer]" but DEMARCO ARAGON will mail them now just in case. DEMARCO ARAGON will mail this application to pt to have for his records.    Discussed establishing with a new PCP. Sofi would like to wait until the transportation is all sorted out.    Care Gaps:    Health Maintenance Due   Topic Date Due     PREVENTIVE CARE VISIT  Never done     LUNG CANCER SCREENING  Never done     Pneumococcal Vaccine: Pediatrics (0 to 5 Years) and At-Risk Patients (6 to 64 Years) (1 of 2 - PPSV23) Never done     COLORECTAL CANCER SCREENING  Never done     ZOSTER IMMUNIZATION (1 of 2) Never done     INFLUENZA VACCINE (1) 09/01/2021     COVID-19 Vaccine (3 - Booster for Moderna series) 09/04/2021     Postponed to focus on transportation     Goals addressed this encounter:   Goals Addressed                    This Visit's Progress       Patient Stated       1. Medical (pt-stated)   90%      Goal Statement: Over the next 6 months, Wilfredo's family would like to assist him to following medical recommendation to support his health and wellbeing.   Date Goal Set: 4/15/2021  Barriers: Complexity of medical needs  Strengths: Strong support system  Date to Achieve By: 10/15/2021  Patient expressed understanding of goal: yes    Action steps to achieve this  goal:  1. Family will assist pt in completing [employer] mobility paperwork  2. Family will explore payment for family as PCA support  3. Family will outreach to Care Coordination  for further questions or concerns            Outreach Frequency: monthly    Plan: CC MAHESH will outreach to Sofi in 1 month to discuss pt's overall wellbeing.    Cheyanne Yi [facility]  Clinic Care Coordinator  [facility] Women's [facility] Sarah [place]  [facility]  [phone]  [email or web address]

## 2021-11-10 ENCOUNTER — TELEPHONE (OUTPATIENT)
Dept: FAMILY MEDICINE | Facility: CLINIC | Age: 61
End: 2021-11-10
Payer: COMMERCIAL

## 2021-11-10 DIAGNOSIS — E43 SEVERE PROTEIN-CALORIE MALNUTRITION (H): ICD-10-CM

## 2021-11-10 NOTE — TELEPHONE ENCOUNTER
Reason for Call:  Home Health Care    Va with [facility] called regarding (reason for call): verbal orders    Orders are needed for this patient. Yes    PT: N/A    OT: N/A    Skilled Nursing: Continue  1 time a week for 9 weeks  And 1 Prn  Also Requesting refill on Patient Multivitamins- says Pharmacy has been Requesting?    Pt Provider: Charles    Phone Number Homecare Nurse can be reached at: [phone]    Can we leave a detailed message on this number? YES    Phone number patient can be reached at: Other phone number:  N/A*    Best Time: Today    Call taken on 11/10/2021 at 4:44 PM by America Mcadams

## 2021-11-11 DIAGNOSIS — E43 SEVERE PROTEIN-CALORIE MALNUTRITION (H): ICD-10-CM

## 2021-11-11 RX ORDER — MULTIVITAMIN
1 TABLET ORAL DAILY
Qty: 90 TABLET | Refills: 3 | Status: SHIPPED | OUTPATIENT
Start: 2021-11-11 | End: 2022-02-01

## 2021-11-11 RX ORDER — MULTIVITAMIN
1 TABLET ORAL DAILY
Qty: 90 TABLET | Refills: 3 | Status: SHIPPED | OUTPATIENT
Start: 2021-11-11 | End: 2021-11-11

## 2021-11-11 NOTE — TELEPHONE ENCOUNTER
Spoke with Va, gave verbal ok and let her know multi vits were sent to pharm. Verbalized understanding.     Thanks,  VAISHALI South  [facility]

## 2021-11-11 NOTE — TELEPHONE ENCOUNTER
"Requested Prescriptions   Signed Prescriptions Disp Refills    multivitamin (ONE-DAILY) tablet 90 tablet 3     Sig: Take 1 tablet by mouth daily       Vitamin Supplements (Adult) Protocol Passed - 11/11/2021  2:10 PM        Passed - High dose Vitamin D not ordered        Passed - Recent (12 mo) or future (30 days) visit within the authorizing provider's specialty     Patient has had an office visit with the authorizing provider or a provider within the authorizing providers department within the previous 12 mos or has a future within next 30 days. See \"Patient Info\" tab in inbasket, or \"Choose Columns\" in Meds & Orders section of the refill encounter.              Passed - Medication is active on med list           Akosua Roldan RN  [facility]     "

## 2021-11-23 ENCOUNTER — TELEPHONE (OUTPATIENT)
Dept: FAMILY MEDICINE | Facility: CLINIC | Age: 61
End: 2021-11-23
Payer: COMMERCIAL

## 2021-11-23 DIAGNOSIS — Z53.9 DIAGNOSIS NOT YET DEFINED: Primary | ICD-10-CM

## 2021-11-23 PROCEDURE — G0179 MD RECERTIFICATION HHA PT: HCPCS | Performed by: FAMILY MEDICINE

## 2021-11-23 NOTE — TELEPHONE ENCOUNTER
Reason for Call:  Form, our goal is to have forms completed with 72 hours, however, some forms may require a visit or additional information.    Type of letter, form or note:  Home Health Certification-HOME HEALTH CERTIFICATION AND PLAN OF CARE 11/13/21-1/11/22    Who is the form from?: Home care    Where did the form come from: form was faxed in    What clinic location was the form placed at?: [facility]    Where the form was placed:  Box/Folder    What number is listed as a contact on the form?: FAX- [phone]       Additional comments: Please review,sign date and fax back to the number listed above.    Call taken on 11/23/2021 at 10:48 AM by Apryl Norton

## 2021-12-15 DIAGNOSIS — Z22.7 LATENT TUBERCULOSIS: ICD-10-CM

## 2021-12-15 DIAGNOSIS — I69.30 HISTORY OF CVA WITH RESIDUAL DEFICIT: ICD-10-CM

## 2021-12-15 DIAGNOSIS — I10 ESSENTIAL HYPERTENSION, MALIGNANT: ICD-10-CM

## 2021-12-15 RX ORDER — ATORVASTATIN CALCIUM 40 MG/1
40 TABLET, FILM COATED ORAL EVERY EVENING
Qty: 30 TABLET | Refills: 3 | Status: SHIPPED | OUTPATIENT
Start: 2021-12-15 | End: 2022-02-01

## 2021-12-15 RX ORDER — ISONIAZID 300 MG/1
300 TABLET ORAL EVERY MORNING
Qty: 30 TABLET | Refills: 2 | OUTPATIENT
Start: 2021-12-15

## 2021-12-15 RX ORDER — ASPIRIN 81 MG/1
81 TABLET, CHEWABLE ORAL DAILY
Qty: 30 TABLET | Refills: 3 | Status: SHIPPED | OUTPATIENT
Start: 2021-12-15 | End: 2022-02-01

## 2021-12-15 RX ORDER — AMLODIPINE BESYLATE 10 MG/1
10 TABLET ORAL DAILY
Qty: 30 TABLET | Refills: 3 | Status: SHIPPED | OUTPATIENT
Start: 2021-12-15 | End: 2022-02-01

## 2021-12-15 RX ORDER — CARVEDILOL 12.5 MG/1
12.5 TABLET ORAL 2 TIMES DAILY WITH MEALS
Qty: 60 TABLET | Refills: 3 | Status: SHIPPED | OUTPATIENT
Start: 2021-12-15 | End: 2022-02-01

## 2021-12-15 NOTE — TELEPHONE ENCOUNTER
Routing refill request to provider for review/approval because:  No protocol    Call from pharmacy requesting refill of the meds cued  They will also let pt know he needs to establish care with a new provider    June Cummins RN   [facility]

## 2021-12-16 ENCOUNTER — PATIENT OUTREACH (OUTPATIENT)
Dept: CARE COORDINATION | Facility: CLINIC | Age: 61
End: 2021-12-16
Payer: COMMERCIAL

## 2021-12-16 NOTE — PROGRESS NOTES
Clinic Care Coordination Contact  [facility]/Voicemail       Clinical Data: Care Coordinator Outreach    Outreach attempted x 1.  Left message on patient's sister's voicemail with Mongolian  with call back information and requested return call.    Plan: Care Coordinator will try to reach patient again in 10 business days.    Cheyanne Yi [facility]  Clinic Care Coordinator  [facility] Women's [facility] Sarah [place]  [facility]  [phone]  [email or web address]

## 2021-12-17 ENCOUNTER — TRANSFERRED RECORDS (OUTPATIENT)
Dept: HEALTH INFORMATION MANAGEMENT | Facility: CLINIC | Age: 61
End: 2021-12-17

## 2021-12-17 ENCOUNTER — TELEPHONE (OUTPATIENT)
Dept: FAMILY MEDICINE | Facility: CLINIC | Age: 61
End: 2021-12-17

## 2021-12-17 ENCOUNTER — PATIENT OUTREACH (OUTPATIENT)
Dept: NURSING | Facility: CLINIC | Age: 61
End: 2021-12-17
Payer: COMMERCIAL

## 2021-12-17 NOTE — PROGRESS NOTES
Clinic Care Coordination Contact    Follow Up Progress Note      Assessment: DEMARCO ARAGON spoke with pt's sister, Sofi, regarding their overall wellbeing. Sofi shared they are doing very good.    Discussed the paperwork that DEMARCO ARAGON sent over a month ago. Sofi stated she hasn't received the paperwork in the mail. She also never received this paperwork which was also sent by clinic staff. DEMARCO ARAGON expressed concern that they are not receiving mail from [facility]. Sofi stated she doesn't have any alternative address and to keep sending mail to his address.    Sofi requested update on the social security papers and housing paperwork that was given to clinic. DEMARCO ARAGON explained that as was discussed last month, the clinic did receive and complete this. It was sent to public housed and back to her home. Sofi didn't receive this.    DEMARCO ARAGON contacted [employer] and they stated they do not have any record of the application, though it does take 3 weeks to process and will not show up in the system until processing is complete. DEMARCO ARAGON will send application again just to be certain that it is received. Pt will received notification by mail if he is approved. This was explained the Sofi.    They are out of his medication. DEMARCO ARAGON shared that his medications were refilled 12/15 but he needs to establish with a new provider for ongoing refills. Sofi explained that usually medications are delivered but they have not been yet. Sofi requested DEMARCO ARAGON call the pharmacy to discuss this. DEMARCO ARAGON called [facility] ([phone]), they have the medications scheduled for delivery today.    DEMARCO ARAGON explained the importance of pt establishing with a new provider. DEMARCO ARAGON assisted Sofi to contact scheduling to make an appointment. She accepted the next available appointment in February. She understands if she is out of any medication prior to the appointment to contact the clinic.    Care Gaps:    Health Maintenance Due   Topic Date Due     PREVENTIVE  CARE VISIT  Never done     Pneumococcal Vaccine: Pediatrics (0 to 5 Years) and At-Risk Patients (6 to 64 Years) (1 of 2 - PPSV23) Never done     COLORECTAL CANCER SCREENING  Never done     ZOSTER IMMUNIZATION (1 of 2) Never done     LUNG CANCER SCREENING  Never done     INFLUENZA VACCINE (1) 09/01/2021     COVID-19 Vaccine (3 - Booster for Moderna series) 09/04/2021     Postponed to focus on psychosocial needs     Goals addressed this encounter:   Goals Addressed                    This Visit's Progress       Patient Stated       1. Medical (pt-stated)   90%      Goal Statement: Over the next 6 months, Allison family would like to assist him to following medical recommendation to support his health and wellbeing.   Date Goal Set: 4/15/2021  Barriers: Complexity of medical needs  Strengths: Strong support system  Date to Achieve By: 10/15/2021  Patient expressed understanding of goal: yes    Action steps to achieve this goal:  1. Family will assist pt in completing [employer] paperwork  2. Family will explore payment for family as PCA support  3. Family will outreach to Care Coordination  for further questions or concerns            Outreach Frequency: monthly    Plan: CC MAHESH will outreach to [facility] in 1 month to discuss [employer] and housing application.    Cheyanne Yi [facility]  Clinic Care Coordinator  [facility] [place]  [facility] [place]  [facility]  [phone]  [email or web address]

## 2021-12-17 NOTE — TELEPHONE ENCOUNTER
Reason for Call:  Form, our goal is to have forms completed with 72 hours, however, some forms may require a visit or additional information.    Type of letter, form or note:  medical-Physical Therapy Plan of Care with Partial Evaluation 03/18/21-05/16/2021    Who is the form from?: Home care    Where did the form come from: form was faxed in    What clinic location was the form placed at?: [facility]    Where the form was placed:  Box/Folder    What number is listed as a contact on the form?:   P: [phone]  F: [phone]       Additional comments: Please sign attached orders, and fax back to [facility]     Call taken on 12/17/2021 at 10:15 AM by Apryl Norton

## 2022-01-19 ENCOUNTER — OFFICE VISIT (OUTPATIENT)
Dept: GASTROENTEROLOGY | Facility: CLINIC | Age: 62
End: 2022-01-19
Attending: STUDENT IN AN ORGANIZED HEALTH CARE EDUCATION/TRAINING PROGRAM
Payer: COMMERCIAL

## 2022-01-19 ENCOUNTER — LAB (OUTPATIENT)
Dept: LAB | Facility: CLINIC | Age: 62
End: 2022-01-19
Attending: STUDENT IN AN ORGANIZED HEALTH CARE EDUCATION/TRAINING PROGRAM
Payer: COMMERCIAL

## 2022-01-19 VITALS
SYSTOLIC BLOOD PRESSURE: 131 MMHG | BODY MASS INDEX: 18.3 KG/M2 | TEMPERATURE: 98.2 F | HEIGHT: 65 IN | DIASTOLIC BLOOD PRESSURE: 83 MMHG | OXYGEN SATURATION: 98 % | HEART RATE: 64 BPM

## 2022-01-19 DIAGNOSIS — B18.1 CHRONIC VIRAL HEPATITIS B WITHOUT DELTA AGENT AND WITHOUT COMA (H): ICD-10-CM

## 2022-01-19 DIAGNOSIS — B18.1 CHRONIC VIRAL HEPATITIS B WITHOUT DELTA AGENT AND WITHOUT COMA (H): Primary | ICD-10-CM

## 2022-01-19 DIAGNOSIS — K74.60 CIRRHOSIS OF LIVER WITH ASCITES, UNSPECIFIED HEPATIC CIRRHOSIS TYPE (H): ICD-10-CM

## 2022-01-19 DIAGNOSIS — R18.8 CIRRHOSIS OF LIVER WITH ASCITES, UNSPECIFIED HEPATIC CIRRHOSIS TYPE (H): ICD-10-CM

## 2022-01-19 LAB
AFP SERPL-MCNC: 6.3 UG/L (ref 0–8)
ALBUMIN SERPL-MCNC: 2.5 G/DL (ref 3.4–5)
ALP SERPL-CCNC: 124 U/L (ref 40–150)
ALT SERPL W P-5'-P-CCNC: 36 U/L (ref 0–70)
ANION GAP SERPL CALCULATED.3IONS-SCNC: 10 MMOL/L (ref 3–14)
AST SERPL W P-5'-P-CCNC: 33 U/L (ref 0–45)
BILIRUB SERPL-MCNC: 0.7 MG/DL (ref 0.2–1.3)
BUN SERPL-MCNC: 19 MG/DL (ref 7–30)
CALCIUM SERPL-MCNC: 8.8 MG/DL (ref 8.5–10.1)
CHLORIDE BLD-SCNC: 107 MMOL/L (ref 94–109)
CO2 SERPL-SCNC: 27 MMOL/L (ref 20–32)
CREAT SERPL-MCNC: 1.08 MG/DL (ref 0.66–1.25)
ERYTHROCYTE [DISTWIDTH] IN BLOOD BY AUTOMATED COUNT: 12.7 % (ref 10–15)
GFR SERPL CREATININE-BSD FRML MDRD: 79 ML/MIN/1.73M2
GLUCOSE BLD-MCNC: 78 MG/DL (ref 70–99)
HCT VFR BLD AUTO: 45.4 % (ref 40–53)
HGB BLD-MCNC: 15.4 G/DL (ref 13.3–17.7)
INR PPP: 1.12 (ref 0.85–1.15)
MCH RBC QN AUTO: 31 PG (ref 26.5–33)
MCHC RBC AUTO-ENTMCNC: 33.9 G/DL (ref 31.5–36.5)
MCV RBC AUTO: 91 FL (ref 78–100)
PLATELET # BLD AUTO: 161 10E3/UL (ref 150–450)
POTASSIUM BLD-SCNC: 3.6 MMOL/L (ref 3.4–5.3)
PROT SERPL-MCNC: 6.9 G/DL (ref 6.8–8.8)
RBC # BLD AUTO: 4.97 10E6/UL (ref 4.4–5.9)
SODIUM SERPL-SCNC: 144 MMOL/L (ref 133–144)
WBC # BLD AUTO: 4.2 10E3/UL (ref 4–11)

## 2022-01-19 PROCEDURE — 85027 COMPLETE CBC AUTOMATED: CPT | Performed by: PATHOLOGY

## 2022-01-19 PROCEDURE — 87340 HEPATITIS B SURFACE AG IA: CPT | Mod: 90 | Performed by: PATHOLOGY

## 2022-01-19 PROCEDURE — 82105 ALPHA-FETOPROTEIN SERUM: CPT | Mod: 90 | Performed by: PATHOLOGY

## 2022-01-19 PROCEDURE — 85610 PROTHROMBIN TIME: CPT | Performed by: PATHOLOGY

## 2022-01-19 PROCEDURE — 87341 HEP B SURFACE AG NEUTRLZJ IA: CPT | Mod: 90 | Performed by: PATHOLOGY

## 2022-01-19 PROCEDURE — 99214 OFFICE O/P EST MOD 30 MIN: CPT | Performed by: STUDENT IN AN ORGANIZED HEALTH CARE EDUCATION/TRAINING PROGRAM

## 2022-01-19 PROCEDURE — 87517 HEPATITIS B DNA QUANT: CPT | Mod: 90 | Performed by: PATHOLOGY

## 2022-01-19 PROCEDURE — 99000 SPECIMEN HANDLING OFFICE-LAB: CPT | Performed by: PATHOLOGY

## 2022-01-19 PROCEDURE — 36415 COLL VENOUS BLD VENIPUNCTURE: CPT | Performed by: PATHOLOGY

## 2022-01-19 PROCEDURE — 80053 COMPREHEN METABOLIC PANEL: CPT | Performed by: PATHOLOGY

## 2022-01-19 NOTE — PROGRESS NOTES
"Chief Complaint   Patient presents with     RECHECK     CHRONIC HEP B     /83   Pulse 64   Temp 98.2  F (36.8  C) (Oral)   Ht 1.651 m (5' 5\")   SpO2 98%   BMI 18.30 kg/m      Austin Hull MA  "

## 2022-01-19 NOTE — LETTER
1/19/2022     RE: Wilfredo Yoon  [address]  [facility] [address]    Dear Colleague,    Thank you for referring your patient, Wilfredo Yoon, to the [facility] [place]. Please see a copy of my visit note below.    [facility] Return Patient Visit    Date of Visit: January 19th, 2022    Reason for referral: Follow up of hepatitis B cirrhosis    Subjective: Mr. 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     Initial History:     He was admitted to the hospital 8/2020 with AMS, found to multifocal infarcts 2/2 HTN urgency and brainstem abnormalities thought to be related to infratentorial PRES syndrome.     He had a paracentesis 7/2020 - no studies sent.      Found to have latent Tb during this admission, took INH.     Was discharged to a TCU Fall 2020 - just discharged to home 2/2021. .     Cirrhosis diagnosed 2017 - had a history of ascites, no history of HE or variceal bleeding. EGD 10/2018 without varices. Previously follows with Dr. Adame - scheduled in my clinic in error.     Interval Events:  - No abdominal distention, other s/s of liver decompensation  - Taking his entecavir, RN sets up his medications.   - MRI liver for history of LR 3 lesions, baseline elevated AFP showed cirrhosis with LR 2 lesions, no mass. Also showed small volume perihepatic and perisplenic ascites.   - [place] asking if that is okay     ROS: 14 point ROS negative except for positives noted in HPI.    PMHx:  Past Medical History:   Diagnosis Date     Cirrhosis (H)      Cryoglobulinemia (H)      CVA (cerebral vascular accident) (H) 07/16/2020    Supratentorial likely d/t hypertensive emergency leading to right hemiparesis, dysphagia and aphasia      Glomerulonephritis      Hepatitis B      Hypertension      Latent tuberculosis     Treatment [id number]     MPGN (membranoproliferative glomerulonephritides)      Positive QuantiFERON-TB Gold test      PRES (posterior reversible encephalopathy syndrome) 07/16/2020    In brainstem d/t hypertensive emergency; suffered supratentorial CVAs same date       PSHx:  Past Surgical History:   Procedure Laterality Date     ANESTHESIA OUT OF OR MRI N/A 7/18/2020    Procedure: ANESTHESIA OUT OF OR MRI;  Surgeon: GENERIC ANESTHESIA PROVIDER;  Location:  OR     ESOPHAGOSCOPY, GASTROSCOPY, DUODENOSCOPY (EGD), COMBINED N/A 10/18/2018    Procedure: EGD;  Surgeon: Eber Ortez MD;  Location: [facility]     HAND SURGERY Right      IR PARACENTESIS  7/27/2020     ZZC HAND/FINGER SURGERY UNLISTED         FamHx:  Family History   Problem Relation Age of Onset     Liver Cancer No family hx of      Hepatitis No family hx of      No family history of liver disease, liver cancer    SocHx:  Social History     Socioeconomic History     Marital status: Single     Spouse name: Not on file     Number of children: Not on file     Years of education: Not on file     Highest education level: Not on file   Occupational History     Not on file   Tobacco Use     Smoking status: Former Smoker     Packs/day: 0.25     Years: 40.00     Pack years: 10.00     Types: Cigarettes     Smokeless tobacco: Never Used   Substance and Sexual Activity     Alcohol use: No     Alcohol/week: 0.0 standard drinks     Drug use: No     Sexual activity: Not on file   Other Topics Concern     Parent/sibling w/ CABG, MI or angioplasty before 65F 55M? Not Asked   Social History Narrative     Not on file     Social Determinants of Health     Financial Resource Strain: Not on file   Food Insecurity: No Food Insecurity     Worried About Running Out of Food in the Last Year: Never true     Ran Out of Food in the Last Year: Never true   Transportation Needs: Unknown     Lack of  "Transportation (Medical): Not on file     Lack of Transportation (Non-Medical): No   Physical Activity: Not on file   Stress: Not on file   Social Connections: Unknown     Frequency of Communication with Friends and Family: Not on file     Frequency of Social Gatherings with Friends and Family: More than three times a week     Attends [employer] Services: Not on file     Active Member of Clubs or Organizations: Not on file     Attends Club or Organization Meetings: Not on file     Marital Status: Not on file   Intimate Partner Violence: Not on file   Housing Stability: Not on file       Medications:  Current Outpatient Medications   Medication     acetaminophen (TYLENOL) 325 MG tablet     amLODIPine (NORVASC) 10 MG tablet     aspirin (ASA) 81 MG chewable tablet     atorvastatin (LIPITOR) 40 MG tablet     carvedilol (COREG) 12.5 MG tablet     docusate sodium (COLACE) 100 MG capsule     entecavir (BARACLUDE) 0.5 MG tablet     isoniazid (NYDRAZID) 300 MG tablet     multivitamin (ONE-DAILY) tablet     polyethylene glycol (MIRALAX) 17 GM/Dose powder     potassium chloride ER (MICRO-K) 10 MEQ CR capsule     No current facility-administered medications for this visit.   Patient and niece do not know what medications he takes    Allergies:  No Known Allergies    Objective:  /83   Pulse 64   Temp 98.2  F (36.8  C) (Oral)   Ht 1.651 m (5' 5\")   SpO2 98%   BMI 18.30 kg/m    General: pleasant man in NAD  HEENT: Non icteric  Abd: NT, No HSM, no ascites  Ext: no edema    Labs:  Last Comprehensive Metabolic Panel:  Sodium   Date Value Ref Range Status   01/19/2022 144 133 - 144 mmol/L Final   06/30/2021 140 133 - 144 mmol/L Final     Potassium   Date Value Ref Range Status   01/19/2022 3.6 3.4 - 5.3 mmol/L Final   06/30/2021 4.2 3.4 - 5.3 mmol/L Final     Chloride   Date Value Ref Range Status   01/19/2022 107 94 - 109 mmol/L Final   06/30/2021 106 94 - 109 mmol/L Final     Carbon Dioxide   Date Value Ref Range Status "   06/30/2021 25 20 - 32 mmol/L Final     Carbon Dioxide (CO2)   Date Value Ref Range Status   01/19/2022 27 20 - 32 mmol/L Final     Anion Gap   Date Value Ref Range Status   01/19/2022 10 3 - 14 mmol/L Final   06/30/2021 9 3 - 14 mmol/L Final     Glucose   Date Value Ref Range Status   01/19/2022 78 70 - 99 mg/dL Final   06/30/2021 96 70 - 99 mg/dL Final     Urea Nitrogen   Date Value Ref Range Status   01/19/2022 19 7 - 30 mg/dL Final   06/30/2021 24 7 - 30 mg/dL Final     Creatinine   Date Value Ref Range Status   01/19/2022 1.08 0.66 - 1.25 mg/dL Final   06/30/2021 1.27 (H) 0.66 - 1.25 mg/dL Final     GFR Estimate   Date Value Ref Range Status   01/19/2022 79 >60 mL/min/1.73m2 Final     Comment:     Effective December 21, 2021 eGFRcr in adults is calculated using the 2021 CKD-EPI creatinine equation which includes age and gender (Duke et al., NEJ, DOI: 10.1056/XKFGvw8561696)   06/30/2021 61 >60 mL/min/[1.73_m2] Final     Comment:     Non  GFR Calc  Starting 12/18/2018, serum creatinine based estimated GFR (eGFR) will be   calculated using the Chronic Kidney Disease Epidemiology Collaboration   (CKD-EPI) equation.       Calcium   Date Value Ref Range Status   01/19/2022 8.8 8.5 - 10.1 mg/dL Final   06/30/2021 9.0 8.5 - 10.1 mg/dL Final     Bilirubin Total   Date Value Ref Range Status   01/19/2022 0.7 0.2 - 1.3 mg/dL Final   06/30/2021 0.7 0.2 - 1.3 mg/dL Final     Alkaline Phosphatase   Date Value Ref Range Status   01/19/2022 124 40 - 150 U/L Final   06/30/2021 106 40 - 150 U/L Final     ALT   Date Value Ref Range Status   01/19/2022 36 0 - 70 U/L Final   06/30/2021 16 0 - 70 U/L Final     AST   Date Value Ref Range Status   01/19/2022 33 0 - 45 U/L Final   06/30/2021 32 0 - 45 U/L Final       Lab Results   Component Value Date    WBC 6.2 04/02/2021     Lab Results   Component Value Date    RBC 4.44 04/02/2021     Lab Results   Component Value Date    HGB 13.5 04/02/2021     Lab Results    Component Value Date    HCT 41.3 04/02/2021     Lab Results   Component Value Date    MCV 93 04/02/2021     Lab Results   Component Value Date    MCH 30.4 04/02/2021     Lab Results   Component Value Date    MCHC 32.7 04/02/2021     Lab Results   Component Value Date    RDW 12.5 04/02/2021     Lab Results   Component Value Date     04/02/2021       INR   Date Value Ref Range Status   01/19/2022 1.12 0.85 - 1.15 Final   06/30/2021 1.16 (H) 0.86 - 1.14 Final        MELD-Na score: 8 at 1/19/2022  3:34 PM  MELD score: 8 at 1/19/2022  3:34 PM  Calculated from:  Serum Creatinine: 1.08 mg/dL at 1/19/2022  3:34 PM  Serum Sodium: 144 mmol/L (Using max of 137 mmol/L) at 1/19/2022  3:34 PM  Total Bilirubin: 0.7 mg/dL (Using min of 1 mg/dL) at 1/19/2022  3:34 PM  INR(ratio): 1.12 at 1/19/2022  3:34 PM  Age: 61 years    Imaging:     RUQ US 6/2021    Fluid: No evidence of ascites or pleural effusions.     Liver: Cirrhotic morphology of the liver, measuring 12 cm in  craniocaudal dimension. No focal hepatic mass. No intrahepatic biliary  dilatation. The main portal vein is patent with antegrade flow.     US visualization score: A - No or minimal limitations     Gallbladder: The gallbladder is well distended and of normal  morphology. No wall thickening, pericholecystic fluid, sonographic  Keen's sign, or evidence of cholelithiasis.     Bile Ducts: Normal caliber intra and extrahepatic biliary tree. The  common bile duct measures 4.6 mm in diameter.     Pancreas: Smaller in size and prominence of the hypoechoic rounded  structures about the visualized pancreas, for example now measuring  0.8 x 0.8 x 1.1 cm, previously measured 1.3 x 1.1 x 0.9 cm. Otherwise,  visualized portions of the pancreas are unremarkable. S     Kidney: The right kidney measures 10.3 cm in long dimension. No  hydronephrosis, hydroureter, shadowing renal calculi, or solid mass.  The capsule and parenchyma demonstrate normal  echogenicity.  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     Aorta and IVC: The visualized portions of the aorta and IVC are  unremarkable.                                                                       IMPRESSION:   1. Cirrhosis without focal liver lesion.  a. LI-RADS US Category: US-1 Negative: No US evidence of HCC  b. Recommend continued surveillance US.  2. Smaller in size and prominence of the hypoechoic rounded structures  about the visualized pancreas since prior US 6/11/2020. These are  likely representing peripancreatic/periportal lymph nodes also when  correlated with prior CT and MRI. Attention on follow-up.    MRI 9/2021    Liver: Hepatic parenchyma is slightly atrophic with nodular hepatic  capsule and relative hypertrophy of the left lobe. Small volume  perihepatic ascites. No biliary dilatation. Multiple,  well-circumscribed, irregular, arterially enhancing lesions scattered  throughout the hepatic parenchyma without washout, T2 hyperintensity  or restricted diffusion, the largest of which measures 1.1 cm located  in the medial aspect of the right lobe (series 19, image 7).     Gallbladder: No focal abnormalities.      Spleen: No focal abnormalities. Small volume perisplenic ascites.      Kidneys: Unremarkable. No urinary obstruction.     Adrenal glands: Unremarkable     Pancreas: Normal homogenous T1 signal. No focal abnormalities.     Bowel: Visualized large and small bowel are normal diameter.     Lymph nodes: Subcentimeter peripancreatic nodes are stable and likely  reactive. No pathologically enlarged lymph nodes.     Blood vessels: Abdominal aorta and its proximal bifurcations are  widely patent.     Lung bases: Clear     Bones and soft tissues: No suspicious osseous or soft tissue lesions.     Mesentery and abdominal wall: Unremarkable     Ascites: Small volume perihepatic and perisplenic ascites, markedly  improved when compared to 5/17/2019  abdominal MR.                                                                      IMPRESSION:  1.  Limited study due to motion artifact.  2.  Cirrhotic liver morphology with multiple small arterially  enhancing lesions that are not evident on delayed or  diffusion-weighted sequences, likely representing benign vascular  shunts(LI-RADS 2).   3.  Markedly decreased perihepatic and perisplenic ascites.      Endoscopy:    EGD 10/2018    Findings:        The examined esophagus was normal.        There is no endoscopic evidence of varices in the entire esophagus.        The entire examined stomach was normal.        There is no endoscopic evidence of portal hypertension gastropathy in        the entire examined stomach.        The examined duodenum was normal.                                                                                     Moderate Sedation:        Moderate (conscious) sedation was administered by the endoscopy nurse        and supervised by the endoscopist. The following parameters were        monitored: oxygen saturation, heart rate, blood pressure, and response        to care. Total physician intraservice time was 8 minutes.        Moderate (conscious) sedation was administered by the endoscopy nurse        and supervised by the endoscopist. The following parameters were        monitored: oxygen saturation, heart rate, blood pressure, and response        to care. Total physician intraservice time was 8 minutes.   Impression:          - Normal esophagus.                        - Normal stomach.                        - Normal examined duodenum.                        - No specimens collected.     Independently reviewed labs and imaging.     Assessment/Plan: Mr. 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     He currently has compensated cirrhosis -  "previously decompensations with ascites (is only a very small amount of ascites on imaging). He has made good functional improvements after his CVA.     MRI September 2021 showed cirrhosis without known liver lesions to explain chronically low elevated AFP.    - Recommend CBC, CMP, INR, AFP, Hepatitis B dna, Sag ~ 6/2022  - Due for RUQ US 6/2022 for HCC screening  - No varices on EGD 2018, since has been put on NSBB, can hold on repeat EGD for surveillance while on NSBB  - Continue entecavir 1 mg daily for chronic hepatitis B in a patient with decompensated cirrhosis. Discussed this is a lifelong medication    RTC 6 months with Abd US and labs    Marlyn Hurley MD MS  Hepatology/Liver Transplant  [facility]    Approximately 15 minutes was spent for the visit with 15 minutes of non face-to-face time were spent in review of the patient's medical record on the day of the visit. This included review of previous: clinic visits, hospital records, lab results, imaging studies, and documentation.  The findings from this review are summarized in the above note.    Chief Complaint   Patient presents with     RECHECK     CHRONIC HEP B     /83   Pulse 64   Temp 98.2  F (36.8  C) (Oral)   Ht 1.651 m (5' 5\")   SpO2 98%   BMI 18.30 kg/m    Austin Hull MA  "

## 2022-01-19 NOTE — PROGRESS NOTES
[facility] Return Patient Visit    Date of Visit: January 19th, 2022    Reason for referral: Follow up of hepatitis B cirrhosis    Subjective: Mr. 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     Initial History:     He was admitted to the hospital 8/2020 with AMS, found to multifocal infarcts 2/2 HTN urgency and brainstem abnormalities thought to be related to infratentorial PRES syndrome.     He had a paracentesis 7/2020 - no studies sent.      Found to have latent Tb during this admission, took INH.     Was discharged to a TCU Fall 2020 - just discharged to home 2/2021. .     Cirrhosis diagnosed 2017 - had a history of ascites, no history of HE or variceal bleeding. EGD 10/2018 without varices. Previously follows with Dr. Adame - scheduled in my clinic in error.     Interval Events:  - No abdominal distention, other s/s of liver decompensation  - Taking his entecavir, RN sets up his medications.   - MRI liver for history of LR 3 lesions, baseline elevated AFP showed cirrhosis with LR 2 lesions, no mass. Also showed small volume perihepatic and perisplenic ascites.   - [place] asking if that is okay     ROS: 14 point ROS negative except for positives noted in HPI.    PMHx:  Past Medical History:   Diagnosis Date     Cirrhosis (H)      Cryoglobulinemia (H)      CVA (cerebral vascular accident) (H) 07/16/2020    Supratentorial likely d/t hypertensive emergency leading to right hemiparesis, dysphagia and aphasia     Glomerulonephritis      Hepatitis B      Hypertension      Latent tuberculosis     Treatment [id number]     MPGN (membranoproliferative glomerulonephritides)      Positive QuantiFERON-TB Gold test      PRES (posterior reversible encephalopathy syndrome) 07/16/2020    In brainstem d/t hypertensive  emergency; suffered supratentorial CVAs same date       PSHx:  Past Surgical History:   Procedure Laterality Date     ANESTHESIA OUT OF OR MRI N/A 7/18/2020    Procedure: ANESTHESIA OUT OF OR MRI;  Surgeon: GENERIC ANESTHESIA PROVIDER;  Location: [facility]     ESOPHAGOSCOPY, GASTROSCOPY, DUODENOSCOPY (EGD), COMBINED N/A 10/18/2018    Procedure: EGD;  Surgeon: Eber Ortez MD;  Location: [facility]     HAND SURGERY Right      IR PARACENTESIS  7/27/2020     ZZC HAND/FINGER SURGERY UNLISTED         FamHx:  Family History   Problem Relation Age of Onset     Liver Cancer No family hx of      Hepatitis No family hx of      No family history of liver disease, liver cancer    SocHx:  Social History     Socioeconomic History     Marital status: Single     Spouse name: Not on file     Number of children: Not on file     Years of education: Not on file     Highest education level: Not on file   Occupational History     Not on file   Tobacco Use     Smoking status: Former Smoker     Packs/day: 0.25     Years: 40.00     Pack years: 10.00     Types: Cigarettes     Smokeless tobacco: Never Used   Substance and Sexual Activity     Alcohol use: No     Alcohol/week: 0.0 standard drinks     Drug use: No     Sexual activity: Not on file   Other Topics Concern     Parent/sibling w/ CABG, MI or angioplasty before 65F 55M? Not Asked   Social History Narrative     Not on file     Social Determinants of Health     Financial Resource Strain: Not on file   Food Insecurity: No Food Insecurity     Worried About Running Out of Food in the Last Year: Never true     Ran Out of Food in the Last Year: Never true   Transportation Needs: Unknown     Lack of Transportation (Medical): Not on file     Lack of Transportation (Non-Medical): No   Physical Activity: Not on file   Stress: Not on file   Social Connections: Unknown     Frequency of Communication with Friends and Family: Not on file     Frequency of Social Gatherings with Friends and Family:  "More than three times a week     Attends Restoration Services: Not on file     Active Member of Clubs or Organizations: Not on file     Attends Club or Organization Meetings: Not on file     Marital Status: Not on file   Intimate Partner Violence: Not on file   Housing Stability: Not on file       Medications:  Current Outpatient Medications   Medication     acetaminophen (TYLENOL) 325 MG tablet     amLODIPine (NORVASC) 10 MG tablet     aspirin (ASA) 81 MG chewable tablet     atorvastatin (LIPITOR) 40 MG tablet     carvedilol (COREG) 12.5 MG tablet     docusate sodium (COLACE) 100 MG capsule     entecavir (BARACLUDE) 0.5 MG tablet     isoniazid (NYDRAZID) 300 MG tablet     multivitamin (ONE-DAILY) tablet     polyethylene glycol (MIRALAX) 17 GM/Dose powder     potassium chloride ER (MICRO-K) 10 MEQ CR capsule     No current facility-administered medications for this visit.   Patient and niece do not know what medications he takes    Allergies:  No Known Allergies    Objective:  /83   Pulse 64   Temp 98.2  F (36.8  C) (Oral)   Ht 1.651 m (5' 5\")   SpO2 98%   BMI 18.30 kg/m    General: pleasant man in NAD  HEENT: Non icteric  Abd: NT, No HSM, no ascites  Ext: no edema    Labs:  Last Comprehensive Metabolic Panel:  Sodium   Date Value Ref Range Status   01/19/2022 144 133 - 144 mmol/L Final   06/30/2021 140 133 - 144 mmol/L Final     Potassium   Date Value Ref Range Status   01/19/2022 3.6 3.4 - 5.3 mmol/L Final   06/30/2021 4.2 3.4 - 5.3 mmol/L Final     Chloride   Date Value Ref Range Status   01/19/2022 107 94 - 109 mmol/L Final   06/30/2021 106 94 - 109 mmol/L Final     Carbon Dioxide   Date Value Ref Range Status   06/30/2021 25 20 - 32 mmol/L Final     Carbon Dioxide (CO2)   Date Value Ref Range Status   01/19/2022 27 20 - 32 mmol/L Final     Anion Gap   Date Value Ref Range Status   01/19/2022 10 3 - 14 mmol/L Final   06/30/2021 9 3 - 14 mmol/L Final     Glucose   Date Value Ref Range Status   01/19/2022 " 78 70 - 99 mg/dL Final   06/30/2021 96 70 - 99 mg/dL Final     Urea Nitrogen   Date Value Ref Range Status   01/19/2022 19 7 - 30 mg/dL Final   06/30/2021 24 7 - 30 mg/dL Final     Creatinine   Date Value Ref Range Status   01/19/2022 1.08 0.66 - 1.25 mg/dL Final   06/30/2021 1.27 (H) 0.66 - 1.25 mg/dL Final     GFR Estimate   Date Value Ref Range Status   01/19/2022 79 >60 mL/min/1.73m2 Final     Comment:     Effective December 21, 2021 eGFRcr in adults is calculated using the 2021 CKD-EPI creatinine equation which includes age and gender (Duke et al., NEJ, DOI: 10.1056/AGOXio7270955)   06/30/2021 61 >60 mL/min/[1.73_m2] Final     Comment:     Non  GFR Calc  Starting 12/18/2018, serum creatinine based estimated GFR (eGFR) will be   calculated using the Chronic Kidney Disease Epidemiology Collaboration   (CKD-EPI) equation.       Calcium   Date Value Ref Range Status   01/19/2022 8.8 8.5 - 10.1 mg/dL Final   06/30/2021 9.0 8.5 - 10.1 mg/dL Final     Bilirubin Total   Date Value Ref Range Status   01/19/2022 0.7 0.2 - 1.3 mg/dL Final   06/30/2021 0.7 0.2 - 1.3 mg/dL Final     Alkaline Phosphatase   Date Value Ref Range Status   01/19/2022 124 40 - 150 U/L Final   06/30/2021 106 40 - 150 U/L Final     ALT   Date Value Ref Range Status   01/19/2022 36 0 - 70 U/L Final   06/30/2021 16 0 - 70 U/L Final     AST   Date Value Ref Range Status   01/19/2022 33 0 - 45 U/L Final   06/30/2021 32 0 - 45 U/L Final       Lab Results   Component Value Date    WBC 6.2 04/02/2021     Lab Results   Component Value Date    RBC 4.44 04/02/2021     Lab Results   Component Value Date    HGB 13.5 04/02/2021     Lab Results   Component Value Date    HCT 41.3 04/02/2021     Lab Results   Component Value Date    MCV 93 04/02/2021     Lab Results   Component Value Date    MCH 30.4 04/02/2021     Lab Results   Component Value Date    MCHC 32.7 04/02/2021     Lab Results   Component Value Date    RDW 12.5 04/02/2021     Lab  Results   Component Value Date     04/02/2021       INR   Date Value Ref Range Status   01/19/2022 1.12 0.85 - 1.15 Final   06/30/2021 1.16 (H) 0.86 - 1.14 Final        MELD-Na score: 8 at 1/19/2022  3:34 PM  MELD score: 8 at 1/19/2022  3:34 PM  Calculated from:  Serum Creatinine: 1.08 mg/dL at 1/19/2022  3:34 PM  Serum Sodium: 144 mmol/L (Using max of 137 mmol/L) at 1/19/2022  3:34 PM  Total Bilirubin: 0.7 mg/dL (Using min of 1 mg/dL) at 1/19/2022  3:34 PM  INR(ratio): 1.12 at 1/19/2022  3:34 PM  Age: 61 years    Imaging:     RUQ US 6/2021    Fluid: No evidence of ascites or pleural effusions.     Liver: Cirrhotic morphology of the liver, measuring 12 cm in  craniocaudal dimension. No focal hepatic mass. No intrahepatic biliary  dilatation. The main portal vein is patent with antegrade flow.     US visualization score: A - No or minimal limitations     Gallbladder: The gallbladder is well distended and of normal  morphology. No wall thickening, pericholecystic fluid, sonographic  Keen's sign, or evidence of cholelithiasis.     Bile Ducts: Normal caliber intra and extrahepatic biliary tree. The  common bile duct measures 4.6 mm in diameter.     Pancreas: Smaller in size and prominence of the hypoechoic rounded  structures about the visualized pancreas, for example now measuring  0.8 x 0.8 x 1.1 cm, previously measured 1.3 x 1.1 x 0.9 cm. Otherwise,  visualized portions of the pancreas are unremarkable. S     Kidney: The right kidney measures 10.3 cm in long dimension. No  hydronephrosis, hydroureter, shadowing renal calculi, or solid mass.  The capsule and parenchyma demonstrate normal echogenicity.  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     Aorta and IVC: The visualized portions of the aorta and IVC are  unremarkable.                                                                       IMPRESSION:   1. Cirrhosis without focal liver lesion.  a. LI-RADS US  Category: US-1 Negative: No US evidence of HCC  b. Recommend continued surveillance US.  2. Smaller in size and prominence of the hypoechoic rounded structures  about the visualized pancreas since prior US 6/11/2020. These are  likely representing peripancreatic/periportal lymph nodes also when  correlated with prior CT and MRI. Attention on follow-up.    MRI 9/2021    Liver: Hepatic parenchyma is slightly atrophic with nodular hepatic  capsule and relative hypertrophy of the left lobe. Small volume  perihepatic ascites. No biliary dilatation. Multiple,  well-circumscribed, irregular, arterially enhancing lesions scattered  throughout the hepatic parenchyma without washout, T2 hyperintensity  or restricted diffusion, the largest of which measures 1.1 cm located  in the medial aspect of the right lobe (series 19, image 7).     Gallbladder: No focal abnormalities.      Spleen: No focal abnormalities. Small volume perisplenic ascites.      Kidneys: Unremarkable. No urinary obstruction.     Adrenal glands: Unremarkable     Pancreas: Normal homogenous T1 signal. No focal abnormalities.     Bowel: Visualized large and small bowel are normal diameter.     Lymph nodes: Subcentimeter peripancreatic nodes are stable and likely  reactive. No pathologically enlarged lymph nodes.     Blood vessels: Abdominal aorta and its proximal bifurcations are  widely patent.     Lung bases: Clear     Bones and soft tissues: No suspicious osseous or soft tissue lesions.     Mesentery and abdominal wall: Unremarkable     Ascites: Small volume perihepatic and perisplenic ascites, markedly  improved when compared to 5/17/2019 abdominal MR.                                                                      IMPRESSION:  1.  Limited study due to motion artifact.  2.  Cirrhotic liver morphology with multiple small arterially  enhancing lesions that are not evident on delayed or  diffusion-weighted sequences, likely representing benign  vascular  shunts(LI-RADS 2).   3.  Markedly decreased perihepatic and perisplenic ascites.      Endoscopy:    EGD 10/2018    Findings:        The examined esophagus was normal.        There is no endoscopic evidence of varices in the entire esophagus.        The entire examined stomach was normal.        There is no endoscopic evidence of portal hypertension gastropathy in        the entire examined stomach.        The examined duodenum was normal.                                                                                     Moderate Sedation:        Moderate (conscious) sedation was administered by the endoscopy nurse        and supervised by the endoscopist. The following parameters were        monitored: oxygen saturation, heart rate, blood pressure, and response        to care. Total physician intraservice time was 8 minutes.        Moderate (conscious) sedation was administered by the endoscopy nurse        and supervised by the endoscopist. The following parameters were        monitored: oxygen saturation, heart rate, blood pressure, and response        to care. Total physician intraservice time was 8 minutes.   Impression:          - Normal esophagus.                        - Normal stomach.                        - Normal examined duodenum.                        - No specimens collected.     Independently reviewed labs and imaging.     Assessment/Plan: Mr. 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     He currently has compensated cirrhosis - previously decompensations with ascites (is only a very small amount of ascites on imaging). He has made good functional improvements after his CVA.     MRI September 2021 showed cirrhosis without known liver lesions to explain chronically low elevated AFP.    - Recommend CBC, CMP, INR, AFP, Hepatitis B dna, Sag ~  6/2022  - Due for RUQ US 6/2022 for HCC screening  - No varices on EGD 2018, since has been put on NSBB, can hold on repeat EGD for surveillance while on NSBB  - Continue entecavir 1 mg daily for chronic hepatitis B in a patient with decompensated cirrhosis. Discussed this is a lifelong medication    RTC 6 months with Abd US and labs    Marlyn Hurley MD MS  Hepatology/Liver Transplant  [facility]    Approximately 15 minutes was spent for the visit with 15 minutes of non face-to-face time were spent in review of the patient's medical record on the day of the visit. This included review of previous: clinic visits, hospital records, lab results, imaging studies, and documentation.  The findings from this review are summarized in the above note.

## 2022-01-20 LAB
HBV DNA SERPL NAA+PROBE-ACNC: <20 IU/ML
HBV DNA SERPL NAA+PROBE-LOG IU: <1.3 {LOG_IU}/ML
HBV SURFACE AG SERPL QL IA: REACTIVE

## 2022-01-26 ENCOUNTER — TELEPHONE (OUTPATIENT)
Dept: FAMILY MEDICINE | Facility: CLINIC | Age: 62
End: 2022-01-26
Payer: COMMERCIAL

## 2022-01-26 ENCOUNTER — PATIENT OUTREACH (OUTPATIENT)
Dept: NURSING | Facility: CLINIC | Age: 62
End: 2022-01-26
Payer: COMMERCIAL

## 2022-01-26 NOTE — TELEPHONE ENCOUNTER
[facility]   [phone]  Faxed to: [phone]  [employer]: [phone]

## 2022-01-26 NOTE — PROGRESS NOTES
Clinic Care Coordination Contact    Follow Up Progress Note      Assessment: CC SW spoke with pt's sister with [place]  regarding pt's overall wellbeing. Sofi shared that pt received the [employer] card yesterday.     Pt would like to resume PT at [facility] but they were told that he needs a new referral from PCP. Pt is scheduled to establish care with new PCP 2/24/2022. CC MAHESH will send a message to Sana Meier requesting referral for PT at [facility].    Sofi shared that pt is in need of medication refills prior to his appointment next month. She would like to discuss this with pt's PCP. Due to pt's PCP no longer being in clinic CC MAHESH encouraged pt schedule a phone appointment to discuss this further. Sofi agreeable to this. Phone appointment made for 2/1/2022 with Sana Meier.     Care Gaps:    Health Maintenance Due   Topic Date Due     PREVENTIVE CARE VISIT  Never done     Pneumococcal Vaccine: Pediatrics (0 to 5 Years) and At-Risk Patients (6 to 64 Years) (1 of 2 - PPSV23) Never done     COLORECTAL CANCER SCREENING  Never done     ZOSTER IMMUNIZATION (1 of 2) Never done     LUNG CANCER SCREENING  Never done     COVID-19 Vaccine (3 - Booster for Moderna series) 08/04/2021     INFLUENZA VACCINE (1) 09/01/2021     PHQ-2  01/01/2022     Scheduled 2/24/2022      Goals addressed this encounter:   Goals Addressed                    This Visit's Progress       Patient Stated       1. Medical (pt-stated)   90%      Goal Statement: Over the next 6 months, Wilfredo's family would like to assist him to following medical recommendation to support his health and wellbeing.   Date Goal Set: 4/15/2021  Barriers: Complexity of medical needs  Strengths: Strong support system  Date to Achieve By: 10/15/2021  Patient expressed understanding of goal: yes    Action steps to achieve this goal:  1. Family will assist pt in completing [employer] mobility paperwork  2. Family will explore payment for family as  PCA support  3. Family will outreach to Care Coordination  for further questions or concerns            Outreach Frequency: monthly    Plan: CC SW will outreach to Sofi in 1 month to discuss overall wellbeing.    Cheyanne Yi, [facility]  Clinic Care Coordinator  [facility] Sarah Emmet  [facility]  [phone]  [email or web address]

## 2022-01-26 NOTE — TELEPHONE ENCOUNTER
Reason for Call:  Form, our goal is to have forms completed with 72 hours, however, some forms may require a visit or additional information.    Type of letter, form or note:  Home Health Certification (1/12/22-3/12/22)    Who is the form from?: Home care    Where did the form come from: form was faxed in    What clinic location was the form placed at?: [facility]    Where the form was placed: [facility] Box/Folder    What number is listed as a contact on the form?:   P: [phone]  F: [phone]       Additional comments: PLEASE REVIEW, SIGN, AND RETURN VIA FAX    Call taken on 1/26/2022 at 8:10 AM by Renee Guerrero

## 2022-02-01 ENCOUNTER — VIRTUAL VISIT (OUTPATIENT)
Dept: FAMILY MEDICINE | Facility: CLINIC | Age: 62
End: 2022-02-01
Payer: COMMERCIAL

## 2022-02-01 DIAGNOSIS — R18.8 CIRRHOSIS OF LIVER WITH ASCITES, UNSPECIFIED HEPATIC CIRRHOSIS TYPE (H): ICD-10-CM

## 2022-02-01 DIAGNOSIS — I10 ESSENTIAL HYPERTENSION, MALIGNANT: ICD-10-CM

## 2022-02-01 DIAGNOSIS — B18.1 CHRONIC VIRAL HEPATITIS B WITHOUT DELTA AGENT AND WITHOUT COMA (H): ICD-10-CM

## 2022-02-01 DIAGNOSIS — K74.60 CIRRHOSIS OF LIVER WITH ASCITES, UNSPECIFIED HEPATIC CIRRHOSIS TYPE (H): ICD-10-CM

## 2022-02-01 DIAGNOSIS — I69.30 HISTORY OF CVA WITH RESIDUAL DEFICIT: ICD-10-CM

## 2022-02-01 DIAGNOSIS — E87.6 HYPOPOTASSEMIA: ICD-10-CM

## 2022-02-01 DIAGNOSIS — E43 SEVERE PROTEIN-CALORIE MALNUTRITION (H): ICD-10-CM

## 2022-02-01 DIAGNOSIS — K59.00 CONSTIPATION, UNSPECIFIED CONSTIPATION TYPE: ICD-10-CM

## 2022-02-01 DIAGNOSIS — I63.89 CEREBROVASCULAR ACCIDENT (CVA) DUE TO OTHER MECHANISM (H): ICD-10-CM

## 2022-02-01 PROCEDURE — 99214 OFFICE O/P EST MOD 30 MIN: CPT | Mod: 95 | Performed by: NURSE PRACTITIONER

## 2022-02-01 RX ORDER — ACETAMINOPHEN 325 MG/1
325 TABLET ORAL EVERY 4 HOURS PRN
Qty: 90 TABLET | Refills: 3 | Status: SHIPPED | OUTPATIENT
Start: 2022-02-01 | End: 2024-09-05

## 2022-02-01 RX ORDER — CARVEDILOL 12.5 MG/1
12.5 TABLET ORAL 2 TIMES DAILY WITH MEALS
Qty: 180 TABLET | Refills: 3 | Status: SHIPPED | OUTPATIENT
Start: 2022-02-01 | End: 2023-02-28

## 2022-02-01 RX ORDER — MULTIVITAMIN
1 TABLET ORAL DAILY
Qty: 90 TABLET | Refills: 3 | Status: SHIPPED | OUTPATIENT
Start: 2022-02-01 | End: 2023-01-13

## 2022-02-01 RX ORDER — DOCUSATE SODIUM 100 MG/1
100 CAPSULE, LIQUID FILLED ORAL 2 TIMES DAILY PRN
Qty: 180 CAPSULE | Refills: 4 | Status: SHIPPED | OUTPATIENT
Start: 2022-02-01 | End: 2023-03-28

## 2022-02-01 RX ORDER — ENTECAVIR 0.5 MG/1
1 TABLET, FILM COATED ORAL DAILY
Qty: 90 TABLET | Refills: 4 | Status: SHIPPED | OUTPATIENT
Start: 2022-02-01 | End: 2023-01-13

## 2022-02-01 RX ORDER — AMLODIPINE BESYLATE 10 MG/1
10 TABLET ORAL DAILY
Qty: 90 TABLET | Refills: 3 | Status: SHIPPED | OUTPATIENT
Start: 2022-02-01 | End: 2023-02-28

## 2022-02-01 RX ORDER — ATORVASTATIN CALCIUM 40 MG/1
40 TABLET, FILM COATED ORAL EVERY EVENING
Qty: 90 TABLET | Refills: 3 | Status: SHIPPED | OUTPATIENT
Start: 2022-02-01 | End: 2023-01-31

## 2022-02-01 RX ORDER — ASPIRIN 81 MG/1
81 TABLET, CHEWABLE ORAL DAILY
Qty: 90 TABLET | Refills: 3 | Status: SHIPPED | OUTPATIENT
Start: 2022-02-01 | End: 2023-01-13

## 2022-02-01 RX ORDER — POTASSIUM CHLORIDE 750 MG/1
20 CAPSULE, EXTENDED RELEASE ORAL DAILY
Qty: 60 CAPSULE | Refills: 4 | Status: SHIPPED | OUTPATIENT
Start: 2022-02-01 | End: 2022-08-15

## 2022-02-01 NOTE — PROGRESS NOTES
Wilfredo is a 61 year old who is being evaluated via a billable telephone visit.      What phone number would you like to be contacted at? [phone]   How would you like to obtain your AVS? [facility]    Assessment & Plan   Problem List Items Addressed This Visit     Chronic viral hepatitis B without delta-agent (H)    Relevant Medications    entecavir (BARACLUDE) 0.5 MG tablet    Protein-calorie malnutrition (H)    Relevant Medications    multivitamin (ONE-DAILY) tablet    Unspecified cirrhosis of liver (H)    Relevant Medications    entecavir (BARACLUDE) 0.5 MG tablet      Other Visit Diagnoses     Constipation, unspecified constipation type        Relevant Medications    docusate sodium (COLACE) 100 MG capsule    Hypopotassemia        Relevant Medications    potassium chloride ER (MICRO-K) 10 MEQ CR capsule    History of CVA with residual deficit        Relevant Medications    aspirin (ASA) 81 MG chewable tablet    atorvastatin (LIPITOR) 40 MG tablet    Other Relevant Orders    Physical Therapy Referral    Essential hypertension, malignant        Relevant Medications    carvedilol (COREG) 12.5 MG tablet    amLODIPine (NORVASC) 10 MG tablet    Cerebrovascular accident (CVA) due to other mechanism (H)        Relevant Medications    acetaminophen (TYLENOL) 325 MG tablet         - Patient's sister is mostly concerned about his medication refills. Notes some difficulties with getting her brother's prescription. Discussed sending sending 90 day refills and this would reduce the amount of times she needs to call and request his medications.   - Discussed starting with land physical therapy and once it gets warmer, he can follow-up with pool therapy.   - Reminded her of a clinic appointment already scheduled and to see if she will be able to come in with her brother.     Prescription drug management  I spent a total of 28 minutes on the day of the visit.   Time spent doing chart review, history and exam, documentation  and further activities per the note       Patient Instructions   - Medications refilled.   - Physical therapy ordered.    - Call clinic with any questions or concerns.         Return for as scheduled. .    LINDA Hilliard CNP [facility]ob[facility] is a 61 year old who presents for the following health issues:   [place]izen of [place] phone .   Sister (Sofi)- primary care giver on the phone.     HPI     Patient's sister notes that they are in need of medication refills and also a physical therapy order.     I spoke with his sister today with the help of the : patient has significant dysarthria and aphasia.  She is still working on getting some resources to help with his care at home.  She describes her brother as sick, bedridden, not able to walk, and completely dependent on her for all cares.      Review of Systems   Constitutional, HEENT, cardiovascular, pulmonary, gi and gu systems are negative, except as otherwise noted.      Objective           Vitals:  No vitals were obtained today due to virtual visit.    Physical Exam   healthy, alert and no distress  PSYCH: Alert and oriented times 3; coherent speech, normal   rate and volume, able to articulate logical thoughts, able   to abstract reason, no tangential thoughts, no hallucinations   or delusions  His affect is normal  RESP: No cough, no audible wheezing, able to talk in full sentences  Remainder of exam unable to be completed due to telephone visits    Labs Reviewed.             Phone call duration: 30 minutes

## 2022-02-01 NOTE — PATIENT INSTRUCTIONS
- Medications refilled.   - Physical therapy ordered.    - Call clinic with any questions or concerns.

## 2022-02-21 ENCOUNTER — TELEPHONE (OUTPATIENT)
Dept: FAMILY MEDICINE | Facility: CLINIC | Age: 62
End: 2022-02-21

## 2022-02-21 ENCOUNTER — OFFICE VISIT (OUTPATIENT)
Dept: FAMILY MEDICINE | Facility: CLINIC | Age: 62
End: 2022-02-21
Payer: COMMERCIAL

## 2022-02-21 VITALS
BODY MASS INDEX: 18.3 KG/M2 | RESPIRATION RATE: 16 BRPM | DIASTOLIC BLOOD PRESSURE: 86 MMHG | HEART RATE: 65 BPM | SYSTOLIC BLOOD PRESSURE: 123 MMHG | OXYGEN SATURATION: 98 % | TEMPERATURE: 98.2 F | HEIGHT: 65 IN

## 2022-02-21 DIAGNOSIS — I69.351 HEMIPLEGIA AND HEMIPARESIS FOLLOWING CEREBRAL INFARCTION AFFECTING RIGHT DOMINANT SIDE (H): ICD-10-CM

## 2022-02-21 DIAGNOSIS — I44.2 ATRIOVENTRICULAR BLOCK, COMPLETE (H): ICD-10-CM

## 2022-02-21 DIAGNOSIS — Z12.11 SCREENING FOR MALIGNANT NEOPLASM OF COLON: ICD-10-CM

## 2022-02-21 DIAGNOSIS — N18.1 CHRONIC KIDNEY DISEASE, STAGE 1: ICD-10-CM

## 2022-02-21 DIAGNOSIS — Z87.891 PERSONAL HISTORY OF TOBACCO USE: Primary | ICD-10-CM

## 2022-02-21 PROCEDURE — 99214 OFFICE O/P EST MOD 30 MIN: CPT | Performed by: FAMILY MEDICINE

## 2022-02-21 PROCEDURE — G0296 VISIT TO DETERM LDCT ELIG: HCPCS | Performed by: FAMILY MEDICINE

## 2022-02-21 RX ORDER — IBUPROFEN 400 MG/1
TABLET, FILM COATED ORAL
COMMUNITY
Start: 2022-01-01 | End: 2024-09-05

## 2022-02-21 NOTE — PROGRESS NOTES
[facility] Progress Note          Assessment & Plan     Personal history of tobacco use  Discussed patient's tobacco history he by history does not have a long enough.  So is not eligible  - Prof Fee: Shared Decision Making Visit for Lung Cancer Screening    Screening for malignant neoplasm of colon  Here are willing to try the Cologuard test this will be sent to the house.  - COLOGUARD([employer]ACT SCI[employer]ES)    Chronic kidney disease, stage 1     - Home Care Referral    Hemiplegia and hemiparesis following cerebral infarction affecting right dominant side (H)  Patient needs significant help with meds set up will continue to do this will refer for home health care.  - Home Care Referral    Atrioventricular block, complete (H)  Patient has a history of AV block currently has a regular rate and no symptoms.        I spent a total of 32 minutes on the day of the visit.   Time spent doing chart review, history and exam, documentation and further activities per the note            Return in about 2 months (around 4/21/2022) for Hypertension.    Keven Fairbanks MD  [facility] NONA Villalobos is a 62 year old who presents for the following health issues   Patient presents with:  Establish Care: pt here to establish care and to discuss current medications with provider. no other questions or concerns.         HPI   Here to establish care- had primary care at [place]. Has a PCA and having a skilled nurse coming weekly. Family expressing concerns about the nurse. Family expresses concerns that they feel the nurse is not listening to family and do not feel respected. They would like a new skilled nurse.  They were also told by the agency that they would not be continue to follow-up with them and would not be working with this patient anymore.  Patient had a history of hemiplegia and hemiparesis following cerebral infarction involving the right dominant side.  Living Situation: Lives with  "family,   ADLs unable to stand or walk unable to dress or bathe. Family makes decisions for him. REcieves 10 hours of PCA care a day.   Reviewed medications: sister reports they are accurate.   Currently is clinically stable         Review of Systems         Objective    /86   Pulse 65   Temp 98.2  F (36.8  C) (Oral)   Resp 16   Ht 1.651 m (5' 5\")   SpO2 98%   BMI 18.30 kg/m    Body mass index is 18.3 kg/m .  Physical Exam  Vitals reviewed.   Constitutional:       General: He is not in acute distress.     Appearance: He is well-developed. He is not diaphoretic.   HENT:      Head: Normocephalic.   Eyes:      General: No scleral icterus.     Conjunctiva/sclera: Conjunctivae normal.   Neck:      Thyroid: No thyromegaly.   Cardiovascular:      Rate and Rhythm: Normal rate and regular rhythm.      Heart sounds: Normal heart sounds. No murmur heard.  Pulmonary:      Effort: Pulmonary effort is normal. No respiratory distress.      Breath sounds: Normal breath sounds. No wheezing.   Abdominal:      General: Bowel sounds are normal. There is no distension.      Palpations: Abdomen is soft. There is no hepatomegaly or splenomegaly.      Tenderness: There is no abdominal tenderness.   Musculoskeletal:      Cervical back: Normal range of motion.   Lymphadenopathy:      Cervical: No cervical adenopathy.   Skin:     General: Skin is warm and dry.   Neurological:      Mental Status: He is alert.      Cranial Nerves: Cranial nerves are intact.      Comments: Patient has right-sided hemiplegia and hemiparesis.  He is unable to ambulate independently does have movement of both arms and legs though is has more focus movement on his left side.   Psychiatric:      Comments: Patient answers simple questions with yes or no was able to declined immunizations but unable to say why he wanted to declined them generally family makes decisions for him except and less he declined something.                        Lung Cancer " Screening Shared Decision Making Visit     Wilfredo Yoon is not eligible for lung cancer screening on the basis of the information provided in my signed lung cancer screening order. Wilfredo's smoking history is below the threshold and so it is not recommended.    Patient is not currently a smoker and so we did discuss that the only way to prevent lung cancer is to not smoke. Smoking cessation counseling was not given.

## 2022-02-21 NOTE — TELEPHONE ENCOUNTER
3:38p.m Many Home Care Facilities are at capacity. CC will try and find one with availability. Forwarding message on to  as ADILENE.    Karly Kimble  Pronouns: She/Her/Hers  Care Coordinator  [facility]  [phone]

## 2022-02-21 NOTE — PATIENT INSTRUCTIONS
Here is the plan from today's visit    1. Personal history of tobacco use    - Prof Fee: Shared Decision Making Visit for Lung Cancer Screening    2. Screening for malignant neoplasm of colon  This will come to the house  - COLOGUARD(EXACT SCIENCES)    3. Chronic kidney disease, stage 1    - Home Care Referral    4. Hemiplegia and hemiparesis following cerebral infarction affecting right dominant side (H)  Ordered hjome care  - Home Care Referral    5. Atrioventricular block, complete (H)  stable      Please call or return to clinic if your symptoms don't go away.    Follow up plan  Return in about 2 months (around 4/21/2022) for Hypertension.     Thank you for coming to [facility] today.  Lab Testing:  **If you had lab testing today and your results are reassuring or normal they will be mailed to you or sent through [employer] within 7 days.   **If the lab tests need quick action we will call you with the results.  The phone number we will call with results is # [phone] (home) none (work). If this is not the best number please call our clinic and change the number.  Medication Refills:  If you need any refills please call your pharmacy and they will contact us.   If you need to  your refill at a new pharmacy, please contact the new pharmacy directly. The new pharmacy will help you get your medications transferred faster.   Scheduling:  If you have any concerns about today's visit or wish to schedule another appointment please call our office during normal business hours [phone] (8-5:00 M-F)   eferrals to [facility] please call [phone].   Mammogram Scheduling [phone]     XRay/CT/Ultrasound/MRI Scheduling [phone]    Medical Concerns:  If you have urgent medical concerns please call [phone] at any time of the day.    Keven Fairbanks MD

## 2022-02-21 NOTE — TELEPHONE ENCOUNTER
"HARPAL from [facility] called regarding referral for home care. HARPAL states they have to decline referral as they \"are at capacity\".  "

## 2022-03-04 ENCOUNTER — PATIENT OUTREACH (OUTPATIENT)
Dept: NURSING | Facility: CLINIC | Age: 62
End: 2022-03-04
Payer: COMMERCIAL

## 2022-03-04 NOTE — PROGRESS NOTES
Clinic Care Coordination Contact    Follow Up Progress Note      Assessment: DEMARCO ARAGON spoke with pt's sister, Sofi, regarding pt's overall wellbeing. Pt has established care with a new PCP at [facility]. This went well and pt will remain with this PCP. She would like to have appointment with Sana Meier cancelled and a 2 month follow up scheduled with Dr. Fairbanks. DEMARCO ARAGON will assist with this.    DEMARCO ARAGON inquired about using [employer]. Sofi shared that she hasn't gotten any confirmation from [employer] that he is able to use this. DEMARCO ARAGON inquired about the card she stated she got last month but Sofi denied having gotten this. DEMARCO ARAGON will outreach to [employer] to understand the problem. DEMARCO ARAGON will also outreach to pt's [facility] to further discuss issues pt has been having with accessing this.     They haven't gone to the Physical Therapist because they did not have transportation.    Care Gaps:    Health Maintenance Due   Topic Date Due     PREVENTIVE CARE VISIT  Never done     Pneumococcal Vaccine: Pediatrics (0 to 5 Years) and At-Risk Patients (6 to 64 Years) (1 of 2 - PPSV23) Never done     COLORECTAL CANCER SCREENING  Never done     ZOSTER IMMUNIZATION (1 of 2) Never done     LUNG CANCER SCREENING  Never done     LIPID  07/18/2021     MICROALBUMIN  07/24/2021     COVID-19 Vaccine (3 - Booster for Moderna series) 08/04/2021     INFLUENZA VACCINE (1) 09/01/2021     Care Gaps Last addressed on 2/21/2022    Goals addressed this encounter:   Goals        Patient Stated       1. Medical (pt-stated)       Goal Statement: Over the next 6 months, Wilfredo's family would like to assist him to following medical recommendation to support his health and wellbeing.   Date Goal Set: 4/15/2021  Barriers: Complexity of medical needs  Strengths: Strong support system  Date to Achieve By: 10/15/2021  Patient expressed understanding of goal: yes    Action steps to achieve this goal:  1. Family will assist pt in  completing [employer] paperwork  2. Family will explore payment for family as PCA support  3. Family will outreach to Care Coordination  for further questions or concerns           Outreach Frequency: monthly    Plan: CC SW will outreach to scheduling to assist with making a PCP appointment. CC SW will also speak with [employer] and anthony to discuss transportation.    Cheyanne Yi [facility]  Clinic Care Coordinator  [facility] Sarah [place]  [facility]  [phone]  [email or web address]

## 2022-03-04 NOTE — PROGRESS NOTES
"Clinic Care Coordination Contact  Care Team Conversations    DEMARCO ARAGON spoke with [facility] transportation. DEMARCO ARAGON explained that due to problems in the past with transportation pt has not been utilizing rides. [facility] stated they could set up rides with a different company for special transportation. Pt is set up for special rides with a person to accompany. This doesn't have an end date.    Sofi will call [phone] - direct transportation line, they answer and she asks for . She needs to give more than 2 days in advance notice but she can call up to 1 month ahead.     When  gets to her home some companies call or text when they arrive but some don't. Pt needs be ready and watching for  at  time.     Can book for return time or \"will call\" and they call the company directly. Company may not have interpreters. She can call [phone] for  and help with getting the return call.     If any problems occur, Sofi can call to explain that a problem occurred.     DEMARCO ARAGON tried to call [employer] but they will not speak without pt's permission.    Plan: DEMARCO ARAGON will outreach to Sofi to further discuss transportation.    Cheyanne Yi [facility]  Clinic Care Coordinator  [facility] Sarah [place]  [facility]  [phone]  [email or web address]  "

## 2022-03-04 NOTE — PROGRESS NOTES
Clinic Care Coordination Contact    Follow Up Progress Note      Assessment: DEMARCO ARAGON returned call to pt's sister to relay information on [employer] [place] transportation. She wants to know if he has a cab through [place]. DEMARCO ARAGON explained that [employer] is [place]'s [place].     Sofi stated that she doesn't want to speak with the transportation company through an  and only wants to speak with someone directly in [place] citizen. DEMARCO ARAGON explained that it is unknown the languages that all staff know and it is not possible to guarantee the person that answers to set up the ride speaks [place] citizen. Pt stated that they have had [place] citizen speaking drivers before and this was helpful. DEMARCO ARAGON explained she could request this and they may be able to accommodate if they have the staff.    Sofi shared that she has called the [place] transportation number in the past and though she was speaking directly with a cab company. DEMARCO ARAGON clarify that this number is through [place] transportation and not a cab. Pt shared great frustration with this company because they would often have long wait times for cabs to pick them up from appointments.    Zeenat shared that she would rather contact the cab company directly to set up rides because it would be faster. DEMARCO ARAGON explained she needs to have [place] set up the rides because otherwise they won't be able to pay for it. DEMARCO ARAGON explained she could request a specific company to be used if she has one that works better. DEMARCO ARAGON also explained that to make the return rides faster would be to set up a return ride in advance rather than will call.     Plan: DEMARCO ARAGON will call Sofi next week to schedule PCP appointment.     Cheyanne Yi [facility]  Clinic Care Coordinator  [facility] Sarah [place]  [facility]  [phone]  [email or web address]

## 2022-03-08 NOTE — TELEPHONE ENCOUNTER
After many calls to Home Health Care facilities, CC was told that [facility] was taking new patients. CC faxed order to [phone]. Successful send.    Karly Kimble  Pronouns: She/Her/Hers  Care Coordinator  [facility]  [phone]

## 2022-03-15 NOTE — PROGRESS NOTES
Clinic Care Coordination Contact  Care Team Conversations    DEMARCO ARAGON spoke with [facility] Scheduling. April's scheduling is not yet available for appointments. Pt's new provider Dr. Fairbanks will be out of clinic from 4/8 to 5/8. Due to complexity of pt's needs, pt will await PCP's return for follow up appointment. The schedule will become available around mid-April for scheduling.    Plan: DEMARCO ARAGON will inform [facility] about PCP follow up plan.     Cheyanne Yi [facility]  Clinic Care Coordinator  [facility] Sarah [place]  [facility]  [phone]  [email or web address]

## 2022-03-16 ENCOUNTER — TELEPHONE (OUTPATIENT)
Dept: FAMILY MEDICINE | Facility: CLINIC | Age: 62
End: 2022-03-16
Payer: COMMERCIAL

## 2022-03-16 NOTE — TELEPHONE ENCOUNTER
[facility] phone call message - order or referral request for patient:     Order or referral being requested: Skilled Nursing Orders      Additional Comments: Santyrodrick is the  for the patient and will like a call back concerning nursing orders she said was supposed to be put in on 2/21/22 by Dr Fairbanks.    OK to leave a message on voice mail? Yes    Primary language: Ecuadorean      needed? Yes    Call taken on March 16, 2022 at 3:57 PM by Anahi Dockery

## 2022-03-17 NOTE — TELEPHONE ENCOUNTER
3/17/22  attempted to reach Dorina ([phone]). Had to leave a message on Dorina's voicemail.    Karly Kimble  Pronouns: She/Her/Hers  Care Coordinator  [facility]  [phone]

## 2022-03-22 NOTE — TELEPHONE ENCOUNTER
3/18/22 CCdid speak to  Dorina and she requested that Home Care order be faxed ([phone]) to [facility] ([phone]). Successful send.      Karly Kimble  Pronouns: She/Her/Hers  Care Coordinator  [facility]  [phone]

## 2022-03-25 ENCOUNTER — TELEPHONE (OUTPATIENT)
Dept: FAMILY MEDICINE | Facility: CLINIC | Age: 62
End: 2022-03-25
Payer: COMMERCIAL

## 2022-03-25 DIAGNOSIS — I69.351 HEMIPLEGIA AND HEMIPARESIS FOLLOWING CEREBRAL INFARCTION AFFECTING RIGHT DOMINANT SIDE (H): Primary | ICD-10-CM

## 2022-03-25 DIAGNOSIS — N39.46 MIXED INCONTINENCE URGE AND STRESS (MALE)(FEMALE): ICD-10-CM

## 2022-03-25 NOTE — TELEPHONE ENCOUNTER
Reason for Call:  Medication or medication refill:    Do you use a [facility]?  Name of the pharmacy and phone number for the current request:  [facility]    Name of the medication requested: LARGE DISPOSABLE UNDERPAD, ADULT SIZE BRIEF/DIAPER MEDIUM    Other request: N/A    Can we leave a detailed message on this number? YES    Phone number patient can be reached at: Home number on file [phone] (home)    Best Time: ANY    Call taken on 3/25/2022 at 1:54 PM by Mirella Ward

## 2022-03-28 NOTE — TELEPHONE ENCOUNTER
POD,    Pt requesting DME. However, multiple scheduled and canceled appts. Does not look like he has been seen since Dr. Soto was here.     Thanks,  VAISHALI South  [facility]

## 2022-03-28 NOTE — TELEPHONE ENCOUNTER
Patient was seen once: 2/1/22 on a telephone visit- sister as his primary care giver. He will need to get established with a new provider once I leave.   DME signed.   LINDA Esparza CNP

## 2022-03-29 NOTE — TELEPHONE ENCOUNTER
It looks like he was established at [facility] 2/21/22 with Dr. Fairbanks- lets have him continue there.   Thanks!  Can we forward his medical requests to [facility].   LINDA Esparza CNP

## 2022-03-29 NOTE — TELEPHONE ENCOUNTER
DME (Durable Medical Equipment) Orders and Documentation  Orders Placed This Encounter   Procedures     Incontinence Supplies Order      The patient was assessed and it was determined the patient is in need of the following listed DME Supplies/Equipment. Please complete supporting documentation below to demonstrate medical necessity.      Incontinence Supplies Documentation  Incontinence supplies are needed due to patient has incontinence of stool and urine because of inablity to ambulate and to sense when he needs to use the toilet..

## 2022-03-31 ENCOUNTER — TELEPHONE (OUTPATIENT)
Dept: FAMILY MEDICINE | Facility: CLINIC | Age: 62
End: 2022-03-31
Payer: COMMERCIAL

## 2022-03-31 DIAGNOSIS — R19.5 POSITIVE COLORECTAL CANCER SCREENING USING COLOGUARD TEST: Primary | ICD-10-CM

## 2022-03-31 NOTE — TELEPHONE ENCOUNTER
RN spoke with Rao at [facility]- confirmed that patient's Cologuard that Dr. Fairbanks prescribed in February came back positive. RN requested that they re-fax results to us- provided [facility] RN direct fax of [phone]. Once result received, will place in provider folder. Routing to PCP high priority.  Jeni Bruce RN

## 2022-03-31 NOTE — TELEPHONE ENCOUNTER
"[facility] phone call message- patient requesting to speak with PCP or provider:    PCP: Keven Fairbanks    Additional Comments: Juvencio provider support from [facility] called and said that patient has \"abnormal cologuard results\". They said they faxed over paperwork on 3/28/22 to Dr. Fairbanks. Their call back number is [phone].     Is a call back needed? Yes    Patient informed that it may take up to 2 business days to hear back from PCP:Yes    OK to leave a message on voice mail? Yes    Primary language: North Korean      needed? Yes    Call taken on March 31, 2022 at 10:54 AM by Nickie Hull      "

## 2022-04-05 NOTE — TELEPHONE ENCOUNTER
RN called [facility] again as we have still not received results- spoke with Randi at provider support. Gave her RN fax number again of [phone]. She states she will re-send results now. RN also updated [facility] fax number associated with Dr. Fairbanks to our current Medical Records Fax of [phone].  Jeni Bruce RN

## 2022-04-05 NOTE — TELEPHONE ENCOUNTER
RN received result fax for patient with positive Cologuard result from 3/23/22. Placed result in Dr. Fairbanks's mailbox and will route to provider for next steps.   Jeni Bruce RN

## 2022-04-06 PROBLEM — R19.5 POSITIVE COLORECTAL CANCER SCREENING USING COLOGUARD TEST: Status: ACTIVE | Noted: 2022-04-06

## 2022-04-06 NOTE — TELEPHONE ENCOUNTER
RN spoke to patient's sister Bren who is primary caregiver via  [id number]. Relayed message that Cologard was positive and patient needs colonoscopy to check for polyps and other signs of cancer. Advised that positive Cologard does not mean patient has cancer- it detects abomal DNA but puts him at high risk for cancer which is why we need to do this follow up test. Explained that the colonoscopy is a procedure using flexible tube with camera on it to examine patient's intestinal tract through the anus. Advised it will require preparatory medications which a provider or nurse will go through with them after it is scheduled. She verbalized understanding requesting assistance with setting this up but did not have time to do it now. Requesting CC schedule for patient- sister states any date or time works. Patient will need assistance as he is in a wheelchair and needs assistance with transferring. Routing to CC to schedule and contact sister with details.   Jeni Bruce, RN              Please contact patient and family with  with the + cologuard result and the recommendation to have a colonoscopy. In people with + Cologuard 1 in 10 will not show a polyp or cancer on the follow up colonoscopy. That means that 9/10 there is findings.   Robert PORTILLO

## 2022-04-06 NOTE — TELEPHONE ENCOUNTER
Please contact patient's family and let them know I have ordered a colonoscopy because the Cologard test was positive -this greatly increases the risk of colon cancer and I recommend it.  Patient is not his own decision maker

## 2022-04-06 NOTE — TELEPHONE ENCOUNTER
3:05p.m Colonoscopy referral is internal, so was auto sent to Colonoscopy scheduling. They will reach out to patient/family directly and do assessment and scheduling of procedure.      Karly Kimble  Pronouns: She/Her/Hers  Care Coordinator  [facility]  [phone]

## 2022-04-07 ENCOUNTER — PATIENT OUTREACH (OUTPATIENT)
Dept: NURSING | Facility: CLINIC | Age: 62
End: 2022-04-07
Payer: COMMERCIAL

## 2022-04-07 NOTE — PROGRESS NOTES
Clinic Care Coordination Contact    Follow Up Progress Note      Assessment: DEMARCO ARAGON spoke with pt's sister, Sofi. She shared that pt is doing well overall but Home Care has not started working with pt. She would like to have a nurse come out for pt.    Discussed PT. They will be starting 4/10 at [facility]. Discussed transportation. DEMARCO ARAGON encouraged her to call ASAP as he is in need of wheelchair transport and there are less vehicles for this. DEMARCO ARAGON shared that she can schedule rides up to a month in advance.    They are waiting for pt's next appointment to discuss what is next for pt's care. Sofi shared confusion regarding the different roles of PT and PCP. DEMARCO ARAGON explained how these roles differ.    DEMARCO ARAGON connected with [facility] Care Coordinator to provide warm handoff. As pt is now established care at [facility], he will now receive support from his care team there. DEMARCO ARAGON explained this transition to [facility], she stated understanding. Karly will outreach to [facility] next week to introduce self.    Care Gaps:    Health Maintenance Due   Topic Date Due     PREVENTIVE CARE VISIT  Never done     Pneumococcal Vaccine: Pediatrics (0 to 5 Years) and At-Risk Patients (6 to 64 Years) (1 of 2 - PPSV23) Never done     COLORECTAL CANCER SCREENING  Never done     ZOSTER IMMUNIZATION (1 of 2) Never done     LUNG CANCER SCREENING  Never done     LIPID  07/18/2021     MICROALBUMIN  07/24/2021     COVID-19 Vaccine (3 - Booster for Moderna series) 08/04/2021     INFLUENZA VACCINE (1) 09/01/2021     Patient accepted scheduling phone number for [facility]  to schedule independently     Goals addressed this encounter:   Goals Addressed                    This Visit's Progress       Patient Stated       1. Medical (pt-stated)   70%      Goal Statement: Over the next 6 months, Wilfredo's family would like to assist him to following medical recommendation to support his health and wellbeing.   Date Goal Set:  4/15/2021  Barriers: Complexity of medical needs  Strengths: Strong support system  Date to Achieve By: 10/15/2021 // date extended 6/1/2022  Patient expressed understanding of goal: yes    Action steps to achieve this goal:  1. Family will assist pt in completing [employer] paperwork  2. Family will explore payment for family as PCA support  3. Family will outreach to Care Coordination  for further questions or concerns            Outreach Frequency: monthly    Plan: CC SW will make no further outreaches to pt. CC at [facility] will perform next outreach and introduce herself to pt's sister.    Cheyanne Yi [facility]  Clinic Care Coordinator  [facility] Sarah [place]  [facility]  [phone]  [email or web address]

## 2022-04-11 ENCOUNTER — TELEPHONE (OUTPATIENT)
Dept: CARE COORDINATION | Facility: CLINIC | Age: 62
End: 2022-04-11

## 2022-04-11 NOTE — TELEPHONE ENCOUNTER
Patient's family came in and requested the following medications be refilled: Aspirin, Atorvastatin, Multivitamin. SW unable to see if medications have refills still available. Patient's family requested a phone call at [phone] on whether medications get refilled or not.     JOHNNY Huddleston  Pronouns: She/Her/Hers  , Care Coordination  [facility]  [phone]

## 2022-04-11 NOTE — TELEPHONE ENCOUNTER
RN attempted to call family back,  with call back number. If they return call, please relay message below about refills. Okay to transfer to RN if they have questions. Please document if they receive message.   Jeni Bruce, VAISHALI    According to chart, patient should have refills of all these medications at [facility]. Rn spoke to pharmacy who confirms he has refills of all three however, too soon to fill atorvastatin as it was last filled on 2/1/22 for 90 day supply so not due until 5/2/22. They will run both the Asprin and MV today.

## 2022-04-12 ENCOUNTER — PATIENT OUTREACH (OUTPATIENT)
Dept: CARE COORDINATION | Facility: CLINIC | Age: 62
End: 2022-04-12
Payer: COMMERCIAL

## 2022-04-12 NOTE — PROGRESS NOTES
Clinic Care Coordination Contact  Care Team Conversations    DEMARCO ARAGON received a call from Gina, a past home care nurse of pts. She shared that she received a phone call from pt's sister, Sofi, very upset because pt is in need of a Home Care nurse to assist with pt's cares.     DEMARCO ARAGON shared that due to pt now working with [facility] for his medical care, he will now be working with a CC at that clinic.     Plan: DEMARCO ARAGON will route message to Karly to assist with Home Care.    Cheyanne Yi [facility]  Clinic Care Coordinator  [facility] Sarah Holt  [facility]  [phone]  [email or web address]

## 2022-04-13 ENCOUNTER — DOCUMENTATION ONLY (OUTPATIENT)
Dept: FAMILY MEDICINE | Facility: CLINIC | Age: 62
End: 2022-04-13

## 2022-04-13 ENCOUNTER — TELEPHONE (OUTPATIENT)
Dept: CARE COORDINATION | Facility: CLINIC | Age: 62
End: 2022-04-13
Payer: COMMERCIAL

## 2022-04-13 ENCOUNTER — OFFICE VISIT (OUTPATIENT)
Dept: FAMILY MEDICINE | Facility: CLINIC | Age: 62
End: 2022-04-13
Payer: COMMERCIAL

## 2022-04-13 VITALS
RESPIRATION RATE: 14 BRPM | HEART RATE: 60 BPM | SYSTOLIC BLOOD PRESSURE: 128 MMHG | DIASTOLIC BLOOD PRESSURE: 86 MMHG | TEMPERATURE: 98 F | OXYGEN SATURATION: 99 %

## 2022-04-13 DIAGNOSIS — E78.2 MIXED HYPERLIPIDEMIA: ICD-10-CM

## 2022-04-13 DIAGNOSIS — I69.351 HEMIPLEGIA AND HEMIPARESIS FOLLOWING CEREBRAL INFARCTION AFFECTING RIGHT DOMINANT SIDE (H): Primary | ICD-10-CM

## 2022-04-13 DIAGNOSIS — N05.5 UNSPECIFIED NEPHRITIC SYNDROME WITH DIFFUSE MESANGIOCAPILLARY GLOMERULONEPHRITIS: ICD-10-CM

## 2022-04-13 DIAGNOSIS — N18.1 CHRONIC KIDNEY DISEASE, STAGE 1: ICD-10-CM

## 2022-04-13 DIAGNOSIS — B18.1 CHRONIC VIRAL HEPATITIS B WITHOUT DELTA AGENT AND WITHOUT COMA (H): ICD-10-CM

## 2022-04-13 DIAGNOSIS — I44.2 ATRIOVENTRICULAR BLOCK, COMPLETE (H): ICD-10-CM

## 2022-04-13 PROCEDURE — 99213 OFFICE O/P EST LOW 20 MIN: CPT | Performed by: FAMILY MEDICINE

## 2022-04-13 NOTE — TELEPHONE ENCOUNTER
"Received warm hand off from Keena ARAGON at [facility], Keena has been working with patient/family, however we are his Primary Clinic and  is his PCP. Patient sister Lyndsey mentioned that Home Care has not yet been out to the patient home.   Gina [phone].    CC spoke to Gina, CC explained that Dorina  from [facility] said that the Home Care order needs to be faxed to [facility], CC faxed order on 3/18/22.  CC will contact [facility] [phone] to find out status of order and will contact Gina back with information.  (Gina did mention order could be faxed to Home Health Care)  Looking into chart, patient has been getting home care from [facility], Gina states the \"family does not want them anymore, family has a complaint\".      CC contacted [facility] and was transferred to Christy, CC had to leave message along with direct number. CC then contacted Gina, I informed her that I would call her back once I hear from Kimberly at [facility]. Gina agreed.      Karly Kimble  Pronouns: She/Her/Hers  Care Coordinator  [facility]  [phone]         "

## 2022-04-13 NOTE — PROGRESS NOTES
"When opening a documentation only encounter, be sure to enter in \"Chief Complaint\" Forms and in \" Comments\" Title of form, description if needed.    Wilfredo is a 62 year old  male  Form received via: Fax  Form now resides in: Provider Ready    Sydnee Gutierrez MA    Form has been completed by provider.     Form sent out via: Fax to [employer] at Fax Number: [phone]  Patient informed: No, Reason:n/a  Output date: April 19, 2022    Sydnee Gutierrez MA      **Please close the encounter**                  "

## 2022-04-15 ENCOUNTER — MEDICAL CORRESPONDENCE (OUTPATIENT)
Dept: HEALTH INFORMATION MANAGEMENT | Facility: CLINIC | Age: 62
End: 2022-04-15
Payer: COMMERCIAL

## 2022-04-15 NOTE — TELEPHONE ENCOUNTER
This sounds good to me.  I think if he is getting the therapy at [facility] then it would be fine to use the agency that doesn't have it.  Chava Mcmillan MD

## 2022-04-15 NOTE — TELEPHONE ENCOUNTER
"4/15/22 CC received call back from Christy at [facility] ([phone]) Per Christy \"we have no availability at this time\"chief complaint then reached out to several other Home Care Agencies:    [facility]  at [facility]  [facility], [facility] Jenny [phone] at [facility]  [facility] Lovely [phone] Do not accept insurance  [facility] Eugenie [phone] Do not take insurane  [facility] [phone] Left message  [facility], [facility] [phone] \"We do not have servicecs here\"  [facility] [phone] \"can not take call at this time\"  [facility] [phone] left message    Contacted Gina and explained where we were at with finding different Home Care.Gina asked me to try:    [facility] [phone] No speech/OT therapy    CC contacted Gina back and informed her that [facility] has no PT/OT/Speech. Gina stated that all the patient needs is an RN, skilled nursing, patient has our patient PT/OT at [facility]. CC explained to Gina that CC will send message to provider letting them know this and will call her back once CC has information /direction from provider, Gina agreed. Forwarding message to  who is covering for .      Karly Kimble  Pronouns: She/Her/Hers  Care Coordinator  [facility]  [phone]    "

## 2022-04-17 ENCOUNTER — DOCUMENTATION ONLY (OUTPATIENT)
Dept: FAMILY MEDICINE | Facility: CLINIC | Age: 62
End: 2022-04-17
Payer: COMMERCIAL

## 2022-04-17 NOTE — PROGRESS NOTES
Wilfredo was seen today for referral.    Diagnoses and all orders for this visit:    Hemiplegia and hemiparesis following cerebral infarction affecting right dominant side (H).  He has significant generalized sarcopenia and right-sided weakness, but does have some muscle strength on the right.  I feel that a course of physical therapy, which she has not engaged in since his stroke, is worth a try.  -     Physical Therapy Referral; Future    Mixed hyperlipidemia.  Reinforced the importance of continuing a statin given his cerebrovascular disease.    Chronic kidney disease, stage 1.  Stable continue current care.            Aline Villalobos is a 62 year old who presents for the following health issues     HPI the patient presents with his sister, with whom he lives, and his nephew serves as an .  The patient had a left CVA with right hemiplegia resulting in August 2020.  He has been wheelchair-bound since then.  The family and patient are interested in the possibility of getting him strengthen to the point where he can walk again.  They feel that his strength has improved somewhat on the right side.          Review of Systems         Objective    /86 (BP Location: Left arm, Patient Position: Sitting, Cuff Size: Adult Small)   Pulse 60   Temp 98  F (36.7  C) (Oral)   Resp 14   SpO2 99%   There is no height or weight on file to calculate BMI.  Physical Exam  Constitutional:       Comments: Thin alert male seated in wheelchair   Cardiovascular:      Rate and Rhythm: Regular rhythm.      Heart sounds: Normal heart sounds.   Pulmonary:      Breath sounds: Normal breath sounds.   Musculoskeletal:      Comments: Generalized sarcopenia, worse on the right than the left   Neurological:      Comments: There is right-sided hemiplegia with strength in his right arm and right leg at 3/5.  Some mild contracture of the right biceps.            Lab on 01/19/2022   Component Date Value Ref Range Status      WBC Count 01/19/2022 4.2  4.0 - 11.0 10e3/uL Final     RBC Count 01/19/2022 4.97  4.40 - 5.90 10e6/uL Final     Hemoglobin 01/19/2022 15.4  13.3 - 17.7 g/dL Final     Hematocrit 01/19/2022 45.4  40.0 - 53.0 % Final     MCV 01/19/2022 91  78 - 100 fL Final     MCH 01/19/2022 31.0  26.5 - 33.0 pg Final     MCHC 01/19/2022 33.9  31.5 - 36.5 g/dL Final     RDW 01/19/2022 12.7  10.0 - 15.0 % Final     Platelet Count 01/19/2022 161  150 - 450 10e3/uL Final     Sodium 01/19/2022 144  133 - 144 mmol/L Final     Potassium 01/19/2022 3.6  3.4 - 5.3 mmol/L Final     Chloride 01/19/2022 107  94 - 109 mmol/L Final     Carbon Dioxide (CO2) 01/19/2022 27  20 - 32 mmol/L Final     Anion Gap 01/19/2022 10  3 - 14 mmol/L Final     Urea Nitrogen 01/19/2022 19  7 - 30 mg/dL Final     Creatinine 01/19/2022 1.08  0.66 - 1.25 mg/dL Final     Calcium 01/19/2022 8.8  8.5 - 10.1 mg/dL Final     Glucose 01/19/2022 78  70 - 99 mg/dL Final     Alkaline Phosphatase 01/19/2022 124  40 - 150 U/L Final     AST 01/19/2022 33  0 - 45 U/L Final     ALT 01/19/2022 36  0 - 70 U/L Final     Protein Total 01/19/2022 6.9  6.8 - 8.8 g/dL Final     Albumin 01/19/2022 2.5 (A) 3.4 - 5.0 g/dL Final     Bilirubin Total 01/19/2022 0.7  0.2 - 1.3 mg/dL Final     GFR Estimate 01/19/2022 79  >60 mL/min/1.73m2 Final    Effective December 21, 2021 eGFRcr in adults is calculated using the 2021 CKD-EPI creatinine equation which includes age and gender (Duke et al., NEJM, DOI: 10.1056/ZNUGss6851059)     INR 01/19/2022 1.12  0.85 - 1.15 Final     AFP tumor marker 01/19/2022 6.3  0.0 - 8.0 ug/L Final     Hepatitis B DNA IU/mL 01/19/2022 <20 (A) Not Detected IU/mL Final     Hepatitis B log 01/19/2022 <1.3   Final     Hepatitis B Surface Antigen 01/19/2022 Reactive (A) Nonreactive Final    Confirmed Reactive

## 2022-04-17 NOTE — PROGRESS NOTES
[facility] will be sharing updates with you on Referral requests for home care services.  This is for care coordination purposes and alert you to referral status.    Dear / Hipolito,   Thank You for the referral for [facility], for Wilfredo Yoon; MRN [id number].  At this time, [facility] is experiencing a high volume of referrals and is not able to meet your patient s needs in a safe and timely fashion for Home Care Services.  We have declined this referral.   Thank you again for the referral,  Della Jones,   [phone]

## 2022-04-18 NOTE — PROGRESS NOTES
4/18/22 CC faxed Home Care order to [facility] ([phone]). Put Gina's  ([phone]) name and number on cover sheet as patient contact. Юлия has been informed.    Karly Kimble  Pronouns: She/Her/Hers  Care Coordinator  [facility]  [phone]

## 2022-04-18 NOTE — TELEPHONE ENCOUNTER
4/18/22 [facility][phone]). Put Gina's  ([phone]) name and number on cover sheet as patient contact. Юлия has been informed.    Karly Kimble  Pronouns: She/Her/Hers  Care Coordinator  [facility]  [phone]

## 2022-04-18 NOTE — TELEPHONE ENCOUNTER
Fowarding this on to care coordination - per chart review it appears patient is receiving skilled RN services through a different company.     Gris Hernandez DO  (Covering for Dr. Faribanks).

## 2022-04-25 ENCOUNTER — TELEPHONE (OUTPATIENT)
Dept: FAMILY MEDICINE | Facility: CLINIC | Age: 62
End: 2022-04-25
Payer: COMMERCIAL

## 2022-04-25 NOTE — TELEPHONE ENCOUNTER
[facility] phone call message - order or referral request for patient:     Order or referral being requested: Verbal Orders      Additional Comments:   -Skilled Nursing 1x a week; every week for 8 weeks  -3PRN visits for change in status        OK to leave a message on voice mail? Yes    Primary language: Namibian      needed? Yes    Call taken on April 25, 2022 at 8:34 AM by Anahi Dockery

## 2022-04-25 NOTE — TELEPHONE ENCOUNTER
Returned call to home care nurse, unable to reach. Left VM with verbal orders per protocol as requested. Left callback number for questions.    Renee Vaughan RN

## 2022-04-28 ENCOUNTER — THERAPY VISIT (OUTPATIENT)
Dept: PHYSICAL THERAPY | Facility: CLINIC | Age: 62
End: 2022-04-28
Attending: FAMILY MEDICINE
Payer: COMMERCIAL

## 2022-04-28 DIAGNOSIS — I69.351 HEMIPLEGIA AND HEMIPARESIS FOLLOWING CEREBRAL INFARCTION AFFECTING RIGHT DOMINANT SIDE (H): ICD-10-CM

## 2022-04-28 PROCEDURE — 97530 THERAPEUTIC ACTIVITIES: CPT | Mod: GP | Performed by: PHYSICAL THERAPIST

## 2022-04-28 PROCEDURE — 97542 WHEELCHAIR MNGMENT TRAINING: CPT | Mod: GP | Performed by: PHYSICAL THERAPIST

## 2022-04-28 PROCEDURE — 97161 PT EVAL LOW COMPLEX 20 MIN: CPT | Mod: GP | Performed by: PHYSICAL THERAPIST

## 2022-04-29 ENCOUNTER — DOCUMENTATION ONLY (OUTPATIENT)
Dept: FAMILY MEDICINE | Facility: CLINIC | Age: 62
End: 2022-04-29
Payer: COMMERCIAL

## 2022-04-29 NOTE — PROGRESS NOTES
"When opening a documentation only encounter, be sure to enter in \"Chief Complaint\" Forms and in \" Comments\" Title of form, description if needed.    Wilfredo is a 62 year old  male  Form received via: Fax  Form now resides in: Provider Ready    Sydnee Gutierrez MA    Form has been completed by provider.     Form sent out via: Fax to [facility] at Fax Number: [phone]  Patient informed: No, Reason:n/a  Output date: May 2, 2022    Sydnee Gutierrez MA      **Please close the encounter**                    "

## 2022-05-03 ENCOUNTER — DOCUMENTATION ONLY (OUTPATIENT)
Dept: FAMILY MEDICINE | Facility: CLINIC | Age: 62
End: 2022-05-03
Payer: COMMERCIAL

## 2022-05-03 NOTE — PROGRESS NOTES
"When opening a documentation only encounter, be sure to enter in \"Chief Complaint\" Forms and in \" Comments\" Title of form, description if needed.    Wilfredo is a 62 year old  male  Form received via: Fax  Form now resides in: Provider Ready    Sydnee Gutierrez MA    Form has been completed by provider.     Form sent out via: Fax to [employer] at Fax Number: [phone]  Patient informed: No, Reason:n/a  Output date: May 10, 2022    Sydnee Gutierrez MA      **Please close the encounter**              "

## 2022-05-12 ENCOUNTER — OFFICE VISIT (OUTPATIENT)
Dept: FAMILY MEDICINE | Facility: CLINIC | Age: 62
End: 2022-05-12
Payer: COMMERCIAL

## 2022-05-12 VITALS
HEART RATE: 59 BPM | OXYGEN SATURATION: 100 % | DIASTOLIC BLOOD PRESSURE: 83 MMHG | RESPIRATION RATE: 16 BRPM | SYSTOLIC BLOOD PRESSURE: 129 MMHG

## 2022-05-12 DIAGNOSIS — G89.4 CHRONIC PAIN SYNDROME: Primary | ICD-10-CM

## 2022-05-12 DIAGNOSIS — R53.1 WEAKNESS: ICD-10-CM

## 2022-05-12 DIAGNOSIS — I69.90 LATE EFFECTS OF CVA (CEREBROVASCULAR ACCIDENT): ICD-10-CM

## 2022-05-12 PROBLEM — M06.9 RHEUMATOID ARTHRITIS (H): Status: ACTIVE | Noted: 2022-05-12

## 2022-05-12 PROCEDURE — 99214 OFFICE O/P EST MOD 30 MIN: CPT | Performed by: FAMILY MEDICINE

## 2022-05-12 RX ORDER — GABAPENTIN 100 MG/1
100 CAPSULE ORAL 3 TIMES DAILY
Qty: 90 CAPSULE | Refills: 1 | Status: SHIPPED | OUTPATIENT
Start: 2022-05-12 | End: 2022-07-15

## 2022-05-12 NOTE — NURSING NOTE
Due to patient being non-English speaking/uses sign language, an  was used for this visit. Only for face-to-face interpretation by an external agency, date and length of interpretation can be found on the scanned worksheet.     name:Miguel Intepertalex  Agency: [employer]  Language: Malian   ID number:[id number]  Type of interpretation: Telephone, spoken

## 2022-05-12 NOTE — PATIENT INSTRUCTIONS
Here is the plan from today's visit    1. Chronic pain syndrome  Try this cream and medication.   - diclofenac (VOLTAREN) 1 % topical gel; Apply 4 grams to knees or 2 grams to hands four times daily using enclosed dosing card.  Dispense: 100 g; Refill: 1  - gabapentin (NEURONTIN) 100 MG capsule; Take 1 capsule (100 mg) by mouth 3 times daily  Dispense: 90 capsule; Refill: 1            Please call or return to clinic if your symptoms don't go away.    Follow up plan  Return in about 3 months (around 8/12/2022) for In person Visit.     Thank you for coming to [facility] today.  Lab Testing:  **If you had lab testing today and your results are reassuring or normal they will be mailed to you or sent through [facility] within 7 days. Please call us at [phone] if you do not receive your results within 2 weeks.   **If the lab tests need quick action we will call you with the results. The phone number we will call with results is # [phone] (home) none (work). If this is not the best number please call our clinic and change the number.  Medication Refills:  If you need any refills please call your pharmacy and they will contact us.   If you need to  your refill at a new pharmacy, please contact the new pharmacy directly. The new pharmacy will help you get your medications transferred faster.   Scheduling:  If you have any concerns about today's visit or wish to schedule another appointment please call our office during normal business hours [phone] (8-5:00 M-F)   Referrals: If you do not get a call from central scheduling, please refer to directions on your visit summary or call our office during normal business hours for assistance.    Mammogram Scheduling [phone]   XRay/CT/Ultrasound/MRI Scheduling [phone]  [facility] has ultrasound appointments available on Wednesdays. If you would like your ultrasound at [facility], please call [phone] to schedule.   Medical Concerns:  If  you have urgent medical concerns please call [phone] at any time of the day.    Keven Fairbanks MD

## 2022-05-12 NOTE — PROGRESS NOTES
[facility] Progress Note          Assessment & Plan     Chronic pain syndrome  Late effects of CVA (cerebrovascular accident)  Will add the following medications   - diclofenac (VOLTAREN) 1 % topical gel; Apply 4 grams to knees or 2 grams to hands four times daily using enclosed dosing card.  - gabapentin (NEURONTIN) 100 MG capsule; Take 1 capsule (100 mg) by mouth 3 times daily    Weakness  Continues in PT       I spent a total of 34 minutes on the day of the visit.   Time spent doing chart review, history and exam, documentation and further activities per the note       Return in about 3 months (around 8/12/2022) for In person Visit.    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 Now has residual hemiplegia  Pain   Patient reports diffuse pain in the R hand and the leg-nable to characterize it further   Right upper extremity   Right lower extremity      Hep B   Follows up with Hepatology scheduled to be seen in June.   Review of Systems         Objective    /83   Pulse 59   Resp 16   SpO2 100%   There is no height or weight on file to calculate BMI.  Physical Exam  Vitals reviewed.   Constitutional:       General: He is not in acute distress.     Appearance: He is well-developed. He is not diaphoretic.   HENT:      Head: Normocephalic.   Eyes:      General: No scleral icterus.     Conjunctiva/sclera: Conjunctivae normal.   Neck:      Thyroid: No thyromegaly.   Cardiovascular:      Rate and Rhythm: Normal rate and regular rhythm.      Heart sounds: Normal heart sounds. No murmur heard.  Pulmonary:      Effort: Pulmonary effort is normal. No  respiratory distress.      Breath sounds: Normal breath sounds. No wheezing.   Abdominal:      General: Bowel sounds are normal. There is no distension.      Palpations: Abdomen is soft. There is no hepatomegaly or splenomegaly.      Tenderness: There is no abdominal tenderness.   Musculoskeletal:      Cervical back: Normal range of motion.   Lymphadenopathy:      Cervical: No cervical adenopathy.   Skin:     General: Skin is warm and dry.   Neurological:      Mental Status: He is alert.      Cranial Nerves: Cranial nerves are intact.      Comments: Patient has right-sided hemiplegia and hemiparesis.  He is unable to ambulate independently does have movement of both arms and legs though is has more focus movement on his left side.   Psychiatric:      Comments: Patient answers simple questions with yes or no was able to declined immunizations but unable to say why he wanted to declined them generally family makes decisions for him except and less he declined something.

## 2022-06-06 ENCOUNTER — DOCUMENTATION ONLY (OUTPATIENT)
Dept: FAMILY MEDICINE | Facility: CLINIC | Age: 62
End: 2022-06-06
Payer: COMMERCIAL

## 2022-06-06 NOTE — PROGRESS NOTES
"When opening a documentation only encounter, be sure to enter in \"Chief Complaint\" Forms and in \" Comments\" Title of form, description if needed.    Wilfredo is a 62 year old  male  Form received via: Fax  Form now resides in: Provider Ready    Loraine Richardson MA                  "

## 2022-06-08 ENCOUNTER — THERAPY VISIT (OUTPATIENT)
Dept: PHYSICAL THERAPY | Facility: CLINIC | Age: 62
End: 2022-06-08
Payer: COMMERCIAL

## 2022-06-08 DIAGNOSIS — I69.351 HEMIPLEGIA AND HEMIPARESIS FOLLOWING CEREBRAL INFARCTION AFFECTING RIGHT DOMINANT SIDE (H): Primary | ICD-10-CM

## 2022-06-08 PROCEDURE — 97530 THERAPEUTIC ACTIVITIES: CPT | Mod: GP | Performed by: PHYSICAL THERAPIST

## 2022-06-08 PROCEDURE — 97542 WHEELCHAIR MNGMENT TRAINING: CPT | Mod: GP | Performed by: PHYSICAL THERAPIST

## 2022-06-08 PROCEDURE — 97110 THERAPEUTIC EXERCISES: CPT | Mod: GP | Performed by: PHYSICAL THERAPIST

## 2022-06-09 NOTE — PROGRESS NOTES
Form has been completed by provider.     Form sent out via: Mailed to [employer]  Patient informed: No, Reason:  Output date: June 9, 2022    Loraine Richardson MA      **Please close the encounter**

## 2022-06-14 ENCOUNTER — TELEPHONE (OUTPATIENT)
Dept: FAMILY MEDICINE | Facility: CLINIC | Age: 62
End: 2022-06-14
Payer: COMMERCIAL

## 2022-06-14 ENCOUNTER — DOCUMENTATION ONLY (OUTPATIENT)
Dept: FAMILY MEDICINE | Facility: CLINIC | Age: 62
End: 2022-06-14
Payer: COMMERCIAL

## 2022-06-14 NOTE — TELEPHONE ENCOUNTER
Returned call to home care nurse to give verbal orders per protocol as requested. Nurse verbalized understanding.    Dianne Elder RN

## 2022-06-14 NOTE — TELEPHONE ENCOUNTER
[facility] phone call message - order or referral request for patient:     Order or referral being requested: Verbal Orders for Skilled Nursing       Additional Comments: Shannan from [facility] called for verbal orders for skilled nursing 1X a week for 9 weeks and 3 PRN visits. Physical therapy and occupational therapy to evaluate and treat. Call back number for Shannan is [phone].    OK to leave a message on voice mail? Yes    Primary language: Prydeinig      needed? Yes    Call taken on June 14, 2022 at 1:52 PM by Nickie Hull

## 2022-06-15 ENCOUNTER — THERAPY VISIT (OUTPATIENT)
Dept: PHYSICAL THERAPY | Facility: CLINIC | Age: 62
End: 2022-06-15
Payer: COMMERCIAL

## 2022-06-15 DIAGNOSIS — I69.351 HEMIPLEGIA AND HEMIPARESIS FOLLOWING CEREBRAL INFARCTION AFFECTING RIGHT DOMINANT SIDE (H): Primary | ICD-10-CM

## 2022-06-15 PROCEDURE — 97542 WHEELCHAIR MNGMENT TRAINING: CPT | Mod: GP | Performed by: PHYSICAL THERAPIST

## 2022-06-15 PROCEDURE — 97530 THERAPEUTIC ACTIVITIES: CPT | Mod: GP | Performed by: PHYSICAL THERAPIST

## 2022-06-17 NOTE — PROGRESS NOTES
Form has been completed by provider.     Form sent out via: Fax to [facility] at Fax Number: [phone]  Patient informed: No, Reason:  Output date: June 17, 2022    Loraine Richardson MA      **Please close the encounter**

## 2022-06-22 ENCOUNTER — THERAPY VISIT (OUTPATIENT)
Dept: PHYSICAL THERAPY | Facility: CLINIC | Age: 62
End: 2022-06-22
Payer: COMMERCIAL

## 2022-06-22 DIAGNOSIS — I69.351 HEMIPLEGIA AND HEMIPARESIS FOLLOWING CEREBRAL INFARCTION AFFECTING RIGHT DOMINANT SIDE (H): Primary | ICD-10-CM

## 2022-06-22 PROCEDURE — 97542 WHEELCHAIR MNGMENT TRAINING: CPT | Mod: GP | Performed by: PHYSICAL THERAPIST

## 2022-06-22 PROCEDURE — 97116 GAIT TRAINING THERAPY: CPT | Mod: GP | Performed by: PHYSICAL THERAPIST

## 2022-06-22 PROCEDURE — 97530 THERAPEUTIC ACTIVITIES: CPT | Mod: GP | Performed by: PHYSICAL THERAPIST

## 2022-06-23 ENCOUNTER — DOCUMENTATION ONLY (OUTPATIENT)
Dept: FAMILY MEDICINE | Facility: CLINIC | Age: 62
End: 2022-06-23

## 2022-06-29 ENCOUNTER — THERAPY VISIT (OUTPATIENT)
Dept: PHYSICAL THERAPY | Facility: CLINIC | Age: 62
End: 2022-06-29
Payer: COMMERCIAL

## 2022-06-29 DIAGNOSIS — I69.351 HEMIPLEGIA AND HEMIPARESIS FOLLOWING CEREBRAL INFARCTION AFFECTING RIGHT DOMINANT SIDE (H): Primary | ICD-10-CM

## 2022-06-29 PROCEDURE — 97116 GAIT TRAINING THERAPY: CPT | Mod: GP | Performed by: PHYSICAL THERAPIST

## 2022-06-29 PROCEDURE — 97542 WHEELCHAIR MNGMENT TRAINING: CPT | Mod: GP | Performed by: PHYSICAL THERAPIST

## 2022-06-29 PROCEDURE — 97530 THERAPEUTIC ACTIVITIES: CPT | Mod: GP | Performed by: PHYSICAL THERAPIST

## 2022-06-29 NOTE — PROGRESS NOTES
Form has been completed by provider.     Form sent out via: Fax to [facility] at Fax Number: [phone]  Patient informed: No, Reason:  Output date: June 29, 2022    Loraine Richardson MA      **Please close the encounter**

## 2022-07-13 ENCOUNTER — THERAPY VISIT (OUTPATIENT)
Dept: PHYSICAL THERAPY | Facility: CLINIC | Age: 62
End: 2022-07-13
Payer: COMMERCIAL

## 2022-07-13 DIAGNOSIS — I69.351 HEMIPLEGIA AND HEMIPARESIS FOLLOWING CEREBRAL INFARCTION AFFECTING RIGHT DOMINANT SIDE (H): Primary | ICD-10-CM

## 2022-07-13 PROCEDURE — 97530 THERAPEUTIC ACTIVITIES: CPT | Mod: GP | Performed by: PHYSICAL THERAPIST

## 2022-07-13 PROCEDURE — 97116 GAIT TRAINING THERAPY: CPT | Mod: GP | Performed by: PHYSICAL THERAPIST

## 2022-07-13 PROCEDURE — 97542 WHEELCHAIR MNGMENT TRAINING: CPT | Mod: GP | Performed by: PHYSICAL THERAPIST

## 2022-07-14 DIAGNOSIS — G89.4 CHRONIC PAIN SYNDROME: ICD-10-CM

## 2022-07-14 NOTE — PLAN OF CARE
Discharge Planner OT   Patient plan for discharge: Unable to discuss today  Current status: Pt lethargic following PT session. Sister present,  not available. Completed RUE PROM, 10 sets each of shoulder, elbow, and wrist flexion/extension, as well as grasp/release of fist. Noted increased muscle tone with 3 of 10 sets of elbow extension, will continue to monitor changes in tone.  Barriers to return to prior living situation: Medical status, significant R sided weakness, level of assist, deconditioning, command following/cognition  Recommendations for discharge: TCU  Rationale for recommendations: Pt is significantly below baseline functioning and would benefit from continued skilled therapies for improved independence with functional mobility and ADLs/IADLs.        Entered by: Emerita Melton 07/28/2020 4:26 PM       Patient is a 68y old  Female who presents with a chief complaint of dvt, booker, electrolyte disorder

## 2022-07-14 NOTE — TELEPHONE ENCOUNTER
"Request for medication refill:  gabapentin (NEURONTIN) 100 MG capsule  Providers if patient needs an appointment and you are willing to give a one month supply please refill for one month and  send a letter/MyChart using \".SMILLIMITEDREFILL\" .smillimited and route chart to \"P SMI \" (Giving one month refill in non controlled medications is strongly recommended before denial)    If refill has been denied, meaning absolutely no refills without visit, please complete the smart phrase \".smirxrefuse\" and route it to the \"P SMI MED REFILLS\"  pool to inform the patient and the pharmacy.    Shannon Harry        "

## 2022-07-15 RX ORDER — GABAPENTIN 100 MG/1
100 CAPSULE ORAL 3 TIMES DAILY
Qty: 90 CAPSULE | Refills: 1 | Status: SHIPPED | OUTPATIENT
Start: 2022-07-15 | End: 2022-10-17

## 2022-07-20 ENCOUNTER — THERAPY VISIT (OUTPATIENT)
Dept: PHYSICAL THERAPY | Facility: CLINIC | Age: 62
End: 2022-07-20
Payer: COMMERCIAL

## 2022-07-20 DIAGNOSIS — I69.351 HEMIPLEGIA AND HEMIPARESIS FOLLOWING CEREBRAL INFARCTION AFFECTING RIGHT DOMINANT SIDE (H): Primary | ICD-10-CM

## 2022-07-20 PROCEDURE — 97530 THERAPEUTIC ACTIVITIES: CPT | Mod: GP | Performed by: PHYSICAL THERAPIST

## 2022-07-20 PROCEDURE — 97542 WHEELCHAIR MNGMENT TRAINING: CPT | Mod: GP | Performed by: PHYSICAL THERAPIST

## 2022-07-20 PROCEDURE — 97116 GAIT TRAINING THERAPY: CPT | Mod: GP | Performed by: PHYSICAL THERAPIST

## 2022-07-22 ENCOUNTER — TELEPHONE (OUTPATIENT)
Dept: FAMILY MEDICINE | Facility: CLINIC | Age: 62
End: 2022-07-22

## 2022-07-22 NOTE — TELEPHONE ENCOUNTER
[facility] phone call message - order or referral request for patient:     Order or referral being requested: Physical Therapy      Additional Comments: The company is discharging the PT orders; the patient already have PT orders with another company.    OK to leave a message on voice mail? Yes    Primary language: Stateless      needed? Yes    Call taken on July 22, 2022 at 8:23 AM by Anahi Dockery

## 2022-07-27 ENCOUNTER — THERAPY VISIT (OUTPATIENT)
Dept: PHYSICAL THERAPY | Facility: CLINIC | Age: 62
End: 2022-07-27
Payer: COMMERCIAL

## 2022-07-27 DIAGNOSIS — I69.351 HEMIPLEGIA AND HEMIPARESIS FOLLOWING CEREBRAL INFARCTION AFFECTING RIGHT DOMINANT SIDE (H): Primary | ICD-10-CM

## 2022-07-27 PROCEDURE — 97116 GAIT TRAINING THERAPY: CPT | Mod: GP | Performed by: PHYSICAL THERAPIST

## 2022-07-27 PROCEDURE — 97542 WHEELCHAIR MNGMENT TRAINING: CPT | Mod: GP | Performed by: PHYSICAL THERAPIST

## 2022-08-03 ENCOUNTER — THERAPY VISIT (OUTPATIENT)
Dept: PHYSICAL THERAPY | Facility: CLINIC | Age: 62
End: 2022-08-03
Payer: COMMERCIAL

## 2022-08-03 DIAGNOSIS — I69.351 HEMIPLEGIA AND HEMIPARESIS FOLLOWING CEREBRAL INFARCTION AFFECTING RIGHT DOMINANT SIDE (H): Primary | ICD-10-CM

## 2022-08-03 PROCEDURE — 97542 WHEELCHAIR MNGMENT TRAINING: CPT | Mod: GP | Performed by: PHYSICAL THERAPIST

## 2022-08-03 PROCEDURE — 97530 THERAPEUTIC ACTIVITIES: CPT | Mod: GP | Performed by: PHYSICAL THERAPIST

## 2022-08-03 PROCEDURE — 97116 GAIT TRAINING THERAPY: CPT | Mod: GP | Performed by: PHYSICAL THERAPIST

## 2022-08-08 ENCOUNTER — DOCUMENTATION ONLY (OUTPATIENT)
Dept: FAMILY MEDICINE | Facility: CLINIC | Age: 62
End: 2022-08-08

## 2022-08-08 ENCOUNTER — MEDICAL CORRESPONDENCE (OUTPATIENT)
Dept: HEALTH INFORMATION MANAGEMENT | Facility: CLINIC | Age: 62
End: 2022-08-08

## 2022-08-08 PROBLEM — R15.1 FECAL SMEARING: Status: ACTIVE | Noted: 2022-08-08

## 2022-08-08 NOTE — PROGRESS NOTES
"When opening a documentation only encounter, be sure to enter in \"Chief Complaint\" Forms and in \" Comments\" Title of form, description if needed.    Wilfredo is a 62 year old  male  Form received via: Fax  Form now resides in: Provider Ready    Loraine Richardson MA          Form has been completed by provider.     Form sent out via: Fax to [employer] at Fax Number: [phone]  Patient informed: No, Reason:  Output date: August 8, 2022    Loraine Richardson MA      **Please close the encounter**      "

## 2022-08-08 NOTE — PROGRESS NOTES
"When opening a documentation only encounter, be sure to enter in \"Chief Complaint\" Forms and in \" Comments\" Title of form, description if needed.    Wilfredo is a 62 year old  male  Form received via: Fax  Form now resides in: Provider Ready    Loraine Richardson MA      Form has been completed by provider.     Form sent out via: Fax to [employer] at Fax Number: [phone]  Patient informed: No, Reason:  Output date: August 8, 2022    Loraine Richardson MA      **Please close the encounter**                "

## 2022-08-09 ENCOUNTER — TELEPHONE (OUTPATIENT)
Dept: FAMILY MEDICINE | Facility: CLINIC | Age: 62
End: 2022-08-09

## 2022-08-09 NOTE — TELEPHONE ENCOUNTER
[facility] phone call message - order or referral request for patient:     Order or referral being requested: Motorized wheelchair RX/order sent to [facility] medical.      Additional Comments: Patient's CADI  requesting order be sent on behalf of patient.    OK to leave a message on voice mail? Yes    Primary language: Cymro      needed? Yes    Call taken on August 9, 2022 at 10:52 AM by Miranda Kaufman

## 2022-08-09 NOTE — TELEPHONE ENCOUNTER
Reached out to patient's CADI  Celia. STEFANO stating patient needs a face to face appt for power wheelchair order.    Miranda Kaufman  Care Coordinator  [facility]  Phone:[phone]

## 2022-08-10 ENCOUNTER — THERAPY VISIT (OUTPATIENT)
Dept: PHYSICAL THERAPY | Facility: CLINIC | Age: 62
End: 2022-08-10
Payer: COMMERCIAL

## 2022-08-10 DIAGNOSIS — I69.351 HEMIPLEGIA AND HEMIPARESIS FOLLOWING CEREBRAL INFARCTION AFFECTING RIGHT DOMINANT SIDE (H): Primary | ICD-10-CM

## 2022-08-10 PROCEDURE — 97116 GAIT TRAINING THERAPY: CPT | Mod: GP | Performed by: PHYSICAL THERAPIST

## 2022-08-10 PROCEDURE — 97530 THERAPEUTIC ACTIVITIES: CPT | Mod: GP | Performed by: PHYSICAL THERAPIST

## 2022-08-10 NOTE — PROGRESS NOTES
[facility]    Outpatient Physical Therapy Progress Note  Patient: Wilfredo Yoon  : 1960    Beginning/End Dates of Reporting Period:  22 to 8/10/22    Referring Provider: Yahir Govea MD    Therapy Diagnosis: Impaired functional mobility s/p CVA     Client Self Report:  Reports feeling better since last visit.  Reports continuing to work on standing with UE support on table and L weight shift and propelling his wheelchair at home.     Objective Measurements:  Reports IND bed <> wheelchair transfers at home.  Sit <> stand requires min/mod assist and is consistent with poor anterior weight shift. Has been ambulating 30-50' with FWW and min/mod assist and LIKO lift pant support.  Needs cues and max encouragement to propel his wheelchair independently.      Goals:  His transfer and gait goals have been met.  Wheelchair propulsion goal and new gait goal (ambulate 50' with FWW SBA) target dates are now 22/      Plan: Patient making slow progress with transfers/wheelchair propulsion/gait however he is inconsistent with weight shift techniques needed for safe standing and gait.  I believe there may be a cognitive component to this inconsistency and I am not sure if he will progress to safe functional ambulation.  However he is still making slow progress and I believe he should continue OP PT for another 10 weeks and reassess potential after that.       Discharge:  No

## 2022-08-15 DIAGNOSIS — E87.6 HYPOPOTASSEMIA: ICD-10-CM

## 2022-08-15 RX ORDER — POTASSIUM CHLORIDE 750 MG/1
20 CAPSULE, EXTENDED RELEASE ORAL DAILY
Qty: 60 CAPSULE | Refills: 4 | Status: SHIPPED | OUTPATIENT
Start: 2022-08-15 | End: 2023-02-15

## 2022-08-15 NOTE — TELEPHONE ENCOUNTER
[facility] phone call message - order or referral request for patient:     Order or referral being requested: Verbal orders for skilled nursing 1X per week for 9 weeks and 3 PRN visits.       OK to leave a message on voice mail? Yes    Primary language: [place] citizen      needed? Yes    Call taken on August 15, 2022 at 4:14 PM by Shelby Staley

## 2022-08-15 NOTE — TELEPHONE ENCOUNTER
Returned call to home care nurse to give verbal orders per protocol as requested. Nurse verbalized understanding.    Brigitte Grijalva RN

## 2022-08-15 NOTE — TELEPHONE ENCOUNTER

## 2022-08-17 ENCOUNTER — DOCUMENTATION ONLY (OUTPATIENT)
Dept: FAMILY MEDICINE | Facility: CLINIC | Age: 62
End: 2022-08-17

## 2022-08-17 ENCOUNTER — THERAPY VISIT (OUTPATIENT)
Dept: PHYSICAL THERAPY | Facility: CLINIC | Age: 62
End: 2022-08-17
Payer: COMMERCIAL

## 2022-08-17 DIAGNOSIS — I69.351 HEMIPLEGIA AND HEMIPARESIS FOLLOWING CEREBRAL INFARCTION AFFECTING RIGHT DOMINANT SIDE (H): Primary | ICD-10-CM

## 2022-08-17 PROCEDURE — 97116 GAIT TRAINING THERAPY: CPT | Mod: GP | Performed by: PHYSICAL THERAPIST

## 2022-08-17 PROCEDURE — 97530 THERAPEUTIC ACTIVITIES: CPT | Mod: GP | Performed by: PHYSICAL THERAPIST

## 2022-08-25 NOTE — PROGRESS NOTES
Form has been completed by provider.     Form sent out via: Fax to [facility] at Fax Number: [phone]  Patient informed: N/A  Output date: August 25, 2022    Lucio Carr MA      **Please close the encounter**

## 2022-08-25 NOTE — PROGRESS NOTES
Form has been completed by provider.     Form sent out via: Fax to [facility] at Fax Number: [phone]  Patient informed: N/A  Output date: August 25, 2022    Lucio Carr MA      **Please close the encounter**

## 2022-08-31 NOTE — LETTER
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  Last clinic visit March 2017.  Since then, patient completed a course of antibiotics for h.pylori.  Fibrosis Scan showed stiffness consistent with F4 fibrosis.  Patient denies ER visits or hospital admissions since last clinic visit.    Patient is well today.  He denies any signs or symptoms specific to liver disease.    Patient denies jaundice, lower extremity edema, abdominal distension, lethargy or confusion.    Patient denies melena, hematemesis or hematochezia.    Patient denies fevers, sweats or chills.  Weight stable.    Patient is compliant with his medications.  He has not missed any doses of his antiviral therapy.    Patient continues to smoke.  He does not drink alcohol.  He is currently working part-time as PCA and going to school to learn english and math.      Medical hx Surgical hx   Past Medical History:   Diagnosis Date     Cryoglobulinemia (H)      Glomerulonephritis      Hepatitis B      Surgical complication       Past Surgical History:   Procedure Laterality Date     C HAND/FINGER SURGERY UNLISTED       HAND SURGERY Right           Medications  Prior to Admission medications    Medication Sig Start Date End Date Taking? Authorizing Provider   entecavir (BARACLUDE) 0.5 MG tablet Take 1 tablet (0.5 mg) by mouth daily 4/13/17   Eber Ortez MD   omeprazole (PRILOSEC) 20 MG CR capsule Take 20 mg by mouth  "3/16/17   Reported, Patient   AMOXICILLIN PO Take 500 mg by mouth 2 times daily    Reported, Patient   lisinopril (PRINIVIL/ZESTRIL) 20 MG tablet Take 1 tablet (20 mg) by mouth daily 2/14/17   Bal Hunter MD   bumetanide (BUMEX) 2 MG tablet Take 1 tablet (2 mg) by mouth daily 2/14/17   Bal Hunter MD       Allergies  No Known Allergies    Review of systems  A 10-point review of systems was negative    Examination  BP (!) 178/115  Pulse 70  Temp 97.9  F (36.6  C) (Oral)  Ht 1.778 m (5' 10\")  Wt 58.2 kg (128 lb 6.4 oz)  SpO2 98%  BMI 18.42 kg/m2  Body mass index is 18.42 kg/(m^2).    Gen- well, NAD, A+Ox3, normal color  CVS- RRR  RS- CTA  Abd- soft, non-tender, no ascites or organomegaly on palpation or percussion, BS+  Extr- hands normal, no AIDEN  Skin- no rash or jaundice  Neuro- no asterixis  Psych- normal mood    Laboratory  Lab Results   Component Value Date     09/06/2017    POTASSIUM 3.5 09/06/2017    CHLORIDE 106 09/06/2017    CO2 25 09/06/2017    BUN 10 09/06/2017    CR 0.75 09/06/2017       Lab Results   Component Value Date    BILITOTAL 0.5 09/06/2017    ALT 22 09/06/2017    AST 27 09/06/2017    ALKPHOS 72 09/06/2017       Lab Results   Component Value Date    ALBUMIN 1.9 09/06/2017    PROTTOTAL 5.7 09/06/2017        Lab Results   Component Value Date    WBC 5.6 09/06/2017    HGB 13.7 09/06/2017    MCV 93 09/06/2017     09/06/2017       Lab Results   Component Value Date    INR 0.98 09/06/2017       Radiology  Fibrosis Scan March 2017 personally reviewed    Assessment  57 year old male who presents for follow-up of chronic HBV complicated with glomerulonephritis.  Liver function tests normal on entecavir.  Renal function normal.  Fibrosis Scan consistent with cirrhosis in addition to hypoalbuminemia and borderline thrombocytopenia.  Will obtain EGD to screen for esophageal varices.  Will obtain abdominal ultrasound to screen for HCC.    Plan  1.  Abd U/S  2.  EGD  3.  " None Continue entecavir 0.5mg PO Q24  4.  Follow-up in 6 months    Eber Adame MD  Hepatology  [facility]

## 2022-09-07 ENCOUNTER — THERAPY VISIT (OUTPATIENT)
Dept: PHYSICAL THERAPY | Facility: CLINIC | Age: 62
End: 2022-09-07
Payer: COMMERCIAL

## 2022-09-07 DIAGNOSIS — I69.351 HEMIPLEGIA AND HEMIPARESIS FOLLOWING CEREBRAL INFARCTION AFFECTING RIGHT DOMINANT SIDE (H): Primary | ICD-10-CM

## 2022-09-07 PROCEDURE — 97530 THERAPEUTIC ACTIVITIES: CPT | Mod: GP | Performed by: PHYSICAL THERAPIST

## 2022-09-07 PROCEDURE — 97116 GAIT TRAINING THERAPY: CPT | Mod: GP | Performed by: PHYSICAL THERAPIST

## 2022-09-21 ENCOUNTER — THERAPY VISIT (OUTPATIENT)
Dept: PHYSICAL THERAPY | Facility: CLINIC | Age: 62
End: 2022-09-21
Payer: COMMERCIAL

## 2022-09-21 DIAGNOSIS — I69.351 HEMIPLEGIA AND HEMIPARESIS FOLLOWING CEREBRAL INFARCTION AFFECTING RIGHT DOMINANT SIDE (H): Primary | ICD-10-CM

## 2022-09-21 PROCEDURE — 97530 THERAPEUTIC ACTIVITIES: CPT | Mod: GP | Performed by: PHYSICAL THERAPIST

## 2022-09-21 PROCEDURE — 97116 GAIT TRAINING THERAPY: CPT | Mod: GP | Performed by: PHYSICAL THERAPIST

## 2022-09-28 ENCOUNTER — THERAPY VISIT (OUTPATIENT)
Dept: PHYSICAL THERAPY | Facility: CLINIC | Age: 62
End: 2022-09-28
Payer: COMMERCIAL

## 2022-09-28 DIAGNOSIS — I69.351 HEMIPLEGIA AND HEMIPARESIS FOLLOWING CEREBRAL INFARCTION AFFECTING RIGHT DOMINANT SIDE (H): Primary | ICD-10-CM

## 2022-09-28 PROCEDURE — 97530 THERAPEUTIC ACTIVITIES: CPT | Mod: GP | Performed by: PHYSICAL THERAPIST

## 2022-09-28 PROCEDURE — 97116 GAIT TRAINING THERAPY: CPT | Mod: GP | Performed by: PHYSICAL THERAPIST

## 2022-09-30 ENCOUNTER — MEDICAL CORRESPONDENCE (OUTPATIENT)
Dept: HEALTH INFORMATION MANAGEMENT | Facility: CLINIC | Age: 62
End: 2022-09-30

## 2022-09-30 ENCOUNTER — OFFICE VISIT (OUTPATIENT)
Dept: FAMILY MEDICINE | Facility: CLINIC | Age: 62
End: 2022-09-30
Payer: COMMERCIAL

## 2022-09-30 VITALS
OXYGEN SATURATION: 99 % | DIASTOLIC BLOOD PRESSURE: 86 MMHG | HEART RATE: 59 BPM | TEMPERATURE: 97.5 F | SYSTOLIC BLOOD PRESSURE: 136 MMHG | BODY MASS INDEX: 20.14 KG/M2 | WEIGHT: 121 LBS | RESPIRATION RATE: 16 BRPM

## 2022-09-30 DIAGNOSIS — K74.60 CIRRHOSIS OF LIVER WITH ASCITES, UNSPECIFIED HEPATIC CIRRHOSIS TYPE (H): ICD-10-CM

## 2022-09-30 DIAGNOSIS — R18.8 CIRRHOSIS OF LIVER WITH ASCITES, UNSPECIFIED HEPATIC CIRRHOSIS TYPE (H): ICD-10-CM

## 2022-09-30 DIAGNOSIS — I15.1 HYPERTENSION SECONDARY TO OTHER RENAL DISORDERS: ICD-10-CM

## 2022-09-30 DIAGNOSIS — R53.1 WEAKNESS: ICD-10-CM

## 2022-09-30 DIAGNOSIS — I69.351 HEMIPLEGIA AND HEMIPARESIS FOLLOWING CEREBRAL INFARCTION AFFECTING RIGHT DOMINANT SIDE (H): Primary | ICD-10-CM

## 2022-09-30 DIAGNOSIS — B18.1 CHRONIC VIRAL HEPATITIS B WITHOUT DELTA AGENT AND WITHOUT COMA (H): ICD-10-CM

## 2022-09-30 LAB
AFP SERPL-MCNC: 8.4 NG/ML
ALBUMIN SERPL BCG-MCNC: 2.7 G/DL (ref 3.5–5.2)
ALP SERPL-CCNC: 114 U/L (ref 40–129)
ALT SERPL W P-5'-P-CCNC: 17 U/L (ref 10–50)
ANION GAP SERPL CALCULATED.3IONS-SCNC: 10 MMOL/L (ref 7–15)
AST SERPL W P-5'-P-CCNC: 29 U/L (ref 10–50)
BILIRUB SERPL-MCNC: 0.2 MG/DL
BUN SERPL-MCNC: 22.7 MG/DL (ref 8–23)
CALCIUM SERPL-MCNC: 8.9 MG/DL (ref 8.8–10.2)
CHLORIDE SERPL-SCNC: 107 MMOL/L (ref 98–107)
CHOLEST SERPL-MCNC: 235 MG/DL
CREAT SERPL-MCNC: 1.46 MG/DL (ref 0.67–1.17)
DEPRECATED HCO3 PLAS-SCNC: 20 MMOL/L (ref 22–29)
GFR SERPL CREATININE-BSD FRML MDRD: 54 ML/MIN/1.73M2
GLUCOSE SERPL-MCNC: 93 MG/DL (ref 70–99)
HDLC SERPL-MCNC: 48 MG/DL
LDLC SERPL CALC-MCNC: 161 MG/DL
NONHDLC SERPL-MCNC: 187 MG/DL
POTASSIUM SERPL-SCNC: 4.3 MMOL/L (ref 3.4–5.3)
PROT SERPL-MCNC: 5.9 G/DL (ref 6.4–8.3)
SODIUM SERPL-SCNC: 137 MMOL/L (ref 136–145)
TRIGL SERPL-MCNC: 132 MG/DL

## 2022-09-30 PROCEDURE — 87517 HEPATITIS B DNA QUANT: CPT | Performed by: FAMILY MEDICINE

## 2022-09-30 PROCEDURE — 80061 LIPID PANEL: CPT | Performed by: FAMILY MEDICINE

## 2022-09-30 PROCEDURE — 99396 PREV VISIT EST AGE 40-64: CPT | Performed by: FAMILY MEDICINE

## 2022-09-30 PROCEDURE — 80053 COMPREHEN METABOLIC PANEL: CPT | Performed by: FAMILY MEDICINE

## 2022-09-30 PROCEDURE — 36415 COLL VENOUS BLD VENIPUNCTURE: CPT | Performed by: FAMILY MEDICINE

## 2022-09-30 PROCEDURE — 82105 ALPHA-FETOPROTEIN SERUM: CPT | Performed by: FAMILY MEDICINE

## 2022-09-30 NOTE — PROGRESS NOTES
SUBJECTIVE:   CC: Wilfredo is an 62 year old who presents for preventative health visit.         HPI  Presents today for refill of medications and a follow-up visit.          Today's PHQ-2 Score:   PHQ-2 (  [employer]) 2022   Q1: Little interest or pleasure in doing things 0   Q2: Feeling down, depressed or hopeless 0   PHQ-2 Score 0   PHQ-2 Total Score (12-17 Years)- Positive if 3 or more points; Administer PHQ-A if positive -       Abuse: Current or Past(Physical, Sexual or Emotional)- No  Do you feel safe in your environment? Yes        Social History     Tobacco Use     Smoking status: Former Smoker     Packs/day: 0.25     Years: 40.00     Pack years: 10.00     Types: Cigarettes     Quit date: 10/1/2020     Years since quittin.9     Smokeless tobacco: Never Used   Substance Use Topics     Alcohol use: No     Alcohol/week: 0.0 standard drinks     If you drink alcohol do you typically have >3 drinks per day or >7 drinks per week? No    No flowsheet data found.    Last PSA: No results found for: PSA    Reviewed orders with patient. Reviewed health maintenance and updated orders accordingly - No      Reviewed and updated as needed this visit by clinical staff   Tobacco  Allergies  Meds  Problems  Med Hx  Surg Hx  Fam Hx  Soc   Hx          Reviewed and updated as needed this visit by Provider   Tobacco  Allergies  Meds  Problems  Med Hx  Surg Hx  Fam Hx               Review of Systems      OBJECTIVE:   /86   Pulse 59   Temp 97.5  F (36.4  C) (Oral)   Resp 16   Wt 54.9 kg (121 lb)   SpO2 99%   BMI 20.14 kg/m      Physical Exam  GENERAL: healthy, alert and no distress  NECK: no adenopathy, no asymmetry, masses, or scars and thyroid normal to palpation  RESP: lungs clear to auscultation - no rales, rhonchi or wheezes  CV: regular rate and rhythm, normal S1 S2, no S3 or S4, no murmur, click or rub, no peripheral edema and peripheral pulses strong  ABDOMEN: soft, nontender, no  hepatosplenomegaly, no masses and bowel sounds normal  MS: no gross musculoskeletal defects noted, no edema  MS: Patient is wheelchair-bound unable to walk and unable to wheel wheelchair by himself though it it was recommended by physical therapy that it would be helpful exercise and that he could get some mobility by doing that.  Patient has hemiplegia secondary to CVA.        ASSESSMENT/PLAN:       ICD-10-CM    1. Hemiplegia and hemiparesis following cerebral infarction affecting right dominant side (H)  I69.351 Wheelchair Order   2. Weakness  R53.1 Wheelchair Order   3. Hypertension secondary to other renal disorders  I15.1 Lipid panel reflex to direct LDL Non-fasting    N28.89 Comprehensive metabolic panel     Lipid panel reflex to direct LDL Non-fasting     Comprehensive metabolic panel     CANCELED: Basic metabolic panel   4. Chronic viral hepatitis B without delta agent and without coma (H)  B18.1 AFP tumor marker     Hep B Virus DNA Quant Real Time PCR   5. Cirrhosis of liver with ascites, unspecified hepatic cirrhosis type (H)  K74.60 AFP tumor marker    R18.8 Hep B Virus DNA Quant Real Time PCR           He reports that he quit smoking about 1 years ago. His smoking use included cigarettes. He has a 10.00 pack-year smoking history. He has never used smokeless tobacco.      Counseling Resources:  ATP IV Guidelines  Pooled Cohorts Equation Calculator  FRAX Risk Assessment  ICSI Preventive Guidelines  Dietary Guidelines for Americans, 2010  USDA's MyPlate  ASA Prophylaxis  Lung CA Screening    Keven Fairbanks MD  [facility]  DME (Durable Medical Equipment) Orders and Documentation  Orders Placed This Encounter   Procedures     Wheelchair Order      The patient was assessed and it was determined the patient is in need of the following listed DME Supplies/Equipment. Please complete supporting documentation below to demonstrate medical necessity.      Wheelchair Documentation  1. The patient  has mobility limitations that impairs their ability to participate in one or more mobility related activities: Toileting, Feeding, Grooming and Bathing.  2. The patient's mobility limitations cannot be safely resolved by using a cane/walker:Yes does not have the strength or balance to be able to use a walker  3. The patients home has adequate access to use a manual wheelchair:Yes  4. The use of a manual wheelchair on a regular basis will improve the patients ability to participate in mobility related ADL's at home:Yes  5. The patient is willing to use a manual wheelchair at home:Yes  6. The patient has adequate upper body strength and the mental capability to safely use a manual wheelchair and/or has a caregiver that is able to assist: Yes his sister is the primary caregiver she is able to help with a manual wheelchair and patient has been advised that a manual wheelchair would be a good physical therapy device for him.  7. Does the patient have a lower extremity injury or edema?No    Reason for Type of Wheelchair  Patient weight: 121 lbs 0 oz  Light Weight Wheelchair: Patient is unable to self-propel a standard wheelchair in the home but can self propel a light weight wheelchair.    **Use of a manual wheelchair will significantly improve the patient's ability to participate in MRADLs and the patient will use it on a regular basis in the home. The patient has not expressed an unwillingness to use the manual wheelchair that is provided in the home.**

## 2022-10-03 DIAGNOSIS — K74.60 CIRRHOSIS OF LIVER WITH ASCITES, UNSPECIFIED HEPATIC CIRRHOSIS TYPE (H): ICD-10-CM

## 2022-10-03 DIAGNOSIS — B18.1 CHRONIC VIRAL HEPATITIS B WITHOUT DELTA AGENT AND WITHOUT COMA (H): Primary | ICD-10-CM

## 2022-10-03 DIAGNOSIS — R18.8 CIRRHOSIS OF LIVER WITH ASCITES, UNSPECIFIED HEPATIC CIRRHOSIS TYPE (H): ICD-10-CM

## 2022-10-03 LAB
HBV DNA SERPL NAA+PROBE-ACNC: <20 IU/ML
HBV DNA SERPL NAA+PROBE-LOG IU: <1.3 {LOG_IU}/ML

## 2022-10-04 ENCOUNTER — DOCUMENTATION ONLY (OUTPATIENT)
Dept: FAMILY MEDICINE | Facility: CLINIC | Age: 62
End: 2022-10-04

## 2022-10-04 NOTE — PROGRESS NOTES
"When opening a documentation only encounter, be sure to enter in \"Chief Complaint\" Forms and in \" Comments\" Title of form, description if needed.    Wilfredo is a 62 year old  male  Form received via: Fax  Form now resides in: Provider Ready    Marci Jonas RN                  "

## 2022-10-05 ENCOUNTER — THERAPY VISIT (OUTPATIENT)
Dept: PHYSICAL THERAPY | Facility: CLINIC | Age: 62
End: 2022-10-05
Payer: COMMERCIAL

## 2022-10-05 ENCOUNTER — DOCUMENTATION ONLY (OUTPATIENT)
Dept: FAMILY MEDICINE | Facility: CLINIC | Age: 62
End: 2022-10-05

## 2022-10-05 DIAGNOSIS — I69.351 HEMIPLEGIA AND HEMIPARESIS FOLLOWING CEREBRAL INFARCTION AFFECTING RIGHT DOMINANT SIDE (H): Primary | ICD-10-CM

## 2022-10-05 PROCEDURE — 97535 SELF CARE MNGMENT TRAINING: CPT | Mod: GP | Performed by: PHYSICAL THERAPIST

## 2022-10-10 NOTE — PROGRESS NOTES
Sami patients home care provider called and she said that patient does not have a wheelchair at all at this time and he recently had a fall. So patient is in urgent need of wheelchair. Sami would like a call back at [phone].     Nickie Hull

## 2022-10-16 DIAGNOSIS — G89.4 CHRONIC PAIN SYNDROME: ICD-10-CM

## 2022-10-17 RX ORDER — GABAPENTIN 100 MG/1
100 CAPSULE ORAL 3 TIMES DAILY
Qty: 360 CAPSULE | Refills: 3 | Status: SHIPPED | OUTPATIENT
Start: 2022-10-17 | End: 2023-11-02

## 2022-10-17 NOTE — TELEPHONE ENCOUNTER
"Request for medication refill:    gabapentin (NEURONTIN) 100 MG capsule    Providers if patient needs an appointment and you are willing to give a one month supply please refill for one month and  send a letter/MyChart using \".SMILLIMITEDREFILL\" .smillimited and route chart to \"P SMI \" (Giving one month refill in non controlled medications is strongly recommended before denial)    If refill has been denied, meaning absolutely no refills without visit, please complete the smart phrase \".smirxrefuse\" and route it to the \"P SMI MED REFILLS\"  pool to inform the patient and the pharmacy.    Ochoa Maurice MA        "

## 2022-10-19 ENCOUNTER — THERAPY VISIT (OUTPATIENT)
Dept: PHYSICAL THERAPY | Facility: CLINIC | Age: 62
End: 2022-10-19
Payer: COMMERCIAL

## 2022-10-19 ENCOUNTER — TELEPHONE (OUTPATIENT)
Dept: FAMILY MEDICINE | Facility: CLINIC | Age: 62
End: 2022-10-19

## 2022-10-19 DIAGNOSIS — I69.351 HEMIPLEGIA AND HEMIPARESIS FOLLOWING CEREBRAL INFARCTION AFFECTING RIGHT DOMINANT SIDE (H): Primary | ICD-10-CM

## 2022-10-19 PROCEDURE — 97116 GAIT TRAINING THERAPY: CPT | Mod: GP | Performed by: PHYSICAL THERAPIST

## 2022-10-19 PROCEDURE — 97530 THERAPEUTIC ACTIVITIES: CPT | Mod: GP | Performed by: PHYSICAL THERAPIST

## 2022-10-19 NOTE — TELEPHONE ENCOUNTER
[facility] phone call message - order or referral request for patient:     Order or referral being requested: Tonio from [facility] called for verbal orders for skilled nursing 1X per week for 9 weeks. 3 PRN visits.    This will be for med management and safety.      Additional Comments: They also wanted to update the provider this patient fell off of their scooter a few weeks ago and scraped their forehead. Patient reported that they did not lose consciousness.     OK to leave a message on voice mail? Yes       Primary language: [place]      needed? Yes    Call taken on October 19, 2022 at 1:26 PM by Shelby Staley

## 2022-10-19 NOTE — PROGRESS NOTES
Faxed signed wheelchair order and office notes to [facility] @ [phone].  Will scan into chart.    Miranda Kaufman  Care Coordinator  [facility]  Phone:[phone]

## 2022-10-19 NOTE — TELEPHONE ENCOUNTER
Returned call to home care nurse to give verbal orders per protocol as requested. Nurse verbalized understanding.    Routing FYI about scooter fall no injuries reported besides scrap    Renee Vaughan RN

## 2022-10-19 NOTE — PROGRESS NOTES
Called patient in regards to Signed [facility] Dietary forms. Spoke to sister, requested a copy be faxed to [facility] @[phone] and a copy be mailed to patient's home.  Copy will be scanned into chart.    Miranda Kaufman  Care Coordinator  [facility]  Phone:[phone]

## 2022-10-21 ENCOUNTER — DOCUMENTATION ONLY (OUTPATIENT)
Dept: FAMILY MEDICINE | Facility: CLINIC | Age: 62
End: 2022-10-21

## 2022-10-21 NOTE — PROGRESS NOTES
"When opening a documentation only encounter, be sure to enter in \"Chief Complaint\" Forms and in \" Comments\" Title of form, description if needed.    Wilfredo is a 62 year old  male  Form received via: Fax  Form now resides in: Provider Ready    Marci oJnas RN                  "

## 2022-10-28 ENCOUNTER — DOCUMENTATION ONLY (OUTPATIENT)
Dept: FAMILY MEDICINE | Facility: CLINIC | Age: 62
End: 2022-10-28

## 2022-11-01 NOTE — PROGRESS NOTES
Form has been completed by provider.     Form sent out via: Fax to [facility] at Fax Number: [phone]  Patient informed: N/A  Output date: November 1, 2022    Lucio Carr MA      **Please close the encounter**

## 2022-11-01 NOTE — PROGRESS NOTES
Form has been completed by provider.     Form sent out via: Fax to [facility] at Fax Number: [phone]  Patient informed: N/A  Output date: November 1, 2022    Lucio Carr MA      **Please close the encounter**

## 2022-11-01 NOTE — PROGRESS NOTES
Form has been completed by provider.     Form sent out via: Fax to [facility] at Fax Number: [phone]  Patient informed: N/A  Output date: November 1, 2022    Lucio Carr MA      **Please close the encounter**

## 2022-11-09 ENCOUNTER — TELEPHONE (OUTPATIENT)
Dept: FAMILY MEDICINE | Facility: CLINIC | Age: 62
End: 2022-11-09

## 2022-11-09 DIAGNOSIS — I69.351 HEMIPLEGIA AND HEMIPARESIS FOLLOWING CEREBRAL INFARCTION AFFECTING RIGHT DOMINANT SIDE (H): ICD-10-CM

## 2022-11-09 DIAGNOSIS — I69.90 LATE EFFECTS OF CVA (CEREBROVASCULAR ACCIDENT): Primary | ICD-10-CM

## 2022-11-09 NOTE — TELEPHONE ENCOUNTER
[facility] phone call message - order or referral request for patient:     Order or referral being requested: Referral for physical therapy       Additional Comments: Patient sisters and guardians (Evelina and Radha) called and said that patient was given a manual wheelchair and what they were requesting was a power wheelchair. Evelina/Radha patient's sisters are requesting a referral for physical therapy so they can try to get the patient a power wheelchair. Call back for Joyce is [phone].     OK to leave a message on voice mail? Yes    Primary language: [place]      needed? Yes    Call taken on November 9, 2022 at 2:29 PM by Nickie Hull

## 2022-11-10 NOTE — TELEPHONE ENCOUNTER
DME (Durable Medical Equipment) Orders and Documentation  No orders of the defined types were placed in this encounter.     The patient was assessed and it was determined the patient is in need of the following listed DME Supplies/Equipment. Please complete supporting documentation below to demonstrate medical necessity.      DME All Other Item(s) Documentation    List reason for need and supporting documentation for medical necessity below for each DME item.     1. Needs to have evaluation to see if his abilities are appropriate for an electric chair so I completed a PT referral for an electric chair assessment      Keven Fairbanks MD on 11/10/2022 at 12:20 PM

## 2022-11-10 NOTE — TELEPHONE ENCOUNTER
Faxed order for physical therapy from 2/1/2022 to [facility] per request of family. [phone]  [phone].  Called Evelina/Radha [phone] updating them on status and gave scheduling number.    Miranda Kaufman  Care Coordinator  [facility]  Phone:[phone]

## 2022-11-10 NOTE — TELEPHONE ENCOUNTER
RN called back to relay PT referral was placed (ID [id number])    Family is requesting referral be for bhavesh Vaughan RN

## 2022-11-11 ENCOUNTER — TELEPHONE (OUTPATIENT)
Dept: FAMILY MEDICINE | Facility: CLINIC | Age: 62
End: 2022-11-11

## 2022-11-11 DIAGNOSIS — I69.351 HEMIPLEGIA AND HEMIPARESIS FOLLOWING CEREBRAL INFARCTION AFFECTING RIGHT DOMINANT SIDE (H): Primary | ICD-10-CM

## 2022-11-11 NOTE — TELEPHONE ENCOUNTER
----- Message from Adelaida Edgar sent at 11/11/2022 12:08 PM CST -----  Regarding: OT order  Hello,  We received an order for the above patient for wheelchair and seating needs. Please update the order to include 'occupational therapy' as our OT specialist will be meeting with the patient for these needs.  Thank you for your help and understanding!  Sincerely, the Rehab Central Scheduling Team

## 2022-12-06 ENCOUNTER — TRANSFERRED RECORDS (OUTPATIENT)
Dept: HEALTH INFORMATION MANAGEMENT | Facility: CLINIC | Age: 62
End: 2022-12-06

## 2022-12-07 ENCOUNTER — HOSPITAL ENCOUNTER (OUTPATIENT)
Dept: OCCUPATIONAL THERAPY | Facility: CLINIC | Age: 62
Setting detail: THERAPIES SERIES
Discharge: HOME | End: 2022-12-07
Attending: FAMILY MEDICINE
Payer: COMMERCIAL

## 2022-12-07 ENCOUNTER — MEDICAL CORRESPONDENCE (OUTPATIENT)
Dept: HEALTH INFORMATION MANAGEMENT | Facility: CLINIC | Age: 62
End: 2022-12-07

## 2022-12-07 DIAGNOSIS — I69.351 HEMIPLEGIA AND HEMIPARESIS FOLLOWING CEREBRAL INFARCTION AFFECTING RIGHT DOMINANT SIDE (H): ICD-10-CM

## 2022-12-07 DIAGNOSIS — I69.90 LATE EFFECTS OF CVA (CEREBROVASCULAR ACCIDENT): ICD-10-CM

## 2022-12-07 PROCEDURE — 97542 WHEELCHAIR MNGMENT TRAINING: CPT | Mod: GO | Performed by: OCCUPATIONAL THERAPIST

## 2022-12-08 ENCOUNTER — TELEPHONE (OUTPATIENT)
Dept: FAMILY MEDICINE | Facility: CLINIC | Age: 62
End: 2022-12-08

## 2022-12-08 NOTE — PLAN OF CARE
6A OT: Cancel    Pt declined therapy 2/2 lethargy. Therapist provided various suggestions, declined, difficulty maintaining eye contact. iPad  used. Will reschedule for tomorrow.    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 Sister and patient at bedside refer that she has been c/o dizziness described as the room spinning that happens mostly with positional changes but she had one yesterday that was pretty severe and lasted a couple of minutes, where she had some head bobbing movements, blank stare with no loss LOC and nausea. Sister refers that this has happened about 3 times and per daughter this is a new issue since this admission. As part of her hospital admission, she had an MRI brain 11/1/22 that showed remote left thalamic and left cerebellar infarct and chronic microvascular changes but no infarction. She denies any focal neurological deficits or symptoms other than the dizziness, no visual changes, tremors, focal weakness, numbness, gate disturbance.     She is currently on a prednisone taper per Rheumatology and plan is to complete 6 doses of cyclophosphamide. 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

## 2022-12-08 NOTE — TELEPHONE ENCOUNTER
Form has been completed by provider.     Form sent out via: Fax to [facility] at Fax Number: [phone]  Patient informed: No  Output date: December 13, 2022    Marci Jonas RN      **Please close the encounter**    Type of Form:[facility] Incontinence supplies    Patient's PCP: Keven Fairbanks  Placed in Green Color Bin    [facility] called looking for URGENT signed [facility] MEDICAL Incontinence supplies.  Placed in DR Fairbanks's green folder.    Miranda Kaufman  Care Coordinator  [facility]  Phone:[phone]

## 2022-12-08 NOTE — PROGRESS NOTES
"   SEATING AND WHEELED MOBILITY ASSESSMENT  12/07/22 1500   Quick Adds   Quick Adds Current Manual Wheelchair       Present Yes   Language [place]an    Comments Cousin   General Information    Rehab Discipline OT   Funding [facility] PMAP/Waiver   Service Outpatient;Occupational Therapy;Seating/Wheeled Mobility Evaluation   Height 5'5\"   Weight 121   Start Of Care Date 12/07/22   Referring Physician Keven Fairbanks   Orders Date 11/14/22   Others Present at Evaluation Sister, cousin   Patient/Caregiver Goals Power mobility   Rehabilitation Technology Supplier Terri ROSARIO from [employer]   Current Community Support Personal Care Attendant;Family/Friend Caregiver;Meals On Wheels;Transportation Service   Patient role/Employment history Disabled   Fall Risk Screen   Fall screen completed by OT   Have you fallen 2 or more times in the past year? No   Have you fallen and had an injury in the past year? No   Is patient a fall risk? No   Fall screen comments One fall 3 months ago   Medical History   Onset Of Illness/injury Or Date Of Surgery 11/14/22   Medical Diagnosis Nervous and Auditory  Brain lesion  Hemiplegia and hemiparesis following cerebral infarction affecting right dominant side (H)  Aphasia  Cerebral infarction, unspecified (H)     Digestive  Chronic viral hepatitis B without delta-agent (H)  Protein-calorie malnutrition (H)  Unspecified cirrhosis of liver (H)  Dysphagia following cerebral infarction     Endocrine  Mixed hyperlipidemia     Circulatory  Hypertension secondary to other renal disorders  Cerebral aneurysm, nonruptured  Atrioventricular block, complete (H)     Immune  Rheumatoid arthritis (H)     Urinary  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  Chronic kidney disease, stage 1   Current Manual Wheelchair   Manual Wheelchair Comments standard private pay transport wheelchair   Home Accessibility   Living Environment Apartment/condo   Primary " Entrance Level;Elevator   All Rooms Wheelchair Accessible Yes   Community ADL   Transportation Transportation Services   Community Mobility Requirements Medical Appointments;Shopping;Day Program   Cognitive/Visual/Hearing Status   Vision Intact   Hearing Intact   ADL Status   Feeding Independent   Grooming/Hygiene Requires Assist   Dressing Requires Assist   Toileting Incontinent;Requires Assist;Uses Equipment   Bathing Requires Assist;Unable  (roll in shower chair)   Meal Preparation Unable   Home Management Unable   Balance   Unsupported Sitting Balance Uses Upper Extremities for Balance   Sitting Balance in Chair Uses Upper Extremities for Balance   Standing Balance Physical Assist Required   Ambulation   Ambulation Non Ambulatory   Transfers   Transfer Assist Moderate Assist   Transfer Method Stand Pivot   Sleep/Rest   Sleep Surface/Equipment hospital bed   Neuromuscular   History of Pressure Sores No   Current Pressure Sores No   Pain No   Coordination UE Impaired;LE Impaired  (Right fredrick)   Tone Hypotonic   Sensory Deficits Reported R side fredrick sensation loss   Head and Neck   Head and Neck Position Functional   Head Control Good   Upper Extremities   UE ROM L WFL R limited shoulder to about 90 degrees with end range limitations throughout   UE Strength L 5/5 R 2+/5   Dominance Right   UE Comments Needs to now be L hand   Pelvis   Anterior/Posterior Pelvis Position Posterior Tilt   Trunk   Anterior/Posterior Trunk Position Increased Thoracic Kyphosis   Lower Extremities   LE ROM L WFL and R inital active stages only   LE Strength L 5/5 R 2+/5   Foot Positioning Plantar flexed   Patient Measurements   Other per atp notes   Education Assessment   Barriers to Learning Physical;Language   Preferred Learning Style Listening;Demonstration   Assessment/Plan   Criteria for Skilled Interventions Met Yes, Treatment Indicated   Treatment Diagnosis impaired participation in MRADLS and IADLs   Therapy Frequency once    Planned Therapy Interventions Comments Determined need for power mobility and had a safe and independent trial in clinic of mwd with all seat functions.   Risks and benefits of treatment have been explained Yes   Patient/family & other staff in agreement with plan of care Yes   Comments Specs determined wt vendor.   Session Time   OT Wheelchair Management Minutes (76809) 45   Adult OT Eval Goals   OT Eval Goals (Adult) 1    OT Goal 1   Goal Identifier power mobility   Goal Description Patient to demonstrate a successful clinical trial of the recommended wheelchair;   Goal Progress safe trial today   Target Date 12/08/22   Date Met 12/08/22   Electronically signed by:  Genet MORA/L, ATP      Occupational Therapist, Assistive   [phone]      fax: [phone]      [email or web address]  Seating Clinic- [facility], [address]  [address]  [place] [address]

## 2022-12-13 ENCOUNTER — TELEPHONE (OUTPATIENT)
Dept: FAMILY MEDICINE | Facility: CLINIC | Age: 62
End: 2022-12-13

## 2022-12-13 DIAGNOSIS — I69.351 HEMIPLEGIA AND HEMIPARESIS FOLLOWING CEREBRAL INFARCTION AFFECTING RIGHT DOMINANT SIDE (H): ICD-10-CM

## 2022-12-13 DIAGNOSIS — I69.90 LATE EFFECTS OF CVA (CEREBROVASCULAR ACCIDENT): Primary | ICD-10-CM

## 2022-12-13 NOTE — TELEPHONE ENCOUNTER
[facility] phone call message - order or referral request for patient:     Order or referral being requested: recert of skilled nursing 1x a week for 9 weeks, 3 prn for 9 weeks       Additional Comments:     OK to leave a message on voice mail? Yes    Primary language: Finnish      needed? Yes    Call taken on December 13, 2022 at 3:29 PM by Jaren Garcia

## 2022-12-13 NOTE — TELEPHONE ENCOUNTER
[facility] phone call message- patient requesting to speak with PCP or provider:    PCP: Keven Fairbanks    Additional Comments: Seeking prescription for electric wheelchair as opposed to manual as patient is having trouble getting around.     Is a call back needed? Yes    Patient informed that it may take up to 2 business days to hear back from PCP:Yes    OK to leave a message on voice mail? Yes    Primary language: Ukrainian      needed? Yes    Call taken on December 13, 2022 at 2:21 PM by Jaren Garcia

## 2022-12-13 NOTE — TELEPHONE ENCOUNTER
DME (Durable Medical Equipment) Orders and Documentation  No orders of the defined types were placed in this encounter.     The patient was assessed and it was determined the patient is in need of the following listed DME Supplies/Equipment. Please complete supporting documentation below to demonstrate medical necessity.      DME All Other Item(s) Documentation    List reason for need and supporting documentation for medical necessity below for each DME item.     1. Patient had an evaluation and trial of a wheelchair at OT on 12/07/22 -they determined that he needs an electric wheelchair and was able to navigate one safely in the clinic.   He had a face to face evaluation     Robert Fairbanks

## 2022-12-15 ENCOUNTER — TELEPHONE (OUTPATIENT)
Dept: FAMILY MEDICINE | Facility: CLINIC | Age: 62
End: 2022-12-15

## 2022-12-15 NOTE — TELEPHONE ENCOUNTER
[facility] phone call message- patient requesting to speak with PCP or provider:    PCP: Keven Fairbanks    Additional Comments: Insurance requires video or in person visit to confirm need for wheelchair, per insurance.     Is a call back needed? Yes    Patient informed that it may take up to 2 business days to hear back from PCP:Yes    OK to leave a message on voice mail? Yes    Primary language: [place]se      needed? Yes    Call taken on December 15, 2022 at 4:09 PM by Jaren Garcia

## 2022-12-16 ENCOUNTER — VIRTUAL VISIT (OUTPATIENT)
Dept: FAMILY MEDICINE | Facility: CLINIC | Age: 62
End: 2022-12-16
Payer: COMMERCIAL

## 2022-12-16 DIAGNOSIS — I69.90 LATE EFFECTS OF CVA (CEREBROVASCULAR ACCIDENT): Primary | ICD-10-CM

## 2022-12-16 DIAGNOSIS — I69.351 HEMIPLEGIA AND HEMIPARESIS FOLLOWING CEREBRAL INFARCTION AFFECTING RIGHT DOMINANT SIDE (H): ICD-10-CM

## 2022-12-16 PROCEDURE — 99213 OFFICE O/P EST LOW 20 MIN: CPT | Mod: 95 | Performed by: FAMILY MEDICINE

## 2022-12-16 NOTE — PROGRESS NOTES
Wilfredo is a 62 year old who is being evaluated via a billable video visit.    He was seen he was at home and his sister was assisting on the video call         Assessment & Plan     Late effects of CVA (cerebrovascular accident)    Hemiplegia and hemiparesis following cerebral infarction affecting right dominant side (H)  In September it was thought that the patient could use a manual wheelchair and so that was ordered for him we found that he was unable to propel the chair he does not have enough upper arm strength.  He had an evaluation on with occupational therapy which showed that a manual wheelchair was not appropriate and that the patient was able to manage an electric wheelchair.  So order for electric wheelchair was placed.  -Electric  Wheelchair Order      - Wheelchair Order      Review of external notes as documented elsewhere in note  Ordering of each unique test             No follow-ups on file.    Keven Fairbanks MD  [facility] NONA Villalobos is a 62 year old, presenting for the following health issues:  No chief complaint on file.      HPI           Review of Systems         Objective           Vitals:  No vitals were obtained today due to virtual visit.    Physical Exam   GENERAL: Healthy, alert and no distress  EYES: Eyes grossly normal to inspection.  No discharge or erythema, or obvious scleral/conjunctival abnormalities.  RESP: No audible wheeze, cough, or visible cyanosis.  No visible retractions or increased work of breathing.    SKIN: Visible skin clear. No significant rash, abnormal pigmentation or lesions.  Patient able to answer simple questions A's and with the help of his sister he was sitting up in a wheelchair which she is unable to propel himself.  His house appears spacious enough for a electric wheelchair.                Video-Visit Details    Video Start Time: 3:30 pm    Type of service:  Video Visit    Video End Time:3:42 PM    Originating  Location (pt. Location): Home        Distant Location (provider location):  On-site    Platform used for Video Visit: [employer]    DME (Durable Medical Equipment) Orders and Documentation  Orders Placed This Encounter   Procedures     Wheelchair Order      The patient was assessed and it was determined the patient is in need of the following listed DME Supplies/Equipment. Please complete supporting documentation below to demonstrate medical necessity.      Wheelchair Documentation  1. The patient has mobility limitations that impairs their ability to participate in one or more mobility related activities: Toileting, Feeding, Grooming and Bathing.  2. The patient's mobility limitations cannot be safely resolved by using a cane/walker:Yes  3. The patients home has adequate access to use an electric wheelchair:Yes  4. The use of an electric wheelchair on a regular basis will improve the patients ability to participate in mobility related ADL's at home:Yes  5. The patient is willing to use a electric wheelchair at home:Yes  6. The patient has adequate upper body strength and the mental capability to safely use a manual wheelchair and/or has a caregiver that is able to assist: No patient had evaluation at OT and was found to be not strong enough to use a manual wheelchair and needed an electric wheelchair he was tested and fitted for electric wheelchair at a OT visit and so this is a kind of wheelchair he needs.  7. Does the patient have a lower extremity injury or edema?No    Reason for Type of Wheelchair  Patient weight:54.9 Kg  Electric Wheelchair patient has hemiparesis related to a previous stroke and is unable to propel himself with manual wheelchair at this was determined during an occupational therapy wheelchair evaluation is determined the patient needs an electric wheelchair this was trialed during occupational therapy visit and it was successful.    **Use of am electric wheelchair will significantly improve the  patient's ability to participate in MRADLs and the patient will use it on a regular basis in the home. The patient has not expressed an unwillingness to use the electricwheelchair that is provided in the home.**      DME All Other Item(s) Documentation    List reason for need and supporting documentation for medical necessity below for each DME item.     1.  Hemiplegia and hemiparesis related to a hemorrhagic stroke.  Inability to have a mobility with a cane walker or manual wheelchair.  He has successfully used an electric wheelchair and therapy.

## 2022-12-19 NOTE — TELEPHONE ENCOUNTER
[facility] Faxed face to face office note and wheelchair order to [phone].    Miranda Kaufman  Care Coordinator  [facility]  Phone:[phone]

## 2022-12-20 ENCOUNTER — DOCUMENTATION ONLY (OUTPATIENT)
Dept: FAMILY MEDICINE | Facility: CLINIC | Age: 62
End: 2022-12-20

## 2022-12-22 ENCOUNTER — DOCUMENTATION ONLY (OUTPATIENT)
Dept: FAMILY MEDICINE | Facility: CLINIC | Age: 62
End: 2022-12-22

## 2022-12-22 ENCOUNTER — MEDICAL CORRESPONDENCE (OUTPATIENT)
Dept: HEALTH INFORMATION MANAGEMENT | Facility: CLINIC | Age: 62
End: 2022-12-22

## 2022-12-22 NOTE — PROGRESS NOTES
"When opening a documentation only encounter, be sure to enter in \"Chief Complaint\" Forms and in \" Comments\" Title of form, description if needed.    Wilfredo is a 62 year old  male  Form received via: Fax  Form now resides in: Provider Ready    Lucio Carr MA              Form has been completed by provider.     Form sent out via: Fax to [facility] at Fax Number: [phone]  Patient informed: N/A  Output date: December 26, 2022    Lucio Carr MA      **Please close the encounter**        "

## 2022-12-22 NOTE — PROGRESS NOTES
Form has been completed by provider.     Form sent out via: Fax to [facility] at Fax Number: [phone]  Patient informed: N/A  Output date: December 22, 2022    Lucio Carr MA      **Please close the encounter**

## 2022-12-26 ENCOUNTER — DOCUMENTATION ONLY (OUTPATIENT)
Dept: FAMILY MEDICINE | Facility: CLINIC | Age: 62
End: 2022-12-26

## 2022-12-26 DIAGNOSIS — Z53.9 DIAGNOSIS NOT YET DEFINED: Primary | ICD-10-CM

## 2022-12-26 NOTE — PROGRESS NOTES
"When opening a documentation only encounter, be sure to enter in \"Chief Complaint\" Forms and in \" Comments\" Title of form, description if needed.    Wilfredo is a 62 year old  male  Form received via: Fax  Form now resides in: Provider Ready    Lucio Carr MA                Form has been completed by provider.     Form sent out via: Fax to [facility] at Fax Number: [phone]  Patient informed: N/A  Output date: December 26, 2022    Lucio Carr MA      **Please close the encounter**      "

## 2023-01-05 NOTE — PROGRESS NOTES
REQUISITION AND JUSTIFICATION FOR DURABLE MEDICAL EQUIPMENT    Patient Name:  Wilfredo Yoon  MR #:  [id number]  :  1960  Age/Gender:  62 year old male  Visit Date:  Wilfredo Yoon seen for seating and wheeled mobility evaluation by Genet Blevins OTR/L,ATP and ATP from [facility] on 22.    CLINICAL CRITERIA FOR MOBILITY ASSISTIVE EQUIPMENT  Coverage Criteria Per Local Coverage Determination  A) Wilfredo has mobility limitations due to Nervous and Auditory  Brain lesion  Hemiplegia and hemiparesis following cerebral infarction affecting right dominant side (H)  Aphasia  Cerebral infarction, unspecified (H)     Digestive  Chronic viral hepatitis B without delta-agent (H)  Protein-calorie malnutrition (H)  Unspecified cirrhosis of liver (H)  Dysphagia following cerebral infarction     Endocrine  Mixed hyperlipidemia     Circulatory  Hypertension secondary to other renal disorders  Cerebral aneurysm, nonruptured  Atrioventricular block, complete (H)     Immune  Rheumatoid arthritis (H)     Urinary  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 Specifically affected are toileting (being able to get there in time to prevent accidents), dressing, and bathing (getting into the bathroom of designated place). Current equipment used is standard private pay transport chair he cannot independently move. This patient needs the new equipment requested to be able to allow for independent mobility and repositioning. Please see additional documentation in the seating and wheeled mobility report for details.   Wilfredo had a successful clinical trial here, and also a successful trial at home with the recommended equipment. Wilfredo is very willing and physically / cognitively able to use the recommended  equipment to assist his with mobility-related activities of daily living and general mobility. A group 3 power wheelchair is being requested because it has better suspension for a smooth ride and has the capabilities of expandable electronics to operate the  power seat functions Wilfredo needs for independence with his activities of daily living. A Group 2 power wheelchair does not have sufficient electronics to support this patient's progressive neurological deficits due to hemiplegia.  B) Wilfredo's mobility limitation cannot be sufficiently and safely resolved by the use of an appropriately fitted cane or walker because he is non ambulatory. TUG results Nt as not able. Strength of legs is L 5/5 R 2+/5 for one maximal repetition. Fatigue also impacts this patient's ability to ambulate, regardless of the gait aid.    C) Wilfredo does not have sufficient upper extremity function to self-propel an optimally-configured manual wheelchair in his home to perform MRADLs during a typical day due to limitations in strength, endurance, range of motion, and coordination. Distance and time to propel a light weight manual wheelchair NT due to hemiplegia.  Strength of arms is L 5/5/ R 2+/5.  D)  Wilfredo is not able to use a POV/scooter because it will not fit in his home environment. Wilfredo is unable to safely transfer to and from a POV, unable to operate the tiller steering system, and unable to maintain postural stability and position while operating the POV. Wilfredo needs more appropriate seating and positioning than any scooter seat provides.  E) The need for this equipment is LIFETIME.   RECOMMENDATIONS FOR MOBILITY BASE, SEATING SYSTEM AND COMPONENTS  Quantum Q6 [email or web address] 3[facility]SS - this mid wheel drive power wheelchair is needed for this patient to continue to have independent mobility and to be able to allow him to complete or assist in all of his mobility related activities of daily living (MRADLs). This  wheelchair will also have the seating and positioning system and seat function he needs to be able to use and tolerate the wheelchair full time, and have functional and comfortable positioning for a full day's activities. Wilfredo has  which impairs his ability to move in his home without the use of the requested wheelchair. He lives in an apartment with level access and uses transport services for transportation.    100 amp Q-Logic 3 EX controller -  Needed for operation of the joystick for mobility and seat functions    Harness for expandable controller - needs to be inline for communication between the controller and the joystick    Q-Logic3 EX Joystick - this is the joystick needed to be used by this patient for moving this wheelchair and also controlling the movement of the seat functions.    Swing away joystick mount - needed to be able to move the joystick out of the way during transfers, or when at the desk using the computer, or at the table eating, so as not to inadvertently hit the joystick, thus moving the wheelchair or turning the power on or off without his knowledge.    ANNMARIE Balance Power Tilt and Recline  - This seating function combination is needed for this patient to be able to perform independent weight shifts and also to be able to change him seated position without the request of a care giver. Wilfredo  requires a powered seating system with both tilt and recline to optimize pressure distribution and postural repositioning.   The power tilt seat allows him to change his position by rotating rearward without changing the angle of his hips or knees. Due to atrophy of his buttocks he is at high risk of developing pressure ulcers if not able to change his position. Due to compromised ability to exert himself for weight shifting, the power tilt seat allows him to do this by operating it through the joystick. He can also use a slight degree of tilt for assisting in his seating and positioning for  normal functions during the day a to assist keeping him in a midline, erect and upright position by using the effect of gravity.   The power reclining back feature also reduces pressures by allowing Yahir to change the angle of his hips and knees into a more open and supine / recumbent position. This  increases his tolerance and be able to remain in the requested wheelchair for a complete day and not be dependent on care givers to change his position or transfer him to another surface for comfort or resting. The recline feature can be used to access the perineal area necessary for toileting assistance. The power tilt seat and power recline back are necessary features that allow tasks to be done by the care giver without the need for transferring back to bed for these activities.  The medical justification for these seat functions is consistent with the RESNA Position Papers regarding these seat function interventions (Madisyn et al., 2009). These seat functions will also reduce upper extremity strain per the Paralyzed 's of Laura Guidelines for upper limb preservation (Terryinger, et al., 2005).    Lateral Wedge- needed to provide support for weak trunk muscles in order to provide upright posture for optimal environmental engagement.    Power elevating seat - Vertical movement is necessary to allow Wilfredo  to function and participate in a three-dimensional world. This seat feature will increase his ability to reach by bringing him closer for better access with weak arms while reaching into cupboards or closets.  During the home trial he was able to use this feature to increase ease and safety and transfers and ability to reach into high cupboards for independence with kitchen tasks.  Wilfredo  is transferring  bed<=> wheelchair with stand pivot transfer and moderate care giver assistance. This requested seat lift feature promotes safety with and improved independence with lateral transfers by allowing a  level transfer or transfer from a higher to lower surface, which is gravity-assisted.  It also facilitates forward transfer by allowing legs, hips to be more extended, thereby lessening the strength required for the user to perform a stand-pivot transfer.  Power seat elevation also allows the user to have eye contact with others and reduces cervical strain and pain (including headaches from poor positioning). Vertical rise also provides psycho-social benefits of being on peer level and speaking eye-to-eye. Additionally, seat elevation allows certain medications to be kept out of reach of children but remain accessible to the user.    ANNMARIE comfort Solid curved and padded back support - firm and contoured back support is needed to support Wilfredo  's thoracolumbar area in an upright and midline position, with appropriate support pads as needed. This will provide support whether he is in the upright or tilted/reclined position. This back support is essential to provide sufficient lateral contour to maximize his postural alignment and minimize his tendency to develop scoliosis and other secondary complications.    Stealth headrest and mounting hardware - needed to keep Adriennes head in an erect, midline and upright position whether he is in the upright, tilted or reclined position. His head and neck need to be supported as well as the rest of his body.    Comfort plus mounting hardware - needed for mounting the requested headrest in the most appropriate position on the back of the wheelchair for optimal head and neck support and to be able to be removed for transfers.     Power elevating foot legrest- Allows for elevation and extension of lower extremities while elevating. This can improve circulation and prevent/reduce edema (with recline/tilt combination).  The lower legs of this wheelchair user act as a reservoir for fluid accumulation due to lack of movement. Elevation of this patient's legs above the level of the  heart (left atrium) is recommended as part of the management of edema in conjunction with other measures such as support garments  This patient has lower extremity dependent edema while sitting in his wheelchair, which resolves with elevation of his legs. This feature, when used with the power recline back or tilt seat, can increase Wilfreod 's sitting tolerance while positioning him in a more natural position. This can also be helpful if he fatigues and requires rests throughout the day, without transferring him back to bed.  Due to inability to perform a functional weight shifting, he is at risk for developing pressure ulcers. With the use of this feature it will allow for optimal weight shifting in conjunction with tilt and recline. Per RESNA white paper on elevating legrests, using elevating legrests and tilting more than 30 degrees in combination with full recline significantly improves lower leg hemodynamics status as measured by near-infrared spectroscopy (Mu et al., 2010)  Additionally, these legrests can improve ground clearance to navigate thresholds and slopes and still allowing the legs to achieve a tight 90 degree position for typical driving conditions. This position shortens the overall functional wheelbase for improved maneuverability    Power Positioning electronics to be operated through the Q logic controller - this is needed to allow him to use the joystick of the wheelchair for control of mobility and operation of the power seat function. This is important for this patient with limited strength and coordination so as not to require a separate switch for operation of the seat function.    With extension-needed to safely support size of feet with use of chair    2 Batteries and  - gel sealed, and two are necessary to power the wheelchair. They are maintenance free and are safe for travel on the road or in the air. They are necessary to provide reliable use of the power wheelchair on a  single charge.    ANNMARIE comfort SPP seat cushion - this pressure distribution and positioning seat cushion will optimally  distribute seating pressures to prevent pressure ulcers, but also provide a stable base of support for him to use during MRADLs.    This equipment is reasonable and necessary with reference to accepted standards of medical practice and treatment of this patient's condition and is not being recommended as a convenience item. Without this recommended equipment, he is highly likely to sustain injuries from falls, develop pressure sores or postural compensation, and/or be bed confined, which those costs far exceed the cost of the requested equipment.      Electronically signed by:  Genet HAWTHORNE, ATP      Occupational Therapist, Assistive   [phone]      fax: [phone]      [email or web address]  Seating [facility] [place] [address]  January 5, 2023    I have read and concur with the above recommendations.    Physician Printed Name __________________________________________    Physician SIgnature  _____________________________________________    Date of SIgnature ______________________________    Physician Phone  ______________________________

## 2023-01-10 ENCOUNTER — DOCUMENTATION ONLY (OUTPATIENT)
Dept: FAMILY MEDICINE | Facility: CLINIC | Age: 63
End: 2023-01-10

## 2023-01-10 NOTE — PROGRESS NOTES
"When opening a documentation only encounter, be sure to enter in \"Chief Complaint\" Forms and in \" Comments\" Title of form, description if needed.    Wilfredo is a 62 year old  male  Form received via: Fax  Form now resides in: Provider sima Carr MA                  "

## 2023-01-13 ENCOUNTER — OFFICE VISIT (OUTPATIENT)
Dept: FAMILY MEDICINE | Facility: CLINIC | Age: 63
End: 2023-01-13
Payer: MEDICARE

## 2023-01-13 VITALS
SYSTOLIC BLOOD PRESSURE: 132 MMHG | DIASTOLIC BLOOD PRESSURE: 89 MMHG | HEART RATE: 61 BPM | WEIGHT: 120.1 LBS | BODY MASS INDEX: 19.99 KG/M2 | OXYGEN SATURATION: 100 % | RESPIRATION RATE: 18 BRPM

## 2023-01-13 DIAGNOSIS — I69.351 HEMIPLEGIA AND HEMIPARESIS FOLLOWING CEREBRAL INFARCTION AFFECTING RIGHT DOMINANT SIDE (H): ICD-10-CM

## 2023-01-13 DIAGNOSIS — E43 SEVERE PROTEIN-CALORIE MALNUTRITION (H): ICD-10-CM

## 2023-01-13 DIAGNOSIS — N18.31 STAGE 3A CHRONIC KIDNEY DISEASE (H): ICD-10-CM

## 2023-01-13 DIAGNOSIS — B18.1 CHRONIC VIRAL HEPATITIS B WITHOUT DELTA AGENT AND WITHOUT COMA (H): ICD-10-CM

## 2023-01-13 DIAGNOSIS — R18.8 CIRRHOSIS OF LIVER WITH ASCITES, UNSPECIFIED HEPATIC CIRRHOSIS TYPE (H): ICD-10-CM

## 2023-01-13 DIAGNOSIS — K74.60 CIRRHOSIS OF LIVER WITH ASCITES, UNSPECIFIED HEPATIC CIRRHOSIS TYPE (H): ICD-10-CM

## 2023-01-13 DIAGNOSIS — I69.30 HISTORY OF CVA WITH RESIDUAL DEFICIT: ICD-10-CM

## 2023-01-13 LAB
AFP SERPL-MCNC: 6.8 NG/ML
ALBUMIN SERPL BCG-MCNC: 2.6 G/DL (ref 3.5–5.2)
ALP SERPL-CCNC: 99 U/L (ref 40–129)
ALT SERPL W P-5'-P-CCNC: 18 U/L (ref 10–50)
ANION GAP SERPL CALCULATED.3IONS-SCNC: 11 MMOL/L (ref 7–15)
AST SERPL W P-5'-P-CCNC: 36 U/L (ref 10–50)
BASOPHILS # BLD AUTO: 0 10E3/UL (ref 0–0.2)
BASOPHILS NFR BLD AUTO: 1 %
BILIRUB SERPL-MCNC: 0.4 MG/DL
BUN SERPL-MCNC: 21.3 MG/DL (ref 8–23)
CALCIUM SERPL-MCNC: 8.7 MG/DL (ref 8.8–10.2)
CHLORIDE SERPL-SCNC: 110 MMOL/L (ref 98–107)
CREAT SERPL-MCNC: 1.72 MG/DL (ref 0.67–1.17)
DEPRECATED HCO3 PLAS-SCNC: 18 MMOL/L (ref 22–29)
EOSINOPHIL # BLD AUTO: 0.4 10E3/UL (ref 0–0.7)
EOSINOPHIL NFR BLD AUTO: 8 %
ERYTHROCYTE [DISTWIDTH] IN BLOOD BY AUTOMATED COUNT: 13.3 % (ref 10–15)
GFR SERPL CREATININE-BSD FRML MDRD: 45 ML/MIN/1.73M2
GLUCOSE SERPL-MCNC: 93 MG/DL (ref 70–99)
HCT VFR BLD AUTO: 44.9 % (ref 40–53)
HGB BLD-MCNC: 14.5 G/DL (ref 13.3–17.7)
IMM GRANULOCYTES # BLD: 0 10E3/UL
IMM GRANULOCYTES NFR BLD: 0 %
INR PPP: 1.1 (ref 0.85–1.15)
LYMPHOCYTES # BLD AUTO: 1.6 10E3/UL (ref 0.8–5.3)
LYMPHOCYTES NFR BLD AUTO: 29 %
MCH RBC QN AUTO: 31.3 PG (ref 26.5–33)
MCHC RBC AUTO-ENTMCNC: 32.3 G/DL (ref 31.5–36.5)
MCV RBC AUTO: 97 FL (ref 78–100)
MONOCYTES # BLD AUTO: 0.5 10E3/UL (ref 0–1.3)
MONOCYTES NFR BLD AUTO: 9 %
NEUTROPHILS # BLD AUTO: 2.8 10E3/UL (ref 1.6–8.3)
NEUTROPHILS NFR BLD AUTO: 53 %
NRBC # BLD AUTO: 0 10E3/UL
NRBC BLD AUTO-RTO: 0 /100
PLAT MORPH BLD: NORMAL
PLATELET # BLD AUTO: 128 10E3/UL (ref 150–450)
POTASSIUM SERPL-SCNC: 5 MMOL/L (ref 3.4–5.3)
PROT SERPL-MCNC: 5.9 G/DL (ref 6.4–8.3)
RBC # BLD AUTO: 4.63 10E6/UL (ref 4.4–5.9)
RBC MORPH BLD: NORMAL
SODIUM SERPL-SCNC: 139 MMOL/L (ref 136–145)
WBC # BLD AUTO: 5.3 10E3/UL (ref 4–11)

## 2023-01-13 PROCEDURE — 85025 COMPLETE CBC W/AUTO DIFF WBC: CPT | Performed by: FAMILY MEDICINE

## 2023-01-13 PROCEDURE — 99214 OFFICE O/P EST MOD 30 MIN: CPT | Performed by: FAMILY MEDICINE

## 2023-01-13 PROCEDURE — 85610 PROTHROMBIN TIME: CPT | Performed by: FAMILY MEDICINE

## 2023-01-13 PROCEDURE — 36415 COLL VENOUS BLD VENIPUNCTURE: CPT | Performed by: FAMILY MEDICINE

## 2023-01-13 PROCEDURE — 82105 ALPHA-FETOPROTEIN SERUM: CPT | Performed by: FAMILY MEDICINE

## 2023-01-13 PROCEDURE — 80053 COMPREHEN METABOLIC PANEL: CPT | Performed by: FAMILY MEDICINE

## 2023-01-13 PROCEDURE — 87517 HEPATITIS B DNA QUANT: CPT | Performed by: FAMILY MEDICINE

## 2023-01-13 RX ORDER — ASPIRIN 81 MG/1
81 TABLET, CHEWABLE ORAL DAILY
Qty: 90 TABLET | Refills: 3 | Status: SHIPPED | OUTPATIENT
Start: 2023-01-13 | End: 2024-01-04

## 2023-01-13 RX ORDER — ENTECAVIR 0.5 MG/1
1 TABLET, FILM COATED ORAL DAILY
Qty: 90 TABLET | Refills: 4 | Status: SHIPPED | OUTPATIENT
Start: 2023-01-13 | End: 2023-12-04

## 2023-01-13 RX ORDER — MULTIVITAMIN
1 TABLET ORAL DAILY
Qty: 90 TABLET | Refills: 3 | Status: SHIPPED | OUTPATIENT
Start: 2023-01-13 | End: 2023-12-21

## 2023-01-13 NOTE — LETTER
January 16, 2023      Wilfredo Yoon  [address]   [place] [address]        Dear Wilfredo,    Thank you for getting your care at [facility]. Please see below for your test results.    Resulted Orders   Comprehensive metabolic panel   Result Value Ref Range    Sodium 139 136 - 145 mmol/L    Potassium 5.0 3.4 - 5.3 mmol/L      Comment:      Specimen slightly hemolyzed, potassium may be falsely elevated.    Chloride 110 (H) 98 - 107 mmol/L    Carbon Dioxide (CO2) 18 (L) 22 - 29 mmol/L    Anion Gap 11 7 - 15 mmol/L    Urea Nitrogen 21.3 8.0 - 23.0 mg/dL    Creatinine 1.72 (H) 0.67 - 1.17 mg/dL    Calcium 8.7 (L) 8.8 - 10.2 mg/dL    Glucose 93 70 - 99 mg/dL    Alkaline Phosphatase 99 40 - 129 U/L    AST 36 10 - 50 U/L      Comment:      Specimen is hemolyzed which can falsely elevate AST. Analysis of a non-hemolyzed specimen may result in a lower value.    ALT 18 10 - 50 U/L    Protein Total 5.9 (L) 6.4 - 8.3 g/dL    Albumin 2.6 (L) 3.5 - 5.2 g/dL    Bilirubin Total 0.4 <=1.2 mg/dL    GFR Estimate 45 (L) >60 mL/min/1.73m2      Comment:      Effective December 21, 2021 eGFRcr in adults is calculated using the 2021 CKD-EPI creatinine equation which includes age and gender (Duke et al., NEJ, DOI: 10.1056/QGYXas3658695)   INR   Result Value Ref Range    INR 1.10 0.85 - 1.15   AFP tumor marker   Result Value Ref Range    AFP tumor marker 6.8 <=8.3 ng/mL    Narrative    This result is obtained using the Roche Elecsys AFP method on  the victoriano e801 immunoassay analyzer. Results obtained with different assay methods or kits cannot be used interchangeably.  Reference ranges apply to non-pregnant females only.   Hep B Virus DNA Quant Real Time PCR   Result Value Ref Range    Hepatitis B DNA IU/mL Not Detected Not Detected IU/mL    Narrative    The VICTORIANO AmpliPrep/VICTORIANO TaqMan HBV test is a FDA-approved in vitro nucleic acid amplification test for the quantitation of HBV DNA in human plasma (EDTA  "plasma) or serum using the VICTORIANO AmpliPrep instrument for automated viral nucleic acid extraction and the VICTORIANO TaqMan for the automated real-time PCR amplification and detection of viral nucleic acid target. Titer results are reported in International Units/mL (IU/mL) using the 1st WHO International standard for HBV for nucleic acid amplification assays.   CBC with platelets and differential   Result Value Ref Range    WBC Count 5.3 4.0 - 11.0 10e3/uL    RBC Count 4.63 4.40 - 5.90 10e6/uL    Hemoglobin 14.5 13.3 - 17.7 g/dL    Hematocrit 44.9 40.0 - 53.0 %    MCV 97 78 - 100 fL    MCH 31.3 26.5 - 33.0 pg    MCHC 32.3 31.5 - 36.5 g/dL    RDW 13.3 10.0 - 15.0 %    Platelet Count 128 (L) 150 - 450 10e3/uL    % Neutrophils 53 %    % Lymphocytes 29 %    % Monocytes 9 %    % Eosinophils 8 %    % Basophils 1 %    % Immature Granulocytes 0 %    NRBCs per 100 WBC 0 <1 /100    Absolute Neutrophils 2.8 1.6 - 8.3 10e3/uL    Absolute Lymphocytes 1.6 0.8 - 5.3 10e3/uL    Absolute Monocytes 0.5 0.0 - 1.3 10e3/uL    Absolute Eosinophils 0.4 0.0 - 0.7 10e3/uL    Absolute Basophils 0.0 0.0 - 0.2 10e3/uL    Absolute Immature Granulocytes 0.0 <=0.4 10e3/uL    Absolute NRBCs 0.0 10e3/uL   RBC and Platelet Morphology   Result Value Ref Range    Platelet Assessment  Automated Count Confirmed. Platelet morphology is normal.     Automated Count Confirmed. Platelet morphology is normal.    RBC Morphology Confirmed RBC Indices        {SMI FOLLOW UP [id number]::\"If you have any concerns about these results please call and leave a message for me or send a MyChart message to the clinic.\"}    Sincerely,    Keven Fairbanks MD    "

## 2023-01-13 NOTE — LETTER
January 16, 2023      Wilfredo Yoon  [address]   [place] [facility] [address]        Dear Wilfredo,    Thank you for getting your care at [facility]. Please see below for your test results.    Resulted Orders   Comprehensive metabolic panel   Result Value Ref Range    Sodium 139 136 - 145 mmol/L    Potassium 5.0 3.4 - 5.3 mmol/L      Comment:      Specimen slightly hemolyzed, potassium may be falsely elevated.    Chloride 110 (H) 98 - 107 mmol/L    Carbon Dioxide (CO2) 18 (L) 22 - 29 mmol/L    Anion Gap 11 7 - 15 mmol/L    Urea Nitrogen 21.3 8.0 - 23.0 mg/dL    Creatinine 1.72 (H) 0.67 - 1.17 mg/dL    Calcium 8.7 (L) 8.8 - 10.2 mg/dL    Glucose 93 70 - 99 mg/dL    Alkaline Phosphatase 99 40 - 129 U/L    AST 36 10 - 50 U/L      Comment:      Specimen is hemolyzed which can falsely elevate AST. Analysis of a non-hemolyzed specimen may result in a lower value.    ALT 18 10 - 50 U/L    Protein Total 5.9 (L) 6.4 - 8.3 g/dL    Albumin 2.6 (L) 3.5 - 5.2 g/dL    Bilirubin Total 0.4 <=1.2 mg/dL    GFR Estimate 45 (L) >60 mL/min/1.73m2      Comment:      Effective December 21, 2021 eGFRcr in adults is calculated using the 2021 CKD-EPI creatinine equation which includes age and gender (Duke et al., NEJ, DOI: 10.1056/COOGjm1791760)   INR   Result Value Ref Range    INR 1.10 0.85 - 1.15   AFP tumor marker   Result Value Ref Range    AFP tumor marker 6.8 <=8.3 ng/mL    Narrative    This result is obtained using the Roche Elecsys AFP method on  the victoriano e801 immunoassay analyzer. Results obtained with different assay methods or kits cannot be used interchangeably.  Reference ranges apply to non-pregnant females only.   Hep B Virus DNA Quant Real Time PCR   Result Value Ref Range    Hepatitis B DNA IU/mL Not Detected Not Detected IU/mL    Narrative    The VICTORIANO AmpliPrep/VICTORIANO TaqMan HBV test is a FDA-approved in vitro nucleic acid amplification test for the quantitation of HBV DNA in human plasma (EDTA  plasma) or serum using the VICTORIANO AmpliPrep instrument for automated viral nucleic acid extraction and the VICTORIANO TaqMan for the automated real-time PCR amplification and detection of viral nucleic acid target. Titer results are reported in International Units/mL (IU/mL) using the 1st WHO International standard for HBV for nucleic acid amplification assays.   CBC with platelets and differential   Result Value Ref Range    WBC Count 5.3 4.0 - 11.0 10e3/uL    RBC Count 4.63 4.40 - 5.90 10e6/uL    Hemoglobin 14.5 13.3 - 17.7 g/dL    Hematocrit 44.9 40.0 - 53.0 %    MCV 97 78 - 100 fL    MCH 31.3 26.5 - 33.0 pg    MCHC 32.3 31.5 - 36.5 g/dL    RDW 13.3 10.0 - 15.0 %    Platelet Count 128 (L) 150 - 450 10e3/uL    % Neutrophils 53 %    % Lymphocytes 29 %    % Monocytes 9 %    % Eosinophils 8 %    % Basophils 1 %    % Immature Granulocytes 0 %    NRBCs per 100 WBC 0 <1 /100    Absolute Neutrophils 2.8 1.6 - 8.3 10e3/uL    Absolute Lymphocytes 1.6 0.8 - 5.3 10e3/uL    Absolute Monocytes 0.5 0.0 - 1.3 10e3/uL    Absolute Eosinophils 0.4 0.0 - 0.7 10e3/uL    Absolute Basophils 0.0 0.0 - 0.2 10e3/uL    Absolute Immature Granulocytes 0.0 <=0.4 10e3/uL    Absolute NRBCs 0.0 10e3/uL   RBC and Platelet Morphology   Result Value Ref Range    Platelet Assessment  Automated Count Confirmed. Platelet morphology is normal.     Automated Count Confirmed. Platelet morphology is normal.    RBC Morphology Confirmed RBC Indices      The liver results are rereassuring.   If you have any concerns about these results please call and leave a message for me or send a MyChart message to the clinic.    Sincerely,    Keven Fairbanks MD

## 2023-01-13 NOTE — PATIENT INSTRUCTIONS
PPatient Education   Here is the plan from today's visit    1. Severe protein-calorie malnutrition (H)    - multivitamin (ONE-DAILY) tablet; Take 1 tablet by mouth daily  Dispense: 90 tablet; Refill: 3    2. History of CVA with residual deficit    - aspirin (ASA) 81 MG chewable tablet; Take 1 tablet (81 mg) by mouth daily Indication: Stroke  Dispense: 90 tablet; Refill: 3    3. Chronic viral hepatitis B without delta agent and without coma (H)    - entecavir (BARACLUDE) 0.5 MG tablet; Take 2 tablets (1 mg) by mouth daily  Dispense: 90 tablet; Refill: 4  - Adult GI  Referral - Consult Only; Future  - CBC with platelets differential; Future  - Comprehensive metabolic panel; Future  - INR; Future  - AFP tumor marker; Future  - Hep B Virus DNA Quant Real Time PCR; Future    4. Cirrhosis of liver with ascites, unspecified hepatic cirrhosis type (H)    - entecavir (BARACLUDE) 0.5 MG tablet; Take 2 tablets (1 mg) by mouth daily  Dispense: 90 tablet; Refill: 4  - Adult GI  Referral - Consult Only; Future  - US Abdomen Limited; Future    5. Hemiplegia and hemiparesis following cerebral infarction affecting right dominant side (H)            Please call or return to clinic if your symptoms don't go away.    Follow up plan  Return in about 1 week (around 1/20/2023) for schedule US for RUQ at .  Follow up with Liver specialists  Thank you for coming to [facility] today.  Lab Testing:  **If you had lab testing today and your results are reassuring or normal they will be mailed to you or sent through [employer] within 7 days.   **If the lab tests need quick action we will call you with the results.  **If you are having labs done on a different day, please call [phone] to schedule at [facility] or [phone] for other [facility] locations. Labs do not offer walk-in appointments.  The phone number we will call with results is # [phone] (home) none (work). If this is  not the best number please call our clinic and change the number.  Medication Refills:  If you need any refills please call your pharmacy and they will contact us.   If you need to  your refill at a new pharmacy, please contact the new pharmacy directly. The new pharmacy will help you get your medications transferred faster.   Scheduling:  If you have any concerns about today's visit or wish to schedule another appointment please call our office during normal business hours [phone] (8-5:00 M-F). If you can no longer make a scheduled visit, please cancel via [employer] or call us to cancel.   If a referral was made to an [facility] specialty provider and you do not get a call from central scheduling, please refer to directions on your visit summary or call our office during normal business hours for assistance.   If a Mammogram was ordered for you at the Breast Center call [phone] to schedule or change your appointment.  If you had an XRay/CT/Ultrasound/MRI ordered the number is [phone] to schedule or change your radiology appointment.   [facility] has limited ultrasound appointments available on Wednesdays, if you would like your ultrasound at [facility], please call [phone] to schedule.   Medical Concerns:  If you have urgent medical concerns please call [phone] at any time of the day.    Keven Fairbanks MD

## 2023-01-13 NOTE — PROGRESS NOTES
Assessment & Plan     Severe protein-calorie malnutrition (H)  Patient's weight is stable and he appears in his usual state of affairs.  Will check labs to's make sure he is stable.  - multivitamin (ONE-DAILY) tablet; Take 1 tablet by mouth daily    History of CVA with residual deficit  Stable and continue usual aspirin.  - aspirin (ASA) 81 MG chewable tablet; Take 1 tablet (81 mg) by mouth daily Indication: Stroke    Chronic viral hepatitis B without delta agent and without coma (H)  Patient follows up with hepatology for this referral was done to ensure the patient reaches out and he was scheduled for a right upper quadrant ultrasound.  - entecavir (BARACLUDE) 0.5 MG tablet; Take 2 tablets (1 mg) by mouth daily  - Adult GI  Referral - Consult Only; Future  - CBC with platelets differential; Future  - Comprehensive metabolic panel; Future  - INR; Future  - AFP tumor marker; Future  - Hep B Virus DNA Quant Real Time PCR; Future  - CBC with platelets differential  - Comprehensive metabolic panel  - INR  - AFP tumor marker  - Hep B Virus DNA Quant Real Time PCR    Cirrhosis of liver with ascites, unspecified hepatic cirrhosis type (H)  Refilled medication.  - entecavir (BARACLUDE) 0.5 MG tablet; Take 2 tablets (1 mg) by mouth daily  - Adult GI  Referral - Consult Only; Future  - US Abdomen Limited; Future    Hemiplegia and hemiparesis following cerebral infarction affecting right dominant side (H)  Stable post stroke neurologic condition        I spent a total of 34 minutes on the day of the visit.   Time spent doing chart review, history and exam, documentation and further activities per the note           Return in about 1 week (around 1/20/2023) for schedule US for RUQ at .    Keven Fairbanks MD  [facility] NONA Villalobos is a 62 year old accompanied by his sister, presenting for the following health issues:  Hypertension (Follow up)      HPI      Hypertension Follow-up      Do you check your blood pressure regularly outside of the clinic? No     Are you following a low salt diet? Yes    Are your blood pressures ever more than 140 on the top number (systolic) OR more   than 90 on the bottom number (diastolic), for example 140/90? No    Fatigue  Presents with is his sister she is concerned because he has been more tired. Feels lightheaded/dizzy and threw up once. Appetite is poor.  His sister is concerned that he is more tired than usual and not getting up as usual.  Review of Systems         Objective    /89   Pulse 61   Resp 18   SpO2 100%   There is no height or weight on file to calculate BMI.  Physical Exam  Vitals reviewed.   Constitutional:       General: He is not in acute distress.     Appearance: He is well-developed. He is not diaphoretic.   HENT:      Head: Normocephalic.   Eyes:      General: No scleral icterus.     Conjunctiva/sclera: Conjunctivae normal.   Neck:      Thyroid: No thyromegaly.   Cardiovascular:      Rate and Rhythm: Normal rate and regular rhythm.      Heart sounds: Normal heart sounds. No murmur heard.  Pulmonary:      Effort: Pulmonary effort is normal. No respiratory distress.      Breath sounds: Normal breath sounds. No wheezing.   Abdominal:      General: Bowel sounds are normal. There is no distension.      Palpations: Abdomen is soft. There is no hepatomegaly or splenomegaly.      Tenderness: There is no abdominal tenderness.   Musculoskeletal:      Cervical back: Normal range of motion.   Lymphadenopathy:      Cervical: No cervical adenopathy.   Skin:     General: Skin is warm and dry.   Neurological:      Mental Status: He is alert.      Comments: Patient has right-sided hemiplegia and hemiparesis.  He is unable to ambulate independently does have movement of both arms and legs though is has more focus movement on his left side.   Psychiatric:      Comments: Patient answers simple questions with yes or no was  able to declined immunizations but unable to say why he wanted to declined them generally family makes decisions for him except and less he declined something.

## 2023-01-16 PROBLEM — N18.30 CKD (CHRONIC KIDNEY DISEASE) STAGE 3, GFR 30-59 ML/MIN (H): Status: ACTIVE | Noted: 2023-01-16

## 2023-01-16 LAB — HBV DNA SERPL NAA+PROBE-ACNC: NOT DETECTED IU/ML

## 2023-01-17 NOTE — PROGRESS NOTES
[facility], and Practitioner's Standard Written Order for wheelchair documents faxed to [facility].  Will scan copy into chart.    Miranda Kaufman  Care Coordinator  [facility]  Phone:[phone]

## 2023-01-24 ENCOUNTER — TELEPHONE (OUTPATIENT)
Dept: FAMILY MEDICINE | Facility: CLINIC | Age: 63
End: 2023-01-24

## 2023-01-24 ENCOUNTER — TELEPHONE (OUTPATIENT)
Dept: FAMILY MEDICINE | Facility: CLINIC | Age: 63
End: 2023-01-24
Payer: MEDICARE

## 2023-01-24 NOTE — TELEPHONE ENCOUNTER
[facility] phone call message-patient reporting a symptom:     Symptom: Patient's LPN (Edlynn) called to report that patient is experiencing stomach pain and a fever of 101.8. Please call Edlynn to follow up on patient's symptoms.     OK to leave message on voice mail? Yes       Primary language: Tunisian      needed? Yes    Call taken on January 24, 2023 at 8:34 AM by Shelby Staley

## 2023-01-24 NOTE — TELEPHONE ENCOUNTER
RN spoke to the patient and the family,patient woke up with stomach pain and and fever 101.8 temperature given breakfast and medication, went to the bathroom and had good amount of bowel movement his temperature is better family said that the nurse said he does not have fever now and they do not have thermometer.Patient have an appointment 1/25/2023  at 9:45 am.         [facility] phone call message-patient reporting a symptom:       Symptom: Patient's LPN (Marlenen) called to report that patient is experiencing stomach pain and a fever of 101.8. Please call Edlynn to follow up on patient's symptoms.     OK to leave message on voice mail? Yes           Primary language: Bangladeshi      needed? Yes       Call taken on January 24, 2023 at 8:34 AM by Shelby Powell RN

## 2023-01-25 ENCOUNTER — ANCILLARY PROCEDURE (OUTPATIENT)
Dept: ULTRASOUND IMAGING | Facility: CLINIC | Age: 63
End: 2023-01-25
Attending: FAMILY MEDICINE
Payer: MEDICARE

## 2023-01-25 DIAGNOSIS — K74.60 CIRRHOSIS OF LIVER WITH ASCITES, UNSPECIFIED HEPATIC CIRRHOSIS TYPE (H): ICD-10-CM

## 2023-01-25 DIAGNOSIS — R18.8 CIRRHOSIS OF LIVER WITH ASCITES, UNSPECIFIED HEPATIC CIRRHOSIS TYPE (H): ICD-10-CM

## 2023-01-25 PROCEDURE — 76705 ECHO EXAM OF ABDOMEN: CPT | Mod: GC | Performed by: STUDENT IN AN ORGANIZED HEALTH CARE EDUCATION/TRAINING PROGRAM

## 2023-01-25 NOTE — NURSING NOTE
Due to patient being non-English speaking/uses sign language, an  was used for this visit. Only for face-to-face interpretation by an external agency, date and length of interpretation can be found on the scanned worksheet.     name: Yvette  Agency: [employer]  Language: Moroccan   Telephone number: [phone]  Type of interpretation: Telephone, spoken

## 2023-01-27 DIAGNOSIS — K74.60 CIRRHOSIS OF LIVER WITH ASCITES, UNSPECIFIED HEPATIC CIRRHOSIS TYPE (H): Primary | ICD-10-CM

## 2023-01-27 DIAGNOSIS — B18.1 CHRONIC VIRAL HEPATITIS B WITHOUT DELTA AGENT AND WITHOUT COMA (H): ICD-10-CM

## 2023-01-27 DIAGNOSIS — R18.8 CIRRHOSIS OF LIVER WITH ASCITES, UNSPECIFIED HEPATIC CIRRHOSIS TYPE (H): Primary | ICD-10-CM

## 2023-01-30 ENCOUNTER — DOCUMENTATION ONLY (OUTPATIENT)
Dept: FAMILY MEDICINE | Facility: CLINIC | Age: 63
End: 2023-01-30
Payer: MEDICARE

## 2023-01-30 NOTE — PROGRESS NOTES
"When opening a documentation only encounter, be sure to enter in \"Chief Complaint\" Forms and in \" Comments\" Title of form, description if needed.    Wilfredo is a 62 year old  male  Form received via: Fax  Form now resides in: Provider Jennifer Carr MA                  "

## 2023-01-31 DIAGNOSIS — I69.30 HISTORY OF CVA WITH RESIDUAL DEFICIT: ICD-10-CM

## 2023-01-31 RX ORDER — ATORVASTATIN CALCIUM 40 MG/1
TABLET, FILM COATED ORAL
Qty: 30 TABLET | Refills: 1 | Status: SHIPPED | OUTPATIENT
Start: 2023-01-31 | End: 2023-04-04

## 2023-01-31 NOTE — TELEPHONE ENCOUNTER
"Requested Prescriptions   Pending Prescriptions Disp Refills     atorvastatin (LIPITOR) 40 MG tablet [Pharmacy Med Name: ATORVASTATIN CALCIUM 40 MG ORAL TABLET] 30 tablet      Sig: TAKE ONE TABLET BY MOUTH EVERY EVENING FOR STROKE       Statins Protocol Passed - 1/31/2023 11:46 AM        Passed - LDL on file in past 12 months     Recent Labs   Lab Test 09/30/22  1714   *             Passed - No abnormal creatine kinase in past 12 months     Recent Labs   Lab Test 05/29/15  1302                   Passed - Recent (12 mo) or future (30 days) visit within the authorizing provider's specialty     Patient has had an office visit with the authorizing provider or a provider within the authorizing providers department within the previous 12 mos or has a future within next 30 days. See \"Patient Info\" tab in inbasket, or \"Choose Columns\" in Meds & Orders section of the refill encounter.              Passed - Medication is active on med list        Passed - Patient is age 18 or older         Prescription approved per [facility] Refill Protocol.    Sangita Oliver RN  [facility]    "

## 2023-02-02 NOTE — PROGRESS NOTES
Faxed signed (Dr EDSON Govea)Jennifer Omar [facility]- - Physical Therapy Outpatient Initial Assessment (date of Service-12/06/22) on 02/02/2023.  Will scan into chart.    Miranda Kaufman  Care Coordinator  [facility]  Phone:[phone]

## 2023-02-06 ENCOUNTER — TELEPHONE (OUTPATIENT)
Dept: GASTROENTEROLOGY | Facility: CLINIC | Age: 63
End: 2023-02-06
Payer: MEDICARE

## 2023-02-06 NOTE — TELEPHONE ENCOUNTER
+ interp LVM // pt needs MRI scheduled prior to visit with Dr. Hurley in April // first attempt, AN 2.6.23

## 2023-02-10 ENCOUNTER — TELEPHONE (OUTPATIENT)
Dept: GASTROENTEROLOGY | Facility: CLINIC | Age: 63
End: 2023-02-10
Payer: MEDICARE

## 2023-02-10 ENCOUNTER — DOCUMENTATION ONLY (OUTPATIENT)
Dept: FAMILY MEDICINE | Facility: CLINIC | Age: 63
End: 2023-02-10
Payer: MEDICARE

## 2023-02-10 NOTE — TELEPHONE ENCOUNTER
scheduled pt for MRI on 4.4.23 & sending a visit summary so pt and sister have the addresses for the visits

## 2023-02-10 NOTE — PROGRESS NOTES
"When opening a documentation only encounter, be sure to enter in \"Chief Complaint\" Forms and in \" Comments\" Title of form, description if needed.    Wilfredo is a 62 year old  male  Form received via: Fax  Form now resides in: Provider Ready    Lucio Carr MA          Form has been completed by provider.     Form sent out via: Fax to [facility]: [facility] at Fax Number: [phone]  Patient informed: N/A  Output date: February 16, 2023    Lucio Carr MA      **Please close the encounter**        "

## 2023-02-14 NOTE — PROGRESS NOTES
REQUISITION AND JUSTIFICATION FOR DURABLE MEDICAL EQUIPMENT    Patient Name:  Wilfredo Yoon  MR #:  [id number]  :  1960  Age/Gender:  62 year old male  Visit Date:  Wilfredo Yoon seen for seating and wheeled mobility evaluation by Genet Blevins OTR/L,ATP and ATP from [facility] on 22.    CLINICAL CRITERIA FOR MOBILITY ASSISTIVE EQUIPMENT  Coverage Criteria Per Local Coverage Determination  A) Wilfredo has mobility limitations due to Nervous and Auditory  Brain lesion  Hemiplegia and hemiparesis following cerebral infarction affecting right dominant side (H)  Aphasia  Cerebral infarction, unspecified (H)     Digestive  Chronic viral hepatitis B without delta-agent (H)  Protein-calorie malnutrition (H)  Unspecified cirrhosis of liver (H)  Dysphagia following cerebral infarction     Endocrine  Mixed hyperlipidemia     Circulatory  Hypertension secondary to other renal disorders  Cerebral aneurysm, nonruptured  Atrioventricular block, complete (H)     Immune  Rheumatoid arthritis (H)     Urinary  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 Specifically affected are toileting (being able to get there in time to prevent accidents), dressing, and bathing (getting into the bathroom of designated place). Current equipment used is standard private pay transport chair he cannot independently move. This patient needs the new equipment requested to be able to allow for independent mobility and repositioning. Please see additional documentation in the seating and wheeled mobility report for details.   Wilfredo had a successful clinical trial here, and also a successful trial at home with the recommended equipment. Wilfredo is very willing and physically / cognitively able to use the recommended  equipment to assist his with mobility-related activities of daily living and general mobility. A group 3 power wheelchair is being requested because it has better suspension for a smooth ride and has the capabilities of expandable electronics to operate the  power seat functions Wilfredo needs for independence with his activities of daily living. A Group 2 power wheelchair does not have sufficient electronics to support this patient's progressive neurological deficits due to hemiplegia.  B) Wilfredo's mobility limitation cannot be sufficiently and safely resolved by the use of an appropriately fitted cane or walker because he is non ambulatory. TUG results Nt as not able. Strength of legs is L 5/5 R 2+/5 for one maximal repetition. Fatigue also impacts this patient's ability to ambulate, regardless of the gait aid.    C) Wilfredo does not have sufficient upper extremity function to self-propel an optimally-configured manual wheelchair in his home to perform MRADLs during a typical day due to limitations in strength, endurance, range of motion, and coordination. Distance and time to propel a light weight manual wheelchair NT due to hemiplegia.  Strength of arms is L 5/5/ R 2+/5.  D)  Wilfredo is not able to use a POV/scooter because it will not fit in his home environment. Wilfredo is unable to safely transfer to and from a POV, unable to operate the tiller steering system, and unable to maintain postural stability and position while operating the POV. Wilfredo needs more appropriate seating and positioning than any scooter seat provides.  E) The need for this equipment is LIFETIME.   RECOMMENDATIONS FOR MOBILITY BASE, SEATING SYSTEM AND COMPONENTS  Quantum Q6 Stealth [employer] [facility] [facility] 3MP[facility]SS - this mid wheel drive power wheelchair is needed for this patient to continue to have independent mobility and to be able to allow him to complete or assist in all of his mobility related activities of daily living (MRADLs). This  wheelchair will also have the seating and positioning system and seat function he needs to be able to use and tolerate the wheelchair full time, and have functional and comfortable positioning for a full day's activities. Wilfredo has  which impairs his ability to move in his home without the use of the requested wheelchair. He lives in an apartment with level access and uses transport services for transportation.    100 amp Q-Logic 3 EX controller -  Needed for operation of the joystick for mobility and seat functions    Harness for expandable controller - needs to be inline for communication between the controller and the joystick    Q-Logic3 EX Joystick - this is the joystick needed to be used by this patient for moving this wheelchair and also controlling the movement of the seat functions.    Swing away joystick mount - needed to be able to move the joystick out of the way during transfers, or when at the desk using the computer, or at the table eating, so as not to inadvertently hit the joystick, thus moving the wheelchair or turning the power on or off without his knowledge.    ANNMARIE Balance Power Tilt and Recline  - This seating function combination is needed for this patient to be able to perform independent weight shifts and also to be able to change him seated position without the request of a care giver. Wilfredo  requires a powered seating system with both tilt and recline to optimize pressure distribution and postural repositioning.   The power tilt seat allows him to change his position by rotating rearward without changing the angle of his hips or knees. Due to atrophy of his buttocks he is at high risk of developing pressure ulcers if not able to change his position. Due to compromised ability to exert himself for weight shifting, the power tilt seat allows him to do this by operating it through the joystick. He can also use a slight degree of tilt for assisting in his seating and positioning for  normal functions during the day a to assist keeping him in a midline, erect and upright position by using the effect of gravity.   The power reclining back feature also reduces pressures by allowing Yahir to change the angle of his hips and knees into a more open and supine / recumbent position. This  increases his tolerance and be able to remain in the requested wheelchair for a complete day and not be dependent on care givers to change his position or transfer him to another surface for comfort or resting. The recline feature can be used to access the perineal area necessary for toileting assistance. The power tilt seat and power recline back are necessary features that allow tasks to be done by the care giver without the need for transferring back to bed for these activities.  The medical justification for these seat functions is consistent with the RESNA Position Papers regarding these seat function interventions (Madisyn et al., 2009). These seat functions will also reduce upper extremity strain per the Paralyzed 's of Laura Guidelines for upper limb preservation (Terryinger, et al., 2005).    Lateral Wedge- needed to provide support for weak trunk muscles in order to provide upright posture for optimal environmental engagement.    Power elevating seat - Vertical movement is necessary to allow Wilfredo  to function and participate in a three-dimensional world. This seat feature will increase his ability to reach by bringing him closer for better access with weak arms while reaching into cupboards or closets.  During the home trial he was able to use this feature to increase ease and safety and transfers and ability to reach into high cupboards for independence with kitchen tasks.  Wilfredo  is transferring  bed<=> wheelchair with stand pivot transfer and moderate care giver assistance. This requested seat lift feature promotes safety with and improved independence with lateral transfers by allowing a  level transfer or transfer from a higher to lower surface, which is gravity-assisted.  It also facilitates forward transfer by allowing legs, hips to be more extended, thereby lessening the strength required for the user to perform a stand-pivot transfer.  Power seat elevation also allows the user to have eye contact with others and reduces cervical strain and pain (including headaches from poor positioning). Vertical rise also provides psycho-social benefits of being on peer level and speaking eye-to-eye. Additionally, seat elevation allows certain medications to be kept out of reach of children but remain accessible to the user.    ANNMARIE comfort Solid curved and padded back support - firm and contoured back support is needed to support Wilfredo  's thoracolumbar area in an upright and midline position, with appropriate support pads as needed. This will provide support whether he is in the upright or tilted/reclined position. This back support is essential to provide sufficient lateral contour to maximize his postural alignment and minimize his tendency to develop scoliosis and other secondary complications.    Stealth headrest and mounting hardware - needed to keep Adriennes head in an erect, midline and upright position whether he is in the upright, tilted or reclined position. His head and neck need to be supported as well as the rest of his body.    Comfort plus mounting hardware - needed for mounting the requested headrest in the most appropriate position on the back of the wheelchair for optimal head and neck support and to be able to be removed for transfers.     Power elevating foot legrest- Allows for elevation and extension of lower extremities while elevating. This can improve circulation and prevent/reduce edema (with recline/tilt combination).  The lower legs of this wheelchair user act as a reservoir for fluid accumulation due to lack of movement. Elevation of this patient's legs above the level of the  heart (left atrium) is recommended as part of the management of edema in conjunction with other measures such as support garments  This patient has lower extremity dependent edema while sitting in his wheelchair, which resolves with elevation of his legs. This feature, when used with the power recline back or tilt seat, can increase Wilfredo 's sitting tolerance while positioning him in a more natural position. This can also be helpful if he fatigues and requires rests throughout the day, without transferring him back to bed.  Due to inability to perform a functional weight shifting, he is at risk for developing pressure ulcers. With the use of this feature it will allow for optimal weight shifting in conjunction with tilt and recline. Per RESNA white paper on elevating legrests, using elevating legrests and tilting more than 30 degrees in combination with full recline significantly improves lower leg hemodynamics status as measured by near-infrared spectroscopy (Mu et al., 2010)  Additionally, these legrests can improve ground clearance to navigate thresholds and slopes and still allowing the legs to achieve a tight 90 degree position for typical driving conditions. This position shortens the overall functional wheelbase for improved maneuverability    Power Positioning electronics to be operated through the Q logic controller - this is needed to allow him to use the joystick of the wheelchair for control of mobility and operation of the power seat function. This is important for this patient with limited strength and coordination so as not to require a separate switch for operation of the seat function.    With extension-needed to safely support size of feet with use of chair    2 Batteries and  - gel sealed, and two are necessary to power the wheelchair. They are maintenance free and are safe for travel on the road or in the air. They are necessary to provide reliable use of the power wheelchair on a  single charge.    ANNMARIE comfort SPP seat cushion - this pressure distribution and positioning seat cushion will optimally  distribute seating pressures to prevent pressure ulcers, but also provide a stable base of support for him to use during MRADLs.    This equipment is reasonable and necessary with reference to accepted standards of medical practice and treatment of this patient's condition and is not being recommended as a convenience item. Without this recommended equipment, he is highly likely to sustain injuries from falls, develop pressure sores or postural compensation, and/or be bed confined, which those costs far exceed the cost of the requested equipment.      Electronically signed by:  Genet HAWTHORNE, ATP      Occupational Therapist, Assistive   [phone]      fax: [phone]      [email or web address]  [facility], [place] [address]  January 5, 2023    I have read and concur with the above recommendations.    Physician Printed Name __________________________________________    Physician SIgnature  _____________________________________________    Date of SIgnature ______________________________    Physician Phone  ______________________________      Addendum 2/14/2023    He is not able to independently weight shift at as he requires moderate assistance for transfers and repositioning secondary to hemiplegia noted by significant weakness on his right upper and lower extremity with sensation loss.  He currently does not have an appropriate seating surface using a transport chair only thus it is not known how many hours he will be up in the chair.  If given on appropriate mobility device is expected he would be spending his waking hours in this chair to allow for maximal independence, thus 8-10 would be most appropriate if given a way to independently move himself and reposition throughout  the day.  He is not able to maintain a full upright position for extended periods of time per eval requiring physical assist with standing and needing to use his arms for balance while sitting.  Kyphosis of spine and posterior tilt noted that requires additional support with use of features that we will use gravity to keep patient in chair as well as a positioning back to accommodate for such asymmetries of spine as well as laterally from hemiplegia.    ANNMARIE comfort SPP seat cushion -Wilfredo has impaired sensation and asymmetries on his seating surface leaving them more subseptal for future skin breakdown with sitting.  Due to hemiplegia increased support needed in mid body in order to support hemiplegic weak side in order to provide a stable base while seated.    Attendant control- joystick style controller to allow Wilfredo's caregivers to steer/drive chair from behind. It is necessary for use when he becomes fatigued, following medical episodes, and in potentially unsafe conditions like an busy, crowded areas, traffic intersections, steep ramps, or in emergency situations. It can also be used to maneuver the chair into an appropriate position for transfers or vehicle transport.    Electronically signed by:  Genet HAWTHORNE, ATP      Occupational Therapist, Assistive   [phone]      fax: [phone]      [email or web address]  Seating [facility]- [facility], [address]  [address]  [place]   [address]    February 14, 2023

## 2023-02-15 ENCOUNTER — TELEPHONE (OUTPATIENT)
Dept: FAMILY MEDICINE | Facility: CLINIC | Age: 63
End: 2023-02-15
Payer: MEDICARE

## 2023-02-15 DIAGNOSIS — E87.6 HYPOPOTASSEMIA: ICD-10-CM

## 2023-02-15 RX ORDER — POTASSIUM CHLORIDE 750 MG/1
CAPSULE, EXTENDED RELEASE ORAL
Qty: 60 CAPSULE | Refills: 4 | Status: SHIPPED | OUTPATIENT
Start: 2023-02-15 | End: 2023-07-10

## 2023-02-15 NOTE — TELEPHONE ENCOUNTER
[facility] phone call message - order or referral request for patient:     Order or referral being requested: Verbal Order      Additional Comments: Re-certifcation skilled RN for 1 time per week for 9 weeks and 3PRN    OK to leave a message on voice mail? Yes    Primary language: Uruguayan      needed? Yes    Call taken on February 15, 2023 at 10:57 AM by Jacquelyn Phillips

## 2023-02-15 NOTE — TELEPHONE ENCOUNTER
"Request for medication refill:  potassium chloride ER (MICRO-K) 10 MEQ CR capsule    Providers if patient needs an appointment and you are willing to give a one month supply please refill for one month and  send a letter/MyChart using \".SMILLIMITEDREFILL\" .smillimited and route chart to \"P [facility] \" (Giving one month refill in non controlled medications is strongly recommended before denial)    If refill has been denied, meaning absolutely no refills without visit, please complete the smart phrase \".smirxrefuse\" and route it to the \"P SMI MED REFILLS\"  pool to inform the patient and the pharmacy.    Lucio Carr MA        "

## 2023-02-20 ENCOUNTER — DOCUMENTATION ONLY (OUTPATIENT)
Dept: FAMILY MEDICINE | Facility: CLINIC | Age: 63
End: 2023-02-20
Payer: MEDICARE

## 2023-02-20 ENCOUNTER — TELEPHONE (OUTPATIENT)
Dept: GASTROENTEROLOGY | Facility: CLINIC | Age: 63
End: 2023-02-20
Payer: MEDICARE

## 2023-02-20 ENCOUNTER — APPOINTMENT (OUTPATIENT)
Dept: INTERPRETER SERVICES | Facility: CLINIC | Age: 63
End: 2023-02-20
Payer: MEDICARE

## 2023-02-20 NOTE — TELEPHONE ENCOUNTER
LVM // pt needs to reschedule MRI to be at least a week prior to the visit with Dr. Hurley // first attempt, AN 2.20.23

## 2023-02-20 NOTE — PROGRESS NOTES
"When opening a documentation only encounter, be sure to enter in \"Chief Complaint\" Forms and in \" Comments\" Title of form, description if needed.    Wilfredo is a 62 year old  male  Form received via: Fax  Form now resides in: Provider Ready    Renee Funes MA                  "

## 2023-02-21 ENCOUNTER — DOCUMENTATION ONLY (OUTPATIENT)
Dept: FAMILY MEDICINE | Facility: CLINIC | Age: 63
End: 2023-02-21
Payer: MEDICARE

## 2023-02-21 DIAGNOSIS — Z53.9 DIAGNOSIS NOT YET DEFINED: Primary | ICD-10-CM

## 2023-02-21 NOTE — PROGRESS NOTES
"When opening a documentation only encounter, be sure to enter in \"Chief Complaint\" Forms and in \" Comments\" Title of form, description if needed.    Wilfredo is a 63 year old  male  Form received via: Fax  Form now resides in: Provider Ready    Renee Funes MA                  "

## 2023-02-22 ENCOUNTER — DOCUMENTATION ONLY (OUTPATIENT)
Dept: FAMILY MEDICINE | Facility: CLINIC | Age: 63
End: 2023-02-22
Payer: MEDICARE

## 2023-02-22 NOTE — PROGRESS NOTES
"When opening a documentation only encounter, be sure to enter in \"Chief Complaint\" Forms and in \" Comments\" Title of form, description if needed.    Wilfredo is a 63 year old  male  Form received via: Fax  Form now resides in: Provider Jennifer Syed                  "

## 2023-02-23 NOTE — PROGRESS NOTES
Faxed signed [facility]- Toilet [employer].  Will scan copy into chart.    Miranda Kaufman  Care Coordinator  [facility]  Phone:[phone]

## 2023-02-23 NOTE — PROGRESS NOTES
Faxed [facility]- ICD 10 Diagnosis Verification Form.  Will scan copy into chart.    Miranda Kaufman  Care Coordinator  [facility]  Phone:[phone]

## 2023-02-24 ENCOUNTER — TELEPHONE (OUTPATIENT)
Dept: GASTROENTEROLOGY | Facility: CLINIC | Age: 63
End: 2023-02-24
Payer: MEDICARE

## 2023-02-24 NOTE — PROGRESS NOTES
Form has been completed by provider.     Form sent out via: Fax to [facility] at Fax Number: [phone]  Patient informed: N/A  Output date: February 24, 2023    Lucio Carr MA      **Please close the encounter**

## 2023-02-24 NOTE — TELEPHONE ENCOUNTER
+ interp LVM // pt needs to reschedule MRI to be sooner than original appt // second attempt, AN 2.24.23

## 2023-02-27 PROBLEM — R60.0 LOCALIZED EDEMA: Status: ACTIVE | Noted: 2023-02-27

## 2023-02-28 DIAGNOSIS — I10 ESSENTIAL HYPERTENSION, MALIGNANT: ICD-10-CM

## 2023-02-28 RX ORDER — AMLODIPINE BESYLATE 10 MG/1
10 TABLET ORAL DAILY
Qty: 90 TABLET | Refills: 3 | Status: SHIPPED | OUTPATIENT
Start: 2023-02-28 | End: 2024-02-01

## 2023-02-28 RX ORDER — AMLODIPINE BESYLATE 10 MG/1
TABLET ORAL
Qty: 30 TABLET | Refills: 1 | Status: SHIPPED | OUTPATIENT
Start: 2023-02-28 | End: 2024-02-23

## 2023-02-28 RX ORDER — CARVEDILOL 12.5 MG/1
12.5 TABLET ORAL 2 TIMES DAILY WITH MEALS
Qty: 180 TABLET | Refills: 3 | Status: SHIPPED | OUTPATIENT
Start: 2023-02-28 | End: 2023-11-29

## 2023-02-28 NOTE — TELEPHONE ENCOUNTER
[facility] phone call message- patient requesting a refill:    Full Medication Name: amLODIPine (NORVASC) 10 MG tablet    carvedilol (COREG) 12.5 MG tablet        Pharmacy confirmed as     [facility] - [place], [place] - [address]  [address] [address]  Phone: [phone] Fax: [phone]  : Yes    Additional Comments:  for pt is requesting refills for the pt. The pt has an appt scheduled for 3/15. If there is any issues with getting the refills please call [facility] at [phone].     OK to leave a message on voice mail? Yes    Primary language: Palestinian      needed? Yes    Call taken on February 28, 2023 at 2:51 PM by Jacquelyn Phillips

## 2023-02-28 NOTE — TELEPHONE ENCOUNTER
"Requested Prescriptions   Pending Prescriptions Disp Refills     amLODIPine (NORVASC) 10 MG tablet [Pharmacy Med Name: AMLODIPINE BESYLATE 10 MG ORAL TABLET] 30 tablet      Sig: TAKE ONE TABLET BY MOUTH EVERY DAY FOR HYPERTENSION       Calcium Channel Blockers Protocol  Failed - 2/28/2023 12:45 PM        Failed - Recent (12 mo) or future (30 days) visit within the authorizing provider's specialty     Patient has had an office visit with the authorizing provider or a provider within the authorizing providers department within the previous 12 mos or has a future within next 30 days. See \"Patient Info\" tab in inbasket, or \"Choose Columns\" in Meds & Orders section of the refill encounter.              Failed - Normal serum creatinine on file in past 12 months     Recent Labs   Lab Test 01/13/23  1011   CR 1.72*       Ok to refill medication if creatinine is low          Passed - Blood pressure under 140/90 in past 12 months     BP Readings from Last 3 Encounters:   01/13/23 132/89   09/30/22 136/86   05/12/22 129/83                 Passed - Medication is active on med list        Passed - Patient is age 18 or older         Routing refill request to provider for review/approval because medication did not pass protocol.    Sangita Oliver RN  [facility]   "

## 2023-03-06 ENCOUNTER — DOCUMENTATION ONLY (OUTPATIENT)
Dept: FAMILY MEDICINE | Facility: CLINIC | Age: 63
End: 2023-03-06
Payer: MEDICARE

## 2023-03-06 NOTE — PROGRESS NOTES
Form has been completed by provider.     Form sent out via: Fax to [facility] at Fax Number: [phone]  Patient informed: n/a  Output date: March 6, 2023    Shannon Harry      **Please close the encounter**

## 2023-03-07 ENCOUNTER — DOCUMENTATION ONLY (OUTPATIENT)
Dept: FAMILY MEDICINE | Facility: CLINIC | Age: 63
End: 2023-03-07
Payer: MEDICARE

## 2023-03-13 NOTE — PROGRESS NOTES
Form has been completed by provider.     Form sent out via: Fax to [facility] at Fax Number: [phone]  Patient informed: N/A  Output date: March 13, 2023    Lucio Carr MA      **Please close the encounter**

## 2023-03-13 NOTE — PROGRESS NOTES
Form has been completed by provider.     Form sent out via: Fax to [employer] at Fax Number: [phone]  Patient informed: N/A  Output date: March 13, 2023    Lucio Carr MA      **Please close the encounter**

## 2023-03-23 ENCOUNTER — MEDICAL CORRESPONDENCE (OUTPATIENT)
Dept: HEALTH INFORMATION MANAGEMENT | Facility: CLINIC | Age: 63
End: 2023-03-23
Payer: MEDICARE

## 2023-03-23 ENCOUNTER — DOCUMENTATION ONLY (OUTPATIENT)
Dept: FAMILY MEDICINE | Facility: CLINIC | Age: 63
End: 2023-03-23
Payer: MEDICARE

## 2023-03-27 NOTE — PROGRESS NOTES
Form has been completed by provider.     Form sent out via: Fax to [facility] at Fax Number: [phone]  Patient informed: N/A  Output date: March 27, 2023    Lucio Carr MA      **Please close the encounter**

## 2023-03-28 DIAGNOSIS — K59.00 CONSTIPATION, UNSPECIFIED CONSTIPATION TYPE: ICD-10-CM

## 2023-03-28 RX ORDER — DOCUSATE SODIUM 100 MG/1
100 CAPSULE, LIQUID FILLED ORAL 2 TIMES DAILY PRN
Qty: 180 CAPSULE | Refills: 4 | Status: SHIPPED | OUTPATIENT
Start: 2023-03-28 | End: 2024-04-17

## 2023-04-04 ENCOUNTER — LAB (OUTPATIENT)
Dept: LAB | Facility: CLINIC | Age: 63
End: 2023-04-04
Attending: FAMILY MEDICINE
Payer: MEDICARE

## 2023-04-04 ENCOUNTER — HOSPITAL ENCOUNTER (OUTPATIENT)
Dept: MRI IMAGING | Facility: CLINIC | Age: 63
Discharge: HOME | End: 2023-04-04
Attending: STUDENT IN AN ORGANIZED HEALTH CARE EDUCATION/TRAINING PROGRAM
Payer: MEDICARE

## 2023-04-04 ENCOUNTER — OFFICE VISIT (OUTPATIENT)
Dept: GASTROENTEROLOGY | Facility: CLINIC | Age: 63
End: 2023-04-04
Attending: FAMILY MEDICINE
Payer: MEDICARE

## 2023-04-04 VITALS
DIASTOLIC BLOOD PRESSURE: 94 MMHG | BODY MASS INDEX: 20.29 KG/M2 | HEART RATE: 61 BPM | WEIGHT: 121.9 LBS | SYSTOLIC BLOOD PRESSURE: 137 MMHG

## 2023-04-04 DIAGNOSIS — R18.8 CIRRHOSIS OF LIVER WITH ASCITES, UNSPECIFIED HEPATIC CIRRHOSIS TYPE (H): ICD-10-CM

## 2023-04-04 DIAGNOSIS — B18.1 CHRONIC VIRAL HEPATITIS B WITHOUT DELTA AGENT AND WITHOUT COMA (H): ICD-10-CM

## 2023-04-04 DIAGNOSIS — K74.60 CIRRHOSIS OF LIVER WITH ASCITES, UNSPECIFIED HEPATIC CIRRHOSIS TYPE (H): ICD-10-CM

## 2023-04-04 DIAGNOSIS — I69.30 HISTORY OF CVA WITH RESIDUAL DEFICIT: ICD-10-CM

## 2023-04-04 DIAGNOSIS — R79.89 ELEVATED SERUM CREATININE: Primary | ICD-10-CM

## 2023-04-04 LAB
AFP SERPL-MCNC: 6.9 NG/ML
ALBUMIN SERPL BCG-MCNC: 2.6 G/DL (ref 3.5–5.2)
ALP SERPL-CCNC: 108 U/L (ref 40–129)
ALT SERPL W P-5'-P-CCNC: 15 U/L (ref 10–50)
ANION GAP SERPL CALCULATED.3IONS-SCNC: 9 MMOL/L (ref 7–15)
AST SERPL W P-5'-P-CCNC: 29 U/L (ref 10–50)
BILIRUB SERPL-MCNC: 0.4 MG/DL
BUN SERPL-MCNC: 23.8 MG/DL (ref 8–23)
CALCIUM SERPL-MCNC: 8.8 MG/DL (ref 8.8–10.2)
CHLORIDE SERPL-SCNC: 107 MMOL/L (ref 98–107)
CREAT SERPL-MCNC: 2.08 MG/DL (ref 0.67–1.17)
DEPRECATED HCO3 PLAS-SCNC: 21 MMOL/L (ref 22–29)
ERYTHROCYTE [DISTWIDTH] IN BLOOD BY AUTOMATED COUNT: 13.4 % (ref 10–15)
GFR SERPL CREATININE-BSD FRML MDRD: 35 ML/MIN/1.73M2
GLUCOSE SERPL-MCNC: 179 MG/DL (ref 70–99)
HCT VFR BLD AUTO: 42.2 % (ref 40–53)
HGB BLD-MCNC: 14 G/DL (ref 13.3–17.7)
INR PPP: 1.16 (ref 0.85–1.15)
MCH RBC QN AUTO: 31.1 PG (ref 26.5–33)
MCHC RBC AUTO-ENTMCNC: 33.2 G/DL (ref 31.5–36.5)
MCV RBC AUTO: 94 FL (ref 78–100)
PLATELET # BLD AUTO: 117 10E3/UL (ref 150–450)
POTASSIUM SERPL-SCNC: 4.3 MMOL/L (ref 3.4–5.3)
PROT SERPL-MCNC: 5.8 G/DL (ref 6.4–8.3)
RBC # BLD AUTO: 4.5 10E6/UL (ref 4.4–5.9)
SODIUM SERPL-SCNC: 137 MMOL/L (ref 136–145)
WBC # BLD AUTO: 4.5 10E3/UL (ref 4–11)

## 2023-04-04 PROCEDURE — 36415 COLL VENOUS BLD VENIPUNCTURE: CPT | Performed by: PATHOLOGY

## 2023-04-04 PROCEDURE — G0463 HOSPITAL OUTPT CLINIC VISIT: HCPCS | Mod: 25 | Performed by: STUDENT IN AN ORGANIZED HEALTH CARE EDUCATION/TRAINING PROGRAM

## 2023-04-04 PROCEDURE — 87517 HEPATITIS B DNA QUANT: CPT | Performed by: STUDENT IN AN ORGANIZED HEALTH CARE EDUCATION/TRAINING PROGRAM

## 2023-04-04 PROCEDURE — 85610 PROTHROMBIN TIME: CPT | Performed by: PATHOLOGY

## 2023-04-04 PROCEDURE — 85027 COMPLETE CBC AUTOMATED: CPT | Performed by: PATHOLOGY

## 2023-04-04 PROCEDURE — 82105 ALPHA-FETOPROTEIN SERUM: CPT | Performed by: STUDENT IN AN ORGANIZED HEALTH CARE EDUCATION/TRAINING PROGRAM

## 2023-04-04 PROCEDURE — 255N000002 HC RX 255 OP 636: Performed by: STUDENT IN AN ORGANIZED HEALTH CARE EDUCATION/TRAINING PROGRAM

## 2023-04-04 PROCEDURE — 99214 OFFICE O/P EST MOD 30 MIN: CPT | Performed by: STUDENT IN AN ORGANIZED HEALTH CARE EDUCATION/TRAINING PROGRAM

## 2023-04-04 PROCEDURE — 74183 MRI ABD W/O CNTR FLWD CNTR: CPT | Mod: MA

## 2023-04-04 PROCEDURE — A9585 GADOBUTROL INJECTION: HCPCS | Performed by: STUDENT IN AN ORGANIZED HEALTH CARE EDUCATION/TRAINING PROGRAM

## 2023-04-04 PROCEDURE — 80053 COMPREHEN METABOLIC PANEL: CPT | Performed by: PATHOLOGY

## 2023-04-04 PROCEDURE — 74183 MRI ABD W/O CNTR FLWD CNTR: CPT | Mod: 26 | Performed by: RADIOLOGY

## 2023-04-04 RX ORDER — GADOBUTROL 604.72 MG/ML
7.5 INJECTION INTRAVENOUS ONCE
Status: COMPLETED | OUTPATIENT
Start: 2023-04-04 | End: 2023-04-04

## 2023-04-04 RX ORDER — ATORVASTATIN CALCIUM 40 MG/1
TABLET, FILM COATED ORAL
Qty: 30 TABLET | Refills: 1 | Status: SHIPPED | OUTPATIENT
Start: 2023-04-04 | End: 2023-05-01

## 2023-04-04 RX ADMIN — GADOBUTROL 5.5 ML: 604.72 INJECTION INTRAVENOUS at 12:24

## 2023-04-04 ASSESSMENT — PAIN SCALES - GENERAL: PAINLEVEL: NO PAIN (0)

## 2023-04-04 NOTE — TELEPHONE ENCOUNTER
"Requested Prescriptions   Pending Prescriptions Disp Refills     atorvastatin (LIPITOR) 40 MG tablet [Pharmacy Med Name: ATORVASTATIN CALCIUM 40 MG ORAL TABLET] 30 tablet 1     Sig: TAKE ONE TABLET BY MOUTH EVERY EVENING FOR STROKE       Statins Protocol Failed - 4/4/2023 12:25 PM        Failed - Recent (12 mo) or future (30 days) visit within the authorizing provider's specialty     Patient has had an office visit with the authorizing provider or a provider within the authorizing providers department within the previous 12 mos or has a future within next 30 days. See \"Patient Info\" tab in inbasket, or \"Choose Columns\" in Meds & Orders section of the refill encounter.              Passed - LDL on file in past 12 months     Recent Labs   Lab Test 09/30/22  1714   *             Passed - No abnormal creatine kinase in past 12 months     Recent Labs   Lab Test 05/29/15  1302                   Passed - Medication is active on med list        Passed - Patient is age 18 or older             Patient has PCP at [facility]. Routing to PCP clinic.    Thanks!  Chris Webb RN   [facility]    "

## 2023-04-04 NOTE — LETTER
4/4/2023         RE: Wilfredo Yoon  [address]  [place] [address]        Dear Colleague,    Thank you for referring your patient, Wilfredo Yoon, to the [facility] [place]. Please see a copy of my visit note below.    [facility] Return Patient Visit    Date of Visit: April 4th, 2023    Reason for referral: Follow up of hepatitis B cirrhosis    Subjective: Mr. 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     Initial History:     He was admitted to the hospital 8/2020 with AMS, found to multifocal infarcts 2/2 HTN urgency and brainstem abnormalities thought to be related to infratentorial PRES syndrome.     He had a paracentesis 7/2020 - no studies sent.      Found to have latent Tb during this admission, took INH.     Was discharged to a TCU Fall 2020 - just discharged to home 2/2021. .     Cirrhosis diagnosed 2017 - had a history of ascites, no history of HE or variceal bleeding. EGD 10/2018 without varices. Previously follows with Dr. Adame - scheduled in my clinic in error.     Interval Events:  -Ultrasound January 25 showing less than 1 cm lesion in the right hepatic lobe.  MRI scheduled for today.  Showed cirrhosis with small amount of ascites and small right pleural effusion. Having a little bit of lower extremity swelling  -Labs January 2023 showed rise in his creatinine to 1.7, now 2.0 today.  Not taking diuretics. Reports blood pressure well controlled at home. Maybe not drinking as much fluids.   -Taking entecavir - RN sets up meds  -Relies on family support post CVA - can walk with a walker with family assistance    ROS: 14 point ROS negative except for positives noted in HPI.    PMHx:  Past Medical History:   Diagnosis Date    Cirrhosis (H)     Cryoglobulinemia (H)      CVA (cerebral vascular accident) (H) 2020    Supratentorial likely d/t hypertensive emergency leading to right hemiparesis, dysphagia and aphasia    Glomerulonephritis     Hepatitis B     Hypertension     Latent tuberculosis     Treatment 4332-1021    MPGN (membranoproliferative glomerulonephritides)     Positive QuantiFERON-TB Gold test     PRES (posterior reversible encephalopathy syndrome) 2020    In brainstem d/t hypertensive emergency; suffered supratentorial CVAs same date       PSHx:  Past Surgical History:   Procedure Laterality Date    ANESTHESIA OUT OF OR MRI N/A 2020    Procedure: ANESTHESIA OUT OF OR MRI;  Surgeon: GENERIC ANESTHESIA PROVIDER;  Location:  OR    ESOPHAGOSCOPY, GASTROSCOPY, DUODENOSCOPY (EGD), COMBINED N/A 10/18/2018    Procedure: EGD;  Surgeon: Eber Ortez MD;  Location: [facility]    HAND SURGERY Right     IR PARACENTESIS  2020    ZZC HAND/FINGER SURGERY UNLISTED         FamHx:  Family History   Problem Relation Age of Onset    Liver Cancer No family hx of     Hepatitis No family hx of      No family history of liver disease, liver cancer    SocHx:  Social History     Socioeconomic History    Marital status: Single     Spouse name: Not on file    Number of children: Not on file    Years of education: Not on file    Highest education level: Not on file   Occupational History    Not on file   Tobacco Use    Smoking status: Former     Packs/day: 0.25     Years: 40.00     Pack years: 10.00     Types: Cigarettes     Quit date: 10/1/2020     Years since quittin.5    Smokeless tobacco: Never   Vaping Use    Vaping status: Never Used   Substance and Sexual Activity    Alcohol use: No     Alcohol/week: 0.0 standard drinks of alcohol    Drug use: No    Sexual activity: Not on file   Other Topics Concern    Parent/sibling w/ CABG, MI or angioplasty before 65F 55M? Not Asked   Social History Narrative    Not on file     Social Determinants of Health     Financial  Resource Strain: Not on file   Food Insecurity: No Food Insecurity (4/15/2021)    Hunger Vital Sign     Worried About Running Out of Food in the Last Year: Never true     Ran Out of Food in the Last Year: Never true   Transportation Needs: Unknown (4/15/2021)    PRAPARE - Transportation     Lack of Transportation (Medical): Not on file     Lack of Transportation (Non-Medical): No   Physical Activity: Not on file   Stress: Not on file   Social Connections: Unknown (4/15/2021)    Social Connection and Isolation Panel [NHANES]     Frequency of Communication with Friends and Family: Not on file     Frequency of Social Gatherings with Friends and Family: More than three times a week     Attends Gnosticist Services: Not on file     Active Member of Clubs or Organizations: Not on file     Attends Club or Organization Meetings: Not on file     Marital Status: Not on file   Intimate Partner Violence: Not on file   Housing Stability: Not on file       Medications:  Current Outpatient Medications   Medication    acetaminophen (TYLENOL) 325 MG tablet    amLODIPine (NORVASC) 10 MG tablet    amLODIPine (NORVASC) 10 MG tablet    aspirin (ASA) 81 MG chewable tablet    atorvastatin (LIPITOR) 40 MG tablet    carvedilol (COREG) 12.5 MG tablet    diclofenac (VOLTAREN) 1 % topical gel    docusate sodium (COLACE) 100 MG capsule    entecavir (BARACLUDE) 0.5 MG tablet    gabapentin (NEURONTIN) 100 MG capsule    ibuprofen (ADVIL/MOTRIN) 400 MG tablet    multivitamin (ONE-DAILY) tablet    polyethylene glycol (MIRALAX) 17 GM/Dose powder    potassium chloride ER (MICRO-K) 10 MEQ CR capsule     No current facility-administered medications for this visit.   Patient and niece do not know what medications he takes    Allergies:  No Known Allergies    Objective:  BP (!) 137/94   Pulse 61   Wt 55.3 kg (121 lb 14.4 oz)   BMI 20.29 kg/m    General: thin main in NAD sitting in wheelchair  HEENT: Non icteric  Abd: NT, No HSM, no ascites  Ext: no  edema    Labs:  Last Comprehensive Metabolic Panel:  Sodium   Date Value Ref Range Status   04/04/2023 137 136 - 145 mmol/L Final   06/30/2021 140 133 - 144 mmol/L Final     Potassium   Date Value Ref Range Status   04/04/2023 4.3 3.4 - 5.3 mmol/L Final   01/19/2022 3.6 3.4 - 5.3 mmol/L Final   06/30/2021 4.2 3.4 - 5.3 mmol/L Final     Chloride   Date Value Ref Range Status   04/04/2023 107 98 - 107 mmol/L Final   01/19/2022 107 94 - 109 mmol/L Final   06/30/2021 106 94 - 109 mmol/L Final     Carbon Dioxide   Date Value Ref Range Status   06/30/2021 25 20 - 32 mmol/L Final     Carbon Dioxide (CO2)   Date Value Ref Range Status   04/04/2023 21 (L) 22 - 29 mmol/L Final   01/19/2022 27 20 - 32 mmol/L Final     Anion Gap   Date Value Ref Range Status   04/04/2023 9 7 - 15 mmol/L Final   01/19/2022 10 3 - 14 mmol/L Final   06/30/2021 9 3 - 14 mmol/L Final     Glucose   Date Value Ref Range Status   04/04/2023 179 (H) 70 - 99 mg/dL Final   01/19/2022 78 70 - 99 mg/dL Final   06/30/2021 96 70 - 99 mg/dL Final     Urea Nitrogen   Date Value Ref Range Status   04/04/2023 23.8 (H) 8.0 - 23.0 mg/dL Final   01/19/2022 19 7 - 30 mg/dL Final   06/30/2021 24 7 - 30 mg/dL Final     Creatinine   Date Value Ref Range Status   04/04/2023 2.08 (H) 0.67 - 1.17 mg/dL Final   06/30/2021 1.27 (H) 0.66 - 1.25 mg/dL Final     GFR Estimate   Date Value Ref Range Status   04/04/2023 35 (L) >60 mL/min/1.73m2 Final     Comment:     eGFR calculated using 2021 CKD-EPI equation.   06/30/2021 61 >60 mL/min/[1.73_m2] Final     Comment:     Non  GFR Calc  Starting 12/18/2018, serum creatinine based estimated GFR (eGFR) will be   calculated using the Chronic Kidney Disease Epidemiology Collaboration   (CKD-EPI) equation.       Calcium   Date Value Ref Range Status   04/04/2023 8.8 8.8 - 10.2 mg/dL Final   06/30/2021 9.0 8.5 - 10.1 mg/dL Final     Bilirubin Total   Date Value Ref Range Status   04/04/2023 0.4 <=1.2 mg/dL Final    06/30/2021 0.7 0.2 - 1.3 mg/dL Final     Alkaline Phosphatase   Date Value Ref Range Status   04/04/2023 108 40 - 129 U/L Final   06/30/2021 106 40 - 150 U/L Final     ALT   Date Value Ref Range Status   04/04/2023 15 10 - 50 U/L Final   06/30/2021 16 0 - 70 U/L Final     AST   Date Value Ref Range Status   04/04/2023 29 10 - 50 U/L Final   06/30/2021 32 0 - 45 U/L Final       Lab Results   Component Value Date    WBC 6.2 04/02/2021     Lab Results   Component Value Date    RBC 4.44 04/02/2021     Lab Results   Component Value Date    HGB 13.5 04/02/2021     Lab Results   Component Value Date    HCT 41.3 04/02/2021     Lab Results   Component Value Date    MCV 93 04/02/2021     Lab Results   Component Value Date    MCH 30.4 04/02/2021     Lab Results   Component Value Date    MCHC 32.7 04/02/2021     Lab Results   Component Value Date    RDW 12.5 04/02/2021     Lab Results   Component Value Date     04/02/2021       INR   Date Value Ref Range Status   04/04/2023 1.16 (H) 0.85 - 1.15 Final   06/30/2021 1.16 (H) 0.86 - 1.14 Final        MELD-Na score: 16 at 4/4/2023  9:27 AM  MELD score: 16 at 4/4/2023  9:27 AM  Calculated from:  Serum Creatinine: 2.08 mg/dL at 4/4/2023  9:27 AM  Serum Sodium: 137 mmol/L at 4/4/2023  9:27 AM  Total Bilirubin: 0.4 mg/dL (Using min of 1 mg/dL) at 4/4/2023  9:27 AM  INR(ratio): 1.16 at 4/4/2023  9:27 AM  Age: 63 years    Imaging:     Reviewed in EHR     Endoscopy:    EGD 10/2018    Findings:        The examined esophagus was normal.        There is no endoscopic evidence of varices in the entire esophagus.        The entire examined stomach was normal.        There is no endoscopic evidence of portal hypertension gastropathy in        the entire examined stomach.        The examined duodenum was normal.                                                                                     Moderate Sedation:        Moderate (conscious) sedation was administered by the endoscopy  nurse        and supervised by the endoscopist. The following parameters were        monitored: oxygen saturation, heart rate, blood pressure, and response        to care. Total physician intraservice time was 8 minutes.        Moderate (conscious) sedation was administered by the endoscopy nurse        and supervised by the endoscopist. The following parameters were        monitored: oxygen saturation, heart rate, blood pressure, and response        to care. Total physician intraservice time was 8 minutes.   Impression:          - Normal esophagus.                        - Normal stomach.                        - Normal examined duodenum.                        - No specimens collected.     Independently reviewed labs and imaging.     Assessment/Plan: Mr. 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     Ultrasound showed a small liver lesion, follow-up MRI scheduled for today.  Ultrasound showed some ascites and pleural effusion, asymptomatic from this, does not have ascites on exam today    -Follow-up liver MRI and AFP from today.  -MELD labs and AFP every 6 months  -No varices on EGD 2018, since has been put on NSBB, can hold on repeat EGD for surveillance while on NSBB  -Continue entecavir 1 mg daily for chronic hepatitis B in a patient with decompensated cirrhosis. Discussed this is a lifelong medication  -Refer to nephrology for rising creatinine with history of glomerulonephritis.  Blood pressure has reportedly been in control as an outpatient.    RTC 6 months with Abd US and labs    Marlyn Hurley MD MS  Hepatology/Liver Transplant  [facility]              Again, thank you for allowing me to participate in the care of your patient.        Sincerely,        Marlyn Hurley MD

## 2023-04-04 NOTE — PROGRESS NOTES
[facility] Return Patient Visit    Date of Visit: April 4th, 2023    Reason for referral: Follow up of hepatitis B cirrhosis    Subjective: Mr. 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     Initial History:     He was admitted to the hospital 8/2020 with AMS, found to multifocal infarcts 2/2 HTN urgency and brainstem abnormalities thought to be related to infratentorial PRES syndrome.     He had a paracentesis 7/2020 - no studies sent.      Found to have latent Tb during this admission, took INH.     Was discharged to a TCU Fall 2020 - just discharged to home 2/2021. .     Cirrhosis diagnosed 2017 - had a history of ascites, no history of HE or variceal bleeding. EGD 10/2018 without varices. Previously follows with Dr. Adame - scheduled in my clinic in error.     Interval Events:  -Ultrasound January 25 showing less than 1 cm lesion in the right hepatic lobe.  MRI scheduled for today.  Showed cirrhosis with small amount of ascites and small right pleural effusion. Having a little bit of lower extremity swelling  -Labs January 2023 showed rise in his creatinine to 1.7, now 2.0 today.  Not taking diuretics. Reports blood pressure well controlled at home. Maybe not drinking as much fluids.   -Taking entecavir - RN sets up meds  -Relies on family support post CVA - can walk with a walker with family assistance    ROS: 14 point ROS negative except for positives noted in HPI.    PMHx:  Past Medical History:   Diagnosis Date     Cirrhosis (H)      Cryoglobulinemia (H)      CVA (cerebral vascular accident) (H) 07/16/2020    Supratentorial likely d/t hypertensive emergency leading to right hemiparesis, dysphagia and aphasia     Glomerulonephritis      Hepatitis B      Hypertension      Latent tuberculosis     Treatment [id number]     MPGN  (membranoproliferative glomerulonephritides)      Positive QuantiFERON-TB Gold test      PRES (posterior reversible encephalopathy syndrome) 2020    In brainstem d/t hypertensive emergency; suffered supratentorial CVAs same date       PSHx:  Past Surgical History:   Procedure Laterality Date     ANESTHESIA OUT OF OR MRI N/A 2020    Procedure: ANESTHESIA OUT OF OR MRI;  Surgeon: GENERIC ANESTHESIA PROVIDER;  Location: [facility]     ESOPHAGOSCOPY, GASTROSCOPY, DUODENOSCOPY (EGD), COMBINED N/A 10/18/2018    Procedure: EGD;  Surgeon: Eber Ortez MD;  Location: [facility]     HAND SURGERY Right      IR PARACENTESIS  2020     ZZC HAND/FINGER SURGERY UNLISTED         FamHx:  Family History   Problem Relation Age of Onset     Liver Cancer No family hx of      Hepatitis No family hx of      No family history of liver disease, liver cancer    SocHx:  Social History     Socioeconomic History     Marital status: Single     Spouse name: Not on file     Number of children: Not on file     Years of education: Not on file     Highest education level: Not on file   Occupational History     Not on file   Tobacco Use     Smoking status: Former     Packs/day: 0.25     Years: 40.00     Pack years: 10.00     Types: Cigarettes     Quit date: 10/1/2020     Years since quittin.5     Smokeless tobacco: Never   Vaping Use     Vaping status: Never Used   Substance and Sexual Activity     Alcohol use: No     Alcohol/week: 0.0 standard drinks of alcohol     Drug use: No     Sexual activity: Not on file   Other Topics Concern     Parent/sibling w/ CABG, MI or angioplasty before 65F 55M? Not Asked   Social History Narrative     Not on file     Social Determinants of Health     Financial Resource Strain: Not on file   Food Insecurity: No Food Insecurity (4/15/2021)    Hunger Vital Sign      Worried About Running Out of Food in the Last Year: Never true      Ran Out of Food in the Last Year: Never true   Transportation Needs:  Unknown (4/15/2021)    PRAPARE - Transportation      Lack of Transportation (Medical): Not on file      Lack of Transportation (Non-Medical): No   Physical Activity: Not on file   Stress: Not on file   Social Connections: Unknown (4/15/2021)    Social Connection and Isolation Panel [NHANES]      Frequency of Communication with Friends and Family: Not on file      Frequency of Social Gatherings with Friends and Family: More than three times a week      Attends Holiness Services: Not on file      Active Member of Clubs or Organizations: Not on file      Attends Club or Organization Meetings: Not on file      Marital Status: Not on file   Intimate Partner Violence: Not on file   Housing Stability: Not on file       Medications:  Current Outpatient Medications   Medication     acetaminophen (TYLENOL) 325 MG tablet     amLODIPine (NORVASC) 10 MG tablet     amLODIPine (NORVASC) 10 MG tablet     aspirin (ASA) 81 MG chewable tablet     atorvastatin (LIPITOR) 40 MG tablet     carvedilol (COREG) 12.5 MG tablet     diclofenac (VOLTAREN) 1 % topical gel     docusate sodium (COLACE) 100 MG capsule     entecavir (BARACLUDE) 0.5 MG tablet     gabapentin (NEURONTIN) 100 MG capsule     ibuprofen (ADVIL/MOTRIN) 400 MG tablet     multivitamin (ONE-DAILY) tablet     polyethylene glycol (MIRALAX) 17 GM/Dose powder     potassium chloride ER (MICRO-K) 10 MEQ CR capsule     No current facility-administered medications for this visit.   Patient and niece do not know what medications he takes    Allergies:  No Known Allergies    Objective:  BP (!) 137/94   Pulse 61   Wt 55.3 kg (121 lb 14.4 oz)   BMI 20.29 kg/m    General: thin main in NAD sitting in wheelchair  HEENT: Non icteric  Abd: NT, No HSM, no ascites  Ext: no edema    Labs:  Last Comprehensive Metabolic Panel:  Sodium   Date Value Ref Range Status   04/04/2023 137 136 - 145 mmol/L Final   06/30/2021 140 133 - 144 mmol/L Final     Potassium   Date Value Ref Range Status    04/04/2023 4.3 3.4 - 5.3 mmol/L Final   01/19/2022 3.6 3.4 - 5.3 mmol/L Final   06/30/2021 4.2 3.4 - 5.3 mmol/L Final     Chloride   Date Value Ref Range Status   04/04/2023 107 98 - 107 mmol/L Final   01/19/2022 107 94 - 109 mmol/L Final   06/30/2021 106 94 - 109 mmol/L Final     Carbon Dioxide   Date Value Ref Range Status   06/30/2021 25 20 - 32 mmol/L Final     Carbon Dioxide (CO2)   Date Value Ref Range Status   04/04/2023 21 (L) 22 - 29 mmol/L Final   01/19/2022 27 20 - 32 mmol/L Final     Anion Gap   Date Value Ref Range Status   04/04/2023 9 7 - 15 mmol/L Final   01/19/2022 10 3 - 14 mmol/L Final   06/30/2021 9 3 - 14 mmol/L Final     Glucose   Date Value Ref Range Status   04/04/2023 179 (H) 70 - 99 mg/dL Final   01/19/2022 78 70 - 99 mg/dL Final   06/30/2021 96 70 - 99 mg/dL Final     Urea Nitrogen   Date Value Ref Range Status   04/04/2023 23.8 (H) 8.0 - 23.0 mg/dL Final   01/19/2022 19 7 - 30 mg/dL Final   06/30/2021 24 7 - 30 mg/dL Final     Creatinine   Date Value Ref Range Status   04/04/2023 2.08 (H) 0.67 - 1.17 mg/dL Final   06/30/2021 1.27 (H) 0.66 - 1.25 mg/dL Final     GFR Estimate   Date Value Ref Range Status   04/04/2023 35 (L) >60 mL/min/1.73m2 Final     Comment:     eGFR calculated using 2021 CKD-EPI equation.   06/30/2021 61 >60 mL/min/[1.73_m2] Final     Comment:     Non  GFR Calc  Starting 12/18/2018, serum creatinine based estimated GFR (eGFR) will be   calculated using the Chronic Kidney Disease Epidemiology Collaboration   (CKD-EPI) equation.       Calcium   Date Value Ref Range Status   04/04/2023 8.8 8.8 - 10.2 mg/dL Final   06/30/2021 9.0 8.5 - 10.1 mg/dL Final     Bilirubin Total   Date Value Ref Range Status   04/04/2023 0.4 <=1.2 mg/dL Final   06/30/2021 0.7 0.2 - 1.3 mg/dL Final     Alkaline Phosphatase   Date Value Ref Range Status   04/04/2023 108 40 - 129 U/L Final   06/30/2021 106 40 - 150 U/L Final     ALT   Date Value Ref Range Status   04/04/2023 15 10 -  50 U/L Final   06/30/2021 16 0 - 70 U/L Final     AST   Date Value Ref Range Status   04/04/2023 29 10 - 50 U/L Final   06/30/2021 32 0 - 45 U/L Final       Lab Results   Component Value Date    WBC 6.2 04/02/2021     Lab Results   Component Value Date    RBC 4.44 04/02/2021     Lab Results   Component Value Date    HGB 13.5 04/02/2021     Lab Results   Component Value Date    HCT 41.3 04/02/2021     Lab Results   Component Value Date    MCV 93 04/02/2021     Lab Results   Component Value Date    MCH 30.4 04/02/2021     Lab Results   Component Value Date    MCHC 32.7 04/02/2021     Lab Results   Component Value Date    RDW 12.5 04/02/2021     Lab Results   Component Value Date     04/02/2021       INR   Date Value Ref Range Status   04/04/2023 1.16 (H) 0.85 - 1.15 Final   06/30/2021 1.16 (H) 0.86 - 1.14 Final        MELD-Na score: 16 at 4/4/2023  9:27 AM  MELD score: 16 at 4/4/2023  9:27 AM  Calculated from:  Serum Creatinine: 2.08 mg/dL at 4/4/2023  9:27 AM  Serum Sodium: 137 mmol/L at 4/4/2023  9:27 AM  Total Bilirubin: 0.4 mg/dL (Using min of 1 mg/dL) at 4/4/2023  9:27 AM  INR(ratio): 1.16 at 4/4/2023  9:27 AM  Age: 63 years    Imaging:     Reviewed in EHR     Endoscopy:    EGD 10/2018    Findings:        The examined esophagus was normal.        There is no endoscopic evidence of varices in the entire esophagus.        The entire examined stomach was normal.        There is no endoscopic evidence of portal hypertension gastropathy in        the entire examined stomach.        The examined duodenum was normal.                                                                                     Moderate Sedation:        Moderate (conscious) sedation was administered by the endoscopy nurse        and supervised by the endoscopist. The following parameters were        monitored: oxygen saturation, heart rate, blood pressure, and response        to care. Total physician intraservice time was 8 minutes.         Moderate (conscious) sedation was administered by the endoscopy nurse        and supervised by the endoscopist. The following parameters were        monitored: oxygen saturation, heart rate, blood pressure, and response        to care. Total physician intraservice time was 8 minutes.   Impression:          - Normal esophagus.                        - Normal stomach.                        - Normal examined duodenum.                        - No specimens collected.     Independently reviewed labs and imaging.     Assessment/Plan: Mr. 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     Ultrasound showed a small liver lesion, follow-up MRI scheduled for today.  Ultrasound showed some ascites and pleural effusion, asymptomatic from this, does not have ascites on exam today    -Follow-up liver MRI and AFP from today.  -MELD labs and AFP every 6 months  -No varices on EGD 2018, since has been put on NSBB, can hold on repeat EGD for surveillance while on NSBB  -Continue entecavir 1 mg daily for chronic hepatitis B in a patient with decompensated cirrhosis. Discussed this is a lifelong medication  -Refer to nephrology for rising creatinine with history of glomerulonephritis.  Blood pressure has reportedly been in control as an outpatient.    RTC 6 months with Abd US and labs    Marlyn Hurley MD MS  Hepatology/Liver Transplant  [facility]

## 2023-04-06 LAB
HBV DNA SERPL NAA+PROBE-ACNC: <20 IU/ML
HBV DNA SERPL NAA+PROBE-LOG IU: <1.3 {LOG_IU}/ML

## 2023-04-07 ENCOUNTER — TELEPHONE (OUTPATIENT)
Dept: GASTROENTEROLOGY | Facility: CLINIC | Age: 63
End: 2023-04-07
Payer: MEDICARE

## 2023-04-07 NOTE — TELEPHONE ENCOUNTER
Left Voicemail with Family Member (1st Attempt) for the patient to call back and schedule the following:    Appointment type: Return Liver  Provider: Dr. Marlyn Hurley  Return date: around 10.4.23  Specialty phone number: [phone]  Additional appointment(s) needed: labs and imaging prior to the appt with the provider  Additonal Notes:     First attempt + interp AN 4.7.23

## 2023-04-12 DIAGNOSIS — Z53.9 DIAGNOSIS NOT YET DEFINED: Primary | ICD-10-CM

## 2023-04-12 PROCEDURE — G0179 MD RECERTIFICATION HHA PT: HCPCS | Performed by: FAMILY MEDICINE

## 2023-04-14 ENCOUNTER — TELEPHONE (OUTPATIENT)
Dept: GASTROENTEROLOGY | Facility: CLINIC | Age: 63
End: 2023-04-14
Payer: MEDICARE

## 2023-04-14 NOTE — TELEPHONE ENCOUNTER
+ interp LVM // pt needs to schedule 6 month follow up with Dr. Marlyn Hurley around 10.4.23 with labs and imaging // second attempt, AN 4.14.23

## 2023-04-21 ENCOUNTER — TELEPHONE (OUTPATIENT)
Dept: MULTI SPECIALTY CLINIC | Facility: CLINIC | Age: 63
End: 2023-04-21
Payer: MEDICARE

## 2023-04-26 ENCOUNTER — TELEPHONE (OUTPATIENT)
Dept: MULTI SPECIALTY CLINIC | Facility: CLINIC | Age: 63
End: 2023-04-26
Payer: MEDICARE

## 2023-04-26 NOTE — TELEPHONE ENCOUNTER
Left Voicemail (1st Attempt) for the patient to call back and schedule the following:    Appointment type: Referral appointment  Provider: Anna  Return date: Next available  Specialty phone number: [phone]  Additional appointment(s) needed: lab  Additonal Notes: n/a

## 2023-04-27 ENCOUNTER — TELEPHONE (OUTPATIENT)
Dept: MULTI SPECIALTY CLINIC | Facility: CLINIC | Age: 63
End: 2023-04-27
Payer: MEDICARE

## 2023-04-27 NOTE — TELEPHONE ENCOUNTER
Called the patient and spoke with the patient and got the pt scheduled for a new patient referral appt per Referral message. Also scheduled labs prior. Patient is aware of appt date and times. Patient asked writer to sent out a  Appointment confirmation sheet. 4.27.23

## 2023-05-01 ENCOUNTER — DOCUMENTATION ONLY (OUTPATIENT)
Dept: FAMILY MEDICINE | Facility: CLINIC | Age: 63
End: 2023-05-01
Payer: MEDICARE

## 2023-05-01 DIAGNOSIS — I69.30 HISTORY OF CVA WITH RESIDUAL DEFICIT: ICD-10-CM

## 2023-05-01 RX ORDER — ATORVASTATIN CALCIUM 40 MG/1
TABLET, FILM COATED ORAL
Qty: 30 TABLET | Refills: 1 | Status: SHIPPED | OUTPATIENT
Start: 2023-05-01 | End: 2023-08-01

## 2023-05-01 NOTE — PROGRESS NOTES
"When opening a documentation only encounter, be sure to enter in \"Chief Complaint\" Forms and in \" Comments\" Title of form, description if needed.    Wilfredo is a 63 year old  male  Form received via: Fax  Form now resides in: Provider Ready    Evelina Morton       Form has been completed by provider.     Form sent out via: Fax to [facility] at Fax Number: [phone]  Patient informed: Yes  Output date: May 10, 2023    Kristin Seyd      **Please close the encounter**            "

## 2023-05-01 NOTE — PROGRESS NOTES
"When opening a documentation only encounter, be sure to enter in \"Chief Complaint\" Forms and in \" Comments\" Title of form, description if needed.    Wilfredo is a 63 year old  male  Form received via: Fax  Form now resides in: Provider Ready    Evelina Morton       Form has been completed by provider.     Form sent out via: Fax to [facility] at Fax Number: [phone]  Patient informed: Yes  Output date: May 12, 2023    Kristin Syed      **Please close the encounter**            "

## 2023-05-01 NOTE — TELEPHONE ENCOUNTER
"Request for medication refill:  atorvastatin (LIPITOR) 40 MG tablet  Providers if patient needs an appointment and you are willing to give a one month supply please refill for one month and  send a letter/MyChart using \".SMILLIMITEDREFILL\" .smillimited and route chart to \"P SMI \" (Giving one month refill in non controlled medications is strongly recommended before denial)    If refill has been denied, meaning absolutely no refills without visit, please complete the smart phrase \".smirxrefuse\" and route it to the \"P SMI MED REFILLS\"  pool to inform the patient and the pharmacy.    Shannon Harry      "

## 2023-05-04 DIAGNOSIS — R79.89 ELEVATED SERUM CREATININE: Primary | ICD-10-CM

## 2023-05-05 ENCOUNTER — TELEPHONE (OUTPATIENT)
Dept: GASTROENTEROLOGY | Facility: CLINIC | Age: 63
End: 2023-05-05
Payer: MEDICARE

## 2023-05-05 DIAGNOSIS — Z53.9 DIAGNOSIS NOT YET DEFINED: Primary | ICD-10-CM

## 2023-05-05 NOTE — CONFIDENTIAL NOTE
DIAGNOSIS:  Elevated serum creatinine   DATE RECEIVED: 06.15.2023   NOTES STATUS DETAILS   OFFICE NOTE from referring provider Internal 04.04.2023 Marlyn Hurley MD   OFFICE NOTE from other specialist      *Only VASCULITIS or LUPUS gather office notes for the following     *PULMONARY       *ENT     *DERMATOLOGY     *RHEUMATOLOGY     DISCHARGE SUMMARY from hospital     DISCHARGE REPORT from the ER     MEDICATION LIST Internal    IMAGING  (NEED IMAGES AND REPORTS)     KIDNEY CT SCAN     KIDNEY ULTRASOUND     MR ABDOMEN     NUCLEAR MEDICINE RENAL     LABS     CBC Internal 04.04.2023   CMP Internal 04.04.2023   BMP Care Everywhere 01.02.2020   UA Internal 07.30.2023   URINE PROTEIN Internal 07.30.2020   RENAL PANEL     BIOPSY     KIDNEY BIOPSY

## 2023-05-05 NOTE — TELEPHONE ENCOUNTER
+ interp LVM // pt needs to schedule labs asap for Dr. Hurley & set up 6 month follow up with labs and US around 10.4.23 // AN 5.5.23

## 2023-06-05 ENCOUNTER — TELEPHONE (OUTPATIENT)
Dept: NEPHROLOGY | Facility: CLINIC | Age: 63
End: 2023-06-05
Payer: MEDICARE

## 2023-06-12 DIAGNOSIS — N18.31 STAGE 3A CHRONIC KIDNEY DISEASE (H): Primary | ICD-10-CM

## 2023-06-14 NOTE — PROGRESS NOTES
Physician Attestation   I, Sue Harrison, saw and evaluated Wilfredo Yoon with Resident/Fellow. I have reviewed and discussed with the Resident/Fellow their history, physical and plan. I agree with the documented history, physical, assessment and plan provided in the resident/fellow's note.     I personally reviewed the vital signs, medications, labs, and imaging.    Rest per the resident/fellow's note.   Total time spent 90 minutes on the date of encounter for chart review, history taking, physical exam, labs and notes reviewed, advised and coordinating care. Face to face time 25 minutes.    Sue Harrison  Date of Service (when I saw the patient): Mireille 15, 2023     lower

## 2023-06-15 ENCOUNTER — OFFICE VISIT (OUTPATIENT)
Dept: NEPHROLOGY | Facility: CLINIC | Age: 63
End: 2023-06-15
Attending: INTERNAL MEDICINE
Payer: MEDICARE

## 2023-06-15 ENCOUNTER — LAB (OUTPATIENT)
Dept: LAB | Facility: CLINIC | Age: 63
End: 2023-06-15
Attending: INTERNAL MEDICINE
Payer: MEDICARE

## 2023-06-15 ENCOUNTER — LAB (OUTPATIENT)
Dept: LAB | Facility: CLINIC | Age: 63
End: 2023-06-15
Payer: MEDICARE

## 2023-06-15 ENCOUNTER — PRE VISIT (OUTPATIENT)
Dept: NEPHROLOGY | Facility: CLINIC | Age: 63
End: 2023-06-15

## 2023-06-15 VITALS
TEMPERATURE: 97.8 F | OXYGEN SATURATION: 99 % | SYSTOLIC BLOOD PRESSURE: 143 MMHG | HEART RATE: 56 BPM | DIASTOLIC BLOOD PRESSURE: 85 MMHG

## 2023-06-15 DIAGNOSIS — R79.89 ELEVATED SERUM CREATININE: ICD-10-CM

## 2023-06-15 DIAGNOSIS — N18.31 STAGE 3A CHRONIC KIDNEY DISEASE (H): ICD-10-CM

## 2023-06-15 DIAGNOSIS — N18.31 STAGE 3A CHRONIC KIDNEY DISEASE (H): Primary | ICD-10-CM

## 2023-06-15 LAB
ALBUMIN SERPL BCG-MCNC: 2.7 G/DL (ref 3.5–5.2)
ANION GAP SERPL CALCULATED.3IONS-SCNC: 8 MMOL/L (ref 7–15)
BUN SERPL-MCNC: 30.6 MG/DL (ref 8–23)
C3 SERPL-MCNC: 116 MG/DL (ref 81–157)
C4 SERPL-MCNC: 33 MG/DL (ref 13–39)
CALCIUM SERPL-MCNC: 9.1 MG/DL (ref 8.8–10.2)
CHLORIDE SERPL-SCNC: 115 MMOL/L (ref 98–107)
CREAT SERPL-MCNC: 1.98 MG/DL (ref 0.67–1.17)
CYSTATIN C (ROCHE): 2.8 MG/L (ref 0.6–1)
DEPRECATED CALCIDIOL+CALCIFEROL SERPL-MC: 28 UG/L (ref 20–75)
DEPRECATED HCO3 PLAS-SCNC: 20 MMOL/L (ref 22–29)
ERYTHROCYTE [DISTWIDTH] IN BLOOD BY AUTOMATED COUNT: 13.5 % (ref 10–15)
GFR SERPL CREATININE-BSD FRML MDRD: 20 ML/MIN/1.73M2
GFR SERPL CREATININE-BSD FRML MDRD: 37 ML/MIN/1.73M2
GLUCOSE SERPL-MCNC: 94 MG/DL (ref 70–99)
HCT VFR BLD AUTO: 40 % (ref 40–53)
HGB BLD-MCNC: 13.3 G/DL (ref 13.3–17.7)
KAPPA LC FREE SER-MCNC: 15.24 MG/DL (ref 0.33–1.94)
KAPPA LC FREE/LAMBDA FREE SER NEPH: 1.91 {RATIO} (ref 0.26–1.65)
LAMBDA LC FREE SERPL-MCNC: 7.96 MG/DL (ref 0.57–2.63)
MCH RBC QN AUTO: 31 PG (ref 26.5–33)
MCHC RBC AUTO-ENTMCNC: 33.3 G/DL (ref 31.5–36.5)
MCV RBC AUTO: 93 FL (ref 78–100)
PHOSPHATE SERPL-MCNC: 4 MG/DL (ref 2.5–4.5)
PLATELET # BLD AUTO: 98 10E3/UL (ref 150–450)
POTASSIUM SERPL-SCNC: 4.7 MMOL/L (ref 3.4–5.3)
PTH-INTACT SERPL-MCNC: 81 PG/ML (ref 15–65)
RBC # BLD AUTO: 4.29 10E6/UL (ref 4.4–5.9)
SODIUM SERPL-SCNC: 143 MMOL/L (ref 136–145)
TOTAL PROTEIN SERUM FOR ELP: 5.4 G/DL (ref 6.4–8.3)
WBC # BLD AUTO: 5 10E3/UL (ref 4–11)

## 2023-06-15 PROCEDURE — 83970 ASSAY OF PARATHORMONE: CPT | Performed by: PATHOLOGY

## 2023-06-15 PROCEDURE — 99000 SPECIMEN HANDLING OFFICE-LAB: CPT | Performed by: PATHOLOGY

## 2023-06-15 PROCEDURE — 86225 DNA ANTIBODY NATIVE: CPT | Performed by: PATHOLOGY

## 2023-06-15 PROCEDURE — 82595 ASSAY OF CRYOGLOBULIN: CPT | Performed by: INTERNAL MEDICINE

## 2023-06-15 PROCEDURE — 83521 IG LIGHT CHAINS FREE EACH: CPT | Mod: 59 | Performed by: INTERNAL MEDICINE

## 2023-06-15 PROCEDURE — 85027 COMPLETE CBC AUTOMATED: CPT | Performed by: PATHOLOGY

## 2023-06-15 PROCEDURE — 84155 ASSAY OF PROTEIN SERUM: CPT | Performed by: PATHOLOGY

## 2023-06-15 PROCEDURE — G0463 HOSPITAL OUTPT CLINIC VISIT: HCPCS | Performed by: INTERNAL MEDICINE

## 2023-06-15 PROCEDURE — 99417 PROLNG OP E/M EACH 15 MIN: CPT | Performed by: INTERNAL MEDICINE

## 2023-06-15 PROCEDURE — 84165 PROTEIN E-PHORESIS SERUM: CPT | Mod: TC | Performed by: PATHOLOGY

## 2023-06-15 PROCEDURE — 86160 COMPLEMENT ANTIGEN: CPT | Performed by: INTERNAL MEDICINE

## 2023-06-15 PROCEDURE — 82610 CYSTATIN C: CPT | Performed by: INTERNAL MEDICINE

## 2023-06-15 PROCEDURE — 86038 ANTINUCLEAR ANTIBODIES: CPT | Performed by: PATHOLOGY

## 2023-06-15 PROCEDURE — 84165 PROTEIN E-PHORESIS SERUM: CPT | Mod: 26 | Performed by: PATHOLOGY

## 2023-06-15 PROCEDURE — 86334 IMMUNOFIX E-PHORESIS SERUM: CPT | Performed by: INTERNAL MEDICINE

## 2023-06-15 PROCEDURE — 99205 OFFICE O/P NEW HI 60 MIN: CPT | Mod: GC | Performed by: INTERNAL MEDICINE

## 2023-06-15 PROCEDURE — 86334 IMMUNOFIX E-PHORESIS SERUM: CPT | Mod: 26

## 2023-06-15 PROCEDURE — 86334 IMMUNOFIX E-PHORESIS SERUM: CPT | Mod: 26 | Performed by: PATHOLOGY

## 2023-06-15 PROCEDURE — 80069 RENAL FUNCTION PANEL: CPT | Performed by: PATHOLOGY

## 2023-06-15 PROCEDURE — 84165 PROTEIN E-PHORESIS SERUM: CPT | Mod: 26

## 2023-06-15 PROCEDURE — 83876 ASSAY MYELOPEROXIDASE: CPT | Performed by: INTERNAL MEDICINE

## 2023-06-15 PROCEDURE — 82306 VITAMIN D 25 HYDROXY: CPT | Performed by: INTERNAL MEDICINE

## 2023-06-15 PROCEDURE — 36415 COLL VENOUS BLD VENIPUNCTURE: CPT | Performed by: PATHOLOGY

## 2023-06-15 PROCEDURE — 86334 IMMUNOFIX E-PHORESIS SERUM: CPT | Mod: 59 | Performed by: PATHOLOGY

## 2023-06-15 RX ORDER — CETIRIZINE HYDROCHLORIDE TABLETS 10 MG/1
TABLET, FILM COATED ORAL
COMMUNITY
Start: 2023-05-09 | End: 2024-06-06

## 2023-06-15 RX ORDER — FUROSEMIDE 20 MG
10 TABLET ORAL DAILY
Qty: 60 TABLET | Refills: 1 | Status: SHIPPED | OUTPATIENT
Start: 2023-06-15 | End: 2024-02-01

## 2023-06-15 RX ORDER — CHOLECALCIFEROL (VITAMIN D3) 50 MCG
TABLET ORAL
COMMUNITY
Start: 2023-05-09

## 2023-06-15 RX ORDER — SPIRONOLACTONE 50 MG/1
TABLET, FILM COATED ORAL
COMMUNITY
Start: 2023-05-09 | End: 2024-07-03

## 2023-06-15 RX ORDER — FUROSEMIDE 20 MG
TABLET ORAL
Status: ON HOLD | COMMUNITY
Start: 2023-05-09 | End: 2023-11-07

## 2023-06-15 ASSESSMENT — PAIN SCALES - GENERAL: PAINLEVEL: NO PAIN (0)

## 2023-06-15 NOTE — NURSING NOTE
Chief Complaint   Patient presents with     RECHECK     Complete RM     BP (!) 143/85 (BP Location: Right arm, Patient Position: Sitting, Cuff Size: Adult Regular)   Pulse 56   Temp 97.8  F (36.6  C) (Oral)   SpO2 99%   Meka [facility]org[facility]

## 2023-06-15 NOTE — PATIENT INSTRUCTIONS
We will do additional testing today to determine what is causing your kidney dysfunction  We will resume 10m lasix daily for lower extremity swelling  We will follow-up in 2 months and depending on your creatinine trend, we may need to consider a biopsy

## 2023-06-15 NOTE — LETTER
6/15/2023       RE: Wilfredo Yoon  [address]  [facility] [address]     Dear Colleague,    Thank you for referring your patient, Wilfredo Yoon, to the [facility] [place] at [facility]. Please see a copy of my visit note below.    Physician Attestation   I, Sue Harrison, saw and evaluated Wilfredo Yoon with Resident/Fellow. I have reviewed and discussed with the Resident/Fellow their history, physical and plan. I agree with the documented history, physical, assessment and plan provided in the resident/fellow's note.     I personally reviewed the vital signs, medications, labs, and imaging.    Rest per the resident/fellow's note.   Total time spent 90 minutes on the date of encounter for chart review, history taking, physical exam, labs and notes reviewed, advised and coordinating care. Face to face time 25 minutes.    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    The patient was previously seen by Dr.Junghare craig in 10/17.  Per his note, the patient was initially evaluated for edema and nephrotic range proteinuria.  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  Upon diagnosis, he has  preserved kidney function with Cr of 0.8 although UPCR of  3.5g/g.    At that time, he was treated conservatively with Bumex and lisinopril. His HBV has been treated with Entecavir. Since then has has lost to followed-up with Neph.      In June 2020 he suffered a stroke and continues to have right sided weakness, In July 2020, he has mild ANDRY with creatinine increased to 1.6 but it then came down to 1.1-1.3.  Creatinine increased to 1.46 on 9/30/22, 1.72 on 1/13/2023 and now 2.08 on 4/23.  His serum albumin has been progressively low from 3.3 in 2015 down to 1.5 to 2.0 but then somewhat improved. Serum albumin in April 23 was 2.6.  His UA always show blood and protein. Last UPCR was done in 7/31/18 was 4.80. He has trace cryo in the blood. He had positive IgM, Kappa cryo but then in 2020, he has positive for polyclonal Cryo: A, G, M, K and L. No monoclonal protein via immunofixation in serum or urine.  [place] free light chain was 12.7 and lambda free light chain was 6.79 with ratio of 1.87 in July 2020. M spike was negative.  UT-3 and MPO were negative.  He has low C3 in the past and normal C4.  SPEP showed marked hypoalbuminemia and decreased beta globulin without monoclonal protein.    He has been followed by Dr. Osborne in hepatology clinic, last seen in April 2023.  He was noted to have a small liver lesion, ultrasound with some ascites and pleural effusion.  Noted blood pressure 137/94.  He has kidney in 1/23 which showed normal-sized right kidney without hydronephrosis. There is small amount of ascites and pleural effusion.    6/15/23: Seen for consultation. He arrives with his niece and she assists with translating.  He is noted to be in a wheelchair partially because of his residual right-sided weakness after CVA.  Reports that he is able to transition from room to room on the same floor with a walker.  However, he is unable to go upstairs and requires assistance moving long distances.  He uses a wheelchair  when going outside the home.    We reviewed that his creatinine is increased over the past 9 months.  There have been no specific illnesses, hospital presentations or events that they can recall that may have led to kidney injury. After seeing hepatology in April, he was started on Lasix 20 mg and spironolactone for edema management.  His niece reports that his swelling resolved dramatically and now there is only trace swelling present.  He is off of the diuretics for the past few weeks.  Additionally, he continues on his antiretroviral for hepatitis B.  Most recently viral load was undetectable.     In reviewing his appetite and p.o. intake, he often does not eat a lot.  Although there are some times where he has better appetite.  States that his p.o. fluid intake is reasonable.  His urine is reported to be normal and seems to be going to the bathroom several times a day.  No noted hematuria or color/quality.    PAST MEDICAL HISTORY:  Reviewed with patient on 06/15/2023     Past Medical History:   Diagnosis Date     Cirrhosis (H)      Cryoglobulinemia (H)      CVA (cerebral vascular accident) (H) 07/16/2020    Supratentorial likely d/t hypertensive emergency leading to right hemiparesis, dysphagia and aphasia     Glomerulonephritis      Hepatitis B      Hypertension      Latent tuberculosis     Treatment 1220-9803     MPGN (membranoproliferative glomerulonephritides)      Positive QuantiFERON-TB Gold test      PRES (posterior reversible encephalopathy syndrome) 07/16/2020    In brainstem d/t hypertensive emergency; suffered supratentorial CVAs same date       Past Surgical History:   Procedure Laterality Date     ANESTHESIA OUT OF OR MRI N/A 7/18/2020    Procedure: ANESTHESIA OUT OF OR MRI;  Surgeon: GENERIC ANESTHESIA PROVIDER;  Location:  OR     ESOPHAGOSCOPY, GASTROSCOPY, DUODENOSCOPY (EGD), COMBINED N/A 10/18/2018    Procedure: EGD;  Surgeon: Eber Ortez MD;  Location: [facility]     HAND SURGERY Right       IR PARACENTESIS  2020     ZZC HAND/FINGER SURGERY UNLISTED          MEDICATIONS:  PTA Meds  Current Outpatient Medications   Medication     acetaminophen (TYLENOL) 325 MG tablet     amLODIPine (NORVASC) 10 MG tablet     amLODIPine (NORVASC) 10 MG tablet     aspirin (ASA) 81 MG chewable tablet     atorvastatin (LIPITOR) 40 MG tablet     carvedilol (COREG) 12.5 MG tablet     diclofenac (VOLTAREN) 1 % topical gel     docusate sodium (COLACE) 100 MG capsule     entecavir (BARACLUDE) 0.5 MG tablet     furosemide (LASIX) 20 MG tablet     gabapentin (NEURONTIN) 100 MG capsule     ibuprofen (ADVIL/MOTRIN) 400 MG tablet     multivitamin (ONE-DAILY) tablet     polyethylene glycol (MIRALAX) 17 GM/Dose powder     potassium chloride ER (MICRO-K) 10 MEQ CR capsule     furosemide (LASIX) 20 MG tablet     HM CETIRIZINE HCL 10 MG tablet     spironolactone (ALDACTONE) 50 MG tablet     vitamin D3 (CHOLECALCIFEROL) 50 mcg (2000 units) tablet     No current facility-administered medications for this visit.       ALLERGIES:    No Known Allergies    REVIEW OF SYSTEMS:  A comprehensive of systems was negative except as noted above.    SOCIAL HISTORY:   Social History     Socioeconomic History     Marital status: Single     Spouse name: Not on file     Number of children: Not on file     Years of education: Not on file     Highest education level: Not on file   Occupational History     Not on file   Tobacco Use     Smoking status: Former     Packs/day: 0.25     Years: 40.00     Pack years: 10.00     Types: Cigarettes     Quit date: 10/1/2020     Years since quittin.7     Smokeless tobacco: Never   Vaping Use     Vaping status: Never Used   Substance and Sexual Activity     Alcohol use: No     Alcohol/week: 0.0 standard drinks of alcohol     Drug use: No     Sexual activity: Not on file   Other Topics Concern     Parent/sibling w/ CABG, MI or angioplasty before 65F 55M? Not Asked   Social History Narrative     Not on file      Social Determinants of Health     Financial Resource Strain: Not on file   Food Insecurity: No Food Insecurity (4/15/2021)    Hunger Vital Sign      Worried About Running Out of Food in the Last Year: Never true      Ran Out of Food in the Last Year: Never true   Transportation Needs: Unknown (4/15/2021)    PRAPARE - Transportation      Lack of Transportation (Medical): Not on file      Lack of Transportation (Non-Medical): No   Physical Activity: Not on file   Stress: Not on file   Social Connections: Unknown (4/15/2021)    Social Connection and Isolation Panel [NHANES]      Frequency of Communication with Friends and Family: Not on file      Frequency of Social Gatherings with Friends and Family: More than three times a week      Attends Jewish Services: Not on file      Active Member of Clubs or Organizations: Not on file      Attends Club or Organization Meetings: Not on file      Marital Status: Not on file   Intimate Partner Violence: Not on file   Housing Stability: Not on file     Reviewed with patient.    FAMILY MEDICAL HISTORY:   Family History   Problem Relation Age of Onset     Liver Cancer No family hx of      Hepatitis No family hx of      Reviewed with patient.    PHYSICAL EXAM:     BP (!) 143/85 (BP Location: Right arm, Patient Position: Sitting, Cuff Size: Adult Regular)   Pulse 56   Temp 97.8  F (36.6  C) (Oral)   SpO2 99%      GENERAL APPEARANCE: Thin male in no distress, alert and awake. On wheelchair  EYES: no scleral icterus, pupils equal  Endo: no goiter, no moon facies  Lymphatics: no cervical or supraclavicular LAD  Pulmonary: lungs clear to auscultation with equal breath sounds bilaterally  CV: regular rhythm, normal rate, no murmur   - Edema Trace in LE  GI: soft, nontender, no appreciable ascites on exam  MS: no evidence of inflammation in joints, no muscle tenderness  : no vazquez  SKIN: no rash, warm, dry, no cyanosis  NEURO: face symmetric, difficult to assess asterixis      LABS:   Last Renal Panel:  Sodium   Date Value Ref Range Status   06/15/2023 143 136 - 145 mmol/L Final   06/30/2021 140 133 - 144 mmol/L Final     Potassium   Date Value Ref Range Status   06/15/2023 4.7 3.4 - 5.3 mmol/L Final   01/19/2022 3.6 3.4 - 5.3 mmol/L Final   06/30/2021 4.2 3.4 - 5.3 mmol/L Final     Chloride   Date Value Ref Range Status   06/15/2023 115 (H) 98 - 107 mmol/L Final   01/19/2022 107 94 - 109 mmol/L Final   06/30/2021 106 94 - 109 mmol/L Final     Carbon Dioxide   Date Value Ref Range Status   06/30/2021 25 20 - 32 mmol/L Final     Carbon Dioxide (CO2)   Date Value Ref Range Status   06/15/2023 20 (L) 22 - 29 mmol/L Final   01/19/2022 27 20 - 32 mmol/L Final     Anion Gap   Date Value Ref Range Status   06/15/2023 8 7 - 15 mmol/L Final   01/19/2022 10 3 - 14 mmol/L Final   06/30/2021 9 3 - 14 mmol/L Final     Glucose   Date Value Ref Range Status   06/15/2023 94 70 - 99 mg/dL Final   01/19/2022 78 70 - 99 mg/dL Final   06/30/2021 96 70 - 99 mg/dL Final     Urea Nitrogen   Date Value Ref Range Status   06/15/2023 30.6 (H) 8.0 - 23.0 mg/dL Final   01/19/2022 19 7 - 30 mg/dL Final   06/30/2021 24 7 - 30 mg/dL Final     Creatinine   Date Value Ref Range Status   06/15/2023 1.98 (H) 0.67 - 1.17 mg/dL Final   06/30/2021 1.27 (H) 0.66 - 1.25 mg/dL Final     GFR Estimate   Date Value Ref Range Status   06/15/2023 37 (L) >60 mL/min/1.73m2 Final     Comment:     eGFR calculated using 2021 CKD-EPI equation.   06/30/2021 61 >60 mL/min/[1.73_m2] Final     Comment:     Non  GFR Calc  Starting 12/18/2018, serum creatinine based estimated GFR (eGFR) will be   calculated using the Chronic Kidney Disease Epidemiology Collaboration   (CKD-EPI) equation.       Calcium   Date Value Ref Range Status   06/15/2023 9.1 8.8 - 10.2 mg/dL Final   06/30/2021 9.0 8.5 - 10.1 mg/dL Final     Phosphorus   Date Value Ref Range Status   06/15/2023 4.0 2.5 - 4.5 mg/dL Final   10/10/2017 3.0 2.5 - 4.5 mg/dL  Final     Albumin   Date Value Ref Range Status   06/15/2023 2.7 (L) 3.5 - 5.2 g/dL Final   01/19/2022 2.5 (L) 3.4 - 5.0 g/dL Final   06/30/2021 3.0 (L) 3.4 - 5.0 g/dL Final       URINE STUDIES  Recent Labs   Lab Test 07/30/20  2030 07/29/20 2012 07/20/20  1300 07/17/20  0025   COLOR Light Yellow Light Yellow Yellow Yellow   APPEARANCE Clear Slightly Cloudy Slightly Cloudy Clear   URINEGLC Negative Negative Negative Negative   URINEBILI Negative Negative Negative Negative   URINEKETONE Negative Negative 10* Negative   SG 1.005 1.008 1.019 1.012   UBLD Small* Moderate* Small* Moderate*   URINEPH 7.0 7.0 6.0 6.5   PROTEIN 100* 100* 300* 300*   NITRITE Negative Negative Negative Negative   LEUKEST Trace* Large* Trace* Negative   RBCU 3* 105* 5* 14*   WBCU 4 >182* 6* 5     Recent Labs   Lab Test 07/31/18  1630 10/10/17  1336 04/04/17  1718 01/20/16  1450 01/05/16  1142 11/04/15  1626   UTPG 4.80* 3.51* 2.62* 2.24* 3.36* 3.37*     PTH  Recent Labs   Lab Test 06/15/23  1043 10/10/17  1328   PTHI 81* 37     IRON STUDIES  Recent Labs   Lab Test 10/10/17  1328   IRON 133      IRONSAT 50*   CHRISTIAN 176       IMAGING:  I review the MR liver on 4/23 which showed no hydronephrosis. Moderate ascites and cirrhotic liver with evidence of portal HTN.    ASSESSMENT AND RECOMMENDATIONS:   # Progressive CKD now stage 3B  # Hx of Biopsy proven Cryoglobulinemic GN (IgM kappa)   In the past, baseline creatinine 1.1-1.2 however there is also moderate variability likely related to his low muscle mass.  It does appear that the most recent trend is reflecting a progressive elevation in creatinine since 9/30/2022.  There is some concern that this might be related to his liver disease but there is limited lower extremity edema and palpable ascites on exam.  His MELD score is relatively low to observe type II HRS physiology.  In regard to his prior cryoglobulinemic GN, it would be odd for this to come out of quiescence well his hepatitis B  has been appropriately treated.  At this time, we will pursue broad serologic work-up to help understand underlying etiology.  -Resume lasix 10mg daily for volume management  -Encouraged collection of UA today with evaluation of urine sodium   -Cystatin C to correlate with GFR  -FARIDA, ANCA, C3, C4, dsDNA and FARIDA, SPEP with immunofixation, kappa/lambda light chain, cryo, PLA2R  -Will plan to evaluate with serologic workup and if limited diagnostic return, will consider repeat biopsy after evaluating trend in creatinine at next visit (2mo).  # HBV cirrhosis  # HBV infection on entecavir  Follows with hepatology, last viral quant undetectable on 4/4/23. MELD 16. MR Abdomen showing cirrhosis with evidence of portal hypertension. Reports no encephalopathy or concern for hematemesis.  # Hypertension  # Volume overload  Blood pressure has been 130-140 at recent clinic appointments. Does not measure at home. Reports significant improvement in lower extremity edema after lasix/spironolactone. He is now off of both of these and has a small amount of lower extremity edema.  -Will resume lasix 10mg daily  - Continue Amlodipine 10 mg daily, Carvedilol 12.5 mg BID  # Malnutrition  # Hx of stroke with Rt sided hemiparesis  Last weight 121 lbs with BMI 20.3. Mostly wheelchair bound for transportation. Weight generally appears to be stable although he appears to be quite weak. There is concern that his creatinine is a poor estimate of his true kidney function.  -Repeat cystatin C  # BMD  # Secondary hyperparathyroidism  PTH is 81, vit D pending. Ca/Phos normal.    Follow-up in 2 months    Seen and discussed with Dr. Christy Guevara MD  Division of Renal Disease and Hypertension  [facility]  [facility]              Again, thank you for allowing me to participate in the care of your patient.      Sincerely,    Sue Harrison MD

## 2023-06-16 LAB
ALBUMIN MFR UR ELPH: 375 MG/DL
ALBUMIN SERPL ELPH-MCNC: 2.4 G/DL (ref 3.7–5.1)
ALBUMIN UR-MCNC: 200 MG/DL
ALPHA1 GLOB SERPL ELPH-MCNC: 0.3 G/DL (ref 0.2–0.4)
ALPHA2 GLOB SERPL ELPH-MCNC: 1 G/DL (ref 0.5–0.9)
ANA SER QL IF: NEGATIVE
APPEARANCE UR: ABNORMAL
B-GLOBULIN SERPL ELPH-MCNC: 0.7 G/DL (ref 0.6–1)
BACTERIA #/AREA URNS HPF: ABNORMAL /HPF
BILIRUB UR QL STRIP: NEGATIVE
CAOX CRY #/AREA URNS HPF: ABNORMAL /HPF
COLOR UR AUTO: YELLOW
CREAT UR-MCNC: 67.4 MG/DL
GAMMA GLOB SERPL ELPH-MCNC: 0.9 G/DL (ref 0.7–1.6)
GLUCOSE UR STRIP-MCNC: NEGATIVE MG/DL
HGB UR QL STRIP: ABNORMAL
HYALINE CASTS: 10 /LPF
KETONES UR STRIP-MCNC: NEGATIVE MG/DL
LEUKOCYTE ESTERASE UR QL STRIP: ABNORMAL
M PROTEIN SERPL ELPH-MCNC: 0 G/DL
MUCOUS THREADS #/AREA URNS LPF: PRESENT /LPF
NITRATE UR QL: NEGATIVE
PH UR STRIP: 6 [PH] (ref 5–7)
PROT PATTERN SERPL ELPH-IMP: ABNORMAL
PROT PATTERN SERPL IFE-IMP: NORMAL
PROT/CREAT 24H UR: 5.56 MG/MG CR (ref 0–0.2)
RBC URINE: 6 /HPF
SP GR UR STRIP: 1.01 (ref 1–1.03)
UROBILINOGEN UR STRIP-MCNC: NORMAL MG/DL
WBC CLUMPS #/AREA URNS HPF: PRESENT /HPF
WBC URINE: >182 /HPF

## 2023-06-16 PROCEDURE — 87086 URINE CULTURE/COLONY COUNT: CPT | Mod: GZ | Performed by: INTERNAL MEDICINE

## 2023-06-16 PROCEDURE — 81001 URINALYSIS AUTO W/SCOPE: CPT | Performed by: PATHOLOGY

## 2023-06-16 PROCEDURE — 84300 ASSAY OF URINE SODIUM: CPT | Performed by: INTERNAL MEDICINE

## 2023-06-16 PROCEDURE — 84156 ASSAY OF PROTEIN URINE: CPT | Performed by: PATHOLOGY

## 2023-06-16 NOTE — PROGRESS NOTES
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    The patient was previously seen by Dr.Junghare craig in 10/17.  Per his note, the patient was initially evaluated for edema and nephrotic range proteinuria.  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  Upon diagnosis, he has preserved kidney function with Cr of 0.8 although UPCR of  3.5g/g.    At that time, he was treated conservatively with Bumex and lisinopril. His HBV has been treated with Entecavir. Since then has has lost to followed-up with Neph.      In June 2020 he suffered a stroke and continues to have right sided weakness, In July 2020, he has mild ANDRY with creatinine increased to 1.6 but it then came down to 1.1-1.3.  Creatinine increased to 1.46 on 9/30/22, 1.72 on 1/13/2023 and now 2.08 on 4/23.  His serum albumin has been progressively low from 3.3 in 2015 down to 1.5 to 2.0 but then somewhat improved. Serum albumin in April 23 was 2.6.  His UA always show blood and protein. Last UPCR was done in 7/31/18 was 4.80. He has trace cryo in the blood. He had positive IgM, Kappa cryo but then in 2020, he has positive for polyclonal Cryo: A, G, M, K and L. No monoclonal protein via immunofixation in serum or urine.  [place] free light chain was 12.7 and lambda free light chain was 6.79 with ratio of 1.87 in July 2020. M spike was negative.   [place]-3 and MPO were negative.  He has low C3 in the past and normal C4.  SPEP showed marked hypoalbuminemia and decreased beta globulin without monoclonal protein.    He has been followed by Dr. Osborne in hepatology clinic, last seen in April 2023.  He was noted to have a small liver lesion, ultrasound with some ascites and pleural effusion.  Noted blood pressure 137/94.  He has kidney in 1/23 which showed normal-sized right kidney without hydronephrosis. There is small amount of ascites and pleural effusion.    6/15/23: Seen for consultation. He arrives with his niece and she assists with translating.  He is noted to be in a wheelchair partially because of his residual right-sided weakness after CVA.  Reports that he is able to transition from room to room on the same floor with a walker.  However, he is unable to go upstairs and requires assistance moving long distances.  He uses a wheelchair when going outside the home.    We reviewed that his creatinine is increased over the past 9 months.  There have been no specific illnesses, hospital presentations or events that they can recall that may have led to kidney injury. After seeing hepatology in April, he was started on Lasix 20 mg and spironolactone for edema management.  His niece reports that his swelling resolved dramatically and now there is only trace swelling present.  He is off of the diuretics for the past few weeks.  Additionally, he continues on his antiretroviral for hepatitis B.  Most recently viral load was undetectable.     In reviewing his appetite and p.o. intake, he often does not eat a lot.  Although there are some times where he has better appetite.  States that his p.o. fluid intake is reasonable.  His urine is reported to be normal and seems to be going to the bathroom several times a day.  No noted hematuria or color/quality.    PAST MEDICAL HISTORY:  Reviewed with patient on 06/15/2023     Past Medical History:   Diagnosis Date      Cirrhosis (H)      Cryoglobulinemia (H)      CVA (cerebral vascular accident) (H) 07/16/2020    Supratentorial likely d/t hypertensive emergency leading to right hemiparesis, dysphagia and aphasia     Glomerulonephritis      Hepatitis B      Hypertension      Latent tuberculosis     Treatment [id number]     MPGN (membranoproliferative glomerulonephritides)      Positive QuantiFERON-TB Gold test      PRES (posterior reversible encephalopathy syndrome) 07/16/2020    In brainstem d/t hypertensive emergency; suffered supratentorial CVAs same date       Past Surgical History:   Procedure Laterality Date     ANESTHESIA OUT OF OR MRI N/A 7/18/2020    Procedure: ANESTHESIA OUT OF OR MRI;  Surgeon: GENERIC ANESTHESIA PROVIDER;  Location: [facility]     ESOPHAGOSCOPY, GASTROSCOPY, DUODENOSCOPY (EGD), COMBINED N/A 10/18/2018    Procedure: EGD;  Surgeon: Eber Ortez MD;  Location: [facility]     HAND SURGERY Right      IR PARACENTESIS  7/27/2020     ZZC HAND/FINGER SURGERY UNLISTED          MEDICATIONS:  PTA Meds  Current Outpatient Medications   Medication     acetaminophen (TYLENOL) 325 MG tablet     amLODIPine (NORVASC) 10 MG tablet     amLODIPine (NORVASC) 10 MG tablet     aspirin (ASA) 81 MG chewable tablet     atorvastatin (LIPITOR) 40 MG tablet     carvedilol (COREG) 12.5 MG tablet     diclofenac (VOLTAREN) 1 % topical gel     docusate sodium (COLACE) 100 MG capsule     entecavir (BARACLUDE) 0.5 MG tablet     furosemide (LASIX) 20 MG tablet     gabapentin (NEURONTIN) 100 MG capsule     ibuprofen (ADVIL/MOTRIN) 400 MG tablet     multivitamin (ONE-DAILY) tablet     polyethylene glycol (MIRALAX) 17 GM/Dose powder     potassium chloride ER (MICRO-K) 10 MEQ CR capsule     furosemide (LASIX) 20 MG tablet     HM CETIRIZINE HCL 10 MG tablet     spironolactone (ALDACTONE) 50 MG tablet     vitamin D3 (CHOLECALCIFEROL) 50 mcg (2000 units) tablet     No current facility-administered medications for this visit.       ALLERGIES:     No Known Allergies    REVIEW OF SYSTEMS:  A comprehensive of systems was negative except as noted above.    SOCIAL HISTORY:   Social History     Socioeconomic History     Marital status: Single     Spouse name: Not on file     Number of children: Not on file     Years of education: Not on file     Highest education level: Not on file   Occupational History     Not on file   Tobacco Use     Smoking status: Former     Packs/day: 0.25     Years: 40.00     Pack years: 10.00     Types: Cigarettes     Quit date: 10/1/2020     Years since quittin.7     Smokeless tobacco: Never   Vaping Use     Vaping status: Never Used   Substance and Sexual Activity     Alcohol use: No     Alcohol/week: 0.0 standard drinks of alcohol     Drug use: No     Sexual activity: Not on file   Other Topics Concern     Parent/sibling w/ CABG, MI or angioplasty before 65F 55M? Not Asked   Social History Narrative     Not on file     Social Determinants of Health     Financial Resource Strain: Not on file   Food Insecurity: No Food Insecurity (4/15/2021)    Hunger Vital Sign      Worried About Running Out of Food in the Last Year: Never true      Ran Out of Food in the Last Year: Never true   Transportation Needs: Unknown (4/15/2021)    PRAPARE - Transportation      Lack of Transportation (Medical): Not on file      Lack of Transportation (Non-Medical): No   Physical Activity: Not on file   Stress: Not on file   Social Connections: Unknown (4/15/2021)    Social Connection and Isolation Panel [NHANES]      Frequency of Communication with Friends and Family: Not on file      Frequency of Social Gatherings with Friends and Family: More than three times a week      Attends Islam Services: Not on file      Active Member of Clubs or Organizations: Not on file      Attends Club or Organization Meetings: Not on file      Marital Status: Not on file   Intimate Partner Violence: Not on file   Housing Stability: Not on file     Reviewed with  patient.    FAMILY MEDICAL HISTORY:   Family History   Problem Relation Age of Onset     Liver Cancer No family hx of      Hepatitis No family hx of      Reviewed with patient.    PHYSICAL EXAM:     BP (!) 143/85 (BP Location: Right arm, Patient Position: Sitting, Cuff Size: Adult Regular)   Pulse 56   Temp 97.8  F (36.6  C) (Oral)   SpO2 99%      GENERAL APPEARANCE: Thin male in no distress, alert and awake. On wheelchair  EYES: no scleral icterus, pupils equal  Endo: no goiter, no moon facies  Lymphatics: no cervical or supraclavicular LAD  Pulmonary: lungs clear to auscultation with equal breath sounds bilaterally  CV: regular rhythm, normal rate, no murmur   - Edema Trace in LE  GI: soft, nontender, no appreciable ascites on exam  MS: no evidence of inflammation in joints, no muscle tenderness  : no vazquez  SKIN: no rash, warm, dry, no cyanosis  NEURO: face symmetric, difficult to assess asterixis     LABS:   Last Renal Panel:  Sodium   Date Value Ref Range Status   06/15/2023 143 136 - 145 mmol/L Final   06/30/2021 140 133 - 144 mmol/L Final     Potassium   Date Value Ref Range Status   06/15/2023 4.7 3.4 - 5.3 mmol/L Final   01/19/2022 3.6 3.4 - 5.3 mmol/L Final   06/30/2021 4.2 3.4 - 5.3 mmol/L Final     Chloride   Date Value Ref Range Status   06/15/2023 115 (H) 98 - 107 mmol/L Final   01/19/2022 107 94 - 109 mmol/L Final   06/30/2021 106 94 - 109 mmol/L Final     Carbon Dioxide   Date Value Ref Range Status   06/30/2021 25 20 - 32 mmol/L Final     Carbon Dioxide (CO2)   Date Value Ref Range Status   06/15/2023 20 (L) 22 - 29 mmol/L Final   01/19/2022 27 20 - 32 mmol/L Final     Anion Gap   Date Value Ref Range Status   06/15/2023 8 7 - 15 mmol/L Final   01/19/2022 10 3 - 14 mmol/L Final   06/30/2021 9 3 - 14 mmol/L Final     Glucose   Date Value Ref Range Status   06/15/2023 94 70 - 99 mg/dL Final   01/19/2022 78 70 - 99 mg/dL Final   06/30/2021 96 70 - 99 mg/dL Final     Urea Nitrogen   Date Value Ref  Range Status   06/15/2023 30.6 (H) 8.0 - 23.0 mg/dL Final   01/19/2022 19 7 - 30 mg/dL Final   06/30/2021 24 7 - 30 mg/dL Final     Creatinine   Date Value Ref Range Status   06/15/2023 1.98 (H) 0.67 - 1.17 mg/dL Final   06/30/2021 1.27 (H) 0.66 - 1.25 mg/dL Final     GFR Estimate   Date Value Ref Range Status   06/15/2023 37 (L) >60 mL/min/1.73m2 Final     Comment:     eGFR calculated using 2021 CKD-EPI equation.   06/30/2021 61 >60 mL/min/[1.73_m2] Final     Comment:     Non  GFR Calc  Starting 12/18/2018, serum creatinine based estimated GFR (eGFR) will be   calculated using the Chronic Kidney Disease Epidemiology Collaboration   (CKD-EPI) equation.       Calcium   Date Value Ref Range Status   06/15/2023 9.1 8.8 - 10.2 mg/dL Final   06/30/2021 9.0 8.5 - 10.1 mg/dL Final     Phosphorus   Date Value Ref Range Status   06/15/2023 4.0 2.5 - 4.5 mg/dL Final   10/10/2017 3.0 2.5 - 4.5 mg/dL Final     Albumin   Date Value Ref Range Status   06/15/2023 2.7 (L) 3.5 - 5.2 g/dL Final   01/19/2022 2.5 (L) 3.4 - 5.0 g/dL Final   06/30/2021 3.0 (L) 3.4 - 5.0 g/dL Final       URINE STUDIES  Recent Labs   Lab Test 07/30/20 2030 07/29/20 2012 07/20/20  1300 07/17/20  0025   COLOR Light Yellow Light Yellow Yellow Yellow   APPEARANCE Clear Slightly Cloudy Slightly Cloudy Clear   URINEGLC Negative Negative Negative Negative   URINEBILI Negative Negative Negative Negative   URINEKETONE Negative Negative 10* Negative   SG 1.005 1.008 1.019 1.012   UBLD Small* Moderate* Small* Moderate*   URINEPH 7.0 7.0 6.0 6.5   PROTEIN 100* 100* 300* 300*   NITRITE Negative Negative Negative Negative   LEUKEST Trace* Large* Trace* Negative   RBCU 3* 105* 5* 14*   WBCU 4 >182* 6* 5     Recent Labs   Lab Test 07/31/18  1630 10/10/17  1336 04/04/17  1718 01/20/16  1450 01/05/16  1142 11/04/15  1626   UTPG 4.80* 3.51* 2.62* 2.24* 3.36* 3.37*     PTH  Recent Labs   Lab Test 06/15/23  1043 10/10/17  1328   PTHI 81* 37     IRON  STUDIES  Recent Labs   Lab Test 10/10/17  1328   IRON 133      IRONSAT 50*   CHRISTIAN 176       IMAGING:  I review the MR liver on 4/23 which showed no hydronephrosis. Moderate ascites and cirrhotic liver with evidence of portal HTN.    ASSESSMENT AND RECOMMENDATIONS:   # Progressive CKD now stage 3B  # Hx of Biopsy proven Cryoglobulinemic GN (IgM kappa)   In the past, baseline creatinine 1.1-1.2 however there is also moderate variability likely related to his low muscle mass.  It does appear that the most recent trend is reflecting a progressive elevation in creatinine since 9/30/2022.  There is some concern that this might be related to his liver disease but there is limited lower extremity edema and palpable ascites on exam.  His MELD score is relatively low to observe type II HRS physiology.  In regard to his prior cryoglobulinemic GN, it would be odd for this to come out of quiescence well his hepatitis B has been appropriately treated.  At this time, we will pursue broad serologic work-up to help understand underlying etiology.  -Resume lasix 10mg daily for volume management  -Encouraged collection of UA today with evaluation of urine sodium   -Cystatin C to correlate with GFR  -FARIDA, ANCA, C3, C4, dsDNA and FARIDA, SPEP with immunofixation, kappa/lambda light chain, cryo, PLA2R  -Will plan to evaluate with serologic workup and if limited diagnostic return, will consider repeat biopsy after evaluating trend in creatinine at next visit (2mo).  # HBV cirrhosis  # HBV infection on entecavir  Follows with hepatology, last viral quant undetectable on 4/4/23. MELD 16. MR Abdomen showing cirrhosis with evidence of portal hypertension. Reports no encephalopathy or concern for hematemesis.  # Hypertension  # Volume overload  Blood pressure has been 130-140 at recent clinic appointments. Does not measure at home. Reports significant improvement in lower extremity edema after lasix/spironolactone. He is now off of both of  these and has a small amount of lower extremity edema.  -Will resume lasix 10mg daily  - Continue Amlodipine 10 mg daily, Carvedilol 12.5 mg BID  # Malnutrition  # Hx of stroke with Rt sided hemiparesis  Last weight 121 lbs with BMI 20.3. Mostly wheelchair bound for transportation. Weight generally appears to be stable although he appears to be quite weak. There is concern that his creatinine is a poor estimate of his true kidney function.  -Repeat cystatin C  # BMD  # Secondary hyperparathyroidism  PTH is 81, vit D pending. Ca/Phos normal.    Follow-up in 2 months    Seen and discussed with Dr. Christy Guevara MD  Division of Renal Disease and Hypertension  [facility]  [email or web address] Web Console

## 2023-06-17 LAB — SODIUM UR-SCNC: 67 MMOL/L

## 2023-06-18 LAB — BACTERIA UR CULT: NORMAL

## 2023-06-19 LAB — DSDNA AB SER-ACNC: 66 IU/ML

## 2023-06-20 LAB
MYELOPEROXIDASE AB SER IA-ACNC: <0.3 U/ML
MYELOPEROXIDASE AB SER IA-ACNC: NEGATIVE
PROTEINASE3 AB SER IA-ACNC: <1 U/ML
PROTEINASE3 AB SER IA-ACNC: NEGATIVE

## 2023-06-21 LAB — CRYOGLOB SER QL: ABNORMAL

## 2023-06-22 LAB
ALBUMIN SERPL ELPH-MCNC: POSITIVE G/DL
CRYOGLOB IGA & IGG & IGM SER-IMP: ABNORMAL
CRYOGLOB TYP SER IFE: ABNORMAL
CRYOGLOB TYP SER IFE: ABNORMAL
CRYOGLOB TYP SER IFE: POSITIVE

## 2023-07-10 DIAGNOSIS — E87.6 HYPOPOTASSEMIA: ICD-10-CM

## 2023-07-10 RX ORDER — POTASSIUM CHLORIDE 750 MG/1
CAPSULE, EXTENDED RELEASE ORAL
Qty: 60 CAPSULE | Refills: 4 | Status: SHIPPED | OUTPATIENT
Start: 2023-07-10 | End: 2023-12-06

## 2023-07-10 NOTE — TELEPHONE ENCOUNTER
"Request for medication refill:  potassium chloride ER (MICRO-K) 10 MEQ CR capsule  Providers if patient needs an appointment and you are willing to give a one month supply please refill for one month and  send a letter/MyChart using \".SMILLIMITEDREFILL\" .smillimited and route chart to \"P [facility] \" (Giving one month refill in non controlled medications is strongly recommended before denial)    If refill has been denied, meaning absolutely no refills without visit, please complete the smart phrase \".smirxrefuse\" and route it to the \"P [facility] MED REFILLS\"  pool to inform the patient and the pharmacy.    Duran Alvarez MA        "

## 2023-07-31 DIAGNOSIS — I69.30 HISTORY OF CVA WITH RESIDUAL DEFICIT: ICD-10-CM

## 2023-07-31 NOTE — TELEPHONE ENCOUNTER
"Request for medication refill:  atorvastatin (LIPITOR) 40 MG tablet     Providers if patient needs an appointment and you are willing to give a one month supply please refill for one month and  send a letter/MyChart using \".SMILLIMITEDREFILL\" .smillimited and route chart to \"P [facility] \" (Giving one month refill in non controlled medications is strongly recommended before denial)    If refill has been denied, meaning absolutely no refills without visit, please complete the smart phrase \".smirxrefuse\" and route it to the \"P [facility] MED REFILLS\"  pool to inform the patient and the pharmacy.    Duran Alvarez MA      "

## 2023-08-01 RX ORDER — ATORVASTATIN CALCIUM 40 MG/1
TABLET, FILM COATED ORAL
Qty: 30 TABLET | Refills: 1 | Status: SHIPPED | OUTPATIENT
Start: 2023-08-01 | End: 2023-10-04

## 2023-08-22 ENCOUNTER — DOCUMENTATION ONLY (OUTPATIENT)
Dept: MULTI SPECIALTY CLINIC | Facility: CLINIC | Age: 63
End: 2023-08-22
Payer: MEDICARE

## 2023-08-24 ENCOUNTER — LAB (OUTPATIENT)
Dept: LAB | Facility: CLINIC | Age: 63
End: 2023-08-24
Attending: INTERNAL MEDICINE
Payer: MEDICARE

## 2023-08-24 DIAGNOSIS — N18.31 STAGE 3A CHRONIC KIDNEY DISEASE (H): ICD-10-CM

## 2023-08-24 LAB
Lab: NORMAL
PERFORMING LABORATORY: NORMAL
SPECIMEN STATUS: NORMAL
TEST NAME: NORMAL

## 2023-08-24 PROCEDURE — 36415 COLL VENOUS BLD VENIPUNCTURE: CPT | Performed by: PATHOLOGY

## 2023-08-24 PROCEDURE — 83520 IMMUNOASSAY QUANT NOS NONAB: CPT | Performed by: PATHOLOGY

## 2023-08-25 ENCOUNTER — PATIENT OUTREACH (OUTPATIENT)
Dept: NEPHROLOGY | Facility: CLINIC | Age: 63
End: 2023-08-25
Payer: MEDICARE

## 2023-08-25 DIAGNOSIS — N18.31 STAGE 3A CHRONIC KIDNEY DISEASE (H): Primary | ICD-10-CM

## 2023-08-25 LAB — MAYO MISC RESULT: NORMAL

## 2023-08-25 NOTE — PROGRESS NOTES
Nephrology Note: Follow Up    REASON FOR CALL:      REASON FOR CALL: Nephrology Care Coordination                                       SITUATION/BACKROUND:   Patient is being treated for CKD Stage 3.        ASSESSMENT:   Ayse Toro,     I am wondering if you could call him or his family and figure out what is going on. He just came and get the [facility] labs and left without being seen. Repeat CBC, renal panel, UA, urine protein, INR. I think he would need a kidnelso, I would need biopsy. I was hoping to talk to him today but not sure why he left.     Thanks so much,   NK     ---------------------------------------------------------------  8/25- Called Caty per note to see why patient did not stay for appointment 8/24 with Dr. Harrison     8/28-Called and spoke with Caty. Stated that it was a misunderstanding that patient did not stay for appointment. She was not present this time, usually she goes to appointments with patient. Writer was able to reschedule appointment for October 5th. Labs will be drawn on 10/3.   Patient to get labs redrawn 8/29 per provider request. Provider updated.     Jelly Oneal RN, BSN   Nephrology RN Care Coordinator   [facility]   [phone]   Neph Assessments:  Recent Labs  Lab Results   Component Value Date     06/15/2023     04/04/2023     01/13/2023     06/30/2021     04/02/2021     08/02/2020    Lab Results   Component Value Date    GFRESTIMATED 37 06/15/2023    GFRESTIMATED 35 04/04/2023    GFRESTIMATED 45 01/13/2023    GFRESTIMATED 61 06/30/2021    GFRESTIMATED 66 04/02/2021    GFRESTIMATED 67 08/02/2020        Lab Results   Component Value Date    POTASSIUM 4.7 06/15/2023    POTASSIUM 4.3 04/04/2023    POTASSIUM 5.0 01/13/2023    POTASSIUM 3.6 01/19/2022    POTASSIUM 4.2 06/30/2021    POTASSIUM 4.0 04/02/2021    POTASSIUM 4.0 08/02/2020    Lab Results   Component Value Date    CR 1.98 06/15/2023    CR 2.08 04/04/2023    CR  1.72 01/13/2023    CR 1.27 06/30/2021    CR 1.19 04/02/2021    CR 1.18 08/02/2020      Lab Results   Component Value Date    MAG 1.8 07/17/2020    Lab Results   Component Value Date    BUN 30.6 06/15/2023    BUN 23.8 04/04/2023    BUN 21.3 01/13/2023    BUN 19 01/19/2022    BUN 24 06/30/2021    BUN 25 04/02/2021    BUN 28 08/02/2020          Lab Results   Component Value Date    HGB 13.3 06/15/2023    HGB 14.0 04/04/2023    HGB 14.5 01/13/2023    HGB 14.3 06/30/2021    HGB 13.5 04/02/2021    HGB 10.1 08/02/2020    Lab Results   Component Value Date    A1C 4.7 07/18/2020    A1C 5.3 11/04/2015      Lab Results   Component Value Date    CHRISTIAN 176 10/10/2017       Lab Results   Component Value Date     10/10/2017    Lab Results   Component Value Date    PTHI 81 06/15/2023    PTHI 37 10/10/2017      Lab Results   Component Value Date    IRON 133 10/10/2017    No results found for: DTOT           PLAN:     Follow Up:   Patient to follow up as scheduled at next appointment  Patient to call/Breezyhart message with updates     Patient Daughter verbalized understanding and will follow up as recommended.    Jelly Oneal RN

## 2023-08-28 ENCOUNTER — APPOINTMENT (OUTPATIENT)
Dept: INTERPRETER SERVICES | Facility: CLINIC | Age: 63
End: 2023-08-28
Payer: MEDICARE

## 2023-08-29 ENCOUNTER — LAB (OUTPATIENT)
Dept: LAB | Facility: CLINIC | Age: 63
End: 2023-08-29
Payer: MEDICARE

## 2023-08-29 DIAGNOSIS — N18.31 STAGE 3A CHRONIC KIDNEY DISEASE (H): ICD-10-CM

## 2023-08-29 LAB
ALBUMIN SERPL BCG-MCNC: 2.9 G/DL (ref 3.5–5.2)
ANION GAP SERPL CALCULATED.3IONS-SCNC: 8 MMOL/L (ref 7–15)
BUN SERPL-MCNC: 33.6 MG/DL (ref 8–23)
CALCIUM SERPL-MCNC: 9 MG/DL (ref 8.8–10.2)
CHLORIDE SERPL-SCNC: 110 MMOL/L (ref 98–107)
CREAT SERPL-MCNC: 2.08 MG/DL (ref 0.67–1.17)
DEPRECATED HCO3 PLAS-SCNC: 21 MMOL/L (ref 22–29)
ERYTHROCYTE [DISTWIDTH] IN BLOOD BY AUTOMATED COUNT: 13 % (ref 10–15)
GFR SERPL CREATININE-BSD FRML MDRD: 35 ML/MIN/1.73M2
GLUCOSE SERPL-MCNC: 91 MG/DL (ref 70–99)
HCT VFR BLD AUTO: 40.2 % (ref 40–53)
HGB BLD-MCNC: 13.6 G/DL (ref 13.3–17.7)
INR PPP: 1.18 (ref 0.85–1.15)
MCH RBC QN AUTO: 31.8 PG (ref 26.5–33)
MCHC RBC AUTO-ENTMCNC: 33.8 G/DL (ref 31.5–36.5)
MCV RBC AUTO: 94 FL (ref 78–100)
PHOSPHATE SERPL-MCNC: 3.9 MG/DL (ref 2.5–4.5)
PLATELET # BLD AUTO: 122 10E3/UL (ref 150–450)
POTASSIUM SERPL-SCNC: 4.5 MMOL/L (ref 3.4–5.3)
RBC # BLD AUTO: 4.28 10E6/UL (ref 4.4–5.9)
SODIUM SERPL-SCNC: 139 MMOL/L (ref 136–145)
WBC # BLD AUTO: 4.9 10E3/UL (ref 4–11)

## 2023-08-29 PROCEDURE — 80069 RENAL FUNCTION PANEL: CPT | Performed by: PATHOLOGY

## 2023-08-29 PROCEDURE — 85610 PROTHROMBIN TIME: CPT | Performed by: PATHOLOGY

## 2023-08-29 PROCEDURE — 36415 COLL VENOUS BLD VENIPUNCTURE: CPT | Performed by: PATHOLOGY

## 2023-08-29 PROCEDURE — 85027 COMPLETE CBC AUTOMATED: CPT | Performed by: PATHOLOGY

## 2023-10-04 DIAGNOSIS — N18.31 STAGE 3A CHRONIC KIDNEY DISEASE (H): Primary | ICD-10-CM

## 2023-10-04 DIAGNOSIS — I69.30 HISTORY OF CVA WITH RESIDUAL DEFICIT: ICD-10-CM

## 2023-10-04 RX ORDER — ATORVASTATIN CALCIUM 40 MG/1
TABLET, FILM COATED ORAL
Qty: 30 TABLET | Refills: 1 | Status: SHIPPED | OUTPATIENT
Start: 2023-10-04 | End: 2023-12-06

## 2023-10-04 NOTE — TELEPHONE ENCOUNTER
"Request for medication refill:  atorvastatin (LIPITOR) 40 MG tablet   Providers if patient needs an appointment and you are willing to give a one month supply please refill for one month and  send a letter/MyChart using \".SMILLIMITEDREFILL\" .smillimited and route chart to \"P [facility] \" (Giving one month refill in non controlled medications is strongly recommended before denial)    If refill has been denied, meaning absolutely no refills without visit, please complete the smart phrase \".smirxrefuse\" and route it to the \"P [facility] MED REFILLS\"  pool to inform the patient and the pharmacy.    Ryanne Judd MA     "

## 2023-10-05 ENCOUNTER — LAB (OUTPATIENT)
Dept: LAB | Facility: CLINIC | Age: 63
End: 2023-10-05
Payer: MEDICARE

## 2023-10-05 ENCOUNTER — OFFICE VISIT (OUTPATIENT)
Dept: NEPHROLOGY | Facility: CLINIC | Age: 63
End: 2023-10-05
Attending: INTERNAL MEDICINE
Payer: MEDICARE

## 2023-10-05 VITALS
TEMPERATURE: 98 F | SYSTOLIC BLOOD PRESSURE: 147 MMHG | OXYGEN SATURATION: 97 % | WEIGHT: 140 LBS | HEART RATE: 61 BPM | DIASTOLIC BLOOD PRESSURE: 89 MMHG | BODY MASS INDEX: 23.3 KG/M2

## 2023-10-05 DIAGNOSIS — B19.10 CIRRHOSIS OF LIVER DUE TO HEPATITIS B (H): ICD-10-CM

## 2023-10-05 DIAGNOSIS — N08 CRYOGLOBULINEMIC GLOMERULONEPHRITIS (H): ICD-10-CM

## 2023-10-05 DIAGNOSIS — K74.60 CIRRHOSIS OF LIVER WITH ASCITES, UNSPECIFIED HEPATIC CIRRHOSIS TYPE (H): ICD-10-CM

## 2023-10-05 DIAGNOSIS — R80.9 NEPHROTIC RANGE PROTEINURIA: ICD-10-CM

## 2023-10-05 DIAGNOSIS — I10 HYPERTENSION, ESSENTIAL: ICD-10-CM

## 2023-10-05 DIAGNOSIS — N18.31 STAGE 3A CHRONIC KIDNEY DISEASE (H): ICD-10-CM

## 2023-10-05 DIAGNOSIS — N18.32 STAGE 3B CHRONIC KIDNEY DISEASE (H): ICD-10-CM

## 2023-10-05 DIAGNOSIS — R79.89 ELEVATED SERUM CREATININE: ICD-10-CM

## 2023-10-05 DIAGNOSIS — R18.8 CIRRHOSIS OF LIVER WITH ASCITES, UNSPECIFIED HEPATIC CIRRHOSIS TYPE (H): ICD-10-CM

## 2023-10-05 DIAGNOSIS — N18.32 STAGE 3B CHRONIC KIDNEY DISEASE (H): Primary | ICD-10-CM

## 2023-10-05 DIAGNOSIS — K74.60 CIRRHOSIS OF LIVER DUE TO HEPATITIS B (H): ICD-10-CM

## 2023-10-05 DIAGNOSIS — B18.1 CHRONIC VIRAL HEPATITIS B WITHOUT DELTA AGENT AND WITHOUT COMA (H): ICD-10-CM

## 2023-10-05 DIAGNOSIS — D89.1 CRYOGLOBULINEMIC GLOMERULONEPHRITIS (H): ICD-10-CM

## 2023-10-05 LAB
AFP SERPL-MCNC: 6.9 NG/ML
ALBUMIN SERPL BCG-MCNC: 2.9 G/DL (ref 3.5–5.2)
ALP SERPL-CCNC: 114 U/L (ref 40–129)
ALT SERPL W P-5'-P-CCNC: 9 U/L (ref 0–70)
ANION GAP SERPL CALCULATED.3IONS-SCNC: 10 MMOL/L (ref 7–15)
AST SERPL W P-5'-P-CCNC: 25 U/L (ref 0–45)
BILIRUB SERPL-MCNC: 0.4 MG/DL
BUN SERPL-MCNC: 37.7 MG/DL (ref 8–23)
CALCIUM SERPL-MCNC: 9 MG/DL (ref 8.8–10.2)
CHLORIDE SERPL-SCNC: 111 MMOL/L (ref 98–107)
CREAT SERPL-MCNC: 2.09 MG/DL (ref 0.67–1.17)
DEPRECATED HCO3 PLAS-SCNC: 19 MMOL/L (ref 22–29)
EGFRCR SERPLBLD CKD-EPI 2021: 35 ML/MIN/1.73M2
ERYTHROCYTE [DISTWIDTH] IN BLOOD BY AUTOMATED COUNT: 12.7 % (ref 10–15)
GLUCOSE SERPL-MCNC: 101 MG/DL (ref 70–99)
HCT VFR BLD AUTO: 42.3 % (ref 40–53)
HGB BLD-MCNC: 14.1 G/DL (ref 13.3–17.7)
INR PPP: 1.19 (ref 0.85–1.15)
MCH RBC QN AUTO: 31.1 PG (ref 26.5–33)
MCHC RBC AUTO-ENTMCNC: 33.3 G/DL (ref 31.5–36.5)
MCV RBC AUTO: 93 FL (ref 78–100)
PHOSPHATE SERPL-MCNC: 3.7 MG/DL (ref 2.5–4.5)
PLATELET # BLD AUTO: 114 10E3/UL (ref 150–450)
POTASSIUM SERPL-SCNC: 4.2 MMOL/L (ref 3.4–5.3)
PROT SERPL-MCNC: 6.3 G/DL (ref 6.4–8.3)
RBC # BLD AUTO: 4.53 10E6/UL (ref 4.4–5.9)
SODIUM SERPL-SCNC: 140 MMOL/L (ref 135–145)
WBC # BLD AUTO: 5.2 10E3/UL (ref 4–11)

## 2023-10-05 PROCEDURE — 82105 ALPHA-FETOPROTEIN SERUM: CPT | Performed by: STUDENT IN AN ORGANIZED HEALTH CARE EDUCATION/TRAINING PROGRAM

## 2023-10-05 PROCEDURE — G0463 HOSPITAL OUTPT CLINIC VISIT: HCPCS | Performed by: INTERNAL MEDICINE

## 2023-10-05 PROCEDURE — 99215 OFFICE O/P EST HI 40 MIN: CPT | Performed by: INTERNAL MEDICINE

## 2023-10-05 PROCEDURE — 80053 COMPREHEN METABOLIC PANEL: CPT | Performed by: PATHOLOGY

## 2023-10-05 PROCEDURE — 99000 SPECIMEN HANDLING OFFICE-LAB: CPT | Performed by: PATHOLOGY

## 2023-10-05 PROCEDURE — 36415 COLL VENOUS BLD VENIPUNCTURE: CPT | Performed by: PATHOLOGY

## 2023-10-05 PROCEDURE — 85610 PROTHROMBIN TIME: CPT | Performed by: PATHOLOGY

## 2023-10-05 PROCEDURE — 85027 COMPLETE CBC AUTOMATED: CPT | Performed by: PATHOLOGY

## 2023-10-05 PROCEDURE — 84100 ASSAY OF PHOSPHORUS: CPT | Performed by: PATHOLOGY

## 2023-10-05 RX ORDER — LOSARTAN POTASSIUM 25 MG/1
25 TABLET ORAL DAILY
Qty: 30 TABLET | Refills: 6 | Status: SHIPPED | OUTPATIENT
Start: 2023-10-05 | End: 2024-08-26

## 2023-10-05 ASSESSMENT — PAIN SCALES - GENERAL: PAINLEVEL: NO PAIN (0)

## 2023-10-05 NOTE — PROGRESS NOTES
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     The patient was previously seen by Dr.Junghare craig in 10/17.  Per his note, the patient was initially evaluated for edema and nephrotic range proteinuria.  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  Upon diagnosis, he has preserved kidney function with Cr of 0.8 although UPCR of  3.5g/g.     At that time, he was treated conservatively with Bumex and lisinopril. His HBV has been treated with Entecavir. Since then has has lost to followed-up with Neph.       In June 2020 he suffered a stroke and continues to have right sided weakness, In July 2020, he has mild ANDRY with creatinine increased to 1.6 but it then came down to 1.1-1.3.  Creatinine increased to 1.46 on 9/30/22, 1.72 on 1/13/2023 and now 2.08 on 4/23.  His serum albumin has been progressively low from 3.3 in 2015 down to 1.5 to 2.0 but then somewhat improved. Serum albumin in April 23 was 2.6.  His UA always show blood and protein. Last UPCR was done in 7/31/18 was 4.80. He has trace cryo in the blood. He had positive IgM, Kappa cryo but then in 2020, he has positive for polyclonal Cryo: A, G, M, K and L. No monoclonal protein via immunofixation in serum or urine.  [place] free light chain was 12.7 and lambda free light chain was 6.79 with ratio of 1.87 in July 2020. M spike was negative.  FL-3 and MPO were negative.  He has low C3 in the past and normal C4.  SPEP showed marked hypoalbuminemia and decreased beta globulin without monoclonal protein.     He has  been followed by Dr. Obsorne in hepatology clinic, last seen in April 2023.  He was noted to have a small liver lesion, ultrasound with some ascites and pleural effusion.  Noted blood pressure 137/94.  He has kidney in 1/23 which showed normal-sized right kidney without hydronephrosis. There is small amount of ascites and pleural effusion.     6/15/23: Seen for consultation. He arrives with his niece and she assists with translating.  He is noted to be in a wheelchair partially because of his residual right-sided weakness after CVA.  Reports that he is able to transition from room to room on the same floor with a walker.  However, he is unable to go upstairs and requires assistance moving long distances.  He uses a wheelchair when going outside the home.     We reviewed that his creatinine is increased over the past 9 months.  There have been no specific illnesses, hospital presentations or events that they can recall that may have led to kidney injury. After seeing hepatology in April, he was started on Lasix 20 mg and spironolactone for edema management.  His niece reports that his swelling resolved dramatically and now there is only trace swelling present.  He is off of the diuretics for the past few weeks.  Additionally, he continues on his antiretroviral for hepatitis B.  Most recently viral load was undetectable.      In reviewing his appetite and p.o. intake, he often does not eat a lot.  Although there are some times where he has better appetite.  States that his p.o. fluid intake is reasonable.  His urine is reported to be normal and seems to be going to the bathroom several times a day.  No noted hematuria or color/quality.  Plan: Serological work-up, resume lasix 10 mg daily.   8/24/23: He missed his appt.  10/5/23: He is here with his niece today.  Today, he feels fine.  Patient still complaining of incontinence.  So he cannot provide a urine sample for us.  The patient has not done labs yet either.   His swelling has improved after we resume Lasix 10 mg/day.  His blood pressure still limited at 147/89.  The patient denies any joint pain fevers or rashes.  He has no oral ulcer.   Labs on 8/29/23 showed Cr 2.08 with eGFR 35, BUN 33.6, Bicarb 21. Albumin 2.9. UA showed WBC >182, RBC 6, hyaline cast 10, UPCR 5.56. Serological work-up showed positive cryo IgG, IgM and kappa and lambda. Cryo trace. C3 116 and C4 33. Kappa 15.24, lambda 7.96 and K/L ratio 1.91. PTH 81. DS DNA 61. ANCA negative. UA showed WBC >182, RBC 6, UPCR 5.56 g/g.     Past medical history  Past Medical History:   Diagnosis Date    Cirrhosis (H)     Cryoglobulinemia (H24)     CVA (cerebral vascular accident) (H) 07/16/2020    Supratentorial likely d/t hypertensive emergency leading to right hemiparesis, dysphagia and aphasia    Glomerulonephritis     Hepatitis B     Hypertension     Latent tuberculosis     Treatment 2336-3402    MPGN (membranoproliferative glomerulonephritides)     Positive QuantiFERON-TB Gold test     PRES (posterior reversible encephalopathy syndrome) 07/16/2020    In brainstem d/t hypertensive emergency; suffered supratentorial CVAs same date       Past surgical history  Past Surgical History:   Procedure Laterality Date    ANESTHESIA OUT OF OR MRI N/A 7/18/2020    Procedure: ANESTHESIA OUT OF OR MRI;  Surgeon: GENERIC ANESTHESIA PROVIDER;  Location:  OR    ESOPHAGOSCOPY, GASTROSCOPY, DUODENOSCOPY (EGD), COMBINED N/A 10/18/2018    Procedure: EGD;  Surgeon: Eber Ortez MD;  Location: [facility]    HAND SURGERY Right     IR PARACENTESIS  7/27/2020    [facility] HAND/FINGER SURGERY UNLISTED       Review of Systems:   14 systems were reviewed and all negative except as mentioned above.   Current Medications:  Current Outpatient Medications   Medication    amLODIPine (NORVASC) 10 MG tablet    amLODIPine (NORVASC) 10 MG tablet    aspirin (ASA) 81 MG chewable tablet    atorvastatin (LIPITOR) 40 MG tablet    carvedilol (COREG) 12.5 MG  tablet    diclofenac (VOLTAREN) 1 % topical gel    docusate sodium (COLACE) 100 MG capsule    entecavir (BARACLUDE) 0.5 MG tablet    furosemide (LASIX) 20 MG tablet    furosemide (LASIX) 20 MG tablet    gabapentin (NEURONTIN) 100 MG capsule    HM CETIRIZINE HCL 10 MG tablet    ibuprofen (ADVIL/MOTRIN) 400 MG tablet    losartan (COZAAR) 25 MG tablet    multivitamin (ONE-DAILY) tablet    polyethylene glycol (MIRALAX) 17 GM/Dose powder    potassium chloride ER (MICRO-K) 10 MEQ CR capsule    spironolactone (ALDACTONE) 50 MG tablet    vitamin D3 (CHOLECALCIFEROL) 50 mcg (2000 units) tablet    acetaminophen (TYLENOL) 325 MG tablet     No current facility-administered medications for this visit.       Physical Exam:   BP (!) 147/89   Pulse 61   Temp 98  F (36.7  C) (Oral)   Wt 63.5 kg (140 lb)   SpO2 97%   BMI 23.30 kg/m     Body mass index is 23.3 kg/m .    GENERAL APPEARANCE: Alert, not in acute distress, thin built  EYES:  Not pale conjunctiva, pupils equal  HENT: Mouth without ulcers or lesions  PULM: lungs clear to auscultation bilaterally, equal air movement, no clubbing  CV: regular rhythm, normal rate, no rub     -JVD no distended.      -edema: trace  GI: soft,  - tender, no distended, bowel sounds are present  INTEGUMENT: No rash  NEURO:  Non focal. No asterixis.       Labs:   All labs reviewed by me  Last Renal Panel:  Sodium   Date Value Ref Range Status   08/29/2023 139 136 - 145 mmol/L Final   06/30/2021 140 133 - 144 mmol/L Final     Potassium   Date Value Ref Range Status   08/29/2023 4.5 3.4 - 5.3 mmol/L Final   01/19/2022 3.6 3.4 - 5.3 mmol/L Final   06/30/2021 4.2 3.4 - 5.3 mmol/L Final     Chloride   Date Value Ref Range Status   08/29/2023 110 (H) 98 - 107 mmol/L Final   01/19/2022 107 94 - 109 mmol/L Final   06/30/2021 106 94 - 109 mmol/L Final     Carbon Dioxide   Date Value Ref Range Status   06/30/2021 25 20 - 32 mmol/L Final     Carbon Dioxide (CO2)   Date Value Ref Range Status   08/29/2023  21 (L) 22 - 29 mmol/L Final   01/19/2022 27 20 - 32 mmol/L Final     Anion Gap   Date Value Ref Range Status   08/29/2023 8 7 - 15 mmol/L Final   01/19/2022 10 3 - 14 mmol/L Final   06/30/2021 9 3 - 14 mmol/L Final     Glucose   Date Value Ref Range Status   08/29/2023 91 70 - 99 mg/dL Final   01/19/2022 78 70 - 99 mg/dL Final   06/30/2021 96 70 - 99 mg/dL Final     Urea Nitrogen   Date Value Ref Range Status   08/29/2023 33.6 (H) 8.0 - 23.0 mg/dL Final   01/19/2022 19 7 - 30 mg/dL Final   06/30/2021 24 7 - 30 mg/dL Final     Creatinine   Date Value Ref Range Status   08/29/2023 2.08 (H) 0.67 - 1.17 mg/dL Final   06/30/2021 1.27 (H) 0.66 - 1.25 mg/dL Final     GFR Estimate   Date Value Ref Range Status   08/29/2023 35 (L) >60 mL/min/1.73m2 Final   06/30/2021 61 >60 mL/min/[1.73_m2] Final     Comment:     Non  GFR Calc  Starting 12/18/2018, serum creatinine based estimated GFR (eGFR) will be   calculated using the Chronic Kidney Disease Epidemiology Collaboration   (CKD-EPI) equation.       Calcium   Date Value Ref Range Status   08/29/2023 9.0 8.8 - 10.2 mg/dL Final   06/30/2021 9.0 8.5 - 10.1 mg/dL Final     Phosphorus   Date Value Ref Range Status   08/29/2023 3.9 2.5 - 4.5 mg/dL Final   10/10/2017 3.0 2.5 - 4.5 mg/dL Final     Albumin   Date Value Ref Range Status   08/29/2023 2.9 (L) 3.5 - 5.2 g/dL Final   01/19/2022 2.5 (L) 3.4 - 5.0 g/dL Final   06/30/2021 3.0 (L) 3.4 - 5.0 g/dL Final       Imaging:  I reviewed imaging studies.     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  It does appear that the most recent trend is reflecting a  progressive elevation in creatinine since 9/30/2022.  There is some concern that this might be related to his liver disease but there is limited lower extremity edema and palpable ascites on exam.  His MELD score is relatively low to observe type II HRS physiology.  In regard to his prior cryoglobulinemic GN, it would be odd for this to come out of quiescence well his hepatitis B has been appropriately treated. I repeat serological work-up and showed that he has trace cryo IgG, IgM and kappa but normal complement: C3 116 and C4 33. Otherwise negative monoclonal protein; K/L ratio of 1.91. PLA2R negative, ANCA negative  but DS DNA positive at 61 U/ml. HBV VL <20 on 4/4/23. At this time, it is unclear why he has progressive kidney disease despite HBV controlled. He is also noted to have nephrotic range proteinuria.  Therefore, I think a kidney biopsy would provide us with a better picture as to why he deteriorate and the degree of severity. I discussed risk and benefit of kidney Bx with the patient which include 1/100 of Blood transfusion, 1/1000 requiring intervention to stop bleeding, and <1/13544 for losing kidney and death. The patient decided to proceed with a kidney Bx.    -Continue lasix 10mg daily for volume management  - Kidney Bx by IR; hold ASA 7 days before and 5 days after  -Encouraged collection of UA today with evaluation of urine sodium-> I gave urine container to him today  - Follow up in 2-3 weeks post-biopsy  # HBV cirrhosis  # HBV infection on entecavir  Follows with hepatology, last viral quant undetectable on 4/4/23. MELD 16. MR Abdomen showing cirrhosis with evidence of portal hypertension. Reports no encephalopathy or concern for hematemesis.  # Hypertension; not controlled  # Volume overload  Blood pressure has been 140at recent clinic appointments. Does not measure at home. Reports significant improvement in lower extremity edema after lasix/spironolactone.   - Continue Amlodipine 10 mg  daily, Carvedilol 12.5 mg BID, Lasix 10mg daily  - Start losartan at 25 mg per day  - Check BMP in 2 weeks  # Malnutrition  # Hx of stroke with Rt sided hemiparesis  Mostly wheelchair bound for transportation. Weight generally appears to be stable although he appears to be quite weak. There is concern that his creatinine is a poor estimate of his true kidney function. Cystatin C 2.8 with eGFR 20 and Cr 1.98 with eGFR by Cr 37 on 6/15/23.   # BMD  # Secondary hyperparathyroidism  6/15/23: PTH is 81, vit D 28 . Ca/Phos normal.     Follow-up 2-3 weeks after a kidney biopsy.    I spent  45 minutes on the date of the encounter doing chart review, history and exam, documentation and further activities as noted above. 20 minutes of this visit is dedicated to direct patient interaction via face-to-face.    Sue Harrison MD on 10/05/2023

## 2023-10-05 NOTE — NURSING NOTE
Chief Complaint   Patient presents with    RECHECK       BP (!) 147/89   Pulse 61   Temp 98  F (36.7  C) (Oral)   Wt 63.5 kg (140 lb)   SpO2 97%   BMI 23.30 kg/m      Reji Knight on 10/5/2023 at 11:33 AM

## 2023-10-05 NOTE — PATIENT INSTRUCTIONS
Start losartan 25 mg per day  Measure blood pressure keep <130/80  Kidney biopsy  Please stop aspirin 7 days before and resume 5 days after  Check BMP in 2 weeks  Have labs done today-> please drop urine whenever he can

## 2023-10-05 NOTE — LETTER
10/5/2023       RE: Wilfredo Yoon  [address]  [facility] [address]     Dear Colleague,    Thank you for referring your patient, Wilfredo Yoon, to the [facility] NEPHROLOG[facility] [place] at [facility]. Please see a copy of my visit note below.      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     The patient was previously seen by  back in 10/17.  Per his note, the patient was initially evaluated for edema and nephrotic range proteinuria.  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  Upon diagnosis, he has preserved kidney function with Cr of 0.8 although UPCR of  3.5g/g.     At that time, he was treated conservatively with Bumex and lisinopril. His HBV has been treated with Entecavir. Since then has has lost to followed-up with Neph.       In June 2020 he suffered a stroke and continues to have right sided weakness, In July 2020, he has mild ANDRY with creatinine increased to 1.6 but it then came down to 1.1-1.3.  Creatinine increased to 1.46 on 9/30/22, 1.72 on 1/13/2023 and now 2.08 on 4/23.  His serum albumin has been progressively low from 3.3 in 2015 down to 1.5 to 2.0 but then somewhat improved. Serum albumin in April 23 was 2.6.  His UA always show blood and protein. Last UPCR was done in 7/31/18 was 4.80. He has trace cryo in the blood. He had positive IgM, Kappa cryo but then in 2020, he has positive for polyclonal Cryo: A, G, M, K and L. No monoclonal  protein via immunofixation in serum or urine.  [place] free light chain was 12.7 and lambda free light chain was 6.79 with ratio of 1.87 in July 2020. M spike was negative.  UT-3 and MPO were negative.  He has low C3 in the past and normal C4.  SPEP showed marked hypoalbuminemia and decreased beta globulin without monoclonal protein.     He has been followed by Dr. Osborne in hepatology clinic, last seen in April 2023.  He was noted to have a small liver lesion, ultrasound with some ascites and pleural effusion.  Noted blood pressure 137/94.  He has kidney in 1/23 which showed normal-sized right kidney without hydronephrosis. There is small amount of ascites and pleural effusion.     6/15/23: Seen for consultation. He arrives with his niece and she assists with translating.  He is noted to be in a wheelchair partially because of his residual right-sided weakness after CVA.  Reports that he is able to transition from room to room on the same floor with a walker.  However, he is unable to go upstairs and requires assistance moving long distances.  He uses a wheelchair when going outside the home.     We reviewed that his creatinine is increased over the past 9 months.  There have been no specific illnesses, hospital presentations or events that they can recall that may have led to kidney injury. After seeing hepatology in April, he was started on Lasix 20 mg and spironolactone for edema management.  His niece reports that his swelling resolved dramatically and now there is only trace swelling present.  He is off of the diuretics for the past few weeks.  Additionally, he continues on his antiretroviral for hepatitis B.  Most recently viral load was undetectable.      In reviewing his appetite and p.o. intake, he often does not eat a lot.  Although there are some times where he has better appetite.  States that his p.o. fluid intake is reasonable.  His urine is reported to be normal and seems to be going to the bathroom  several times a day.  No noted hematuria or color/quality.  Plan: Serological work-up, resume lasix 10 mg daily.   8/24/23: He missed his appt.  10/5/23: He is here with his niece today.  Today, he feels fine.  Patient still complaining of incontinence.  So he cannot provide a urine sample for us.  The patient has not done labs yet either.  His swelling has improved after we resume Lasix 10 mg/day.  His blood pressure still limited at 147/89.  The patient denies any joint pain fevers or rashes.  He has no oral ulcer.   Labs on 8/29/23 showed Cr 2.08 with eGFR 35, BUN 33.6, Bicarb 21. Albumin 2.9. UA showed WBC >182, RBC 6, hyaline cast 10, UPCR 5.56. Serological work-up showed positive cryo IgG, IgM and kappa and lambda. Cryo trace. C3 116 and C4 33. Kappa 15.24, lambda 7.96 and K/L ratio 1.91. PTH 81. DS DNA 61. ANCA negative. UA showed WBC >182, RBC 6, UPCR 5.56 g/g.     Past medical history  Past Medical History:   Diagnosis Date    Cirrhosis (H)     Cryoglobulinemia (H24)     CVA (cerebral vascular accident) (H) 07/16/2020    Supratentorial likely d/t hypertensive emergency leading to right hemiparesis, dysphagia and aphasia    Glomerulonephritis     Hepatitis B     Hypertension     Latent tuberculosis     Treatment 7026-6359    MPGN (membranoproliferative glomerulonephritides)     Positive QuantiFERON-TB Gold test     PRES (posterior reversible encephalopathy syndrome) 07/16/2020    In brainstem d/t hypertensive emergency; suffered supratentorial CVAs same date       Past surgical history  Past Surgical History:   Procedure Laterality Date    ANESTHESIA OUT OF OR MRI N/A 7/18/2020    Procedure: ANESTHESIA OUT OF OR MRI;  Surgeon: GENERIC ANESTHESIA PROVIDER;  Location: [facility] OR    ESOPHAGOSCOPY, GASTROSCOPY, DUODENOSCOPY (EGD), COMBINED N/A 10/18/2018    Procedure: EGD;  Surgeon: Eber Ortez MD;  Location: [facility] GI    HAND SURGERY Right     IR PARACENTESIS  7/27/2020    [facility] HAND/FINGER SURGERY UNLISTED        Review of Systems:   14 systems were reviewed and all negative except as mentioned above.   Current Medications:  Current Outpatient Medications   Medication    amLODIPine (NORVASC) 10 MG tablet    amLODIPine (NORVASC) 10 MG tablet    aspirin (ASA) 81 MG chewable tablet    atorvastatin (LIPITOR) 40 MG tablet    carvedilol (COREG) 12.5 MG tablet    diclofenac (VOLTAREN) 1 % topical gel    docusate sodium (COLACE) 100 MG capsule    entecavir (BARACLUDE) 0.5 MG tablet    furosemide (LASIX) 20 MG tablet    furosemide (LASIX) 20 MG tablet    gabapentin (NEURONTIN) 100 MG capsule    HM CETIRIZINE HCL 10 MG tablet    ibuprofen (ADVIL/MOTRIN) 400 MG tablet    losartan (COZAAR) 25 MG tablet    multivitamin (ONE-DAILY) tablet    polyethylene glycol (MIRALAX) 17 GM/Dose powder    potassium chloride ER (MICRO-K) 10 MEQ CR capsule    spironolactone (ALDACTONE) 50 MG tablet    vitamin D3 (CHOLECALCIFEROL) 50 mcg (2000 units) tablet    acetaminophen (TYLENOL) 325 MG tablet     No current facility-administered medications for this visit.       Physical Exam:   BP (!) 147/89   Pulse 61   Temp 98  F (36.7  C) (Oral)   Wt 63.5 kg (140 lb)   SpO2 97%   BMI 23.30 kg/m     Body mass index is 23.3 kg/m .    GENERAL APPEARANCE: Alert, not in acute distress, thin built  EYES:  Not pale conjunctiva, pupils equal  HENT: Mouth without ulcers or lesions  PULM: lungs clear to auscultation bilaterally, equal air movement, no clubbing  CV: regular rhythm, normal rate, no rub     -JVD no distended.      -edema: trace  GI: soft,  - tender, no distended, bowel sounds are present  INTEGUMENT: No rash  NEURO:  Non focal. No asterixis.       Labs:   All labs reviewed by me  Last Renal Panel:  Sodium   Date Value Ref Range Status   08/29/2023 139 136 - 145 mmol/L Final   06/30/2021 140 133 - 144 mmol/L Final     Potassium   Date Value Ref Range Status   08/29/2023 4.5 3.4 - 5.3 mmol/L Final   01/19/2022 3.6 3.4 - 5.3 mmol/L Final   06/30/2021  4.2 3.4 - 5.3 mmol/L Final     Chloride   Date Value Ref Range Status   08/29/2023 110 (H) 98 - 107 mmol/L Final   01/19/2022 107 94 - 109 mmol/L Final   06/30/2021 106 94 - 109 mmol/L Final     Carbon Dioxide   Date Value Ref Range Status   06/30/2021 25 20 - 32 mmol/L Final     Carbon Dioxide (CO2)   Date Value Ref Range Status   08/29/2023 21 (L) 22 - 29 mmol/L Final   01/19/2022 27 20 - 32 mmol/L Final     Anion Gap   Date Value Ref Range Status   08/29/2023 8 7 - 15 mmol/L Final   01/19/2022 10 3 - 14 mmol/L Final   06/30/2021 9 3 - 14 mmol/L Final     Glucose   Date Value Ref Range Status   08/29/2023 91 70 - 99 mg/dL Final   01/19/2022 78 70 - 99 mg/dL Final   06/30/2021 96 70 - 99 mg/dL Final     Urea Nitrogen   Date Value Ref Range Status   08/29/2023 33.6 (H) 8.0 - 23.0 mg/dL Final   01/19/2022 19 7 - 30 mg/dL Final   06/30/2021 24 7 - 30 mg/dL Final     Creatinine   Date Value Ref Range Status   08/29/2023 2.08 (H) 0.67 - 1.17 mg/dL Final   06/30/2021 1.27 (H) 0.66 - 1.25 mg/dL Final     GFR Estimate   Date Value Ref Range Status   08/29/2023 35 (L) >60 mL/min/1.73m2 Final   06/30/2021 61 >60 mL/min/[1.73_m2] Final     Comment:     Non  GFR Calc  Starting 12/18/2018, serum creatinine based estimated GFR (eGFR) will be   calculated using the Chronic Kidney Disease Epidemiology Collaboration   (CKD-EPI) equation.       Calcium   Date Value Ref Range Status   08/29/2023 9.0 8.8 - 10.2 mg/dL Final   06/30/2021 9.0 8.5 - 10.1 mg/dL Final     Phosphorus   Date Value Ref Range Status   08/29/2023 3.9 2.5 - 4.5 mg/dL Final   10/10/2017 3.0 2.5 - 4.5 mg/dL Final     Albumin   Date Value Ref Range Status   08/29/2023 2.9 (L) 3.5 - 5.2 g/dL Final   01/19/2022 2.5 (L) 3.4 - 5.0 g/dL Final   06/30/2021 3.0 (L) 3.4 - 5.0 g/dL Final       Imaging:  I reviewed imaging studies.     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  It does appear that the most recent trend is reflecting a progressive elevation in creatinine since 9/30/2022.  There is some concern that this might be related to his liver disease but there is limited lower extremity edema and palpable ascites on exam.  His MELD score is relatively low to observe type II HRS physiology.  In regard to his prior cryoglobulinemic GN, it would be odd for this to come out of quiescence well his hepatitis B has been appropriately treated. I repeat serological work-up and showed that he has trace cryo IgG, IgM and kappa but normal complement: C3 116 and C4 33. Otherwise negative monoclonal protein; K/L ratio of 1.91. PLA2R negative, ANCA negative  but DS DNA positive at 61 U/ml. HBV VL <20 on 4/4/23. At this time, it is unclear why he has progressive kidney disease despite HBV controlled. He is also noted to have nephrotic range proteinuria.  Therefore, I think a kidney biopsy would provide us with a better picture as to why he deteriorate and the degree of severity. I discussed risk and benefit of kidney Bx with the patient which include 1/100 of Blood transfusion, 1/1000 requiring intervention to stop bleeding, and <1/07599 for losing kidney and death. The patient decided to proceed with a kidney Bx.    -Continue lasix 10mg daily for volume management  - Kidney Bx by IR; hold ASA 7 days before and 5 days after  -Encouraged collection of UA today with evaluation of urine sodium-> I gave urine container to him today  - Follow up in 2-3 weeks post-biopsy  # HBV cirrhosis  # HBV infection on entecavir  Follows with hepatology, last viral quant undetectable on 4/4/23. MELD 16. MR Abdomen showing cirrhosis with  evidence of portal hypertension. Reports no encephalopathy or concern for hematemesis.  # Hypertension; not controlled  # Volume overload  Blood pressure has been 140at recent clinic appointments. Does not measure at home. Reports significant improvement in lower extremity edema after lasix/spironolactone.   - Continue Amlodipine 10 mg daily, Carvedilol 12.5 mg BID, Lasix 10mg daily  - Start losartan at 25 mg per day  - Check BMP in 2 weeks  # Malnutrition  # Hx of stroke with Rt sided hemiparesis  Mostly wheelchair bound for transportation. Weight generally appears to be stable although he appears to be quite weak. There is concern that his creatinine is a poor estimate of his true kidney function. Cystatin C 2.8 with eGFR 20 and Cr 1.98 with eGFR by Cr 37 on 6/15/23.   # BMD  # Secondary hyperparathyroidism  6/15/23: PTH is 81, vit D 28 . Ca/Phos normal.     Follow-up 2-3 weeks after a kidney biopsy.    I spent  45 minutes on the date of the encounter doing chart review, history and exam, documentation and further activities as noted above. 20 minutes of this visit is dedicated to direct patient interaction via face-to-face.      Again, thank you for allowing me to participate in the care of your patient.      Sincerely,    Sue Harrison MD

## 2023-10-10 DIAGNOSIS — N18.32 STAGE 3B CHRONIC KIDNEY DISEASE (H): Primary | ICD-10-CM

## 2023-10-10 DIAGNOSIS — D89.1 CRYOGLOBULINEMIC GLOMERULONEPHRITIS (H): ICD-10-CM

## 2023-10-10 DIAGNOSIS — N08 CRYOGLOBULINEMIC GLOMERULONEPHRITIS (H): ICD-10-CM

## 2023-10-19 ENCOUNTER — LAB (OUTPATIENT)
Dept: LAB | Facility: CLINIC | Age: 63
End: 2023-10-19
Payer: MEDICARE

## 2023-10-19 DIAGNOSIS — N18.32 STAGE 3B CHRONIC KIDNEY DISEASE (H): ICD-10-CM

## 2023-10-19 DIAGNOSIS — N18.32 STAGE 3B CHRONIC KIDNEY DISEASE (H): Primary | ICD-10-CM

## 2023-10-19 DIAGNOSIS — N18.31 STAGE 3A CHRONIC KIDNEY DISEASE (H): ICD-10-CM

## 2023-10-19 LAB
ALBUMIN MFR UR ELPH: 308 MG/DL
ALBUMIN UR-MCNC: 200 MG/DL
ANION GAP SERPL CALCULATED.3IONS-SCNC: 9 MMOL/L (ref 7–15)
APPEARANCE UR: ABNORMAL
BACTERIA #/AREA URNS HPF: ABNORMAL /HPF
BILIRUB UR QL STRIP: NEGATIVE
BUN SERPL-MCNC: 30.8 MG/DL (ref 8–23)
CALCIUM SERPL-MCNC: 8.8 MG/DL (ref 8.8–10.2)
CHLORIDE SERPL-SCNC: 110 MMOL/L (ref 98–107)
COLOR UR AUTO: YELLOW
CREAT SERPL-MCNC: 2.21 MG/DL (ref 0.67–1.17)
CREAT UR-MCNC: 59.8 MG/DL
DEPRECATED HCO3 PLAS-SCNC: 21 MMOL/L (ref 22–29)
EGFRCR SERPLBLD CKD-EPI 2021: 33 ML/MIN/1.73M2
GLUCOSE SERPL-MCNC: 91 MG/DL (ref 70–99)
GLUCOSE UR STRIP-MCNC: NEGATIVE MG/DL
HGB UR QL STRIP: ABNORMAL
KETONES UR STRIP-MCNC: NEGATIVE MG/DL
LEUKOCYTE ESTERASE UR QL STRIP: ABNORMAL
MUCOUS THREADS #/AREA URNS LPF: PRESENT /LPF
NITRATE UR QL: NEGATIVE
PH UR STRIP: 6 [PH] (ref 5–7)
POTASSIUM SERPL-SCNC: 4.3 MMOL/L (ref 3.4–5.3)
PROT/CREAT 24H UR: 5.15 MG/MG CR (ref 0–0.2)
RBC URINE: 9 /HPF
SODIUM SERPL-SCNC: 140 MMOL/L (ref 135–145)
SP GR UR STRIP: 1.01 (ref 1–1.03)
SQUAMOUS EPITHELIAL: <1 /HPF
UROBILINOGEN UR STRIP-MCNC: NORMAL MG/DL
WBC CLUMPS #/AREA URNS HPF: PRESENT /HPF
WBC URINE: >182 /HPF

## 2023-10-19 PROCEDURE — 81001 URINALYSIS AUTO W/SCOPE: CPT | Performed by: PATHOLOGY

## 2023-10-19 PROCEDURE — 80048 BASIC METABOLIC PNL TOTAL CA: CPT | Performed by: PATHOLOGY

## 2023-10-19 PROCEDURE — 36415 COLL VENOUS BLD VENIPUNCTURE: CPT | Performed by: PATHOLOGY

## 2023-10-19 PROCEDURE — 84156 ASSAY OF PROTEIN URINE: CPT | Performed by: PATHOLOGY

## 2023-10-19 NOTE — PROGRESS NOTES
Lab orders placed per last office visit with Dr. Harrison.     Nhi Matute, RN, BSN  Nephrology Supervisor  Former[facility]

## 2023-10-25 ENCOUNTER — TELEPHONE (OUTPATIENT)
Dept: NEPHROLOGY | Facility: CLINIC | Age: 63
End: 2023-10-25
Payer: MEDICARE

## 2023-10-25 NOTE — TELEPHONE ENCOUNTER
Spoke to patient through  to schedule follow up appointment after kidney biopsy // 10.25.2023 MCE

## 2023-10-31 ENCOUNTER — TELEPHONE (OUTPATIENT)
Dept: INTERVENTIONAL RADIOLOGY/VASCULAR | Facility: CLINIC | Age: 63
End: 2023-10-31
Payer: MEDICARE

## 2023-10-31 NOTE — TELEPHONE ENCOUNTER
The following was shared with Wilfredo'nubia holt via  phone call:      INTERVENTIONAL RADIOLOGY INSTRUCTIONS     You are scheduled for an upcoming procedure in the Interventional Radiology Dept at [facility].     Date: 11/7/23     Procedure: Kidney Biopsy     Address: [address] [place] [address]       There is  parking only for patients with limited mobility at this time.  Patient/Visitor ramp is on the corner of [address] [facility].     Check into the [facility] Waiting room at: 10:00 am    Do not eat any food after: 2:00 am  You may drink clear liquids until 8:00 am then nothing to drink until after your procedure.  Clear liquids are: water, apple juice, black coffee (no cream, sugar or milk), gatorade, jello     Two visitors may accompany you to your procedure.  You will need to have someone available to drive you home.  We recommend that you have someone stay with you 1-2 hours after you get home.    Please hold your aspirin until after the procedure. You can take all your other medications with clear liquids the morning of the procedure.     If you have any questions, please call the IR nurses at [phone].     Thank you!

## 2023-11-01 ENCOUNTER — APPOINTMENT (OUTPATIENT)
Dept: GENERAL RADIOLOGY | Facility: CLINIC | Age: 63
End: 2023-11-01
Attending: EMERGENCY MEDICINE
Payer: MEDICARE

## 2023-11-01 ENCOUNTER — HOSPITAL ENCOUNTER (EMERGENCY)
Facility: CLINIC | Age: 63
Discharge: HOME | End: 2023-11-01
Attending: FAMILY MEDICINE | Admitting: FAMILY MEDICINE
Payer: MEDICARE

## 2023-11-01 VITALS
TEMPERATURE: 98.2 F | DIASTOLIC BLOOD PRESSURE: 100 MMHG | RESPIRATION RATE: 18 BRPM | HEART RATE: 81 BPM | SYSTOLIC BLOOD PRESSURE: 147 MMHG | OXYGEN SATURATION: 98 %

## 2023-11-01 DIAGNOSIS — S90.01XA CONTUSION OF RIGHT ANKLE, INITIAL ENCOUNTER: ICD-10-CM

## 2023-11-01 DIAGNOSIS — S90.31XA CONTUSION OF RIGHT FOOT, INITIAL ENCOUNTER: ICD-10-CM

## 2023-11-01 DIAGNOSIS — I69.90 LATE EFFECTS OF CVA (CEREBROVASCULAR ACCIDENT): ICD-10-CM

## 2023-11-01 PROCEDURE — 99283 EMERGENCY DEPT VISIT LOW MDM: CPT

## 2023-11-01 PROCEDURE — 250N000013 HC RX MED GY IP 250 OP 250 PS 637: Performed by: FAMILY MEDICINE

## 2023-11-01 PROCEDURE — 73630 X-RAY EXAM OF FOOT: CPT | Mod: RT

## 2023-11-01 PROCEDURE — 99284 EMERGENCY DEPT VISIT MOD MDM: CPT | Performed by: FAMILY MEDICINE

## 2023-11-01 PROCEDURE — 73610 X-RAY EXAM OF ANKLE: CPT | Mod: RT

## 2023-11-01 RX ORDER — OXYCODONE AND ACETAMINOPHEN 5; 325 MG/1; MG/1
1 TABLET ORAL EVERY 6 HOURS PRN
Qty: 12 TABLET | Refills: 0 | Status: SHIPPED | OUTPATIENT
Start: 2023-11-01 | End: 2023-11-04

## 2023-11-01 RX ORDER — OXYCODONE AND ACETAMINOPHEN 5; 325 MG/1; MG/1
1 TABLET ORAL ONCE
Status: COMPLETED | OUTPATIENT
Start: 2023-11-01 | End: 2023-11-01

## 2023-11-01 RX ADMIN — OXYCODONE HYDROCHLORIDE AND ACETAMINOPHEN 1 TABLET: 5; 325 TABLET ORAL at 21:18

## 2023-11-01 ASSESSMENT — ACTIVITIES OF DAILY LIVING (ADL)
ADLS_ACUITY_SCORE: 35
ADLS_ACUITY_SCORE: 33

## 2023-11-02 DIAGNOSIS — G89.4 CHRONIC PAIN SYNDROME: ICD-10-CM

## 2023-11-02 RX ORDER — GABAPENTIN 100 MG/1
100 CAPSULE ORAL 3 TIMES DAILY
Qty: 360 CAPSULE | Refills: 3 | Status: SHIPPED | OUTPATIENT
Start: 2023-11-02

## 2023-11-02 NOTE — ED TRIAGE NOTES
Triage Assessment (Adult)       Row Name 11/01/23 2010          Triage Assessment    Airway WDL WDL        Respiratory WDL    Respiratory WDL WDL        Skin Circulation/Temperature WDL    Skin Circulation/Temperature WDL WDL

## 2023-11-02 NOTE — DISCHARGE INSTRUCTIONS
Discharge to home with ankle brace and pain medications following up with your primary MD as needed.

## 2023-11-02 NOTE — ED PROVIDER NOTES
ED Provider Note  [facility]      History     Chief Complaint   Patient presents with    Foot Injury     Right foot injury. Hit foot into wall about 1600. Generally has foot swelling.      HPI  Wilfredo Yoon is a 63 year old male with a history of CKD, cryo GN 2/2 chronic hepatitis B infection, HBV cirrhosis, stroke in 6/20 now wheelchair bound, hypertension with CVA and PRES following hypertensive emergency, latent TB who presents to the ED with R foot injury after hitting his foot into the wall around 1600. Reports regular foot edema.    Past Medical History  Past Medical History:   Diagnosis Date    Cirrhosis (H)     Cryoglobulinemia (H24)     CVA (cerebral vascular accident) (H) 07/16/2020    Supratentorial likely d/t hypertensive emergency leading to right hemiparesis, dysphagia and aphasia    Glomerulonephritis     Hepatitis B     Hypertension     Latent tuberculosis     Treatment 5738-1151    MPGN (membranoproliferative glomerulonephritides)     Positive QuantiFERON-TB Gold test     PRES (posterior reversible encephalopathy syndrome) 07/16/2020    In brainstem d/t hypertensive emergency; suffered supratentorial CVAs same date     Past Surgical History:   Procedure Laterality Date    ANESTHESIA OUT OF OR MRI N/A 7/18/2020    Procedure: ANESTHESIA OUT OF OR MRI;  Surgeon: GENERIC ANESTHESIA PROVIDER;  Location:  OR    ESOPHAGOSCOPY, GASTROSCOPY, DUODENOSCOPY (EGD), COMBINED N/A 10/18/2018    Procedure: EGD;  Surgeon: Eber Ortez MD;  Location: [facility] GI    HAND SURGERY Right     IR PARACENTESIS  7/27/2020    [facility] HAND/FINGER SURGERY UNLISTED       oxyCODONE-acetaminophen (PERCOCET) 5-325 MG tablet  acetaminophen (TYLENOL) 325 MG tablet  amLODIPine (NORVASC) 10 MG tablet  amLODIPine (NORVASC) 10 MG tablet  aspirin (ASA) 81 MG chewable tablet  atorvastatin (LIPITOR) 40 MG tablet  carvedilol (COREG) 12.5 MG tablet  diclofenac (VOLTAREN) 1 % topical gel  docusate  sodium (COLACE) 100 MG capsule  entecavir (BARACLUDE) 0.5 MG tablet  furosemide (LASIX) 20 MG tablet  furosemide (LASIX) 20 MG tablet  gabapentin (NEURONTIN) 100 MG capsule  HM CETIRIZINE HCL 10 MG tablet  ibuprofen (ADVIL/MOTRIN) 400 MG tablet  losartan (COZAAR) 25 MG tablet  multivitamin (ONE-DAILY) tablet  polyethylene glycol (MIRALAX) 17 GM/Dose powder  potassium chloride ER (MICRO-K) 10 MEQ CR capsule  spironolactone (ALDACTONE) 50 MG tablet  vitamin D3 (CHOLECALCIFEROL) 50 mcg (2000 units) tablet      No Known Allergies  Family History  Family History   Problem Relation Age of Onset    Liver Cancer No family hx of     Hepatitis No family hx of      Social History   Social History     Tobacco Use    Smoking status: Former     Packs/day: 0.25     Years: 40.00     Additional pack years: 0.00     Total pack years: 10.00     Types: Cigarettes     Quit date: 10/1/2020     Years since quitting: 3.0    Smokeless tobacco: Never   Vaping Use    Vaping Use: Never used   Substance Use Topics    Alcohol use: No     Alcohol/week: 0.0 standard drinks of alcohol    Drug use: No      Past medical history, past surgical history, medications, allergies, family history, and social history were reviewed with the patient. No additional pertinent items.      A complete review of systems was performed with pertinent positives and negatives noted in the HPI, and all other systems negative.    Physical Exam   BP: (!) 135/100  Pulse: 91  Temp: 97.8  F (36.6  C)  Resp: 16  SpO2: 95 %  Physical Exam  Constitutional:       General: He is not in acute distress.     Appearance: Normal appearance. He is not toxic-appearing.   HENT:      Head: Atraumatic.   Eyes:      General: No scleral icterus.     Conjunctiva/sclera: Conjunctivae normal.   Cardiovascular:      Rate and Rhythm: Normal rate.      Heart sounds: Normal heart sounds.   Pulmonary:      Effort: Pulmonary effort is normal. No respiratory distress.      Breath sounds: Normal breath  sounds.   Abdominal:      Palpations: Abdomen is soft.      Tenderness: There is no abdominal tenderness.   Musculoskeletal:         General: No deformity.      Cervical back: Neck supple.      Right ankle: Swelling present. Tenderness present. Decreased range of motion.      Right foot: Decreased range of motion. Tenderness present.   Skin:     General: Skin is warm.   Neurological:      Mental Status: He is alert.           ED Course, Procedures, & Data      Procedures       Results for orders placed or performed during the hospital encounter of 11/01/23   Ankle XR, G/E 3 views, right     Status: None    Narrative    EXAM: XR ANKLE RIGHT G/E 3 VIEWS  LOCATION: [facility]  DATE: 11/1/2023    INDICATION: Pain after injury  COMPARISON: None.      Impression    IMPRESSION: Diffuse demineralization. No evidence for fracture or disruption of ankle mortise. There is slight soft tissue swelling over the lateral malleolus. The right foot is negative for fracture. Hammertoe deformities.     Medications   oxyCODONE-acetaminophen (PERCOCET) 5-325 MG per tablet 1 tablet (1 tablet Oral $Given 11/1/23 2117)     Labs Ordered and Resulted from Time of ED Arrival to Time of ED Departure - No data to display  Ankle XR, G/E 3 views, right   Final Result   IMPRESSION: Diffuse demineralization. No evidence for fracture or disruption of ankle mortise. There is slight soft tissue swelling over the lateral malleolus. The right foot is negative for fracture. Hammertoe deformities.      Foot  XR, G/E 3 views, right    (Results Pending)          Critical care was not performed.     Medical Decision Making  The patient's presentation was of low complexity (an acute and uncomplicated illness or injury).    The patient's evaluation involved:  ordering and/or review of 2 test(s) in this encounter (see separate area of note for details)    The patient's management necessitated moderate risk  (prescription drug management including medications given in the ED).    Assessment & Plan        I have reviewed the nursing notes. I have reviewed the findings, diagnosis, plan and need for follow up with the patient.    Discharge Medication List as of 11/1/2023 11:02 PM        START taking these medications    Details   oxyCODONE-acetaminophen (PERCOCET) 5-325 MG tablet Take 1 tablet by mouth every 6 hours as needed for pain, Disp-12 tablet, R-0, Local Print             Final diagnoses:   Late effects of CVA (cerebrovascular accident)   Contusion of right foot, initial encounter   Contusion of right ankle, initial encounter         [facility] EMERGENCY DEPARTMENT  11/1/2023     Vladimir Cabezas MD  11/02/23 1912

## 2023-11-07 ENCOUNTER — APPOINTMENT (OUTPATIENT)
Dept: MEDSURG UNIT | Facility: CLINIC | Age: 63
End: 2023-11-07
Attending: RADIOLOGY
Payer: MEDICARE

## 2023-11-07 ENCOUNTER — HOSPITAL ENCOUNTER (OUTPATIENT)
Facility: CLINIC | Age: 63
Discharge: HOME | End: 2023-11-07
Attending: RADIOLOGY | Admitting: RADIOLOGY
Payer: MEDICARE

## 2023-11-07 ENCOUNTER — APPOINTMENT (OUTPATIENT)
Dept: INTERVENTIONAL RADIOLOGY/VASCULAR | Facility: CLINIC | Age: 63
End: 2023-11-07
Attending: INTERNAL MEDICINE
Payer: MEDICARE

## 2023-11-07 VITALS
SYSTOLIC BLOOD PRESSURE: 155 MMHG | RESPIRATION RATE: 18 BRPM | OXYGEN SATURATION: 97 % | DIASTOLIC BLOOD PRESSURE: 100 MMHG | TEMPERATURE: 97.5 F | HEART RATE: 68 BPM

## 2023-11-07 DIAGNOSIS — D89.1 CRYOGLOBULINEMIC GLOMERULONEPHRITIS (H): ICD-10-CM

## 2023-11-07 DIAGNOSIS — N18.32 STAGE 3B CHRONIC KIDNEY DISEASE (H): ICD-10-CM

## 2023-11-07 DIAGNOSIS — N08 CRYOGLOBULINEMIC GLOMERULONEPHRITIS (H): ICD-10-CM

## 2023-11-07 LAB
ERYTHROCYTE [DISTWIDTH] IN BLOOD BY AUTOMATED COUNT: 12.7 % (ref 10–15)
HCT VFR BLD AUTO: 42.1 % (ref 40–53)
HGB BLD-MCNC: 14.1 G/DL (ref 13.3–17.7)
INR PPP: 1.16 (ref 0.85–1.15)
MCH RBC QN AUTO: 31.3 PG (ref 26.5–33)
MCHC RBC AUTO-ENTMCNC: 33.5 G/DL (ref 31.5–36.5)
MCV RBC AUTO: 94 FL (ref 78–100)
PLATELET # BLD AUTO: 113 10E3/UL (ref 150–450)
RBC # BLD AUTO: 4.5 10E6/UL (ref 4.4–5.9)
WBC # BLD AUTO: 6.5 10E3/UL (ref 4–11)

## 2023-11-07 PROCEDURE — 88346 IMFLUOR 1ST 1ANTB STAIN PX: CPT | Mod: 26 | Performed by: PATHOLOGY

## 2023-11-07 PROCEDURE — 99152 MOD SED SAME PHYS/QHP 5/>YRS: CPT

## 2023-11-07 PROCEDURE — 272N000505 IR RENAL BIOPSY RIGHT

## 2023-11-07 PROCEDURE — 99152 MOD SED SAME PHYS/QHP 5/>YRS: CPT | Performed by: RADIOLOGY

## 2023-11-07 PROCEDURE — 88348 ELECTRON MICROSCOPY DX: CPT | Mod: 26 | Performed by: PATHOLOGY

## 2023-11-07 PROCEDURE — 50200 RENAL BIOPSY PERQ: CPT | Mod: RT | Performed by: RADIOLOGY

## 2023-11-07 PROCEDURE — 88350 IMFLUOR EA ADDL 1ANTB STN PX: CPT | Mod: TC | Performed by: INTERNAL MEDICINE

## 2023-11-07 PROCEDURE — 88313 SPECIAL STAINS GROUP 2: CPT | Mod: 26 | Performed by: PATHOLOGY

## 2023-11-07 PROCEDURE — 999N000142 HC STATISTIC PROCEDURE PREP ONLY

## 2023-11-07 PROCEDURE — 76942 ECHO GUIDE FOR BIOPSY: CPT | Mod: 26 | Performed by: RADIOLOGY

## 2023-11-07 PROCEDURE — 250N000011 HC RX IP 250 OP 636

## 2023-11-07 PROCEDURE — 88350 IMFLUOR EA ADDL 1ANTB STN PX: CPT | Mod: 26 | Performed by: PATHOLOGY

## 2023-11-07 PROCEDURE — 85027 COMPLETE CBC AUTOMATED: CPT | Performed by: NURSE PRACTITIONER

## 2023-11-07 PROCEDURE — 250N000009 HC RX 250

## 2023-11-07 PROCEDURE — 85610 PROTHROMBIN TIME: CPT | Performed by: NURSE PRACTITIONER

## 2023-11-07 PROCEDURE — 258N000003 HC RX IP 258 OP 636: Performed by: NURSE PRACTITIONER

## 2023-11-07 PROCEDURE — 88305 TISSUE EXAM BY PATHOLOGIST: CPT | Mod: 26 | Performed by: PATHOLOGY

## 2023-11-07 PROCEDURE — 36415 COLL VENOUS BLD VENIPUNCTURE: CPT | Performed by: NURSE PRACTITIONER

## 2023-11-07 PROCEDURE — 999N000134 HC STATISTIC PP CARE STAGE 3

## 2023-11-07 RX ORDER — FENTANYL CITRATE 50 UG/ML
25-50 INJECTION, SOLUTION INTRAMUSCULAR; INTRAVENOUS EVERY 5 MIN PRN
Status: DISCONTINUED | OUTPATIENT
Start: 2023-11-07 | End: 2023-11-07 | Stop reason: HOSPADM

## 2023-11-07 RX ORDER — LIDOCAINE 40 MG/G
CREAM TOPICAL
Status: DISCONTINUED | OUTPATIENT
Start: 2023-11-07 | End: 2023-11-07 | Stop reason: HOSPADM

## 2023-11-07 RX ORDER — FLUMAZENIL 0.1 MG/ML
0.2 INJECTION, SOLUTION INTRAVENOUS
Status: DISCONTINUED | OUTPATIENT
Start: 2023-11-07 | End: 2023-11-07 | Stop reason: HOSPADM

## 2023-11-07 RX ORDER — SODIUM CHLORIDE 9 MG/ML
INJECTION, SOLUTION INTRAVENOUS CONTINUOUS
Status: DISCONTINUED | OUTPATIENT
Start: 2023-11-07 | End: 2023-11-07 | Stop reason: HOSPADM

## 2023-11-07 RX ORDER — NALOXONE HYDROCHLORIDE 0.4 MG/ML
0.2 INJECTION, SOLUTION INTRAMUSCULAR; INTRAVENOUS; SUBCUTANEOUS
Status: DISCONTINUED | OUTPATIENT
Start: 2023-11-07 | End: 2023-11-07 | Stop reason: HOSPADM

## 2023-11-07 RX ORDER — NALOXONE HYDROCHLORIDE 0.4 MG/ML
0.4 INJECTION, SOLUTION INTRAMUSCULAR; INTRAVENOUS; SUBCUTANEOUS
Status: DISCONTINUED | OUTPATIENT
Start: 2023-11-07 | End: 2023-11-07 | Stop reason: HOSPADM

## 2023-11-07 RX ADMIN — FENTANYL CITRATE 25 MCG: 50 INJECTION, SOLUTION INTRAMUSCULAR; INTRAVENOUS at 12:48

## 2023-11-07 RX ADMIN — SODIUM CHLORIDE: 9 INJECTION, SOLUTION INTRAVENOUS at 10:46

## 2023-11-07 RX ADMIN — FENTANYL CITRATE 25 MCG: 50 INJECTION, SOLUTION INTRAMUSCULAR; INTRAVENOUS at 12:20

## 2023-11-07 RX ADMIN — MIDAZOLAM HYDROCHLORIDE 1 MG: 1 INJECTION, SOLUTION INTRAMUSCULAR; INTRAVENOUS at 12:38

## 2023-11-07 RX ADMIN — MIDAZOLAM HYDROCHLORIDE 1 MG: 1 INJECTION, SOLUTION INTRAMUSCULAR; INTRAVENOUS at 12:47

## 2023-11-07 RX ADMIN — MIDAZOLAM HYDROCHLORIDE 1 MG: 1 INJECTION, SOLUTION INTRAMUSCULAR; INTRAVENOUS at 12:42

## 2023-11-07 RX ADMIN — LIDOCAINE HYDROCHLORIDE 7 ML: 10 INJECTION, SOLUTION EPIDURAL; INFILTRATION; INTRACAUDAL; PERINEURAL at 12:40

## 2023-11-07 RX ADMIN — FENTANYL CITRATE 50 MCG: 50 INJECTION, SOLUTION INTRAMUSCULAR; INTRAVENOUS at 12:38

## 2023-11-07 RX ADMIN — MIDAZOLAM HYDROCHLORIDE 0.5 MG: 1 INJECTION, SOLUTION INTRAMUSCULAR; INTRAVENOUS at 12:21

## 2023-11-07 ASSESSMENT — ACTIVITIES OF DAILY LIVING (ADL)
ADLS_ACUITY_SCORE: 35

## 2023-11-07 NOTE — PROGRESS NOTES
Pt tolerated recovery without complication. Pt incontinent of clear urine. Discharge instructions reviewed by Ophelia sEpinosa, copy given to pt. Pt tolerated oral intake. ROSALBA tong. Pt discharged home accompanied by daughter.

## 2023-11-07 NOTE — IR NOTE
Patient Name: Wilfredo Yoon  Medical Record Number: [id number]  Today's Date: 11/7/2023    Procedure: US guided kidney biopsy  Proceduralist: Dr. Melton & Dr. Atkins  Pathology present: Yes, renal path present    Procedure Start: 1237  Procedure end: 1310  Sedation medications administered: 3.5mg versed & 100 mcg fentanyl     Report given to:  RN  : N/A    Other Notes: Pt arrived to IR room 6 from . Consent reviewed. Pt denies any questions or concerns regarding procedure. Pt positioned prone and monitored per protocol. Pt tolerated procedure without any noted complications. Pt transferred back to .

## 2023-11-07 NOTE — PROGRESS NOTES
Pt arrived to 2A from home for Kidney Biopsy. VSS. Denies pain. Consent obtained . Waiting on Lab resulted. H&P current. Allergies reviewed with pt. Appropriately NPO.  Prep completed. Daughter Caty at bedside; will be transporting patient to home. [phone]

## 2023-11-07 NOTE — PRE-PROCEDURE
GENERAL PRE-PROCEDURE:   Procedure:  Image guided random native kidney biopsy  Date/Time:  11/7/2023 11:03 AM    Verbal consent obtained?: Yes    Written consent obtained?: Yes    Risks and benefits: Risks, benefits and alternatives were discussed    Consent given by:  Patient  Patient states understanding of procedure being performed: Yes    Patient's understanding of procedure matches consent: Yes    Procedure consent matches procedure scheduled: Yes    Appropriately NPO:  Yes  ASA Class:  2  Mallampati  :  Grade 2- soft palate, base of uvula, tonsillar pillars, and portion of posterior pharyngeal wall visible  Lungs:  Lungs clear with good breath sounds bilaterally  Heart:  Normal heart sounds and rate  History & Physical reviewed:  History and physical reviewed and no updates needed  Statement of review:  I have reviewed the lab findings, diagnostic data, medications, and the plan for sedation

## 2023-11-07 NOTE — DISCHARGE INSTRUCTIONS
[facility] Following Kidney Biopsy    Physician:Chris Melton MD                                                Date:November 7, 2023    ACTIVITY:    Relax and take it easy, no strenuous activity for 24 hours.  No heavy lifting (>10 lbs.) for 1 week    DIET:            Resume your regular diet and drink plenty of fluids, unless you are fluid restricted                    DRAINAGE:    There should be minimal drainage from the biopsy site. If bleeding soaks the dressing, you should lie down and apply pressure to the site for a minimum of 10 minutes. If the bleeding persists, refer to the emergency contact numbers below; whether the bleeding persists or not, you should report the occurrence to one of the numbers below.    Refer to the emergency numbers below for the following:  Excessive bleeding or drainage  Excessive swelling, redness, or tenderness at the site  Fever above 100.5 degrees orally  Severe pain  Drainage that is green, yellow, thick white, or has a bad odor  Passage of bloody urine or clots after you are discharged.     Emergency Contact Numbers:             [phone]      Ask for the Adult Nephrology Fellow on Call, someone is available 24 hours a day.

## 2023-11-07 NOTE — PROCEDURES
[facility]    Procedure: IR Procedure Note    Date/Time: 11/7/2023 1:14 PM    Performed by: Chris Melton MD  Authorized by: Chris Melton MD  IR Fellow Physician: Chris Melton      UNIVERSAL PROTOCOL   Site Marked: NA  Prior Images Obtained and Reviewed:  Yes  Required items: Required blood products, implants, devices and special equipment available    Patient identity confirmed:  Verbally with patient, arm band, provided demographic data and hospital-assigned identification number  Patient was reevaluated immediately before administering moderate or deep sedation or anesthesia  Confirmation Checklist:  Patient's identity using two indicators, relevant allergies, procedure was appropriate and matched the consent or emergent situation and correct equipment/implants were available  Time out: Immediately prior to the procedure a time out was called    Universal Protocol: the Joint Commission Universal Protocol was followed    Preparation: Patient was prepped and draped in usual sterile fashion    ESBL (mL):  3     ANESTHESIA    Anesthesia: Local infiltration  Local Anesthetic:  Lidocaine 1% without epinephrine  Anesthetic Total (mL):  5      SEDATION  Patient Sedated: Yes    Sedation Type:  Moderate (conscious) sedation  Sedation:  Fentanyl and midazolam  Vital signs: Vital signs monitored during sedation    See dictated procedure note for full details.  Findings: See dictation in imaging tab of chart review    Specimens: core needle biopsy specimens sent for pathological analysis    Complications: None    Condition: Stable    Plan: Return to 2A.  Bedrest for 3 hours.      PROCEDURE  Describe Procedure: Ultrasound guided kidney biopsy random kidney biopsy, left lower pole. 3 core biopsies obtained and submitted to pathology who confirmed adequacy of samples.  Patient Tolerance:  Patient tolerated the procedure well with no immediate complications  Length of time  physician/provider present for 1:1 monitoring during sedation: 30

## 2023-11-07 NOTE — PROGRESS NOTES
Patient arrived to room via litter with IR RN s/p Kidney biopsy . VSS. Denies pain. Pt alert and oriented x4. Right flank site CDI.

## 2023-11-13 LAB
PATH REPORT.ADDENDUM SPEC: NORMAL
PATH REPORT.COMMENTS IMP SPEC: NORMAL
PATH REPORT.FINAL DX SPEC: NORMAL
PATH REPORT.GROSS SPEC: NORMAL
PATH REPORT.MICROSCOPIC SPEC OTHER STN: NORMAL
PATH REPORT.RELEVANT HX SPEC: NORMAL
PHOTO IMAGE: NORMAL

## 2023-11-21 DIAGNOSIS — N18.32 STAGE 3B CHRONIC KIDNEY DISEASE (H): Primary | ICD-10-CM

## 2023-11-29 DIAGNOSIS — I10 ESSENTIAL HYPERTENSION, MALIGNANT: ICD-10-CM

## 2023-11-29 RX ORDER — CARVEDILOL 12.5 MG/1
12.5 TABLET ORAL 2 TIMES DAILY WITH MEALS
Qty: 180 TABLET | Refills: 3 | Status: SHIPPED | OUTPATIENT
Start: 2023-11-29 | End: 2024-09-13

## 2023-11-29 NOTE — TELEPHONE ENCOUNTER
"Request for medication refill:  carvedilol (COREG) 12.5 MG tablet     Providers if patient needs an appointment and you are willing to give a one month supply please refill for one month and  send a letter/MyChart using \".SMILLIMITEDREFILL\" .smillimited and route chart to \"[facility] \" (Giving one month refill in non controlled medications is strongly recommended before denial)    If refill has been denied, meaning absolutely no refills without visit, please complete the smart phrase \".smirxrefuse\" and route it to the \"[facility] MED REFILLS\"  pool to inform the patient and the pharmacy.    Duran Alvarez, [facility]      "

## 2023-11-30 ENCOUNTER — OFFICE VISIT (OUTPATIENT)
Dept: NEPHROLOGY | Facility: CLINIC | Age: 63
End: 2023-11-30
Attending: INTERNAL MEDICINE
Payer: MEDICARE

## 2023-11-30 VITALS — OXYGEN SATURATION: 98 % | SYSTOLIC BLOOD PRESSURE: 132 MMHG | DIASTOLIC BLOOD PRESSURE: 86 MMHG | HEART RATE: 67 BPM

## 2023-11-30 DIAGNOSIS — D89.1 CRYOGLOBULINEMIC GLOMERULONEPHRITIS (H): ICD-10-CM

## 2023-11-30 DIAGNOSIS — N18.32 STAGE 3B CHRONIC KIDNEY DISEASE (H): Primary | ICD-10-CM

## 2023-11-30 DIAGNOSIS — N08 CRYOGLOBULINEMIC GLOMERULONEPHRITIS (H): ICD-10-CM

## 2023-11-30 PROCEDURE — G0463 HOSPITAL OUTPT CLINIC VISIT: HCPCS | Performed by: INTERNAL MEDICINE

## 2023-11-30 PROCEDURE — 99215 OFFICE O/P EST HI 40 MIN: CPT | Performed by: INTERNAL MEDICINE

## 2023-11-30 ASSESSMENT — PAIN SCALES - GENERAL: PAINLEVEL: NO PAIN (0)

## 2023-11-30 NOTE — NURSING NOTE
Chief Complaint   Patient presents with    RECHECK     Can you please call and schedule with Dr Harrison Nov 27 or 30th to discuss kidney biopsy results     /86 (BP Location: Left arm, Patient Position: Sitting, Cuff Size: Adult Regular)   Pulse 67   SpO2 98%   M[facility]a [facility]

## 2023-11-30 NOTE — LETTER
11/30/2023       RE: Wilfredo Yoon  [address]  [place] [address]     Dear Colleague,    Thank you for referring your patient, Wilfredo Yoon, to the [facility] [place] at [facility]. Please see a copy of my visit note below.      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     The patient was previously seen by  back in 10/17.  Per his note, the patient was initially evaluated for edema and nephrotic range proteinuria.  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  Upon diagnosis, he has preserved kidney function with Cr of 0.8 although UPCR of  3.5g/g.     At that time, he was treated conservatively with Bumex and lisinopril. His HBV has been treated with Entecavir. Since then has has lost to followed-up with Neph.       In June 2020 he suffered a stroke and continues to have right sided weakness, In July 2020, he has mild ANDRY with creatinine increased to 1.6 but it then came down to 1.1-1.3.  Creatinine increased to 1.46 on 9/30/22, 1.72 on 1/13/2023 and now 2.08 on 4/23.  His serum albumin has been progressively low from 3.3 in 2015 down to 1.5 to 2.0 but then somewhat improved. Serum albumin in April 23 was 2.6.  His UA always show blood and protein. Last UPCR was done in 7/31/18 was 4.80. He has trace cryo in the blood. He had positive IgM, Kappa cryo but then in 2020, he has positive for polyclonal Cryo: A, G, M, K and L. No  monoclonal protein via immunofixation in serum or urine.  [place] free light chain was 12.7 and lambda free light chain was 6.79 with ratio of 1.87 in July 2020. M spike was negative.  WA-3 and MPO were negative.  He has low C3 in the past and normal C4.  SPEP showed marked hypoalbuminemia and decreased beta globulin without monoclonal protein.     He has been followed by Dr. Osborne in hepatology clinic, last seen in April 2023.  He was noted to have a small liver lesion, ultrasound with some ascites and pleural effusion.  Noted blood pressure 137/94.  He has kidney in 1/23 which showed normal-sized right kidney without hydronephrosis. There is small amount of ascites and pleural effusion.     6/15/23: Seen for consultation. He arrives with his niece and she assists with translating.  He is noted to be in a wheelchair partially because of his residual right-sided weakness after CVA.  Reports that he is able to transition from room to room on the same floor with a walker.  However, he is unable to go upstairs and requires assistance moving long distances.  He uses a wheelchair when going outside the home.     We reviewed that his creatinine is increased over the past 9 months.  There have been no specific illnesses, hospital presentations or events that they can recall that may have led to kidney injury. After seeing hepatology in April, he was started on Lasix 20 mg and spironolactone for edema management.  His niece reports that his swelling resolved dramatically and now there is only trace swelling present.  He is off of the diuretics for the past few weeks.  Additionally, he continues on his antiretroviral for hepatitis B.  Most recently viral load was undetectable.      In reviewing his appetite and p.o. intake, he often does not eat a lot.  Although there are some times where he has better appetite.  States that his p.o. fluid intake is reasonable.  His urine is reported to be normal and seems to be going to  the bathroom several times a day.  No noted hematuria or color/quality.  Plan: Serological work-up, resume lasix 10 mg daily.   8/24/23: He missed his appt.  10/5/23: He is here with his niece today.  Today, he feels fine.  Patient still complaining of incontinence.  So he cannot provide a urine sample for us.  The patient has not done labs yet either.  His swelling has improved after we resume Lasix 10 mg/day.  His blood pressure still limited at 147/89.  The patient denies any joint pain fevers or rashes.  He has no oral ulcer.  Started losartan 25 mg per day.   Labs on 8/29/23 showed Cr 2.08 with eGFR 35, BUN 33.6, Bicarb 21. Albumin 2.9. UA showed WBC >182, RBC 6, hyaline cast 10, UPCR 5.56. Serological work-up showed positive cryo IgG, IgM and kappa and lambda. Cryo trace. C3 116 and C4 33. Kappa 15.24, lambda 7.96 and K/L ratio 1.91. PTH 81. DS DNA 61. ANCA negative. UA showed WBC >182, RBC 6, UPCR 5.56 g/g.   11/30/23: In the interim, he underwent a kidney Bx on 11/7/23 (read by Dr. Montenegro). 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 Advanced chronic changes of the parenchyma, including: global glomerulosclerosis (78% of the glomeruli), tubular atrophy and interstitial fibrosis (70-80% of the cortex), severe arterial and arteriolar sclerosis. This finding may be found in cryo GN even though there was no substructure presented in the EM. The patient also has positive Cryo  IgM, IgG and kappa. The polyclonal IgG and monoclonal IgG kappa suggested type 2 Cryo. Today,  He is doing good. Biopsy went well. Swelling better but he just hit something on his Rt leg and got  swelled up again. Appetite is not so good. Discussed at length about diagnosis and treatment. Discussed risk of HBV flare with Rx.    Past medical history  Past Medical History:   Diagnosis Date    Cirrhosis (H)     Cryoglobulinemia (H24)     CVA (cerebral vascular accident) (H) 07/16/2020    Supratentorial likely d/t hypertensive emergency leading to right hemiparesis, dysphagia and aphasia    Glomerulonephritis     Hepatitis B     Hypertension     Latent tuberculosis     Treatment 1185-5339    MPGN (membranoproliferative glomerulonephritides)     Positive QuantiFERON-TB Gold test     PRES (posterior reversible encephalopathy syndrome) 07/16/2020    In brainstem d/t hypertensive emergency; suffered supratentorial CVAs same date       Past surgical history  Past Surgical History:   Procedure Laterality Date    ANESTHESIA OUT OF OR MRI N/A 7/18/2020    Procedure: ANESTHESIA OUT OF OR MRI;  Surgeon: GENERIC ANESTHESIA PROVIDER;  Location:  OR    ESOPHAGOSCOPY, GASTROSCOPY, DUODENOSCOPY (EGD), COMBINED N/A 10/18/2018    Procedure: EGD;  Surgeon: Eber Ortez MD;  Location: [facility]    HAND SURGERY Right     IR PARACENTESIS  7/27/2020    IR RENAL BIOPSY RIGHT  11/7/2023    [facility] HAND/FINGER SURGERY UNLISTED       Review of Systems:   14 systems were reviewed and all negative except as mentioned above.   Current Medications:  Current Outpatient Medications   Medication    acetaminophen (TYLENOL) 325 MG tablet    amLODIPine (NORVASC) 10 MG tablet    amLODIPine (NORVASC) 10 MG tablet    aspirin (ASA) 81 MG chewable tablet    atorvastatin (LIPITOR) 40 MG tablet    carvedilol (COREG) 12.5 MG tablet    diclofenac (VOLTAREN) 1 % topical gel    docusate sodium (COLACE) 100 MG capsule    entecavir (BARACLUDE) 0.5 MG tablet    furosemide (LASIX) 20 MG tablet    gabapentin (NEURONTIN) 100 MG capsule    HM CETIRIZINE HCL 10 MG tablet    ibuprofen (ADVIL/MOTRIN) 400 MG tablet    losartan (COZAAR) 25 MG tablet    multivitamin  (ONE-DAILY) tablet    polyethylene glycol (MIRALAX) 17 GM/Dose powder    potassium chloride ER (MICRO-K) 10 MEQ CR capsule    spironolactone (ALDACTONE) 50 MG tablet    vitamin D3 (CHOLECALCIFEROL) 50 mcg (2000 units) tablet     No current facility-administered medications for this visit.       Physical Exam:   There were no vitals taken for this visit.   There is no height or weight on file to calculate BMI.    GENERAL APPEARANCE: Alert, not in acute distress, thin built  EYES:  Not pale conjunctiva, pupils equal  HENT: Mouth without ulcers or lesions  PULM: lungs clear to auscultation bilaterally, equal air movement, no clubbing  CV: regular rhythm, normal rate, no rub     -JVD no distended.      -edema: trace  GI: soft,  - tender, no distended, bowel sounds are present  INTEGUMENT: No rash  NEURO:  Non focal. No asterixis.       Labs:   All labs reviewed by me  Last Renal Panel:  Sodium   Date Value Ref Range Status   10/19/2023 140 135 - 145 mmol/L Final     Comment:     Reference intervals for this test were updated on 09/26/2023 to more accurately reflect our healthy population. There may be differences in the flagging of prior results with similar values performed with this method. Interpretation of those prior results can be made in the context of the updated reference intervals.    06/30/2021 140 133 - 144 mmol/L Final     Potassium   Date Value Ref Range Status   10/19/2023 4.3 3.4 - 5.3 mmol/L Final   01/19/2022 3.6 3.4 - 5.3 mmol/L Final   06/30/2021 4.2 3.4 - 5.3 mmol/L Final     Chloride   Date Value Ref Range Status   10/19/2023 110 (H) 98 - 107 mmol/L Final   01/19/2022 107 94 - 109 mmol/L Final   06/30/2021 106 94 - 109 mmol/L Final     Carbon Dioxide   Date Value Ref Range Status   06/30/2021 25 20 - 32 mmol/L Final     Carbon Dioxide (CO2)   Date Value Ref Range Status   10/19/2023 21 (L) 22 - 29 mmol/L Final   01/19/2022 27 20 - 32 mmol/L Final     Anion Gap   Date Value Ref Range Status    10/19/2023 9 7 - 15 mmol/L Final   01/19/2022 10 3 - 14 mmol/L Final   06/30/2021 9 3 - 14 mmol/L Final     Glucose   Date Value Ref Range Status   10/19/2023 91 70 - 99 mg/dL Final   01/19/2022 78 70 - 99 mg/dL Final   06/30/2021 96 70 - 99 mg/dL Final     Urea Nitrogen   Date Value Ref Range Status   10/19/2023 30.8 (H) 8.0 - 23.0 mg/dL Final   01/19/2022 19 7 - 30 mg/dL Final   06/30/2021 24 7 - 30 mg/dL Final     Creatinine   Date Value Ref Range Status   10/19/2023 2.21 (H) 0.67 - 1.17 mg/dL Final   06/30/2021 1.27 (H) 0.66 - 1.25 mg/dL Final     GFR Estimate   Date Value Ref Range Status   10/19/2023 33 (L) >60 mL/min/1.73m2 Final   06/30/2021 61 >60 mL/min/[1.73_m2] Final     Comment:     Non  GFR Calc  Starting 12/18/2018, serum creatinine based estimated GFR (eGFR) will be   calculated using the Chronic Kidney Disease Epidemiology Collaboration   (CKD-EPI) equation.       Calcium   Date Value Ref Range Status   10/19/2023 8.8 8.8 - 10.2 mg/dL Final   06/30/2021 9.0 8.5 - 10.1 mg/dL Final     Phosphorus   Date Value Ref Range Status   10/05/2023 3.7 2.5 - 4.5 mg/dL Final   10/10/2017 3.0 2.5 - 4.5 mg/dL Final     Albumin   Date Value Ref Range Status   10/05/2023 2.9 (L) 3.5 - 5.2 g/dL Final   01/19/2022 2.5 (L) 3.4 - 5.0 g/dL Final   06/30/2021 3.0 (L) 3.4 - 5.0 g/dL Final       Imaging:  I reviewed imaging studies.     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  It does appear that the most recent trend is reflecting a progressive elevation in creatinine since 9/30/2022.  There is some concern that this might be related to his liver disease but there is limited lower extremity edema and palpable ascites on exam.  His MELD score is relatively low to observe type II HRS physiology.  In regard to his prior cryoglobulinemic GN, it would be odd for this to come out of quiescence well his hepatitis B has been appropriately treated. I repeat serological work-up and showed that he has trace cryo IgG, IgM and kappa but normal complement: C3 116 and C4 33. Otherwise negative monoclonal protein; K/L ratio of 1.91. PLA2R negative, ANCA negative  but DS DNA positive at 61 U/ml. HBV VL <20 on 4/4/23. It was unclear why he has progressive kidney disease despite HBV controlled. He is also noted to have nephrotic range proteinuria.  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 Severe arterial sclerosis with extensive ultrastructural signs of endothelial injury and glomerular capillary loop remodeling, suggesting a superimposed chronic and acute thrombotic angiopathy. Advanced chronic changes of the parenchyma, including: global glomerulosclerosis (78% of the glomeruli), tubular atrophy and interstitial fibrosis (70-80% of the cortex), severe arterial and arteriolar sclerosis. This findings are mostly consistent with cryo GN. This is quite  interesting since his HBV has been under control. So it is possible that prior previous HBV could stimulate his immune reaction resulted in cryoglobulin. Discussed about Rx with Rituximab but would want to discus with GI first and I am sending a message to Dr. Hurley today. If GI agrees, will start the process of RTX.     - Continue lasix 10mg daily for volume management  - Continue losartan 25 mg per day  - Will check Antiphospholipid AB and Complement as well as BMP this Monday  - Message GI about RTX-> tentative plan to start if GI ok  # HBV cirrhosis  # HBV infection on entecavir  Follows with hepatology, last viral quant undetectable on 4/4/23. MELD 16. MR Abdomen showing cirrhosis with evidence of portal hypertension. Reports no encephalopathy or concern for hematemesis. Currently on Entevavir, discuss risk of HBV flare while on RTX but he is on Entecavir now. Check HBV VL.   # Hypertension; controlled  # Volume overload; improved  Home BP is now better after adding lasix and losartan.   - Continue Amlodipine 10 mg daily, Carvedilol 12.5 mg BID, Lasix 10mg daily and losartan at 25 mg per day  # Malnutrition  # Hx of stroke with Rt sided hemiparesis  Mostly wheelchair bound for transportation. Weight generally appears to be stable although he appears to be quite weak. There is concern that his creatinine is a poor estimate of his true kidney function. Cystatin C 2.8 with eGFR 20 and Cr 1.98 with eGFR by Cr 37 on 6/15/23.   # BMD  # Secondary hyperparathyroidism  6/15/23: PTH is 81, vit D 28 . Ca/Phos normal.     Follow-up 2 months with labs; plan to start Rituximab in between    I spent  53 minutes on the date of the encounter doing chart review, history and exam, documentation and further activities as noted above. 25 minutes of this visit is dedicated to direct patient interaction via face-to-face.    Sue Harrison MD on 11/30/2023

## 2023-11-30 NOTE — PATIENT INSTRUCTIONS
Will discuss with Liver team about treatment option and let you know if we decide to proceed with it or not  Continue to measure blood pressure keep 130/80 mmHg  Labs next week Monday  See you in 2 months with labs

## 2023-11-30 NOTE — PROGRESS NOTES
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     The patient was previously seen by  back in 10/17.  Per his note, the patient was initially evaluated for edema and nephrotic range proteinuria.  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  Upon diagnosis, he has preserved kidney function with Cr of 0.8 although UPCR of  3.5g/g.     At that time, he was treated conservatively with Bumex and lisinopril. His HBV has been treated with Entecavir. Since then has has lost to followed-up with Neph.       In June 2020 he suffered a stroke and continues to have right sided weakness, In July 2020, he has mild ANDRY with creatinine increased to 1.6 but it then came down to 1.1-1.3.  Creatinine increased to 1.46 on 9/30/22, 1.72 on 1/13/2023 and now 2.08 on 4/23.  His serum albumin has been progressively low from 3.3 in 2015 down to 1.5 to 2.0 but then somewhat improved. Serum albumin in April 23 was 2.6.  His UA always show blood and protein. Last UPCR was done in 7/31/18 was 4.80. He has trace cryo in the blood. He had positive IgM, Kappa cryo but then in 2020, he has positive for polyclonal Cryo: A, G, M, K and L. No monoclonal protein via immunofixation in serum or urine.  [place] free light chain was 12.7 and lambda free light chain was 6.79 with ratio of 1.87 in July 2020. M spike was negative.  VA-3 and MPO were negative.  He has low C3 in the past and normal C4.  SPEP showed marked hypoalbuminemia and decreased beta globulin without monoclonal  protein.     He has been followed by Dr. Osborne in hepatology clinic, last seen in April 2023.  He was noted to have a small liver lesion, ultrasound with some ascites and pleural effusion.  Noted blood pressure 137/94.  He has kidney in 1/23 which showed normal-sized right kidney without hydronephrosis. There is small amount of ascites and pleural effusion.     6/15/23: Seen for consultation. He arrives with his niece and she assists with translating.  He is noted to be in a wheelchair partially because of his residual right-sided weakness after CVA.  Reports that he is able to transition from room to room on the same floor with a walker.  However, he is unable to go upstairs and requires assistance moving long distances.  He uses a wheelchair when going outside the home.     We reviewed that his creatinine is increased over the past 9 months.  There have been no specific illnesses, hospital presentations or events that they can recall that may have led to kidney injury. After seeing hepatology in April, he was started on Lasix 20 mg and spironolactone for edema management.  His niece reports that his swelling resolved dramatically and now there is only trace swelling present.  He is off of the diuretics for the past few weeks.  Additionally, he continues on his antiretroviral for hepatitis B.  Most recently viral load was undetectable.      In reviewing his appetite and p.o. intake, he often does not eat a lot.  Although there are some times where he has better appetite.  States that his p.o. fluid intake is reasonable.  His urine is reported to be normal and seems to be going to the bathroom several times a day.  No noted hematuria or color/quality.  Plan: Serological work-up, resume lasix 10 mg daily.   8/24/23: He missed his appt.  10/5/23: He is here with his niece today.  Today, he feels fine.  Patient still complaining of incontinence.  So he cannot provide a urine sample for us.  The patient has not done  labs yet either.  His swelling has improved after we resume Lasix 10 mg/day.  His blood pressure still limited at 147/89.  The patient denies any joint pain fevers or rashes.  He has no oral ulcer.  Started losartan 25 mg per day.   Labs on 8/29/23 showed Cr 2.08 with eGFR 35, BUN 33.6, Bicarb 21. Albumin 2.9. UA showed WBC >182, RBC 6, hyaline cast 10, UPCR 5.56. Serological work-up showed positive cryo IgG, IgM and kappa and lambda. Cryo trace. C3 116 and C4 33. Kappa 15.24, lambda 7.96 and K/L ratio 1.91. PTH 81. DS DNA 61. ANCA negative. UA showed WBC >182, RBC 6, UPCR 5.56 g/g.   11/30/23: In the interim, he underwent a kidney Bx on 11/7/23 (read by Dr. Montenegro). 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 Advanced chronic changes of the parenchyma, including: global glomerulosclerosis (78% of the glomeruli), tubular atrophy and interstitial fibrosis (70-80% of the cortex), severe arterial and arteriolar sclerosis. This finding may be found in cryo GN even though there was no substructure presented in the EM. The patient also has positive Cryo  IgM, IgG and kappa. The polyclonal IgG and monoclonal IgG kappa suggested type 2 Cryo. Today,  He is doing good. Biopsy went well. Swelling better but he just hit something on his Rt leg and got swelled up again. Appetite is not so good. Discussed at length about diagnosis and treatment. Discussed risk of HBV flare with Rx.    Past medical history  Past Medical History:   Diagnosis Date    Cirrhosis (H)     Cryoglobulinemia (H24)     CVA (cerebral vascular accident) (H) 07/16/2020    Supratentorial likely d/t hypertensive emergency  leading to right hemiparesis, dysphagia and aphasia    Glomerulonephritis     Hepatitis B     Hypertension     Latent tuberculosis     Treatment [id number]    MPGN (membranoproliferative glomerulonephritides)     Positive QuantiFERON-TB Gold test     PRES (posterior reversible encephalopathy syndrome) 07/16/2020    In brainstem d/t hypertensive emergency; suffered supratentorial CVAs same date       Past surgical history  Past Surgical History:   Procedure Laterality Date    ANESTHESIA OUT OF OR MRI N/A 7/18/2020    Procedure: ANESTHESIA OUT OF OR MRI;  Surgeon: GENERIC ANESTHESIA PROVIDER;  Location: [facility] OR    ESOPHAGOSCOPY, GASTROSCOPY, DUODENOSCOPY (EGD), COMBINED N/A 10/18/2018    Procedure: EGD;  Surgeon: Eber Ortez MD;  Location: [facility] GI    HAND SURGERY Right     IR PARACENTESIS  7/27/2020    IR RENAL BIOPSY RIGHT  11/7/2023    ZZC HAND/FINGER SURGERY UNLISTED       Review of Systems:   14 systems were reviewed and all negative except as mentioned above.   Current Medications:  Current Outpatient Medications   Medication    acetaminophen (TYLENOL) 325 MG tablet    amLODIPine (NORVASC) 10 MG tablet    amLODIPine (NORVASC) 10 MG tablet    aspirin (ASA) 81 MG chewable tablet    atorvastatin (LIPITOR) 40 MG tablet    carvedilol (COREG) 12.5 MG tablet    diclofenac (VOLTAREN) 1 % topical gel    docusate sodium (COLACE) 100 MG capsule    entecavir (BARACLUDE) 0.5 MG tablet    furosemide (LASIX) 20 MG tablet    gabapentin (NEURONTIN) 100 MG capsule    HM CETIRIZINE HCL 10 MG tablet    ibuprofen (ADVIL/MOTRIN) 400 MG tablet    losartan (COZAAR) 25 MG tablet    multivitamin (ONE-DAILY) tablet    polyethylene glycol (MIRALAX) 17 GM/Dose powder    potassium chloride ER (MICRO-K) 10 MEQ CR capsule    spironolactone (ALDACTONE) 50 MG tablet    vitamin D3 (CHOLECALCIFEROL) 50 mcg (2000 units) tablet     No current facility-administered medications for this visit.       Physical Exam:   There were no vitals taken  for this visit.   There is no height or weight on file to calculate BMI.    GENERAL APPEARANCE: Alert, not in acute distress, thin built  EYES:  Not pale conjunctiva, pupils equal  HENT: Mouth without ulcers or lesions  PULM: lungs clear to auscultation bilaterally, equal air movement, no clubbing  CV: regular rhythm, normal rate, no rub     -JVD no distended.      -edema: trace  GI: soft,  - tender, no distended, bowel sounds are present  INTEGUMENT: No rash  NEURO:  Non focal. No asterixis.       Labs:   All labs reviewed by me  Last Renal Panel:  Sodium   Date Value Ref Range Status   10/19/2023 140 135 - 145 mmol/L Final     Comment:     Reference intervals for this test were updated on 09/26/2023 to more accurately reflect our healthy population. There may be differences in the flagging of prior results with similar values performed with this method. Interpretation of those prior results can be made in the context of the updated reference intervals.    06/30/2021 140 133 - 144 mmol/L Final     Potassium   Date Value Ref Range Status   10/19/2023 4.3 3.4 - 5.3 mmol/L Final   01/19/2022 3.6 3.4 - 5.3 mmol/L Final   06/30/2021 4.2 3.4 - 5.3 mmol/L Final     Chloride   Date Value Ref Range Status   10/19/2023 110 (H) 98 - 107 mmol/L Final   01/19/2022 107 94 - 109 mmol/L Final   06/30/2021 106 94 - 109 mmol/L Final     Carbon Dioxide   Date Value Ref Range Status   06/30/2021 25 20 - 32 mmol/L Final     Carbon Dioxide (CO2)   Date Value Ref Range Status   10/19/2023 21 (L) 22 - 29 mmol/L Final   01/19/2022 27 20 - 32 mmol/L Final     Anion Gap   Date Value Ref Range Status   10/19/2023 9 7 - 15 mmol/L Final   01/19/2022 10 3 - 14 mmol/L Final   06/30/2021 9 3 - 14 mmol/L Final     Glucose   Date Value Ref Range Status   10/19/2023 91 70 - 99 mg/dL Final   01/19/2022 78 70 - 99 mg/dL Final   06/30/2021 96 70 - 99 mg/dL Final     Urea Nitrogen   Date Value Ref Range Status   10/19/2023 30.8 (H) 8.0 - 23.0 mg/dL Final    01/19/2022 19 7 - 30 mg/dL Final   06/30/2021 24 7 - 30 mg/dL Final     Creatinine   Date Value Ref Range Status   10/19/2023 2.21 (H) 0.67 - 1.17 mg/dL Final   06/30/2021 1.27 (H) 0.66 - 1.25 mg/dL Final     GFR Estimate   Date Value Ref Range Status   10/19/2023 33 (L) >60 mL/min/1.73m2 Final   06/30/2021 61 >60 mL/min/[1.73_m2] Final     Comment:     Non  GFR Calc  Starting 12/18/2018, serum creatinine based estimated GFR (eGFR) will be   calculated using the Chronic Kidney Disease Epidemiology Collaboration   (CKD-EPI) equation.       Calcium   Date Value Ref Range Status   10/19/2023 8.8 8.8 - 10.2 mg/dL Final   06/30/2021 9.0 8.5 - 10.1 mg/dL Final     Phosphorus   Date Value Ref Range Status   10/05/2023 3.7 2.5 - 4.5 mg/dL Final   10/10/2017 3.0 2.5 - 4.5 mg/dL Final     Albumin   Date Value Ref Range Status   10/05/2023 2.9 (L) 3.5 - 5.2 g/dL Final   01/19/2022 2.5 (L) 3.4 - 5.0 g/dL Final   06/30/2021 3.0 (L) 3.4 - 5.0 g/dL Final       Imaging:  I reviewed imaging studies.     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  It  does appear that the most recent trend is reflecting a progressive elevation in creatinine since 9/30/2022.  There is some concern that this might be related to his liver disease but there is limited lower extremity edema and palpable ascites on exam.  His MELD score is relatively low to observe type II HRS physiology.  In regard to his prior cryoglobulinemic GN, it would be odd for this to come out of quiescence well his hepatitis B has been appropriately treated. I repeat serological work-up and showed that he has trace cryo IgG, IgM and kappa but normal complement: C3 116 and C4 33. Otherwise negative monoclonal protein; K/L ratio of 1.91. PLA2R negative, ANCA negative  but DS DNA positive at 61 U/ml. HBV VL <20 on 4/4/23. It was unclear why he has progressive kidney disease despite HBV controlled. He is also noted to have nephrotic range proteinuria.  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 Severe arterial sclerosis with extensive ultrastructural signs of endothelial injury and glomerular capillary loop remodeling, suggesting a superimposed chronic and acute thrombotic angiopathy. Advanced chronic changes of the parenchyma, including: global glomerulosclerosis (78% of the glomeruli), tubular atrophy and interstitial fibrosis (70-80% of the cortex), severe arterial and arteriolar sclerosis. This findings are mostly consistent with cryo GN. This is quite interesting since his HBV has been under control. So it is possible that prior previous HBV could stimulate his immune reaction resulted in cryoglobulin. Discussed about Rx with Rituximab but would want to discus with GI first and I am sending a message to Dr. Hurley today. If GI agrees, will start the process of RTX.     - Continue lasix 10mg daily  for volume management  - Continue losartan 25 mg per day  - Will check Antiphospholipid AB and Complement as well as BMP this Monday  - Message GI about RTX-> tentative plan to start if GI ok  # HBV cirrhosis  # HBV infection on entecavir  Follows with hepatology, last viral quant undetectable on 4/4/23. MELD 16. MR Abdomen showing cirrhosis with evidence of portal hypertension. Reports no encephalopathy or concern for hematemesis. Currently on Entevavir, discuss risk of HBV flare while on RTX but he is on Entecavir now. Check HBV VL.   # Hypertension; controlled  # Volume overload; improved  Home BP is now better after adding lasix and losartan.   - Continue Amlodipine 10 mg daily, Carvedilol 12.5 mg BID, Lasix 10mg daily and losartan at 25 mg per day  # Malnutrition  # Hx of stroke with Rt sided hemiparesis  Mostly wheelchair bound for transportation. Weight generally appears to be stable although he appears to be quite weak. There is concern that his creatinine is a poor estimate of his true kidney function. Cystatin C 2.8 with eGFR 20 and Cr 1.98 with eGFR by Cr 37 on 6/15/23.   # BMD  # Secondary hyperparathyroidism  6/15/23: PTH is 81, vit D 28 . Ca/Phos normal.     Follow-up 2 months with labs; plan to start Rituximab in between    I spent  53 minutes on the date of the encounter doing chart review, history and exam, documentation and further activities as noted above. 25 minutes of this visit is dedicated to direct patient interaction via face-to-face.    Sue Harrison MD on 11/30/2023

## 2023-12-04 ENCOUNTER — TELEPHONE (OUTPATIENT)
Dept: GASTROENTEROLOGY | Facility: CLINIC | Age: 63
End: 2023-12-04
Payer: MEDICARE

## 2023-12-04 DIAGNOSIS — R18.8 CIRRHOSIS OF LIVER WITH ASCITES, UNSPECIFIED HEPATIC CIRRHOSIS TYPE (H): ICD-10-CM

## 2023-12-04 DIAGNOSIS — B18.1 CHRONIC VIRAL HEPATITIS B WITHOUT DELTA AGENT AND WITHOUT COMA (H): ICD-10-CM

## 2023-12-04 DIAGNOSIS — K74.60 CIRRHOSIS OF LIVER WITH ASCITES, UNSPECIFIED HEPATIC CIRRHOSIS TYPE (H): ICD-10-CM

## 2023-12-04 RX ORDER — ENTECAVIR 1 MG/1
TABLET, FILM COATED ORAL
Qty: 45 TABLET | Refills: 3 | Status: SHIPPED | OUTPATIENT
Start: 2023-12-04

## 2023-12-04 NOTE — TELEPHONE ENCOUNTER
Called patient per Dr. Marlyn Hurley regarding patient's hepatitis B medication.    Informed patient:  Entecavir reduced to 1 mg every other day due to renal function.     Left voicemail with patient's mobile ending in [phone].    David's number missing/invalid.    MAXIMILIAN HallN, RN, PHN  Hepatology Clinic  [facility]  [facility]

## 2023-12-05 ENCOUNTER — DOCUMENTATION ONLY (OUTPATIENT)
Dept: FAMILY MEDICINE | Facility: CLINIC | Age: 63
End: 2023-12-05
Payer: MEDICARE

## 2023-12-05 ENCOUNTER — TELEPHONE (OUTPATIENT)
Dept: GASTROENTEROLOGY | Facility: CLINIC | Age: 63
End: 2023-12-05
Payer: MEDICARE

## 2023-12-05 ENCOUNTER — APPOINTMENT (OUTPATIENT)
Dept: INTERPRETER SERVICES | Facility: CLINIC | Age: 63
End: 2023-12-05
Payer: MEDICARE

## 2023-12-05 DIAGNOSIS — B18.1 CHRONIC VIRAL HEPATITIS B WITHOUT DELTA AGENT AND WITHOUT COMA (H): Primary | ICD-10-CM

## 2023-12-05 DIAGNOSIS — R18.8 CIRRHOSIS OF LIVER WITH ASCITES, UNSPECIFIED HEPATIC CIRRHOSIS TYPE (H): ICD-10-CM

## 2023-12-05 DIAGNOSIS — K74.60 CIRRHOSIS OF LIVER WITH ASCITES, UNSPECIFIED HEPATIC CIRRHOSIS TYPE (H): ICD-10-CM

## 2023-12-05 NOTE — PROGRESS NOTES
"When opening a documentation only encounter, be sure to enter in \"Chief Complaint\" Forms and in \" Comments\" Title of form, description if needed.    Wilfredo is a 63 year old  male  Form received via: Fax  Form now resides in: Provider Ready    Donnie Crisostomo MA             Form has been completed by provider.     Form sent out via: Fax to [facility] at Fax Number: [phone]  Patient informed: N/A  Output date: December 12, 2023    Donnie Crisostomo MA      **Please close the encounter**   "

## 2023-12-05 NOTE — TELEPHONE ENCOUNTER
Tried to call patient's sister Sofi (patient's legal guardian) with the help of a [employer] , no answer, left voice message. Informed sister patient is to reduce Entecavir to 1 mg every other day due to kidney function. Asked that sister call clinic back to let us know she received message, number given to call center.    Maria Eugenia NOLEN LPN  Hepatology Clinic

## 2023-12-06 DIAGNOSIS — I69.30 HISTORY OF CVA WITH RESIDUAL DEFICIT: ICD-10-CM

## 2023-12-06 DIAGNOSIS — E87.6 HYPOPOTASSEMIA: ICD-10-CM

## 2023-12-06 RX ORDER — POTASSIUM CHLORIDE 750 MG/1
CAPSULE, EXTENDED RELEASE ORAL
Qty: 60 CAPSULE | Refills: 4 | Status: SHIPPED | OUTPATIENT
Start: 2023-12-06 | End: 2023-12-13 | Stop reason: SINTOL

## 2023-12-06 RX ORDER — ATORVASTATIN CALCIUM 40 MG/1
TABLET, FILM COATED ORAL
Qty: 30 TABLET | Refills: 1 | Status: SHIPPED | OUTPATIENT
Start: 2023-12-06 | End: 2024-01-10

## 2023-12-06 NOTE — TELEPHONE ENCOUNTER
"Request for medication refill:  atorvastatin (LIPITOR) 40 MG tablet     potassium chloride ER (MICRO-K) 10 MEQ CR capsule     Providers if patient needs an appointment and you are willing to give a one month supply please refill for one month and  send a letter/MyChart using \".SMILLIMITEDREFILL\" .smillimited and route chart to \"P [facility] \" (Giving one month refill in non controlled medications is strongly recommended before denial)    If refill has been denied, meaning absolutely no refills without visit, please complete the smart phrase \".smirxrefuse\" and route it to the \"P SMI MED REFILLS\"  pool to inform the patient and the pharmacy.    Duran Alvarez, CMA      "

## 2023-12-07 NOTE — PROGRESS NOTES
Form has been completed by provider.     Form sent out via: Fax to [facility]  at Fax Number: [phone]  Patient informed:   Output date: December 7, 2023    Ronal Horvath CMA      **Please close the encounter**

## 2023-12-08 ENCOUNTER — PATIENT OUTREACH (OUTPATIENT)
Dept: NEPHROLOGY | Facility: CLINIC | Age: 63
End: 2023-12-08
Payer: MEDICARE

## 2023-12-08 ENCOUNTER — TELEPHONE (OUTPATIENT)
Dept: NEPHROLOGY | Facility: CLINIC | Age: 63
End: 2023-12-08
Payer: MEDICARE

## 2023-12-08 DIAGNOSIS — D89.1 CRYOGLOBULINEMIC GLOMERULONEPHRITIS (H): ICD-10-CM

## 2023-12-08 DIAGNOSIS — N08 CRYOGLOBULINEMIC GLOMERULONEPHRITIS (H): Primary | ICD-10-CM

## 2023-12-08 DIAGNOSIS — R80.9 NEPHROTIC RANGE PROTEINURIA: Primary | ICD-10-CM

## 2023-12-08 DIAGNOSIS — N08 CRYOGLOBULINEMIC GLOMERULONEPHRITIS (H): ICD-10-CM

## 2023-12-08 DIAGNOSIS — N05.0 UNSPECIFIED NEPHRITIC SYNDROME WITH MINOR GLOMERULAR ABNORMALITY: ICD-10-CM

## 2023-12-08 DIAGNOSIS — D89.1 CRYOGLOBULINEMIC GLOMERULONEPHRITIS (H): Primary | ICD-10-CM

## 2023-12-08 RX ORDER — DIPHENHYDRAMINE HYDROCHLORIDE 50 MG/ML
50 INJECTION INTRAMUSCULAR; INTRAVENOUS
Status: CANCELLED
Start: 2023-12-08

## 2023-12-08 RX ORDER — ACETAMINOPHEN 325 MG/1
650 TABLET ORAL ONCE
Status: CANCELLED
Start: 2023-12-08

## 2023-12-08 RX ORDER — METHYLPREDNISOLONE SODIUM SUCCINATE 125 MG/2ML
125 INJECTION, POWDER, LYOPHILIZED, FOR SOLUTION INTRAMUSCULAR; INTRAVENOUS
Status: CANCELLED
Start: 2023-12-08

## 2023-12-08 RX ORDER — HEPARIN SODIUM,PORCINE 10 UNIT/ML
5-20 VIAL (ML) INTRAVENOUS DAILY PRN
Status: CANCELLED | OUTPATIENT
Start: 2023-12-08

## 2023-12-08 RX ORDER — ALBUTEROL SULFATE 0.83 MG/ML
2.5 SOLUTION RESPIRATORY (INHALATION)
Status: CANCELLED | OUTPATIENT
Start: 2023-12-08

## 2023-12-08 RX ORDER — EPINEPHRINE 1 MG/ML
0.3 INJECTION, SOLUTION, CONCENTRATE INTRAVENOUS EVERY 5 MIN PRN
Status: CANCELLED | OUTPATIENT
Start: 2023-12-08

## 2023-12-08 RX ORDER — HEPARIN SODIUM (PORCINE) LOCK FLUSH IV SOLN 100 UNIT/ML 100 UNIT/ML
5 SOLUTION INTRAVENOUS
Status: CANCELLED | OUTPATIENT
Start: 2023-12-08

## 2023-12-08 RX ORDER — METHYLPREDNISOLONE SODIUM SUCCINATE 125 MG/2ML
100 INJECTION, POWDER, LYOPHILIZED, FOR SOLUTION INTRAMUSCULAR; INTRAVENOUS ONCE
Status: CANCELLED | OUTPATIENT
Start: 2023-12-08

## 2023-12-08 RX ORDER — MEPERIDINE HYDROCHLORIDE 25 MG/ML
25 INJECTION INTRAMUSCULAR; INTRAVENOUS; SUBCUTANEOUS EVERY 30 MIN PRN
Status: CANCELLED | OUTPATIENT
Start: 2023-12-08

## 2023-12-08 RX ORDER — ALBUTEROL SULFATE 90 UG/1
1-2 AEROSOL, METERED RESPIRATORY (INHALATION)
Status: CANCELLED
Start: 2023-12-08

## 2023-12-08 RX ORDER — DIPHENHYDRAMINE HCL 25 MG
50 CAPSULE ORAL ONCE
Status: CANCELLED
Start: 2023-12-08

## 2023-12-08 NOTE — TELEPHONE ENCOUNTER
Messages left on mobile ending in [phone] with the assistance of a Uzbek  by writer and co-worker regarding having patient reduce his entecavir to 1 mg every other day due to renal function. Have not been able to verify message was received, always goes to voice mail.    Patient is scheduled to see Camilla Stein PA-C on 12/13/23. Writer will send message to Camilla to check with patient/family member to verify they are aware of decrease in entecavir.    Maria Eugenia NOLEN LPN  Hepatology Clinic

## 2023-12-09 NOTE — PROGRESS NOTES
Update from Dr. Harrison to assist in scheduling patient asap for Rituximab therapy.  Called [facility] and scheduling is closed, will follow up on Monday with scheduling to expedite.  Nevin Marx RN, BSN, PHN  [facility] Nephrology Nurse Navigator  [phone]

## 2023-12-11 ENCOUNTER — APPOINTMENT (OUTPATIENT)
Dept: INTERPRETER SERVICES | Facility: CLINIC | Age: 63
End: 2023-12-11
Payer: MEDICARE

## 2023-12-11 ENCOUNTER — TELEPHONE (OUTPATIENT)
Dept: FAMILY MEDICINE | Facility: CLINIC | Age: 63
End: 2023-12-11
Payer: MEDICARE

## 2023-12-11 NOTE — TELEPHONE ENCOUNTER
[facility] phone call message- general phone call:    Reason for call: Wondering about fax whereabouts regarding briefs under pads and gloves Call back [phone]    Return call needed: Yes    OK to leave a message on voice mail? Yes    Primary language: Sudanese      needed? Yes    Call taken on December 11, 2023 at 2:07 PM by Vincent Franks

## 2023-12-11 NOTE — TELEPHONE ENCOUNTER
Wilfrid has not been seen since 1/13/23    The last form that was sent out was for [employer] Pre [employer]ist    I called [facility] and they will refax the order.    Marci Jonas RN

## 2023-12-11 NOTE — PROGRESS NOTES
Reached out to [facility] , Aedlaide who updated that [facility] booked until 1/17/24.  Dubois first available is 12/22.  Called with [facility]  and left sister is legal gaurdian message of appt scheduled and instructed to call back with any questions or concerns.  [facility] scheduling working on getting other weekly infusions scheduled once length of visit is determined.  Updated provider of infusion delay r/t availability.  Nevin Marx RN, BSN, PHN  [facility] Nephrology Nurse Navigator  [phone]

## 2023-12-12 NOTE — PROGRESS NOTES
Updated Rituximab infusion diagnosis to include: N05.0 Unspecified nephritic syndrome with minor glomerular abnormality to qualify patient under insurance requirements.  Nevin Marx RN, BSN, PHN  [facility] Nephrology Nurse Navigator  [phone]

## 2023-12-13 ENCOUNTER — ANCILLARY PROCEDURE (OUTPATIENT)
Dept: ULTRASOUND IMAGING | Facility: CLINIC | Age: 63
End: 2023-12-13
Attending: STUDENT IN AN ORGANIZED HEALTH CARE EDUCATION/TRAINING PROGRAM
Payer: MEDICARE

## 2023-12-13 ENCOUNTER — TELEPHONE (OUTPATIENT)
Dept: NEPHROLOGY | Facility: CLINIC | Age: 63
End: 2023-12-13

## 2023-12-13 ENCOUNTER — LAB (OUTPATIENT)
Dept: LAB | Facility: CLINIC | Age: 63
End: 2023-12-13
Attending: STUDENT IN AN ORGANIZED HEALTH CARE EDUCATION/TRAINING PROGRAM
Payer: MEDICARE

## 2023-12-13 ENCOUNTER — OFFICE VISIT (OUTPATIENT)
Dept: GASTROENTEROLOGY | Facility: CLINIC | Age: 63
End: 2023-12-13
Attending: PHYSICIAN ASSISTANT
Payer: MEDICARE

## 2023-12-13 VITALS
DIASTOLIC BLOOD PRESSURE: 92 MMHG | HEART RATE: 62 BPM | WEIGHT: 111.5 LBS | OXYGEN SATURATION: 98 % | HEIGHT: 65 IN | TEMPERATURE: 97.3 F | RESPIRATION RATE: 18 BRPM | BODY MASS INDEX: 18.58 KG/M2 | SYSTOLIC BLOOD PRESSURE: 141 MMHG

## 2023-12-13 DIAGNOSIS — N08 CRYOGLOBULINEMIC GLOMERULONEPHRITIS (H): ICD-10-CM

## 2023-12-13 DIAGNOSIS — B18.1 CHRONIC VIRAL HEPATITIS B WITHOUT DELTA AGENT AND WITHOUT COMA (H): Primary | ICD-10-CM

## 2023-12-13 DIAGNOSIS — K74.60 CIRRHOSIS OF LIVER WITH ASCITES, UNSPECIFIED HEPATIC CIRRHOSIS TYPE (H): ICD-10-CM

## 2023-12-13 DIAGNOSIS — B18.1 CHRONIC VIRAL HEPATITIS B WITHOUT DELTA AGENT AND WITHOUT COMA (H): ICD-10-CM

## 2023-12-13 DIAGNOSIS — R18.8 CIRRHOSIS OF LIVER WITH ASCITES, UNSPECIFIED HEPATIC CIRRHOSIS TYPE (H): Primary | ICD-10-CM

## 2023-12-13 DIAGNOSIS — R79.89 ELEVATED SERUM CREATININE: ICD-10-CM

## 2023-12-13 DIAGNOSIS — R18.8 CIRRHOSIS OF LIVER WITH ASCITES, UNSPECIFIED HEPATIC CIRRHOSIS TYPE (H): ICD-10-CM

## 2023-12-13 DIAGNOSIS — K74.60 CIRRHOSIS OF LIVER WITH ASCITES, UNSPECIFIED HEPATIC CIRRHOSIS TYPE (H): Primary | ICD-10-CM

## 2023-12-13 DIAGNOSIS — D69.6 THROMBOCYTOPENIA (H): ICD-10-CM

## 2023-12-13 DIAGNOSIS — D89.1 CRYOGLOBULINEMIC GLOMERULONEPHRITIS (H): ICD-10-CM

## 2023-12-13 DIAGNOSIS — N18.32 STAGE 3B CHRONIC KIDNEY DISEASE (H): ICD-10-CM

## 2023-12-13 DIAGNOSIS — D68.9 COAGULOPATHY (H): ICD-10-CM

## 2023-12-13 DIAGNOSIS — E87.5 HYPERKALEMIA: Primary | ICD-10-CM

## 2023-12-13 LAB
ALBUMIN SERPL BCG-MCNC: 3.4 G/DL (ref 3.5–5.2)
ALP SERPL-CCNC: 124 U/L (ref 40–150)
ALT SERPL W P-5'-P-CCNC: 13 U/L (ref 0–70)
ANION GAP SERPL CALCULATED.3IONS-SCNC: 9 MMOL/L (ref 7–15)
AST SERPL W P-5'-P-CCNC: 22 U/L (ref 0–45)
BILIRUB DIRECT SERPL-MCNC: <0.2 MG/DL (ref 0–0.3)
BILIRUB SERPL-MCNC: 0.5 MG/DL
BUN SERPL-MCNC: 39.8 MG/DL (ref 8–23)
C3 SERPL-MCNC: 135 MG/DL (ref 81–157)
C4 SERPL-MCNC: 42 MG/DL (ref 13–39)
CALCIUM SERPL-MCNC: 9.3 MG/DL (ref 8.8–10.2)
CHLORIDE SERPL-SCNC: 106 MMOL/L (ref 98–107)
CREAT SERPL-MCNC: 2.69 MG/DL (ref 0.67–1.17)
CRP SERPL-MCNC: 7.14 MG/L
DEPRECATED HCO3 PLAS-SCNC: 19 MMOL/L (ref 22–29)
DRVVT SCREEN RATIO: 0.76
EGFRCR SERPLBLD CKD-EPI 2021: 26 ML/MIN/1.73M2
ERYTHROCYTE [DISTWIDTH] IN BLOOD BY AUTOMATED COUNT: 12.9 % (ref 10–15)
ERYTHROCYTE [SEDIMENTATION RATE] IN BLOOD BY WESTERGREN METHOD: 43 MM/HR (ref 0–20)
GLUCOSE SERPL-MCNC: 93 MG/DL (ref 70–99)
HCT VFR BLD AUTO: 43.4 % (ref 40–53)
HGB BLD-MCNC: 14.8 G/DL (ref 13.3–17.7)
INR PPP: 1.22 (ref 0.85–1.15)
LA PPP-IMP: NEGATIVE
LOCATION OF TASK: ABNORMAL
LUPUS INTERPRETATION: ABNORMAL
MCH RBC QN AUTO: 31.4 PG (ref 26.5–33)
MCHC RBC AUTO-ENTMCNC: 34.1 G/DL (ref 31.5–36.5)
MCV RBC AUTO: 92 FL (ref 78–100)
PHOSPHATE SERPL-MCNC: 3.8 MG/DL (ref 2.5–4.5)
PLATELET # BLD AUTO: 140 10E3/UL (ref 150–450)
POTASSIUM SERPL-SCNC: 5.9 MMOL/L (ref 3.4–5.3)
PROT SERPL-MCNC: 7.4 G/DL (ref 6.4–8.3)
PTT RATIO: 1.2
RBC # BLD AUTO: 4.72 10E6/UL (ref 4.4–5.9)
RHEUMATOID FACT SERPL-ACNC: 86 IU/ML
SODIUM SERPL-SCNC: 134 MMOL/L (ref 135–145)
THROMBIN TIME: 19.9 SECONDS (ref 13–19)
WBC # BLD AUTO: 5.8 10E3/UL (ref 4–11)

## 2023-12-13 PROCEDURE — 86160 COMPLEMENT ANTIGEN: CPT | Performed by: INTERNAL MEDICINE

## 2023-12-13 PROCEDURE — 86334 IMMUNOFIX E-PHORESIS SERUM: CPT | Mod: 26 | Performed by: PATHOLOGY

## 2023-12-13 PROCEDURE — 76705 ECHO EXAM OF ABDOMEN: CPT | Performed by: RADIOLOGY

## 2023-12-13 PROCEDURE — 85652 RBC SED RATE AUTOMATED: CPT | Performed by: PATHOLOGY

## 2023-12-13 PROCEDURE — 85027 COMPLETE CBC AUTOMATED: CPT | Performed by: PATHOLOGY

## 2023-12-13 PROCEDURE — 86334 IMMUNOFIX E-PHORESIS SERUM: CPT | Performed by: INTERNAL MEDICINE

## 2023-12-13 PROCEDURE — 82248 BILIRUBIN DIRECT: CPT | Performed by: PATHOLOGY

## 2023-12-13 PROCEDURE — 85730 THROMBOPLASTIN TIME PARTIAL: CPT | Performed by: INTERNAL MEDICINE

## 2023-12-13 PROCEDURE — 84100 ASSAY OF PHOSPHORUS: CPT | Performed by: PATHOLOGY

## 2023-12-13 PROCEDURE — 85390 FIBRINOLYSINS SCREEN I&R: CPT | Mod: 26 | Performed by: PATHOLOGY

## 2023-12-13 PROCEDURE — 86147 CARDIOLIPIN ANTIBODY EA IG: CPT | Performed by: INTERNAL MEDICINE

## 2023-12-13 PROCEDURE — 86431 RHEUMATOID FACTOR QUANT: CPT | Performed by: INTERNAL MEDICINE

## 2023-12-13 PROCEDURE — 86146 BETA-2 GLYCOPROTEIN ANTIBODY: CPT | Performed by: INTERNAL MEDICINE

## 2023-12-13 PROCEDURE — 86140 C-REACTIVE PROTEIN: CPT | Performed by: PATHOLOGY

## 2023-12-13 PROCEDURE — 99214 OFFICE O/P EST MOD 30 MIN: CPT | Performed by: PHYSICIAN ASSISTANT

## 2023-12-13 PROCEDURE — G0463 HOSPITAL OUTPT CLINIC VISIT: HCPCS | Performed by: PHYSICIAN ASSISTANT

## 2023-12-13 PROCEDURE — 86225 DNA ANTIBODY NATIVE: CPT | Performed by: INTERNAL MEDICINE

## 2023-12-13 PROCEDURE — 85610 PROTHROMBIN TIME: CPT | Performed by: PATHOLOGY

## 2023-12-13 PROCEDURE — 82595 ASSAY OF CRYOGLOBULIN: CPT | Performed by: INTERNAL MEDICINE

## 2023-12-13 PROCEDURE — 99000 SPECIMEN HANDLING OFFICE-LAB: CPT | Performed by: PATHOLOGY

## 2023-12-13 PROCEDURE — 87517 HEPATITIS B DNA QUANT: CPT | Performed by: INTERNAL MEDICINE

## 2023-12-13 PROCEDURE — 80053 COMPREHEN METABOLIC PANEL: CPT | Performed by: PATHOLOGY

## 2023-12-13 PROCEDURE — 36415 COLL VENOUS BLD VENIPUNCTURE: CPT | Performed by: PATHOLOGY

## 2023-12-13 ASSESSMENT — PAIN SCALES - GENERAL: PAINLEVEL: NO PAIN (0)

## 2023-12-13 NOTE — LETTER
12/13/2023         RE: Wilfredo Yoon  [address]  [facility] [address]        Dear Colleague,    Thank you for referring your patient, Wilfredo Yoon, to the [facility] [place]. Please see a copy of my visit note below.    Hepatology Clinic Note  Wilfredo Yoon   Date of Birth 1960    REASON FOR FOLLOW UP: Hepatitis B  REFERRING PROVIDER: Marlyn Hurley MD         Assessment/plan:     Mr. 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 Pt is joined by  and his nephew. Last seen by Dr. Hurley 4/4/23.     E antigen negative and E antibody positive (in 2015)     US April 2023 showed a small liver lesion, but follow-up MRI April 2023 noted: Limited exam d/t motion artifact resulting in nondiagnostic contrast-enhanced sequences: Cirrhosis and portal HTN. Moderate ascites/bilateral pleural effusion. No evidence of HCC on limited eval with particular attention paid to L lobe. Previously seen multiple small arterially enhancing observations throughout liver not visualized today, likely related to limits or contrast timing. Based on this exam only, patient is within Tanner criteria.    AFP unremarkable Oct 2023    Hep B DNA < 20 April 2023    US previously showed some ascites and pleural effusion but remains asymptomatic from this, does not have ascites/ LE edema on exam today    Last EGD 2018 was unremarkable w/o varices     Lab consistent with thrombocytopenia (140), coagulopathy (1.22), hyponatremia (134), hypoalbuminemia (3.4).  AST, ALT, alk phos and T. bili within normal limits.     MELD 3.0: 21 at 12/13/2023  8:08 AM  MELD-Na: 20 at 12/13/2023  8:08 AM  Calculated from:  Serum Creatinine: 2.69 mg/dL at 12/13/2023  8:08 AM  Serum Sodium: 134 mmol/L at  12/13/2023  8:08 AM  Total Bilirubin: 0.5 mg/dL (Using min of 1 mg/dL) at 12/13/2023  8:08 AM  Serum Albumin: 3.4 g/dL at 12/13/2023  8:08 AM  INR(ratio): 1.22 at 12/13/2023  8:08 AM  Age at listing (hypothetical): 63 years  Sex: Male at 12/13/2023  8:08 AM    - With concerns of malnutrition, recommended starting daily Boost or Ensure    > Consider referral to Nutrition/Dietician if pt continues to lose weight  - Awaiting US and lab results from today  - MELD labs and AFP every 6 months  - No varices on EGD 2018, since has been put on NSBB, can hold on repeat EGD for surveillance while on NSBB  - Continue entecavir 1 mg daily for chronic hepatitis B in a patient with decompensated cirrhosis. Discussed this is a lifelong medication  - Continue to follow with Nephrology as recommended   > Pt last seen 11/30/23 and it was recommended to follow up in 2 months and to start Rituximab in between    > Will plan to reach out to Dr. Harrison and mention rise in Cr; looks as if diuretics are currently prescribed by this provider   - Follow up labs in 6 months: Hepatic panel, CBC, INR, BMP, HBV DNA quant.   > Encouraged patient to bring his sister with to next appointment since he helps with meds and is familiar with which ones he takes  - HCC screening every 6 months    Camilla Briggs PA-C   [facility] Hepat[facility]  -----------------------------------------------------       HPI:   Wilfredo Yoon is a 63 year old male presenting for follow up regarding his liver disease.     Patient presents today and states he is doing well. Tells me he lives with his sister who helps with his medications. Sister was called during the appointment and did confirm that he takes Entecavir as prescribed. Patient states he has been feeling well as of late.  Denies any fevers, chills, nausea, vomiting or abdominal pain. Denies chest pain or shortness of breath.  Has not noted any recent lower extremity edema or distention  in his abdomen. Difficult for him to take his weight at home. Denies any recent confusion. No bright red blood per rectum or melena. Occasionally has mild symptoms of a cold. Admits he eats a small amount.  Patient's nephew reiterates this.  He believes he might be losing weight.    Patient typically uses wheelchair, power chair or is lying on the couch or bed.    Denies tobacco, alcohol and illicit drug use.    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      S/p paracentesis 7/2020 - no studies sent.      Found to have latent Tb during this admission, took INH.      Discharged to a TCU Fall 2020 - just discharged to home 2/2021. .      Cirrhosis diagnosed 2017 - had a hx of ascites, no hx of HE or variceal bleeding. EGD 10/2018 w/o varices. Previously follows with Dr. Adame and Dr. Hurley.      Interval Events:  - US 1/25/23 showing less than 1 cm lesion in R hepatic lobe, portal HTN, cirrhosis with small amount of ascites and small right pleural effusion.   - MRI 4/4/23: as noted above  - Labs Jan 2023 showed rise in Cr to 1.7, 2.0 April 2023, now 2.69 today. Currently has Lasix 20 mg and Spironolactone 50 mg on med list.   - Taking entecavir - RN sets up meds and also gets help from his sister who he lives with   - Relies on family support post CVA     PMH:  has a past medical history of Cirrhosis (H), Cryoglobulinemia (H24), CVA (cerebral vascular accident) (H) (07/16/2020), Glomerulonephritis, Hepatitis B, Hypertension, Latent tuberculosis, MPGN (membranoproliferative glomerulonephritides), Positive QuantiFERON-TB Gold test, and PRES (posterior reversible encephalopathy syndrome) (07/16/2020).    SMH:  has a past surgical history that includes Hand surgery (Right); HAND/FINGER SURGERY UNLISTED; Esophagoscopy, gastroscopy, duodenoscopy (EGD), combined (N/A, 10/18/2018);  Anesthesia out of OR MRI (N/A, 7/18/2020); IR Paracentesis (7/27/2020); and IR Renal Biopsy Right (11/7/2023).     Medications:   Current Outpatient Medications   Medication    acetaminophen (TYLENOL) 325 MG tablet    amLODIPine (NORVASC) 10 MG tablet    amLODIPine (NORVASC) 10 MG tablet    aspirin (ASA) 81 MG chewable tablet    atorvastatin (LIPITOR) 40 MG tablet    carvedilol (COREG) 12.5 MG tablet    diclofenac (VOLTAREN) 1 % topical gel    docusate sodium (COLACE) 100 MG capsule    entecavir (BARACLUDE) 1 MG tablet    furosemide (LASIX) 20 MG tablet    gabapentin (NEURONTIN) 100 MG capsule    HM CETIRIZINE HCL 10 MG tablet    ibuprofen (ADVIL/MOTRIN) 400 MG tablet    losartan (COZAAR) 25 MG tablet    multivitamin (ONE-DAILY) tablet    polyethylene glycol (MIRALAX) 17 GM/Dose powder    potassium chloride ER (MICRO-K) 10 MEQ CR capsule    spironolactone (ALDACTONE) 50 MG tablet    vitamin D3 (CHOLECALCIFEROL) 50 mcg (2000 units) tablet     No current facility-administered medications for this visit.     Hepatitis B:   Lab Results   Component Value Date    HEPBANG Reactive (A) 01/19/2022    HCVAB Nonreactive 07/18/2020     Lab Results   Component Value Date    HBQRES <20 (A) 04/04/2023    HBQRES Not Detected 01/13/2023    HBQRES <20 (A) 09/30/2022    HBQRES <20 (A) 01/19/2022    HBQRES HBV DNA Not Detected 06/30/2021     Lab Results   Component Value Date    HBEABY (A) 11/10/2015     Positive  Reference range: Negative  (Note)  The anti-HBe is repeatedly reactive, which is consistent  with recent or remote Hepatitis B infection. Anti-HBe can  be present in a small proportion of chronic HBV infections,  but its presence usually indicates resolution. If HBeAg is  also positive, this may represent the transition between  the appearance of anti-HBe and decline of HBeAg, and  retesting in one month may be useful.  Performed by [facility],  [address] [place] [address] [phone]  [email or web address], Serge LITTLEJOHN  MD Bela, Lab. Director      HBEAGN  11/10/2015     Negative  Reference range: Negative  (Note)  Performed by [facility],  [address] [place] [address] [phone]  [email or web address], Serge Cramer MD, Lab. Director            Medications:     Current Outpatient Medications   Medication    acetaminophen (TYLENOL) 325 MG tablet    amLODIPine (NORVASC) 10 MG tablet    amLODIPine (NORVASC) 10 MG tablet    aspirin (ASA) 81 MG chewable tablet    atorvastatin (LIPITOR) 40 MG tablet    carvedilol (COREG) 12.5 MG tablet    diclofenac (VOLTAREN) 1 % topical gel    docusate sodium (COLACE) 100 MG capsule    entecavir (BARACLUDE) 1 MG tablet    furosemide (LASIX) 20 MG tablet    gabapentin (NEURONTIN) 100 MG capsule    HM CETIRIZINE HCL 10 MG tablet    ibuprofen (ADVIL/MOTRIN) 400 MG tablet    losartan (COZAAR) 25 MG tablet    multivitamin (ONE-DAILY) tablet    polyethylene glycol (MIRALAX) 17 GM/Dose powder    potassium chloride ER (MICRO-K) 10 MEQ CR capsule    spironolactone (ALDACTONE) 50 MG tablet    vitamin D3 (CHOLECALCIFEROL) 50 mcg (2000 units) tablet     No current facility-administered medications for this visit.          Allergies:   No Known Allergies         Social History:     Social History     Socioeconomic History    Marital status: Single     Spouse name: Not on file    Number of children: Not on file    Years of education: Not on file    Highest education level: Not on file   Occupational History    Not on file   Tobacco Use    Smoking status: Former     Packs/day: 0.25     Years: 40.00     Additional pack years: 0.00     Total pack years: 10.00     Types: Cigarettes     Quit date: 10/1/2020     Years since quitting: 3.2    Smokeless tobacco: Never   Vaping Use    Vaping Use: Never used   Substance and Sexual Activity    Alcohol use: No     Alcohol/week: 0.0 standard drinks of alcohol    Drug use: No    Sexual activity: Not on file   Other Topics Concern    Parent/sibling w/ CABG, MI or  "angioplasty before 65F 55M? Not Asked   Social History Narrative    Not on file     Social Determinants of Health     Financial Resource Strain: Not on file   Food Insecurity: No Food Insecurity (4/15/2021)    Hunger Vital Sign     Worried About Running Out of Food in the Last Year: Never true     Ran Out of Food in the Last Year: Never true   Transportation Needs: Unknown (4/15/2021)    PRAPARE - Transportation     Lack of Transportation (Medical): Not on file     Lack of Transportation (Non-Medical): No   Physical Activity: Not on file   Stress: Not on file   Social Connections: Unknown (4/15/2021)    Social Connection and Isolation Panel [NHANES]     Frequency of Communication with Friends and Family: Not on file     Frequency of Social Gatherings with Friends and Family: More than three times a week     Attends Religion Services: Not on file     Active Member of Clubs or Organizations: Not on file     Attends Club or Organization Meetings: Not on file     Marital Status: Not on file   Interpersonal Safety: Not on file   Housing Stability: Not on file          Family History:     Family History   Problem Relation Age of Onset    Liver Cancer No family hx of     Hepatitis No family hx of           Review of Systems:   GEN: See HPI         Physical Exam:   Vital signs:  Temp: 97.3  F (36.3  C) Temp src: Oral BP: (!) 141/92 Pulse: 62   Resp: 18 SpO2: 98 %     Height: 165.1 cm (5' 5\") Weight: 50.6 kg (111 lb 8 oz)  Estimated body mass index is 18.55 kg/m  as calculated from the following:    Height as of this encounter: 1.651 m (5' 5\").    Weight as of this encounter: 50.6 kg (111 lb 8 oz).  Gen: A&Ox3, NAD, frail elderly male in wheelchair  HEENT: non-icteric   CV: RRR, no overt murmurs  Lung: CTA anteriorly, no wheezing or crackles.   Abd: soft, NT, ND, no palpable splenomegaly, liver is not palpable.   Ext: no edema, intact pulses.   Skin: No rash  Neuro: grossly intact, no asterixis   Psych: appropriate mood and " "affects         Data:   Reviewed in person and significant for:    Lab Results   Component Value Date     10/19/2023     06/30/2021      Lab Results   Component Value Date    POTASSIUM 4.3 10/19/2023    POTASSIUM 3.6 01/19/2022    POTASSIUM 4.2 06/30/2021     Lab Results   Component Value Date    CHLORIDE 110 10/19/2023    CHLORIDE 107 01/19/2022    CHLORIDE 106 06/30/2021     Lab Results   Component Value Date    CO2 21 10/19/2023    CO2 27 01/19/2022    CO2 25 06/30/2021     Lab Results   Component Value Date    BUN 30.8 10/19/2023    BUN 19 01/19/2022    BUN 24 06/30/2021     Lab Results   Component Value Date    CR 2.21 10/19/2023    CR 1.27 06/30/2021     Lab Results   Component Value Date    WBC 6.5 11/07/2023    WBC 5.3 06/30/2021     Lab Results   Component Value Date    HGB 14.1 11/07/2023    HGB 14.3 06/30/2021     Lab Results   Component Value Date    HCT 42.1 11/07/2023    HCT 41.5 06/30/2021     Lab Results   Component Value Date    MCV 94 11/07/2023    MCV 91 06/30/2021     Lab Results   Component Value Date     11/07/2023     06/30/2021     Lab Results   Component Value Date    AST 25 10/05/2023    AST 32 06/30/2021     Lab Results   Component Value Date    ALT 9 10/05/2023    ALT 16 06/30/2021     No results found for: \"BILICONJ\"   Lab Results   Component Value Date    BILITOTAL 0.4 10/05/2023    BILITOTAL 0.7 06/30/2021     Lab Results   Component Value Date    ALBUMIN 2.9 10/05/2023    ALBUMIN 2.5 01/19/2022    ALBUMIN 3.0 06/30/2021     Lab Results   Component Value Date    PROTTOTAL 6.3 10/05/2023    PROTTOTAL 7.4 06/30/2021      Lab Results   Component Value Date    ALKPHOS 114 10/05/2023    ALKPHOS 106 06/30/2021       Lab Results   Component Value Date    INR 1.16 11/07/2023    INR 1.16 06/30/2021       EGD 10/2018     Findings:        The examined esophagus was normal.        There is no endoscopic evidence of varices in the entire esophagus.        The entire " examined stomach was normal.        There is no endoscopic evidence of portal hypertension gastropathy in        the entire examined stomach.        The examined duodenum was normal.                                                                                     Moderate Sedation:        Moderate (conscious) sedation was administered by the endoscopy nurse        and supervised by the endoscopist. The following parameters were        monitored: oxygen saturation, heart rate, blood pressure, and response        to care. Total physician intraservice time was 8 minutes.        Moderate (conscious) sedation was administered by the endoscopy nurse        and supervised by the endoscopist. The following parameters were        monitored: oxygen saturation, heart rate, blood pressure, and response        to care. Total physician intraservice time was 8 minutes.   Impression:          - Normal esophagus.                        - Normal stomach.                        - Normal examined duodenum.                        - No specimens collected.          Camilla Briggs PA-C

## 2023-12-13 NOTE — Clinical Note
Gene Harrison,     I just wanted to reach out and let you know I saw this pt today for follow up. Looks relatively stable in regards to his liver but MELD came up 5 since April. I think this may be a result of his Cr creeping up. Today it was 2.69. I see that he is on low diuretics. I would be okay if we tried stopping. He had no ascites or LE edema on exam today. Let me know your thoughts :)  Thanks,   Camilla Briggs PA-C

## 2023-12-13 NOTE — TELEPHONE ENCOUNTER
From: Sue Harrison MD   Sent: 12/13/2023   1:57 PM CST   To: Camilla Briggs PA-C; *   Subject: Need help calling patient-urgent                 Ayse Hidalgo are you covering letter W?     This patient has K of 5.9 today and someone started him on K on 12/6/23 we have to make sure he stopped taking KCL and repeat BMP next week.     Thanks     ================================================    Noted that Wilfredo has been on potassium supplement for years but potassium has been mostly WNL.  Via , tried calling Wilfredo and his sister, Sofi. Had to leave message via  with instructions to stop potassium. Labs will be drawn at infusion on 12/22.

## 2023-12-13 NOTE — NURSING NOTE
"Chief Complaint   Patient presents with    RECHECK     Hep B     Vital signs:  Temp: 97.3  F (36.3  C) Temp src: Oral BP: (!) 141/92 Pulse: 62   Resp: 18 SpO2: 98 %     Height: 165.1 cm (5' 5\") Weight: 50.6 kg (111 lb 8 oz)  Estimated body mass index is 18.55 kg/m  as calculated from the following:    Height as of this encounter: 1.651 m (5' 5\").    Weight as of this encounter: 50.6 kg (111 lb 8 oz).      Ellyn Olson, [facility]  12/13/2023 8:28 AM    "

## 2023-12-13 NOTE — Clinical Note
Uriah Hurley,      Saw this pt today for follow up. Seems to be stable from a liver standpoint. No bleeding, HE or ascites. Continues to take Entecavir. Of note, Cr bumped up again today. Looks like Neph are the ones prescribing low dose diuretics.   Any specific recommendations that you might have? Also, I know he would likely be a poor liver transplant candidate but has that ever been discussed?  Thanks!  Camilla

## 2023-12-13 NOTE — PROGRESS NOTES
Hepatology Clinic Note  Wilfredo Yoon   Date of Birth 1960    REASON FOR FOLLOW UP: Hepatitis B  REFERRING PROVIDER: Marlyn Hurley MD         Assessment/plan:     Mr. 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 Pt is joined by  and his nephew. Last seen by Dr. Hurley 4/4/23.     E antigen negative and E antibody positive (in 2015)     US April 2023 showed a small liver lesion, but follow-up MRI April 2023 noted: Limited exam d/t motion artifact resulting in nondiagnostic contrast-enhanced sequences: Cirrhosis and portal HTN. Moderate ascites/bilateral pleural effusion. No evidence of HCC on limited eval with particular attention paid to L lobe. Previously seen multiple small arterially enhancing observations throughout liver not visualized today, likely related to limits or contrast timing. Based on this exam only, patient is within Tanner criteria.    AFP unremarkable Oct 2023    Hep B DNA < 20 April 2023    US previously showed some ascites and pleural effusion but remains asymptomatic from this, does not have ascites/ LE edema on exam today    Last EGD 2018 was unremarkable w/o varices     Lab consistent with thrombocytopenia (140), coagulopathy (1.22), hyponatremia (134), hypoalbuminemia (3.4).  AST, ALT, alk phos and T. bili within normal limits.     MELD 3.0: 21 at 12/13/2023  8:08 AM  MELD-Na: 20 at 12/13/2023  8:08 AM  Calculated from:  Serum Creatinine: 2.69 mg/dL at 12/13/2023  8:08 AM  Serum Sodium: 134 mmol/L at 12/13/2023  8:08 AM  Total Bilirubin: 0.5 mg/dL (Using min of 1 mg/dL) at 12/13/2023  8:08 AM  Serum Albumin: 3.4 g/dL at 12/13/2023  8:08 AM  INR(ratio): 1.22 at 12/13/2023  8:08 AM  Age at listing (hypothetical): 63 years  Sex: Male at 12/13/2023  8:08 AM    - With concerns of malnutrition,  recommended starting daily Boost or Ensure    > Consider referral to Nutrition/Dietician if pt continues to lose weight  - Awaiting US and lab results from today  - MELD labs and AFP every 6 months  - No varices on EGD 2018, since has been put on NSBB, can hold on repeat EGD for surveillance while on NSBB  - Continue entecavir 1 mg daily for chronic hepatitis B in a patient with decompensated cirrhosis. Discussed this is a lifelong medication  - Continue to follow with Nephrology as recommended   > Pt last seen 11/30/23 and it was recommended to follow up in 2 months and to start Rituximab in between    > Will plan to reach out to Dr. Harrison and mention rise in Cr; looks as if diuretics are currently prescribed by this provider   - Follow up labs in 6 months: Hepatic panel, CBC, INR, BMP, HBV DNA quant.   > Encouraged patient to bring his sister with to next appointment since he helps with meds and is familiar with which ones he takes  - HCC screening every 6 months    Camilla Briggs PA-C   [facility] Hepatology  -----------------------------------------------------       HPI:   Wilfredo Yoon is a 63 year old male presenting for follow up regarding his liver disease.     Patient presents today and states he is doing well. Tells me he lives with his sister who helps with his medications. Sister was called during the appointment and did confirm that he takes Entecavir as prescribed. Patient states he has been feeling well as of late.  Denies any fevers, chills, nausea, vomiting or abdominal pain. Denies chest pain or shortness of breath.  Has not noted any recent lower extremity edema or distention in his abdomen. Difficult for him to take his weight at home. Denies any recent confusion. No bright red blood per rectum or melena. Occasionally has mild symptoms of a cold. Admits he eats a small amount.  Patient's nephew reiterates this.  He believes he might be losing weight.    Patient  typically uses wheelchair, power chair or is lying on the couch or bed.    Denies tobacco, alcohol and illicit drug use.    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      S/p paracentesis 7/2020 - no studies sent.      Found to have latent Tb during this admission, took INH.      Discharged to a TCU Fall 2020 - just discharged to home 2/2021. .      Cirrhosis diagnosed 2017 - had a hx of ascites, no hx of HE or variceal bleeding. EGD 10/2018 w/o varices. Previously follows with Dr. Adame and Dr. Hurley.      Interval Events:  - US 1/25/23 showing less than 1 cm lesion in R hepatic lobe, portal HTN, cirrhosis with small amount of ascites and small right pleural effusion.   - MRI 4/4/23: as noted above  - Labs Jan 2023 showed rise in Cr to 1.7, 2.0 April 2023, now 2.69 today. Currently has Lasix 20 mg and Spironolactone 50 mg on med list.   - Taking entecavir - RN sets up meds and also gets help from his sister who he lives with   - Relies on family support post CVA     PMH:  has a past medical history of Cirrhosis (H), Cryoglobulinemia (H24), CVA (cerebral vascular accident) (H) (07/16/2020), Glomerulonephritis, Hepatitis B, Hypertension, Latent tuberculosis, MPGN (membranoproliferative glomerulonephritides), Positive QuantiFERON-TB Gold test, and PRES (posterior reversible encephalopathy syndrome) (07/16/2020).    SMH:  has a past surgical history that includes Hand surgery (Right); HAND/FINGER SURGERY UNLISTED; Esophagoscopy, gastroscopy, duodenoscopy (EGD), combined (N/A, 10/18/2018); Anesthesia out of OR MRI (N/A, 7/18/2020); IR Paracentesis (7/27/2020); and IR Renal Biopsy Right (11/7/2023).     Medications:   Current Outpatient Medications   Medication    acetaminophen (TYLENOL) 325 MG tablet    amLODIPine (NORVASC) 10 MG tablet    amLODIPine (NORVASC) 10 MG tablet    aspirin  (ASA) 81 MG chewable tablet    atorvastatin (LIPITOR) 40 MG tablet    carvedilol (COREG) 12.5 MG tablet    diclofenac (VOLTAREN) 1 % topical gel    docusate sodium (COLACE) 100 MG capsule    entecavir (BARACLUDE) 1 MG tablet    furosemide (LASIX) 20 MG tablet    gabapentin (NEURONTIN) 100 MG capsule    HM CETIRIZINE HCL 10 MG tablet    ibuprofen (ADVIL/MOTRIN) 400 MG tablet    losartan (COZAAR) 25 MG tablet    multivitamin (ONE-DAILY) tablet    polyethylene glycol (MIRALAX) 17 GM/Dose powder    potassium chloride ER (MICRO-K) 10 MEQ CR capsule    spironolactone (ALDACTONE) 50 MG tablet    vitamin D3 (CHOLECALCIFEROL) 50 mcg (2000 units) tablet     No current facility-administered medications for this visit.     Hepatitis B:   Lab Results   Component Value Date    HEPBANG Reactive (A) 01/19/2022    HCVAB Nonreactive 07/18/2020     Lab Results   Component Value Date    HBQRES <20 (A) 04/04/2023    HBQRES Not Detected 01/13/2023    HBQRES <20 (A) 09/30/2022    HBQRES <20 (A) 01/19/2022    HBQRES HBV DNA Not Detected 06/30/2021     Lab Results   Component Value Date    HBEABY (A) 11/10/2015     Positive  Reference range: Negative  (Note)  The anti-HBe is repeatedly reactive, which is consistent  with recent or remote Hepatitis B infection. Anti-HBe can  be present in a small proportion of chronic HBV infections,  but its presence usually indicates resolution. If HBeAg is  also positive, this may represent the transition between  the appearance of anti-HBe and decline of HBeAg, and  retesting in one month may be useful.  Performed by [facility],  [address] [facility] [place] [address] [phone]  [email or web address] Serge Cramer MD, Lab. Director      HBEAGN  11/10/2015     Negative  Reference range: Negative  (Note)  Performed by [facility],  [address] [facility] [place] [address] [phone]  [email or web address] Serge Cramer MD, Lab. Director            Medications:     Current Outpatient Medications   Medication     acetaminophen (TYLENOL) 325 MG tablet    amLODIPine (NORVASC) 10 MG tablet    amLODIPine (NORVASC) 10 MG tablet    aspirin (ASA) 81 MG chewable tablet    atorvastatin (LIPITOR) 40 MG tablet    carvedilol (COREG) 12.5 MG tablet    diclofenac (VOLTAREN) 1 % topical gel    docusate sodium (COLACE) 100 MG capsule    entecavir (BARACLUDE) 1 MG tablet    furosemide (LASIX) 20 MG tablet    gabapentin (NEURONTIN) 100 MG capsule    HM CETIRIZINE HCL 10 MG tablet    ibuprofen (ADVIL/MOTRIN) 400 MG tablet    losartan (COZAAR) 25 MG tablet    multivitamin (ONE-DAILY) tablet    polyethylene glycol (MIRALAX) 17 GM/Dose powder    potassium chloride ER (MICRO-K) 10 MEQ CR capsule    spironolactone (ALDACTONE) 50 MG tablet    vitamin D3 (CHOLECALCIFEROL) 50 mcg (2000 units) tablet     No current facility-administered medications for this visit.          Allergies:   No Known Allergies         Social History:     Social History     Socioeconomic History    Marital status: Single     Spouse name: Not on file    Number of children: Not on file    Years of education: Not on file    Highest education level: Not on file   Occupational History    Not on file   Tobacco Use    Smoking status: Former     Packs/day: 0.25     Years: 40.00     Additional pack years: 0.00     Total pack years: 10.00     Types: Cigarettes     Quit date: 10/1/2020     Years since quitting: 3.2    Smokeless tobacco: Never   Vaping Use    Vaping Use: Never used   Substance and Sexual Activity    Alcohol use: No     Alcohol/week: 0.0 standard drinks of alcohol    Drug use: No    Sexual activity: Not on file   Other Topics Concern    Parent/sibling w/ CABG, MI or angioplasty before 65F 55M? Not Asked   Social History Narrative    Not on file     Social Determinants of Health     Financial Resource Strain: Not on file   Food Insecurity: No Food Insecurity (4/15/2021)    Hunger Vital Sign     Worried About Running Out of Food in the Last Year: Never true     Ran Out of  "Food in the Last Year: Never true   Transportation Needs: Unknown (4/15/2021)    PRAPARE - Transportation     Lack of Transportation (Medical): Not on file     Lack of Transportation (Non-Medical): No   Physical Activity: Not on file   Stress: Not on file   Social Connections: Unknown (4/15/2021)    Social Connection and Isolation Panel [NHANES]     Frequency of Communication with Friends and Family: Not on file     Frequency of Social Gatherings with Friends and Family: More than three times a week     Attends Mosque Services: Not on file     Active Member of Clubs or Organizations: Not on file     Attends Club or Organization Meetings: Not on file     Marital Status: Not on file   Interpersonal Safety: Not on file   Housing Stability: Not on file          Family History:     Family History   Problem Relation Age of Onset    Liver Cancer No family hx of     Hepatitis No family hx of           Review of Systems:   GEN: See HPI         Physical Exam:   Vital signs:  Temp: 97.3  F (36.3  C) Temp src: Oral BP: (!) 141/92 Pulse: 62   Resp: 18 SpO2: 98 %     Height: 165.1 cm (5' 5\") Weight: 50.6 kg (111 lb 8 oz)  Estimated body mass index is 18.55 kg/m  as calculated from the following:    Height as of this encounter: 1.651 m (5' 5\").    Weight as of this encounter: 50.6 kg (111 lb 8 oz).  Gen: A&Ox3, NAD, frail elderly male in wheelchair  HEENT: non-icteric   CV: RRR, no overt murmurs  Lung: CTA anteriorly, no wheezing or crackles.   Abd: soft, NT, ND, no palpable splenomegaly, liver is not palpable.   Ext: no edema, intact pulses.   Skin: No rash  Neuro: grossly intact, no asterixis   Psych: appropriate mood and affects         Data:   Reviewed in person and significant for:    Lab Results   Component Value Date     10/19/2023     06/30/2021      Lab Results   Component Value Date    POTASSIUM 4.3 10/19/2023    POTASSIUM 3.6 01/19/2022    POTASSIUM 4.2 06/30/2021     Lab Results   Component Value Date    " "CHLORIDE 110 10/19/2023    CHLORIDE 107 01/19/2022    CHLORIDE 106 06/30/2021     Lab Results   Component Value Date    CO2 21 10/19/2023    CO2 27 01/19/2022    CO2 25 06/30/2021     Lab Results   Component Value Date    BUN 30.8 10/19/2023    BUN 19 01/19/2022    BUN 24 06/30/2021     Lab Results   Component Value Date    CR 2.21 10/19/2023    CR 1.27 06/30/2021     Lab Results   Component Value Date    WBC 6.5 11/07/2023    WBC 5.3 06/30/2021     Lab Results   Component Value Date    HGB 14.1 11/07/2023    HGB 14.3 06/30/2021     Lab Results   Component Value Date    HCT 42.1 11/07/2023    HCT 41.5 06/30/2021     Lab Results   Component Value Date    MCV 94 11/07/2023    MCV 91 06/30/2021     Lab Results   Component Value Date     11/07/2023     06/30/2021     Lab Results   Component Value Date    AST 25 10/05/2023    AST 32 06/30/2021     Lab Results   Component Value Date    ALT 9 10/05/2023    ALT 16 06/30/2021     No results found for: \"BILICONJ\"   Lab Results   Component Value Date    BILITOTAL 0.4 10/05/2023    BILITOTAL 0.7 06/30/2021     Lab Results   Component Value Date    ALBUMIN 2.9 10/05/2023    ALBUMIN 2.5 01/19/2022    ALBUMIN 3.0 06/30/2021     Lab Results   Component Value Date    PROTTOTAL 6.3 10/05/2023    PROTTOTAL 7.4 06/30/2021      Lab Results   Component Value Date    ALKPHOS 114 10/05/2023    ALKPHOS 106 06/30/2021       Lab Results   Component Value Date    INR 1.16 11/07/2023    INR 1.16 06/30/2021       EGD 10/2018     Findings:        The examined esophagus was normal.        There is no endoscopic evidence of varices in the entire esophagus.        The entire examined stomach was normal.        There is no endoscopic evidence of portal hypertension gastropathy in        the entire examined stomach.        The examined duodenum was normal.                                                                                     Moderate Sedation:        Moderate (conscious) " sedation was administered by the endoscopy nurse        and supervised by the endoscopist. The following parameters were        monitored: oxygen saturation, heart rate, blood pressure, and response        to care. Total physician intraservice time was 8 minutes.        Moderate (conscious) sedation was administered by the endoscopy nurse        and supervised by the endoscopist. The following parameters were        monitored: oxygen saturation, heart rate, blood pressure, and response        to care. Total physician intraservice time was 8 minutes.   Impression:          - Normal esophagus.                        - Normal stomach.                        - Normal examined duodenum.                        - No specimens collected.

## 2023-12-14 LAB
B2 GLYCOPROT1 IGG SERPL IA-ACNC: 20 U/ML
B2 GLYCOPROT1 IGM SERPL IA-ACNC: 3.7 U/ML
CARDIOLIPIN IGG SER IA-ACNC: 6.2 GPL-U/ML
CARDIOLIPIN IGG SER IA-ACNC: NEGATIVE
CARDIOLIPIN IGM SER IA-ACNC: 2.6 MPL-U/ML
CARDIOLIPIN IGM SER IA-ACNC: NEGATIVE
DSDNA AB SER-ACNC: 40 IU/ML
HBV DNA SERPL NAA+PROBE-ACNC: NOT DETECTED IU/ML

## 2023-12-14 NOTE — TELEPHONE ENCOUNTER
Writer called patient's sister Sofi to have Wilfredo stop taking potasium supplement; no answer at first try. Called Sofi again; left VM via .

## 2023-12-15 NOTE — TELEPHONE ENCOUNTER
Spoke to Wilfredo's daughter who said that they did get the message to stop the potassium yesterday and have done so.  Labs will be completed on 12/22.

## 2023-12-18 LAB — CRYOGLOB SER QL: ABNORMAL

## 2023-12-19 LAB
ALBUMIN SERPL ELPH-MCNC: ABNORMAL G/DL
CRYOGLOB IGA & IGG & IGM SER-IMP: ABNORMAL
CRYOGLOB TYP SER IFE: ABNORMAL
CRYOGLOB TYP SER IFE: POSITIVE
CRYOGLOB TYP SER IFE: POSITIVE

## 2023-12-21 DIAGNOSIS — E43 SEVERE PROTEIN-CALORIE MALNUTRITION (H): ICD-10-CM

## 2023-12-21 RX ORDER — MULTIVITAMIN
1 TABLET ORAL DAILY
Qty: 90 TABLET | Refills: 3 | Status: SHIPPED | OUTPATIENT
Start: 2023-12-21 | End: 2024-02-15

## 2023-12-21 NOTE — TELEPHONE ENCOUNTER
"Request for medication refill:      multivitamin (ONE-DAILY) tablet       Providers if patient needs an appointment and you are willing to give a one month supply please refill for one month and  send a letter/MyChart using \".SMILLIMITEDREFILL\" .smillimited and route chart to \"P SMI \" (Giving one month refill in non controlled medications is strongly recommended before denial)    If refill has been denied, meaning absolutely no refills without visit, please complete the smart phrase \".smirxrefuse\" and route it to the \"P SMI MED REFILLS\"  pool to inform the patient and the pharmacy.    Micaela Carcamo MA      "

## 2023-12-22 ENCOUNTER — INFUSION THERAPY VISIT (OUTPATIENT)
Dept: INFUSION THERAPY | Facility: CLINIC | Age: 63
End: 2023-12-22
Attending: INTERNAL MEDICINE
Payer: MEDICARE

## 2023-12-22 VITALS
SYSTOLIC BLOOD PRESSURE: 147 MMHG | BODY MASS INDEX: 18.61 KG/M2 | WEIGHT: 111.7 LBS | RESPIRATION RATE: 18 BRPM | HEART RATE: 71 BPM | HEIGHT: 65 IN | DIASTOLIC BLOOD PRESSURE: 88 MMHG

## 2023-12-22 DIAGNOSIS — R80.9 NEPHROTIC RANGE PROTEINURIA: ICD-10-CM

## 2023-12-22 DIAGNOSIS — N08 CRYOGLOBULINEMIC GLOMERULONEPHRITIS (H): Primary | ICD-10-CM

## 2023-12-22 DIAGNOSIS — D89.1 CRYOGLOBULINEMIC GLOMERULONEPHRITIS (H): Primary | ICD-10-CM

## 2023-12-22 DIAGNOSIS — N05.0 UNSPECIFIED NEPHRITIC SYNDROME WITH MINOR GLOMERULAR ABNORMALITY: ICD-10-CM

## 2023-12-22 PROCEDURE — 96375 TX/PRO/DX INJ NEW DRUG ADDON: CPT

## 2023-12-22 PROCEDURE — 258N000003 HC RX IP 258 OP 636: Performed by: INTERNAL MEDICINE

## 2023-12-22 PROCEDURE — 96413 CHEMO IV INFUSION 1 HR: CPT

## 2023-12-22 PROCEDURE — 250N000013 HC RX MED GY IP 250 OP 250 PS 637: Performed by: INTERNAL MEDICINE

## 2023-12-22 PROCEDURE — 250N000011 HC RX IP 250 OP 636: Mod: JZ | Performed by: INTERNAL MEDICINE

## 2023-12-22 PROCEDURE — 96415 CHEMO IV INFUSION ADDL HR: CPT

## 2023-12-22 RX ORDER — HEPARIN SODIUM (PORCINE) LOCK FLUSH IV SOLN 100 UNIT/ML 100 UNIT/ML
5 SOLUTION INTRAVENOUS
Status: CANCELLED | OUTPATIENT
Start: 2023-12-29

## 2023-12-22 RX ORDER — DIPHENHYDRAMINE HYDROCHLORIDE 50 MG/ML
50 INJECTION INTRAMUSCULAR; INTRAVENOUS
Status: CANCELLED
Start: 2023-12-29

## 2023-12-22 RX ORDER — ACETAMINOPHEN 325 MG/1
650 TABLET ORAL ONCE
Status: COMPLETED | OUTPATIENT
Start: 2023-12-22 | End: 2023-12-22

## 2023-12-22 RX ORDER — DIPHENHYDRAMINE HCL 25 MG
50 CAPSULE ORAL ONCE
Status: COMPLETED | OUTPATIENT
Start: 2023-12-22 | End: 2023-12-22

## 2023-12-22 RX ORDER — METHYLPREDNISOLONE SODIUM SUCCINATE 125 MG/2ML
125 INJECTION, POWDER, LYOPHILIZED, FOR SOLUTION INTRAMUSCULAR; INTRAVENOUS
Status: CANCELLED
Start: 2023-12-29

## 2023-12-22 RX ORDER — DIPHENHYDRAMINE HCL 25 MG
50 CAPSULE ORAL ONCE
Status: CANCELLED
Start: 2023-12-29

## 2023-12-22 RX ORDER — ALBUTEROL SULFATE 0.83 MG/ML
2.5 SOLUTION RESPIRATORY (INHALATION)
Status: CANCELLED | OUTPATIENT
Start: 2023-12-29

## 2023-12-22 RX ORDER — HEPARIN SODIUM,PORCINE 10 UNIT/ML
5-20 VIAL (ML) INTRAVENOUS DAILY PRN
Status: CANCELLED | OUTPATIENT
Start: 2023-12-29

## 2023-12-22 RX ORDER — METHYLPREDNISOLONE SODIUM SUCCINATE 125 MG/2ML
100 INJECTION, POWDER, LYOPHILIZED, FOR SOLUTION INTRAMUSCULAR; INTRAVENOUS ONCE
Status: COMPLETED | OUTPATIENT
Start: 2023-12-22 | End: 2023-12-22

## 2023-12-22 RX ORDER — METHYLPREDNISOLONE SODIUM SUCCINATE 125 MG/2ML
100 INJECTION, POWDER, LYOPHILIZED, FOR SOLUTION INTRAMUSCULAR; INTRAVENOUS ONCE
Status: CANCELLED | OUTPATIENT
Start: 2023-12-29

## 2023-12-22 RX ORDER — ACETAMINOPHEN 325 MG/1
650 TABLET ORAL ONCE
Status: CANCELLED
Start: 2023-12-29

## 2023-12-22 RX ORDER — MEPERIDINE HYDROCHLORIDE 25 MG/ML
25 INJECTION INTRAMUSCULAR; INTRAVENOUS; SUBCUTANEOUS EVERY 30 MIN PRN
Status: CANCELLED | OUTPATIENT
Start: 2023-12-29

## 2023-12-22 RX ORDER — EPINEPHRINE 1 MG/ML
0.3 INJECTION, SOLUTION, CONCENTRATE INTRAVENOUS EVERY 5 MIN PRN
Status: CANCELLED | OUTPATIENT
Start: 2023-12-29

## 2023-12-22 RX ORDER — ALBUTEROL SULFATE 90 UG/1
1-2 AEROSOL, METERED RESPIRATORY (INHALATION)
Status: CANCELLED
Start: 2023-12-29

## 2023-12-22 RX ADMIN — RITUXIMAB-ABBS 600 MG: 10 INJECTION, SOLUTION INTRAVENOUS at 10:00

## 2023-12-22 RX ADMIN — ACETAMINOPHEN 650 MG: 325 TABLET, FILM COATED ORAL at 09:00

## 2023-12-22 RX ADMIN — DIPHENHYDRAMINE HYDROCHLORIDE 50 MG: 25 CAPSULE ORAL at 09:00

## 2023-12-22 RX ADMIN — METHYLPREDNISOLONE SODIUM SUCCINATE 100 MG: 125 INJECTION, POWDER, FOR SOLUTION INTRAMUSCULAR; INTRAVENOUS at 09:08

## 2023-12-22 ASSESSMENT — PAIN SCALES - GENERAL: PAINLEVEL: NO PAIN (0)

## 2023-12-22 NOTE — TELEPHONE ENCOUNTER
Noted that labs were not drawn in [facility] today despite it being in visit note to draw. [facility] nurse said she did not see order until after patient left. Writer, using Tongan , left a detailed message for sister, Sofi regarding error and need to get labs done as soon as possible with the holiday weekend and reason for it.

## 2023-12-22 NOTE — PROGRESS NOTES
Infusion Nursing Note:  Wilfredo Della Yoon presents today for Rituximab infusion.    Patient seen by provider today: No   present during visit today: Yes, Language: Sammarinese.     Note: Patient states that he has been doing well. Reviewed questions with both patient and nephew.    Intravenous Access:  Peripheral IV placed.    Treatment Conditions:  Biological Infusion Checklist:  ~~~ NOTE: If the patient answers yes to any of the questions below, hold the infusion and contact ordering provider or on-call provider.    Have you recently had an elevated temperature, fever, chills, productive cough, coughing for 3 weeks or longer or hemoptysis,  abnormal vital signs, night sweats,  chest pain or have you noticed a decrease in your appetite, unexplained weight loss or fatigue? No  Do you have any open wounds or new incisions? No  Do you have any upcoming hospitalizations or surgeries? Does not include esophagogastroduodenoscopy, colonoscopy, endoscopic retrograde cholangiopancreatography (ERCP), endoscopic ultrasound (EUS), dental procedures or joint aspiration/steroid injections No  Do you currently have any signs of illness or infection or are you on any antibiotics? No  Have you had any new, sudden or worsening abdominal pain? No  Have you or anyone in your household received a live vaccination in the past 4 weeks? Please note: No live vaccines while on biologic/chemotherapy until 6 months after the last treatment. Patient can receive the flu vaccine (shot only), pneumovax and the Covid vaccine. It is optimal for the patient to get these vaccines mid cycle, but they can be given at any time as long as it is not on the day of the infusion. No  Have you recently been diagnosed with any new nervous system diseases (ie. Multiple sclerosis, Guillain [place], seizures, neurological changes) or cancer diagnosis? Are you on any form of radiation or chemotherapy? No  Are you pregnant or breast feeding or do you  have plans of pregnancy in the future? No  Have you been having any signs of worsening depression or suicidal ideations?  (benlysta only) No, NA  Have there been any other new onset medical symptoms? No  Have you had any new blood clots? (IVIG only) No      Post Infusion Assessment:  Patient tolerated infusion without incident.  Blood return noted pre and post infusion.  Site patent and intact, free from redness, edema or discomfort.  No evidence of extravasations.  Access discontinued per protocol.       Discharge Plan:   Discharge instructions reviewed with: Patient.  Patient and/or family verbalized understanding of discharge instructions and all questions answered.  Patient discharged in stable condition accompanied by: self. Nephew to  patient in lobby.  Departure Mode: Wheelchair.    EDUCATION POST BIOLOGICAL/CHEMOTHERAPY INFUSION  Call the triage nurse at your clinic or seek medical attention if you have chills and/or temperature greater than or equal to 100.5, uncontrolled nausea/vomiting, diarrhea, constipation, dizziness, shortness of breath, chest pain, heart palpitations, weakness or any other new or concerning symptoms, questions or concerns.  You can not have any live virus vaccines prior to or during treatment or up to 6 months post infusion.  If you have an upcoming surgery, medical procedure or dental procedure during treatment, this should be discussed with your ordering physician and your surgeon/dentist.  If you are having any concerning symptom, if you are unsure if you should get your next infusion or wish to speak to a provider before your next infusion, please call your care coordinator or triage nurse at your clinic to notify them so we can adequately serve you.      Terra Rodriguez RN    Addendum signed 01/05/24  10:24 AM

## 2024-01-03 DIAGNOSIS — I69.30 HISTORY OF CVA WITH RESIDUAL DEFICIT: ICD-10-CM

## 2024-01-03 NOTE — TELEPHONE ENCOUNTER
"Request for medication refill:  aspirin (ASA) 81 MG chewable tablet     Providers if patient needs an appointment and you are willing to give a one month supply please refill for one month and  send a letter/MyChart using \".SMILLIMITEDREFILL\" .smillimited and route chart to \"P [facility] \" (Giving one month refill in non controlled medications is strongly recommended before denial)    If refill has been denied, meaning absolutely no refills without visit, please complete the smart phrase \".smirxrefuse\" and route it to the \"P SMI MED REFILLS\"  pool to inform the patient and the pharmacy.    Duran Alvarez, CMA      "

## 2024-01-04 RX ORDER — ASPIRIN 81 MG/1
81 TABLET, CHEWABLE ORAL DAILY
Qty: 90 TABLET | Refills: 3 | Status: SHIPPED | OUTPATIENT
Start: 2024-01-04 | End: 2024-02-15

## 2024-01-05 ENCOUNTER — INFUSION THERAPY VISIT (OUTPATIENT)
Dept: INFUSION THERAPY | Facility: CLINIC | Age: 64
End: 2024-01-05
Attending: INTERNAL MEDICINE
Payer: MEDICARE

## 2024-01-05 VITALS
HEART RATE: 58 BPM | SYSTOLIC BLOOD PRESSURE: 145 MMHG | DIASTOLIC BLOOD PRESSURE: 95 MMHG | RESPIRATION RATE: 16 BRPM | TEMPERATURE: 97.4 F | OXYGEN SATURATION: 97 %

## 2024-01-05 DIAGNOSIS — N08 CRYOGLOBULINEMIC GLOMERULONEPHRITIS (H): Primary | ICD-10-CM

## 2024-01-05 DIAGNOSIS — N05.0 UNSPECIFIED NEPHRITIC SYNDROME WITH MINOR GLOMERULAR ABNORMALITY: ICD-10-CM

## 2024-01-05 DIAGNOSIS — D89.1 CRYOGLOBULINEMIC GLOMERULONEPHRITIS (H): Primary | ICD-10-CM

## 2024-01-05 DIAGNOSIS — E87.5 HYPERKALEMIA: ICD-10-CM

## 2024-01-05 DIAGNOSIS — R80.9 NEPHROTIC RANGE PROTEINURIA: ICD-10-CM

## 2024-01-05 LAB
ANION GAP SERPL CALCULATED.3IONS-SCNC: 10 MMOL/L (ref 7–15)
BUN SERPL-MCNC: 36.9 MG/DL (ref 8–23)
CALCIUM SERPL-MCNC: 9.1 MG/DL (ref 8.8–10.2)
CHLORIDE SERPL-SCNC: 107 MMOL/L (ref 98–107)
CREAT SERPL-MCNC: 2.14 MG/DL (ref 0.67–1.17)
DEPRECATED HCO3 PLAS-SCNC: 24 MMOL/L (ref 22–29)
EGFRCR SERPLBLD CKD-EPI 2021: 34 ML/MIN/1.73M2
GLUCOSE SERPL-MCNC: 92 MG/DL (ref 70–99)
POTASSIUM SERPL-SCNC: 4.1 MMOL/L (ref 3.4–5.3)
SODIUM SERPL-SCNC: 141 MMOL/L (ref 135–145)

## 2024-01-05 PROCEDURE — 80048 BASIC METABOLIC PNL TOTAL CA: CPT

## 2024-01-05 PROCEDURE — 36415 COLL VENOUS BLD VENIPUNCTURE: CPT

## 2024-01-05 PROCEDURE — 258N000003 HC RX IP 258 OP 636: Performed by: INTERNAL MEDICINE

## 2024-01-05 PROCEDURE — 250N000013 HC RX MED GY IP 250 OP 250 PS 637: Performed by: INTERNAL MEDICINE

## 2024-01-05 PROCEDURE — 96413 CHEMO IV INFUSION 1 HR: CPT

## 2024-01-05 PROCEDURE — 96415 CHEMO IV INFUSION ADDL HR: CPT

## 2024-01-05 PROCEDURE — 96375 TX/PRO/DX INJ NEW DRUG ADDON: CPT

## 2024-01-05 PROCEDURE — 250N000011 HC RX IP 250 OP 636: Mod: JZ | Performed by: INTERNAL MEDICINE

## 2024-01-05 RX ORDER — METHYLPREDNISOLONE SODIUM SUCCINATE 125 MG/2ML
100 INJECTION, POWDER, LYOPHILIZED, FOR SOLUTION INTRAMUSCULAR; INTRAVENOUS ONCE
Status: COMPLETED | OUTPATIENT
Start: 2024-01-05 | End: 2024-01-05

## 2024-01-05 RX ORDER — ACETAMINOPHEN 325 MG/1
650 TABLET ORAL ONCE
Status: CANCELLED
Start: 2024-01-12

## 2024-01-05 RX ORDER — ALBUTEROL SULFATE 0.83 MG/ML
2.5 SOLUTION RESPIRATORY (INHALATION)
Status: CANCELLED | OUTPATIENT
Start: 2024-01-12

## 2024-01-05 RX ORDER — HEPARIN SODIUM,PORCINE 10 UNIT/ML
5-20 VIAL (ML) INTRAVENOUS DAILY PRN
Status: CANCELLED | OUTPATIENT
Start: 2024-01-12

## 2024-01-05 RX ORDER — DIPHENHYDRAMINE HYDROCHLORIDE 50 MG/ML
50 INJECTION INTRAMUSCULAR; INTRAVENOUS
Status: CANCELLED
Start: 2024-01-12

## 2024-01-05 RX ORDER — HEPARIN SODIUM (PORCINE) LOCK FLUSH IV SOLN 100 UNIT/ML 100 UNIT/ML
5 SOLUTION INTRAVENOUS
Status: CANCELLED | OUTPATIENT
Start: 2024-01-12

## 2024-01-05 RX ORDER — MEPERIDINE HYDROCHLORIDE 25 MG/ML
25 INJECTION INTRAMUSCULAR; INTRAVENOUS; SUBCUTANEOUS EVERY 30 MIN PRN
Status: CANCELLED | OUTPATIENT
Start: 2024-01-12

## 2024-01-05 RX ORDER — METHYLPREDNISOLONE SODIUM SUCCINATE 125 MG/2ML
125 INJECTION, POWDER, LYOPHILIZED, FOR SOLUTION INTRAMUSCULAR; INTRAVENOUS
Status: CANCELLED
Start: 2024-01-12

## 2024-01-05 RX ORDER — DIPHENHYDRAMINE HCL 25 MG
50 CAPSULE ORAL ONCE
Status: CANCELLED
Start: 2024-01-12

## 2024-01-05 RX ORDER — METHYLPREDNISOLONE SODIUM SUCCINATE 125 MG/2ML
100 INJECTION, POWDER, LYOPHILIZED, FOR SOLUTION INTRAMUSCULAR; INTRAVENOUS ONCE
Status: CANCELLED | OUTPATIENT
Start: 2024-01-12

## 2024-01-05 RX ORDER — EPINEPHRINE 1 MG/ML
0.3 INJECTION, SOLUTION, CONCENTRATE INTRAVENOUS EVERY 5 MIN PRN
Status: CANCELLED | OUTPATIENT
Start: 2024-01-12

## 2024-01-05 RX ORDER — DIPHENHYDRAMINE HCL 25 MG
50 CAPSULE ORAL ONCE
Status: COMPLETED | OUTPATIENT
Start: 2024-01-05 | End: 2024-01-05

## 2024-01-05 RX ORDER — ALBUTEROL SULFATE 90 UG/1
1-2 AEROSOL, METERED RESPIRATORY (INHALATION)
Status: CANCELLED
Start: 2024-01-12

## 2024-01-05 RX ORDER — ACETAMINOPHEN 325 MG/1
650 TABLET ORAL ONCE
Status: COMPLETED | OUTPATIENT
Start: 2024-01-05 | End: 2024-01-05

## 2024-01-05 RX ADMIN — ACETAMINOPHEN 650 MG: 325 TABLET, FILM COATED ORAL at 08:40

## 2024-01-05 RX ADMIN — METHYLPREDNISOLONE SODIUM SUCCINATE 100 MG: 125 INJECTION, POWDER, FOR SOLUTION INTRAMUSCULAR; INTRAVENOUS at 08:42

## 2024-01-05 RX ADMIN — DIPHENHYDRAMINE HYDROCHLORIDE 50 MG: 25 CAPSULE ORAL at 08:40

## 2024-01-05 RX ADMIN — RITUXIMAB-ABBS 600 MG: 10 INJECTION, SOLUTION INTRAVENOUS at 09:11

## 2024-01-05 ASSESSMENT — PAIN SCALES - GENERAL: PAINLEVEL: NO PAIN (0)

## 2024-01-05 NOTE — PROGRESS NOTES
Infusion Nursing Note:  Wilfredoliz Yoon presents today for Rituxan.    Patient seen by provider today: No   present during visit today: Yes, Language: Sammarinese.     Note: Pt arrived per personal WC.  Transferred to chair with assist of 2.    Tylenol, Benadryl, and solu medrol given as pre meds.       Intravenous Access:  Peripheral IV placed.    Treatment Conditions:  Biological Infusion Checklist:  ~~~ NOTE: If the patient answers yes to any of the questions below, hold the infusion and contact ordering provider or on-call provider.    Have you recently had an elevated temperature, fever, chills, productive cough, coughing for 3 weeks or longer or hemoptysis,  abnormal vital signs, night sweats,  chest pain or have you noticed a decrease in your appetite, unexplained weight loss or fatigue? No  Do you have any open wounds or new incisions? No  Do you have any upcoming hospitalizations or surgeries? Does not include esophagogastroduodenoscopy, colonoscopy, endoscopic retrograde cholangiopancreatography (ERCP), endoscopic ultrasound (EUS), dental procedures or joint aspiration/steroid injections No  Do you currently have any signs of illness or infection or are you on any antibiotics? No  Have you had any new, sudden or worsening abdominal pain? No  Have you or anyone in your household received a live vaccination in the past 4 weeks? Please note: No live vaccines while on biologic/chemotherapy until 6 months after the last treatment. Patient can receive the flu vaccine (shot only), pneumovax and the Covid vaccine. It is optimal for the patient to get these vaccines mid cycle, but they can be given at any time as long as it is not on the day of the infusion. No  Have you recently been diagnosed with any new nervous system diseases (ie. Multiple sclerosis, Guillain [place], seizures, neurological changes) or cancer diagnosis? Are you on any form of radiation or chemotherapy? No  Are you pregnant or  breast feeding or do you have plans of pregnancy in the future? No  Have you been having any signs of worsening depression or suicidal ideations?  (benlysta only) No  Have there been any other new onset medical symptoms? No  Have you had any new blood clots? (IVIG only) No      Post Infusion Assessment:  Patient tolerated infusion without incident.  No evidence of extravasations.  Access discontinued per protocol.  Biologic Infusion Post Education: Call the triage nurse at your clinic or seek medical attention if you have chills and/or temperature greater than or equal to 100.5, uncontrolled nausea/vomiting, diarrhea, constipation, dizziness, shortness of breath, chest pain, heart palpitations, weakness or any other new or concerning symptoms, questions or concerns.  You cannot have any live virus vaccines prior to or during treatment or up to 6 months post infusion.  If you have an upcoming surgery, medical procedure or dental procedure during treatment, this should be discussed with your ordering physician and your surgeon/dentist.  If you are having any concerning symptom, if you are unsure if you should get your next infusion or wish to speak to a provider before your next infusion, please call your care coordinator or triage nurse at your clinic to notify them so we can adequately serve you.       Discharge Plan:   Patient and/or family verbalized understanding of discharge instructions and all questions answered.  Patient discharged in stable condition accompanied by: nephew.  Departure Mode: Wheelchair.      JENNIFER PONCE RN

## 2024-01-09 ENCOUNTER — TELEPHONE (OUTPATIENT)
Dept: GASTROENTEROLOGY | Facility: CLINIC | Age: 64
End: 2024-01-09
Payer: MEDICARE

## 2024-01-09 DIAGNOSIS — B18.1 CHRONIC VIRAL HEPATITIS B WITHOUT DELTA AGENT AND WITHOUT COMA (H): ICD-10-CM

## 2024-01-09 NOTE — TELEPHONE ENCOUNTER
Tried calling patient with the help of an interpretor but was forwarded to voicemail. Had interpretor leave a voicemail on patient's mobile number asking him to please call the Hepatology clinic back regarding his Hepatitis B medication.    Hepatology team (including Dr. Mei MD) would like him to change his Entecavir dose from 1 mg daily to once every other day in the setting of his chronic kidney disease.     If patient calls back, please advise him to change this.     He is scheduled to see me back in Hepatology clinic 6/14/24.     Thanks,     Camilla Briggs PA-C

## 2024-01-10 DIAGNOSIS — I69.30 HISTORY OF CVA WITH RESIDUAL DEFICIT: ICD-10-CM

## 2024-01-10 RX ORDER — ATORVASTATIN CALCIUM 40 MG/1
TABLET, FILM COATED ORAL
Qty: 90 TABLET | Refills: 0 | Status: SHIPPED | OUTPATIENT
Start: 2024-01-10 | End: 2024-05-28

## 2024-01-10 NOTE — TELEPHONE ENCOUNTER
"Request for medication refill:    atorvastatin (LIPITOR) 40 MG tablet     Providers if patient needs an appointment and you are willing to give a one month supply please refill for one month and  send a letter/MyChart using \".SMILLIMITEDREFILL\" .smillimited and route chart to \"P [facility] \" (Giving one month refill in non controlled medications is strongly recommended before denial)    If refill has been denied, meaning absolutely no refills without visit, please complete the smart phrase \".smirxrefuse\" and route it to the \"P [facility] MED REFILLS\"  pool to inform the patient and the pharmacy.    Micaela Carcamo MA      "

## 2024-01-10 NOTE — CONFIDENTIAL NOTE
90 days of atorvastatin reordered  Patient is due for annual wellness visit     - please call patient to schedule Annual wellness visit with Dr. Fairbanks, next available    -Angela Cisneros MD

## 2024-01-12 ENCOUNTER — INFUSION THERAPY VISIT (OUTPATIENT)
Dept: INFUSION THERAPY | Facility: CLINIC | Age: 64
End: 2024-01-12
Attending: INTERNAL MEDICINE
Payer: MEDICARE

## 2024-01-12 VITALS
OXYGEN SATURATION: 98 % | DIASTOLIC BLOOD PRESSURE: 89 MMHG | HEART RATE: 58 BPM | SYSTOLIC BLOOD PRESSURE: 144 MMHG | RESPIRATION RATE: 20 BRPM | TEMPERATURE: 97.4 F

## 2024-01-12 DIAGNOSIS — N05.0 UNSPECIFIED NEPHRITIC SYNDROME WITH MINOR GLOMERULAR ABNORMALITY: ICD-10-CM

## 2024-01-12 DIAGNOSIS — R80.9 NEPHROTIC RANGE PROTEINURIA: ICD-10-CM

## 2024-01-12 DIAGNOSIS — N08 CRYOGLOBULINEMIC GLOMERULONEPHRITIS (H): Primary | ICD-10-CM

## 2024-01-12 DIAGNOSIS — D89.1 CRYOGLOBULINEMIC GLOMERULONEPHRITIS (H): Primary | ICD-10-CM

## 2024-01-12 PROCEDURE — 258N000003 HC RX IP 258 OP 636: Performed by: INTERNAL MEDICINE

## 2024-01-12 PROCEDURE — 96413 CHEMO IV INFUSION 1 HR: CPT

## 2024-01-12 PROCEDURE — 96415 CHEMO IV INFUSION ADDL HR: CPT

## 2024-01-12 PROCEDURE — 250N000013 HC RX MED GY IP 250 OP 250 PS 637: Performed by: INTERNAL MEDICINE

## 2024-01-12 PROCEDURE — 250N000011 HC RX IP 250 OP 636: Performed by: INTERNAL MEDICINE

## 2024-01-12 PROCEDURE — 96375 TX/PRO/DX INJ NEW DRUG ADDON: CPT

## 2024-01-12 RX ORDER — MEPERIDINE HYDROCHLORIDE 25 MG/ML
25 INJECTION INTRAMUSCULAR; INTRAVENOUS; SUBCUTANEOUS EVERY 30 MIN PRN
Status: CANCELLED | OUTPATIENT
Start: 2024-01-19

## 2024-01-12 RX ORDER — DIPHENHYDRAMINE HCL 25 MG
50 CAPSULE ORAL ONCE
Status: CANCELLED
Start: 2024-01-19

## 2024-01-12 RX ORDER — ACETAMINOPHEN 325 MG/1
650 TABLET ORAL ONCE
Status: CANCELLED
Start: 2024-01-19

## 2024-01-12 RX ORDER — HEPARIN SODIUM,PORCINE 10 UNIT/ML
5-20 VIAL (ML) INTRAVENOUS DAILY PRN
Status: CANCELLED | OUTPATIENT
Start: 2024-01-19

## 2024-01-12 RX ORDER — EPINEPHRINE 1 MG/ML
0.3 INJECTION, SOLUTION, CONCENTRATE INTRAVENOUS EVERY 5 MIN PRN
Status: CANCELLED | OUTPATIENT
Start: 2024-01-19

## 2024-01-12 RX ORDER — METHYLPREDNISOLONE SODIUM SUCCINATE 125 MG/2ML
100 INJECTION, POWDER, LYOPHILIZED, FOR SOLUTION INTRAMUSCULAR; INTRAVENOUS ONCE
Qty: 2 ML | Refills: 0 | Status: COMPLETED | OUTPATIENT
Start: 2024-01-12 | End: 2024-01-12

## 2024-01-12 RX ORDER — METHYLPREDNISOLONE SODIUM SUCCINATE 125 MG/2ML
100 INJECTION, POWDER, LYOPHILIZED, FOR SOLUTION INTRAMUSCULAR; INTRAVENOUS ONCE
Status: CANCELLED | OUTPATIENT
Start: 2024-01-19

## 2024-01-12 RX ORDER — ALBUTEROL SULFATE 0.83 MG/ML
2.5 SOLUTION RESPIRATORY (INHALATION)
Status: CANCELLED | OUTPATIENT
Start: 2024-01-19

## 2024-01-12 RX ORDER — HEPARIN SODIUM (PORCINE) LOCK FLUSH IV SOLN 100 UNIT/ML 100 UNIT/ML
5 SOLUTION INTRAVENOUS
Status: CANCELLED | OUTPATIENT
Start: 2024-01-19

## 2024-01-12 RX ORDER — DIPHENHYDRAMINE HCL 25 MG
50 CAPSULE ORAL ONCE
Qty: 2 CAPSULE | Refills: 0 | Status: COMPLETED | OUTPATIENT
Start: 2024-01-12 | End: 2024-01-12

## 2024-01-12 RX ORDER — ALBUTEROL SULFATE 90 UG/1
1-2 AEROSOL, METERED RESPIRATORY (INHALATION)
Status: CANCELLED
Start: 2024-01-19

## 2024-01-12 RX ORDER — DIPHENHYDRAMINE HYDROCHLORIDE 50 MG/ML
50 INJECTION INTRAMUSCULAR; INTRAVENOUS
Status: CANCELLED
Start: 2024-01-19

## 2024-01-12 RX ORDER — ACETAMINOPHEN 325 MG/1
650 TABLET ORAL ONCE
Qty: 2 TABLET | Refills: 0 | Status: COMPLETED | OUTPATIENT
Start: 2024-01-12 | End: 2024-01-12

## 2024-01-12 RX ORDER — METHYLPREDNISOLONE SODIUM SUCCINATE 125 MG/2ML
125 INJECTION, POWDER, LYOPHILIZED, FOR SOLUTION INTRAMUSCULAR; INTRAVENOUS
Status: CANCELLED
Start: 2024-01-19

## 2024-01-12 RX ADMIN — DIPHENHYDRAMINE HYDROCHLORIDE 50 MG: 25 CAPSULE ORAL at 10:41

## 2024-01-12 RX ADMIN — RITUXIMAB-ABBS 600 MG: 10 INJECTION, SOLUTION INTRAVENOUS at 11:04

## 2024-01-12 RX ADMIN — METHYLPREDNISOLONE SODIUM SUCCINATE 100 MG: 125 INJECTION, POWDER, FOR SOLUTION INTRAMUSCULAR; INTRAVENOUS at 10:45

## 2024-01-12 RX ADMIN — ACETAMINOPHEN 650 MG: 325 TABLET, FILM COATED ORAL at 10:41

## 2024-01-12 NOTE — PROGRESS NOTES
Infusion Nursing Note:  Wilfredoliz Yoon presents today for week 3 rituxan.    Patient seen by provider today: No   present during visit today: Yes, Language: Icelandic.     Note: Patient states he is feeling better.      Intravenous Access:  Peripheral IV placed.    Treatment Conditions:  Biological Infusion Checklist:  ~~~ NOTE: If the patient answers yes to any of the questions below, hold the infusion and contact ordering provider or on-call provider.    Have you recently had an elevated temperature, fever, chills, productive cough, coughing for 3 weeks or longer or hemoptysis,  abnormal vital signs, night sweats,  chest pain or have you noticed a decrease in your appetite, unexplained weight loss or fatigue? No  Do you have any open wounds or new incisions? No  Do you have any upcoming hospitalizations or surgeries? Does not include esophagogastroduodenoscopy, colonoscopy, endoscopic retrograde cholangiopancreatography (ERCP), endoscopic ultrasound (EUS), dental procedures or joint aspiration/steroid injections No  Do you currently have any signs of illness or infection or are you on any antibiotics? No  Have you had any new, sudden or worsening abdominal pain? No  Have you or anyone in your household received a live vaccination in the past 4 weeks? Please note: No live vaccines while on biologic/chemotherapy until 6 months after the last treatment. Patient can receive the flu vaccine (shot only), pneumovax and the Covid vaccine. It is optimal for the patient to get these vaccines mid cycle, but they can be given at any time as long as it is not on the day of the infusion. No  Have you recently been diagnosed with any new nervous system diseases (ie. Multiple sclerosis, Guillain [place], seizures, neurological changes) or cancer diagnosis? Are you on any form of radiation or chemotherapy? No  Are you pregnant or breast feeding or do you have plans of pregnancy in the future? No  Have you been  having any signs of worsening depression or suicidal ideations?  (benlysta only) No  Have there been any other new onset medical symptoms? No  Have you had any new blood clots? (IVIG only) No      Post Infusion Assessment:  Patient tolerated infusion without incident.  Blood return noted pre and post infusion.  Site patent and intact, free from redness, edema or discomfort.  No evidence of extravasations.  Access discontinued per protocol.  Biologic Infusion Post Education: Call the triage nurse at your clinic or seek medical attention if you have chills and/or temperature greater than or equal to 100.5, uncontrolled nausea/vomiting, diarrhea, constipation, dizziness, shortness of breath, chest pain, heart palpitations, weakness or any other new or concerning symptoms, questions or concerns.  You cannot have any live virus vaccines prior to or during treatment or up to 6 months post infusion.  If you have an upcoming surgery, medical procedure or dental procedure during treatment, this should be discussed with your ordering physician and your surgeon/dentist.  If you are having any concerning symptom, if you are unsure if you should get your next infusion or wish to speak to a provider before your next infusion, please call your care coordinator or triage nurse at your clinic to notify them so we can adequately serve you.       Discharge Plan:   AVS to patient via [employer].  Patient will return 1/19 for next appointment.   Patient discharged in stable condition accompanied by: nephew.  Departure Mode: Wheelchair.      Marlyn Spence RN

## 2024-01-19 ENCOUNTER — INFUSION THERAPY VISIT (OUTPATIENT)
Dept: INFUSION THERAPY | Facility: CLINIC | Age: 64
End: 2024-01-19
Attending: INTERNAL MEDICINE
Payer: MEDICARE

## 2024-01-19 VITALS
HEART RATE: 73 BPM | OXYGEN SATURATION: 98 % | SYSTOLIC BLOOD PRESSURE: 138 MMHG | WEIGHT: 115.8 LBS | DIASTOLIC BLOOD PRESSURE: 98 MMHG | TEMPERATURE: 97.6 F | BODY MASS INDEX: 19.29 KG/M2

## 2024-01-19 DIAGNOSIS — D89.1 CRYOGLOBULINEMIC GLOMERULONEPHRITIS (H): Primary | ICD-10-CM

## 2024-01-19 DIAGNOSIS — N05.0 UNSPECIFIED NEPHRITIC SYNDROME WITH MINOR GLOMERULAR ABNORMALITY: ICD-10-CM

## 2024-01-19 DIAGNOSIS — N08 CRYOGLOBULINEMIC GLOMERULONEPHRITIS (H): Primary | ICD-10-CM

## 2024-01-19 DIAGNOSIS — R80.9 NEPHROTIC RANGE PROTEINURIA: ICD-10-CM

## 2024-01-19 PROCEDURE — 96413 CHEMO IV INFUSION 1 HR: CPT

## 2024-01-19 PROCEDURE — 96415 CHEMO IV INFUSION ADDL HR: CPT

## 2024-01-19 PROCEDURE — 250N000013 HC RX MED GY IP 250 OP 250 PS 637: Performed by: INTERNAL MEDICINE

## 2024-01-19 PROCEDURE — 258N000003 HC RX IP 258 OP 636: Performed by: INTERNAL MEDICINE

## 2024-01-19 PROCEDURE — 250N000011 HC RX IP 250 OP 636: Mod: JZ | Performed by: INTERNAL MEDICINE

## 2024-01-19 PROCEDURE — 96375 TX/PRO/DX INJ NEW DRUG ADDON: CPT

## 2024-01-19 RX ORDER — EPINEPHRINE 1 MG/ML
0.3 INJECTION, SOLUTION, CONCENTRATE INTRAVENOUS EVERY 5 MIN PRN
OUTPATIENT
Start: 2024-01-19

## 2024-01-19 RX ORDER — METHYLPREDNISOLONE SODIUM SUCCINATE 125 MG/2ML
100 INJECTION, POWDER, LYOPHILIZED, FOR SOLUTION INTRAMUSCULAR; INTRAVENOUS ONCE
Status: COMPLETED | OUTPATIENT
Start: 2024-01-19 | End: 2024-01-19

## 2024-01-19 RX ORDER — DIPHENHYDRAMINE HCL 25 MG
50 CAPSULE ORAL ONCE
Status: COMPLETED | OUTPATIENT
Start: 2024-01-19 | End: 2024-01-19

## 2024-01-19 RX ORDER — ALBUTEROL SULFATE 90 UG/1
1-2 AEROSOL, METERED RESPIRATORY (INHALATION)
Start: 2024-01-19

## 2024-01-19 RX ORDER — MEPERIDINE HYDROCHLORIDE 25 MG/ML
25 INJECTION INTRAMUSCULAR; INTRAVENOUS; SUBCUTANEOUS EVERY 30 MIN PRN
OUTPATIENT
Start: 2024-01-19

## 2024-01-19 RX ORDER — ACETAMINOPHEN 325 MG/1
650 TABLET ORAL ONCE
Start: 2024-01-19

## 2024-01-19 RX ORDER — HEPARIN SODIUM,PORCINE 10 UNIT/ML
5-20 VIAL (ML) INTRAVENOUS DAILY PRN
OUTPATIENT
Start: 2024-01-19

## 2024-01-19 RX ORDER — DIPHENHYDRAMINE HYDROCHLORIDE 50 MG/ML
50 INJECTION INTRAMUSCULAR; INTRAVENOUS
Start: 2024-01-19

## 2024-01-19 RX ORDER — DIPHENHYDRAMINE HCL 25 MG
50 CAPSULE ORAL ONCE
Start: 2024-01-19

## 2024-01-19 RX ORDER — ACETAMINOPHEN 325 MG/1
650 TABLET ORAL ONCE
Status: COMPLETED | OUTPATIENT
Start: 2024-01-19 | End: 2024-01-19

## 2024-01-19 RX ORDER — ALBUTEROL SULFATE 0.83 MG/ML
2.5 SOLUTION RESPIRATORY (INHALATION)
OUTPATIENT
Start: 2024-01-19

## 2024-01-19 RX ORDER — HEPARIN SODIUM (PORCINE) LOCK FLUSH IV SOLN 100 UNIT/ML 100 UNIT/ML
5 SOLUTION INTRAVENOUS
OUTPATIENT
Start: 2024-01-19

## 2024-01-19 RX ORDER — METHYLPREDNISOLONE SODIUM SUCCINATE 125 MG/2ML
125 INJECTION, POWDER, LYOPHILIZED, FOR SOLUTION INTRAMUSCULAR; INTRAVENOUS
Start: 2024-01-19

## 2024-01-19 RX ORDER — METHYLPREDNISOLONE SODIUM SUCCINATE 125 MG/2ML
100 INJECTION, POWDER, LYOPHILIZED, FOR SOLUTION INTRAMUSCULAR; INTRAVENOUS ONCE
OUTPATIENT
Start: 2024-01-19

## 2024-01-19 RX ADMIN — RITUXIMAB-ABBS 600 MG: 10 INJECTION, SOLUTION INTRAVENOUS at 10:20

## 2024-01-19 RX ADMIN — ACETAMINOPHEN 650 MG: 325 TABLET, FILM COATED ORAL at 09:54

## 2024-01-19 RX ADMIN — METHYLPREDNISOLONE SODIUM SUCCINATE 100 MG: 125 INJECTION, POWDER, FOR SOLUTION INTRAMUSCULAR; INTRAVENOUS at 09:54

## 2024-01-19 RX ADMIN — DIPHENHYDRAMINE HYDROCHLORIDE 50 MG: 25 CAPSULE ORAL at 09:54

## 2024-01-19 NOTE — PROGRESS NOTES
Infusion Nursing Note:  Wilfredoliz Yoon presents today for Rituxin.    Patient seen by provider today: No   present during visit today: Yes, Language: Romanian.     Note: Pt here for 4th Rituxin infusion. Infusion done rapid. Assessment done with use of . Johnathon Powell transporting. Pt arrived in  and transferred with assist of 1-2.       Intravenous Access:  Peripheral IV placed.    Treatment Conditions:  Biological Infusion Checklist:  ~~~ NOTE: If the patient answers yes to any of the questions below, hold the infusion and contact ordering provider or on-call provider.    Have you recently had an elevated temperature, fever, chills, productive cough, coughing for 3 weeks or longer or hemoptysis,  abnormal vital signs, night sweats,  chest pain or have you noticed a decrease in your appetite, unexplained weight loss or fatigue? No  Do you have any open wounds or new incisions? No  Do you have any upcoming hospitalizations or surgeries? Does not include esophagogastroduodenoscopy, colonoscopy, endoscopic retrograde cholangiopancreatography (ERCP), endoscopic ultrasound (EUS), dental procedures or joint aspiration/steroid injections No  Do you currently have any signs of illness or infection or are you on any antibiotics? No  Have you had any new, sudden or worsening abdominal pain? No  Have you or anyone in your household received a live vaccination in the past 4 weeks? Please note: No live vaccines while on biologic/chemotherapy until 6 months after the last treatment. Patient can receive the flu vaccine (shot only), pneumovax and the Covid vaccine. It is optimal for the patient to get these vaccines mid cycle, but they can be given at any time as long as it is not on the day of the infusion. No  Have you recently been diagnosed with any new nervous system diseases (ie. Multiple sclerosis, Guillain [place], seizures, neurological changes) or cancer diagnosis? Are you on any form of  radiation or chemotherapy? No  Are you pregnant or breast feeding or do you have plans of pregnancy in the future? No  Have you been having any signs of worsening depression or suicidal ideations?  (benlysta only) No  Have there been any other new onset medical symptoms? No  Have you had any new blood clots? (IVIG only) No      Post Infusion Assessment:  Patient tolerated infusion without incident.  Site patent and intact, free from redness, edema or discomfort.  No evidence of extravasations.  Access discontinued per protocol.   Biologic Infusion Post Education: Call the triage nurse at your clinic or seek medical attention if you have chills and/or temperature greater than or equal to 100.5, uncontrolled nausea/vomiting, diarrhea, constipation, dizziness, shortness of breath, chest pain, heart palpitations, weakness or any other new or concerning symptoms, questions or concerns.  You cannot have any live virus vaccines prior to or during treatment or up to 6 months post infusion.  If you have an upcoming surgery, medical procedure or dental procedure during treatment, this should be discussed with your ordering physician and your surgeon/dentist.  If you are having any concerning symptom, if you are unsure if you should get your next infusion or wish to speak to a provider before your next infusion, please call your care coordinator or triage nurse at your clinic to notify them so we can adequately serve you.       Discharge Plan:   Discharge instructions reviewed with: Patient.  Patient and/or family verbalized understanding of discharge instructions and all questions answered.  Patient discharged in stable condition accompanied by: self.  Departure Mode: Wheelchair.      Lisa Centeno RN

## 2024-01-31 DIAGNOSIS — N18.31 STAGE 3A CHRONIC KIDNEY DISEASE (H): ICD-10-CM

## 2024-01-31 DIAGNOSIS — I10 ESSENTIAL HYPERTENSION, MALIGNANT: ICD-10-CM

## 2024-02-01 RX ORDER — FUROSEMIDE 20 MG
10 TABLET ORAL DAILY
Qty: 60 TABLET | Refills: 1 | Status: SHIPPED | OUTPATIENT
Start: 2024-02-01

## 2024-02-01 RX ORDER — AMLODIPINE BESYLATE 10 MG/1
10 TABLET ORAL DAILY
Qty: 90 TABLET | Refills: 3 | Status: SHIPPED | OUTPATIENT
Start: 2024-02-01

## 2024-02-01 NOTE — TELEPHONE ENCOUNTER
"Request for medication refill:  amLODIPine (NORVASC) 10 MG tablet     Providers if patient needs an appointment and you are willing to give a one month supply please refill for one month and  send a letter/MyChart using \".SMILLIMITEDREFILL\" .smillimited and route chart to \"P [facility] \" (Giving one month refill in non controlled medications is strongly recommended before denial)    If refill has been denied, meaning absolutely no refills without visit, please complete the smart phrase \".smirxrefuse\" and route it to the \"P [facility] MED REFILLS\"  pool to inform the patient and the pharmacy.    Duran Alvarez, [facility]      "

## 2024-02-05 ENCOUNTER — PATIENT OUTREACH (OUTPATIENT)
Dept: NEPHROLOGY | Facility: CLINIC | Age: 64
End: 2024-02-05

## 2024-02-05 DIAGNOSIS — R80.9 NEPHROTIC RANGE PROTEINURIA: ICD-10-CM

## 2024-02-05 DIAGNOSIS — D89.1 CRYOGLOBULINEMIC GLOMERULONEPHRITIS (H): Primary | ICD-10-CM

## 2024-02-05 DIAGNOSIS — N08 CRYOGLOBULINEMIC GLOMERULONEPHRITIS (H): Primary | ICD-10-CM

## 2024-02-05 NOTE — PROGRESS NOTES
Update from Dr. Harrison requesting 4-8wk follow up with labs prior to appt:Cryo identification Cryo quant, CBC, renal panel, C3, C4, ESR, CRP, UA, urine protein, LFT, HBV VL.   Labs ordered/pended.   katia Fuentes  assisted in calling patient - left message for Sofi marin: [phone] to return call to schedule follow up with labs in 4-8 week before March 22.    Nevin Marx RN, BSN, PHN  [facility] Nephrology Nurse Navigator  [phone]

## 2024-02-14 DIAGNOSIS — I69.30 HISTORY OF CVA WITH RESIDUAL DEFICIT: ICD-10-CM

## 2024-02-14 DIAGNOSIS — E43 SEVERE PROTEIN-CALORIE MALNUTRITION (H): ICD-10-CM

## 2024-02-14 NOTE — TELEPHONE ENCOUNTER
"Request for medication refill:    aspirin (ASA) 81 MG chewable tablet     Multiple Vitamin (MULTI VITAMIN DAILY) TABS     Providers if patient needs an appointment and you are willing to give a one month supply please refill for one month and  send a letter/MyChart using \".SMILLIMITEDREFILL\" .smillimited and route chart to \"P [facility] \" (Giving one month refill in non controlled medications is strongly recommended before denial)    If refill has been denied, meaning absolutely no refills without visit, please complete the smart phrase \".smirxrefuse\" and route it to the \"P [facility] MED REFILLS\"  pool to inform the patient and the pharmacy.    Micaela Carcamo MA      "

## 2024-02-15 RX ORDER — ASPIRIN 81 MG/1
81 TABLET, CHEWABLE ORAL DAILY
Qty: 90 TABLET | Refills: 3 | Status: SHIPPED | OUTPATIENT
Start: 2024-02-15

## 2024-02-15 RX ORDER — MULTIVITAMIN
1 TABLET ORAL DAILY
Qty: 90 TABLET | Refills: 3 | Status: SHIPPED | OUTPATIENT
Start: 2024-02-15

## 2024-02-22 NOTE — PROGRESS NOTES
Vasculitis and Lupus Program Note: Patient Outreach Encounter    REASON FOR CALL:     REASON FOR CALL: GN Care Coordination, clarrification of med regimen                                                           SITUATION/BACKROUND:     Patient is being treated for Cryo GN    Per provider instruction, writer to follow up on medication change. and symptom follow up.  Inquiry sent from CM requesting clarification of medications.    ASSESSMENT:     Nurse Assessments:  Medications  Nephology medication reconciliation completed: Yes  Any barriers to taking medication(s) as prescribed?  No- although clarified Nephrology did not instruct patient to hold his entecavir.  Family will make sure patient starts taking again as ordered.   Taking over the counter medication(s?) No          amLODIPine (NORVASC) 10 MG tablet TAKE ONE TABLET BY MOUTH EVERY DAY FOR HYPERTENSION    carvedilol (COREG) 12.5 MG tablet TAKE 1 TABLET (12.5 MG) BY MOUTH 2 TIMES DAILY (WITH MEALS)    furosemide (LASIX) 20 MG tablet   TAKE 0.5 TABLETS (10 MG) BY MOUTH DAILY    losartan (COZAAR) 25 MG tablet Take 1 tablet (25 mg) by mouth daily for 210 days    spironolactone (ALDACTONE) 50 MG tablet TAKE 2 TABLET ORALLY ONCE A DAY 30 DAYS     Confirmed patient is taking all blood pressure meds as listed above.  Family unable to recall today's blood pressure from nurse visit.  Denies any headaches, izziness/lightheadedness, or other hypo/hypertensive complications.  Denies swelling in feet or other concerns at this time.  Dr. Harrison updated and we will maintain current med regimen as he has been on these meds since fall and wait for blood pressure readings from home care nurse to determine changes to med regimen at that time.   Current Outpatient Medications (Other)   Medication Sig    acetaminophen (TYLENOL) 325 MG tablet Take 1 tablet (325 mg) by mouth every 4 hours as needed for mild pain or fever    aspirin (ASA) 81 MG chewable tablet TAKE 1 TABLET (81 MG)  BY MOUTH DAILY INDICATION: STROKE    atorvastatin (LIPITOR) 40 MG tablet TAKE 1 TABLET BY MOUTH EVERY EVENING FOR STROKE    diclofenac (VOLTAREN) 1 % topical gel Apply 4 grams to knees or 2 grams to hands four times daily using enclosed dosing card.    docusate sodium (COLACE) 100 MG capsule Take 1 capsule (100 mg) by mouth 2 times daily as needed for constipation    entecavir (BARACLUDE) 1 MG tablet Take 1 tablet (1 mg) every other day- stopped     gabapentin (NEURONTIN) 100 MG capsule TAKE 1 CAPSULE (100 MG) BY MOUTH 3 TIMES DAILY    HM CETIRIZINE HCL 10 MG tablet 1 TABLET ORALLY ONCE A DAY 30 DAYS- not taking    ibuprofen (ADVIL/MOTRIN) 400 MG tablet     Multiple Vitamin (MULTI VITAMIN DAILY) TABS TAKE 1 TABLET BY MOUTH DAILY    polyethylene glycol (MIRALAX) 17 GM/Dose powder Take 17 g by mouth daily as needed for constipation Hold for loose stools/diarrhea    vitamin D3 (CHOLECALCIFEROL) 50 mcg (2000 units) tablet 1 TABLET ORALLY ONCE A DAY 30 DAYS     Blood Pressure checked today by home nurse- normal for him- unable to confirm.    PLAN:     Education:  Method:  general discussion/verbal explanation  Discussed: medications and requested Home Care nurse call with next visit to review vitals.     Follow Up:   Follow up call in 1-2 weeks with vitals.  Provider present during phone so aware of updates.  We will keep regimen as is until we have vitals and as long as patient or family do not notice he has any Hyper/Hypotensive symptoms/signs.    Patient to follow up at next scheduled appointment with provider. Appt scheduled with labs 1hr before, confirmed date and time with family.  Labs ordered. Added Renal and Liver panel to labs drawn today as V.O. DR. Harrison yesterday.    Patient and family caregiver verbalized understanding and will follow up as recommended.    Nevin Marx RN  [facility]: [phone]

## 2024-02-23 ENCOUNTER — LAB (OUTPATIENT)
Dept: LAB | Facility: CLINIC | Age: 64
End: 2024-02-23
Payer: MEDICARE

## 2024-02-23 DIAGNOSIS — D89.1 CRYOGLOBULINEMIC GLOMERULONEPHRITIS (H): Primary | ICD-10-CM

## 2024-02-23 DIAGNOSIS — N18.32 STAGE 3B CHRONIC KIDNEY DISEASE (H): ICD-10-CM

## 2024-02-23 DIAGNOSIS — N08 CRYOGLOBULINEMIC GLOMERULONEPHRITIS (H): Primary | ICD-10-CM

## 2024-02-23 DIAGNOSIS — R80.9 NEPHROTIC RANGE PROTEINURIA: ICD-10-CM

## 2024-02-23 LAB
ALBUMIN SERPL BCG-MCNC: 3.2 G/DL (ref 3.5–5.2)
ALBUMIN SERPL BCG-MCNC: 3.2 G/DL (ref 3.5–5.2)
ALBUMIN SERPL BCG-MCNC: 3.4 G/DL (ref 3.5–5.2)
ALP SERPL-CCNC: 105 U/L (ref 40–150)
ALT SERPL W P-5'-P-CCNC: 14 U/L (ref 0–70)
ANION GAP SERPL CALCULATED.3IONS-SCNC: 10 MMOL/L (ref 7–15)
ANION GAP SERPL CALCULATED.3IONS-SCNC: 9 MMOL/L (ref 7–15)
AST SERPL W P-5'-P-CCNC: 24 U/L (ref 0–45)
BASOPHILS # BLD AUTO: 0 10E3/UL (ref 0–0.2)
BASOPHILS NFR BLD AUTO: 1 %
BILIRUB DIRECT SERPL-MCNC: <0.2 MG/DL (ref 0–0.3)
BILIRUB SERPL-MCNC: 0.4 MG/DL
BUN SERPL-MCNC: 32.8 MG/DL (ref 8–23)
BUN SERPL-MCNC: 33.2 MG/DL (ref 8–23)
CALCIUM SERPL-MCNC: 8.9 MG/DL (ref 8.8–10.2)
CALCIUM SERPL-MCNC: 9.1 MG/DL (ref 8.8–10.2)
CHLORIDE SERPL-SCNC: 106 MMOL/L (ref 98–107)
CHLORIDE SERPL-SCNC: 107 MMOL/L (ref 98–107)
CREAT SERPL-MCNC: 2.27 MG/DL (ref 0.67–1.17)
CREAT SERPL-MCNC: 2.27 MG/DL (ref 0.67–1.17)
DEPRECATED HCO3 PLAS-SCNC: 21 MMOL/L (ref 22–29)
DEPRECATED HCO3 PLAS-SCNC: 22 MMOL/L (ref 22–29)
EGFRCR SERPLBLD CKD-EPI 2021: 32 ML/MIN/1.73M2
EGFRCR SERPLBLD CKD-EPI 2021: 32 ML/MIN/1.73M2
EOSINOPHIL # BLD AUTO: 0.2 10E3/UL (ref 0–0.7)
EOSINOPHIL NFR BLD AUTO: 5 %
ERYTHROCYTE [DISTWIDTH] IN BLOOD BY AUTOMATED COUNT: 13.1 % (ref 10–15)
GLUCOSE SERPL-MCNC: 131 MG/DL (ref 70–99)
GLUCOSE SERPL-MCNC: 137 MG/DL (ref 70–99)
HCT VFR BLD AUTO: 41.1 % (ref 40–53)
HGB BLD-MCNC: 13.7 G/DL (ref 13.3–17.7)
IMM GRANULOCYTES # BLD: 0 10E3/UL
IMM GRANULOCYTES NFR BLD: 0 %
LYMPHOCYTES # BLD AUTO: 1.2 10E3/UL (ref 0.8–5.3)
LYMPHOCYTES NFR BLD AUTO: 27 %
MCH RBC QN AUTO: 31.4 PG (ref 26.5–33)
MCHC RBC AUTO-ENTMCNC: 33.3 G/DL (ref 31.5–36.5)
MCV RBC AUTO: 94 FL (ref 78–100)
MONOCYTES # BLD AUTO: 0.4 10E3/UL (ref 0–1.3)
MONOCYTES NFR BLD AUTO: 9 %
NEUTROPHILS # BLD AUTO: 2.7 10E3/UL (ref 1.6–8.3)
NEUTROPHILS NFR BLD AUTO: 58 %
NRBC # BLD AUTO: 0 10E3/UL
NRBC BLD AUTO-RTO: 0 /100
PHOSPHATE SERPL-MCNC: 3.3 MG/DL (ref 2.5–4.5)
PHOSPHATE SERPL-MCNC: 3.3 MG/DL (ref 2.5–4.5)
PLATELET # BLD AUTO: 147 10E3/UL (ref 150–450)
POTASSIUM SERPL-SCNC: 4.6 MMOL/L (ref 3.4–5.3)
POTASSIUM SERPL-SCNC: 4.6 MMOL/L (ref 3.4–5.3)
PROT SERPL-MCNC: 6.3 G/DL (ref 6.4–8.3)
PTH-INTACT SERPL-MCNC: 124 PG/ML (ref 15–65)
RBC # BLD AUTO: 4.37 10E6/UL (ref 4.4–5.9)
SODIUM SERPL-SCNC: 137 MMOL/L (ref 135–145)
SODIUM SERPL-SCNC: 138 MMOL/L (ref 135–145)
VIT D+METAB SERPL-MCNC: 20 NG/ML (ref 20–50)
WBC # BLD AUTO: 4.7 10E3/UL (ref 4–11)

## 2024-02-23 PROCEDURE — 80069 RENAL FUNCTION PANEL: CPT | Performed by: PATHOLOGY

## 2024-02-23 PROCEDURE — 36415 COLL VENOUS BLD VENIPUNCTURE: CPT | Performed by: PATHOLOGY

## 2024-02-23 PROCEDURE — 86334 IMMUNOFIX E-PHORESIS SERUM: CPT | Performed by: PATHOLOGY

## 2024-02-23 PROCEDURE — 86334 IMMUNOFIX E-PHORESIS SERUM: CPT | Mod: 26 | Performed by: PATHOLOGY

## 2024-02-23 PROCEDURE — 83970 ASSAY OF PARATHORMONE: CPT | Performed by: PATHOLOGY

## 2024-02-23 PROCEDURE — 99000 SPECIMEN HANDLING OFFICE-LAB: CPT | Performed by: PATHOLOGY

## 2024-02-23 PROCEDURE — 84100 ASSAY OF PHOSPHORUS: CPT | Performed by: INTERNAL MEDICINE

## 2024-02-23 PROCEDURE — 85025 COMPLETE CBC W/AUTO DIFF WBC: CPT | Performed by: PATHOLOGY

## 2024-02-23 PROCEDURE — 82306 VITAMIN D 25 HYDROXY: CPT | Performed by: INTERNAL MEDICINE

## 2024-02-23 PROCEDURE — 82595 ASSAY OF CRYOGLOBULIN: CPT | Performed by: INTERNAL MEDICINE

## 2024-02-29 ENCOUNTER — TELEPHONE (OUTPATIENT)
Dept: NEPHROLOGY | Facility: CLINIC | Age: 64
End: 2024-02-29
Payer: MEDICARE

## 2024-02-29 LAB — CRYOGLOB SER QL: ABNORMAL

## 2024-02-29 NOTE — TELEPHONE ENCOUNTER
Called patient via  service with a appointment reminder for Mon. 3/18/24 @ 1:00 pm with Dr. Harrison and a lab draw @ 12:00 pm.    Reji Knight on 2/29/2024 at 10:30 AM

## 2024-03-04 LAB
ALBUMIN SERPL ELPH-MCNC: POSITIVE G/DL
CRYOGLOB IGA & IGG & IGM SER-IMP: ABNORMAL
CRYOGLOB TYP SER IFE: ABNORMAL
CRYOGLOB TYP SER IFE: POSITIVE
CRYOGLOB TYP SER IFE: POSITIVE

## 2024-03-06 ENCOUNTER — PATIENT OUTREACH (OUTPATIENT)
Dept: NEPHROLOGY | Facility: CLINIC | Age: 64
End: 2024-03-06
Payer: COMMERCIAL

## 2024-03-06 NOTE — PROGRESS NOTES
Vasculitis and Lupus Program Note: Patient Outreach Encounter    REASON FOR CALL:     REASON FOR CALL: GN Care Coordination                               SITUATION/BACKROUND:   Patient is being treated for Glomerular Disease.    Per provider instruction, writer to follow up on medication change. and symptom follow up.  Left message with family requesting home care nurse follow up on symptoms with next visit.    ASSESSMENT:   LINDSAY Lackey visit nurse from [facility] called and reports patient is more tired and blood pressures are lower 100's last several weeks.     **Confirmed that when patient was in the hospital last his primary provider, Dr. Della Zamora had sent [facility] orders to stop patient's Entecavir (BARACLUDE) 1 MG tablet not nephrology.    Nurse Assessments at today's visit:  More tired but otherwise no complaints of hypotensive issues at this time.  Today's vitals: /55 P 63, 98% on r/a, T 98.1, R 12.    Medications  Nephology medication reconciliation completed: Yes  Any barriers to taking medication(s) as prescribed?  No  Taking over the counter medication(s?) No    Current Outpatient Medications (Antihypertensive, Cardiovascular, Diuretics, Beta blockers, Calcium blockers, Anticoagulants)   Medication Sig    amLODIPine (NORVASC) 10 MG tablet TAKE 1 TABLET (10 MG) BY MOUTH DAILY INDICATION: HTN    carvedilol (COREG) 12.5 MG tablet TAKE 1 TABLET (12.5 MG) BY MOUTH 2 TIMES DAILY (WITH MEALS)    furosemide (LASIX) 20 MG tablet TAKE 0.5 TABLETS (10 MG) BY MOUTH DAILY    losartan (COZAAR) 25 MG tablet Take 1 tablet (25 mg) by mouth daily for 210 days    spironolactone (ALDACTONE) 50 MG tablet TAKE 2 TABLET ORALLY ONCE A DAY 30 DAYS     Current Outpatient Medications (Other)   Medication Sig    acetaminophen (TYLENOL) 325 MG tablet Take 1 tablet (325 mg) by mouth every 4 hours as needed for mild pain or fever    aspirin (ASA) 81 MG chewable tablet TAKE 1 TABLET (81 MG) BY MOUTH DAILY  INDICATION: STROKE    atorvastatin (LIPITOR) 40 MG tablet TAKE 1 TABLET BY MOUTH EVERY EVENING FOR STROKE    diclofenac (VOLTAREN) 1 % topical gel Apply 4 grams to knees or 2 grams to hands four times daily using enclosed dosing card.    docusate sodium (COLACE) 100 MG capsule Take 1 capsule (100 mg) by mouth 2 times daily as needed for constipation    entecavir (BARACLUDE) 1 MG tablet Take 1 tablet (1 mg) every other day    gabapentin (NEURONTIN) 100 MG capsule TAKE 1 CAPSULE (100 MG) BY MOUTH 3 TIMES DAILY    HM CETIRIZINE HCL 10 MG tablet 1 TABLET ORALLY ONCE A DAY 30 DAYS    ibuprofen (ADVIL/MOTRIN) 400 MG tablet     Multiple Vitamin (MULTI VITAMIN DAILY) TABS TAKE 1 TABLET BY MOUTH DAILY    polyethylene glycol (MIRALAX) 17 GM/Dose powder Take 17 g by mouth daily as needed for constipation Hold for loose stools/diarrhea    vitamin D3 (CHOLECALCIFEROL) 50 mcg (2000 units) tablet 1 TABLET ORALLY ONCE A DAY 30 DAYS     PLAN:     Follow Up:   Will follow up with provider if we need to take away any of his BP meds and update his family to remove any of the meds in pill reminder box this week.  Instructed Darya to call if there are any further questions or concerns we can review them while he is present at the patient's home.    Patient verbalized understanding and will follow up as recommended.    Nevin Marx RN  [facility]upus Program: [phone]

## 2024-03-08 NOTE — PROGRESS NOTES
Vasculitis and Lupus Program Note: Patient Outreach Encounter    REASON FOR CALL:     REASON FOR CALL: GN Care Coordination                                  SITUATION/BACKROUND:   Patient is being treated for Glomerular Disease.    Per provider instruction, writer to follow up on symptom follow up.    Reduce amlodipine to 5 mg per day. If BP still low then reduce Coreg to 6.25 mg twice a day from 12.5 BID.     ASSESSMENT:     Update from Dr. Harrison requesting:  Reduce amlodipine to 5 mg per day. If BP still low then reduce Coreg to 6.25 mg twice a day from 12.5 BID.     Called with support of David  and spoke with Caty holt [phone].  Updated Naluanmo that Dr. Harrison is requesting to split Amlodipine in half or 5mg instead of 10mg.  We will trial to see if this resolves reported low blood pressures from home care nurse assessments.    Caty able to identify the med and split pills for the week in med set up.  Also reviewed findings that TB med was discontinued by PCP who ordered it, when I spoke with Darya home care nurse.  Instructed jonathon, who lives with patient for them to call us back if patient starts experiencing pounding headache, increased pressure in chest or high heart rate or other concerns.  We will tweak meds if needed.  Family confirmed understanding via  and gave GN Care Coordinator line for timely support.    Called placed to Darya [facility] visit nurse and updated them to change in plan of care  Amlodipine to 5mg and continue to check BP and update if BP continue to be low or additional concerns.  Confirmed Darya has [facility] Care Coordinator phone and fax to send orders to co-sign.    Plan- he will call back in 2 weeks to give BP results unless concerns noted before that time.    PLAN:     Education:  Method:  general discussion/verbal explanation  Discussed: medications    Follow Up:   Follow up call in 1-2 weeks  Patient to follow up at next scheduled appointment with provider.    Patient to call/MyChart message with updates  Medication Change: Discussed medication change with patient    Patient and family verbalized understanding and will follow up as recommended.    Nevin Marx RN  [facility]cul[facility] Lupus Program: [phone]

## 2024-03-18 ENCOUNTER — LAB (OUTPATIENT)
Dept: LAB | Facility: CLINIC | Age: 64
End: 2024-03-18
Attending: INTERNAL MEDICINE
Payer: COMMERCIAL

## 2024-03-18 ENCOUNTER — OFFICE VISIT (OUTPATIENT)
Dept: NEPHROLOGY | Facility: CLINIC | Age: 64
End: 2024-03-18
Attending: INTERNAL MEDICINE
Payer: COMMERCIAL

## 2024-03-18 ENCOUNTER — PATIENT OUTREACH (OUTPATIENT)
Dept: NEPHROLOGY | Facility: CLINIC | Age: 64
End: 2024-03-18

## 2024-03-18 VITALS
DIASTOLIC BLOOD PRESSURE: 72 MMHG | SYSTOLIC BLOOD PRESSURE: 109 MMHG | RESPIRATION RATE: 18 BRPM | HEART RATE: 62 BPM | TEMPERATURE: 99 F | OXYGEN SATURATION: 96 %

## 2024-03-18 DIAGNOSIS — N08 CRYOGLOBULINEMIC GLOMERULONEPHRITIS (H): Primary | ICD-10-CM

## 2024-03-18 DIAGNOSIS — N18.32 STAGE 3B CHRONIC KIDNEY DISEASE (H): ICD-10-CM

## 2024-03-18 DIAGNOSIS — D89.1 CRYOGLOBULINEMIC GLOMERULONEPHRITIS (H): Primary | ICD-10-CM

## 2024-03-18 DIAGNOSIS — R80.9 NEPHROTIC RANGE PROTEINURIA: ICD-10-CM

## 2024-03-18 DIAGNOSIS — N25.81 SECONDARY HYPERPARATHYROIDISM (H): ICD-10-CM

## 2024-03-18 DIAGNOSIS — I10 HYPERTENSION, ESSENTIAL: ICD-10-CM

## 2024-03-18 DIAGNOSIS — K74.60 CIRRHOSIS OF LIVER DUE TO HEPATITIS B (H): ICD-10-CM

## 2024-03-18 DIAGNOSIS — B19.10 CIRRHOSIS OF LIVER DUE TO HEPATITIS B (H): ICD-10-CM

## 2024-03-18 LAB
ALBUMIN SERPL BCG-MCNC: 3.5 G/DL (ref 3.5–5.2)
ALP SERPL-CCNC: 102 U/L (ref 40–150)
ALT SERPL W P-5'-P-CCNC: 15 U/L (ref 0–70)
ANION GAP SERPL CALCULATED.3IONS-SCNC: 12 MMOL/L (ref 7–15)
AST SERPL W P-5'-P-CCNC: 23 U/L (ref 0–45)
BASOPHILS # BLD AUTO: 0 10E3/UL (ref 0–0.2)
BASOPHILS NFR BLD AUTO: 1 %
BILIRUB DIRECT SERPL-MCNC: <0.2 MG/DL (ref 0–0.3)
BILIRUB SERPL-MCNC: 0.5 MG/DL
BUN SERPL-MCNC: 54.2 MG/DL (ref 8–23)
CALCIUM SERPL-MCNC: 9.1 MG/DL (ref 8.8–10.2)
CHLORIDE SERPL-SCNC: 105 MMOL/L (ref 98–107)
CREAT SERPL-MCNC: 2.37 MG/DL (ref 0.67–1.17)
CRP SERPL-MCNC: <3 MG/L
DEPRECATED HCO3 PLAS-SCNC: 18 MMOL/L (ref 22–29)
EGFRCR SERPLBLD CKD-EPI 2021: 30 ML/MIN/1.73M2
EOSINOPHIL # BLD AUTO: 0.4 10E3/UL (ref 0–0.7)
EOSINOPHIL NFR BLD AUTO: 7 %
ERYTHROCYTE [DISTWIDTH] IN BLOOD BY AUTOMATED COUNT: 12.5 % (ref 10–15)
ERYTHROCYTE [SEDIMENTATION RATE] IN BLOOD BY WESTERGREN METHOD: 35 MM/HR (ref 0–20)
GLUCOSE SERPL-MCNC: 106 MG/DL (ref 70–99)
HCT VFR BLD AUTO: 41.9 % (ref 40–53)
HGB BLD-MCNC: 14.3 G/DL (ref 13.3–17.7)
IMM GRANULOCYTES # BLD: 0 10E3/UL
IMM GRANULOCYTES NFR BLD: 0 %
LYMPHOCYTES # BLD AUTO: 1.6 10E3/UL (ref 0.8–5.3)
LYMPHOCYTES NFR BLD AUTO: 30 %
MCH RBC QN AUTO: 31.6 PG (ref 26.5–33)
MCHC RBC AUTO-ENTMCNC: 34.1 G/DL (ref 31.5–36.5)
MCV RBC AUTO: 93 FL (ref 78–100)
MONOCYTES # BLD AUTO: 0.7 10E3/UL (ref 0–1.3)
MONOCYTES NFR BLD AUTO: 14 %
NEUTROPHILS # BLD AUTO: 2.6 10E3/UL (ref 1.6–8.3)
NEUTROPHILS NFR BLD AUTO: 48 %
NRBC # BLD AUTO: 0 10E3/UL
NRBC BLD AUTO-RTO: 0 /100
PHOSPHATE SERPL-MCNC: 3.3 MG/DL (ref 2.5–4.5)
PLATELET # BLD AUTO: 128 10E3/UL (ref 150–450)
POTASSIUM SERPL-SCNC: 4 MMOL/L (ref 3.4–5.3)
PROT SERPL-MCNC: 6.6 G/DL (ref 6.4–8.3)
RBC # BLD AUTO: 4.52 10E6/UL (ref 4.4–5.9)
RHEUMATOID FACT SERPL-ACNC: 51 IU/ML
SODIUM SERPL-SCNC: 135 MMOL/L (ref 135–145)
WBC # BLD AUTO: 5.4 10E3/UL (ref 4–11)

## 2024-03-18 PROCEDURE — 86160 COMPLEMENT ANTIGEN: CPT | Performed by: INTERNAL MEDICINE

## 2024-03-18 PROCEDURE — 86431 RHEUMATOID FACTOR QUANT: CPT | Performed by: INTERNAL MEDICINE

## 2024-03-18 PROCEDURE — G2211 COMPLEX E/M VISIT ADD ON: HCPCS | Performed by: INTERNAL MEDICINE

## 2024-03-18 PROCEDURE — 82595 ASSAY OF CRYOGLOBULIN: CPT | Performed by: INTERNAL MEDICINE

## 2024-03-18 PROCEDURE — 80053 COMPREHEN METABOLIC PANEL: CPT | Performed by: PATHOLOGY

## 2024-03-18 PROCEDURE — G0463 HOSPITAL OUTPT CLINIC VISIT: HCPCS | Performed by: INTERNAL MEDICINE

## 2024-03-18 PROCEDURE — 85652 RBC SED RATE AUTOMATED: CPT | Performed by: PATHOLOGY

## 2024-03-18 PROCEDURE — 86225 DNA ANTIBODY NATIVE: CPT | Performed by: INTERNAL MEDICINE

## 2024-03-18 PROCEDURE — 87517 HEPATITIS B DNA QUANT: CPT | Performed by: INTERNAL MEDICINE

## 2024-03-18 PROCEDURE — 99000 SPECIMEN HANDLING OFFICE-LAB: CPT | Performed by: PATHOLOGY

## 2024-03-18 PROCEDURE — 86355 B CELLS TOTAL COUNT: CPT | Performed by: INTERNAL MEDICINE

## 2024-03-18 PROCEDURE — 84100 ASSAY OF PHOSPHORUS: CPT | Performed by: PATHOLOGY

## 2024-03-18 PROCEDURE — 36415 COLL VENOUS BLD VENIPUNCTURE: CPT | Performed by: PATHOLOGY

## 2024-03-18 PROCEDURE — 99215 OFFICE O/P EST HI 40 MIN: CPT | Performed by: INTERNAL MEDICINE

## 2024-03-18 PROCEDURE — 82248 BILIRUBIN DIRECT: CPT | Performed by: PATHOLOGY

## 2024-03-18 PROCEDURE — 86140 C-REACTIVE PROTEIN: CPT | Performed by: PATHOLOGY

## 2024-03-18 PROCEDURE — 85025 COMPLETE CBC W/AUTO DIFF WBC: CPT | Performed by: PATHOLOGY

## 2024-03-18 ASSESSMENT — PAIN SCALES - GENERAL: PAINLEVEL: NO PAIN (0)

## 2024-03-18 NOTE — NURSING NOTE
"Chief Complaint   Patient presents with    RECHECK     Cryoglobulinemia glomerulonephritis      Vital signs:  Temp: 99  F (37.2  C) (drinking hot beverage) Temp src: Oral BP: 109/72 Pulse: 62   Resp: 18 SpO2: 96 %       Weight:  (unable to stand)  Estimated body mass index is 19.29 kg/m  as calculated from the following:    Height as of 12/22/23: 1.65 m (5' 4.96\").    Weight as of 1/19/24: 52.5 kg (115 lb 12.8 oz).      Ellyn Olson, [facility]  3/18/2024 12:55 PM    "

## 2024-03-18 NOTE — PROGRESS NOTES
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     The patient was previously seen by Dr.Junghare craig in 10/17.  Per his note, the patient was initially evaluated for edema and nephrotic range proteinuria.  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  Upon diagnosis, he has preserved kidney function with Cr of 0.8 although UPCR of  3.5g/g.     At that time, he was treated conservatively with Bumex and lisinopril. His HBV has been treated with Entecavir. Since then has has lost to followed-up with Neph.       In June 2020 he suffered a stroke and continues to have right sided weakness, In July 2020, he has mild ANDRY with creatinine increased to 1.6 but it then came down to 1.1-1.3.  Creatinine increased to 1.46 on 9/30/22, 1.72 on 1/13/2023 and now 2.08 on 4/23.  His serum albumin has been progressively low from 3.3 in 2015 down to 1.5 to 2.0 but then somewhat improved. Serum albumin in April 23 was 2.6.  His UA always show blood and protein. Last UPCR was done in 7/31/18 was 4.80. He has trace cryo in the blood. He had positive IgM, Kappa cryo but then in 2020, he has positive for polyclonal Cryo: A, G, M, K and L. No monoclonal protein via immunofixation in serum or urine.  [place] free light chain was 12.7 and lambda free light chain was 6.79 with ratio of 1.87 in July 2020. M spike was negative.  [place]-3 and MPO were negative.  He has low C3 in the past and normal C4.  SPEP showed marked hypoalbuminemia and decreased beta globulin without monoclonal protein.     He has been  followed by Dr. Osborne in hepatology clinic, last seen in April 2023.  He was noted to have a small liver lesion, ultrasound with some ascites and pleural effusion.  Noted blood pressure 137/94.  He has kidney in 1/23 which showed normal-sized right kidney without hydronephrosis. There is small amount of ascites and pleural effusion.     6/15/23: Seen for consultation. He arrives with his niece and she assists with translating.  He is noted to be in a wheelchair partially because of his residual right-sided weakness after CVA.  Reports that he is able to transition from room to room on the same floor with a walker.  However, he is unable to go upstairs and requires assistance moving long distances.  He uses a wheelchair when going outside the home.     We reviewed that his creatinine is increased over the past 9 months.  There have been no specific illnesses, hospital presentations or events that they can recall that may have led to kidney injury. After seeing hepatology in April, he was started on Lasix 20 mg and spironolactone for edema management.  His niece reports that his swelling resolved dramatically and now there is only trace swelling present.  He is off of the diuretics for the past few weeks.  Additionally, he continues on his antiretroviral for hepatitis B.  Most recently viral load was undetectable.      In reviewing his appetite and p.o. intake, he often does not eat a lot.  Although there are some times where he has better appetite.  States that his p.o. fluid intake is reasonable.  His urine is reported to be normal and seems to be going to the bathroom several times a day.  No noted hematuria or color/quality.  Plan: Serological work-up, resume lasix 10 mg daily.   8/24/23: He missed his appt.  10/5/23: He is here with his niece today.  Today, he feels fine.  Patient still complaining of incontinence.  So he cannot provide a urine sample for us.  The patient has not done labs yet either.  His  swelling has improved after we resume Lasix 10 mg/day.  His blood pressure still limited at 147/89.  The patient denies any joint pain fevers or rashes.  He has no oral ulcer.  Started losartan 25 mg per day.   Labs on 8/29/23 showed Cr 2.08 with eGFR 35, BUN 33.6, Bicarb 21. Albumin 2.9. UA showed WBC >182, RBC 6, hyaline cast 10, UPCR 5.56. Serological work-up showed positive cryo IgG, IgM and kappa and lambda. Cryo trace. C3 116 and C4 33. Kappa 15.24, lambda 7.96 and K/L ratio 1.91. PTH 81. DS DNA 61. ANCA negative. UA showed WBC >182, RBC 6, UPCR 5.56 g/g.   11/30/23: In the interim, he underwent a kidney Bx on 11/7/23 (read by Dr. Montenegro). 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 Advanced chronic changes of the parenchyma, including: global glomerulosclerosis (78% of the glomeruli), tubular atrophy and interstitial fibrosis (70-80% of the cortex), severe arterial and arteriolar sclerosis. This finding may be found in cryo GN even though there was no substructure presented in the EM. The patient also has positive Cryo  IgM, IgG and kappa. The polyclonal IgG and monoclonal IgG kappa suggested type 2 Cryo. Today,  He is doing good. Biopsy went well. Swelling better but he just hit something on his Rt leg and got swelled up again. Appetite is not so good. Discussed at length about diagnosis and treatment. Discussed risk of HBV flare with Rx.    3/18/24: In the interim, he received  mg/m2 nx4 (1st dose on 12/22/23 and last on 1/19/24).  BP has been improving.  Appetite is good now. He has no leg swelling. He has pain or burning sensation when he urinates.  He has  gained some weight but we do not have a weight check today.  Home blood pressure has been excellent without any evidence of hypotension.  Current medication: Amlodipine 5 (previously 10), Coreg 12.5 mg BID, losartan 25 mg daily., furosemide 20 mg 0.5 tab daily, spironolactone 50 mg once a day.   Labs today show sodium 135, potassium 4.0, carbon dioxide 18, BUN 54.2, creatinine 2.37, GFR 30.  Phosphorus 3.3, albumin 3.5.  LFTs normal.  CBC show white blood cell 5.4, platelet 128 and hemoglobin 14.3. , vit D 20.     Past medical history  Past Medical History:   Diagnosis Date    Cirrhosis (H)     Cryoglobulinemia (H24)     CVA (cerebral vascular accident) (H) 07/16/2020    Supratentorial likely d/t hypertensive emergency leading to right hemiparesis, dysphagia and aphasia    Glomerulonephritis     Hepatitis B     Hypertension     Latent tuberculosis     Treatment [id number]    MPGN (membranoproliferative glomerulonephritides)     Positive QuantiFERON-TB Gold test     PRES (posterior reversible encephalopathy syndrome) 07/16/2020    In brainstem d/t hypertensive emergency; suffered supratentorial CVAs same date       Past surgical history  Past Surgical History:   Procedure Laterality Date    ANESTHESIA OUT OF OR MRI N/A 7/18/2020    Procedure: ANESTHESIA OUT OF OR MRI;  Surgeon: GENERIC ANESTHESIA PROVIDER;  Location: [facility]    ESOPHAGOSCOPY, GASTROSCOPY, DUODENOSCOPY (EGD), COMBINED N/A 10/18/2018    Procedure: EGD;  Surgeon: Eber Ortez MD;  Location: [facility]    HAND SURGERY Right     IR PARACENTESIS  7/27/2020    IR RENAL BIOPSY RIGHT  11/7/2023    [facility] HAND/FINGER SURGERY UNLISTED       Review of Systems:   14 systems were reviewed and all negative except as mentioned above.   Current Medications:  Current Outpatient Medications   Medication    acetaminophen (TYLENOL) 325 MG tablet    amLODIPine (NORVASC) 10 MG tablet    aspirin (ASA) 81 MG chewable tablet    atorvastatin (LIPITOR) 40 MG tablet     carvedilol (COREG) 12.5 MG tablet    docusate sodium (COLACE) 100 MG capsule    entecavir (BARACLUDE) 1 MG tablet    furosemide (LASIX) 20 MG tablet    gabapentin (NEURONTIN) 100 MG capsule    ibuprofen (ADVIL/MOTRIN) 400 MG tablet    losartan (COZAAR) 25 MG tablet    Multiple Vitamin (MULTI VITAMIN DAILY) TABS    polyethylene glycol (MIRALAX) 17 GM/Dose powder    spironolactone (ALDACTONE) 50 MG tablet    vitamin D3 (CHOLECALCIFEROL) 50 mcg (2000 units) tablet    HM CETIRIZINE HCL 10 MG tablet     No current facility-administered medications for this visit.       Physical Exam:   /72 (BP Location: Left arm, Patient Position: Sitting, Cuff Size: Adult Small)   Pulse 62   Temp 99  F (37.2  C) (Oral)   Resp 18   SpO2 96%    There is no height or weight on file to calculate BMI.    GENERAL APPEARANCE: Alert, not in acute distress, thin built, on WC.   EYES:  Not pale conjunctiva, pupils equal  HENT: Mouth without ulcers or lesions  PULM: lungs clear to auscultation bilaterally, equal air movement, no clubbing  CV: regular rhythm, normal rate, no rub     -JVD no distended.      -edema: trace  GI: soft,  - tender, no distended, bowel sounds are present  INTEGUMENT: No rash  NEURO:  Non focal. No asterixis.     Labs:   All labs reviewed by me  Last Renal Panel:  Sodium   Date Value Ref Range Status   03/18/2024 135 135 - 145 mmol/L Final     Comment:     Reference intervals for this test were updated on 09/26/2023 to more accurately reflect our healthy population. There may be differences in the flagging of prior results with similar values performed with this method. Interpretation of those prior results can be made in the context of the updated reference intervals.    06/30/2021 140 133 - 144 mmol/L Final     Potassium   Date Value Ref Range Status   03/18/2024 4.0 3.4 - 5.3 mmol/L Final   01/19/2022 3.6 3.4 - 5.3 mmol/L Final   06/30/2021 4.2 3.4 - 5.3 mmol/L Final     Chloride   Date Value Ref Range Status    03/18/2024 105 98 - 107 mmol/L Final   01/19/2022 107 94 - 109 mmol/L Final   06/30/2021 106 94 - 109 mmol/L Final     Carbon Dioxide   Date Value Ref Range Status   06/30/2021 25 20 - 32 mmol/L Final     Carbon Dioxide (CO2)   Date Value Ref Range Status   03/18/2024 18 (L) 22 - 29 mmol/L Final   01/19/2022 27 20 - 32 mmol/L Final     Anion Gap   Date Value Ref Range Status   03/18/2024 12 7 - 15 mmol/L Final   01/19/2022 10 3 - 14 mmol/L Final   06/30/2021 9 3 - 14 mmol/L Final     Glucose   Date Value Ref Range Status   03/18/2024 106 (H) 70 - 99 mg/dL Final   01/19/2022 78 70 - 99 mg/dL Final   06/30/2021 96 70 - 99 mg/dL Final     Urea Nitrogen   Date Value Ref Range Status   03/18/2024 54.2 (H) 8.0 - 23.0 mg/dL Final   01/19/2022 19 7 - 30 mg/dL Final   06/30/2021 24 7 - 30 mg/dL Final     Creatinine   Date Value Ref Range Status   03/18/2024 2.37 (H) 0.67 - 1.17 mg/dL Final   06/30/2021 1.27 (H) 0.66 - 1.25 mg/dL Final     GFR Estimate   Date Value Ref Range Status   03/18/2024 30 (L) >60 mL/min/1.73m2 Final   06/30/2021 61 >60 mL/min/[1.73_m2] Final     Comment:     Non  GFR Calc  Starting 12/18/2018, serum creatinine based estimated GFR (eGFR) will be   calculated using the Chronic Kidney Disease Epidemiology Collaboration   (CKD-EPI) equation.       Calcium   Date Value Ref Range Status   03/18/2024 9.1 8.8 - 10.2 mg/dL Final   06/30/2021 9.0 8.5 - 10.1 mg/dL Final     Phosphorus   Date Value Ref Range Status   03/18/2024 3.3 2.5 - 4.5 mg/dL Final   10/10/2017 3.0 2.5 - 4.5 mg/dL Final     Albumin   Date Value Ref Range Status   03/18/2024 3.5 3.5 - 5.2 g/dL Final   01/19/2022 2.5 (L) 3.4 - 5.0 g/dL Final   06/30/2021 3.0 (L) 3.4 - 5.0 g/dL Final       Imaging:  I reviewed imaging studies.     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  It does appear that the most recent trend is reflecting a progressive elevation in creatinine since 9/30/2022.  There is some concern that this might be related to his liver disease but there is limited lower extremity edema and palpable ascites on exam.  His MELD score is relatively low to observe type II HRS physiology.  In regard to his prior cryoglobulinemic GN, it would be odd for this to come out of quiescence well his hepatitis B has been appropriately treated. I repeat serological work-up and showed that he has trace cryo IgG, IgM and kappa but normal complement: C3 116 and C4 33. Otherwise negative monoclonal protein; K/L ratio of 1.91. PLA2R negative, ANCA negative  but DS DNA positive at 61 U/ml. HBV VL <20 on 4/4/23. It was unclear why he has progressive kidney disease despite HBV controlled. He is also noted to have nephrotic range proteinuria.  Therefore, we precede with a kidney Bx which he underwent on 11/7/23 which showed glomerular deposits with IgM/kappa immunofluorescence specificity and a membranoproliferative pattern of glomerular injury associated with necrotizing arteritis in  one artery. Severe arterial sclerosis with extensive ultrastructural signs of endothelial injury and glomerular capillary loop remodeling, suggesting a superimposed chronic and acute thrombotic angiopathy. Advanced chronic changes of the parenchyma, including: global glomerulosclerosis (78% of the glomeruli), tubular atrophy and interstitial fibrosis (70-80% of the cortex), severe arterial and arteriolar sclerosis. This findings are mostly consistent with cryo GN. This is quite interesting since his HBV has been under control. So it is possible that prior previous HBV could stimulate his immune reaction resulted in cryoglobulin.  We decided to initiate rituximab 375 mg/m  x 4 doses and 1st dose on  12/22/23 and last dose on 1/19/24.  Upon repeat crier still remain at trace level.  However clinically the patient appears to be improving.  His appetite is better.  His serum albumin improved to 3.5 which is normal at the first time, from baseline 2.7.  However, we do not have any UA and urine protein to compare.  The patient will collect UA and urine protein for us when he is is at home.  Home care nurse will help with that.  However, his creatinine is slightly increased from 2.2-2.37 at this time. I am not sure whether this is worsening kidney function or the fact that he is eating more protein.  Will continue to  monitor closely.    - Continue lasix 10mg daily for volume management  - Continue losartan 25 mg per day  -S/p rituximab 375 mg/m  first dose on 12/22/2023 and last dose on 1/19/2024  -Pending double-stranded DNA, rheumatoid factors and complement levels, if the staining did not respond to rituximab we may consider adding CellCept however I am hesitant to add at this time given that the patient has chronic dyspnea infection  # HBV cirrhosis  # HBV infection on entecavir  Follows with hepatology, last viral quant undetectable on 4/4/23. MELD 16. MR Abdomen showing cirrhosis with evidence of portal  hypertension. Reports no encephalopathy or concern for hematemesis. Currently on Entevavir, discuss risk of HBV flare while on RTX but he is on Entecavir now.   -Currently liver function test is normal.  Pending hepatitis B viral load  # Hypertension; controlled  # Volume overload; improved  Home BP is now better after adding lasix and losartan.   - Continue Amlodipine 5 mg daily, Carvedilol 12.5 mg BID, Lasix 10mg daily and losartan at 25 mg per day as well as spironolactone 50 mg daily  # Malnutrition  # Hx of stroke with Rt sided hemiparesis  Mostly wheelchair bound for transportation. Weight generally appears to be stable although he appears to be quite weak. There is concern that his creatinine is a poor estimate of his true kidney function. Cystatin C 2.8 with eGFR 20 and Cr 1.98 with eGFR by Cr 37 on 6/15/23.   # BMD  # Secondary hyperparathyroidism  2/23/24: PTH is 124, vit D 20 . Ca/Phos normal.     Follow-up 3 months with labs at 6 weeks and 3 months    The longitudinal plan of care for CKD stage 3B, nephrotic syndrome, chronic Hep B, Cryo GN was addressed during this visit. Due to the added complexity in care, I will continue to support Wilfredo Yoon in the subsequent management of this condition(s) and with the ongoing continuity of care of this condition(s).     I spent  53 minutes on the date of the encounter doing chart review, history and exam, documentation and further activities as noted above. 25 minutes of this visit is dedicated to direct patient interaction via face-to-face.    Sue Harrison MD on 03/18/2024

## 2024-03-18 NOTE — LETTER
3/18/2024       RE: Wilfredo Yoon  [address]  [facility] [address]     Dear Colleague,    Thank you for referring your patient, Wilfredo Yoon, to the [facility] [place] at [facility]. Please see a copy of my visit note below.      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     The patient was previously seen by  back in 10/17.  Per his note, the patient was initially evaluated for edema and nephrotic range proteinuria.  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  Upon diagnosis, he has preserved kidney function with Cr of 0.8 although UPCR of  3.5g/g.     At that time, he was treated conservatively with Bumex and lisinopril. His HBV has been treated with Entecavir. Since then has has lost to followed-up with Neph.       In June 2020 he suffered a stroke and continues to have right sided weakness, In July 2020, he has mild ANDRY with creatinine increased to 1.6 but it then came down to 1.1-1.3.  Creatinine increased to 1.46 on 9/30/22, 1.72 on 1/13/2023 and now 2.08 on 4/23.  His serum albumin has been progressively low from 3.3 in 2015 down to 1.5 to 2.0 but then somewhat improved. Serum albumin in April 23 was 2.6.  His UA always show blood and protein. Last UPCR was done in 7/31/18 was 4.80. He has trace cryo in the blood. He had positive IgM, Kappa cryo but then in 2020, he has positive for polyclonal Cryo: A, G, M, K and L. No monoclonal protein via  immunofixation in serum or urine.  Emelle free light chain was 12.7 and lambda free light chain was 6.79 with ratio of 1.87 in July 2020. M spike was negative.  ND-3 and MPO were negative.  He has low C3 in the past and normal C4.  SPEP showed marked hypoalbuminemia and decreased beta globulin without monoclonal protein.     He has been followed by Dr. Osborne in hepatology clinic, last seen in April 2023.  He was noted to have a small liver lesion, ultrasound with some ascites and pleural effusion.  Noted blood pressure 137/94.  He has kidney in 1/23 which showed normal-sized right kidney without hydronephrosis. There is small amount of ascites and pleural effusion.     6/15/23: Seen for consultation. He arrives with his niece and she assists with translating.  He is noted to be in a wheelchair partially because of his residual right-sided weakness after CVA.  Reports that he is able to transition from room to room on the same floor with a walker.  However, he is unable to go upstairs and requires assistance moving long distances.  He uses a wheelchair when going outside the home.     We reviewed that his creatinine is increased over the past 9 months.  There have been no specific illnesses, hospital presentations or events that they can recall that may have led to kidney injury. After seeing hepatology in April, he was started on Lasix 20 mg and spironolactone for edema management.  His niece reports that his swelling resolved dramatically and now there is only trace swelling present.  He is off of the diuretics for the past few weeks.  Additionally, he continues on his antiretroviral for hepatitis B.  Most recently viral load was undetectable.      In reviewing his appetite and p.o. intake, he often does not eat a lot.  Although there are some times where he has better appetite.  States that his p.o. fluid intake is reasonable.  His urine is reported to be normal and seems to be going to the bathroom several  times a day.  No noted hematuria or color/quality.  Plan: Serological work-up, resume lasix 10 mg daily.   8/24/23: He missed his appt.  10/5/23: He is here with his niece today.  Today, he feels fine.  Patient still complaining of incontinence.  So he cannot provide a urine sample for us.  The patient has not done labs yet either.  His swelling has improved after we resume Lasix 10 mg/day.  His blood pressure still limited at 147/89.  The patient denies any joint pain fevers or rashes.  He has no oral ulcer.  Started losartan 25 mg per day.   Labs on 8/29/23 showed Cr 2.08 with eGFR 35, BUN 33.6, Bicarb 21. Albumin 2.9. UA showed WBC >182, RBC 6, hyaline cast 10, UPCR 5.56. Serological work-up showed positive cryo IgG, IgM and kappa and lambda. Cryo trace. C3 116 and C4 33. Kappa 15.24, lambda 7.96 and K/L ratio 1.91. PTH 81. DS DNA 61. ANCA negative. UA showed WBC >182, RBC 6, UPCR 5.56 g/g.   11/30/23: In the interim, he underwent a kidney Bx on 11/7/23 (read by Dr. Montenegro). 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 Advanced chronic changes of the parenchyma, including: global glomerulosclerosis (78% of the glomeruli), tubular atrophy and interstitial fibrosis (70-80% of the cortex), severe arterial and arteriolar sclerosis. This finding may be found in cryo GN even though there was no substructure presented in the EM. The patient also has positive Cryo  IgM, IgG and kappa. The polyclonal IgG and monoclonal IgG kappa suggested type 2 Cryo. Today,  He is doing good. Biopsy went well. Swelling better but he just hit something on his Rt leg and got swelled up again. Appetite  is not so good. Discussed at length about diagnosis and treatment. Discussed risk of HBV flare with Rx.    3/18/24: In the interim, he received  mg/m2 nx4 (1st dose on 12/22/23 and last on 1/19/24).  BP has been improving.  Appetite is good now. He has no leg swelling. He has pain or burning sensation when he urinates.  He has gained some weight but we do not have a weight check today.  Home blood pressure has been excellent without any evidence of hypotension.  Current medication: Amlodipine 5 (previously 10), Coreg 12.5 mg BID, losartan 25 mg daily., furosemide 20 mg 0.5 tab daily, spironolactone 50 mg once a day.   Labs today show sodium 135, potassium 4.0, carbon dioxide 18, BUN 54.2, creatinine 2.37, GFR 30.  Phosphorus 3.3, albumin 3.5.  LFTs normal.  CBC show white blood cell 5.4, platelet 128 and hemoglobin 14.3. , vit D 20.     Past medical history  Past Medical History:   Diagnosis Date    Cirrhosis (H)     Cryoglobulinemia (H24)     CVA (cerebral vascular accident) (H) 07/16/2020    Supratentorial likely d/t hypertensive emergency leading to right hemiparesis, dysphagia and aphasia    Glomerulonephritis     Hepatitis B     Hypertension     Latent tuberculosis     Treatment [id number]    MPGN (membranoproliferative glomerulonephritides)     Positive QuantiFERON-TB Gold test     PRES (posterior reversible encephalopathy syndrome) 07/16/2020    In brainstem d/t hypertensive emergency; suffered supratentorial CVAs same date       Past surgical history  Past Surgical History:   Procedure Laterality Date    ANESTHESIA OUT OF OR MRI N/A 7/18/2020    Procedure: ANESTHESIA OUT OF OR MRI;  Surgeon: GENERIC ANESTHESIA PROVIDER;  Location: [facility]    ESOPHAGOSCOPY, GASTROSCOPY, DUODENOSCOPY (EGD), COMBINED N/A 10/18/2018    Procedure: EGD;  Surgeon: Eber Ortez MD;  Location: [facility]    HAND SURGERY Right     IR PARACENTESIS  7/27/2020    IR RENAL BIOPSY RIGHT  11/7/2023    [facility] HAND/FINGER  SURGERY UNLISTED       Review of Systems:   14 systems were reviewed and all negative except as mentioned above.   Current Medications:  Current Outpatient Medications   Medication    acetaminophen (TYLENOL) 325 MG tablet    amLODIPine (NORVASC) 10 MG tablet    aspirin (ASA) 81 MG chewable tablet    atorvastatin (LIPITOR) 40 MG tablet    carvedilol (COREG) 12.5 MG tablet    docusate sodium (COLACE) 100 MG capsule    entecavir (BARACLUDE) 1 MG tablet    furosemide (LASIX) 20 MG tablet    gabapentin (NEURONTIN) 100 MG capsule    ibuprofen (ADVIL/MOTRIN) 400 MG tablet    losartan (COZAAR) 25 MG tablet    Multiple Vitamin (MULTI VITAMIN DAILY) TABS    polyethylene glycol (MIRALAX) 17 GM/Dose powder    spironolactone (ALDACTONE) 50 MG tablet    vitamin D3 (CHOLECALCIFEROL) 50 mcg (2000 units) tablet    HM CETIRIZINE HCL 10 MG tablet     No current facility-administered medications for this visit.       Physical Exam:   /72 (BP Location: Left arm, Patient Position: Sitting, Cuff Size: Adult Small)   Pulse 62   Temp 99  F (37.2  C) (Oral)   Resp 18   SpO2 96%    There is no height or weight on file to calculate BMI.    GENERAL APPEARANCE: Alert, not in acute distress, thin built, on WC.   EYES:  Not pale conjunctiva, pupils equal  HENT: Mouth without ulcers or lesions  PULM: lungs clear to auscultation bilaterally, equal air movement, no clubbing  CV: regular rhythm, normal rate, no rub     -JVD no distended.      -edema: trace  GI: soft,  - tender, no distended, bowel sounds are present  INTEGUMENT: No rash  NEURO:  Non focal. No asterixis.     Labs:   All labs reviewed by me  Last Renal Panel:  Sodium   Date Value Ref Range Status   03/18/2024 135 135 - 145 mmol/L Final     Comment:     Reference intervals for this test were updated on 09/26/2023 to more accurately reflect our healthy population. There may be differences in the flagging of prior results with similar values performed with this method.  Interpretation of those prior results can be made in the context of the updated reference intervals.    06/30/2021 140 133 - 144 mmol/L Final     Potassium   Date Value Ref Range Status   03/18/2024 4.0 3.4 - 5.3 mmol/L Final   01/19/2022 3.6 3.4 - 5.3 mmol/L Final   06/30/2021 4.2 3.4 - 5.3 mmol/L Final     Chloride   Date Value Ref Range Status   03/18/2024 105 98 - 107 mmol/L Final   01/19/2022 107 94 - 109 mmol/L Final   06/30/2021 106 94 - 109 mmol/L Final     Carbon Dioxide   Date Value Ref Range Status   06/30/2021 25 20 - 32 mmol/L Final     Carbon Dioxide (CO2)   Date Value Ref Range Status   03/18/2024 18 (L) 22 - 29 mmol/L Final   01/19/2022 27 20 - 32 mmol/L Final     Anion Gap   Date Value Ref Range Status   03/18/2024 12 7 - 15 mmol/L Final   01/19/2022 10 3 - 14 mmol/L Final   06/30/2021 9 3 - 14 mmol/L Final     Glucose   Date Value Ref Range Status   03/18/2024 106 (H) 70 - 99 mg/dL Final   01/19/2022 78 70 - 99 mg/dL Final   06/30/2021 96 70 - 99 mg/dL Final     Urea Nitrogen   Date Value Ref Range Status   03/18/2024 54.2 (H) 8.0 - 23.0 mg/dL Final   01/19/2022 19 7 - 30 mg/dL Final   06/30/2021 24 7 - 30 mg/dL Final     Creatinine   Date Value Ref Range Status   03/18/2024 2.37 (H) 0.67 - 1.17 mg/dL Final   06/30/2021 1.27 (H) 0.66 - 1.25 mg/dL Final     GFR Estimate   Date Value Ref Range Status   03/18/2024 30 (L) >60 mL/min/1.73m2 Final   06/30/2021 61 >60 mL/min/[1.73_m2] Final     Comment:     Non  GFR Calc  Starting 12/18/2018, serum creatinine based estimated GFR (eGFR) will be   calculated using the Chronic Kidney Disease Epidemiology Collaboration   (CKD-EPI) equation.       Calcium   Date Value Ref Range Status   03/18/2024 9.1 8.8 - 10.2 mg/dL Final   06/30/2021 9.0 8.5 - 10.1 mg/dL Final     Phosphorus   Date Value Ref Range Status   03/18/2024 3.3 2.5 - 4.5 mg/dL Final   10/10/2017 3.0 2.5 - 4.5 mg/dL Final     Albumin   Date Value Ref Range Status   03/18/2024 3.5  3.5 - 5.2 g/dL Final   01/19/2022 2.5 (L) 3.4 - 5.0 g/dL Final   06/30/2021 3.0 (L) 3.4 - 5.0 g/dL Final       Imaging:  I reviewed imaging studies.     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  It does appear that the most recent trend is reflecting a progressive elevation in creatinine since 9/30/2022.  There is some concern that this might be related to his liver disease but there is limited lower extremity edema and palpable ascites on exam.  His MELD score is relatively low to observe type II HRS physiology.  In regard to his prior cryoglobulinemic GN, it would be odd for this to come out of quiescence well his hepatitis B has been appropriately treated. I repeat serological work-up and showed that he has trace cryo IgG, IgM and kappa but normal complement: C3 116 and C4 33. Otherwise negative monoclonal protein; K/L ratio of 1.91. PLA2R negative, ANCA negative  but DS DNA positive at 61 U/ml. HBV VL <20 on 4/4/23. It was unclear why he has  progressive kidney disease despite HBV controlled. He is also noted to have nephrotic range proteinuria.  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 Severe arterial sclerosis with extensive ultrastructural signs of endothelial injury and glomerular capillary loop remodeling, suggesting a superimposed chronic and acute thrombotic angiopathy. Advanced chronic changes of the parenchyma, including: global glomerulosclerosis (78% of the glomeruli), tubular atrophy and interstitial fibrosis (70-80% of the cortex), severe arterial and arteriolar sclerosis. This findings are mostly consistent with cryo GN. This is quite interesting since his HBV has been under control. So it is possible that prior previous HBV could stimulate his immune reaction resulted in cryoglobulin.  We decided to initiate rituximab 375 mg/m  x 4 doses and 1st dose on  12/22/23 and last dose on 1/19/24.  Upon repeat crier still remain at trace level.  However clinically the patient appears to be improving.  His appetite is better.  His serum albumin improved to 3.5 which is normal at the first time, from baseline 2.7.  However, we do not have any UA and urine protein to compare.  The patient will collect UA and urine protein for us when he is is at home.  Home care nurse will help with that.  However, his creatinine is slightly increased from 2.2-2.37 at this time. I am not sure whether this is worsening kidney function or the fact that he is eating more protein.  Will continue to  monitor closely.    - Continue lasix 10mg daily for volume management  - Continue losartan 25 mg per day  -S/p rituximab 375 mg/m  first dose on 12/22/2023 and last dose on 1/19/2024  -Pending double-stranded DNA, rheumatoid factors and complement levels, if the  staining did not respond to rituximab we may consider adding CellCept however I am hesitant to add at this time given that the patient has chronic dyspnea infection  # HBV cirrhosis  # HBV infection on entecavir  Follows with hepatology, last viral quant undetectable on 4/4/23. MELD 16. MR Abdomen showing cirrhosis with evidence of portal hypertension. Reports no encephalopathy or concern for hematemesis. Currently on Entevavir, discuss risk of HBV flare while on RTX but he is on Entecavir now.   -Currently liver function test is normal.  Pending hepatitis B viral load  # Hypertension; controlled  # Volume overload; improved  Home BP is now better after adding lasix and losartan.   - Continue Amlodipine 5 mg daily, Carvedilol 12.5 mg BID, Lasix 10mg daily and losartan at 25 mg per day as well as spironolactone 50 mg daily  # Malnutrition  # Hx of stroke with Rt sided hemiparesis  Mostly wheelchair bound for transportation. Weight generally appears to be stable although he appears to be quite weak. There is concern that his creatinine is a poor estimate of his true kidney function. Cystatin C 2.8 with eGFR 20 and Cr 1.98 with eGFR by Cr 37 on 6/15/23.   # BMD  # Secondary hyperparathyroidism  2/23/24: PTH is 124, vit D 20 . Ca/Phos normal.     Follow-up 3 months with labs at 6 weeks and 3 months    The longitudinal plan of care for CKD stage 3B, nephrotic syndrome, chronic Hep B, Cryo GN was addressed during this visit. Due to the added complexity in care, I will continue to support Wilfredo Yoon in the subsequent management of this condition(s) and with the ongoing continuity of care of this condition(s).     I spent  53 minutes on the date of the encounter doing chart review, history and exam, documentation and further activities as noted above. 25 minutes of this visit is dedicated to direct patient interaction via face-to-face.    Sue Harrison MD on 03/18/2024

## 2024-03-18 NOTE — PROGRESS NOTES
Update from Dr. Harrison requesting support to get urine sample/labs done at home, per patient request.  Printed off orders, labelled urine specimen cup and gave extra cup and labels with instructions to patient and sister.  Called Sabiha from [facility] who confirmed he can drop lab samples to [facility] if we can fax orders to his office.  F [phone]- faxed orders    Faxed future UA and Protein Random lab orders to be drawn every 6 weeks, around week of April 29th and June 10th to SABIHA as well at [facility] to get samples done in preparation of urine orders needed.  Confirmed [facility] does not do lab draws.  Will need to schedule with family, who will call to schedule as they do not have calendar here today at clinic.    Nevin Marx RN, BSN, PHN  [facility] Nephrology Nurse Navigator  [phone]

## 2024-03-19 ENCOUNTER — TELEPHONE (OUTPATIENT)
Dept: NEPHROLOGY | Facility: CLINIC | Age: 64
End: 2024-03-19
Payer: COMMERCIAL

## 2024-03-19 LAB
C3 SERPL-MCNC: 127 MG/DL (ref 81–157)
C4 SERPL-MCNC: 31 MG/DL (ref 13–39)
CD19 B CELL COMMENT: ABNORMAL
CD19 CELLS # BLD: 1 CELLS/UL (ref 107–698)
CD19 CELLS NFR BLD: <1 % (ref 6–27)
DSDNA AB SER-ACNC: 45 IU/ML

## 2024-03-20 ENCOUNTER — TELEPHONE (OUTPATIENT)
Dept: GASTROENTEROLOGY | Facility: CLINIC | Age: 64
End: 2024-03-20
Payer: COMMERCIAL

## 2024-03-20 LAB — DSDNA AB SER-ACNC: 44 IU/ML

## 2024-03-20 NOTE — TELEPHONE ENCOUNTER
Home care nurse was just verifying that patient should still be taken entecavir?  Patient's last clinic visit was on 12/13/2023 with SOILA Briggs PA-C  noted in progress note that patient to continue entecavir 1 mg daily and was discussed that this is a lifelong medication.  Noted Dr. Hurley refilled entecavir 1 mg to be taken every other day.    Clarified entecavir 1 mg is to be taken every other day, not daily. Home care nurse, isabel Jose LPN  Hepatology Clinic   --------------  Camilla Briggs PA-C [place], Shannon, LPN  Let's do entecavir 1 mg every other day due to renal function, as Dr. Hurley said.    Thanks,    Camilla    ----- Message -----   From: Maria Eugenia Pelayo LPN   Sent: 3/20/2024   1:39 PM CDT   To: Camilla Briggs PA-C   Subject: Entecavir                                        Quick question on this patients entecavir. Dr. Hurley had refilled entecavir 1 mg every other day, but your last progress note at the end of Dec 2023 said patient to continue entecavir 1 mg daily. Just want to clarify which is corrected?       -----------------   Health Call Center    Phone Message    May a detailed message be left on voicemail: yes     Reason for Call: Medication Question or concern regarding medication   Prescription Clarification  Name of Medication: entecavir (BARACLUDE) 1 MG tablet   Prescribing Provider: Dr. Hurley    Pharmacy: On File   What on the order needs clarification? Rebeca Clark's home care nurse is requesting a call back please to discuss this medication with the team as he has some questions. Please advise. Thank you!      Action Taken: Message routed to:  [facility] Hepatology    Travel Screening: Not Applicable

## 2024-03-21 ENCOUNTER — APPOINTMENT (OUTPATIENT)
Dept: INTERPRETER SERVICES | Facility: CLINIC | Age: 64
End: 2024-03-21
Payer: COMMERCIAL

## 2024-03-21 ENCOUNTER — TELEPHONE (OUTPATIENT)
Dept: NEPHROLOGY | Facility: CLINIC | Age: 64
End: 2024-03-21
Payer: COMMERCIAL

## 2024-03-21 LAB — HBV DNA SERPL NAA+PROBE-ACNC: NOT DETECTED IU/ML

## 2024-03-25 LAB — CRYOGLOB SER QL: NEGATIVE

## 2024-04-01 NOTE — PROGRESS NOTES
Updated Dr. Harrison on message left that patient is not able to void in toilet and may need to be cathed to get sample.  Dr. Harrison is ok with this for labs currently ordered and for future clinic appts.  Called and left [facility] [phone] a message requesting to follow up.  Nevin Marx RN, BSN, PHN  [facility] Nephrology Nurse Navigator  [phone]     none

## 2024-04-03 ENCOUNTER — TELEPHONE (OUTPATIENT)
Dept: NEPHROLOGY | Facility: CLINIC | Age: 64
End: 2024-04-03
Payer: COMMERCIAL

## 2024-04-03 NOTE — TELEPHONE ENCOUNTER
Darya [facility] Home Care Nurse calling as family has reported of us calling.  Updated Darya that we received his voicemail from a few weeks back stating issues patient is having issues being able to collect clean catch urine and wondering if straight cath was possible.  Updated that I spoke with Dr. Harrison on Monday of his request and Dr. Harrison is ok to support straight cath for urine specimen now and moving forward for patient if patient agreeable to plan.  Darya just left patient home and will connect to them next week and get back to me on the plan. They will need support getting supplies to home.  Also reviewed that scheduling has been trying to reach patient to schedule follow up appt with Dr. Harrison but has not been able to connect with family yet.  Darya will also talk with family and will call when he is at visit next week and we can schedule June F/Up appt at that time.    Vitals: T 97.2, P 67, /85, O2 97% on room air.  Lungs are clear and BM yesterday.  Patient meds set up for the week and we will reconnect week at the nurse visit to coordinate clean catch urine sample and follow up appt scheduling support.  Darya agreeable to plan and will talk to family to support coordination of care.    Nevin Marx RN, BSN, PHN  [facility] Nephrology Nurse Navigator  [phone]

## 2024-04-17 DIAGNOSIS — K59.00 CONSTIPATION, UNSPECIFIED CONSTIPATION TYPE: ICD-10-CM

## 2024-04-17 RX ORDER — DOCUSATE SODIUM 100 MG
100 CAPSULE ORAL 2 TIMES DAILY PRN
Qty: 180 CAPSULE | Refills: 4 | Status: SHIPPED | OUTPATIENT
Start: 2024-04-17

## 2024-04-17 NOTE — TELEPHONE ENCOUNTER
"Request for medication refill:    docusate sodium (COLACE) 100 MG capsule     Providers if patient needs an appointment and you are willing to give a one month supply please refill for one month and  send a letter/MyChart using \".SMILLIMITEDREFILL\" .smillimited and route chart to \"[facility] \" (Giving one month refill in non controlled medications is strongly recommended before denial)    If refill has been denied, meaning absolutely no refills without visit, please complete the smart phrase \".smirxrefuse\" and route it to the \"P [facility] MED REFILLS\"  pool to inform the patient and the pharmacy.    Micaela Carcamo MA      "

## 2024-05-27 DIAGNOSIS — I69.30 HISTORY OF CVA WITH RESIDUAL DEFICIT: ICD-10-CM

## 2024-05-28 RX ORDER — ATORVASTATIN CALCIUM 40 MG/1
40 TABLET, FILM COATED ORAL DAILY
Qty: 90 TABLET | Refills: 3 | Status: SHIPPED | OUTPATIENT
Start: 2024-05-28 | End: 2024-05-29

## 2024-05-28 NOTE — TELEPHONE ENCOUNTER
"Request for medication refill: atorvastatin (LIPITOR) 40 MG tablet     Providers if patient needs an appointment and you are willing to give a one month supply please refill for one month and  send a letter/MyChart using \".SMILLIMITEDREFILL\" .smillimited and route chart to \"P [facility] \" (Giving one month refill in non controlled medications is strongly recommended before denial)    If refill has been denied, meaning absolutely no refills without visit, please complete the smart phrase \".smirxrefuse\" and route it to the \"P [facility] MED REFILLS\"  pool to inform the patient and the pharmacy.    Ronal Horvath, [facility]    "

## 2024-05-29 ENCOUNTER — PATIENT OUTREACH (OUTPATIENT)
Dept: NEPHROLOGY | Facility: CLINIC | Age: 64
End: 2024-05-29
Payer: COMMERCIAL

## 2024-05-29 DIAGNOSIS — R80.9 NEPHROTIC RANGE PROTEINURIA: Primary | ICD-10-CM

## 2024-05-29 DIAGNOSIS — I69.351 HEMIPLEGIA AND HEMIPARESIS FOLLOWING CEREBRAL INFARCTION AFFECTING RIGHT DOMINANT SIDE (H): ICD-10-CM

## 2024-05-29 DIAGNOSIS — N08 CRYOGLOBULINEMIC GLOMERULONEPHRITIS (H): ICD-10-CM

## 2024-05-29 DIAGNOSIS — D89.1 CRYOGLOBULINEMIC GLOMERULONEPHRITIS (H): ICD-10-CM

## 2024-05-29 DIAGNOSIS — R32 URINARY INCONTINENCE, UNSPECIFIED TYPE: ICD-10-CM

## 2024-05-29 DIAGNOSIS — I69.30 HISTORY OF CVA WITH RESIDUAL DEFICIT: ICD-10-CM

## 2024-05-29 RX ORDER — ATORVASTATIN CALCIUM 40 MG/1
40 TABLET, FILM COATED ORAL DAILY
Qty: 90 TABLET | Refills: 3 | Status: SHIPPED | OUTPATIENT
Start: 2024-05-29

## 2024-05-29 NOTE — PROGRESS NOTES
[phone] -reached out to Zue nurse at [facility] to see if they are to do urine sample with next SNV.  Zue is able to as long as supplies are I the home.  Reached out to [facility] to see about ordering.  Previously V.O. obtained from Dr. Harrison for ok to do straight cath with supplies with SNV home visits for pre-clinic labs.  Plan:  requested staffing to call and confirm appt and labs next week openings.  Nevin Marx RN, BSN, PHN  [facility] Nephrology Nurse Navigator  [phone]

## 2024-05-29 NOTE — TELEPHONE ENCOUNTER
"Request for medication refill: atorvastatin (LIPITOR) 40 MG tablet    Providers if patient needs an appointment and you are willing to give a one month supply please refill for one month and  send a letter/MyChart using \".SMILLIMITEDREFILL\" .smillimited and route chart to \"P SMI \" (Giving one month refill in non controlled medications is strongly recommended before denial)    If refill has been denied, meaning absolutely no refills without visit, please complete the smart phrase \".smirxrefuse\" and route it to the \"P SMI MED REFILLS\"  pool to inform the patient and the pharmacy.    Ronal Horvath, [facility]    "

## 2024-05-30 RX ORDER — CATHETER 12 FR
6 EACH MISCELLANEOUS ONCE
Qty: 6 EACH | Refills: 0 | Status: SHIPPED | OUTPATIENT
Start: 2024-05-30 | End: 2024-05-30

## 2024-05-30 NOTE — PROGRESS NOTES
Update from  DME on how to order vazquez insertion trays. Discontinued the old orders and placed DME incontinence smart set per previous V.O. Dr. Harrison.  Nevin Marx RN, BSN, PHN  [facility] Nephrology Nurse Navigator  [phone]

## 2024-06-03 ENCOUNTER — TELEPHONE (OUTPATIENT)
Dept: NEPHROLOGY | Facility: CLINIC | Age: 64
End: 2024-06-03
Payer: COMMERCIAL

## 2024-06-03 NOTE — PROGRESS NOTES
Re-ordered previously missed and future labs to current appt with lab appt this week for Dr. Harrison add on.  Spoke to Darya in home care requesting support to get urine sample for UA and Protein random labs.  Nevin Marx RN, BSN, PHN  [facility] Nephrology Nurse Navigator  [phone]

## 2024-06-05 ENCOUNTER — TELEPHONE (OUTPATIENT)
Dept: NEPHROLOGY | Facility: CLINIC | Age: 64
End: 2024-06-05
Payer: COMMERCIAL

## 2024-06-05 NOTE — TELEPHONE ENCOUNTER
[facility] Call Center    Phone Message    May a detailed message be left on voicemail: no     Reason for Call: Other:  called regarding orders for patients appointment tomorrow. Please call Yuan at [phone].      Action Taken: Message routed to:  [facility] Nephrology    Travel Screening: Not Applicable

## 2024-06-05 NOTE — TELEPHONE ENCOUNTER
[facility] calling to update that supplies have not come out to the home yet.  Updated that  Home Medical should have them shipped.  Vitals today: T96.8, HR 69, R 16, 109/76 lying down, 96% on r/a.  Some Nausea earlier today, no skin issues, no pain, and bowels regular.    [facility] [phone] called requesting urgent  - [place] and confirmed supplies will be received by end of day Thursday.  RN CM updated they will go back when supplies arrive this week or early next week.  Nevin Marx RN, BSN, PHN  [facility] Nephrology Nurse Navigator  [phone]

## 2024-06-05 NOTE — TELEPHONE ENCOUNTER
Reached out to sister who confirm they can come earlier in the day tomorrow - repeated back and confirmed for labs at 830am and appt at 930am.  Updated Darya- [facility] who will try to get out there this week to do straight cath for UA/Protein random and drop at  lab for Protein/Creating Random urine protocol.  Faxed to Darya at [facility] [phone]: UA/Protein/Albumin Random Urine orders as well as face sheet via [employer].    Darya returned call to patient and nurse and they will try to get urine sample today if supplies are in the home.  Nevin Marx RN, BSN, PHN  [facility] Nephrology Nurse Navigator  [phone]

## 2024-06-05 NOTE — TELEPHONE ENCOUNTER
Voicemail left for [facility] CM to clarify their order questions.  Nevin Marx RN, BSN, PHN  [facility] Nephrology Nurse Navigator  [phone]

## 2024-06-05 NOTE — PROGRESS NOTES
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     The patient was previously seen by Dr.Junghare craig in 10/17.  Per his note, the patient was initially evaluated for edema and nephrotic range proteinuria.  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  Upon diagnosis, he has preserved kidney function with Cr of 0.8 although UPCR of  3.5g/g.     At that time, he was treated conservatively with Bumex and lisinopril. His HBV has been treated with Entecavir. Since then has has lost to followed-up with Neph.       In June 2020 he suffered a stroke and continues to have right sided weakness, In July 2020, he has mild ANDRY with creatinine increased to 1.6 but it then came down to 1.1-1.3.  Creatinine increased to 1.46 on 9/30/22, 1.72 on 1/13/2023 and now 2.08 on 4/23.  His serum albumin has been progressively low from 3.3 in 2015 down to 1.5 to 2.0 but then somewhat improved. Serum albumin in April 23 was 2.6.  His UA always show blood and protein. Last UPCR was done in 7/31/18 was 4.80. He has trace cryo in the blood. He had positive IgM, Kappa cryo but then in 2020, he has positive for polyclonal Cryo: A, G, M, K and L. No monoclonal protein via immunofixation in serum or urine.  Sadorus free light chain was 12.7 and lambda free light chain was 6.79 with ratio of 1.87 in July 2020. M spike was negative.  WY-3 and MPO were negative.  He has low C3 in the past and normal C4.  SPEP showed marked hypoalbuminemia and decreased beta globulin without monoclonal protein.     He has been  followed by Dr. Osborne in hepatology clinic, last seen in April 2023.  He was noted to have a small liver lesion, ultrasound with some ascites and pleural effusion.  Noted blood pressure 137/94.  He has kidney in 1/23 which showed normal-sized right kidney without hydronephrosis. There is small amount of ascites and pleural effusion.     6/15/23: Seen for consultation. He arrives with his niece and she assists with translating.  He is noted to be in a wheelchair partially because of his residual right-sided weakness after CVA.  Reports that he is able to transition from room to room on the same floor with a walker.  However, he is unable to go upstairs and requires assistance moving long distances.  He uses a wheelchair when going outside the home.     We reviewed that his creatinine is increased over the past 9 months.  There have been no specific illnesses, hospital presentations or events that they can recall that may have led to kidney injury. After seeing hepatology in April, he was started on Lasix 20 mg and spironolactone for edema management.  His niece reports that his swelling resolved dramatically and now there is only trace swelling present.  He is off of the diuretics for the past few weeks.  Additionally, he continues on his antiretroviral for hepatitis B.  Most recently viral load was undetectable.      In reviewing his appetite and p.o. intake, he often does not eat a lot.  Although there are some times where he has better appetite.  States that his p.o. fluid intake is reasonable.  His urine is reported to be normal and seems to be going to the bathroom several times a day.  No noted hematuria or color/quality.  Plan: Serological work-up, resume lasix 10 mg daily.   8/24/23: He missed his appt.  10/5/23: He is here with his niece today.  Today, he feels fine.  Patient still complaining of incontinence.  So he cannot provide a urine sample for us.  The patient has not done labs yet either.  His  swelling has improved after we resume Lasix 10 mg/day.  His blood pressure still limited at 147/89.  The patient denies any joint pain fevers or rashes.  He has no oral ulcer.  Started losartan 25 mg per day.   Labs on 8/29/23 showed Cr 2.08 with eGFR 35, BUN 33.6, Bicarb 21. Albumin 2.9. UA showed WBC >182, RBC 6, hyaline cast 10, UPCR 5.56. Serological work-up showed positive cryo IgG, IgM and kappa and lambda. Cryo trace. C3 116 and C4 33. Kappa 15.24, lambda 7.96 and K/L ratio 1.91. PTH 81. DS DNA 61. ANCA negative. UA showed WBC >182, RBC 6, UPCR 5.56 g/g.   11/30/23: In the interim, he underwent a kidney Bx on 11/7/23 (read by Dr. Montenegro). 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 Advanced chronic changes of the parenchyma, including: global glomerulosclerosis (78% of the glomeruli), tubular atrophy and interstitial fibrosis (70-80% of the cortex), severe arterial and arteriolar sclerosis. This finding may be found in cryo GN even though there was no substructure presented in the EM. The patient also has positive Cryo  IgM, IgG and kappa. The polyclonal IgG and monoclonal IgG kappa suggested type 2 Cryo. Today,  He is doing good. Biopsy went well. Swelling better but he just hit something on his Rt leg and got swelled up again. Appetite is not so good. Discussed at length about diagnosis and treatment. Discussed risk of HBV flare with Rx.    3/18/24: In the interim, he received  mg/m2 nx4 (1st dose on 12/22/23 and last on 1/19/24).  BP has been improving.  Appetite is good now. He has no leg swelling. He has pain or burning sensation when he urinates.  He has  gained some weight but we do not have a weight check today.  Home blood pressure has been excellent without any evidence of hypotension.  Current medication: Amlodipine 5 (previously 10), Coreg 12.5 mg BID, losartan 25 mg daily., furosemide 20 mg 0.5 tab daily, spironolactone 50 mg once a day.   Labs today show sodium 135, potassium 4.0, carbon dioxide 18, BUN 54.2, creatinine 2.37, GFR 30.  Phosphorus 3.3, albumin 3.5.  LFTs normal.  CBC show white blood cell 5.4, platelet 128 and hemoglobin 14.3. , vit D 20.     6/6/2024: In the interim, his Cryo was negative on 3/18/24. CD19 was < 1 on 3/18/24. RF improved to 51 from 86 in 12/13/24 (pre-treatment). DS DNA showed improvement.  He has been feeling better and has gained some weight and energy.  His weight is 118 pounds from 111 pounds in 12/2023.  He has no lower extremity edema.   BP is high at 164/79 mmHg but at home his blood pressure is 109 over 70s.  He has no problem with urination.. He has been coughing but he reports that it has been normal for him and he denies any short of breath.  His appetite is good. His energy is also good.  Labs today showed creatinine 2.11, GFR 35, BUN 36.9, potassium 4.4, Bicarb , 22.  Calcium 9.3, phosphorus 3.4, albumin 3.4. CRP 21.10. Pending Cryo, RF, DS DNA and complement.     Past medical history  Past Medical History:   Diagnosis Date    Cirrhosis (H)     Cryoglobulinemia (H24)     CVA (cerebral vascular accident) (H) 07/16/2020    Supratentorial likely d/t hypertensive emergency leading to right hemiparesis, dysphagia and aphasia    Glomerulonephritis     Hepatitis B     Hypertension     Latent tuberculosis     Treatment [id number]    MPGN (membranoproliferative glomerulonephritides)     Positive QuantiFERON-TB Gold test     PRES (posterior reversible encephalopathy syndrome) 07/16/2020    In brainstem d/t hypertensive emergency; suffered supratentorial CVAs same date       Past surgical history  Past Surgical History:    Procedure Laterality Date    ANESTHESIA OUT OF OR MRI N/A 7/18/2020    Procedure: ANESTHESIA OUT OF OR MRI;  Surgeon: GENERIC ANESTHESIA PROVIDER;  Location: [facility] OR    ESOPHAGOSCOPY, GASTROSCOPY, DUODENOSCOPY (EGD), COMBINED N/A 10/18/2018    Procedure: EGD;  Surgeon: Eber Ortez MD;  Location: [facility] GI    HAND SURGERY Right     IR PARACENTESIS  7/27/2020    IR RENAL BIOPSY RIGHT  11/7/2023    ZZC HAND/FINGER SURGERY UNLISTED       Review of Systems:   14 systems were reviewed and all negative except as mentioned above.   Current Medications:  Current Outpatient Medications   Medication Sig Dispense Refill    acetaminophen (TYLENOL) 325 MG tablet Take 1 tablet (325 mg) by mouth every 4 hours as needed for mild pain or fever 90 tablet 3    amLODIPine (NORVASC) 10 MG tablet TAKE 1 TABLET (10 MG) BY MOUTH DAILY INDICATION: HTN 90 tablet 3    aspirin (ASA) 81 MG chewable tablet TAKE 1 TABLET (81 MG) BY MOUTH DAILY INDICATION: STROKE 90 tablet 3    atorvastatin (LIPITOR) 40 MG tablet Take 1 tablet (40 mg) by mouth daily 90 tablet 3    carvedilol (COREG) 12.5 MG tablet TAKE 1 TABLET (12.5 MG) BY MOUTH 2 TIMES DAILY (WITH MEALS) 180 tablet 3    entecavir (BARACLUDE) 1 MG tablet Take 1 tablet (1 mg) every other day 45 tablet 3    furosemide (LASIX) 20 MG tablet TAKE 0.5 TABLETS (10 MG) BY MOUTH DAILY 60 tablet 1    gabapentin (NEURONTIN) 100 MG capsule TAKE 1 CAPSULE (100 MG) BY MOUTH 3 TIMES DAILY 360 capsule 3    ibuprofen (ADVIL/MOTRIN) 400 MG tablet       Multiple Vitamin (MULTI VITAMIN DAILY) TABS TAKE 1 TABLET BY MOUTH DAILY 90 tablet 3    polyethylene glycol (MIRALAX) 17 GM/Dose powder Take 17 g by mouth daily as needed for constipation Hold for loose stools/diarrhea 507 g 2    spironolactone (ALDACTONE) 50 MG tablet TAKE 2 TABLET ORALLY ONCE A DAY 30 DAYS      STOOL SOFTENER 100 MG capsule TAKE 1 CAPSULE (100 MG) BY MOUTH 2 TIMES DAILY AS NEEDED FOR CONSTIPATION 180 capsule 4    vitamin D3  (CHOLECALCIFEROL) 50 mcg (2000 units) tablet 1 TABLET ORALLY ONCE A DAY 30 DAYS      losartan (COZAAR) 25 MG tablet Take 1 tablet (25 mg) by mouth daily for 210 days 30 tablet 6     No current facility-administered medications for this visit.       Physical Exam:   BP (!) 164/79 (BP Location: Left arm, Patient Position: Chair, Cuff Size: Adult Small)   Pulse 65   Wt 53.7 kg (118 lb 6.4 oz)   SpO2 100%   BMI 19.73 kg/m     Body mass index is 19.73 kg/m .    GENERAL APPEARANCE: Alert, not in acute distress, pleasant,  thin built, on WC.   EYES:  Not pale conjunctiva, pupils equal  HENT: Mouth without ulcers or lesions  PULM: lungs clear to auscultation bilaterally, equal air movement, no clubbing  CV: regular rhythm, normal rate, no rub     -JVD no distended.      -edema: None  GI: soft,  - tender, no distended, bowel sounds are present  INTEGUMENT: No rash  NEURO:  Non focal. No asterixis.     Labs:   All labs reviewed by me  Last Renal Panel:  Sodium   Date Value Ref Range Status   06/06/2024 142 135 - 145 mmol/L Final     Comment:     Reference intervals for this test were updated on 09/26/2023 to more accurately reflect our healthy population. There may be differences in the flagging of prior results with similar values performed with this method. Interpretation of those prior results can be made in the context of the updated reference intervals.    06/30/2021 140 133 - 144 mmol/L Final     Potassium   Date Value Ref Range Status   06/06/2024 4.4 3.4 - 5.3 mmol/L Final   01/19/2022 3.6 3.4 - 5.3 mmol/L Final   06/30/2021 4.2 3.4 - 5.3 mmol/L Final     Chloride   Date Value Ref Range Status   06/06/2024 110 (H) 98 - 107 mmol/L Final   01/19/2022 107 94 - 109 mmol/L Final   06/30/2021 106 94 - 109 mmol/L Final     Carbon Dioxide   Date Value Ref Range Status   06/30/2021 25 20 - 32 mmol/L Final     Carbon Dioxide (CO2)   Date Value Ref Range Status   06/06/2024 22 22 - 29 mmol/L Final   01/19/2022 27 20 - 32  mmol/L Final     Anion Gap   Date Value Ref Range Status   06/06/2024 10 7 - 15 mmol/L Final   01/19/2022 10 3 - 14 mmol/L Final   06/30/2021 9 3 - 14 mmol/L Final     Glucose   Date Value Ref Range Status   06/06/2024 99 70 - 99 mg/dL Final   01/19/2022 78 70 - 99 mg/dL Final   06/30/2021 96 70 - 99 mg/dL Final     Urea Nitrogen   Date Value Ref Range Status   06/06/2024 36.9 (H) 8.0 - 23.0 mg/dL Final   01/19/2022 19 7 - 30 mg/dL Final   06/30/2021 24 7 - 30 mg/dL Final     Creatinine   Date Value Ref Range Status   06/06/2024 2.11 (H) 0.67 - 1.17 mg/dL Final   06/30/2021 1.27 (H) 0.66 - 1.25 mg/dL Final     GFR Estimate   Date Value Ref Range Status   06/06/2024 35 (L) >60 mL/min/1.73m2 Final   06/30/2021 61 >60 mL/min/[1.73_m2] Final     Comment:     Non  GFR Calc  Starting 12/18/2018, serum creatinine based estimated GFR (eGFR) will be   calculated using the Chronic Kidney Disease Epidemiology Collaboration   (CKD-EPI) equation.       Calcium   Date Value Ref Range Status   06/06/2024 9.3 8.8 - 10.2 mg/dL Final   06/30/2021 9.0 8.5 - 10.1 mg/dL Final     Phosphorus   Date Value Ref Range Status   06/06/2024 3.4 2.5 - 4.5 mg/dL Final   10/10/2017 3.0 2.5 - 4.5 mg/dL Final     Albumin   Date Value Ref Range Status   06/06/2024 3.4 (L) 3.5 - 5.2 g/dL Final   01/19/2022 2.5 (L) 3.4 - 5.0 g/dL Final   06/30/2021 3.0 (L) 3.4 - 5.0 g/dL Final       Imaging:  I reviewed imaging studies.     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  It does appear that the most recent trend is reflecting a progressive elevation in creatinine since 9/30/2022.  There is some concern that this might be related to his liver disease but there is limited lower extremity edema and palpable ascites on exam.  His MELD score is relatively low to observe type II HRS physiology.  In regard to his prior cryoglobulinemic GN, it would be odd for this to come out of quiescence well his hepatitis B has been appropriately treated. I repeat serological work-up and showed that he has trace cryo IgG, IgM and kappa but normal complement: C3 116 and C4 33. Otherwise negative monoclonal protein; K/L ratio of 1.91. PLA2R negative, ANCA negative  but DS DNA positive at 61 U/ml. HBV VL <20 on 4/4/23. It was unclear why he has progressive kidney disease despite HBV controlled. He is also noted to have nephrotic range proteinuria.  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 Severe arterial sclerosis with extensive ultrastructural signs of endothelial injury and glomerular capillary loop remodeling, suggesting a superimposed chronic and acute thrombotic angiopathy. Advanced chronic changes of the parenchyma, including: global glomerulosclerosis (78% of the glomeruli), tubular  atrophy and interstitial fibrosis (70-80% of the cortex), severe arterial and arteriolar sclerosis. This findings are mostly consistent with cryo GN. This is quite interesting since his HBV has been under control. So it is possible that prior previous HBV could stimulate his immune reaction resulted in cryoglobulin.  We decided to initiate rituximab 375 mg/m  x 4 doses and 1st dose on  12/22/23 and last dose on 1/19/24.  His creatinine continue to improved and now down to 2.11 with a EGFR 35.  We have not had any UA since October 2023 as he has difficult time providing UA hence we needs to repeat UA.  His DS DNA, rheumatoid factors and cryoglobulin level has been improving based on the lab in March 2024 but all of this are pending today.  I think the patient has improvement but rituximab but will see what the labs shows this time, if double-stranded DS continue to increase, we may consider starting CellCept instead of rituximab.     - Continue lasix 10mg daily for volume management  - Continue losartan 25 mg per day  - S/p rituximab 375 mg/m  first dose on 12/22/2023 and last dose on 1/19/2024  - Pending double-stranded DNA, rheumatoid factors, UPCR, and complement levels: next step CellCept vs. RTX  # HBV cirrhosis  # HBV infection on entecavir  Follows with hepatology, last viral quant undetectable on 4/4/23. MELD 16. MR Abdomen showing cirrhosis with evidence of portal hypertension. Reports no encephalopathy or concern for hematemesis. Currently on Entevavir, discuss risk of HBV flare while on RTX but he is on Entecavir now. HBV VL in 3/2024 was undetectable.  Pending viral load from today.  -Currently liver function test is normal.  Pending hepatitis B viral load  # Hypertension; controlled  # Volume overload; improved  Home BP is now better after adding lasix and losartan.  Office blood pressure is elevated but at home it is normalized.  - Continue Amlodipine 5 mg daily, Carvedilol 12.5 mg BID, Lasix 10mg daily  and losartan at 25 mg per day as well as spironolactone 50 mg daily  # Malnutrition  # Hx of stroke with Rt sided hemiparesis  Mostly wheelchair bound for transportation. Weight generally appears to be stable although he appears to be quite weak. There is concern that his creatinine is a poor estimate of his true kidney function. Cystatin C 2.8 with eGFR 20 and Cr 1.98 with eGFR by Cr 37 on 6/15/23.   # BMD  # Secondary hyperparathyroidism  2/23/24: PTH is 124, vit D 20 . Ca/Phos normal. Will continue to monitor.      Follow-up 3 months with labs     The longitudinal plan of care for CKD stage 3B, nephrotic syndrome, chronic Hep B, Cryo GN was addressed during this visit. Due to the added complexity in care, I will continue to support Wilfredo Yoon in the subsequent management of this condition(s) and with the ongoing continuity of care of this condition(s).     I spent  53 minutes on the date of the encounter doing chart review, history and exam, documentation and further activities as noted above. 25 minutes of this visit is dedicated to direct patient interaction via face-to-face.    Sue Harrison MD on 06/06/2024

## 2024-06-06 ENCOUNTER — OFFICE VISIT (OUTPATIENT)
Dept: NEPHROLOGY | Facility: CLINIC | Age: 64
End: 2024-06-06
Attending: INTERNAL MEDICINE
Payer: COMMERCIAL

## 2024-06-06 ENCOUNTER — LAB (OUTPATIENT)
Dept: LAB | Facility: CLINIC | Age: 64
End: 2024-06-06
Attending: INTERNAL MEDICINE
Payer: COMMERCIAL

## 2024-06-06 VITALS
WEIGHT: 118.4 LBS | HEART RATE: 65 BPM | DIASTOLIC BLOOD PRESSURE: 79 MMHG | OXYGEN SATURATION: 100 % | BODY MASS INDEX: 19.73 KG/M2 | SYSTOLIC BLOOD PRESSURE: 164 MMHG

## 2024-06-06 DIAGNOSIS — D89.1 CRYOGLOBULINEMIC GLOMERULONEPHRITIS (H): ICD-10-CM

## 2024-06-06 DIAGNOSIS — N25.81 SECONDARY HYPERPARATHYROIDISM (H): ICD-10-CM

## 2024-06-06 DIAGNOSIS — N08 CRYOGLOBULINEMIC GLOMERULONEPHRITIS (H): Primary | ICD-10-CM

## 2024-06-06 DIAGNOSIS — R80.9 NEPHROTIC RANGE PROTEINURIA: Primary | ICD-10-CM

## 2024-06-06 DIAGNOSIS — K74.60 CIRRHOSIS OF LIVER DUE TO HEPATITIS B (H): ICD-10-CM

## 2024-06-06 DIAGNOSIS — D89.1 CRYOGLOBULINEMIC GLOMERULONEPHRITIS (H): Primary | ICD-10-CM

## 2024-06-06 DIAGNOSIS — R80.9 NEPHROTIC RANGE PROTEINURIA: ICD-10-CM

## 2024-06-06 DIAGNOSIS — N18.32 STAGE 3B CHRONIC KIDNEY DISEASE (H): ICD-10-CM

## 2024-06-06 DIAGNOSIS — I69.351 HEMIPLEGIA AND HEMIPARESIS FOLLOWING CEREBRAL INFARCTION AFFECTING RIGHT DOMINANT SIDE (H): ICD-10-CM

## 2024-06-06 DIAGNOSIS — B19.10 CIRRHOSIS OF LIVER DUE TO HEPATITIS B (H): ICD-10-CM

## 2024-06-06 DIAGNOSIS — N08 CRYOGLOBULINEMIC GLOMERULONEPHRITIS (H): ICD-10-CM

## 2024-06-06 LAB
ALBUMIN SERPL BCG-MCNC: 3.4 G/DL (ref 3.5–5.2)
ALP SERPL-CCNC: 97 U/L (ref 40–150)
ALT SERPL W P-5'-P-CCNC: 19 U/L (ref 0–70)
ANION GAP SERPL CALCULATED.3IONS-SCNC: 10 MMOL/L (ref 7–15)
AST SERPL W P-5'-P-CCNC: 22 U/L (ref 0–45)
BASOPHILS # BLD AUTO: 0 10E3/UL (ref 0–0.2)
BASOPHILS NFR BLD AUTO: 1 %
BILIRUB DIRECT SERPL-MCNC: <0.2 MG/DL (ref 0–0.3)
BILIRUB SERPL-MCNC: 0.5 MG/DL
BUN SERPL-MCNC: 36.9 MG/DL (ref 8–23)
C3 SERPL-MCNC: 120 MG/DL (ref 81–157)
C4 SERPL-MCNC: 39 MG/DL (ref 13–39)
CALCIUM SERPL-MCNC: 9.3 MG/DL (ref 8.8–10.2)
CD19 B CELL COMMENT: ABNORMAL
CD19 CELLS # BLD: 1 CELLS/UL (ref 107–698)
CD19 CELLS NFR BLD: <1 % (ref 6–27)
CHLORIDE SERPL-SCNC: 110 MMOL/L (ref 98–107)
CREAT SERPL-MCNC: 2.11 MG/DL (ref 0.67–1.17)
CRP SERPL-MCNC: 21.1 MG/L
DEPRECATED HCO3 PLAS-SCNC: 22 MMOL/L (ref 22–29)
EGFRCR SERPLBLD CKD-EPI 2021: 35 ML/MIN/1.73M2
EOSINOPHIL # BLD AUTO: 0.4 10E3/UL (ref 0–0.7)
EOSINOPHIL NFR BLD AUTO: 7 %
ERYTHROCYTE [DISTWIDTH] IN BLOOD BY AUTOMATED COUNT: 12.8 % (ref 10–15)
GLUCOSE SERPL-MCNC: 99 MG/DL (ref 70–99)
HCT VFR BLD AUTO: 36.9 % (ref 40–53)
HGB BLD-MCNC: 12.4 G/DL (ref 13.3–17.7)
IMM GRANULOCYTES # BLD: 0 10E3/UL
IMM GRANULOCYTES NFR BLD: 0 %
LYMPHOCYTES # BLD AUTO: 1.6 10E3/UL (ref 0.8–5.3)
LYMPHOCYTES NFR BLD AUTO: 27 %
MCH RBC QN AUTO: 31.6 PG (ref 26.5–33)
MCHC RBC AUTO-ENTMCNC: 33.6 G/DL (ref 31.5–36.5)
MCV RBC AUTO: 94 FL (ref 78–100)
MONOCYTES # BLD AUTO: 0.6 10E3/UL (ref 0–1.3)
MONOCYTES NFR BLD AUTO: 10 %
NEUTROPHILS # BLD AUTO: 3.2 10E3/UL (ref 1.6–8.3)
NEUTROPHILS NFR BLD AUTO: 55 %
NRBC # BLD AUTO: 0 10E3/UL
NRBC BLD AUTO-RTO: 0 /100
PHOSPHATE SERPL-MCNC: 3.4 MG/DL (ref 2.5–4.5)
PLATELET # BLD AUTO: 121 10E3/UL (ref 150–450)
POTASSIUM SERPL-SCNC: 4.4 MMOL/L (ref 3.4–5.3)
PROT SERPL-MCNC: 6.6 G/DL (ref 6.4–8.3)
RBC # BLD AUTO: 3.92 10E6/UL (ref 4.4–5.9)
RHEUMATOID FACT SERPL-ACNC: 30 IU/ML
SODIUM SERPL-SCNC: 142 MMOL/L (ref 135–145)
WBC # BLD AUTO: 5.8 10E3/UL (ref 4–11)

## 2024-06-06 PROCEDURE — 99215 OFFICE O/P EST HI 40 MIN: CPT | Performed by: INTERNAL MEDICINE

## 2024-06-06 PROCEDURE — 85025 COMPLETE CBC W/AUTO DIFF WBC: CPT | Performed by: PATHOLOGY

## 2024-06-06 PROCEDURE — 80053 COMPREHEN METABOLIC PANEL: CPT | Performed by: PATHOLOGY

## 2024-06-06 PROCEDURE — 36415 COLL VENOUS BLD VENIPUNCTURE: CPT | Performed by: PATHOLOGY

## 2024-06-06 PROCEDURE — 82248 BILIRUBIN DIRECT: CPT | Performed by: PATHOLOGY

## 2024-06-06 PROCEDURE — 86334 IMMUNOFIX E-PHORESIS SERUM: CPT | Performed by: PATHOLOGY

## 2024-06-06 PROCEDURE — 86160 COMPLEMENT ANTIGEN: CPT | Performed by: INTERNAL MEDICINE

## 2024-06-06 PROCEDURE — 86334 IMMUNOFIX E-PHORESIS SERUM: CPT | Mod: 26 | Performed by: PATHOLOGY

## 2024-06-06 PROCEDURE — 87517 HEPATITIS B DNA QUANT: CPT | Performed by: INTERNAL MEDICINE

## 2024-06-06 PROCEDURE — 82595 ASSAY OF CRYOGLOBULIN: CPT | Performed by: INTERNAL MEDICINE

## 2024-06-06 PROCEDURE — 86140 C-REACTIVE PROTEIN: CPT | Performed by: PATHOLOGY

## 2024-06-06 PROCEDURE — 86355 B CELLS TOTAL COUNT: CPT | Performed by: INTERNAL MEDICINE

## 2024-06-06 PROCEDURE — 84100 ASSAY OF PHOSPHORUS: CPT | Performed by: PATHOLOGY

## 2024-06-06 PROCEDURE — 86225 DNA ANTIBODY NATIVE: CPT | Performed by: INTERNAL MEDICINE

## 2024-06-06 PROCEDURE — 99000 SPECIMEN HANDLING OFFICE-LAB: CPT | Performed by: PATHOLOGY

## 2024-06-06 PROCEDURE — 86431 RHEUMATOID FACTOR QUANT: CPT | Performed by: INTERNAL MEDICINE

## 2024-06-06 PROCEDURE — G0463 HOSPITAL OUTPT CLINIC VISIT: HCPCS | Performed by: INTERNAL MEDICINE

## 2024-06-06 PROCEDURE — G2211 COMPLEX E/M VISIT ADD ON: HCPCS | Performed by: INTERNAL MEDICINE

## 2024-06-06 ASSESSMENT — PAIN SCALES - GENERAL: PAINLEVEL: NO PAIN (0)

## 2024-06-06 NOTE — PATIENT INSTRUCTIONS
Things are improving  Waiting for urine test and other results to determine the next step  See you in 3 months with labs

## 2024-06-06 NOTE — LETTER
6/6/2024       RE: Wilfredo Yoon  [address]  [facility] [address]     Dear Colleague,    Thank you for referring your patient, Wilfredo Yoon, to the [facility] [place] at [facility]. Please see a copy of my visit note below.      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     The patient was previously seen by  back in 10/17.  Per his note, the patient was initially evaluated for edema and nephrotic range proteinuria.  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  Upon diagnosis, he has preserved kidney function with Cr of 0.8 although UPCR of  3.5g/g.     At that time, he was treated conservatively with Bumex and lisinopril. His HBV has been treated with Entecavir. Since then has has lost to followed-up with Neph.       In June 2020 he suffered a stroke and continues to have right sided weakness, In July 2020, he has mild ANDRY with creatinine increased to 1.6 but it then came down to 1.1-1.3.  Creatinine increased to 1.46 on 9/30/22, 1.72 on 1/13/2023 and now 2.08 on 4/23.  His serum albumin has been progressively low from 3.3 in 2015 down to 1.5 to 2.0 but then somewhat improved. Serum albumin in April 23 was 2.6.  His UA always show blood and protein. Last UPCR was done in 7/31/18 was 4.80. He has trace cryo in the blood. He had positive IgM, Kappa cryo but then in 2020, he has positive for polyclonal Cryo: A, G, M, K and L. No monoclonal protein via  immunofixation in serum or urine.  [place] free light chain was 12.7 and lambda free light chain was 6.79 with ratio of 1.87 in July 2020. M spike was negative.  WA-3 and MPO were negative.  He has low C3 in the past and normal C4.  SPEP showed marked hypoalbuminemia and decreased beta globulin without monoclonal protein.     He has been followed by Dr. Osborne in hepatology clinic, last seen in April 2023.  He was noted to have a small liver lesion, ultrasound with some ascites and pleural effusion.  Noted blood pressure 137/94.  He has kidney in 1/23 which showed normal-sized right kidney without hydronephrosis. There is small amount of ascites and pleural effusion.     6/15/23: Seen for consultation. He arrives with his niece and she assists with translating.  He is noted to be in a wheelchair partially because of his residual right-sided weakness after CVA.  Reports that he is able to transition from room to room on the same floor with a walker.  However, he is unable to go upstairs and requires assistance moving long distances.  He uses a wheelchair when going outside the home.     We reviewed that his creatinine is increased over the past 9 months.  There have been no specific illnesses, hospital presentations or events that they can recall that may have led to kidney injury. After seeing hepatology in April, he was started on Lasix 20 mg and spironolactone for edema management.  His niece reports that his swelling resolved dramatically and now there is only trace swelling present.  He is off of the diuretics for the past few weeks.  Additionally, he continues on his antiretroviral for hepatitis B.  Most recently viral load was undetectable.      In reviewing his appetite and p.o. intake, he often does not eat a lot.  Although there are some times where he has better appetite.  States that his p.o. fluid intake is reasonable.  His urine is reported to be normal and seems to be going to the bathroom several  times a day.  No noted hematuria or color/quality.  Plan: Serological work-up, resume lasix 10 mg daily.   8/24/23: He missed his appt.  10/5/23: He is here with his niece today.  Today, he feels fine.  Patient still complaining of incontinence.  So he cannot provide a urine sample for us.  The patient has not done labs yet either.  His swelling has improved after we resume Lasix 10 mg/day.  His blood pressure still limited at 147/89.  The patient denies any joint pain fevers or rashes.  He has no oral ulcer.  Started losartan 25 mg per day.   Labs on 8/29/23 showed Cr 2.08 with eGFR 35, BUN 33.6, Bicarb 21. Albumin 2.9. UA showed WBC >182, RBC 6, hyaline cast 10, UPCR 5.56. Serological work-up showed positive cryo IgG, IgM and kappa and lambda. Cryo trace. C3 116 and C4 33. Kappa 15.24, lambda 7.96 and K/L ratio 1.91. PTH 81. DS DNA 61. ANCA negative. UA showed WBC >182, RBC 6, UPCR 5.56 g/g.   11/30/23: In the interim, he underwent a kidney Bx on 11/7/23 (read by Dr. Montenegro). 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 Advanced chronic changes of the parenchyma, including: global glomerulosclerosis (78% of the glomeruli), tubular atrophy and interstitial fibrosis (70-80% of the cortex), severe arterial and arteriolar sclerosis. This finding may be found in cryo GN even though there was no substructure presented in the EM. The patient also has positive Cryo  IgM, IgG and kappa. The polyclonal IgG and monoclonal IgG kappa suggested type 2 Cryo. Today,  He is doing good. Biopsy went well. Swelling better but he just hit something on his Rt leg and got swelled up again. Appetite  is not so good. Discussed at length about diagnosis and treatment. Discussed risk of HBV flare with Rx.    3/18/24: In the interim, he received  mg/m2 nx4 (1st dose on 12/22/23 and last on 1/19/24).  BP has been improving.  Appetite is good now. He has no leg swelling. He has pain or burning sensation when he urinates.  He has gained some weight but we do not have a weight check today.  Home blood pressure has been excellent without any evidence of hypotension.  Current medication: Amlodipine 5 (previously 10), Coreg 12.5 mg BID, losartan 25 mg daily., furosemide 20 mg 0.5 tab daily, spironolactone 50 mg once a day.   Labs today show sodium 135, potassium 4.0, carbon dioxide 18, BUN 54.2, creatinine 2.37, GFR 30.  Phosphorus 3.3, albumin 3.5.  LFTs normal.  CBC show white blood cell 5.4, platelet 128 and hemoglobin 14.3. , vit D 20.     6/6/2024: In the interim, his Cryo was negative on 3/18/24. CD19 was < 1 on 3/18/24. RF improved to 51 from 86 in 12/13/24 (pre-treatment). DS DNA showed improvement.  He has been feeling better and has gained some weight and energy.  His weight is 118 pounds from 111 pounds in 12/2023.  He has no lower extremity edema.   BP is high at 164/79 mmHg but at home his blood pressure is 109 over 70s.  He has no problem with urination.. He has been coughing but he reports that it has been normal for him and he denies any short of breath.  His appetite is good. His energy is also good.  Labs today showed creatinine 2.11, GFR 35, BUN 36.9, potassium 4.4, Bicarb , 22.  Calcium 9.3, phosphorus 3.4, albumin 3.4. CRP 21.10. Pending Cryo, RF, DS DNA and complement.     Past medical history  Past Medical History:   Diagnosis Date     Cirrhosis (H)      Cryoglobulinemia (H24)      CVA (cerebral vascular accident) (H) 07/16/2020    Supratentorial likely d/t hypertensive emergency leading to right hemiparesis, dysphagia and aphasia     Glomerulonephritis      Hepatitis B       Hypertension      Latent tuberculosis     Treatment 9732-2050     MPGN (membranoproliferative glomerulonephritides)      Positive QuantiFERON-TB Gold test      PRES (posterior reversible encephalopathy syndrome) 07/16/2020    In brainstem d/t hypertensive emergency; suffered supratentorial CVAs same date       Past surgical history  Past Surgical History:   Procedure Laterality Date     ANESTHESIA OUT OF OR MRI N/A 7/18/2020    Procedure: ANESTHESIA OUT OF OR MRI;  Surgeon: GENERIC ANESTHESIA PROVIDER;  Location: [facility] OR     ESOPHAGOSCOPY, GASTROSCOPY, DUODENOSCOPY (EGD), COMBINED N/A 10/18/2018    Procedure: EGD;  Surgeon: Eber Ortez MD;  Location: [facility] GI     HAND SURGERY Right      IR PARACENTESIS  7/27/2020     IR RENAL BIOPSY RIGHT  11/7/2023     ZZC HAND/FINGER SURGERY UNLISTED       Review of Systems:   14 systems were reviewed and all negative except as mentioned above.   Current Medications:  Current Outpatient Medications   Medication Sig Dispense Refill     acetaminophen (TYLENOL) 325 MG tablet Take 1 tablet (325 mg) by mouth every 4 hours as needed for mild pain or fever 90 tablet 3     amLODIPine (NORVASC) 10 MG tablet TAKE 1 TABLET (10 MG) BY MOUTH DAILY INDICATION: HTN 90 tablet 3     aspirin (ASA) 81 MG chewable tablet TAKE 1 TABLET (81 MG) BY MOUTH DAILY INDICATION: STROKE 90 tablet 3     atorvastatin (LIPITOR) 40 MG tablet Take 1 tablet (40 mg) by mouth daily 90 tablet 3     carvedilol (COREG) 12.5 MG tablet TAKE 1 TABLET (12.5 MG) BY MOUTH 2 TIMES DAILY (WITH MEALS) 180 tablet 3     entecavir (BARACLUDE) 1 MG tablet Take 1 tablet (1 mg) every other day 45 tablet 3     furosemide (LASIX) 20 MG tablet TAKE 0.5 TABLETS (10 MG) BY MOUTH DAILY 60 tablet 1     gabapentin (NEURONTIN) 100 MG capsule TAKE 1 CAPSULE (100 MG) BY MOUTH 3 TIMES DAILY 360 capsule 3     ibuprofen (ADVIL/MOTRIN) 400 MG tablet        Multiple Vitamin (MULTI VITAMIN DAILY) TABS TAKE 1 TABLET BY MOUTH DAILY 90 tablet 3      polyethylene glycol (MIRALAX) 17 GM/Dose powder Take 17 g by mouth daily as needed for constipation Hold for loose stools/diarrhea 507 g 2     spironolactone (ALDACTONE) 50 MG tablet TAKE 2 TABLET ORALLY ONCE A DAY 30 DAYS       STOOL SOFTENER 100 MG capsule TAKE 1 CAPSULE (100 MG) BY MOUTH 2 TIMES DAILY AS NEEDED FOR CONSTIPATION 180 capsule 4     vitamin D3 (CHOLECALCIFEROL) 50 mcg (2000 units) tablet 1 TABLET ORALLY ONCE A DAY 30 DAYS       losartan (COZAAR) 25 MG tablet Take 1 tablet (25 mg) by mouth daily for 210 days 30 tablet 6     No current facility-administered medications for this visit.       Physical Exam:   BP (!) 164/79 (BP Location: Left arm, Patient Position: Chair, Cuff Size: Adult Small)   Pulse 65   Wt 53.7 kg (118 lb 6.4 oz)   SpO2 100%   BMI 19.73 kg/m     Body mass index is 19.73 kg/m .    GENERAL APPEARANCE: Alert, not in acute distress, pleasant,  thin built, on WC.   EYES:  Not pale conjunctiva, pupils equal  HENT: Mouth without ulcers or lesions  PULM: lungs clear to auscultation bilaterally, equal air movement, no clubbing  CV: regular rhythm, normal rate, no rub     -JVD no distended.      -edema: None  GI: soft,  - tender, no distended, bowel sounds are present  INTEGUMENT: No rash  NEURO:  Non focal. No asterixis.     Labs:   All labs reviewed by me  Last Renal Panel:  Sodium   Date Value Ref Range Status   06/06/2024 142 135 - 145 mmol/L Final     Comment:     Reference intervals for this test were updated on 09/26/2023 to more accurately reflect our healthy population. There may be differences in the flagging of prior results with similar values performed with this method. Interpretation of those prior results can be made in the context of the updated reference intervals.    06/30/2021 140 133 - 144 mmol/L Final     Potassium   Date Value Ref Range Status   06/06/2024 4.4 3.4 - 5.3 mmol/L Final   01/19/2022 3.6 3.4 - 5.3 mmol/L Final   06/30/2021 4.2 3.4 - 5.3 mmol/L Final      Chloride   Date Value Ref Range Status   06/06/2024 110 (H) 98 - 107 mmol/L Final   01/19/2022 107 94 - 109 mmol/L Final   06/30/2021 106 94 - 109 mmol/L Final     Carbon Dioxide   Date Value Ref Range Status   06/30/2021 25 20 - 32 mmol/L Final     Carbon Dioxide (CO2)   Date Value Ref Range Status   06/06/2024 22 22 - 29 mmol/L Final   01/19/2022 27 20 - 32 mmol/L Final     Anion Gap   Date Value Ref Range Status   06/06/2024 10 7 - 15 mmol/L Final   01/19/2022 10 3 - 14 mmol/L Final   06/30/2021 9 3 - 14 mmol/L Final     Glucose   Date Value Ref Range Status   06/06/2024 99 70 - 99 mg/dL Final   01/19/2022 78 70 - 99 mg/dL Final   06/30/2021 96 70 - 99 mg/dL Final     Urea Nitrogen   Date Value Ref Range Status   06/06/2024 36.9 (H) 8.0 - 23.0 mg/dL Final   01/19/2022 19 7 - 30 mg/dL Final   06/30/2021 24 7 - 30 mg/dL Final     Creatinine   Date Value Ref Range Status   06/06/2024 2.11 (H) 0.67 - 1.17 mg/dL Final   06/30/2021 1.27 (H) 0.66 - 1.25 mg/dL Final     GFR Estimate   Date Value Ref Range Status   06/06/2024 35 (L) >60 mL/min/1.73m2 Final   06/30/2021 61 >60 mL/min/[1.73_m2] Final     Comment:     Non  GFR Calc  Starting 12/18/2018, serum creatinine based estimated GFR (eGFR) will be   calculated using the Chronic Kidney Disease Epidemiology Collaboration   (CKD-EPI) equation.       Calcium   Date Value Ref Range Status   06/06/2024 9.3 8.8 - 10.2 mg/dL Final   06/30/2021 9.0 8.5 - 10.1 mg/dL Final     Phosphorus   Date Value Ref Range Status   06/06/2024 3.4 2.5 - 4.5 mg/dL Final   10/10/2017 3.0 2.5 - 4.5 mg/dL Final     Albumin   Date Value Ref Range Status   06/06/2024 3.4 (L) 3.5 - 5.2 g/dL Final   01/19/2022 2.5 (L) 3.4 - 5.0 g/dL Final   06/30/2021 3.0 (L) 3.4 - 5.0 g/dL Final       Imaging:  I reviewed imaging studies.     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  It does appear that the most recent trend is reflecting a progressive elevation in creatinine since 9/30/2022.  There is some concern that this might be related to his liver disease but there is limited lower extremity edema and palpable ascites on exam.  His MELD score is relatively low to observe type II HRS physiology.  In regard to his prior cryoglobulinemic GN, it would be odd for this to come out of quiescence well his hepatitis B has been appropriately treated. I repeat serological work-up and showed that he has trace cryo IgG, IgM and kappa but normal complement: C3 116 and C4 33. Otherwise negative monoclonal protein; K/L ratio of 1.91. PLA2R negative, ANCA negative  but DS DNA positive at 61 U/ml. HBV VL <20 on 4/4/23. It was unclear why he has progressive kidney disease despite HBV controlled. He is also noted to have nephrotic range proteinuria.  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 Severe arterial sclerosis with extensive ultrastructural signs of endothelial injury and glomerular capillary loop remodeling, suggesting a superimposed chronic and acute thrombotic angiopathy. Advanced chronic changes of the parenchyma, including: global glomerulosclerosis (78% of the glomeruli), tubular atrophy and interstitial fibrosis (70-80% of the cortex), severe arterial and arteriolar sclerosis. This findings are mostly consistent with cryo GN. This is quite interesting since his HBV has been under control. So it is possible that prior previous HBV could stimulate his immune reaction resulted in cryoglobulin.  We decided to initiate rituximab 375 mg/m  x 4 doses and 1st dose on  12/22/23 and last dose on 1/19/24.  His creatinine continue to improved and now down to 2.11 with a EGFR 35.  We have not had any UA since October 2023 as he has difficult time providing UA hence we needs to repeat UA.  His DS DNA, rheumatoid factors and cryoglobulin level has been improving based on the lab in March 2024 but all of this are pending today.  I think the patient has improvement but rituximab but will see what the labs shows this time, if double-stranded DS continue to increase, we may consider starting CellCept instead of rituximab.     - Continue lasix 10mg daily for volume management  - Continue losartan 25 mg per day  - S/p rituximab 375 mg/m  first dose on 12/22/2023 and last dose on 1/19/2024  - Pending double-stranded DNA, rheumatoid factors, UPCR, and complement levels: next step CellCept vs. RTX  # HBV cirrhosis  # HBV infection on entecavir  Follows with hepatology, last viral quant undetectable on 4/4/23. MELD 16. MR Abdomen showing cirrhosis with evidence of portal hypertension. Reports no encephalopathy or concern for hematemesis. Currently on Entevavir, discuss risk of HBV flare while on RTX but he is on Entecavir now. HBV VL in 3/2024 was undetectable.   Pending viral load from today.  -Currently liver function test is normal.  Pending hepatitis B viral load  # Hypertension; controlled  # Volume overload; improved  Home BP is now better after adding lasix and losartan.  Office blood pressure is elevated but at home it is normalized.  - Continue Amlodipine 5 mg daily, Carvedilol 12.5 mg BID, Lasix 10mg daily and losartan at 25 mg per day as well as spironolactone 50 mg daily  # Malnutrition  # Hx of stroke with Rt sided hemiparesis  Mostly wheelchair bound for transportation. Weight generally appears to be stable although he appears to be quite weak. There is concern that his creatinine is a poor estimate of his true kidney function. Cystatin C 2.8 with eGFR 20 and Cr 1.98 with eGFR by Cr 37 on 6/15/23.   # BMD  # Secondary hyperparathyroidism  2/23/24: PTH is 124, vit D 20 . Ca/Phos normal. Will continue to monitor.      Follow-up 3 months with labs     The longitudinal plan of care for CKD stage 3B, nephrotic syndrome, chronic Hep B, Cryo GN was addressed during this visit. Due to the added complexity in care, I will continue to support Wilfredo Yoon in the subsequent management of this condition(s) and with the ongoing continuity of care of this condition(s).     I spent  53 minutes on the date of the encounter doing chart review, history and exam, documentation and further activities as noted above. 25 minutes of this visit is dedicated to direct patient interaction via face-to-face.    Sue Harrison MD on 06/06/2024

## 2024-06-06 NOTE — NURSING NOTE
# [id number]    Chief Complaint   Patient presents with    RECHECK     Follow up.      Vitals:    06/06/24 0914 06/06/24 0918 06/06/24 0919   BP: 132/82 (!) 142/87 (!) 164/79   BP Location: Left arm Left arm Left arm   Patient Position: Chair Chair Chair   Cuff Size: Adult Small Adult Small Adult Small   Pulse: 65     SpO2: 100%     Weight: 53.7 kg (118 lb 6.4 oz)         BP Readings from Last 3 Encounters:   06/06/24 (!) 164/79   03/18/24 109/72   01/19/24 (!) 138/98       BP (!) 164/79 (BP Location: Left arm, Patient Position: Chair, Cuff Size: Adult Small)   Pulse 65   Wt 53.7 kg (118 lb 6.4 oz)   SpO2 100%   BMI 19.73 kg/m       Chelo Monaco

## 2024-06-07 LAB — HBV DNA SERPL NAA+PROBE-ACNC: NOT DETECTED IU/ML

## 2024-06-10 ENCOUNTER — TRANSCRIBE ORDERS (OUTPATIENT)
Dept: OTHER | Age: 64
End: 2024-06-10

## 2024-06-10 LAB — DSDNA AB SER-ACNC: 38 IU/ML

## 2024-06-11 ENCOUNTER — PATIENT OUTREACH (OUTPATIENT)
Dept: NEPHROLOGY | Facility: CLINIC | Age: 64
End: 2024-06-11
Payer: COMMERCIAL

## 2024-06-11 LAB — CRYOGLOB SER QL: ABNORMAL

## 2024-06-11 NOTE — PROGRESS NOTES
Update from provider that urine labs are urgently needed to help create a plan for patient.  Updated provider that  DME promised to get supplies to the home end of day last Thursday but this was after home care visit was already done.  Home Care was going to try to see if they had staff available on Friday or see earlier this week if available.  Reached out to Darya, visit nurse from [facility] who will verify supplies are there and get urine sample at tomorrow visit.  Requested they call at visit to confirm patient status and adherence to med regimen which Darya verbalized understanding and has our contact information to follow up.   Updated provider to plan.  Darya confirmed he has our contact and will call.  Nevin Marx RN, BSN, PHN  [facility] Nephrology Nurse Navigator  [phone]

## 2024-06-12 ENCOUNTER — PATIENT OUTREACH (OUTPATIENT)
Dept: NEPHROLOGY | Facility: CLINIC | Age: 64
End: 2024-06-12
Payer: COMMERCIAL

## 2024-06-12 DIAGNOSIS — D89.1 CRYOGLOBULINEMIC GLOMERULONEPHRITIS (H): Primary | ICD-10-CM

## 2024-06-12 DIAGNOSIS — N08 CRYOGLOBULINEMIC GLOMERULONEPHRITIS (H): Primary | ICD-10-CM

## 2024-06-12 LAB
ALBUMIN SERPL ELPH-MCNC: NEGATIVE G/DL
CRYOGLOB IGA & IGG & IGM SER-IMP: ABNORMAL
CRYOGLOB TYP SER IFE: ABNORMAL
CRYOGLOB TYP SER IFE: ABNORMAL
CRYOGLOB TYP SER IFE: NEGATIVE
CRYOGLOB TYP SER IFE: POSITIVE
CRYOGLOB TYP SER IFE: POSITIVE

## 2024-06-12 NOTE — PROGRESS NOTES
[facility] nurse, Darya called and is at patient home and no insertion kit/vazquez supplies have come that he can find.  When he called family yesterday it was confirmed the supplies were delivered but he looked around the apt and cannot find any.    Darya called [facility] and supplies delivered to wrong apartment.  Darya will see if family can find supplies or offered for him to come to our office and  vazquez straight cath kit.  Darya will keep me posted if supplies are not found and when he can swing by to  supplies today or tomorrow and do an extra visit.  Vazquez insertion kit, chux, biohazard bag, patient labels and orders printed and ready for nurse to  today/tomorrow if needed.  UA/Protein and Albumin Random urine re-ordered stat for timely processing for home care to expedite any [facility] lab to run results right away vs couriering them to central core lab per T.O. Dr. Harrison.  Updated provider of lab delays.    Nevin Marx RN, BSN, PHN  [facility] Nephrology Nurse Navigator  [phone]

## 2024-06-14 ENCOUNTER — LAB (OUTPATIENT)
Dept: LAB | Facility: CLINIC | Age: 64
End: 2024-06-14
Payer: COMMERCIAL

## 2024-06-14 ENCOUNTER — PATIENT OUTREACH (OUTPATIENT)
Dept: NEPHROLOGY | Facility: CLINIC | Age: 64
End: 2024-06-14

## 2024-06-14 DIAGNOSIS — D89.1 CRYOGLOBULINEMIC GLOMERULONEPHRITIS (H): ICD-10-CM

## 2024-06-14 DIAGNOSIS — N08 CRYOGLOBULINEMIC GLOMERULONEPHRITIS (H): ICD-10-CM

## 2024-06-14 LAB
ALBUMIN MFR UR ELPH: 119 MG/DL
ALBUMIN UR-MCNC: 100 MG/DL
APPEARANCE UR: ABNORMAL
BACTERIA #/AREA URNS HPF: ABNORMAL /HPF
BILIRUB UR QL STRIP: NEGATIVE
COLOR UR AUTO: YELLOW
CREAT UR-MCNC: 78.5 MG/DL
CREAT UR-MCNC: 78.6 MG/DL
GLUCOSE UR STRIP-MCNC: NEGATIVE MG/DL
HGB UR QL STRIP: ABNORMAL
HYALINE CASTS: 9 /LPF
KETONES UR STRIP-MCNC: NEGATIVE MG/DL
LEUKOCYTE ESTERASE UR QL STRIP: ABNORMAL
MICROALBUMIN UR-MCNC: 745 MG/L
MICROALBUMIN/CREAT UR: 949.04 MG/G CR (ref 0–17)
MUCOUS THREADS #/AREA URNS LPF: PRESENT /LPF
NITRATE UR QL: NEGATIVE
PH UR STRIP: 5.5 [PH] (ref 5–7)
PROT/CREAT 24H UR: 1.51 MG/MG CR (ref 0–0.2)
RBC URINE: 44 /HPF
SP GR UR STRIP: 1.01 (ref 1–1.03)
UROBILINOGEN UR STRIP-MCNC: NORMAL MG/DL
WBC CLUMPS #/AREA URNS HPF: PRESENT /HPF
WBC URINE: >182 /HPF

## 2024-06-14 PROCEDURE — 81001 URINALYSIS AUTO W/SCOPE: CPT | Performed by: PATHOLOGY

## 2024-06-14 PROCEDURE — 84156 ASSAY OF PROTEIN URINE: CPT | Performed by: PATHOLOGY

## 2024-06-14 PROCEDURE — 82570 ASSAY OF URINE CREATININE: CPT | Performed by: INTERNAL MEDICINE

## 2024-06-14 PROCEDURE — 99000 SPECIMEN HANDLING OFFICE-LAB: CPT | Performed by: PATHOLOGY

## 2024-06-14 NOTE — PROGRESS NOTES
Update from Darya that he was at the patient's home and had all the supplies besides a sterile urine cup to get sample.  Family was able to find cup and Darya would call back if he ran into any issues.  Returned call from Darya confirming he was able to get sample and wanted to confirm he could drop off sample at any [facility] location, clarified that he could as updated orders placed to run stat so we did not have to wait for labs to be couriered to main lab to be processed.    Lab results reviewed with Dr. Harrison and update that he would like patient to get Rituximab 1gm IV asap with Solumedrol dose of 80mg.  Informed Dr. Harrison that Infusion plan will be sent to him for review/approval then once signed I will work with family to get patient scheduled asap.  Therapy plan initiated as per T.O. and forwarded onto Dr. Harrison for review and signature.  Plan: follow up with family on next steps as well as lab results once therapy plan in place.    Nevin Marx RN, BSN, PHN  [facility] Nephrology Nurse Navigator  [phone]

## 2024-06-17 DIAGNOSIS — N08 CRYOGLOBULINEMIC GLOMERULONEPHRITIS (H): Primary | ICD-10-CM

## 2024-06-17 DIAGNOSIS — N05.0 UNSPECIFIED NEPHRITIC SYNDROME WITH MINOR GLOMERULAR ABNORMALITY: ICD-10-CM

## 2024-06-17 DIAGNOSIS — D89.1 CRYOGLOBULINEMIC GLOMERULONEPHRITIS (H): Primary | ICD-10-CM

## 2024-06-17 DIAGNOSIS — R80.9 NEPHROTIC RANGE PROTEINURIA: ICD-10-CM

## 2024-06-17 RX ORDER — ALBUTEROL SULFATE 90 UG/1
1-2 AEROSOL, METERED RESPIRATORY (INHALATION)
Start: 2024-06-17

## 2024-06-17 RX ORDER — DIPHENHYDRAMINE HCL 25 MG
50 CAPSULE ORAL ONCE
Start: 2024-06-17

## 2024-06-17 RX ORDER — METHYLPREDNISOLONE SODIUM SUCCINATE 125 MG/2ML
80 INJECTION, POWDER, LYOPHILIZED, FOR SOLUTION INTRAMUSCULAR; INTRAVENOUS ONCE
OUTPATIENT
Start: 2024-06-17

## 2024-06-17 RX ORDER — ACETAMINOPHEN 325 MG/1
650 TABLET ORAL ONCE
Start: 2024-06-17

## 2024-06-17 RX ORDER — EPINEPHRINE 1 MG/ML
0.3 INJECTION, SOLUTION, CONCENTRATE INTRAVENOUS EVERY 5 MIN PRN
OUTPATIENT
Start: 2024-06-17

## 2024-06-17 RX ORDER — ALBUTEROL SULFATE 0.83 MG/ML
2.5 SOLUTION RESPIRATORY (INHALATION)
OUTPATIENT
Start: 2024-06-17

## 2024-06-17 RX ORDER — DIPHENHYDRAMINE HYDROCHLORIDE 50 MG/ML
50 INJECTION INTRAMUSCULAR; INTRAVENOUS
Start: 2024-06-17

## 2024-06-28 ENCOUNTER — DOCUMENTATION ONLY (OUTPATIENT)
Dept: FAMILY MEDICINE | Facility: CLINIC | Age: 64
End: 2024-06-28
Payer: COMMERCIAL

## 2024-06-28 NOTE — PROGRESS NOTES
"When opening a documentation only encounter, be sure to enter in \"Chief Complaint\" Forms and in \" Comments\" Title of form, description if needed.    Wilfredo is a 64 year old  male  Form received via: Fax  Form now resides in: Provider Ready    Donnie Crisostomo MA             Form has been completed by provider.     Form sent out via: Fax to [facility] at Fax Number: [phone]  Patient informed: Yes  Output date: July 3, 2024    Donnie Crisostomo MA      **Please close the encounter**   "

## 2024-07-01 ENCOUNTER — MEDICAL CORRESPONDENCE (OUTPATIENT)
Dept: HEALTH INFORMATION MANAGEMENT | Facility: CLINIC | Age: 64
End: 2024-07-01
Payer: COMMERCIAL

## 2024-07-02 ENCOUNTER — HOSPITAL ENCOUNTER (EMERGENCY)
Facility: CLINIC | Age: 64
Discharge: HOME | End: 2024-07-03
Attending: EMERGENCY MEDICINE | Admitting: EMERGENCY MEDICINE
Payer: COMMERCIAL

## 2024-07-02 DIAGNOSIS — R11.10 VOMITING, UNSPECIFIED VOMITING TYPE, UNSPECIFIED WHETHER NAUSEA PRESENT: ICD-10-CM

## 2024-07-02 DIAGNOSIS — J06.9 URI WITH COUGH AND CONGESTION: ICD-10-CM

## 2024-07-02 DIAGNOSIS — R53.83 OTHER FATIGUE: ICD-10-CM

## 2024-07-02 LAB
ALBUMIN SERPL BCG-MCNC: 3.3 G/DL (ref 3.5–5.2)
ALP SERPL-CCNC: 102 U/L (ref 40–150)
ALT SERPL W P-5'-P-CCNC: 22 U/L (ref 0–70)
ANION GAP SERPL CALCULATED.3IONS-SCNC: 10 MMOL/L (ref 7–15)
AST SERPL W P-5'-P-CCNC: 26 U/L (ref 0–45)
BASOPHILS # BLD AUTO: 0 10E3/UL (ref 0–0.2)
BASOPHILS NFR BLD AUTO: 0 %
BILIRUB SERPL-MCNC: 0.9 MG/DL
BUN SERPL-MCNC: 45.7 MG/DL (ref 8–23)
CALCIUM SERPL-MCNC: 8.6 MG/DL (ref 8.8–10.2)
CHLORIDE SERPL-SCNC: 104 MMOL/L (ref 98–107)
CREAT SERPL-MCNC: 2.21 MG/DL (ref 0.67–1.17)
DEPRECATED HCO3 PLAS-SCNC: 21 MMOL/L (ref 22–29)
EGFRCR SERPLBLD CKD-EPI 2021: 33 ML/MIN/1.73M2
EOSINOPHIL # BLD AUTO: 0.1 10E3/UL (ref 0–0.7)
EOSINOPHIL NFR BLD AUTO: 0 %
ERYTHROCYTE [DISTWIDTH] IN BLOOD BY AUTOMATED COUNT: 12.5 % (ref 10–15)
GLUCOSE SERPL-MCNC: 120 MG/DL (ref 70–99)
HCT VFR BLD AUTO: 37 % (ref 40–53)
HGB BLD-MCNC: 12.4 G/DL (ref 13.3–17.7)
IMM GRANULOCYTES # BLD: 0.1 10E3/UL
IMM GRANULOCYTES NFR BLD: 0 %
LIPASE SERPL-CCNC: 26 U/L (ref 13–60)
LYMPHOCYTES # BLD AUTO: 1.3 10E3/UL (ref 0.8–5.3)
LYMPHOCYTES NFR BLD AUTO: 8 %
MCH RBC QN AUTO: 32.1 PG (ref 26.5–33)
MCHC RBC AUTO-ENTMCNC: 33.5 G/DL (ref 31.5–36.5)
MCV RBC AUTO: 96 FL (ref 78–100)
MONOCYTES # BLD AUTO: 1.3 10E3/UL (ref 0–1.3)
MONOCYTES NFR BLD AUTO: 8 %
NEUTROPHILS # BLD AUTO: 13 10E3/UL (ref 1.6–8.3)
NEUTROPHILS NFR BLD AUTO: 84 %
NRBC # BLD AUTO: 0 10E3/UL
NRBC BLD AUTO-RTO: 0 /100
PLATELET # BLD AUTO: 95 10E3/UL (ref 150–450)
POTASSIUM SERPL-SCNC: 4.7 MMOL/L (ref 3.4–5.3)
PROT SERPL-MCNC: 6.3 G/DL (ref 6.4–8.3)
RBC # BLD AUTO: 3.86 10E6/UL (ref 4.4–5.9)
SODIUM SERPL-SCNC: 135 MMOL/L (ref 135–145)
WBC # BLD AUTO: 15.7 10E3/UL (ref 4–11)

## 2024-07-02 PROCEDURE — 82040 ASSAY OF SERUM ALBUMIN: CPT | Performed by: EMERGENCY MEDICINE

## 2024-07-02 PROCEDURE — 36415 COLL VENOUS BLD VENIPUNCTURE: CPT | Performed by: EMERGENCY MEDICINE

## 2024-07-02 PROCEDURE — 85025 COMPLETE CBC W/AUTO DIFF WBC: CPT | Performed by: EMERGENCY MEDICINE

## 2024-07-02 PROCEDURE — 83690 ASSAY OF LIPASE: CPT | Performed by: EMERGENCY MEDICINE

## 2024-07-02 PROCEDURE — 99285 EMERGENCY DEPT VISIT HI MDM: CPT | Mod: 25 | Performed by: EMERGENCY MEDICINE

## 2024-07-02 PROCEDURE — 99285 EMERGENCY DEPT VISIT HI MDM: CPT | Performed by: EMERGENCY MEDICINE

## 2024-07-02 ASSESSMENT — ACTIVITIES OF DAILY LIVING (ADL): ADLS_ACUITY_SCORE: 39

## 2024-07-03 ENCOUNTER — APPOINTMENT (OUTPATIENT)
Dept: GENERAL RADIOLOGY | Facility: CLINIC | Age: 64
End: 2024-07-03
Attending: EMERGENCY MEDICINE
Payer: COMMERCIAL

## 2024-07-03 ENCOUNTER — APPOINTMENT (OUTPATIENT)
Dept: CT IMAGING | Facility: CLINIC | Age: 64
End: 2024-07-03
Attending: EMERGENCY MEDICINE
Payer: COMMERCIAL

## 2024-07-03 ENCOUNTER — PATIENT OUTREACH (OUTPATIENT)
Dept: NEPHROLOGY | Facility: CLINIC | Age: 64
End: 2024-07-03
Payer: COMMERCIAL

## 2024-07-03 VITALS
TEMPERATURE: 98.6 F | OXYGEN SATURATION: 98 % | SYSTOLIC BLOOD PRESSURE: 143 MMHG | RESPIRATION RATE: 16 BRPM | DIASTOLIC BLOOD PRESSURE: 84 MMHG | HEART RATE: 66 BPM

## 2024-07-03 DIAGNOSIS — N18.32 STAGE 3B CHRONIC KIDNEY DISEASE (H): ICD-10-CM

## 2024-07-03 DIAGNOSIS — R80.9 NEPHROTIC RANGE PROTEINURIA: Primary | ICD-10-CM

## 2024-07-03 LAB
AMMONIA PLAS-SCNC: 30 UMOL/L (ref 16–60)
FLUAV RNA SPEC QL NAA+PROBE: NEGATIVE
FLUBV RNA RESP QL NAA+PROBE: NEGATIVE
RSV RNA SPEC NAA+PROBE: NEGATIVE
SARS-COV-2 RNA RESP QL NAA+PROBE: NEGATIVE

## 2024-07-03 PROCEDURE — 96374 THER/PROPH/DIAG INJ IV PUSH: CPT | Performed by: EMERGENCY MEDICINE

## 2024-07-03 PROCEDURE — 71046 X-RAY EXAM CHEST 2 VIEWS: CPT

## 2024-07-03 PROCEDURE — 258N000003 HC RX IP 258 OP 636: Performed by: EMERGENCY MEDICINE

## 2024-07-03 PROCEDURE — 87637 SARSCOV2&INF A&B&RSV AMP PRB: CPT | Performed by: EMERGENCY MEDICINE

## 2024-07-03 PROCEDURE — 250N000011 HC RX IP 250 OP 636: Performed by: EMERGENCY MEDICINE

## 2024-07-03 PROCEDURE — 36415 COLL VENOUS BLD VENIPUNCTURE: CPT | Performed by: EMERGENCY MEDICINE

## 2024-07-03 PROCEDURE — 71250 CT THORAX DX C-: CPT

## 2024-07-03 PROCEDURE — 82140 ASSAY OF AMMONIA: CPT | Performed by: EMERGENCY MEDICINE

## 2024-07-03 PROCEDURE — 96361 HYDRATE IV INFUSION ADD-ON: CPT | Performed by: EMERGENCY MEDICINE

## 2024-07-03 RX ORDER — ONDANSETRON 2 MG/ML
4 INJECTION INTRAMUSCULAR; INTRAVENOUS ONCE
Status: COMPLETED | OUTPATIENT
Start: 2024-07-03 | End: 2024-07-03

## 2024-07-03 RX ORDER — ONDANSETRON 8 MG/1
8 TABLET, ORALLY DISINTEGRATING ORAL EVERY 8 HOURS PRN
Qty: 10 TABLET | Refills: 0 | Status: SHIPPED | OUTPATIENT
Start: 2024-07-03 | End: 2024-07-06

## 2024-07-03 RX ORDER — SODIUM CHLORIDE 9 MG/ML
INJECTION, SOLUTION INTRAVENOUS
Status: DISCONTINUED
Start: 2024-07-03 | End: 2024-07-03 | Stop reason: HOSPADM

## 2024-07-03 RX ADMIN — ONDANSETRON 4 MG: 2 INJECTION INTRAMUSCULAR; INTRAVENOUS at 01:19

## 2024-07-03 RX ADMIN — SODIUM CHLORIDE 500 ML: 0.9 INJECTION, SOLUTION INTRAVENOUS at 01:19

## 2024-07-03 ASSESSMENT — ACTIVITIES OF DAILY LIVING (ADL)
ADLS_ACUITY_SCORE: 39

## 2024-07-03 NOTE — DISCHARGE INSTRUCTIONS
Drink small amounts of fluids frequently. Use the zofran as needed for nausea. Follow up with primary care later in the week. Return with any worsening or concerns.

## 2024-07-03 NOTE — PROGRESS NOTES
[facility] Nurse is calling to update that patient went to ER yesterday with nausea.  Kidney labs reviewed and at patient baseline.   Vitals today:  T 98.1 P 79, O2 96% on r/a, /69  Lungs clear, skin intact, pitting adema +1 below ankle bilaterally.  Patient was sent home with Zofran prescribed nausea.  Out of Spironolactone for 2 weeks, waiting on refill. Reviewed MAR and Dr. Guevara ordering provider.  Sent to Dr. Koroma to confirm to continue.    [facility] Nephrology Note: Medication Refill Request    Medication Refill Request:     Medication/Dose/Frequency: Spironolactone 50mg 2 tabs daily  [facility]  Provider:  Dr. Koroma                         SITUATION/BACKROUND:           Last neph office visit: 6/6/24        Future neph office visit: 9/5/24    ASSESSMENT:     Neph Assessments:    Recent Labs:    Last Renal Panel:  Sodium   Date Value Ref Range Status   07/02/2024 135 135 - 145 mmol/L Final   06/30/2021 140 133 - 144 mmol/L Final     Potassium   Date Value Ref Range Status   07/02/2024 4.7 3.4 - 5.3 mmol/L Final   01/19/2022 3.6 3.4 - 5.3 mmol/L Final   06/30/2021 4.2 3.4 - 5.3 mmol/L Final     Chloride   Date Value Ref Range Status   07/02/2024 104 98 - 107 mmol/L Final   01/19/2022 107 94 - 109 mmol/L Final   06/30/2021 106 94 - 109 mmol/L Final     Carbon Dioxide   Date Value Ref Range Status   06/30/2021 25 20 - 32 mmol/L Final     Carbon Dioxide (CO2)   Date Value Ref Range Status   07/02/2024 21 (L) 22 - 29 mmol/L Final   01/19/2022 27 20 - 32 mmol/L Final     Anion Gap   Date Value Ref Range Status   07/02/2024 10 7 - 15 mmol/L Final   01/19/2022 10 3 - 14 mmol/L Final   06/30/2021 9 3 - 14 mmol/L Final     Glucose   Date Value Ref Range Status   07/02/2024 120 (H) 70 - 99 mg/dL Final   01/19/2022 78 70 - 99 mg/dL Final   06/30/2021 96 70 - 99 mg/dL Final     Urea Nitrogen   Date Value Ref Range Status   07/02/2024 45.7 (H) 8.0 - 23.0 mg/dL Final    01/19/2022 19 7 - 30 mg/dL Final   06/30/2021 24 7 - 30 mg/dL Final     Creatinine   Date Value Ref Range Status   07/02/2024 2.21 (H) 0.67 - 1.17 mg/dL Final   06/30/2021 1.27 (H) 0.66 - 1.25 mg/dL Final     GFR Estimate   Date Value Ref Range Status   07/02/2024 33 (L) >60 mL/min/1.73m2 Final     Comment:     eGFR calculated using 2021 CKD-EPI equation.   06/30/2021 61 >60 mL/min/[1.73_m2] Final     Comment:     Non  GFR Calc  Starting 12/18/2018, serum creatinine based estimated GFR (eGFR) will be   calculated using the Chronic Kidney Disease Epidemiology Collaboration   (CKD-EPI) equation.       Calcium   Date Value Ref Range Status   07/02/2024 8.6 (L) 8.8 - 10.2 mg/dL Final   06/30/2021 9.0 8.5 - 10.1 mg/dL Final     Phosphorus   Date Value Ref Range Status   06/06/2024 3.4 2.5 - 4.5 mg/dL Final   10/10/2017 3.0 2.5 - 4.5 mg/dL Final     Albumin   Date Value Ref Range Status   07/02/2024 3.3 (L) 3.5 - 5.2 g/dL Final   01/19/2022 2.5 (L) 3.4 - 5.0 g/dL Final   06/30/2021 3.0 (L) 3.4 - 5.0 g/dL Final       Liver Function Studies:  Recent Labs   Lab Test 07/02/24 2312   PROTTOTAL 6.3*   ALBUMIN 3.3*   BILITOTAL 0.9   ALKPHOS 102   AST 26   ALT 22     CBC Results:  Recent Labs   Lab Test 07/02/24 2312   WBC 15.7*   RBC 3.86*   HGB 12.4*   HCT 37.0*   MCV 96   MCH 32.1   MCHC 33.5   RDW 12.5   PLT 95*     Urinalysis:  Color Urine (no units)   Date Value   06/14/2024 Yellow   07/30/2020 Light Yellow     Appearance Urine (no units)   Date Value   06/14/2024 Cloudy (A)   07/30/2020 Clear     Glucose Urine (mg/dL)   Date Value   06/14/2024 Negative   07/30/2020 Negative     Bilirubin Urine (no units)   Date Value   06/14/2024 Negative   07/30/2020 Negative     Ketones Urine (mg/dL)   Date Value   06/14/2024 Negative   07/30/2020 Negative     Specific Gravity Urine (no units)   Date Value   06/14/2024 1.010   07/30/2020 1.005     pH Urine   Date Value   06/14/2024 5.5   07/30/2020 7.0 pH     Protein  Albumin Urine (mg/dL)   Date Value   06/14/2024 100 (A)   07/30/2020 100 (A)     Nitrite Urine (no units)   Date Value   06/14/2024 Negative   07/30/2020 Negative     Leukocyte Esterase Urine (no units)   Date Value   06/14/2024 Large (A)   07/30/2020 Trace (A)     Current Medication List:   Current Outpatient Medications (Antihypertensive, Cardiovascular, Diuretics, Beta blockers, Calcium blockers, Anticoagulants)   Medication Sig    amLODIPine (NORVASC) 10 MG tablet TAKE 1 TABLET (10 MG) BY MOUTH DAILY INDICATION: HTN    carvedilol (COREG) 12.5 MG tablet TAKE 1 TABLET (12.5 MG) BY MOUTH 2 TIMES DAILY (WITH MEALS)    furosemide (LASIX) 20 MG tablet TAKE 0.5 TABLETS (10 MG) BY MOUTH DAILY    spironolactone (ALDACTONE) 50 MG tablet TAKE 2 TABLET ORALLY ONCE A DAY 30 DAYS     XX Losartan is still on med list but shows discontinued on 5/2/24?    Current Outpatient Medications (Other)   Medication Sig    acetaminophen (TYLENOL) 325 MG tablet Take 1 tablet (325 mg) by mouth every 4 hours as needed for mild pain or fever    aspirin (ASA) 81 MG chewable tablet TAKE 1 TABLET (81 MG) BY MOUTH DAILY INDICATION: STROKE    atorvastatin (LIPITOR) 40 MG tablet Take 1 tablet (40 mg) by mouth daily    entecavir (BARACLUDE) 1 MG tablet Take 1 tablet (1 mg) every other day    gabapentin (NEURONTIN) 100 MG capsule TAKE 1 CAPSULE (100 MG) BY MOUTH 3 TIMES DAILY    ibuprofen (ADVIL/MOTRIN) 400 MG tablet     Multiple Vitamin (MULTI VITAMIN DAILY) TABS TAKE 1 TABLET BY MOUTH DAILY    ondansetron (ZOFRAN ODT) 8 MG ODT tab Take 1 tablet (8 mg) by mouth every 8 hours as needed for nausea    polyethylene glycol (MIRALAX) 17 GM/Dose powder Take 17 g by mouth daily as needed for constipation Hold for loose stools/diarrhea    STOOL SOFTENER 100 MG capsule TAKE 1 CAPSULE (100 MG) BY MOUTH 2 TIMES DAILY AS NEEDED FOR CONSTIPATION    vitamin D3 (CHOLECALCIFEROL) 50 mcg (2000 units) tablet 1 TABLET ORALLY ONCE A DAY 30 DAYS     Has patient had  kidney transplant in the prior 1 year: no.   Has patient been seen the last 12 months: Yes.  Associated labs reviewed for medication: Yes    PLAN:     Medication refilled per protocol: No, routed to Provider to advise.     Nevin Marx RN

## 2024-07-03 NOTE — ED TRIAGE NOTES
Triage Assessment (Adult)       Row Name 07/02/24 2248          Triage Assessment    Airway WDL WDL        Respiratory WDL    Respiratory WDL WDL        Skin Circulation/Temperature WDL    Skin Circulation/Temperature WDL WDL        Cardiac WDL    Cardiac WDL WDL        Peripheral/Neurovascular WDL    Peripheral Neurovascular WDL X  right sided weakness, hx of stroke in 2021, currently at baseline        Cognitive/Neuro/Behavioral WDL    Cognitive/Neuro/Behavioral WDL WDL  pt at baseline     Level of Consciousness alert     Arousal Level opens eyes spontaneously     Mood/Behavior calm        Tony Coma Scale    Best Eye Response 4-->(E4) spontaneous     Best Motor Response 6-->(M6) obeys commands     Best Verbal Response 5-->(V5) oriented     Tony Coma Scale Score 15

## 2024-07-03 NOTE — ED PROVIDER NOTES
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  The history is primarily given by his niece who is one of his typical caretakers.  Small  was used for the interview.  She reports that he seemed his normal self this morning.  He went to adult  for half a day, which is typical for him.  After getting home, he took a nap, which again is typical.  When she went to wake him up, he said he was very sleepy, wanted to continue to nap.  She went again to wake him up a few hours later, and again he indicated he was too tired to get up.  He napped for longer, eventually she did get him up.  He reported that he was not feeling well.  She gave him something to eat, but after one bite he vomited yellow fluid.  Last bowel movement was yesterday and was normal.  At one point she thought he might have a mild fever, but when his temperature was checked by EMS the patient's niece reports that it was normal.  She does report he has had a cough for few days and a little bit of a runny nose.  The patient himself reports that he has whole body pain.  He denies any specific abdominal pain or any specific pain in one part of his body.  He chronically uses a wheelchair.  No previous abdominal surgeries aside from kidney biopsy.  He does have chronic right-sided weakness secondary to previous stroke. He is taking all of his medications today.   This part of the medical record was transcribed by ALONDRA REYES Medical Scribe, from a dictation done by Stephanie Walden MD.     Past Medical  History  Past Medical History:   Diagnosis Date    Cirrhosis (H)     Cryoglobulinemia (H24)     CVA (cerebral vascular accident) (H) 07/16/2020    Supratentorial likely d/t hypertensive emergency leading to right hemiparesis, dysphagia and aphasia    Glomerulonephritis     Hepatitis B     Hypertension     Latent tuberculosis     Treatment 2781-5613    MPGN (membranoproliferative glomerulonephritides)     Positive QuantiFERON-TB Gold test     PRES (posterior reversible encephalopathy syndrome) 07/16/2020    In brainstem d/t hypertensive emergency; suffered supratentorial CVAs same date     Past Surgical History:   Procedure Laterality Date    ANESTHESIA OUT OF OR MRI N/A 7/18/2020    Procedure: ANESTHESIA OUT OF OR MRI;  Surgeon: GENERIC ANESTHESIA PROVIDER;  Location: [facility] OR    ESOPHAGOSCOPY, GASTROSCOPY, DUODENOSCOPY (EGD), COMBINED N/A 10/18/2018    Procedure: EGD;  Surgeon: Eber Ortez MD;  Location: [facility] GI    HAND SURGERY Right     IR PARACENTESIS  7/27/2020    IR RENAL BIOPSY RIGHT  11/7/2023    ZZC HAND/FINGER SURGERY UNLISTED       ondansetron (ZOFRAN ODT) 8 MG ODT tab  acetaminophen (TYLENOL) 325 MG tablet  amLODIPine (NORVASC) 10 MG tablet  aspirin (ASA) 81 MG chewable tablet  atorvastatin (LIPITOR) 40 MG tablet  carvedilol (COREG) 12.5 MG tablet  entecavir (BARACLUDE) 1 MG tablet  furosemide (LASIX) 20 MG tablet  gabapentin (NEURONTIN) 100 MG capsule  ibuprofen (ADVIL/MOTRIN) 400 MG tablet  losartan (COZAAR) 25 MG tablet  Multiple Vitamin (MULTI VITAMIN DAILY) TABS  polyethylene glycol (MIRALAX) 17 GM/Dose powder  spironolactone (ALDACTONE) 50 MG tablet  STOOL SOFTENER 100 MG capsule  vitamin D3 (CHOLECALCIFEROL) 50 mcg (2000 units) tablet      No Known Allergies  Family History  Family History   Problem Relation Age of Onset    Liver Cancer No family hx of     Hepatitis No family hx of      Social History   Social History     Tobacco Use    Smoking status: Former     Current packs/day: 0.00      Average packs/day: 0.3 packs/day for 40.0 years (10.0 ttl pk-yrs)     Types: Cigarettes     Start date: 10/1/1980     Quit date: 10/1/2020     Years since quitting: 3.7    Smokeless tobacco: Never   Vaping Use    Vaping status: Never Used   Substance Use Topics    Alcohol use: No     Alcohol/week: 0.0 standard drinks of alcohol    Drug use: No      Past medical history, past surgical history, medications, allergies, family history, and social history were reviewed with the patient. No additional pertinent items.   A complete review of systems was performed with pertinent positives and negatives noted in the HPI, and all other systems negative.    Physical Exam   BP: 126/83  Pulse: 73  Temp: 98.6  F (37  C)  Resp: 16  SpO2: 96 %  Physical Exam  Constitutional:       General: He is not in acute distress.     Appearance: Normal appearance. He is not toxic-appearing.   HENT:      Head: Atraumatic.   Eyes:      General: No scleral icterus.     Conjunctiva/sclera: Conjunctivae normal.   Cardiovascular:      Rate and Rhythm: Normal rate.      Heart sounds: Normal heart sounds.   Pulmonary:      Effort: Pulmonary effort is normal. No respiratory distress.      Breath sounds: Normal breath sounds.   Abdominal:      Palpations: Abdomen is soft.      Tenderness: There is no abdominal tenderness.   Musculoskeletal:         General: No deformity.      Cervical back: Neck supple.   Skin:     General: Skin is warm.   Neurological:      Mental Status: He is alert.      Comments:  strength seems equal bilaterally as well plantar flexion           ED Course, Procedures, & Data      Procedures         Results for orders placed or performed during the hospital encounter of 07/02/24   Chest XR,  PA & LAT     Status: None    Narrative    EXAM: XR CHEST 2 VIEWS  LOCATION: [facility]  DATE: 7/3/2024    INDICATION: cough, fatigue  COMPARISON: 7/16/2020      Impression    IMPRESSION: The  lungs are clear. There is no pleural effusion or pneumothorax. The cardiomediastinal silhouette is normal. The limited visualized portions of the upper abdomen are grossly normal.   CT Chest Abdomen Pelvis w/o Contrast     Status: None    Narrative    EXAM: CT CHEST ABDOMEN PELVIS W/O CONTRAST  LOCATION: [facility]  DATE: 7/3/2024    INDICATION: Extreme fatigue, vomiting.  COMPARISON: None.  TECHNIQUE: CT scan of the chest, abdomen, and pelvis was performed without IV contrast. Multiplanar reformats were obtained. Dose reduction techniques were used.   CONTRAST: None.    FINDINGS:   LUNGS AND PLEURA: Emphysematous changes in the lungs. No acute-appearing infiltrates or consolidation. There is some mild interstitial scarring in the lower lungs. Slight bronchial wall thickening the lower lungs. Correlation for any signs of   bronchiolitis. Tiny calcified granuloma in the right lung. No pleural effusions.    MEDIASTINUM/AXILLAE: Calcified right hilar and mediastinal lymph nodes compatible with prior granulomatous disease. No worrisome adenopathy. Normal heart size. Trace amount of pericardial fluid. Normal-caliber thoracic aorta. Esophagus is grossly   negative. Bilateral gynecomastia.    CORONARY ARTERY CALCIFICATION: Mild.    HEPATOBILIARY: Cirrhotic liver. No calcified gallstones or biliary dilatation.    PANCREAS: Normal.    SPLEEN: Normal.    ADRENAL GLANDS: Normal.    KIDNEYS/BLADDER: Normal.    BOWEL: Bowel is normal in caliber with no evidence for obstruction. Normal appendix. Moderate amount of stool in the colon. No acute bowel findings.    LYMPH NODES: Normal.    VASCULATURE: Aortic calcification without aneurysm.    PELVIC ORGANS: Normal.    MUSCULOSKELETAL: Mild scattered degenerative changes in the spine.      Impression    IMPRESSION:  1.  Mild bronchial wall thickening in the lower lungs could be seen with bronchiolitis. Nothing definite for  pneumonitis.    2.  Emphysematous changes in the lungs.    3.  Evidence of prior granulomatous disease.    4.  Cirrhotic liver. No ascites.    5.  Normal appendix. No acute bowel findings. Moderate amounts of stool in the colon.    6.  Bilateral gynecomastia.   Comprehensive metabolic panel     Status: Abnormal   Result Value Ref Range    Sodium 135 135 - 145 mmol/L    Potassium 4.7 3.4 - 5.3 mmol/L    Carbon Dioxide (CO2) 21 (L) 22 - 29 mmol/L    Anion Gap 10 7 - 15 mmol/L    Urea Nitrogen 45.7 (H) 8.0 - 23.0 mg/dL    Creatinine 2.21 (H) 0.67 - 1.17 mg/dL    GFR Estimate 33 (L) >60 mL/min/1.73m2    Calcium 8.6 (L) 8.8 - 10.2 mg/dL    Chloride 104 98 - 107 mmol/L    Glucose 120 (H) 70 - 99 mg/dL    Alkaline Phosphatase 102 40 - 150 U/L    AST 26 0 - 45 U/L    ALT 22 0 - 70 U/L    Protein Total 6.3 (L) 6.4 - 8.3 g/dL    Albumin 3.3 (L) 3.5 - 5.2 g/dL    Bilirubin Total 0.9 <=1.2 mg/dL   Lipase     Status: Normal   Result Value Ref Range    Lipase 26 13 - 60 U/L   CBC with platelets and differential     Status: Abnormal   Result Value Ref Range    WBC Count 15.7 (H) 4.0 - 11.0 10e3/uL    RBC Count 3.86 (L) 4.40 - 5.90 10e6/uL    Hemoglobin 12.4 (L) 13.3 - 17.7 g/dL    Hematocrit 37.0 (L) 40.0 - 53.0 %    MCV 96 78 - 100 fL    MCH 32.1 26.5 - 33.0 pg    MCHC 33.5 31.5 - 36.5 g/dL    RDW 12.5 10.0 - 15.0 %    Platelet Count 95 (L) 150 - 450 10e3/uL    % Neutrophils 84 %    % Lymphocytes 8 %    % Monocytes 8 %    % Eosinophils 0 %    % Basophils 0 %    % Immature Granulocytes 0 %    NRBCs per 100 WBC 0 <1 /100    Absolute Neutrophils 13.0 (H) 1.6 - 8.3 10e3/uL    Absolute Lymphocytes 1.3 0.8 - 5.3 10e3/uL    Absolute Monocytes 1.3 0.0 - 1.3 10e3/uL    Absolute Eosinophils 0.1 0.0 - 0.7 10e3/uL    Absolute Basophils 0.0 0.0 - 0.2 10e3/uL    Absolute Immature Granulocytes 0.1 <=0.4 10e3/uL    Absolute NRBCs 0.0 10e3/uL   Symptomatic Influenza A/B, RSV, & SARS-CoV2 PCR (COVID-19) Nose     Status: Normal    Specimen: Nose;  Swab   Result Value Ref Range    Influenza A PCR Negative Negative    Influenza B PCR Negative Negative    RSV PCR Negative Negative    SARS CoV2 PCR Negative Negative    Narrative    Testing was performed using the Xpert Xpress CoV2/Flu/RSV Assay on the Cepheid GeneXpert Instrument. This test should be ordered for the detection of SARS-CoV-2, influenza, and RSV viruses in individuals who meet clinical and/or epidemiological criteria. Test performance is unknown in asymptomatic patients. This test is for in vitro diagnostic use under the FDA EUA for laboratories certified under CLIA to perform high or moderate complexity testing. This test has not been FDA cleared or approved. A negative result does not rule out the presence of PCR inhibitors in the specimen or target RNA in concentration below the limit of detection for the assay. If only one viral target is positive but coinfection with multiple targets is suspected, the sample should be re-tested with another FDA cleared, approved, or authorized test, if coinfection would change clinical management. This test was validated by the [facility]. These laboratories are certified under the Clinical Laboratory Improvement Amendments of 1988 (CLIA-88) as qualified to perform high complexity laboratory testing.   Ammonia     Status: Normal   Result Value Ref Range    Ammonia 30 16 - 60 umol/L   CBC with platelets differential     Status: Abnormal    Narrative    The following orders were created for panel order CBC with platelets differential.  Procedure                               Abnormality         Status                     ---------                               -----------         ------                     CBC with platelets and d...[[id number]]  Abnormal            Final result                 Please view results for these tests on the individual orders.     Medications   sodium chloride 0.9 % infusion (has no administration in time range)    sodium chloride 0.9% BOLUS 500 mL (0 mLs Intravenous Stopped 7/3/24 2227)   ondansetron (ZOFRAN) injection 4 mg (4 mg Intravenous $Given 7/3/24 7716)     Labs Ordered and Resulted from Time of ED Arrival to Time of ED Departure   COMPREHENSIVE METABOLIC PANEL - Abnormal       Result Value    Sodium 135      Potassium 4.7      Carbon Dioxide (CO2) 21 (*)     Anion Gap 10      Urea Nitrogen 45.7 (*)     Creatinine 2.21 (*)     GFR Estimate 33 (*)     Calcium 8.6 (*)     Chloride 104      Glucose 120 (*)     Alkaline Phosphatase 102      AST 26      ALT 22      Protein Total 6.3 (*)     Albumin 3.3 (*)     Bilirubin Total 0.9     CBC WITH PLATELETS AND DIFFERENTIAL - Abnormal    WBC Count 15.7 (*)     RBC Count 3.86 (*)     Hemoglobin 12.4 (*)     Hematocrit 37.0 (*)     MCV 96      MCH 32.1      MCHC 33.5      RDW 12.5      Platelet Count 95 (*)     % Neutrophils 84      % Lymphocytes 8      % Monocytes 8      % Eosinophils 0      % Basophils 0      % Immature Granulocytes 0      NRBCs per 100 WBC 0      Absolute Neutrophils 13.0 (*)     Absolute Lymphocytes 1.3      Absolute Monocytes 1.3      Absolute Eosinophils 0.1      Absolute Basophils 0.0      Absolute Immature Granulocytes 0.1      Absolute NRBCs 0.0     LIPASE - Normal    Lipase 26     INFLUENZA A/B, RSV, & SARS-COV2 PCR - Normal    Influenza A PCR Negative      Influenza B PCR Negative      RSV PCR Negative      SARS CoV2 PCR Negative     AMMONIA - Normal    Ammonia 30     ROUTINE UA WITH MICROSCOPIC     CT Chest Abdomen Pelvis w/o Contrast   Final Result   IMPRESSION:   1.  Mild bronchial wall thickening in the lower lungs could be seen with bronchiolitis. Nothing definite for pneumonitis.      2.  Emphysematous changes in the lungs.      3.  Evidence of prior granulomatous disease.      4.  Cirrhotic liver. No ascites.      5.  Normal appendix. No acute bowel findings. Moderate amounts of stool in the colon.      6.  Bilateral gynecomastia.      Chest  XR,  PA & LAT   Final Result   IMPRESSION: The lungs are clear. There is no pleural effusion or pneumothorax. The cardiomediastinal silhouette is normal. The limited visualized portions of the upper abdomen are grossly normal.             Critical care was not performed.     Medical Decision Making  The patient's presentation was of high complexity (acute complaints with mult co morbidities and language barrier).    The patient's evaluation involved:  an assessment requiring an independent historian (aric)  review of external note(s) from 2 sources (previous notes)  review of 3+ test result(s) ordered prior to this encounter (previous results)  ordering and/or review of 3+ test(s) in this encounter (see separate area of note for details)    The patient's management necessitated moderate risk (prescription drug management including medications given in the ED).    Assessment & Plan    The patient presents with cough for few days and significant fatigue today, vomiting, complaints of diffuse myalgias.  No significant findings on exam.  Strength seems fairly equal bilaterally.  He does follow my commands.  He was given some Zofran here, feels much improved.  Basic labs were checked, he does have a mildly elevated white blood cell count, which is nonspecific.  COVID/influenza/RSV are unremarkable.  Chest x-ray unremarkable.  Given the language barrier, elevated white blood cell count, fatigue, chest/abdomen/pelvis CT was done.  This showed mild bronchial wall thickening in the lower lungs which could be seen with bronchiolitis.  Nothing definite for pneumonitis.  There was no other acute findings.  The patient had not given a urine sample, and he states he is chronically incontinent of urine.  I did discuss attempting catheterization with her, she did not feel the patient would tolerate this and would be very unhappy if we try this.  She states that his mental status seems to be returned to baseline, he is repeatedly  told her he feels well and wonders when they could go home.  They are choosing to decline a urinalysis at this time.  I do think he likely has a viral URI, and suspect that is the cause of his myalgias today and his fatigue.  Again, he states he feels back to normal at this time.  I will prescribe Zofran.  He is encouraged to follow-up with primary care later in the week if he has ongoing issues, return to the ER with any new or worsening symptoms, any other concern.  The patient and his niece verbalized understanding and are agreeable to the plan.    Dictation Disclaimer: Some of this Note has been completed with voice-recognition dictation software. Although errors are generally corrected real-time, there is the potential for a rare error to be present in the completed chart.      I have reviewed the nursing notes. I have reviewed the findings, diagnosis, plan and need for follow up with the patient.    Discharge Medication List as of 7/3/2024  3:55 AM        START taking these medications    Details   ondansetron (ZOFRAN ODT) 8 MG ODT tab Take 1 tablet (8 mg) by mouth every 8 hours as needed for nausea, Disp-10 tablet, R-0, Local Print             Final diagnoses:   Vomiting, unspecified vomiting type, unspecified whether nausea present   URI with cough and congestion   Other fatigue     I, ALONDRA REYES, am serving as a trained medical scribe to document services personally performed by Stephanie Walden MD, based on the provider's statements to me.     IStephanie MD, was physically present and have reviewed and verified the accuracy of this note documented by ALONDRA REYES.     Stephanie Walden MD.   [facility] EMERGENCY DEPARTMENT  7/2/2024     Stephanie Walden MD  07/03/24 0588

## 2024-07-03 NOTE — ED NOTES
Bed: ED09  Expected date:   Expected time:   Means of arrival:   Comments:  H 419 ETA 5mins  64 M lethargic, vomited

## 2024-07-03 NOTE — ED TRIAGE NOTES
Pt omar, family called EMS because pt threw up while at home and stated he was acting abnormal. Told by relative that pt goes to adult  during the day and when he got home he was asking to sleep for longer which is abnormal for the pt. When pt was being fed by relative he threw up after the first bite which also concerned family. . Hx of stroke in 2021, right sided weakness. Pts family states that pt is not able to get around on own.     Niece currently at bedside.

## 2024-07-05 RX ORDER — SPIRONOLACTONE 50 MG/1
100 TABLET, FILM COATED ORAL DAILY
Qty: 180 TABLET | Refills: 3 | Status: SHIPPED | OUTPATIENT
Start: 2024-07-05 | End: 2024-09-06

## 2024-07-08 ENCOUNTER — MEDICAL CORRESPONDENCE (OUTPATIENT)
Dept: HEALTH INFORMATION MANAGEMENT | Facility: CLINIC | Age: 64
End: 2024-07-08
Payer: COMMERCIAL

## 2024-07-08 ENCOUNTER — DOCUMENTATION ONLY (OUTPATIENT)
Dept: FAMILY MEDICINE | Facility: CLINIC | Age: 64
End: 2024-07-08
Payer: COMMERCIAL

## 2024-07-08 NOTE — PROGRESS NOTES
"When opening a documentation only encounter, be sure to enter in \"Chief Complaint\" Forms and in \" Comments\" Title of form, description if needed.    Wilfredo is a 64 year old  male  Form received via: Fax  Form now resides in: Provider Ready    Donnie Crisostomo MA             Form has been completed by provider.     Form sent out via: Fax to [employer] at Fax Number: [phone]  Patient informed: Yes  Output date: July 10, 2024    Donnie Crisostomo MA      **Please close the encounter**   "

## 2024-08-20 ENCOUNTER — PATIENT OUTREACH (OUTPATIENT)
Dept: NEPHROLOGY | Facility: CLINIC | Age: 64
End: 2024-08-20
Payer: COMMERCIAL

## 2024-08-20 NOTE — PROGRESS NOTES
Voicemail received from Makenzie, primary caregiver for patient with request to return call on appt.  Returned call and voicemail left requesting to return call.    Voicemail also left for Darya on [facility] to help coordinate follow up appt with visit this week and need for urine labs to be done as well.  GN contact information left for timely follow up.    Nevin Marx RN, BSN, PHN  [facility] Nephrology Nurse Navigator  [phone]

## 2024-08-21 ENCOUNTER — TELEPHONE (OUTPATIENT)
Dept: NEPHROLOGY | Facility: CLINIC | Age: 64
End: 2024-08-21
Payer: COMMERCIAL

## 2024-08-21 DIAGNOSIS — N18.32 STAGE 3B CHRONIC KIDNEY DISEASE (H): ICD-10-CM

## 2024-08-21 NOTE — TELEPHONE ENCOUNTER
Najmo, primary caregiver that speaks english wants to clarify appt with Dr. Harrison.  Reviewed appt is scheduled for Sept5th with labs before.  Confirmed appt will work for patient and family to come.  Updated Namo that Darya his Home Care nurse has been asked to get urine samples this week or next.  Request that Namo make sure its done next week so we have labs to review with the patient.  [facility] sure [facility] sample is taken by nurse visit next week.  [facility] request that we call her to support scheduling and follow up instructions as her mother patient's sister does not speak english and asked her to do for continuity and understanding.  Instructed Namo to call if any further questions or concerns.  Najmo verbalized understanding and agreement to plan of care and will follow as instructed.  Nevin Marx RN, BSN, PHN  [facility] Nephrology Nurse Navigator  [phone]

## 2024-08-26 RX ORDER — LOSARTAN POTASSIUM 25 MG/1
25 TABLET ORAL DAILY
Qty: 30 TABLET | Refills: 6 | Status: SHIPPED | OUTPATIENT
Start: 2024-08-26 | End: 2024-09-23

## 2024-08-29 ENCOUNTER — PATIENT OUTREACH (OUTPATIENT)
Dept: NEPHROLOGY | Facility: CLINIC | Age: 64
End: 2024-08-29
Payer: COMMERCIAL

## 2024-08-29 NOTE — PROGRESS NOTES
Update from Darya [facility] nurse voicemail received requesting additional urine cups.  Voicemail left for Darya requesting if he can get cup from any [facility] lab for patient and get urine sample asap then get us supply list and we can get more vazquez and sterile cups if needed.  Nevin Marx RN, BSN, PHN  [facility] Nephrology Nurse Navigator  [phone]

## 2024-08-31 NOTE — PROGRESS NOTES
Form has been completed by provider.     Form sent out via: Fax to [facility] at Fax Number: [phone]  Patient informed: N/A  Output date: February 21, 2023    Lucio Carr MA      **Please close the encounter**       LACERATION

## 2024-09-04 ENCOUNTER — LAB (OUTPATIENT)
Dept: LAB | Facility: CLINIC | Age: 64
End: 2024-09-04
Payer: COMMERCIAL

## 2024-09-04 ENCOUNTER — TELEPHONE (OUTPATIENT)
Dept: NEPHROLOGY | Facility: CLINIC | Age: 64
End: 2024-09-04
Payer: COMMERCIAL

## 2024-09-04 DIAGNOSIS — N08 CRYOGLOBULINEMIC GLOMERULONEPHRITIS (H): ICD-10-CM

## 2024-09-04 DIAGNOSIS — R80.9 NEPHROTIC RANGE PROTEINURIA: ICD-10-CM

## 2024-09-04 DIAGNOSIS — D89.1 CRYOGLOBULINEMIC GLOMERULONEPHRITIS (H): ICD-10-CM

## 2024-09-04 LAB
ALBUMIN MFR UR ELPH: 55.7 MG/DL
ALBUMIN UR-MCNC: 50 MG/DL
APPEARANCE UR: ABNORMAL
BACTERIA #/AREA URNS HPF: ABNORMAL /HPF
BILIRUB UR QL STRIP: NEGATIVE
COLOR UR AUTO: YELLOW
CREAT UR-MCNC: 84.8 MG/DL
GLUCOSE UR STRIP-MCNC: NEGATIVE MG/DL
HGB UR QL STRIP: ABNORMAL
KETONES UR STRIP-MCNC: NEGATIVE MG/DL
LEUKOCYTE ESTERASE UR QL STRIP: ABNORMAL
MUCOUS THREADS #/AREA URNS LPF: PRESENT /LPF
NITRATE UR QL: NEGATIVE
PH UR STRIP: 6 [PH] (ref 5–7)
PROT/CREAT 24H UR: 0.66 MG/MG CR (ref 0–0.2)
RBC URINE: 48 /HPF
SP GR UR STRIP: 1 (ref 1–1.03)
UROBILINOGEN UR STRIP-MCNC: NORMAL MG/DL
WBC CLUMPS #/AREA URNS HPF: PRESENT /HPF
WBC URINE: >182 /HPF

## 2024-09-04 PROCEDURE — 87186 SC STD MICRODIL/AGAR DIL: CPT | Performed by: INTERNAL MEDICINE

## 2024-09-04 PROCEDURE — 99000 SPECIMEN HANDLING OFFICE-LAB: CPT | Performed by: PATHOLOGY

## 2024-09-04 PROCEDURE — 82043 UR ALBUMIN QUANTITATIVE: CPT | Performed by: INTERNAL MEDICINE

## 2024-09-04 PROCEDURE — 87086 URINE CULTURE/COLONY COUNT: CPT | Performed by: INTERNAL MEDICINE

## 2024-09-04 PROCEDURE — 81001 URINALYSIS AUTO W/SCOPE: CPT | Performed by: PATHOLOGY

## 2024-09-04 PROCEDURE — 84156 ASSAY OF PROTEIN URINE: CPT | Performed by: PATHOLOGY

## 2024-09-04 NOTE — TELEPHONE ENCOUNTER
Updated from Darya, Home Care Nurse that he stopped by [facility] and was not given any sterile cups to get urine sample today.  Sterile cups and stickers given to Darya who will get sample today.  Instructed Darya to review supplies and let me know what is needed so we can order more supplies.  Updated provider of labs being done today.  Nevin Marx RN, BSN, PHN  [facility] Nephrology Nurse Navigator  [phone]

## 2024-09-05 ENCOUNTER — LAB (OUTPATIENT)
Dept: LAB | Facility: CLINIC | Age: 64
End: 2024-09-05
Payer: COMMERCIAL

## 2024-09-05 ENCOUNTER — OFFICE VISIT (OUTPATIENT)
Dept: NEPHROLOGY | Facility: CLINIC | Age: 64
End: 2024-09-05
Attending: INTERNAL MEDICINE
Payer: COMMERCIAL

## 2024-09-05 ENCOUNTER — PATIENT OUTREACH (OUTPATIENT)
Dept: NEPHROLOGY | Facility: CLINIC | Age: 64
End: 2024-09-05

## 2024-09-05 VITALS
TEMPERATURE: 98 F | OXYGEN SATURATION: 98 % | HEART RATE: 68 BPM | DIASTOLIC BLOOD PRESSURE: 69 MMHG | SYSTOLIC BLOOD PRESSURE: 101 MMHG

## 2024-09-05 DIAGNOSIS — N08 CRYOGLOBULINEMIC GLOMERULONEPHRITIS (H): ICD-10-CM

## 2024-09-05 DIAGNOSIS — N10 ACUTE PYELONEPHRITIS: ICD-10-CM

## 2024-09-05 DIAGNOSIS — E87.5 HYPERKALEMIA: Primary | ICD-10-CM

## 2024-09-05 DIAGNOSIS — N18.32 STAGE 3B CHRONIC KIDNEY DISEASE (H): ICD-10-CM

## 2024-09-05 DIAGNOSIS — R05.2 SUBACUTE COUGH: ICD-10-CM

## 2024-09-05 DIAGNOSIS — D89.1 CRYOGLOBULINEMIC GLOMERULONEPHRITIS (H): ICD-10-CM

## 2024-09-05 DIAGNOSIS — N08 CRYOGLOBULINEMIC GLOMERULONEPHRITIS (H): Primary | ICD-10-CM

## 2024-09-05 DIAGNOSIS — D89.1 CRYOGLOBULINEMIC GLOMERULONEPHRITIS (H): Primary | ICD-10-CM

## 2024-09-05 LAB
ALBUMIN SERPL BCG-MCNC: 3.6 G/DL (ref 3.5–5.2)
ALBUMIN SERPL BCG-MCNC: 3.6 G/DL (ref 3.5–5.2)
ALP SERPL-CCNC: 92 U/L (ref 40–150)
ALT SERPL W P-5'-P-CCNC: 15 U/L (ref 0–70)
ANION GAP SERPL CALCULATED.3IONS-SCNC: 12 MMOL/L (ref 7–15)
AST SERPL W P-5'-P-CCNC: 20 U/L (ref 0–45)
BILIRUB DIRECT SERPL-MCNC: <0.2 MG/DL (ref 0–0.3)
BILIRUB SERPL-MCNC: 0.5 MG/DL
BUN SERPL-MCNC: 56.1 MG/DL (ref 8–23)
CALCIUM SERPL-MCNC: 9.4 MG/DL (ref 8.8–10.4)
CD19 B CELL COMMENT: ABNORMAL
CD19 CELLS # BLD: 6 CELLS/UL (ref 107–698)
CD19 CELLS NFR BLD: 1 % (ref 6–27)
CHLORIDE SERPL-SCNC: 107 MMOL/L (ref 98–107)
CREAT SERPL-MCNC: 2.73 MG/DL (ref 0.67–1.17)
CREAT UR-MCNC: 84 MG/DL
CRP SERPL-MCNC: 6.5 MG/L
EGFRCR SERPLBLD CKD-EPI 2021: 25 ML/MIN/1.73M2
ERYTHROCYTE [DISTWIDTH] IN BLOOD BY AUTOMATED COUNT: 12.2 % (ref 10–15)
ERYTHROCYTE [SEDIMENTATION RATE] IN BLOOD BY WESTERGREN METHOD: 30 MM/HR (ref 0–20)
GLUCOSE SERPL-MCNC: 143 MG/DL (ref 70–99)
HCO3 SERPL-SCNC: 16 MMOL/L (ref 22–29)
HCT VFR BLD AUTO: 39.7 % (ref 40–53)
HGB BLD-MCNC: 12.9 G/DL (ref 13.3–17.7)
MCH RBC QN AUTO: 31.4 PG (ref 26.5–33)
MCHC RBC AUTO-ENTMCNC: 32.5 G/DL (ref 31.5–36.5)
MCV RBC AUTO: 97 FL (ref 78–100)
MICROALBUMIN UR-MCNC: 304 MG/L
MICROALBUMIN/CREAT UR: 361.9 MG/G CR (ref 0–17)
PHOSPHATE SERPL-MCNC: 3.6 MG/DL (ref 2.5–4.5)
PLATELET # BLD AUTO: 131 10E3/UL (ref 150–450)
POTASSIUM SERPL-SCNC: 5.5 MMOL/L (ref 3.4–5.3)
PROT SERPL-MCNC: 6.9 G/DL (ref 6.4–8.3)
RBC # BLD AUTO: 4.11 10E6/UL (ref 4.4–5.9)
RHEUMATOID FACT SERPL-ACNC: 42 IU/ML
SODIUM SERPL-SCNC: 135 MMOL/L (ref 135–145)
WBC # BLD AUTO: 5 10E3/UL (ref 4–11)

## 2024-09-05 PROCEDURE — 86225 DNA ANTIBODY NATIVE: CPT | Performed by: INTERNAL MEDICINE

## 2024-09-05 PROCEDURE — 86160 COMPLEMENT ANTIGEN: CPT | Performed by: INTERNAL MEDICINE

## 2024-09-05 PROCEDURE — 99215 OFFICE O/P EST HI 40 MIN: CPT | Performed by: INTERNAL MEDICINE

## 2024-09-05 PROCEDURE — 36415 COLL VENOUS BLD VENIPUNCTURE: CPT | Performed by: PATHOLOGY

## 2024-09-05 PROCEDURE — 86140 C-REACTIVE PROTEIN: CPT | Performed by: PATHOLOGY

## 2024-09-05 PROCEDURE — G2211 COMPLEX E/M VISIT ADD ON: HCPCS | Performed by: INTERNAL MEDICINE

## 2024-09-05 PROCEDURE — 82595 ASSAY OF CRYOGLOBULIN: CPT | Performed by: INTERNAL MEDICINE

## 2024-09-05 PROCEDURE — 99000 SPECIMEN HANDLING OFFICE-LAB: CPT | Performed by: PATHOLOGY

## 2024-09-05 PROCEDURE — G0463 HOSPITAL OUTPT CLINIC VISIT: HCPCS | Performed by: INTERNAL MEDICINE

## 2024-09-05 PROCEDURE — 86355 B CELLS TOTAL COUNT: CPT | Performed by: INTERNAL MEDICINE

## 2024-09-05 PROCEDURE — 82248 BILIRUBIN DIRECT: CPT | Performed by: PATHOLOGY

## 2024-09-05 PROCEDURE — 86431 RHEUMATOID FACTOR QUANT: CPT | Performed by: INTERNAL MEDICINE

## 2024-09-05 PROCEDURE — 86334 IMMUNOFIX E-PHORESIS SERUM: CPT | Performed by: INTERNAL MEDICINE

## 2024-09-05 PROCEDURE — 86334 IMMUNOFIX E-PHORESIS SERUM: CPT | Mod: 26 | Performed by: PATHOLOGY

## 2024-09-05 PROCEDURE — 84100 ASSAY OF PHOSPHORUS: CPT | Performed by: PATHOLOGY

## 2024-09-05 PROCEDURE — T1013 SIGN LANG/ORAL INTERPRETER: HCPCS | Mod: U4

## 2024-09-05 PROCEDURE — 80053 COMPREHEN METABOLIC PANEL: CPT | Performed by: PATHOLOGY

## 2024-09-05 PROCEDURE — 85027 COMPLETE CBC AUTOMATED: CPT | Performed by: PATHOLOGY

## 2024-09-05 PROCEDURE — 85652 RBC SED RATE AUTOMATED: CPT | Performed by: PATHOLOGY

## 2024-09-05 PROCEDURE — 87517 HEPATITIS B DNA QUANT: CPT | Performed by: INTERNAL MEDICINE

## 2024-09-05 ASSESSMENT — PAIN SCALES - GENERAL: PAINLEVEL: NO PAIN (0)

## 2024-09-05 NOTE — PROGRESS NOTES
dsDNA, C3, C4, CD19, CBC ordered for today and scheduled.  Labs linked to prevent barriers to correct labs being drawn.  CxR unable to schedule today to meet patient's needs.  Family will schedule this week at PCP clinic.  Confirmed Jan 9th appt will work for patient and provider, scheduled with labs 1hr before.  Updated [facility] labs to new [facility] date to verify correct labs will be drawn at Jan 9th lab appt.  Linked labs to appt.  Nevin Marx RN, BSN, PHN  [facility] Nephrology Nurse Navigator  [phone]

## 2024-09-05 NOTE — PATIENT INSTRUCTIONS
Will get more labs  Will get CXR today  Continue same medications for now  See you in 3 months with labs

## 2024-09-05 NOTE — PROGRESS NOTES
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     The patient was previously seen by Dr.Junghare craig in 10/17.  Per his note, the patient was initially evaluated for edema and nephrotic range proteinuria.  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  Upon diagnosis, he has preserved kidney function with Cr of 0.8 although UPCR of  3.5g/g.     At that time, he was treated conservatively with Bumex and lisinopril. His HBV has been treated with Entecavir. Since then has has lost to followed-up with Neph.       In June 2020 he suffered a stroke and continues to have right sided weakness, In July 2020, he has mild ANDRY with creatinine increased to 1.6 but it then came down to 1.1-1.3.  Creatinine increased to 1.46 on 9/30/22, 1.72 on 1/13/2023 and now 2.08 on 4/23.  His serum albumin has been progressively low from 3.3 in 2015 down to 1.5 to 2.0 but then somewhat improved. Serum albumin in April 23 was 2.6.  His UA always show blood and protein. Last UPCR was done in 7/31/18 was 4.80. He has trace cryo in the blood. He had positive IgM, Kappa cryo but then in 2020, he has positive for polyclonal Cryo: A, G, M, K and L. No monoclonal protein via immunofixation in serum or urine.  [place] [employer] free light chain was 12.7 and lambda free light chain was 6.79 with ratio of 1.87 in July 2020. M spike was negative.  PA-3 and MPO were negative.  He has low C3 in the past and normal C4.  SPEP showed marked hypoalbuminemia and decreased beta globulin without monoclonal protein.     He has been followed by  Dr. Osborne in hepatology clinic, last seen in April 2023.  He was noted to have a small liver lesion, ultrasound with some ascites and pleural effusion.  Noted blood pressure 137/94.  He has kidney in 1/23 which showed normal-sized right kidney without hydronephrosis. There is small amount of ascites and pleural effusion.     6/15/23: Seen for consultation. He arrives with his niece and she assists with translating.  He is noted to be in a wheelchair partially because of his residual right-sided weakness after CVA.  Reports that he is able to transition from room to room on the same floor with a walker.  However, he is unable to go upstairs and requires assistance moving long distances.  He uses a wheelchair when going outside the home.     We reviewed that his creatinine is increased over the past 9 months.  There have been no specific illnesses, hospital presentations or events that they can recall that may have led to kidney injury. After seeing hepatology in April, he was started on Lasix 20 mg and spironolactone for edema management.  His niece reports that his swelling resolved dramatically and now there is only trace swelling present.  He is off of the diuretics for the past few weeks.  Additionally, he continues on his antiretroviral for hepatitis B.  Most recently viral load was undetectable.      In reviewing his appetite and p.o. intake, he often does not eat a lot.  Although there are some times where he has better appetite.  States that his p.o. fluid intake is reasonable.  His urine is reported to be normal and seems to be going to the bathroom several times a day.  No noted hematuria or color/quality.  Plan: Serological work-up, resume lasix 10 mg daily.   8/24/23: He missed his appt.  10/5/23: He is here with his niece today.  Today, he feels fine.  Patient still complaining of incontinence.  So he cannot provide a urine sample for us.  The patient has not done labs yet either.  His swelling has  improved after we resume Lasix 10 mg/day.  His blood pressure still limited at 147/89.  The patient denies any joint pain fevers or rashes.  He has no oral ulcer.  Started losartan 25 mg per day.   Labs on 8/29/23 showed Cr 2.08 with eGFR 35, BUN 33.6, Bicarb 21. Albumin 2.9. UA showed WBC >182, RBC 6, hyaline cast 10, UPCR 5.56. Serological work-up showed positive cryo IgG, IgM and kappa and lambda. Cryo trace. C3 116 and C4 33. Kappa 15.24, lambda 7.96 and K/L ratio 1.91. PTH 81. DS DNA 61. ANCA negative. UA showed WBC >182, RBC 6, UPCR 5.56 g/g.   11/30/23: In the interim, he underwent a kidney Bx on 11/7/23 (read by Dr. Montenegro). 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 Advanced chronic changes of the parenchyma, including: global glomerulosclerosis (78% of the glomeruli), tubular atrophy and interstitial fibrosis (70-80% of the cortex), severe arterial and arteriolar sclerosis. This finding may be found in cryo GN even though there was no substructure presented in the EM. The patient also has positive Cryo  IgM, IgG and kappa. The polyclonal IgG and monoclonal IgG kappa suggested type 2 Cryo. Today,  He is doing good. Biopsy went well. Swelling better but he just hit something on his Rt leg and got swelled up again. Appetite is not so good. Discussed at length about diagnosis and treatment. Discussed risk of HBV flare with Rx.    3/18/24: In the interim, he received  mg/m2 nx4 (1st dose on 12/22/23 and last on 1/19/24).  BP has been improving.  Appetite is good now. He has no leg swelling. He has pain or burning sensation when he urinates.  He has gained some  weight but we do not have a weight check today.  Home blood pressure has been excellent without any evidence of hypotension.  Current medication: Amlodipine 5 (previously 10), Coreg 12.5 mg BID, losartan 25 mg daily., furosemide 20 mg 0.5 tab daily, spironolactone 50 mg once a day.   Labs today show sodium 135, potassium 4.0, carbon dioxide 18, BUN 54.2, creatinine 2.37, GFR 30.  Phosphorus 3.3, albumin 3.5.  LFTs normal.  CBC show white blood cell 5.4, platelet 128 and hemoglobin 14.3. , vit D 20.     6/6/2024: In the interim, his Cryo was negative on 3/18/24. CD19 was < 1 on 3/18/24. RF improved to 51 from 86 in 12/13/24 (pre-treatment). DS DNA showed improvement.  He has been feeling better and has gained some weight and energy.  His weight is 118 pounds from 111 pounds in 12/2023.  He has no lower extremity edema.   BP is high at 164/79 mmHg but at home his blood pressure is 109 over 70s.  He has no problem with urination.. He has been coughing but he reports that it has been normal for him and he denies any short of breath.  His appetite is good. His energy is also good.  Labs today showed creatinine 2.11, GFR 35, BUN 36.9, potassium 4.4, Bicarb , 22.  Calcium 9.3, phosphorus 3.4, albumin 3.4. CRP 21.10. Pending Cryo, RF, DS DNA and complement.     9/5/24: In the interim, the patient did not get rituximab scheduled.   Today, he feels ok except that he feels a bit for the past week. He has not been eating well. He does not throw up or having diarrhea. No pain. He has been coughing for the past 2 weeks. Cough is productive with clear color. He has no fever or chills. He does not feel dizzy when stands up. He has no leg swelling.   BP today is 101/69 mmHg.  He has no problem with urination.   UPCR has improved down to 1.51 g/g on 7/2/2024 and down to 0.66 g/g on 9//24.  UA showed moderate blood, large leukocyte esterase white blood cell more than 182 and RBC 48.  This sample is from [facility].    Labs  drawn some of the lab but most important labs such as renal panel and CBC they did not do it.    Update: Later on labs came back potassium 5.5, bicarb 16, BUN 56.1, creatinine 2.73 and GFR 25.  Albumin 3.6, phosphorus 3.6.  CRP 6.5, rheumatoid factor 42, ESR 30.  White blood cell 5, hemoglobin 12.9 and platelet 131. CD 16 6 cells.     Past medical history  Past Medical History:   Diagnosis Date    Cirrhosis (H)     Cryoglobulinemia (H24)     CVA (cerebral vascular accident) (H) 07/16/2020    Supratentorial likely d/t hypertensive emergency leading to right hemiparesis, dysphagia and aphasia    Glomerulonephritis     Hepatitis B     Hypertension     Latent tuberculosis     Treatment [id number]    MPGN (membranoproliferative glomerulonephritides)     Positive QuantiFERON-TB Gold test     PRES (posterior reversible encephalopathy syndrome) 07/16/2020    In brainstem d/t hypertensive emergency; suffered supratentorial CVAs same date       Past surgical history  Past Surgical History:   Procedure Laterality Date    ANESTHESIA OUT OF OR MRI N/A 7/18/2020    Procedure: ANESTHESIA OUT OF OR MRI;  Surgeon: GENERIC ANESTHESIA PROVIDER;  Location:  OR    ESOPHAGOSCOPY, GASTROSCOPY, DUODENOSCOPY (EGD), COMBINED N/A 10/18/2018    Procedure: EGD;  Surgeon: Ebre Ortez MD;  Location: [facility]    HAND SURGERY Right     IR PARACENTESIS  7/27/2020    IR RENAL BIOPSY RIGHT  11/7/2023    [facility] HAND/FINGER SURGERY UNLISTED       Review of Systems:   14 systems were reviewed and all negative except as mentioned above.   Current Medications:  Current Outpatient Medications   Medication Sig Dispense Refill    acetaminophen (TYLENOL) 325 MG tablet Take 1 tablet (325 mg) by mouth every 4 hours as needed for mild pain or fever 90 tablet 3    amLODIPine (NORVASC) 10 MG tablet TAKE 1 TABLET (10 MG) BY MOUTH DAILY INDICATION: HTN 90 tablet 3    aspirin (ASA) 81 MG chewable tablet TAKE 1 TABLET (81 MG) BY MOUTH DAILY INDICATION: STROKE 90  tablet 3    atorvastatin (LIPITOR) 40 MG tablet Take 1 tablet (40 mg) by mouth daily 90 tablet 3    carvedilol (COREG) 12.5 MG tablet TAKE 1 TABLET (12.5 MG) BY MOUTH 2 TIMES DAILY (WITH MEALS) 180 tablet 3    entecavir (BARACLUDE) 1 MG tablet Take 1 tablet (1 mg) every other day 45 tablet 3    furosemide (LASIX) 20 MG tablet TAKE 0.5 TABLETS (10 MG) BY MOUTH DAILY 60 tablet 1    gabapentin (NEURONTIN) 100 MG capsule TAKE 1 CAPSULE (100 MG) BY MOUTH 3 TIMES DAILY 360 capsule 3    ibuprofen (ADVIL/MOTRIN) 400 MG tablet       losartan (COZAAR) 25 MG tablet TAKE 1 TABLET (25 MG) BY MOUTH DAILY 30 tablet 6    Multiple Vitamin (MULTI VITAMIN DAILY) TABS TAKE 1 TABLET BY MOUTH DAILY 90 tablet 3    polyethylene glycol (MIRALAX) 17 GM/Dose powder Take 17 g by mouth daily as needed for constipation Hold for loose stools/diarrhea 507 g 2    spironolactone (ALDACTONE) 50 MG tablet Take 2 tablets (100 mg) by mouth daily 180 tablet 3    STOOL SOFTENER 100 MG capsule TAKE 1 CAPSULE (100 MG) BY MOUTH 2 TIMES DAILY AS NEEDED FOR CONSTIPATION 180 capsule 4    vitamin D3 (CHOLECALCIFEROL) 50 mcg (2000 units) tablet 1 TABLET ORALLY ONCE A DAY 30 DAYS       No current facility-administered medications for this visit.       Physical Exam:   There were no vitals taken for this visit.   There is no height or weight on file to calculate BMI.    GENERAL APPEARANCE: Alert, not in acute distress, pleasant,  thin built, on WC.   EYES:  Not pale conjunctiva, pupils equal  HENT: Mouth without ulcers or lesions  PULM: lungs clear to auscultation bilaterally, equal air movement, no clubbing  CV: regular rhythm, normal rate, no rub     -JVD no distended.      -edema: None  GI: soft,  - tender, no distended, bowel sounds are present  INTEGUMENT: No rash  NEURO:  Non focal. No asterixis.     Labs:   All labs reviewed by me  Last Renal Panel:  Sodium   Date Value Ref Range Status   07/02/2024 135 135 - 145 mmol/L Final   06/30/2021 140 133 - 144  mmol/L Final     Potassium   Date Value Ref Range Status   07/02/2024 4.7 3.4 - 5.3 mmol/L Final   01/19/2022 3.6 3.4 - 5.3 mmol/L Final   06/30/2021 4.2 3.4 - 5.3 mmol/L Final     Chloride   Date Value Ref Range Status   07/02/2024 104 98 - 107 mmol/L Final   01/19/2022 107 94 - 109 mmol/L Final   06/30/2021 106 94 - 109 mmol/L Final     Carbon Dioxide   Date Value Ref Range Status   06/30/2021 25 20 - 32 mmol/L Final     Carbon Dioxide (CO2)   Date Value Ref Range Status   07/02/2024 21 (L) 22 - 29 mmol/L Final   01/19/2022 27 20 - 32 mmol/L Final     Anion Gap   Date Value Ref Range Status   07/02/2024 10 7 - 15 mmol/L Final   01/19/2022 10 3 - 14 mmol/L Final   06/30/2021 9 3 - 14 mmol/L Final     Glucose   Date Value Ref Range Status   07/02/2024 120 (H) 70 - 99 mg/dL Final   01/19/2022 78 70 - 99 mg/dL Final   06/30/2021 96 70 - 99 mg/dL Final     Urea Nitrogen   Date Value Ref Range Status   07/02/2024 45.7 (H) 8.0 - 23.0 mg/dL Final   01/19/2022 19 7 - 30 mg/dL Final   06/30/2021 24 7 - 30 mg/dL Final     Creatinine   Date Value Ref Range Status   07/02/2024 2.21 (H) 0.67 - 1.17 mg/dL Final   06/30/2021 1.27 (H) 0.66 - 1.25 mg/dL Final     GFR Estimate   Date Value Ref Range Status   07/02/2024 33 (L) >60 mL/min/1.73m2 Final     Comment:     eGFR calculated using 2021 CKD-EPI equation.   06/30/2021 61 >60 mL/min/[1.73_m2] Final     Comment:     Non  GFR Calc  Starting 12/18/2018, serum creatinine based estimated GFR (eGFR) will be   calculated using the Chronic Kidney Disease Epidemiology Collaboration   (CKD-EPI) equation.       Calcium   Date Value Ref Range Status   07/02/2024 8.6 (L) 8.8 - 10.2 mg/dL Final   06/30/2021 9.0 8.5 - 10.1 mg/dL Final     Phosphorus   Date Value Ref Range Status   06/06/2024 3.4 2.5 - 4.5 mg/dL Final   10/10/2017 3.0 2.5 - 4.5 mg/dL Final     Albumin   Date Value Ref Range Status   07/02/2024 3.3 (L) 3.5 - 5.2 g/dL Final   01/19/2022 2.5 (L) 3.4 - 5.0 g/dL  Final   06/30/2021 3.0 (L) 3.4 - 5.0 g/dL Final     Imaging:  I reviewed imaging studies.     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  It does appear that the most recent trend is reflecting a progressive elevation in creatinine since 9/30/2022.  There is some concern that this might be related to his liver disease but there is limited lower extremity edema and palpable ascites on exam.  His MELD score is relatively low to observe type II HRS physiology.  In regard to his prior cryoglobulinemic GN, it would be odd for this to come out of quiescence well his hepatitis B has been appropriately treated. I repeat serological work-up and showed that he has trace cryo IgG, IgM and kappa but normal complement: C3 116 and C4 33. Otherwise negative monoclonal protein; K/L ratio of 1.91. PLA2R negative, ANCA negative  but DS DNA positive at 61 U/ml. HBV VL <20 on 4/4/23. It was unclear why he has progressive kidney disease despite HBV controlled. He is also noted  to have nephrotic range proteinuria.  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 Severe arterial sclerosis with extensive ultrastructural signs of endothelial injury and glomerular capillary loop remodeling, suggesting a superimposed chronic and acute thrombotic angiopathy. Advanced chronic changes of the parenchyma, including: global glomerulosclerosis (78% of the glomeruli), tubular atrophy and interstitial fibrosis (70-80% of the cortex), severe arterial and arteriolar sclerosis. This findings are mostly consistent with cryo GN. This is quite interesting since his HBV has been under control. So it is possible that prior previous HBV could stimulate his immune reaction resulted in cryoglobulin.  We decided to initiate rituximab 375 mg/m  x 4 doses and 1st dose on  12/22/23 and last dose on 1/19/24.  His creatinine continue to improved and now down to 2.11 with a EGFR 35.  We have not had any UA since October 2023 as he has difficult time providing UA hence we needs to repeat UA.  His DS DNA, rheumatoid factors and cryoglobulin level has been improving based on the lab in March 2024 and June 2024.  Initially we plan to give him another dose of rituximab but it has not been scheduled yet and now I am concerned that the patient may have active infection so we will hold off for now.  Moreover, it seems like the patient has responded to RTX nicely with UPCR down to 0.66 g/g and serum albumin 3.6 g/dl on 9/5/24 which are the best labs tus far. We do not have renal panel resulted yet but UPCR has shown that his proteinuria improved down to 0.66 g/g. However, later on lab came back showing creatinine 2.73 with potassium 5.5 this is likely in the setting of ANDRY possible from infection/pre-renal from HTN.     -  Continue lasix 10mg daily for volume management  - Continue losartan 25 mg per day  - Stop spironolactone and check labs next week  - S/p rituximab 375 mg/m  first dose on 12/22/2023 and last dose on 1/19/2024; hold off another dose of RTX for now given concerning for infection  - Pending labs today  - Call labs to inquire why labs are not being done completely today  # Hyperkalemia  Calcium 5.5 likely in the setting of ANDRY and spironolactone and losartan use.  -Will hold losartan for now  -Repeat lab next week  # Metabolic acidosis  Likely from ANDRY on CKD will repeat labs next week  # HBV cirrhosis  # HBV infection on entecavir  Follows with hepatology, last viral quant undetectable on 4/4/23. MELD 16. MR Abdomen showing cirrhosis with evidence of portal hypertension. Reports no encephalopathy or concern for hematemesis. Currently on Entevavir, discuss risk of HBV flare while on RTX but he is on Entecavir now. HBV VL in 6/2024 was undetectable.    -Currently liver function test is normal.  Hepatitis B viral load today is pending.  # Hypertension; controlled  # Volume overload; improved  Home BP is now better after adding lasix and losartan.  Office blood pressure today is a little bit low at 101/69 but at home were normal.  He denies any dizziness or lightheadedness.  - Continue Amlodipine 5 mg daily, Carvedilol 12.5 mg BID, Lasix 10mg daily and losartan at 25 mg per day as well as spironolactone 50 mg daily  # Cough  # Weakness  # Fatigue  I am worried that the patient may have respiratory tract infection so we will check chest x-ray and CBC.  I encouraged the patient to reach out to primary care doctor for this problem.  In the meantime as mentioned above, will hold off redosing rituximab at this time.  # Dysuria  The patient has been experiencing dysuria for the past 2 weeks.  UA show pyuria and microscopic hematuria but this is a Hawkins sample.  Will add on urine culture today.  # Malnutrition  # Hx of stroke  with Rt sided hemiparesis  Mostly wheelchair bound for transportation. Weight generally appears to be stable although he appears to be quite weak. There is concern that his creatinine is a poor estimate of his true kidney function. Cystatin C 2.8 with eGFR 20 and Cr 1.98 with eGFR by Cr 37 on 6/15/23.   # BMD  # Secondary hyperparathyroidism  2/23/24: PTH is 124, vit D 20 . Ca/Phos normal. Will continue to monitor next visti.      Follow-up 3 months with labs     The longitudinal plan of care for CKD stage 3B, nephrotic syndrome, chronic Hep B, Cryo GN was addressed during this visit. Due to the added complexity in care, I will continue to support Wilfredo Yoon in the subsequent management of this condition(s) and with the ongoing continuity of care of this condition(s).     I spent  50 minutes on the date of the encounter doing chart review, history and exam, documentation and further activities as noted above. 30 minutes of this visit is dedicated to direct patient interaction via face-to-face.    Sue Harrison MD on 9/5/24

## 2024-09-05 NOTE — NURSING NOTE
Chief Complaint   Patient presents with    RECHECK     Follow up. PT exhibits very strong cough that hs persisted for 2 weeks. No production of mucus. PT reports feeling very weak/      Vitals:    09/05/24 1115   BP: 101/69   BP Location: Left arm   Patient Position: Chair   Cuff Size: Adult Regular   Pulse: 68   Temp: 98  F (36.7  C)   TempSrc: Oral   SpO2: 98%       BP Readings from Last 3 Encounters:   09/05/24 101/69   07/03/24 (!) 143/84   06/06/24 (!) 164/79       /69 (BP Location: Left arm, Patient Position: Chair, Cuff Size: Adult Regular)   Pulse 68   Temp 98  F (36.7  C) (Oral)   SpO2 98%      Sunshine Heymans

## 2024-09-05 NOTE — PROGRESS NOTES
Update from Dr. Harrison:  Stop Spironolactone 50mg  Renal panel next week.  Family will call back.  Nevin Marx RN, BSN, PHN  [facility] Nephrology Nurse Navigator  [phone]

## 2024-09-05 NOTE — LETTER
9/5/2024       RE: Wilfredo Yoon  [address]  [facility] [address]     Dear Colleague,    Thank you for referring your patient, Wilfredo Yoon, to the [facility]PH[facility] [place] [facility]. Please see a copy of my visit note below.      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     The patient was previously seen by  back in 10/17.  Per his note, the patient was initially evaluated for edema and nephrotic range proteinuria.  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  Upon diagnosis, he has preserved kidney function with Cr of 0.8 although UPCR of  3.5g/g.     At that time, he was treated conservatively with Bumex and lisinopril. His HBV has been treated with Entecavir. Since then has has lost to followed-up with Neph.       In June 2020 he suffered a stroke and continues to have right sided weakness, In July 2020, he has mild ANDRY with creatinine increased to 1.6 but it then came down to 1.1-1.3.  Creatinine increased to 1.46 on 9/30/22, 1.72 on 1/13/2023 and now 2.08 on 4/23.  His serum albumin has been progressively low from 3.3 in 2015 down to 1.5 to 2.0 but then somewhat improved. Serum albumin in April 23 was 2.6.  His UA always show blood and protein. Last UPCR was done in 7/31/18 was 4.80. He has trace cryo in the blood. He had positive IgM, Kappa cryo but then in 2020, he has positive for polyclonal Cryo: A, G, M, K and L. No monoclonal protein via  immunofixation in serum or urine.  [place] free light chain was 12.7 and lambda free light chain was 6.79 with ratio of 1.87 in July 2020. M spike was negative.  MN-3 and MPO were negative.  He has low C3 in the past and normal C4.  SPEP showed marked hypoalbuminemia and decreased beta globulin without monoclonal protein.     He has been followed by Dr. Osborne in hepatology clinic, last seen in April 2023.  He was noted to have a small liver lesion, ultrasound with some ascites and pleural effusion.  Noted blood pressure 137/94.  He has kidney in 1/23 which showed normal-sized right kidney without hydronephrosis. There is small amount of ascites and pleural effusion.     6/15/23: Seen for consultation. He arrives with his niece and she assists with translating.  He is noted to be in a wheelchair partially because of his residual right-sided weakness after CVA.  Reports that he is able to transition from room to room on the same floor with a walker.  However, he is unable to go upstairs and requires assistance moving long distances.  He uses a wheelchair when going outside the home.     We reviewed that his creatinine is increased over the past 9 months.  There have been no specific illnesses, hospital presentations or events that they can recall that may have led to kidney injury. After seeing hepatology in April, he was started on Lasix 20 mg and spironolactone for edema management.  His niece reports that his swelling resolved dramatically and now there is only trace swelling present.  He is off of the diuretics for the past few weeks.  Additionally, he continues on his antiretroviral for hepatitis B.  Most recently viral load was undetectable.      In reviewing his appetite and p.o. intake, he often does not eat a lot.  Although there are some times where he has better appetite.  States that his p.o. fluid intake is reasonable.  His urine is reported to be normal and seems to be going to the bathroom several  times a day.  No noted hematuria or color/quality.  Plan: Serological work-up, resume lasix 10 mg daily.   8/24/23: He missed his appt.  10/5/23: He is here with his niece today.  Today, he feels fine.  Patient still complaining of incontinence.  So he cannot provide a urine sample for us.  The patient has not done labs yet either.  His swelling has improved after we resume Lasix 10 mg/day.  His blood pressure still limited at 147/89.  The patient denies any joint pain fevers or rashes.  He has no oral ulcer.  Started losartan 25 mg per day.   Labs on 8/29/23 showed Cr 2.08 with eGFR 35, BUN 33.6, Bicarb 21. Albumin 2.9. UA showed WBC >182, RBC 6, hyaline cast 10, UPCR 5.56. Serological work-up showed positive cryo IgG, IgM and kappa and lambda. Cryo trace. C3 116 and C4 33. Kappa 15.24, lambda 7.96 and K/L ratio 1.91. PTH 81. DS DNA 61. ANCA negative. UA showed WBC >182, RBC 6, UPCR 5.56 g/g.   11/30/23: In the interim, he underwent a kidney Bx on 11/7/23 (read by Dr. Montenegro). 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 Advanced chronic changes of the parenchyma, including: global glomerulosclerosis (78% of the glomeruli), tubular atrophy and interstitial fibrosis (70-80% of the cortex), severe arterial and arteriolar sclerosis. This finding may be found in cryo GN even though there was no substructure presented in the EM. The patient also has positive Cryo  IgM, IgG and kappa. The polyclonal IgG and monoclonal IgG kappa suggested type 2 Cryo. Today,  He is doing good. Biopsy went well. Swelling better but he just hit something on his Rt leg and got swelled up again. Appetite  is not so good. Discussed at length about diagnosis and treatment. Discussed risk of HBV flare with Rx.    3/18/24: In the interim, he received  mg/m2 nx4 (1st dose on 12/22/23 and last on 1/19/24).  BP has been improving.  Appetite is good now. He has no leg swelling. He has pain or burning sensation when he urinates.  He has gained some weight but we do not have a weight check today.  Home blood pressure has been excellent without any evidence of hypotension.  Current medication: Amlodipine 5 (previously 10), Coreg 12.5 mg BID, losartan 25 mg daily., furosemide 20 mg 0.5 tab daily, spironolactone 50 mg once a day.   Labs today show sodium 135, potassium 4.0, carbon dioxide 18, BUN 54.2, creatinine 2.37, GFR 30.  Phosphorus 3.3, albumin 3.5.  LFTs normal.  CBC show white blood cell 5.4, platelet 128 and hemoglobin 14.3. , vit D 20.     6/6/2024: In the interim, his Cryo was negative on 3/18/24. CD19 was < 1 on 3/18/24. RF improved to 51 from 86 in 12/13/24 (pre-treatment). DS DNA showed improvement.  He has been feeling better and has gained some weight and energy.  His weight is 118 pounds from 111 pounds in 12/2023.  He has no lower extremity edema.   BP is high at 164/79 mmHg but at home his blood pressure is 109 over 70s.  He has no problem with urination.. He has been coughing but he reports that it has been normal for him and he denies any short of breath.  His appetite is good. His energy is also good.  Labs today showed creatinine 2.11, GFR 35, BUN 36.9, potassium 4.4, Bicarb , 22.  Calcium 9.3, phosphorus 3.4, albumin 3.4. CRP 21.10. Pending Cryo, RF, DS DNA and complement.     9/5/24: In the interim, the patient did not get rituximab scheduled.   Today, he feels ok except that he feels a bit for the past week. He has not been eating well. He does not throw up or having diarrhea. No pain. He has been coughing for the past 2 weeks. Cough is productive with clear color. He has no fever or  chills. He does not feel dizzy when stands up. He has no leg swelling.   BP today is 101/69 mmHg.  He has no problem with urination.   UPCR has improved down to 1.51 g/g on 7/2/2024 and down to 0.66 g/g on 9//24.  UA showed moderate blood, large leukocyte esterase white blood cell more than 182 and RBC 48.  This sample is from [facility].    Labs drawn some of the lab but most important labs such as renal panel and CBC they did not do it.    Update: Later on labs came back potassium 5.5, bicarb 16, BUN 56.1, creatinine 2.73 and GFR 25.  Albumin 3.6, phosphorus 3.6.  CRP 6.5, rheumatoid factor 42, ESR 30.  White blood cell 5, hemoglobin 12.9 and platelet 131. CD 16 6 cells.     Past medical history  Past Medical History:   Diagnosis Date     Cirrhosis (H)      Cryoglobulinemia (H24)      CVA (cerebral vascular accident) (H) 07/16/2020    Supratentorial likely d/t hypertensive emergency leading to right hemiparesis, dysphagia and aphasia     Glomerulonephritis      Hepatitis B      Hypertension      Latent tuberculosis     Treatment [id number]     MPGN (membranoproliferative glomerulonephritides)      Positive QuantiFERON-TB Gold test      PRES (posterior reversible encephalopathy syndrome) 07/16/2020    In brainstem d/t hypertensive emergency; suffered supratentorial CVAs same date       Past surgical history  Past Surgical History:   Procedure Laterality Date     ANESTHESIA OUT OF OR MRI N/A 7/18/2020    Procedure: ANESTHESIA OUT OF OR MRI;  Surgeon: GENERIC ANESTHESIA PROVIDER;  Location: [facility] OR     ESOPHAGOSCOPY, GASTROSCOPY, DUODENOSCOPY (EGD), COMBINED N/A 10/18/2018    Procedure: EGD;  Surgeon: Eber Ortez MD;  Location: [facility] GI     HAND SURGERY Right      IR PARACENTESIS  7/27/2020     IR RENAL BIOPSY RIGHT  11/7/2023     [facility] HAND/FINGER SURGERY UNLISTED       Review of Systems:   14 systems were reviewed and all negative except as mentioned above.   Current Medications:  Current Outpatient  Medications   Medication Sig Dispense Refill     acetaminophen (TYLENOL) 325 MG tablet Take 1 tablet (325 mg) by mouth every 4 hours as needed for mild pain or fever 90 tablet 3     amLODIPine (NORVASC) 10 MG tablet TAKE 1 TABLET (10 MG) BY MOUTH DAILY INDICATION: HTN 90 tablet 3     aspirin (ASA) 81 MG chewable tablet TAKE 1 TABLET (81 MG) BY MOUTH DAILY INDICATION: STROKE 90 tablet 3     atorvastatin (LIPITOR) 40 MG tablet Take 1 tablet (40 mg) by mouth daily 90 tablet 3     carvedilol (COREG) 12.5 MG tablet TAKE 1 TABLET (12.5 MG) BY MOUTH 2 TIMES DAILY (WITH MEALS) 180 tablet 3     entecavir (BARACLUDE) 1 MG tablet Take 1 tablet (1 mg) every other day 45 tablet 3     furosemide (LASIX) 20 MG tablet TAKE 0.5 TABLETS (10 MG) BY MOUTH DAILY 60 tablet 1     gabapentin (NEURONTIN) 100 MG capsule TAKE 1 CAPSULE (100 MG) BY MOUTH 3 TIMES DAILY 360 capsule 3     ibuprofen (ADVIL/MOTRIN) 400 MG tablet        losartan (COZAAR) 25 MG tablet TAKE 1 TABLET (25 MG) BY MOUTH DAILY 30 tablet 6     Multiple Vitamin (MULTI VITAMIN DAILY) TABS TAKE 1 TABLET BY MOUTH DAILY 90 tablet 3     polyethylene glycol (MIRALAX) 17 GM/Dose powder Take 17 g by mouth daily as needed for constipation Hold for loose stools/diarrhea 507 g 2     spironolactone (ALDACTONE) 50 MG tablet Take 2 tablets (100 mg) by mouth daily 180 tablet 3     STOOL SOFTENER 100 MG capsule TAKE 1 CAPSULE (100 MG) BY MOUTH 2 TIMES DAILY AS NEEDED FOR CONSTIPATION 180 capsule 4     vitamin D3 (CHOLECALCIFEROL) 50 mcg (2000 units) tablet 1 TABLET ORALLY ONCE A DAY 30 DAYS       No current facility-administered medications for this visit.       Physical Exam:   There were no vitals taken for this visit.   There is no height or weight on file to calculate BMI.    GENERAL APPEARANCE: Alert, not in acute distress, pleasant,  thin built, on WC.   EYES:  Not pale conjunctiva, pupils equal  HENT: Mouth without ulcers or lesions  PULM: lungs clear to auscultation bilaterally,  equal air movement, no clubbing  CV: regular rhythm, normal rate, no rub     -JVD no distended.      -edema: None  GI: soft,  - tender, no distended, bowel sounds are present  INTEGUMENT: No rash  NEURO:  Non focal. No asterixis.     Labs:   All labs reviewed by me  Last Renal Panel:  Sodium   Date Value Ref Range Status   07/02/2024 135 135 - 145 mmol/L Final   06/30/2021 140 133 - 144 mmol/L Final     Potassium   Date Value Ref Range Status   07/02/2024 4.7 3.4 - 5.3 mmol/L Final   01/19/2022 3.6 3.4 - 5.3 mmol/L Final   06/30/2021 4.2 3.4 - 5.3 mmol/L Final     Chloride   Date Value Ref Range Status   07/02/2024 104 98 - 107 mmol/L Final   01/19/2022 107 94 - 109 mmol/L Final   06/30/2021 106 94 - 109 mmol/L Final     Carbon Dioxide   Date Value Ref Range Status   06/30/2021 25 20 - 32 mmol/L Final     Carbon Dioxide (CO2)   Date Value Ref Range Status   07/02/2024 21 (L) 22 - 29 mmol/L Final   01/19/2022 27 20 - 32 mmol/L Final     Anion Gap   Date Value Ref Range Status   07/02/2024 10 7 - 15 mmol/L Final   01/19/2022 10 3 - 14 mmol/L Final   06/30/2021 9 3 - 14 mmol/L Final     Glucose   Date Value Ref Range Status   07/02/2024 120 (H) 70 - 99 mg/dL Final   01/19/2022 78 70 - 99 mg/dL Final   06/30/2021 96 70 - 99 mg/dL Final     Urea Nitrogen   Date Value Ref Range Status   07/02/2024 45.7 (H) 8.0 - 23.0 mg/dL Final   01/19/2022 19 7 - 30 mg/dL Final   06/30/2021 24 7 - 30 mg/dL Final     Creatinine   Date Value Ref Range Status   07/02/2024 2.21 (H) 0.67 - 1.17 mg/dL Final   06/30/2021 1.27 (H) 0.66 - 1.25 mg/dL Final     GFR Estimate   Date Value Ref Range Status   07/02/2024 33 (L) >60 mL/min/1.73m2 Final     Comment:     eGFR calculated using 2021 CKD-EPI equation.   06/30/2021 61 >60 mL/min/[1.73_m2] Final     Comment:     Non  GFR Calc  Starting 12/18/2018, serum creatinine based estimated GFR (eGFR) will be   calculated using the Chronic Kidney Disease Epidemiology Collaboration    (CKD-EPI) equation.       Calcium   Date Value Ref Range Status   07/02/2024 8.6 (L) 8.8 - 10.2 mg/dL Final   06/30/2021 9.0 8.5 - 10.1 mg/dL Final     Phosphorus   Date Value Ref Range Status   06/06/2024 3.4 2.5 - 4.5 mg/dL Final   10/10/2017 3.0 2.5 - 4.5 mg/dL Final     Albumin   Date Value Ref Range Status   07/02/2024 3.3 (L) 3.5 - 5.2 g/dL Final   01/19/2022 2.5 (L) 3.4 - 5.0 g/dL Final   06/30/2021 3.0 (L) 3.4 - 5.0 g/dL Final     Imaging:  I reviewed imaging studies.     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  It does appear that the most recent trend is reflecting a progressive elevation in creatinine since 9/30/2022.  There is some concern that this might be related to his liver disease but there is limited lower extremity edema and palpable ascites on exam.  His MELD score is relatively low to observe type II HRS physiology.  In regard to his prior cryoglobulinemic GN, it would be odd for this to come out of quiescence well his hepatitis B  has been appropriately treated. I repeat serological work-up and showed that he has trace cryo IgG, IgM and kappa but normal complement: C3 116 and C4 33. Otherwise negative monoclonal protein; K/L ratio of 1.91. PLA2R negative, ANCA negative  but DS DNA positive at 61 U/ml. HBV VL <20 on 4/4/23. It was unclear why he has progressive kidney disease despite HBV controlled. He is also noted to have nephrotic range proteinuria.  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 Severe arterial sclerosis with extensive ultrastructural signs of endothelial injury and glomerular capillary loop remodeling, suggesting a superimposed chronic and acute thrombotic angiopathy. Advanced chronic changes of the parenchyma, including: global glomerulosclerosis (78% of the glomeruli), tubular atrophy and interstitial fibrosis (70-80% of the cortex), severe arterial and arteriolar sclerosis. This findings are mostly consistent with cryo GN. This is quite interesting since his HBV has been under control. So it is possible that prior previous HBV could stimulate his immune reaction resulted in cryoglobulin.  We decided to initiate rituximab 375 mg/m  x 4 doses and 1st dose on  12/22/23 and last dose on 1/19/24.  His creatinine continue to improved and now down to 2.11 with a EGFR 35.  We have not had any UA since October 2023 as he has difficult time providing UA hence we needs to repeat UA.  His DS DNA, rheumatoid factors and cryoglobulin level has been improving based on the lab in March 2024 and June 2024.  Initially we plan to give him another dose of rituximab but it has not been scheduled yet and now I am concerned that the patient may have active infection so we will hold off for now.  Moreover, it seems like the patient has  responded to RTX nicely with UPCR down to 0.66 g/g and serum albumin 3.6 g/dl on 9/5/24 which are the best labs tus far. We do not have renal panel resulted yet but UPCR has shown that his proteinuria improved down to 0.66 g/g. However, later on lab came back showing creatinine 2.73 with potassium 5.5 this is likely in the setting of ANDRY possible from infection/pre-renal from HTN.     - Continue lasix 10mg daily for volume management  - Continue losartan 25 mg per day  - Stop spironolactone and check labs next week  - S/p rituximab 375 mg/m  first dose on 12/22/2023 and last dose on 1/19/2024; hold off another dose of RTX for now given concerning for infection  - Pending labs today  - Call labs to inquire why labs are not being done completely today  # Hyperkalemia  Calcium 5.5 likely in the setting of ANDRY and spironolactone and losartan use.  -Will hold losartan for now  -Repeat lab next week  # Metabolic acidosis  Likely from ANDRY on CKD will repeat labs next week  # HBV cirrhosis  # HBV infection on entecavir  Follows with hepatology, last viral quant undetectable on 4/4/23. MELD 16. MR Abdomen showing cirrhosis with evidence of portal hypertension. Reports no encephalopathy or concern for hematemesis. Currently on Entevavir, discuss risk of HBV flare while on RTX but he is on Entecavir now. HBV VL in 6/2024 was undetectable.    -Currently liver function test is normal.  Hepatitis B viral load today is pending.  # Hypertension; controlled  # Volume overload; improved  Home BP is now better after adding lasix and losartan.  Office blood pressure today is a little bit low at 101/69 but at home were normal.  He denies any dizziness or lightheadedness.  - Continue Amlodipine 5 mg daily, Carvedilol 12.5 mg BID, Lasix 10mg daily and losartan at 25 mg per day as well as spironolactone 50 mg daily  # Cough  # Weakness  # Fatigue  I am worried that the patient may have respiratory tract infection so we will check chest  x-ray and CBC.  I encouraged the patient to reach out to primary care doctor for this problem.  In the meantime as mentioned above, will hold off redosing rituximab at this time.  # Dysuria  The patient has been experiencing dysuria for the past 2 weeks.  UA show pyuria and microscopic hematuria but this is a Hawkins sample.  Will add on urine culture today.  # Malnutrition  # Hx of stroke with Rt sided hemiparesis  Mostly wheelchair bound for transportation. Weight generally appears to be stable although he appears to be quite weak. There is concern that his creatinine is a poor estimate of his true kidney function. Cystatin C 2.8 with eGFR 20 and Cr 1.98 with eGFR by Cr 37 on 6/15/23.   # BMD  # Secondary hyperparathyroidism  2/23/24: PTH is 124, vit D 20 . Ca/Phos normal. Will continue to monitor next visti.      Follow-up 3 months with labs     The longitudinal plan of care for CKD stage 3B, nephrotic syndrome, chronic Hep B, Cryo GN was addressed during this visit. Due to the added complexity in care, I will continue to support Wilfredo Yoon in the subsequent management of this condition(s) and with the ongoing continuity of care of this condition(s).     I spent  50 minutes on the date of the encounter doing chart review, history and exam, documentation and further activities as noted above. 30 minutes of this visit is dedicated to direct patient interaction via face-to-face.    Sue Harrison MD on 09/04/2024          dsDNA, C3, C4, CD19, CBC ordered for today and scheduled.  Labs linked to prevent barriers to correct labs being drawn.  CxR unable to schedule today to meet patient's needs.  Family will schedule this week at PCP clinic.  Confirmed Jan 9th appt will work for patient and provider, scheduled with labs 1hr before.  Updated Prec-clinic labs to new [facility] date to verify correct labs will be drawn at Jan 9th lab appt.  Linked labs to appt.  Nevin Marx RN, BSN, PHN  [facility]  Nephrology Nurse Navigator  [phone]      Again, thank you for allowing me to participate in the care of your patient.      Sincerely,    Sue Harrison MD

## 2024-09-06 LAB
C3 SERPL-MCNC: 123 MG/DL (ref 81–157)
C4 SERPL-MCNC: 34 MG/DL (ref 13–39)
DSDNA AB SER-ACNC: 61 IU/ML
HBV DNA SERPL NAA+PROBE-ACNC: NOT DETECTED IU/ML

## 2024-09-06 NOTE — PROGRESS NOTES
Patient/daughter, Caty returned call, reviewed Dr. Harrison request for patient to stop Spironolactone right away.  Caty confirmed agreement and took out meds out of med box.  She is working on getting radiology appt scheduled at [facility] or will return call if they need to schedule [place] [facility] [place].  Renal panel ordered for next week.  Plan: follow up with patient next week on xray results and getting lab scheduled.  Nevin Marx RN, BSN, PHN  [facility] Nephrology Nurse Navigator  [phone]

## 2024-09-08 LAB
BACTERIA UR CULT: ABNORMAL
BACTERIA UR CULT: ABNORMAL

## 2024-09-10 ENCOUNTER — PATIENT OUTREACH (OUTPATIENT)
Dept: NEPHROLOGY | Facility: CLINIC | Age: 64
End: 2024-09-10
Payer: COMMERCIAL

## 2024-09-10 LAB — CRYOGLOB SER QL: ABNORMAL

## 2024-09-10 NOTE — PROGRESS NOTES
[facility]ascul[facility] and Lupus Program Note: Patient Outreach Encounter    REASON FOR CALL:     REASON FOR CALL: [facility] Care Coordination (Clinic follow up)  CxR and renal panel re-check        SITUATION/BACKROUND:   Patient is being treated for Glomerular Disease.    Per provider instruction, writer to follow up on medication change., lab results., and symptom follow up.    ASSESSMENT:   Unable to schedule radiology follow up last week.  Reached out to patient to check in if CXR was able to get done last week at [facility] clinic or not. Spoke with daughter, Primary Caregiver for patient who is with patient. She called [facility] clinic and unable to get patient in for CXR or labs.  Clarified with patient that he had higher potassium and   Plan: schedule both and return call to patient on date/times of appt.  Called to radiology and scheduled 1130 checkin at [facility] on 9/12.  No fever, coughing has improved abd stopped Spironolactone as of last visit, when she removed.  Najmo request to call Darya from [facility] to let them know of med changes.  Updated provider to plan, if patient does not improve recommend seeing PCP.  Voicemail left for Darya [phone] of above details. Stressed importance to make sure Spironolactone is taken off his med list, removed from med boxes correctly, and if symptoms do not improve to have him seen by PCP to rule out other causes.  Also requested HN initiate teaching on foods lower in potassium to use.    Nurse Assessments:    Recent Labs      Latest Ref Rng & Units 9/5/2024 7/2/2024 6/6/2024   Neph Labs   Sodium 135 - 145 mmol/L 135  135  142    GFR Estimate >60 mL/min/1.73m2 25  33  35    Potassium 3.4 - 5.3 mmol/L 5.5  4.7  4.4    Creatinine 0.67 - 1.17 mg/dL 2.73  2.21  2.11    Urea Nitrogen 8.0 - 23.0 mg/dL 56.1  45.7  36.9    Hemoglobin 13.3 - 17.7 g/dL 12.9  12.4  12.4         Reviewed elevated levels with patient/sister who agreed to get recheck of renal panel.    PLAN:     Education:  Method:  general  discussion/verbal explanation  Discussed: managing blood pressure, labs and recheck needed, changes in medications, and health status  These interventions were used: Empathy/Understanding  Education material provided and patient was given an opportunity to ask questions.      Asked and refilled prescriptions per provider and sent to pt's preferred pharmacy.     Follow Up:   Follow up call in 1 week on results and any updates to plan of care  Patient to follow up at next scheduled appointment with provider.   Patient to call/MyChart message with updates  Medication Change: Discussed medication change with patient    Patient verbalized understanding and will follow up as recommended.    Nevin Marx RN  [facility]: [phone]          Nevin Marx RN, BSN, PHN  [facility] Nephrology Nurse Navigator  [phone]

## 2024-09-11 DIAGNOSIS — I10 ESSENTIAL HYPERTENSION, MALIGNANT: ICD-10-CM

## 2024-09-11 LAB
ALBUMIN SERPL ELPH-MCNC: ABNORMAL G/DL
CRYOGLOB IGA & IGG & IGM SER-IMP: ABNORMAL
CRYOGLOB TYP SER IFE: ABNORMAL
CRYOGLOB TYP SER IFE: NEGATIVE
CRYOGLOB TYP SER IFE: NEGATIVE

## 2024-09-12 ENCOUNTER — LAB (OUTPATIENT)
Dept: LAB | Facility: CLINIC | Age: 64
End: 2024-09-12
Attending: INTERNAL MEDICINE
Payer: COMMERCIAL

## 2024-09-12 ENCOUNTER — ANCILLARY PROCEDURE (OUTPATIENT)
Dept: GENERAL RADIOLOGY | Facility: CLINIC | Age: 64
End: 2024-09-12
Attending: INTERNAL MEDICINE
Payer: COMMERCIAL

## 2024-09-12 DIAGNOSIS — R05.2 SUBACUTE COUGH: ICD-10-CM

## 2024-09-12 DIAGNOSIS — E87.5 HYPERKALEMIA: ICD-10-CM

## 2024-09-12 LAB
ALBUMIN SERPL BCG-MCNC: 3.6 G/DL (ref 3.5–5.2)
ANION GAP SERPL CALCULATED.3IONS-SCNC: 11 MMOL/L (ref 7–15)
BUN SERPL-MCNC: 56 MG/DL (ref 8–23)
CALCIUM SERPL-MCNC: 9.3 MG/DL (ref 8.8–10.4)
CHLORIDE SERPL-SCNC: 108 MMOL/L (ref 98–107)
CREAT SERPL-MCNC: 2.72 MG/DL (ref 0.67–1.17)
EGFRCR SERPLBLD CKD-EPI 2021: 25 ML/MIN/1.73M2
GLUCOSE SERPL-MCNC: 139 MG/DL (ref 70–99)
HCO3 SERPL-SCNC: 18 MMOL/L (ref 22–29)
PHOSPHATE SERPL-MCNC: 3.8 MG/DL (ref 2.5–4.5)
POTASSIUM SERPL-SCNC: 5.2 MMOL/L (ref 3.4–5.3)
SODIUM SERPL-SCNC: 137 MMOL/L (ref 135–145)

## 2024-09-12 PROCEDURE — 71045 X-RAY EXAM CHEST 1 VIEW: CPT | Mod: GC | Performed by: RADIOLOGY

## 2024-09-12 PROCEDURE — 36415 COLL VENOUS BLD VENIPUNCTURE: CPT | Performed by: PATHOLOGY

## 2024-09-12 PROCEDURE — 80069 RENAL FUNCTION PANEL: CPT | Performed by: PATHOLOGY

## 2024-09-13 RX ORDER — CARVEDILOL 12.5 MG/1
12.5 TABLET ORAL 2 TIMES DAILY WITH MEALS
Qty: 180 TABLET | Refills: 3 | Status: SHIPPED | OUTPATIENT
Start: 2024-09-13

## 2024-09-13 NOTE — TELEPHONE ENCOUNTER
"Last seen 1/13/23    Request for medication refill:    carvedilol (COREG) 12.5 MG tablet     Providers if patient needs an appointment and you are willing to give a one month supply please refill for one month and  send a letter/MyChart using \".SMILLIMITEDREFILL\" .smillimited and route chart to \"P SMI \" (Giving one month refill in non controlled medications is strongly recommended before denial)    If refill has been denied, meaning absolutely no refills without visit, please complete the smart phrase \".smirxrefuse\" and route it to the \"P SMI MED REFILLS\"  pool to inform the patient and the pharmacy.    Emerita Lopez RN     "

## 2024-09-19 DIAGNOSIS — N18.32 STAGE 3B CHRONIC KIDNEY DISEASE (H): ICD-10-CM

## 2024-09-23 ENCOUNTER — PATIENT OUTREACH (OUTPATIENT)
Dept: NEPHROLOGY | Facility: CLINIC | Age: 64
End: 2024-09-23
Payer: COMMERCIAL

## 2024-09-23 RX ORDER — LOSARTAN POTASSIUM 25 MG/1
25 TABLET ORAL DAILY
Qty: 30 TABLET | Refills: 6 | Status: SHIPPED | OUTPATIENT
Start: 2024-09-23

## 2024-09-23 NOTE — PROGRESS NOTES
Update from Dr. Harrison to follow up on patient's symptoms and if ok support Rituximab scheduling. CxR no findings and labs support needing Rituximab. UA/Protein random to be done with infusion as well.  Voicemail left for daughter to follow up on patient status.  Nevin Marx RN, BSN, PHN  [facility] Nephrology Nurse Navigator  [phone]

## 2024-09-26 ENCOUNTER — PATIENT OUTREACH (OUTPATIENT)
Dept: NEPHROLOGY | Facility: CLINIC | Age: 64
End: 2024-09-26
Payer: COMMERCIAL

## 2024-09-26 DIAGNOSIS — N18.31 STAGE 3A CHRONIC KIDNEY DISEASE (H): ICD-10-CM

## 2024-09-26 DIAGNOSIS — N39.0 UTI (URINARY TRACT INFECTION): Primary | ICD-10-CM

## 2024-09-26 RX ORDER — CIPROFLOXACIN 250 MG/1
250 TABLET, FILM COATED ORAL DAILY
Qty: 7 TABLET | Refills: 0 | Status: SHIPPED | OUTPATIENT
Start: 2024-09-26

## 2024-09-26 NOTE — PROGRESS NOTES
Update from Dr. Harrison that UC came back positive for >100,000 CFU/mL Klebsiella pneumoniae Abnormal   and needs to start on:  I would also treat him with Cipro 250 mg daily for 7 days for UTI.     Voicemail left for Caty to return call who returned call.  Updated that patient was seen at PCP.  Reviewed CareEverywhere and looks like patient started on Montelukast Sodium Tablet, 10 MG, 1 tablet, Orally, Once a day, and Omeprazole 20 MG, 1 capsule 30 minutes before morning meal, Orally, Twice a day.  Reviewed UC findings and Dr. Harrison request to start patient on Cipro 250mg.  Ordered and script sent to [facility] pharmacy per family request.  Reviewed actions and side effects.  Stressed importance to take with food. Instructed to call if any adverse side effects or additional concerns arise from new med.  Reviewed Dr. Harrison wish to get patient scheduled for Rituximab as well, once infection has resolved.  Family agreeable to plan, needs assist to schedule, Thursdays are preferred.  Plan: schedule infusion at [facility] and update family on timing of appt.  Conniejmo, daughter and pcp verbalized understanding and agreement to plan of care.  Denies further questions or concerns.     Warning noted when ordered Cipro- patient will be off Cipro before Methlyprednisone- Premed for Rituximab is given- so warning not appropriate.    Nevin Marx RN, BSN, PHN  [facility]culitis [facility] Lupus Program Nephrology Nurse Navigator  [phone]

## 2024-10-17 ENCOUNTER — INFUSION THERAPY VISIT (OUTPATIENT)
Dept: INFUSION THERAPY | Facility: CLINIC | Age: 64
End: 2024-10-17
Attending: INTERNAL MEDICINE
Payer: COMMERCIAL

## 2024-10-17 VITALS — DIASTOLIC BLOOD PRESSURE: 93 MMHG | HEART RATE: 64 BPM | SYSTOLIC BLOOD PRESSURE: 146 MMHG | TEMPERATURE: 97.3 F

## 2024-10-17 DIAGNOSIS — D89.1 CRYOGLOBULINEMIC GLOMERULONEPHRITIS (H): Primary | ICD-10-CM

## 2024-10-17 DIAGNOSIS — N05.0 UNSPECIFIED NEPHRITIC SYNDROME WITH MINOR GLOMERULAR ABNORMALITY: ICD-10-CM

## 2024-10-17 DIAGNOSIS — R80.9 NEPHROTIC RANGE PROTEINURIA: ICD-10-CM

## 2024-10-17 DIAGNOSIS — N08 CRYOGLOBULINEMIC GLOMERULONEPHRITIS (H): Primary | ICD-10-CM

## 2024-10-17 LAB
ALBUMIN SERPL BCG-MCNC: 3.7 G/DL (ref 3.5–5.2)
ANION GAP SERPL CALCULATED.3IONS-SCNC: 15 MMOL/L (ref 7–15)
BASOPHILS # BLD AUTO: 0 10E3/UL (ref 0–0.2)
BASOPHILS NFR BLD AUTO: 1 %
BUN SERPL-MCNC: 33.7 MG/DL (ref 8–23)
CALCIUM SERPL-MCNC: 9.3 MG/DL (ref 8.8–10.4)
CHLORIDE SERPL-SCNC: 106 MMOL/L (ref 98–107)
CREAT SERPL-MCNC: 2.01 MG/DL (ref 0.67–1.17)
EGFRCR SERPLBLD CKD-EPI 2021: 37 ML/MIN/1.73M2
EOSINOPHIL # BLD AUTO: 0.5 10E3/UL (ref 0–0.7)
EOSINOPHIL NFR BLD AUTO: 7 %
ERYTHROCYTE [DISTWIDTH] IN BLOOD BY AUTOMATED COUNT: 12.4 % (ref 10–15)
GLUCOSE SERPL-MCNC: 86 MG/DL (ref 70–99)
HCO3 SERPL-SCNC: 22 MMOL/L (ref 22–29)
HCT VFR BLD AUTO: 37.3 % (ref 40–53)
HGB BLD-MCNC: 12.7 G/DL (ref 13.3–17.7)
IMM GRANULOCYTES # BLD: 0 10E3/UL
IMM GRANULOCYTES NFR BLD: 0 %
LYMPHOCYTES # BLD AUTO: 1.4 10E3/UL (ref 0.8–5.3)
LYMPHOCYTES NFR BLD AUTO: 22 %
MCH RBC QN AUTO: 32.1 PG (ref 26.5–33)
MCHC RBC AUTO-ENTMCNC: 34 G/DL (ref 31.5–36.5)
MCV RBC AUTO: 94 FL (ref 78–100)
MONOCYTES # BLD AUTO: 0.9 10E3/UL (ref 0–1.3)
MONOCYTES NFR BLD AUTO: 13 %
NEUTROPHILS # BLD AUTO: 3.7 10E3/UL (ref 1.6–8.3)
NEUTROPHILS NFR BLD AUTO: 57 %
NRBC # BLD AUTO: 0 10E3/UL
NRBC BLD AUTO-RTO: 0 /100
PHOSPHATE SERPL-MCNC: 2.8 MG/DL (ref 2.5–4.5)
PLATELET # BLD AUTO: 156 10E3/UL (ref 150–450)
POTASSIUM SERPL-SCNC: 4.4 MMOL/L (ref 3.4–5.3)
RBC # BLD AUTO: 3.96 10E6/UL (ref 4.4–5.9)
SODIUM SERPL-SCNC: 143 MMOL/L (ref 135–145)
WBC # BLD AUTO: 6.6 10E3/UL (ref 4–11)

## 2024-10-17 PROCEDURE — 250N000013 HC RX MED GY IP 250 OP 250 PS 637: Performed by: INTERNAL MEDICINE

## 2024-10-17 PROCEDURE — 96413 CHEMO IV INFUSION 1 HR: CPT

## 2024-10-17 PROCEDURE — 96415 CHEMO IV INFUSION ADDL HR: CPT

## 2024-10-17 PROCEDURE — 258N000003 HC RX IP 258 OP 636: Performed by: INTERNAL MEDICINE

## 2024-10-17 PROCEDURE — 96375 TX/PRO/DX INJ NEW DRUG ADDON: CPT

## 2024-10-17 PROCEDURE — 85014 HEMATOCRIT: CPT | Performed by: INTERNAL MEDICINE

## 2024-10-17 PROCEDURE — 250N000011 HC RX IP 250 OP 636: Mod: JZ | Performed by: INTERNAL MEDICINE

## 2024-10-17 PROCEDURE — 85004 AUTOMATED DIFF WBC COUNT: CPT | Performed by: INTERNAL MEDICINE

## 2024-10-17 PROCEDURE — 36415 COLL VENOUS BLD VENIPUNCTURE: CPT | Performed by: INTERNAL MEDICINE

## 2024-10-17 PROCEDURE — 80069 RENAL FUNCTION PANEL: CPT | Performed by: INTERNAL MEDICINE

## 2024-10-17 RX ORDER — METHYLPREDNISOLONE SODIUM SUCCINATE 125 MG/2ML
80 INJECTION INTRAMUSCULAR; INTRAVENOUS ONCE
OUTPATIENT
Start: 2025-04-15

## 2024-10-17 RX ORDER — DIPHENHYDRAMINE HCL 25 MG
50 CAPSULE ORAL ONCE
Status: COMPLETED | OUTPATIENT
Start: 2024-10-17 | End: 2024-10-17

## 2024-10-17 RX ORDER — EPINEPHRINE 1 MG/ML
0.3 INJECTION, SOLUTION, CONCENTRATE INTRAVENOUS EVERY 5 MIN PRN
OUTPATIENT
Start: 2025-04-15

## 2024-10-17 RX ORDER — ALBUTEROL SULFATE 90 UG/1
1-2 INHALANT RESPIRATORY (INHALATION)
Start: 2025-04-15

## 2024-10-17 RX ORDER — ALBUTEROL SULFATE 0.83 MG/ML
2.5 SOLUTION RESPIRATORY (INHALATION)
OUTPATIENT
Start: 2025-04-15

## 2024-10-17 RX ORDER — METHYLPREDNISOLONE SODIUM SUCCINATE 125 MG/2ML
80 INJECTION INTRAMUSCULAR; INTRAVENOUS ONCE
Status: COMPLETED | OUTPATIENT
Start: 2024-10-17 | End: 2024-10-17

## 2024-10-17 RX ORDER — DIPHENHYDRAMINE HYDROCHLORIDE 50 MG/ML
50 INJECTION INTRAMUSCULAR; INTRAVENOUS
Start: 2025-04-15

## 2024-10-17 RX ORDER — ACETAMINOPHEN 325 MG/1
650 TABLET ORAL ONCE
Status: COMPLETED | OUTPATIENT
Start: 2024-10-17 | End: 2024-10-17

## 2024-10-17 RX ORDER — DIPHENHYDRAMINE HCL 25 MG
50 CAPSULE ORAL ONCE
Start: 2025-04-15

## 2024-10-17 RX ORDER — ACETAMINOPHEN 325 MG/1
650 TABLET ORAL ONCE
Start: 2025-04-15

## 2024-10-17 RX ORDER — METHYLPREDNISOLONE SODIUM SUCCINATE 125 MG/2ML
125 INJECTION INTRAMUSCULAR; INTRAVENOUS
Start: 2025-04-15

## 2024-10-17 RX ADMIN — RITUXIMAB-ABBS 1000 MG: 10 INJECTION, SOLUTION INTRAVENOUS at 14:12

## 2024-10-17 RX ADMIN — ACETAMINOPHEN 650 MG: 325 TABLET, FILM COATED ORAL at 13:46

## 2024-10-17 RX ADMIN — DIPHENHYDRAMINE HYDROCHLORIDE 50 MG: 25 CAPSULE ORAL at 13:47

## 2024-10-17 RX ADMIN — METHYLPREDNISOLONE SODIUM SUCCINATE 81.25 MG: 125 INJECTION, POWDER, FOR SOLUTION INTRAMUSCULAR; INTRAVENOUS at 13:48

## 2024-10-17 ASSESSMENT — PAIN SCALES - GENERAL: PAINLEVEL: NO PAIN (0)

## 2024-10-17 NOTE — PROGRESS NOTES
Infusion Nursing Note:  Wilfredoliz Yoon presents today for Rituxan infusion.    Patient seen by provider today: No   present during visit today: David via phone.    Note: Today's dose is increased over last infusion.      Intravenous Access:  Labs drawn without difficulty.  Peripheral IV placed.    Treatment Conditions:  Biological Infusion Checklist:  ~~~ NOTE: If the patient answers yes to any of the questions below, hold the infusion and contact ordering provider or on-call provider.    Have you recently had an elevated temperature, fever, chills, productive cough, coughing for 3 weeks or longer or hemoptysis,  abnormal vital signs, night sweats,  chest pain or have you noticed a decrease in your appetite, unexplained weight loss or fatigue? No  Do you have any open wounds or new incisions? No  Do you have any upcoming hospitalizations or surgeries? Does not include esophagogastroduodenoscopy, colonoscopy, endoscopic retrograde cholangiopancreatography (ERCP), endoscopic ultrasound (EUS), dental procedures or joint aspiration/steroid injections No  Do you currently have any signs of illness or infection or are you on any antibiotics? No  Have you had any new, sudden or worsening abdominal pain? No  Have you or anyone in your household received a live vaccination in the past 4 weeks? Please note: No live vaccines while on biologic/chemotherapy until 6 months after the last treatment. Patient can receive the flu vaccine (shot only), pneumovax and the Covid vaccine. It is optimal for the patient to get these vaccines mid cycle, but they can be given at any time as long as it is not on the day of the infusion. No  Have you recently been diagnosed with any new nervous system diseases (ie. Multiple sclerosis, Guillain Wanchese, seizures, neurological changes) or cancer diagnosis? Are you on any form of radiation or chemotherapy? No  Are you pregnant or breast feeding or do you have plans of  pregnancy in the future? No  Have you been having any signs of worsening depression or suicidal ideations?  (benlysta only) No  Have there been any other new onset medical symptoms? No  Have you had any new blood clots? (IVIG only) No      Post Infusion Assessment:  Patient has tolerated in the past.  Per therapy plan, infusion started at 100ml/hr and increased q 30 minutes to max rate 400ml/hr  Patient tolerated infusion without incident.  Blood return noted pre and post infusion.  Site patent and intact, free from redness, edema or discomfort.  No evidence of extravasations.  Access discontinued per protocol.  Biologic Infusion Post Education: Call the triage nurse at your clinic or seek medical attention if you have chills and/or temperature greater than or equal to 100.5, uncontrolled nausea/vomiting, diarrhea, constipation, dizziness, shortness of breath, chest pain, heart palpitations, weakness or any other new or concerning symptoms, questions or concerns.  You cannot have any live virus vaccines prior to or during treatment or up to 6 months post infusion.  If you have an upcoming surgery, medical procedure or dental procedure during treatment, this should be discussed with your ordering physician and your surgeon/dentist.  If you are having any concerning symptom, if you are unsure if you should get your next infusion or wish to speak to a provider before your next infusion, please call your care coordinator or triage nurse at your clinic to notify them so we can adequately serve you.         Discharge Plan:   Copy of AVS reviewed with patient and/or family.    Patient discharged in stable condition accompanied by: Family member.  Departure Mode: Wheelchair.      Nesha Funes

## 2024-11-14 DIAGNOSIS — I10 ESSENTIAL HYPERTENSION, MALIGNANT: ICD-10-CM

## 2024-11-15 RX ORDER — AMLODIPINE BESYLATE 10 MG/1
TABLET ORAL
Qty: 90 TABLET | Refills: 3 | OUTPATIENT
Start: 2024-11-15

## 2024-11-15 NOTE — TELEPHONE ENCOUNTER
Patient has established care with a new PCP Dr. Della Zamora. Med refill declined.   Emerita Lopez RN

## 2024-12-11 DIAGNOSIS — N18.31 STAGE 3A CHRONIC KIDNEY DISEASE (H): ICD-10-CM

## 2024-12-12 RX ORDER — FUROSEMIDE 20 MG/1
10 TABLET ORAL DAILY
Qty: 60 TABLET | Refills: 1 | Status: SHIPPED | OUTPATIENT
Start: 2024-12-12

## 2024-12-17 NOTE — OR NURSING
Dr Zabala at bedside to assess pt.  Pt opening eyes to voice, able to lift head off pillow but can not hold head up for longer than 5s.  Light squeeze of my fingers with bilateral hands.  Wrist restraints on while intubated.  Trial of pressure support and now back to CMV vent settings.   Biopsy Photograph Reviewed: Yes Consent Type: Consent 1 (Standard) Eye Shield Used: No Surgeon Performing Repair (Optional): Eugene Forde M.D. Initial Size Of Lesion: 2.7 X Size Of Lesion In Cm (Optional): 1.7 Number Of Stages: 1 Primary Defect Length In Cm (Final Defect Size - Required For Flaps/Grafts): 3 Primary Defect Width In Cm (Final Defect Size - Required For Flaps/Grafts): 2 Primary Defect Depth In Cm (Optional But Required For Some Insurers): 0 Repair Type: Flap Which Instrument Did You Use For Dermabrasion?: Wire Brush Which Eyelid Repair Cpt Are You Using?: 86944 Oculoplastic Surgeon Procedure Text (A): After obtaining clear surgical margins the patient was sent to oculoplastics for surgical repair.  The patient understands they will receive post-surgical care and follow-up from the referring physician's office. Otolaryngologist Procedure Text (A): After obtaining clear surgical margins the patient was sent to otolaryngology for surgical repair.  The patient understands they will receive post-surgical care and follow-up from the referring physician's office. Plastic Surgeon Procedure Text (A): After obtaining clear surgical margins the patient was sent to plastics for surgical repair.  The patient understands they will receive post-surgical care and follow-up from the referring physician's office. Mid-Level Procedure Text (A): After obtaining clear surgical margins the patient was sent to a mid-level provider for surgical repair.  The patient understands they will receive post-surgical care and follow-up from the mid-level provider. Provider Procedure Text (A): After obtaining clear surgical margins the defect was repaired by another provider. Asc Procedure Text (A): After obtaining clear surgical margins the patient was sent to an ASC for surgical repair.  The patient understands they will receive post-surgical care and follow-up from the ASC physician. Suturegard Retention Suture: 2-0 Nylon Retention Suture Bite Size: 3 mm Length To Time In Minutes Device Was In Place: 10 Undermining Type: Entire Wound Debridement Text: The wound edges were debrided prior to proceeding with the closure to facilitate wound healing. Helical Rim Text: The closure involved the helical rim. Vermilion Border Text: The closure involved the vermilion border. Nostril Rim Text: The closure involved the nostril rim. Retention Suture Text: Retention sutures were placed to support the closure and prevent dehiscence. Flap Type: Paramedian Forehead Flap Secondary Defect Length In Cm (Required For Flaps): 9 Location Indication Override (Is Already Calculated Based On Selected Body Location): Area H Area H Indication Text: Tumors in this location are included in Area H (eyelids, eyebrows, nose, lips, chin, ear, pre-auricular, post-auricular, temple, genitalia, hands, feet, ankles and areola).  Tissue conservation is critical in these anatomic locations. Area M Indication Text: Tumors in this location are included in Area M (cheek, forehead, scalp, neck, jawline and pretibial skin).  Mohs surgery is indicated for tumors in these anatomic locations. Area L Indication Text: Tumors in this location are included in Area L (trunk and extremities).  Mohs surgery is indicated for larger tumors, or tumors with aggressive histologic features, in these anatomic locations. Tumor Debulked?: curette Depth Of Tumor Invasion (For Histology): tumor not visualized (deep and peripheral margins are clear of tumor) Perineural Invasion (For Histology - Be Specific If Possible): absent Special Stains Stage 1 - Results: Base On Clearance Noted Above Stage 2: Additional Anesthesia Type: 1% lidocaine with epinephrine Include Tumor Staging In Mohs Note?: Please Select the Appropriate Response Staging Info: By selecting yes to the question above you will include information on AJCC 8 tumor staging in your Mohs note. Information on tumor staging will be automatically added for SCCs on the head and neck. AJCC 8 includes tumor size, tumor depth, perineural involvement and bone invasion. Tumor Depth: Less than 6mm from granular layer and no invasion beyond the subcutaneous fat Medical Necessity Statement: Based on my medical judgement, Mohs surgery is the most appropriate treatment for this cancer compared to other treatments. Alternatives Discussed Intro (Do Not Add Period): I discussed alternative treatments to Mohs surgery and specifically discussed the risks and benefits of Consent 1/Introductory Paragraph: The rationale for Mohs was explained to the patient and consent was obtained. The risks, benefits and alternatives to therapy were discussed in detail. Specifically, the risks of infection, scarring, bleeding, prolonged wound healing, incomplete removal, allergy to anesthesia, nerve injury and recurrence were addressed. Prior to the procedure, the treatment site was clearly identified and confirmed by the patient. All components of Universal Protocol/PAUSE Rule completed. Consent 2/Introductory Paragraph: Mohs surgery was explained to the patient and consent was obtained. The risks, benefits and alternatives to therapy were discussed in detail. Specifically, the risks of infection, scarring, bleeding, prolonged wound healing, incomplete removal, allergy to anesthesia, nerve injury and recurrence were addressed. Prior to the procedure, the treatment site was clearly identified and confirmed by the patient. All components of Universal Protocol/PAUSE Rule completed. Consent 3/Introductory Paragraph: I gave the patient a chance to ask questions they had about the procedure.  Following this I explained the Mohs procedure and consent was obtained. The risks, benefits and alternatives to therapy were discussed in detail. Specifically, the risks of infection, scarring, bleeding, prolonged wound healing, incomplete removal, allergy to anesthesia, nerve injury and recurrence were addressed. Prior to the procedure, the treatment site was clearly identified and confirmed by the patient. All components of Universal Protocol/PAUSE Rule completed. Consent (Temporal Branch)/Introductory Paragraph: The rationale for Mohs was explained to the patient and consent was obtained. The risks, benefits and alternatives to therapy were discussed in detail. Specifically, the risks of damage to the temporal branch of the facial nerve, infection, scarring, bleeding, prolonged wound healing, incomplete removal, allergy to anesthesia, and recurrence were addressed. Prior to the procedure, the treatment site was clearly identified and confirmed by the patient. All components of Universal Protocol/PAUSE Rule completed. Consent (Marginal Mandibular)/Introductory Paragraph: The rationale for Mohs was explained to the patient and consent was obtained. The risks, benefits and alternatives to therapy were discussed in detail. Specifically, the risks of damage to the marginal mandibular branch of the facial nerve, infection, scarring, bleeding, prolonged wound healing, incomplete removal, allergy to anesthesia, and recurrence were addressed. Prior to the procedure, the treatment site was clearly identified and confirmed by the patient. All components of Universal Protocol/PAUSE Rule completed. Consent (Spinal Accessory)/Introductory Paragraph: The rationale for Mohs was explained to the patient and consent was obtained. The risks, benefits and alternatives to therapy were discussed in detail. Specifically, the risks of damage to the spinal accessory nerve, infection, scarring, bleeding, prolonged wound healing, incomplete removal, allergy to anesthesia, and recurrence were addressed. Prior to the procedure, the treatment site was clearly identified and confirmed by the patient. All components of Universal Protocol/PAUSE Rule completed. Consent (Near Eyelid Margin)/Introductory Paragraph: The rationale for Mohs was explained to the patient and consent was obtained. The risks, benefits and alternatives to therapy were discussed in detail. Specifically, the risks of ectropion or eyelid deformity, infection, scarring, bleeding, prolonged wound healing, incomplete removal, allergy to anesthesia, nerve injury and recurrence were addressed. Prior to the procedure, the treatment site was clearly identified and confirmed by the patient. All components of Universal Protocol/PAUSE Rule completed. Consent (Ear)/Introductory Paragraph: The rationale for Mohs was explained to the patient and consent was obtained. The risks, benefits and alternatives to therapy were discussed in detail. Specifically, the risks of ear deformity, infection, scarring, bleeding, prolonged wound healing, incomplete removal, allergy to anesthesia, nerve injury and recurrence were addressed. Prior to the procedure, the treatment site was clearly identified and confirmed by the patient. All components of Universal Protocol/PAUSE Rule completed. Consent (Nose)/Introductory Paragraph: The rationale for Mohs was explained to the patient and consent was obtained. The risks, benefits and alternatives to therapy were discussed in detail. Specifically, the risks of nasal deformity, changes in the flow of air through the nose, infection, scarring, bleeding, prolonged wound healing, incomplete removal, allergy to anesthesia, nerve injury and recurrence were addressed. Prior to the procedure, the treatment site was clearly identified and confirmed by the patient. All components of Universal Protocol/PAUSE Rule completed. Consent (Lip)/Introductory Paragraph: The rationale for Mohs was explained to the patient and consent was obtained. The risks, benefits and alternatives to therapy were discussed in detail. Specifically, the risks of lip deformity, changes in the oral aperture, infection, scarring, bleeding, prolonged wound healing, incomplete removal, allergy to anesthesia, nerve injury and recurrence were addressed. Prior to the procedure, the treatment site was clearly identified and confirmed by the patient. All components of Universal Protocol/PAUSE Rule completed. Consent (Scalp)/Introductory Paragraph: The rationale for Mohs was explained to the patient and consent was obtained. The risks, benefits and alternatives to therapy were discussed in detail. Specifically, the risks of changes in hair growth pattern secondary to repair, infection, scarring, bleeding, prolonged wound healing, incomplete removal, allergy to anesthesia, nerve injury and recurrence were addressed. Prior to the procedure, the treatment site was clearly identified and confirmed by the patient. All components of Universal Protocol/PAUSE Rule completed. Detail Level: Detailed Postop Diagnosis: same Anesthesia Type: 1% lidocaine with 1:100,000 epinephrine and a 1:10 solution of 8.4% sodium bicarbonate Hemostasis: Electrocautery Estimated Blood Loss (Cc): minimal Repair Anesthesia Method: local infiltration Anesthesia Volume In Cc: 5 Brow Lift Text: A midfrontal incision was made medially to the defect to allow access to the tissues just superior to the left eyebrow. Following careful dissection inferiorly in a supraperiosteal plane to the level of the left eyebrow, several 3-0 monocryl sutures were used to resuspend the eyebrow orbicularis oculi muscular unit to the superior frontal bone periosteum. This resulted in an appropriate reapproximation of static eyebrow symmetry and correction of the left brow ptosis. Deep Sutures: 3-0 Monocryl Additional Deep Sutures: 5-0 Vicryl Epidermal Sutures: 6-0 Ethilon Epidermal Closure: running Suturegard Intro: Intraoperative tissue expansion was performed, utilizing the SUTUREGARD device, in order to reduce wound tension. Suturegard Body: The suture ends were repeatedly re-tightened and re-clamped to achieve the desired tissue expansion. Hemigard Intro: Due to skin fragility and wound tension, it was decided to use HEMIGARD adhesive retention suture devices to permit a linear closure. The skin was cleaned and dried for a 6cm distance away from the wound. Excessive hair, if present, was removed to allow for adhesion. Hemigard Postcare Instructions: The HEMIGARD strips are to remain completely dry for at least 5-7 days. Donor Site Anesthesia Type: same as repair anesthesia Epidermal Closure Graft Donor Site (Optional): simple interrupted Graft Donor Site Bandage (Optional-Leave Blank If You Don't Want In Note): Steri-strips and a pressure bandage were applied to the donor site. Closure 2 Information: This tab is for additional flaps and grafts, including complex repair and grafts and complex repair and flaps. You can also specify a different location for the additional defect, if the location is the same you do not need to select a new one. We will insert the automated text for the repair you select below just as we do for solitary flaps and grafts. Please note that at this time if you select a location with a different insurance zone you will need to override the ICD10 and CPT if appropriate. Closure 3 Information: This tab is for additional flaps and grafts above and beyond our usual structured repairs.  Please note if you enter information here it will not currently bill and you will need to add the billing information manually. Wound Care: Gelfoam Dressing: pressure dressing with telfa Wound Care (No Sutures): Petrolatum Dressing (No Sutures): dry sterile dressing Suture Removal: 6 days Unna Boot Text: An Unna boot was placed to help immobilize the limb and facilitate more rapid healing. Home Suture Removal Text: Patient was provided instructions on removing sutures and will remove their sutures at home.  If they have any questions or difficulties they will call the office. Post-Care Instructions: I reviewed with the patient in detail post-care instructions. Patient is not to engage in any heavy lifting, exercise, or swimming for the next 7 days. Should the patient develop any fevers, chills, bleeding, severe pain patient will contact the office immediately. Pain Refusal Text: I offered to prescribe pain medication but the patient refused to take this medication. Mauc Instructions: By selecting yes to the question below the MAUC number will be added into the note.  This will be calculated automatically based on the diagnosis chosen, the size entered, the body zone selected (H,M,L) and the specific indications you chose. You will also have the option to override the Mohs AUC if you disagree with the automatically calculated number and this option is found in the Case Summary tab. Asa Classification: II Time Everyone Was In The Room/Time Out: 9136 Time Procedure Ended: 8947 Time Patient Discharged: 1140 Where Do You Want The Question To Include Opioid Counseling Located?: Case Summary Tab Eye Protection Verbiage: Before proceeding with the stage, a plastic scleral shield was inserted. The globe was anesthetized with a few drops of 1% lidocaine with 1:100,000 epinephrine. Then, an appropriate sized scleral shield was chosen and coated with lacrilube ointment. The shield was gently inserted and left in place for the duration of each stage. After the stage was completed, the shield was gently removed. Mohs Method Verbiage: An incision at a 45 degree angle following the standard Mohs approach was done and the specimen was harvested as a microscopic controlled layer. Surgeon/Pathologist Verbiage (Will Incorporate Name Of Surgeon From Intro If Not Blank): operated in two distinct and integrated capacities as the surgeon and pathologist. Mohs Histo Method Verbiage: Each section was then chromacoded and processed in the Mohs lab using the Mohs protocol and submitted for frozen section. Subsequent Stages Histo Method Verbiage: Using a similar technique to that described above, a thin layer of tissue was removed from all areas where tumor was visible on the previous stage.  The tissue was again oriented, mapped, dyed, and processed as above. Mohs Rapid Report Verbiage: The area of clinically evident tumor was marked with skin marking ink and appropriately hatched.  The initial incision was made following the Mohs approach through the skin.  The specimen was taken to the lab, divided into the necessary number of pieces, chromacoded and processed according to the Mohs protocol.  This was repeated in successive stages until a tumor free defect was achieved. Complex Repair Preamble Text (Leave Blank If You Do Not Want): The defect edges were debeveled with a #15 scalpel blade. Given the location of the defect a complex repair was deemed most appropriate.  Using a sterile surgical marker, burrow triangles were drawn incorporating the defect and placing the expected incisions within the relaxed skin tension lines where possible.    The area thus outlined was incised to the appropriate tissue plane with a scalpel blade. Extensive wide undermining was performed in all directions to allow proper closure of the defect.  Hemostasis was obtained by cautery. Crescentic Complex Repair Preamble Text (Leave Blank If You Do Not Want): Extensive wide undermining was performed. Intermediate Repair Preamble Text (Leave Blank If You Do Not Want): Undermining was performed with blunt dissection. Graft Cartilage Fenestration Text: The cartilage was fenestrated with a 2mm punch biopsy to help facilitate graft survival and healing. Non-Graft Cartilage Fenestration Text: The cartilage was fenestrated with a 2mm punch biopsy to help facilitate healing. Secondary Intention Text (Leave Blank If You Do Not Want): The defect will heal with secondary intention. No Repair - Repaired With Adjacent Surgical Defect Text (Leave Blank If You Do Not Want): After obtaining clear surgical margins the defect was repaired concurrently with another surgical defect which was in close approximation. Unique Flap 1 Name: Tunneled Island Pedicle Flap Unique Flap 2 Name: East-West Double Advancement Flap Unique Flap 1 Text: The surgical defect and surrounding skin were prepped with antiseptic solution. The choice of the repair was performed because extensive undermining required, because there is insufficient tissue mobility to close the wound with local tissue, to avoid a deforming, depressed, and contracted scar, to avoid anatomic distortion and/or functional deficit in adjacent fixed structures, to avoid disruption to anatomic adjacent free margins, to reduce subcutaneous dead space to reduce risk of hematoma, to reduce tension to skin to allow closure, to reduce tension to reduce risk of skin necrosis, infection, and wound dehiscence, and to reduce tension to enhance both functional and cosmetic results. The defect edges were debeveled with a #15 scalpel blade.  Given the location of the defect, shape of the defect and the proximity to free margins a one-staged tunneled  island pedicle advancement flap was deemed most appropriate (Axial Flap based on the postauricular artery).  Using a sterile surgical marker, an appropriate island pedicle flap was drawn incorporating the defect, outlining the appropriate donor tissue and placing the expected incisions within the relaxed skin tension lines where possible.    The area thus outlined was incised deep to muscle with a #15 scalpel blade.  The skin margins were undermined to an appropriate distance in all directions around the primary defect and laterally outward around the island pedicle utilizing iris scissors.  There was minimal undermining beneath the pedicle flap. A window through the cartilage was created with a #15 blade, and a tunnel was created between the defect and the donor site. The flap was tunneled through to the anterior portion of the ear and sutured into place. The subcutaneous tissue and dermis were closed with 5-0 Vicryl.  Epidermal closure was achieved with 6-0 Ethilon (running).  The donor site was closed with 5-0 Vicryl. During the repair hemostasis was achieved with Electrocautery.  Vaseline + pressure dressing with telfa were applied. I reviewed with the patient in detail post-care instructions. Patient is not to engage in any heavy lifting, exercise, or swimming for the next 7 days. Should the patient develop any fevers, chills, bleeding, severe pain patient will contact the office immediately. Suture removal in 7 days. Unique Flap 2 Text: The surgical defect and surrounding skin were prepped with antiseptic solution. The choice of the repair was performed because extensive undermining required, because there is insufficient tissue mobility to close the wound with local tissue, to avoid a deforming, depressed, and contracted scar, to avoid anatomic distortion and/or functional deficit in adjacent fixed structures, to avoid disruption to anatomic adjacent free margins, to reduce subcutaneous dead space to reduce risk of hematoma, to reduce tension to skin to allow closure, to reduce tension to reduce risk of skin necrosis, infection, and wound dehiscence, and to reduce tension to enhance both functional and cosmetic results. The defect edges were debeveled with a #15 scalpel blade.  Given the location of the defect, shape of the defect and/or the proximity to free margins an East-West double advancement flap was performed.  Using a sterile surgical marker, an appropriate East-West advancement flap  was drawn incorporating the defect, outlining the appropriate donor tissue and placing the expected incisions within the relaxed skin tension lines where possible.    The area thus outlined was incised deep to muscle with a #15 scalpel blade.  The skin margins were undermined to an appropriate distance in all directions around the primary defect and subsequent flaps utilizing iris scissors. The flaps were advanced horizontally and sutured into place. The triangular tips were rounded to create a smoother, more contoured appearance. The subcutaneous tissue and dermis were closed with 5-0 Vicryl.  Epidermal closure was achieved with 6-0 Ethilon (running).  The donor site was closed with 5-0 Vicryl. During the repair hemostasis was achieved with Electrocautery.  Vaseline + pressure dressing with telfa were applied. I reviewed with the patient in detail post-care instructions. Patient is not to engage in any heavy lifting, exercise, or swimming for the next 7 days. Should the patient develop any fevers, chills, bleeding, severe pain patient will contact the office immediately. Suture removal in 7 days. Adjacent Tissue Transfer Text: The defect edges were debeveled with a #15 scalpel blade.  Given the location of the defect and the proximity to free margins an adjacent tissue transfer was deemed most appropriate.  Using a sterile surgical marker, an appropriate flap was drawn incorporating the defect and placing the expected incisions within the relaxed skin tension lines where possible.    The area thus outlined was incised deep to adipose tissue with a #15 scalpel blade.  The skin margins were undermined to an appropriate distance in all directions utilizing iris scissors. Following this, the designed flap was advanced and carried over into the primary defect and sutured into place. Advancement Flap (Single) Text: The defect edges were debeveled with a #15 scalpel blade.  Given the location of the defect and the proximity to free margins a single advancement flap was deemed most appropriate.  Using a sterile surgical marker, an appropriate advancement flap was drawn incorporating the defect and placing the expected incisions within the relaxed skin tension lines where possible.    The area thus outlined was incised deep to adipose tissue with a #15 scalpel blade.  The skin margins were undermined to an appropriate distance in all directions utilizing iris scissors. Following this, the designed flap was advanced and carried over into the primary defect and sutured into place. Advancement Flap (Double) Text: The defect edges were debeveled with a #15 scalpel blade.  Given the location of the defect and the proximity to free margins a double advancement flap was deemed most appropriate.  Using a sterile surgical marker, the appropriate advancement flaps were drawn incorporating the defect and placing the expected incisions within the relaxed skin tension lines where possible.    The area thus outlined was incised deep to adipose tissue with a #15 scalpel blade.  The skin margins were undermined to an appropriate distance in all directions utilizing iris scissors. Following this, the designed flap was advanced and carried over into the primary defect and sutured into place. Advancement-Rotation Flap Text: The defect edges were debeveled with a #15 scalpel blade.  Given the location of the defect, shape of the defect and the proximity to free margins an advancement-rotation flap was deemed most appropriate.  Using a sterile surgical marker, an appropriate flap was drawn incorporating the defect and placing the expected incisions within the relaxed skin tension lines where possible. The area thus outlined was incised deep to adipose tissue with a #15 scalpel blade.  The skin margins were undermined to an appropriate distance in all directions utilizing iris scissors. Alar Island Pedicle Flap Text: The defect edges were debeveled with a #15 scalpel blade.  Given the location of the defect, shape of the defect and the proximity to the alar rim an island pedicle advancement flap was deemed most appropriate.  Using a sterile surgical marker, an appropriate advancement flap was drawn incorporating the defect, outlining the appropriate donor tissue and placing the expected incisions within the nasal ala running parallel to the alar rim. The area thus outlined was incised with a #15 scalpel blade.  The skin margins were undermined minimally to an appropriate distance in all directions around the primary defect and laterally outward around the island pedicle utilizing iris scissors.  There was minimal undermining beneath the pedicle flap. A-T Advancement Flap Text: The defect edges were debeveled with a #15 scalpel blade.  Given the location of the defect, shape of the defect and the proximity to free margins an A-T advancement flap was deemed most appropriate.  Using a sterile surgical marker, an appropriate advancement flap was drawn incorporating the defect and placing the expected incisions within the relaxed skin tension lines where possible.    The area thus outlined was incised deep to adipose tissue with a #15 scalpel blade.  The skin margins were undermined to an appropriate distance in all directions utilizing iris scissors. Banner Transposition Flap Text: The defect edges were debeveled with a #15 scalpel blade.  Given the location of the defect and the proximity to free margins a Banner transposition flap was deemed most appropriate.  Using a sterile surgical marker, an appropriate flap drawn around the defect. The area thus outlined was incised deep to adipose tissue with a #15 scalpel blade.  The skin margins were undermined to an appropriate distance in all directions utilizing iris scissors. Bilateral Helical Rim Advancement Flap Text: The defect edges were debeveled with a #15 blade scalpel.  Given the location of the defect and the proximity to free margins (helical rim) a bilateral helical rim advancement flap was deemed most appropriate.  Using a sterile surgical marker, the appropriate advancement flaps were drawn incorporating the defect and placing the expected incisions between the helical rim and antihelix where possible.  The area thus outlined was incised through and through with a #15 scalpel blade.  With a skin hook and iris scissors, the flaps were gently and sharply undermined and freed up. Bilateral Rotation Flap Text: The defect edges were debeveled with a #15 scalpel blade. Given the location of the defect, shape of the defect and the proximity to free margins a bilateral rotation flap was deemed most appropriate. Using a sterile surgical marker, an appropriate rotation flap was drawn incorporating the defect and placing the expected incisions within the relaxed skin tension lines where possible. The area thus outlined was incised deep to adipose tissue with a #15 scalpel blade. The skin margins were undermined to an appropriate distance in all directions utilizing iris scissors. Following this, the designed flap was carried over into the primary defect and sutured into place.Following this, the designed flap was advanced and carried over into the primary defect and sutured into place. Bilobed Flap Text: The defect edges were debeveled with a #15 scalpel blade.  Given the location of the defect and the proximity to free margins a bilobe flap was deemed most appropriate.  Using a sterile surgical marker, an appropriate bilobe flap drawn around the defect.    The area thus outlined was incised deep to adipose tissue with a #15 scalpel blade.  The skin margins were undermined to an appropriate distance in all directions utilizing iris scissors. Following this, the designed flap was advanced and carried over into the primary defect and sutured into place. Bilobed Transposition Flap Text: The defect edges were debeveled with a #15 scalpel blade.  Given the location of the defect and the proximity to free margins a bilobed transposition flap was deemed most appropriate.  Using a sterile surgical marker, an appropriate bilobe flap drawn around the defect.    The area thus outlined was incised deep to adipose tissue with a #15 scalpel blade.  The skin margins were undermined to an appropriate distance in all directions utilizing iris scissors. Bi-Rhombic Flap Text: The defect edges were debeveled with a #15 scalpel blade.  Given the location of the defect and the proximity to free margins a bi-rhombic flap was deemed most appropriate.  Using a sterile surgical marker, an appropriate rhombic flap was drawn incorporating the defect. The area thus outlined was incised deep to adipose tissue with a #15 scalpel blade.  The skin margins were undermined to an appropriate distance in all directions utilizing iris scissors. Burow's Advancement Flap Text: The defect edges were debeveled with a #15 scalpel blade.  Given the location of the defect and the proximity to free margins a Burow's advancement flap was deemed most appropriate.  Using a sterile surgical marker, the appropriate advancement flap was drawn incorporating the defect and placing the expected incisions within the relaxed skin tension lines where possible.    The area thus outlined was incised deep to adipose tissue with a #15 scalpel blade.  The skin margins were undermined to an appropriate distance in all directions utilizing iris scissors. Chonodrocutaneous Helical Advancement Flap Text: The defect edges were debeveled with a #15 scalpel blade.  Given the location of the defect and the proximity to free margins a chondrocutaneous helical advancement flap was deemed most appropriate.  Using a sterile surgical marker, the appropriate advancement flap was drawn incorporating the defect and placing the expected incisions within the relaxed skin tension lines where possible.    The area thus outlined was incised deep to adipose tissue with a #15 scalpel blade.  The skin margins were undermined to an appropriate distance in all directions utilizing iris scissors. Crescentic Advancement Flap Text: The defect edges were debeveled with a #15 scalpel blade.  Given the location of the defect and the proximity to free margins a crescentic advancement flap was deemed most appropriate.  Using a sterile surgical marker, the appropriate advancement flap was drawn incorporating the defect and placing the expected incisions within the relaxed skin tension lines where possible.    The area thus outlined was incised deep to adipose tissue with a #15 scalpel blade.  The skin margins were undermined to an appropriate distance in all directions utilizing iris scissors. Following this, the designed flap was advanced and carried over into the primary defect and sutured into place. Dorsal Nasal Flap Text: The defect edges were debeveled with a #15 scalpel blade.  Given the location of the defect and the proximity to free margins a dorsal nasal flap was deemed most appropriate.  Using a sterile surgical marker, an appropriate dorsal nasal flap was drawn around the defect.    The area thus outlined was incised deep to adipose tissue with a #15 scalpel blade.  The skin margins were undermined to an appropriate distance in all directions utilizing iris scissors. Double Island Pedicle Flap Text: The defect edges were debeveled with a #15 scalpel blade.  Given the location of the defect, shape of the defect and the proximity to free margins a double island pedicle advancement flap was deemed most appropriate.  Using a sterile surgical marker, an appropriate advancement flap was drawn incorporating the defect, outlining the appropriate donor tissue and placing the expected incisions within the relaxed skin tension lines where possible.    The area thus outlined was incised deep to adipose tissue with a #15 scalpel blade.  The skin margins were undermined to an appropriate distance in all directions around the primary defect and laterally outward around the island pedicle utilizing iris scissors.  There was minimal undermining beneath the pedicle flap. Double O-Z Flap Text: The defect edges were debeveled with a #15 scalpel blade.  Given the location of the defect, shape of the defect and the proximity to free margins a Double O-Z flap was deemed most appropriate.  Using a sterile surgical marker, an appropriate transposition flap was drawn incorporating the defect and placing the expected incisions within the relaxed skin tension lines where possible. The area thus outlined was incised deep to adipose tissue with a #15 scalpel blade.  The skin margins were undermined to an appropriate distance in all directions utilizing iris scissors. Double O-Z Plasty Text: The defect edges were debeveled with a #15 scalpel blade.  Given the location of the defect, shape of the defect and the proximity to free margins a Double O-Z plasty (double transposition flap) was deemed most appropriate.  Using a sterile surgical marker, the appropriate transposition flaps were drawn incorporating the defect and placing the expected incisions within the relaxed skin tension lines where possible. The area thus outlined was incised deep to adipose tissue with a #15 scalpel blade.  The skin margins were undermined to an appropriate distance in all directions utilizing iris scissors.  Hemostasis was achieved with electrocautery.  The flaps were then transposed into place, one clockwise and the other counterclockwise, and anchored with interrupted buried subcutaneous sutures. Double Z Plasty Text: The lesion was extirpated to the level of the fat with a #15 scalpel blade. Given the location of the defect, shape of the defect and the proximity to free margins a double Z-plasty was deemed most appropriate for repair. Using a sterile surgical marker, the appropriate transposition arms of the double Z-plasty were drawn incorporating the defect and placing the expected incisions within the relaxed skin tension lines where possible. The area thus outlined was incised deep to adipose tissue with a #15 scalpel blade. The skin margins were undermined to an appropriate distance in all directions utilizing iris scissors. The opposing transposition arms were then transposed and carried over into place in opposite direction and anchored with interrupted buried subcutaneous sutures. Ear Star Wedge Flap Text: The defect edges were debeveled with a #15 blade scalpel.  Given the location of the defect and the proximity to free margins (helical rim) an ear star wedge flap was deemed most appropriate.  Using a sterile surgical marker, the appropriate flap was drawn incorporating the defect and placing the expected incisions between the helical rim and antihelix where possible.  The area thus outlined was incised through and through with a #15 scalpel blade.Following this, the designed flap was advanced and carried over into the primary defect and sutured into place. Flip-Flop Flap Text: The defect edges were debeveled with a #15 blade scalpel.  Given the location of the defect and the proximity to free margins a flip-flop flap was deemed most appropriate. Using a sterile surgical marker, the appropriate flap was drawn incorporating the defect and placing the expected incisions between the helical rim and antihelix where possible.  The area thus outlined was incised through and through with a #15 scalpel blade. Following this, the designed flap was carried over into the primary defect and sutured into place. Hatchet Flap Text: The defect edges were debeveled with a #15 scalpel blade.  Given the location of the defect, shape of the defect and the proximity to free margins a hatchet flap was deemed most appropriate.  Using a sterile surgical marker, an appropriate hatchet flap was drawn incorporating the defect and placing the expected incisions within the relaxed skin tension lines where possible.    The area thus outlined was incised deep to adipose tissue with a #15 scalpel blade.  The skin margins were undermined to an appropriate distance in all directions utilizing iris scissors. Helical Rim Advancement Flap Text: The defect edges were debeveled with a #15 blade scalpel.  Given the location of the defect and the proximity to free margins (helical rim) a double helical rim advancement flap was deemed most appropriate.  Using a sterile surgical marker, the appropriate advancement flaps were drawn incorporating the defect and placing the expected incisions between the helical rim and antihelix where possible.  The area thus outlined was incised through and through with a #15 scalpel blade.  With a skin hook and iris scissors, the flaps were gently and sharply undermined and freed up. Following this, the designed flap was advanced and carried over into the primary defect and sutured into place. H Plasty Text: Given the location of the defect, shape of the defect and the proximity to free margins a H-plasty was deemed most appropriate for repair.  Using a sterile surgical marker, the appropriate advancement arms of the H-plasty were drawn incorporating the defect and placing the expected incisions within the relaxed skin tension lines where possible. The area thus outlined was incised deep to adipose tissue with a #15 scalpel blade. The skin margins were undermined to an appropriate distance in all directions utilizing iris scissors.  The opposing advancement arms were then advanced into place in opposite direction and anchored with interrupted buried subcutaneous sutures. Island Pedicle Flap Text: The defect edges were debeveled with a #15 scalpel blade.  Given the location of the defect, shape of the defect and the proximity to free margins an island pedicle advancement flap was deemed most appropriate.  Using a sterile surgical marker, an appropriate advancement flap was drawn incorporating the defect, outlining the appropriate donor tissue and placing the expected incisions within the relaxed skin tension lines where possible.    The area thus outlined was incised deep to adipose tissue with a #15 scalpel blade.  The skin margins were undermined to an appropriate distance in all directions around the primary defect and laterally outward around the island pedicle utilizing iris scissors.  There was minimal undermining beneath the pedicle flap. Island Pedicle Flap With Canthal Suspension Text: The defect edges were debeveled with a #15 scalpel blade.  Given the location of the defect, shape of the defect and the proximity to free margins an island pedicle advancement flap was deemed most appropriate.  Using a sterile surgical marker, an appropriate advancement flap was drawn incorporating the defect, outlining the appropriate donor tissue and placing the expected incisions within the relaxed skin tension lines where possible. The area thus outlined was incised deep to adipose tissue with a #15 scalpel blade.  The skin margins were undermined to an appropriate distance in all directions around the primary defect and laterally outward around the island pedicle utilizing iris scissors.  There was minimal undermining beneath the pedicle flap. A suspension suture was placed in the canthal tendon to prevent tension and prevent ectropion. Island Pedicle Flap-Requiring Vessel Identification Text: The defect edges were debeveled with a #15 scalpel blade.  Given the location of the defect, shape of the defect and the proximity to free margins an island pedicle advancement flap was deemed most appropriate.  Using a sterile surgical marker, an appropriate advancement flap was drawn, based on the axial vessel mentioned above, incorporating the defect, outlining the appropriate donor tissue and placing the expected incisions within the relaxed skin tension lines where possible.    The area thus outlined was incised deep to adipose tissue with a #15 scalpel blade.  The skin margins were undermined to an appropriate distance in all directions around the primary defect and laterally outward around the island pedicle utilizing iris scissors.  There was minimal undermining beneath the pedicle flap. Keystone Flap Text: The defect edges were debeveled with a #15 scalpel blade.  Given the location of the defect, shape of the defect a keystone flap was deemed most appropriate.  Using a sterile surgical marker, an appropriate keystone flap was drawn incorporating the defect, outlining the appropriate donor tissue and placing the expected incisions within the relaxed skin tension lines where possible. The area thus outlined was incised deep to adipose tissue with a #15 scalpel blade.  The skin margins were undermined to an appropriate distance in all directions around the primary defect and laterally outward around the flap utilizing iris scissors. Melolabial Transposition Flap Text: The defect edges were debeveled with a #15 scalpel blade.  Given the location of the defect and the proximity to free margins a melolabial flap was deemed most appropriate.  Using a sterile surgical marker, an appropriate melolabial transposition flap was drawn incorporating the defect.    The area thus outlined was incised deep to adipose tissue with a #15 scalpel blade.  The skin margins were undermined to an appropriate distance in all directions utilizing iris scissors. Mercedes Flap Text: The defect edges were debeveled with a #15 scalpel blade.  Given the location of the defect, shape of the defect and the proximity to free margins a Mercedes flap was deemed most appropriate.  Using a sterile surgical marker, an appropriate advancement flap was drawn incorporating the defect and placing the expected incisions within the relaxed skin tension lines where possible. The area thus outlined was incised deep to adipose tissue with a #15 scalpel blade.  The skin margins were undermined to an appropriate distance in all directions utilizing iris scissors.Following this, the designed flap was advanced and carried over into the primary defect and sutured into place. Modified Advancement Flap Text: The defect edges were debeveled with a #15 scalpel blade.  Given the location of the defect, shape of the defect and the proximity to free margins a modified advancement flap was deemed most appropriate.  Using a sterile surgical marker, an appropriate advancement flap was drawn incorporating the defect and placing the expected incisions within the relaxed skin tension lines where possible.    The area thus outlined was incised deep to adipose tissue with a #15 scalpel blade.  The skin margins were undermined to an appropriate distance in all directions utilizing iris scissors.Following this, the designed flap was advanced and carried over into the primary defect and sutured into place. Mucosal Advancement Flap Text: Given the location of the defect, shape of the defect and the proximity to free margins a mucosal advancement flap was deemed most appropriate. Incisions were made with a 15 blade scalpel in the appropriate fashion along the cutaneous vermilion border and the mucosal lip. The remaining actinically damaged mucosal tissue was excised.  The mucosal advancement flap was then elevated to the gingival sulcus with care taken to preserve the neurovascular structures and advanced into the primary defect. Care was taken to ensure that precise realignment of the vermilion border was achieved.Following this, the designed flap was advanced and carried over into the primary defect and sutured into place. Muscle Hinge Flap Text: The defect edges were debeveled with a #15 scalpel blade.  Given the size, depth and location of the defect and the proximity to free margins a muscle hinge flap was deemed most appropriate.  Using a sterile surgical marker, an appropriate hinge flap was drawn incorporating the defect. The area thus outlined was incised with a #15 scalpel blade.  The skin margins were undermined to an appropriate distance in all directions utilizing iris scissors. Mustarde Flap Text: The defect edges were debeveled with a #15 scalpel blade.  Given the size, depth and location of the defect and the proximity to free margins a Mustarde flap was deemed most appropriate.  Using a sterile surgical marker, an appropriate flap was drawn incorporating the defect. The area thus outlined was incised with a #15 scalpel blade.  The skin margins were undermined to an appropriate distance in all directions utilizing iris scissors. Nasal Turnover Hinge Flap Text: The defect edges were debeveled with a #15 scalpel blade.  Given the size, depth, location of the defect and the defect being full thickness a nasal turnover hinge flap was deemed most appropriate.  Using a sterile surgical marker, an appropriate hinge flap was drawn incorporating the defect. The area thus outlined was incised with a #15 scalpel blade. The flap was designed to recreate the nasal mucosal lining and the alar rim. The skin margins were undermined to an appropriate distance in all directions utilizing iris scissors. Nasalis-Muscle-Based Myocutaneous Island Pedicle Flap Text: Using a #15 blade, an incision was made around the donor flap to the level of the nasalis muscle. Wide lateral undermining was then performed in both the subcutaneous plane above the nasalis muscle, and in a submuscular plane just above periosteum. This allowed the formation of a free nasalis muscle axial pedicle (based on the angular artery) which was still attached to the actual cutaneous flap, increasing its mobility and vascular viability. Hemostasis was obtained with pinpoint electrocoagulation. The flap was mobilized into position and the pivotal anchor points positioned and stabilized with buried interrupted sutures. Subcutaneous and dermal tissues were closed in a multilayered fashion with sutures. Tissue redundancies were excised, and the epidermal edges were apposed without significant tension and sutured with sutures. Nasalis Myocutaneous Flap Text: Using a #15 blade, an incision was made around the donor flap to the level of the nasalis muscle. Wide lateral undermining was then performed in both the subcutaneous plane above the nasalis muscle, and in a submuscular plane just above periosteum. This allowed the formation of a free nasalis muscle axial pedicle which was still attached to the actual cutaneous flap, increasing its mobility and vascular viability. Hemostasis was obtained with pinpoint electrocoagulation. The flap was mobilized into position and the pivotal anchor points positioned and stabilized with buried interrupted sutures. Subcutaneous and dermal tissues were closed in a multilayered fashion with sutures. Tissue redundancies were excised, and the epidermal edges were apposed without significant tension and sutured with sutures. Nasolabial Transposition Flap Text: The defect edges were debeveled with a #15 scalpel blade.  Given the size, depth and location of the defect and the proximity to free margins a nasolabial transposition flap was deemed most appropriate. Using a sterile surgical marker, an appropriate flap was drawn incorporating the defect. The area thus outlined was incised with a #15 scalpel blade. The skin margins were undermined to an appropriate distance in all directions utilizing iris scissors. Following this, the designed flap was carried into the primary defect and sutured into place. Orbicularis Oris Muscle Flap Text: The defect edges were debeveled with a #15 scalpel blade.  Given that the defect affected the competency of the oral sphincter an obicularis oris muscle flap was deemed most appropriate to restore this competency and normal muscle function.  Using a sterile surgical marker, an appropriate flap was drawn incorporating the defect. The area thus outlined was incised with a #15 scalpel blade. O-T Advancement Flap Text: The defect edges were debeveled with a #15 scalpel blade.  Given the location of the defect, shape of the defect and the proximity to free margins an O-T advancement flap was deemed most appropriate.  Using a sterile surgical marker, an appropriate advancement flap was drawn incorporating the defect and placing the expected incisions within the relaxed skin tension lines where possible.    The area thus outlined was incised deep to adipose tissue with a #15 scalpel blade.  The skin margins were undermined to an appropriate distance in all directions utilizing iris scissors. Following this, the designed flap was advanced and carried over into the primary defect and sutured into place. O-T Plasty Text: The defect edges were debeveled with a #15 scalpel blade.  Given the location of the defect, shape of the defect and the proximity to free margins an O-T plasty was deemed most appropriate.  Using a sterile surgical marker, an appropriate O-T plasty was drawn incorporating the defect and placing the expected incisions within the relaxed skin tension lines where possible.    The area thus outlined was incised deep to adipose tissue with a #15 scalpel blade.  The skin margins were undermined to an appropriate distance in all directions utilizing iris scissors. O-L Flap Text: The defect edges were debeveled with a #15 scalpel blade.  Given the location of the defect, shape of the defect and the proximity to free margins an O-L flap was deemed most appropriate.  Using a sterile surgical marker, an appropriate advancement flap was drawn incorporating the defect and placing the expected incisions within the relaxed skin tension lines where possible.    The area thus outlined was incised deep to adipose tissue with a #15 scalpel blade.  The skin margins were undermined to an appropriate distance in all directions utilizing iris scissors. O-Z Flap Text: The defect edges were debeveled with a #15 scalpel blade.  Given the location of the defect, shape of the defect and the proximity to free margins an O-Z flap was deemed most appropriate.  Using a sterile surgical marker, an appropriate transposition flap was drawn incorporating the defect and placing the expected incisions within the relaxed skin tension lines where possible. The area thus outlined was incised deep to adipose tissue with a #15 scalpel blade.  The skin margins were undermined to an appropriate distance in all directions utilizing iris scissors.Following this, the designed flap was advanced and carried over into the primary defect and sutured into place. O-Z Plasty Text: The defect edges were debeveled with a #15 scalpel blade.  Given the location of the defect, shape of the defect and the proximity to free margins an O-Z plasty (double transposition flap) was deemed most appropriate.  Using a sterile surgical marker, the appropriate transposition flaps were drawn incorporating the defect and placing the expected incisions within the relaxed skin tension lines where possible.    The area thus outlined was incised deep to adipose tissue with a #15 scalpel blade.  The skin margins were undermined to an appropriate distance in all directions utilizing iris scissors.  Hemostasis was achieved with electrocautery.  The flaps were then transposed into place, one clockwise and the other counterclockwise, and anchored with interrupted buried subcutaneous sutures. Peng Advancement Flap Text: The defect edges were debeveled with a #15 scalpel blade.  Given the location of the defect, shape of the defect and the proximity to free margins a Peng advancement flap was deemed most appropriate.  Using a sterile surgical marker, an appropriate advancement flap was drawn incorporating the defect and placing the expected incisions within the relaxed skin tension lines where possible. The area thus outlined was incised deep to adipose tissue with a #15 scalpel blade.  The skin margins were undermined to an appropriate distance in all directions utilizing iris scissors. Rectangular Flap Text: The defect edges were debeveled with a #15 scalpel blade. Given the location of the defect and the proximity to free margins a rectangular flap was deemed most appropriate. Using a sterile surgical marker, an appropriate rectangular flap was drawn incorporating the defect. The area thus outlined was incised deep to adipose tissue with a #15 scalpel blade. The skin margins were undermined to an appropriate distance in all directions utilizing iris scissors. Following this, the designed flap was carried over into the primary defect and sutured into place. Rhombic Flap Text: The defect edges were debeveled with a #15 scalpel blade.  Given the location of the defect and the proximity to free margins a rhombic flap was deemed most appropriate.  Using a sterile surgical marker, an appropriate rhombic flap was drawn incorporating the defect.    The area thus outlined was incised deep to adipose tissue with a #15 scalpel blade.  The skin margins were undermined to an appropriate distance in all directions utilizing iris scissors. Following this, the designed flap was advanced and carried over into the primary defect and sutured into place. Rhomboid Transposition Flap Text: The defect edges were debeveled with a #15 scalpel blade.  Given the location of the defect and the proximity to free margins a rhomboid transposition flap was deemed most appropriate.  Using a sterile surgical marker, an appropriate rhomboid flap was drawn incorporating the defect.    The area thus outlined was incised deep to adipose tissue with a #15 scalpel blade.  The skin margins were undermined to an appropriate distance in all directions utilizing iris scissors. Rotation Flap Text: The defect edges were debeveled with a #15 scalpel blade.  Given the location of the defect, shape of the defect and the proximity to free margins a rotation flap was deemed most appropriate.  Using a sterile surgical marker, an appropriate rotation flap was drawn incorporating the defect and placing the expected incisions within the relaxed skin tension lines where possible.    The area thus outlined was incised deep to adipose tissue with a #15 scalpel blade.  The skin margins were undermined to an appropriate distance in all directions utilizing iris scissors. Following this, the designed flap was advanced and carried over into the primary defect and sutured into place. Spiral Flap Text: The defect edges were debeveled with a #15 scalpel blade.  Given the location of the defect, shape of the defect and the proximity to free margins a spiral flap was deemed most appropriate.  Using a sterile surgical marker, an appropriate rotation flap was drawn incorporating the defect and placing the expected incisions within the relaxed skin tension lines where possible. The area thus outlined was incised deep to adipose tissue with a #15 scalpel blade.  The skin margins were undermined to an appropriate distance in all directions utilizing iris scissors. Staged Advancement Flap Text: The defect edges were debeveled with a #15 scalpel blade.  Given the location of the defect, shape of the defect and the proximity to free margins a staged advancement flap was deemed most appropriate.  Using a sterile surgical marker, an appropriate advancement flap was drawn incorporating the defect and placing the expected incisions within the relaxed skin tension lines where possible. The area thus outlined was incised deep to adipose tissue with a #15 scalpel blade.  The skin margins were undermined to an appropriate distance in all directions utilizing iris scissors. Star Wedge Flap Text: The defect edges were debeveled with a #15 scalpel blade.  Given the location of the defect, shape of the defect and the proximity to free margins a star wedge flap was deemed most appropriate.  Using a sterile surgical marker, an appropriate rotation flap was drawn incorporating the defect and placing the expected incisions within the relaxed skin tension lines where possible. The area thus outlined was incised deep to adipose tissue with a #15 scalpel blade.  The skin margins were undermined to an appropriate distance in all directions utilizing iris scissors. Following this, the designed flap was advanced and carried over into the primary defect and sutured into place. Transposition Flap Text: The defect edges were debeveled with a #15 scalpel blade.  Given the location of the defect and the proximity to free margins a transposition flap was deemed most appropriate.  Using a sterile surgical marker, an appropriate transposition flap was drawn incorporating the defect.    The area thus outlined was incised deep to adipose tissue with a #15 scalpel blade.  The skin margins were undermined to an appropriate distance in all directions utilizing iris scissors. Following this, the designed flap was advanced and carried over into the primary defect and sutured into place. Trilobed Flap Text: The defect edges were debeveled with a #15 scalpel blade.  Given the location of the defect and the proximity to free margins a trilobed flap was deemed most appropriate.  Using a sterile surgical marker, an appropriate trilobed flap drawn around the defect.    The area thus outlined was incised deep to adipose tissue with a #15 scalpel blade.  The skin margins were undermined to an appropriate distance in all directions utilizing iris scissors. Following this, the designed flap was advanced and carried over into the primary defect and sutured into place. V-Y Flap Text: The defect edges were debeveled with a #15 scalpel blade.  Given the location of the defect, shape of the defect and the proximity to free margins a V-Y flap was deemed most appropriate.  Using a sterile surgical marker, an appropriate advancement flap was drawn incorporating the defect and placing the expected incisions within the relaxed skin tension lines where possible.    The area thus outlined was incised deep to adipose tissue with a #15 scalpel blade.  The skin margins were undermined to an appropriate distance in all directions utilizing iris scissors. Following this, the designed flap was advanced and carried over into the primary defect and sutured into place. V-Y Plasty Text: The defect edges were debeveled with a #15 scalpel blade.  Given the location of the defect, shape of the defect and the proximity to free margins an V-Y advancement flap was deemed most appropriate.  Using a sterile surgical marker, an appropriate advancement flap was drawn incorporating the defect and placing the expected incisions within the relaxed skin tension lines where possible.    The area thus outlined was incised deep to adipose tissue with a #15 scalpel blade.  The skin margins were undermined to an appropriate distance in all directions utilizing iris scissors. W Plasty Text: The lesion was extirpated to the level of the fat with a #15 scalpel blade.  Given the location of the defect, shape of the defect and the proximity to free margins a W-plasty was deemed most appropriate for repair.  Using a sterile surgical marker, the appropriate transposition arms of the W-plasty were drawn incorporating the defect and placing the expected incisions within the relaxed skin tension lines where possible.    The area thus outlined was incised deep to adipose tissue with a #15 scalpel blade.  The skin margins were undermined to an appropriate distance in all directions utilizing iris scissors.  The opposing transposition arms were then transposed into place in opposite direction and anchored with interrupted buried subcutaneous sutures. Z Plasty Text: The lesion was extirpated to the level of the fat with a #15 scalpel blade.  Given the location of the defect, shape of the defect and the proximity to free margins a Z-plasty was deemed most appropriate for repair.  Using a sterile surgical marker, the appropriate transposition arms of the Z-plasty were drawn incorporating the defect and placing the expected incisions within the relaxed skin tension lines where possible.    The area thus outlined was incised deep to adipose tissue with a #15 scalpel blade.  The skin margins were undermined to an appropriate distance in all directions utilizing iris scissors.  The opposing transposition arms were then transposed into place in opposite direction and anchored with interrupted buried subcutaneous sutures. Zygomaticofacial Flap Text: Given the location of the defect, shape of the defect and the proximity to free margins a zygomaticofacial flap was deemed most appropriate for repair.  Using a sterile surgical marker, the appropriate flap was drawn incorporating the defect and placing the expected incisions within the relaxed skin tension lines where possible. The area thus outlined was incised deep to adipose tissue with a #15 scalpel blade with preservation of a vascular pedicle.  The skin margins were undermined to an appropriate distance in all directions utilizing iris scissors.  The flap was then placed into the defect and anchored with interrupted buried subcutaneous sutures. Abbe Flap (Lower To Upper Lip) Text: The defect of the upper lip was assessed and measured.  Given the location and size of the defect, an Abbe flap was deemed most appropriate.  Using a sterile surgical marker, an appropriate Abbe flap was measured and drawn on the lower lip. Local anesthesia was then infiltrated. A scalpel was then used to incise the upper lip through and through the skin, vermilion, muscle and mucosa, leaving the flap pedicled on the opposite side.  The flap was then rotated and transferred to the lower lip defect.  The flap was then sutured into place with a three layer technique, closing the orbicularis oris muscle layer with subcutaneous buried sutures, followed by a mucosal layer and an epidermal layer. Abbe Flap (Upper To Lower Lip) Text: The defect of the lower lip was assessed and measured.  Given the location and size of the defect, an Abbe flap was deemed most appropriate.  Using a sterile surgical marker, an appropriate Abbe flap was measured and drawn on the upper lip. Local anesthesia was then infiltrated.  A scalpel was then used to incise the upper lip through and through the skin, vermilion, muscle and mucosa, leaving the flap pedicled on the opposite side.  The flap was then rotated and transferred to the lower lip defect.  The flap was then sutured into place with a three layer technique, closing the orbicularis oris muscle layer with subcutaneous buried sutures, followed by a mucosal layer and an epidermal layer. Cheek Interpolation Flap Text: A decision was made to reconstruct the defect utilizing an interpolation axial flap and a staged reconstruction.  A telfa template was made of the defect.  This telfa template was then used to outline the Cheek Interpolation flap.  The donor area for the pedicle flap was then injected with anesthesia.  The flap was excised through the skin and subcutaneous tissue down to the layer of the underlying musculature.  The interpolation flap was carefully excised within this deep plane to maintain its blood supply.  The edges of the donor site were undermined.   The donor site was closed in a primary fashion.  The pedicle was then rotated into position and sutured.  Following this, the designed flap was advanced and carried over into the primary defect and sutured into place. Once the tube was sutured into place, adequate blood supply was confirmed with blanching and refill.  The pedicle was then wrapped with xeroform gauze and dressed appropriately with a telfa and gauze bandage to ensure continued blood supply and protect the attached pedicle. Cheek-To-Nose Interpolation Flap Text: A decision was made to reconstruct the defect utilizing an interpolation axial flap and a staged reconstruction.  A telfa template was made of the defect.  This telfa template was then used to outline the Cheek-To-Nose Interpolation flap.  The donor area for the pedicle flap was then injected with anesthesia.  The flap was excised through the skin and subcutaneous tissue down to the layer of the underlying musculature.  The interpolation flap was carefully excised within this deep plane to maintain its blood supply.  The edges of the donor site were undermined.   The donor site was closed in a primary fashion.  The pedicle was then rotated into position and sutured. Following this, the designed flap was advanced and carried over into the primary defect and sutured into place. Once the tube was sutured into place, adequate blood supply was confirmed with blanching and refill.  The pedicle was then wrapped with xeroform gauze and dressed appropriately with a telfa and gauze bandage to ensure continued blood supply and protect the attached pedicle. Estlander Flap (Lower To Upper Lip) Text: The defect of the lower lip was assessed and measured.  Given the location and size of the defect, an Estlander flap was deemed most appropriate.  Using a sterile surgical marker, an appropriate Estlander flap was measured and drawn on the upper lip. Local anesthesia was then infiltrated. A scalpel was then used to incise the lateral aspect of the flap, through skin, muscle and mucosa, leaving the flap pedicled medially.  The flap was then rotated and positioned to fill the lower lip defect.  The flap was then sutured into place with a three layer technique, closing the orbicularis oris muscle layer with subcutaneous buried sutures, followed by a mucosal layer and an epidermal layer. Interpolation Flap Text: A decision was made to reconstruct the defect utilizing an interpolation axial flap and a staged reconstruction.  A telfa template was made of the defect.  This telfa template was then used to outline the interpolation flap.  The donor area for the pedicle flap was then injected with anesthesia.  The flap was excised through the skin and subcutaneous tissue down to the layer of the underlying musculature.  The interpolation flap was carefully excised within this deep plane to maintain its blood supply.  The edges of the donor site were undermined.   The donor site was closed in a primary fashion.  The pedicle was then rotated into position and sutured. Following this, the designed flap was advanced and carried over into the primary defect and sutured into place. Once the tube was sutured into place, adequate blood supply was confirmed with blanching and refill.  The pedicle was then wrapped with xeroform gauze and dressed appropriately with a telfa and gauze bandage to ensure continued blood supply and protect the attached pedicle. Melolabial Interpolation Flap Text: A decision was made to reconstruct the defect utilizing an interpolation axial flap and a staged reconstruction.  A telfa template was made of the defect.  This telfa template was then used to outline the melolabial interpolation flap.  The donor area for the pedicle flap was then injected with anesthesia.  The flap was excised through the skin and subcutaneous tissue down to the layer of the underlying musculature.  The pedicle flap was carefully excised within this deep plane to maintain its blood supply.  The edges of the donor site were undermined.   The donor site was closed in a primary fashion.  The pedicle was then rotated into position and sutured.  Once the tube was sutured into place, adequate blood supply was confirmed with blanching and refill.  The pedicle was then wrapped with xeroform gauze and dressed appropriately with a telfa and gauze bandage to ensure continued blood supply and protect the attached pedicle. Mastoid Interpolation Flap Text: A decision was made to reconstruct the defect utilizing an interpolation axial flap and a staged reconstruction.  A telfa template was made of the defect.  This telfa template was then used to outline the mastoid interpolation flap.  The donor area for the pedicle flap was then injected with anesthesia.  The flap was excised through the skin and subcutaneous tissue down to the layer of the underlying musculature.  The pedicle flap was carefully excised within this deep plane to maintain its blood supply.  The edges of the donor site were undermined.   The donor site was closed in a primary fashion.  The pedicle was then rotated into position and sutured. Following this, the designed flap was advanced and carried over into the primary defect and sutured into place.  Once the tube was sutured into place, adequate blood supply was confirmed with blanching and refill.  The pedicle was then wrapped with xeroform gauze and dressed appropriately with a telfa and gauze bandage to ensure continued blood supply and protect the attached pedicle. Paramedian Forehead Flap Text: A decision was made to reconstruct the defect utilizing an interpolation axial flap and a staged reconstruction.  A telfa template was made of the defect.  This telfa template was then used to outline the paramedian forehead pedicle flap.  The donor area for the pedicle flap was then injected with anesthesia.  The flap was excised through the skin and subcutaneous tissue down to the layer of the underlying musculature.  The pedicle flap was carefully excised within this deep plane to maintain its blood supply.  The edges of the donor site were undermined.   The donor site was closed in a primary fashion.  The pedicle was then rotated into position and sutured. Following this, the designed flap was advanced and carried over into the primary defect and sutured into place.  Once the tube was sutured into place, adequate blood supply was confirmed with blanching and refill.  The pedicle was then wrapped with xeroform gauze and dressed appropriately with a telfa and gauze bandage to ensure continued blood supply and protect the attached pedicle. Posterior Auricular Interpolation Flap Text: A decision was made to reconstruct the defect utilizing an interpolation axial flap and a staged reconstruction.  A telfa template was made of the defect.  This telfa template was then used to outline the posterior auricular interpolation flap.  The donor area for the pedicle flap was then injected with anesthesia.  The flap was excised through the skin and subcutaneous tissue down to the layer of the underlying musculature.  The pedicle flap was carefully excised within this deep plane to maintain its blood supply.  The edges of the donor site were undermined.   The donor site was closed in a primary fashion.  The pedicle was then rotated into position and sutured.  Once the tube was sutured into place, adequate blood supply was confirmed with blanching and refill.  The pedicle was then wrapped with xeroform gauze and dressed appropriately with a telfa and gauze bandage to ensure continued blood supply and protect the attached pedicle. Cheiloplasty (Complex) Text: A decision was made to reconstruct the defect with a  cheiloplasty.  The defect was undermined extensively.  Additional obicularis oris muscle was excised with a 15 blade scalpel.  The defect was converted into a full thickness wedge to facilite a better cosmetic result.  Small vessels were then tied off with 5-0 monocyrl. The obicularis oris, superficial fascia, adipose and dermis were then reapproximated.  After the deeper layers were approximated the epidermis was reapproximated with particular care given to realign the vermillion border. Cheiloplasty (Less Than 50%) Text: A decision was made to reconstruct the defect with a  cheiloplasty.  The defect was undermined extensively.  Additional obicularis oris muscle was excised with a 15 blade scalpel.  The defect was converted into a full thickness wedge, of less than 50% of the vertical height of the lip, to facilite a better cosmetic result.  Small vessels were then tied off with 5-0 monocyrl. The obicularis oris, superficial fascia, adipose and dermis were then reapproximated.  After the deeper layers were approximated the epidermis was reapproximated with particular care given to realign the vermillion border. Ear Wedge Repair Text: A wedge excision was completed by carrying down an excision through the full thickness of the ear and cartilage with an inward facing Burow's triangle. The wound was then closed in a layered fashion. Full Thickness Lip Wedge Repair (Flap) Text: Given the location of the defect and the proximity to free margins a full thickness wedge repair was deemed most appropriate.  Using a sterile surgical marker, the appropriate repair was drawn incorporating the defect and placing the expected incisions perpendicular to the vermilion border.  The vermilion border was also meticulously outlined to ensure appropriate reapproximation during the repair.  The area thus outlined was incised through and through with a #15 scalpel blade.  The muscularis and dermis were reaproximated with deep sutures following hemostasis. Care was taken to realign the vermilion border before proceeding with the superficial closure.  Once the vermilion was realigned the superfical and mucosal closure was finished. Burow's Graft Text: The defect edges were debeveled with a #15 scalpel blade.  Given the location of the defect, shape of the defect, the proximity to free margins and the presence of a standing cone deformity a Burow's skin graft was deemed most appropriate. The standing cone was removed and this tissue was then trimmed to the shape of the primary defect. The adipose tissue was also removed until only dermis and epidermis were left.  The skin margins of the secondary defect were undermined to an appropriate distance in all directions utilizing iris scissors.  The secondary defect was closed with interrupted buried subcutaneous sutures.  The skin edges were then re-apposed with running  sutures.  The skin graft was then placed in the primary defect and oriented appropriately. Cartilage Graft Text: The defect edges were debeveled with a #15 scalpel blade.  Given the location of the defect, shape of the defect, the fact the defect involved a full thickness cartilage defect a cartilage graft was deemed most appropriate.  An appropriate donor site was identified, cleansed, and anesthetized. The cartilage graft was then harvested and transferred to the recipient site, oriented appropriately and then sutured into place.  The secondary defect was then repaired using a primary closure. Composite Graft Text: The defect edges were debeveled with a #15 scalpel blade.  Given the location of the defect, shape of the defect, the proximity to free margins and the fact the defect was full thickness a composite graft was deemed most appropriate.  The defect was outline and then transferred to the donor site.  A full thickness graft was then excised from the donor site. The graft was then placed in the primary defect, oriented appropriately and then sutured into place.  The secondary defect was then repaired using a primary closure. Epidermal Autograft Text: The defect edges were debeveled with a #15 scalpel blade.  Given the location of the defect, shape of the defect and the proximity to free margins an epidermal autograft was deemed most appropriate.  Using a sterile surgical marker, the primary defect shape was transferred to the donor site. The epidermal graft was then harvested.  The skin graft was then placed in the primary defect and oriented appropriately. Dermal Autograft Text: The defect edges were debeveled with a #15 scalpel blade.  Given the location of the defect, shape of the defect and the proximity to free margins a dermal autograft was deemed most appropriate.  Using a sterile surgical marker, the primary defect shape was transferred to the donor site. The area thus outlined was incised deep to adipose tissue with a #15 scalpel blade.  The harvested graft was then trimmed of adipose and epidermal tissue until only dermis was left.  The skin graft was then placed in the primary defect and oriented appropriately. Ftsg Text: The defect edges were debeveled with a #15 scalpel blade.  Given the location of the defect, shape of the defect and the proximity to free margins a full thickness skin graft was deemed most appropriate.  Using a sterile surgical marker, the primary defect shape was transferred to the donor site. The area thus outlined was incised deep to adipose tissue with a #15 scalpel blade.  The harvested graft was then trimmed of adipose tissue until only dermis and epidermis was left.  The skin margins of the secondary defect were undermined to an appropriate distance in all directions utilizing iris scissors.  The secondary defect was closed with interrupted buried subcutaneous sutures.  The skin edges were then re-apposed with running  sutures.  The skin graft was then placed in the primary defect and oriented appropriately. Pinch Graft Text: The defect edges were debeveled with a #15 scalpel blade. Given the location of the defect, shape of the defect and the proximity to free margins a pinch graft was deemed most appropriate. Using a sterile surgical marker, the primary defect shape was transferred to the donor site. The area thus outlined was incised deep to adipose tissue with a #15 scalpel blade.  The harvested graft was then trimmed of adipose tissue until only dermis and epidermis was left. The skin graft was then placed in the primary defect and oriented appropriately. Skin Substitute Text: The defect edges were debeveled with a #15 scalpel blade.  Given the location of the defect, shape of the defect and the proximity to free margins a skin substitute graft was deemed most appropriate.  The graft material was trimmed to fit the size of the defect. The graft was then placed in the primary defect and oriented appropriately. Split-Thickness Skin Graft Text: The defect edges were debeveled with a #15 scalpel blade.  Given the location of the defect, shape of the defect and the proximity to free margins a split thickness skin graft was deemed most appropriate.  Using a sterile surgical marker, the primary defect shape was transferred to the donor site. The split thickness graft was then harvested.  The skin graft was then placed in the primary defect and oriented appropriately. Tissue Cultured Epidermal Autograft Text: The defect edges were debeveled with a #15 scalpel blade.  Given the location of the defect, shape of the defect and the proximity to free margins a tissue cultured epidermal autograft was deemed most appropriate.  The graft was then trimmed to fit the size of the defect.  The graft was then placed in the primary defect and oriented appropriately. Xenograft Text: The defect edges were debeveled with a #15 scalpel blade.  Given the location of the defect, shape of the defect and the proximity to free margins a xenograft was deemed most appropriate.  The graft was then trimmed to fit the size of the defect.  The graft was then placed in the primary defect and oriented appropriately. Complex Repair And Flap Additional Text (Will Appearing After The Standard Complex Repair Text): The complex repair was not sufficient to completely close the primary defect. The remaining additional defect was repaired with the flap mentioned below. Complex Repair And Graft Additional Text (Will Appearing After The Standard Complex Repair Text): The complex repair was not sufficient to completely close the primary defect. The remaining additional defect was repaired with the graft mentioned below. Eyelid Full Thickness Repair - 28902: The eyelid defect was full thickness which required a wedge repair of the eyelid. Special care was taken to ensure that the eyelid margin was realligned when placing sutures. Eyelid Partial Thickness Repair - 25441: The eyelid defect was partial thickness which required a wedge repair of the eyelid. Special care was taken to ensure that the eyelid margin was realligned when placing sutures. Intermediate Repair And Flap Additional Text (Will Appearing After The Standard Complex Repair Text): The intermediate repair was not sufficient to completely close the primary defect. The remaining additional defect was repaired with the flap mentioned below. Intermediate Repair And Graft Additional Text (Will Appearing After The Standard Complex Repair Text): The intermediate repair was not sufficient to completely close the primary defect. The remaining additional defect was repaired with the graft mentioned below. Localized Dermabrasion With 15 Blade Text: The patient was draped in routine manner.  Localized dermabrasion using a 15 blade was performed in routine manner to papillary dermis. This spot dermabrasion is being performed to complete skin cancer reconstruction. It also will eliminate the other sun damaged precancerous cells that are known to be part of the regional effect of a lifetime's worth of sun exposure. This localized dermabrasion is therapeutic and should not be considered cosmetic in any regard. Localized Dermabrasion With Sand Papertext: The patient was draped in routine manner.  Localized dermabrasion using sterile sand paper was performed in routine manner to papillary dermis. This spot dermabrasion is being performed to complete skin cancer reconstruction. It also will eliminate the other sun damaged precancerous cells that are known to be part of the regional effect of a lifetime's worth of sun exposure. This localized dermabrasion is therapeutic and should not be considered cosmetic in any regard. Localized Dermabrasion With Wire Brush Text: The patient was draped in routine manner.  Localized dermabrasion using 3 x 17 mm wire brush was performed in routine manner to papillary dermis. This spot dermabrasion is being performed to complete skin cancer reconstruction. It also will eliminate the other sun damaged precancerous cells that are known to be part of the regional effect of a lifetime's worth of sun exposure. This localized dermabrasion is therapeutic and should not be considered cosmetic in any regard. Purse String (Simple) Text: Given the location of the defect and the characteristics of the surrounding skin a pursestring closure was deemed most appropriate.  Undermining was performed circumfirentially around the surgical defect.  A purstring suture was then placed and tightened. Purse String (Intermediate) Text: Given the location of the defect and the characteristics of the surrounding skin a pursestring intermediate closure was deemed most appropriate.  Undermining was performed circumfirentially around the surgical defect.  A purstring suture was then placed and tightened. Partial Purse String (Simple) Text: Given the location of the defect and the characteristics of the surrounding skin a simple purse string closure was deemed most appropriate.  Undermining was performed circumfirentially around the surgical defect.  A purse string suture was then placed and tightened. Wound tension only allowed a partial closure of the circular defect. Partial Purse String (Intermediate) Text: Given the location of the defect and the characteristics of the surrounding skin an intermediate purse string closure was deemed most appropriate.  Undermining was performed circumfirentially around the surgical defect.  A purse string suture was then placed and tightened. Wound tension only allowed a partial closure of the circular defect. Tarsorrhaphy Text: A tarsorrhaphy was performed using Frost sutures. Manual Repair Warning Statement: We plan on removing the manually selected variable below in favor of our much easier automatic structured text blocks found in the previous tab. We decided to do this to help make the flow better and give you the full power of structured data. Manual selection is never going to be ideal in our platform and I would encourage you to avoid using manual selection from this point on, especially since I will be sunsetting this feature. It is important that you do one of two things with the customized text below. First, you can save all of the text in a word file so you can have it for future reference. Second, transfer the text to the appropriate area in the Library tab. Lastly, if there is a flap or graft type which we do not have you need to let us know right away so I can add it in before the variable is hidden. No need to panic, we plan to give you roughly 6 months to make the change. Same Histology In Subsequent Stages Text: The pattern and morphology of the tumor is as described in the first stage. No Residual Tumor Seen Histology Text: There were no malignant cells seen in the sections examined. Inflammation Suggestive Of Cancer Camouflage Histology Text: There was a dense lymphocytic infiltrate which prevented adequate histologic evaluation of adjacent structures. Incidental Superficial Basal Cell Carcinoma Histology Text: Incidental Islands of basaloid cells with a peripheral palisade and surrounding retraction artifact. Incidental Actinic Keratosis Histology Text: Alternating pink, blue parakeratosis with some atypical keratinocytes Missing Epidermis Histology Text: Area of missing epidermis that was not able to be retrieved on the initial stage. A second layer of tissue was removed and processed as a continuation of the previous stage. Bcc Histology Text: Islands of basaloid cells with a peripheral palisade and surrounding retraction artifact. Bcc Infiltrative Histology Text: Strands of basaloid cells penetrating deeply among the collagen fascicles and demonstrating an infiltrative pattern. Bcc  Morpheaform/Sclerosing Histology Text: Strands of basaloid cells penetrating deeply among the collagen fascicles with an increased number of fibroblasts and the presence of fibrotic desmoplastic storms. Bcc  Nodulocystic Histology Text: Islands of basaloid cells with a peripheral palisade and surrounding retraction artifact, in addition to dilated cystic spaces within some of these nodules due to necrosis or mucin formation. Bcc Pigmented Histology Text: Islands of basaloid cells with a peripheral palisade and surrounding retraction artifact with associated melanin and melanocytes within the tumor. Bcc Superficial Histology Text: Islands of basaloid cells with a peripheral palisade and surrounding retraction artifact in close contact with the papillary dermis. Bcc Superficial Pigmented Histology Text: Islands of basaloid cells with a peripheral palisade and surrounding retraction artifact in close contact with the papillary dermis and increased melanin and melanocytes within the tumor. Mixed Superficial And Nodular Bcc Histology Text: Islands of basaloid cells with a peripheral palisade and surrounding retraction artifact in close contact with the papillary dermis, as well as basaloid nodules with peripheral palisading and retraction artifact in the dermis. Mixed Nodular And Infiltrative Bcc Histology Text: Solid basaloid tumor islands with peripheral palisading and retraction artifact intermixed with strands of basaloid cells penetrating deeply among the collagen fascicles and demonstrating an infiltrative pattern. Metatypical Bcc Histology Text: Islands of basaloid cells with a peripheral palisade and surrounding retraction artifact in close contact with the papillary dermis intermixed with islands of atypical keratinocytes present in the underlying dermis. Fibroepithelioma Of Pinkus Histology Text: Pink strands of squamous epithelium with blue basaloid buds embedded in a fibromyxoid storms. Scc Histology Text: Epidermis of variable thickness with disorganization and atypia of the basal and lower spinous layer keratinocytes with areas of full thickness atypia with buds of similar atypical keratinocytes extending into the underlying dermis. Islands of similar atypical keratinocytes are present in the underlying dermis. Scc Well Differentiated Histology Text: Glassy keratinocytes with some areas of full thickness squamous atypia and increased squamous eddies and keratin pearls. Buds of similar atypical keratinocytes also extend into the underlying dermis. Scc Moderately Differentiated Histology Text: Glassy keratinocytes with more abundant areas of full thickness squamous atypia and increased squamous eddies and keratin pearls. Buds of similar atypical keratinocytes also extend into the underlying dermis. Scc Poorly Differentiated Histology Text: Some glassy keratinocytes with areas of severe full thickness squamous atypia and fewer squamous eddies and keratin pearls. Buds of similar severely atypical keratinocytes also extend into the underlying dermis. Scc Ka Subtype Histology Text: Glassy islands of atypical squamous epithelium with neutrophilic microabcesses, eosinophils and elastic trapping. Scc Spindle Histology Text: Glassy keratinocytes with areas of full thickness squamous atypia, prominent spindle cells with buds of similar atypical spindled keratinocytes  extending into the underlying dermis. Scc In Situ Histology Text: Parakeratosis overlying an acanthotic epidermis with full thickness disorganization and atypia of the keratinocytes, and marked solar elastosis of the underlying dermis. Merkel Cell Carcinoma Histology Text: Dermal sheets and nests of cells with hyperchromatic nuclei, a high mitotic rate, and scant cytoplasm. Afx Histology Text: Spindle cell neoplasm with atypical spindle cells throughout the dermis. Basosquamous Cell Carcinoma Histology Text: Epidermis of variable thickness with disorganization and atypia of the basal and lower spinous layer keratinocytes with areas of full thickness atypia with buds of similar atypical keratinocytes extending into the underlying dermis with islands of basaloid epithelium with peripheral palisading and retraction artifact. Dfsp Histology Text: Storiform spindle cell proliferation with infiltration into the subcutaneous tissue. Desmoplastic Trichoepithelioma Histology Text: Orthokeratosis overlying an atrophic epidermis with the dermis containing a dense sclerotic stroma containing small, thin, basaloid islands of cells without retraction and rare horn cysts and calcifications. Extramammary Paget's Disease Histology Text: Atypical cells throughout the epidermis, focally crushing a normal basal layer. Microcystic Adnexal Carcinoma Histology Text: Multiple basaloid strands with small lumina and horn cysts Sebaceous Carcinoma Histology Text: Nodule of undifferentiated cells with areas of necrosis, many apoptotic cells and scattered mitotic figures with several areas of the tumor demonstrating sebaceous differentiation. Mart-1 - Positive Histology Text: MART-1 staining demonstrates areas of higher density and clustering of melanocytes with Pagetoid spread upwards within the epidermis. The surgical margins are positive for tumor cells. Mart-1 - Negative Histology Text: MART-1 staining demonstrates a normal density and pattern of melanocytes along the dermal-epidermal junction. The surgical margins are negative for tumor cells. Information: Selecting Yes will display possible errors in your note based on the variables you have selected. This validation is only offered as a suggestion for you. PLEASE NOTE THAT THE VALIDATION TEXT WILL BE REMOVED WHEN YOU FINALIZE YOUR NOTE. IF YOU WANT TO FAX A PRELIMINARY NOTE YOU WILL NEED TO TOGGLE THIS TO 'NO' IF YOU DO NOT WANT IT IN YOUR FAXED NOTE. Bill 59 Modifier?: No - Continue to Bill 79 Modifier

## 2024-12-30 ENCOUNTER — TELEPHONE (OUTPATIENT)
Dept: NEPHROLOGY | Facility: CLINIC | Age: 64
End: 2024-12-30

## 2024-12-30 NOTE — TELEPHONE ENCOUNTER
A phone call was placed using an  to remind the PT about his upcoming appointments and labs. PT was not available and there is no consent to communicate on file. Someone claiming to be the PT's niece said that he should be home later.    Tone Jean CMA on 12/30/2024 at 1:36 PM

## 2024-12-31 ENCOUNTER — PATIENT OUTREACH (OUTPATIENT)
Dept: NEPHROLOGY | Facility: CLINIC | Age: 64
End: 2024-12-31
Payer: COMMERCIAL

## 2024-12-31 NOTE — PROGRESS NOTES
Received call from Najmo, niece and primary caregiver.  Wanting to confirm appt for Wilfredo.  Reviewed appt and encouraged her to find urine collection supplies for home care nurse with next visit.  Called VAISHALI Lackey CM at [facility] who is planning on seeing patient this Thursday for SNV.  Request to get UA and Protein Random labs sample and bring to any [facility] lab.  He will return call if he is unable to locate vazquez and insertion kit.  Darya verbalized understanding and has our contact for timely support.  Faxed UA/Protein Random Urine labs to [facility]: F [phone] received via [employer].    Nevin Marx RN, BSN, PHN  [facility]  [phone]

## 2025-01-02 DIAGNOSIS — G89.4 CHRONIC PAIN SYNDROME: ICD-10-CM

## 2025-01-02 RX ORDER — GABAPENTIN 100 MG/1
100 CAPSULE ORAL 3 TIMES DAILY
Qty: 360 CAPSULE | Refills: 3 | Status: SHIPPED | OUTPATIENT
Start: 2025-01-02

## 2025-01-02 NOTE — TELEPHONE ENCOUNTER
"Request for medication refill:gabapentin (NEURONTIN) 100 MG capsule     Providers if patient needs an appointment and you are willing to give a one month supply please refill for one month and  send a letter/MyChart using \".SMILLIMITEDREFILL\" .smillimited and route chart to \"P SMI \" (Giving one month refill in non controlled medications is strongly recommended before denial)    If refill has been denied, meaning absolutely no refills without visit, please complete the smart phrase \".smirxrefuse\" and route it to the \"P SMI MED REFILLS\"  pool to inform the patient and the pharmacy.    Marci Jonas RN      "

## 2025-01-07 ENCOUNTER — PATIENT OUTREACH (OUTPATIENT)
Dept: NEPHROLOGY | Facility: CLINIC | Age: 65
End: 2025-01-07
Payer: COMMERCIAL

## 2025-01-07 DIAGNOSIS — N08 CRYOGLOBULINEMIC GLOMERULONEPHRITIS: ICD-10-CM

## 2025-01-07 DIAGNOSIS — D89.1 CRYOGLOBULINEMIC GLOMERULONEPHRITIS: ICD-10-CM

## 2025-01-07 DIAGNOSIS — I69.351 HEMIPLEGIA AND HEMIPARESIS FOLLOWING CEREBRAL INFARCTION AFFECTING RIGHT DOMINANT SIDE (H): Primary | ICD-10-CM

## 2025-01-07 DIAGNOSIS — R32 URINARY INCONTINENCE, UNSPECIFIED TYPE: ICD-10-CM

## 2025-01-07 NOTE — PROGRESS NOTES
Update from [facility] reporting that patient ran out of vazquez/straight cath kits and sterile cups.   Nurse came to [facility] and picked up insertion kit w/ vazquez and sterile cup and requested added supplies ordered.  Prepped [facility] supplies order and forwarded to Dr. Harrison to review and document at next clinic appt this week.  Nevin Marx RN, BSN, PHN   Care Coordinator  [phone]

## 2025-01-09 ENCOUNTER — OFFICE VISIT (OUTPATIENT)
Dept: NEPHROLOGY | Facility: CLINIC | Age: 65
End: 2025-01-09
Attending: INTERNAL MEDICINE
Payer: COMMERCIAL

## 2025-01-09 ENCOUNTER — LAB (OUTPATIENT)
Dept: LAB | Facility: CLINIC | Age: 65
End: 2025-01-09
Attending: INTERNAL MEDICINE
Payer: COMMERCIAL

## 2025-01-09 VITALS
HEIGHT: 71 IN | WEIGHT: 140 LBS | HEART RATE: 67 BPM | SYSTOLIC BLOOD PRESSURE: 130 MMHG | DIASTOLIC BLOOD PRESSURE: 84 MMHG | TEMPERATURE: 98 F | OXYGEN SATURATION: 99 % | BODY MASS INDEX: 19.6 KG/M2

## 2025-01-09 DIAGNOSIS — N18.32 STAGE 3B CHRONIC KIDNEY DISEASE (H): ICD-10-CM

## 2025-01-09 DIAGNOSIS — N18.31 STAGE 3A CHRONIC KIDNEY DISEASE (H): ICD-10-CM

## 2025-01-09 DIAGNOSIS — N08 CRYOGLOBULINEMIC GLOMERULONEPHRITIS: Primary | ICD-10-CM

## 2025-01-09 DIAGNOSIS — D89.1 CRYOGLOBULINEMIC GLOMERULONEPHRITIS: ICD-10-CM

## 2025-01-09 DIAGNOSIS — N08 CRYOGLOBULINEMIC GLOMERULONEPHRITIS: ICD-10-CM

## 2025-01-09 DIAGNOSIS — I10 ESSENTIAL HYPERTENSION, MALIGNANT: ICD-10-CM

## 2025-01-09 DIAGNOSIS — D89.1 CRYOGLOBULINEMIC GLOMERULONEPHRITIS: Primary | ICD-10-CM

## 2025-01-09 DIAGNOSIS — B18.1 CHRONIC VIRAL HEPATITIS B WITHOUT DELTA AGENT AND WITHOUT COMA (H): ICD-10-CM

## 2025-01-09 LAB
ALBUMIN SERPL BCG-MCNC: 3.6 G/DL (ref 3.5–5.2)
ANION GAP SERPL CALCULATED.3IONS-SCNC: 9 MMOL/L (ref 7–15)
BUN SERPL-MCNC: 23.5 MG/DL (ref 8–23)
CALCIUM SERPL-MCNC: 9.3 MG/DL (ref 8.8–10.4)
CD19 B CELL COMMENT: ABNORMAL
CD19 CELLS # BLD: 1 CELLS/UL (ref 107–698)
CD19 CELLS NFR BLD: <1 % (ref 6–27)
CHLORIDE SERPL-SCNC: 107 MMOL/L (ref 98–107)
CREAT SERPL-MCNC: 2.12 MG/DL (ref 0.67–1.17)
EGFRCR SERPLBLD CKD-EPI 2021: 34 ML/MIN/1.73M2
ERYTHROCYTE [DISTWIDTH] IN BLOOD BY AUTOMATED COUNT: 12.6 % (ref 10–15)
GLUCOSE SERPL-MCNC: 125 MG/DL (ref 70–99)
HCO3 SERPL-SCNC: 24 MMOL/L (ref 22–29)
HCT VFR BLD AUTO: 42.5 % (ref 40–53)
HGB BLD-MCNC: 14.1 G/DL (ref 13.3–17.7)
MCH RBC QN AUTO: 30.9 PG (ref 26.5–33)
MCHC RBC AUTO-ENTMCNC: 33.2 G/DL (ref 31.5–36.5)
MCV RBC AUTO: 93 FL (ref 78–100)
PHOSPHATE SERPL-MCNC: 2.6 MG/DL (ref 2.5–4.5)
PLATELET # BLD AUTO: 119 10E3/UL (ref 150–450)
POTASSIUM SERPL-SCNC: 4.4 MMOL/L (ref 3.4–5.3)
PTH-INTACT SERPL-MCNC: 105 PG/ML (ref 15–65)
RBC # BLD AUTO: 4.57 10E6/UL (ref 4.4–5.9)
RHEUMATOID FACT SERPL-ACNC: 33 IU/ML
SODIUM SERPL-SCNC: 140 MMOL/L (ref 135–145)
VIT D+METAB SERPL-MCNC: 30 NG/ML (ref 20–50)
WBC # BLD AUTO: 4.1 10E3/UL (ref 4–11)

## 2025-01-09 PROCEDURE — 86160 COMPLEMENT ANTIGEN: CPT | Performed by: INTERNAL MEDICINE

## 2025-01-09 PROCEDURE — 82595 ASSAY OF CRYOGLOBULIN: CPT | Performed by: INTERNAL MEDICINE

## 2025-01-09 PROCEDURE — 86355 B CELLS TOTAL COUNT: CPT | Performed by: INTERNAL MEDICINE

## 2025-01-09 PROCEDURE — 87517 HEPATITIS B DNA QUANT: CPT | Performed by: INTERNAL MEDICINE

## 2025-01-09 PROCEDURE — G0463 HOSPITAL OUTPT CLINIC VISIT: HCPCS | Performed by: INTERNAL MEDICINE

## 2025-01-09 PROCEDURE — 82306 VITAMIN D 25 HYDROXY: CPT | Performed by: INTERNAL MEDICINE

## 2025-01-09 PROCEDURE — 99000 SPECIMEN HANDLING OFFICE-LAB: CPT | Performed by: PATHOLOGY

## 2025-01-09 PROCEDURE — 86225 DNA ANTIBODY NATIVE: CPT | Performed by: INTERNAL MEDICINE

## 2025-01-09 PROCEDURE — 36415 COLL VENOUS BLD VENIPUNCTURE: CPT | Performed by: PATHOLOGY

## 2025-01-09 PROCEDURE — 85027 COMPLETE CBC AUTOMATED: CPT | Performed by: PATHOLOGY

## 2025-01-09 PROCEDURE — 86431 RHEUMATOID FACTOR QUANT: CPT | Performed by: INTERNAL MEDICINE

## 2025-01-09 PROCEDURE — 80069 RENAL FUNCTION PANEL: CPT | Performed by: PATHOLOGY

## 2025-01-09 PROCEDURE — 83970 ASSAY OF PARATHORMONE: CPT | Performed by: PATHOLOGY

## 2025-01-09 RX ORDER — FUROSEMIDE 20 MG/1
10 TABLET ORAL DAILY
Qty: 45 TABLET | Refills: 3 | Status: SHIPPED | OUTPATIENT
Start: 2025-01-09

## 2025-01-09 RX ORDER — ENTECAVIR 1 MG/1
TABLET, FILM COATED ORAL
Qty: 30 TABLET | Refills: 0 | Status: SHIPPED | OUTPATIENT
Start: 2025-01-09

## 2025-01-09 RX ORDER — CARVEDILOL 12.5 MG/1
12.5 TABLET ORAL 2 TIMES DAILY WITH MEALS
Qty: 180 TABLET | Refills: 3 | Status: SHIPPED | OUTPATIENT
Start: 2025-01-09

## 2025-01-09 RX ORDER — AMLODIPINE BESYLATE 10 MG/1
10 TABLET ORAL DAILY
Qty: 90 TABLET | Refills: 3 | Status: SHIPPED | OUTPATIENT
Start: 2025-01-09

## 2025-01-09 RX ORDER — LOSARTAN POTASSIUM 25 MG/1
25 TABLET ORAL DAILY
Qty: 90 TABLET | Refills: 3 | Status: SHIPPED | OUTPATIENT
Start: 2025-01-09

## 2025-01-09 ASSESSMENT — PAIN SCALES - GENERAL: PAINLEVEL_OUTOF10: NO PAIN (0)

## 2025-01-09 NOTE — PROGRESS NOTES
Today, he has been doing good. No infection. Home blood pressure are good. He has some swelling if on his feet for a long time. UA from yesterday was >24 hrs hence the sample was discarded. BP today is 130/84. He has not taken lasix for 2 weeks now.     Labs today showed Cr 2.12  Hb 14.1, WBC 4.1 and plt 119.  and vit D pending.     Swelling

## 2025-01-09 NOTE — NURSING NOTE
"Chief Complaint   Patient presents with    RECHECK     Follow Up     /84   Pulse 67   Temp 98  F (36.7  C) (Oral)   Ht 1.803 m (5' 11\")   Wt 63.5 kg (140 lb)   SpO2 99%   BMI 19.53 kg/m    Gina Dukes on 1/9/2025 at 1:06 PM    "

## 2025-01-09 NOTE — PATIENT INSTRUCTIONS
Continue same medications from a kidney stands point  Will need to follow-up with liver doctor about hepatits B and cirrhosis  Refill Entecavir only 1 refill but the next refill has to be Dr. Hurley who prescribed initially to refill that.    Call Dr Hurley's office to schedule a follow up: [phone]  See you in 3 months with labs  Questions on medications call Nevin Marx [phone]

## 2025-01-09 NOTE — LETTER
1/9/2025       RE: Wilfredo Yoon  [address]  [facility] [address]     Dear Colleague,    Thank you for referring your patient, Wilfredo Yoon, to the [facility] [place] at [facility]. Please see a copy of my visit note below.      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     The patient was previously seen by  back in 10/17.  Per his note, the patient was initially evaluated for edema and nephrotic range proteinuria.  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  Upon diagnosis, he has preserved kidney function with Cr of 0.8 although UPCR of  3.5g/g.     At that time, he was treated conservatively with Bumex and lisinopril. His HBV has been treated with Entecavir. Since then has has lost to followed-up with Neph.       In June 2020 he suffered a stroke and continues to have right sided weakness, In July 2020, he has mild ANDRY with creatinine increased to 1.6 but it then came down to 1.1-1.3.  Creatinine increased to 1.46 on 9/30/22, 1.72 on 1/13/2023 and now 2.08 on 4/23.  His serum albumin has been progressively low from 3.3 in 2015 down to 1.5 to 2.0 but then somewhat improved. Serum albumin in April 23 was 2.6.  His UA always show blood and protein. Last UPCR was done in 7/31/18 was 4.80. He has trace cryo in the blood. He had positive IgM, Kappa cryo but then in 2020, he has positive for polyclonal Cryo: A, G, M, K and L. No monoclonal protein  via immunofixation in serum or urine.  [place] free light chain was 12.7 and lambda free light chain was 6.79 with ratio of 1.87 in July 2020. M spike was negative.  MS-3 and MPO were negative.  He has low C3 in the past and normal C4.  SPEP showed marked hypoalbuminemia and decreased beta globulin without monoclonal protein.     He has been followed by Dr. Osborne in hepatology clinic, last seen in April 2023.  He was noted to have a small liver lesion, ultrasound with some ascites and pleural effusion.  Noted blood pressure 137/94.  He has kidney in 1/23 which showed normal-sized right kidney without hydronephrosis. There is small amount of ascites and pleural effusion.     6/15/23: Seen for consultation. He arrives with his niece and she assists with translating.  He is noted to be in a wheelchair partially because of his residual right-sided weakness after CVA.  Reports that he is able to transition from room to room on the same floor with a walker.  However, he is unable to go upstairs and requires assistance moving long distances.  He uses a wheelchair when going outside the home.     We reviewed that his creatinine is increased over the past 9 months.  There have been no specific illnesses, hospital presentations or events that they can recall that may have led to kidney injury. After seeing hepatology in April, he was started on Lasix 20 mg and spironolactone for edema management.  His niece reports that his swelling resolved dramatically and now there is only trace swelling present.  He is off of the diuretics for the past few weeks.  Additionally, he continues on his antiretroviral for hepatitis B.  Most recently viral load was undetectable.      In reviewing his appetite and p.o. intake, he often does not eat a lot.  Although there are some times where he has better appetite.  States that his p.o. fluid intake is reasonable.  His urine is reported to be normal and seems to be going to the bathroom several  times a day.  No noted hematuria or color/quality.  Plan: Serological work-up, resume lasix 10 mg daily.   8/24/23: He missed his appt.  10/5/23: He is here with his niece today.  Today, he feels fine.  Patient still complaining of incontinence.  So he cannot provide a urine sample for us.  The patient has not done labs yet either.  His swelling has improved after we resume Lasix 10 mg/day.  His blood pressure still limited at 147/89.  The patient denies any joint pain fevers or rashes.  He has no oral ulcer.  Started losartan 25 mg per day.   Labs on 8/29/23 showed Cr 2.08 with eGFR 35, BUN 33.6, Bicarb 21. Albumin 2.9. UA showed WBC >182, RBC 6, hyaline cast 10, UPCR 5.56. Serological work-up showed positive cryo IgG, IgM and kappa and lambda. Cryo trace. C3 116 and C4 33. Kappa 15.24, lambda 7.96 and K/L ratio 1.91. PTH 81. DS DNA 61. ANCA negative. UA showed WBC >182, RBC 6, UPCR 5.56 g/g.   11/30/23: In the interim, he underwent a kidney Bx on 11/7/23 (read by Dr. Montenegro). 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 Advanced chronic changes of the parenchyma, including: global glomerulosclerosis (78% of the glomeruli), tubular atrophy and interstitial fibrosis (70-80% of the cortex), severe arterial and arteriolar sclerosis. This finding may be found in cryo GN even though there was no substructure presented in the EM. The patient also has positive Cryo  IgM, IgG and kappa. The polyclonal IgG and monoclonal IgG kappa suggested type 2 Cryo. Today,  He is doing good. Biopsy went well. Swelling better but he just hit something on his Rt leg and got swelled up again. Appetite  is not so good. Discussed at length about diagnosis and treatment. Discussed risk of HBV flare with Rx.    3/18/24: In the interim, he received  mg/m2 nx4 (1st dose on 12/22/23 and last on 1/19/24).  BP has been improving.  Appetite is good now. He has no leg swelling. He has pain or burning sensation when he urinates.  He has gained some weight but we do not have a weight check today.  Home blood pressure has been excellent without any evidence of hypotension.  Current medication: Amlodipine 5 (previously 10), Coreg 12.5 mg BID, losartan 25 mg daily., furosemide 20 mg 0.5 tab daily, spironolactone 50 mg once a day.   Labs today show sodium 135, potassium 4.0, carbon dioxide 18, BUN 54.2, creatinine 2.37, GFR 30.  Phosphorus 3.3, albumin 3.5.  LFTs normal.  CBC show white blood cell 5.4, platelet 128 and hemoglobin 14.3. , vit D 20.     6/6/2024: In the interim, his Cryo was negative on 3/18/24. CD19 was < 1 on 3/18/24. RF improved to 51 from 86 in 12/13/24 (pre-treatment). DS DNA showed improvement.  He has been feeling better and has gained some weight and energy.  His weight is 118 pounds from 111 pounds in 12/2023.  He has no lower extremity edema.   BP is high at 164/79 mmHg but at home his blood pressure is 109 over 70s.  He has no problem with urination.. He has been coughing but he reports that it has been normal for him and he denies any short of breath.  His appetite is good. His energy is also good.  Labs today showed creatinine 2.11, GFR 35, BUN 36.9, potassium 4.4, Bicarb , 22.  Calcium 9.3, phosphorus 3.4, albumin 3.4. CRP 21.10. Pending Cryo, RF, DS DNA and complement.     9/5/24: In the interim, the patient did not get rituximab scheduled.   Today, he feels ok except that he feels a bit for the past week. He has not been eating well. He does not throw up or having diarrhea. No pain. He has been coughing for the past 2 weeks. Cough is productive with clear color. He has no fever or  chills. He does not feel dizzy when stands up. He has no leg swelling.   BP today is 101/69 mmHg.  He has no problem with urination.   UPCR has improved down to 1.51 g/g on 7/2/2024 and down to 0.66 g/g on 9//24.  UA showed moderate blood, large leukocyte esterase white blood cell more than 182 and RBC 48.  [facility] from [facility].    Labs drawn some of the lab but most important labs such as renal panel and CBC they did not do it.    Update: Later on labs came back potassium 5.5, bicarb 16, BUN 56.1, creatinine 2.73 and GFR 25.  Albumin 3.6, phosphorus 3.6.  CRP 6.5, rheumatoid factor 42, ESR 30.  White blood cell 5, hemoglobin 12.9 and platelet 131. CD 16 6 cells. UPCR 0.66.    1/9/2025: Last visit, is Cr was 2.73 and UA showed UTI so we treated him with Ab. Cr improved to 2.01 but DS DNA worsens so I redose RTX 1 g om 10/17/24.    Today, he has been doing good. No recent infection. Home blood pressure are good. He has some swelling if on his feet for a long time. UA from yesterday was >24 hrs hence the sample was discarded. BP today is 130/84. He has not taken lasix for 2 weeks now.  He also ran out of Entecavir and has not seen GI since April 2023.   Labs today showed Cr 2.12, potassium 4.4, bicarb 24, BUN 23.5, GFR 34.  Calcium 9.3, phosphorus 2.6, albumin 3.6.  CBC showed white blood cell 4.1, hemoglobin 14.1 and platelet 119. Hb 14.1, WBC 4.1 and plt 119.  and vit D pending. Need to collect UA.    Past medical history  Past Medical History:   Diagnosis Date     Cirrhosis (H)      Cryoglobulinemia      CVA (cerebral vascular accident) (H) 07/16/2020    Supratentorial likely d/t hypertensive emergency leading to right hemiparesis, dysphagia and aphasia     Glomerulonephritis      Hepatitis B      Hypertension      Latent tuberculosis     Treatment [id number]     MPGN (membranoproliferative glomerulonephritides)      Positive QuantiFERON-TB Gold test      PRES (posterior reversible encephalopathy  syndrome) 07/16/2020    In brainstem d/t hypertensive emergency; suffered supratentorial CVAs same date       Past surgical history  Past Surgical History:   Procedure Laterality Date     ANESTHESIA OUT OF OR MRI N/A 7/18/2020    Procedure: ANESTHESIA OUT OF OR MRI;  Surgeon: GENERIC ANESTHESIA PROVIDER;  Location: [facility]     ESOPHAGOSCOPY, GASTROSCOPY, DUODENOSCOPY (EGD), COMBINED N/A 10/18/2018    Procedure: EGD;  Surgeon: Eber Ortez MD;  Location: [facility] GI     HAND SURGERY Right      IR PARACENTESIS  7/27/2020     IR RENAL BIOPSY RIGHT  11/7/2023     [facility] HAND/FINGER SURGERY UNLISTED       Review of Systems:   14 systems were reviewed and all negative except as mentioned above.   Current Medications:  Current Outpatient Medications   Medication Sig Dispense Refill     amLODIPine (NORVASC) 10 MG tablet Take 1 tablet (10 mg) by mouth daily. Indication: HTN 90 tablet 3     carvedilol (COREG) 12.5 MG tablet Take 1 tablet (12.5 mg) by mouth 2 times daily (with meals). 180 tablet 3     entecavir (BARACLUDE) 1 MG tablet Take 1 tablet (1 mg) every other day 30 tablet 0     furosemide (LASIX) 20 MG tablet Take 0.5 tablets (10 mg) by mouth daily. 45 tablet 3     losartan (COZAAR) 25 MG tablet Take 1 tablet (25 mg) by mouth daily. 90 tablet 3     aspirin (ASA) 81 MG chewable tablet TAKE 1 TABLET (81 MG) BY MOUTH DAILY INDICATION: STROKE 90 tablet 3     atorvastatin (LIPITOR) 40 MG tablet Take 1 tablet (40 mg) by mouth daily 90 tablet 3     ciprofloxacin (CIPRO) 250 MG tablet Take 1 tablet (250 mg) by mouth daily. 7 tablet 0     gabapentin (NEURONTIN) 100 MG capsule TAKE 1 CAPSULE (100 MG) BY MOUTH 3 TIMES DAILY 360 capsule 3     Multiple Vitamin (MULTI VITAMIN DAILY) TABS TAKE 1 TABLET BY MOUTH DAILY 90 tablet 3     polyethylene glycol (MIRALAX) 17 GM/Dose powder Take 17 g by mouth daily as needed for constipation Hold for loose stools/diarrhea 507 g 2     STOOL SOFTENER 100 MG capsule TAKE 1 CAPSULE (100 MG) BY  "MOUTH 2 TIMES DAILY AS NEEDED FOR CONSTIPATION 180 capsule 4     vitamin D3 (CHOLECALCIFEROL) 50 mcg (2000 units) tablet 1 TABLET ORALLY ONCE A DAY 30 DAYS       No current facility-administered medications for this visit.       Physical Exam:   /84   Pulse 67   Temp 98  F (36.7  C) (Oral)   Ht 1.803 m (5' 11\")   Wt 63.5 kg (140 lb)   SpO2 99%   BMI 19.53 kg/m     Body mass index is 19.53 kg/m .    GENERAL APPEARANCE: Alert, not in acute distress, pleasant,  thin built, on WC.   EYES:  Not pale conjunctiva, pupils equal  HENT: Mouth without ulcers or lesions  PULM: lungs clear to auscultation bilaterally, equal air movement, no clubbing  CV: regular rhythm, normal rate, no rub     -JVD no distended.      -edema: + trace to 1 + edema  GI: soft,  - tender, no distended, bowel sounds are present  INTEGUMENT: No rash  NEURO:  Non focal. No asterixis.     Labs:   All labs reviewed by me  Last Renal Panel:  Sodium   Date Value Ref Range Status   01/09/2025 140 135 - 145 mmol/L Final   06/30/2021 140 133 - 144 mmol/L Final     Potassium   Date Value Ref Range Status   01/09/2025 4.4 3.4 - 5.3 mmol/L Final   01/19/2022 3.6 3.4 - 5.3 mmol/L Final   06/30/2021 4.2 3.4 - 5.3 mmol/L Final     Chloride   Date Value Ref Range Status   01/09/2025 107 98 - 107 mmol/L Final   01/19/2022 107 94 - 109 mmol/L Final   06/30/2021 106 94 - 109 mmol/L Final     Carbon Dioxide   Date Value Ref Range Status   06/30/2021 25 20 - 32 mmol/L Final     Carbon Dioxide (CO2)   Date Value Ref Range Status   01/09/2025 24 22 - 29 mmol/L Final   01/19/2022 27 20 - 32 mmol/L Final     Anion Gap   Date Value Ref Range Status   01/09/2025 9 7 - 15 mmol/L Final   01/19/2022 10 3 - 14 mmol/L Final   06/30/2021 9 3 - 14 mmol/L Final     Glucose   Date Value Ref Range Status   01/09/2025 125 (H) 70 - 99 mg/dL Final   01/19/2022 78 70 - 99 mg/dL Final   06/30/2021 96 70 - 99 mg/dL Final     Urea Nitrogen   Date Value Ref Range Status   01/09/2025 " 23.5 (H) 8.0 - 23.0 mg/dL Final   01/19/2022 19 7 - 30 mg/dL Final   06/30/2021 24 7 - 30 mg/dL Final     Creatinine   Date Value Ref Range Status   01/09/2025 2.12 (H) 0.67 - 1.17 mg/dL Final   06/30/2021 1.27 (H) 0.66 - 1.25 mg/dL Final     GFR Estimate   Date Value Ref Range Status   01/09/2025 34 (L) >60 mL/min/1.73m2 Final     Comment:     eGFR calculated using 2021 CKD-EPI equation.   06/30/2021 61 >60 mL/min/[1.73_m2] Final     Comment:     Non  GFR Calc  Starting 12/18/2018, serum creatinine based estimated GFR (eGFR) will be   calculated using the Chronic Kidney Disease Epidemiology Collaboration   (CKD-EPI) equation.       Calcium   Date Value Ref Range Status   01/09/2025 9.3 8.8 - 10.4 mg/dL Final     Comment:     Reference intervals for this test were updated on 7/16/2024 to reflect our healthy population more accurately. There may be differences in the flagging of prior results with similar values performed with this method. Those prior results can be interpreted in the context of the updated reference intervals.   06/30/2021 9.0 8.5 - 10.1 mg/dL Final     Phosphorus   Date Value Ref Range Status   01/09/2025 2.6 2.5 - 4.5 mg/dL Final   10/10/2017 3.0 2.5 - 4.5 mg/dL Final     Albumin   Date Value Ref Range Status   01/09/2025 3.6 3.5 - 5.2 g/dL Final   01/19/2022 2.5 (L) 3.4 - 5.0 g/dL Final   06/30/2021 3.0 (L) 3.4 - 5.0 g/dL Final     Imaging:  I reviewed imaging studies.     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  It does appear that the most recent trend is reflecting a progressive elevation in creatinine since 9/30/2022.  There is some concern that this might be related to his liver disease but there is limited lower extremity edema and palpable ascites on exam.  His MELD score is relatively low to observe type II HRS physiology.  In regard to his prior cryoglobulinemic GN, it would be odd for this to come out of quiescence well his hepatitis B has been appropriately treated. I repeat serological work-up and showed that he has trace cryo IgG, IgM and kappa but normal complement: C3 116 and C4 33. Otherwise negative monoclonal protein; K/L ratio of 1.91. PLA2R negative, ANCA negative  but DS DNA positive at 61 U/ml. HBV VL <20 on 4/4/23. It was unclear why he has progressive kidney disease despite HBV controlled. He is also noted to have nephrotic range proteinuria.  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 Severe arterial sclerosis with extensive ultrastructural signs of endothelial injury and glomerular capillary loop remodeling, suggesting a superimposed chronic and acute thrombotic angiopathy. Advanced chronic changes of the parenchyma, including: global glomerulosclerosis (78% of the glomeruli), tubular  atrophy and interstitial fibrosis (70-80% of the cortex), severe arterial and arteriolar sclerosis. This findings are mostly consistent with cryo GN. This is quite interesting since his HBV has been under control. So it is possible that prior previous HBV could stimulate his immune reaction resulted in cryoglobulin.  We decided to initiate rituximab 375 mg/m  x 4 doses and 1st dose on  12/22/23 and last dose on 1/19/24.  His creatinine continue to improved and now down to 2.11 with a EGFR 35.  We have not had any UA since October 2023 as he has difficult time providing UA hence we needs to repeat UA.  His DS DNA, rheumatoid factors and cryoglobulin level has been improving based on the lab in March 2024 and June 2024. In September 2024, proteinuria has improved to 0.66 g/g with improvement in serum albumin. However, he had ANDRY with Cr 2.73 likely from APN and poor po intake. Repeat labs showed improve in Cr down to 2.01 in 10/2024. DS DNA and RF adenike in 9/2024 to 61 and 42 respectively I gave him another dose of rituximab 1 g on 10/17/2024. He has been feeling well today and no neuropathy or rashes.  Creatinine is stable at 2.12 but we do not have UA to compare today.  He does have lower extremity edema but he out of Lasix for 2 weeks now.  I am waiting for Cryo, RF, IgG, CD19 and DS DNA to decide for the next dose of RTX.     - Continue lasix 10mg daily for volume management; reorder for him  - Continue losartan 25 mg per day  - Prevously on spironolactone but stopped since 9/2024 for ANDRY  - S/p rituximab 375 mg/m  first dose on 12/22/2023, 1/19/2024; hold off another and 1000 mg o 10/17/24  # Metabolic acidosis; resolved  Likely from ANDRY on CKD in September 2024 but now resolved.   # HBV cirrhosis  # HBV infection on entecavir  Follows with hepatology, last viral quant undetectable on 4/4/23. MELD 16. MR Abdomen showing cirrhosis with evidence of portal hypertension. Reports no encephalopathy or concern for  hematemesis. Currently on Entevavir, discuss risk of HBV flare while on RTX but he is on Entecavir now. HBV VL in 6/2024, 9/9/2024 have been undetectable.    -Currently liver function test is normal. Check VL next time  - Referral to hepatology, last visit in April 2023  - Refill Entecavir for 1 refill, he needs to be seen by GI   # Hypertension; controlled  # Volume overload; improved  Stopped losartan in 9/2024 due to ANDRY and marginal BP and hyperK.   - Continue Amlodipine 5 mg daily, Carvedilol 12.5 mg BID, Lasix 10mg daily and losartan at 25 mg per day   # Malnutrition  # Hx of stroke with Rt sided hemiparesis  Mostly wheelchair bound for transportation. Weight generally appears to be stable although he appears to be quite weak. There is concern that his creatinine is a poor estimate of his true kidney function. Cystatin C 2.8 with eGFR 20 and Cr 1.98 with eGFR by Cr 37 on 6/15/23.   # BMD  # Secondary hyperparathyroidism  2/23/24: PTH is 124, vit D 20 . Ca/Phos normal. Will continue to monitor next visti.   1/9/2025: , vit D pending.     Follow-up 3 months with labs     The longitudinal plan of care for CKD stage 3B, nephrotic syndrome, chronic Hep B, Cryo GN was addressed during this visit. Due to the added complexity in care, I will continue to support Wilfredo Yoon in the subsequent management of this condition(s) and with the ongoing continuity of care of this condition(s).     I spent  40 minutes on the date of the encounter doing chart review, history and exam, documentation and further activities as noted above. 30 minutes of this visit is dedicated to direct patient interaction via face-to-face.    Sue Harrison MD on 01/09/2025            Again, thank you for allowing me to participate in the care of your patient.      Sincerely,    Sue Harrison MD

## 2025-01-10 LAB
C3 SERPL-MCNC: 128 MG/DL (ref 81–157)
C4 SERPL-MCNC: 36 MG/DL (ref 13–39)
HBV DNA SERPL NAA+PROBE-ACNC: NOT DETECTED IU/ML

## 2025-01-11 LAB — DSDNA AB SER-ACNC: 38 IU/ML

## 2025-01-13 ENCOUNTER — TELEPHONE (OUTPATIENT)
Dept: GASTROENTEROLOGY | Facility: CLINIC | Age: 65
End: 2025-01-13
Payer: COMMERCIAL

## 2025-01-13 NOTE — TELEPHONE ENCOUNTER
Patient confirmed scheduled appointment:     Date: 7/2/25  Time: 1:00 pm  Appointment Type: Return Liver  Provider: Dr. Marlyn Hurley  Location: [place]  Testing/imaging: Labs  Additional Notes:

## 2025-01-14 ENCOUNTER — PATIENT OUTREACH (OUTPATIENT)
Dept: NEPHROLOGY | Facility: CLINIC | Age: 65
End: 2025-01-14
Payer: COMMERCIAL

## 2025-01-14 NOTE — PROGRESS NOTES
Voicemail left for niece, Elianamo, primary caregiver to support getting urine sample to clinic to run needed labs to check protein loss from kidneys.  Instructed left of orders placed and need to collect clean catch and drop a local [facility] lab.  Instructed to call if additional support is needed.  Nevin Marx RN, BSN, PHN   Care Coordinator  [phone]

## 2025-01-15 LAB — CRYOGLOB SER QL: ABNORMAL

## 2025-01-21 ENCOUNTER — PATIENT OUTREACH (OUTPATIENT)
Dept: NEPHROLOGY | Facility: CLINIC | Age: 65
End: 2025-01-21
Payer: COMMERCIAL

## 2025-01-21 NOTE — PROGRESS NOTES
Voicemail left for [facility] nurse to see if he can get urine lab sample this week with his SNV so we can run patient labs and determine next steps of treatment regimen.  Plan: follow up on results or ongoing delays  Nevin Marx RN, BSN, PHN   Care Coordinator  [phone]

## 2025-01-27 ENCOUNTER — PATIENT OUTREACH (OUTPATIENT)
Dept: NEPHROLOGY | Facility: CLINIC | Age: 65
End: 2025-01-27
Payer: COMMERCIAL

## 2025-01-27 NOTE — PROGRESS NOTES
Update from Dr. Harrison:  Please schedule Rituximab 1000 mg another dose in early April 2024.     Voicemail left for Najmo, primary caregiver/coordinator for patient on timing of Ritux and also to get urine sample asap.    Nevin Marx RN, BSN, PHN   Care Coordinator  [phone]

## 2025-01-31 DIAGNOSIS — I69.30 HISTORY OF CVA WITH RESIDUAL DEFICIT: ICD-10-CM

## 2025-02-03 ENCOUNTER — PATIENT OUTREACH (OUTPATIENT)
Dept: NEPHROLOGY | Facility: CLINIC | Age: 65
End: 2025-02-03
Payer: COMMERCIAL

## 2025-02-03 RX ORDER — ASPIRIN 81 MG/1
81 TABLET, CHEWABLE ORAL DAILY
Qty: 90 TABLET | Refills: 3 | Status: SHIPPED | OUTPATIENT
Start: 2025-02-03

## 2025-02-03 NOTE — PROGRESS NOTES
Najmo, primary caregiver and niece called, reporting difficulty of getting urine sample.  Called lab and confirmed 24hrs sample needs to come in before then and refridgerated until brought in.  Updated Najmo, family who will work on getting sample in this week.  Updated Lab appt for follow up in April, alerting lab to take sample to process if family brings in.  Najmo confirmed she brought the last lab sample in within the 24hr window and lab would not take it.  Plan: follow up with results.  Nevin Marx RN, BSN, PHN  [facility] Nephrology Nurse Navigator  [phone]

## 2025-02-03 NOTE — TELEPHONE ENCOUNTER
"Request for medication refill:    aspirin (ASA) 81 MG chewable tablet     Providers if patient needs an appointment and you are willing to give a one month supply please refill for one month and  send a letter/MyChart using \".SMILLIMITEDREFILL\" .smillimited and route chart to \"P SMI \" (Giving one month refill in non controlled medications is strongly recommended before denial)    If refill has been denied, meaning absolutely no refills without visit, please complete the smart phrase \".smirxrefuse\" and route it to the \"P SMI MED REFILLS\"  pool to inform the patient and the pharmacy.    Randolph Jones MA     "

## 2025-02-27 ENCOUNTER — INFUSION THERAPY VISIT (OUTPATIENT)
Dept: INFUSION THERAPY | Facility: CLINIC | Age: 65
End: 2025-02-27
Attending: INTERNAL MEDICINE
Payer: COMMERCIAL

## 2025-02-27 ENCOUNTER — PATIENT OUTREACH (OUTPATIENT)
Dept: NEPHROLOGY | Facility: CLINIC | Age: 65
End: 2025-02-27
Payer: COMMERCIAL

## 2025-02-27 VITALS — SYSTOLIC BLOOD PRESSURE: 153 MMHG | DIASTOLIC BLOOD PRESSURE: 101 MMHG | HEART RATE: 70 BPM

## 2025-02-27 DIAGNOSIS — D89.1 CRYOGLOBULINEMIC GLOMERULONEPHRITIS: ICD-10-CM

## 2025-02-27 DIAGNOSIS — N05.0 UNSPECIFIED NEPHRITIC SYNDROME WITH MINOR GLOMERULAR ABNORMALITY: ICD-10-CM

## 2025-02-27 DIAGNOSIS — R80.9 NEPHROTIC RANGE PROTEINURIA: Primary | ICD-10-CM

## 2025-02-27 DIAGNOSIS — N08 CRYOGLOBULINEMIC GLOMERULONEPHRITIS: ICD-10-CM

## 2025-02-27 LAB
ALBUMIN SERPL BCG-MCNC: 3.4 G/DL (ref 3.5–5.2)
ANION GAP SERPL CALCULATED.3IONS-SCNC: 12 MMOL/L (ref 7–15)
BASOPHILS # BLD AUTO: 0 10E3/UL (ref 0–0.2)
BASOPHILS NFR BLD AUTO: 1 %
BUN SERPL-MCNC: 33.2 MG/DL (ref 8–23)
CALCIUM SERPL-MCNC: 9 MG/DL (ref 8.8–10.4)
CHLORIDE SERPL-SCNC: 104 MMOL/L (ref 98–107)
CREAT SERPL-MCNC: 2.08 MG/DL (ref 0.67–1.17)
EGFRCR SERPLBLD CKD-EPI 2021: 35 ML/MIN/1.73M2
EOSINOPHIL # BLD AUTO: 0.3 10E3/UL (ref 0–0.7)
EOSINOPHIL NFR BLD AUTO: 7 %
ERYTHROCYTE [DISTWIDTH] IN BLOOD BY AUTOMATED COUNT: 13 % (ref 10–15)
GLUCOSE SERPL-MCNC: 95 MG/DL (ref 70–99)
HCO3 SERPL-SCNC: 21 MMOL/L (ref 22–29)
HCT VFR BLD AUTO: 43.3 % (ref 40–53)
HGB BLD-MCNC: 14.5 G/DL (ref 13.3–17.7)
IMM GRANULOCYTES # BLD: 0 10E3/UL
IMM GRANULOCYTES NFR BLD: 0 %
LYMPHOCYTES # BLD AUTO: 1.4 10E3/UL (ref 0.8–5.3)
LYMPHOCYTES NFR BLD AUTO: 27 %
MCH RBC QN AUTO: 30.8 PG (ref 26.5–33)
MCHC RBC AUTO-ENTMCNC: 33.5 G/DL (ref 31.5–36.5)
MCV RBC AUTO: 92 FL (ref 78–100)
MONOCYTES # BLD AUTO: 0.7 10E3/UL (ref 0–1.3)
MONOCYTES NFR BLD AUTO: 14 %
NEUTROPHILS # BLD AUTO: 2.5 10E3/UL (ref 1.6–8.3)
NEUTROPHILS NFR BLD AUTO: 51 %
NRBC # BLD AUTO: 0 10E3/UL
NRBC BLD AUTO-RTO: 0 /100
PHOSPHATE SERPL-MCNC: 2.9 MG/DL (ref 2.5–4.5)
PLATELET # BLD AUTO: 107 10E3/UL (ref 150–450)
POTASSIUM SERPL-SCNC: 4.1 MMOL/L (ref 3.4–5.3)
RBC # BLD AUTO: 4.71 10E6/UL (ref 4.4–5.9)
SODIUM SERPL-SCNC: 137 MMOL/L (ref 135–145)
WBC # BLD AUTO: 5 10E3/UL (ref 4–11)

## 2025-02-27 PROCEDURE — 36415 COLL VENOUS BLD VENIPUNCTURE: CPT | Performed by: INTERNAL MEDICINE

## 2025-02-27 PROCEDURE — 258N000003 HC RX IP 258 OP 636: Performed by: INTERNAL MEDICINE

## 2025-02-27 PROCEDURE — 250N000011 HC RX IP 250 OP 636: Performed by: INTERNAL MEDICINE

## 2025-02-27 PROCEDURE — 82565 ASSAY OF CREATININE: CPT | Performed by: INTERNAL MEDICINE

## 2025-02-27 PROCEDURE — 82040 ASSAY OF SERUM ALBUMIN: CPT | Performed by: INTERNAL MEDICINE

## 2025-02-27 PROCEDURE — 85041 AUTOMATED RBC COUNT: CPT | Performed by: INTERNAL MEDICINE

## 2025-02-27 PROCEDURE — 250N000013 HC RX MED GY IP 250 OP 250 PS 637: Performed by: INTERNAL MEDICINE

## 2025-02-27 PROCEDURE — 82947 ASSAY GLUCOSE BLOOD QUANT: CPT | Performed by: INTERNAL MEDICINE

## 2025-02-27 PROCEDURE — 85014 HEMATOCRIT: CPT | Performed by: INTERNAL MEDICINE

## 2025-02-27 PROCEDURE — 85004 AUTOMATED DIFF WBC COUNT: CPT | Performed by: INTERNAL MEDICINE

## 2025-02-27 RX ORDER — ACETAMINOPHEN 325 MG/1
650 TABLET ORAL ONCE
Status: COMPLETED | OUTPATIENT
Start: 2025-02-27 | End: 2025-02-27

## 2025-02-27 RX ORDER — ACETAMINOPHEN 325 MG/1
650 TABLET ORAL ONCE
Start: 2025-04-15

## 2025-02-27 RX ORDER — METHYLPREDNISOLONE SODIUM SUCCINATE 125 MG/2ML
80 INJECTION INTRAMUSCULAR; INTRAVENOUS ONCE
Status: COMPLETED | OUTPATIENT
Start: 2025-02-27 | End: 2025-02-27

## 2025-02-27 RX ORDER — METHYLPREDNISOLONE SODIUM SUCCINATE 125 MG/2ML
80 INJECTION INTRAMUSCULAR; INTRAVENOUS ONCE
OUTPATIENT
Start: 2025-04-15

## 2025-02-27 RX ORDER — DIPHENHYDRAMINE HCL 25 MG
50 CAPSULE ORAL ONCE
Status: COMPLETED | OUTPATIENT
Start: 2025-02-27 | End: 2025-02-27

## 2025-02-27 RX ORDER — DIPHENHYDRAMINE HCL 25 MG
50 CAPSULE ORAL ONCE
Start: 2025-04-15

## 2025-02-27 RX ADMIN — ACETAMINOPHEN 650 MG: 325 TABLET, FILM COATED ORAL at 14:05

## 2025-02-27 RX ADMIN — METHYLPREDNISOLONE SODIUM SUCCINATE 81.25 MG: 125 INJECTION, POWDER, FOR SOLUTION INTRAMUSCULAR; INTRAVENOUS at 14:09

## 2025-02-27 RX ADMIN — SODIUM CHLORIDE 1000 MG: 900 INJECTION, SOLUTION INTRAVENOUS at 14:53

## 2025-02-27 RX ADMIN — DIPHENHYDRAMINE HYDROCHLORIDE 50 MG: 25 CAPSULE ORAL at 14:07

## 2025-02-27 NOTE — PROGRESS NOTES
Update from [facility] team to clarify Cipro regimen.  Called primary caregiver, niece Caty and confirmed he was taking daily for 7 days in Sept and no longer taking med at this time.  Updated [facility] team.  Nevin Marx RN, BSN, PHN   Care Coordinator  [phone]

## 2025-02-27 NOTE — PROGRESS NOTES
Infusion Nursing Note:  Wilfredoliz Yoon presents today for rituxan infusion.    Patient seen by provider today: No   present during visit today: Yes, Language: Bolivian.     Note: Just prior to the start of infusion, patient stated (via ) that he had never had this med before.  Phone call was made to patients niece, who assured him that he has indeed had rituxan before and tolerated it.      Intravenous Access:  Labs drawn without difficulty.  Peripheral IV placed.    Treatment Conditions:  Biological Infusion Checklist:  ~~~ NOTE: If the patient answers yes to any of the questions below, hold the infusion and contact ordering provider or on-call provider.    Have you recently had an elevated temperature, fever, chills, productive cough, coughing for 3 weeks or longer or hemoptysis,  abnormal vital signs, night sweats,  chest pain or have you noticed a decrease in your appetite, unexplained weight loss or fatigue? No  Do you have any open wounds or new incisions? No  Do you have any upcoming hospitalizations or surgeries? Does not include esophagogastroduodenoscopy, colonoscopy, endoscopic retrograde cholangiopancreatography (ERCP), endoscopic ultrasound (EUS), dental procedures or joint aspiration/steroid injections No  Do you currently have any signs of illness or infection or are you on any antibiotics? No  Have you had any new, sudden or worsening abdominal pain? No  Have you or anyone in your household received a live vaccination in the past 4 weeks? Please note: No live vaccines while on biologic/chemotherapy until 6 months after the last treatment. Patient can receive the flu vaccine (shot only), pneumovax and the Covid vaccine. It is optimal for the patient to get these vaccines mid cycle, but they can be given at any time as long as it is not on the day of the infusion. No  Have you recently been diagnosed with any new nervous system diseases (ie. Multiple sclerosis, Guillain  Minco, seizures, neurological changes) or cancer diagnosis? Are you on any form of radiation or chemotherapy? No  Are you pregnant or breast feeding or do you have plans of pregnancy in the future? No  Have you been having any signs of worsening depression or suicidal ideations?  (benlysta only) No  Have there been any other new onset medical symptoms? No  Have you had any new blood clots? (IVIG only) No      Post Infusion Assessment:  Patient tolerated infusion without incident.  Blood return noted pre and post infusion.  Site patent and intact, free from redness, edema or discomfort.  No evidence of extravasations.  Biologic Infusion Post Education: Call the triage nurse at your clinic or seek medical attention if you have chills and/or temperature greater than or equal to 100.5, uncontrolled nausea/vomiting, diarrhea, constipation, dizziness, shortness of breath, chest pain, heart palpitations, weakness or any other new or concerning symptoms, questions or concerns.  You cannot have any live virus vaccines prior to or during treatment or up to 6 months post infusion.  If you have an upcoming surgery, medical procedure or dental procedure during treatment, this should be discussed with your ordering physician and your surgeon/dentist.  If you are having any concerning symptom, if you are unsure if you should get your next infusion or wish to speak to a provider before your next infusion, please call your care coordinator or triage nurse at your clinic to notify them so we can adequately serve you.       Discharge Plan:   Copy of AVS reviewed with patient and/or family.   Patient discharged in stable condition accompanied by: Niece.  Departure Mode: Wheelchair.      Nesha Funes RN

## 2025-03-07 DIAGNOSIS — B18.1 CHRONIC VIRAL HEPATITIS B WITHOUT DELTA AGENT AND WITHOUT COMA (H): ICD-10-CM

## 2025-03-13 ENCOUNTER — APPOINTMENT (OUTPATIENT)
Dept: CT IMAGING | Facility: CLINIC | Age: 65
DRG: 872 | End: 2025-03-13
Payer: COMMERCIAL

## 2025-03-13 ENCOUNTER — HOSPITAL ENCOUNTER (INPATIENT)
Facility: CLINIC | Age: 65
DRG: 872 | End: 2025-03-13
Attending: FAMILY MEDICINE | Admitting: STUDENT IN AN ORGANIZED HEALTH CARE EDUCATION/TRAINING PROGRAM
Payer: COMMERCIAL

## 2025-03-13 ENCOUNTER — APPOINTMENT (OUTPATIENT)
Dept: GENERAL RADIOLOGY | Facility: CLINIC | Age: 65
DRG: 872 | End: 2025-03-13
Attending: FAMILY MEDICINE
Payer: COMMERCIAL

## 2025-03-13 VITALS
HEART RATE: 75 BPM | SYSTOLIC BLOOD PRESSURE: 131 MMHG | RESPIRATION RATE: 18 BRPM | DIASTOLIC BLOOD PRESSURE: 79 MMHG | OXYGEN SATURATION: 98 % | TEMPERATURE: 97.6 F

## 2025-03-13 DIAGNOSIS — N10 PYELONEPHRITIS, ACUTE: ICD-10-CM

## 2025-03-13 DIAGNOSIS — N32.89 CALCIFICATION OF BLADDER: ICD-10-CM

## 2025-03-13 DIAGNOSIS — R78.81 BACTEREMIA: Primary | ICD-10-CM

## 2025-03-13 PROBLEM — I69.351 HEMIPLEGIA AND HEMIPARESIS FOLLOWING CEREBRAL INFARCTION AFFECTING RIGHT DOMINANT SIDE (H): Status: ACTIVE | Noted: 2020-08-04

## 2025-03-13 LAB
ALBUMIN SERPL BCG-MCNC: 3.1 G/DL (ref 3.5–5.2)
ALBUMIN UR-MCNC: 300 MG/DL
ALP SERPL-CCNC: 132 U/L (ref 40–150)
ALT SERPL W P-5'-P-CCNC: 27 U/L (ref 0–70)
ANION GAP SERPL CALCULATED.3IONS-SCNC: 11 MMOL/L (ref 7–15)
APPEARANCE UR: CLEAR
AST SERPL W P-5'-P-CCNC: 24 U/L (ref 0–45)
BACTERIA #/AREA URNS HPF: ABNORMAL /HPF
BASOPHILS # BLD AUTO: 0.1 10E3/UL (ref 0–0.2)
BASOPHILS NFR BLD AUTO: 0 %
BILIRUB SERPL-MCNC: 1 MG/DL
BILIRUB UR QL STRIP: NEGATIVE
BUN SERPL-MCNC: 32 MG/DL (ref 8–23)
CALCIUM SERPL-MCNC: 8.8 MG/DL (ref 8.8–10.4)
CHLORIDE SERPL-SCNC: 103 MMOL/L (ref 98–107)
COLOR UR AUTO: YELLOW
CREAT SERPL-MCNC: 2.47 MG/DL (ref 0.67–1.17)
EGFRCR SERPLBLD CKD-EPI 2021: 28 ML/MIN/1.73M2
EOSINOPHIL # BLD AUTO: 0.1 10E3/UL (ref 0–0.7)
EOSINOPHIL NFR BLD AUTO: 0 %
ERYTHROCYTE [DISTWIDTH] IN BLOOD BY AUTOMATED COUNT: 13.4 % (ref 10–15)
FLUAV RNA SPEC QL NAA+PROBE: NEGATIVE
FLUBV RNA RESP QL NAA+PROBE: NEGATIVE
GLUCOSE SERPL-MCNC: 190 MG/DL (ref 70–99)
GLUCOSE UR STRIP-MCNC: NEGATIVE MG/DL
HCO3 SERPL-SCNC: 22 MMOL/L (ref 22–29)
HCT VFR BLD AUTO: 43.2 % (ref 40–53)
HGB BLD-MCNC: 14.4 G/DL (ref 13.3–17.7)
HGB UR QL STRIP: ABNORMAL
IMM GRANULOCYTES # BLD: 0.1 10E3/UL
IMM GRANULOCYTES NFR BLD: 1 %
KETONES UR STRIP-MCNC: NEGATIVE MG/DL
LEUKOCYTE ESTERASE UR QL STRIP: ABNORMAL
LYMPHOCYTES # BLD AUTO: 1 10E3/UL (ref 0.8–5.3)
LYMPHOCYTES NFR BLD AUTO: 4 %
MCH RBC QN AUTO: 30.8 PG (ref 26.5–33)
MCHC RBC AUTO-ENTMCNC: 33.3 G/DL (ref 31.5–36.5)
MCV RBC AUTO: 93 FL (ref 78–100)
MONOCYTES # BLD AUTO: 1.2 10E3/UL (ref 0–1.3)
MONOCYTES NFR BLD AUTO: 5 %
MUCOUS THREADS #/AREA URNS LPF: PRESENT /LPF
NEUTROPHILS # BLD AUTO: 21.3 10E3/UL (ref 1.6–8.3)
NEUTROPHILS NFR BLD AUTO: 90 %
NITRATE UR QL: NEGATIVE
NRBC # BLD AUTO: 0 10E3/UL
NRBC BLD AUTO-RTO: 0 /100
PH UR STRIP: 6.5 [PH] (ref 5–7)
PLATELET # BLD AUTO: 87 10E3/UL (ref 150–450)
POTASSIUM SERPL-SCNC: 4.3 MMOL/L (ref 3.4–5.3)
PROT SERPL-MCNC: 6 G/DL (ref 6.4–8.3)
RBC # BLD AUTO: 4.67 10E6/UL (ref 4.4–5.9)
RBC URINE: 9 /HPF
RSV RNA SPEC NAA+PROBE: NEGATIVE
SARS-COV-2 RNA RESP QL NAA+PROBE: NEGATIVE
SODIUM SERPL-SCNC: 136 MMOL/L (ref 135–145)
SP GR UR STRIP: 1.01 (ref 1–1.03)
TRANSITIONAL EPI: 2 /HPF
UROBILINOGEN UR STRIP-MCNC: 2 MG/DL
WBC # BLD AUTO: 23.7 10E3/UL (ref 4–11)
WBC URINE: 52 /HPF

## 2025-03-13 PROCEDURE — 99285 EMERGENCY DEPT VISIT HI MDM: CPT | Mod: 25 | Performed by: FAMILY MEDICINE

## 2025-03-13 PROCEDURE — 36415 COLL VENOUS BLD VENIPUNCTURE: CPT | Performed by: FAMILY MEDICINE

## 2025-03-13 PROCEDURE — 74176 CT ABD & PELVIS W/O CONTRAST: CPT

## 2025-03-13 PROCEDURE — 99222 1ST HOSP IP/OBS MODERATE 55: CPT | Mod: AI

## 2025-03-13 PROCEDURE — 81003 URINALYSIS AUTO W/O SCOPE: CPT | Performed by: FAMILY MEDICINE

## 2025-03-13 PROCEDURE — 99285 EMERGENCY DEPT VISIT HI MDM: CPT | Performed by: FAMILY MEDICINE

## 2025-03-13 PROCEDURE — 87186 SC STD MICRODIL/AGAR DIL: CPT | Performed by: FAMILY MEDICINE

## 2025-03-13 PROCEDURE — 250N000011 HC RX IP 250 OP 636: Performed by: FAMILY MEDICINE

## 2025-03-13 PROCEDURE — 87086 URINE CULTURE/COLONY COUNT: CPT | Performed by: FAMILY MEDICINE

## 2025-03-13 PROCEDURE — 71046 X-RAY EXAM CHEST 2 VIEWS: CPT

## 2025-03-13 PROCEDURE — 96365 THER/PROPH/DIAG IV INF INIT: CPT | Performed by: FAMILY MEDICINE

## 2025-03-13 PROCEDURE — 51701 INSERT BLADDER CATHETER: CPT | Performed by: FAMILY MEDICINE

## 2025-03-13 PROCEDURE — 120N000002 HC R&B MED SURG/OB UMMC

## 2025-03-13 PROCEDURE — 84075 ASSAY ALKALINE PHOSPHATASE: CPT | Performed by: FAMILY MEDICINE

## 2025-03-13 PROCEDURE — 87149 DNA/RNA DIRECT PROBE: CPT | Performed by: FAMILY MEDICINE

## 2025-03-13 PROCEDURE — 250N000013 HC RX MED GY IP 250 OP 250 PS 637

## 2025-03-13 PROCEDURE — 87637 SARSCOV2&INF A&B&RSV AMP PRB: CPT

## 2025-03-13 PROCEDURE — 85025 COMPLETE CBC W/AUTO DIFF WBC: CPT | Performed by: FAMILY MEDICINE

## 2025-03-13 PROCEDURE — 87040 BLOOD CULTURE FOR BACTERIA: CPT | Performed by: FAMILY MEDICINE

## 2025-03-13 RX ORDER — OMEPRAZOLE 20 MG/1
20 CAPSULE, DELAYED RELEASE ORAL DAILY
Status: DISCONTINUED | OUTPATIENT
Start: 2025-03-14 | End: 2025-03-16 | Stop reason: HOSPADM

## 2025-03-13 RX ORDER — OMEPRAZOLE 20 MG/1
20 CAPSULE, DELAYED RELEASE ORAL DAILY
COMMUNITY
Start: 2024-12-20

## 2025-03-13 RX ORDER — CEFTRIAXONE 1 G/1
1 INJECTION, POWDER, FOR SOLUTION INTRAMUSCULAR; INTRAVENOUS ONCE
Status: COMPLETED | OUTPATIENT
Start: 2025-03-13 | End: 2025-03-13

## 2025-03-13 RX ORDER — LOSARTAN POTASSIUM 25 MG/1
25 TABLET ORAL DAILY
Status: DISCONTINUED | OUTPATIENT
Start: 2025-03-14 | End: 2025-03-16 | Stop reason: HOSPADM

## 2025-03-13 RX ORDER — ENTECAVIR 1 MG/1
TABLET, FILM COATED ORAL
Qty: 30 TABLET | Refills: 6 | Status: ON HOLD | OUTPATIENT
Start: 2025-03-13

## 2025-03-13 RX ORDER — ATORVASTATIN CALCIUM 40 MG/1
40 TABLET, FILM COATED ORAL DAILY
Status: DISCONTINUED | OUTPATIENT
Start: 2025-03-13 | End: 2025-03-16 | Stop reason: HOSPADM

## 2025-03-13 RX ORDER — CEFTRIAXONE 1 G/1
1 INJECTION, POWDER, FOR SOLUTION INTRAMUSCULAR; INTRAVENOUS EVERY 24 HOURS
Status: DISCONTINUED | OUTPATIENT
Start: 2025-03-14 | End: 2025-03-14

## 2025-03-13 RX ORDER — CARVEDILOL 12.5 MG/1
12.5 TABLET ORAL 2 TIMES DAILY WITH MEALS
Status: DISCONTINUED | OUTPATIENT
Start: 2025-03-13 | End: 2025-03-16 | Stop reason: HOSPADM

## 2025-03-13 RX ORDER — ENTECAVIR 0.5 MG/1
1 TABLET, FILM COATED ORAL EVERY OTHER DAY
Status: DISCONTINUED | OUTPATIENT
Start: 2025-03-14 | End: 2025-03-16 | Stop reason: HOSPADM

## 2025-03-13 RX ORDER — ACETAMINOPHEN 325 MG/1
650 TABLET ORAL EVERY 4 HOURS PRN
Status: DISCONTINUED | OUTPATIENT
Start: 2025-03-13 | End: 2025-03-16 | Stop reason: HOSPADM

## 2025-03-13 RX ORDER — AMOXICILLIN 250 MG
2 CAPSULE ORAL 2 TIMES DAILY PRN
Status: DISCONTINUED | OUTPATIENT
Start: 2025-03-13 | End: 2025-03-16 | Stop reason: HOSPADM

## 2025-03-13 RX ORDER — AMLODIPINE BESYLATE 10 MG/1
10 TABLET ORAL DAILY
Status: DISCONTINUED | OUTPATIENT
Start: 2025-03-14 | End: 2025-03-16 | Stop reason: HOSPADM

## 2025-03-13 RX ORDER — DOCUSATE SODIUM 50MG AND SENNOSIDES 8.6MG 8.6; 5 MG/1; MG/1
1 TABLET, FILM COATED ORAL 2 TIMES DAILY PRN
COMMUNITY
Start: 2025-03-09

## 2025-03-13 RX ORDER — ASPIRIN 81 MG/1
81 TABLET, CHEWABLE ORAL DAILY
Status: DISCONTINUED | OUTPATIENT
Start: 2025-03-14 | End: 2025-03-16 | Stop reason: HOSPADM

## 2025-03-13 RX ORDER — GABAPENTIN 100 MG/1
100 CAPSULE ORAL 3 TIMES DAILY
Status: DISCONTINUED | OUTPATIENT
Start: 2025-03-13 | End: 2025-03-16 | Stop reason: HOSPADM

## 2025-03-13 RX ORDER — FUROSEMIDE 20 MG/1
10 TABLET ORAL DAILY
Status: DISCONTINUED | OUTPATIENT
Start: 2025-03-14 | End: 2025-03-16 | Stop reason: HOSPADM

## 2025-03-13 RX ORDER — AMOXICILLIN 250 MG
1 CAPSULE ORAL 2 TIMES DAILY PRN
Status: DISCONTINUED | OUTPATIENT
Start: 2025-03-13 | End: 2025-03-16 | Stop reason: HOSPADM

## 2025-03-13 RX ORDER — LIDOCAINE 40 MG/G
CREAM TOPICAL
Status: DISCONTINUED | OUTPATIENT
Start: 2025-03-13 | End: 2025-03-16 | Stop reason: HOSPADM

## 2025-03-13 RX ORDER — ACETAMINOPHEN 650 MG/1
650 SUPPOSITORY RECTAL EVERY 4 HOURS PRN
Status: DISCONTINUED | OUTPATIENT
Start: 2025-03-13 | End: 2025-03-16 | Stop reason: HOSPADM

## 2025-03-13 RX ORDER — ONDANSETRON 4 MG/1
4 TABLET, ORALLY DISINTEGRATING ORAL EVERY 6 HOURS PRN
Status: DISCONTINUED | OUTPATIENT
Start: 2025-03-13 | End: 2025-03-16 | Stop reason: HOSPADM

## 2025-03-13 RX ORDER — ONDANSETRON 2 MG/ML
4 INJECTION INTRAMUSCULAR; INTRAVENOUS EVERY 6 HOURS PRN
Status: DISCONTINUED | OUTPATIENT
Start: 2025-03-13 | End: 2025-03-16 | Stop reason: HOSPADM

## 2025-03-13 RX ADMIN — GABAPENTIN 100 MG: 100 CAPSULE ORAL at 21:08

## 2025-03-13 RX ADMIN — ATORVASTATIN CALCIUM 40 MG: 40 TABLET, FILM COATED ORAL at 16:52

## 2025-03-13 RX ADMIN — GABAPENTIN 100 MG: 100 CAPSULE ORAL at 16:52

## 2025-03-13 RX ADMIN — CEFTRIAXONE SODIUM 1 G: 1 INJECTION, POWDER, FOR SOLUTION INTRAMUSCULAR; INTRAVENOUS at 13:06

## 2025-03-13 ASSESSMENT — ACTIVITIES OF DAILY LIVING (ADL)
ADLS_ACUITY_SCORE: 52
ADLS_ACUITY_SCORE: 56
ADLS_ACUITY_SCORE: 56
ADLS_ACUITY_SCORE: 52
ADLS_ACUITY_SCORE: 52
ADLS_ACUITY_SCORE: 56
ADLS_ACUITY_SCORE: 56

## 2025-03-13 ASSESSMENT — COLUMBIA-SUICIDE SEVERITY RATING SCALE - C-SSRS
1. IN THE PAST MONTH, HAVE YOU WISHED YOU WERE DEAD OR WISHED YOU COULD GO TO SLEEP AND NOT WAKE UP?: NO
2. HAVE YOU ACTUALLY HAD ANY THOUGHTS OF KILLING YOURSELF IN THE PAST MONTH?: NO
6. HAVE YOU EVER DONE ANYTHING, STARTED TO DO ANYTHING, OR PREPARED TO DO ANYTHING TO END YOUR LIFE?: NO

## 2025-03-13 ASSESSMENT — ENCOUNTER SYMPTOMS: DYSURIA: 1

## 2025-03-13 NOTE — MEDICATION SCRIBE - ADMISSION MEDICATION HISTORY
Medication Scribe Admission Medication History    Admission medication history is complete. The information provided in this note is only as accurate as the sources available at the time of the update.    Information Source(s): Patient, Family member, and CareEverywhere/SureScripts via in-person    Pertinent Information: N/A    Changes made to PTA medication list:  Added: senna, omeprazole  Deleted: None  Changed: None    Allergies reviewed with patient and updates made in EHR: yes    Medication History Completed By: May Knight 3/13/2025 2:52 PM    PTA Med List   Medication Sig Last Dose/Taking    amLODIPine (NORVASC) 10 MG tablet Take 1 tablet (10 mg) by mouth daily. Indication: HTN 3/13/2025 Morning    aspirin (ASA) 81 MG chewable tablet TAKE 1 TABLET (81 MG) BY MOUTH DAILY INDICATION: STROKE 3/13/2025 Morning    atorvastatin (LIPITOR) 40 MG tablet Take 1 tablet (40 mg) by mouth daily 3/12/2025 Evening    carvedilol (COREG) 12.5 MG tablet Take 1 tablet (12.5 mg) by mouth 2 times daily (with meals). 3/13/2025 Morning    entecavir (BARACLUDE) 1 MG tablet TAKE 1 TABLET (1 MG) EVERY OTHER DAY (Patient taking differently: Take 1 mg by mouth every other day.) 3/12/2025    furosemide (LASIX) 20 MG tablet Take 0.5 tablets (10 mg) by mouth daily. 3/13/2025 Morning    gabapentin (NEURONTIN) 100 MG capsule TAKE 1 CAPSULE (100 MG) BY MOUTH 3 TIMES DAILY 3/13/2025 Morning    losartan (COZAAR) 25 MG tablet Take 1 tablet (25 mg) by mouth daily. 3/13/2025 Morning    Multiple Vitamin (MULTI VITAMIN DAILY) TABS TAKE 1 TABLET BY MOUTH DAILY 3/13/2025 Morning    omeprazole (PRILOSEC) 20 MG DR capsule Take 20 mg by mouth daily. 3/13/2025 Morning    SENEXON-S 8.6-50 MG tablet Take 1 tablet by mouth 2 times daily as needed. Past Week

## 2025-03-13 NOTE — TELEPHONE ENCOUNTER
02/11/19 1058   [place] Suicide Severity Rating Scale (C-SSRS)   1. Have you wished you were dead or wished you could go to sleep and not wake up? (past month) No   2. Have you actually had any thoughts of killing yourself? (past month) No   6. Have you ever done anything, started to do anything, or prepared to do anything to end your life? (lifetime) No   Suicide Evaluation Negative screen= no ideation, behaviors or history   Maintain current plan of care.   Sign Entecavir.

## 2025-03-13 NOTE — ED NOTES
Intermittent catheter performed with 14 F catheter obtained ~ 50 mL of geronimo urine. Specimen placed in cup from intermittent cath kit and sent to lab at 1202p pt tolerated well.

## 2025-03-13 NOTE — ED TRIAGE NOTES
Pt states he has painful urination, Urine does not feel normal. Pt states he has lower abdominal pain. Pt has hx of stroke in 2020        Triage Assessment (Adult)       Row Name 03/13/25 1108          Triage Assessment    Airway WDL WDL        Respiratory WDL    Respiratory WDL WDL        Skin Circulation/Temperature WDL    Skin Circulation/Temperature WDL WDL        Cardiac WDL    Cardiac WDL WDL        Peripheral/Neurovascular WDL    Peripheral Neurovascular WDL WDL        Cognitive/Neuro/Behavioral WDL    Cognitive/Neuro/Behavioral WDL WDL

## 2025-03-13 NOTE — H&P
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    # Complicated UTI with concern for pyelonephritis  # Leukocytosis  Pt presented w/concerns of several days of dysuria, lower belly pain, and malaise. Work-up in the ED w/UA concerning for infection & marked leukocytosis to 23.7; my exam was positive for lower abdominal tenderness but negative for CVA tenderness. Patient is a poor historian, but otherwise denied any other localizing symptoms. Family members were not present in the room to provide additional history. Will treat empirically for UTI & assess w/CT A/P for any additional, occult pathology.  - CT Abdomen/Pelvis w/o contrast  - Awaiting urine culture  - Antibiotics: CTX 1g IV daily  - Pain: Tylenol PRN, PTA Gabapentin 100mg TID, Oxycodone 2.5mg PO q4h PRN for severe pain  - Nausea: Zofran PRN  - Constipation: Senna PRN  - Labs: BMP, CBC daily  - Nursing: Vitals q8h    # Cryoglobulinemic glomerulonephritis  # Presumed ANDRY on CKD Stage 3b  Cryoglobulinemic glomerulonephritis initially diagnosed via biopsy in 2015, presumed secondary to known chronic HBV infection. Baseline Cr ~2.1; elevated to 2.47 on admission. Not appearing volume-down on exam, will defer IVF for now.  - Strict I's & O's  - daily weights  - Renal diet (non-dialysis)  - PTA Furosemide 10mg daily    # Hx of CVA (2020)  # Chronic dysphagia  # Chronic incontinence of bladder and bowel  # Concern for cognitive impairment  # Wheelchair user  Exam concerning for cognitive impairment; was unable to recall the name of his daughter who  came with him to the ED. Answers were short and non-elaborative. Exam was positive for some moderate right sided deficits, consistent with history gathered from chart review.  - SLP consult, assessment of most appropriate diet in setting of known dysphagia  - Dietician consult, concern for hx of weight loss with possible malnutrition  - May need OT consult for cognitive eval prior to discharge  - PTA ASA 81mg daily      # Chronic hepatitis B  # HBV cirrhosis  LFTs in normal range on admission, chronic hep B has been present for years. Well managed on entecavir, but appears to have been lost to follow-up over the last 2 years; last GI visit was in 2023.   - PTA Entecavir 1mg every other day (per patient report; last GI note recommended daily)    Chronic/Stable:  # HTN: PTA Amlodipine 10mg daily, Carvedilol 12.5mg daily, Losartan 25mg daily  # HLD: PTA Atorvastatin 40mg daily  # GERD: Pantoprazole 40mg daily (PTA Omeprazole 20mg daily)       Diet: Renal Diet (non-dialysis)  DVT Prophylaxis: Pneumatic Compression Devices  Hawkins Catheter: Not present  Fluids: PO  Lines: None     Cardiac Monitoring: None  Code Status: Full Code    Clinically Significant Risk Factors Present on Admission               # Hypoalbuminemia: Lowest albumin = 3.1 g/dL at 3/13/2025 11:40 AM, will monitor as appropriate   # Drug Induced Platelet Defect: home medication list includes an antiplatelet medication   # Hypertension: Noted on problem list                      Disposition Plan      Expected Discharge Date: 03/14/2025                The patient's care was discussed with the Attending Physician, Dr. Bryan.    DO [facility]  [facility]  Securely message with [employer] (more info)  Text page via [facility] Paging/Directory   See signed in provider for up to date coverage  information  ______________________________________________________________________    Chief Complaint   Dysuria    History is obtained from the patient and the medical record    History of Present Illness   Patient is a poor historian, history predominantly obtained from the medical record.  He was interviewed in his emergency department room, and at the time, patient was not accompanied by any visitors.    Per patient report, states that he has had several days of painful urination.  He denies any other localizing symptoms including chest pain, shortness of breath, abdominal pain, diarrhea/constipation, back pain, skin changes, subjective fevers, dizziness, visual changes.  When asked about his living situation, patient reports that he lives at home with family and was brought to the emergency department by family.  When asked if he knew where his family had gone, he reported he did not know.  When I asked who I should contact about his care, he said I should call his daughter.  When I asked her his daughter's name, he stated he did not know it.    Connected with the nurse who is caring for the patient in the emergency department, reported that it was his daughter Caty who accompanied him, and who should be contacted for additional clarifying questions/concerns/family updates.    Past Medical History    Past Medical History:   Diagnosis Date    Cirrhosis (H)     Cryoglobulinemia     CVA (cerebral vascular accident) (H) 07/16/2020    Supratentorial likely d/t hypertensive emergency leading to right hemiparesis, dysphagia and aphasia    Glomerulonephritis     Hepatitis B     Hypertension     Latent tuberculosis     Treatment [id number]    MPGN (membranoproliferative glomerulonephritides)     Positive QuantiFERON-TB Gold test     PRES (posterior reversible encephalopathy syndrome) 07/16/2020    In brainstem d/t hypertensive emergency; suffered supratentorial CVAs same date       Past Surgical History   Past Surgical  History:   Procedure Laterality Date    ANESTHESIA OUT OF OR MRI N/A 7/18/2020    Procedure: ANESTHESIA OUT OF OR MRI;  Surgeon: GENERIC ANESTHESIA PROVIDER;  Location: [facility] OR    ESOPHAGOSCOPY, GASTROSCOPY, DUODENOSCOPY (EGD), COMBINED N/A 10/18/2018    Procedure: EGD;  Surgeon: Eber Ortez MD;  Location: [facility] GI    HAND SURGERY Right     IR PARACENTESIS  7/27/2020    IR RENAL BIOPSY RIGHT  11/7/2023    ZZC HAND/FINGER SURGERY UNLISTED         Prior to Admission Medications   Prior to Admission Medications   Prescriptions Last Dose Informant Patient Reported? Taking?   Multiple Vitamin (MULTI VITAMIN DAILY) TABS 3/13/2025 Morning  No Yes   Sig: TAKE 1 TABLET BY MOUTH DAILY   SENEXON-S 8.6-50 MG tablet Past Week  Yes Yes   Sig: Take 1 tablet by mouth 2 times daily as needed.   amLODIPine (NORVASC) 10 MG tablet 3/13/2025 Morning  No Yes   Sig: Take 1 tablet (10 mg) by mouth daily. Indication: HTN   aspirin (ASA) 81 MG chewable tablet 3/13/2025 Morning  No Yes   Sig: TAKE 1 TABLET (81 MG) BY MOUTH DAILY INDICATION: STROKE   atorvastatin (LIPITOR) 40 MG tablet 3/12/2025 Evening  No Yes   Sig: Take 1 tablet (40 mg) by mouth daily   carvedilol (COREG) 12.5 MG tablet 3/13/2025 Morning  No Yes   Sig: Take 1 tablet (12.5 mg) by mouth 2 times daily (with meals).   entecavir (BARACLUDE) 1 MG tablet 3/12/2025  No Yes   Sig: TAKE 1 TABLET (1 MG) EVERY OTHER DAY   Patient taking differently: Take 1 mg by mouth every other day.   furosemide (LASIX) 20 MG tablet 3/13/2025 Morning  No Yes   Sig: Take 0.5 tablets (10 mg) by mouth daily.   gabapentin (NEURONTIN) 100 MG capsule 3/13/2025 Morning  No Yes   Sig: TAKE 1 CAPSULE (100 MG) BY MOUTH 3 TIMES DAILY   losartan (COZAAR) 25 MG tablet 3/13/2025 Morning  No Yes   Sig: Take 1 tablet (25 mg) by mouth daily.   omeprazole (PRILOSEC) 20 MG DR capsule 3/13/2025 Morning  Yes Yes   Sig: Take 20 mg by mouth daily.      Facility-Administered Medications: None        Physical Exam    Vital Signs: Temp: 98.8  F (37.1  C) Temp src: Oral BP: 110/74 Pulse: 85   Resp: 16 SpO2: 96 %      Weight: 0 lbs 0 oz  Vitals reviewed.  Constitutional: Awake, alert, in NAD. Resting in bed.  Eyes: Sclera without jaundice or injection. Conjunctiva without pallor, erythema, or drainage. PERRLA, EOM intact.  HENT: NCAT, oral cavity with MMM and without lesions.  Respiratory: Lungs CTAB without crackles, wheezing, rales, or increased work of breathing.  Cardiovascular: RRR without murmurs or extra sounds, radial pulses 2+ bilaterally. Cap refill <2 sec.  Abdomen: Soft, non-distended, with positive bowel sounds.  Tenderness to moderate palpation of the lower abdomen/suprapubic region.  Skin: Warm & dry, without lesions, erythema, rashes, or ecchymosis.  Musculoskeletal: Extremities negative for redness, lesions, or bony tenderness.  There is some trace, nonpitting edema present on the bilateral lower extremities. No CVA tenderness.  Neurologic: No gross focal deficit, cranial nerves II-XII largely intact evidence of right-sided weakness in the face and moderate contracture in the right hand, is consistent with baseline based on chart review.  Psychiatric: Alert & calm with appropriate affect and mood, however cognition and memory do appear to be impaired.    Medical Decision Making   Please see A&P for additional details of medical decision making    Data   ------------------------- PAST 24 HR DATA REVIEWED -----------------------------------------------    I have personally reviewed the following data over the past 24 hrs:    23.7 (H)  \   14.4   / 87 (L)     136 103 32.0 (H) /  190 (H)   4.3 22 2.47 (H) \     ALT: 27 AST: 24 AP: 132 TBILI: 1.0   ALB: 3.1 (L) TOT PROTEIN: 6.0 (L) LIPASE: N/A       Imaging results reviewed over the past 24 hrs:   Recent Results (from the past 24 hours)   XR Chest 2 Views    Narrative    EXAM: XR CHEST 2 VIEWS  LOCATION: [facility]  DATE:  3/13/2025    INDICATION: Fever, leukocytosis  COMPARISON: Chest radiograph 9/12/2024      Impression    IMPRESSION: Stable chest radiograph without acute cardiopulmonary abnormality.

## 2025-03-13 NOTE — ED PROVIDER NOTES
ED Provider Note  [facility]      History     Chief Complaint   Patient presents with    Dysuria     Pt states he has painful urination, Urine does not feel normal. Pt states he has lower abdominal pain. 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  Patient reports experiencing dysuria as well as lower abdominal pain.    Past Medical History  Past Medical History:   Diagnosis Date    Cirrhosis (H)     Cryoglobulinemia     CVA (cerebral vascular accident) (H) 07/16/2020    Supratentorial likely d/t hypertensive emergency leading to right hemiparesis, dysphagia and aphasia    Glomerulonephritis     Hepatitis B     Hypertension     Latent tuberculosis     Treatment [id number]    MPGN (membranoproliferative glomerulonephritides)     Positive QuantiFERON-TB Gold test     PRES (posterior reversible encephalopathy syndrome) 07/16/2020    In brainstem d/t hypertensive emergency; suffered supratentorial CVAs same date     Past Surgical History:   Procedure Laterality Date    ANESTHESIA OUT OF OR MRI N/A 7/18/2020    Procedure: ANESTHESIA OUT OF OR MRI;  Surgeon: GENERIC ANESTHESIA PROVIDER;  Location: [facility] OR    ESOPHAGOSCOPY, GASTROSCOPY, DUODENOSCOPY (EGD), COMBINED N/A 10/18/2018    Procedure: EGD;  Surgeon: Eber Ortez MD;  Location: [facility] GI    HAND SURGERY Right     IR PARACENTESIS  7/27/2020    IR RENAL BIOPSY RIGHT  11/7/2023    ZZC HAND/FINGER SURGERY UNLISTED       amLODIPine (NORVASC) 10 MG tablet  aspirin (ASA) 81 MG chewable tablet  atorvastatin (LIPITOR) 40 MG tablet  carvedilol (COREG) 12.5 MG tablet  entecavir (BARACLUDE) 1 MG tablet  furosemide (LASIX) 20 MG tablet  gabapentin (NEURONTIN) 100 MG capsule  losartan (COZAAR) 25  MG tablet  Multiple Vitamin (MULTI VITAMIN DAILY) TABS  omeprazole (PRILOSEC) 20 MG DR capsule  SENEXON-S 8.6-50 MG tablet      No Known Allergies  Family History  Family History   Problem Relation Age of Onset    Liver Cancer No family hx of     Hepatitis No family hx of      Social History   Social History     Tobacco Use    Smoking status: Former     Current packs/day: 0.00     Average packs/day: 0.3 packs/day for 40.0 years (10.0 ttl pk-yrs)     Types: Cigarettes     Start date: 10/1/1980     Quit date: 10/1/2020     Years since quittin.4    Smokeless tobacco: Never   Vaping Use    Vaping status: Never Used   Substance Use Topics    Alcohol use: No     Alcohol/week: 0.0 standard drinks of alcohol    Drug use: No      Past medical history, past surgical history, medications, allergies, family history, and social history were reviewed with the patient. No additional pertinent items.   A medically appropriate review of systems was performed with pertinent positives and negatives noted in the HPI, and all other systems negative.    Physical Exam   BP: 110/74  Pulse: 85  Temp: 98.8  F (37.1  C)  Resp: 16  SpO2: 96 %  Physical Exam  Constitutional:       General: He is not in acute distress.     Appearance: Normal appearance. He is not toxic-appearing.   HENT:      Head: Atraumatic.   Eyes:      General: No scleral icterus.     Conjunctiva/sclera: Conjunctivae normal.   Cardiovascular:      Rate and Rhythm: Normal rate.      Heart sounds: Normal heart sounds.   Pulmonary:      Effort: Pulmonary effort is normal. No respiratory distress.      Breath sounds: Normal breath sounds.   Abdominal:      Palpations: Abdomen is soft.      Tenderness: There is abdominal tenderness in the suprapubic area. There is right CVA tenderness and left CVA tenderness.   Musculoskeletal:         General: No deformity.      Cervical back: Neck supple.   Skin:     General: Skin is warm.   Neurological:      General: No focal deficit  present.      Mental Status: He is alert.      Comments: Patient with history of stroke now at baseline.           ED Course, Procedures, & Data      Procedures    IV Antibiotics given and/or elevated Lactate of 0 and no sepsis note found - Delete this reminder and enter the sepsis note or '.edcms' before signing chart.>>>     Results for orders placed or performed during the hospital encounter of 03/13/25   XR Chest 2 Views     Status: None    Narrative    EXAM: XR CHEST 2 VIEWS  LOCATION: [facility]  DATE: 3/13/2025    INDICATION: Fever, leukocytosis  COMPARISON: Chest radiograph 9/12/2024      Impression    IMPRESSION: Stable chest radiograph without acute cardiopulmonary abnormality.   CT Abdomen Pelvis w/o Contrast     Status: None    Narrative    EXAM: CT ABDOMEN PELVIS W/O CONTRAST  LOCATION: [facility]  DATE: 3/13/2025    INDICATION: Concern for pyelonephritis vs other in setting of positive UTI and leukocytosis  COMPARISON: CT CAP 7/3/2024  TECHNIQUE: CT scan of the abdomen and pelvis was performed without IV contrast. Multiplanar reformats were obtained. Dose reduction techniques were used.  CONTRAST: None.    FINDINGS:   LOWER CHEST: Heart size normal with slight increase in the small volume pericardial effusion. Advanced emphysematous change visualized lungs. Mild interstitial edema and trace pleural fluid.    HEPATOBILIARY: Hepatic cirrhosis with interval development of a 4.5 x 3.0 cm mass anterior aspect lateral segment left hepatic lobe (image 43 of series 5) gallbladder and bile ducts unremarkable.    PANCREAS: Normal.    SPLEEN: Spleen size within normal limits.    ADRENAL GLANDS: Normal.    KIDNEYS/BLADDER: Mild bladder wall edema and perivesical fat edema have developed compatible with nonspecific cystitis. Faint amorphous calcification associated with the bladder dome mucosa has developed which is  nonspecific but could indicate underlying   blood products or neoplasm (series 5 axial image 156, sagittal image 52). 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 The duplicated ureters joining one another just proximal to the bladder. Kidneys, ureters and bladder otherwise   normal.    BOWEL: No ascites. Large amount stool throughout the colon and rectum. No bowel obstruction or inflammatory change.    LYMPH NODES: No lymphadenopathy.    VASCULATURE: Moderate calcified atheromatous plaque throughout the normal caliber abdominal aorta. Probable recanalization of the small caliber umbilical vein.    PELVIC ORGANS: Unremarkable.    MUSCULOSKELETAL: Bones are demineralized. No bone lesion or fracture.      Impression    IMPRESSION:   1.  Hepatic cirrhosis with interval development of a 4.5 x 3.0 cm mass anterior aspect lateral segment left hepatic lobe. Recommend nonurgent MRI liver, correlation with serum alpha-fetoprotein level and possible liver consult.  2.  Mild bladder wall edema and perivesical fat edema have developed compatible with nonspecific cystitis.  3.  Faint amorphous calcification associated with the bladder dome mucosa has developed which is nonspecific but could indicate underlying blood products or neoplasm. Recommend CT urogram.  4.  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  5.  Large amount stool throughout the colon and rectum.  6.  Slight increase in the small volume pericardial effusion and development of mild interstitial edema and trace pleural fluid.       Comprehensive metabolic panel     Status: Abnormal   Result Value Ref Range    Sodium 136 135 - 145 mmol/L    Potassium 4.3 3.4 - 5.3 mmol/L    Carbon Dioxide (CO2) 22 22 - 29 mmol/L    Anion Gap 11 7 - 15 mmol/L    Urea Nitrogen 32.0 (H) 8.0 - 23.0 mg/dL    Creatinine 2.47 (H) 0.67 - 1.17 mg/dL    GFR Estimate 28 (L) >60 mL/min/1.73m2    Calcium 8.8 8.8 - 10.4 mg/dL    Chloride 103 98 - 107  mmol/L    Glucose 190 (H) 70 - 99 mg/dL    Alkaline Phosphatase 132 40 - 150 U/L    AST 24 0 - 45 U/L    ALT 27 0 - 70 U/L    Protein Total 6.0 (L) 6.4 - 8.3 g/dL    Albumin 3.1 (L) 3.5 - 5.2 g/dL    Bilirubin Total 1.0 <=1.2 mg/dL   UA with Microscopic reflex to Culture     Status: Abnormal    Specimen: Urine, Hawkins Catheter   Result Value Ref Range    Color Urine Yellow Colorless, Straw, Light Yellow, Yellow    Appearance Urine Clear Clear    Glucose Urine Negative Negative mg/dL    Bilirubin Urine Negative Negative    Ketones Urine Negative Negative mg/dL    Specific Gravity Urine 1.014 1.003 - 1.035    Blood Urine Moderate (A) Negative    pH Urine 6.5 5.0 - 7.0    Protein Albumin Urine 300 (A) Negative mg/dL    Urobilinogen Urine 2.0 Normal, 2.0 mg/dL    Nitrite Urine Negative Negative    Leukocyte Esterase Urine Moderate (A) Negative    Bacteria Urine Moderate (A) None Seen /HPF    Mucus Urine Present (A) None Seen /LPF    RBC Urine 9 (H) <=2 /HPF    WBC Urine 52 (H) <=5 /HPF    Transitional Epithelials Urine 2 (H) <=1 /HPF    Narrative    Urine Culture ordered based on laboratory criteria   CBC with platelets and differential     Status: Abnormal   Result Value Ref Range    WBC Count 23.7 (H) 4.0 - 11.0 10e3/uL    RBC Count 4.67 4.40 - 5.90 10e6/uL    Hemoglobin 14.4 13.3 - 17.7 g/dL    Hematocrit 43.2 40.0 - 53.0 %    MCV 93 78 - 100 fL    MCH 30.8 26.5 - 33.0 pg    MCHC 33.3 31.5 - 36.5 g/dL    RDW 13.4 10.0 - 15.0 %    Platelet Count 87 (L) 150 - 450 10e3/uL    % Neutrophils 90 %    % Lymphocytes 4 %    % Monocytes 5 %    % Eosinophils 0 %    % Basophils 0 %    % Immature Granulocytes 1 %    NRBCs per 100 WBC 0 <1 /100    Absolute Neutrophils 21.3 (H) 1.6 - 8.3 10e3/uL    Absolute Lymphocytes 1.0 0.8 - 5.3 10e3/uL    Absolute Monocytes 1.2 0.0 - 1.3 10e3/uL    Absolute Eosinophils 0.1 0.0 - 0.7 10e3/uL    Absolute Basophils 0.1 0.0 - 0.2 10e3/uL    Absolute Immature Granulocytes 0.1 <=0.4 10e3/uL     Absolute NRBCs 0.0 10e3/uL   CBC with platelets differential     Status: Abnormal    Narrative    The following orders were created for panel order CBC with platelets differential.  Procedure                               Abnormality         Status                     ---------                               -----------         ------                     CBC with platelets and ...[[id number]]  Abnormal            Final result               RBC and Platelet Morpho...[[id number]]                                                   Please view results for these tests on the individual orders.     Medications   amLODIPine (NORVASC) tablet 10 mg (has no administration in time range)   aspirin (ASA) chewable tablet 81 mg (has no administration in time range)   atorvastatin (LIPITOR) tablet 40 mg (has no administration in time range)   carvedilol (COREG) tablet 12.5 mg (has no administration in time range)   entecavir (BARACLUDE) tablet 1 mg (has no administration in time range)   furosemide (LASIX) half-tab 10 mg (has no administration in time range)   gabapentin (NEURONTIN) capsule 100 mg (has no administration in time range)   losartan (COZAAR) tablet 25 mg (has no administration in time range)   omeprazole (PriLOSEC) CR capsule 20 mg (has no administration in time range)   lidocaine 1 % 0.1-1 mL (has no administration in time range)   lidocaine (LMX4) cream (has no administration in time range)   sodium chloride (PF) 0.9% PF flush 3 mL ( Intracatheter Canceled Entry 3/13/25 1621)   sodium chloride (PF) 0.9% PF flush 3 mL (has no administration in time range)   senna-docusate (SENOKOT-S/PERICOLACE) 8.6-50 MG per tablet 1 tablet (has no administration in time range)     Or   senna-docusate (SENOKOT-S/PERICOLACE) 8.6-50 MG per tablet 2 tablet (has no administration in time range)   acetaminophen (TYLENOL) tablet 650 mg (has no administration in time range)     Or   acetaminophen (TYLENOL) Suppository 650 mg (has no  administration in time range)   oxyCODONE IR (ROXICODONE) half-tab 2.5 mg (has no administration in time range)   ondansetron (ZOFRAN ODT) ODT tab 4 mg (has no administration in time range)     Or   ondansetron (ZOFRAN) injection 4 mg (has no administration in time range)   cefTRIAXone (ROCEPHIN) 1 g vial to attach to  mL bag for ADULTS or NS 50 mL bag for PEDS (has no administration in time range)   cefTRIAXone (ROCEPHIN) 1 g vial to attach to  mL bag for ADULTS or NS 50 mL bag for PEDS (0 g Intravenous Stopped 3/13/25 1346)     Labs Ordered and Resulted from Time of ED Arrival to Time of ED Departure   COMPREHENSIVE METABOLIC PANEL - Abnormal       Result Value    Sodium 136      Potassium 4.3      Carbon Dioxide (CO2) 22      Anion Gap 11      Urea Nitrogen 32.0 (*)     Creatinine 2.47 (*)     GFR Estimate 28 (*)     Calcium 8.8      Chloride 103      Glucose 190 (*)     Alkaline Phosphatase 132      AST 24      ALT 27      Protein Total 6.0 (*)     Albumin 3.1 (*)     Bilirubin Total 1.0     ROUTINE UA WITH MICROSCOPIC REFLEX TO CULTURE - Abnormal    Color Urine Yellow      Appearance Urine Clear      Glucose Urine Negative      Bilirubin Urine Negative      Ketones Urine Negative      Specific Gravity Urine 1.014      Blood Urine Moderate (*)     pH Urine 6.5      Protein Albumin Urine 300 (*)     Urobilinogen Urine 2.0      Nitrite Urine Negative      Leukocyte Esterase Urine Moderate (*)     Bacteria Urine Moderate (*)     Mucus Urine Present (*)     RBC Urine 9 (*)     WBC Urine 52 (*)     Transitional Epithelials Urine 2 (*)    CBC WITH PLATELETS AND DIFFERENTIAL - Abnormal    WBC Count 23.7 (*)     RBC Count 4.67      Hemoglobin 14.4      Hematocrit 43.2      MCV 93      MCH 30.8      MCHC 33.3      RDW 13.4      Platelet Count 87 (*)     % Neutrophils 90      % Lymphocytes 4      % Monocytes 5      % Eosinophils 0      % Basophils 0      % Immature Granulocytes 1      NRBCs per 100 WBC 0       Absolute Neutrophils 21.3 (*)     Absolute Lymphocytes 1.0      Absolute Monocytes 1.2      Absolute Eosinophils 0.1      Absolute Basophils 0.1      Absolute Immature Granulocytes 0.1      Absolute NRBCs 0.0     INFLUENZA A/B, RSV AND SARS-COV2 PCR   URINE CULTURE   BLOOD CULTURE     CT Abdomen Pelvis w/o Contrast   Final Result   IMPRESSION:    1.  Hepatic cirrhosis with interval development of a 4.5 x 3.0 cm mass anterior aspect lateral segment left hepatic lobe. Recommend nonurgent MRI liver, correlation with serum alpha-fetoprotein level and possible liver consult.   2.  Mild bladder wall edema and perivesical fat edema have developed compatible with nonspecific cystitis.   3.  Faint amorphous calcification associated with the bladder dome mucosa has developed which is nonspecific but could indicate underlying blood products or neoplasm. Recommend CT urogram.   4.  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   5.  Large amount stool throughout the colon and rectum.   6.  Slight increase in the small volume pericardial effusion and development of mild interstitial edema and trace pleural fluid.            XR Chest 2 Views   Final Result   IMPRESSION: Stable chest radiograph without acute cardiopulmonary abnormality.             Critical care was not performed.     Medical Decision Making  The patient's presentation was of high complexity (an acute health issue posing potential threat to life or bodily function).    The patient's evaluation involved:  ordering and/or review of 3+ test(s) in this encounter (see separate area of note for details)    The patient's management necessitated high risk (a decision regarding hospitalization).    Assessment & Plan        I have reviewed the nursing notes. I have reviewed the findings, diagnosis, plan and need for follow up with the patient.        Final diagnoses:   Pyelonephritis, acute       Vladimir Cabezas MD  [facility] EMERGENCY  DEPARTMENT  3/13/2025     Vladimir Cabezas MD  03/13/25 5989

## 2025-03-14 ENCOUNTER — APPOINTMENT (OUTPATIENT)
Dept: SPEECH THERAPY | Facility: CLINIC | Age: 65
DRG: 872 | End: 2025-03-14
Payer: COMMERCIAL

## 2025-03-14 LAB
ACINETOBACTER SPECIES: NOT DETECTED
AFP SERPL-MCNC: 6.3 NG/ML
ANION GAP SERPL CALCULATED.3IONS-SCNC: 8 MMOL/L (ref 7–15)
BACTERIA UR CULT: NORMAL
BUN SERPL-MCNC: 33.4 MG/DL (ref 8–23)
CALCIUM SERPL-MCNC: 8.2 MG/DL (ref 8.8–10.4)
CHLORIDE SERPL-SCNC: 106 MMOL/L (ref 98–107)
CITROBACTER SPECIES: NOT DETECTED
CREAT SERPL-MCNC: 2.39 MG/DL (ref 0.67–1.17)
CTX-M: NOT DETECTED
EGFRCR SERPLBLD CKD-EPI 2021: 30 ML/MIN/1.73M2
ENTEROBACTER SPECIES: NOT DETECTED
ERYTHROCYTE [DISTWIDTH] IN BLOOD BY AUTOMATED COUNT: 13.5 % (ref 10–15)
ESCHERICHIA COLI: NOT DETECTED
GLUCOSE SERPL-MCNC: 94 MG/DL (ref 70–99)
HCO3 SERPL-SCNC: 21 MMOL/L (ref 22–29)
HCT VFR BLD AUTO: 36.2 % (ref 40–53)
HGB BLD-MCNC: 12.3 G/DL (ref 13.3–17.7)
IMP: NOT DETECTED
KLEBSIELLA OXYTOCA: NOT DETECTED
KLEBSIELLA PNEUMONIAE: DETECTED
KPC: NOT DETECTED
LACTATE SERPL-SCNC: 0.6 MMOL/L (ref 0.7–2)
MCH RBC QN AUTO: 31.5 PG (ref 26.5–33)
MCHC RBC AUTO-ENTMCNC: 34 G/DL (ref 31.5–36.5)
MCV RBC AUTO: 93 FL (ref 78–100)
NDM: NOT DETECTED
OXA (DETECTED/NOT DETECTED): NOT DETECTED
PLATELET # BLD AUTO: 74 10E3/UL (ref 150–450)
POTASSIUM SERPL-SCNC: 3.8 MMOL/L (ref 3.4–5.3)
PROTEUS SPECIES: NOT DETECTED
PSEUDOMONAS AERUGINOSA: NOT DETECTED
RBC # BLD AUTO: 3.9 10E6/UL (ref 4.4–5.9)
SODIUM SERPL-SCNC: 135 MMOL/L (ref 135–145)
VIM: NOT DETECTED
WBC # BLD AUTO: 16.3 10E3/UL (ref 4–11)

## 2025-03-14 PROCEDURE — 85014 HEMATOCRIT: CPT

## 2025-03-14 PROCEDURE — 36415 COLL VENOUS BLD VENIPUNCTURE: CPT

## 2025-03-14 PROCEDURE — 92610 EVALUATE SWALLOWING FUNCTION: CPT | Mod: GN

## 2025-03-14 PROCEDURE — 92526 ORAL FUNCTION THERAPY: CPT | Mod: GN

## 2025-03-14 PROCEDURE — 120N000002 HC R&B MED SURG/OB UMMC

## 2025-03-14 PROCEDURE — 99232 SBSQ HOSP IP/OBS MODERATE 35: CPT | Mod: GC

## 2025-03-14 PROCEDURE — 82435 ASSAY OF BLOOD CHLORIDE: CPT

## 2025-03-14 PROCEDURE — 83605 ASSAY OF LACTIC ACID: CPT

## 2025-03-14 PROCEDURE — 82565 ASSAY OF CREATININE: CPT

## 2025-03-14 PROCEDURE — 250N000011 HC RX IP 250 OP 636

## 2025-03-14 PROCEDURE — 82105 ALPHA-FETOPROTEIN SERUM: CPT

## 2025-03-14 PROCEDURE — 80048 BASIC METABOLIC PNL TOTAL CA: CPT

## 2025-03-14 PROCEDURE — 250N000013 HC RX MED GY IP 250 OP 250 PS 637

## 2025-03-14 RX ORDER — POLYETHYLENE GLYCOL 3350 17 G/17G
17 POWDER, FOR SOLUTION ORAL 2 TIMES DAILY
Status: DISCONTINUED | OUTPATIENT
Start: 2025-03-14 | End: 2025-03-16 | Stop reason: HOSPADM

## 2025-03-14 RX ORDER — NALOXONE HYDROCHLORIDE 0.4 MG/ML
0.2 INJECTION, SOLUTION INTRAMUSCULAR; INTRAVENOUS; SUBCUTANEOUS
Status: DISCONTINUED | OUTPATIENT
Start: 2025-03-14 | End: 2025-03-16 | Stop reason: HOSPADM

## 2025-03-14 RX ORDER — NALOXONE HYDROCHLORIDE 0.4 MG/ML
0.4 INJECTION, SOLUTION INTRAMUSCULAR; INTRAVENOUS; SUBCUTANEOUS
Status: DISCONTINUED | OUTPATIENT
Start: 2025-03-14 | End: 2025-03-16 | Stop reason: HOSPADM

## 2025-03-14 RX ORDER — CEFTRIAXONE 2 G/1
2 INJECTION, POWDER, FOR SOLUTION INTRAMUSCULAR; INTRAVENOUS EVERY 24 HOURS
Status: DISCONTINUED | OUTPATIENT
Start: 2025-03-14 | End: 2025-03-16

## 2025-03-14 RX ADMIN — AMLODIPINE BESYLATE 10 MG: 10 TABLET ORAL at 08:31

## 2025-03-14 RX ADMIN — OMEPRAZOLE 20 MG: 20 CAPSULE, DELAYED RELEASE ORAL at 08:33

## 2025-03-14 RX ADMIN — GABAPENTIN 100 MG: 100 CAPSULE ORAL at 08:31

## 2025-03-14 RX ADMIN — POLYETHYLENE GLYCOL 3350 17 G: 17 POWDER, FOR SOLUTION ORAL at 14:14

## 2025-03-14 RX ADMIN — CARVEDILOL 12.5 MG: 12.5 TABLET, FILM COATED ORAL at 08:34

## 2025-03-14 RX ADMIN — ASPIRIN 81 MG: 81 TABLET, CHEWABLE ORAL at 08:33

## 2025-03-14 RX ADMIN — ATORVASTATIN CALCIUM 40 MG: 40 TABLET, FILM COATED ORAL at 08:31

## 2025-03-14 RX ADMIN — CARVEDILOL 12.5 MG: 12.5 TABLET, FILM COATED ORAL at 17:28

## 2025-03-14 RX ADMIN — Medication 10 MG: at 09:47

## 2025-03-14 RX ADMIN — GABAPENTIN 100 MG: 100 CAPSULE ORAL at 14:14

## 2025-03-14 RX ADMIN — CEFTRIAXONE SODIUM 2 G: 2 INJECTION, POWDER, FOR SOLUTION INTRAMUSCULAR; INTRAVENOUS at 03:04

## 2025-03-14 RX ADMIN — ENTECAVIR 1 MG: 0.5 TABLET ORAL at 09:47

## 2025-03-14 RX ADMIN — LOSARTAN POTASSIUM 25 MG: 25 TABLET, FILM COATED ORAL at 08:33

## 2025-03-14 RX ADMIN — GABAPENTIN 100 MG: 100 CAPSULE ORAL at 21:46

## 2025-03-14 ASSESSMENT — ACTIVITIES OF DAILY LIVING (ADL)
ADLS_ACUITY_SCORE: 71
ADLS_ACUITY_SCORE: 73
ADLS_ACUITY_SCORE: 71
ADLS_ACUITY_SCORE: 56
ADLS_ACUITY_SCORE: 71
ADLS_ACUITY_SCORE: 73
ADLS_ACUITY_SCORE: 73
ADLS_ACUITY_SCORE: 71
ADLS_ACUITY_SCORE: 73
ADLS_ACUITY_SCORE: 73
ADLS_ACUITY_SCORE: 71
ADLS_ACUITY_SCORE: 73
ADLS_ACUITY_SCORE: 71
ADLS_ACUITY_SCORE: 71
ADLS_ACUITY_SCORE: 73
ADLS_ACUITY_SCORE: 73
ADLS_ACUITY_SCORE: 71

## 2025-03-14 NOTE — PLAN OF CARE
Goal Outcome Evaluation:      Plan of Care Reviewed With: patient    Overall Patient Progress: no changeOverall Patient Progress: no change         Shift: [id number]  VS:/78 (BP Location: Right arm)   Pulse 82   Temp 97.8  F (36.6  C) (Oral)   Resp 16   SpO2 96%     Pain: Denied pain  Neuro: Alert to self, disoriented to time,place and situation  Cardiac: WDL.   Respiratory: WDL. On RA  Diet/Appetite: Renal diet, easy to chew. Thin liquids  /GI: Continent of BM. LBM 3/13  LDA's: L PIV SL  Skin: No new skin issues noted.  Activity: w/c bound. Not oob this shift  Plan: Precautions maintained / Continue with plan of care. Call light within reach.Able to make needs known.

## 2025-03-14 NOTE — PROGRESS NOTES
[facility]   BRIEF PROGRESS NOTE    SUBJECTIVE  Called by bedside nurse and notified of critical result of blood culture positive 1 of 2 bottles for gram-negative bacilli which triggered a sepsis alert.    This patient meets Sepsis Predictive Model Criteria (more info) and may be septic.    Predictive Model Details          51 (High)  Factor Value    Calculated 3/14/2025 02:34 11% Platelet Count 87 10e3/uL    FV EARLY DETECTION OF SEPSIS VERSION 2 Model 9% WBC Count 23.7 10e3/uL     9% Rochelle Coma Scale 14     -8% Duration of Encounter .6 days     7% Albumin 3.1 g/dL     6% Age 65 years old     5% Creatinine 2.47 mg/dL     4% Bilirubin 1.0 mg/dL     4% Total Active Inpatient Meds 22     4% Has Imaging Procedure present        OBJECTIVE:    Most Recent Vitals:    Temp: 98.4  F (36.9  C) (03/14 0226)  Pulse: 80 (03/14 0226)  Oximeter Heart Rate: 78 bpm (03/14 0226)  Resp: 16 (03/14 0226)  BP: 110/78 (03/14 0226)    Vitals:    03/13/25 1654 03/13/25 2119 03/13/25 2137 03/14/25 0226   BP: 108/68 130/83 131/79 110/78   BP Location:   Right arm Right arm   Patient Position:   Semi-Alvarado's Semi-Alvarado's   Cuff Size:   Adult Regular Adult Regular   Pulse: 71 74 75 80   Resp: 18 18 18 16   Temp: 98  F (36.7  C) 98.1  F (36.7  C) 97.6  F (36.4  C) 98.4  F (36.9  C)   TempSrc: Oral Oral Oral Oral   SpO2: 97% 97% 98% 97%        Lab Results   Component Value Date    WBC 23.7 03/13/2025    WBC 5.3 06/30/2021     30-Day Micro Results       Collected Updated Procedure Result Status      03/13/2025 1656 03/13/2025 1757 Influenza A/B, RSV and SARS-CoV2 PCR (COVID-19) Nasopharyngeal [[id number]]    Swab from Nasopharyngeal    Final result Component Value   Influenza A PCR Negative   Influenza B PCR Negative   RSV PCR Negative   SARS CoV2 PCR Negative   NEGATIVE: SARS-CoV-2 (COVID-19) RNA not detected, presumed negative.            03/13/2025 1232 03/14/2025 0213 Blood Culture Peripheral Blood [[id number]]    (Abnormal)   Peripheral Blood    Preliminary result Component Value   Culture Positive on the 1st day of incubation  [P]     Gram negative bacilli  [P]     1 of 2 bottles               03/13/2025 1232 03/14/2025 0212 Verigene GN Panel [[id number]]   Peripheral Blood    In process Component Value   No component results            03/13/2025 1201 03/13/2025 1220 Urine Culture [[id number]]   Urine, Hawkins Catheter    In process Component Value   No component results                           ASSESSMENT/PLAN:    # Gram-negative bacteremia  # Complicated UTI with concern for pyelonephritis  # Leukocytosis  Currently being treated with IV ceftriaxone 1 g daily for a complicated UTI and presumed pyelonephritis.  Preliminary blood culture results reveal Gram negative bacilli 1 of 2 bottles.  Vital signs are stable and does not meet SIRS criteria.  Low risk for Pseudomonas and has not had prior positive Pseudomonas culture findings.  Therefore, will not broaden coverage to include antipseudomonal agents.  However given the presence of bacteremia we will change ceftriaxone dosing to 2 g daily.      Please see daily rounding note for full A/P.  Rachel Stover MD  2:46 AM

## 2025-03-14 NOTE — PROGRESS NOTES
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     # Gram negative bacteremia with Klebsiella pneumoniae  # Complicated cystitis  # Bladder dome calcification  Pt presented w/concerns of several days of dysuria, lower belly pain, and malaise. Work-up in the ED w/UA concerning for infection & marked leukocytosis to 23.7; my exam was positive for lower abdominal tenderness but negative for CVA tenderness. Patient is a poor historian, but otherwise denied any other localizing symptoms. CT A/P w/evidence of cystitis & nonspecific calcification of the bladder dome mucosa but no evidence of pyelonephritis. On the first day of incubation, blood culture results returned positive for gram negative bacilli, verigene positive for Klebsiella pneumoniae, but not meeting sepsis criteria.  - Awaiting urine culture; 3/13 Blood Clx w/gram negative bacilli (1/2 bottles)  - Recommend outpatient CT urogram & urology referral  - Antibiotics: CTX 1g IV daily  - Pain: Tylenol PRN, PTA Gabapentin 100mg TID, Oxycodone 2.5mg PO q4h PRN for severe pain  - Nausea: Zofran PRN  - Constipation: Miralax scheduled; Senna PRN  - Labs: BMP, CBC daily  - Nursing: Vitals q8h     # Cryoglobulinemic glomerulonephritis  # Presumed ANDRY on CKD Stage 3b  Cryoglobulinemic glomerulonephritis initially diagnosed via biopsy in 2015, presumed secondary to known chronic HBV infection. Baseline Cr ~2.1; elevated to 2.47 on admission. Not appearing volume-down on exam,  will defer IVF for now.  - Strict I's & O's  - daily weights  - Renal diet (non-dialysis)  - PTA Furosemide 10mg daily     # Hx of CVA (2020)  # Chronic dysphagia  # Chronic incontinence of bladder and bowel  # Concern for cognitive impairment  # Wheelchair user  Exam concerning for cognitive impairment; was unable to recall the name of his daughter who came with him to the ED. Answers were short and non-elaborative. Exam was positive for some moderate right sided deficits, consistent with history gathered from chart review.  - SLP consult, assessment of most appropriate diet in setting of known dysphagia  - Dietician consult, concern for hx of weight loss with possible malnutrition  - May need OT consult for cognitive eval prior to discharge  - PTA ASA 81mg daily     # Chronic hepatitis B  # HBV cirrhosis  # Hepatic mass  LFTs in normal range on admission, chronic hep B has been present for years. Well managed on entecavir, but appears to have been lost to follow-up over the last 2 years; last GI visit was in 2023. CT A/P w/new 4.5 x 3.0 cm mass anterior aspect lateral segment left hepatic lobe.  - Recommend outpatient hepatic MRI  - Recommend follow-up with patient's established GI team  - Serum AFP   - PTA Entecavir 1mg every other day (per patient report; last GI note recommended daily)     Chronic/Stable:  # HTN: PTA Amlodipine 10mg daily, Carvedilol 12.5mg daily, Losartan 25mg daily  # HLD: PTA Atorvastatin 40mg daily  # GERD: Pantoprazole 40mg daily (PTA Omeprazole 20mg daily)       Diet: Renal Diet (non-dialysis)    DVT Prophylaxis: Pneumatic Compression Devices  Hawkins Catheter: Not present  Fluids: PO  Lines: None     Cardiac Monitoring: None  Code Status: Full Code      Clinically Significant Risk Factors Present on Admission               # Hypoalbuminemia: Lowest albumin = 3.1 g/dL at 3/13/2025 11:40 AM, will monitor as appropriate   # Drug Induced Platelet Defect: home medication list includes an  antiplatelet medication   # Hypertension: Noted on problem list                      Disposition Plan     Expected Discharge Date: 03/14/2025                The patient's care was discussed with the Attending Physician, Dr. Bryan.    DO [facility]  [facility]  Securely message with Vocera (more info)  Text page via [facility] Paging/Directory   See signed in provider for up to date coverage information  ______________________________________________________________________    Interval History   Overnight:  Triggered sepsis protocol w/positive blood culture; otherwise not meeting SIRS criteria    Today: No new concerns, symptoms, worries, or complaints on exam today. Still having dysuria and slightly tender lower abdomen.    Physical Exam   Vital Signs: Temp: 98.2  F (36.8  C) Temp src: Oral BP: 123/81 Pulse: 79   Resp: 16 SpO2: 95 % O2 Device: None (Room air)    Weight: 0 lbs 0 oz  Vitals reviewed  Constitutional: Awake, alert, in NAD. Resting in bed.  Eyes: Sclera without jaundice or injection. Conjunctiva without pallor, erythema, or drainage. PERRLA, EOM intact.  HENT: NCAT, oral cavity with MMM and without lesions.  Respiratory: Lungs CTAB without crackles, wheezing, rales, or increased work of breathing.  Cardiovascular: RRR without murmurs or extra sounds, radial pulses 2+ bilaterally. Cap refill <2 sec.  Abdomen: Soft, non-distended, with positive bowel sounds.  Tenderness to moderate palpation of the lower abdomen/suprapubic region.  Skin: Warm & dry, without lesions, erythema, rashes, or ecchymosis.  Musculoskeletal: Extremities negative for redness, lesions, or bony tenderness.  There is some trace, nonpitting edema present on the bilateral lower extremities. No CVA tenderness.  Neurologic: No gross focal deficit, cranial nerves II-XII largely intact evidence of right-sided weakness in the face and moderate  contracture in the right hand, is consistent with baseline based on chart review.  Psychiatric: Alert & calm with appropriate affect and mood, however cognition and memory do appear to be impaired.    Medical Decision Making   Please see A&P for additional details of medical decision making    Data   ------------------------- PAST 24 HR DATA REVIEWED -----------------------------------------------    I have personally reviewed the following data over the past 24 hrs:    16.3 (H)  \   12.3 (L)   / 74 (L)     135 106 33.4 (H) /  94   3.8 21 (L) 2.39 (H) \     Procal: N/A CRP: N/A Lactic Acid: 0.6 (L)         Imaging results reviewed over the past 24 hrs:   Recent Results (from the past 24 hours)   XR Chest 2 Views    Narrative    EXAM: XR CHEST 2 VIEWS  LOCATION: [facility]  DATE: 3/13/2025    INDICATION: Fever, leukocytosis  COMPARISON: Chest radiograph 9/12/2024      Impression    IMPRESSION: Stable chest radiograph without acute cardiopulmonary abnormality.   CT Abdomen Pelvis w/o Contrast    Narrative    EXAM: CT ABDOMEN PELVIS W/O CONTRAST  LOCATION: [facility]  DATE: 3/13/2025    INDICATION: Concern for pyelonephritis vs other in setting of positive UTI and leukocytosis  COMPARISON: CT CAP 7/3/2024  TECHNIQUE: CT scan of the abdomen and pelvis was performed without IV contrast. Multiplanar reformats were obtained. Dose reduction techniques were used.  CONTRAST: None.    FINDINGS:   LOWER CHEST: Heart size normal with slight increase in the small volume pericardial effusion. Advanced emphysematous change visualized lungs. Mild interstitial edema and trace pleural fluid.    HEPATOBILIARY: Hepatic cirrhosis with interval development of a 4.5 x 3.0 cm mass anterior aspect lateral segment left hepatic lobe (image 43 of series 5) gallbladder and bile ducts unremarkable.    PANCREAS: Normal.    SPLEEN: Spleen size within normal  limits.    ADRENAL GLANDS: Normal.    KIDNEYS/BLADDER: Mild bladder wall edema and perivesical fat edema have developed compatible with nonspecific cystitis. Faint amorphous calcification associated with the bladder dome mucosa has developed which is nonspecific but could indicate underlying   blood products or neoplasm (series 5 axial image 156, sagittal image 52). 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 The duplicated ureters joining one another just proximal to the bladder. Kidneys, ureters and bladder otherwise   normal.    BOWEL: No ascites. Large amount stool throughout the colon and rectum. No bowel obstruction or inflammatory change.    LYMPH NODES: No lymphadenopathy.    VASCULATURE: Moderate calcified atheromatous plaque throughout the normal caliber abdominal aorta. Probable recanalization of the small caliber umbilical vein.    PELVIC ORGANS: Unremarkable.    MUSCULOSKELETAL: Bones are demineralized. No bone lesion or fracture.      Impression    IMPRESSION:   1.  Hepatic cirrhosis with interval development of a 4.5 x 3.0 cm mass anterior aspect lateral segment left hepatic lobe. Recommend nonurgent MRI liver, correlation with serum alpha-fetoprotein level and possible liver consult.  2.  Mild bladder wall edema and perivesical fat edema have developed compatible with nonspecific cystitis.  3.  Faint amorphous calcification associated with the bladder dome mucosa has developed which is nonspecific but could indicate underlying blood products or neoplasm. Recommend CT urogram.  4.  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  5.  Large amount stool throughout the colon and rectum.  6.  Slight increase in the small volume pericardial effusion and development of mild interstitial edema and trace pleural fluid.

## 2025-03-14 NOTE — PROGRESS NOTES
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     SUBJECTIVE INFORMATION  Assessed patient in room. Used  services to facilitate assessment. When asked how the pt's appetite was he broke out laughing. He reported eating 100% of 3 meals daily (discrepancy from nursing documentation). Pt had food left on tray from lunch which was ~50% eaten. Pt was open to trying Jazmin Drew renal supplement because it does not have any milk in it.      NUTRITION HISTORY  Pt did not discuss intake PTA.     CURRENT NUTRITION ORDERS  Diet: Renal (non-dialysis)    CURRENT INTAKE/TOLERANCE  Pt has only been admitted for one day, but ordered 1298 kcal and 52 g protein     LABS  Nutrition-relevant labs: Reviewed    MEDICATIONS  Nutrition-relevant medications: Reviewed    ANTHROPOMETRICS  Height: 180.3 cm (5' 11\")  Most Recent Weight:  63.5 kg (140 lb) stated wt?  IBW: 78.2 kg  % IBW: 81%  BMI (kg/m ): Normal BMI  Weight History:   Wt Readings from Last 15 Encounters:   01/09/25 63.5 kg (140 lb)   06/06/24 53.7 kg (118 lb 6.4 oz)   01/19/24 52.5 kg (115 lb 12.8 oz)   12/22/23 50.7 kg (111 lb 11.2 oz)   12/13/23 50.6 kg (111 lb 8 oz)   10/05/23 63.5 kg (140 lb)   04/04/23 55.3 kg (121 lb 14.4 oz)   01/13/23 54.5 kg (120 lb 1.6 oz) "   09/30/22 54.9 kg (121 lb)   09/08/21 49.9 kg (110 lb)   03/12/21 47.6 kg (105 lb)   03/03/21 49.4 kg (108 lb 14.4 oz)   02/22/21 46.3 kg (102 lb)   12/15/20 45 kg (99 lb 1.6 oz)   12/02/20 44.7 kg (98 lb 8 oz)     Weight trending up since 2023    Dosing Weight: 63.5 kg, based on actual wt    ASSESSED NUTRITION NEEDS  Estimated Energy Needs: 2929-0996 kcals/day (30 - 35 kcals/kg)  Justification: Increased needs  Estimated Protein Needs: 51-64 grams protein/day (0.8 - 1 grams of pro/kg)  Justification:  glomerulonephritis + CKD  Estimated Fluid Needs: 25 - 30 mL/kg  Justification: Maintenance and Per provider pending fluid status    SYSTEM FINDINGS    Skin/wounds: No concerns   GI symptoms: Nausea, constipation     MALNUTRITION  % Intake: Unable to assess  % Weight Loss: None noted  Subcutaneous Fat Loss: Buccal: Mild and Triceps: Mild  Muscle Loss: Temples (temporalis muscle): Moderate, Clavicles (pectoralis and deltoids): Mild, and Calf (gastrocnemius): Mild  Fluid Accumulation/Edema: None noted  Malnutrition Diagnosis: Moderate malnutrition in the context of chronic illness  Malnutrition Present on Admission: Yes    NUTRITION DIAGNOSIS  Inadequate oral intake related to increased needs as evidenced by patient weight history and signs of fat and muscle loss.     INTERVENTIONS  Medical food supplement therapy    Goals  Patient to consume % of nutritionally adequate meal trays TID, or the equivalent with supplements/snacks.     Monitoring/Evaluation  Progress toward goals will be monitored and evaluated per policy.    Rosangela Kaufman PhD, RD, LD  Clinical Dietitian II  Available by name via [employer]  Weekend/Holiday Vocera: Weekend Holiday Clinical Dietitian [[employer]]

## 2025-03-14 NOTE — PROGRESS NOTES
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   General Observations Sleeping upon arrival, but woke to voice       Present yes   Language [place]i via vocera   Type of Evaluation   Type of Evaluation Swallow Evaluation   Oral Motor   Oral Musculature generally intact   Structural Abnormalities none present   Mucosal Quality adequate   Dentition (Oral Motor)   Dentition (Oral Motor) adequate dentition;natural dentition   Facial Symmetry (Oral Motor)   Facial Symmetry (Oral Motor) right side impairment   Right Side Facial Asymmetry minimal impairment   Lip Function (Oral Motor)   Lip Range of Motion (Oral Motor) protrusion impairment;retraction impairment   Protrusion, Lip Range of Motion right side;minimal impairment   Retraction, Lip Range of Motion right side;minimal impairment   Tongue Function (Oral Motor)   Tongue Coordination/Speed (Oral Motor) reduced rate   Tongue ROM (Oral Motor)   (WFL)   Jaw Function (Oral Motor)   Jaw Function (Oral Motor) WNL   Vocal Quality/Secretion Management (Oral Motor)   Vocal Quality (Oral Motor) WFL;hypophonic   Secretion Management (Oral Motor) WNL   General Swallowing Observations   Past History of Dysphagia Pt denied speech therapy hx or difficulty swallowing. Per chart review, 7/2020 - CSE completed after his CVA recommending NPO, progressing " throughout therapy to NDD3 and thin liquids diet with noted minimal attempts to communicate and recommended ongoing speech therapy upon discharge 8/2020. Unsure if ongoing speech therapy was completed.   Comment, General Swallowing Observations Chest XR 3/13/2025 - Stable chest radiograph without acute cardiopulmonary abnormality. RN denied any concerns or overt s/s of aspiration with PO.   Respiratory Support room air   Current Diet/Method of Nutritional Intake (General Swallowing Observations, NIS) regular diet;thin liquids (level 0)   Swallowing Evaluation Clinical swallow evaluation   Clinical Swallow Evaluation   Feeding Assistance minimal assistance required   Clinical Swallow Evaluation Textures Trialed thin liquids;pureed;solid foods   Clinical Swallow Eval: Thin Liquid Texture Trial   Mode of Presentation, Thin Liquids cup;straw;self-fed   Volume of Liquid or Food Presented 4 oz   Oral Phase of Swallow WFL   Pharyngeal Phase of Swallow intact   Diagnostic Statement No overt s/s of aspiration across sequential straw sips.   Clinical Swallow Evaluation: Puree Solid Texture Trial   Mode of Presentation, Puree spoon;self-fed   Volume of Puree Presented ~1.5 oz   Oral Phase, Puree WFL   Pharyngeal Phase, Puree intact   Diagnostic Statement No overt s/s of aspiration. Pt denied globus sensation.   Clinical Swallow Evaluation: Solid Food Texture Trial   Mode of Presentation self-fed   Volume Presented 1 cracker   Oral Phase residue in oral cavity;other (see comments)  (prolonged mastication)   Oral Residue mid posterior tongue  (moderate)   Pharyngeal Phase intact   Successful Strategies Trialed During Procedure other (see comments)  (liquid wash)   Diagnostic Statement No overt s/s of aspiration. Pt with prolonged mastication and mild-moderate oral residue, which cleared with cued liquid wash.   Swallowing Recommendations   Diet Consistency Recommendations easy to chew (level 7);thin liquids (level 0)    Supervision Level for Intake distant supervision needed  (tray set-up)   Mode of Delivery Recommendations bolus size, small;slow rate of intake   Postural Recommendations none   Swallowing Maneuver Recommendations alternate food and liquid intake   Monitoring/Assistance Required (Eating/Swallowing) check mouth frequently for oral residue/pocketing;stop eating activities when fatigue is present;monitor for cough or change in vocal quality with intake   Recommended Feeding/Eating Techniques (Swallow Eval) maintain upright sitting position for eating;minimize distractions during oral intake;set-up and prepare tray   Medication Administration Recommendations, Swallowing (SLP) as tolerated   Instrumental Assessment Recommendations instrumental evaluation not recommended at this time   General Therapy Interventions   Planned Therapy Interventions Dysphagia Treatment   Dysphagia treatment Modified diet education;Instruction of safe swallow strategies;Compensatory strategies for swallowing   Clinical Impression   Criteria for Skilled Therapeutic Interventions Met (SLP Eval) Yes, treatment indicated   SLP Diagnosis Mild oropharyngeal dysphagia   Risks & Benefits of therapy have been explained evaluation/treatment results reviewed;care plan/treatment goals reviewed;risks/benefits reviewed;current/potential barriers reviewed;participants included;patient;participants voiced agreement with care plan  (Suspect pt will benefit from ongoing reinforcement)   Clinical Impression Comments Clinical Swallow Evaluation completed per MD orders. Oral motor function was notable for mild R facial weakness, but WFL. Patient's voice was hypophonic. Patient had no overt s/s aspiration with any textures trialed: thin liquids, puree, or regular solids. Mastication was prolonged, but functional, and complete. Mild-moderate oral residue noted with regular solids, which fully cleared with cued liquid wash.  Recommend easy to chew diet and thin  liquids with tray set-up and check in supervision. Patient should be sitting fully upright & alert and take small bites/sips at a slow rate. SLP will follow for dysphagia management.   SLP Total Evaluation Time   Eval: oral/pharyngeal swallow function, clinical swallow Minutes (88035) 12   SLP Discharge Planning   SLP Plan diet f/u, trial regular textures as appropriate, train strategies   SLP Discharge Recommendation home with assist;home with supervision   SLP Rationale for DC Rec Suspect pt at/near baseline swallowing function, but warrants short course of speech therapy for dysphagia management.   SLP Brief overview of current status  Recommend easy to chew diet and thin liquids with tray set-up and check in supervision. Patient should be sitting fully upright & alert and take small bites/sips at a slow rate. SLP will follow for dysphagia management.

## 2025-03-14 NOTE — PLAN OF CARE
Admission Note    Reason for admission: Pyelonephritis   Primary team notified of pt arrival.  Admitted from: ED  Via: Stretcher.  Accompanied by: Niece and Transport staff.   Belongings: Placed in closet.  Admission Required Doc Completed: No  Mobility Devices Utilized by Patient Provided (i.e. walker, wheelchair, etc.): NA  Teaching: Orientation to unit and call light- call light within reach, use of console, meal times, when to call for the RN, and enforced importance of safety.  IV Access: L PIV SL.  Telemetry: No  Ht./Wt.: Completed  Code Status verified on armband: Yes  2 RN Skin Assessment Completed with: Somo  Suction/Ambu bag/Flowmeter at bedside: Yes.      Goal Outcome Evaluation:      Plan of Care Reviewed With: patient    Overall Patient Progress: no change    VS: Temp: 98.4  F (36.9  C) Temp src: Oral BP: 110/78 Pulse: 80   Resp: 16 SpO2: 97 % O2 Device: None (Room air)      O2: SpO2 > 95% and stable on RA. LS clear and equal bilaterally. Denies chest pain and SOB.    Output: Pt is incontinent to bladder, brief on.    Last BM: 3/13/2025, denies abdominal discomfort. Pt is continent to bowel per pt family, but needs to use commode or bathroom with 2 side rails. BS active& passing flatus.    Activity: Pt is not OOB this shift. Ax1 with care. Pt is able to turn very well. Pt was reminded to reposition himself q2hr.    Skin: WDL    Pain: Pt denied pain.    CMS: Aox2 disoriented to time and situation. Denies numbness and tingling.   Dressing: None   Diet: Renal diet. Denies nausea/vomiting. Family brought pt food and pt ate half of the food.   Pt on strict I&O's.     LDA: L PIV SL.   Equipment: IV pole, personal belongings, Commode.   Plan: Continue with plan of care. Call light within reach, pt able to make needs known.    Additional Info: Pt is [place] speaking and needs .

## 2025-03-15 LAB
ANION GAP SERPL CALCULATED.3IONS-SCNC: 12 MMOL/L (ref 7–15)
BUN SERPL-MCNC: 31.1 MG/DL (ref 8–23)
CALCIUM SERPL-MCNC: 8.3 MG/DL (ref 8.8–10.4)
CHLORIDE SERPL-SCNC: 105 MMOL/L (ref 98–107)
CREAT SERPL-MCNC: 2.35 MG/DL (ref 0.67–1.17)
EGFRCR SERPLBLD CKD-EPI 2021: 30 ML/MIN/1.73M2
ERYTHROCYTE [DISTWIDTH] IN BLOOD BY AUTOMATED COUNT: 13.2 % (ref 10–15)
GLUCOSE SERPL-MCNC: 82 MG/DL (ref 70–99)
HCO3 SERPL-SCNC: 21 MMOL/L (ref 22–29)
HCT VFR BLD AUTO: 37 % (ref 40–53)
HGB BLD-MCNC: 12.6 G/DL (ref 13.3–17.7)
MCH RBC QN AUTO: 31.7 PG (ref 26.5–33)
MCHC RBC AUTO-ENTMCNC: 34.1 G/DL (ref 31.5–36.5)
MCV RBC AUTO: 93 FL (ref 78–100)
PLATELET # BLD AUTO: 81 10E3/UL (ref 150–450)
POTASSIUM SERPL-SCNC: 3.9 MMOL/L (ref 3.4–5.3)
RBC # BLD AUTO: 3.98 10E6/UL (ref 4.4–5.9)
SODIUM SERPL-SCNC: 138 MMOL/L (ref 135–145)
WBC # BLD AUTO: 7.7 10E3/UL (ref 4–11)

## 2025-03-15 PROCEDURE — 120N000002 HC R&B MED SURG/OB UMMC

## 2025-03-15 PROCEDURE — 80048 BASIC METABOLIC PNL TOTAL CA: CPT

## 2025-03-15 PROCEDURE — 85049 AUTOMATED PLATELET COUNT: CPT

## 2025-03-15 PROCEDURE — 250N000013 HC RX MED GY IP 250 OP 250 PS 637

## 2025-03-15 PROCEDURE — 250N000011 HC RX IP 250 OP 636

## 2025-03-15 PROCEDURE — 85014 HEMATOCRIT: CPT

## 2025-03-15 PROCEDURE — 36415 COLL VENOUS BLD VENIPUNCTURE: CPT

## 2025-03-15 PROCEDURE — 82565 ASSAY OF CREATININE: CPT

## 2025-03-15 RX ADMIN — ATORVASTATIN CALCIUM 40 MG: 40 TABLET, FILM COATED ORAL at 08:39

## 2025-03-15 RX ADMIN — CARVEDILOL 12.5 MG: 12.5 TABLET, FILM COATED ORAL at 08:39

## 2025-03-15 RX ADMIN — AMLODIPINE BESYLATE 10 MG: 10 TABLET ORAL at 08:39

## 2025-03-15 RX ADMIN — Medication 10 MG: at 08:39

## 2025-03-15 RX ADMIN — POLYETHYLENE GLYCOL 3350 17 G: 17 POWDER, FOR SOLUTION ORAL at 08:39

## 2025-03-15 RX ADMIN — CEFTRIAXONE SODIUM 2 G: 2 INJECTION, POWDER, FOR SOLUTION INTRAMUSCULAR; INTRAVENOUS at 03:40

## 2025-03-15 RX ADMIN — GABAPENTIN 100 MG: 100 CAPSULE ORAL at 08:39

## 2025-03-15 RX ADMIN — GABAPENTIN 100 MG: 100 CAPSULE ORAL at 20:25

## 2025-03-15 RX ADMIN — LOSARTAN POTASSIUM 25 MG: 25 TABLET, FILM COATED ORAL at 08:39

## 2025-03-15 RX ADMIN — GABAPENTIN 100 MG: 100 CAPSULE ORAL at 13:59

## 2025-03-15 RX ADMIN — OMEPRAZOLE 20 MG: 20 CAPSULE, DELAYED RELEASE ORAL at 08:39

## 2025-03-15 RX ADMIN — ASPIRIN 81 MG: 81 TABLET, CHEWABLE ORAL at 08:39

## 2025-03-15 RX ADMIN — CARVEDILOL 12.5 MG: 12.5 TABLET, FILM COATED ORAL at 18:05

## 2025-03-15 ASSESSMENT — ACTIVITIES OF DAILY LIVING (ADL)
ADLS_ACUITY_SCORE: 77
ADLS_ACUITY_SCORE: 73
ADLS_ACUITY_SCORE: 77
ADLS_ACUITY_SCORE: 73
ADLS_ACUITY_SCORE: 77
ADLS_ACUITY_SCORE: 77
ADLS_ACUITY_SCORE: 81
ADLS_ACUITY_SCORE: 77
ADLS_ACUITY_SCORE: 81
ADLS_ACUITY_SCORE: 77
ADLS_ACUITY_SCORE: 73
ADLS_ACUITY_SCORE: 77

## 2025-03-15 NOTE — PLAN OF CARE
Goal Outcome Evaluation:    Patient is alert, intermittent confusion, oriented to self.     Denied pain, chest pain, SOB, or N/V.    Bed rest, no OOB. Ax1-2 to repostioning in the bed. Incontinent of B/B, brief on. LBM 3/14.     Able to feed self and takes pills as whole with water.     L PIV, SL between IV Abx.     Family visited this PM and talked with provider.     Cont of POC.

## 2025-03-15 NOTE — PROGRESS NOTES
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     # Gram negative bacteremia with Klebsiella pneumoniae  # Complicated cystitis  # Bladder dome calcification  Pt presented w/concerns of several days of dysuria, lower belly pain, and malaise. Work-up in the ED w/UA concerning for infection & marked leukocytosis to 23.7; my exam was positive for lower abdominal tenderness but negative for CVA tenderness. Patient is a poor historian, but otherwise denied any other localizing symptoms. CT A/P w/evidence of cystitis & nonspecific calcification of the bladder dome mucosa but no evidence of pyelonephritis. On the first day of incubation, blood culture results returned positive for gram negative bacilli, verigene positive for Klebsiella pneumoniae, but not meeting sepsis criteria. Urine culture with urogenital tashi.  - 3/13 Blood Clx w/gram negative bacilli (1/2 bottles)  - Recommend outpatient CT urogram & urology referral  - Antibiotics: CTX 1g IV daily  - Pain: Tylenol PRN, PTA Gabapentin 100mg TID, Oxycodone 2.5mg PO q4h PRN for severe pain  - Nausea: Zofran PRN  - Constipation: Miralax scheduled; Senna PRN  - Labs: BMP, CBC daily  - Nursing: Vitals q8h     # Cryoglobulinemic glomerulonephritis  # Presumed ANDRY on CKD Stage 3b  Cryoglobulinemic glomerulonephritis initially diagnosed via biopsy in 2015, presumed secondary to known chronic HBV infection. Baseline Cr ~2.1; elevated to 2.47 on admission. Not appearing  volume-down on exam, will defer IVF for now.  - Strict I's & O's  - daily weights  - Renal diet (non-dialysis)  - PTA Furosemide 10mg daily     # Hx of CVA (2020)  # Chronic dysphagia  # Chronic incontinence of bladder and bowel  # Concern for cognitive impairment  # Wheelchair user  Exam concerning for cognitive impairment; was unable to recall the name of his daughter who came with him to the ED. Answers were short and non-elaborative. Exam was positive for some moderate right sided deficits, consistent with history gathered from chart review.  - SLP consulted recommended easy to chew and thin liquid diet  - Dietician consult, recommended [employer] renal supplementation between meals  - May need OT consult for cognitive eval prior to discharge  - PTA ASA 81mg daily     # Chronic hepatitis B  # HBV cirrhosis  # Hepatic mass  LFTs in normal range on admission, chronic hep B has been present for years. Well managed on entecavir, but appears to have been lost to follow-up over the last 2 years; last GI visit was in 2023. CT A/P w/new 4.5 x 3.0 cm mass anterior aspect lateral segment left hepatic lobe.  - Recommend outpatient hepatic MRI  - Recommend follow-up with patient's established GI team  - Serum AFP   - PTA Entecavir 1mg every other day (per patient report; last GI note recommended daily)     Chronic/Stable:  # HTN: PTA Amlodipine 10mg daily, Carvedilol 12.5mg daily, Losartan 25mg daily  # HLD: PTA Atorvastatin 40mg daily  # GERD: Pantoprazole 40mg daily (PTA Omeprazole 20mg daily)       Diet: Snacks/Supplements Adult: Other; [employer] renal support; Between Meals  Combination Diet Renal Diet (non-dialysis); Easy to Chew (level 7); Thin Liquids (level 0)    DVT Prophylaxis: Pneumatic Compression Devices  Hawkins Catheter: Not present  Fluids: PO  Lines: None     Cardiac Monitoring: None  Code Status: Full Code      Clinically Significant Risk Factors               # Hypoalbuminemia: Lowest albumin = 3.1 g/dL at  3/13/2025 11:40 AM, will monitor as appropriate   # Thrombocytopenia: Lowest platelets = 74 in last 2 days, will monitor for bleeding   # Hypertension: Noted on problem list             # Moderate Malnutrition: based on nutrition assessment and treatment provided per dietitian's recommendations., PRESENT ON ADMISSION          Disposition Plan         The patient's care was discussed with the Attending Physician, Dr. Bryan.    Tara Winston MD  [facility]  [facility]  Securely message with Vocera (more info)  Text page via [employer] Paging/Directory   See signed in provider for up to date coverage information  ______________________________________________________________________    Interval History   Overnight:  no new events    Today: No new concerns, symptoms, worries, or complaints on exam today. No dysuria or abdominal pain.    Physical Exam   Vital Signs: Temp: 98.7  F (37.1  C) Temp src: Oral BP: 129/75 Pulse: 93   Resp: 18 SpO2: 96 % O2 Device: None (Room air)    Weight: 0 lbs 0 oz  Vitals reviewed  Constitutional: Awake, alert, in NAD. Resting in bed. Somnolent this AM  Eyes: Sclera without jaundice or injection. Conjunctiva without pallor, erythema, or drainage. PERRLA, EOM intact.  HENT: NCAT, oral cavity with MMM and without lesions.  Respiratory: Lungs CTAB without crackles, wheezing, rales, or increased work of breathing.  Cardiovascular: RRR without murmurs or extra sounds, radial pulses 2+ bilaterally. Cap refill <2 sec.  Abdomen: Soft, non-distended, with positive bowel sounds.  No tenderness to palpation  Skin: Warm & dry, without lesions, erythema, rashes, or ecchymosis.  Musculoskeletal: Extremities negative for redness, lesions, or bony tenderness.  There is some trace, nonpitting edema present on the bilateral lower extremities. No CVA tenderness.  Neurologic: No gross focal deficit, cranial nerves II-XII largely intact evidence  of right-sided weakness in the face and moderate contracture in the right hand, is consistent with baseline based on chart review.  Psychiatric: Alert & calm with appropriate affect and mood, however cognition and memory do appear to be impaired.    Medical Decision Making   Please see A&P for additional details of medical decision making    Data   ------------------------- PAST 24 HR DATA REVIEWED -----------------------------------------------    I have personally reviewed the following data over the past 24 hrs:    7.7  \   12.6 (L)   / 81 (L)     138 105 31.1 (H) /  82   3.9 21 (L) 2.35 (H) \       Imaging results reviewed over the past 24 hrs:   No results found for this or any previous visit (from the past 24 hours).

## 2025-03-15 NOTE — PLAN OF CARE
Goal Outcome Evaluation:      Plan of Care Reviewed With: patient    Overall Patient Progress: no changeOverall Patient Progress: no change     Vitals-Blood pressure 133/86, pulse 91, temperature 99.1  F (37.3  C), temperature source Oral, resp. rate 16, SpO2 99%.  Room air   Alert to self and situation. Denies pain. Incontinent of B/B. LBM 3/14. Wheelchair bound, not OOB. Repositioning done.  Renal diet, easy to chew, thin liquid. L PIV sl  Colombian speaking, needs .      On IV antibiotic, plan TBD.   BED ALARM ON

## 2025-03-16 VITALS
HEART RATE: 71 BPM | SYSTOLIC BLOOD PRESSURE: 138 MMHG | DIASTOLIC BLOOD PRESSURE: 95 MMHG | OXYGEN SATURATION: 95 % | TEMPERATURE: 98.1 F | RESPIRATION RATE: 18 BRPM

## 2025-03-16 LAB
ANION GAP SERPL CALCULATED.3IONS-SCNC: 12 MMOL/L (ref 7–15)
BACTERIA BLD CULT: ABNORMAL
BACTERIA BLD CULT: ABNORMAL
BUN SERPL-MCNC: 30.3 MG/DL (ref 8–23)
CALCIUM SERPL-MCNC: 8.3 MG/DL (ref 8.8–10.4)
CHLORIDE SERPL-SCNC: 104 MMOL/L (ref 98–107)
CREAT SERPL-MCNC: 2.19 MG/DL (ref 0.67–1.17)
EGFRCR SERPLBLD CKD-EPI 2021: 33 ML/MIN/1.73M2
ERYTHROCYTE [DISTWIDTH] IN BLOOD BY AUTOMATED COUNT: 13 % (ref 10–15)
GLUCOSE SERPL-MCNC: 99 MG/DL (ref 70–99)
HCO3 SERPL-SCNC: 21 MMOL/L (ref 22–29)
HCT VFR BLD AUTO: 35.7 % (ref 40–53)
HGB BLD-MCNC: 12.3 G/DL (ref 13.3–17.7)
MCH RBC QN AUTO: 31.5 PG (ref 26.5–33)
MCHC RBC AUTO-ENTMCNC: 34.5 G/DL (ref 31.5–36.5)
MCV RBC AUTO: 92 FL (ref 78–100)
PLATELET # BLD AUTO: 99 10E3/UL (ref 150–450)
POTASSIUM SERPL-SCNC: 4 MMOL/L (ref 3.4–5.3)
RBC # BLD AUTO: 3.9 10E6/UL (ref 4.4–5.9)
SODIUM SERPL-SCNC: 137 MMOL/L (ref 135–145)
WBC # BLD AUTO: 6 10E3/UL (ref 4–11)

## 2025-03-16 PROCEDURE — 80048 BASIC METABOLIC PNL TOTAL CA: CPT

## 2025-03-16 PROCEDURE — 85041 AUTOMATED RBC COUNT: CPT

## 2025-03-16 PROCEDURE — 250N000013 HC RX MED GY IP 250 OP 250 PS 637

## 2025-03-16 PROCEDURE — 82565 ASSAY OF CREATININE: CPT

## 2025-03-16 PROCEDURE — 250N000011 HC RX IP 250 OP 636

## 2025-03-16 PROCEDURE — 36415 COLL VENOUS BLD VENIPUNCTURE: CPT

## 2025-03-16 PROCEDURE — 85014 HEMATOCRIT: CPT

## 2025-03-16 RX ORDER — CEPHALEXIN 500 MG/1
1000 CAPSULE ORAL EVERY 8 HOURS
Qty: 36 CAPSULE | Refills: 0 | Status: SHIPPED | OUTPATIENT
Start: 2025-03-17 | End: 2025-03-16

## 2025-03-16 RX ORDER — ACETAMINOPHEN 325 MG/1
650 TABLET ORAL EVERY 4 HOURS PRN
Qty: 30 TABLET | Refills: 0 | Status: SHIPPED | OUTPATIENT
Start: 2025-03-16

## 2025-03-16 RX ORDER — CEFDINIR 300 MG/1
600 CAPSULE ORAL EVERY 12 HOURS SCHEDULED
Status: DISCONTINUED | OUTPATIENT
Start: 2025-03-17 | End: 2025-03-16

## 2025-03-16 RX ORDER — CEPHALEXIN 500 MG/1
1000 CAPSULE ORAL EVERY 8 HOURS
Qty: 18 CAPSULE | Refills: 0 | Status: SHIPPED | OUTPATIENT
Start: 2025-03-17 | End: 2025-03-16

## 2025-03-16 RX ORDER — CEFDINIR 300 MG/1
600 CAPSULE ORAL EVERY 12 HOURS
Qty: 12 CAPSULE | Refills: 0 | Status: SHIPPED | OUTPATIENT
Start: 2025-03-17 | End: 2025-03-16

## 2025-03-16 RX ORDER — CEPHALEXIN 500 MG/1
1000 CAPSULE ORAL EVERY 8 HOURS
Qty: 36 CAPSULE | Refills: 0 | Status: SHIPPED | OUTPATIENT
Start: 2025-03-17

## 2025-03-16 RX ORDER — CEPHALEXIN 500 MG/1
1000 CAPSULE ORAL EVERY 8 HOURS SCHEDULED
Status: DISCONTINUED | OUTPATIENT
Start: 2025-03-17 | End: 2025-03-16 | Stop reason: HOSPADM

## 2025-03-16 RX ORDER — TAMSULOSIN HYDROCHLORIDE 0.4 MG/1
0.4 CAPSULE ORAL ONCE
Status: COMPLETED | OUTPATIENT
Start: 2025-03-16 | End: 2025-03-16

## 2025-03-16 RX ADMIN — LOSARTAN POTASSIUM 25 MG: 25 TABLET, FILM COATED ORAL at 10:12

## 2025-03-16 RX ADMIN — ENTECAVIR 1 MG: 0.5 TABLET ORAL at 10:11

## 2025-03-16 RX ADMIN — GABAPENTIN 100 MG: 100 CAPSULE ORAL at 10:12

## 2025-03-16 RX ADMIN — Medication 10 MG: at 10:15

## 2025-03-16 RX ADMIN — AMLODIPINE BESYLATE 10 MG: 10 TABLET ORAL at 10:12

## 2025-03-16 RX ADMIN — OMEPRAZOLE 20 MG: 20 CAPSULE, DELAYED RELEASE ORAL at 10:12

## 2025-03-16 RX ADMIN — ASPIRIN 81 MG: 81 TABLET, CHEWABLE ORAL at 10:12

## 2025-03-16 RX ADMIN — POLYETHYLENE GLYCOL 3350 17 G: 17 POWDER, FOR SOLUTION ORAL at 10:12

## 2025-03-16 RX ADMIN — ACETAMINOPHEN 650 MG: 325 TABLET, FILM COATED ORAL at 10:09

## 2025-03-16 RX ADMIN — CEFTRIAXONE SODIUM 2 G: 2 INJECTION, POWDER, FOR SOLUTION INTRAMUSCULAR; INTRAVENOUS at 02:43

## 2025-03-16 RX ADMIN — TAMSULOSIN HYDROCHLORIDE 0.4 MG: 0.4 CAPSULE ORAL at 10:09

## 2025-03-16 RX ADMIN — CARVEDILOL 12.5 MG: 12.5 TABLET, FILM COATED ORAL at 10:12

## 2025-03-16 RX ADMIN — ATORVASTATIN CALCIUM 40 MG: 40 TABLET, FILM COATED ORAL at 10:11

## 2025-03-16 ASSESSMENT — ACTIVITIES OF DAILY LIVING (ADL)
ADLS_ACUITY_SCORE: 81

## 2025-03-16 NOTE — PLAN OF CARE
Goal Outcome Evaluation:   Vitals-Blood pressure 130/82, pulse 79, temperature 98.3  F (36.8  C), temperature source Oral, resp. rate 18, SpO2 99%.  Room air   Alert to self and situation. Denies pain. Incontinent of B/B. LBM 3/15. Wheelchair bound, not OOB. Repositioning done frequently. Renal diet, easy to chew, thin liquid. L PIV sl  Ugandan speaking, needs .      On IV antibiotic, plan TBD.

## 2025-03-16 NOTE — DISCHARGE SUMMARY
[facility]  Discharge Summary - Medicine & Pediatrics       Date of Admission:  3/13/2025  Date of Discharge:  3/16/2025  Discharging Provider: Dr. Randi MD  Discharge Service: [facility]    Discharge Diagnoses   # Gram negative bacteremia with Klebsiella pneumoniae  # Complicated cystitis  # Bladder dome calcification  # Cryoglobulinemic glomerulonephritis  # Presumed ANDRY on CKD Stage 3b  # Hx of CVA (2020)  # Chronic dysphagia  # Chronic incontinence of bladder and bowel  # Concern for cognitive impairment  # Wheelchair user  # Chronic hepatitis B  # HBV cirrhosis  # Hepatic mass    Clinically Significant Risk Factors     # Moderate Malnutrition: based on nutrition assessment and treatment provided per dietitian's recommendations.      Follow-ups Needed After Discharge   - Recommend Hepatic MRI for further evaluation of liver lesion seen on CT and follow up with patient's hepatology team  - Outpatient urology referral placed for bladder done calcification  - Consider further neurocognitive testing    Unresulted Labs Ordered in the Past 30 Days of this Admission       No orders found from 2/11/2025 to 3/14/2025.        These results will be followed up by PCP    Discharge Disposition   Discharged to home  Condition at discharge: Stable    Hospital Course   Wilfredo Yoon was admitted on 3/13/2025 for gram negative bacteremia with suspected cystitis.  The following problems were addressed during his hospitalization:    # Gram negative bacteremia with Klebsiella pneumoniae  # Complicated cystitis  # Bladder dome calcification  Pt presented w/concerns of several days of dysuria, lower belly pain, and malaise. Work-up in the ED w/UA concerning for infection & marked leukocytosis to 23.7. CT A/P w/evidence of cystitis & nonspecific calcification of the bladder dome mucosa but no evidence of pyelonephritis. On the first day of incubation,  blood culture results returned positive for gram negative bacilli, verigene positive for Klebsiella pneumoniae. Urine culture with urogenital tashi. Started on ceftriaxone for the bacteremia, and with improvement transitioned over to Keflex 1000mg TID for total course of 10 days. Patient has been afebrile throughout hospitalization. For his bladder calcification, sent urology referral.     # Cryoglobulinemic glomerulonephritis  # Presumed ANDRY on CKD Stage 3b  Cryoglobulinemic glomerulonephritis initially diagnosed via biopsy in 2015, presumed secondary to known chronic HBV infection. Baseline Cr ~2.1; elevated to 2.47 on admission, improved while hospitalized. Given one dose of flomax and continue home furosemide.    # Hx of CVA (2020)  # Chronic dysphagia  # Chronic incontinence of bladder and bowel  # Concern for cognitive impairment  # Wheelchair user  Exam concerning for cognitive impairment; was unable to recall the name of his daughter who came with him to the ED. Answers were short and non-elaborative. Exam was positive for some moderate right sided deficits, consistent with history gathered from chart review. SLP consulted recommended easy to chew and thin liquid diet. Dietician consult, recommended [employer] renal supplementation between meals while inpatient.     # Chronic hepatitis B  # HBV cirrhosis  # Hepatic mass  LFTs in normal range on admission, chronic hep B has been present for years. Well managed on entecavir, but appears to have been lost to follow-up over the last 2 years; last GI visit was in 2023. CT A/P w/new 4.5 x 3.0 cm mass anterior aspect lateral segment left hepatic lobe. Normal AFP. Recommend that patient follow up with hepatology and get hepatic MRI.      Consultations This Hospital Stay   NUTRITION SERVICES ADULT IP CONSULT  SPEECH LANGUAGE PATH ADULT IP CONSULT    Code Status   Full Code       The patient was discussed with MD Tara Ferrell MD [facility]  Med[facility] Service  [facility] MED SURG ORTHOPEDIC  [address] [address]  Phone: [phone]  Fax: [phone]  ______________________________________________________________________    Physical Exam   Vital Signs: Temp: 98.1  F (36.7  C) Temp src: Oral BP: (!) 138/95 Pulse: 71   Resp: 18 SpO2: 95 % O2 Device: None (Room air)    Weight: 0 lbs 0 oz  Constitutional: Awake, alert, in NAD. Resting in bed. Somnolent this AM  Eyes: Sclera without jaundice or injection. Conjunctiva without pallor, erythema, or drainage. PERRLA, EOM intact.  HENT: NCAT, oral cavity with MMM and without lesions.  Respiratory: Lungs CTAB without crackles, wheezing, rales, or increased work of breathing.  Cardiovascular: RRR without murmurs or extra sounds, radial pulses 2+ bilaterally. Cap refill <2 sec.  Abdomen: Soft, non-distended, with positive bowel sounds.  No tenderness to palpation  Skin: Warm & dry, without lesions, erythema, rashes, or ecchymosis.  Musculoskeletal: Extremities negative for redness, lesions, or bony tenderness.  There is some trace, nonpitting edema present on the bilateral lower extremities. No CVA tenderness.  Neurologic: No gross focal deficit, cranial nerves II-XII largely intact evidence of right-sided weakness in the face and moderate contracture in the right hand, is consistent with baseline based on chart review.  Psychiatric: Alert & calm with appropriate affect and mood, however cognition and memory do appear to be impaired.      Primary Care Physician   Keven Fairbanks    Discharge Orders      Adult Urology  Referral      Reason for your hospital stay    Blood infection and possible UTI     Activity    Your activity upon discharge: activity as tolerated     Diet    Follow this diet upon discharge: Current Diet:Orders Placed This Encounter      Snacks/Supplements Adult: Other; [employer] renal support; Between Meals       Combination Diet Renal Diet  (non-dialysis); Easy to Chew (level 7); Thin Liquids (level 0)     Hospital Follow-up with Existing Primary Care Provider (PCP)    Please see details below            Significant Results and Procedures   Most Recent 3 CBC's:  Recent Labs   Lab Test 03/16/25  0546 03/15/25  0517 03/14/25  0708   WBC 6.0 7.7 16.3*   HGB 12.3* 12.6* 12.3*   MCV 92 93 93   PLT 99* 81* 74*     Most Recent 3 BMP's:  Recent Labs   Lab Test 03/16/25  0546 03/15/25  0517 03/14/25  0708    138 135   POTASSIUM 4.0 3.9 3.8   CHLORIDE 104 105 106   CO2 21* 21* 21*   BUN 30.3* 31.1* 33.4*   CR 2.19* 2.35* 2.39*   ANIONGAP 12 12 8   SOTERO 8.3* 8.3* 8.2*   GLC 99 82 94     Most Recent Urinalysis:  Recent Labs   Lab Test 03/13/25  1201   COLOR Yellow   APPEARANCE Clear   URINEGLC Negative   URINEBILI Negative   URINEKETONE Negative   SG 1.014   UBLD Moderate*   URINEPH 6.5   PROTEIN 300*   NITRITE Negative   LEUKEST Moderate*   RBCU 9*   WBCU 52*       Discharge Medications   Current Discharge Medication List        START taking these medications    Details   acetaminophen (TYLENOL) 325 MG tablet Take 2 tablets (650 mg) by mouth every 4 hours as needed for mild pain or other (and adjunct with moderate or severe pain or per patient request).  Qty: 30 tablet, Refills: 0    Associated Diagnoses: Bacteremia      cephALEXin (KEFLEX) 500 MG capsule Take 2 capsules (1,000 mg) by mouth every 8 hours.  Qty: 36 capsule, Refills: 0    Associated Diagnoses: Bacteremia           CONTINUE these medications which have NOT CHANGED    Details   amLODIPine (NORVASC) 10 MG tablet Take 1 tablet (10 mg) by mouth daily. Indication: HTN  Qty: 90 tablet, Refills: 3    Associated Diagnoses: Essential hypertension, malignant      aspirin (ASA) 81 MG chewable tablet TAKE 1 TABLET (81 MG) BY MOUTH DAILY INDICATION: STROKE  Qty: 90 tablet, Refills: 3    Associated Diagnoses: History of CVA with residual deficit      atorvastatin (LIPITOR) 40 MG tablet Take 1 tablet (40 mg)  by mouth daily  Qty: 90 tablet, Refills: 3    Associated Diagnoses: History of CVA with residual deficit      carvedilol (COREG) 12.5 MG tablet Take 1 tablet (12.5 mg) by mouth 2 times daily (with meals).  Qty: 180 tablet, Refills: 3    Associated Diagnoses: Essential hypertension, malignant      entecavir (BARACLUDE) 1 MG tablet TAKE 1 TABLET (1 MG) EVERY OTHER DAY  Qty: 30 tablet, Refills: 6    Associated Diagnoses: Chronic viral hepatitis B without delta agent and without coma (H)      furosemide (LASIX) 20 MG tablet Take 0.5 tablets (10 mg) by mouth daily.  Qty: 45 tablet, Refills: 3    Associated Diagnoses: Stage 3a chronic kidney disease (H)      gabapentin (NEURONTIN) 100 MG capsule TAKE 1 CAPSULE (100 MG) BY MOUTH 3 TIMES DAILY  Qty: 360 capsule, Refills: 3    Associated Diagnoses: Chronic pain syndrome      losartan (COZAAR) 25 MG tablet Take 1 tablet (25 mg) by mouth daily.  Qty: 90 tablet, Refills: 3    Associated Diagnoses: Stage 3b chronic kidney disease (H)      Multiple Vitamin (MULTI VITAMIN DAILY) TABS TAKE 1 TABLET BY MOUTH DAILY  Qty: 90 tablet, Refills: 3    Associated Diagnoses: Severe protein-calorie malnutrition      omeprazole (PRILOSEC) 20 MG DR capsule Take 20 mg by mouth daily.      SENEXON-S 8.6-50 MG tablet Take 1 tablet by mouth 2 times daily as needed.           Allergies   No Known Allergies

## 2025-03-16 NOTE — PLAN OF CARE
[id number]    Goal Outcome Evaluation:      Plan of Care Reviewed With: patient    Overall Patient Progress: improvingOverall Patient Progress: improving    BP (!) 138/95 (BP Location: Right arm, Patient Position: Semi-Alvarado's, Cuff Size: Adult Regular)   Pulse 71   Temp 98.1  F (36.7  C) (Oral)   Resp 18   SpO2 95%     Pt uncooperative and irritated this morning. Refused all assessments including meds. Provider was informed. Pt c/o abd pain and stated he was not lynn to void and had pain voiding, he wanted something to help him void. Scheduled Furosemide and new order of Flomax was given only then pt was willing to take his morning meds.   Incontinent of B/B. LBM 3/16. Wheelchair bound, not OOB. Repositioning done frequently. Renal diet, easy to chew, thin liquid. L PIV sl. Plan to discharge.    1510:  DISCHARGE SUMMARY    Pt discharging to: home  Transportation: daughter,Caty  AVS given and discussed: Pt was given AVS and pt states understanding of content. Pt has no further questions.   Medications given: Yes, discussed. No further questions. To pick more meds from home pharmacy [facility]  Belongings returned: Yes, ensured all belongings packed and sent with pt. No items in security.   Comments: Escorted safely to elevators. Pt left at 1511    Laverne Stephen RN

## 2025-03-19 DIAGNOSIS — B18.1 CHRONIC VIRAL HEPATITIS B WITHOUT DELTA AGENT AND WITHOUT COMA (H): ICD-10-CM

## 2025-03-21 DIAGNOSIS — I69.30 HISTORY OF CVA WITH RESIDUAL DEFICIT: ICD-10-CM

## 2025-03-24 RX ORDER — ASPIRIN 81 MG
TABLET,CHEWABLE ORAL
Qty: 90 TABLET | Refills: 3 | Status: SHIPPED | OUTPATIENT
Start: 2025-03-24

## 2025-03-24 NOTE — TELEPHONE ENCOUNTER
"Request for medication refill:    Medication Name: aspirin (ASA) 81 MG chewable tablet     Providers if patient needs an appointment and you are willing to give a one month supply please refill for one month and  send a MyChart using \".SMILLIMITEDREFILL\" .Or route chart to \"P SMI \" . To call patient and inform of limited refill and providers request to make an appointment. (Giving one month refill in non controlled medications is strongly recommended before denial)    If refill has been denied, meaning absolutely no refills without visit, please complete the smart phrase \".SMIRXREFUSE\" and route it to the \"P SMI MED REFILLS\"  pool to inform the patient and the pharmacy.    Randolph Jones MA     "

## 2025-04-06 NOTE — PROGRESS NOTES
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     The patient was previously seen by Dr.Junghare craig in 10/17.  Per his note, the patient was initially evaluated for edema and nephrotic range proteinuria.  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  Upon diagnosis, he has preserved kidney function with Cr of 0.8 although UPCR of  3.5g/g.     At that time, he was treated conservatively with Bumex and lisinopril. His HBV has been treated with Entecavir. Since then has has lost to followed-up with Neph.       In June 2020 he suffered a stroke and continues to have right sided weakness, In July 2020, he has mild ANDRY with creatinine increased to 1.6 but it then came down to 1.1-1.3.  Creatinine increased to 1.46 on 9/30/22, 1.72 on 1/13/2023 and now 2.08 on 4/23.  His serum albumin has been progressively low from 3.3 in 2015 down to 1.5 to 2.0 but then somewhat improved. Serum albumin in April 23 was 2.6.  His UA always show blood and protein. Last UPCR was done in 7/31/18 was 4.80. He has trace cryo in the blood. He had positive IgM, Kappa cryo but then in 2020, he has positive for polyclonal Cryo: A, G, M, K and L. No monoclonal protein via immunofixation in serum or urine.  [place] free light chain was 12.7 and lambda free light chain was 6.79 with ratio of 1.87 in July 2020. M spike was negative.  RI-3 and MPO were negative.  He has low C3 in the past and normal C4.  SPEP showed marked hypoalbuminemia and decreased beta globulin without monoclonal protein.     He has been  followed by Dr. Osborne in hepatology clinic, last seen in April 2023.  He was noted to have a small liver lesion, ultrasound with some ascites and pleural effusion.  Noted blood pressure 137/94.  He has kidney in 1/23 which showed normal-sized right kidney without hydronephrosis. There is small amount of ascites and pleural effusion.     6/15/23: Seen for consultation. He arrives with his niece and she assists with translating.  He is noted to be in a wheelchair partially because of his residual right-sided weakness after CVA.  Reports that he is able to transition from room to room on the same floor with a walker.  However, he is unable to go upstairs and requires assistance moving long distances.  He uses a wheelchair when going outside the home.     We reviewed that his creatinine is increased over the past 9 months.  There have been no specific illnesses, hospital presentations or events that they can recall that may have led to kidney injury. After seeing hepatology in April, he was started on Lasix 20 mg and spironolactone for edema management.  His niece reports that his swelling resolved dramatically and now there is only trace swelling present.  He is off of the diuretics for the past few weeks.  Additionally, he continues on his antiretroviral for hepatitis B.  Most recently viral load was undetectable.      In reviewing his appetite and p.o. intake, he often does not eat a lot.  Although there are some times where he has better appetite.  States that his p.o. fluid intake is reasonable.  His urine is reported to be normal and seems to be going to the bathroom several times a day.  No noted hematuria or color/quality.  Plan: Serological work-up, resume lasix 10 mg daily.   8/24/23: He missed his appt.  10/5/23: He is here with his niece today.  Today, he feels fine.  Patient still complaining of incontinence.  So he cannot provide a urine sample for us.  The patient has not done labs yet either.  His  swelling has improved after we resume Lasix 10 mg/day.  His blood pressure still limited at 147/89.  The patient denies any joint pain fevers or rashes.  He has no oral ulcer.  Started losartan 25 mg per day.   Labs on 8/29/23 showed Cr 2.08 with eGFR 35, BUN 33.6, Bicarb 21. Albumin 2.9. UA showed WBC >182, RBC 6, hyaline cast 10, UPCR 5.56. Serological work-up showed positive cryo IgG, IgM and kappa and lambda. Cryo trace. C3 116 and C4 33. Kappa 15.24, lambda 7.96 and K/L ratio 1.91. PTH 81. DS DNA 61. ANCA negative. UA showed WBC >182, RBC 6, UPCR 5.56 g/g.   11/30/23: In the interim, he underwent a kidney Bx on 11/7/23 (read by Dr. Montenegro). 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 Advanced chronic changes of the parenchyma, including: global glomerulosclerosis (78% of the glomeruli), tubular atrophy and interstitial fibrosis (70-80% of the cortex), severe arterial and arteriolar sclerosis. This finding may be found in cryo GN even though there was no substructure presented in the EM. The patient also has positive Cryo  IgM, IgG and kappa. The polyclonal IgG and monoclonal IgG kappa suggested type 2 Cryo. Today,  He is doing good. Biopsy went well. Swelling better but he just hit something on his Rt leg and got swelled up again. Appetite is not so good. Discussed at length about diagnosis and treatment. Discussed risk of HBV flare with Rx.    3/18/24: In the interim, he received  mg/m2 nx4 (1st dose on 12/22/23 and last on 1/19/24).  BP has been improving.  Appetite is good now. He has no leg swelling. He has pain or burning sensation when he urinates.  He has  gained some weight but we do not have a weight check today.  Home blood pressure has been excellent without any evidence of hypotension.  Current medication: Amlodipine 5 (previously 10), Coreg 12.5 mg BID, losartan 25 mg daily., furosemide 20 mg 0.5 tab daily, spironolactone 50 mg once a day.   Labs today show sodium 135, potassium 4.0, carbon dioxide 18, BUN 54.2, creatinine 2.37, GFR 30.  Phosphorus 3.3, albumin 3.5.  LFTs normal.  CBC show white blood cell 5.4, platelet 128 and hemoglobin 14.3. , vit D 20.     6/6/2024: In the interim, his Cryo was negative on 3/18/24. CD19 was < 1 on 3/18/24. RF improved to 51 from 86 in 12/13/24 (pre-treatment). DS DNA showed improvement.  He has been feeling better and has gained some weight and energy.  His weight is 118 pounds from 111 pounds in 12/2023.  He has no lower extremity edema.   BP is high at 164/79 mmHg but at home his blood pressure is 109 over 70s.  He has no problem with urination.. He has been coughing but he reports that it has been normal for him and he denies any short of breath.  His appetite is good. His energy is also good.  Labs today showed creatinine 2.11, GFR 35, BUN 36.9, potassium 4.4, Bicarb , 22.  Calcium 9.3, phosphorus 3.4, albumin 3.4. CRP 21.10. Pending Cryo, RF, DS DNA and complement.     9/5/24: In the interim, the patient did not get rituximab scheduled.   Today, he feels ok except that he feels a bit for the past week. He has not been eating well. He does not throw up or having diarrhea. No pain. He has been coughing for the past 2 weeks. Cough is productive with clear color. He has no fever or chills. He does not feel dizzy when stands up. He has no leg swelling.   BP today is 101/69 mmHg.  He has no problem with urination.   UPCR has improved down to 1.51 g/g on 7/2/2024 and down to 0.66 g/g on 9//24.  UA showed moderate blood, large leukocyte esterase white blood cell more than 182 and RBC 48.  This sample is from [place]  sample.    Labs drawn some of the lab but most important labs such as renal panel and CBC they did not do it.    Update: Later on labs came back potassium 5.5, bicarb 16, BUN 56.1, creatinine 2.73 and GFR 25.  Albumin 3.6, phosphorus 3.6.  CRP 6.5, rheumatoid factor 42, ESR 30.  White blood cell 5, hemoglobin 12.9 and platelet 131. CD 16 6 cells. UPCR 0.66.    1/9/2025: Last visit, is Cr was 2.73 and UA showed UTI so we treated him with Ab. Cr improved to 2.01 but DS DNA worsens so I redose RTX 1 g om 10/17/24.    Today, he has been doing good. No recent infection. Home blood pressure are good. He has some swelling if on his feet for a long time. UA from yesterday was >24 hrs hence the sample was discarded. BP today is 130/84. He has not taken lasix for 2 weeks now.  He also ran out of Entecavir and has not seen GI since April 2023.   Labs today showed Cr 2.12, potassium 4.4, bicarb 24, BUN 23.5, GFR 34.  Calcium 9.3, phosphorus 2.6, albumin 3.6.  CBC showed white blood cell 4.1, hemoglobin 14.1 and platelet 119. Hb 14.1, WBC 4.1 and plt 119.  and vit D pending. Need to collect UA-> pt did not collect for us.       4/7/2025: In the interim, he received rituximab 1000 mg on 2/27/2025. He then was admitted for gram-negative bacteremia with Klebsiella pneumoniae, complicated cystitis, bladder dome calcification between 3/13-3/16/25.  He was started on ceftriaxone for bacteremia with improvement and transition to Keflex 1000 mg TID. He has mild ANDRY with creatinine increased to 2.47 but on dismissal it was 2.19. He completed antibiotics already. He has some leg swelling  but denies any dyspnea.    He is feeling good today. He has no pain when urinate despite having pyuria. He has no fever or chills. Blood pressure is high today at 154/102 mmHg. Labs today show creatinine 2.52, GFR 28, BUN 29.9, sodium 139, potassium 3.9, bicarb 24, calcium 9.3, phosphorus 1.6, albumin 3.4.  CRP 28.7.  Glucose 102.  CBC showed  white blood cell 6.8 hemoglobin 15.1 and platelet 1/1/2012.  Sed rate 37.  UA showed large leukocyte Estrace, white blood cell 171 and red blood cells 7. UPCR worsening to 3.65 g/g.     Past medical history  Past Medical History:   Diagnosis Date    Cirrhosis (H)     Cryoglobulinemia     CVA (cerebral vascular accident) (H) 07/16/2020    Supratentorial likely d/t hypertensive emergency leading to right hemiparesis, dysphagia and aphasia    Glomerulonephritis     Hepatitis B     Hypertension     Latent tuberculosis     Treatment [id number]    MPGN (membranoproliferative glomerulonephritides)     Positive QuantiFERON-TB Gold test     PRES (posterior reversible encephalopathy syndrome) 07/16/2020    In brainstem d/t hypertensive emergency; suffered supratentorial CVAs same date       Past surgical history  Past Surgical History:   Procedure Laterality Date    ANESTHESIA OUT OF OR MRI N/A 7/18/2020    Procedure: ANESTHESIA OUT OF OR MRI;  Surgeon: GENERIC ANESTHESIA PROVIDER;  Location: [facility] OR    ESOPHAGOSCOPY, GASTROSCOPY, DUODENOSCOPY (EGD), COMBINED N/A 10/18/2018    Procedure: EGD;  Surgeon: Eber Ortez MD;  Location: [facility] GI    HAND SURGERY Right     IR PARACENTESIS  7/27/2020    IR RENAL BIOPSY RIGHT  11/7/2023    [facility] HAND/FINGER SURGERY UNLISTED       Review of Systems:   14 systems were reviewed and all negative except as mentioned above.   Current Medications:  Current Outpatient Medications   Medication Sig Dispense Refill    acetaminophen (TYLENOL) 325 MG tablet Take 2 tablets (650 mg) by mouth every 4 hours as needed for mild pain or other (and adjunct with moderate or severe pain or per patient request). 30 tablet 0    amLODIPine (NORVASC) 10 MG tablet Take 1 tablet (10 mg) by mouth daily. Indication: HTN 90 tablet 3    ASPIRIN LOW DOSE 81 MG chewable tablet TAKE 1 TABLET (81 MG) BY MOUTH DAILY INDICATION: STROKE 90 tablet 3    atorvastatin (LIPITOR) 40 MG tablet Take 1 tablet (40 mg) by mouth daily  "90 tablet 3    carvedilol (COREG) 12.5 MG tablet Take 1 tablet (12.5 mg) by mouth 2 times daily (with meals). 180 tablet 3    cephALEXin (KEFLEX) 500 MG capsule Take 2 capsules (1,000 mg) by mouth every 8 hours. 36 capsule 0    entecavir (BARACLUDE) 1 MG tablet TAKE 1 TABLET (1 MG) EVERY OTHER DAY 30 tablet 6    furosemide (LASIX) 20 MG tablet Take 1 tablet (20 mg) by mouth daily. 90 tablet 3    gabapentin (NEURONTIN) 100 MG capsule TAKE 1 CAPSULE (100 MG) BY MOUTH 3 TIMES DAILY 360 capsule 3    losartan (COZAAR) 25 MG tablet Take 1 tablet (25 mg) by mouth daily. 90 tablet 3    Multiple Vitamin (MULTI VITAMIN DAILY) TABS TAKE 1 TABLET BY MOUTH DAILY 90 tablet 3    omeprazole (PRILOSEC) 20 MG DR capsule Take 20 mg by mouth daily.      SENEXON-S 8.6-50 MG tablet Take 1 tablet by mouth 2 times daily as needed.       No current facility-administered medications for this visit.       Physical Exam:   BP (!) 154/102 (BP Location: Right arm, Patient Position: Sitting, Cuff Size: Adult Small)   Pulse 66   Temp 99.3  F (37.4  C) (Oral)   Ht 1.803 m (5' 11\")   SpO2 100%   BMI 19.53 kg/m     Body mass index is 19.53 kg/m .    GENERAL APPEARANCE: Alert, not in acute distress, pleasant,  thin built, on WC.   EYES:  Not pale conjunctiva, pupils equal  HENT: Mouth without ulcers or lesions  PULM: lungs clear to auscultation bilaterally, equal air movement, no clubbing  CV: regular rhythm, normal rate, no rub     -JVD no distended.      -edema: + trace to 1 + edema  GI: soft,  - tender, no distended, bowel sounds are present  INTEGUMENT: No rash  NEURO:  Non focal. No asterixis.     Labs:   All labs reviewed by me  Last Renal Panel:  Sodium   Date Value Ref Range Status   04/07/2025 139 135 - 145 mmol/L Final   06/30/2021 140 133 - 144 mmol/L Final     Potassium   Date Value Ref Range Status   04/07/2025 3.9 3.4 - 5.3 mmol/L Final   01/19/2022 3.6 3.4 - 5.3 mmol/L Final   06/30/2021 4.2 3.4 - 5.3 mmol/L Final     Chloride "   Date Value Ref Range Status   04/07/2025 106 98 - 107 mmol/L Final   01/19/2022 107 94 - 109 mmol/L Final   06/30/2021 106 94 - 109 mmol/L Final     Carbon Dioxide   Date Value Ref Range Status   06/30/2021 25 20 - 32 mmol/L Final     Carbon Dioxide (CO2)   Date Value Ref Range Status   04/07/2025 24 22 - 29 mmol/L Final   01/19/2022 27 20 - 32 mmol/L Final     Anion Gap   Date Value Ref Range Status   04/07/2025 9 7 - 15 mmol/L Final   01/19/2022 10 3 - 14 mmol/L Final   06/30/2021 9 3 - 14 mmol/L Final     Glucose   Date Value Ref Range Status   04/07/2025 100 (H) 70 - 99 mg/dL Final   01/19/2022 78 70 - 99 mg/dL Final   06/30/2021 96 70 - 99 mg/dL Final     Urea Nitrogen   Date Value Ref Range Status   04/07/2025 29.9 (H) 8.0 - 23.0 mg/dL Final   01/19/2022 19 7 - 30 mg/dL Final   06/30/2021 24 7 - 30 mg/dL Final     Creatinine   Date Value Ref Range Status   04/07/2025 2.52 (H) 0.67 - 1.17 mg/dL Final   06/30/2021 1.27 (H) 0.66 - 1.25 mg/dL Final     GFR Estimate   Date Value Ref Range Status   04/07/2025 28 (L) >60 mL/min/1.73m2 Final     Comment:     eGFR calculated using 2021 CKD-EPI equation.   06/30/2021 61 >60 mL/min/[1.73_m2] Final     Comment:     Non  GFR Calc  Starting 12/18/2018, serum creatinine based estimated GFR (eGFR) will be   calculated using the Chronic Kidney Disease Epidemiology Collaboration   (CKD-EPI) equation.       Calcium   Date Value Ref Range Status   04/07/2025 9.3 8.8 - 10.4 mg/dL Final   06/30/2021 9.0 8.5 - 10.1 mg/dL Final     Phosphorus   Date Value Ref Range Status   04/07/2025 2.6 2.5 - 4.5 mg/dL Final   10/10/2017 3.0 2.5 - 4.5 mg/dL Final     Albumin   Date Value Ref Range Status   04/07/2025 3.4 (L) 3.5 - 5.2 g/dL Final   01/19/2022 2.5 (L) 3.4 - 5.0 g/dL Final   06/30/2021 3.0 (L) 3.4 - 5.0 g/dL Final     Imaging:  I reviewed imaging studies.     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  It does appear that the most recent trend is reflecting a progressive elevation in creatinine since 9/30/2022.  There is some concern that this might be related to his liver disease but there is limited lower extremity edema and palpable ascites on exam.  His MELD score is relatively low to observe type II HRS physiology.  In regard to his prior cryoglobulinemic GN, it would be odd for this to come out of quiescence well his hepatitis B has been appropriately treated. I repeat serological work-up and showed that he has trace cryo IgG, IgM and kappa but normal complement: C3 116 and C4 33. Otherwise negative monoclonal protein; K/L ratio of 1.91. PLA2R negative, ANCA negative  but DS DNA positive at 61 U/ml. HBV VL <20 on 4/4/23. It was unclear why he has progressive kidney disease despite HBV controlled. He is also noted to have nephrotic range proteinuria.  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 Severe arterial sclerosis with extensive ultrastructural signs of endothelial injury and glomerular capillary loop remodeling, suggesting a superimposed chronic and acute thrombotic angiopathy. Advanced chronic changes of the parenchyma, including: global glomerulosclerosis (78% of the glomeruli), tubular atrophy and interstitial fibrosis (70-80% of the cortex), severe arterial and arteriolar sclerosis. This findings are mostly consistent with cryo GN. This is quite interesting since his HBV has been under control. So it is possible that prior previous HBV could stimulate his immune reaction resulted in cryoglobulin.  We decided to initiate rituximab 375 mg/m  x 4 doses and 1st dose on  12/22/23 and last dose on 1/19/24.  His creatinine continue to improved and now down to 2.11 with a EGFR 35.  We have not had any UA since October 2023 as he has difficult time providing UA hence we needs to repeat UA.  His DS DNA, rheumatoid factors and cryoglobulin level has been improving based on the lab in March 2024 and June 2024. In September 2024, proteinuria has improved to 0.66 g/g with improvement in serum albumin. However, he had ANDRY with Cr 2.73 likely from APN and poor po intake. Repeat labs showed improve in Cr down to 2.01 in 10/2024. DS DNA and RF adenike in 9/2024 to 61 and 42 respectively I gave him another dose of rituximab 1 g on 10/17/2024. He has been feeling well today and no neuropathy or rashes.  The patient received rituximab 1 g on 2/27/2025 for cryo GN.  In the interim, the patient was admitted for Klebsiella pneumonia bacteremia and UTI in mid March 2025.  He was treated with antibiotics and was dismissed with Keflex to complete 14 days of antibiotics.  He has mild ANDRY with creatinine 2.47 on 3/13/2025 and on dismissal improved to 2.19.  However, his creatinine increased to 2.52 today.  He has worsening  lower extremity edema and blood pressure is high at 154/102.  UA did show some pyuria but he has no UTI symptoms.  UPCR so worsening at 3.65 g/g compared to 0.66 g/g in September 2024.  Therefore I think we need to address his blood pressure issue.    I am still waiting for other serologies including double-stranded DNA, complement, cryo, and IgG.  I am worried about adding immunosuppression for him at this time given that he just recently has bacteremia in mid March.  Will try to control blood pressure first and see if this can improve proteinuria and monitor his kidney function.    - I increase Lasix to 20 mg/day from 10 mg/day due to lower extremity edema and blood pressure in the 150s  - Continue losartan 25 mg per day  - Checking Cystatin C today  - Prevously on spironolactone but stopped since 9/2024 for ANDRY; if proteinuria does not improve we may have to resume that at some point  - S/p rituximab 375 mg/m  first dose on 12/22/2023, 1/19/2024; hold off another and 1000 mg on 10/17/24, 2/27/25    # Recurrent UTI  # Abnormal findings on CT scan: Bladder dome calcification versus tumor  The patient has recurrent UTI and he has CT scan showing bladder calcification versus tumor.  I will refer him to urology to ensure that there is no other cause of recurrent UTI.  # Metabolic acidosis; resolved  Likely from ANDRY on CKD in September 2024 but now resolved.   # HBV cirrhosis  # HBV infection on entecavir  Follows with hepatology, last viral quant undetectable on 4/4/23. MELD 16. MR Abdomen showing cirrhosis with evidence of portal hypertension. Reports no encephalopathy or concern for hematemesis. Currently on Entevavir, discuss risk of HBV flare while on RTX but he is on Entecavir now. HBV VL in 6/2024, 9/9/2024 have been undetectable.    - Currently liver function test is normal. Check VL_. Undetectable on 1/9/25  - Referral to hepatology, last visit in April 2023-> she will see GI in 7/2/2025  - Continue Entecavir    # Hypertension; controlled  # Volume overload; improved  Stopped spironolactone in 9/2024 due to ANDRY and marginal BP and hyperK.   - Continue Amlodipine 10 mg daily, Carvedilol 12.5 mg BID, Lasix 10mg daily and losartan at 25 mg per day  - Increase Lasix from 10 mg to 20 mg daily   # Malnutrition  # Hx of stroke with Rt sided hemiparesis  Mostly wheelchair bound for transportation. Weight generally appears to be stable although he appears to be quite weak. There is concern that his creatinine is a poor estimate of his true kidney function. Cystatin C 2.8 with eGFR 20 and Cr 1.98 with eGFR by Cr 37 on 6/15/23.   # BMD  # Secondary hyperparathyroidism  2/23/24: PTH is 124, vit D 20 . Ca/Phos normal. Will continue to monitor next visti.   1/9/2025: , vit D 30.    Follow-up 3 months with labs     The longitudinal plan of care for CKD stage 3B/4, HTN, nephrotic syndrome, chronic Hep B, Cryo GN was addressed during this visit. Due to the added complexity in care, I will continue to support Wilfredo Yoon in the subsequent management of this condition(s) and with the ongoing continuity of care of this condition(s).     I spent  40 minutes on the date of the encounter doing chart review, history and exam, documentation and further activities as noted above. 30 minutes of this visit is dedicated to direct patient interaction via face-to-face.    Sue Harrison MD on 04/07/2025

## 2025-04-07 ENCOUNTER — PATIENT OUTREACH (OUTPATIENT)
Dept: NEPHROLOGY | Facility: CLINIC | Age: 65
End: 2025-04-07

## 2025-04-07 ENCOUNTER — OFFICE VISIT (OUTPATIENT)
Dept: NEPHROLOGY | Facility: CLINIC | Age: 65
End: 2025-04-07
Attending: INTERNAL MEDICINE
Payer: COMMERCIAL

## 2025-04-07 ENCOUNTER — LAB (OUTPATIENT)
Dept: LAB | Facility: CLINIC | Age: 65
End: 2025-04-07
Attending: INTERNAL MEDICINE
Payer: COMMERCIAL

## 2025-04-07 VITALS
HEART RATE: 66 BPM | OXYGEN SATURATION: 100 % | HEIGHT: 71 IN | DIASTOLIC BLOOD PRESSURE: 102 MMHG | TEMPERATURE: 99.3 F | SYSTOLIC BLOOD PRESSURE: 154 MMHG | BODY MASS INDEX: 19.53 KG/M2

## 2025-04-07 DIAGNOSIS — N18.31 STAGE 3A CHRONIC KIDNEY DISEASE (H): ICD-10-CM

## 2025-04-07 DIAGNOSIS — B18.1 CHRONIC VIRAL HEPATITIS B WITHOUT DELTA AGENT AND WITHOUT COMA (H): ICD-10-CM

## 2025-04-07 DIAGNOSIS — N08 CRYOGLOBULINEMIC GLOMERULONEPHRITIS: ICD-10-CM

## 2025-04-07 DIAGNOSIS — I69.351 HEMIPLEGIA AND HEMIPARESIS FOLLOWING CEREBRAL INFARCTION AFFECTING RIGHT DOMINANT SIDE (H): ICD-10-CM

## 2025-04-07 DIAGNOSIS — R80.9 NEPHROTIC RANGE PROTEINURIA: Primary | ICD-10-CM

## 2025-04-07 DIAGNOSIS — N18.4 CKD (CHRONIC KIDNEY DISEASE) STAGE 4, GFR 15-29 ML/MIN (H): ICD-10-CM

## 2025-04-07 DIAGNOSIS — D89.1 CRYOGLOBULINEMIC GLOMERULONEPHRITIS: ICD-10-CM

## 2025-04-07 DIAGNOSIS — N18.32 STAGE 3B CHRONIC KIDNEY DISEASE (H): ICD-10-CM

## 2025-04-07 DIAGNOSIS — N08 CRYOGLOBULINEMIC GLOMERULONEPHRITIS: Primary | ICD-10-CM

## 2025-04-07 DIAGNOSIS — N39.0 RECURRENT UTI: ICD-10-CM

## 2025-04-07 DIAGNOSIS — D89.1 CRYOGLOBULINEMIC GLOMERULONEPHRITIS: Primary | ICD-10-CM

## 2025-04-07 LAB
ALBUMIN MFR UR ELPH: 262 MG/DL
ALBUMIN SERPL BCG-MCNC: 3.4 G/DL (ref 3.5–5.2)
ALBUMIN UR-MCNC: 200 MG/DL
ANION GAP SERPL CALCULATED.3IONS-SCNC: 9 MMOL/L (ref 7–15)
APPEARANCE UR: ABNORMAL
BACTERIA #/AREA URNS HPF: ABNORMAL /HPF
BILIRUB UR QL STRIP: NEGATIVE
BUN SERPL-MCNC: 29.9 MG/DL (ref 8–23)
C3 SERPL-MCNC: 135 MG/DL (ref 81–157)
C4 SERPL-MCNC: 37 MG/DL (ref 13–39)
CALCIUM SERPL-MCNC: 9.3 MG/DL (ref 8.8–10.4)
CD19 B CELL COMMENT: ABNORMAL
CD19 CELLS # BLD: <1 CELLS/UL (ref 107–698)
CD19 CELLS NFR BLD: <1 % (ref 6–27)
CHLORIDE SERPL-SCNC: 106 MMOL/L (ref 98–107)
COLOR UR AUTO: YELLOW
CREAT SERPL-MCNC: 2.52 MG/DL (ref 0.67–1.17)
CREAT UR-MCNC: 71.7 MG/DL
CRP SERPL-MCNC: 28.7 MG/L
CYSTATIN C (ROCHE): 2.4 MG/L (ref 0.6–1)
EGFRCR SERPLBLD CKD-EPI 2021: 28 ML/MIN/1.73M2
ERYTHROCYTE [DISTWIDTH] IN BLOOD BY AUTOMATED COUNT: 13.5 % (ref 10–15)
ERYTHROCYTE [SEDIMENTATION RATE] IN BLOOD BY WESTERGREN METHOD: 37 MM/HR (ref 0–20)
GFR/BSA.PRED SERPLBLD CYS-BASED-ARV: 24 ML/MIN/1.73M2
GLUCOSE SERPL-MCNC: 100 MG/DL (ref 70–99)
GLUCOSE UR STRIP-MCNC: NEGATIVE MG/DL
HCO3 SERPL-SCNC: 24 MMOL/L (ref 22–29)
HCT VFR BLD AUTO: 45.9 % (ref 40–53)
HGB BLD-MCNC: 15.1 G/DL (ref 13.3–17.7)
HGB UR QL STRIP: ABNORMAL
IGG SERPL-MCNC: 806 MG/DL (ref 610–1616)
KETONES UR STRIP-MCNC: NEGATIVE MG/DL
LEUKOCYTE ESTERASE UR QL STRIP: ABNORMAL
MCH RBC QN AUTO: 30.6 PG (ref 26.5–33)
MCHC RBC AUTO-ENTMCNC: 32.9 G/DL (ref 31.5–36.5)
MCV RBC AUTO: 93 FL (ref 78–100)
NITRATE UR QL: NEGATIVE
PH UR STRIP: 6.5 [PH] (ref 5–7)
PHOSPHATE SERPL-MCNC: 2.6 MG/DL (ref 2.5–4.5)
PLATELET # BLD AUTO: 112 10E3/UL (ref 150–450)
POTASSIUM SERPL-SCNC: 3.9 MMOL/L (ref 3.4–5.3)
PROT/CREAT 24H UR: 3.65 MG/MG CR (ref 0–0.2)
RBC # BLD AUTO: 4.94 10E6/UL (ref 4.4–5.9)
RBC URINE: 7 /HPF
RHEUMATOID FACT SERPL-ACNC: 32 IU/ML
SODIUM SERPL-SCNC: 139 MMOL/L (ref 135–145)
SP GR UR STRIP: 1.01 (ref 1–1.03)
UROBILINOGEN UR STRIP-MCNC: NORMAL MG/DL
WBC # BLD AUTO: 6.8 10E3/UL (ref 4–11)
WBC CLUMPS #/AREA URNS HPF: PRESENT /HPF
WBC URINE: 171 /HPF

## 2025-04-07 PROCEDURE — 80069 RENAL FUNCTION PANEL: CPT | Performed by: PATHOLOGY

## 2025-04-07 PROCEDURE — 81001 URINALYSIS AUTO W/SCOPE: CPT | Performed by: PATHOLOGY

## 2025-04-07 PROCEDURE — 86160 COMPLEMENT ANTIGEN: CPT | Performed by: INTERNAL MEDICINE

## 2025-04-07 PROCEDURE — 3074F SYST BP LT 130 MM HG: CPT | Performed by: INTERNAL MEDICINE

## 2025-04-07 PROCEDURE — 86334 IMMUNOFIX E-PHORESIS SERUM: CPT | Performed by: INTERNAL MEDICINE

## 2025-04-07 PROCEDURE — 87517 HEPATITIS B DNA QUANT: CPT | Performed by: INTERNAL MEDICINE

## 2025-04-07 PROCEDURE — 85652 RBC SED RATE AUTOMATED: CPT | Performed by: PATHOLOGY

## 2025-04-07 PROCEDURE — 99215 OFFICE O/P EST HI 40 MIN: CPT | Performed by: INTERNAL MEDICINE

## 2025-04-07 PROCEDURE — 86431 RHEUMATOID FACTOR QUANT: CPT | Performed by: INTERNAL MEDICINE

## 2025-04-07 PROCEDURE — G0463 HOSPITAL OUTPT CLINIC VISIT: HCPCS | Performed by: INTERNAL MEDICINE

## 2025-04-07 PROCEDURE — 99000 SPECIMEN HANDLING OFFICE-LAB: CPT | Performed by: PATHOLOGY

## 2025-04-07 PROCEDURE — 84156 ASSAY OF PROTEIN URINE: CPT | Performed by: PATHOLOGY

## 2025-04-07 PROCEDURE — 3080F DIAST BP >= 90 MM HG: CPT | Performed by: INTERNAL MEDICINE

## 2025-04-07 PROCEDURE — 82784 ASSAY IGA/IGD/IGG/IGM EACH: CPT | Performed by: INTERNAL MEDICINE

## 2025-04-07 PROCEDURE — 86140 C-REACTIVE PROTEIN: CPT | Performed by: PATHOLOGY

## 2025-04-07 PROCEDURE — 36415 COLL VENOUS BLD VENIPUNCTURE: CPT | Performed by: PATHOLOGY

## 2025-04-07 PROCEDURE — 86334 IMMUNOFIX E-PHORESIS SERUM: CPT | Mod: 26 | Performed by: PATHOLOGY

## 2025-04-07 PROCEDURE — 87088 URINE BACTERIA CULTURE: CPT | Performed by: INTERNAL MEDICINE

## 2025-04-07 PROCEDURE — 86355 B CELLS TOTAL COUNT: CPT | Performed by: INTERNAL MEDICINE

## 2025-04-07 PROCEDURE — 1126F AMNT PAIN NOTED NONE PRSNT: CPT | Performed by: INTERNAL MEDICINE

## 2025-04-07 PROCEDURE — 85027 COMPLETE CBC AUTOMATED: CPT | Performed by: PATHOLOGY

## 2025-04-07 PROCEDURE — 82595 ASSAY OF CRYOGLOBULIN: CPT | Performed by: INTERNAL MEDICINE

## 2025-04-07 PROCEDURE — 87186 SC STD MICRODIL/AGAR DIL: CPT | Performed by: INTERNAL MEDICINE

## 2025-04-07 PROCEDURE — G2211 COMPLEX E/M VISIT ADD ON: HCPCS | Performed by: INTERNAL MEDICINE

## 2025-04-07 PROCEDURE — 82610 CYSTATIN C: CPT | Performed by: INTERNAL MEDICINE

## 2025-04-07 PROCEDURE — 86225 DNA ANTIBODY NATIVE: CPT | Performed by: INTERNAL MEDICINE

## 2025-04-07 RX ORDER — FUROSEMIDE 20 MG/1
20 TABLET ORAL DAILY
Qty: 90 TABLET | Refills: 3 | Status: SHIPPED | OUTPATIENT
Start: 2025-04-07

## 2025-04-07 ASSESSMENT — PAIN SCALES - GENERAL: PAINLEVEL_OUTOF10: NO PAIN (0)

## 2025-04-07 NOTE — PROGRESS NOTES
Update from Dr. Harrison, renal panel in 2 weeks.  Need 3mo follow up with urine sample brought to clinic or done by Home [facility] prior to appt.  Ritux 2/27/25- due next 8/27- current plan is signed but no meds, so needing to be re-ordered.  Will support scheduling once signed.    Reviewed post clinic instructions with patient and primary caregiverCaty, niece of patient:  Increase lasix to 20 mg daily  Monitor blood pressure and check renal panel in 2 weeks; Nevin will call  See you in 3 months with labs    Scheduled follow up for July 17th with labs before clinic appt on that day.  Request to see if Home Care can redraw labs in 2 weeks. Message to [facility] [phone] to confirm they can draw and if they can need to fax labs: [phone].    Urine sample cups given to patient to ensure they can bring urine sample to clinic to prevent delays.  Patient and Najmo, family agrees to plan of care.  Ritux- 1000mg to be done [place] end of August.    Nevin Marx RN, BSN, PHN  [facility] Nephrology Nurse Navigator  [phone]

## 2025-04-07 NOTE — LETTER
4/7/2025       RE: Wilfredo Yoon  [address]  [facility] [address]     Dear Colleague,    Thank you for referring your patient, Wilfredo Yoon, to the [facility] [place] at [facility]. Please see a copy of my visit note below.      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     The patient was previously seen by  back in 10/17.  Per his note, the patient was initially evaluated for edema and nephrotic range proteinuria.  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  Upon diagnosis, he has preserved kidney function with Cr of 0.8 although UPCR of  3.5g/g.     At that time, he was treated conservatively with Bumex and lisinopril. His HBV has been treated with Entecavir. Since then has has lost to followed-up with Neph.       In June 2020 he suffered a stroke and continues to have right sided weakness, In July 2020, he has mild ANDRY with creatinine increased to 1.6 but it then came down to 1.1-1.3.  Creatinine increased to 1.46 on 9/30/22, 1.72 on 1/13/2023 and now 2.08 on 4/23.  His serum albumin has been progressively low from 3.3 in 2015 down to 1.5 to 2.0 but then somewhat improved. Serum albumin in April 23 was 2.6.  His UA always show blood and protein. Last UPCR was done in 7/31/18 was 4.80. He has trace cryo in the blood. He had positive IgM, Kappa cryo but then in 2020, he has positive for polyclonal Cryo: A, G, M, K and L. No monoclonal protein  via immunofixation in serum or urine.  [place] free light chain was 12.7 and lambda free light chain was 6.79 with ratio of 1.87 in July 2020. M spike was negative.  MI-3 and MPO were negative.  He has low C3 in the past and normal C4.  SPEP showed marked hypoalbuminemia and decreased beta globulin without monoclonal protein.     He has been followed by Dr. Osborne in hepatology clinic, last seen in April 2023.  He was noted to have a small liver lesion, ultrasound with some ascites and pleural effusion.  Noted blood pressure 137/94.  He has kidney in 1/23 which showed normal-sized right kidney without hydronephrosis. There is small amount of ascites and pleural effusion.     6/15/23: Seen for consultation. He arrives with his niece and she assists with translating.  He is noted to be in a wheelchair partially because of his residual right-sided weakness after CVA.  Reports that he is able to transition from room to room on the same floor with a walker.  However, he is unable to go upstairs and requires assistance moving long distances.  He uses a wheelchair when going outside the home.     We reviewed that his creatinine is increased over the past 9 months.  There have been no specific illnesses, hospital presentations or events that they can recall that may have led to kidney injury. After seeing hepatology in April, he was started on Lasix 20 mg and spironolactone for edema management.  His niece reports that his swelling resolved dramatically and now there is only trace swelling present.  He is off of the diuretics for the past few weeks.  Additionally, he continues on his antiretroviral for hepatitis B.  Most recently viral load was undetectable.      In reviewing his appetite and p.o. intake, he often does not eat a lot.  Although there are some times where he has better appetite.  States that his p.o. fluid intake is reasonable.  His urine is reported to be normal and seems to be going to the bathroom several  times a day.  No noted hematuria or color/quality.  Plan: Serological work-up, resume lasix 10 mg daily.   8/24/23: He missed his appt.  10/5/23: He is here with his niece today.  Today, he feels fine.  Patient still complaining of incontinence.  So he cannot provide a urine sample for us.  The patient has not done labs yet either.  His swelling has improved after we resume Lasix 10 mg/day.  His blood pressure still limited at 147/89.  The patient denies any joint pain fevers or rashes.  He has no oral ulcer.  Started losartan 25 mg per day.   Labs on 8/29/23 showed Cr 2.08 with eGFR 35, BUN 33.6, Bicarb 21. Albumin 2.9. UA showed WBC >182, RBC 6, hyaline cast 10, UPCR 5.56. Serological work-up showed positive cryo IgG, IgM and kappa and lambda. Cryo trace. C3 116 and C4 33. Kappa 15.24, lambda 7.96 and K/L ratio 1.91. PTH 81. DS DNA 61. ANCA negative. UA showed WBC >182, RBC 6, UPCR 5.56 g/g.   11/30/23: In the interim, he underwent a kidney Bx on 11/7/23 (read by Dr. Montenegro). 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 Advanced chronic changes of the parenchyma, including: global glomerulosclerosis (78% of the glomeruli), tubular atrophy and interstitial fibrosis (70-80% of the cortex), severe arterial and arteriolar sclerosis. This finding may be found in cryo GN even though there was no substructure presented in the EM. The patient also has positive Cryo  IgM, IgG and kappa. The polyclonal IgG and monoclonal IgG kappa suggested type 2 Cryo. Today,  He is doing good. Biopsy went well. Swelling better but he just hit something on his Rt leg and got swelled up again. Appetite  is not so good. Discussed at length about diagnosis and treatment. Discussed risk of HBV flare with Rx.    3/18/24: In the interim, he received  mg/m2 nx4 (1st dose on 12/22/23 and last on 1/19/24).  BP has been improving.  Appetite is good now. He has no leg swelling. He has pain or burning sensation when he urinates.  He has gained some weight but we do not have a weight check today.  Home blood pressure has been excellent without any evidence of hypotension.  Current medication: Amlodipine 5 (previously 10), Coreg 12.5 mg BID, losartan 25 mg daily., furosemide 20 mg 0.5 tab daily, spironolactone 50 mg once a day.   Labs today show sodium 135, potassium 4.0, carbon dioxide 18, BUN 54.2, creatinine 2.37, GFR 30.  Phosphorus 3.3, albumin 3.5.  LFTs normal.  CBC show white blood cell 5.4, platelet 128 and hemoglobin 14.3. , vit D 20.     6/6/2024: In the interim, his Cryo was negative on 3/18/24. CD19 was < 1 on 3/18/24. RF improved to 51 from 86 in 12/13/24 (pre-treatment). DS DNA showed improvement.  He has been feeling better and has gained some weight and energy.  His weight is 118 pounds from 111 pounds in 12/2023.  He has no lower extremity edema.   BP is high at 164/79 mmHg but at home his blood pressure is 109 over 70s.  He has no problem with urination.. He has been coughing but he reports that it has been normal for him and he denies any short of breath.  His appetite is good. His energy is also good.  Labs today showed creatinine 2.11, GFR 35, BUN 36.9, potassium 4.4, Bicarb , 22.  Calcium 9.3, phosphorus 3.4, albumin 3.4. CRP 21.10. Pending Cryo, RF, DS DNA and complement.     9/5/24: In the interim, the patient did not get rituximab scheduled.   Today, he feels ok except that he feels a bit for the past week. He has not been eating well. He does not throw up or having diarrhea. No pain. He has been coughing for the past 2 weeks. Cough is productive with clear color. He has no fever or  chills. He does not feel dizzy when stands up. He has no leg swelling.   BP today is 101/69 mmHg.  He has no problem with urination.   UPCR has improved down to 1.51 g/g on 7/2/2024 and down to 0.66 g/g on 9//24.  UA showed moderate blood, large leukocyte esterase white blood cell more than 182 and RBC 48.  This sample is from [place] sample.    Labs drawn some of the lab but most important labs such as renal panel and CBC they did not do it.    Update: Later on labs came back potassium 5.5, bicarb 16, BUN 56.1, creatinine 2.73 and GFR 25.  Albumin 3.6, phosphorus 3.6.  CRP 6.5, rheumatoid factor 42, ESR 30.  White blood cell 5, hemoglobin 12.9 and platelet 131. CD 16 6 cells. UPCR 0.66.    1/9/2025: Last visit, is Cr was 2.73 and UA showed UTI so we treated him with Ab. Cr improved to 2.01 but DS DNA worsens so I redose RTX 1 g om 10/17/24.    Today, he has been doing good. No recent infection. Home blood pressure are good. He has some swelling if on his feet for a long time. UA from yesterday was >24 hrs hence the sample was discarded. BP today is 130/84. He has not taken lasix for 2 weeks now.  He also ran out of Entecavir and has not seen GI since April 2023.   Labs today showed Cr 2.12, potassium 4.4, bicarb 24, BUN 23.5, GFR 34.  Calcium 9.3, phosphorus 2.6, albumin 3.6.  CBC showed white blood cell 4.1, hemoglobin 14.1 and platelet 119. Hb 14.1, WBC 4.1 and plt 119.  and vit D pending. Need to collect UA-> pt did not collect for us.       4/7/2025: In the interim, he received rituximab 1000 mg on 2/27/2025. He then was admitted for gram-negative bacteremia with Klebsiella pneumoniae, complicated cystitis, bladder dome calcification between 3/13-3/16/25.  He was started on ceftriaxone for bacteremia with improvement and transition to Keflex 1000 mg TID. He has mild ANDRY with creatinine increased to 2.47 but on dismissal it was 2.19. He completed antibiotics already. He has some leg swelling  but denies any  dyspnea.    He is feeling good today. He has no pain when urinate despite having pyuria. He has no fever or chills. Blood pressure is high today at 154/102 mmHg. Labs today show creatinine 2.52, GFR 28, BUN 29.9, sodium 139, potassium 3.9, bicarb 24, calcium 9.3, phosphorus 1.6, albumin 3.4.  CRP 28.7.  Glucose 102.  CBC showed white blood cell 6.8 hemoglobin 15.1 and platelet 1/1/2012.  Sed rate 37.  UA showed large leukocyte Estrace, white blood cell 171 and red blood cells 7. UPCR worsening to 3.65 g/g.     Past medical history  Past Medical History:   Diagnosis Date     Cirrhosis (H)      Cryoglobulinemia      CVA (cerebral vascular accident) (H) 07/16/2020    Supratentorial likely d/t hypertensive emergency leading to right hemiparesis, dysphagia and aphasia     Glomerulonephritis      Hepatitis B      Hypertension      Latent tuberculosis     Treatment [id number]     MPGN (membranoproliferative glomerulonephritides)      Positive QuantiFERON-TB Gold test      PRES (posterior reversible encephalopathy syndrome) 07/16/2020    In brainstem d/t hypertensive emergency; suffered supratentorial CVAs same date       Past surgical history  Past Surgical History:   Procedure Laterality Date     ANESTHESIA OUT OF OR MRI N/A 7/18/2020    Procedure: ANESTHESIA OUT OF OR MRI;  Surgeon: GENERIC ANESTHESIA PROVIDER;  Location: [facility]     ESOPHAGOSCOPY, GASTROSCOPY, DUODENOSCOPY (EGD), COMBINED N/A 10/18/2018    Procedure: EGD;  Surgeon: Eber Ortez MD;  Location: [facility] GI     HAND SURGERY Right      IR PARACENTESIS  7/27/2020     IR RENAL BIOPSY RIGHT  11/7/2023     [facility] HAND/FINGER SURGERY UNLISTED       Review of Systems:   14 systems were reviewed and all negative except as mentioned above.   Current Medications:  Current Outpatient Medications   Medication Sig Dispense Refill     acetaminophen (TYLENOL) 325 MG tablet Take 2 tablets (650 mg) by mouth every 4 hours as needed for mild pain or other (and adjunct with  "moderate or severe pain or per patient request). 30 tablet 0     amLODIPine (NORVASC) 10 MG tablet Take 1 tablet (10 mg) by mouth daily. Indication: HTN 90 tablet 3     ASPIRIN LOW DOSE 81 MG chewable tablet TAKE 1 TABLET (81 MG) BY MOUTH DAILY INDICATION: STROKE 90 tablet 3     atorvastatin (LIPITOR) 40 MG tablet Take 1 tablet (40 mg) by mouth daily 90 tablet 3     carvedilol (COREG) 12.5 MG tablet Take 1 tablet (12.5 mg) by mouth 2 times daily (with meals). 180 tablet 3     cephALEXin (KEFLEX) 500 MG capsule Take 2 capsules (1,000 mg) by mouth every 8 hours. 36 capsule 0     entecavir (BARACLUDE) 1 MG tablet TAKE 1 TABLET (1 MG) EVERY OTHER DAY 30 tablet 6     furosemide (LASIX) 20 MG tablet Take 1 tablet (20 mg) by mouth daily. 90 tablet 3     gabapentin (NEURONTIN) 100 MG capsule TAKE 1 CAPSULE (100 MG) BY MOUTH 3 TIMES DAILY 360 capsule 3     losartan (COZAAR) 25 MG tablet Take 1 tablet (25 mg) by mouth daily. 90 tablet 3     Multiple Vitamin (MULTI VITAMIN DAILY) TABS TAKE 1 TABLET BY MOUTH DAILY 90 tablet 3     omeprazole (PRILOSEC) 20 MG DR capsule Take 20 mg by mouth daily.       SENEXON-S 8.6-50 MG tablet Take 1 tablet by mouth 2 times daily as needed.       No current facility-administered medications for this visit.       Physical Exam:   BP (!) 154/102 (BP Location: Right arm, Patient Position: Sitting, Cuff Size: Adult Small)   Pulse 66   Temp 99.3  F (37.4  C) (Oral)   Ht 1.803 m (5' 11\")   SpO2 100%   BMI 19.53 kg/m     Body mass index is 19.53 kg/m .    GENERAL APPEARANCE: Alert, not in acute distress, pleasant,  thin built, on WC.   EYES:  Not pale conjunctiva, pupils equal  HENT: Mouth without ulcers or lesions  PULM: lungs clear to auscultation bilaterally, equal air movement, no clubbing  CV: regular rhythm, normal rate, no rub     -JVD no distended.      -edema: + trace to 1 + edema  GI: soft,  - tender, no distended, bowel sounds are present  INTEGUMENT: No rash  NEURO:  Non focal. No " tess.     Labs:   All labs reviewed by me  Last Renal Panel:  Sodium   Date Value Ref Range Status   04/07/2025 139 135 - 145 mmol/L Final   06/30/2021 140 133 - 144 mmol/L Final     Potassium   Date Value Ref Range Status   04/07/2025 3.9 3.4 - 5.3 mmol/L Final   01/19/2022 3.6 3.4 - 5.3 mmol/L Final   06/30/2021 4.2 3.4 - 5.3 mmol/L Final     Chloride   Date Value Ref Range Status   04/07/2025 106 98 - 107 mmol/L Final   01/19/2022 107 94 - 109 mmol/L Final   06/30/2021 106 94 - 109 mmol/L Final     Carbon Dioxide   Date Value Ref Range Status   06/30/2021 25 20 - 32 mmol/L Final     Carbon Dioxide (CO2)   Date Value Ref Range Status   04/07/2025 24 22 - 29 mmol/L Final   01/19/2022 27 20 - 32 mmol/L Final     Anion Gap   Date Value Ref Range Status   04/07/2025 9 7 - 15 mmol/L Final   01/19/2022 10 3 - 14 mmol/L Final   06/30/2021 9 3 - 14 mmol/L Final     Glucose   Date Value Ref Range Status   04/07/2025 100 (H) 70 - 99 mg/dL Final   01/19/2022 78 70 - 99 mg/dL Final   06/30/2021 96 70 - 99 mg/dL Final     Urea Nitrogen   Date Value Ref Range Status   04/07/2025 29.9 (H) 8.0 - 23.0 mg/dL Final   01/19/2022 19 7 - 30 mg/dL Final   06/30/2021 24 7 - 30 mg/dL Final     Creatinine   Date Value Ref Range Status   04/07/2025 2.52 (H) 0.67 - 1.17 mg/dL Final   06/30/2021 1.27 (H) 0.66 - 1.25 mg/dL Final     GFR Estimate   Date Value Ref Range Status   04/07/2025 28 (L) >60 mL/min/1.73m2 Final     Comment:     eGFR calculated using 2021 CKD-EPI equation.   06/30/2021 61 >60 mL/min/[1.73_m2] Final     Comment:     Non  GFR Calc  Starting 12/18/2018, serum creatinine based estimated GFR (eGFR) will be   calculated using the Chronic Kidney Disease Epidemiology Collaboration   (CKD-EPI) equation.       Calcium   Date Value Ref Range Status   04/07/2025 9.3 8.8 - 10.4 mg/dL Final   06/30/2021 9.0 8.5 - 10.1 mg/dL Final     Phosphorus   Date Value Ref Range Status   04/07/2025 2.6 2.5 - 4.5 mg/dL Final    10/10/2017 3.0 2.5 - 4.5 mg/dL Final     Albumin   Date Value Ref Range Status   04/07/2025 3.4 (L) 3.5 - 5.2 g/dL Final   01/19/2022 2.5 (L) 3.4 - 5.0 g/dL Final   06/30/2021 3.0 (L) 3.4 - 5.0 g/dL Final     Imaging:  I reviewed imaging studies.     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  It does appear that the most recent trend is reflecting a progressive elevation in creatinine since 9/30/2022.  There is some concern that this might be related to his liver disease but there is limited lower extremity edema and palpable ascites on exam.  His MELD score is relatively low to observe type II HRS physiology.  In regard to his prior cryoglobulinemic GN, it would be odd for this to come out of quiescence well his hepatitis B has been appropriately treated. I repeat serological work-up and showed that he has trace cryo IgG, IgM and kappa but normal complement: C3 116 and C4 33. Otherwise negative monoclonal protein; K/L ratio of 1.91. PLA2R negative, ANCA  negative  but DS DNA positive at 61 U/ml. HBV VL <20 on 4/4/23. It was unclear why he has progressive kidney disease despite HBV controlled. He is also noted to have nephrotic range proteinuria.  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 Severe arterial sclerosis with extensive ultrastructural signs of endothelial injury and glomerular capillary loop remodeling, suggesting a superimposed chronic and acute thrombotic angiopathy. Advanced chronic changes of the parenchyma, including: global glomerulosclerosis (78% of the glomeruli), tubular atrophy and interstitial fibrosis (70-80% of the cortex), severe arterial and arteriolar sclerosis. This findings are mostly consistent with cryo GN. This is quite interesting since his HBV has been under control. So it is possible that prior previous HBV could stimulate his immune reaction resulted in cryoglobulin.  We decided to initiate rituximab 375 mg/m  x 4 doses and 1st dose on  12/22/23 and last dose on 1/19/24.  His creatinine continue to improved and now down to 2.11 with a EGFR 35.  We have not had any UA since October 2023 as he has difficult time providing UA hence we needs to repeat UA.  His DS DNA, rheumatoid factors and cryoglobulin level has been improving based on the lab in March 2024 and June 2024. In September 2024, proteinuria has improved to 0.66 g/g with improvement in serum albumin. However, he had ANDRY with Cr 2.73 likely from APN and poor po intake. Repeat labs showed improve in Cr down to 2.01 in 10/2024. DS DNA and RF adenike in 9/2024 to 61 and 42 respectively I gave him another dose of rituximab 1 g on 10/17/2024. He has been feeling well today and no neuropathy or rashes.  The patient received rituximab 1 g on 2/27/2025 for cryo GN.  In the  interim, the patient was admitted for Klebsiella pneumonia bacteremia and UTI in mid March 2025.  He was treated with antibiotics and was dismissed with Keflex to complete 14 days of antibiotics.  He has mild ANDRY with creatinine 2.47 on 3/13/2025 and on dismissal improved to 2.19.  However, his creatinine increased to 2.52 today.  He has worsening lower extremity edema and blood pressure is high at 154/102.  UA did show some pyuria but he has no UTI symptoms.  UPCR so worsening at 3.65 g/g compared to 0.66 g/g in September 2024.  Therefore I think we need to address his blood pressure issue.    I am still waiting for other serologies including double-stranded DNA, complement, cryo, and IgG.  I am worried about adding immunosuppression for him at this time given that he just recently has bacteremia in mid March.  Will try to control blood pressure first and see if this can improve proteinuria and monitor his kidney function.    - I increase Lasix to 20 mg/day from 10 mg/day due to lower extremity edema and blood pressure in the 150s  - Continue losartan 25 mg per day  - Checking Cystatin C today  - Prevously on spironolactone but stopped since 9/2024 for ANDRY; if proteinuria does not improve we may have to resume that at some point  - S/p rituximab 375 mg/m  first dose on 12/22/2023, 1/19/2024; hold off another and 1000 mg on 10/17/24, 2/27/25    # Recurrent UTI  # Abnormal findings on CT scan: Bladder dome calcification versus tumor  The patient has recurrent UTI and he has CT scan showing bladder calcification versus tumor.  I will refer him to urology to ensure that there is no other cause of recurrent UTI.  # Metabolic acidosis; resolved  Likely from ANDRY on CKD in September 2024 but now resolved.   # HBV cirrhosis  # HBV infection on entecavir  Follows with hepatology, last viral quant undetectable on 4/4/23. MELD 16. MR Abdomen showing cirrhosis with evidence of portal hypertension. Reports no encephalopathy or  concern for hematemesis. Currently on Entevavir, discuss risk of HBV flare while on RTX but he is on Entecavir now. HBV VL in 6/2024, 9/9/2024 have been undetectable.    - Currently liver function test is normal. Check VL_. Undetectable on 1/9/25  - Referral to hepatology, last visit in April 2023-> she will see GI in 7/2/2025  - Continue Entecavir   # Hypertension; controlled  # Volume overload; improved  Stopped spironolactone in 9/2024 due to ANDRY and marginal BP and hyperK.   - Continue Amlodipine 10 mg daily, Carvedilol 12.5 mg BID, Lasix 10mg daily and losartan at 25 mg per day  - Increase Lasix from 10 mg to 20 mg daily   # Malnutrition  # Hx of stroke with Rt sided hemiparesis  Mostly wheelchair bound for transportation. Weight generally appears to be stable although he appears to be quite weak. There is concern that his creatinine is a poor estimate of his true kidney function. Cystatin C 2.8 with eGFR 20 and Cr 1.98 with eGFR by Cr 37 on 6/15/23.   # BMD  # Secondary hyperparathyroidism  2/23/24: PTH is 124, vit D 20 . Ca/Phos normal. Will continue to monitor next visti.   1/9/2025: , vit D 30.    Follow-up 3 months with labs     The longitudinal plan of care for CKD stage 3B/4, HTN, nephrotic syndrome, chronic Hep B, Cryo GN was addressed during this visit. Due to the added complexity in care, I will continue to support Wilfredo Yoon in the subsequent management of this condition(s) and with the ongoing continuity of care of this condition(s).     I spent  40 minutes on the date of the encounter doing chart review, history and exam, documentation and further activities as noted above. 30 minutes of this visit is dedicated to direct patient interaction via face-to-face.    Sue Harrison MD on 04/07/2025            Again, thank you for allowing me to participate in the care of your patient.      Sincerely,    Sue Harrison MD

## 2025-04-07 NOTE — NURSING NOTE
"Chief Complaint   Patient presents with    RECHECK     Follow up.      Vitals:    04/07/25 1104 04/07/25 1107 04/07/25 1108   BP: 128/90 (!) 151/93 (!) 154/102   BP Location: Right arm Right arm Right arm   Patient Position: Sitting Sitting Sitting   Cuff Size: Adult Small Adult Small Adult Small   Pulse: 66     Temp: 99.3  F (37.4  C)     TempSrc: Oral     SpO2: 100%     Height: 1.803 m (5' 11\")         BP Readings from Last 3 Encounters:   04/07/25 (!) 154/102   03/16/25 (!) 138/95   02/27/25 (!) 153/101       BP (!) 154/102 (BP Location: Right arm, Patient Position: Sitting, Cuff Size: Adult Small)   Pulse 66   Temp 99.3  F (37.4  C) (Oral)   Ht 1.803 m (5' 11\")   SpO2 100%   BMI 19.53 kg/m       Chelo Monaco    "

## 2025-04-07 NOTE — PATIENT INSTRUCTIONS
Increase lasix to 20 mg daily  Monitor blood pressure and check renal panel in 2 weeks; Nevin will call  See you in 3 months with labs

## 2025-04-08 LAB
HBV DNA SERPL NAA+PROBE-ACNC: <10 IU/ML
HBV DNA SERPL NAA+PROBE-LOG IU: <1 {LOG_IU}/ML

## 2025-04-09 LAB — BACTERIA UR CULT: ABNORMAL

## 2025-04-09 NOTE — PROGRESS NOTES
Discontinued old Ritux infusion plans as they no longer were complete and previous therapy plans confirmed done.  Ritux 1gm every 6mo initiated with CD19 and IGG pre-labs added per Dr. Harrison's request.  Sent to Dr. Harrison to review and approve then support scheduling at [facility] for August date.  Nevin Marx RN, BSN, PHN  [facility] Nephrology Nurse Navigator  [phone]

## 2025-04-10 LAB — DSDNA AB SER-ACNC: 41 IU/ML

## 2025-04-14 LAB — CRYOGLOB SER QL: ABNORMAL

## 2025-04-16 DIAGNOSIS — K59.00 CONSTIPATION, UNSPECIFIED CONSTIPATION TYPE: ICD-10-CM

## 2025-04-17 RX ORDER — DOCUSATE SODIUM 100 MG/1
100 CAPSULE, LIQUID FILLED ORAL 2 TIMES DAILY PRN
Qty: 180 CAPSULE | Refills: 4 | Status: SHIPPED | OUTPATIENT
Start: 2025-04-17

## 2025-04-17 NOTE — TELEPHONE ENCOUNTER
"I queued med  Pt is requesting DOCUSATE SODIUM 100 MG ORAL CAPSULE   Discontinued on 3/13/2025        Request for medication refill:    Medication Name: DOCUSATE SODIUM 100 MG ORAL CAPSULE     Providers if patient needs an appointment and you are willing to give a one month supply please refill for one month and  send a MyChart using \".SMILLIMITEDREFILL\" .Or route chart to \"P [facility] \" . To call patient and inform of limited refill and providers request to make an appointment. (Giving one month refill in non controlled medications is strongly recommended before denial)    If refill has been denied, meaning absolutely no refills without visit, please complete the smart phrase \".SMIRXREFUSE\" and route it to the \"P [facility] MED REFILLS\"  pool to inform the patient and the pharmacy.    Duran Alvarez, [facility]      "

## 2025-04-30 ENCOUNTER — PATIENT OUTREACH (OUTPATIENT)
Dept: NEPHROLOGY | Facility: CLINIC | Age: 65
End: 2025-04-30
Payer: COMMERCIAL

## 2025-04-30 DIAGNOSIS — D89.1 CRYOGLOBULINEMIC GLOMERULONEPHRITIS: Primary | ICD-10-CM

## 2025-04-30 DIAGNOSIS — N05.0 UNSPECIFIED NEPHRITIC SYNDROME WITH MINOR GLOMERULAR ABNORMALITY: ICD-10-CM

## 2025-04-30 DIAGNOSIS — R80.9 NEPHROTIC RANGE PROTEINURIA: ICD-10-CM

## 2025-04-30 DIAGNOSIS — N08 CRYOGLOBULINEMIC GLOMERULONEPHRITIS: Primary | ICD-10-CM

## 2025-04-30 RX ORDER — METHYLPREDNISOLONE SODIUM SUCCINATE 40 MG/ML
40 INJECTION INTRAMUSCULAR; INTRAVENOUS
Start: 2025-08-27

## 2025-04-30 RX ORDER — MEPERIDINE HYDROCHLORIDE 25 MG/ML
25 INJECTION INTRAMUSCULAR; INTRAVENOUS; SUBCUTANEOUS
OUTPATIENT
Start: 2025-08-27

## 2025-04-30 RX ORDER — METHYLPREDNISOLONE SODIUM SUCCINATE 125 MG/2ML
80 INJECTION INTRAMUSCULAR; INTRAVENOUS ONCE
OUTPATIENT
Start: 2025-08-27

## 2025-04-30 RX ORDER — ENTECAVIR 1 MG/1
TABLET, FILM COATED ORAL
Qty: 30 TABLET | Refills: 6 | Status: SHIPPED | OUTPATIENT
Start: 2025-04-30

## 2025-04-30 RX ORDER — DIPHENHYDRAMINE HYDROCHLORIDE 50 MG/ML
25 INJECTION, SOLUTION INTRAMUSCULAR; INTRAVENOUS
Start: 2025-08-27

## 2025-04-30 RX ORDER — EPINEPHRINE 1 MG/ML
0.3 INJECTION, SOLUTION, CONCENTRATE INTRAVENOUS EVERY 5 MIN PRN
OUTPATIENT
Start: 2025-08-27

## 2025-04-30 RX ORDER — ACETAMINOPHEN 325 MG/1
650 TABLET ORAL ONCE
Start: 2025-08-27

## 2025-04-30 RX ORDER — ALBUTEROL SULFATE 0.83 MG/ML
2.5 SOLUTION RESPIRATORY (INHALATION)
OUTPATIENT
Start: 2025-08-27

## 2025-04-30 RX ORDER — ALBUTEROL SULFATE 90 UG/1
1-2 INHALANT RESPIRATORY (INHALATION)
Start: 2025-08-27

## 2025-04-30 RX ORDER — DIPHENHYDRAMINE HCL 25 MG
50 CAPSULE ORAL ONCE
Start: 2025-08-27

## 2025-04-30 RX ORDER — DIPHENHYDRAMINE HYDROCHLORIDE 50 MG/ML
50 INJECTION, SOLUTION INTRAMUSCULAR; INTRAVENOUS
Start: 2025-08-27

## 2025-04-30 NOTE — PROGRESS NOTES
Najmo, niece with patient verbal consent called and home care has not redrawn renal panel Dr. Harrison wanted drawn.  Scheduled patient at [facility] lab for tomorrow confirmed date/time/location or appt.  Linked lab order to appt, renal panel only.  Confirmed she will call back with SN[facility] recent BP results to also report those findings to Dr. Harrison.  Ritux August appt cannot be scheduled yet as Ritux plan is not signed, resent therapy plan to Dr. John and notified him to sign.  Plan: follow up on lab results and BP results once received.  Nevin Marx RN, BSN, PHN  [facility] Nephrology Nurse Navigator  [phone]

## 2025-05-01 ENCOUNTER — LAB (OUTPATIENT)
Dept: LAB | Facility: CLINIC | Age: 65
End: 2025-05-01
Attending: INTERNAL MEDICINE
Payer: COMMERCIAL

## 2025-05-01 DIAGNOSIS — N18.4 CKD (CHRONIC KIDNEY DISEASE) STAGE 4, GFR 15-29 ML/MIN (H): ICD-10-CM

## 2025-05-01 LAB
ALBUMIN SERPL BCG-MCNC: 3.4 G/DL (ref 3.5–5.2)
ANION GAP SERPL CALCULATED.3IONS-SCNC: 8 MMOL/L (ref 7–15)
BUN SERPL-MCNC: 37.2 MG/DL (ref 8–23)
CALCIUM SERPL-MCNC: 9.1 MG/DL (ref 8.8–10.4)
CHLORIDE SERPL-SCNC: 105 MMOL/L (ref 98–107)
CREAT SERPL-MCNC: 2.39 MG/DL (ref 0.67–1.17)
EGFRCR SERPLBLD CKD-EPI 2021: 30 ML/MIN/1.73M2
GLUCOSE SERPL-MCNC: 137 MG/DL (ref 70–99)
HCO3 SERPL-SCNC: 23 MMOL/L (ref 22–29)
PHOSPHATE SERPL-MCNC: 2.2 MG/DL (ref 2.5–4.5)
POTASSIUM SERPL-SCNC: 4 MMOL/L (ref 3.4–5.3)
SODIUM SERPL-SCNC: 136 MMOL/L (ref 135–145)

## 2025-05-01 PROCEDURE — 80069 RENAL FUNCTION PANEL: CPT | Performed by: PATHOLOGY

## 2025-05-01 PROCEDURE — 36415 COLL VENOUS BLD VENIPUNCTURE: CPT | Performed by: PATHOLOGY

## 2025-05-14 ENCOUNTER — PATIENT OUTREACH (OUTPATIENT)
Dept: NEPHROLOGY | Facility: CLINIC | Age: 65
End: 2025-05-14
Payer: COMMERCIAL

## 2025-06-30 DIAGNOSIS — I69.30 HISTORY OF CVA WITH RESIDUAL DEFICIT: ICD-10-CM

## 2025-07-01 ENCOUNTER — PATIENT OUTREACH (OUTPATIENT)
Dept: NEPHROLOGY | Facility: CLINIC | Age: 65
End: 2025-07-01
Payer: COMMERCIAL

## 2025-07-01 DIAGNOSIS — R80.9 NEPHROTIC RANGE PROTEINURIA: Primary | ICD-10-CM

## 2025-07-01 DIAGNOSIS — N18.4 CKD (CHRONIC KIDNEY DISEASE) STAGE 4, GFR 15-29 ML/MIN (H): ICD-10-CM

## 2025-07-01 DIAGNOSIS — R32 URINARY INCONTINENCE, UNSPECIFIED TYPE: ICD-10-CM

## 2025-07-01 DIAGNOSIS — N18.31 STAGE 3A CHRONIC KIDNEY DISEASE (H): ICD-10-CM

## 2025-07-01 DIAGNOSIS — N39.0 RECURRENT UTI: ICD-10-CM

## 2025-07-01 RX ORDER — ASPIRIN 81 MG
TABLET,CHEWABLE ORAL
Qty: 90 TABLET | Refills: 3 | Status: SHIPPED | OUTPATIENT
Start: 2025-07-01

## 2025-07-01 NOTE — TELEPHONE ENCOUNTER
"Request for medication refill:    Medication Name: ASPIRIN LOW DOSE 81 MG chewable tablet     Providers if patient needs an appointment and you are willing to give a one month supply please refill for one month and  send a MyChart using \".SMILLIMITEDREFILL\" .Or route chart to \"P [facility] \" . And use the phrase \" SMIRXFOLLOWUP\"To call patient and inform of limited refill and providers request to make an appointment. (Giving one month refill in non controlled medications is strongly recommended before denial)    If refill has been denied, meaning absolutely no refills without visit, please complete the smart phrase \".SMIRXREFUSE\" and route it to the \"P [facility] MED REFILLS\"  pool to inform the patient and the pharmacy.    Duran Alvarez, CMA      "

## 2025-07-01 NOTE — PROGRESS NOTES
Reached out to Darya  Nurse from [facility] about collecting urine with SNV next week prior to appt on 7/17.  Darya will check with family if urine sample cup is available or come in to get a few sterile cups.  Re-ordered vazquez insertion trays, vazquez and supplies x 6 with sterile urine cups to support frequent urine samples needed for clinic appts per T.O. Dr. Harrison  Faxed form to [facility] to get filled.    Nevin Marx RN, BSN, PHN   Care Coordinator  [phone]

## 2025-07-10 ENCOUNTER — TELEPHONE (OUTPATIENT)
Dept: NEPHROLOGY | Facility: CLINIC | Age: 65
End: 2025-07-10
Payer: COMMERCIAL

## 2025-07-10 NOTE — TELEPHONE ENCOUNTER
Called patient's daughter (Najmo) and left voice message for appointment reminder on 7/17 at 1:30 PM in person with non-fasting labs prior at 12:30 PM.  Park Rodriguez,  Nephrology Clinic Navigator  [facility]  Direct: [phone].

## 2025-07-13 DIAGNOSIS — K59.00 CONSTIPATION, UNSPECIFIED CONSTIPATION TYPE: ICD-10-CM

## 2025-07-13 DIAGNOSIS — I69.30 HISTORY OF CVA WITH RESIDUAL DEFICIT: ICD-10-CM

## 2025-07-14 RX ORDER — ATORVASTATIN CALCIUM 40 MG/1
40 TABLET, FILM COATED ORAL DAILY
Qty: 90 TABLET | Refills: 3 | Status: SHIPPED | OUTPATIENT
Start: 2025-07-14

## 2025-07-14 RX ORDER — DOCUSATE SODIUM 100 MG/1
100 CAPSULE, LIQUID FILLED ORAL 2 TIMES DAILY PRN
Qty: 180 CAPSULE | Refills: 4 | Status: SHIPPED | OUTPATIENT
Start: 2025-07-14

## 2025-07-14 NOTE — TELEPHONE ENCOUNTER
"Request for medication refill:    Medication Name: atorvastatin (LIPITOR) 40 MG tablet     docusate sodium (COLACE) 100 MG capsule     Providers if patient needs an appointment and you are willing to give a one month supply please refill for one month and  send a MyChart using \".SMILLIMITEDREFILL\" .Or route chart to \"P [facility] \" . And use the phrase \" SMIRXFOLLOWUP\"To call patient and inform of limited refill and providers request to make an appointment. (Giving one month refill in non controlled medications is strongly recommended before denial)    If refill has been denied, meaning absolutely no refills without visit, please complete the smart phrase \".SMIRXREFUSE\" and route it to the \"P [facility] MED REFILLS\"  pool to inform the patient and the pharmacy.    Duran Alvarez, CMA      "

## 2025-07-16 PROCEDURE — 82043 UR ALBUMIN QUANTITATIVE: CPT | Performed by: INTERNAL MEDICINE

## 2025-07-16 PROCEDURE — 99000 SPECIMEN HANDLING OFFICE-LAB: CPT | Performed by: PATHOLOGY

## 2025-07-16 PROCEDURE — 87186 SC STD MICRODIL/AGAR DIL: CPT | Performed by: INTERNAL MEDICINE

## 2025-07-17 ENCOUNTER — TELEPHONE (OUTPATIENT)
Dept: MULTI SPECIALTY CLINIC | Facility: CLINIC | Age: 65
End: 2025-07-17

## 2025-07-17 ENCOUNTER — LAB (OUTPATIENT)
Dept: LAB | Facility: CLINIC | Age: 65
End: 2025-07-17
Attending: INTERNAL MEDICINE
Payer: COMMERCIAL

## 2025-07-17 ENCOUNTER — OFFICE VISIT (OUTPATIENT)
Dept: NEPHROLOGY | Facility: CLINIC | Age: 65
End: 2025-07-17
Attending: INTERNAL MEDICINE
Payer: COMMERCIAL

## 2025-07-17 VITALS
OXYGEN SATURATION: 97 % | SYSTOLIC BLOOD PRESSURE: 119 MMHG | DIASTOLIC BLOOD PRESSURE: 84 MMHG | TEMPERATURE: 98 F | HEART RATE: 76 BPM

## 2025-07-17 DIAGNOSIS — D89.1 MIXED CRYOGLOBULINEMIA: ICD-10-CM

## 2025-07-17 DIAGNOSIS — N30.01 ACUTE CYSTITIS WITH HEMATURIA: ICD-10-CM

## 2025-07-17 DIAGNOSIS — D89.1 CRYOGLOBULINEMIC GLOMERULONEPHRITIS: ICD-10-CM

## 2025-07-17 DIAGNOSIS — N08 CRYOGLOBULINEMIC GLOMERULONEPHRITIS: ICD-10-CM

## 2025-07-17 DIAGNOSIS — B18.1 CHRONIC VIRAL HEPATITIS B WITHOUT DELTA AGENT AND WITHOUT COMA (H): ICD-10-CM

## 2025-07-17 DIAGNOSIS — N18.32 STAGE 3B CHRONIC KIDNEY DISEASE (H): ICD-10-CM

## 2025-07-17 DIAGNOSIS — R80.9 NEPHROTIC RANGE PROTEINURIA: ICD-10-CM

## 2025-07-17 DIAGNOSIS — N18.32 STAGE 3B CHRONIC KIDNEY DISEASE (H): Primary | ICD-10-CM

## 2025-07-17 DIAGNOSIS — K74.60 CIRRHOSIS OF LIVER WITH ASCITES, UNSPECIFIED HEPATIC CIRRHOSIS TYPE (H): ICD-10-CM

## 2025-07-17 DIAGNOSIS — I10 ESSENTIAL HYPERTENSION, MALIGNANT: ICD-10-CM

## 2025-07-17 DIAGNOSIS — R18.8 CIRRHOSIS OF LIVER WITH ASCITES, UNSPECIFIED HEPATIC CIRRHOSIS TYPE (H): ICD-10-CM

## 2025-07-17 DIAGNOSIS — K76.9 LIVER LESION: Primary | ICD-10-CM

## 2025-07-17 DIAGNOSIS — N18.31 STAGE 3A CHRONIC KIDNEY DISEASE (H): ICD-10-CM

## 2025-07-17 LAB
AFP SERPL-MCNC: 114 NG/ML
ALBUMIN MFR UR ELPH: 325 MG/DL
ALBUMIN SERPL BCG-MCNC: 3.6 G/DL (ref 3.5–5.2)
ALBUMIN UR-MCNC: 200 MG/DL
ALP SERPL-CCNC: 199 U/L (ref 40–150)
ALT SERPL W P-5'-P-CCNC: 20 U/L (ref 0–70)
ANION GAP SERPL CALCULATED.3IONS-SCNC: 12 MMOL/L (ref 7–15)
APPEARANCE UR: ABNORMAL
AST SERPL W P-5'-P-CCNC: 43 U/L (ref 0–45)
BACTERIA #/AREA URNS HPF: ABNORMAL /HPF
BASOPHILS # BLD AUTO: 0 10E3/UL (ref 0–0.2)
BASOPHILS NFR BLD AUTO: 0 %
BILIRUB SERPL-MCNC: 0.6 MG/DL
BILIRUB UR QL STRIP: NEGATIVE
BILIRUBIN DIRECT (ROCHE PRO & PURE): 0.29 MG/DL (ref 0–0.45)
BUN SERPL-MCNC: 38.2 MG/DL (ref 8–23)
CALCIUM SERPL-MCNC: 9.5 MG/DL (ref 8.8–10.4)
CD19 B CELL COMMENT: ABNORMAL
CD19 CELLS # BLD: 2 CELLS/UL (ref 107–698)
CD19 CELLS NFR BLD: <1 % (ref 6–27)
CHLORIDE SERPL-SCNC: 105 MMOL/L (ref 98–107)
COLOR UR AUTO: YELLOW
CREAT SERPL-MCNC: 2.52 MG/DL (ref 0.67–1.17)
CREAT UR-MCNC: 79.1 MG/DL
CREAT UR-MCNC: 81.4 MG/DL
CRP SERPL-MCNC: 18.2 MG/L
EGFRCR SERPLBLD CKD-EPI 2021: 28 ML/MIN/1.73M2
EOSINOPHIL # BLD AUTO: 0.2 10E3/UL (ref 0–0.7)
EOSINOPHIL NFR BLD AUTO: 2 %
ERYTHROCYTE [DISTWIDTH] IN BLOOD BY AUTOMATED COUNT: 13.3 % (ref 10–15)
ERYTHROCYTE [SEDIMENTATION RATE] IN BLOOD BY WESTERGREN METHOD: 32 MM/HR (ref 0–20)
GLUCOSE SERPL-MCNC: 107 MG/DL (ref 70–99)
GLUCOSE UR STRIP-MCNC: NEGATIVE MG/DL
HCO3 SERPL-SCNC: 21 MMOL/L (ref 22–29)
HCT VFR BLD AUTO: 46.6 % (ref 40–53)
HGB BLD-MCNC: 15.3 G/DL (ref 13.3–17.7)
HGB UR QL STRIP: ABNORMAL
HYALINE CASTS: 3 /LPF
IMM GRANULOCYTES # BLD: 0 10E3/UL
IMM GRANULOCYTES NFR BLD: 0 %
INR PPP: 1.07 (ref 0.85–1.15)
KETONES UR STRIP-MCNC: NEGATIVE MG/DL
LEUKOCYTE ESTERASE UR QL STRIP: ABNORMAL
LYMPHOCYTES # BLD AUTO: 0.6 10E3/UL (ref 0.8–5.3)
LYMPHOCYTES NFR BLD AUTO: 7 %
MCH RBC QN AUTO: 30.7 PG (ref 26.5–33)
MCHC RBC AUTO-ENTMCNC: 32.8 G/DL (ref 31.5–36.5)
MCV RBC AUTO: 93 FL (ref 78–100)
MICROALBUMIN UR-MCNC: 2219 MG/L
MICROALBUMIN/CREAT UR: 2726.04 MG/G CR (ref 0–17)
MONOCYTES # BLD AUTO: 0.8 10E3/UL (ref 0–1.3)
MONOCYTES NFR BLD AUTO: 10 %
NEUTROPHILS # BLD AUTO: 6.6 10E3/UL (ref 1.6–8.3)
NEUTROPHILS NFR BLD AUTO: 80 %
NITRATE UR QL: NEGATIVE
NRBC # BLD AUTO: 0 10E3/UL
NRBC BLD AUTO-RTO: 0 /100
PH UR STRIP: 6 [PH] (ref 5–7)
PHOSPHATE SERPL-MCNC: 2.8 MG/DL (ref 2.5–4.5)
PLATELET # BLD AUTO: 125 10E3/UL (ref 150–450)
POTASSIUM SERPL-SCNC: 4.5 MMOL/L (ref 3.4–5.3)
PROT SERPL-MCNC: 7.5 G/DL (ref 6.4–8.3)
PROT/CREAT 24H UR: 4.11 MG/MG CR (ref 0–0.2)
PROTHROMBIN TIME: 14.1 SECONDS (ref 11.8–14.8)
RBC # BLD AUTO: 4.99 10E6/UL (ref 4.4–5.9)
RBC URINE: 3 /HPF
RHEUMATOID FACT SERPL-ACNC: 27 IU/ML
SODIUM SERPL-SCNC: 138 MMOL/L (ref 135–145)
SP GR UR STRIP: 1.01 (ref 1–1.03)
UROBILINOGEN UR STRIP-MCNC: NORMAL MG/DL
WBC # BLD AUTO: 8.3 10E3/UL (ref 4–11)
WBC CLUMPS #/AREA URNS HPF: PRESENT /HPF
WBC URINE: >182 /HPF

## 2025-07-17 PROCEDURE — 86355 B CELLS TOTAL COUNT: CPT | Performed by: INTERNAL MEDICINE

## 2025-07-17 PROCEDURE — 82784 ASSAY IGA/IGD/IGG/IGM EACH: CPT | Performed by: INTERNAL MEDICINE

## 2025-07-17 PROCEDURE — 82105 ALPHA-FETOPROTEIN SERUM: CPT | Performed by: INTERNAL MEDICINE

## 2025-07-17 PROCEDURE — 86334 IMMUNOFIX E-PHORESIS SERUM: CPT | Mod: 26 | Performed by: PATHOLOGY

## 2025-07-17 PROCEDURE — 86160 COMPLEMENT ANTIGEN: CPT | Performed by: INTERNAL MEDICINE

## 2025-07-17 PROCEDURE — 99000 SPECIMEN HANDLING OFFICE-LAB: CPT | Performed by: PATHOLOGY

## 2025-07-17 PROCEDURE — 86334 IMMUNOFIX E-PHORESIS SERUM: CPT | Performed by: INTERNAL MEDICINE

## 2025-07-17 PROCEDURE — G0463 HOSPITAL OUTPT CLINIC VISIT: HCPCS | Performed by: INTERNAL MEDICINE

## 2025-07-17 PROCEDURE — 82595 ASSAY OF CRYOGLOBULIN: CPT | Performed by: INTERNAL MEDICINE

## 2025-07-17 PROCEDURE — 86431 RHEUMATOID FACTOR QUANT: CPT | Performed by: INTERNAL MEDICINE

## 2025-07-17 PROCEDURE — 86225 DNA ANTIBODY NATIVE: CPT | Performed by: INTERNAL MEDICINE

## 2025-07-17 RX ORDER — FUROSEMIDE 20 MG/1
20 TABLET ORAL DAILY
Qty: 90 TABLET | Refills: 3 | Status: SHIPPED | OUTPATIENT
Start: 2025-07-17

## 2025-07-17 RX ORDER — ENTECAVIR 1 MG/1
TABLET, FILM COATED ORAL
Qty: 30 TABLET | Refills: 6 | Status: SHIPPED | OUTPATIENT
Start: 2025-07-17

## 2025-07-17 RX ORDER — AMLODIPINE BESYLATE 10 MG/1
10 TABLET ORAL DAILY
Qty: 90 TABLET | Refills: 3 | Status: SHIPPED | OUTPATIENT
Start: 2025-07-17

## 2025-07-17 RX ORDER — LOSARTAN POTASSIUM 25 MG/1
25 TABLET ORAL DAILY
Qty: 90 TABLET | Refills: 3 | Status: SHIPPED | OUTPATIENT
Start: 2025-07-17

## 2025-07-17 RX ORDER — CARVEDILOL 12.5 MG/1
12.5 TABLET ORAL 2 TIMES DAILY WITH MEALS
Qty: 180 TABLET | Refills: 3 | Status: SHIPPED | OUTPATIENT
Start: 2025-07-17

## 2025-07-17 ASSESSMENT — PAIN SCALES - GENERAL: PAINLEVEL_OUTOF10: NO PAIN (0)

## 2025-07-17 NOTE — NURSING NOTE
Chief Complaint   Patient presents with    RECHECK     GN follow up       Blood pressure 119/84, pulse 76, temperature 98  F (36.7  C), temperature source Oral, SpO2 97%.      Gwendolyn Champagne

## 2025-07-17 NOTE — TELEPHONE ENCOUNTER
Patient no-showed for his visit in July.  He needs follow-up in hepatology and also needs a follow-up MRI to evaluate liver lesions seen on CT scan in March    Dr. Harrison -assuming he can get a liver MRI with his last GFR    GI team please arrange follow-up with myself or CARLYLE next available.  Also please help schedule liver MRI    FYI to PCP

## 2025-07-17 NOTE — LETTER
7/17/2025       RE: Wilfredo Yoon  [address]  [facility] [address]     Dear Colleague,    Thank you for referring your patient, Wilfredo Yoon, to the [facility] [place] at [facility]. Please see a copy of my visit note below.        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     The patient was previously seen by  back in 10/17.  Per his note, the patient was initially evaluated for edema and nephrotic range proteinuria.  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  Upon diagnosis, he has preserved kidney function with Cr of 0.8 although UPCR of  3.5g/g.     At that time, he was treated conservatively with Bumex and lisinopril. His HBV has been treated with Entecavir. Since then has has lost to followed-up with Neph.       In June 2020 he suffered a stroke and continues to have right sided weakness, In July 2020, he has mild ANDRY with creatinine increased to 1.6 but it then came down to 1.1-1.3.  Creatinine increased to 1.46 on 9/30/22, 1.72 on 1/13/2023 and now 2.08 on 4/23.  His serum albumin has been progressively low from 3.3 in 2015 down to 1.5 to 2.0 but then somewhat improved. Serum albumin in April 23 was 2.6.  His UA always show blood and protein. Last UPCR was done in 7/31/18 was 4.80. He has trace cryo in the blood. He had positive IgM, Kappa cryo but then in 2020, he has positive for polyclonal Cryo: A, G, M, K and L. No monoclonal protein  via immunofixation in serum or urine.  [place] free light chain was 12.7 and lambda free light chain was 6.79 with ratio of 1.87 in July 2020. M spike was negative.  DE-3 and MPO were negative.  He has low C3 in the past and normal C4.  SPEP showed marked hypoalbuminemia and decreased beta globulin without monoclonal protein.     He has been followed by Dr. Osborne in hepatology clinic, last seen in April 2023.  He was noted to have a small liver lesion, ultrasound with some ascites and pleural effusion.  Noted blood pressure 137/94.  He has kidney in 1/23 which showed normal-sized right kidney without hydronephrosis. There is small amount of ascites and pleural effusion.     6/15/23: Seen for consultation. He arrives with his niece and she assists with translating.  He is noted to be in a wheelchair partially because of his residual right-sided weakness after CVA.  Reports that he is able to transition from room to room on the same floor with a walker.  However, he is unable to go upstairs and requires assistance moving long distances.  He uses a wheelchair when going outside the home.     We reviewed that his creatinine is increased over the past 9 months.  There have been no specific illnesses, hospital presentations or events that they can recall that may have led to kidney injury. After seeing hepatology in April, he was started on Lasix 20 mg and spironolactone for edema management.  His niece reports that his swelling resolved dramatically and now there is only trace swelling present.  He is off of the diuretics for the past few weeks.  Additionally, he continues on his antiretroviral for hepatitis B.  Most recently viral load was undetectable.      In reviewing his appetite and p.o. intake, he often does not eat a lot.  Although there are some times where he has better appetite.  States that his p.o. fluid intake is reasonable.  His urine is reported to be normal and seems to be going to the bathroom several  times a day.  No noted hematuria or color/quality.  Plan: Serological work-up, resume lasix 10 mg daily.   8/24/23: He missed his appt.  10/5/23: He is here with his niece today.  Today, he feels fine.  Patient still complaining of incontinence.  So he cannot provide a urine sample for us.  The patient has not done labs yet either.  His swelling has improved after we resume Lasix 10 mg/day.  His blood pressure still limited at 147/89.  The patient denies any joint pain fevers or rashes.  He has no oral ulcer.  Started losartan 25 mg per day.   Labs on 8/29/23 showed Cr 2.08 with eGFR 35, BUN 33.6, Bicarb 21. Albumin 2.9. UA showed WBC >182, RBC 6, hyaline cast 10, UPCR 5.56. Serological work-up showed positive cryo IgG, IgM and kappa and lambda. Cryo trace. C3 116 and C4 33. Kappa 15.24, lambda 7.96 and K/L ratio 1.91. PTH 81. DS DNA 61. ANCA negative. UA showed WBC >182, RBC 6, UPCR 5.56 g/g.   11/30/23: In the interim, he underwent a kidney Bx on 11/7/23 (read by Dr. Montenegro). 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 Advanced chronic changes of the parenchyma, including: global glomerulosclerosis (78% of the glomeruli), tubular atrophy and interstitial fibrosis (70-80% of the cortex), severe arterial and arteriolar sclerosis. This finding may be found in cryo GN even though there was no substructure presented in the EM. The patient also has positive Cryo  IgM, IgG and kappa. The polyclonal IgG and monoclonal IgG kappa suggested type 2 Cryo. Today,  He is doing good. Biopsy went well. Swelling better but he just hit something on his Rt leg and got swelled up again. Appetite  is not so good. Discussed at length about diagnosis and treatment. Discussed risk of HBV flare with Rx.    3/18/24: In the interim, he received  mg/m2 nx4 (1st dose on 12/22/23 and last on 1/19/24).  BP has been improving.  Appetite is good now. He has no leg swelling. He has pain or burning sensation when he urinates.  He has gained some weight but we do not have a weight check today.  Home blood pressure has been excellent without any evidence of hypotension.  Current medication: Amlodipine 5 (previously 10), Coreg 12.5 mg BID, losartan 25 mg daily., furosemide 20 mg 0.5 tab daily, spironolactone 50 mg once a day.   Labs today show sodium 135, potassium 4.0, carbon dioxide 18, BUN 54.2, creatinine 2.37, GFR 30.  Phosphorus 3.3, albumin 3.5.  LFTs normal.  CBC show white blood cell 5.4, platelet 128 and hemoglobin 14.3. , vit D 20.     6/6/2024: In the interim, his Cryo was negative on 3/18/24. CD19 was < 1 on 3/18/24. RF improved to 51 from 86 in 12/13/24 (pre-treatment). DS DNA showed improvement.  He has been feeling better and has gained some weight and energy.  His weight is 118 pounds from 111 pounds in 12/2023.  He has no lower extremity edema.   BP is high at 164/79 mmHg but at home his blood pressure is 109 over 70s.  He has no problem with urination.. He has been coughing but he reports that it has been normal for him and he denies any short of breath.  His appetite is good. His energy is also good.  Labs today showed creatinine 2.11, GFR 35, BUN 36.9, potassium 4.4, Bicarb , 22.  Calcium 9.3, phosphorus 3.4, albumin 3.4. CRP 21.10. Pending Cryo, RF, DS DNA and complement.     9/5/24: In the interim, the patient did not get rituximab scheduled.   Today, he feels ok except that he feels a bit for the past week. He has not been eating well. He does not throw up or having diarrhea. No pain. He has been coughing for the past 2 weeks. Cough is productive with clear color. He has no fever or  chills. He does not feel dizzy when stands up. He has no leg swelling.   BP today is 101/69 mmHg.  He has no problem with urination.   UPCR has improved down to 1.51 g/g on 7/2/2024 and down to 0.66 g/g on 9//24.  UA showed moderate blood, large leukocyte esterase white blood cell more than 182 and RBC 48.  This sample is from [place] sample.    Labs drawn some of the lab but most important labs such as renal panel and CBC they did not do it.    Update: Later on labs came back potassium 5.5, bicarb 16, BUN 56.1, creatinine 2.73 and GFR 25.  Albumin 3.6, phosphorus 3.6.  CRP 6.5, rheumatoid factor 42, ESR 30.  White blood cell 5, hemoglobin 12.9 and platelet 131. CD 16 6 cells. UPCR 0.66.    1/9/2025: Last visit, is Cr was 2.73 and UA showed UTI so we treated him with Ab. Cr improved to 2.01 but DS DNA worsens so I redose RTX 1 g om 10/17/24.    Today, he has been doing good. No recent infection. Home blood pressure are good. He has some swelling if on his feet for a long time. UA from yesterday was >24 hrs hence the sample was discarded. BP today is 130/84. He has not taken lasix for 2 weeks now.  He also ran out of Entecavir and has not seen GI since April 2023.   Labs today showed Cr 2.12, potassium 4.4, bicarb 24, BUN 23.5, GFR 34.  Calcium 9.3, phosphorus 2.6, albumin 3.6.  CBC showed white blood cell 4.1, hemoglobin 14.1 and platelet 119. Hb 14.1, WBC 4.1 and plt 119.  and vit D pending. Need to collect UA-> pt did not collect for us.       4/7/2025: In the interim, he received rituximab 1000 mg on 2/27/2025. He then was admitted for gram-negative bacteremia with Klebsiella pneumoniae, complicated cystitis, bladder dome calcification between 3/13-3/16/25.  He was started on ceftriaxone for bacteremia with improvement and transition to Keflex 1000 mg TID. He has mild ANDRY with creatinine increased to 2.47 but on dismissal it was 2.19. He completed antibiotics already. He has some leg swelling  but denies any  dyspnea.    He is feeling good today. He has no pain when urinate despite having pyuria. He has no fever or chills. Blood pressure is high today at 154/102 mmHg. Labs today show creatinine 2.52, GFR 28, BUN 29.9, sodium 139, potassium 3.9, bicarb 24, calcium 9.3, phosphorus 1.6, albumin 3.4.  CRP 28.7.  Glucose 102.  CBC showed white blood cell 6.8 hemoglobin 15.1 and platelet 1/1/2012.  Sed rate 37.  UA showed large leukocyte Estrace, white blood cell 171 and red blood cells 7. UPCR worsening to 3.65 g/g.     7/17/25: Last visit, I increased Lasix to 20 mg daily due to swelling high blood pressure.  He was a no-show for GI appt on 7/3/25.    Today, he feels ok except feeling a bit tired that started last night. He has no fever or chills. He has productive cough which started last night. He has no leg swelling. He has no dysuria but noticed frequency of urination. He has been throwing up today and they think that he ate something at his .    Labs today showed creatinine 2.52, GFR 28, BUN 38.2, potassium 4.5, sodium 138, bicarb 21, calcium 9.5, phosphorus 2.8, albumin 3.6.  CRP 18.2 glucose 107.  Sed rate is 32. CBC showed WBC 8.3, Hb 15.3, plt 125. UA on 7/16/25 showed white blood cells more than 182, RBC 3, protein 200 mg/dL. UPCR 4.11 g/g.    Past medical history  Past Medical History:   Diagnosis Date     Cirrhosis (H)      Cryoglobulinemia      CVA (cerebral vascular accident) (H) 07/16/2020    Supratentorial likely d/t hypertensive emergency leading to right hemiparesis, dysphagia and aphasia     Glomerulonephritis      Hepatitis B      Hypertension      Latent tuberculosis     Treatment [id number]     MPGN (membranoproliferative glomerulonephritides)      Positive QuantiFERON-TB Gold test      PRES (posterior reversible encephalopathy syndrome) 07/16/2020    In brainstem d/t hypertensive emergency; suffered supratentorial CVAs same date       Past surgical history  Past Surgical History:   Procedure  Laterality Date     ANESTHESIA OUT OF OR MRI N/A 7/18/2020    Procedure: ANESTHESIA OUT OF OR MRI;  Surgeon: GENERIC ANESTHESIA PROVIDER;  Location: [facility] OR     ESOPHAGOSCOPY, GASTROSCOPY, DUODENOSCOPY (EGD), COMBINED N/A 10/18/2018    Procedure: EGD;  Surgeon: Eber Ortez MD;  Location: [facility] GI     HAND SURGERY Right      IR PARACENTESIS  7/27/2020     IR RENAL BIOPSY RIGHT  11/7/2023     ZZC HAND/FINGER SURGERY UNLISTED       Review of Systems:   14 systems were reviewed and all negative except as mentioned above.   Current Medications:  Current Outpatient Medications   Medication Sig Dispense Refill     acetaminophen (TYLENOL) 325 MG tablet Take 2 tablets (650 mg) by mouth every 4 hours as needed for mild pain or other (and adjunct with moderate or severe pain or per patient request). 30 tablet 0     amLODIPine (NORVASC) 10 MG tablet Take 1 tablet (10 mg) by mouth daily. Indication: HTN 90 tablet 3     ASPIRIN LOW DOSE 81 MG chewable tablet TAKE 1 TABLET (81 MG) BY MOUTH DAILY INDICATION: STROKE 90 tablet 3     atorvastatin (LIPITOR) 40 MG tablet TAKE 1 TABLET (40 MG) BY MOUTH DAILY 90 tablet 3     carvedilol (COREG) 12.5 MG tablet Take 1 tablet (12.5 mg) by mouth 2 times daily (with meals). 180 tablet 3     cephALEXin (KEFLEX) 500 MG capsule Take 2 capsules (1,000 mg) by mouth every 8 hours. 36 capsule 0     docusate sodium (COLACE) 100 MG capsule TAKE 1 CAPSULE (100 MG) BY MOUTH 2 TIMES DAILY AS NEEDED FOR CONSTIPATION 180 capsule 4     entecavir (BARACLUDE) 1 MG tablet TAKE 1 TABLET (1 MG) EVERY OTHER DAY 30 tablet 6     furosemide (LASIX) 20 MG tablet Take 1 tablet (20 mg) by mouth daily. 90 tablet 3     gabapentin (NEURONTIN) 100 MG capsule TAKE 1 CAPSULE (100 MG) BY MOUTH 3 TIMES DAILY 360 capsule 3     losartan (COZAAR) 25 MG tablet Take 1 tablet (25 mg) by mouth daily. 90 tablet 3     Multiple Vitamin (MULTI VITAMIN DAILY) TABS TAKE 1 TABLET BY MOUTH DAILY 90 tablet 3     omeprazole (PRILOSEC) 20  MG DR capsule Take 20 mg by mouth daily.       SENEXON-S 8.6-50 MG tablet Take 1 tablet by mouth 2 times daily as needed.       No current facility-administered medications for this visit.       Physical Exam:   /84 (BP Location: Left arm, Patient Position: Sitting, Cuff Size: Adult Regular)   Pulse 76   Temp 98  F (36.7  C) (Oral)   SpO2 97%    There is no height or weight on file to calculate BMI.    GENERAL APPEARANCE: Alert, not in acute distress, pleasant,  thin built, on WC.   EYES:  Not pale conjunctiva, pupils equal  HENT: Mouth without ulcers or lesions  PULM: lungs clear to auscultation bilaterally, equal air movement, no clubbing  CV: regular rhythm, normal rate, no rub     -JVD no distended.      -edema: + trace to none edema  GI: soft,  - tender, no distended, bowel sounds are present  INTEGUMENT: No rash  NEURO:  Non focal. No asterixis.     Labs:   All labs reviewed by me  Last Renal Panel:  Sodium   Date Value Ref Range Status   05/01/2025 136 135 - 145 mmol/L Final   06/30/2021 140 133 - 144 mmol/L Final     Potassium   Date Value Ref Range Status   05/01/2025 4.0 3.4 - 5.3 mmol/L Final   01/19/2022 3.6 3.4 - 5.3 mmol/L Final   06/30/2021 4.2 3.4 - 5.3 mmol/L Final     Chloride   Date Value Ref Range Status   05/01/2025 105 98 - 107 mmol/L Final   01/19/2022 107 94 - 109 mmol/L Final   06/30/2021 106 94 - 109 mmol/L Final     Carbon Dioxide   Date Value Ref Range Status   06/30/2021 25 20 - 32 mmol/L Final     Carbon Dioxide (CO2)   Date Value Ref Range Status   05/01/2025 23 22 - 29 mmol/L Final   01/19/2022 27 20 - 32 mmol/L Final     Anion Gap   Date Value Ref Range Status   05/01/2025 8 7 - 15 mmol/L Final   01/19/2022 10 3 - 14 mmol/L Final   06/30/2021 9 3 - 14 mmol/L Final     Glucose   Date Value Ref Range Status   05/01/2025 137 (H) 70 - 99 mg/dL Final   01/19/2022 78 70 - 99 mg/dL Final   06/30/2021 96 70 - 99 mg/dL Final     Urea Nitrogen   Date Value Ref Range Status    05/01/2025 37.2 (H) 8.0 - 23.0 mg/dL Final   01/19/2022 19 7 - 30 mg/dL Final   06/30/2021 24 7 - 30 mg/dL Final     Creatinine   Date Value Ref Range Status   05/01/2025 2.39 (H) 0.67 - 1.17 mg/dL Final   06/30/2021 1.27 (H) 0.66 - 1.25 mg/dL Final     GFR Estimate   Date Value Ref Range Status   05/01/2025 30 (L) >60 mL/min/1.73m2 Final     Comment:     eGFR calculated using 2021 CKD-EPI equation.   06/30/2021 61 >60 mL/min/[1.73_m2] Final     Comment:     Non  GFR Calc  Starting 12/18/2018, serum creatinine based estimated GFR (eGFR) will be   calculated using the Chronic Kidney Disease Epidemiology Collaboration   (CKD-EPI) equation.       Calcium   Date Value Ref Range Status   05/01/2025 9.1 8.8 - 10.4 mg/dL Final   06/30/2021 9.0 8.5 - 10.1 mg/dL Final     Phosphorus   Date Value Ref Range Status   05/01/2025 2.2 (L) 2.5 - 4.5 mg/dL Final   10/10/2017 3.0 2.5 - 4.5 mg/dL Final     Albumin   Date Value Ref Range Status   05/01/2025 3.4 (L) 3.5 - 5.2 g/dL Final   01/19/2022 2.5 (L) 3.4 - 5.0 g/dL Final   06/30/2021 3.0 (L) 3.4 - 5.0 g/dL Final     Imaging:  I reviewed imaging studies.     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  It does appear that the most recent trend is reflecting a progressive elevation in creatinine since 9/30/2022.  There is some concern that this might be related to his liver disease but there is limited lower extremity edema and palpable ascites on exam.  His MELD score is relatively low to observe type II HRS physiology.  In regard to his prior cryoglobulinemic GN, it would be odd for this to come out of quiescence well his hepatitis B has been appropriately treated. I repeat serological work-up and showed that he has trace cryo IgG, IgM and kappa but normal complement: C3 116 and C4 33. Otherwise negative monoclonal protein; K/L ratio of 1.91. PLA2R negative, ANCA negative  but DS DNA positive at 61 U/ml. HBV VL <20 on 4/4/23. It was unclear why he has progressive kidney disease despite HBV controlled. He is also noted to have nephrotic range proteinuria.  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 Severe arterial sclerosis with extensive ultrastructural signs of endothelial injury and glomerular capillary loop remodeling, suggesting a superimposed chronic and acute thrombotic angiopathy. Advanced chronic changes of the parenchyma, including: global glomerulosclerosis (78% of the glomeruli), tubular atrophy and interstitial fibrosis (70-80% of the cortex), severe arterial and arteriolar sclerosis. This findings are mostly consistent with cryo GN. This is quite interesting since his HBV has been under control. So it is possible that prior previous HBV could  stimulate his immune reaction resulted in cryoglobulin.  We decided to initiate rituximab 375 mg/m  x 4 doses and 1st dose on  12/22/23 and last dose on 1/19/24.  His creatinine continue to improved and now down to 2.11 with a EGFR 35.  We have not had any UA since October 2023 as he has difficult time providing UA hence we needs to repeat UA.  His DS DNA, rheumatoid factors and cryoglobulin level has been improving based on the lab in March 2024 and June 2024. In September 2024, proteinuria has improved to 0.66 g/g with improvement in serum albumin. However, he had ANDRY with Cr 2.73 likely from APN and poor po intake. Repeat labs showed improve in Cr down to 2.01 in 10/2024. DS DNA and RF adenike in 9/2024 to 61 and 42 respectively I gave him another dose of rituximab 1 g on 10/17/2024. He has been feeling well today and no neuropathy or rashes.  The patient received rituximab 1 g on 2/27/2025 for cryo GN.  In the interim, the patient was admitted for Klebsiella pneumonia bacteremia and UTI in mid March 2025.  He was treated with antibiotics and was dismissed with Keflex to complete 14 days of antibiotics.  He has mild ANDRY with creatinine 2.47 on 3/13/2025 and on dismissal improved to 2.19.  However, his creatinine increased to 2.52 today.  He has worsening lower extremity edema and blood pressure is high at 154/102.  UA did show some pyuria but he has no UTI symptoms.  UPCR so worsening at 3.65 g/g in April 2025 compared to 0.66 g/g in September 2024.  I increased Lasix from 10 mg/day to 20 mg/day in April 2025 and blood pressure and swelling has improved.     However, his Cr remains elevated at 2.52 and UPCR has worsened to 4.11 g/g.  Still working for double-stranded DNA, RF, complement, cryo, complement, and IgG.  Repeat labs showed double-stranded DNA worsens, I would like to add CellCept as he may have some component of lupus ( which can also cause type 2 Cryo as well).    - Lasix to 20 mg/day since 4/2025  -  Continue losartan 25 mg per day  - Prevously on spironolactone but stopped since 9/2024 for ANDRY; if proteinuria does not improve we may have to resume that at some point  - S/p rituximab 375 mg/m x 4 doses on 12/22/2023 -1/19/2024 then 1000 mg on 10/17/24, 2/27/25  - Potentially adding on CellCept 500 mg BID to Rituximab 1000 mg (next dose on 8/2025)  # Recurrent UTI  # Abnormal findings on CT scan: Bladder dome calcification versus tumor  The patient has recurrent UTI and he has CT scan showing bladder calcification versus tumor.  I will refer him to urology to ensure that there is no other cause of recurrent UTI but he never shows up.  - Checking UC again today  # Metabolic acidosis; resolved  Likely from ANDRY on CKD in September 2024 but now resolved.   # HBV cirrhosis  # HBV infection on entecavir  Follows with hepatology, last viral quant undetectable on 4/4/23. MELD 16. MR Abdomen showing cirrhosis with evidence of portal hypertension. Reports no encephalopathy or concern for hematemesis. Currently on Entevavir, discuss risk of HBV flare while on RTX but he is on Entecavir now. HBV VL in 6/2024, 9/9/2024 have been undetectable.    - Currently liver function test is normal. Check VL_. Undetectable on 4/2025  - Referral to hepatology, last visit in April 2023->missing GI appt in 7/2/2025  - Continue Entecavir   - Check HBV VL next time  # Hypertension; controlled  # Volume overload; improved  Stopped spironolactone in 9/2024 due to ANDRY and marginal BP and hyperK.   - Continue Amlodipine 10 mg daily, Carvedilol 12.5 mg BID, Lasix 20mg daily and losartan at 25 mg per day  # Malnutrition  # Hx of stroke with Rt sided hemiparesis  Mostly wheelchair bound for transportation. Weight generally appears to be stable although he appears to be quite weak. There is concern that his creatinine is a poor estimate of his true kidney function. Cystatin C 2.8 with eGFR 20 and Cr 1.98 with eGFR by Cr 37 on 6/15/23.   # BMD  #  Secondary hyperparathyroidism  2/23/24: PTH is 124, vit D 20 . Ca/Phos normal. Will continue to monitor next visti.   1/9/2025: , vit D 30.  - Check PTHa dn vit D next visit    Follow-up 4 months with labs     The longitudinal plan of care for CKD stage 3B/4, HTN, nephrotic syndrome, chronic Hep B, Cryo GN was addressed during this visit. Due to the added complexity in care, I will continue to support Wilfredo Yoon in the subsequent management of this condition(s) and with the ongoing continuity of care of this condition(s).     I spent  40 minutes on the date of the encounter doing chart review, history and exam, documentation and further activities as noted above. 30 minutes of this visit is dedicated to direct patient interaction via face-to-face.    Sue Harrison MD on 07/17/2025            Again, thank you for allowing me to participate in the care of your patient.      Sincerely,    Sue Harrison MD

## 2025-07-17 NOTE — PATIENT INSTRUCTIONS
Try taking melatonin for sleeping  Kidney function stable from last time but over all his kidney disease has been slowly getting worse  Waiting for labs to determine the next course of action  See you in 4 months with labs

## 2025-07-17 NOTE — PROGRESS NOTES
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     The patient was previously seen by  back in 10/17.  Per his note, the patient was initially evaluated for edema and nephrotic range proteinuria.  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  Upon diagnosis, he has preserved kidney function with Cr of 0.8 although UPCR of  3.5g/g.     At that time, he was treated conservatively with Bumex and lisinopril. His HBV has been treated with Entecavir. Since then has has lost to followed-up with Neph.       In June 2020 he suffered a stroke and continues to have right sided weakness, In July 2020, he has mild ANDRY with creatinine increased to 1.6 but it then came down to 1.1-1.3.  Creatinine increased to 1.46 on 9/30/22, 1.72 on 1/13/2023 and now 2.08 on 4/23.  His serum albumin has been progressively low from 3.3 in 2015 down to 1.5 to 2.0 but then somewhat improved. Serum albumin in April 23 was 2.6.  His UA always show blood and protein. Last UPCR was done in 7/31/18 was 4.80. He has trace cryo in the blood. He had positive IgM, Kappa cryo but then in 2020, he has positive for polyclonal Cryo: A, G, M, K and L. No monoclonal protein via immunofixation in serum or urine.  Happy free light chain was 12.7 and lambda free light chain was 6.79 with ratio of 1.87 in July 2020. M spike was negative.  GA-3 and MPO were negative.  He has low C3 in the past and normal C4.  SPEP showed marked hypoalbuminemia and decreased beta globulin without monoclonal protein.     He has been  followed by Dr. Osborne in hepatology clinic, last seen in April 2023.  He was noted to have a small liver lesion, ultrasound with some ascites and pleural effusion.  Noted blood pressure 137/94.  He has kidney in 1/23 which showed normal-sized right kidney without hydronephrosis. There is small amount of ascites and pleural effusion.     6/15/23: Seen for consultation. He arrives with his niece and she assists with translating.  He is noted to be in a wheelchair partially because of his residual right-sided weakness after CVA.  Reports that he is able to transition from room to room on the same floor with a walker.  However, he is unable to go upstairs and requires assistance moving long distances.  He uses a wheelchair when going outside the home.     We reviewed that his creatinine is increased over the past 9 months.  There have been no specific illnesses, hospital presentations or events that they can recall that may have led to kidney injury. After seeing hepatology in April, he was started on Lasix 20 mg and spironolactone for edema management.  His niece reports that his swelling resolved dramatically and now there is only trace swelling present.  He is off of the diuretics for the past few weeks.  Additionally, he continues on his antiretroviral for hepatitis B.  Most recently viral load was undetectable.      In reviewing his appetite and p.o. intake, he often does not eat a lot.  Although there are some times where he has better appetite.  States that his p.o. fluid intake is reasonable.  His urine is reported to be normal and seems to be going to the bathroom several times a day.  No noted hematuria or color/quality.  Plan: Serological work-up, resume lasix 10 mg daily.   8/24/23: He missed his appt.  10/5/23: He is here with his niece today.  Today, he feels fine.  Patient still complaining of incontinence.  So he cannot provide a urine sample for us.  The patient has not done labs yet either.  His  swelling has improved after we resume Lasix 10 mg/day.  His blood pressure still limited at 147/89.  The patient denies any joint pain fevers or rashes.  He has no oral ulcer.  Started losartan 25 mg per day.   Labs on 8/29/23 showed Cr 2.08 with eGFR 35, BUN 33.6, Bicarb 21. Albumin 2.9. UA showed WBC >182, RBC 6, hyaline cast 10, UPCR 5.56. Serological work-up showed positive cryo IgG, IgM and kappa and lambda. Cryo trace. C3 116 and C4 33. Kappa 15.24, lambda 7.96 and K/L ratio 1.91. PTH 81. DS DNA 61. ANCA negative. UA showed WBC >182, RBC 6, UPCR 5.56 g/g.   11/30/23: In the interim, he underwent a kidney Bx on 11/7/23 (read by Dr. Montenegro). 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 Advanced chronic changes of the parenchyma, including: global glomerulosclerosis (78% of the glomeruli), tubular atrophy and interstitial fibrosis (70-80% of the cortex), severe arterial and arteriolar sclerosis. This finding may be found in cryo GN even though there was no substructure presented in the EM. The patient also has positive Cryo  IgM, IgG and kappa. The polyclonal IgG and monoclonal IgG kappa suggested type 2 Cryo. Today,  He is doing good. Biopsy went well. Swelling better but he just hit something on his Rt leg and got swelled up again. Appetite is not so good. Discussed at length about diagnosis and treatment. Discussed risk of HBV flare with Rx.    3/18/24: In the interim, he received  mg/m2 nx4 (1st dose on 12/22/23 and last on 1/19/24).  BP has been improving.  Appetite is good now. He has no leg swelling. He has pain or burning sensation when he urinates.  He has  gained some weight but we do not have a weight check today.  Home blood pressure has been excellent without any evidence of hypotension.  Current medication: Amlodipine 5 (previously 10), Coreg 12.5 mg BID, losartan 25 mg daily., furosemide 20 mg 0.5 tab daily, spironolactone 50 mg once a day.   Labs today show sodium 135, potassium 4.0, carbon dioxide 18, BUN 54.2, creatinine 2.37, GFR 30.  Phosphorus 3.3, albumin 3.5.  LFTs normal.  CBC show white blood cell 5.4, platelet 128 and hemoglobin 14.3. , vit D 20.     6/6/2024: In the interim, his Cryo was negative on 3/18/24. CD19 was < 1 on 3/18/24. RF improved to 51 from 86 in 12/13/24 (pre-treatment). DS DNA showed improvement.  He has been feeling better and has gained some weight and energy.  His weight is 118 pounds from 111 pounds in 12/2023.  He has no lower extremity edema.   BP is high at 164/79 mmHg but at home his blood pressure is 109 over 70s.  He has no problem with urination.. He has been coughing but he reports that it has been normal for him and he denies any short of breath.  His appetite is good. His energy is also good.  Labs today showed creatinine 2.11, GFR 35, BUN 36.9, potassium 4.4, Bicarb , 22.  Calcium 9.3, phosphorus 3.4, albumin 3.4. CRP 21.10. Pending Cryo, RF, DS DNA and complement.     9/5/24: In the interim, the patient did not get rituximab scheduled.   Today, he feels ok except that he feels a bit for the past week. He has not been eating well. He does not throw up or having diarrhea. No pain. He has been coughing for the past 2 weeks. Cough is productive with clear color. He has no fever or chills. He does not feel dizzy when stands up. He has no leg swelling.   BP today is 101/69 mmHg.  He has no problem with urination.   UPCR has improved down to 1.51 g/g on 7/2/2024 and down to 0.66 g/g on 9//24.  UA showed moderate blood, large leukocyte esterase white blood cell more than 182 and RBC 48.  This sample is from [place]  sample.    Labs drawn some of the lab but most important labs such as renal panel and CBC they did not do it.    Update: Later on labs came back potassium 5.5, bicarb 16, BUN 56.1, creatinine 2.73 and GFR 25.  Albumin 3.6, phosphorus 3.6.  CRP 6.5, rheumatoid factor 42, ESR 30.  White blood cell 5, hemoglobin 12.9 and platelet 131. CD 16 6 cells. UPCR 0.66.    1/9/2025: Last visit, is Cr was 2.73 and UA showed UTI so we treated him with Ab. Cr improved to 2.01 but DS DNA worsens so I redose RTX 1 g om 10/17/24.    Today, he has been doing good. No recent infection. Home blood pressure are good. He has some swelling if on his feet for a long time. UA from yesterday was >24 hrs hence the sample was discarded. BP today is 130/84. He has not taken lasix for 2 weeks now.  He also ran out of Entecavir and has not seen GI since April 2023.   Labs today showed Cr 2.12, potassium 4.4, bicarb 24, BUN 23.5, GFR 34.  Calcium 9.3, phosphorus 2.6, albumin 3.6.  CBC showed white blood cell 4.1, hemoglobin 14.1 and platelet 119. Hb 14.1, WBC 4.1 and plt 119.  and vit D pending. Need to collect UA-> pt did not collect for us.       4/7/2025: In the interim, he received rituximab 1000 mg on 2/27/2025. He then was admitted for gram-negative bacteremia with Klebsiella pneumoniae, complicated cystitis, bladder dome calcification between 3/13-3/16/25.  He was started on ceftriaxone for bacteremia with improvement and transition to Keflex 1000 mg TID. He has mild ANDRY with creatinine increased to 2.47 but on dismissal it was 2.19. He completed antibiotics already. He has some leg swelling  but denies any dyspnea.    He is feeling good today. He has no pain when urinate despite having pyuria. He has no fever or chills. Blood pressure is high today at 154/102 mmHg. Labs today show creatinine 2.52, GFR 28, BUN 29.9, sodium 139, potassium 3.9, bicarb 24, calcium 9.3, phosphorus 1.6, albumin 3.4.  CRP 28.7.  Glucose 102.  CBC showed  white blood cell 6.8 hemoglobin 15.1 and platelet 1/1/2012.  Sed rate 37.  UA showed large leukocyte Estrace, white blood cell 171 and red blood cells 7. UPCR worsening to 3.65 g/g.     7/17/25: Last visit, I increased Lasix to 20 mg daily due to swelling high blood pressure.  He was a no-show for GI appt on 7/3/25.    Today, he feels ok except feeling a bit tired that started last night. He has no fever or chills. He has productive cough which started last night. He has no leg swelling. He has no dysuria but noticed frequency of urination. He has been throwing up today and they think that he ate something at his .    Labs today showed creatinine 2.52, GFR 28, BUN 38.2, potassium 4.5, sodium 138, bicarb 21, calcium 9.5, phosphorus 2.8, albumin 3.6.  CRP 18.2 glucose 107.  Sed rate is 32. CBC showed WBC 8.3, Hb 15.3, plt 125. UA on 7/16/25 showed white blood cells more than 182, RBC 3, protein 200 mg/dL. UPCR 4.11 g/g.    Past medical history  Past Medical History:   Diagnosis Date    Cirrhosis (H)     Cryoglobulinemia     CVA (cerebral vascular accident) (H) 07/16/2020    Supratentorial likely d/t hypertensive emergency leading to right hemiparesis, dysphagia and aphasia    Glomerulonephritis     Hepatitis B     Hypertension     Latent tuberculosis     Treatment [id number]    MPGN (membranoproliferative glomerulonephritides)     Positive QuantiFERON-TB Gold test     PRES (posterior reversible encephalopathy syndrome) 07/16/2020    In brainstem d/t hypertensive emergency; suffered supratentorial CVAs same date       Past surgical history  Past Surgical History:   Procedure Laterality Date    ANESTHESIA OUT OF OR MRI N/A 7/18/2020    Procedure: ANESTHESIA OUT OF OR MRI;  Surgeon: GENERIC ANESTHESIA PROVIDER;  Location: [facility]    ESOPHAGOSCOPY, GASTROSCOPY, DUODENOSCOPY (EGD), COMBINED N/A 10/18/2018    Procedure: EGD;  Surgeon: Eber Ortez MD;  Location: [facility]    HAND SURGERY Right     IR PARACENTESIS   7/27/2020    IR RENAL BIOPSY RIGHT  11/7/2023    [facility] HAND/FINGER SURGERY UNLISTED       Review of Systems:   14 systems were reviewed and all negative except as mentioned above.   Current Medications:  Current Outpatient Medications   Medication Sig Dispense Refill    acetaminophen (TYLENOL) 325 MG tablet Take 2 tablets (650 mg) by mouth every 4 hours as needed for mild pain or other (and adjunct with moderate or severe pain or per patient request). 30 tablet 0    amLODIPine (NORVASC) 10 MG tablet Take 1 tablet (10 mg) by mouth daily. Indication: HTN 90 tablet 3    ASPIRIN LOW DOSE 81 MG chewable tablet TAKE 1 TABLET (81 MG) BY MOUTH DAILY INDICATION: STROKE 90 tablet 3    atorvastatin (LIPITOR) 40 MG tablet TAKE 1 TABLET (40 MG) BY MOUTH DAILY 90 tablet 3    carvedilol (COREG) 12.5 MG tablet Take 1 tablet (12.5 mg) by mouth 2 times daily (with meals). 180 tablet 3    cephALEXin (KEFLEX) 500 MG capsule Take 2 capsules (1,000 mg) by mouth every 8 hours. 36 capsule 0    docusate sodium (COLACE) 100 MG capsule TAKE 1 CAPSULE (100 MG) BY MOUTH 2 TIMES DAILY AS NEEDED FOR CONSTIPATION 180 capsule 4    entecavir (BARACLUDE) 1 MG tablet TAKE 1 TABLET (1 MG) EVERY OTHER DAY 30 tablet 6    furosemide (LASIX) 20 MG tablet Take 1 tablet (20 mg) by mouth daily. 90 tablet 3    gabapentin (NEURONTIN) 100 MG capsule TAKE 1 CAPSULE (100 MG) BY MOUTH 3 TIMES DAILY 360 capsule 3    losartan (COZAAR) 25 MG tablet Take 1 tablet (25 mg) by mouth daily. 90 tablet 3    Multiple Vitamin (MULTI VITAMIN DAILY) TABS TAKE 1 TABLET BY MOUTH DAILY 90 tablet 3    omeprazole (PRILOSEC) 20 MG DR capsule Take 20 mg by mouth daily.      SENEXON-S 8.6-50 MG tablet Take 1 tablet by mouth 2 times daily as needed.       No current facility-administered medications for this visit.       Physical Exam:   /84 (BP Location: Left arm, Patient Position: Sitting, Cuff Size: Adult Regular)   Pulse 76   Temp 98  F (36.7  C) (Oral)   SpO2 97%    There is  no height or weight on file to calculate BMI.    GENERAL APPEARANCE: Alert, not in acute distress, pleasant,  thin built, on WC.   EYES:  Not pale conjunctiva, pupils equal  HENT: Mouth without ulcers or lesions  PULM: lungs clear to auscultation bilaterally, equal air movement, no clubbing  CV: regular rhythm, normal rate, no rub     -JVD no distended.      -edema: + trace to none edema  GI: soft,  - tender, no distended, bowel sounds are present  INTEGUMENT: No rash  NEURO:  Non focal. No asterixis.     Labs:   All labs reviewed by me  Last Renal Panel:  Sodium   Date Value Ref Range Status   05/01/2025 136 135 - 145 mmol/L Final   06/30/2021 140 133 - 144 mmol/L Final     Potassium   Date Value Ref Range Status   05/01/2025 4.0 3.4 - 5.3 mmol/L Final   01/19/2022 3.6 3.4 - 5.3 mmol/L Final   06/30/2021 4.2 3.4 - 5.3 mmol/L Final     Chloride   Date Value Ref Range Status   05/01/2025 105 98 - 107 mmol/L Final   01/19/2022 107 94 - 109 mmol/L Final   06/30/2021 106 94 - 109 mmol/L Final     Carbon Dioxide   Date Value Ref Range Status   06/30/2021 25 20 - 32 mmol/L Final     Carbon Dioxide (CO2)   Date Value Ref Range Status   05/01/2025 23 22 - 29 mmol/L Final   01/19/2022 27 20 - 32 mmol/L Final     Anion Gap   Date Value Ref Range Status   05/01/2025 8 7 - 15 mmol/L Final   01/19/2022 10 3 - 14 mmol/L Final   06/30/2021 9 3 - 14 mmol/L Final     Glucose   Date Value Ref Range Status   05/01/2025 137 (H) 70 - 99 mg/dL Final   01/19/2022 78 70 - 99 mg/dL Final   06/30/2021 96 70 - 99 mg/dL Final     Urea Nitrogen   Date Value Ref Range Status   05/01/2025 37.2 (H) 8.0 - 23.0 mg/dL Final   01/19/2022 19 7 - 30 mg/dL Final   06/30/2021 24 7 - 30 mg/dL Final     Creatinine   Date Value Ref Range Status   05/01/2025 2.39 (H) 0.67 - 1.17 mg/dL Final   06/30/2021 1.27 (H) 0.66 - 1.25 mg/dL Final     GFR Estimate   Date Value Ref Range Status   05/01/2025 30 (L) >60 mL/min/1.73m2 Final     Comment:     eGFR calculated  using 2021 CKD-EPI equation.   06/30/2021 61 >60 mL/min/[1.73_m2] Final     Comment:     Non  GFR Calc  Starting 12/18/2018, serum creatinine based estimated GFR (eGFR) will be   calculated using the Chronic Kidney Disease Epidemiology Collaboration   (CKD-EPI) equation.       Calcium   Date Value Ref Range Status   05/01/2025 9.1 8.8 - 10.4 mg/dL Final   06/30/2021 9.0 8.5 - 10.1 mg/dL Final     Phosphorus   Date Value Ref Range Status   05/01/2025 2.2 (L) 2.5 - 4.5 mg/dL Final   10/10/2017 3.0 2.5 - 4.5 mg/dL Final     Albumin   Date Value Ref Range Status   05/01/2025 3.4 (L) 3.5 - 5.2 g/dL Final   01/19/2022 2.5 (L) 3.4 - 5.0 g/dL Final   06/30/2021 3.0 (L) 3.4 - 5.0 g/dL Final     Imaging:  I reviewed imaging studies.     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  It does appear that the most recent trend is reflecting a progressive elevation in creatinine since 9/30/2022.  There is some concern  that this might be related to his liver disease but there is limited lower extremity edema and palpable ascites on exam.  His MELD score is relatively low to observe type II HRS physiology.  In regard to his prior cryoglobulinemic GN, it would be odd for this to come out of quiescence well his hepatitis B has been appropriately treated. I repeat serological work-up and showed that he has trace cryo IgG, IgM and kappa but normal complement: C3 116 and C4 33. Otherwise negative monoclonal protein; K/L ratio of 1.91. PLA2R negative, ANCA negative  but DS DNA positive at 61 U/ml. HBV VL <20 on 4/4/23. It was unclear why he has progressive kidney disease despite HBV controlled. He is also noted to have nephrotic range proteinuria.  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 Severe arterial sclerosis with extensive ultrastructural signs of endothelial injury and glomerular capillary loop remodeling, suggesting a superimposed chronic and acute thrombotic angiopathy. Advanced chronic changes of the parenchyma, including: global glomerulosclerosis (78% of the glomeruli), tubular atrophy and interstitial fibrosis (70-80% of the cortex), severe arterial and arteriolar sclerosis. This findings are mostly consistent with cryo GN. This is quite interesting since his HBV has been under control. So it is possible that prior previous HBV could stimulate his immune reaction resulted in cryoglobulin.  We decided to initiate rituximab 375 mg/m  x 4 doses and 1st dose on  12/22/23 and last dose on 1/19/24.  His creatinine continue to improved and now down to 2.11 with a EGFR 35.  We have not had any UA since October 2023 as he has difficult time providing UA hence we needs to repeat UA.  His DS DNA, rheumatoid factors and  cryoglobulin level has been improving based on the lab in March 2024 and June 2024. In September 2024, proteinuria has improved to 0.66 g/g with improvement in serum albumin. However, he had ANDRY with Cr 2.73 likely from APN and poor po intake. Repeat labs showed improve in Cr down to 2.01 in 10/2024. DS DNA and RF adenike in 9/2024 to 61 and 42 respectively I gave him another dose of rituximab 1 g on 10/17/2024. He has been feeling well today and no neuropathy or rashes.  The patient received rituximab 1 g on 2/27/2025 for cryo GN.  In the interim, the patient was admitted for Klebsiella pneumonia bacteremia and UTI in mid March 2025.  He was treated with antibiotics and was dismissed with Keflex to complete 14 days of antibiotics.  He has mild ANDRY with creatinine 2.47 on 3/13/2025 and on dismissal improved to 2.19.  However, his creatinine increased to 2.52 today.  He has worsening lower extremity edema and blood pressure is high at 154/102.  UA did show some pyuria but he has no UTI symptoms.  UPCR so worsening at 3.65 g/g in April 2025 compared to 0.66 g/g in September 2024.  I increased Lasix from 10 mg/day to 20 mg/day in April 2025 and blood pressure and swelling has improved.     However, his Cr remains elevated at 2.52 and UPCR has worsened to 4.11 g/g.  Still working for double-stranded DNA, RF, complement, cryo, complement, and IgG.  Repeat labs showed double-stranded DNA worsens, I would like to add CellCept as he may have some component of lupus ( which can also cause type 2 Cryo as well).    - Lasix to 20 mg/day since 4/2025  - Continue losartan 25 mg per day  - Prevously on spironolactone but stopped since 9/2024 for ANDRY; if proteinuria does not improve we may have to resume that at some point  - S/p rituximab 375 mg/m x 4 doses on 12/22/2023 -1/19/2024 then 1000 mg on 10/17/24, 2/27/25  - Potentially adding on CellCept 500 mg BID to Rituximab 1000 mg (next dose on 8/2025)  # Recurrent UTI  # Abnormal  findings on CT scan: Bladder dome calcification versus tumor  The patient has recurrent UTI and he has CT scan showing bladder calcification versus tumor.  I will refer him to urology to ensure that there is no other cause of recurrent UTI but he never shows up.  - Checking UC again today  # Metabolic acidosis; resolved  Likely from ANDRY on CKD in September 2024 but now resolved.   # HBV cirrhosis  # HBV infection on entecavir  Follows with hepatology, last viral quant undetectable on 4/4/23. MELD 16. MR Abdomen showing cirrhosis with evidence of portal hypertension. Reports no encephalopathy or concern for hematemesis. Currently on Entevavir, discuss risk of HBV flare while on RTX but he is on Entecavir now. HBV VL in 6/2024, 9/9/2024 have been undetectable.    - Currently liver function test is normal. Check VL_. Undetectable on 4/2025  - Referral to hepatology, last visit in April 2023->missing GI appt in 7/2/2025  - Continue Entecavir   - Check HBV VL next time  # Hypertension; controlled  # Volume overload; improved  Stopped spironolactone in 9/2024 due to ANDRY and marginal BP and hyperK.   - Continue Amlodipine 10 mg daily, Carvedilol 12.5 mg BID, Lasix 20mg daily and losartan at 25 mg per day  # Malnutrition  # Hx of stroke with Rt sided hemiparesis  Mostly wheelchair bound for transportation. Weight generally appears to be stable although he appears to be quite weak. There is concern that his creatinine is a poor estimate of his true kidney function. Cystatin C 2.8 with eGFR 20 and Cr 1.98 with eGFR by Cr 37 on 6/15/23.   # BMD  # Secondary hyperparathyroidism  2/23/24: PTH is 124, vit D 20 . Ca/Phos normal. Will continue to monitor next visti.   1/9/2025: , vit D 30.  - Check PTHa dn vit D next visit    Follow-up 4 months with labs     The longitudinal plan of care for CKD stage 3B/4, HTN, nephrotic syndrome, chronic Hep B, Cryo GN was addressed during this visit. Due to the added complexity in care, I  will continue to support Wilfredo Yoon in the subsequent management of this condition(s) and with the ongoing continuity of care of this condition(s).     I spent  40 minutes on the date of the encounter doing chart review, history and exam, documentation and further activities as noted above. 30 minutes of this visit is dedicated to direct patient interaction via face-to-face.    Sue Harrison MD on 07/17/2025

## 2025-07-21 ENCOUNTER — PATIENT OUTREACH (OUTPATIENT)
Dept: NEPHROLOGY | Facility: CLINIC | Age: 65
End: 2025-07-21
Payer: COMMERCIAL

## 2025-07-21 DIAGNOSIS — D89.1 CRYOGLOBULINEMIC GLOMERULONEPHRITIS: ICD-10-CM

## 2025-07-21 DIAGNOSIS — N08 CRYOGLOBULINEMIC GLOMERULONEPHRITIS: ICD-10-CM

## 2025-07-21 DIAGNOSIS — N39.0 RECURRENT UTI: Primary | ICD-10-CM

## 2025-07-21 RX ORDER — CIPROFLOXACIN 250 MG/1
250 TABLET, FILM COATED ORAL 2 TIMES DAILY
Qty: 20 TABLET | Refills: 0 | Status: SHIPPED | OUTPATIENT
Start: 2025-07-21 | End: 2025-07-31

## 2025-07-21 NOTE — PROGRESS NOTES
Dr. Harrison called labs show UTI  Request to start Cipro 250mg po BID x 10 days  Labs with infusion CBC, Renal Panel, UA, Protein Random and will determine at that time to start Cellcept or not.   Ordered labs and antibiotic per T.O. Dr. Harrison.  Reached out and spoke with Caty, primary caregiver (previos consent to communicate was not scanned to HIM but patient gave verbal consent to myself/Dr. Harrison to call his niece Caty to coordinate all his care) and updated on UTI and Cipro twice daily for 10 days.  Encouraged to drink lots of water to help flush kidneys and to take antibiotic with food.  Najmo verbalized understanding, they will  today and start right away today.  Reviewed need to bring urine sample to 8/28 infusion appt and depending on labs may start additional medications at that time.  Conniejmo agreeable to updates in plan of care, will follow up as instructed and call if any additional questions or concerns arise.    Caty mentioned she called GI earlier this month to verify GI appt but was told no appt was scheduled.  Reviewed that GI was 7/2 and instructed to call [phone] to reschedule consult appt, reviewing mis-communication of appt details.  Nevin Marx RN, BSN, PHN  [facility] Nephrology Nurse Navigator  [phone]

## 2025-07-22 LAB — DSDNA AB SER-ACNC: 18 IU/ML

## 2025-07-23 LAB — CRYOGLOB SER QL: ABNORMAL

## 2025-07-24 LAB
ALBUMIN SERPL ELPH-MCNC: NEGATIVE G/DL
CRYOGLOB IGA & IGG & IGM SER-IMP: ABNORMAL
CRYOGLOB TYP SER IFE: ABNORMAL
CRYOGLOB TYP SER IFE: NEGATIVE
CRYOGLOB TYP SER IFE: NEGATIVE
CRYOGLOB TYP SER IFE: POSITIVE
CRYOGLOB TYP SER IFE: POSITIVE

## 2025-08-16 ENCOUNTER — LAB (OUTPATIENT)
Dept: LAB | Facility: CLINIC | Age: 65
End: 2025-08-16
Payer: COMMERCIAL

## 2025-08-16 DIAGNOSIS — D89.1 CRYOGLOBULINEMIC GLOMERULONEPHRITIS: ICD-10-CM

## 2025-08-16 DIAGNOSIS — N08 CRYOGLOBULINEMIC GLOMERULONEPHRITIS: ICD-10-CM

## 2025-08-16 LAB
ALBUMIN SERPL BCG-MCNC: 3.3 G/DL (ref 3.5–5.2)
ANION GAP SERPL CALCULATED.3IONS-SCNC: 13 MMOL/L (ref 7–15)
BUN SERPL-MCNC: 31.8 MG/DL (ref 8–23)
CALCIUM SERPL-MCNC: 9.2 MG/DL (ref 8.8–10.4)
CHLORIDE SERPL-SCNC: 104 MMOL/L (ref 98–107)
CREAT SERPL-MCNC: 2.27 MG/DL (ref 0.67–1.17)
CRP SERPL-MCNC: 15.9 MG/L
EGFRCR SERPLBLD CKD-EPI 2021: 31 ML/MIN/1.73M2
ERYTHROCYTE [SEDIMENTATION RATE] IN BLOOD BY WESTERGREN METHOD: 37 MM/HR (ref 0–20)
GLUCOSE SERPL-MCNC: 90 MG/DL (ref 70–99)
HCO3 SERPL-SCNC: 21 MMOL/L (ref 22–29)
PHOSPHATE SERPL-MCNC: 3.3 MG/DL (ref 2.5–4.5)
POTASSIUM SERPL-SCNC: 4.1 MMOL/L (ref 3.4–5.3)
RHEUMATOID FACT SERPL-ACNC: 28 IU/ML
SODIUM SERPL-SCNC: 138 MMOL/L (ref 135–145)

## 2025-08-16 PROCEDURE — 86225 DNA ANTIBODY NATIVE: CPT | Performed by: INTERNAL MEDICINE

## 2025-08-16 PROCEDURE — 36415 COLL VENOUS BLD VENIPUNCTURE: CPT | Performed by: PATHOLOGY

## 2025-08-16 PROCEDURE — 99000 SPECIMEN HANDLING OFFICE-LAB: CPT | Performed by: PATHOLOGY

## 2025-08-16 PROCEDURE — 86140 C-REACTIVE PROTEIN: CPT | Performed by: PATHOLOGY

## 2025-08-16 PROCEDURE — 85652 RBC SED RATE AUTOMATED: CPT | Performed by: PATHOLOGY

## 2025-08-16 PROCEDURE — 87517 HEPATITIS B DNA QUANT: CPT | Performed by: INTERNAL MEDICINE

## 2025-08-16 PROCEDURE — 80069 RENAL FUNCTION PANEL: CPT | Performed by: PATHOLOGY

## 2025-08-16 PROCEDURE — 86431 RHEUMATOID FACTOR QUANT: CPT | Performed by: INTERNAL MEDICINE

## 2025-08-18 ENCOUNTER — PATIENT OUTREACH (OUTPATIENT)
Dept: NEPHROLOGY | Facility: CLINIC | Age: 65
End: 2025-08-18
Payer: COMMERCIAL

## 2025-08-18 LAB
DSDNA AB SER-ACNC: 24 IU/ML
HBV DNA SERPL NAA+PROBE-ACNC: NOT DETECTED IU/ML

## 2025-08-27 ENCOUNTER — PATIENT OUTREACH (OUTPATIENT)
Dept: NEPHROLOGY | Facility: CLINIC | Age: 65
End: 2025-08-27
Payer: COMMERCIAL

## 2025-08-28 ENCOUNTER — INFUSION THERAPY VISIT (OUTPATIENT)
Dept: INFUSION THERAPY | Facility: CLINIC | Age: 65
End: 2025-08-28
Attending: INTERNAL MEDICINE
Payer: COMMERCIAL

## 2025-08-28 VITALS
OXYGEN SATURATION: 96 % | HEART RATE: 75 BPM | DIASTOLIC BLOOD PRESSURE: 71 MMHG | SYSTOLIC BLOOD PRESSURE: 113 MMHG | RESPIRATION RATE: 18 BRPM | TEMPERATURE: 98.1 F

## 2025-08-28 DIAGNOSIS — N05.0 UNSPECIFIED NEPHRITIC SYNDROME WITH MINOR GLOMERULAR ABNORMALITY: Primary | ICD-10-CM

## 2025-08-28 DIAGNOSIS — N08 CRYOGLOBULINEMIC GLOMERULONEPHRITIS: ICD-10-CM

## 2025-08-28 DIAGNOSIS — R80.9 NEPHROTIC RANGE PROTEINURIA: ICD-10-CM

## 2025-08-28 DIAGNOSIS — D89.1 CRYOGLOBULINEMIC GLOMERULONEPHRITIS: ICD-10-CM

## 2025-08-28 LAB
ALBUMIN MFR UR ELPH: 176.6 MG/DL
ALBUMIN SERPL BCG-MCNC: 3.1 G/DL (ref 3.5–5.2)
ALBUMIN UR-MCNC: 100 MG/DL
ANION GAP SERPL CALCULATED.3IONS-SCNC: 13 MMOL/L (ref 7–15)
APPEARANCE UR: ABNORMAL
BACTERIA #/AREA URNS HPF: ABNORMAL /HPF
BASOPHILS # BLD AUTO: 0.03 10E3/UL (ref 0–0.2)
BASOPHILS NFR BLD AUTO: 0.3 %
BILIRUB UR QL STRIP: NEGATIVE
BUN SERPL-MCNC: 31.9 MG/DL (ref 8–23)
CALCIUM SERPL-MCNC: 8.5 MG/DL (ref 8.8–10.4)
CD19 B CELL COMMENT: ABNORMAL
CD19 CELLS # BLD: 8 CELLS/UL (ref 107–698)
CD19 CELLS NFR BLD: 1 % (ref 6–27)
CHLORIDE SERPL-SCNC: 102 MMOL/L (ref 98–107)
COLOR UR AUTO: YELLOW
CREAT SERPL-MCNC: 2.52 MG/DL (ref 0.67–1.17)
CREAT UR-MCNC: 103.5 MG/DL
EGFRCR SERPLBLD CKD-EPI 2021: 28 ML/MIN/1.73M2
EOSINOPHIL # BLD AUTO: 0.39 10E3/UL (ref 0–0.7)
EOSINOPHIL NFR BLD AUTO: 3.7 %
ERYTHROCYTE [DISTWIDTH] IN BLOOD BY AUTOMATED COUNT: 12.9 % (ref 10–15)
GLUCOSE SERPL-MCNC: 199 MG/DL (ref 70–99)
GLUCOSE UR STRIP-MCNC: NEGATIVE MG/DL
HCO3 SERPL-SCNC: 19 MMOL/L (ref 22–29)
HCT VFR BLD AUTO: 39.7 % (ref 40–53)
HGB BLD-MCNC: 13.6 G/DL (ref 13.3–17.7)
HGB UR QL STRIP: ABNORMAL
IMM GRANULOCYTES # BLD: 0.04 10E3/UL
IMM GRANULOCYTES NFR BLD: 0.4 %
KETONES UR STRIP-MCNC: NEGATIVE MG/DL
LEUKOCYTE ESTERASE UR QL STRIP: ABNORMAL
LYMPHOCYTES # BLD AUTO: 0.85 10E3/UL (ref 0.8–5.3)
LYMPHOCYTES NFR BLD AUTO: 8 %
MCH RBC QN AUTO: 31.1 PG (ref 26.5–33)
MCHC RBC AUTO-ENTMCNC: 34.3 G/DL (ref 31.5–36.5)
MCV RBC AUTO: 90.6 FL (ref 78–100)
MONOCYTES # BLD AUTO: 0.76 10E3/UL (ref 0–1.3)
MONOCYTES NFR BLD AUTO: 7.1 %
NEUTROPHILS # BLD AUTO: 8.6 10E3/UL (ref 1.6–8.3)
NEUTROPHILS NFR BLD AUTO: 80.5 %
NITRATE UR QL: NEGATIVE
NRBC # BLD AUTO: <0.03 10E3/UL
NRBC BLD AUTO-RTO: 0 /100
PH UR STRIP: 6 [PH] (ref 5–7)
PHOSPHATE SERPL-MCNC: 3.6 MG/DL (ref 2.5–4.5)
PLATELET # BLD AUTO: 142 10E3/UL (ref 150–450)
POTASSIUM SERPL-SCNC: 3.5 MMOL/L (ref 3.4–5.3)
PROT/CREAT 24H UR: 1.71 MG/MG CR (ref 0–0.2)
RBC # BLD AUTO: 4.38 10E6/UL (ref 4.4–5.9)
RBC URINE: 1 /HPF
SODIUM SERPL-SCNC: 134 MMOL/L (ref 135–145)
SP GR UR STRIP: 1.01 (ref 1–1.03)
UROBILINOGEN UR STRIP-MCNC: NORMAL MG/DL
WBC # BLD AUTO: 10.67 10E3/UL (ref 4–11)
WBC CLUMPS #/AREA URNS HPF: PRESENT /HPF
WBC URINE: >182 /HPF

## 2025-08-28 PROCEDURE — 250N000011 HC RX IP 250 OP 636: Performed by: INTERNAL MEDICINE

## 2025-08-28 PROCEDURE — 258N000003 HC RX IP 258 OP 636: Performed by: INTERNAL MEDICINE

## 2025-08-28 PROCEDURE — 82310 ASSAY OF CALCIUM: CPT | Performed by: INTERNAL MEDICINE

## 2025-08-28 PROCEDURE — 250N000013 HC RX MED GY IP 250 OP 250 PS 637: Performed by: INTERNAL MEDICINE

## 2025-08-28 PROCEDURE — 81001 URINALYSIS AUTO W/SCOPE: CPT

## 2025-08-28 PROCEDURE — 84156 ASSAY OF PROTEIN URINE: CPT

## 2025-08-28 PROCEDURE — 36415 COLL VENOUS BLD VENIPUNCTURE: CPT

## 2025-08-28 PROCEDURE — 86355 B CELLS TOTAL COUNT: CPT

## 2025-08-28 PROCEDURE — 85025 COMPLETE CBC W/AUTO DIFF WBC: CPT | Performed by: INTERNAL MEDICINE

## 2025-08-28 RX ORDER — METHYLPREDNISOLONE SODIUM SUCCINATE 40 MG/ML
40 INJECTION INTRAMUSCULAR; INTRAVENOUS
Start: 2026-02-24

## 2025-08-28 RX ORDER — DIPHENHYDRAMINE HYDROCHLORIDE 50 MG/ML
50 INJECTION, SOLUTION INTRAMUSCULAR; INTRAVENOUS
Start: 2026-02-24

## 2025-08-28 RX ORDER — EPINEPHRINE 1 MG/ML
0.3 INJECTION, SOLUTION, CONCENTRATE INTRAVENOUS EVERY 5 MIN PRN
OUTPATIENT
Start: 2026-02-24

## 2025-08-28 RX ORDER — DIPHENHYDRAMINE HCL 25 MG
50 CAPSULE ORAL ONCE
Status: COMPLETED | OUTPATIENT
Start: 2025-08-28 | End: 2025-08-28

## 2025-08-28 RX ORDER — ALBUTEROL SULFATE 90 UG/1
1-2 INHALANT RESPIRATORY (INHALATION)
Start: 2026-02-24

## 2025-08-28 RX ORDER — DIPHENHYDRAMINE HCL 25 MG
50 CAPSULE ORAL ONCE
Start: 2026-02-24

## 2025-08-28 RX ORDER — METHYLPREDNISOLONE SODIUM SUCCINATE 125 MG/2ML
80 INJECTION INTRAMUSCULAR; INTRAVENOUS ONCE
OUTPATIENT
Start: 2026-02-24

## 2025-08-28 RX ORDER — MEPERIDINE HYDROCHLORIDE 25 MG/ML
25 INJECTION INTRAMUSCULAR; INTRAVENOUS; SUBCUTANEOUS
OUTPATIENT
Start: 2026-02-24

## 2025-08-28 RX ORDER — ACETAMINOPHEN 325 MG/1
650 TABLET ORAL ONCE
Start: 2026-02-24

## 2025-08-28 RX ORDER — ALBUTEROL SULFATE 0.83 MG/ML
2.5 SOLUTION RESPIRATORY (INHALATION)
OUTPATIENT
Start: 2026-02-24

## 2025-08-28 RX ORDER — ACETAMINOPHEN 325 MG/1
650 TABLET ORAL ONCE
Status: COMPLETED | OUTPATIENT
Start: 2025-08-28 | End: 2025-08-28

## 2025-08-28 RX ORDER — METHYLPREDNISOLONE SODIUM SUCCINATE 125 MG/2ML
80 INJECTION INTRAMUSCULAR; INTRAVENOUS ONCE
Status: COMPLETED | OUTPATIENT
Start: 2025-08-28 | End: 2025-08-28

## 2025-08-28 RX ORDER — DIPHENHYDRAMINE HYDROCHLORIDE 50 MG/ML
25 INJECTION, SOLUTION INTRAMUSCULAR; INTRAVENOUS
Start: 2026-02-24

## 2025-08-28 RX ADMIN — SODIUM CHLORIDE 1000 MG: 0.9 INJECTION, SOLUTION INTRAVENOUS at 10:56

## 2025-08-28 RX ADMIN — ACETAMINOPHEN 650 MG: 325 TABLET ORAL at 10:29

## 2025-08-28 RX ADMIN — DIPHENHYDRAMINE HYDROCHLORIDE 50 MG: 25 CAPSULE ORAL at 10:29

## 2025-08-28 RX ADMIN — METHYLPREDNISOLONE SODIUM SUCCINATE 81.25 MG: 125 INJECTION INTRAMUSCULAR; INTRAVENOUS at 10:33

## (undated) RX ORDER — SODIUM CHLORIDE 9 MG/ML
INJECTION, SOLUTION INTRAVENOUS
Status: DISPENSED
Start: 2023-11-07

## (undated) RX ORDER — DIPHENHYDRAMINE HCL 25 MG
CAPSULE ORAL
Status: DISPENSED
Start: 2024-10-17

## (undated) RX ORDER — DEXTROSE MONOHYDRATE 25 G/50ML
INJECTION, SOLUTION INTRAVENOUS
Status: DISPENSED
Start: 2020-07-18

## (undated) RX ORDER — PROPOFOL 10 MG/ML
INJECTION, EMULSION INTRAVENOUS
Status: DISPENSED
Start: 2020-07-18

## (undated) RX ORDER — LIDOCAINE HYDROCHLORIDE 10 MG/ML
INJECTION, SOLUTION EPIDURAL; INFILTRATION; INTRACAUDAL; PERINEURAL
Status: DISPENSED
Start: 2019-06-14

## (undated) RX ORDER — METHYLPREDNISOLONE SODIUM SUCCINATE 125 MG/2ML
INJECTION INTRAMUSCULAR; INTRAVENOUS
Status: DISPENSED
Start: 2025-02-27

## (undated) RX ORDER — FENTANYL CITRATE-0.9 % NACL/PF 10 MCG/ML
PLASTIC BAG, INJECTION (ML) INTRAVENOUS
Status: DISPENSED
Start: 2020-07-18

## (undated) RX ORDER — ALBUMIN (HUMAN) 12.5 G/50ML
SOLUTION INTRAVENOUS
Status: DISPENSED
Start: 2019-06-21

## (undated) RX ORDER — METHYLPREDNISOLONE SODIUM SUCCINATE 125 MG/2ML
INJECTION INTRAMUSCULAR; INTRAVENOUS
Status: DISPENSED
Start: 2024-10-17

## (undated) RX ORDER — LIDOCAINE HYDROCHLORIDE 20 MG/ML
INJECTION, SOLUTION EPIDURAL; INFILTRATION; INTRACAUDAL; PERINEURAL
Status: DISPENSED
Start: 2020-07-18

## (undated) RX ORDER — SIMETHICONE 20 MG/.3ML
EMULSION ORAL
Status: DISPENSED
Start: 2018-10-18

## (undated) RX ORDER — LIDOCAINE HYDROCHLORIDE 10 MG/ML
INJECTION, SOLUTION EPIDURAL; INFILTRATION; INTRACAUDAL; PERINEURAL
Status: DISPENSED
Start: 2023-11-07

## (undated) RX ORDER — ACETAMINOPHEN 325 MG/1
TABLET ORAL
Status: DISPENSED
Start: 2024-01-12

## (undated) RX ORDER — ALBUMIN (HUMAN) 12.5 G/50ML
SOLUTION INTRAVENOUS
Status: DISPENSED
Start: 2019-05-24

## (undated) RX ORDER — ACETAMINOPHEN 325 MG/1
TABLET ORAL
Status: DISPENSED
Start: 2023-12-22

## (undated) RX ORDER — FENTANYL CITRATE 50 UG/ML
INJECTION, SOLUTION INTRAMUSCULAR; INTRAVENOUS
Status: DISPENSED
Start: 2018-10-18

## (undated) RX ORDER — DIPHENHYDRAMINE HCL 25 MG
CAPSULE ORAL
Status: DISPENSED
Start: 2024-01-19

## (undated) RX ORDER — LIDOCAINE HYDROCHLORIDE 10 MG/ML
INJECTION, SOLUTION EPIDURAL; INFILTRATION; INTRACAUDAL; PERINEURAL
Status: DISPENSED
Start: 2019-07-05

## (undated) RX ORDER — GLYCOPYRROLATE 0.2 MG/ML
INJECTION, SOLUTION INTRAMUSCULAR; INTRAVENOUS
Status: DISPENSED
Start: 2020-07-18

## (undated) RX ORDER — ESMOLOL HYDROCHLORIDE 10 MG/ML
INJECTION INTRAVENOUS
Status: DISPENSED
Start: 2020-07-18

## (undated) RX ORDER — METHYLPREDNISOLONE SODIUM SUCCINATE 125 MG/2ML
INJECTION INTRAMUSCULAR; INTRAVENOUS
Status: DISPENSED
Start: 2025-08-28

## (undated) RX ORDER — DIPHENHYDRAMINE HCL 25 MG
CAPSULE ORAL
Status: DISPENSED
Start: 2024-01-12

## (undated) RX ORDER — DIPHENHYDRAMINE HCL 25 MG
CAPSULE ORAL
Status: DISPENSED
Start: 2024-01-05

## (undated) RX ORDER — FENTANYL CITRATE 50 UG/ML
INJECTION, SOLUTION INTRAMUSCULAR; INTRAVENOUS
Status: DISPENSED
Start: 2020-07-18

## (undated) RX ORDER — METHYLPREDNISOLONE SODIUM SUCCINATE 125 MG/2ML
INJECTION, POWDER, LYOPHILIZED, FOR SOLUTION INTRAMUSCULAR; INTRAVENOUS
Status: DISPENSED
Start: 2023-12-22

## (undated) RX ORDER — METOPROLOL TARTRATE 1 MG/ML
INJECTION, SOLUTION INTRAVENOUS
Status: DISPENSED
Start: 2020-07-18

## (undated) RX ORDER — ALBUMIN, HUMAN INJ 5% 5 %
SOLUTION INTRAVENOUS
Status: DISPENSED
Start: 2020-07-18

## (undated) RX ORDER — DIPHENHYDRAMINE HCL 25 MG
CAPSULE ORAL
Status: DISPENSED
Start: 2023-12-22

## (undated) RX ORDER — DEXAMETHASONE SODIUM PHOSPHATE 4 MG/ML
INJECTION, SOLUTION INTRA-ARTICULAR; INTRALESIONAL; INTRAMUSCULAR; INTRAVENOUS; SOFT TISSUE
Status: DISPENSED
Start: 2020-07-18

## (undated) RX ORDER — LIDOCAINE HYDROCHLORIDE 10 MG/ML
INJECTION, SOLUTION EPIDURAL; INFILTRATION; INTRACAUDAL; PERINEURAL
Status: DISPENSED
Start: 2019-05-28

## (undated) RX ORDER — ALBUMIN (HUMAN) 12.5 G/50ML
SOLUTION INTRAVENOUS
Status: DISPENSED
Start: 2019-07-12

## (undated) RX ORDER — CEFAZOLIN SODIUM 1 G/3ML
INJECTION, POWDER, FOR SOLUTION INTRAMUSCULAR; INTRAVENOUS
Status: DISPENSED
Start: 2020-07-18

## (undated) RX ORDER — DIPHENHYDRAMINE HCL 25 MG
CAPSULE ORAL
Status: DISPENSED
Start: 2025-02-27

## (undated) RX ORDER — METHYLPREDNISOLONE SODIUM SUCCINATE 125 MG/2ML
INJECTION, POWDER, LYOPHILIZED, FOR SOLUTION INTRAMUSCULAR; INTRAVENOUS
Status: DISPENSED
Start: 2024-01-05

## (undated) RX ORDER — LIDOCAINE HYDROCHLORIDE 10 MG/ML
INJECTION, SOLUTION EPIDURAL; INFILTRATION; INTRACAUDAL; PERINEURAL
Status: DISPENSED
Start: 2019-06-07

## (undated) RX ORDER — DIPHENHYDRAMINE HCL 25 MG
CAPSULE ORAL
Status: DISPENSED
Start: 2025-08-28

## (undated) RX ORDER — ALBUMIN (HUMAN) 12.5 G/50ML
SOLUTION INTRAVENOUS
Status: DISPENSED
Start: 2019-06-14

## (undated) RX ORDER — ACETAMINOPHEN 325 MG/1
TABLET ORAL
Status: DISPENSED
Start: 2024-01-05

## (undated) RX ORDER — FENTANYL CITRATE 50 UG/ML
INJECTION, SOLUTION INTRAMUSCULAR; INTRAVENOUS
Status: DISPENSED
Start: 2023-11-07

## (undated) RX ORDER — METHYLPREDNISOLONE SODIUM SUCCINATE 125 MG/2ML
INJECTION, POWDER, LYOPHILIZED, FOR SOLUTION INTRAMUSCULAR; INTRAVENOUS
Status: DISPENSED
Start: 2024-01-12

## (undated) RX ORDER — LIDOCAINE HYDROCHLORIDE 10 MG/ML
INJECTION, SOLUTION EPIDURAL; INFILTRATION; INTRACAUDAL; PERINEURAL
Status: DISPENSED
Start: 2019-06-21

## (undated) RX ORDER — ALBUMIN (HUMAN) 12.5 G/50ML
SOLUTION INTRAVENOUS
Status: DISPENSED
Start: 2019-06-07

## (undated) RX ORDER — LIDOCAINE HYDROCHLORIDE 10 MG/ML
INJECTION, SOLUTION EPIDURAL; INFILTRATION; INTRACAUDAL; PERINEURAL
Status: DISPENSED
Start: 2019-07-12

## (undated) RX ORDER — ALBUMIN (HUMAN) 12.5 G/50ML
SOLUTION INTRAVENOUS
Status: DISPENSED
Start: 2019-05-28

## (undated) RX ORDER — ONDANSETRON 2 MG/ML
INJECTION INTRAMUSCULAR; INTRAVENOUS
Status: DISPENSED
Start: 2020-07-18

## (undated) RX ORDER — ALBUMIN (HUMAN) 12.5 G/50ML
SOLUTION INTRAVENOUS
Status: DISPENSED
Start: 2019-05-17

## (undated) RX ORDER — LIDOCAINE HYDROCHLORIDE 10 MG/ML
INJECTION, SOLUTION EPIDURAL; INFILTRATION; INTRACAUDAL; PERINEURAL
Status: DISPENSED
Start: 2019-05-24

## (undated) RX ORDER — ALBUMIN (HUMAN) 12.5 G/50ML
SOLUTION INTRAVENOUS
Status: DISPENSED
Start: 2019-07-05

## (undated) RX ORDER — ACETAMINOPHEN 325 MG/1
TABLET ORAL
Status: DISPENSED
Start: 2025-02-27

## (undated) RX ORDER — LIDOCAINE HYDROCHLORIDE 10 MG/ML
INJECTION, SOLUTION EPIDURAL; INFILTRATION; INTRACAUDAL; PERINEURAL
Status: DISPENSED
Start: 2020-07-27

## (undated) RX ORDER — LIDOCAINE HYDROCHLORIDE 10 MG/ML
INJECTION, SOLUTION EPIDURAL; INFILTRATION; INTRACAUDAL; PERINEURAL
Status: DISPENSED
Start: 2019-05-17

## (undated) RX ORDER — ACETAMINOPHEN 325 MG/1
TABLET ORAL
Status: DISPENSED
Start: 2024-01-19

## (undated) RX ORDER — ALBUMIN (HUMAN) 12.5 G/50ML
SOLUTION INTRAVENOUS
Status: DISPENSED
Start: 2019-06-28

## (undated) RX ORDER — METHYLPREDNISOLONE SODIUM SUCCINATE 125 MG/2ML
INJECTION, POWDER, LYOPHILIZED, FOR SOLUTION INTRAMUSCULAR; INTRAVENOUS
Status: DISPENSED
Start: 2024-01-19

## (undated) RX ORDER — ACETAMINOPHEN 325 MG/1
TABLET ORAL
Status: DISPENSED
Start: 2024-10-17

## (undated) RX ORDER — LIDOCAINE HYDROCHLORIDE 10 MG/ML
INJECTION, SOLUTION EPIDURAL; INFILTRATION; INTRACAUDAL; PERINEURAL
Status: DISPENSED
Start: 2019-06-28

## (undated) RX ORDER — ACETAMINOPHEN 325 MG/1
TABLET ORAL
Status: DISPENSED
Start: 2025-08-28